# Patient Record
Sex: MALE | Race: BLACK OR AFRICAN AMERICAN | NOT HISPANIC OR LATINO | Employment: OTHER | URBAN - METROPOLITAN AREA
[De-identification: names, ages, dates, MRNs, and addresses within clinical notes are randomized per-mention and may not be internally consistent; named-entity substitution may affect disease eponyms.]

---

## 2017-01-09 RX ORDER — CARVEDILOL 25 MG/1
TABLET ORAL
Qty: 60 TABLET | Refills: 3 | Status: SHIPPED | OUTPATIENT
Start: 2017-01-09 | End: 2017-05-18 | Stop reason: SDUPTHER

## 2017-02-24 ENCOUNTER — LAB VISIT (OUTPATIENT)
Dept: LAB | Facility: HOSPITAL | Age: 72
End: 2017-02-24
Attending: INTERNAL MEDICINE
Payer: MEDICARE

## 2017-02-24 ENCOUNTER — TELEPHONE (OUTPATIENT)
Dept: INTERNAL MEDICINE | Facility: CLINIC | Age: 72
End: 2017-02-24

## 2017-02-24 DIAGNOSIS — R06.6 HICCUPS: ICD-10-CM

## 2017-02-24 DIAGNOSIS — I10 HTN (HYPERTENSION), BENIGN: Primary | ICD-10-CM

## 2017-02-24 DIAGNOSIS — I10 HTN (HYPERTENSION), BENIGN: ICD-10-CM

## 2017-02-24 LAB
ANION GAP SERPL CALC-SCNC: 10 MMOL/L
BUN SERPL-MCNC: 34 MG/DL
CALCIUM SERPL-MCNC: 9.5 MG/DL
CHLORIDE SERPL-SCNC: 98 MMOL/L
CO2 SERPL-SCNC: 28 MMOL/L
CREAT SERPL-MCNC: 2.2 MG/DL
EST. GFR  (AFRICAN AMERICAN): 33.4 ML/MIN/1.73 M^2
EST. GFR  (NON AFRICAN AMERICAN): 28.9 ML/MIN/1.73 M^2
GLUCOSE SERPL-MCNC: 502 MG/DL
POTASSIUM SERPL-SCNC: 4.3 MMOL/L
SODIUM SERPL-SCNC: 136 MMOL/L

## 2017-02-24 PROCEDURE — 36415 COLL VENOUS BLD VENIPUNCTURE: CPT | Mod: PO

## 2017-02-24 PROCEDURE — 83036 HEMOGLOBIN GLYCOSYLATED A1C: CPT

## 2017-02-24 PROCEDURE — 80048 BASIC METABOLIC PNL TOTAL CA: CPT

## 2017-02-24 RX ORDER — PANTOPRAZOLE SODIUM 40 MG/1
40 TABLET, DELAYED RELEASE ORAL DAILY
Qty: 30 TABLET | Refills: 3 | Status: SHIPPED | OUTPATIENT
Start: 2017-02-24 | End: 2017-06-30 | Stop reason: SDUPTHER

## 2017-02-25 LAB
ESTIMATED AVG GLUCOSE: 306 MG/DL
HBA1C MFR BLD HPLC: 12.3 %

## 2017-02-25 NOTE — PROGRESS NOTES
IM on call--  Lab call bs 500.  Spoke with pt. BS's running 300's generally.  Feels fine.  He is on 42 units levemir. Advised increase to 48 units daily.  Call PCP 1 week with BS readings

## 2017-03-02 ENCOUNTER — TELEPHONE (OUTPATIENT)
Dept: INTERNAL MEDICINE | Facility: CLINIC | Age: 72
End: 2017-03-02

## 2017-03-02 NOTE — TELEPHONE ENCOUNTER
Please schedule an appointment for patient to be seen.  He will need to have an evaluation.  Patient mentioned symptoms during his wife's appointment, and labs were ordered.  His blood sugar was very high, and insulin dose subsequently adjusted.

## 2017-03-02 NOTE — TELEPHONE ENCOUNTER
Family has been notified the patient needs to schedule an appointment, Also discussed  The levels and correct dieting. Will call to schedule

## 2017-03-03 ENCOUNTER — TELEPHONE (OUTPATIENT)
Dept: INTERNAL MEDICINE | Facility: CLINIC | Age: 72
End: 2017-03-03

## 2017-03-03 NOTE — TELEPHONE ENCOUNTER
----- Message from Mehnaz Mantilla sent at 3/2/2017  2:26 PM CST -----  Contact: Wife/[phone]  Sooner appointment than the  can schedule.  When is the first available appointment: 4/26  What is the nature of the appointment: follow up  What visit type: EP  Patient preference of timeframe to be scheduled:  After March 13  Comments: Pt's wife states that she spoke to Cee  About an apt in March

## 2017-03-03 NOTE — TELEPHONE ENCOUNTER
----- Message from Cha Armenta sent at 3/1/2017 11:32 AM CST -----  Contact: wife/juan/[phone]  Pt wife called in regards to a missed call from the office.        Please advise

## 2017-03-08 RX ORDER — INSULIN LISPRO 100 [IU]/ML
INJECTION, SOLUTION INTRAVENOUS; SUBCUTANEOUS
Qty: 1 BOX | Refills: 6 | Status: SHIPPED | OUTPATIENT
Start: 2017-03-08 | End: 2017-08-04 | Stop reason: SDUPTHER

## 2017-03-14 ENCOUNTER — OFFICE VISIT (OUTPATIENT)
Dept: INTERNAL MEDICINE | Facility: CLINIC | Age: 72
End: 2017-03-14
Payer: MEDICARE

## 2017-03-14 ENCOUNTER — LAB VISIT (OUTPATIENT)
Dept: LAB | Facility: HOSPITAL | Age: 72
End: 2017-03-14
Attending: INTERNAL MEDICINE
Payer: MEDICARE

## 2017-03-14 VITALS
WEIGHT: 249.31 LBS | HEIGHT: 75 IN | RESPIRATION RATE: 18 BRPM | TEMPERATURE: 98 F | BODY MASS INDEX: 31 KG/M2 | HEART RATE: 74 BPM | DIASTOLIC BLOOD PRESSURE: 78 MMHG | SYSTOLIC BLOOD PRESSURE: 146 MMHG

## 2017-03-14 DIAGNOSIS — N18.30 CKD (CHRONIC KIDNEY DISEASE) STAGE 3, GFR 30-59 ML/MIN: ICD-10-CM

## 2017-03-14 LAB
ANION GAP SERPL CALC-SCNC: 10 MMOL/L
BUN SERPL-MCNC: 27 MG/DL
CALCIUM SERPL-MCNC: 9.5 MG/DL
CHLORIDE SERPL-SCNC: 104 MMOL/L
CO2 SERPL-SCNC: 26 MMOL/L
CREAT SERPL-MCNC: 1.9 MG/DL
EST. GFR  (AFRICAN AMERICAN): 39.8 ML/MIN/1.73 M^2
EST. GFR  (NON AFRICAN AMERICAN): 34.5 ML/MIN/1.73 M^2
GLUCOSE SERPL-MCNC: 198 MG/DL
POTASSIUM SERPL-SCNC: 4.1 MMOL/L
SODIUM SERPL-SCNC: 140 MMOL/L

## 2017-03-14 PROCEDURE — 80048 BASIC METABOLIC PNL TOTAL CA: CPT

## 2017-03-14 PROCEDURE — 99999 PR PBB SHADOW E&M-EST. PATIENT-LVL III: CPT | Mod: PBBFAC,,, | Performed by: INTERNAL MEDICINE

## 2017-03-14 PROCEDURE — 99213 OFFICE O/P EST LOW 20 MIN: CPT | Mod: S$PBB,,, | Performed by: INTERNAL MEDICINE

## 2017-03-14 PROCEDURE — 36415 COLL VENOUS BLD VENIPUNCTURE: CPT | Mod: PO

## 2017-03-14 NOTE — MR AVS SNAPSHOT
[place]   [facility]  [place] [id number]  Phone: [phone]  Fax: [phone]                  Domingo Castro   3/14/2017 3:20 PM   Office Visit    Description:  Male : 1945   Provider:  Griselda Nugent MD   Department:  [facility] [place]           Reason for Visit     Follow-up           Diagnoses this Visit        Comments    Type 2 diabetes, uncontrolled, with neuropathy    -  Primary     CKD (chronic kidney disease) stage 3, GFR 30-59 ml/min                To Do List           Future Appointments        Provider Department Dept Phone    2017 8:15 AM HORACIO BROTHERS [facility] Ophthalmology [phone]    2017 9:30 AM Jane Moreno MD [facility] Ophthalmology [phone]      Goals (5 Years of Data)     None      Follow-Up and Disposition     Return in about 3 months (around 2017).      [facility]     [facility] On Call Nurse [facility] -  Assistance  Registered nurses in the [facility] Call Center provide clinical advisement, health education, appointment booking, and other advisory services.  Call for this free service at [phone].             Medications           Message regarding Medications     Verify the changes and/or additions to your medication regime listed below are the same as discussed with your clinician today.  If any of these changes or additions are incorrect, please notify your healthcare provider.             Verify that the below list of medications is an accurate representation of the medications you are currently taking.  If none reported, the list may be blank. If incorrect, please contact your healthcare provider. Carry this list with you in case of emergency.           Current Medications     amlodipine (NORVASC) 5 MG tablet Take 1 tablet (5 mg total) by mouth once daily.    aspirin (ECOTRIN) 81 MG EC tablet Take 1 tablet (81 mg total) by mouth once daily.    carvedilol (COREG) 25 MG tablet  "TAKE ONE TABLET BY MOUTH TWICE DAILY WITH MEALS    CRESTOR 20 mg tablet TAKE ONE TABLET BY MOUTH ONCE DAILY    cyclobenzaprine (FLEXERIL) 10 MG tablet TAKE ONE TABLET BY MOUTH THREE TIMES DAILY AS NEEDED FOR MUSCLE SPASM    gabapentin (NEURONTIN) 100 MG capsule Take 1 tab PO qam, 1 tab PO qpm and 3 tabs PO qHS    HUMALOG KWIKPEN 100 unit/mL InPn pen INJECT 10 UNITS SUBCUTANEOUSLY THREE TIMES DAILY WITH MEALS    hydrALAZINE (APRESOLINE) 50 MG tablet TAKE ONE TABLET BY MOUTH EVERY 12 HOURS    hydrochlorothiazide (HYDRODIURIL) 25 MG tablet Take 1 tablet (25 mg total) by mouth every morning.    hydrocodone-acetaminophen 5-325mg (NORCO) 5-325 mg per tablet Take 1 tablet by mouth every 4 to 6 hours as needed for Pain.    hydrocodone-acetaminophen 5-325mg (NORCO) 5-325 mg per tablet Take 1 tablet by mouth every 6 (six) hours as needed for Pain.    insulin detemir (LEVEMIR FLEXPEN) 100 unit/mL (3 mL) SubQ InPn pen Inject 48 Units into the skin once daily.    losartan (COZAAR) 100 MG tablet Take 1 tablet (100 mg total) by mouth once daily.    MG GLY-CALCIUM-POTASSIUM-E-NNEKA ORAL Take 25 mg by mouth once daily.    pantoprazole (PROTONIX) 40 MG tablet Take 1 tablet (40 mg total) by mouth once daily.           Clinical Reference Information           Your Vitals Were     BP Pulse Temp Resp Height Weight    146/78 (BP Location: Left arm, Patient Position: Sitting, BP Method: Manual) 74 98.2 °F (36.8 °C) (Oral) 18 6' 3" (1.905 m) 113.1 kg (249 lb 5.4 oz)    BMI                31.17 kg/m2          Blood Pressure          Most Recent Value    BP  (!)  146/78      Allergies as of 3/14/2017     Atorvastatin      Immunizations Administered on Date of Encounter - 3/14/2017     None      Orders Placed During Today's Visit     Future Labs/Procedures Expected by Expires    Basic metabolic panel  3/14/2017 3/14/2018      Language Assistance Services     ATTENTION: Language assistance services are available, free of charge. Please call " [phone].      ATENCIÓN: Si habla español, tiene a roth disposición servicios gratuitos de asistencia lingüística. Llame al [phone].     CHÚ Ý: N?u b?n nói Ti?ng Vi?t, có các d?ch v? h? tr? ngôn ng? mi?n phí dành cho b?n. G?i s? [phone].         [facility] complies with applicable Federal civil rights laws and does not discriminate on the basis of race, color, national origin, age, disability, or sex.

## 2017-03-14 NOTE — PROGRESS NOTES
CC: followup of hypertension and diabetes   HPI:  The patient is a 72 y.o. year old male who presents to the office for followup of hypertension and diabetes.  The patient denies any chest pain, shortness of breath, headache, nausea or vomiting, but complains of blurred vision and fatigue.  He reports his blood sugars have been labile.  He states his blood sugar was 60 today.  The patient states hiccups have resolved.    PAST MEDICAL HISTORY:  Past Medical History:   Diagnosis Date    Cataract     Cystoid macular edema of both eyes     Diabetes mellitus     Diabetes mellitus type II     Hyperlipemia     Hypertension     Retinal hole        SURGICAL HISTORY:  Past Surgical History:   Procedure Laterality Date    CATARACT EXTRACTION W/  INTRAOCULAR LENS IMPLANT Left 12/15/14    Dr de la cruz    CATARACT EXTRACTION W/  INTRAOCULAR LENS IMPLANT Right 14    dorothy    CIRCUMCISION, NON-      focal laser right eye      KNEE SURGERY      left    Left medial collateral ligament repair      TONSILLECTOMY         MEDS:  Medcard reviewed and updated    ALLERGIES: Allergy Card reviewed and updated    SOCIAL HISTORY:   The patient is a nonsmoker.    PE:   APPEARANCE: Well nourished, well developed, in no acute distress.    CHEST: Lungs clear to auscultation with unlabored respirations.  CARDIOVASCULAR: Normal S1, S2. No murmurs. No carotid bruits. No pedal edema.  ABDOMEN: Bowel sounds normal. Not distended. Soft. No tenderness or masses.   PSYCHIATRIC: The patient is oriented to person, place, and time and has a pleasant affect.        ASSESSMENT/PLAN:  Domingo was seen today for follow-up.    Diagnoses and all orders for this visit:    Type 2 diabetes, uncontrolled, with neuropathy  -     Basic metabolic panel; Future  -     Encourage adherence to diet and medications    CKD (chronic kidney disease) stage 3, GFR 30-59 ml/min  -     Basic metabolic panel; Future  -     Recent creatinine was elevated,  retest

## 2017-03-16 ENCOUNTER — TELEPHONE (OUTPATIENT)
Dept: INTERNAL MEDICINE | Facility: CLINIC | Age: 72
End: 2017-03-16

## 2017-03-16 DIAGNOSIS — N18.30 CKD (CHRONIC KIDNEY DISEASE) STAGE 3, GFR 30-59 ML/MIN: ICD-10-CM

## 2017-03-16 DIAGNOSIS — I10 HTN (HYPERTENSION), BENIGN: ICD-10-CM

## 2017-03-16 NOTE — TELEPHONE ENCOUNTER
Please inform patient that glucose remains elevated at 198, but overall improved.  Also, kidney function has improved somewhat.  Please schedule follow-up in 3 months with fasting labs.

## 2017-03-17 NOTE — TELEPHONE ENCOUNTER
Spoke to pt and he was given the result of lab and appt was booked . Patient said he would get it off his my chart portal

## 2017-05-02 ENCOUNTER — INITIAL CONSULT (OUTPATIENT)
Dept: OPHTHALMOLOGY | Facility: CLINIC | Age: 72
End: 2017-05-02
Payer: MEDICARE

## 2017-05-02 ENCOUNTER — CLINICAL SUPPORT (OUTPATIENT)
Dept: OPHTHALMOLOGY | Facility: CLINIC | Age: 72
End: 2017-05-02
Payer: MEDICARE

## 2017-05-02 DIAGNOSIS — H02.403 PTOSIS, BILATERAL: ICD-10-CM

## 2017-05-02 DIAGNOSIS — H02.423 MYOGENIC PTOSIS OF BILATERAL EYELIDS: Primary | ICD-10-CM

## 2017-05-02 DIAGNOSIS — E11.3413 DIABETIC MACULAR EDEMA OF BOTH EYES WITH SEVERE NONPROLIFERATIVE RETINOPATHY ASSOCIATED WITH TYPE 2 DIABETES MELLITUS: ICD-10-CM

## 2017-05-02 DIAGNOSIS — H35.033 HYPERTENSIVE RETINOPATHY OF BOTH EYES: ICD-10-CM

## 2017-05-02 DIAGNOSIS — E11.311 DIABETIC MACULAR EDEMA, RIGHT EYE: ICD-10-CM

## 2017-05-02 DIAGNOSIS — H33.302 RETINAL HOLE OR TEAR, LEFT: ICD-10-CM

## 2017-05-02 PROCEDURE — 99212 OFFICE O/P EST SF 10 MIN: CPT | Mod: PBBFAC | Performed by: OPHTHALMOLOGY

## 2017-05-02 PROCEDURE — 99999 PR PBB SHADOW E&M-EST. PATIENT-LVL II: CPT | Mod: PBBFAC,,, | Performed by: OPHTHALMOLOGY

## 2017-05-02 PROCEDURE — 92014 COMPRE OPH EXAM EST PT 1/>: CPT | Mod: S$PBB,,, | Performed by: OPHTHALMOLOGY

## 2017-05-02 PROCEDURE — 99999 PR PBB SHADOW E&M-EST. PATIENT-LVL I: CPT | Mod: PBBFAC,,,

## 2017-05-02 PROCEDURE — 99211 OFF/OP EST MAY X REQ PHY/QHP: CPT | Mod: PBBFAC,27

## 2017-05-02 NOTE — PROGRESS NOTES
HPI     Ptosis    Additional comments: ref. by Dr Harrison for ptosis eval           Comments   DLS 12/13/16- Patient feels as if lids are sagging to much. He has to open   his eyes really wide to see things. Difficult driving at night.    RGI=177 this am fasting  A1c=12.4 (10/28/16)    1. Severe NPDR OU     2. DME OU   S/p Focal OU (OS x 2 06/13)   S/p focal OS (6/1/15)   S/p Ozurdex x 1 (8/9/16)    3. PCIOL OU     4. HTN Ret OU     5. Ret Hole x 2   S/p barrier laser      Eyelid surgery by Dr Bautista               Last edited by Lyndsay Barkley on 5/2/2017  9:55 AM. (History)            Assessment /Plan     For exam results, see Encounter Report.    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  Now with recurrent ptosis.  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 Ice pack test negative.      Plan for bilateral external levator resection with conservative blepharoplasty. MRD 1 equivalent on exam today.    Informed consent obtained after extensive risks/benefits/alternatives were discussed with the patient including but not limited to pain, bleeding, infection, ocular injury, loss of the eye, asymmetry, need for revision in future, scarring.  Alternatives such as waiting were discussed.  All questions were answered.      Hold ASA, NSAIDS, and fish oil 5 to 7 days prior to procedure.    Return for surgery

## 2017-05-19 RX ORDER — CARVEDILOL 25 MG/1
TABLET ORAL
Qty: 60 TABLET | Refills: 3 | Status: SHIPPED | OUTPATIENT
Start: 2017-05-19 | End: 2017-08-28 | Stop reason: SDUPTHER

## 2017-06-02 DIAGNOSIS — H02.403 PTOSIS, BILATERAL: Primary | ICD-10-CM

## 2017-06-12 ENCOUNTER — PATIENT MESSAGE (OUTPATIENT)
Dept: INTERNAL MEDICINE | Facility: CLINIC | Age: 72
End: 2017-06-12

## 2017-06-13 ENCOUNTER — LAB VISIT (OUTPATIENT)
Dept: LAB | Facility: HOSPITAL | Age: 72
End: 2017-06-13
Attending: INTERNAL MEDICINE
Payer: MEDICARE

## 2017-06-13 DIAGNOSIS — N18.30 CKD (CHRONIC KIDNEY DISEASE) STAGE 3, GFR 30-59 ML/MIN: ICD-10-CM

## 2017-06-13 DIAGNOSIS — I10 HTN (HYPERTENSION), BENIGN: ICD-10-CM

## 2017-06-13 LAB
ALBUMIN SERPL BCP-MCNC: 3.5 G/DL
ALP SERPL-CCNC: 88 U/L
ALT SERPL W/O P-5'-P-CCNC: 15 U/L
ANION GAP SERPL CALC-SCNC: 10 MMOL/L
AST SERPL-CCNC: 15 U/L
BILIRUB SERPL-MCNC: 0.4 MG/DL
BUN SERPL-MCNC: 29 MG/DL
CALCIUM SERPL-MCNC: 9.7 MG/DL
CHLORIDE SERPL-SCNC: 104 MMOL/L
CHOLEST/HDLC SERPL: 3.4 {RATIO}
CO2 SERPL-SCNC: 25 MMOL/L
CREAT SERPL-MCNC: 2 MG/DL
EST. GFR  (AFRICAN AMERICAN): 37.4 ML/MIN/1.73 M^2
EST. GFR  (NON AFRICAN AMERICAN): 32.4 ML/MIN/1.73 M^2
ESTIMATED AVG GLUCOSE: 246 MG/DL
GLUCOSE SERPL-MCNC: 300 MG/DL
HBA1C MFR BLD HPLC: 10.2 %
HDL/CHOLESTEROL RATIO: 29.4 %
HDLC SERPL-MCNC: 143 MG/DL
HDLC SERPL-MCNC: 42 MG/DL
LDLC SERPL CALC-MCNC: 81.6 MG/DL
NONHDLC SERPL-MCNC: 101 MG/DL
POTASSIUM SERPL-SCNC: 3.7 MMOL/L
PROT SERPL-MCNC: 7.6 G/DL
SODIUM SERPL-SCNC: 139 MMOL/L
TRIGL SERPL-MCNC: 97 MG/DL
TSH SERPL DL<=0.005 MIU/L-ACNC: 1.59 UIU/ML

## 2017-06-13 PROCEDURE — 80061 LIPID PANEL: CPT

## 2017-06-13 PROCEDURE — 36415 COLL VENOUS BLD VENIPUNCTURE: CPT | Mod: PO

## 2017-06-13 PROCEDURE — 80053 COMPREHEN METABOLIC PANEL: CPT

## 2017-06-13 PROCEDURE — 83036 HEMOGLOBIN GLYCOSYLATED A1C: CPT

## 2017-06-13 PROCEDURE — 84443 ASSAY THYROID STIM HORMONE: CPT

## 2017-06-13 NOTE — TELEPHONE ENCOUNTER
i spoke to patient. He needed rx for bd needle to use with flexpen.  loaded for you to approve.    He is going to call [employer] and see what kind of glucometer is covered so can get lancets and strips ordered.    Thanks zhanna

## 2017-06-15 ENCOUNTER — TELEPHONE (OUTPATIENT)
Dept: INTERNAL MEDICINE | Facility: CLINIC | Age: 72
End: 2017-06-15

## 2017-06-15 NOTE — TELEPHONE ENCOUNTER
Attempted to call the pharmacy and speak with them in regards to the Rx request change. There is no one in the office until 9a/ will RTC again

## 2017-06-15 NOTE — TELEPHONE ENCOUNTER
----- Message from Madelaine Neville sent at 6/15/2017  8:18 AM CDT -----  Contact: Karlee Vale [phone]  Karlee Vale would like to speak with the nurse regarding changing the pts Pen Arabi to  [employer] Brand Pen Needles because they are cheaper ,Please call

## 2017-06-30 RX ORDER — LOSARTAN POTASSIUM 100 MG/1
TABLET ORAL
Qty: 90 TABLET | Refills: 3 | Status: SHIPPED | OUTPATIENT
Start: 2017-06-30 | End: 2017-08-28 | Stop reason: SDUPTHER

## 2017-06-30 RX ORDER — HYDROCHLOROTHIAZIDE 25 MG/1
TABLET ORAL
Qty: 90 TABLET | Refills: 3 | Status: SHIPPED | OUTPATIENT
Start: 2017-06-30 | End: 2017-08-28 | Stop reason: SDUPTHER

## 2017-06-30 RX ORDER — PANTOPRAZOLE SODIUM 40 MG/1
TABLET, DELAYED RELEASE ORAL
Qty: 30 TABLET | Refills: 3 | Status: SHIPPED | OUTPATIENT
Start: 2017-06-30 | End: 2017-08-28 | Stop reason: SDUPTHER

## 2017-07-05 RX ORDER — AMLODIPINE BESYLATE 5 MG/1
TABLET ORAL
Qty: 90 TABLET | Refills: 0 | Status: SHIPPED | OUTPATIENT
Start: 2017-07-05 | End: 2017-08-28 | Stop reason: SDUPTHER

## 2017-07-21 RX ORDER — AMLODIPINE BESYLATE 5 MG/1
TABLET ORAL
Qty: 90 TABLET | Refills: 0 | Status: ON HOLD | OUTPATIENT
Start: 2017-07-21 | End: 2018-07-23 | Stop reason: HOSPADM

## 2017-07-31 ENCOUNTER — TELEPHONE (OUTPATIENT)
Dept: OPHTHALMOLOGY | Facility: CLINIC | Age: 72
End: 2017-07-31

## 2017-07-31 NOTE — TELEPHONE ENCOUNTER
----- Message from Osiel Dobbins sent at 7/31/2017 11:29 AM CDT -----  Contact: Domingo Castro       ----- Message -----  From: Nae Hart  Sent: 7/27/2017   2:18 PM  To: Osiel Dobbins    Pt would like to speak with you about the scheduled the eye surgery pt can be reached at [phone] or Home#  [phone] please thanks.

## 2017-08-01 ENCOUNTER — TELEPHONE (OUTPATIENT)
Dept: INTERNAL MEDICINE | Facility: CLINIC | Age: 72
End: 2017-08-01

## 2017-08-01 NOTE — TELEPHONE ENCOUNTER
----- Message from Marilyn Betts sent at 7/31/2017 11:34 AM CDT -----  Contact: self   Patient state he is calling to give information from his insurance company, ask for a call back too much information to provide.

## 2017-08-01 NOTE — TELEPHONE ENCOUNTER
Called to advise the patient he can bring the insurance information to the office,. There was no answer lmom

## 2017-08-02 ENCOUNTER — PATIENT MESSAGE (OUTPATIENT)
Dept: INTERNAL MEDICINE | Facility: CLINIC | Age: 72
End: 2017-08-02

## 2017-08-03 ENCOUNTER — TELEPHONE (OUTPATIENT)
Dept: OPHTHALMOLOGY | Facility: CLINIC | Age: 72
End: 2017-08-03

## 2017-08-03 DIAGNOSIS — H02.423 MYOGENIC PTOSIS OF BILATERAL EYELIDS: Primary | ICD-10-CM

## 2017-08-04 RX ORDER — INSULIN LISPRO 100 [IU]/ML
INJECTION, SOLUTION INTRAVENOUS; SUBCUTANEOUS
Qty: 3 BOX | Refills: 6 | Status: SHIPPED | OUTPATIENT
Start: 2017-08-04 | End: 2017-08-25 | Stop reason: SDUPTHER

## 2017-08-25 RX ORDER — INSULIN LISPRO 100 [IU]/ML
INJECTION, SOLUTION INTRAVENOUS; SUBCUTANEOUS
Qty: 3 BOX | Refills: 6 | Status: SHIPPED | OUTPATIENT
Start: 2017-08-25 | End: 2017-08-28 | Stop reason: SDUPTHER

## 2017-08-28 ENCOUNTER — TELEPHONE (OUTPATIENT)
Dept: OPHTHALMOLOGY | Facility: CLINIC | Age: 72
End: 2017-08-28

## 2017-08-28 DIAGNOSIS — R09.1 PLEURISY: ICD-10-CM

## 2017-08-28 DIAGNOSIS — M54.12 CERVICAL RADICULOPATHY: ICD-10-CM

## 2017-08-28 DIAGNOSIS — M62.838 MUSCLE SPASMS OF NECK: ICD-10-CM

## 2017-08-28 RX ORDER — AMLODIPINE BESYLATE 5 MG/1
5 TABLET ORAL DAILY
Qty: 90 TABLET | Refills: 3 | Status: ON HOLD | OUTPATIENT
Start: 2017-08-28 | End: 2017-08-30 | Stop reason: HOSPADM

## 2017-08-28 RX ORDER — HYDROCODONE BITARTRATE AND ACETAMINOPHEN 5; 325 MG/1; MG/1
1 TABLET ORAL
Qty: 30 TABLET | Refills: 0 | OUTPATIENT
Start: 2017-08-28

## 2017-08-28 RX ORDER — ROSUVASTATIN CALCIUM 20 MG/1
20 TABLET, COATED ORAL DAILY
Qty: 90 TABLET | Refills: 3 | Status: SHIPPED | OUTPATIENT
Start: 2017-08-28 | End: 2018-11-02 | Stop reason: SDUPTHER

## 2017-08-28 RX ORDER — AMLODIPINE BESYLATE 5 MG/1
5 TABLET ORAL DAILY
Qty: 90 TABLET | Refills: 0 | OUTPATIENT
Start: 2017-08-28

## 2017-08-28 RX ORDER — HYDROCODONE BITARTRATE AND ACETAMINOPHEN 5; 325 MG/1; MG/1
1 TABLET ORAL EVERY 6 HOURS PRN
Qty: 12 TABLET | Refills: 0 | OUTPATIENT
Start: 2017-08-28

## 2017-08-28 RX ORDER — LOSARTAN POTASSIUM 100 MG/1
100 TABLET ORAL DAILY
Qty: 90 TABLET | Refills: 3 | Status: ON HOLD | OUTPATIENT
Start: 2017-08-28 | End: 2018-07-23 | Stop reason: HOSPADM

## 2017-08-28 RX ORDER — GABAPENTIN 100 MG/1
CAPSULE ORAL
Qty: 450 CAPSULE | Refills: 3 | Status: ON HOLD | OUTPATIENT
Start: 2017-08-28 | End: 2017-08-30 | Stop reason: HOSPADM

## 2017-08-28 RX ORDER — HYDRALAZINE HYDROCHLORIDE 50 MG/1
50 TABLET, FILM COATED ORAL EVERY 12 HOURS
Qty: 180 TABLET | Refills: 3 | Status: ON HOLD | OUTPATIENT
Start: 2017-08-28 | End: 2018-07-23 | Stop reason: HOSPADM

## 2017-08-28 RX ORDER — ASPIRIN 81 MG/1
81 TABLET ORAL DAILY
Qty: 90 TABLET | Refills: 0 | OUTPATIENT
Start: 2017-08-28 | End: 2017-11-26

## 2017-08-28 RX ORDER — CYCLOBENZAPRINE HCL 10 MG
10 TABLET ORAL 3 TIMES DAILY PRN
Qty: 180 TABLET | Refills: 0 | Status: SHIPPED | OUTPATIENT
Start: 2017-08-28 | End: 2017-11-26

## 2017-08-28 RX ORDER — PANTOPRAZOLE SODIUM 40 MG/1
40 TABLET, DELAYED RELEASE ORAL DAILY
Qty: 90 TABLET | Refills: 3 | Status: SHIPPED | OUTPATIENT
Start: 2017-08-28 | End: 2017-11-26

## 2017-08-28 RX ORDER — CARVEDILOL 25 MG/1
25 TABLET ORAL 2 TIMES DAILY WITH MEALS
Qty: 180 TABLET | Refills: 0 | Status: SHIPPED | OUTPATIENT
Start: 2017-08-28 | End: 2017-11-26 | Stop reason: SDUPTHER

## 2017-08-28 RX ORDER — HYDROCHLOROTHIAZIDE 25 MG/1
25 TABLET ORAL EVERY MORNING
Qty: 90 TABLET | Refills: 3 | Status: SHIPPED | OUTPATIENT
Start: 2017-08-28 | End: 2018-06-14

## 2017-08-28 RX ORDER — INSULIN LISPRO 100 [IU]/ML
INJECTION, SOLUTION INTRAVENOUS; SUBCUTANEOUS
Qty: 9 BOX | Refills: 6 | Status: SHIPPED | OUTPATIENT
Start: 2017-08-28 | End: 2018-05-18 | Stop reason: SDUPTHER

## 2017-08-30 ENCOUNTER — SURGERY (OUTPATIENT)
Age: 72
End: 2017-08-30

## 2017-08-30 ENCOUNTER — ANESTHESIA (OUTPATIENT)
Dept: SURGERY | Facility: HOSPITAL | Age: 72
End: 2017-08-30
Payer: MEDICARE

## 2017-08-30 ENCOUNTER — ANESTHESIA EVENT (OUTPATIENT)
Dept: SURGERY | Facility: HOSPITAL | Age: 72
End: 2017-08-30
Payer: MEDICARE

## 2017-08-30 ENCOUNTER — HOSPITAL ENCOUNTER (OUTPATIENT)
Facility: HOSPITAL | Age: 72
Discharge: HOME | End: 2017-08-30
Attending: OPHTHALMOLOGY | Admitting: OPHTHALMOLOGY
Payer: MEDICARE

## 2017-08-30 VITALS
HEART RATE: 60 BPM | RESPIRATION RATE: 18 BRPM | BODY MASS INDEX: 29.96 KG/M2 | TEMPERATURE: 99 F | WEIGHT: 246 LBS | OXYGEN SATURATION: 100 % | DIASTOLIC BLOOD PRESSURE: 79 MMHG | HEIGHT: 76 IN | SYSTOLIC BLOOD PRESSURE: 174 MMHG

## 2017-08-30 DIAGNOSIS — H02.429: ICD-10-CM

## 2017-08-30 DIAGNOSIS — H02.423 MYOGENIC PTOSIS OF BILATERAL EYELIDS: Primary | ICD-10-CM

## 2017-08-30 LAB
POCT GLUCOSE: 119 MG/DL (ref 70–110)
POCT GLUCOSE: 80 MG/DL (ref 70–110)

## 2017-08-30 PROCEDURE — 25000003 PHARM REV CODE 250: Performed by: NURSE ANESTHETIST, CERTIFIED REGISTERED

## 2017-08-30 PROCEDURE — 88304 TISSUE EXAM BY PATHOLOGIST: CPT | Mod: 26,,,

## 2017-08-30 PROCEDURE — 71000015 HC POSTOP RECOV 1ST HR: Performed by: OPHTHALMOLOGY

## 2017-08-30 PROCEDURE — 36000706: Performed by: OPHTHALMOLOGY

## 2017-08-30 PROCEDURE — 37000008 HC ANESTHESIA 1ST 15 MINUTES: Performed by: OPHTHALMOLOGY

## 2017-08-30 PROCEDURE — 82962 GLUCOSE BLOOD TEST: CPT | Performed by: OPHTHALMOLOGY

## 2017-08-30 PROCEDURE — 36000707: Performed by: OPHTHALMOLOGY

## 2017-08-30 PROCEDURE — 63600175 PHARM REV CODE 636 W HCPCS: Performed by: NURSE ANESTHETIST, CERTIFIED REGISTERED

## 2017-08-30 PROCEDURE — 25000003 PHARM REV CODE 250: Performed by: OPHTHALMOLOGY

## 2017-08-30 PROCEDURE — 88304 TISSUE EXAM BY PATHOLOGIST: CPT

## 2017-08-30 PROCEDURE — 27201423 OPTIME MED/SURG SUP & DEVICES STERILE SUPPLY: Performed by: OPHTHALMOLOGY

## 2017-08-30 PROCEDURE — 67904 REPAIR EYELID DEFECT: CPT | Mod: 50,,, | Performed by: OPHTHALMOLOGY

## 2017-08-30 PROCEDURE — D9220A PRA ANESTHESIA: Mod: ANES,,, | Performed by: ANESTHESIOLOGY

## 2017-08-30 PROCEDURE — D9220A PRA ANESTHESIA: Mod: CRNA,,, | Performed by: NURSE ANESTHETIST, CERTIFIED REGISTERED

## 2017-08-30 PROCEDURE — 37000009 HC ANESTHESIA EA ADD 15 MINS: Performed by: OPHTHALMOLOGY

## 2017-08-30 RX ORDER — PROPOFOL 10 MG/ML
VIAL (ML) INTRAVENOUS
Status: DISCONTINUED | OUTPATIENT
Start: 2017-08-30 | End: 2017-08-30

## 2017-08-30 RX ORDER — OXYCODONE AND ACETAMINOPHEN 5; 325 MG/1; MG/1
1 TABLET ORAL EVERY 6 HOURS PRN
Qty: 16 TABLET | Refills: 0 | Status: SHIPPED | OUTPATIENT
Start: 2017-08-30 | End: 2018-07-16 | Stop reason: SDUPTHER

## 2017-08-30 RX ORDER — FENTANYL CITRATE 50 UG/ML
25 INJECTION, SOLUTION INTRAMUSCULAR; INTRAVENOUS EVERY 5 MIN PRN
Status: DISCONTINUED | OUTPATIENT
Start: 2017-08-30 | End: 2017-08-30 | Stop reason: HOSPADM

## 2017-08-30 RX ORDER — LIDOCAINE HYDROCHLORIDE AND EPINEPHRINE 20; 10 MG/ML; UG/ML
INJECTION, SOLUTION INFILTRATION; PERINEURAL
Status: DISCONTINUED
Start: 2017-08-30 | End: 2017-08-30 | Stop reason: HOSPADM

## 2017-08-30 RX ORDER — SODIUM CHLORIDE 9 MG/ML
INJECTION, SOLUTION INTRAVENOUS CONTINUOUS PRN
Status: DISCONTINUED | OUTPATIENT
Start: 2017-08-30 | End: 2017-08-30

## 2017-08-30 RX ORDER — DIPHENHYDRAMINE HYDROCHLORIDE 50 MG/ML
25 INJECTION INTRAMUSCULAR; INTRAVENOUS EVERY 6 HOURS PRN
Status: DISCONTINUED | OUTPATIENT
Start: 2017-08-30 | End: 2017-08-30 | Stop reason: HOSPADM

## 2017-08-30 RX ORDER — TETRACAINE HYDROCHLORIDE 5 MG/ML
1 SOLUTION OPHTHALMIC ONCE
Status: DISCONTINUED | OUTPATIENT
Start: 2017-08-30 | End: 2017-08-30 | Stop reason: HOSPADM

## 2017-08-30 RX ORDER — HYDROMORPHONE HYDROCHLORIDE 1 MG/ML
0.2 INJECTION, SOLUTION INTRAMUSCULAR; INTRAVENOUS; SUBCUTANEOUS EVERY 5 MIN PRN
Status: DISCONTINUED | OUTPATIENT
Start: 2017-08-30 | End: 2017-08-30 | Stop reason: HOSPADM

## 2017-08-30 RX ORDER — LIDOCAINE HYDROCHLORIDE 10 MG/ML
1 INJECTION, SOLUTION EPIDURAL; INFILTRATION; INTRACAUDAL; PERINEURAL ONCE
Status: DISCONTINUED | OUTPATIENT
Start: 2017-08-30 | End: 2017-08-30 | Stop reason: HOSPADM

## 2017-08-30 RX ORDER — MIDAZOLAM HYDROCHLORIDE 1 MG/ML
INJECTION, SOLUTION INTRAMUSCULAR; INTRAVENOUS
Status: DISCONTINUED | OUTPATIENT
Start: 2017-08-30 | End: 2017-08-30

## 2017-08-30 RX ORDER — ERYTHROMYCIN 5 MG/G
OINTMENT OPHTHALMIC NIGHTLY
Qty: 1 TUBE | Refills: 1 | Status: SHIPPED | OUTPATIENT
Start: 2017-08-30 | End: 2017-10-03

## 2017-08-30 RX ORDER — TETRACAINE HYDROCHLORIDE 5 MG/ML
SOLUTION OPHTHALMIC
Status: DISCONTINUED | OUTPATIENT
Start: 2017-08-30 | End: 2017-08-30 | Stop reason: HOSPADM

## 2017-08-30 RX ORDER — OXYCODONE AND ACETAMINOPHEN 5; 325 MG/1; MG/1
1 TABLET ORAL ONCE
Status: COMPLETED | OUTPATIENT
Start: 2017-08-30 | End: 2017-08-30

## 2017-08-30 RX ORDER — LIDOCAINE HYDROCHLORIDE AND EPINEPHRINE 20; 10 MG/ML; UG/ML
INJECTION, SOLUTION INFILTRATION; PERINEURAL
Status: DISCONTINUED | OUTPATIENT
Start: 2017-08-30 | End: 2017-08-30 | Stop reason: HOSPADM

## 2017-08-30 RX ADMIN — PROPOFOL 20 MG: 10 INJECTION, EMULSION INTRAVENOUS at 05:08

## 2017-08-30 RX ADMIN — LIDOCAINE HYDROCHLORIDE AND EPINEPHRINE 2.5 ML: 20; 10 INJECTION, SOLUTION INFILTRATION; PERINEURAL at 05:08

## 2017-08-30 RX ADMIN — PROPOFOL 10 MG: 10 INJECTION, EMULSION INTRAVENOUS at 05:08

## 2017-08-30 RX ADMIN — OXYCODONE HYDROCHLORIDE AND ACETAMINOPHEN 1 TABLET: 5; 325 TABLET ORAL at 08:08

## 2017-08-30 RX ADMIN — MIDAZOLAM HYDROCHLORIDE 1 MG: 1 INJECTION, SOLUTION INTRAMUSCULAR; INTRAVENOUS at 06:08

## 2017-08-30 RX ADMIN — PROPOFOL 50 MG: 10 INJECTION, EMULSION INTRAVENOUS at 05:08

## 2017-08-30 RX ADMIN — SODIUM CHLORIDE: 0.9 INJECTION, SOLUTION INTRAVENOUS at 04:08

## 2017-08-30 RX ADMIN — TETRACAINE HYDROCHLORIDE 1 DROP: 5 SOLUTION OPHTHALMIC at 05:08

## 2017-08-30 NOTE — ANESTHESIA PREPROCEDURE EVALUATION
08/30/2017  Domingo Castro is a 72 y.o., male.    Anesthesia Evaluation    I have reviewed the Patient Summary Reports.    I have reviewed the Nursing Notes.   I have reviewed the Medications.     Review of Systems  Anesthesia Hx:  No problems with previous Anesthesia    Hematology/Oncology:  Hematology Normal   Oncology Normal     Cardiovascular:   Hypertension    Renal/:   Chronic Renal Disease, CRI    Hepatic/GI:  Hepatic/GI Normal    Neurological:   Neuromuscular Disease,    Endocrine:   Diabetes, type 2        Physical Exam  General:  Well nourished    Airway/Jaw/Neck:  Airway Findings: Mouth Opening: Normal Tongue: Normal  General Airway Assessment: Adult  Mallampati: II  Improves to II with phonation.  TM Distance: Normal, at least 6 cm      Dental:  Dental Findings: In tact   Chest/Lungs:  Chest/Lungs Findings: Clear to auscultation     Heart/Vascular:  Heart Findings: Rate: Normal  Rhythm: Regular Rhythm  Sounds: Normal        Mental Status:  Mental Status Findings:  Cooperative, Alert and Oriented         Anesthesia Plan  Type of Anesthesia, risks & benefits discussed:  Anesthesia Type:  MAC  Patient's Preference: Sedation  Intra-op Monitoring Plan: standard ASA monitors  Intra-op Monitoring Plan Comments:   Post Op Pain Control Plan: per primary service following discharge from PACU  Post Op Pain Control Plan Comments:   Induction:   IV  Beta Blocker:  Patient is on a Beta-Blocker and has received one dose within the past 24 hours (No further documentation required).       Informed Consent: Patient understands risks and agrees with Anesthesia plan.  Questions answered. Anesthesia consent signed with patient.  ASA Score: 3     Day of Surgery Review of History & Physical:    H&P update referred to the surgeon.     Anesthesia Plan Notes: Sedation plan discussed.          Ready For Surgery From  Anesthesia Perspective.

## 2017-08-30 NOTE — H&P
Ophthalmology H&P    CC: lids sagging too much    HPI: Domingo Castro is a 72 y.o. male, currently feeling well, able to lie flat without having shortness of breath, has no current complaints of pain, headache, dizziness, fever, or chills, and feels well enough to undergo eye surgery. Pt has history of blepharoplasty and ptosis repair now with recurrent ptosis.     PMHx: has a past medical history of Cataract; Cystoid macular edema of both eyes; Diabetes mellitus; Diabetes mellitus type II; Hyperlipemia; Hypertension; and Retinal hole.     PSurgHx:  has a past surgical history that includes Tonsillectomy; Knee surgery; Left medial collateral ligament repair; focal laser right eye; Cataract extraction w/  intraocular lens implant (Left, 12/15/14); Cataract extraction w/  intraocular lens implant (Right, 14); and Circumcision, non-.     Medications:  Prior to Admission medications    Medication Sig Start Date End Date Taking? Authorizing Provider   amlodipine (NORVASC) 5 MG tablet TAKE ONE TABLET BY MOUTH ONCE DAILY 17  Yes Griselda Nugent MD   aspirin (ECOTRIN) 81 MG EC tablet Take 1 tablet (81 mg total) by mouth once daily. 14 Yes Griselda Nugent MD   carvedilol (COREG) 25 MG tablet Take 1 tablet (25 mg total) by mouth 2 (two) times daily with meals. 17 Yes Griselda Nugent MD   gabapentin (NEURONTIN) 100 MG capsule Take 1 tab PO qam, 1 tab PO qpm and 3 tabs PO qHS 17  Yes Griselda Nugent MD   hydrALAZINE (APRESOLINE) 50 MG tablet Take 1 tablet (50 mg total) by mouth every 12 (twelve) hours. 17  Yes Griselda Nugent MD   hydrochlorothiazide (HYDRODIURIL) 25 MG tablet Take 1 tablet (25 mg total) by mouth every morning. 17  Yes Griselda Nugent MD   insulin detemir (LEVEMIR FLEXPEN) 100 unit/mL (3 mL) SubQ InPn pen Inject 48 Units into the skin once daily. 17 Yes Griselda Nugent MD   insulin lispro (HUMALOG KWIKPEN) 100 unit/mL  "InPn pen INJECT 10 UNITS SUBCUTANEOUSLY THREE TIMES DAILY WITH MEALS 8/28/17  Yes Griselda Nugent MD   losartan (COZAAR) 100 MG tablet Take 1 tablet (100 mg total) by mouth once daily. 8/28/17  Yes Griselda Nugent MD   MG GLY-CALCIUM-POTASSIUM-E-NNEKA ORAL Take 25 mg by mouth once daily. 3/14/16  Yes Historical Provider, MD   pantoprazole (PROTONIX) 40 MG tablet Take 1 tablet (40 mg total) by mouth once daily. 8/28/17 11/26/17 Yes Griselda Nugent MD   rosuvastatin (CRESTOR) 20 MG tablet Take 1 tablet (20 mg total) by mouth once daily. 8/28/17 11/26/17 Yes Griselda Nugent MD   amlodipine (NORVASC) 5 MG tablet Take 1 tablet (5 mg total) by mouth once daily. 8/28/17   Griselda Nugent MD   blood sugar diagnostic Strp 1 strip by Misc.(Non-Drug; Combo Route) route once daily. 8/28/17 11/26/17  Griselda Nugent MD   cyclobenzaprine (FLEXERIL) 10 MG tablet Take 1 tablet (10 mg total) by mouth 3 (three) times daily as needed. 8/28/17 11/26/17  Griselda Nugent MD   hydrocodone-acetaminophen 5-325mg (NORCO) 5-325 mg per tablet Take 1 tablet by mouth every 4 to 6 hours as needed for Pain. 9/29/14   Griselda Nugent MD   hydrocodone-acetaminophen 5-325mg (NORCO) 5-325 mg per tablet Take 1 tablet by mouth every 6 (six) hours as needed for Pain. 10/15/16   Karly Sam MD   pen needle, diabetic, safety (BD AUTOSHIELD PEN NEEDLE) 29 gauge x 5/16" Ndle For use once daily with levemir flexpen 8/28/17   Griselda Nugent MD       Allergies:is allergic to atorvastatin.      Social: reports that he has never smoked. He has never used smokeless tobacco. He reports that he does not drink alcohol or use drugs.     Family Hx:family history includes Cancer in his brother, mother, and sister; Cataracts in his paternal grandmother; Diabetes in his father; Glaucoma in his paternal grandmother; Heart disease in his father and mother.     ROS: Negative x 10 except for eye complaints pre-operatively; negative for fever, " chills, weight loss, nausea, vomiting, diarrhea, shortness of breath, nasal discharge, cough, abdominal pain, dyspnea, difficulty moving arms and legs, confusion, dysuria, palpitations, or chest pain     PE:  Patient appears well developed and well nourished and is in no acute distress, is normocephalic and atraumatic. Pt is resting comfortably and breathing at a normal rate. Pulses are intact and heart is beating at a normal rate. Abdomen is not distended. Pt is able to ambulate and move arms and legs appropriately. Pt is awake, alert, and oriented x3. See ophthalmology clinic note for full ocular details of examination findings.     Assessment/Plan:   Mr. Castro is a 72-year-old male with history of blepharoplasty and ptosis repair that presents with recurrent ptosis Plan for bilateral external levator resection with conservative blepharoplasty in OR today.

## 2017-08-30 NOTE — OP NOTE
PROCEDURE NOTE:  8/30/17  Attending: Jane Moreno M.D.  Resident: RUDY Childers M.D.  Procedure: Blepharoplasty OU; External Levator Resection OU  Pre-op Dx: myogenic ptosis OU  Post-op Dx: same  Local: 2% lidocaine with epinephrine  Specimens: None  Blood Loss: less than 5 mL     The patient presents with a history of ptosis repair and blepharoplasty now with recurrent ptosis impairing his superior visual fields. Plan for bilateral external levator resection with conservative blepharoplasty.Informed consent obtained after extensive risks/benefits/alternatives were discussed with the patient including but not limited to pain, bleeding, infection, ocular injury, loss of the eye, asymmetry, need for revision in future, scarring.  Alternatives such as waiting were discussed.  All questions were answered.      The patient was brought into the room and marked and injected with local anesthesia as above. The patient was then prepped and draped in the usual sterile manner for ophthalmic plastic surgery. Corneal shield was placed into both eyes. Attention was placed to the left eye. A 4-0 Silk traction suture was placed into the tarsus at the lid margin. A #15 blade was then used to incise the skin of the blepharoplasty incision, and dissection with #15 blade was used to elevate the skin flap of the incision. Skin hooks were then used to pull tension over the incision, and sharp westcotts were used to make a horizontal cut over the tarsus through the orbicularis down to the external surface of tarsus, also exposing the underlying mullers muscle and orbital septum. The sharp westcotts were then used to make a similar horizontal incision through the orbital septum, taking care not to involve the levator. Hemostasis was achieved with monopolar cautery. Once the septum was opened, the scrolled up levator aponeurosis was identified. The levator was dissected off of the underlying mullers muscle with high-temp cautery and was  able to be brought down freely over the tarsus. A prior prolene suture was encountered within Carter's muscle which was cut out with Kiran scissors. A 5-0 Vicryl double armed suture was then placed centrally at the superior margin of tarsal border, and then was placed through the levator aponeurosis and temporarily tied down. The position of the lid was then checked by raising the head of the bed and asking the patient to look straight ahead. Once the lid was in satisfactory height and contour, the 5-0 Vicryl was permanently tied down. 2-3 mm of the redundant edge of levator aponeurosis was then excised. A 6-0 Prolene running suture was used to close the lid incision, taking care to incorporate the edge of levator with every other throw. The entirety of the procedure was repeated on the right side. The corneal shields were removed. Tobradex ointment was applied to the wound and the eyes. The patient tolerated the procedure well. There were no complications.

## 2017-08-31 NOTE — ANESTHESIA RELEASE NOTE
Anesthesia Release from PACU note     Patient: Domingo Castro  Procedure(s) Performed: Procedure(s) (LRB):  REPAIR-PTOSIS/BILATERAL EXTERNAL LEVATORS (Bilateral)  BLEPHAROPLASTY (Bilateral)  Anesthesia type: MAC  Post pain: Adequate analgesia  Post assessment: no apparent anesthetic complications, tolerated procedure well and no evidence of recall  Last Vitals:   Vitals:    08/30/17 1915   BP: (!) 156/67   Pulse: (!) 54   Resp: 18   Temp: 37 °C (98.6 °F)   SpO2: 100%     Post vital signs: stable  Level of consciousness: awake, alert  and oriented  Nausea/Vomiting: no nausea/no vomiting  Complications: none  Airway Patency: patent  Respiratory: unassisted  Cardiovascular: stable and blood pressure at baseline  Hydration: euvolemic

## 2017-08-31 NOTE — TRANSFER OF CARE
"Anesthesia Transfer of Care Note    Patient: Domingo Castro    Procedure(s) Performed: Procedure(s) (LRB):  REPAIR-PTOSIS/BILATERAL EXTERNAL LEVATORS (Bilateral)  BLEPHAROPLASTY (Bilateral)    Patient location: [facility]    Anesthesia Type: MAC    Transport from OR: Transported from OR on 2-3 L/min O2 by NC with adequate spontaneous ventilation    Post pain: adequate analgesia    Post assessment: no apparent anesthetic complications    Post vital signs: stable    Level of consciousness: awake and alert    Nausea/Vomiting: no nausea/vomiting    Complications: none    Transfer of care protocol was followed      Last vitals:   Visit Vitals  BP (!) 160/68 (BP Location: Right arm, Patient Position: Lying)   Pulse (!) 56   Temp 36.9 °C (98.4 °F) (Oral)   Resp 20   Ht 6' 3.5" (1.918 m)   Wt 111.6 kg (246 lb)   SpO2 100%   BMI 30.34 kg/m²     "

## 2017-08-31 NOTE — PLAN OF CARE
Patient and family state they are ready to be discharged. Instructions and narcotic prescription given to patient and family. Both verbalize understanding. Patient tolerating po liquids with no difficulty. Patient states pain is at a tolerable level for them. Anesthesia consent and surgical consent in chart upon patient's discharge from [facility].

## 2017-08-31 NOTE — DISCHARGE SUMMARY
Discharge Summary  Ophthalmology Service    Admit Date: 8/30/2017     Discharge Date: 8/30/2017     Attending Physician: Jane Moreno MD     Discharged Condition: Good    Reason for Admission: Myogenic ptosis of bilateral eyelids [H02.423]  Myogenic ptosis [H02.429]     Treatments/Procedures: Procedure(s) (LRB):  REPAIR-PTOSIS/BILATERAL EXTERNAL LEVATORS (Bilateral)  BLEPHAROPLASTY (Bilateral) (see dictated report for details).    Hospital Course: Stable    Consults: None    Significant Diagnostic Studies: None    Disposition: Home    Patient Instructions:   - Resume same diet as prior to surgery  - Limit activity, no heavy lifting  - Call MD for severe uncontrolled pain  - Follow-up with ophthalmology as instructed    Patient Instructions:   Current Discharge Medication List      START taking these medications    Details   erythromycin (ROMYCIN) ophthalmic ointment Place into both eyes every evening. Place into both eyes and onto incision sites nightly  Qty: 1 Tube, Refills: 1      oxycodone-acetaminophen (ROXICET) 5-325 mg per tablet Take 1 tablet by mouth every 6 (six) hours as needed for Pain.  Qty: 16 tablet, Refills: 0         CONTINUE these medications which have NOT CHANGED    Details   amlodipine (NORVASC) 5 MG tablet TAKE ONE TABLET BY MOUTH ONCE DAILY  Qty: 90 tablet, Refills: 0      aspirin (ECOTRIN) 81 MG EC tablet Take 1 tablet (81 mg total) by mouth once daily.  Refills: 0      carvedilol (COREG) 25 MG tablet Take 1 tablet (25 mg total) by mouth 2 (two) times daily with meals.  Qty: 180 tablet, Refills: 0      hydrALAZINE (APRESOLINE) 50 MG tablet Take 1 tablet (50 mg total) by mouth every 12 (twelve) hours.  Qty: 180 tablet, Refills: 3      hydrochlorothiazide (HYDRODIURIL) 25 MG tablet Take 1 tablet (25 mg total) by mouth every morning.  Qty: 90 tablet, Refills: 3      insulin detemir (LEVEMIR FLEXPEN) 100 unit/mL (3 mL) SubQ InPn pen Inject 48 Units into the skin once daily.  Qty: 43.2 mL,  "Refills: 3      insulin lispro (HUMALOG KWIKPEN) 100 unit/mL InPn pen INJECT 10 UNITS SUBCUTANEOUSLY THREE TIMES DAILY WITH MEALS  Qty: 9 Box, Refills: 6      losartan (COZAAR) 100 MG tablet Take 1 tablet (100 mg total) by mouth once daily.  Qty: 90 tablet, Refills: 3      MG GLY-CALCIUM-POTASSIUM-E-NNEKA ORAL Take 25 mg by mouth once daily.      pantoprazole (PROTONIX) 40 MG tablet Take 1 tablet (40 mg total) by mouth once daily.  Qty: 90 tablet, Refills: 3      rosuvastatin (CRESTOR) 20 MG tablet Take 1 tablet (20 mg total) by mouth once daily.  Qty: 90 tablet, Refills: 3      blood sugar diagnostic Strp 1 strip by Misc.(Non-Drug; Combo Route) route once daily.  Qty: 200 strip, Refills: 0      cyclobenzaprine (FLEXERIL) 10 MG tablet Take 1 tablet (10 mg total) by mouth 3 (three) times daily as needed.  Qty: 180 tablet, Refills: 0    Associated Diagnoses: Cervical radiculopathy; Muscle spasms of neck      hydrocodone-acetaminophen 5-325mg (NORCO) 5-325 mg per tablet Take 1 tablet by mouth every 4 to 6 hours as needed for Pain.  Qty: 30 tablet, Refills: 0    Comments: Medication is sedating.      pen needle, diabetic, safety (BD AUTOSHIELD PEN NEEDLE) 29 gauge x 5/16" Ndle For use once daily with levemir flexpen  Qty: 90 each, Refills: 11         STOP taking these medications       gabapentin (NEURONTIN) 100 MG capsule Comments:   Reason for Stopping:                 Discharge Procedure Orders  Diet general     Diet general           "

## 2017-08-31 NOTE — ANESTHESIA POSTPROCEDURE EVALUATION
"Anesthesia Post Evaluation    Patient: Domingo Castro    Procedure(s) Performed: Procedure(s) (LRB):  REPAIR-PTOSIS/BILATERAL EXTERNAL LEVATORS (Bilateral)  BLEPHAROPLASTY (Bilateral)    Final Anesthesia Type: MAC  Patient location during evaluation: PACU  Patient participation: Yes- Able to Participate  Level of consciousness: awake and alert  Post-procedure vital signs: reviewed and stable  Pain management: adequate  Airway patency: patent  PONV status at discharge: No PONV  Anesthetic complications: no      Cardiovascular status: blood pressure returned to baseline  Respiratory status: unassisted  Hydration status: euvolemic  Follow-up not needed.        Visit Vitals  BP (!) 156/67   Pulse (!) 54   Temp 37 °C (98.6 °F) (Skin)   Resp 18   Ht 6' 3.5" (1.918 m)   Wt 111.6 kg (246 lb)   SpO2 100%   BMI 30.34 kg/m²       Pain/Ashli Score: Pain Assessment Performed: Yes (8/30/2017  7:45 PM)  Presence of Pain: denies (8/30/2017  7:45 PM)      "

## 2017-08-31 NOTE — DISCHARGE INSTRUCTIONS
Recovery After Procedural Sedation (Adult)  You have been given medicine by vein to make you sleep during your surgery. This may have included both a pain medicine and sleeping medicine. Most of the effects have worn off. But you may still have some drowsiness for the next 6 to 8 hours.  Home care  Follow these guidelines when you get home:  · For the next 8 hours, you should be watched by a responsible adult. This person should make sure your condition is not getting worse.  · Don't drink any alcohol for the next 24 hours.  · Don't drive, operate dangerous machinery, or make important business or personal decisions during the next 24 hours.  Note: Your healthcare provider may tell you not to take any medicine by mouth for pain or sleep in the next 4 hours. These medicines may react with the medicines you were given in the hospital. This could cause a much stronger response than usual.  Follow-up care  Follow up with your healthcare provider if you are not alert and back to your usual level of activity within 12 hours.  When to seek medical advice  Call your healthcare provider right away if any of these occur:  · Drowsiness gets worse  · Weakness or dizziness gets worse  · Repeated vomiting  · You can't be awakened   Date Last Reviewed: 10/18/2016  © 3041-4854 [employer]. [address] [place] [address], [place] [address]. All rights reserved. This information is not intended as a substitute for professional medical care. Always follow your healthcare professional's instructions.

## 2017-09-05 ENCOUNTER — TELEPHONE (OUTPATIENT)
Dept: OPHTHALMOLOGY | Facility: CLINIC | Age: 72
End: 2017-09-05

## 2017-09-05 NOTE — TELEPHONE ENCOUNTER
----- Message from Olivia Garcia sent at 9/1/2017  3:31 PM CDT -----  Contact: Spouse   Wife calling to get appointment scheduled for her husbands post op.  He had surgery on 08/30/17 and was told to schedule in a 5 days.  She can be reached at [phone] or [phone]

## 2017-09-07 ENCOUNTER — OFFICE VISIT (OUTPATIENT)
Dept: OPHTHALMOLOGY | Facility: CLINIC | Age: 72
End: 2017-09-07
Payer: MEDICARE

## 2017-09-07 DIAGNOSIS — H33.302 RETINAL HOLE OR TEAR, LEFT: ICD-10-CM

## 2017-09-07 DIAGNOSIS — E11.311 DIABETIC MACULAR EDEMA, RIGHT EYE: ICD-10-CM

## 2017-09-07 DIAGNOSIS — H35.033 HYPERTENSIVE RETINOPATHY OF BOTH EYES: ICD-10-CM

## 2017-09-07 DIAGNOSIS — E11.3413 DIABETIC MACULAR EDEMA OF BOTH EYES WITH SEVERE NONPROLIFERATIVE RETINOPATHY ASSOCIATED WITH TYPE 2 DIABETES MELLITUS: ICD-10-CM

## 2017-09-07 DIAGNOSIS — Z98.890 POST-OPERATIVE STATE: Primary | ICD-10-CM

## 2017-09-07 DIAGNOSIS — H02.423 MYOGENIC PTOSIS OF BILATERAL EYELIDS: ICD-10-CM

## 2017-09-07 PROCEDURE — 99212 OFFICE O/P EST SF 10 MIN: CPT | Mod: PBBFAC | Performed by: OPHTHALMOLOGY

## 2017-09-07 PROCEDURE — 99024 POSTOP FOLLOW-UP VISIT: CPT | Mod: ,,, | Performed by: OPHTHALMOLOGY

## 2017-09-07 PROCEDURE — 99999 PR PBB SHADOW E&M-EST. PATIENT-LVL II: CPT | Mod: PBBFAC,,, | Performed by: OPHTHALMOLOGY

## 2017-09-19 NOTE — PROGRESS NOTES
HPI     S/p Myogenic ptosis of bilateral eyelids    1 week post op suture removed.          1. Severe NPDR OU     2. DME OU   S/p Focal OU (OS x 2 06/13)   S/p focal OS (6/1/15)   S/p Ozurdex x 1 (8/9/16)     3. PCIOL OU     4. HTN Ret OU     5. Ret Hole x 2   S/p barrier laser       Eyelid surgery by Dr Bautista    Last edited by Rian Krueger MA on 9/7/2017  3:29 PM. (History)            Assessment /Plan     For exam results, see Encounter Report.    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 Post-operative instructions reviewed. Sutures removed. All questions answered.  Return in 3 weeks prn sooner any worsening of vision/symptoms or any concerns.     Severe NPDR followed by Dr. Harrison.

## 2017-10-03 ENCOUNTER — OFFICE VISIT (OUTPATIENT)
Dept: OPHTHALMOLOGY | Facility: CLINIC | Age: 72
End: 2017-10-03
Payer: MEDICARE

## 2017-10-03 DIAGNOSIS — H02.423 MYOGENIC PTOSIS OF BILATERAL EYELIDS: ICD-10-CM

## 2017-10-03 DIAGNOSIS — H33.302 RETINAL HOLE OR TEAR, LEFT: ICD-10-CM

## 2017-10-03 DIAGNOSIS — E11.3413 DIABETIC MACULAR EDEMA OF BOTH EYES WITH SEVERE NONPROLIFERATIVE RETINOPATHY ASSOCIATED WITH TYPE 2 DIABETES MELLITUS: ICD-10-CM

## 2017-10-03 DIAGNOSIS — Z98.890 POST-OPERATIVE STATE: Primary | ICD-10-CM

## 2017-10-03 DIAGNOSIS — H35.033 HYPERTENSIVE RETINOPATHY OF BOTH EYES: ICD-10-CM

## 2017-10-03 PROCEDURE — 99024 POSTOP FOLLOW-UP VISIT: CPT | Mod: ,,, | Performed by: OPHTHALMOLOGY

## 2017-10-03 PROCEDURE — 99213 OFFICE O/P EST LOW 20 MIN: CPT | Mod: PBBFAC | Performed by: OPHTHALMOLOGY

## 2017-10-03 PROCEDURE — 99999 PR PBB SHADOW E&M-EST. PATIENT-LVL III: CPT | Mod: PBBFAC,,, | Performed by: OPHTHALMOLOGY

## 2017-10-03 PROCEDURE — 92285 EXTERNAL OCULAR PHOTOGRAPHY: CPT | Mod: PBBFAC | Performed by: OPHTHALMOLOGY

## 2017-10-03 NOTE — PROGRESS NOTES
HPI     S/p Myogenic ptosis of bilateral eyelids 8/30/17    3 week post op  No drops or ointments          1. Severe NPDR OU     2. DME OU   S/p Focal OU (OS x 2 06/13)   S/p focal OS (6/1/15)   S/p Ozurdex x 1 (8/9/16)     3. PCIOL OU     4. HTN Ret OU     5. Ret Hole x 2   S/p barrier laser       Eyelid surgery by Dr Bautista    Last edited by Lyndsay Barkley on 10/3/2017  8:38 AM. (History)            Assessment /Plan     For exam results, see Encounter Report.    Post-operative state  -     External/Slit Lamp Photography    Myogenic ptosis of bilateral eyelids    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 2 diabetes mellitus    Hypertensive retinopathy of both eyes    Retinal hole or tear, left      NPDR followed by Dr. Harrison.    Patient doing well! Post-operative instructions reviewed. All questions answered. Return in 4 weeks prn sooner any worsening of vision/symptoms or any concerns.

## 2017-11-07 ENCOUNTER — OFFICE VISIT (OUTPATIENT)
Dept: OPHTHALMOLOGY | Facility: CLINIC | Age: 72
End: 2017-11-07
Payer: MEDICARE

## 2017-11-07 DIAGNOSIS — Z98.890 POST-OPERATIVE STATE: ICD-10-CM

## 2017-11-07 DIAGNOSIS — H02.409 PTOSIS OF EYELID, UNSPECIFIED LATERALITY: Primary | ICD-10-CM

## 2017-11-07 DIAGNOSIS — H02.423 MYOGENIC PTOSIS OF BILATERAL EYELIDS: ICD-10-CM

## 2017-11-07 DIAGNOSIS — H33.302 RETINAL HOLE OR TEAR, LEFT: ICD-10-CM

## 2017-11-07 DIAGNOSIS — H35.033 HYPERTENSIVE RETINOPATHY OF BOTH EYES: ICD-10-CM

## 2017-11-07 DIAGNOSIS — E11.3413 DIABETIC MACULAR EDEMA OF BOTH EYES WITH SEVERE NONPROLIFERATIVE RETINOPATHY ASSOCIATED WITH TYPE 2 DIABETES MELLITUS: ICD-10-CM

## 2017-11-07 PROCEDURE — 99213 OFFICE O/P EST LOW 20 MIN: CPT | Mod: PBBFAC | Performed by: OPHTHALMOLOGY

## 2017-11-07 PROCEDURE — 99999 PR PBB SHADOW E&M-EST. PATIENT-LVL III: CPT | Mod: PBBFAC,,, | Performed by: OPHTHALMOLOGY

## 2017-11-07 PROCEDURE — 92285 EXTERNAL OCULAR PHOTOGRAPHY: CPT | Mod: PBBFAC | Performed by: OPHTHALMOLOGY

## 2017-11-07 PROCEDURE — 99024 POSTOP FOLLOW-UP VISIT: CPT | Mod: ,,, | Performed by: OPHTHALMOLOGY

## 2017-11-07 NOTE — PROGRESS NOTES
HPI     S/p Myogenic ptosis of bilateral eyelids 8/30/17    Eyedrops PRN OU (pt not sure of the name)    1. Severe NPDR OU   2. DME OU   S/p Focal OU (OS x 2 06/13)   S/p focal OS (6/1/15)   S/p Ozurdex x 1 (8/9/16)   3. PCIOL OU   4. HTN Ret OU   5. Ret Hole x 2   S/p barrier laser       Prior eyelid surgery by Dr Bautista    Pt here for post op check.  Pt states he is doing well.  Pt states x last   week he had a little irritation OU with a floater.  Pt denies pain or   floaters today OU.    Last edited by Zahraa Suazo MA on 11/7/2017  8:34 AM.   (History)            Assessment /Plan     For exam results, see Encounter Report.    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 Post-operative instructions reviewed. All questions answered. Return prn    Severe NPDR ou followed by Dr. Harrison.

## 2017-11-27 RX ORDER — CARVEDILOL 25 MG/1
TABLET ORAL
Qty: 180 TABLET | Refills: 3 | Status: ON HOLD | OUTPATIENT
Start: 2017-11-27 | End: 2018-07-23 | Stop reason: HOSPADM

## 2018-05-07 ENCOUNTER — TELEPHONE (OUTPATIENT)
Dept: INTERNAL MEDICINE | Facility: CLINIC | Age: 73
End: 2018-05-07

## 2018-05-07 NOTE — TELEPHONE ENCOUNTER
Patient spouse  called and he states he has been having swelling in his leg to his knee  And he can barely walk. This has been there for at least a week.. Called to advise patient per PCP he needs to go to the ED.     Per patient he states his leg is not as swollen and he has new insurance and he has to pay a large co payment and he does not want to pay that fee. Per Patient he states rather come in the office and see Dr Nugent as his leg is not as swollen as before and he didn't take his medication on that day or he may have eaten something he was not supposed to eat. Appt has been scheduled. Termed the call

## 2018-05-08 RX ORDER — TOBRAMYCIN/DEXAMETHASONE 0.3 %-0.1%
OINTMENT (GRAM) OPHTHALMIC (EYE)
Refills: 0 | OUTPATIENT
Start: 2018-05-08

## 2018-05-10 ENCOUNTER — OFFICE VISIT (OUTPATIENT)
Dept: INTERNAL MEDICINE | Facility: CLINIC | Age: 73
End: 2018-05-10
Payer: MEDICARE

## 2018-05-10 ENCOUNTER — LAB VISIT (OUTPATIENT)
Dept: LAB | Facility: HOSPITAL | Age: 73
End: 2018-05-10
Attending: INTERNAL MEDICINE
Payer: MEDICARE

## 2018-05-10 VITALS
BODY MASS INDEX: 31.52 KG/M2 | HEART RATE: 70 BPM | DIASTOLIC BLOOD PRESSURE: 70 MMHG | RESPIRATION RATE: 16 BRPM | TEMPERATURE: 98 F | WEIGHT: 253.5 LBS | HEIGHT: 75 IN | SYSTOLIC BLOOD PRESSURE: 126 MMHG

## 2018-05-10 DIAGNOSIS — I10 HTN (HYPERTENSION), BENIGN: Primary | ICD-10-CM

## 2018-05-10 DIAGNOSIS — B35.1 ONYCHOMYCOSIS: ICD-10-CM

## 2018-05-10 DIAGNOSIS — L03.90 CELLULITIS, UNSPECIFIED CELLULITIS SITE: ICD-10-CM

## 2018-05-10 LAB
ANION GAP SERPL CALC-SCNC: 7 MMOL/L
BUN SERPL-MCNC: 36 MG/DL
CALCIUM SERPL-MCNC: 9.6 MG/DL
CHLORIDE SERPL-SCNC: 107 MMOL/L
CO2 SERPL-SCNC: 27 MMOL/L
CREAT SERPL-MCNC: 2.4 MG/DL
EST. GFR  (AFRICAN AMERICAN): 29.8 ML/MIN/1.73 M^2
EST. GFR  (NON AFRICAN AMERICAN): 25.8 ML/MIN/1.73 M^2
ESTIMATED AVG GLUCOSE: 223 MG/DL
GLUCOSE SERPL-MCNC: 121 MG/DL
HBA1C MFR BLD HPLC: 9.4 %
POTASSIUM SERPL-SCNC: 4 MMOL/L
SODIUM SERPL-SCNC: 141 MMOL/L

## 2018-05-10 PROCEDURE — 99214 OFFICE O/P EST MOD 30 MIN: CPT | Mod: PBBFAC,PO | Performed by: INTERNAL MEDICINE

## 2018-05-10 PROCEDURE — 99214 OFFICE O/P EST MOD 30 MIN: CPT | Mod: S$PBB,,, | Performed by: INTERNAL MEDICINE

## 2018-05-10 PROCEDURE — 80048 BASIC METABOLIC PNL TOTAL CA: CPT

## 2018-05-10 PROCEDURE — 36415 COLL VENOUS BLD VENIPUNCTURE: CPT | Mod: PO

## 2018-05-10 PROCEDURE — 83036 HEMOGLOBIN GLYCOSYLATED A1C: CPT

## 2018-05-10 PROCEDURE — 99999 PR PBB SHADOW E&M-EST. PATIENT-LVL IV: CPT | Mod: PBBFAC,,, | Performed by: INTERNAL MEDICINE

## 2018-05-10 RX ORDER — ROSUVASTATIN CALCIUM 20 MG/1
20 TABLET, COATED ORAL DAILY
COMMUNITY
Start: 2018-04-12 | End: 2020-02-18

## 2018-05-10 RX ORDER — GABAPENTIN 100 MG/1
100 CAPSULE ORAL
COMMUNITY
Start: 2018-02-16 | End: 2018-11-28 | Stop reason: SDUPTHER

## 2018-05-10 RX ORDER — DOXYCYCLINE HYCLATE 100 MG
100 TABLET ORAL 2 TIMES DAILY
Qty: 20 TABLET | Refills: 0 | Status: ON HOLD | OUTPATIENT
Start: 2018-05-10 | End: 2018-07-20 | Stop reason: CLARIF

## 2018-05-10 RX ORDER — INSULIN DETEMIR 100 [IU]/ML
INJECTION, SOLUTION SUBCUTANEOUS
COMMUNITY
Start: 2018-02-16 | End: 2018-05-18 | Stop reason: SDUPTHER

## 2018-05-10 RX ORDER — PANTOPRAZOLE SODIUM 40 MG/1
40 TABLET, DELAYED RELEASE ORAL DAILY
Status: ON HOLD | COMMUNITY
Start: 2018-03-02 | End: 2018-07-23 | Stop reason: HOSPADM

## 2018-05-10 NOTE — PROGRESS NOTES
CC: followup of hypertension and diabetes  HPI:  The patient is a 73 y.o. year old male who presents to the office for followup of hypertension and diabetes.  The patient denies any chest pain, headache, nausea or vomiting, but complains of fatigue and intermittent blurred vision.  He reports shortness of breath with activity.  He also reports intermittent lower extremity swelling.    PAST MEDICAL HISTORY:  Past Medical History:   Diagnosis Date    Cataract     Cystoid macular edema of both eyes     Diabetes mellitus     Diabetes mellitus type II     Hyperlipemia     Hypertension     Retinal hole        SURGICAL HISTORY:  Past Surgical History:   Procedure Laterality Date    CATARACT EXTRACTION W/  INTRAOCULAR LENS IMPLANT Left 12/15/14    Dr de la cruz    CATARACT EXTRACTION W/  INTRAOCULAR LENS IMPLANT Right 14    dorothy    CIRCUMCISION, NON-      focal laser right eye      KNEE SURGERY      left    Left medial collateral ligament repair      TONSILLECTOMY         MEDS:  Medcard reviewed and updated    ALLERGIES: Allergy Card reviewed and updated    SOCIAL HISTORY:   The patient is a nonsmoker.    PE:   APPEARANCE: Well nourished, well developed, in no acute distress.    CHEST: Lungs clear to auscultation with unlabored respirations.  CARDIOVASCULAR: Normal S1, S2. No murmurs. No carotid bruits. Positive pedal edema.  ABDOMEN: Bowel sounds normal. Not distended. Soft. No tenderness or masses.   SKIN: Positive erythema of anterior aspect of left leg.  PSYCHIATRIC: The patient is oriented to person, place, and time and has a pleasant affect.    Protective Sensation (w/ 10 gram monofilament):  Right: Decreased  Left: Decreased    Visual Inspection:  Onychomycosis -  Bilateral    Pedal Pulses:   Right: Present  Left: Present    Posterior tibialis:   Right:Present  Left: Present            ASSESSMENT/PLAN:  Domingo was seen today for leg swelling.    Diagnoses and all orders for this visit:    HTN  (hypertension), benign    Type 2 diabetes, uncontrolled, with neuropathy  -     Ambulatory Referral to Podiatry  -     Basic metabolic panel; Future  -     Hemoglobin A1c; Future    Onychomycosis  -     Ambulatory Referral to Podiatry    Cellulitis, unspecified cellulitis site  -      Prescribed doxycycline    Other orders  -     doxycycline (VIBRA-TABS) 100 MG tablet; Take 1 tablet (100 mg total) by mouth 2 (two) times daily.

## 2018-05-11 ENCOUNTER — TELEPHONE (OUTPATIENT)
Dept: INTERNAL MEDICINE | Facility: CLINIC | Age: 73
End: 2018-05-11

## 2018-05-11 DIAGNOSIS — N18.4 CKD (CHRONIC KIDNEY DISEASE) STAGE 4, GFR 15-29 ML/MIN: ICD-10-CM

## 2018-05-11 NOTE — TELEPHONE ENCOUNTER
Informed patient that diabetes remains poorly controlled with a hemoglobin A1c of 9.4.  Also, kidney function has worsened with a creatinine of 2.4.  Recommend referral to endocrinology and nephrology.

## 2018-05-18 ENCOUNTER — OFFICE VISIT (OUTPATIENT)
Dept: ENDOCRINOLOGY | Facility: CLINIC | Age: 73
End: 2018-05-18
Payer: MEDICARE

## 2018-05-18 VITALS
HEART RATE: 62 BPM | HEIGHT: 75 IN | WEIGHT: 254.19 LBS | BODY MASS INDEX: 31.61 KG/M2 | RESPIRATION RATE: 18 BRPM | SYSTOLIC BLOOD PRESSURE: 146 MMHG | DIASTOLIC BLOOD PRESSURE: 78 MMHG

## 2018-05-18 DIAGNOSIS — E78.5 HYPERLIPIDEMIA, UNSPECIFIED HYPERLIPIDEMIA TYPE: ICD-10-CM

## 2018-05-18 DIAGNOSIS — I10 HTN (HYPERTENSION), BENIGN: ICD-10-CM

## 2018-05-18 DIAGNOSIS — E11.3413 DIABETIC MACULAR EDEMA OF BOTH EYES WITH SEVERE NONPROLIFERATIVE RETINOPATHY ASSOCIATED WITH TYPE 2 DIABETES MELLITUS: ICD-10-CM

## 2018-05-18 DIAGNOSIS — N18.30 CKD (CHRONIC KIDNEY DISEASE) STAGE 3, GFR 30-59 ML/MIN: ICD-10-CM

## 2018-05-18 PROCEDURE — 99214 OFFICE O/P EST MOD 30 MIN: CPT | Mod: PBBFAC | Performed by: INTERNAL MEDICINE

## 2018-05-18 PROCEDURE — 99214 OFFICE O/P EST MOD 30 MIN: CPT | Mod: S$PBB,,, | Performed by: INTERNAL MEDICINE

## 2018-05-18 PROCEDURE — 99999 PR PBB SHADOW E&M-EST. PATIENT-LVL IV: CPT | Mod: PBBFAC,,, | Performed by: INTERNAL MEDICINE

## 2018-05-18 RX ORDER — INSULIN LISPRO 100 [IU]/ML
INJECTION, SOLUTION INTRAVENOUS; SUBCUTANEOUS
Qty: 9 BOX | Refills: 6 | Status: SHIPPED | OUTPATIENT
Start: 2018-05-18 | End: 2018-09-28

## 2018-05-18 RX ORDER — INSULIN DETEMIR 100 [IU]/ML
36 INJECTION, SOLUTION SUBCUTANEOUS NIGHTLY
Qty: 36 ML | Refills: 3 | Status: SHIPPED | OUTPATIENT
Start: 2018-05-18 | End: 2018-09-28

## 2018-05-18 NOTE — LETTER
May 18, 2018      Griselda Nugent MD  [address]  [place] [address]           [facility]  [address]  [facility] [address]  Phone: [phone]  Fax: [phone]          Patient: Domingo Castro   MR Number: [id number]   YOB: 1945   Date of Visit: 5/18/2018       Dear Dr. Griselda Nugent:    Thank you for referring Domingo Castro to me for evaluation. Attached you will find relevant portions of my assessment and plan of care.    If you have questions, please do not hesitate to call me. I look forward to following Domingo Castro along with you.    Sincerely,    Jarod King MD    Enclosure  CC:  No Recipients    If you would like to receive this communication electronically, please contact [email or web address] or [phone] to request more information on [email or web address] Link access.    For providers and/or their staff who would like to refer a patient to [facility], please contact us through our one-stop-shop provider referral line, [facility], at [phone].    If you feel you have received this communication in error or would no longer like to receive these types of communications, please e-mail [email or web address]

## 2018-05-18 NOTE — PROGRESS NOTES
Subjective:      Patient ID: Domingo Latif is a 73 y.o. male.    Chief Complaint:  Diabetes      History of Present Illness  Mr. Latif presents for evaluation and management of type 2 diabetes. Previously seen by Dr. Cotto.    Has active history of type 2 diabetes, HTN, HLP, CKD 3, diabetic neuropathy, and diabetic retinopathy.     Type 2 diabetes first diagnosed in early 50's. Was started on insulin in mid 60's.    Diabetes Complications:  Has diabetic neuropathy. No hx of foot ulcers. Has appt upcoming with podiatry.   Has retinopathy - follows with ophthamology regularly   Has nephropathy - has appt with nephrology    Current Diabetes Regimen:  Levemir 30 units qhs (but will take less because insurance not giving him enough insulin to make it a month)  Humalog 5-15 with meals    Reports full compliance with diabetes regimen, however he won't take insulin with meal if glucose is in the low normal range, and he will take less than the prescribed meal time dose if his glucose is near 100. He has also been taking less than the prescribed dose of Levemir as he has not been getting enough Levemir to last a month.    Current Self Reported Glucoses:  AM: 200's if takes full dose of 30 units  Lunch: 200's  Dinner: 200's    Gets hypoglycemia, rare nocturnal hypoglycemia, will get hypoglycemia if takes fasting acting insulin with a meal and then works outside or dose physical activity.     Eats 3 regular meals daily.    On statin for HLP.    Review of Systems   Constitutional: Negative for chills and fever.   Gastrointestinal: Negative for nausea and vomiting.   No CP  No SOB    Objective:   Physical Exam   Nursing note and vitals reviewed.  injection sites are ok. No lipo hypertropthy or atrophy  Feet no cuts or  scratches  Shoes appropriate  Decreased sensation to vibration and monofilament in bilateral feet    Lab Review:   Results for DOMINGO LATIF (MRN [id number]) as of 5/18/2018 07:46   Ref. Range 10/28/2016 06:01  2/24/2017 16:37 3/14/2017 16:33 6/13/2017 07:11 5/10/2018 16:18   Hemoglobin A1C Latest Ref Range: 4.0 - 5.6 % 12.4 (H) 12.3 (H)  10.2 (H) 9.4 (H)   Estimated Avg Glucose Latest Ref Range: 68 - 131 mg/dL 309 (H) 306 (H)  246 (H) 223 (H)   TSH Latest Ref Range: 0.400 - 4.000 uIU/mL    1.589        Assessment:     1. Type 2 diabetes, uncontrolled, with neuropathy    2. CKD (chronic kidney disease) stage 3, GFR 30-59 ml/min    3. Hyperlipidemia, unspecified hyperlipidemia type    4. HTN (hypertension), benign    5. 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 2 diabetes mellitus      Plan:     1.) Patient with type 2 diabetes that is not at goal  --A1c goal <7.5%  --Reviewed appropriate timing and administration of meal time insulin  --Will have him take 7 units of Novolog if glucose <110 and have him take 12 units with meals if glucose >110 plus correction scale  --Advised him that Novolog should be taken at the time of the meal and not after the meal  --Will continue Levemir 30 units qhs but ensure he gets enough insulin to last a full month  --Diabetes education ASAP    2.) Avoid insulin stacking and hypoglycemia    3.) On statin    4.) On 5 bp agents  --Managed by PCP    5.) Sees ophthalmology regularly\    Follow up in 3 months with A1c prior to appt    Jarod King M.D. Staff Endocrinology

## 2018-05-18 NOTE — PATIENT INSTRUCTIONS
If glucose greater than 110 take 12 units plus floating scale, if glucose less than 110 take 7 units plus floating scale

## 2018-05-22 ENCOUNTER — CLINICAL SUPPORT (OUTPATIENT)
Dept: DIABETES | Facility: CLINIC | Age: 73
End: 2018-05-22
Payer: MEDICARE

## 2018-05-22 PROCEDURE — G0108 DIAB MANAGE TRN  PER INDIV: HCPCS | Mod: PBBFAC | Performed by: DIETITIAN, REGISTERED

## 2018-05-30 ENCOUNTER — OFFICE VISIT (OUTPATIENT)
Dept: PODIATRY | Facility: CLINIC | Age: 73
End: 2018-05-30
Payer: MEDICARE

## 2018-05-30 VITALS
HEART RATE: 63 BPM | BODY MASS INDEX: 31.58 KG/M2 | SYSTOLIC BLOOD PRESSURE: 191 MMHG | DIASTOLIC BLOOD PRESSURE: 86 MMHG | HEIGHT: 75 IN | WEIGHT: 254 LBS

## 2018-05-30 DIAGNOSIS — B35.1 ONYCHOMYCOSIS DUE TO DERMATOPHYTE: ICD-10-CM

## 2018-05-30 PROCEDURE — 99213 OFFICE O/P EST LOW 20 MIN: CPT | Mod: PBBFAC,PO,25 | Performed by: PODIATRIST

## 2018-05-30 PROCEDURE — 99203 OFFICE O/P NEW LOW 30 MIN: CPT | Mod: S$PBB,25,, | Performed by: PODIATRIST

## 2018-05-30 PROCEDURE — 11721 DEBRIDE NAIL 6 OR MORE: CPT | Mod: Q9,S$PBB,, | Performed by: PODIATRIST

## 2018-05-30 PROCEDURE — 99999 PR PBB SHADOW E&M-EST. PATIENT-LVL III: CPT | Mod: PBBFAC,,, | Performed by: PODIATRIST

## 2018-05-30 PROCEDURE — 11721 DEBRIDE NAIL 6 OR MORE: CPT | Mod: Q9,PBBFAC,PO | Performed by: PODIATRIST

## 2018-05-30 NOTE — PROGRESS NOTES
Subjective:      Patient ID: Domingo Castro is a 73 y.o. male.    Chief Complaint: Diabetes Mellitus (Dr. Nugent 5/10/18) and Diabetic Foot Exam    Domingo is a 73 y.o. male who presents to the clinic for evaluation and treatment of high risk feet. Domingo has a past medical history of Cataract; Cystoid macular edema of both eyes; Diabetes mellitus; Diabetes mellitus type II; Hyperlipemia; Hypertension; and Retinal hole. The patient's chief complaint is long, thick toenails. This patient has documented high risk feet requiring routine maintenance secondary to peripheral neuropathy.    PCP: Griselda Nugent MD    Date Last Seen by PCP: Jarod King 5/18/2018    Current shoe gear:  Affected Foot: Tennis shoes     Unaffected Foot: Tennis shoes    Hemoglobin A1C   Date Value Ref Range Status   05/10/2018 9.4 (H) 4.0 - 5.6 % Final     Comment:     According to ADA guidelines, hemoglobin A1c <7.0% represents  optimal control in non-pregnant diabetic patients. Different  metrics may apply to specific patient populations.   Standards of Medical Care in Diabetes-2016.  For the purpose of screening for the presence of diabetes:  <5.7%     Consistent with the absence of diabetes  5.7-6.4%  Consistent with increasing risk for diabetes   (prediabetes)  >or=6.5%  Consistent with diabetes  Currently, no consensus exists for use of hemoglobin A1c  for diagnosis of diabetes for children.  This Hemoglobin A1c assay has significant interference with fetal   hemoglobin   (HbF). The results are invalid for patients with abnormal amounts of   HbF,   including those with known Hereditary Persistence   of Fetal Hemoglobin. Heterozygous hemoglobin variants (HbAS, HbAC,   HbAD, HbAE, HbA2) do not significantly interfere with this assay;   however, presence of multiple variants in a sample may impact the %   interference.     06/13/2017 10.2 (H) 4.0 - 5.6 % Final     Comment:     According to ADA guidelines, hemoglobin A1c <7.0%  represents  optimal control in non-pregnant diabetic patients. Different  metrics may apply to specific patient populations.   Standards of Medical Care in Diabetes-2016.  For the purpose of screening for the presence of diabetes:  <5.7%     Consistent with the absence of diabetes  5.7-6.4%  Consistent with increasing risk for diabetes   (prediabetes)  >or=6.5%  Consistent with diabetes  Currently, no consensus exists for use of hemoglobin A1c  for diagnosis of diabetes for children.  This Hemoglobin A1c assay has significant interference with fetal   hemoglobin   (HbF). The results are invalid for patients with abnormal amounts of   HbF,   including those with known Hereditary Persistence   of Fetal Hemoglobin. Heterozygous hemoglobin variants (HbAS, HbAC,   HbAD, HbAE, HbA2) do not significantly interfere with this assay;   however, presence of multiple variants in a sample may impact the %   interference.     02/24/2017 12.3 (H) 4.5 - 6.2 % Final     Comment:     According to ADA guidelines, hemoglobin A1C <7.0% represents  optimal control in non-pregnant diabetic patients.  Different  metrics may apply to specific populations.   Standards of Medical Care in Diabetes - 2016.  For the purpose of screening for the presence of diabetes:  <5.7%     Consistent with the absence of diabetes  5.7-6.4%  Consistent with increasing risk for diabetes   (prediabetes)  >or=6.5%  Consistent with diabetes  Currently no consensus exists for use of hemoglobin A1C  for diagnosis of diabetes for children.         Review of Systems   Constitution: Negative for chills, fever and malaise/fatigue.   HENT: Negative for hearing loss.    Cardiovascular: Positive for leg swelling. Negative for claudication.   Respiratory: Negative for shortness of breath.    Skin: Positive for color change, dry skin and nail changes. Negative for flushing and rash.   Musculoskeletal: Negative for joint pain and myalgias.   Neurological: Positive for  numbness and sensory change. Negative for loss of balance.   Psychiatric/Behavioral: Negative for altered mental status.           Objective:      Physical Exam   Constitutional: He appears well-developed and well-nourished. He is cooperative.   Cardiovascular:   Pulses:       Dorsalis pedis pulses are 0 on the right side, and 0 on the left side.        Posterior tibial pulses are 0 on the right side, and 0 on the left side.   Mild LE edema noted b/L   Musculoskeletal: He exhibits edema and tenderness.        Right ankle: He exhibits decreased range of motion and abnormal pulse. Achilles tendon exhibits normal Thibodeaux's test results.        Left ankle: He exhibits decreased range of motion and abnormal pulse. Achilles tendon exhibits normal Thibodeaux's test results.        Right foot: There is decreased range of motion.        Left foot: There is decreased range of motion.   Adequate joint ROM noted to all lower extremity muscle groups with no pain or crepitation noted. Muscle strength is 5/5 in all groups bilaterally.  Hammertoes noted, digits 2-5 b/L with adductovarus rotation of b/L fifth digit.       Feet:   Right Foot:   Protective Sensation: 5 sites tested. 1 site sensed.  Left Foot:   Protective Sensation: 5 sites tested. 1 site sensed.  Neurological: He is alert.   Diminished protective sensation noted b/L     Skin: Skin is dry. Capillary refill takes more than 3 seconds.   Skin is thin, terse, shiny, and cool, b/L.  Nails x10 are elongated by  3-5mm's, thickened by 3-4 mm's, dystrophic, and are dark in  coloration . Xerosis Bilaterally. No open lesions noted.   Webspaces 1-4 b/L clean, dry, and intact.     Psychiatric: His behavior is normal.   Vitals reviewed.            Assessment:       Encounter Diagnoses   Name Primary?    Type 2 diabetes, uncontrolled, with neuropathy Yes    Onychomycosis due to dermatophyte          Plan:       Domingo was seen today for diabetes mellitus and diabetic foot  exam.    Diagnoses and all orders for this visit:    Type 2 diabetes, uncontrolled, with neuropathy    Onychomycosis due to dermatophyte      I counseled the patient on his conditions, their implications and medical management.        - Shoe inspection. Diabetic Foot Education. Patient reminded of the importance of good nutrition and blood sugar control to help prevent podiatric complications of diabetes. Patient instructed on proper foot hygeine. We discussed wearing proper shoe gear, daily foot inspections, never walking without protective shoe gear, never putting sharp instruments to feet, routine podiatric nail visits every 2-3 months.      - With patient's permission, nails were aggressively reduced and debrided x 10 to their soft tissue attachment mechanically and with electric , removing all offending nail and debris. Patient relates relief following the procedure. He will continue to monitor the areas daily, inspect his feet, wear protective shoe gear when ambulatory, moisturizer to maintain skin integrity and follow in this office in approximately 2-3 months, sooner p.r.n.

## 2018-05-30 NOTE — PROGRESS NOTES
Diabetes Education  Author: Rema Chun RD  Date: 2018    Diabetes Education Visit  Diabetes Education Record Assessment/Progress: Initial    Diabetes Type  Diabetes Type : Type II    Diabetes History  Diabetes Diagnosis: >10 years    Nutrition  Meal Planning: 3 meals per day (usually 3 meals per day; no white rice, no added sugars)    Monitoring   Self Monitoring :  (SMBG 3-5 times daily; FB-250s; averaging high 200s)  Blood Glucose Logs: No  Do you use a personal glucose monitor?: No  In the last month, how often have you had a low blood sugar reaction?: never    Current Diabetes Treatment   Current Treatment: Insulin (levemir (pens) 30 units daily, humalog (pens) 12 units AC + correction)    Social History  Preferred Learning Method: Face to Face  Primary Support: Self  Occupation:  ()  Smoking Status: Never a Smoker    DDS-2 Score  ( > 3 = SIGNIFICANT DISTRESS): 1    Barriers to Change  Barriers to Change: None  Learning Challenges : None    Readiness to Learn   Readiness to Learn : Acceptance    Cultural Influences  Cultural Influences: No      Diabetes Education Assessment/Progress  Diabetes Disease Process (diabetes disease process and treatment options): Discussion, Instructed, Individual Session, Written Materials Provided, Comprehends Key Points  Discussed qualifying parameters of diabetes dx. Discussed disease process. Reviewed possible causes of increasing A1c.    Nutrition (Incorporating nutritional management into one's lifestyle): Discussion, Instructed, Individual Session, Written Materials Provided, Comprehends Key Points  Emphasized importance of eliminating all SSB. Discussed SF drink options. Discussed carb vs non-carb foods and reviewed appropriate amounts of carbs to have at meals vs snacks. Reviewed need to have carb containing foods only at mealtimes with humalog. Recommended 45-60 gm carb at meals and 0-5 gm carb at snacks. Discussed meal plans and snack ideas amenable to  pt.     Physical Activity (incorporating physical activity into one's lifestyle): Discussion    Medications (states correct name, dose, onset, peak, duration, side effects & timing of meds): Instructed, Discussion, Individual Session, Written Materials Provided, Comprehends Key Points (Discussed timing and MOA of long vs rapid acting insulin. Reviewed need for rotation of injection sites, appropriate insulin storage, and insulin pen use. Emphasized need to take Levemir at same time daily and need to take Humalog >4 hours apart. Emphasized need to take humalog injections BEFORE meals.)    Monitoring (monitoring blood glucose/other parameters & using results): Discussion, Instructed, Individual Session, Written Materials Provided, Comprehends Key Points (Reviewed goal BG's and when to use sliding scale. Emphasized need to test BG before every meal/novolog injection.)    Acute Complications (preventing, detecting, and treating acute complications): Discussion, Instructed, Individual Session, Written Materials Provided, Comprehends Key Points (Reviewed hypo vs hyper symptoms and discussed appropriate treatments for each.)    Chronic Complications (preventing, detecting, and treating chronic complications): Discussion, Instructed, Individual Session, Written Materials Provided, Comprehends Key Points (Reviewed annual diabetes care schedule and patient priorities.)    Cognitive (knowledge of self-management skills, functional health literacy): Discussion (Arrives with misconceptions of diabetes self care - would benefit from f/u)    Diabetes Distress and Support Systems: Discussion (No distress noted.)    Behavioral (readiness for change, lifestyle practices, self-care behaviors): Discussion (Appears somewhat motivated for diet modifications.)        Goals  Patient has selected/evaluated goals during today's session: Yes, selected    Medications: Set (Take humalog BEFORE meals)  Start Date: 05/22/18         Diabetes Care  Plan/Intervention   Education Plan/Intervention: Individual Follow-Up DSMT    Diabetes Meal Plan  Restrictions: Restricted Carbohydrate  Carbohydrate Per Meal: 45-60g  Carbohydrate Per Snack :  (0-5 gm)    Education Units of Time   Time Spent: 60 min    Health Maintenance was reviewed today with patient. Discussed with patient importance of routine eye exams, foot exams/foot care, blood work (i.e.: A1c, microalbumin, and lipid), dental visits, yearly flu vaccine, and pneumonia vaccine as indicated by PCP. Patient verbalized understanding.     Health Maintenance Topics with due status: Not Due       Topic Last Completion Date    Colonoscopy 08/24/2012    TETANUS VACCINE 08/26/2013    Influenza Vaccine 10/16/2014    Lipid Panel 06/13/2017    Foot Exam 05/10/2018    Hemoglobin A1c 05/10/2018    Low Dose Statin 05/30/2018     Health Maintenance Due   Topic Date Due    Zoster Vaccine  02/24/2005    Pneumococcal (65+) (1 of 2 - PCV13) 02/24/2010    Eye Exam  05/02/2018

## 2018-05-30 NOTE — LETTER
May 30, 2018      Griselda Nugent MD  [address]  [place] [address]           [facility] - Podiatry  [address] [address]  Phone: [phone]          Patient: Domingo Castro   MR Number: [id number]   YOB: 1945   Date of Visit: 5/30/2018       Dear Dr. Griselda Nugent:    Thank you for referring Domingo Castro to me for evaluation. Attached you will find relevant portions of my assessment and plan of care.    If you have questions, please do not hesitate to call me. I look forward to following Domingo Castro along with you.    Sincerely,    Dale Neville, EDUIN    Enclosure  CC:  No Recipients    If you would like to receive this communication electronically, please contact [email or web address] or [phone] to request more information on [employer] Link access.    For providers and/or their staff who would like to refer a patient to [facility], please contact us through our one-stop-shop provider referral line, Sheila Lock, at [phone].    If you feel you have received this communication in error or would no longer like to receive these types of communications, please e-mail [email or web address]

## 2018-06-14 ENCOUNTER — LAB VISIT (OUTPATIENT)
Dept: LAB | Facility: HOSPITAL | Age: 73
End: 2018-06-14
Attending: INTERNAL MEDICINE
Payer: MEDICARE

## 2018-06-14 ENCOUNTER — OFFICE VISIT (OUTPATIENT)
Dept: NEPHROLOGY | Facility: CLINIC | Age: 73
End: 2018-06-14
Payer: MEDICARE

## 2018-06-14 VITALS
WEIGHT: 253 LBS | BODY MASS INDEX: 31.46 KG/M2 | HEART RATE: 64 BPM | DIASTOLIC BLOOD PRESSURE: 80 MMHG | HEIGHT: 75 IN | SYSTOLIC BLOOD PRESSURE: 140 MMHG | OXYGEN SATURATION: 99 %

## 2018-06-14 DIAGNOSIS — I10 HYPERTENSION, UNSPECIFIED TYPE: ICD-10-CM

## 2018-06-14 DIAGNOSIS — N18.4 CKD (CHRONIC KIDNEY DISEASE) STAGE 4, GFR 15-29 ML/MIN: Primary | ICD-10-CM

## 2018-06-14 DIAGNOSIS — R06.09 DYSPNEA ON EXERTION: ICD-10-CM

## 2018-06-14 DIAGNOSIS — D63.1 ANEMIA OF CHRONIC RENAL FAILURE, STAGE 4 (SEVERE): ICD-10-CM

## 2018-06-14 DIAGNOSIS — N39.45 CONTINUOUS LEAKAGE OF URINE: ICD-10-CM

## 2018-06-14 DIAGNOSIS — N18.4 CKD (CHRONIC KIDNEY DISEASE) STAGE 4, GFR 15-29 ML/MIN: ICD-10-CM

## 2018-06-14 DIAGNOSIS — R60.0 PERIPHERAL EDEMA: ICD-10-CM

## 2018-06-14 DIAGNOSIS — E78.49 OTHER HYPERLIPIDEMIA: ICD-10-CM

## 2018-06-14 DIAGNOSIS — M89.8X9 METABOLIC BONE DISEASE: ICD-10-CM

## 2018-06-14 DIAGNOSIS — N18.4 ANEMIA OF CHRONIC RENAL FAILURE, STAGE 4 (SEVERE): ICD-10-CM

## 2018-06-14 PROBLEM — R32 INCONTINENCE: Status: ACTIVE | Noted: 2018-06-14

## 2018-06-14 LAB
25(OH)D3+25(OH)D2 SERPL-MCNC: 39 NG/ML
ALBUMIN SERPL BCP-MCNC: 3.7 G/DL
ANION GAP SERPL CALC-SCNC: 10 MMOL/L
BASOPHILS # BLD AUTO: 0.06 K/UL
BASOPHILS NFR BLD: 0.8 %
BUN SERPL-MCNC: 29 MG/DL
CALCIUM SERPL-MCNC: 9.9 MG/DL
CHLORIDE SERPL-SCNC: 105 MMOL/L
CO2 SERPL-SCNC: 27 MMOL/L
CREAT SERPL-MCNC: 2.1 MG/DL
DIFFERENTIAL METHOD: ABNORMAL
EOSINOPHIL # BLD AUTO: 0.2 K/UL
EOSINOPHIL NFR BLD: 2.3 %
ERYTHROCYTE [DISTWIDTH] IN BLOOD BY AUTOMATED COUNT: 13.9 %
EST. GFR  (AFRICAN AMERICAN): 35 ML/MIN/1.73 M^2
EST. GFR  (NON AFRICAN AMERICAN): 30.3 ML/MIN/1.73 M^2
FERRITIN SERPL-MCNC: 265 NG/ML
GLUCOSE SERPL-MCNC: 203 MG/DL
HCT VFR BLD AUTO: 36.8 %
HGB BLD-MCNC: 12 G/DL
IMM GRANULOCYTES # BLD AUTO: 0.02 K/UL
IMM GRANULOCYTES NFR BLD AUTO: 0.3 %
IRON SERPL-MCNC: 52 UG/DL
LYMPHOCYTES # BLD AUTO: 1.8 K/UL
LYMPHOCYTES NFR BLD: 24.2 %
MAGNESIUM SERPL-MCNC: 2.2 MG/DL
MCH RBC QN AUTO: 28.9 PG
MCHC RBC AUTO-ENTMCNC: 32.6 G/DL
MCV RBC AUTO: 89 FL
MONOCYTES # BLD AUTO: 0.6 K/UL
MONOCYTES NFR BLD: 8.3 %
NEUTROPHILS # BLD AUTO: 4.8 K/UL
NEUTROPHILS NFR BLD: 64.1 %
NRBC BLD-RTO: 0 /100 WBC
PHOSPHATE SERPL-MCNC: 3.3 MG/DL
PLATELET # BLD AUTO: 110 K/UL
PMV BLD AUTO: 12.9 FL
POTASSIUM SERPL-SCNC: 4 MMOL/L
PTH-INTACT SERPL-MCNC: 128 PG/ML
RBC # BLD AUTO: 4.15 M/UL
SATURATED IRON: 20 %
SODIUM SERPL-SCNC: 142 MMOL/L
TOTAL IRON BINDING CAPACITY: 259 UG/DL
TRANSFERRIN SERPL-MCNC: 175 MG/DL
URATE SERPL-MCNC: 6.5 MG/DL
WBC # BLD AUTO: 7.43 K/UL

## 2018-06-14 PROCEDURE — 85025 COMPLETE CBC W/AUTO DIFF WBC: CPT

## 2018-06-14 PROCEDURE — 99214 OFFICE O/P EST MOD 30 MIN: CPT | Mod: PBBFAC | Performed by: INTERNAL MEDICINE

## 2018-06-14 PROCEDURE — 83970 ASSAY OF PARATHORMONE: CPT

## 2018-06-14 PROCEDURE — 36415 COLL VENOUS BLD VENIPUNCTURE: CPT

## 2018-06-14 PROCEDURE — 83540 ASSAY OF IRON: CPT

## 2018-06-14 PROCEDURE — 99204 OFFICE O/P NEW MOD 45 MIN: CPT | Mod: S$PBB,,, | Performed by: INTERNAL MEDICINE

## 2018-06-14 PROCEDURE — 80069 RENAL FUNCTION PANEL: CPT

## 2018-06-14 PROCEDURE — 82306 VITAMIN D 25 HYDROXY: CPT

## 2018-06-14 PROCEDURE — 82728 ASSAY OF FERRITIN: CPT

## 2018-06-14 PROCEDURE — 83520 IMMUNOASSAY QUANT NOS NONAB: CPT

## 2018-06-14 PROCEDURE — 83735 ASSAY OF MAGNESIUM: CPT

## 2018-06-14 PROCEDURE — 99999 PR PBB SHADOW E&M-EST. PATIENT-LVL IV: CPT | Mod: PBBFAC,,, | Performed by: INTERNAL MEDICINE

## 2018-06-14 PROCEDURE — 84550 ASSAY OF BLOOD/URIC ACID: CPT

## 2018-06-14 PROCEDURE — 86334 IMMUNOFIX E-PHORESIS SERUM: CPT

## 2018-06-14 PROCEDURE — 86334 IMMUNOFIX E-PHORESIS SERUM: CPT | Mod: 26,,, | Performed by: PATHOLOGY

## 2018-06-14 RX ORDER — FUROSEMIDE 20 MG/1
20 TABLET ORAL DAILY
Qty: 90 TABLET | Refills: 3 | Status: ON HOLD | OUTPATIENT
Start: 2018-06-14 | End: 2018-07-23

## 2018-06-14 NOTE — PATIENT INSTRUCTIONS
1. Labs: today, rfp in 2 weeks and labs and urien in 3 mo 24 hr urien for protein   renal us   echocardiogram    2.  Medications: stop HCTZ ,begin lasix 20 mg daily    3. Referrals:  will place urology referral    4. Follow up with PCP regarding:speak to your PCP about cardiology referral, workupfor leg weakness, gastroenterology evaluation colonscopy and egd    5. BP:  Take BP and pulse  twice daily for one week, record              Bring results  to next visit.              Goal :   <130/80, > 110/60  Weigh daily call if wt increases or decreases more than 5 lbs    6. Diet:  National Kidney Foundation:  www.kidney.org       Sodium: < 2000 milligrams daily including all food and drink      (one teaspoon of table salt has 2300 milligrams of sodium)         Phosphorus: <1000mg daily       It is very important that you reduce your salt intake to below 2 grams daily  Low/moderate  oxalate diet: avoid tea, chocolate, spinach, rhubarb, nuts as peanuts, cashews, almonds, walnuts..vit C   Low red meat  As beef, pork, lamb, venison, once a month  Avoid cold cuts  Shellfish, once a month  Eat fillets of fish, white lean meat chicken and turkey.  Avoid animal fat and butter and fried food completely.  Use extra virgin olive oil first cold press and cannola oil.  Plenty of fresh fruits and vegetables   drink >64 oz of water daily 4- 16 oz bottles of water daily  Avoid sodas and sugar drinks       Vaccines: please check with your PCP and be sure to have an annual flu vaccine and keep up to date with your pneumococcal vaccine for pneumonia      Please avoid or minimize all NSAIDS (ibuprofen, motrin, aleve, indocin, naprosyn, BC powder, mobic, relafen, alleve, and any others) to minimize the risk to your kidneys    Please avoid Proton pump inhibitors unless specifically necessary and speak with your PCP about alternatives such as H2 blockers    Avoid laxatives and enemas with magnesium and phosphate     Return to clinic: 3  months

## 2018-06-14 NOTE — LETTER
June 14, 2018      Griselda Nugent MD  [address]  [place] [address]           [facility] - Nephrology  [address]  [place] [address]  Phone: [phone]  Fax: [phone]          Patient: Domingo Castro   MR Number: [id number]   YOB: 1945   Date of Visit: 6/14/2018       Dear Dr. Griselda Nugent:    Thank you for referring Domingo Castro to me for evaluation. Attached you will find relevant portions of my assessment and plan of care.    If you have questions, please do not hesitate to call me. I look forward to following Domingo Castro along with you.    Sincerely,    Nely Watson MD    Enclosure  CC:  No Recipients    If you would like to receive this communication electronically, please contact [email or web address] or [phone] to request more information on [employer] Link access.    For providers and/or their staff who would like to refer a patient to [facility], please contact us through our one-stop-shop provider referral line, [facility], at [phone].    If you feel you have received this communication in error or would no longer like to receive these types of communications, please e-mail [email or web address]

## 2018-06-14 NOTE — PROGRESS NOTES
Subjective:       Patient ID: Domingo Castro is a 73 y.o. Black or  male who presents for new evaluation of renal function  Patient was seen once by Dr. See in 2103 for CKD.    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 Serum  crt 2013 1.7-1.8 and now 2.3, no recent urine studies, no imaging studies available, electrolytes wnl, no acidosis  He is on RAAS inhibition   He ws recently place don doxycycline for a skin infection.  He does have intermittent peripheral edema, the only diuretic that he is on currently is HCTZ, He did not take carvedilol today, he has to pickup at pharmacy.  He has had recent worsenign SOB and exertional dyspnea and increasing LE edema      Review of Systems   Constitutional: Negative for activity change, appetite change, chills, diaphoresis, fatigue, fever and unexpected weight change.   HENT: Negative for congestion, ear discharge, ear pain, facial swelling, hearing loss, nosebleeds, sinus pressure, sore throat and trouble swallowing.    Eyes: Negative for photophobia, pain, discharge, redness, itching and visual disturbance.   Respiratory: Positive for shortness of breath. Negative for apnea, cough, chest tightness and wheezing.         Recent sob going up steps   Cardiovascular: Positive for leg swelling. Negative for chest pain and palpitations.        Intermittent   Gastrointestinal: Negative for abdominal distention, abdominal pain, constipation, diarrhea and vomiting.   Endocrine: Negative for cold intolerance, heat intolerance, polydipsia and polyuria.   Genitourinary: Negative for decreased urine volume, difficulty urinating, dysuria, flank pain, frequency, hematuria, scrotal swelling, testicular pain and urgency.        Incontinence and urgency   Musculoskeletal:  Negative for arthralgias, back pain, gait problem, joint swelling, myalgias, neck pain and neck stiffness.   Skin: Negative for color change, pallor, rash and wound.   Allergic/Immunologic: Negative for environmental allergies and food allergies.   Neurological: Negative for dizziness, tremors, seizures, syncope, facial asymmetry, speech difficulty, weakness, light-headedness, numbness and headaches.        L leg heaviness and weakness   Hematological: Negative for adenopathy. Does not bruise/bleed easily.   Psychiatric/Behavioral: Negative for agitation, behavioral problems, dysphoric mood and sleep disturbance. The patient is not nervous/anxious.        Objective:       Physical Exam   Constitutional: He is oriented to person, place, and time. He appears well-developed and well-nourished. No distress.   Periorbital edema  NAD    HENT:   Head: Normocephalic and atraumatic.   Mouth/Throat: Oropharynx is clear and moist. No oropharyngeal exudate.   Eyes: Conjunctivae and EOM are normal. Pupils are equal, round, and reactive to light. Right eye exhibits no discharge. Left eye exhibits no discharge. No scleral icterus.   Neck: Normal range of motion. Neck supple. No JVD present. No tracheal deviation present. No thyromegaly present.   Cardiovascular: Normal rate, regular rhythm and normal heart sounds.  Exam reveals no gallop and no friction rub.    No murmur heard.  + 4 LE edema.    Pulmonary/Chest: Effort normal and breath sounds normal. No stridor. No respiratory distress. He has no wheezes. He has no rales. He exhibits no tenderness.   No crackles or wheeze   Abdominal: Soft. Bowel sounds are normal. He exhibits no distension and no mass. There is no tenderness. There is no rebound and no guarding. No hernia.   Musculoskeletal: Normal range of motion. He exhibits no edema or tenderness.   Lymphadenopathy:     He has no cervical adenopathy.   Neurological: He is alert and oriented to person, place, and time. No  cranial nerve deficit. He exhibits normal muscle tone. Coordination normal.   Skin: Skin is warm and dry. No rash noted. He is not diaphoretic. No erythema. No pallor.   No open wounds,discharge or erythema   Psychiatric: He has a normal mood and affect. His behavior is normal. Judgment and thought content normal.   Nursing note and vitals reviewed.      Assessment:       1. CKD (chronic kidney disease) stage 4, GFR 15-29 ml/min    2. Other hyperlipidemia    3. Hypertension, unspecified type    4. Peripheral edema    5. Dyspnea on exertion    6. Continuous leakage of urine        Plan:       74 yo AAM with longstanding poorly controlled HTM, T2IDDM, HPL, with macroalbuminuria in the past and nonproliferative Dm retinopathy, now with progression of CKD 3 to CKD 4   related to Dm nephropathy and nephroscelrosis, arteriolarsclerosis and must consider that there may be an acute component from infection, and ischemic nephropathy secondary to PERICO. Will evaluate for obstruction as well.    HTN: on lisinopril, hydralazine, HCTZ, amlodipine and coreg it is possible that PERICO is contributing to poorly controlled HTN, will request Renal US and eval Perico.  Unless there is an acute change in gfr/ Bp the likelihood of improving renal function or bp with intervention would be very low however.  consider 2 d echo to evalute for end organ damage    Proteinuria: check UA,  UPRCT and free light chains spep/ipep    Dyspnea on exertion and worsening  Peripheral edema  :  Echo and would consider cardiology evaluation, add lasix 20 mg daily and follow rfp, mg     Leg weakness and cramping;  consider evaluation for PVD  Metabolic bone disease: check pth and vit d     electrolyte abnormality: none    metabolic acidosis: none    Anemia  Check cbc and irons suggest egd and colonoscopy as he has never had these it seems    Plan: labs and imaging as above   renal US   rtc 3 mos or as testing indicates clincially

## 2018-06-14 NOTE — Clinical Note
Labs sand urine today  rfp 2 weeks  other labs and urien in 3 months   24 hr urien protein  urology referral,  echo  Renal ultrasound

## 2018-06-15 LAB
INTERPRETATION SERPL IFE-IMP: NORMAL
KAPPA LC SER QL IA: 7.2 MG/DL
KAPPA LC/LAMBDA SER IA: 1.9
LAMBDA LC SER QL IA: 3.79 MG/DL
PATHOLOGIST INTERPRETATION IFE: NORMAL

## 2018-06-29 DIAGNOSIS — E11.9 TYPE 2 DIABETES MELLITUS WITHOUT COMPLICATION: ICD-10-CM

## 2018-07-16 ENCOUNTER — HOSPITAL ENCOUNTER (EMERGENCY)
Facility: HOSPITAL | Age: 73
Discharge: HOME | End: 2018-07-16
Attending: EMERGENCY MEDICINE
Payer: MEDICARE

## 2018-07-16 ENCOUNTER — OFFICE VISIT (OUTPATIENT)
Dept: URGENT CARE | Facility: CLINIC | Age: 73
End: 2018-07-16
Payer: MEDICARE

## 2018-07-16 VITALS
SYSTOLIC BLOOD PRESSURE: 170 MMHG | OXYGEN SATURATION: 92 % | DIASTOLIC BLOOD PRESSURE: 72 MMHG | HEART RATE: 79 BPM | BODY MASS INDEX: 31.71 KG/M2 | TEMPERATURE: 99 F | WEIGHT: 255 LBS | RESPIRATION RATE: 16 BRPM | HEIGHT: 75 IN

## 2018-07-16 VITALS
TEMPERATURE: 100 F | OXYGEN SATURATION: 96 % | DIASTOLIC BLOOD PRESSURE: 85 MMHG | HEART RATE: 76 BPM | SYSTOLIC BLOOD PRESSURE: 193 MMHG | RESPIRATION RATE: 18 BRPM | HEIGHT: 75 IN | WEIGHT: 250 LBS | BODY MASS INDEX: 31.08 KG/M2

## 2018-07-16 DIAGNOSIS — N45.1 EPIDIDYMITIS, RIGHT: Primary | ICD-10-CM

## 2018-07-16 DIAGNOSIS — N50.811 RIGHT TESTICULAR PAIN: Primary | ICD-10-CM

## 2018-07-16 DIAGNOSIS — N50.89 TESTICULAR MASS: ICD-10-CM

## 2018-07-16 LAB
ALBUMIN SERPL BCP-MCNC: 3.4 G/DL
ALP SERPL-CCNC: 75 U/L
ALT SERPL W/O P-5'-P-CCNC: 22 U/L
ANION GAP SERPL CALC-SCNC: 10 MMOL/L
AST SERPL-CCNC: 19 U/L
BACTERIA #/AREA URNS HPF: ABNORMAL /HPF
BASOPHILS # BLD AUTO: 0.03 K/UL
BASOPHILS NFR BLD: 0.3 %
BILIRUB SERPL-MCNC: 0.7 MG/DL
BILIRUB UR QL STRIP: NEGATIVE
BUN SERPL-MCNC: 30 MG/DL
CALCIUM SERPL-MCNC: 9.6 MG/DL
CHLORIDE SERPL-SCNC: 109 MMOL/L
CLARITY UR: CLEAR
CO2 SERPL-SCNC: 21 MMOL/L
COLOR UR: YELLOW
CREAT SERPL-MCNC: 2.1 MG/DL
DIFFERENTIAL METHOD: ABNORMAL
EOSINOPHIL # BLD AUTO: 0.1 K/UL
EOSINOPHIL NFR BLD: 0.8 %
ERYTHROCYTE [DISTWIDTH] IN BLOOD BY AUTOMATED COUNT: 14.1 %
EST. GFR  (AFRICAN AMERICAN): 35 ML/MIN/1.73 M^2
EST. GFR  (NON AFRICAN AMERICAN): 30 ML/MIN/1.73 M^2
GLUCOSE SERPL-MCNC: 147 MG/DL
GLUCOSE UR QL STRIP: NEGATIVE
HCT VFR BLD AUTO: 31.6 %
HGB BLD-MCNC: 10.4 G/DL
HGB UR QL STRIP: ABNORMAL
HYALINE CASTS #/AREA URNS LPF: 2 /LPF
KETONES UR QL STRIP: NEGATIVE
LEUKOCYTE ESTERASE UR QL STRIP: NEGATIVE
LIPASE SERPL-CCNC: 10 U/L
LYMPHOCYTES # BLD AUTO: 1.2 K/UL
LYMPHOCYTES NFR BLD: 11.7 %
MCH RBC QN AUTO: 28.6 PG
MCHC RBC AUTO-ENTMCNC: 32.9 G/DL
MCV RBC AUTO: 87 FL
MICROSCOPIC COMMENT: ABNORMAL
MONOCYTES # BLD AUTO: 0.8 K/UL
MONOCYTES NFR BLD: 8 %
NEUTROPHILS # BLD AUTO: 7.8 K/UL
NEUTROPHILS NFR BLD: 78.8 %
NITRITE UR QL STRIP: NEGATIVE
PH UR STRIP: 6 [PH] (ref 5–8)
PLATELET # BLD AUTO: 122 K/UL
PMV BLD AUTO: 11.3 FL
POTASSIUM SERPL-SCNC: 3.8 MMOL/L
PROT SERPL-MCNC: 7.6 G/DL
PROT UR QL STRIP: ABNORMAL
RBC # BLD AUTO: 3.64 M/UL
RBC #/AREA URNS HPF: 3 /HPF (ref 0–4)
SODIUM SERPL-SCNC: 140 MMOL/L
SP GR UR STRIP: 1.02 (ref 1–1.03)
SQUAMOUS #/AREA URNS HPF: 2 /HPF
URN SPEC COLLECT METH UR: ABNORMAL
UROBILINOGEN UR STRIP-ACNC: NEGATIVE EU/DL
WBC # BLD AUTO: 9.85 K/UL
WBC #/AREA URNS HPF: 5 /HPF (ref 0–5)

## 2018-07-16 PROCEDURE — 85025 COMPLETE CBC W/AUTO DIFF WBC: CPT

## 2018-07-16 PROCEDURE — 81000 URINALYSIS NONAUTO W/SCOPE: CPT

## 2018-07-16 PROCEDURE — 80053 COMPREHEN METABOLIC PANEL: CPT

## 2018-07-16 PROCEDURE — 96375 TX/PRO/DX INJ NEW DRUG ADDON: CPT

## 2018-07-16 PROCEDURE — 96365 THER/PROPH/DIAG IV INF INIT: CPT

## 2018-07-16 PROCEDURE — 96366 THER/PROPH/DIAG IV INF ADDON: CPT

## 2018-07-16 PROCEDURE — 87086 URINE CULTURE/COLONY COUNT: CPT

## 2018-07-16 PROCEDURE — 83690 ASSAY OF LIPASE: CPT

## 2018-07-16 PROCEDURE — 63600175 PHARM REV CODE 636 W HCPCS: Performed by: EMERGENCY MEDICINE

## 2018-07-16 PROCEDURE — 99214 OFFICE O/P EST MOD 30 MIN: CPT | Mod: S$GLB,,, | Performed by: FAMILY MEDICINE

## 2018-07-16 PROCEDURE — 99284 EMERGENCY DEPT VISIT MOD MDM: CPT | Mod: 25

## 2018-07-16 RX ORDER — CIPROFLOXACIN 2 MG/ML
400 INJECTION, SOLUTION INTRAVENOUS
Status: COMPLETED | OUTPATIENT
Start: 2018-07-16 | End: 2018-07-16

## 2018-07-16 RX ORDER — ONDANSETRON 2 MG/ML
8 INJECTION INTRAMUSCULAR; INTRAVENOUS
Status: COMPLETED | OUTPATIENT
Start: 2018-07-16 | End: 2018-07-16

## 2018-07-16 RX ORDER — OXYCODONE AND ACETAMINOPHEN 5; 325 MG/1; MG/1
0.5 TABLET ORAL EVERY 4 HOURS PRN
Qty: 12 TABLET | Refills: 0 | Status: SHIPPED | OUTPATIENT
Start: 2018-07-16 | End: 2018-09-28

## 2018-07-16 RX ORDER — MORPHINE SULFATE 2 MG/ML
2 INJECTION, SOLUTION INTRAMUSCULAR; INTRAVENOUS
Status: COMPLETED | OUTPATIENT
Start: 2018-07-16 | End: 2018-07-16

## 2018-07-16 RX ORDER — CIPROFLOXACIN 500 MG/1
500 TABLET ORAL 2 TIMES DAILY
Qty: 28 TABLET | Refills: 0 | Status: SHIPPED | OUTPATIENT
Start: 2018-07-16 | End: 2018-07-27

## 2018-07-16 RX ADMIN — ONDANSETRON 8 MG: 2 INJECTION INTRAMUSCULAR; INTRAVENOUS at 08:07

## 2018-07-16 RX ADMIN — MORPHINE SULFATE 2 MG: 2 INJECTION, SOLUTION INTRAMUSCULAR; INTRAVENOUS at 08:07

## 2018-07-16 RX ADMIN — CIPROFLOXACIN 400 MG: 2 INJECTION, SOLUTION INTRAVENOUS at 09:07

## 2018-07-16 NOTE — PROGRESS NOTES
"Subjective:       Patient ID: Domingo Castro is a 73 y.o. male.    Vitals:  height is 6' 3" (1.905 m) and weight is 113.4 kg (250 lb). His temperature is 99.8 °F (37.7 °C). His blood pressure is 193/85 (abnormal) and his pulse is 76. His respiration is 18 and oxygen saturation is 96%.     Chief Complaint: Testicle Pain    Pt is having Rt testicle pain and swelling. No known injury done to area. He relates to the pain as a result of wearing a different type of undergarment and having it put pressure in the area  He reports being treated for UTI recently.       Testicle Pain   The patient's primary symptoms include testicular pain. This is a new problem. Episode onset: 3 Days. The problem occurs constantly. The problem has been gradually worsening. The pain is medium. Pertinent negatives include no chills, dysuria, fever, nausea, rash, urgency or vomiting. The testicular pain affects the right testicle. There is swelling in the right testicle. The color of the testicles is normal. The symptoms are aggravated by bowel movements. Treatments tried: warm compress. The treatment provided mild relief.     Review of Systems   Constitution: Negative for chills and fever.   Eyes: Negative for discharge.   Skin: Negative for flushing and rash.   Musculoskeletal: Negative for back pain.   Gastrointestinal: Negative for nausea and vomiting.   Genitourinary: Positive for testicular pain. Negative for dysuria, genital sores, hematuria and urgency.   All other systems reviewed and are negative.      Objective:      Physical Exam   Constitutional: He is oriented to person, place, and time. He appears well-developed and well-nourished. No distress.   HENT:   Head: Normocephalic and atraumatic.   Right Ear: External ear normal.   Left Ear: External ear normal.   Nose: Nose normal. No nasal deformity. No epistaxis.   Mouth/Throat: Oropharynx is clear and moist and mucous membranes are normal.   Eyes: Conjunctivae and lids are normal. "   Neck: Trachea normal, normal range of motion and phonation normal. Neck supple.   Cardiovascular: Normal rate, regular rhythm, normal heart sounds and intact distal pulses.    Pulmonary/Chest: Effort normal and breath sounds normal.   Abdominal: Soft. Normal appearance and bowel sounds are normal. He exhibits no distension. There is no tenderness. There is no CVA tenderness.   Genitourinary: Right testis shows mass, swelling and tenderness. No discharge found.         Musculoskeletal: Normal range of motion.   Neurological: He is alert and oriented to person, place, and time. He has normal reflexes.   Skin: Skin is warm, dry and intact. He is not diaphoretic.   Psychiatric: He has a normal mood and affect. His speech is normal and behavior is normal. Judgment and thought content normal. Cognition and memory are normal.   Nursing note and vitals reviewed.      Assessment:       1. Right testicular pain    2. Testicular mass        Plan:         Right testicular pain  -     Refer to Emergency Dept.    Testicular mass      Follow Up Comments   Make sure that you follow up with your primary care doctor in the next 2-5 days if needed .  Return to the Urgent Care if signs or symptoms change and certainly if you have worsening symptoms go to the nearest emergency department for further evaluation.

## 2018-07-17 NOTE — ED PROVIDER NOTES
Encounter Date: 2018       History     Chief Complaint   Patient presents with    Testicle Pain     tender to palpation; swollen right testicle     Patient is a 73-year-old male past medicine of diabetes presents emergency room reporting right testicle pain x3 days.  Patient states prior to onset of pain he experience 1 day of dysuria, took antibiotics for 1 day which relieved the dysuria.  Patient denies nausea vomiting, flank pain, rectal pain weakness, chills associated with right testicle pain.  Patient also denies any trauma.          Review of patient's allergies indicates:   Allergen Reactions    Atorvastatin Other (See Comments)     Past Medical History:   Diagnosis Date    Cataract     Cystoid macular edema of both eyes     Diabetes mellitus     Diabetes mellitus type II     Hyperlipemia     Hypertension     Retinal hole      Past Surgical History:   Procedure Laterality Date    CATARACT EXTRACTION W/  INTRAOCULAR LENS IMPLANT Left 12/15/14    Dr de la cruz    CATARACT EXTRACTION W/  INTRAOCULAR LENS IMPLANT Right 14    dorothy    CIRCUMCISION, NON-      focal laser right eye      KNEE SURGERY      left    Left medial collateral ligament repair      TONSILLECTOMY       Family History   Problem Relation Age of Onset    Heart disease Mother     Cancer Mother     Cancer Brother     Heart disease Father     Diabetes Father     Cancer Sister     Cataracts Paternal Grandmother     Glaucoma Paternal Grandmother     Blindness Neg Hx     Amblyopia Neg Hx     Hypertension Neg Hx     Macular degeneration Neg Hx     Retinal detachment Neg Hx     Strabismus Neg Hx      Social History   Substance Use Topics    Smoking status: Never Smoker    Smokeless tobacco: Never Used    Alcohol use No     Review of Systems   Constitutional: Negative.    HENT: Negative.    Eyes: Negative.    Respiratory: Negative.    Cardiovascular: Negative.    Gastrointestinal: Negative.    Endocrine:  Negative.    Genitourinary: Positive for dysuria and testicular pain.   Musculoskeletal: Negative.    Skin: Negative.    Allergic/Immunologic: Negative.    Neurological: Negative.    Hematological: Negative.    Psychiatric/Behavioral: Negative.    All other systems reviewed and are negative.      Physical Exam     Initial Vitals [07/16/18 1848]   BP Pulse Resp Temp SpO2   (!) 196/80 82 16 99.1 °F (37.3 °C) 96 %      MAP       --         Physical Exam    Vitals reviewed.  Constitutional: He appears well-developed and well-nourished.   Well-appearing moist mucous membranes   HENT:   Head: Normocephalic and atraumatic.   Right Ear: External ear normal.   Left Ear: External ear normal.   Nose: Nose normal.   Mouth/Throat: Oropharynx is clear and moist.   Eyes: Conjunctivae and EOM are normal. Pupils are equal, round, and reactive to light.   Neck: Normal range of motion. Neck supple.   Genitourinary: Penis normal.   Genitourinary Comments: Scrotal:  Positive tenderness to palpation of right epididymis, negative edema negative erythema of scrotal sac, negative crepitance   Musculoskeletal: Normal range of motion.   Neurological: He is alert. He has normal reflexes.   Skin: Skin is warm and dry. Capillary refill takes less than 2 seconds. No erythema.   Psychiatric: He has a normal mood and affect. His behavior is normal. Judgment and thought content normal.         ED Course   Procedures  Labs Reviewed   CBC W/ AUTO DIFFERENTIAL - Abnormal; Notable for the following:        Result Value    RBC 3.64 (*)     Hemoglobin 10.4 (*)     Hematocrit 31.6 (*)     Platelets 122 (*)     Gran # (ANC) 7.8 (*)     Gran% 78.8 (*)     Lymph% 11.7 (*)     All other components within normal limits   COMPREHENSIVE METABOLIC PANEL - Abnormal; Notable for the following:     CO2 21 (*)     Glucose 147 (*)     BUN, Bld 30 (*)     Creatinine 2.1 (*)     Albumin 3.4 (*)     eGFR if  35 (*)     eGFR if non  30 (*)      All other components within normal limits   CULTURE, URINE   LIPASE   URINALYSIS          Imaging Results          US Scrotum And Testicles (Final result)  Result time 07/16/18 20:34:50    Final result by Thaddeus Mari MD (07/16/18 20:34:50)                 Impression:      Enlarged right epididymis with hyperemia to the right epididymis.  The findings are most suggestive of right-sided epididymitis.  No sonographic evidence of orchitis.    Small bilateral hydroceles with mild complex appearance of the left-sided hydrocele.    Additional details as above.      Electronically signed by: Thaddeus Mari MD  Date:    07/16/2018  Time:    20:34             Narrative:    EXAMINATION:  US SCROTUM AND TESTICLES    CLINICAL HISTORY:  Right-sided testicular pain.    TECHNIQUE:  Sonography of the scrotum and testes.    COMPARISON:  None.    FINDINGS:  Right Testicle:    Size: 3.5 x 2.8 x 1.2 cm    Appearance: There are multiple anechoic regions within the right testicle of the largest measuring 0.4 x 0.3 x 0.4 cm.  No evidence of increased vascularity is identified these lesions.    Flow: There is asymmetric increased flow to the right epididymal head.  The right epididymal head is enlarged.    Epididymis: There is enlargement of the right epididymal head with increased vascularity right epididymal head.  There is a right epididymal head cyst measuring 0.5 x 0.6 x 0.4 cm.  An additional right epididymal head cyst present measuring 0.5 x 0.5 x 0.7 cm.    Hydrocele: There is a small hydrocele.    Varicocele: None    Left Testicle:    Size: 4.4 x 2.4 x 3.0 cm    Appearance: There are multiple hypoechoic regions within the left testicle with the largest measuring 0.4 x 0.3 x 0.3 cm.    Flow: Normal arterial and venous flow    Epididymis: Normal    Hydrocele: There is a small left-sided hydrocele.  There is debris within the hydrocele.    Varicocele: None                              X-Rays:   Independently Interpreted Readings:    Other Readings:  EXAMINATION:  US SCROTUM AND TESTICLES    CLINICAL HISTORY:  Right-sided testicular pain.    TECHNIQUE:  Sonography of the scrotum and testes.    COMPARISON:  None.    FINDINGS:  Right Testicle:    Size: 3.5 x 2.8 x 1.2 cm    Appearance: There are multiple anechoic regions within the right testicle of the largest measuring 0.4 x 0.3 x 0.4 cm.  No evidence of increased vascularity is identified these lesions.    Flow: There is asymmetric increased flow to the right epididymal head.  The right epididymal head is enlarged.    Epididymis: There is enlargement of the right epididymal head with increased vascularity right epididymal head.  There is a right epididymal head cyst measuring 0.5 x 0.6 x 0.4 cm.  An additional right epididymal head cyst present measuring 0.5 x 0.5 x 0.7 cm.    Hydrocele: There is a small hydrocele.    Varicocele: None    Left Testicle:    Size: 4.4 x 2.4 x 3.0 cm    Appearance: There are multiple hypoechoic regions within the left testicle with the largest measuring 0.4 x 0.3 x 0.3 cm.    Flow: Normal arterial and venous flow    Epididymis: Normal    Hydrocele: There is a small left-sided hydrocele.  There is debris within the hydrocele.    Varicocele: None  Impression       Enlarged right epididymis with hyperemia to the right epididymis.  The findings are most suggestive of right-sided epididymitis.  No sonographic evidence of orchitis.    Small bilateral hydroceles with mild complex appearance of the left-sided hydrocele.    Additional details as above.        Medical Decision Making:   Initial Assessment:   Patient is a 73-year-old male past medicine of diabetes presents emergency room reporting right testicle pain x3 days.  Patient states prior to onset of pain he experience 1 day of dysuria, took antibiotics for 1 day which relieved the dysuria.  Patient denies nausea vomiting, flank pain, rectal pain weakness, chills associated with right testicle pain.  Patient also  denies any trauma.    Differential Diagnosis:   Epididymitis  Torsion    ED Management:  Patient given IV pain medicine on arrival, with resolution of pain. Patient also given Cipro 400 mg IV.  2154 patient is well appearing after completion of Cipro 40 mg IV, with normal CBC.  Will discharge patient home with Cipro 500 mg b.i.d. for 14 days; Pt states he has follow-up set up with his PCP tomorrow morning for further evaluation.  Patient encouraged to return to emergency room with any acute changes or if he cannot follow up with his PCP as planned.                      Clinical Impression:   The encounter diagnosis was Epididymitis, right.                             Lionel Brown MD  07/16/18 215

## 2018-07-17 NOTE — ED NOTES
"Pt A&Ox4, calm & cooperative, resting comfortably in bed, warm blanket applied, pt instructed to change into gown. Pt states testicular pain started 3 days ago, worse on Rt side. Denies any redness or swelling but states it feels "hard".  Pt states he was at [facility]gent Care [facility] and sent here for further testing not available at .  Pt states he occasionally has dizziness when he stands up that has been occurring for at least 6 months. Also c/o worsening pain with his bulging disc in his back that has been causing pain going down his right leg. Pt states he has an appointment with his PCP tomorrow to discuss this.  Wife at bedside. Denies any needs at this time. Pt informed of plan to wait for provider orders.  "

## 2018-07-17 NOTE — ED NOTES
US completed, pt resting comfortably on bed, aware of need for urine sample but unable to produce sample at this time - has cup & urinal at bedside. Wife remains at bedside. Pt denies any needs at this time.

## 2018-07-18 LAB — BACTERIA UR CULT: NO GROWTH

## 2018-07-19 ENCOUNTER — TELEPHONE (OUTPATIENT)
Dept: INTERNAL MEDICINE | Facility: CLINIC | Age: 73
End: 2018-07-19

## 2018-07-19 ENCOUNTER — HOSPITAL ENCOUNTER (INPATIENT)
Facility: OTHER | Age: 73
LOS: 4 days | Discharge: HOME | DRG: 291 | End: 2018-07-23
Attending: EMERGENCY MEDICINE | Admitting: HOSPITALIST
Payer: MEDICARE

## 2018-07-19 DIAGNOSIS — I50.9 ACUTE ON CHRONIC CONGESTIVE HEART FAILURE, UNSPECIFIED HEART FAILURE TYPE: ICD-10-CM

## 2018-07-19 DIAGNOSIS — R79.89 ELEVATED TROPONIN: ICD-10-CM

## 2018-07-19 DIAGNOSIS — I50.9 CHF (CONGESTIVE HEART FAILURE): ICD-10-CM

## 2018-07-19 DIAGNOSIS — R53.83 FATIGUE: ICD-10-CM

## 2018-07-19 DIAGNOSIS — N18.9 ACUTE KIDNEY INJURY SUPERIMPOSED ON CHRONIC KIDNEY DISEASE: Primary | ICD-10-CM

## 2018-07-19 DIAGNOSIS — I50.9 ACUTE ON CHRONIC CONGESTIVE HEART FAILURE: ICD-10-CM

## 2018-07-19 DIAGNOSIS — N17.9 ACUTE KIDNEY INJURY SUPERIMPOSED ON CHRONIC KIDNEY DISEASE: Primary | ICD-10-CM

## 2018-07-19 DIAGNOSIS — N17.9 AKI (ACUTE KIDNEY INJURY): ICD-10-CM

## 2018-07-19 DIAGNOSIS — I50.33 ACUTE ON CHRONIC DIASTOLIC HEART FAILURE: ICD-10-CM

## 2018-07-19 DIAGNOSIS — R73.9 HYPERGLYCEMIA: ICD-10-CM

## 2018-07-19 DIAGNOSIS — N18.9 CHRONIC KIDNEY DISEASE, UNSPECIFIED CKD STAGE: ICD-10-CM

## 2018-07-19 LAB
ALBUMIN SERPL BCP-MCNC: 3.1 G/DL
ALLENS TEST: ABNORMAL
ALP SERPL-CCNC: 71 U/L
ALT SERPL W/O P-5'-P-CCNC: 21 U/L
ANION GAP SERPL CALC-SCNC: 11 MMOL/L
AST SERPL-CCNC: 19 U/L
B-OH-BUTYR BLD STRIP-SCNC: 0.1 MMOL/L
BACTERIA #/AREA URNS HPF: ABNORMAL /HPF
BASOPHILS # BLD AUTO: 0.02 K/UL
BASOPHILS NFR BLD: 0.1 %
BILIRUB SERPL-MCNC: 0.6 MG/DL
BILIRUB UR QL STRIP: NEGATIVE
BNP SERPL-MCNC: 880 PG/ML
BUN SERPL-MCNC: 52 MG/DL
CALCIUM SERPL-MCNC: 9.7 MG/DL
CHLORIDE SERPL-SCNC: 103 MMOL/L
CLARITY UR: CLEAR
CO2 SERPL-SCNC: 20 MMOL/L
COLOR UR: YELLOW
CREAT SERPL-MCNC: 2.8 MG/DL
D DIMER PPP IA.FEU-MCNC: 1.45 MG/L FEU
DELSYS: ABNORMAL
DIFFERENTIAL METHOD: ABNORMAL
EOSINOPHIL # BLD AUTO: 0 K/UL
EOSINOPHIL NFR BLD: 0.2 %
ERYTHROCYTE [DISTWIDTH] IN BLOOD BY AUTOMATED COUNT: 14.1 %
EST. GFR  (AFRICAN AMERICAN): 25 ML/MIN/1.73 M^2
EST. GFR  (NON AFRICAN AMERICAN): 21 ML/MIN/1.73 M^2
GLUCOSE SERPL-MCNC: 468 MG/DL
GLUCOSE SERPL-MCNC: >500 MG/DL (ref 70–110)
GLUCOSE UR QL STRIP: ABNORMAL
HCO3 UR-SCNC: 26.4 MMOL/L (ref 24–28)
HCT VFR BLD AUTO: 31.8 %
HGB BLD-MCNC: 10.4 G/DL
HGB UR QL STRIP: ABNORMAL
HYALINE CASTS #/AREA URNS LPF: 2 /LPF
KETONES UR QL STRIP: NEGATIVE
LEUKOCYTE ESTERASE UR QL STRIP: NEGATIVE
LYMPHOCYTES # BLD AUTO: 1.2 K/UL
LYMPHOCYTES NFR BLD: 8.6 %
MAGNESIUM SERPL-MCNC: 2.4 MG/DL
MCH RBC QN AUTO: 28.8 PG
MCHC RBC AUTO-ENTMCNC: 32.7 G/DL
MCV RBC AUTO: 88 FL
MICROSCOPIC COMMENT: ABNORMAL
MODE: ABNORMAL
MONOCYTES # BLD AUTO: 1.3 K/UL
MONOCYTES NFR BLD: 9.1 %
NEUTROPHILS # BLD AUTO: 11.3 K/UL
NEUTROPHILS NFR BLD: 81.6 %
NITRITE UR QL STRIP: NEGATIVE
PCO2 BLDA: 41.3 MMHG (ref 35–45)
PH SMN: 7.41 [PH] (ref 7.35–7.45)
PH UR STRIP: 5 [PH] (ref 5–8)
PHOSPHATE SERPL-MCNC: 2.7 MG/DL
PLATELET # BLD AUTO: 167 K/UL
PMV BLD AUTO: 11.1 FL
PO2 BLDA: 40 MMHG (ref 40–60)
POC BE: 2 MMOL/L
POC SATURATED O2: 75 % (ref 95–100)
POCT GLUCOSE: 435 MG/DL (ref 70–110)
POCT GLUCOSE: 473 MG/DL (ref 70–110)
POTASSIUM SERPL-SCNC: 4.9 MMOL/L
PROT SERPL-MCNC: 8 G/DL
PROT UR QL STRIP: ABNORMAL
RBC # BLD AUTO: 3.61 M/UL
RBC #/AREA URNS HPF: 0 /HPF (ref 0–4)
SAMPLE: ABNORMAL
SITE: ABNORMAL
SODIUM SERPL-SCNC: 134 MMOL/L
SP GR UR STRIP: >=1.03 (ref 1–1.03)
TROPONIN I SERPL DL<=0.01 NG/ML-MCNC: 0.07 NG/ML
URN SPEC COLLECT METH UR: ABNORMAL
UROBILINOGEN UR STRIP-ACNC: NEGATIVE EU/DL
WBC # BLD AUTO: 13.89 K/UL
WBC #/AREA URNS HPF: 0 /HPF (ref 0–5)
YEAST URNS QL MICRO: ABNORMAL

## 2018-07-19 PROCEDURE — 27000190 HC CPAP FULL FACE MASK W/VALVE

## 2018-07-19 PROCEDURE — 27000221 HC OXYGEN, UP TO 24 HOURS

## 2018-07-19 PROCEDURE — 85025 COMPLETE CBC W/AUTO DIFF WBC: CPT

## 2018-07-19 PROCEDURE — 94660 CPAP INITIATION&MGMT: CPT

## 2018-07-19 PROCEDURE — 25000003 PHARM REV CODE 250: Performed by: NURSE PRACTITIONER

## 2018-07-19 PROCEDURE — 96374 THER/PROPH/DIAG INJ IV PUSH: CPT

## 2018-07-19 PROCEDURE — 25000003 PHARM REV CODE 250: Performed by: EMERGENCY MEDICINE

## 2018-07-19 PROCEDURE — 63600175 PHARM REV CODE 636 W HCPCS: Performed by: EMERGENCY MEDICINE

## 2018-07-19 PROCEDURE — 84484 ASSAY OF TROPONIN QUANT: CPT

## 2018-07-19 PROCEDURE — 11000001 HC ACUTE MED/SURG PRIVATE ROOM

## 2018-07-19 PROCEDURE — 83735 ASSAY OF MAGNESIUM: CPT

## 2018-07-19 PROCEDURE — 82962 GLUCOSE BLOOD TEST: CPT

## 2018-07-19 PROCEDURE — 99284 EMERGENCY DEPT VISIT MOD MDM: CPT | Mod: 25

## 2018-07-19 PROCEDURE — 85379 FIBRIN DEGRADATION QUANT: CPT

## 2018-07-19 PROCEDURE — 82803 BLOOD GASES ANY COMBINATION: CPT

## 2018-07-19 PROCEDURE — 63600175 PHARM REV CODE 636 W HCPCS: Performed by: NURSE PRACTITIONER

## 2018-07-19 PROCEDURE — 80053 COMPREHEN METABOLIC PANEL: CPT

## 2018-07-19 PROCEDURE — 81000 URINALYSIS NONAUTO W/SCOPE: CPT

## 2018-07-19 PROCEDURE — 83880 ASSAY OF NATRIURETIC PEPTIDE: CPT

## 2018-07-19 PROCEDURE — 82010 KETONE BODYS QUAN: CPT

## 2018-07-19 PROCEDURE — 99900035 HC TECH TIME PER 15 MIN (STAT)

## 2018-07-19 PROCEDURE — 93010 ELECTROCARDIOGRAM REPORT: CPT | Mod: ,,, | Performed by: INTERNAL MEDICINE

## 2018-07-19 PROCEDURE — 84100 ASSAY OF PHOSPHORUS: CPT

## 2018-07-19 RX ORDER — IBUPROFEN 200 MG
16 TABLET ORAL
Status: DISCONTINUED | OUTPATIENT
Start: 2018-07-19 | End: 2018-07-23 | Stop reason: HOSPADM

## 2018-07-19 RX ORDER — ONDANSETRON 8 MG/1
8 TABLET, ORALLY DISINTEGRATING ORAL EVERY 8 HOURS PRN
Status: DISCONTINUED | OUTPATIENT
Start: 2018-07-19 | End: 2018-07-23 | Stop reason: HOSPADM

## 2018-07-19 RX ORDER — ROSUVASTATIN CALCIUM 10 MG/1
20 TABLET, COATED ORAL DAILY
Status: DISCONTINUED | OUTPATIENT
Start: 2018-07-20 | End: 2018-07-23 | Stop reason: HOSPADM

## 2018-07-19 RX ORDER — IPRATROPIUM BROMIDE AND ALBUTEROL SULFATE 2.5; .5 MG/3ML; MG/3ML
3 SOLUTION RESPIRATORY (INHALATION) EVERY 4 HOURS PRN
Status: DISCONTINUED | OUTPATIENT
Start: 2018-07-19 | End: 2018-07-23 | Stop reason: HOSPADM

## 2018-07-19 RX ORDER — CARVEDILOL 12.5 MG/1
25 TABLET ORAL 2 TIMES DAILY WITH MEALS
Status: DISCONTINUED | OUTPATIENT
Start: 2018-07-20 | End: 2018-07-23 | Stop reason: HOSPADM

## 2018-07-19 RX ORDER — ASPIRIN 81 MG/1
81 TABLET ORAL DAILY
Status: DISCONTINUED | OUTPATIENT
Start: 2018-07-20 | End: 2018-07-23 | Stop reason: HOSPADM

## 2018-07-19 RX ORDER — HYDRALAZINE HYDROCHLORIDE 25 MG/1
50 TABLET, FILM COATED ORAL EVERY 12 HOURS
Status: DISCONTINUED | OUTPATIENT
Start: 2018-07-19 | End: 2018-07-21

## 2018-07-19 RX ORDER — GLUCAGON 1 MG
1 KIT INJECTION
Status: DISCONTINUED | OUTPATIENT
Start: 2018-07-19 | End: 2018-07-23 | Stop reason: HOSPADM

## 2018-07-19 RX ORDER — LOSARTAN POTASSIUM 50 MG/1
100 TABLET ORAL DAILY
Status: DISCONTINUED | OUTPATIENT
Start: 2018-07-20 | End: 2018-07-20

## 2018-07-19 RX ORDER — PANTOPRAZOLE SODIUM 40 MG/1
40 TABLET, DELAYED RELEASE ORAL DAILY
Status: DISCONTINUED | OUTPATIENT
Start: 2018-07-20 | End: 2018-07-23 | Stop reason: HOSPADM

## 2018-07-19 RX ORDER — ASPIRIN 325 MG
325 TABLET ORAL
Status: COMPLETED | OUTPATIENT
Start: 2018-07-19 | End: 2018-07-19

## 2018-07-19 RX ORDER — SODIUM CHLORIDE 0.9 % (FLUSH) 0.9 %
5 SYRINGE (ML) INJECTION
Status: DISCONTINUED | OUTPATIENT
Start: 2018-07-19 | End: 2018-07-20

## 2018-07-19 RX ORDER — IBUPROFEN 200 MG
24 TABLET ORAL
Status: DISCONTINUED | OUTPATIENT
Start: 2018-07-19 | End: 2018-07-23 | Stop reason: HOSPADM

## 2018-07-19 RX ORDER — INSULIN ASPART 100 [IU]/ML
1-10 INJECTION, SOLUTION INTRAVENOUS; SUBCUTANEOUS
Status: DISCONTINUED | OUTPATIENT
Start: 2018-07-19 | End: 2018-07-23 | Stop reason: HOSPADM

## 2018-07-19 RX ORDER — ACETAMINOPHEN 325 MG/1
650 TABLET ORAL EVERY 4 HOURS PRN
Status: DISCONTINUED | OUTPATIENT
Start: 2018-07-19 | End: 2018-07-23 | Stop reason: HOSPADM

## 2018-07-19 RX ORDER — AMLODIPINE BESYLATE 5 MG/1
5 TABLET ORAL DAILY
Status: DISCONTINUED | OUTPATIENT
Start: 2018-07-20 | End: 2018-07-21

## 2018-07-19 RX ORDER — GABAPENTIN 100 MG/1
100 CAPSULE ORAL 2 TIMES DAILY
Status: DISCONTINUED | OUTPATIENT
Start: 2018-07-19 | End: 2018-07-23 | Stop reason: HOSPADM

## 2018-07-19 RX ORDER — FUROSEMIDE 10 MG/ML
40 INJECTION INTRAMUSCULAR; INTRAVENOUS
Status: COMPLETED | OUTPATIENT
Start: 2018-07-19 | End: 2018-07-19

## 2018-07-19 RX ADMIN — INSULIN ASPART 5 UNITS: 100 INJECTION, SOLUTION INTRAVENOUS; SUBCUTANEOUS at 11:07

## 2018-07-19 RX ADMIN — NITROGLYCERIN 1 INCH: 20 OINTMENT TOPICAL at 08:07

## 2018-07-19 RX ADMIN — ASPIRIN 325 MG ORAL TABLET 325 MG: 325 PILL ORAL at 08:07

## 2018-07-19 RX ADMIN — FUROSEMIDE 40 MG: 10 INJECTION, SOLUTION INTRAVENOUS at 08:07

## 2018-07-19 RX ADMIN — HYDRALAZINE HYDROCHLORIDE 50 MG: 25 TABLET, FILM COATED ORAL at 10:07

## 2018-07-19 RX ADMIN — INSULIN DETEMIR 36 UNITS: 100 INJECTION, SOLUTION SUBCUTANEOUS at 11:07

## 2018-07-19 RX ADMIN — GABAPENTIN 100 MG: 100 CAPSULE ORAL at 10:07

## 2018-07-19 NOTE — TELEPHONE ENCOUNTER
Per Dr Nugent Patient wife was instructed to take him the Er.   Spoke to patient wife , they will go to [facility] er because they are in a hotel down town

## 2018-07-19 NOTE — ED PROVIDER NOTES
"Encounter Date: 2018    SCRIBE #1 NOTE: I, Kyle Caraballo, am scribing for, and in the presence of, Dr. Paige .       History     Chief Complaint   Patient presents with    Fatigue     + generalized legthary, pt states his sugars have been running 500's, decreased appitite.     Time seen by provider: 6:26 PM    This is a 73 y.o. male, with history of HTN, HLD, and DM, who presents with complaint of hyperglycemia that began today. Patient reports his blood glucose was 560 earlier today, he took 17 units of Humalog just prior to coming to the ED, and "it dropped to 500". He has been experiencing nausea, malaise, and decreased appetite x 4 days. He called his PCP today and was advised to present to the ED. He also admits chills, one episode of vomiting, SOB with physical exertion, and bilateral leg swelling (getting better). Patient complies with lasix and Levemir regularly. Patient states he had a "UTI for one day", and it resolved after taking "two pills". He denies fevers, headaches, dizziness, congestion, rhinorrhea, sore throat, cough, chest pain, palpitations, abdominal pain, diarrhea, or constipation. NKDA. He denies use of alcohol, tobacco, or illicit drugs.       The history is provided by the patient (daughter at bedside).     Review of patient's allergies indicates:   Allergen Reactions    Atorvastatin Other (See Comments)     Past Medical History:   Diagnosis Date    Cataract     Cystoid macular edema of both eyes     Diabetes mellitus     Diabetes mellitus type II     Hyperlipemia     Hypertension     Retinal hole     Stage 4 chronic kidney disease      Past Surgical History:   Procedure Laterality Date    CATARACT EXTRACTION W/  INTRAOCULAR LENS IMPLANT Left 12/15/14    Dr de la cruz    CATARACT EXTRACTION W/  INTRAOCULAR LENS IMPLANT Right 14    dorothy    CIRCUMCISION, NON-      focal laser right eye      KNEE SURGERY      left    Left medial collateral ligament repair      " TONSILLECTOMY       Family History   Problem Relation Age of Onset    Heart disease Mother     Cancer Mother     Cancer Brother     Heart disease Father     Diabetes Father     Cancer Sister     Cataracts Paternal Grandmother     Glaucoma Paternal Grandmother     Blindness Neg Hx     Amblyopia Neg Hx     Hypertension Neg Hx     Macular degeneration Neg Hx     Retinal detachment Neg Hx     Strabismus Neg Hx      Social History   Substance Use Topics    Smoking status: Never Smoker    Smokeless tobacco: Never Used    Alcohol use No     Review of Systems   Constitutional: Positive for appetite change and chills. Negative for fever.        Positive for hyperglycemia. Positive for malaise.    HENT: Negative for congestion, rhinorrhea and sore throat.    Respiratory: Positive for shortness of breath. Negative for cough.    Cardiovascular: Positive for leg swelling (bilateral). Negative for chest pain and palpitations.   Gastrointestinal: Positive for nausea and vomiting. Negative for abdominal pain, constipation and diarrhea.   Genitourinary: Negative for decreased urine volume, dysuria and frequency.   Musculoskeletal: Negative for back pain.   Skin: Negative for rash.   Neurological: Negative for dizziness and headaches.   Psychiatric/Behavioral: Negative for confusion.       Physical Exam     Initial Vitals [07/19/18 1819]   BP Pulse Resp Temp SpO2   (!) 128/57 73 16 97.9 °F (36.6 °C) (!) 80 %      MAP       --         Vitals:    07/22/18 2338 07/23/18 0336 07/23/18 0430 07/23/18 0745   BP: (!) 116/58 135/66  (!) 150/71   Pulse: 67 65  75   Resp: 18 18 18   Temp: 98.9 °F (37.2 °C) 98.8 °F (37.1 °C)  99.1 °F (37.3 °C)   TempSrc: Oral Oral  Oral   SpO2: (!) 94% (!) 94%  97%   Weight:   110 kg (242 lb 8.1 oz)    Height:        07/23/18 0753   BP:    Pulse: 60   Resp:    Temp:    TempSrc:    SpO2: 98%   Weight:    Height:        Physical Exam    Nursing note and vitals reviewed.  Constitutional: He  appears well-developed and well-nourished. No distress.   HENT:   Head: Atraumatic.   Mouth/Throat: Oropharynx is clear and moist.   Eyes: Conjunctivae and EOM are normal. Pupils are equal, round, and reactive to light.   Neck: Normal range of motion. Neck supple.   Cardiovascular: Normal rate, regular rhythm and normal heart sounds.   Pulmonary/Chest: No respiratory distress. He has no wheezes. He has no rhonchi. He has no rales.   Breath sounds diminished at the bases.    Abdominal: Soft. Bowel sounds are normal. He exhibits no distension. There is no tenderness.   Musculoskeletal: Normal range of motion. He exhibits edema.   Trace pretibial edema bilaterally.    Neurological: He is alert and oriented to person, place, and time. He has normal strength.   Skin: Skin is warm and dry. There is pallor.   Psychiatric: He has a normal mood and affect. His behavior is normal. Judgment and thought content normal.         ED Course   Procedures  Labs Reviewed   CBC W/ AUTO DIFFERENTIAL - Abnormal; Notable for the following:        Result Value    WBC 13.89 (*)     RBC 3.61 (*)     Hemoglobin 10.4 (*)     Hematocrit 31.8 (*)     Gran # (ANC) 11.3 (*)     Mono # 1.3 (*)     Gran% 81.6 (*)     Lymph% 8.6 (*)     All other components within normal limits   COMPREHENSIVE METABOLIC PANEL - Abnormal; Notable for the following:     Sodium 134 (*)     CO2 20 (*)     Glucose 468 (*)     BUN, Bld 52 (*)     Creatinine 2.8 (*)     Albumin 3.1 (*)     eGFR if  25 (*)     eGFR if non  21 (*)     All other components within normal limits    Narrative:     Glu critical result(s) called and verbal readback obtained from   Melinda Davis [id number], 07/19/2018 19:58   TROPONIN I - Abnormal; Notable for the following:     Troponin I 0.074 (*)     All other components within normal limits   B-TYPE NATRIURETIC PEPTIDE - Abnormal; Notable for the following:      (*)     All other components within  normal limits   D DIMER, QUANTITATIVE - Abnormal; Notable for the following:     D-Dimer 1.45 (*)     All other components within normal limits   POCT GLUCOSE - Abnormal; Notable for the following:     POCT Glucose 435 (*)     All other components within normal limits   ISTAT PROCEDURE - Abnormal; Notable for the following:     POC SATURATED O2 75 (*)     All other components within normal limits   BETA - HYDROXYBUTYRATE, SERUM   MAGNESIUM   PHOSPHORUS   POCT GLUCOSE MONITORING CONTINUOUS     EKG Readings: (Independently Interpreted)   Initial Reading: No STEMI.   NSR at a rate of 70. Lateral T wave changes with mild depressions. Changed from previous EKG.       Imaging Results          X-Ray Chest AP Portable (Final result)  Result time 07/19/18 19:32:31    Final result by José Blanchard MD (07/19/18 19:32:31)                 Impression:      Cardiomegaly with bilateral edema.      Electronically signed by: José Blanchard MD  Date:    07/19/2018  Time:    19:32             Narrative:    EXAMINATION:  XR CHEST AP PORTABLE    CLINICAL HISTORY:  hyperglycemia;    TECHNIQUE:  Single frontal view of the chest was performed.    COMPARISON:  December 16, 2016.    FINDINGS:  Cardiomegaly with bilateral edema.    No pneumothorax.  No effusion on the right.  Left CP angle is not included.                              X-Rays:   Independently Interpreted Readings:   Chest X-Ray: Overdeveloped. Diffuse interstitial prominence without lobar infiltrate, effusion, or pneumothorax.      Medical Decision Making:   Clinical Tests:   Lab Tests: Ordered and Reviewed  Radiological Study: Ordered and Reviewed  Medical Tests: Ordered and Reviewed  ED Management:  Emergent evaluation of a 73-year-old male with complaint of fatigue and malaise, hyperglycemia.  Triage vital signs revealed hypoxia, although patient denies shortness of breath, he did require supplemental oxygen.  On exam he had decreased lung sounds at the bases with a  trace of pedal edema, had x-ray and labs confirm CHF.  Other labs show acute on chronic kidney injury with prerenal pattern, concerning for the delicate situation of fluid balance given that he is overall fluid overloaded.  There was a trace elevation in his troponin, although I doubt acute MI will trend with serial cardiac enzymes.  He was treated in the ED with Lasix, topical nitro, and BiPAP to treat his CHF.  There is no DKA present.  He is admitted in stable condition for further care and monitoring.            Scribe Attestation:   Scribe #1: I performed the above scribed service and the documentation accurately describes the services I performed. I attest to the accuracy of the note.    Attending Attestation:           Physician Attestation for Scribe:  Physician Attestation Statement for Scribe #1: I, Dr. Paige, reviewed documentation, as scribed by Kyle Caraballo  in my presence, and it is both accurate and complete.                    Clinical Impression:     1. Acute kidney injury superimposed on chronic kidney disease    2. Fatigue    3. Hyperglycemia    4. Acute on chronic congestive heart failure, unspecified heart failure type    5. MAGDA (acute kidney injury)    6. Chronic kidney disease, unspecified CKD stage    7. Elevated troponin    8. CHF (congestive heart failure)    9. Acute on chronic congestive heart failure    10. Acute on chronic diastolic heart failure                                   Rebecca Paige MD  07/23/18 [id number]

## 2018-07-19 NOTE — TELEPHONE ENCOUNTER
Spoke to patient wife and patient is have nausea vomiting.  Patient is not getting out of the of and moaning in pain but when she ask he say that nothing hurting him.  Patient has not eating any things solid since Monday evening, they took his blood sugar on yesterday and it was 300.   I asked if he taking his med and she said he did not do his night injection and nothing today .  I ask her to check  His blood sugar and call back with his reading .  I booked him and appointment for tomorrow at 11 40

## 2018-07-19 NOTE — TELEPHONE ENCOUNTER
----- Message from Kimberly Dowling sent at 7/19/2018  1:55 PM CDT -----  Contact: pt wife Patty [phone]  Pt wife would like a call back regarding pt taking antibiotics for a uti. Please advise.

## 2018-07-19 NOTE — TELEPHONE ENCOUNTER
----- Message from Rachelle Marrufo sent at 7/19/2018  4:00 PM CDT -----  Contact: Patty/wife/ [phone]  FYI:Patient sugar reading is 560 and patient took 17cc of his insulin and patient is eating.

## 2018-07-20 LAB
ALBUMIN SERPL BCP-MCNC: 2.9 G/DL
ALP SERPL-CCNC: 65 U/L
ALT SERPL W/O P-5'-P-CCNC: 18 U/L
ANION GAP SERPL CALC-SCNC: 9 MMOL/L
ANION GAP SERPL CALC-SCNC: 9 MMOL/L
AST SERPL-CCNC: 16 U/L
BASOPHILS # BLD AUTO: 0.02 K/UL
BASOPHILS NFR BLD: 0.2 %
BILIRUB SERPL-MCNC: 0.5 MG/DL
BUN SERPL-MCNC: 54 MG/DL
BUN SERPL-MCNC: 54 MG/DL
CALCIUM SERPL-MCNC: 9.1 MG/DL
CALCIUM SERPL-MCNC: 9.5 MG/DL
CHLORIDE SERPL-SCNC: 103 MMOL/L
CHLORIDE SERPL-SCNC: 103 MMOL/L
CHOLEST SERPL-MCNC: 126 MG/DL
CHOLEST/HDLC SERPL: 4.1 {RATIO}
CO2 SERPL-SCNC: 22 MMOL/L
CO2 SERPL-SCNC: 23 MMOL/L
CREAT SERPL-MCNC: 2.8 MG/DL
CREAT SERPL-MCNC: 2.8 MG/DL
DIASTOLIC DYSFUNCTION: YES
DIFFERENTIAL METHOD: ABNORMAL
EOSINOPHIL # BLD AUTO: 0.1 K/UL
EOSINOPHIL NFR BLD: 0.9 %
ERYTHROCYTE [DISTWIDTH] IN BLOOD BY AUTOMATED COUNT: 14.3 %
EST. GFR  (AFRICAN AMERICAN): 25 ML/MIN/1.73 M^2
EST. GFR  (AFRICAN AMERICAN): 25 ML/MIN/1.73 M^2
EST. GFR  (NON AFRICAN AMERICAN): 21 ML/MIN/1.73 M^2
EST. GFR  (NON AFRICAN AMERICAN): 21 ML/MIN/1.73 M^2
ESTIMATED PA SYSTOLIC PRESSURE: 14.29
GLUCOSE SERPL-MCNC: 427 MG/DL
GLUCOSE SERPL-MCNC: 495 MG/DL
HCT VFR BLD AUTO: 31.7 %
HDLC SERPL-MCNC: 31 MG/DL
HDLC SERPL: 24.6 %
HGB BLD-MCNC: 10.2 G/DL
LDLC SERPL CALC-MCNC: 76.8 MG/DL
LYMPHOCYTES # BLD AUTO: 1.4 K/UL
LYMPHOCYTES NFR BLD: 11.8 %
MAGNESIUM SERPL-MCNC: 2.2 MG/DL
MCH RBC QN AUTO: 28.4 PG
MCHC RBC AUTO-ENTMCNC: 32.2 G/DL
MCV RBC AUTO: 88 FL
MITRAL VALVE REGURGITATION: ABNORMAL
MONOCYTES # BLD AUTO: 1.1 K/UL
MONOCYTES NFR BLD: 9.1 %
NEUTROPHILS # BLD AUTO: 9 K/UL
NEUTROPHILS NFR BLD: 77.6 %
NONHDLC SERPL-MCNC: 95 MG/DL
PHOSPHATE SERPL-MCNC: 2.6 MG/DL
PLATELET # BLD AUTO: 179 K/UL
PMV BLD AUTO: 11.8 FL
POCT GLUCOSE: 236 MG/DL (ref 70–110)
POCT GLUCOSE: 244 MG/DL (ref 70–110)
POCT GLUCOSE: 357 MG/DL (ref 70–110)
POCT GLUCOSE: 409 MG/DL (ref 70–110)
POCT GLUCOSE: 456 MG/DL (ref 70–110)
POCT GLUCOSE: >500 MG/DL (ref 70–110)
POTASSIUM SERPL-SCNC: 4.4 MMOL/L
POTASSIUM SERPL-SCNC: 4.5 MMOL/L
PROT SERPL-MCNC: 7.5 G/DL
RBC # BLD AUTO: 3.59 M/UL
RETIRED EF AND QEF - SEE NOTES: 55 (ref 55–65)
SODIUM SERPL-SCNC: 134 MMOL/L
SODIUM SERPL-SCNC: 135 MMOL/L
TRICUSPID VALVE REGURGITATION: ABNORMAL
TRIGL SERPL-MCNC: 91 MG/DL
TROPONIN I SERPL DL<=0.01 NG/ML-MCNC: 0.05 NG/ML
WBC # BLD AUTO: 11.53 K/UL

## 2018-07-20 PROCEDURE — 83735 ASSAY OF MAGNESIUM: CPT

## 2018-07-20 PROCEDURE — 27000221 HC OXYGEN, UP TO 24 HOURS

## 2018-07-20 PROCEDURE — 84484 ASSAY OF TROPONIN QUANT: CPT

## 2018-07-20 PROCEDURE — 36415 COLL VENOUS BLD VENIPUNCTURE: CPT

## 2018-07-20 PROCEDURE — 80053 COMPREHEN METABOLIC PANEL: CPT

## 2018-07-20 PROCEDURE — 25000003 PHARM REV CODE 250: Performed by: NURSE PRACTITIONER

## 2018-07-20 PROCEDURE — 85025 COMPLETE CBC W/AUTO DIFF WBC: CPT

## 2018-07-20 PROCEDURE — 93306 TTE W/DOPPLER COMPLETE: CPT

## 2018-07-20 PROCEDURE — 63600175 PHARM REV CODE 636 W HCPCS: Performed by: NURSE PRACTITIONER

## 2018-07-20 PROCEDURE — 80061 LIPID PANEL: CPT

## 2018-07-20 PROCEDURE — 11000001 HC ACUTE MED/SURG PRIVATE ROOM

## 2018-07-20 PROCEDURE — 63600175 PHARM REV CODE 636 W HCPCS: Performed by: HOSPITALIST

## 2018-07-20 PROCEDURE — 25000003 PHARM REV CODE 250: Performed by: HOSPITALIST

## 2018-07-20 PROCEDURE — 99223 1ST HOSP IP/OBS HIGH 75: CPT | Mod: ,,, | Performed by: HOSPITALIST

## 2018-07-20 PROCEDURE — 80048 BASIC METABOLIC PNL TOTAL CA: CPT

## 2018-07-20 PROCEDURE — 94761 N-INVAS EAR/PLS OXIMETRY MLT: CPT

## 2018-07-20 PROCEDURE — 84100 ASSAY OF PHOSPHORUS: CPT

## 2018-07-20 PROCEDURE — 99900035 HC TECH TIME PER 15 MIN (STAT)

## 2018-07-20 RX ORDER — CIPROFLOXACIN 500 MG/1
500 TABLET ORAL EVERY 12 HOURS
Status: DISCONTINUED | OUTPATIENT
Start: 2018-07-20 | End: 2018-07-23 | Stop reason: HOSPADM

## 2018-07-20 RX ORDER — IBUPROFEN 100 MG/5ML
3000 SUSPENSION, ORAL (FINAL DOSE FORM) ORAL 2 TIMES DAILY
Status: ON HOLD | COMMUNITY
End: 2018-07-23 | Stop reason: HOSPADM

## 2018-07-20 RX ORDER — FUROSEMIDE 10 MG/ML
80 INJECTION INTRAMUSCULAR; INTRAVENOUS 2 TIMES DAILY
Status: DISCONTINUED | OUTPATIENT
Start: 2018-07-20 | End: 2018-07-23 | Stop reason: HOSPADM

## 2018-07-20 RX ORDER — VIT C/E/ZN/COPPR/LUTEIN/ZEAXAN 250MG-90MG
1000 CAPSULE ORAL DAILY
Status: ON HOLD | COMMUNITY
End: 2018-07-23 | Stop reason: HOSPADM

## 2018-07-20 RX ORDER — SODIUM CHLORIDE 9 MG/ML
INJECTION, SOLUTION INTRAVENOUS CONTINUOUS
Status: DISCONTINUED | OUTPATIENT
Start: 2018-07-20 | End: 2018-07-20

## 2018-07-20 RX ADMIN — INSULIN ASPART 10 UNITS: 100 INJECTION, SOLUTION INTRAVENOUS; SUBCUTANEOUS at 10:07

## 2018-07-20 RX ADMIN — INSULIN DETEMIR 36 UNITS: 100 INJECTION, SOLUTION SUBCUTANEOUS at 08:07

## 2018-07-20 RX ADMIN — HYDRALAZINE HYDROCHLORIDE 50 MG: 25 TABLET, FILM COATED ORAL at 10:07

## 2018-07-20 RX ADMIN — SODIUM CHLORIDE: 0.9 INJECTION, SOLUTION INTRAVENOUS at 04:07

## 2018-07-20 RX ADMIN — PANTOPRAZOLE SODIUM 40 MG: 40 TABLET, DELAYED RELEASE ORAL at 10:07

## 2018-07-20 RX ADMIN — GABAPENTIN 100 MG: 100 CAPSULE ORAL at 10:07

## 2018-07-20 RX ADMIN — INSULIN ASPART 10 UNITS: 100 INJECTION, SOLUTION INTRAVENOUS; SUBCUTANEOUS at 12:07

## 2018-07-20 RX ADMIN — ASPIRIN 81 MG: 81 TABLET, COATED ORAL at 10:07

## 2018-07-20 RX ADMIN — INSULIN ASPART 4 UNITS: 100 INJECTION, SOLUTION INTRAVENOUS; SUBCUTANEOUS at 06:07

## 2018-07-20 RX ADMIN — AMLODIPINE BESYLATE 5 MG: 5 TABLET ORAL at 10:07

## 2018-07-20 RX ADMIN — CIPROFLOXACIN HYDROCHLORIDE 500 MG: 500 TABLET, FILM COATED ORAL at 08:07

## 2018-07-20 RX ADMIN — CARVEDILOL 25 MG: 12.5 TABLET, FILM COATED ORAL at 10:07

## 2018-07-20 RX ADMIN — GABAPENTIN 100 MG: 100 CAPSULE ORAL at 08:07

## 2018-07-20 RX ADMIN — FUROSEMIDE 80 MG: 10 INJECTION, SOLUTION INTRAVENOUS at 06:07

## 2018-07-20 RX ADMIN — HYDRALAZINE HYDROCHLORIDE 50 MG: 25 TABLET, FILM COATED ORAL at 08:07

## 2018-07-20 RX ADMIN — FUROSEMIDE 80 MG: 10 INJECTION, SOLUTION INTRAVENOUS at 10:07

## 2018-07-20 RX ADMIN — INSULIN ASPART 2 UNITS: 100 INJECTION, SOLUTION INTRAVENOUS; SUBCUTANEOUS at 09:07

## 2018-07-20 RX ADMIN — CARVEDILOL 25 MG: 12.5 TABLET, FILM COATED ORAL at 06:07

## 2018-07-20 RX ADMIN — ROSUVASTATIN CALCIUM 20 MG: 10 TABLET, FILM COATED ORAL at 10:07

## 2018-07-20 NOTE — PHARMACY MED REC
"Admission Medication Reconciliation - Pharmacy Consult Note    The home medication history was taken by Swati Massey.  Based on information gathered and subsequent review by the clinical pharmacist, the items below may need attention.     You may go to "Admission" then "Reconcile Home Medications" tabs to review and/or act upon these items.     Potentially problematic discrepancies with current MAR  o Patient IS taking the following which was not ordered upon admit  o Losartan 100 mg PO daily  - NOTED: AoCKD  o Patient is taking a drug DIFFERENTLY than how ordered upon admit  o Carvedilol 25 mg PO daily instead of BID  - May lack understanding of therapy  o Gabapentin 100 mg TID with meals and 3 capsules (300 mg) at bedtime  - Current inpatient order = gabapentin 100 mg PO BID   Dose reduction may be appropriate based on AoCKD    Potential issues to be addressed PRIOR TO DISCHARGE  o Patient lacks understanding of therapy    Prescriptions Prior to Admission   Medication Sig Dispense Refill Last Dose    amlodipine (NORVASC) 5 MG tablet TAKE ONE TABLET BY MOUTH ONCE DAILY 90 tablet 0 7/19/2018    ascorbic acid, vitamin C, (VITAMIN C) 1000 MG tablet Take 3,000 mg by mouth 2 (two) times daily.       aspirin (ECOTRIN) 81 MG EC tablet Take 1 tablet (81 mg total) by mouth once daily.  0 7/19/2018    carvedilol (COREG) 25 MG tablet TAKE ONE TABLET BY MOUTH TWICE DAILY WITH MEALS (Patient taking differently: TAKE ONE TABLET BY MOUTH ONCE DAILY WITH MEALS) 180 tablet 3 7/19/2018    cholecalciferol, vitamin D3, (VITAMIN D3) 1,000 unit capsule Take 1,000 Units by mouth once daily.       ciprofloxacin HCl (CIPRO) 500 MG tablet Take 1 tablet (500 mg total) by mouth 2 (two) times daily. for 10 days 28 tablet 0 7/19/2018    furosemide (LASIX) 20 MG tablet Take 1 tablet (20 mg total) by mouth once daily. Do not take the pm dose after 3 pm 90 tablet 3 7/19/2018    gabapentin (NEURONTIN) 100 MG capsule Take 100 mg by " "mouth 3 (three) times daily with meals. . Then 3 capsules by mouth at bedtime   7/19/2018    hydrALAZINE (APRESOLINE) 50 MG tablet Take 1 tablet (50 mg total) by mouth every 12 (twelve) hours. 180 tablet 3 7/19/2018    insulin lispro (HUMALOG KWIKPEN INSULIN) 100 unit/mL InPn pen INJECT 12 UNITS SUBCUTANEOUSLY THREE TIMES DAILY WITH MEALS WITH CORRECTION SCALE, MAX TDD 50 UNITS 9 Box 6 7/19/2018    LEVEMIR FLEXTOUCH U-100 INSULN 100 unit/mL (3 mL) InPn pen Inject 36 Units into the skin every evening. 36 mL 3 7/18/2018    losartan (COZAAR) 100 MG tablet Take 1 tablet (100 mg total) by mouth once daily. 90 tablet 3 7/19/2018    oxyCODONE-acetaminophen (PERCOCET) 5-325 mg per tablet Take 0.5 tablets by mouth every 4 (four) hours as needed for Pain. (Patient taking differently: Take 0.5 tablets by mouth every 12 (twelve) hours as needed for Pain. ) 12 tablet 0 7/19/2018    pantoprazole (PROTONIX) 40 MG tablet Take 40 mg by mouth once daily.    7/19/2018    rosuvastatin (CRESTOR) 20 MG tablet Take 20 mg by mouth once daily.    7/19/2018    pen needle, diabetic, safety (BD AUTOSHIELD PEN NEEDLE) 29 gauge x 5/16" Ndle For use once daily with levemir flexpen 90 each 11 Taking       Please address this information as you see fit.  Feel free to contact us if you have any questions or require assistance.    Stefano Mccarthy, Pharm.D., BCPS  [phone]                .  .          "

## 2018-07-20 NOTE — PLAN OF CARE
CM met with pt and family for initial discharge planning assessment.    Pt's PCP is correct in [facility] and pharmacy of choice is [facility] in [place].    Pt is independent, uses no DME and has no mental health issues.    Pt states he will likely have no CM needs for discharge.     07/20/18 1211   Discharge Assessment   Assessment Type Discharge Planning Assessment   Confirmed/corrected address and phone number on facesheet? Yes   Assessment information obtained from? Patient;Medical Record;Caregiver   Expected Length of Stay (days) 3   Communicated expected length of stay with patient/caregiver yes   Prior to hospitilization cognitive status: Alert/Oriented   Prior to hospitalization functional status: Independent   Current cognitive status: Alert/Oriented   Current Functional Status: Independent   Lives With spouse   Able to Return to Prior Arrangements yes   Is patient able to care for self after discharge? Yes   Patient's perception of discharge disposition home or selfcare   Readmission Within The Last 30 Days no previous admission in last 30 days   Patient currently being followed by outpatient case management? No   Patient currently receives any other outside agency services? No   Equipment Currently Used at Home none   Do you have any problems affording any of your prescribed medications? No   Is the patient taking medications as prescribed? yes   Does the patient have transportation home? Yes   Transportation Available car;family or friend will provide   Does the patient receive services at the [facility]? No   Discharge Plan A Home   Discharge Plan B Home

## 2018-07-20 NOTE — ASSESSMENT & PLAN NOTE
Creatinine 2.8, 2.1 three days ago. Believe patient is intravascularly depleted due to elevated BG    [facility] further lasix dosing  [facility]

## 2018-07-20 NOTE — PLAN OF CARE
Problem: Patient Care Overview  Goal: Plan of Care Review  Outcome: Ongoing (interventions implemented as appropriate)  Patient received on simple mask from ED. Placed on Bipap, settings as documented. Sats 94%. No Prn Tx needed.  Pt weaned to 2L nc.@ 2200. Bipap on standby in room. Pt. in no distress, will continue to monitor.

## 2018-07-20 NOTE — ED NOTES
Pt presents with c/o hyperglycemia, N/V x2 days and sob with exertion. Pt denies any diarrhea, dizziness or CP. Pt reports his CBG was 560 pta , he took 17 units of Humalog and reports CBG went down to 530.  Pt also reporting that he was seen by his PCP on MOnday for swollen scrotum and was sent home on abx for a UTI. Pt does not appear to be in acute distress. RR are even and unlabored. Pts skin appears pale. Pt is placed o cont cardiac, bp and pulse ox monitoring,. Bed is locked and in lowest position with side rails up x2. Call light is within reach. Pts wife is at the bedside.

## 2018-07-20 NOTE — H&P
"[facility] Hospital Medicine  History & Physical    Patient Name: Domingo Castro  MRN: [id number]  Admission Date: 2018  Attending Physician: Farhan Chow MD   Primary Care Provider: Griselda Nugent MD         Patient information was obtained from patient, past medical records and ER records.     Subjective:     Principal Problem:Acute on chronic congestive heart failure    Chief Complaint:   Chief Complaint   Patient presents with    Fatigue     + generalized legthary, pt states his sugars have been running 500's, decreased appitite.        HPI: This is a 73 y.o. male, with history of HTN, HLD, and DM, who presents with complaint of hyperglycemia that began today. Patient reports his blood glucose was 560 earlier today, he took 17 units of Humalog, and it dropped to 500. He has been experiencing nausea, malaise, and decreased appetite x 4 days. He called his PCP today and was advised to present to the ED. He also admits chills, one episode of vomiting, SOB with physical exertion, and bilateral leg swelling (getting better). Patient complies with lasix and Levemir regularly. Patient states he had a "UTI for one day", and it resolved after taking "two pills". He denies fevers, headaches, dizziness, congestion, rhinorrhea, sore throat, cough, chest pain, palpitations, abdominal pain, diarrhea, or constipation. NKDA. He denies use of alcohol, tobacco, or illicit drugs.     Past Medical History:   Diagnosis Date    Cataract     Cystoid macular edema of both eyes     Diabetes mellitus     Diabetes mellitus type II     Hyperlipemia     Hypertension     Retinal hole     Stage 4 chronic kidney disease        Past Surgical History:   Procedure Laterality Date    CATARACT EXTRACTION W/  INTRAOCULAR LENS IMPLANT Left 12/15/14    Dr de la cruz    CATARACT EXTRACTION W/  INTRAOCULAR LENS IMPLANT Right 14    dorothy    CIRCUMCISION, NON-      focal laser right eye      KNEE SURGERY   " "   left    Left medial collateral ligament repair      TONSILLECTOMY         Review of patient's allergies indicates:   Allergen Reactions    Atorvastatin Other (See Comments)       No current facility-administered medications on file prior to encounter.      Current Outpatient Prescriptions on File Prior to Encounter   Medication Sig    amlodipine (NORVASC) 5 MG tablet TAKE ONE TABLET BY MOUTH ONCE DAILY    aspirin (ECOTRIN) 81 MG EC tablet Take 1 tablet (81 mg total) by mouth once daily.    carvedilol (COREG) 25 MG tablet TAKE ONE TABLET BY MOUTH TWICE DAILY WITH MEALS    ciprofloxacin HCl (CIPRO) 500 MG tablet Take 1 tablet (500 mg total) by mouth 2 (two) times daily. for 10 days    furosemide (LASIX) 20 MG tablet Take 1 tablet (20 mg total) by mouth once daily. Do not take the pm dose after 3 pm    gabapentin (NEURONTIN) 100 MG capsule     hydrALAZINE (APRESOLINE) 50 MG tablet Take 1 tablet (50 mg total) by mouth every 12 (twelve) hours.    insulin lispro (HUMALOG KWIKPEN INSULIN) 100 unit/mL InPn pen INJECT 12 UNITS SUBCUTANEOUSLY THREE TIMES DAILY WITH MEALS WITH CORRECTION SCALE, MAX TDD 50 UNITS    LEVEMIR FLEXTOUCH U-100 INSULN 100 unit/mL (3 mL) InPn pen Inject 36 Units into the skin every evening.    losartan (COZAAR) 100 MG tablet Take 1 tablet (100 mg total) by mouth once daily.    oxyCODONE-acetaminophen (PERCOCET) 5-325 mg per tablet Take 0.5 tablets by mouth every 4 (four) hours as needed for Pain.    pantoprazole (PROTONIX) 40 MG tablet     rosuvastatin (CRESTOR) 20 MG tablet     doxycycline (VIBRA-TABS) 100 MG tablet Take 1 tablet (100 mg total) by mouth 2 (two) times daily.    MG GLY-CALCIUM-POTASSIUM-E-NNEKA ORAL Take 25 mg by mouth once daily.    pen needle, diabetic, safety (BD AUTOSHIELD PEN NEEDLE) 29 gauge x 5/16" Ndle For use once daily with levemir flexpen     Family History     Problem Relation (Age of Onset)    Cancer Mother, Brother, Sister    Cataracts Paternal " Grandmother    Diabetes Father    Glaucoma Paternal Grandmother    Heart disease Mother, Father        Social History Main Topics    Smoking status: Never Smoker    Smokeless tobacco: Never Used    Alcohol use No    Drug use: No    Sexual activity: Yes     Partners: Female     Review of Systems   Constitutional: Positive for activity change, appetite change and fatigue. Negative for fever.   HENT: Negative for congestion, ear pain and postnasal drip.    Eyes: Negative for discharge.   Respiratory: Positive for cough and shortness of breath. Negative for apnea and wheezing.    Cardiovascular: Positive for leg swelling. Negative for chest pain.   Gastrointestinal: Positive for nausea and vomiting. Negative for abdominal distention and abdominal pain.   Endocrine: Negative for polydipsia, polyphagia and polyuria.   Genitourinary: Negative for difficulty urinating, flank pain, frequency, hematuria and urgency.   Musculoskeletal: Positive for arthralgias and myalgias. Negative for joint swelling.   Skin: Negative for pallor and rash.   Allergic/Immunologic: Negative for environmental allergies and food allergies.   Neurological: Negative for dizziness, speech difficulty, weakness, light-headedness and headaches.   Hematological: Does not bruise/bleed easily.   Psychiatric/Behavioral: Negative for agitation.     Objective:     Vital Signs (Most Recent):  Temp: 97.8 °F (36.6 °C) (07/19/18 2237)  Pulse: 66 (07/19/18 2237)  Resp: 20 (07/19/18 2237)  BP: (!) 171/80 (will give night meds and cont to monitor ) (07/19/18 2237)  SpO2: 95 % (07/19/18 2237) Vital Signs (24h Range):  Temp:  [97.8 °F (36.6 °C)-97.9 °F (36.6 °C)] 97.8 °F (36.6 °C)  Pulse:  [66-73] 66  Resp:  [16-25] 20  SpO2:  [72 %-96 %] 95 %  BP: (128-179)/(57-80) 171/80     Weight: 114.3 kg (252 lb)  Body mass index is 31.5 kg/m².    Physical Exam   Constitutional: He is oriented to person, place, and time. He appears well-developed and well-nourished.   HENT:    Head: Normocephalic.   Eyes: Conjunctivae are normal.   Neck: Normal range of motion. Neck supple.   Cardiovascular: Normal rate, regular rhythm, normal heart sounds and intact distal pulses.    Pulses:       Radial pulses are 2+ on the right side, and 2+ on the left side.        Dorsalis pedis pulses are 2+ on the right side, and 2+ on the left side.   Bilateral +1 edema to lower extremities   Pulmonary/Chest: Effort normal. Tachypnea noted. He has rales in the right lower field and the left lower field.   Abdominal: Soft. Bowel sounds are normal. He exhibits no distension. There is no tenderness.   Musculoskeletal: Normal range of motion.   Neurological: He is alert and oriented to person, place, and time. He has normal strength. GCS eye subscore is 4. GCS verbal subscore is 5. GCS motor subscore is 6.   Skin: Skin is warm and dry.   Psychiatric: He has a normal mood and affect. His speech is normal and behavior is normal.           Significant Labs:   CBC:   Recent Labs  Lab 07/19/18 1922   WBC 13.89*   HGB 10.4*   HCT 31.8*        CMP:   Recent Labs  Lab 07/19/18 1922   *   K 4.9      CO2 20*   *   BUN 52*   CREATININE 2.8*   CALCIUM 9.7   PROT 8.0   ALBUMIN 3.1*   BILITOT 0.6   ALKPHOS 71   AST 19   ALT 21   ANIONGAP 11   EGFRNONAA 21*     Cardiac Markers:   Recent Labs  Lab 07/19/18 1922   *     Troponin:   Recent Labs  Lab 07/19/18 1922   TROPONINI 0.074*       Significant Imaging: I have reviewed all pertinent imaging results/findings within the past 24 hours.    Assessment/Plan:     * Acute on chronic congestive heart failure    BNP- 880    Lasix given in ER  Echo pending  Trend Troponin            Hyperlipidemia    Lipid panel pending    Continue Crestor        Hypertension    Hypertensive currently    Continue amlodipine, coreg, hydralazine, losartan          Acute kidney injury superimposed on CKD    Creatinine 2.8, 2.1 three days ago. Believe patient is  intravascularly depleted due to elevated BG    Will hold further lasix dosing  CMP QAM                Type 2 DM with CKD stage 4 and hypertension    BG- 468    Continue Levemir 36 units daily  Moderate dose SSI  A1c pending            VTE Risk Mitigation         Ordered     Place sequential compression device  Until discontinued      07/19/18 2219     IP VTE HIGH RISK PATIENT  Once      07/19/18 2219     Reason for No Pharmacological VTE Prophylaxis  Once      07/19/18 2219             Oniel Dudley NP  Department of Hospital Medicine   [facility]

## 2018-07-20 NOTE — PLAN OF CARE
Problem: Patient Care Overview  Goal: Plan of Care Review  Outcome: Ongoing (interventions implemented as appropriate)  Patient on 5L NC humidified saturations 93% with clear and diminished BS,prn not needed.

## 2018-07-20 NOTE — HPI
"Per nOiel Dudley, "This is a 73 y.o. male, with history of HTN, HLD, and DM, who presents with complaint of hyperglycemia that began today. Patient reports his blood glucose was 560 earlier today, he took 17 units of Humalog, and it dropped to 500. He has been experiencing nausea, malaise, and decreased appetite x 4 days. He called his PCP today and was advised to present to the ED. He also admits chills, one episode of vomiting, SOB with physical exertion, and bilateral leg swelling (getting better). Patient complies with lasix and Levemir regularly. Patient states he had a "UTI for one day", and it resolved after taking "two pills". He denies fevers, headaches, dizziness, congestion, rhinorrhea, sore throat, cough, chest pain, palpitations, abdominal pain, diarrhea, or constipation. NKDA. He denies use of alcohol, tobacco, or illicit drugs."  "

## 2018-07-20 NOTE — PLAN OF CARE
Problem: Patient Care Overview  Goal: Plan of Care Review  Outcome: Ongoing (interventions implemented as appropriate)  Patient resting in bed at this time, call light within reach, instructed to call as needed, bed locked and in lowest position, night light on, non skid footwear on, room free of clutter, patient free of injuries    2L NC, pt tolerating  NS at 50, tolerating (gentle hydration to help with high blood sugar and BUN and Cr levels)  No pain reported at this time  accuchecks in 400s, covered   Tele reading NSR    No further concerns at this time, will cont to monitor

## 2018-07-20 NOTE — SUBJECTIVE & OBJECTIVE
Past Medical History:   Diagnosis Date    Cataract     Cystoid macular edema of both eyes     Diabetes mellitus     Diabetes mellitus type II     Hyperlipemia     Hypertension     Retinal hole     Stage 4 chronic kidney disease        Past Surgical History:   Procedure Laterality Date    CATARACT EXTRACTION W/  INTRAOCULAR LENS IMPLANT Left 12/15/14    Dr de la cruz    CATARACT EXTRACTION W/  INTRAOCULAR LENS IMPLANT Right 14    dorothy    CIRCUMCISION, NON-      focal laser right eye      KNEE SURGERY      left    Left medial collateral ligament repair      TONSILLECTOMY         Review of patient's allergies indicates:   Allergen Reactions    Atorvastatin Other (See Comments)       No current facility-administered medications on file prior to encounter.      Current Outpatient Prescriptions on File Prior to Encounter   Medication Sig    amlodipine (NORVASC) 5 MG tablet TAKE ONE TABLET BY MOUTH ONCE DAILY    aspirin (ECOTRIN) 81 MG EC tablet Take 1 tablet (81 mg total) by mouth once daily.    carvedilol (COREG) 25 MG tablet TAKE ONE TABLET BY MOUTH TWICE DAILY WITH MEALS    ciprofloxacin HCl (CIPRO) 500 MG tablet Take 1 tablet (500 mg total) by mouth 2 (two) times daily. for 10 days    furosemide (LASIX) 20 MG tablet Take 1 tablet (20 mg total) by mouth once daily. Do not take the pm dose after 3 pm    gabapentin (NEURONTIN) 100 MG capsule     hydrALAZINE (APRESOLINE) 50 MG tablet Take 1 tablet (50 mg total) by mouth every 12 (twelve) hours.    insulin lispro (HUMALOG KWIKPEN INSULIN) 100 unit/mL InPn pen INJECT 12 UNITS SUBCUTANEOUSLY THREE TIMES DAILY WITH MEALS WITH CORRECTION SCALE, MAX TDD 50 UNITS    LEVEMIR FLEXTOUCH U-100 INSULN 100 unit/mL (3 mL) InPn pen Inject 36 Units into the skin every evening.    losartan (COZAAR) 100 MG tablet Take 1 tablet (100 mg total) by mouth once daily.    oxyCODONE-acetaminophen (PERCOCET) 5-325 mg per tablet Take 0.5 tablets by mouth every 4  "(four) hours as needed for Pain.    pantoprazole (PROTONIX) 40 MG tablet     rosuvastatin (CRESTOR) 20 MG tablet     doxycycline (VIBRA-TABS) 100 MG tablet Take 1 tablet (100 mg total) by mouth 2 (two) times daily.    MG GLY-CALCIUM-POTASSIUM-E-NNEKA ORAL Take 25 mg by mouth once daily.    pen needle, diabetic, safety (BD AUTOSHIELD PEN NEEDLE) 29 gauge x 5/16" Ndle For use once daily with levemir flexpen     Family History     Problem Relation (Age of Onset)    Cancer Mother, Brother, Sister    Cataracts Paternal Grandmother    Diabetes Father    Glaucoma Paternal Grandmother    Heart disease Mother, Father        Social History Main Topics    Smoking status: Never Smoker    Smokeless tobacco: Never Used    Alcohol use No    Drug use: No    Sexual activity: Yes     Partners: Female     Review of Systems   Constitutional: Positive for activity change, appetite change and fatigue. Negative for fever.   HENT: Negative for congestion, ear pain and postnasal drip.    Eyes: Negative for discharge.   Respiratory: Positive for cough and shortness of breath. Negative for apnea and wheezing.    Cardiovascular: Positive for leg swelling. Negative for chest pain.   Gastrointestinal: Positive for nausea and vomiting. Negative for abdominal distention and abdominal pain.   Endocrine: Negative for polydipsia, polyphagia and polyuria.   Genitourinary: Negative for difficulty urinating, flank pain, frequency, hematuria and urgency.   Musculoskeletal: Positive for arthralgias and myalgias. Negative for joint swelling.   Skin: Negative for pallor and rash.   Allergic/Immunologic: Negative for environmental allergies and food allergies.   Neurological: Negative for dizziness, speech difficulty, weakness, light-headedness and headaches.   Hematological: Does not bruise/bleed easily.   Psychiatric/Behavioral: Negative for agitation.     Objective:     Vital Signs (Most Recent):  Temp: 97.8 °F (36.6 °C) (07/19/18 2237)  Pulse: 66 " (07/19/18 2237)  Resp: 20 (07/19/18 2237)  BP: (!) 171/80 (will give night meds and cont to monitor ) (07/19/18 2237)  SpO2: 95 % (07/19/18 2237) Vital Signs (24h Range):  Temp:  [97.8 °F (36.6 °C)-97.9 °F (36.6 °C)] 97.8 °F (36.6 °C)  Pulse:  [66-73] 66  Resp:  [16-25] 20  SpO2:  [72 %-96 %] 95 %  BP: (128-179)/(57-80) 171/80     Weight: 114.3 kg (252 lb)  Body mass index is 31.5 kg/m².    Physical Exam   Constitutional: He is oriented to person, place, and time. He appears well-developed and well-nourished.   HENT:   Head: Normocephalic.   Eyes: Conjunctivae are normal.   Neck: Normal range of motion. Neck supple.   Cardiovascular: Normal rate, regular rhythm, normal heart sounds and intact distal pulses.    Pulses:       Radial pulses are 2+ on the right side, and 2+ on the left side.        Dorsalis pedis pulses are 2+ on the right side, and 2+ on the left side.   Bilateral +1 edema to lower extremities   Pulmonary/Chest: Effort normal. Tachypnea noted. He has rales in the right lower field and the left lower field.   Abdominal: Soft. Bowel sounds are normal. He exhibits no distension. There is no tenderness.   Musculoskeletal: Normal range of motion.   Neurological: He is alert and oriented to person, place, and time. He has normal strength. GCS eye subscore is 4. GCS verbal subscore is 5. GCS motor subscore is 6.   Skin: Skin is warm and dry.   Psychiatric: He has a normal mood and affect. His speech is normal and behavior is normal.           Significant Labs:   CBC:   Recent Labs  Lab 07/19/18 1922   WBC 13.89*   HGB 10.4*   HCT 31.8*        CMP:   Recent Labs  Lab 07/19/18 1922   *   K 4.9      CO2 20*   *   BUN 52*   CREATININE 2.8*   CALCIUM 9.7   PROT 8.0   ALBUMIN 3.1*   BILITOT 0.6   ALKPHOS 71   AST 19   ALT 21   ANIONGAP 11   EGFRNONAA 21*     Cardiac Markers:   Recent Labs  Lab 07/19/18 1922   *     Troponin:   Recent Labs  Lab 07/19/18 1922   TROPONINI 0.074*        Significant Imaging: I have reviewed all pertinent imaging results/findings within the past 24 hours.

## 2018-07-21 PROBLEM — I50.43 ACUTE ON CHRONIC COMBINED SYSTOLIC AND DIASTOLIC HEART FAILURE: Status: ACTIVE | Noted: 2018-07-19

## 2018-07-21 PROBLEM — I50.33 ACUTE ON CHRONIC DIASTOLIC HEART FAILURE: Status: ACTIVE | Noted: 2018-07-19

## 2018-07-21 LAB
ANION GAP SERPL CALC-SCNC: 11 MMOL/L
BASOPHILS # BLD AUTO: 0.03 K/UL
BASOPHILS NFR BLD: 0.3 %
BUN SERPL-MCNC: 53 MG/DL
CALCIUM SERPL-MCNC: 9.2 MG/DL
CHLORIDE SERPL-SCNC: 104 MMOL/L
CO2 SERPL-SCNC: 24 MMOL/L
CREAT SERPL-MCNC: 2.6 MG/DL
DIFFERENTIAL METHOD: ABNORMAL
EOSINOPHIL # BLD AUTO: 0.3 K/UL
EOSINOPHIL NFR BLD: 2.7 %
ERYTHROCYTE [DISTWIDTH] IN BLOOD BY AUTOMATED COUNT: 14 %
EST. GFR  (AFRICAN AMERICAN): 27 ML/MIN/1.73 M^2
EST. GFR  (NON AFRICAN AMERICAN): 23 ML/MIN/1.73 M^2
GLUCOSE SERPL-MCNC: 204 MG/DL
HCT VFR BLD AUTO: 29.7 %
HGB BLD-MCNC: 9.6 G/DL
LYMPHOCYTES # BLD AUTO: 1.1 K/UL
LYMPHOCYTES NFR BLD: 11.9 %
MAGNESIUM SERPL-MCNC: 1.9 MG/DL
MCH RBC QN AUTO: 28.2 PG
MCHC RBC AUTO-ENTMCNC: 32.3 G/DL
MCV RBC AUTO: 87 FL
MONOCYTES # BLD AUTO: 0.9 K/UL
MONOCYTES NFR BLD: 9.4 %
NEUTROPHILS # BLD AUTO: 6.9 K/UL
NEUTROPHILS NFR BLD: 75.4 %
PHOSPHATE SERPL-MCNC: 2.6 MG/DL
PLATELET # BLD AUTO: 171 K/UL
PMV BLD AUTO: 10.6 FL
POCT GLUCOSE: 179 MG/DL (ref 70–110)
POCT GLUCOSE: 210 MG/DL (ref 70–110)
POCT GLUCOSE: 297 MG/DL (ref 70–110)
POCT GLUCOSE: 323 MG/DL (ref 70–110)
POTASSIUM SERPL-SCNC: 3.4 MMOL/L
RBC # BLD AUTO: 3.4 M/UL
SODIUM SERPL-SCNC: 139 MMOL/L
WBC # BLD AUTO: 9.13 K/UL

## 2018-07-21 PROCEDURE — 11000001 HC ACUTE MED/SURG PRIVATE ROOM

## 2018-07-21 PROCEDURE — 85025 COMPLETE CBC W/AUTO DIFF WBC: CPT

## 2018-07-21 PROCEDURE — 27000221 HC OXYGEN, UP TO 24 HOURS

## 2018-07-21 PROCEDURE — 99900035 HC TECH TIME PER 15 MIN (STAT)

## 2018-07-21 PROCEDURE — 83735 ASSAY OF MAGNESIUM: CPT

## 2018-07-21 PROCEDURE — 36415 COLL VENOUS BLD VENIPUNCTURE: CPT

## 2018-07-21 PROCEDURE — 25000003 PHARM REV CODE 250: Performed by: HOSPITALIST

## 2018-07-21 PROCEDURE — 80048 BASIC METABOLIC PNL TOTAL CA: CPT

## 2018-07-21 PROCEDURE — 25000003 PHARM REV CODE 250: Performed by: NURSE PRACTITIONER

## 2018-07-21 PROCEDURE — 84100 ASSAY OF PHOSPHORUS: CPT

## 2018-07-21 PROCEDURE — 99233 SBSQ HOSP IP/OBS HIGH 50: CPT | Mod: ,,, | Performed by: HOSPITALIST

## 2018-07-21 PROCEDURE — 94761 N-INVAS EAR/PLS OXIMETRY MLT: CPT

## 2018-07-21 PROCEDURE — 63600175 PHARM REV CODE 636 W HCPCS: Performed by: HOSPITALIST

## 2018-07-21 RX ORDER — INSULIN ASPART 100 [IU]/ML
5 INJECTION, SOLUTION INTRAVENOUS; SUBCUTANEOUS
Status: DISCONTINUED | OUTPATIENT
Start: 2018-07-21 | End: 2018-07-22

## 2018-07-21 RX ORDER — AMLODIPINE BESYLATE 5 MG/1
10 TABLET ORAL DAILY
Status: DISCONTINUED | OUTPATIENT
Start: 2018-07-22 | End: 2018-07-23 | Stop reason: HOSPADM

## 2018-07-21 RX ORDER — HYDRALAZINE HYDROCHLORIDE 25 MG/1
50 TABLET, FILM COATED ORAL EVERY 8 HOURS
Status: DISCONTINUED | OUTPATIENT
Start: 2018-07-21 | End: 2018-07-23 | Stop reason: HOSPADM

## 2018-07-21 RX ORDER — POTASSIUM CHLORIDE 20 MEQ/1
40 TABLET, EXTENDED RELEASE ORAL ONCE
Status: COMPLETED | OUTPATIENT
Start: 2018-07-21 | End: 2018-07-21

## 2018-07-21 RX ORDER — HEPARIN SODIUM 5000 [USP'U]/ML
5000 INJECTION, SOLUTION INTRAVENOUS; SUBCUTANEOUS EVERY 8 HOURS
Status: DISCONTINUED | OUTPATIENT
Start: 2018-07-21 | End: 2018-07-23 | Stop reason: HOSPADM

## 2018-07-21 RX ADMIN — INSULIN ASPART 4 UNITS: 100 INJECTION, SOLUTION INTRAVENOUS; SUBCUTANEOUS at 01:07

## 2018-07-21 RX ADMIN — HYDRALAZINE HYDROCHLORIDE 50 MG: 25 TABLET, FILM COATED ORAL at 09:07

## 2018-07-21 RX ADMIN — INSULIN ASPART 5 UNITS: 100 INJECTION, SOLUTION INTRAVENOUS; SUBCUTANEOUS at 05:07

## 2018-07-21 RX ADMIN — CARVEDILOL 25 MG: 12.5 TABLET, FILM COATED ORAL at 05:07

## 2018-07-21 RX ADMIN — ASPIRIN 81 MG: 81 TABLET, COATED ORAL at 09:07

## 2018-07-21 RX ADMIN — CIPROFLOXACIN HYDROCHLORIDE 500 MG: 500 TABLET, FILM COATED ORAL at 09:07

## 2018-07-21 RX ADMIN — GABAPENTIN 100 MG: 100 CAPSULE ORAL at 08:07

## 2018-07-21 RX ADMIN — HEPARIN SODIUM 5000 UNITS: 5000 INJECTION, SOLUTION INTRAVENOUS; SUBCUTANEOUS at 09:07

## 2018-07-21 RX ADMIN — CARVEDILOL 25 MG: 12.5 TABLET, FILM COATED ORAL at 08:07

## 2018-07-21 RX ADMIN — ROSUVASTATIN CALCIUM 20 MG: 10 TABLET, FILM COATED ORAL at 09:07

## 2018-07-21 RX ADMIN — HEPARIN SODIUM 5000 UNITS: 5000 INJECTION, SOLUTION INTRAVENOUS; SUBCUTANEOUS at 02:07

## 2018-07-21 RX ADMIN — AMLODIPINE BESYLATE 5 MG: 5 TABLET ORAL at 09:07

## 2018-07-21 RX ADMIN — FUROSEMIDE 80 MG: 10 INJECTION, SOLUTION INTRAVENOUS at 05:07

## 2018-07-21 RX ADMIN — CIPROFLOXACIN HYDROCHLORIDE 500 MG: 500 TABLET, FILM COATED ORAL at 08:07

## 2018-07-21 RX ADMIN — GABAPENTIN 100 MG: 100 CAPSULE ORAL at 09:07

## 2018-07-21 RX ADMIN — FUROSEMIDE 80 MG: 10 INJECTION, SOLUTION INTRAVENOUS at 09:07

## 2018-07-21 RX ADMIN — POTASSIUM CHLORIDE 40 MEQ: 1500 TABLET, EXTENDED RELEASE ORAL at 11:07

## 2018-07-21 RX ADMIN — INSULIN ASPART 4 UNITS: 100 INJECTION, SOLUTION INTRAVENOUS; SUBCUTANEOUS at 09:07

## 2018-07-21 RX ADMIN — PANTOPRAZOLE SODIUM 40 MG: 40 TABLET, DELAYED RELEASE ORAL at 09:07

## 2018-07-21 RX ADMIN — HYDRALAZINE HYDROCHLORIDE 50 MG: 25 TABLET, FILM COATED ORAL at 02:07

## 2018-07-21 RX ADMIN — INSULIN ASPART 2 UNITS: 100 INJECTION, SOLUTION INTRAVENOUS; SUBCUTANEOUS at 09:07

## 2018-07-21 NOTE — PLAN OF CARE
Problem: Diabetes, Type 2 (Adult)  Goal: Signs and Symptoms of Listed Potential Problems Will be Absent, Minimized or Managed (Diabetes, Type 2)  Signs and symptoms of listed potential problems will be absent, minimized or managed by discharge/transition of care (reference Diabetes, Type 2 (Adult) CPG).   Outcome: Ongoing (interventions implemented as appropriate)  BG remain elevated. MD changed insulin orders, new orders acknowledged and carried out. Spouse remains at the bedside. No acute distress noted. Will continue to monitor.

## 2018-07-21 NOTE — NURSING
1500:  Pt request shower and states he doesn't need his oxygen to do it.  Had pt sit on edge of bed without O2 for 15 minutes.      1515:  SpO2 89% at rest.  Humidified O2 @ 5L via NC placed back on pt.  Pt assisted to shower by MAURI Luciano.

## 2018-07-21 NOTE — ASSESSMENT & PLAN NOTE
Patient with chronic kidney disease stage IV and now with acute kidney injury likely secondary to heart failure.  Serum creatinine improving with diuresis.  Will continue.

## 2018-07-21 NOTE — PROGRESS NOTES
"[facility] Hospital Medicine  Progress Note    Patient Name: Domingo Castro  MRN: [id number]  Patient Class: IP- Inpatient   Admission Date: 7/19/2018  Length of Stay: 2 days  Attending Physician: Farhan Chow MD  Primary Care Provider: Griselda Nugent MD        Subjective:     Principal Problem:Acute on chronic diastolic heart failure    HPI:  This is a 73 y.o. male, with history of HTN, HLD, and DM, who presents with complaint of hyperglycemia that began today. Patient reports his blood glucose was 560 earlier today, he took 17 units of Humalog, and it dropped to 500. He has been experiencing nausea, malaise, and decreased appetite x 4 days. He called his PCP today and was advised to present to the ED. He also admits chills, one episode of vomiting, SOB with physical exertion, and bilateral leg swelling (getting better). Patient complies with lasix and Levemir regularly. Patient states he had a "UTI for one day", and it resolved after taking "two pills". He denies fevers, headaches, dizziness, congestion, rhinorrhea, sore throat, cough, chest pain, palpitations, abdominal pain, diarrhea, or constipation. NKDA. He denies use of alcohol, tobacco, or illicit drugs.     Hospital Course:  Patient is a 73 year-old man with hypertension, diabetes mellitus type II, chronic kidney disease stage IV, and dyslipidemia who presented with hypervolemic in acute on chronic diastolic heart failure and acute kidney injury.  Patient improving with intravenous furosemide and titrate to achieve negative fluid balance.  Both volume status and kidney function improving with diuresis.  Patient also receiving antibiotics to treatment epididymitis.    Interval History: Patient reports breathing better this morning.    Review of Systems   Constitutional: Negative for chills and fever.   Respiratory: Positive for shortness of breath.    Cardiovascular: Negative for chest pain.   Gastrointestinal: Negative for " abdominal pain, constipation, diarrhea, nausea and vomiting.   Genitourinary: Positive for scrotal swelling and testicular pain.     Objective:     Vital Signs (Most Recent):  Temp: 97.5 °F (36.4 °C) (07/21/18 1212)  Pulse: 63 (07/21/18 1212)  Resp: 20 (07/21/18 0820)  BP: (!) 162/79 (07/21/18 1212)  SpO2: (!) 93 % (07/21/18 1212) Vital Signs (24h Range):  Temp:  [97.5 °F (36.4 °C)-98.9 °F (37.2 °C)] 97.5 °F (36.4 °C)  Pulse:  [58-68] 63  Resp:  [18-20] 20  SpO2:  [92 %-97 %] 93 %  BP: (154-186)/(77-86) 162/79     Weight: 110.1 kg (242 lb 11.6 oz)  Body mass index is 30.34 kg/m².    Intake/Output Summary (Last 24 hours) at 07/21/18 1252  Last data filed at 07/21/18 0400   Gross per 24 hour   Intake              880 ml   Output             1750 ml   Net             -870 ml      Physical Exam   Constitutional: He is oriented to person, place, and time. He appears well-developed.   Cardiovascular: Normal rate, regular rhythm, normal heart sounds and intact distal pulses.  Exam reveals no gallop and no friction rub.    No murmur heard.  Pulmonary/Chest: No respiratory distress. He has wheezes. He has rales.   Abdominal: Soft. Bowel sounds are normal. He exhibits no distension. There is no tenderness.   Genitourinary:   Genitourinary Comments: Testicles erythematous and tender to touch, improving.   Musculoskeletal: Normal range of motion. He exhibits no edema or tenderness.   Neurological: He is alert and oriented to person, place, and time.   Skin: Skin is warm and dry. No rash noted. No erythema. No pallor.       Significant Labs: All pertinent labs within the past 24 hours have been reviewed.    Significant Imaging: I have reviewed all pertinent imaging results/findings within the past 24 hours.    Assessment/Plan:      * Acute on chronic diastolic heart failure    Improving.  Continue with medical therapy with diuresis.          Acute kidney injury superimposed on chronic kidney disease    Patient with chronic  kidney disease stage IV and now with acute kidney injury likely secondary to heart failure.  Serum creatinine improving with diuresis.  Will continue.            Essential hypertension    I have increased amlodipine and change hydralazine to thrice daily.  Hold losartan due to acute kidney injury.        Type 2 diabetes mellitus with stage 4 chronic kidney disease, with long-term current use of insulin    Poorly controlled.  Start scheduled preprandial insulin in addition to long-acting insulin.          Dyslipidemia    Continue with statin therapy.          VTE Risk Mitigation         Ordered     heparin (porcine) injection 5,000 Units  Every 8 hours      07/21/18 1305     Place sequential compression device  Until discontinued      07/19/18 2219     IP VTE HIGH RISK PATIENT  Once      07/19/18 2219     Reason for No Pharmacological VTE Prophylaxis  Once      07/19/18 2219              Farhan Chow MD  Department of Hospital Medicine   [facility]

## 2018-07-21 NOTE — SUBJECTIVE & OBJECTIVE
Interval History: Patient reports breathing better this morning.    Review of Systems   Constitutional: Negative for chills and fever.   Respiratory: Positive for shortness of breath.    Cardiovascular: Negative for chest pain.   Gastrointestinal: Negative for abdominal pain, constipation, diarrhea, nausea and vomiting.   Genitourinary: Positive for scrotal swelling and testicular pain.     Objective:     Vital Signs (Most Recent):  Temp: 97.5 °F (36.4 °C) (07/21/18 1212)  Pulse: 63 (07/21/18 1212)  Resp: 20 (07/21/18 0820)  BP: (!) 162/79 (07/21/18 1212)  SpO2: (!) 93 % (07/21/18 1212) Vital Signs (24h Range):  Temp:  [97.5 °F (36.4 °C)-98.9 °F (37.2 °C)] 97.5 °F (36.4 °C)  Pulse:  [58-68] 63  Resp:  [18-20] 20  SpO2:  [92 %-97 %] 93 %  BP: (154-186)/(77-86) 162/79     Weight: 110.1 kg (242 lb 11.6 oz)  Body mass index is 30.34 kg/m².    Intake/Output Summary (Last 24 hours) at 07/21/18 1252  Last data filed at 07/21/18 0400   Gross per 24 hour   Intake              880 ml   Output             1750 ml   Net             -870 ml      Physical Exam   Constitutional: He is oriented to person, place, and time. He appears well-developed.   Cardiovascular: Normal rate, regular rhythm, normal heart sounds and intact distal pulses.  Exam reveals no gallop and no friction rub.    No murmur heard.  Pulmonary/Chest: No respiratory distress. He has wheezes. He has rales.   Abdominal: Soft. Bowel sounds are normal. He exhibits no distension. There is no tenderness.   Genitourinary:   Genitourinary Comments: Testicles erythematous and tender to touch, improving.   Musculoskeletal: Normal range of motion. He exhibits no edema or tenderness.   Neurological: He is alert and oriented to person, place, and time.   Skin: Skin is warm and dry. No rash noted. No erythema. No pallor.       Significant Labs: All pertinent labs within the past 24 hours have been reviewed.    Significant Imaging: I have reviewed all pertinent imaging  results/findings within the past 24 hours.

## 2018-07-21 NOTE — NURSING
Report received. No acute distress observed. Needs met. Wife at bedside. Will continue to monitor.

## 2018-07-21 NOTE — PLAN OF CARE
Problem: Patient Care Overview  Goal: Plan of Care Review  Outcome: Ongoing (interventions implemented as appropriate)  Patient remains on 5L NC humidified saturations 92-93% with fine crackles in LL,prn aerosol teratment not needed.Will continue to monitor pt status..

## 2018-07-21 NOTE — PLAN OF CARE
Problem: Patient Care Overview  Goal: Plan of Care Review  Outcome: Ongoing (interventions implemented as appropriate)  Plan of care reviewed with patient.  Blood pressure elevated this shift, discussed with moonlighter, no new orders received.  Blood glucose  Monitored, prn insulin given.  Purposeful rounding completed, call bell within reach, no needs at this time.

## 2018-07-21 NOTE — ASSESSMENT & PLAN NOTE
I have increased amlodipine and change hydralazine to thrice daily.  Hold losartan due to acute kidney injury.

## 2018-07-21 NOTE — HOSPITAL COURSE
Patient is a 73 year-old man with hypertension, diabetes mellitus type II, chronic kidney disease stage IV, and dyslipidemia who presented with hypervolemic in acute on chronic diastolic heart failure and acute kidney injury.  Patient treated with intravenous furosemide which was titrated to achieve negative fluid balance.  Both volume status and kidney function improving with diuresis.  Patient also continued to receive antibiotics which was started as an outpatient to treatment epididymitis which continued to improve with treatment.  Patient's blood pressure medications were adjusted to achieve better blood pressure control however his losartan was held due to acute kidney injury.  Patient discharged home in stable condition and advised to follow-up with his primary care provider and his nephrologist Dr. Nely Watson to continue to closely monitor his kidney function and to arrange for fistula as I suspect he will need dialysis in the near future.

## 2018-07-22 LAB
ANION GAP SERPL CALC-SCNC: 14 MMOL/L
BASOPHILS # BLD AUTO: 0.04 K/UL
BASOPHILS NFR BLD: 0.4 %
BUN SERPL-MCNC: 49 MG/DL
CALCIUM SERPL-MCNC: 9.8 MG/DL
CHLORIDE SERPL-SCNC: 100 MMOL/L
CO2 SERPL-SCNC: 24 MMOL/L
CREAT SERPL-MCNC: 2.5 MG/DL
DIFFERENTIAL METHOD: ABNORMAL
EOSINOPHIL # BLD AUTO: 0.2 K/UL
EOSINOPHIL NFR BLD: 2.2 %
ERYTHROCYTE [DISTWIDTH] IN BLOOD BY AUTOMATED COUNT: 14.1 %
EST. GFR  (AFRICAN AMERICAN): 28 ML/MIN/1.73 M^2
EST. GFR  (NON AFRICAN AMERICAN): 25 ML/MIN/1.73 M^2
GLUCOSE SERPL-MCNC: 225 MG/DL
HCT VFR BLD AUTO: 33.8 %
HGB BLD-MCNC: 10.9 G/DL
LYMPHOCYTES # BLD AUTO: 1.4 K/UL
LYMPHOCYTES NFR BLD: 14.4 %
MAGNESIUM SERPL-MCNC: 2 MG/DL
MCH RBC QN AUTO: 28.2 PG
MCHC RBC AUTO-ENTMCNC: 32.2 G/DL
MCV RBC AUTO: 88 FL
MONOCYTES # BLD AUTO: 0.7 K/UL
MONOCYTES NFR BLD: 6.8 %
NEUTROPHILS # BLD AUTO: 7.5 K/UL
NEUTROPHILS NFR BLD: 75.9 %
PHOSPHATE SERPL-MCNC: 3.4 MG/DL
PLATELET # BLD AUTO: 211 K/UL
PMV BLD AUTO: 11 FL
POCT GLUCOSE: 121 MG/DL (ref 70–110)
POCT GLUCOSE: 201 MG/DL (ref 70–110)
POCT GLUCOSE: 205 MG/DL (ref 70–110)
POCT GLUCOSE: 257 MG/DL (ref 70–110)
POCT GLUCOSE: 310 MG/DL (ref 70–110)
POTASSIUM SERPL-SCNC: 3.6 MMOL/L
RBC # BLD AUTO: 3.86 M/UL
SODIUM SERPL-SCNC: 138 MMOL/L
WBC # BLD AUTO: 9.94 K/UL

## 2018-07-22 PROCEDURE — 99233 SBSQ HOSP IP/OBS HIGH 50: CPT | Mod: ,,, | Performed by: HOSPITALIST

## 2018-07-22 PROCEDURE — 85025 COMPLETE CBC W/AUTO DIFF WBC: CPT

## 2018-07-22 PROCEDURE — 83735 ASSAY OF MAGNESIUM: CPT

## 2018-07-22 PROCEDURE — 99900035 HC TECH TIME PER 15 MIN (STAT)

## 2018-07-22 PROCEDURE — 11000001 HC ACUTE MED/SURG PRIVATE ROOM

## 2018-07-22 PROCEDURE — 36415 COLL VENOUS BLD VENIPUNCTURE: CPT

## 2018-07-22 PROCEDURE — 84100 ASSAY OF PHOSPHORUS: CPT

## 2018-07-22 PROCEDURE — 80048 BASIC METABOLIC PNL TOTAL CA: CPT

## 2018-07-22 PROCEDURE — 25000003 PHARM REV CODE 250: Performed by: NURSE PRACTITIONER

## 2018-07-22 PROCEDURE — 25000003 PHARM REV CODE 250: Performed by: HOSPITALIST

## 2018-07-22 PROCEDURE — 63600175 PHARM REV CODE 636 W HCPCS: Performed by: HOSPITALIST

## 2018-07-22 PROCEDURE — 94761 N-INVAS EAR/PLS OXIMETRY MLT: CPT

## 2018-07-22 RX ORDER — INSULIN ASPART 100 [IU]/ML
8 INJECTION, SOLUTION INTRAVENOUS; SUBCUTANEOUS
Status: DISCONTINUED | OUTPATIENT
Start: 2018-07-22 | End: 2018-07-23 | Stop reason: HOSPADM

## 2018-07-22 RX ORDER — ISOSORBIDE MONONITRATE 30 MG/1
60 TABLET, EXTENDED RELEASE ORAL DAILY
Status: DISCONTINUED | OUTPATIENT
Start: 2018-07-22 | End: 2018-07-23 | Stop reason: HOSPADM

## 2018-07-22 RX ORDER — POTASSIUM CHLORIDE 20 MEQ/1
40 TABLET, EXTENDED RELEASE ORAL ONCE
Status: COMPLETED | OUTPATIENT
Start: 2018-07-22 | End: 2018-07-22

## 2018-07-22 RX ORDER — BENZONATATE 100 MG/1
200 CAPSULE ORAL 3 TIMES DAILY PRN
Status: DISCONTINUED | OUTPATIENT
Start: 2018-07-22 | End: 2018-07-23 | Stop reason: HOSPADM

## 2018-07-22 RX ADMIN — HEPARIN SODIUM 5000 UNITS: 5000 INJECTION, SOLUTION INTRAVENOUS; SUBCUTANEOUS at 05:07

## 2018-07-22 RX ADMIN — INSULIN ASPART 8 UNITS: 100 INJECTION, SOLUTION INTRAVENOUS; SUBCUTANEOUS at 05:07

## 2018-07-22 RX ADMIN — HYDRALAZINE HYDROCHLORIDE 50 MG: 25 TABLET, FILM COATED ORAL at 09:07

## 2018-07-22 RX ADMIN — INSULIN ASPART 8 UNITS: 100 INJECTION, SOLUTION INTRAVENOUS; SUBCUTANEOUS at 12:07

## 2018-07-22 RX ADMIN — CARVEDILOL 25 MG: 12.5 TABLET, FILM COATED ORAL at 04:07

## 2018-07-22 RX ADMIN — BENZONATATE 200 MG: 100 CAPSULE ORAL at 08:07

## 2018-07-22 RX ADMIN — CIPROFLOXACIN HYDROCHLORIDE 500 MG: 500 TABLET, FILM COATED ORAL at 08:07

## 2018-07-22 RX ADMIN — FUROSEMIDE 80 MG: 10 INJECTION, SOLUTION INTRAVENOUS at 08:07

## 2018-07-22 RX ADMIN — GABAPENTIN 100 MG: 100 CAPSULE ORAL at 08:07

## 2018-07-22 RX ADMIN — INSULIN ASPART 4 UNITS: 100 INJECTION, SOLUTION INTRAVENOUS; SUBCUTANEOUS at 08:07

## 2018-07-22 RX ADMIN — ASPIRIN 81 MG: 81 TABLET, COATED ORAL at 08:07

## 2018-07-22 RX ADMIN — PANTOPRAZOLE SODIUM 40 MG: 40 TABLET, DELAYED RELEASE ORAL at 08:07

## 2018-07-22 RX ADMIN — INSULIN ASPART 6 UNITS: 100 INJECTION, SOLUTION INTRAVENOUS; SUBCUTANEOUS at 12:07

## 2018-07-22 RX ADMIN — HEPARIN SODIUM 5000 UNITS: 5000 INJECTION, SOLUTION INTRAVENOUS; SUBCUTANEOUS at 09:07

## 2018-07-22 RX ADMIN — HYDRALAZINE HYDROCHLORIDE 50 MG: 25 TABLET, FILM COATED ORAL at 05:07

## 2018-07-22 RX ADMIN — ROSUVASTATIN CALCIUM 20 MG: 10 TABLET, FILM COATED ORAL at 08:07

## 2018-07-22 RX ADMIN — POTASSIUM CHLORIDE 40 MEQ: 1500 TABLET, EXTENDED RELEASE ORAL at 12:07

## 2018-07-22 RX ADMIN — CARVEDILOL 25 MG: 12.5 TABLET, FILM COATED ORAL at 08:07

## 2018-07-22 RX ADMIN — AMLODIPINE BESYLATE 10 MG: 5 TABLET ORAL at 08:07

## 2018-07-22 RX ADMIN — HYDRALAZINE HYDROCHLORIDE 50 MG: 25 TABLET, FILM COATED ORAL at 01:07

## 2018-07-22 RX ADMIN — INSULIN ASPART 5 UNITS: 100 INJECTION, SOLUTION INTRAVENOUS; SUBCUTANEOUS at 08:07

## 2018-07-22 RX ADMIN — ISOSORBIDE MONONITRATE 60 MG: 30 TABLET, EXTENDED RELEASE ORAL at 12:07

## 2018-07-22 RX ADMIN — HEPARIN SODIUM 5000 UNITS: 5000 INJECTION, SOLUTION INTRAVENOUS; SUBCUTANEOUS at 01:07

## 2018-07-22 NOTE — PLAN OF CARE
Problem: Patient Care Overview  Goal: Plan of Care Review  Outcome: Ongoing (interventions implemented as appropriate)  Plan of care reviewed with patient.  BP remains elevated.  Patient denies pain.  Purposeful rounding completed, call bell within reach, no needs at this time.

## 2018-07-22 NOTE — PLAN OF CARE
Problem: Patient Care Overview  Goal: Plan of Care Review  Outcome: Ongoing (interventions implemented as appropriate)  Good saturation on room air, and no requests for PRN rx.

## 2018-07-22 NOTE — SUBJECTIVE & OBJECTIVE
Interval History: Patient reports breathing continues to improve.    Review of Systems   Constitutional: Negative for chills and fever.   Respiratory: Positive for shortness of breath.    Cardiovascular: Negative for chest pain.   Gastrointestinal: Negative for abdominal pain, constipation, diarrhea, nausea and vomiting.   Genitourinary: Positive for scrotal swelling and testicular pain.     Objective:     Vital Signs (Most Recent):  Temp: 97.6 °F (36.4 °C) (07/22/18 1201)  Pulse: 63 (07/22/18 1201)  Resp: 18 (07/22/18 0738)  BP: 131/63 (07/22/18 1201)  SpO2: (!) 93 % (07/22/18 1201) Vital Signs (24h Range):  Temp:  [97.6 °F (36.4 °C)-99.3 °F (37.4 °C)] 97.6 °F (36.4 °C)  Pulse:  [60-70] 63  Resp:  [18-20] 18  SpO2:  [93 %-100 %] 93 %  BP: (131-187)/(63-87) 131/63     Weight: 109.1 kg (240 lb 8.4 oz)  Body mass index is 30.06 kg/m².    Intake/Output Summary (Last 24 hours) at 07/22/18 1340  Last data filed at 07/22/18 0500   Gross per 24 hour   Intake              240 ml   Output             1350 ml   Net            -1110 ml      Physical Exam   Constitutional: He is oriented to person, place, and time. He appears well-developed.   Cardiovascular: Normal rate, regular rhythm, normal heart sounds and intact distal pulses.  Exam reveals no gallop and no friction rub.    No murmur heard.  Pulmonary/Chest: No respiratory distress. He has wheezes. He has rales.   Abdominal: Soft. Bowel sounds are normal. He exhibits no distension. There is no tenderness.   Genitourinary:   Genitourinary Comments: Testicles erythematous and tender to touch, improving.   Musculoskeletal: Normal range of motion. He exhibits no edema or tenderness.   Neurological: He is alert and oriented to person, place, and time.   Skin: Skin is warm and dry. No rash noted. No erythema. No pallor.       Significant Labs: All pertinent labs within the past 24 hours have been reviewed.    Significant Imaging: I have reviewed all pertinent imaging  results/findings within the past 24 hours.

## 2018-07-22 NOTE — ASSESSMENT & PLAN NOTE
Patient with chronic kidney disease stage IV and now with acute kidney injury likely secondary to heart failure.  Serum creatinine improving with diuresis.  Will continue.  I explained to the patient and the wife of controlling comorbidities (hypertension and diabetes) to delay the need for dialysis.  I also explained that he will need very close monitoring and I would arrange for a fistula to be placed as he will likely need access in the near future.  I explained that the risk of complications and death are much lower if he obtains access prior to needing urgent dialysis.

## 2018-07-22 NOTE — PROGRESS NOTES
"[facility] Hospital Medicine  Progress Note    Patient Name: Domingo Castro  MRN: [id number]  Patient Class: IP- Inpatient   Admission Date: 7/19/2018  Length of Stay: 3 days  Attending Physician: Farhan Chow MD  Primary Care Provider: Griselda Nugent MD        Subjective:     Principal Problem:Acute on chronic diastolic heart failure    HPI:  This is a 73 y.o. male, with history of HTN, HLD, and DM, who presents with complaint of hyperglycemia that began today. Patient reports his blood glucose was 560 earlier today, he took 17 units of Humalog, and it dropped to 500. He has been experiencing nausea, malaise, and decreased appetite x 4 days. He called his PCP today and was advised to present to the ED. He also admits chills, one episode of vomiting, SOB with physical exertion, and bilateral leg swelling (getting better). Patient complies with lasix and Levemir regularly. Patient states he had a "UTI for one day", and it resolved after taking "two pills". He denies fevers, headaches, dizziness, congestion, rhinorrhea, sore throat, cough, chest pain, palpitations, abdominal pain, diarrhea, or constipation. NKDA. He denies use of alcohol, tobacco, or illicit drugs.     Hospital Course:  Patient is a 73 year-old man with hypertension, diabetes mellitus type II, chronic kidney disease stage IV, and dyslipidemia who presented with hypervolemic in acute on chronic diastolic heart failure and acute kidney injury.  Patient improving with intravenous furosemide and titrate to achieve negative fluid balance.  Both volume status and kidney function improving with diuresis.  Patient also receiving antibiotics to treatment epididymitis.    Interval History: Patient reports breathing continues to improve.    Review of Systems   Constitutional: Negative for chills and fever.   Respiratory: Positive for shortness of breath.    Cardiovascular: Negative for chest pain.   Gastrointestinal: Negative for " abdominal pain, constipation, diarrhea, nausea and vomiting.   Genitourinary: Positive for scrotal swelling and testicular pain.     Objective:     Vital Signs (Most Recent):  Temp: 97.6 °F (36.4 °C) (07/22/18 1201)  Pulse: 63 (07/22/18 1201)  Resp: 18 (07/22/18 0738)  BP: 131/63 (07/22/18 1201)  SpO2: (!) 93 % (07/22/18 1201) Vital Signs (24h Range):  Temp:  [97.6 °F (36.4 °C)-99.3 °F (37.4 °C)] 97.6 °F (36.4 °C)  Pulse:  [60-70] 63  Resp:  [18-20] 18  SpO2:  [93 %-100 %] 93 %  BP: (131-187)/(63-87) 131/63     Weight: 109.1 kg (240 lb 8.4 oz)  Body mass index is 30.06 kg/m².    Intake/Output Summary (Last 24 hours) at 07/22/18 1340  Last data filed at 07/22/18 0500   Gross per 24 hour   Intake              240 ml   Output             1350 ml   Net            -1110 ml      Physical Exam   Constitutional: He is oriented to person, place, and time. He appears well-developed.   Cardiovascular: Normal rate, regular rhythm, normal heart sounds and intact distal pulses.  Exam reveals no gallop and no friction rub.    No murmur heard.  Pulmonary/Chest: No respiratory distress. He has wheezes. He has rales.   Abdominal: Soft. Bowel sounds are normal. He exhibits no distension. There is no tenderness.   Genitourinary:   Genitourinary Comments: Testicles erythematous and tender to touch, improving.   Musculoskeletal: Normal range of motion. He exhibits no edema or tenderness.   Neurological: He is alert and oriented to person, place, and time.   Skin: Skin is warm and dry. No rash noted. No erythema. No pallor.       Significant Labs: All pertinent labs within the past 24 hours have been reviewed.    Significant Imaging: I have reviewed all pertinent imaging results/findings within the past 24 hours.    Assessment/Plan:      * Acute on chronic diastolic heart failure    Improving.  Continue with medical therapy with diuresis with intravenous furosemide.           Acute kidney injury superimposed on chronic kidney disease     Patient with chronic kidney disease stage IV and now with acute kidney injury likely secondary to heart failure.  Serum creatinine improving with diuresis.  Will continue.  I explained to the patient and the wife of controlling comorbidities (hypertension and diabetes) to delay the need for dialysis.  I also explained that he will need very close monitoring and I would arrange for a fistula to be placed as he will likely need access in the near future.  I explained that the risk of complications and death are much lower if he obtains access prior to needing urgent dialysis.        Essential hypertension    Holding losartan due to acute kidney injury.  Increased amlodipine and now will also add long-acting nitrates (isosorbide mononitrate 24 hour tablet 60 mg daily) for better blood pressure control.        Type 2 diabetes mellitus with stage 4 chronic kidney disease, with long-term current use of insulin    Poorly controlled.  Increase both long-acting and preprandial insulin today for better control.        Dyslipidemia    Continue with statin therapy.          VTE Risk Mitigation         Ordered     heparin (porcine) injection 5,000 Units  Every 8 hours      07/21/18 1305     Place sequential compression device  Until discontinued      07/19/18 2219     IP VTE HIGH RISK PATIENT  Once      07/19/18 2219     Reason for No Pharmacological VTE Prophylaxis  Once      07/19/18 2219              Farhan Chow MD  Department of Hospital Medicine   [facility]

## 2018-07-22 NOTE — PROGRESS NOTES
Asked by patients nurse to give scheduled dose of 80 mg IV lasix.  /63.  Spoke to dany Ambrosio.  Hebert ordered to hold this dose of lasix due to low BP.  Will continue to monitor.

## 2018-07-22 NOTE — PROGRESS NOTES
Pt received on RA with adequate saturation. No PRN treatments were needed. Will continue to monitor.

## 2018-07-22 NOTE — ASSESSMENT & PLAN NOTE
Holding losartan due to acute kidney injury.  Increased amlodipine and now will also add long-acting nitrates (isosorbide mononitrate 24 hour tablet 60 mg daily) for better blood pressure control.

## 2018-07-23 VITALS
WEIGHT: 242.5 LBS | BODY MASS INDEX: 30.15 KG/M2 | HEART RATE: 60 BPM | DIASTOLIC BLOOD PRESSURE: 71 MMHG | TEMPERATURE: 99 F | OXYGEN SATURATION: 98 % | HEIGHT: 75 IN | RESPIRATION RATE: 18 BRPM | SYSTOLIC BLOOD PRESSURE: 150 MMHG

## 2018-07-23 LAB
ANION GAP SERPL CALC-SCNC: 8 MMOL/L
BASOPHILS # BLD AUTO: 0.04 K/UL
BASOPHILS NFR BLD: 0.4 %
BUN SERPL-MCNC: 49 MG/DL
CALCIUM SERPL-MCNC: 9.2 MG/DL
CHLORIDE SERPL-SCNC: 103 MMOL/L
CO2 SERPL-SCNC: 27 MMOL/L
CREAT SERPL-MCNC: 2.6 MG/DL
DIFFERENTIAL METHOD: ABNORMAL
EOSINOPHIL # BLD AUTO: 0.2 K/UL
EOSINOPHIL NFR BLD: 2 %
ERYTHROCYTE [DISTWIDTH] IN BLOOD BY AUTOMATED COUNT: 14.4 %
EST. GFR  (AFRICAN AMERICAN): 27 ML/MIN/1.73 M^2
EST. GFR  (NON AFRICAN AMERICAN): 23 ML/MIN/1.73 M^2
GLUCOSE SERPL-MCNC: 160 MG/DL
HCT VFR BLD AUTO: 29.6 %
HGB BLD-MCNC: 9.5 G/DL
LYMPHOCYTES # BLD AUTO: 1.7 K/UL
LYMPHOCYTES NFR BLD: 17.2 %
MAGNESIUM SERPL-MCNC: 1.9 MG/DL
MCH RBC QN AUTO: 28.1 PG
MCHC RBC AUTO-ENTMCNC: 32.1 G/DL
MCV RBC AUTO: 88 FL
MONOCYTES # BLD AUTO: 0.6 K/UL
MONOCYTES NFR BLD: 6.1 %
NEUTROPHILS # BLD AUTO: 7.2 K/UL
NEUTROPHILS NFR BLD: 73.8 %
PHOSPHATE SERPL-MCNC: 3.3 MG/DL
PLATELET # BLD AUTO: 191 K/UL
PMV BLD AUTO: 10.6 FL
POCT GLUCOSE: 149 MG/DL (ref 70–110)
POTASSIUM SERPL-SCNC: 3.4 MMOL/L
RBC # BLD AUTO: 3.38 M/UL
SODIUM SERPL-SCNC: 138 MMOL/L
WBC # BLD AUTO: 9.77 K/UL

## 2018-07-23 PROCEDURE — 99900035 HC TECH TIME PER 15 MIN (STAT)

## 2018-07-23 PROCEDURE — 25000003 PHARM REV CODE 250: Performed by: NURSE PRACTITIONER

## 2018-07-23 PROCEDURE — 80048 BASIC METABOLIC PNL TOTAL CA: CPT

## 2018-07-23 PROCEDURE — 94761 N-INVAS EAR/PLS OXIMETRY MLT: CPT

## 2018-07-23 PROCEDURE — 25000003 PHARM REV CODE 250: Performed by: HOSPITALIST

## 2018-07-23 PROCEDURE — 63600175 PHARM REV CODE 636 W HCPCS: Performed by: HOSPITALIST

## 2018-07-23 PROCEDURE — 85025 COMPLETE CBC W/AUTO DIFF WBC: CPT

## 2018-07-23 PROCEDURE — 27000221 HC OXYGEN, UP TO 24 HOURS

## 2018-07-23 PROCEDURE — 84100 ASSAY OF PHOSPHORUS: CPT

## 2018-07-23 PROCEDURE — 36415 COLL VENOUS BLD VENIPUNCTURE: CPT

## 2018-07-23 PROCEDURE — 99239 HOSP IP/OBS DSCHRG MGMT >30: CPT | Mod: ,,, | Performed by: HOSPITALIST

## 2018-07-23 PROCEDURE — 83735 ASSAY OF MAGNESIUM: CPT

## 2018-07-23 RX ORDER — HYDRALAZINE HYDROCHLORIDE 50 MG/1
50 TABLET, FILM COATED ORAL EVERY 8 HOURS
Qty: 90 TABLET | Refills: 0 | Status: SHIPPED | OUTPATIENT
Start: 2018-07-23 | End: 2018-09-28 | Stop reason: SDUPTHER

## 2018-07-23 RX ORDER — AMLODIPINE BESYLATE 10 MG/1
10 TABLET ORAL DAILY
Qty: 30 TABLET | Refills: 0 | Status: SHIPPED | OUTPATIENT
Start: 2018-07-23 | End: 2018-11-08 | Stop reason: SDUPTHER

## 2018-07-23 RX ORDER — POTASSIUM CHLORIDE 20 MEQ/1
40 TABLET, EXTENDED RELEASE ORAL ONCE
Status: COMPLETED | OUTPATIENT
Start: 2018-07-23 | End: 2018-07-23

## 2018-07-23 RX ORDER — ISOSORBIDE MONONITRATE 60 MG/1
60 TABLET, EXTENDED RELEASE ORAL DAILY
Qty: 30 TABLET | Refills: 0 | Status: SHIPPED | OUTPATIENT
Start: 2018-07-23 | End: 2018-09-28

## 2018-07-23 RX ORDER — ACETAMINOPHEN 325 MG/1
650 TABLET ORAL EVERY 4 HOURS PRN
Refills: 0 | Status: ON HOLD | COMMUNITY
Start: 2018-07-23 | End: 2023-01-01 | Stop reason: HOSPADM

## 2018-07-23 RX ORDER — FUROSEMIDE 80 MG/1
80 TABLET ORAL 2 TIMES DAILY
Qty: 60 TABLET | Refills: 0 | Status: SHIPPED | OUTPATIENT
Start: 2018-07-23 | End: 2018-07-27

## 2018-07-23 RX ORDER — CARVEDILOL 25 MG/1
25 TABLET ORAL 2 TIMES DAILY WITH MEALS
Qty: 60 TABLET | Refills: 0 | Status: SHIPPED | OUTPATIENT
Start: 2018-07-23 | End: 2020-01-13

## 2018-07-23 RX ORDER — ASPIRIN 81 MG/1
81 TABLET ORAL DAILY
Refills: 0 | COMMUNITY
Start: 2018-07-23 | End: 2020-02-18

## 2018-07-23 RX ADMIN — HYDRALAZINE HYDROCHLORIDE 50 MG: 25 TABLET, FILM COATED ORAL at 05:07

## 2018-07-23 RX ADMIN — INSULIN ASPART 8 UNITS: 100 INJECTION, SOLUTION INTRAVENOUS; SUBCUTANEOUS at 09:07

## 2018-07-23 RX ADMIN — AMLODIPINE BESYLATE 10 MG: 5 TABLET ORAL at 09:07

## 2018-07-23 RX ADMIN — PANTOPRAZOLE SODIUM 40 MG: 40 TABLET, DELAYED RELEASE ORAL at 09:07

## 2018-07-23 RX ADMIN — ASPIRIN 81 MG: 81 TABLET, COATED ORAL at 09:07

## 2018-07-23 RX ADMIN — CIPROFLOXACIN HYDROCHLORIDE 500 MG: 500 TABLET, FILM COATED ORAL at 09:07

## 2018-07-23 RX ADMIN — CARVEDILOL 25 MG: 12.5 TABLET, FILM COATED ORAL at 09:07

## 2018-07-23 RX ADMIN — FUROSEMIDE 80 MG: 10 INJECTION, SOLUTION INTRAVENOUS at 10:07

## 2018-07-23 RX ADMIN — ROSUVASTATIN CALCIUM 20 MG: 10 TABLET, FILM COATED ORAL at 09:07

## 2018-07-23 RX ADMIN — POTASSIUM CHLORIDE 40 MEQ: 1500 TABLET, EXTENDED RELEASE ORAL at 09:07

## 2018-07-23 RX ADMIN — INSULIN ASPART 2 UNITS: 100 INJECTION, SOLUTION INTRAVENOUS; SUBCUTANEOUS at 09:07

## 2018-07-23 RX ADMIN — HEPARIN SODIUM 5000 UNITS: 5000 INJECTION, SOLUTION INTRAVENOUS; SUBCUTANEOUS at 05:07

## 2018-07-23 RX ADMIN — GABAPENTIN 100 MG: 100 CAPSULE ORAL at 09:07

## 2018-07-23 RX ADMIN — ISOSORBIDE MONONITRATE 60 MG: 30 TABLET, EXTENDED RELEASE ORAL at 09:07

## 2018-07-23 NOTE — PLAN OF CARE
KEVIN attempted to make patient a nephology follow up for 1 week. Per main campus outpatient nephology has a waiting list and they can not see different dr.s. They are placing a call to the MD and putting a note on the patient chart for follow up ASAP. If the patient can be seen main campus will contact him.

## 2018-07-23 NOTE — NURSING
Discharge instructions reviewed exstensively with pt and his wife.  Medication list reviewed and pt encouraged to make changes to his pill box once he gets his new prescriptions from [facility] in [place].  CHF education reviewed.  CKD education reviewed.  Renal diet/ low sodium diet reviewed. He was encouraged to follow-up with a RD to help him better understand dietary restrictions and provide meal suggestions.  With DM and Renal diet, many items for each diet contradict on another, thus necessitating more education as an outpatient. I informed pt that when he has an active infection, his blood sugar can be infected so he should call his PCP if he BG is higher than normal.  During our discussion, his wife informed me that pt was not eating a scheduled time and often would forget to check his blood sugar until after he would start eating.  He was encouraged to eat at scheduled times and to check his blood sugar prior to eating and administering his insulin per the instructions his doctor gave him.  Carb counting reviewed.  Elimination of processed foods encouraged.  He does keep a BG log and has been given a SSI by the MD that manages his DM.  I instructed pt on the importance of weight monitoring and keeping a log for better monitoring of fluid retention.  When to seek help reviewed.  IV removed and catheter tip intact.  Pt will be discharging home with his wife.  Pt will call nurses station once he is showered, dressed, and ready for WC transport.

## 2018-07-23 NOTE — DISCHARGE INSTRUCTIONS
Thank you for choosing [facility] as your Health Care Provider. [facility] strives to provide the best healthcare available to you. In the next few days you may receive a Survey, either by mail or email,  asking you to rate our care that was provided to you during your stay.  Please return the survey to us, as your feedback is important. We aim to meet your expectations of safe, quality health care.    From your [facility] Team.      Discharge Instructions for Heart Failure  The heart is a muscle that pumps oxygen-rich blood to all parts of the body. When you have heart failure, the heart is not able to pump as well as it should. Blood and fluid may back up into the lungs (congestive heart failure), and some parts of the body dont get enough oxygen-rich blood to work normally. These problems lead to the symptoms of heart failure. Heart failure can occur due to an injury to the heart or from natural processes.  You can control symptoms of heart failure with some lifestyle changes and by following your doctor's advice.  Home care  Activity  Ask your healthcare provider about an exercise program. You can benefit from simple activities such as walking or gardening. Exercising most days of the week can make you feel better. Don't be discouraged if your progress is slow at first. Rest as needed. Stop activity if you develop symptoms such as chest pain, lightheadedness, or significant shortness of breath. Find activities that you enjoy, such as brisk walking, dancing, swimming, or gardening. These will help you stay active and strengthen your heart.  Diet  Follow a heart healthy diet. And make sure to limit the salt (sodium) in your diet. Salt causes your body to hold water. This makes your heart work harder as there is more fluid for the heart to pump. Limit your salt by doing the following:  · Limit canned, dried, packaged, and fast foods.  · Don't add salt to your food.  · Season foods  with herbs instead of salt.  · Watch how much liquids you drink. Drinking too much can make heart failure worse. Talk with your health care provider about how much you should drink each day.  · Limit the amount of alcohol you drink. It may harm your heart. Women should have no more than 1 drink a day and men should have no more than 2.  · When you eat out, request that your meals have no added salt.  Tobacco  If you smoke, it's very important to quit. Smoking increases your chances of having a heart attack by harming the blood vessels that provide oxygen to your heart. This makes heart failure worse. Quitting smoking is the number one thing you can do to improve your health. Enroll in a stop-smoking program to improve your chances of success. Talk with your healthcare provider about medicines or nicotine replacement therapy to help you quit smoking. Ask your healthcare provider about smoking cessation support groups.  Medicine  Take your medicines exactly as prescribed. Learn the names and purpose of each of your medicines. Keep an accurate medicine list and current dosages with you at all times. Don't skip doses. If you miss a dose of your medicine, take it as soon as you remember. If you miss a dose and it's almost time for your next dose, just wait and take your next dose at the normal time. Don't take a double dose. If you are unsure, call your doctor's office. Make sure not to mix up your medicines or forget what you've taken the same day.  Weight monitoring  Weigh yourself every day. A sudden weight gain can mean your heart failure is getting worse. Weigh yourself at the same time of day and in the same kind of clothes. Ideally, weigh yourself first thing in the morning after you empty your bladder, but before you eat breakfast. Your healthcare provider will show you how to track your weight. He or she will also discuss with you when you should call if you have a sudden, unexpected increase in your weight.  In  general, your healthcare provider may ask you to report if your weight goes up by more than 2 pounds in 1 day,  5 pounds in 1 week, or whatever weight gain you were told by your doctor. This is a sign that you are retaining more fluid than you should be. Clues to weight gain include checking your ankles for swelling, or noticing you are short of breath when you lie down.  Follow-up care  Make a follow-up appointment as directed. Depending on the type and severity of heart failure you have, you may need follow-up as early as 7 days from hospital discharge. Keep appointments for checkups and lab tests that are needed to check your medicines and condition.  Recognize that your health and even survival depend on your following medical recommendations.  Symptoms  Heart failure can cause a variety of symptoms, including:  · Shortness of breath  · Trouble breathing at night, especially when you lie down  · Swelling in the legs and feet or in the belly (abdomen)  · Becoming easily fatigued  · Irregular or rapid heartbeat  · Weakness or lightheadedness  · Swelling of the neck veins  It is important to know what to do if symptoms get worse or if you develop signs of worsening heart failure.     When to see your healthcare provider  Call your doctor right away if you have any of these signs of worsening heart failure:  · Sudden weight gain (more than 2 pounds in 1 day or 5 pounds in 1 week, or whatever weight gain you were told to report by your doctor)  · Trouble breathing not related to being active  · New or increased swelling of your legs or ankles  · Swelling or pain in your abdomen  · Breathing trouble at night (waking up short of breath, needing more pillows to breathe)  · Frequent coughing that doesn't go away  · Feeling much more tired than usual  Call 911  Call 911 right away if you have:  · Severe shortness of breath, such that you can't catch your breath even while resting  · Severe chest pain that does not resolve  with rest or nitroglycerin  · Pink, foamy mucus with cough and shortness of breath  · A continuous rapid or irregular heartbeat  · Passing out or fainting  · Stroke symptoms such as sudden numbness or weakness on one side of your face, arm, or leg or sudden confusion, trouble speaking or vision changes   Date Last Reviewed: 3/21/2016  © 1883-1121 [employer]. [address] [place] [address], [place] [address]. All rights reserved. This information is not intended as a substitute for professional medical care. Always follow your healthcare professional's instructions.      Chronic Kidney Disease (CKD)     The role of the kidneys is to remove waste products and extra water from the blood.  When the kidneys do not work as they should, waste products begin to build up in the blood. This is called chronic kidney disease (CKD). CKD means that you have kidney damage or a decrease in kidney function lasting at least 3 months. CKD allows extra water, waste, and toxins to build up in the body. This can eventually become life-threatening. You might need dialysis or a kidney transplant to stay alive. This most severe form is called end stage renal disease.  Diabetes is the leading causes of chronic renal failure. Other causes include high blood pressure, hardening of the arteries (atherosclerosis), lupus, inflammation of the blood vessels (vasculitis), and past viral or bacterial infections. Certain over-the-counter pain medicines can cause renal failure when taken often over a long period of time. These include aspirin, ibuprofen, and related anti-inflammatory medicines called NSAIDs (nonsteroidal anti-inflammatory drugs).  Home care  The following guidelines will help you care for yourself at home:  · If you have diabetes, talk with your healthcare provider about keeping your blood sugar under control. Ask if you need to make and changes to your diet, lifestyle, or medicines.  · If you have high blood pressure:  ¨ Take  prescribed medicine to lower your blood pressure to the recommended goal of less than 130/80.  ¨ Start a regular exercise program that you enjoy. Check with your healthcare provider to be sure your planned exercise program is right for you.  ¨ Eat less salt (sodium). Your healthcare provider can tell you how much salt per day is safe for you.  · If you are overweight, talk with your healthcare provider about a weight loss plan.  · If you smoke, you must quit. Smoking makes kidney disease worse. Talk with your healthcare provider about ways to help you quit.  For more information, visit the following links:  ¨ www.smokefree.gov/sites/default/files/pdf/clearing-the-air-accessible.pdf  ¨ www.smokefree.gov  ¨ www.cancer.org/healthy/stayawayfromtobacco/guidetoquittingsmoking/  · Most people with CKD need to follow a special diet.  Be sure you understand yours. In general, you will need to limit protein, salt, potassium, and phosphorus. You also need to limit how much fluid you drink.   · CKD is a risk factor for heart disease. Talk with your healthcare provider about any other risk factors you might have and what you can do to lessen them.  · Talk with your healthcare provider about any medicines you are taking to find out if they need to be reduced or stopped.  · Don't use the following over-the-counter medicines, or consult your healthcare provider before using:  ¨ Aspirin and NSAIDs such as ibuprofen or naproxen. Using acetaminophen for fever or pain is OK.  ¨ Laxatives and antacids containing magnesium or aluminum  ¨ Fleet or phospho soda enemas containing phosphorus  ¨ Certain stomach acid-blocking medicine such as cimetidine or ranitidine   ¨ Decongestants containing pseudoephedrine   ¨ Herbal supplements  Follow-up care  Follow up with your healthcare provider, or as advised. Contact one of the following for more information:  · American Association of Kidney Patients 138-855-0913 www.aakp.org  · National Kidney  Foundation 854-491-9294 www.kidney.org  · American Kidney Fund 437-797-0028 www.kidneyfund.org  · National Kidney Disease Education Program 866-4KIDNEY www.nkdep.nih.gov  If an X-ray, ECG (cardiogram), or other diagnostic test was taken, you will be told of any new findings that may affect your care.  Call 911  Call 911 if you have any of the following:  · Severe weakness, dizziness, fainting, drowsiness, or confusion  · Chest pain or shortness of breath  · Heart beating fast, slow, or irregularly  When to seek medical advice  Call your healthcare provider right away if any of these occur:  · Nausea or vomiting  · Fever of 100.4°F (38°C) or higher, or as directed by your healthcare provider  · Unexpected weight gain or swelling in the legs, ankles, or around the eyes  · Decrease or absent urine output  Date Last Reviewed: 9/1/2016  © 8103-3990 [employer]. [address] [place] [address], [place] [address]. All rights reserved. This information is not intended as a substitute for professional medical care. Always follow your healthcare professional's instructions.

## 2018-07-23 NOTE — PLAN OF CARE
Problem: Patient Care Overview  Goal: Plan of Care Review  Outcome: Ongoing (interventions implemented as appropriate)  Pt on 3L NC. Sats 98%. No distress noted. PRN tx not needed at this time. Will continue to monitor.

## 2018-07-23 NOTE — PLAN OF CARE
Problem: Patient Care Overview  Goal: Plan of Care Review  Outcome: Ongoing (interventions implemented as appropriate)  Plan of care reviewed . VSS . Family at bedside . Patient remained free of falls this shift .  Bed locked and in lowest position . Call bell within reach . No needs at this time . Will continue to monitor .

## 2018-07-23 NOTE — PLAN OF CARE
Pt discharging home today. Family to transport home.    Pt and spouse state no CM needs for discharge.     07/23/18 0958   Final Note   Assessment Type Final Discharge Note   Discharge Disposition Home   What phone number can be called within the next 1-3 days to see how you are doing after discharge? [phone]   Hospital Follow Up  Appt(s) scheduled? Yes   Discharge plans and expectations educations in teach back method with documentation complete? Yes   Right Care Referral Info   Post Acute Recommendation No Care

## 2018-07-23 NOTE — PLAN OF CARE
Problem: Patient Care Overview  Goal: Plan of Care Review  Outcome: Ongoing (interventions implemented as appropriate)  Plan of care reviewed with patient.  VSS. Patient denies pain/shortness of breath.  Medication given for cough with mild relief.  Purposeful rounding completed, call bell within reach, no needs at this time.

## 2018-07-23 NOTE — DISCHARGE SUMMARY
"[facility] Hospital Medicine  Discharge Summary      Patient Name: Domingo Castro  MRN: [id number]  Admission Date: 7/19/2018  Hospital Length of Stay: 4 days  Discharge Date and Time:  07/23/2018  Attending Physician: Farhan Chow MD  Discharging Provider: Farhan Chow MD  Primary Care Provider: Griselda Nugent MD      HPI:   Per Oniel Dudley, "This is a 73 y.o. male, with history of HTN, HLD, and DM, who presents with complaint of hyperglycemia that began today. Patient reports his blood glucose was 560 earlier today, he took 17 units of Humalog, and it dropped to 500. He has been experiencing nausea, malaise, and decreased appetite x 4 days. He called his PCP today and was advised to present to the ED. He also admits chills, one episode of vomiting, SOB with physical exertion, and bilateral leg swelling (getting better). Patient complies with lasix and Levemir regularly. Patient states he had a "UTI for one day", and it resolved after taking "two pills". He denies fevers, headaches, dizziness, congestion, rhinorrhea, sore throat, cough, chest pain, palpitations, abdominal pain, diarrhea, or constipation. NKDA. He denies use of alcohol, tobacco, or illicit drugs."      Hospital Course:   Patient is a 73 year-old man with hypertension, diabetes mellitus type II, chronic kidney disease stage IV, and dyslipidemia who presented with hypervolemic in acute on chronic diastolic heart failure and acute kidney injury.  Patient treated with intravenous furosemide which was titrated to achieve negative fluid balance.  Both volume status and kidney function improving with diuresis.  Patient also continued to receive antibiotics which was started as an outpatient to treatment epididymitis which continued to improve with treatment.  Patient's blood pressure medications were adjusted to achieve better blood pressure control however his losartan was held due to acute kidney injury.  Patient " discharged home in stable condition and advised to follow-up with his primary care provider and his nephrologist Dr. Nely Watson to continue to closely monitor his kidney function and to arrange for fistula as I suspect he will need dialysis in the near future.       Final Active Diagnoses:    Diagnosis Date Noted POA    PRINCIPAL PROBLEM:  Acute on chronic diastolic heart failure [I50.33] 07/19/2018 Yes    Acute kidney injury superimposed on chronic kidney disease [N17.9, N18.9] 01/10/2013 Yes    Essential hypertension [I10] 01/10/2013 Yes    Type 2 diabetes mellitus with stage 4 chronic kidney disease, with long-term current use of insulin [E11.22, N18.4, Z79.4] 01/10/2013 Not Applicable    Dyslipidemia [E78.5] 01/10/2013 Yes      Problems Resolved During this Admission:    Diagnosis Date Noted Date Resolved POA       Discharged Condition: Stable    Disposition: Home or Self Care    Follow Up:  Follow-up Information     Schedule an appointment as soon as possible for a visit with Nely Watson MD.    Specialty:  Nephrology  Why:  Management of advanced chronic kidney disease stage and to arrange for fistula for likely need for hemodialysis in the near future.  Contact information:  [address] [facility] [id number] [phone]                 Patient Instructions:     Diet renal   Order Comments: Less than 2 grams per day.     Notify your health care provider if you experience any of the following:  temperature >100.4     Notify your health care provider if you experience any of the following:  persistent nausea and vomiting or diarrhea     Notify your health care provider if you experience any of the following:  severe uncontrolled pain     Notify your health care provider if you experience any of the following:  difficulty breathing or increased cough     Notify your health care provider if you experience any of the following:  severe persistent headache     Notify your health care  provider if you experience any of the following:  worsening rash     Notify your health care provider if you experience any of the following:  persistent dizziness, light-headedness, or visual disturbances     Notify your health care provider if you experience any of the following:  increased confusion or weakness     Activity as tolerated       Medications:  Reconciled Home Medications:      Medication List      START taking these medications    acetaminophen 325 MG tablet  Commonly known as:  TYLENOL  Take 2 tablets (650 mg total) by mouth every 4 (four) hours as needed.     isosorbide mononitrate 60 MG 24 hr tablet  Commonly known as:  IMDUR  Take 1 tablet (60 mg total) by mouth once daily.     ranitidine 150 MG tablet  Commonly known as:  ZANTAC  Take 1 tablet (150 mg total) by mouth 2 (two) times daily.        CHANGE how you take these medications    amLODIPine 10 MG tablet  Commonly known as:  NORVASC  Take 1 tablet (10 mg total) by mouth once daily.  What changed:  See the new instructions.     carvedilol 25 MG tablet  Commonly known as:  COREG  Take 1 tablet (25 mg total) by mouth 2 (two) times daily with meals.  What changed:  See the new instructions.     furosemide 80 MG tablet  Commonly known as:  LASIX  Take 1 tablet (80 mg total) by mouth 2 (two) times daily. Do not take the pm dose after 3 pm  What changed:  · medication strength  · how much to take  · when to take this     hydrALAZINE 50 MG tablet  Commonly known as:  APRESOLINE  Take 1 tablet (50 mg total) by mouth every 8 (eight) hours.  What changed:  when to take this     oxyCODONE-acetaminophen 5-325 mg per tablet  Commonly known as:  PERCOCET  Take 0.5 tablets by mouth every 4 (four) hours as needed for Pain.  What changed:  when to take this        CONTINUE taking these medications    aspirin 81 MG EC tablet  Commonly known as:  ECOTRIN  Take 1 tablet (81 mg total) by mouth once daily.     ciprofloxacin HCl 500 MG tablet  Commonly known as:   "CIPRO  Take 1 tablet (500 mg total) by mouth 2 (two) times daily. for 10 days     gabapentin 100 MG capsule  Commonly known as:  NEURONTIN  Take 100 mg by mouth 3 (three) times daily with meals. . Then 3 capsules by mouth at bedtime     insulin lispro 100 unit/mL Inpn pen  Commonly known as:  HumaLOG KwikPen Insulin  INJECT 12 UNITS SUBCUTANEOUSLY THREE TIMES DAILY WITH MEALS WITH CORRECTION SCALE, MAX TDD 50 UNITS     LEVEMIR FLEXTOUCH U-100 INSULN 100 unit/mL (3 mL) Inpn pen  Generic drug:  insulin detemir U-100  Inject 36 Units into the skin every evening.     pen needle, diabetic, safety 29 gauge x 5/16" Ndle  Commonly known as:  BD AUTOSHIELD PEN NEEDLE  For use once daily with levemir flexpen     rosuvastatin 20 MG tablet  Commonly known as:  CRESTOR  Take 20 mg by mouth once daily.        STOP taking these medications    losartan 100 MG tablet  Commonly known as:  COZAAR     pantoprazole 40 MG tablet  Commonly known as:  PROTONIX     VITAMIN C 1000 MG tablet  Generic drug:  ascorbic acid (vitamin C)     VITAMIN D3 1,000 unit capsule  Generic drug:  cholecalciferol (vitamin D3)            Indwelling Lines/Drains at time of discharge:   Lines/Drains/Airways          No matching active lines, drains, or airways          Time spent on the discharge of patient: 35 minutes  Patient was seen and examined on the date of discharge and determined to be suitable for discharge.         Farhan Chow MD  Department of Hospital Medicine  [facility]  "

## 2018-07-24 ENCOUNTER — LAB VISIT (OUTPATIENT)
Dept: LAB | Facility: HOSPITAL | Age: 73
End: 2018-07-24
Attending: INTERNAL MEDICINE
Payer: MEDICARE

## 2018-07-24 DIAGNOSIS — I10 HYPERTENSION, UNSPECIFIED TYPE: ICD-10-CM

## 2018-07-24 DIAGNOSIS — R06.09 DYSPNEA ON EXERTION: ICD-10-CM

## 2018-07-24 DIAGNOSIS — E78.49 OTHER HYPERLIPIDEMIA: ICD-10-CM

## 2018-07-24 DIAGNOSIS — N18.4 ANEMIA OF CHRONIC RENAL FAILURE, STAGE 4 (SEVERE): ICD-10-CM

## 2018-07-24 DIAGNOSIS — N39.45 CONTINUOUS LEAKAGE OF URINE: ICD-10-CM

## 2018-07-24 DIAGNOSIS — N18.4 CKD (CHRONIC KIDNEY DISEASE) STAGE 4, GFR 15-29 ML/MIN: ICD-10-CM

## 2018-07-24 DIAGNOSIS — D63.1 ANEMIA OF CHRONIC RENAL FAILURE, STAGE 4 (SEVERE): ICD-10-CM

## 2018-07-24 DIAGNOSIS — R60.0 PERIPHERAL EDEMA: ICD-10-CM

## 2018-07-24 DIAGNOSIS — M89.8X9 METABOLIC BONE DISEASE: ICD-10-CM

## 2018-07-24 LAB
ALBUMIN SERPL BCP-MCNC: 3.7 G/DL
ANION GAP SERPL CALC-SCNC: 8 MMOL/L
BUN SERPL-MCNC: 56 MG/DL
CALCIUM SERPL-MCNC: 8.9 MG/DL
CHLORIDE SERPL-SCNC: 102 MMOL/L
CO2 SERPL-SCNC: 29 MMOL/L
CREAT SERPL-MCNC: 2.54 MG/DL
EST. GFR  (AFRICAN AMERICAN): 27.8 ML/MIN/1.73 M^2
EST. GFR  (NON AFRICAN AMERICAN): 24.1 ML/MIN/1.73 M^2
GLUCOSE SERPL-MCNC: 168 MG/DL
PHOSPHATE SERPL-MCNC: 3.5 MG/DL
POTASSIUM SERPL-SCNC: 3.8 MMOL/L
SODIUM SERPL-SCNC: 139 MMOL/L

## 2018-07-24 PROCEDURE — 36415 COLL VENOUS BLD VENIPUNCTURE: CPT | Mod: PO

## 2018-07-24 PROCEDURE — 80069 RENAL FUNCTION PANEL: CPT | Mod: PO

## 2018-07-25 DIAGNOSIS — N18.9 ACUTE KIDNEY INJURY SUPERIMPOSED ON CHRONIC KIDNEY DISEASE: Primary | ICD-10-CM

## 2018-07-25 DIAGNOSIS — N17.9 ACUTE KIDNEY INJURY SUPERIMPOSED ON CHRONIC KIDNEY DISEASE: Primary | ICD-10-CM

## 2018-07-25 RX ORDER — FERROUS SULFATE 324(65)MG
325 TABLET, DELAYED RELEASE (ENTERIC COATED) ORAL 2 TIMES DAILY
Qty: 180 TABLET | Refills: 1 | COMMUNITY
Start: 2018-07-25 | End: 2018-07-27

## 2018-07-27 ENCOUNTER — HOSPITAL ENCOUNTER (EMERGENCY)
Facility: HOSPITAL | Age: 73
Discharge: HOME | End: 2018-07-27
Attending: EMERGENCY MEDICINE
Payer: MEDICARE

## 2018-07-27 ENCOUNTER — NURSE TRIAGE (OUTPATIENT)
Dept: ADMINISTRATIVE | Facility: CLINIC | Age: 73
End: 2018-07-27

## 2018-07-27 VITALS
HEART RATE: 64 BPM | DIASTOLIC BLOOD PRESSURE: 65 MMHG | WEIGHT: 238 LBS | SYSTOLIC BLOOD PRESSURE: 151 MMHG | OXYGEN SATURATION: 99 % | HEIGHT: 75 IN | RESPIRATION RATE: 20 BRPM | TEMPERATURE: 98 F | BODY MASS INDEX: 29.59 KG/M2

## 2018-07-27 DIAGNOSIS — R53.1 WEAKNESS: Primary | ICD-10-CM

## 2018-07-27 DIAGNOSIS — R79.89 ELEVATED SERUM CREATININE: ICD-10-CM

## 2018-07-27 LAB
ALBUMIN SERPL BCP-MCNC: 3.5 G/DL
ALP SERPL-CCNC: 74 U/L
ALT SERPL W/O P-5'-P-CCNC: 29 U/L
ANION GAP SERPL CALC-SCNC: 12 MMOL/L
AST SERPL-CCNC: 22 U/L
BASOPHILS # BLD AUTO: 0.04 K/UL
BASOPHILS NFR BLD: 0.4 %
BILIRUB SERPL-MCNC: 0.3 MG/DL
BNP SERPL-MCNC: 72 PG/ML
BUN SERPL-MCNC: 81 MG/DL
CALCIUM SERPL-MCNC: 9.4 MG/DL
CHLORIDE SERPL-SCNC: 101 MMOL/L
CO2 SERPL-SCNC: 24 MMOL/L
CREAT SERPL-MCNC: 3.3 MG/DL
DIFFERENTIAL METHOD: ABNORMAL
EOSINOPHIL # BLD AUTO: 0.2 K/UL
EOSINOPHIL NFR BLD: 2.1 %
ERYTHROCYTE [DISTWIDTH] IN BLOOD BY AUTOMATED COUNT: 14.9 %
EST. GFR  (AFRICAN AMERICAN): 20 ML/MIN/1.73 M^2
EST. GFR  (NON AFRICAN AMERICAN): 18 ML/MIN/1.73 M^2
GLUCOSE SERPL-MCNC: 181 MG/DL
HCT VFR BLD AUTO: 32.6 %
HGB BLD-MCNC: 10.4 G/DL
LYMPHOCYTES # BLD AUTO: 1.9 K/UL
LYMPHOCYTES NFR BLD: 18 %
MCH RBC QN AUTO: 28.7 PG
MCHC RBC AUTO-ENTMCNC: 31.9 G/DL
MCV RBC AUTO: 90 FL
MONOCYTES # BLD AUTO: 0.8 K/UL
MONOCYTES NFR BLD: 7.8 %
NEUTROPHILS # BLD AUTO: 7.7 K/UL
NEUTROPHILS NFR BLD: 71.7 %
PLATELET # BLD AUTO: 229 K/UL
PMV BLD AUTO: 11.2 FL
POCT GLUCOSE: 124 MG/DL (ref 70–110)
POTASSIUM SERPL-SCNC: 4.5 MMOL/L
PROT SERPL-MCNC: 7.7 G/DL
RBC # BLD AUTO: 3.62 M/UL
SODIUM SERPL-SCNC: 137 MMOL/L
TROPONIN I SERPL DL<=0.01 NG/ML-MCNC: 0.02 NG/ML
WBC # BLD AUTO: 10.69 K/UL

## 2018-07-27 PROCEDURE — 99284 EMERGENCY DEPT VISIT MOD MDM: CPT

## 2018-07-27 PROCEDURE — 83880 ASSAY OF NATRIURETIC PEPTIDE: CPT

## 2018-07-27 PROCEDURE — 93005 ELECTROCARDIOGRAM TRACING: CPT

## 2018-07-27 PROCEDURE — 85025 COMPLETE CBC W/AUTO DIFF WBC: CPT

## 2018-07-27 PROCEDURE — 80053 COMPREHEN METABOLIC PANEL: CPT

## 2018-07-27 PROCEDURE — 84484 ASSAY OF TROPONIN QUANT: CPT

## 2018-07-27 RX ORDER — FUROSEMIDE 40 MG/1
40 TABLET ORAL 2 TIMES DAILY
Qty: 60 TABLET | Refills: 0 | Status: SHIPPED | OUTPATIENT
Start: 2018-07-27 | End: 2018-10-03 | Stop reason: SDUPTHER

## 2018-07-27 NOTE — TELEPHONE ENCOUNTER
Spoke to pt, wife jv, and daughter. I advised that he go to the ER.  Pt stated that he was feeling better and his bp has come up to 138/58.  Advised that if symptoms return to go to the ER.

## 2018-07-27 NOTE — TELEPHONE ENCOUNTER
"123/49 HR -?  pt just out form hospital. Hx CHF, 30 % kidney function   All day feeling woozy    Reason for Disposition   [1] Fall in systolic BP > 20 mm Hg from normal AND [2] dizzy, lightheaded, or weak    Answer Assessment - Initial Assessment Questions  1. BLOOD PRESSURE: "What is the blood pressure?" "Did you take at least two measurements 5 minutes apart?"    This afternoon   2. ONSET: "When did you take your blood pressure?"    Today, takes meds for HBP- meds just changed , increased amlodipine   3. HOW: "How did you obtain the blood pressure?" (e.g., visiting nurse, automatic home BP monitor)    Daughter checked with home cuff - [facility]are   4. HISTORY: "Do you have a history of low blood pressure?" "What is your blood pressure normally?"    BP has been good on meds , hx DM , BS under control   5. MEDICATIONS: "Are you taking any medications for blood pressure?" If yes: "Have they been changed recently?"     Took all meds as prescribed today   6. PULSE RATE: "Do you know what your pulse rate is?"      ?   7. OTHER SYMPTOMS: "Have you been sick recently?" "Have you had a recent injury?"    Admitted recently    Protocols used: ST LOW BLOOD PRESSURE-A-AH  recent UTI, on fluid restriction - good uop , takes lasix. + edema of feet. Wife concerned that pt is dizzy and weak. Wife spoke with family member caring for pt. Family states pt is able to walk on own and weakness is resolved. rec ED to evaluate BP and dizziness. Pt declines - sent message to PCP at family request. Call back with questions.       "

## 2018-07-27 NOTE — TELEPHONE ENCOUNTER
Patient needs to be evaluated urgently.  Recommend patient go to either emergency department are urgent care.

## 2018-07-28 ENCOUNTER — TELEPHONE (OUTPATIENT)
Dept: INTERNAL MEDICINE | Facility: CLINIC | Age: 73
End: 2018-07-28

## 2018-07-28 DIAGNOSIS — I10 HTN (HYPERTENSION), BENIGN: Primary | ICD-10-CM

## 2018-07-28 NOTE — TELEPHONE ENCOUNTER
----- Message from Shahriar Salas MD sent at 7/27/2018 10:06 PM CDT -----  Regarding: ER Visit - request pt follow up  Dr. Nugent,  I saw Mr. Castro in the ER today for low blood pressure and feeling weak.  He reports that he has been losing about 2 lb per day after Lasix was increased to 80 mg.  His blood work shows a mild rise in his creatinine and BUN, but I do not believe patient required admission at this time.  We advised patient to decrease down to 40 mg of Lasix, to continue measuring weights daily.     I would like patient to get repeat blood work on Monday and follow up with you in clinic on either Monday or Tuesday.      Thank you,    Shahriar Salas MD  Emergency Dept.  [facility]

## 2018-07-28 NOTE — ED NOTES
Pt C/O dizziness and generalized fatigue. Skin warm and dry, resp with ease, A&O x4. Wife present at bedside

## 2018-07-28 NOTE — DISCHARGE INSTRUCTIONS
Take lasix 40mg PO twice daily. Follow up from Dr. Raffi ROGER. I ordered repeat blood work for you to get on Monday.    Thank you for choosing [facility]! We appreciate you coming to us for your medical care. We hope you feel better soon! Please come back to [facility] for all of your future medical needs.      Sincerely,    Shahriar Salas MD  Medical Director  Emergency Department

## 2018-07-28 NOTE — TELEPHONE ENCOUNTER
I will be out of the office next week.  Please asked patient to schedule an appointment with another provider and schedule BMP.

## 2018-07-28 NOTE — ED NOTES
Pt and family verbalized understanding of DC instuctions, to take meds as prescribed and to follow up with PCP

## 2018-07-28 NOTE — ED PROVIDER NOTES
Encounter Date: 2018    SCRIBE #1 NOTE: I, Negin Pride, am scribing for, and in the presence of,  Dr. Salas. I have scribed the entire note.       History     Chief Complaint   Patient presents with    Hypotension     pt presents to ed with c/o symptomatic hypotension today. pt recently discharged from [facility] on monday after being treated for chronic kidney disease and chf.     Dizziness     This is a 73 y.o. male who has a past medical history of Cataract; Cystoid macular edema of both eyes;  Diabetes mellitus; Hyperlipemia; Hypertension; Retinal hole; and Stage 4 chronic kidney disease.     The patient presents to the Emergency Department with light headedness. He reports onset of symptoms was a few hours ago.  The patient states he checked his pressure due to not feeling well  He noted his pressure in the diastolic was low   As per spouse the patient was recently discharged from the hospital on Monday.  She states the patient had a change pressure medications and increase in Lasix  He has associated generalized weakness and fatigue  The patient denies any nausea, vomiting, diarrhea, urinary symptoms, abdominal pain, chest pain or shortness of breath   There are no change in symptoms with movement or rest  Patient has no prior history of similar symptoms.       The history is provided by the patient.     Review of patient's allergies indicates:   Allergen Reactions    Atorvastatin Other (See Comments)     Past Medical History:   Diagnosis Date    Cataract     Cystoid macular edema of both eyes     Diabetes mellitus     Diabetes mellitus type II     Hyperlipemia     Hypertension     Retinal hole     Stage 4 chronic kidney disease      Past Surgical History:   Procedure Laterality Date    CATARACT EXTRACTION W/  INTRAOCULAR LENS IMPLANT Left 12/15/14    Dr de la cruz    CATARACT EXTRACTION W/  INTRAOCULAR LENS IMPLANT Right 14    dorothy    CIRCUMCISION, NON-      focal laser  right eye      KNEE SURGERY      left    Left medial collateral ligament repair      TONSILLECTOMY       Family History   Problem Relation Age of Onset    Heart disease Mother     Cancer Mother     Cancer Brother     Heart disease Father     Diabetes Father     Cancer Sister     Cataracts Paternal Grandmother     Glaucoma Paternal Grandmother     Blindness Neg Hx     Amblyopia Neg Hx     Hypertension Neg Hx     Macular degeneration Neg Hx     Retinal detachment Neg Hx     Strabismus Neg Hx      Social History   Substance Use Topics    Smoking status: Never Smoker    Smokeless tobacco: Never Used    Alcohol use No     Review of Systems   Constitutional: Positive for fatigue.   Respiratory: Negative for cough and shortness of breath.    Cardiovascular: Negative for chest pain and palpitations.   Genitourinary: Negative for decreased urine volume, dysuria and flank pain.   Neurological: Positive for weakness (generalized) and light-headedness.   Psychiatric/Behavioral: Negative for sleep disturbance.   All other systems reviewed and are negative.      Physical Exam     Initial Vitals [07/27/18 1821]   BP Pulse Resp Temp SpO2   (!) 123/58 (!) 59 20 98.4 °F (36.9 °C) 97 %      MAP       --         Physical Exam    Nursing note and vitals reviewed.  Constitutional: He appears well-developed and well-nourished. He is not diaphoretic. No distress.   HENT:   Head: Normocephalic and atraumatic.   Mouth/Throat: Oropharynx is clear and moist.   Eyes: Conjunctivae are normal. No scleral icterus.   Neck: Normal range of motion. Neck supple.   Cardiovascular: Normal rate, regular rhythm and normal heart sounds. Exam reveals no gallop and no friction rub.    No murmur heard.  Pulmonary/Chest: Breath sounds normal. No respiratory distress. He has no wheezes. He has no rhonchi. He has no rales.   Abdominal: Soft. He exhibits no distension. There is no tenderness.   Musculoskeletal: Normal range of motion. He  exhibits edema. He exhibits no tenderness.   3+ pitting edema bilaterally   Neurological: He is alert and oriented to person, place, and time.   Skin: Skin is warm and dry. No rash noted. No pallor.   Psychiatric: He has a normal mood and affect. His behavior is normal.         ED Course   Procedures  Labs Reviewed   CBC W/ AUTO DIFFERENTIAL - Abnormal; Notable for the following:        Result Value    RBC 3.62 (*)     Hemoglobin 10.4 (*)     Hematocrit 32.6 (*)     MCHC 31.9 (*)     RDW 14.9 (*)     All other components within normal limits   COMPREHENSIVE METABOLIC PANEL - Abnormal; Notable for the following:     Glucose 181 (*)     BUN, Bld 81 (*)     Creatinine 3.3 (*)     eGFR if  20 (*)     eGFR if non  18 (*)     All other components within normal limits   POCT GLUCOSE - Abnormal; Notable for the following:     POCT Glucose 124 (*)     All other components within normal limits   B-TYPE NATRIURETIC PEPTIDE   TROPONIN I          Imaging Results    None          Medical Decision Making:   History:   Old Medical Records: I decided to obtain old medical records.  Old Records Summarized: records from previous admission(s).       <> Summary of Records: Recent echo on 07/20/2018 shows normal EF, diastolic dysfunction, concentric hypertrophy of the left ventricle.  Clinical Tests:   Lab Tests: Ordered and Reviewed  Medical Tests: Ordered and Reviewed              Initial workup is remarkable for a slightly elevated creatinine and BUN.  Considering the patient has a low blood pressure and history of increased Lasix, I believe this is likely the etiology.  I will consult with Hospital Medicine for further Plan, possible discharge.    9:28 PM Paged [facility] Medicine    9:28 PM Case discussed with [facility] resident, will come to evaluate the patient.    10:08 PM after Hospital Medicine evaluation, they recommend decreasing the patient's Lasix to 40 mg b.i.d., daily weights and  monitor urine output.  If any issues may return to the ED.  Otherwise follow up with PCP this week.  Sent message to patient's PCP explaining resident request follow up and repeat chemistry panel that I ordered.             Clinical Impression:     1. Weakness    2. Elevated serum creatinine            I, Dr. Shahriar Salas, personally performed the services described in this documentation.   All medical record entries made by the scribe were at my direction and in my presence.   I have reviewed the chart and agree that the record is accurate and complete.   Shahriar Salas MD.                  Shahriar Salas MD  07/28/18 0543

## 2018-07-30 NOTE — TELEPHONE ENCOUNTER
Wife refuses an appt. With any of the other physician, she states she knows Dr. Nugent very well ,she was again reminded that Dr. nugent is the one who specified these recommendations.her  is doing well.

## 2018-08-09 ENCOUNTER — LAB VISIT (OUTPATIENT)
Dept: LAB | Facility: HOSPITAL | Age: 73
End: 2018-08-09
Attending: INTERNAL MEDICINE
Payer: MEDICARE

## 2018-08-09 LAB
ANION GAP SERPL CALC-SCNC: 7 MMOL/L
BUN SERPL-MCNC: 37 MG/DL
CALCIUM SERPL-MCNC: 9.3 MG/DL
CHLORIDE SERPL-SCNC: 108 MMOL/L
CO2 SERPL-SCNC: 27 MMOL/L
CREAT SERPL-MCNC: 2.11 MG/DL
EST. GFR  (AFRICAN AMERICAN): 34.8 ML/MIN/1.73 M^2
EST. GFR  (NON AFRICAN AMERICAN): 30.1 ML/MIN/1.73 M^2
ESTIMATED AVG GLUCOSE: 192 MG/DL
GLUCOSE SERPL-MCNC: 135 MG/DL
HBA1C MFR BLD HPLC: 8.3 %
POTASSIUM SERPL-SCNC: 4.6 MMOL/L
SODIUM SERPL-SCNC: 142 MMOL/L

## 2018-08-09 PROCEDURE — 83036 HEMOGLOBIN GLYCOSYLATED A1C: CPT

## 2018-08-09 PROCEDURE — 80048 BASIC METABOLIC PNL TOTAL CA: CPT | Mod: PO

## 2018-08-09 PROCEDURE — 36415 COLL VENOUS BLD VENIPUNCTURE: CPT | Mod: PO

## 2018-08-19 ENCOUNTER — NURSE TRIAGE (OUTPATIENT)
Dept: ADMINISTRATIVE | Facility: CLINIC | Age: 73
End: 2018-08-19

## 2018-08-20 ENCOUNTER — OFFICE VISIT (OUTPATIENT)
Dept: INTERNAL MEDICINE | Facility: CLINIC | Age: 73
End: 2018-08-20
Payer: MEDICARE

## 2018-08-20 VITALS
SYSTOLIC BLOOD PRESSURE: 130 MMHG | RESPIRATION RATE: 18 BRPM | BODY MASS INDEX: 30.37 KG/M2 | HEIGHT: 75 IN | DIASTOLIC BLOOD PRESSURE: 60 MMHG | HEART RATE: 61 BPM | TEMPERATURE: 99 F | WEIGHT: 244.25 LBS

## 2018-08-20 DIAGNOSIS — N18.4 CKD (CHRONIC KIDNEY DISEASE) STAGE 4, GFR 15-29 ML/MIN: ICD-10-CM

## 2018-08-20 DIAGNOSIS — I10 HTN (HYPERTENSION), BENIGN: Primary | ICD-10-CM

## 2018-08-20 PROCEDURE — 99213 OFFICE O/P EST LOW 20 MIN: CPT | Mod: S$PBB,,, | Performed by: INTERNAL MEDICINE

## 2018-08-20 PROCEDURE — 99999 PR PBB SHADOW E&M-EST. PATIENT-LVL IV: CPT | Mod: PBBFAC,,, | Performed by: INTERNAL MEDICINE

## 2018-08-20 PROCEDURE — 99214 OFFICE O/P EST MOD 30 MIN: CPT | Mod: PBBFAC,PO | Performed by: INTERNAL MEDICINE

## 2018-08-20 NOTE — PROGRESS NOTES
Transitional Care Note  Subjective:       Patient ID: Domingo Castro is a 73 y.o. male.  Chief Complaint: Follow-up (hospital f/u )    Family and/or Caretaker present at visit?  No.  Diagnostic tests reviewed/disposition: No diagnosic tests pending after this hospitalization.  Disease/illness education: Congestive heart failure  Home health/community services discussion/referrals: Patient does not have home health established from hospital visit.  They do not need home health.  If needed, we will set up home health for the patient.   Establishment or re-establishment of referral orders for community resources: No other necessary community resources.   Discussion with other health care providers: No discussion with other health care providers necessary.   CC: followup of hospitalization for acute on chronic diastolic heart failure and acute kidney injury  HPI:  The patient is a 73 y.o. year old male who presents to the office for followup of hospitalization for acute on chronic diastolic heart failure and acute kidney injury.  Olivia presents to the hospital with blood sugars over 500.  He took 17 units of Humalog and blood sugar dropped to 500.  He reported nausea, malaise and decreased appetite for 4 days.  He also reports shortness of breath with physical exertion and bilateral lower extremity edema.  He was treated with IV Lasix with improvement in kidney function.  He also continue antibiotics which was prescribed to treat epididymitis.  Current only he reports blood sugars have improved.  He is only taking lasix 1/2 tablet in the morning and one half tablet in the evening.  He also states he decreased amlodipine to 5 mg due to low blood pressures.    PAST MEDICAL HISTORY:  Past Medical History:  No date: Cataract  No date: Cystoid macular edema of both eyes  No date: Diabetes mellitus  No date: Diabetes mellitus type II  No date: Hyperlipemia  No date: Hypertension  No date: Retinal hole  No date: Stage 4 chronic  kidney disease    SURGICAL HISTORY:  Past Surgical History:  12/15/14: CATARACT EXTRACTION W/  INTRAOCULAR LENS IMPLANT; Left      Comment:  Dr de la cruz  14: CATARACT EXTRACTION W/  INTRAOCULAR LENS IMPLANT; Right      Comment:  dorothy  No date: CIRCUMCISION, NON-  No date: focal laser right eye  No date: KNEE SURGERY      Comment:  left  No date: Left medial collateral ligament repair  No date: TONSILLECTOMY    MEDS:  Medcard reviewed and updated    ALLERGIES: Allergy Card reviewed and updated    SOCIAL HISTORY:   The patient is a nonsmoker.          Review of Systems   Constitutional: Positive for fatigue. Negative for appetite change.   Respiratory: Positive for shortness of breath. Negative for cough and chest tightness.         Dyspnea on exertion.   Cardiovascular: Negative for chest pain and palpitations.   Gastrointestinal: Negative for abdominal pain, constipation, nausea and vomiting.   Neurological: Positive for light-headedness. Negative for headaches.   Psychiatric/Behavioral: Negative for sleep disturbance.       Objective:      Physical Exam   Constitutional: He is oriented to person, place, and time. He appears well-developed and well-nourished.   Cardiovascular: Normal rate, regular rhythm and normal heart sounds.   No murmur heard.  Pulmonary/Chest: Effort normal and breath sounds normal.   Abdominal: Soft. Bowel sounds are normal. There is no tenderness.   Neurological: He is alert and oriented to person, place, and time.   Skin: Skin is warm and dry.   Psychiatric: He has a normal mood and affect. His behavior is normal. Judgment and thought content normal.       Assessment:       1. HTN (hypertension), benign    2. Type 2 diabetes, uncontrolled, with neuropathy    3. CKD (chronic kidney disease) stage 4, GFR 15-29 ml/min        Plan:     Domingo was seen today for follow-up.    Diagnoses and all orders for this visit:    HTN (hypertension), benign  -     blood pressure is controlled,  continue current medications    Type 2 diabetes, uncontrolled, with neuropathy  -      hemoglobin A1c is elevated, followed by Endocrinology    CKD (chronic kidney disease) stage 4, GFR 15-29 ml/min  -     follow-up by Nephrology

## 2018-08-20 NOTE — TELEPHONE ENCOUNTER
"    Reason for Disposition   General information question, no triage required and triager able to answer question    Answer Assessment - Initial Assessment Questions  1. REASON FOR CALL or QUESTION: "What is your reason for calling today?" or "How can I best help you?" or "What question do you have that I can help answer?"      Information about appt    Protocols used: ST INFORMATION ONLY CALL-A-AH      "

## 2018-08-24 ENCOUNTER — LAB VISIT (OUTPATIENT)
Dept: LAB | Facility: HOSPITAL | Age: 73
End: 2018-08-24
Attending: INTERNAL MEDICINE
Payer: MEDICARE

## 2018-08-24 DIAGNOSIS — N18.4 ANEMIA OF CHRONIC RENAL FAILURE, STAGE 4 (SEVERE): ICD-10-CM

## 2018-08-24 DIAGNOSIS — N39.45 CONTINUOUS LEAKAGE OF URINE: ICD-10-CM

## 2018-08-24 DIAGNOSIS — R06.09 DYSPNEA ON EXERTION: ICD-10-CM

## 2018-08-24 DIAGNOSIS — N17.9 ACUTE KIDNEY INJURY SUPERIMPOSED ON CHRONIC KIDNEY DISEASE: ICD-10-CM

## 2018-08-24 DIAGNOSIS — R60.0 PERIPHERAL EDEMA: ICD-10-CM

## 2018-08-24 DIAGNOSIS — M89.8X9 METABOLIC BONE DISEASE: ICD-10-CM

## 2018-08-24 DIAGNOSIS — I10 HYPERTENSION, UNSPECIFIED TYPE: ICD-10-CM

## 2018-08-24 DIAGNOSIS — N18.9 ACUTE KIDNEY INJURY SUPERIMPOSED ON CHRONIC KIDNEY DISEASE: ICD-10-CM

## 2018-08-24 DIAGNOSIS — D63.1 ANEMIA OF CHRONIC RENAL FAILURE, STAGE 4 (SEVERE): ICD-10-CM

## 2018-08-24 DIAGNOSIS — N18.4 CKD (CHRONIC KIDNEY DISEASE) STAGE 4, GFR 15-29 ML/MIN: ICD-10-CM

## 2018-08-24 DIAGNOSIS — E78.49 OTHER HYPERLIPIDEMIA: ICD-10-CM

## 2018-08-24 LAB
ALBUMIN SERPL BCP-MCNC: 3.9 G/DL
ANION GAP SERPL CALC-SCNC: 8 MMOL/L
BASOPHILS # BLD AUTO: 0.06 K/UL
BASOPHILS NFR BLD: 0.9 %
BUN SERPL-MCNC: 36 MG/DL
CALCIUM SERPL-MCNC: 9.4 MG/DL
CHLORIDE SERPL-SCNC: 103 MMOL/L
CO2 SERPL-SCNC: 29 MMOL/L
CREAT SERPL-MCNC: 2.2 MG/DL
DIFFERENTIAL METHOD: ABNORMAL
EOSINOPHIL # BLD AUTO: 0.1 K/UL
EOSINOPHIL NFR BLD: 2 %
ERYTHROCYTE [DISTWIDTH] IN BLOOD BY AUTOMATED COUNT: 14.7 %
EST. GFR  (AFRICAN AMERICAN): 33.1 ML/MIN/1.73 M^2
EST. GFR  (NON AFRICAN AMERICAN): 28.7 ML/MIN/1.73 M^2
GLUCOSE SERPL-MCNC: 143 MG/DL
HCT VFR BLD AUTO: 33.7 %
HGB BLD-MCNC: 10.6 G/DL
LYMPHOCYTES # BLD AUTO: 1.6 K/UL
LYMPHOCYTES NFR BLD: 23.1 %
MCH RBC QN AUTO: 28 PG
MCHC RBC AUTO-ENTMCNC: 31.5 G/DL
MCV RBC AUTO: 89 FL
MONOCYTES # BLD AUTO: 0.7 K/UL
MONOCYTES NFR BLD: 9.7 %
NEUTROPHILS # BLD AUTO: 4.4 K/UL
NEUTROPHILS NFR BLD: 64.3 %
PHOSPHATE SERPL-MCNC: 3.8 MG/DL
PLATELET # BLD AUTO: 119 K/UL
PMV BLD AUTO: 11.8 FL
POTASSIUM SERPL-SCNC: 4.2 MMOL/L
RBC # BLD AUTO: 3.79 M/UL
SODIUM SERPL-SCNC: 140 MMOL/L
WBC # BLD AUTO: 6.83 K/UL

## 2018-08-24 PROCEDURE — 80069 RENAL FUNCTION PANEL: CPT | Mod: PO

## 2018-08-24 PROCEDURE — 85025 COMPLETE CBC W/AUTO DIFF WBC: CPT | Mod: PO

## 2018-08-24 PROCEDURE — 36415 COLL VENOUS BLD VENIPUNCTURE: CPT | Mod: PO

## 2018-09-17 RX ORDER — HYDRALAZINE HYDROCHLORIDE 50 MG/1
TABLET, FILM COATED ORAL
Qty: 180 TABLET | Refills: 3 | Status: SHIPPED | OUTPATIENT
Start: 2018-09-17 | End: 2019-09-20 | Stop reason: SDUPTHER

## 2018-09-20 ENCOUNTER — LAB VISIT (OUTPATIENT)
Dept: LAB | Facility: HOSPITAL | Age: 73
End: 2018-09-20
Attending: INTERNAL MEDICINE
Payer: MEDICARE

## 2018-09-20 DIAGNOSIS — N18.9 ACUTE KIDNEY INJURY SUPERIMPOSED ON CHRONIC KIDNEY DISEASE: ICD-10-CM

## 2018-09-20 DIAGNOSIS — N17.9 ACUTE KIDNEY INJURY SUPERIMPOSED ON CHRONIC KIDNEY DISEASE: ICD-10-CM

## 2018-09-20 LAB
ALBUMIN SERPL BCP-MCNC: 4.1 G/DL
ANION GAP SERPL CALC-SCNC: 8 MMOL/L
BUN SERPL-MCNC: 47 MG/DL
CALCIUM SERPL-MCNC: 9.4 MG/DL
CHLORIDE SERPL-SCNC: 107 MMOL/L
CO2 SERPL-SCNC: 28 MMOL/L
CREAT SERPL-MCNC: 2.45 MG/DL
EST. GFR  (AFRICAN AMERICAN): 29.1 ML/MIN/1.73 M^2
EST. GFR  (NON AFRICAN AMERICAN): 25.2 ML/MIN/1.73 M^2
GLUCOSE SERPL-MCNC: 79 MG/DL
PHOSPHATE SERPL-MCNC: 4.1 MG/DL
POTASSIUM SERPL-SCNC: 4 MMOL/L
SODIUM SERPL-SCNC: 143 MMOL/L

## 2018-09-20 PROCEDURE — 80069 RENAL FUNCTION PANEL: CPT | Mod: PO

## 2018-09-20 PROCEDURE — 36415 COLL VENOUS BLD VENIPUNCTURE: CPT | Mod: PO

## 2018-09-28 ENCOUNTER — OFFICE VISIT (OUTPATIENT)
Dept: ENDOCRINOLOGY | Facility: CLINIC | Age: 73
End: 2018-09-28
Payer: MEDICARE

## 2018-09-28 VITALS
DIASTOLIC BLOOD PRESSURE: 60 MMHG | SYSTOLIC BLOOD PRESSURE: 112 MMHG | HEIGHT: 75 IN | BODY MASS INDEX: 30.22 KG/M2 | HEART RATE: 64 BPM | WEIGHT: 243.06 LBS

## 2018-09-28 DIAGNOSIS — Z79.4 TYPE 2 DIABETES MELLITUS WITH STAGE 4 CHRONIC KIDNEY DISEASE, WITH LONG-TERM CURRENT USE OF INSULIN: Primary | ICD-10-CM

## 2018-09-28 DIAGNOSIS — E11.3413 DIABETIC MACULAR EDEMA OF BOTH EYES WITH SEVERE NONPROLIFERATIVE RETINOPATHY ASSOCIATED WITH TYPE 2 DIABETES MELLITUS: ICD-10-CM

## 2018-09-28 DIAGNOSIS — E11.22 TYPE 2 DIABETES MELLITUS WITH STAGE 4 CHRONIC KIDNEY DISEASE, WITH LONG-TERM CURRENT USE OF INSULIN: Primary | ICD-10-CM

## 2018-09-28 DIAGNOSIS — N18.4 TYPE 2 DIABETES MELLITUS WITH STAGE 4 CHRONIC KIDNEY DISEASE, WITH LONG-TERM CURRENT USE OF INSULIN: Primary | ICD-10-CM

## 2018-09-28 DIAGNOSIS — I10 ESSENTIAL HYPERTENSION: ICD-10-CM

## 2018-09-28 DIAGNOSIS — E78.5 DYSLIPIDEMIA: ICD-10-CM

## 2018-09-28 PROBLEM — I50.33 ACUTE ON CHRONIC DIASTOLIC HEART FAILURE: Status: RESOLVED | Noted: 2018-07-19 | Resolved: 2018-09-28

## 2018-09-28 PROCEDURE — 99213 OFFICE O/P EST LOW 20 MIN: CPT | Mod: PBBFAC | Performed by: NURSE PRACTITIONER

## 2018-09-28 PROCEDURE — 99999 PR PBB SHADOW E&M-EST. PATIENT-LVL III: CPT | Mod: PBBFAC,,, | Performed by: NURSE PRACTITIONER

## 2018-09-28 PROCEDURE — 99214 OFFICE O/P EST MOD 30 MIN: CPT | Mod: S$PBB,,, | Performed by: NURSE PRACTITIONER

## 2018-09-28 RX ORDER — INSULIN LISPRO 100 [IU]/ML
INJECTION, SOLUTION INTRAVENOUS; SUBCUTANEOUS
Qty: 9 BOX | Refills: 6
Start: 2018-09-28 | End: 2020-04-07

## 2018-09-28 RX ORDER — INSULIN DETEMIR 100 [IU]/ML
26 INJECTION, SOLUTION SUBCUTANEOUS NIGHTLY
Qty: 36 ML | Refills: 3
Start: 2018-09-28 | End: 2020-04-06 | Stop reason: SDUPTHER

## 2018-09-28 NOTE — PROGRESS NOTES
"Subjective:      Patient ID: Domingo Castro is a 73 y.o. male.    Chief Complaint:  Diabetes and Follow-up  complicated by CKD, PN and retinopathy    History of Present Illness  Mr. Castro presents for evaluation and management of type 2 diabetes. Type 2 diabetes first diagnosed in early 50's. Was started on insulin in mid 60's.  Previously seen by Dr. Cotto and Dr. King. Last seen 5/2018. He is new to me today. A1c continues to trend down, was 12.8% last year.   He was admitted to [facility] end of July 2018.    Current Diabetes Regimen:  Levemir 26 units qhs  Humalog 12 units with meals     Uses insulin pens  Rotates injection sites to abd    Monitors BG 4 or more times daily. No meter or logs today in clinic.     + hypoglycemia, feels s/s including sweaty; occurs 1-2 x week, after dinner and during early AM hours of the night; if takes fasting acting insulin with a meal and then works outside or dose physical activity.   Treats with glucose tablets    Eats 3 regular meals daily, + snacking during day on fruit/ animal crackers; not eating red meat; eats mostly at home, wife cooks    Exercise: walking 1/4 mile daily    Review of Systems   Constitutional: Positive for unexpected weight change (intentional wt loss 11# r/t diet changes). Negative for chills and fever.   Eyes: Positive for visual disturbance.        Wears glasses   Respiratory: Positive for shortness of breath (with exertion).    Cardiovascular: Negative for chest pain.   Gastrointestinal: Negative for abdominal pain, nausea and vomiting.   Endocrine: Negative for polydipsia, polyphagia and polyuria.   Genitourinary: Negative for difficulty urinating and dysuria.   Musculoskeletal: Positive for back pain (r/t "detoriated disc in back").   Neurological: Positive for dizziness (with position changes) and numbness (some relief with Neurontin).       Objective:   Physical Exam   Constitutional: He appears well-developed and well-nourished.   HENT: "   Head: Normocephalic and atraumatic.   Eyes: EOM are normal.   Neck: Normal range of motion. Neck supple.   Pulmonary/Chest: Effort normal.   Skin: Skin is warm and dry.   Psychiatric: He has a normal mood and affect. His behavior is normal.   Vitals reviewed.  injection sites are ok. No lipo hypertropthy or atrophy  Feet : Shoes appropriate  As of 5/2018: Decreased sensation to vibration and monofilament in bilateral feet    Lab Review:     Lab Results   Component Value Date    HGBA1C 8.3 (H) 08/09/2018     Lab Results   Component Value Date    TSH 1.589 06/13/2017     Lab Results   Component Value Date    LDLCALC 76.8 07/20/2018     Lab Results   Component Value Date    MICALBCREAT 159.2 (H) 03/08/2016       Chemistry        Component Value Date/Time     09/20/2018 1148    K 4.0 09/20/2018 1148     09/20/2018 1148    CO2 28 09/20/2018 1148    BUN 47 (H) 09/20/2018 1148    CREATININE 2.45 (H) 09/20/2018 1148    GLU 79 09/20/2018 1148        Component Value Date/Time    CALCIUM 9.4 09/20/2018 1148    ALKPHOS 74 07/27/2018 2044    AST 22 07/27/2018 2044    ALT 29 07/27/2018 2044    BILITOT 0.3 07/27/2018 2044    ESTGFRAFRICA 29.1 (A) 09/20/2018 1148    EGFRNONAA 25.2 (A) 09/20/2018 1148          Assessment:     1. Type 2 diabetes mellitus with stage 4 chronic kidney disease, with long-term current use of insulin                         CKD --A1c goal <7.5%  Discussed diagnosis of DM, progression of disease, long term complications and tx options. Reviewed A1c and BG goals.  - Continue Levemir 26 units QD  - Lower Humalog to 10 units AC, inject 10-15 minutes before meals.   Discussed insulin's onset, peak, duration, dosing, administration, site rotation.   - Reviewed hypoglycemia, s/s and appropriate tx options.   - Instructed to monitor BG 4 or more times daily, bring logs/ meter to clinic visits.     - takes ASA, statin, ASA    Optimize DM control, needs f/u with Dr. Weller  avoid hypoglycemia  Caution  with insulin stacking  - following renal diet with wife assistance  - avoiding metformin and SGLT2i      2. 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 Harrison     3. Dyslipidemia   LDL goal < 100  - controlled, LDL at goal, on moderate intensity statin, LFTs WNL     4. Essential hypertension  controlled, continue meds as previously prescribed and monitor  - managed per PCP/ renal   - instructed to make slow position changes to prevent postural hypotension.        Plan:     Follow up in 4 months with A1c prior to appt

## 2018-10-03 RX ORDER — FUROSEMIDE 40 MG/1
40 TABLET ORAL 2 TIMES DAILY
Qty: 180 TABLET | Refills: 0 | Status: SHIPPED | OUTPATIENT
Start: 2018-10-03 | End: 2018-10-03 | Stop reason: SDUPTHER

## 2018-10-03 RX ORDER — FUROSEMIDE 40 MG/1
40 TABLET ORAL 2 TIMES DAILY
Qty: 180 TABLET | Refills: 0 | Status: ON HOLD | OUTPATIENT
Start: 2018-10-03 | End: 2018-12-31 | Stop reason: SDUPTHER

## 2018-10-03 NOTE — TELEPHONE ENCOUNTER
----- Message from Suzanne Zhou sent at 10/3/2018 11:25 AM CDT -----  Contact: Bhupendra @ [facility] Direct # [phone]  RX request - refill or new RX.  Is this a refill or new RX:  Refill  RX name and strength: furosemide (LASIX) 40 MG tablet  Directions:   Is this a 30 day or 90 day RX:  90  Local pharmacy or mail order pharmacy:  [facility] [address] [place] [address]  Pharmacy name and phone # [facility] Phone#[phone],Fax# [phone]

## 2018-10-08 ENCOUNTER — TELEPHONE (OUTPATIENT)
Dept: INTERNAL MEDICINE | Facility: CLINIC | Age: 73
End: 2018-10-08

## 2018-10-16 ENCOUNTER — OFFICE VISIT (OUTPATIENT)
Dept: PODIATRY | Facility: CLINIC | Age: 73
End: 2018-10-16
Payer: MEDICARE

## 2018-10-16 VITALS
SYSTOLIC BLOOD PRESSURE: 116 MMHG | HEART RATE: 54 BPM | DIASTOLIC BLOOD PRESSURE: 59 MMHG | WEIGHT: 243 LBS | BODY MASS INDEX: 30.21 KG/M2 | HEIGHT: 75 IN

## 2018-10-16 DIAGNOSIS — E11.51 TYPE 2 DIABETES MELLITUS WITH PERIPHERAL VASCULAR DISEASE: ICD-10-CM

## 2018-10-16 DIAGNOSIS — B35.1 ONYCHOMYCOSIS: ICD-10-CM

## 2018-10-16 PROCEDURE — 99499 UNLISTED E&M SERVICE: CPT | Mod: S$PBB,,, | Performed by: PODIATRIST

## 2018-10-16 PROCEDURE — 99999 PR PBB SHADOW E&M-EST. PATIENT-LVL III: CPT | Mod: PBBFAC,,, | Performed by: PODIATRIST

## 2018-10-16 PROCEDURE — 11721 DEBRIDE NAIL 6 OR MORE: CPT | Mod: PBBFAC,PN | Performed by: PODIATRIST

## 2018-10-16 PROCEDURE — 99213 OFFICE O/P EST LOW 20 MIN: CPT | Mod: PBBFAC,PN,25 | Performed by: PODIATRIST

## 2018-10-16 NOTE — PROCEDURES
"Routine Foot Care  Date/Time: 10/16/2018 11:51 AM  Performed by: Dimitry Gallegos DPM  Authorized by: Dimitry Gallegos DPM     Time out: Immediately prior to procedure a "time out" was called to verify the correct patient, procedure, equipment, support staff and site/side marked as required.    Consent Done?:  Yes (Verbal)  Hyperkeratotic Skin Lesions?: No      Nail Care Type:  Debride  Location(s): All  (Left 1st Toe, Left 3rd Toe, Left 2nd Toe, Left 4th Toe, Left 5th Toe, Right 1st Toe, Right 2nd Toe, Right 3rd Toe, Right 4th Toe and Right 5th Toe)  Patient tolerance:  Patient tolerated the procedure well with no immediate complications      "

## 2018-10-17 NOTE — PROGRESS NOTES
Subjective:      Patient ID: Domingo Castro is a 73 y.o. male.    Chief Complaint: Diabetes Mellitus (Dr. Griselda Nugent 8//20/18); Diabetic Foot Exam; and Routine Foot Care    Domingo is a 73 y.o. male who presents to the clinic for evaluation and treatment of high risk feet. Domingo has a past medical history of Cataract, Cystoid macular edema of both eyes, Diabetes mellitus, Diabetes mellitus type II, Hyperlipemia, Hypertension, Retinal hole, and Stage 4 chronic kidney disease. The patient's chief complaint is long, thick toenails. This patient has documented high risk feet requiring routine maintenance secondary to peripheral neuropathy.    PCP: Griselda Nugent MD    Date Last Seen by PCP: 8/20/18    Current shoe gear:  Affected Foot: Tennis shoes     Unaffected Foot: Tennis shoes    Hemoglobin A1C   Date Value Ref Range Status   08/09/2018 8.3 (H) 4.0 - 5.6 % Final     Comment:     ADA Screening Guidelines:  5.7-6.4%  Consistent with prediabetes  >or=6.5%  Consistent with diabetes  High levels of fetal hemoglobin interfere with the HbA1C  assay. Heterozygous hemoglobin variants (HbS, HgC, etc)do  not significantly interfere with this assay.   However, presence of multiple variants may affect accuracy.     05/10/2018 9.4 (H) 4.0 - 5.6 % Final     Comment:     According to ADA guidelines, hemoglobin A1c <7.0% represents  optimal control in non-pregnant diabetic patients. Different  metrics may apply to specific patient populations.   Standards of Medical Care in Diabetes-2016.  For the purpose of screening for the presence of diabetes:  <5.7%     Consistent with the absence of diabetes  5.7-6.4%  Consistent with increasing risk for diabetes   (prediabetes)  >or=6.5%  Consistent with diabetes  Currently, no consensus exists for use of hemoglobin A1c  for diagnosis of diabetes for children.  This Hemoglobin A1c assay has significant interference with fetal   hemoglobin   (HbF). The results are invalid for patients  "with abnormal amounts of   HbF,   including those with known Hereditary Persistence   of Fetal Hemoglobin. Heterozygous hemoglobin variants (HbAS, HbAC,   HbAD, HbAE, HbA2) do not significantly interfere with this assay;   however, presence of multiple variants in a sample may impact the %   interference.     06/13/2017 10.2 (H) 4.0 - 5.6 % Final     Comment:     According to ADA guidelines, hemoglobin A1c <7.0% represents  optimal control in non-pregnant diabetic patients. Different  metrics may apply to specific patient populations.   Standards of Medical Care in Diabetes-2016.  For the purpose of screening for the presence of diabetes:  <5.7%     Consistent with the absence of diabetes  5.7-6.4%  Consistent with increasing risk for diabetes   (prediabetes)  >or=6.5%  Consistent with diabetes  Currently, no consensus exists for use of hemoglobin A1c  for diagnosis of diabetes for children.  This Hemoglobin A1c assay has significant interference with fetal   hemoglobin   (HbF). The results are invalid for patients with abnormal amounts of   HbF,   including those with known Hereditary Persistence   of Fetal Hemoglobin. Heterozygous hemoglobin variants (HbAS, HbAC,   HbAD, HbAE, HbA2) do not significantly interfere with this assay;   however, presence of multiple variants in a sample may impact the %   interference.       Vitals:    10/16/18 1058   BP: (!) 116/59   Pulse: (!) 54   Weight: 110.2 kg (243 lb)   Height: 6' 3" (1.905 m)   PainSc: 0-No pain      Past Medical History:   Diagnosis Date    Cataract     Cystoid macular edema of both eyes     Diabetes mellitus     Diabetes mellitus type II     Hyperlipemia     Hypertension     Retinal hole     Stage 4 chronic kidney disease        Past Surgical History:   Procedure Laterality Date    BLEPHAROPLASTY Bilateral 8/30/2017    Per[facility] at [facility] OR [address] [place] [address]    CATARACT EXTRACTION W/  INTRAOCULAR LENS IMPLANT Left 12/15/14    Dr de la cruz    " CATARACT EXTRACTION W/  INTRAOCULAR LENS IMPLANT Right 14    dorothy    CIRCUMCISION, NON-      focal laser right eye      INSERTION-INTRAOCULAR LENS (IOL) Right 2014    [facility]    INSERTION-INTRAOCULAR LENS (IOL) Left 12/15/2014    [facility] at [facility]    KNEE SURGERY      left    Left medial collateral ligament repair      PHACOEMULSIFICATION-ASPIRATION-CATARACT Right 2014    [facility] at [facility]    PHACOEMULSIFICATION-ASPIRATION-CATARACT Left 12/15/2014    [facility] at [facility]    REPAIR-PTOSIS/BILATERAL EXTERNAL LEVATORS Bilateral 2017    [facility] OR [facility]    TONSILLECTOMY         Family History   Problem Relation Age of Onset    Heart disease Mother     Cancer Mother     Cancer Brother     Heart disease Father     Diabetes Father     Cancer Sister     Cataracts Paternal Grandmother     Glaucoma Paternal Grandmother     Blindness Neg Hx     Amblyopia Neg Hx     Hypertension Neg Hx     Macular degeneration Neg Hx     Retinal detachment Neg Hx     Strabismus Neg Hx        Social History     Socioeconomic History    Marital status:      Spouse name: None    Number of children: None    Years of education: None    Highest education level: None   Social Needs    Financial resource strain: None    Food insecurity - worry: None    Food insecurity - inability: None    Transportation needs - medical: None    Transportation needs - non-medical: None   Occupational History    None   Tobacco Use    Smoking status: Never Smoker    Smokeless tobacco: Never Used   Substance and Sexual Activity    Alcohol use: No     Alcohol/week: 0.0 oz    Drug use: No    Sexual activity: Yes     Partners: Female   Other Topics Concern    None   Social History Narrative    None       Current Outpatient Medications   Medication Sig Dispense  "Refill    acetaminophen (TYLENOL) 325 MG tablet Take 2 tablets (650 mg total) by mouth every 4 (four) hours as needed.  0    amLODIPine (NORVASC) 10 MG tablet Take 1 tablet (10 mg total) by mouth once daily. 30 tablet 0    aspirin (ECOTRIN) 81 MG EC tablet Take 1 tablet (81 mg total) by mouth once daily.  0    carvedilol (COREG) 25 MG tablet Take 1 tablet (25 mg total) by mouth 2 (two) times daily with meals. 60 tablet 0    furosemide (LASIX) 40 MG tablet Take 1 tablet (40 mg total) by mouth 2 (two) times daily. Do not take the pm dose after 3 pm 180 tablet 0    gabapentin (NEURONTIN) 100 MG capsule Take 100 mg by mouth 3 (three) times daily with meals. . Then 3 capsules by mouth at bedtime      hydrALAZINE (APRESOLINE) 50 MG tablet TAKE ONE TABLET BY MOUTH EVERY 12 HOURS 180 tablet 3    insulin lispro (HUMALOG KWIKPEN INSULIN) 100 unit/mL InPn pen INJECT 10 UNITS SUBCUTANEOUSLY THREE TIMES DAILY BEFORE MEALS WITH CORRECTION SCALE, MAX TDD 50 UNITS 9 Box 6    LEVEMIR FLEXTOUCH U-100 INSULN 100 unit/mL (3 mL) InPn pen Inject 26 Units into the skin every evening. 36 mL 3    pen needle, diabetic, safety (BD AUTOSHIELD PEN NEEDLE) 29 gauge x 5/16" Ndle For use once daily with levemir flexpen 90 each 11    rosuvastatin (CRESTOR) 20 MG tablet Take 20 mg by mouth once daily.        No current facility-administered medications for this visit.        Review of patient's allergies indicates:   Allergen Reactions    Atorvastatin Other (See Comments)       Review of Systems   Constitution: Negative for chills, fever, weakness and malaise/fatigue.   HENT: Negative for congestion.    Cardiovascular: Negative for chest pain, claudication and leg swelling.   Respiratory: Negative for cough and shortness of breath.    Skin: Positive for dry skin and nail changes.   Musculoskeletal: Positive for back pain. Negative for joint pain, muscle cramps and muscle weakness.   Gastrointestinal: Negative for nausea and vomiting. "   Neurological: Positive for numbness and paresthesias.   Psychiatric/Behavioral: Negative for altered mental status.           Objective:      Physical Exam   Constitutional: He is oriented to person, place, and time. No distress.   Cardiovascular:   Pulses:       Dorsalis pedis pulses are 1+ on the right side, and 1+ on the left side.        Posterior tibial pulses are 1+ on the right side, and 1+ on the left side.   CFT< 3 secs all toes bilateral foot, skin temp warm to cool proximal to distal bilateral foot, no hair growth bilateral lower extremity, no lower extremity edema bilateral.       Musculoskeletal:        Right foot: There is decreased range of motion and deformity.        Left foot: There is decreased range of motion and deformity.   No pain with ROM or MMT bilateral lower extremity.    Elongated second toe bilateral. Non-reducible hammertoe second toe left foot. Flexion contractures toes 2-5 bilateral foot.    + equinus that reduces with knee bent bilateral.    No pain with ROM or MMT bilateral lower extremity.     Feet:   Right Foot:   Protective Sensation: 10 sites tested. 3 sites sensed.   Skin Integrity: Positive for dry skin. Negative for ulcer, blister, skin breakdown, erythema, warmth or callus.   Left Foot:   Protective Sensation: 10 sites tested. 4 sites sensed.   Skin Integrity: Positive for skin breakdown and dry skin. Negative for ulcer, blister, erythema, warmth or callus.   Neurological: He is alert and oriented to person, place, and time. He has normal strength. A sensory deficit is present.   Vibratory sensation absent bilateral foot.   Skin: Skin is dry and intact. Capillary refill takes 2 to 3 seconds. Lesion and rash noted. No ecchymosis noted. He is not diaphoretic. No cyanosis or erythema. Nails show no clubbing.   Distal tip of left second toe with pre-ulcerative lesion noting mild dark red discoloration.    Nails 1-5 bilateral are elongated 3-4 mm, thickened 1-2 mm, hard with  minor loosening and debris.    No open lesions or macerations bilateral lower extremity.               Assessment:       Encounter Diagnoses   Name Primary?    Uncontrolled type 2 diabetes mellitus with peripheral neuropathy Yes    Type 2 diabetes mellitus with peripheral vascular disease     Onychomycosis          Plan:       Domingo was seen today for diabetes mellitus, diabetic foot exam and routine foot care.    Diagnoses and all orders for this visit:    Uncontrolled type 2 diabetes mellitus with peripheral neuropathy  -     Routine Foot Care    Type 2 diabetes mellitus with peripheral vascular disease  -     Routine Foot Care    Onychomycosis  -     Routine Foot Care      I counseled the patient on his conditions, their implications and medical management.    Shoe inspection. Diabetic Foot Education. Patient reminded of the importance of good nutrition and blood sugar control to help prevent podiatric complications of diabetes. Patient instructed on proper foot hygeine. We discussed wearing proper shoe gear, daily foot inspections, never walking without protective shoe gear, never putting sharp instruments to feet, routine podiatric nail visits every 2-3 months.      Routine foot care per attached note. Patient relates relief following the procedure. He will continue to monitor the areas daily, inspect his feet, wear protective shoe gear when ambulatory, moisturizer to maintain skin integrity and follow in this office in approximately 2-3 months, sooner p.r.n.    Refused diabetic shoes today. Discussed importance of good shoe gear to protect his foot. Clinically pre-ulcerative lesion distal left second toe.    RTC 3 months or prn.

## 2018-10-22 RX ORDER — GABAPENTIN 100 MG/1
CAPSULE ORAL
Qty: 450 CAPSULE | Refills: 3 | Status: SHIPPED | OUTPATIENT
Start: 2018-10-22 | End: 2019-11-04 | Stop reason: SDUPTHER

## 2018-11-01 ENCOUNTER — OFFICE VISIT (OUTPATIENT)
Dept: NEPHROLOGY | Facility: CLINIC | Age: 73
End: 2018-11-01
Payer: MEDICARE

## 2018-11-01 ENCOUNTER — LAB VISIT (OUTPATIENT)
Dept: LAB | Facility: HOSPITAL | Age: 73
End: 2018-11-01
Attending: INTERNAL MEDICINE
Payer: MEDICARE

## 2018-11-01 VITALS
HEIGHT: 75 IN | SYSTOLIC BLOOD PRESSURE: 130 MMHG | OXYGEN SATURATION: 98 % | WEIGHT: 247 LBS | DIASTOLIC BLOOD PRESSURE: 70 MMHG | BODY MASS INDEX: 30.71 KG/M2 | HEART RATE: 56 BPM

## 2018-11-01 DIAGNOSIS — Z79.4 TYPE 2 DIABETES MELLITUS WITH STAGE 4 CHRONIC KIDNEY DISEASE, WITH LONG-TERM CURRENT USE OF INSULIN: Primary | ICD-10-CM

## 2018-11-01 DIAGNOSIS — N18.4 ANEMIA OF CHRONIC RENAL FAILURE, STAGE 4 (SEVERE): ICD-10-CM

## 2018-11-01 DIAGNOSIS — E78.5 DYSLIPIDEMIA: ICD-10-CM

## 2018-11-01 DIAGNOSIS — E11.22 TYPE 2 DIABETES MELLITUS WITH STAGE 4 CHRONIC KIDNEY DISEASE, WITH LONG-TERM CURRENT USE OF INSULIN: ICD-10-CM

## 2018-11-01 DIAGNOSIS — E11.22 TYPE 2 DIABETES MELLITUS WITH STAGE 4 CHRONIC KIDNEY DISEASE, WITH LONG-TERM CURRENT USE OF INSULIN: Primary | ICD-10-CM

## 2018-11-01 DIAGNOSIS — R60.0 PERIPHERAL EDEMA: ICD-10-CM

## 2018-11-01 DIAGNOSIS — N18.4 TYPE 2 DIABETES MELLITUS WITH STAGE 4 CHRONIC KIDNEY DISEASE, WITH LONG-TERM CURRENT USE OF INSULIN: ICD-10-CM

## 2018-11-01 DIAGNOSIS — N18.4 TYPE 2 DIABETES MELLITUS WITH STAGE 4 CHRONIC KIDNEY DISEASE, WITH LONG-TERM CURRENT USE OF INSULIN: Primary | ICD-10-CM

## 2018-11-01 DIAGNOSIS — M89.8X9 METABOLIC BONE DISEASE: ICD-10-CM

## 2018-11-01 DIAGNOSIS — I10 ESSENTIAL HYPERTENSION: ICD-10-CM

## 2018-11-01 DIAGNOSIS — R06.09 DYSPNEA ON EXERTION: ICD-10-CM

## 2018-11-01 DIAGNOSIS — Z79.4 TYPE 2 DIABETES MELLITUS WITH STAGE 4 CHRONIC KIDNEY DISEASE, WITH LONG-TERM CURRENT USE OF INSULIN: ICD-10-CM

## 2018-11-01 DIAGNOSIS — D63.1 ANEMIA OF CHRONIC RENAL FAILURE, STAGE 4 (SEVERE): ICD-10-CM

## 2018-11-01 LAB
ALBUMIN SERPL BCP-MCNC: 3.5 G/DL
ANION GAP SERPL CALC-SCNC: 6 MMOL/L
BUN SERPL-MCNC: 38 MG/DL
CALCIUM SERPL-MCNC: 9.9 MG/DL
CHLORIDE SERPL-SCNC: 107 MMOL/L
CO2 SERPL-SCNC: 29 MMOL/L
CREAT SERPL-MCNC: 2.3 MG/DL
EST. GFR  (AFRICAN AMERICAN): 31.4 ML/MIN/1.73 M^2
EST. GFR  (NON AFRICAN AMERICAN): 27.2 ML/MIN/1.73 M^2
GLUCOSE SERPL-MCNC: 59 MG/DL
PHOSPHATE SERPL-MCNC: 3.2 MG/DL
POTASSIUM SERPL-SCNC: 4.4 MMOL/L
SODIUM SERPL-SCNC: 142 MMOL/L

## 2018-11-01 PROCEDURE — 80069 RENAL FUNCTION PANEL: CPT

## 2018-11-01 PROCEDURE — 99999 PR PBB SHADOW E&M-EST. PATIENT-LVL III: CPT | Mod: PBBFAC,,, | Performed by: INTERNAL MEDICINE

## 2018-11-01 PROCEDURE — 99213 OFFICE O/P EST LOW 20 MIN: CPT | Mod: PBBFAC | Performed by: INTERNAL MEDICINE

## 2018-11-01 PROCEDURE — 99213 OFFICE O/P EST LOW 20 MIN: CPT | Mod: S$PBB,,, | Performed by: INTERNAL MEDICINE

## 2018-11-01 PROCEDURE — 36415 COLL VENOUS BLD VENIPUNCTURE: CPT

## 2018-11-01 NOTE — PROGRESS NOTES
Subjective:       Patient ID: Domingo Castro is a 73 y.o. Black or  male who presents for new evaluation of renal function  Patient was seen once by Dr. See in 2103 for CKD.  Interval Hx:   11/1/2018   in house 7/2108 for:  who presented with hypervolemic in acute on chronic diastolic heart failure and acute kidney injury.  Patient treated with intravenous furosemide which was titrated to achieve negative fluid balance.  Both volume status and kidney function improving with diuresis.  Patient also continued to receive antibiotics which was started as an outpatient to treatment epididymitis which continued to improve with treatment.  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 Serum  crt 2013 1.7-1.8 and now 2.3, no recent urine studies, no imaging studies available, electrolytes wnl, no acidosis  He is on RAAS inhibition   He ws recently place don doxycycline for a skin infection.  He does have intermittent peripheral edema, the only diuretic that he is on currently is HCTZ, He did not take carvedilol today, he has to pickup at pharmacy.  He has had recent worsenign SOB and exertional dyspnea and increasing LE edema      Review of Systems   Constitutional: Negative for activity change, appetite change, chills, diaphoresis, fatigue, fever and unexpected weight change.   HENT: Negative for congestion, ear discharge, ear pain, facial swelling, hearing loss, nosebleeds, sinus pressure, sore throat and trouble swallowing.   "  Eyes: Negative for photophobia, pain, discharge, redness, itching and visual disturbance.   Respiratory: Positive for shortness of breath. Negative for apnea, cough, chest tightness and wheezing.         Recent sob going up steps   Cardiovascular: Positive for leg swelling. Negative for chest pain and palpitations.        Intermittent   Gastrointestinal: Negative for abdominal distention, abdominal pain, constipation, diarrhea and vomiting.   Endocrine: Negative for cold intolerance, heat intolerance, polydipsia and polyuria.   Genitourinary: Negative for decreased urine volume, difficulty urinating, dysuria, flank pain, frequency, hematuria, scrotal swelling, testicular pain and urgency.        Incontinence and urgency   Musculoskeletal: Negative for arthralgias, back pain, gait problem, joint swelling, myalgias, neck pain and neck stiffness.   Skin: Negative for color change, pallor, rash and wound.   Allergic/Immunologic: Negative for environmental allergies and food allergies.   Neurological: Negative for dizziness, tremors, seizures, syncope, facial asymmetry, speech difficulty, weakness, light-headedness, numbness and headaches.        L leg heaviness and weakness   Hematological: Negative for adenopathy. Does not bruise/bleed easily.   Psychiatric/Behavioral: Negative for agitation, behavioral problems, dysphoric mood and sleep disturbance. The patient is not nervous/anxious.        Objective:     Blood pressure 130/70, pulse (!) 56, height 6' 3" (1.905 m), weight 112 kg (247 lb), SpO2 98 %.      Physical Exam   Constitutional: He is oriented to person, place, and time. He appears well-developed and well-nourished. No distress.   Periorbital edema  NAD    HENT:   Head: Normocephalic and atraumatic.   Mouth/Throat: Oropharynx is clear and moist. No oropharyngeal exudate.   Eyes: Conjunctivae and EOM are normal. Pupils are equal, round, and reactive to light. Right eye exhibits no discharge. Left eye exhibits " no discharge. No scleral icterus.   Neck: Normal range of motion. Neck supple. No JVD present. No tracheal deviation present. No thyromegaly present.   Cardiovascular: Normal rate, regular rhythm and normal heart sounds. Exam reveals no gallop and no friction rub.   No murmur heard.  + 4 LE edema. bilateral   Pulmonary/Chest: Effort normal and breath sounds normal. No stridor. No respiratory distress. He has no wheezes. He has no rales. He exhibits no tenderness.   No crackles or wheeze   Abdominal: Soft. Bowel sounds are normal. He exhibits no distension and no mass. There is no tenderness. There is no rebound and no guarding. No hernia.   Musculoskeletal: Normal range of motion. He exhibits no edema or tenderness.   Lymphadenopathy:     He has no cervical adenopathy.   Neurological: He is alert and oriented to person, place, and time. No cranial nerve deficit. He exhibits normal muscle tone. Coordination normal.   Skin: Skin is warm and dry. No rash noted. He is not diaphoretic. No erythema. No pallor.   No open wounds,discharge or erythema   Psychiatric: He has a normal mood and affect. His behavior is normal. Judgment and thought content normal.   Nursing note and vitals reviewed.      Assessment:       1. Type 2 diabetes mellitus with stage 4 chronic kidney disease, with long-term current use of insulin    2. Anemia of chronic renal failure, stage 4 (severe)    3. Dyslipidemia    4. Essential hypertension    5. Peripheral edema    6. Dyspnea on exertion    7. Metabolic bone disease        Plan:       74 yo AAM with longstanding poorly controlled HTM, T2IDDM, HPL, with macroalbuminuria in the past and nonproliferative DM retinopathy, now with progression of CKD 3 to CKD 4   related to DM nephropathy and nephrosclerosis, arteriolarsclerosis and must consider that there may be an acute component from infection, and ischemic nephropathy secondary to RAFAEL. Will evaluate for obstruction as well.    HTN:  With  worsening peripheral edema, and wt gain increase lasix 40 bid   off lisinopril, on  hydralazine, HCTZ, amlodipine and coreg it is possible that RAFAEL is contributing to poorly controlled HTN, will request Renal US and eval RAFAEL.  Unless there is an acute change in gfr/ Bp the likelihood of improving renal function or bp with intervention would be very low however.  consider 2 d echo to evalute for end organ damage    Proteinuria: check UA,  UPRCT and free light chains spep/ipep    Dyspnea on exertion and worsening  Peripheral edema  :  Echo and would consider cardiology evaluation, add lasix 20 mg daily and follow rfp, mg     Leg weakness and cramping;  consider evaluation for PVD  Metabolic bone disease: check pth and vit d     electrolyte abnormality: none    metabolic acidosis: none    Anemia  Check cbc and irons suggest egd and colonoscopy as he has never had these it seems    Plan: labs and imaging as above   renal US   rtc 3 mos   rfp in 1 mo

## 2018-11-02 RX ORDER — ROSUVASTATIN CALCIUM 20 MG/1
TABLET, COATED ORAL
Qty: 90 TABLET | Refills: 3 | Status: SHIPPED | OUTPATIENT
Start: 2018-11-02 | End: 2018-11-28 | Stop reason: SDUPTHER

## 2018-11-08 RX ORDER — AMLODIPINE BESYLATE 10 MG/1
10 TABLET ORAL DAILY
Qty: 90 TABLET | Refills: 3 | Status: SHIPPED | OUTPATIENT
Start: 2018-11-08 | End: 2019-12-09 | Stop reason: SDUPTHER

## 2018-11-28 ENCOUNTER — OFFICE VISIT (OUTPATIENT)
Dept: INTERNAL MEDICINE | Facility: CLINIC | Age: 73
End: 2018-11-28
Payer: MEDICARE

## 2018-11-28 ENCOUNTER — LAB VISIT (OUTPATIENT)
Dept: LAB | Facility: HOSPITAL | Age: 73
End: 2018-11-28
Attending: INTERNAL MEDICINE
Payer: MEDICARE

## 2018-11-28 VITALS
OXYGEN SATURATION: 97 % | DIASTOLIC BLOOD PRESSURE: 68 MMHG | BODY MASS INDEX: 29.93 KG/M2 | RESPIRATION RATE: 16 BRPM | WEIGHT: 239.44 LBS | HEART RATE: 57 BPM | TEMPERATURE: 98 F | SYSTOLIC BLOOD PRESSURE: 124 MMHG

## 2018-11-28 DIAGNOSIS — N18.4 CKD (CHRONIC KIDNEY DISEASE) STAGE 4, GFR 15-29 ML/MIN: ICD-10-CM

## 2018-11-28 DIAGNOSIS — R06.09 DOE (DYSPNEA ON EXERTION): ICD-10-CM

## 2018-11-28 DIAGNOSIS — I10 HTN (HYPERTENSION), BENIGN: Primary | ICD-10-CM

## 2018-11-28 DIAGNOSIS — I10 HTN (HYPERTENSION), BENIGN: ICD-10-CM

## 2018-11-28 LAB
ANION GAP SERPL CALC-SCNC: 9 MMOL/L
BUN SERPL-MCNC: 55 MG/DL
CALCIUM SERPL-MCNC: 9.8 MG/DL
CHLORIDE SERPL-SCNC: 106 MMOL/L
CO2 SERPL-SCNC: 26 MMOL/L
CREAT SERPL-MCNC: 2.7 MG/DL
EST. GFR  (AFRICAN AMERICAN): 25.9 ML/MIN/1.73 M^2
EST. GFR  (NON AFRICAN AMERICAN): 22.4 ML/MIN/1.73 M^2
ESTIMATED AVG GLUCOSE: 163 MG/DL
GLUCOSE SERPL-MCNC: 151 MG/DL
HBA1C MFR BLD HPLC: 7.3 %
POTASSIUM SERPL-SCNC: 4.1 MMOL/L
SODIUM SERPL-SCNC: 141 MMOL/L

## 2018-11-28 PROCEDURE — 99214 OFFICE O/P EST MOD 30 MIN: CPT | Mod: S$PBB,,, | Performed by: INTERNAL MEDICINE

## 2018-11-28 PROCEDURE — 99999 PR PBB SHADOW E&M-EST. PATIENT-LVL III: CPT | Mod: PBBFAC,,, | Performed by: INTERNAL MEDICINE

## 2018-11-28 PROCEDURE — 36415 COLL VENOUS BLD VENIPUNCTURE: CPT | Mod: PO

## 2018-11-28 PROCEDURE — 80048 BASIC METABOLIC PNL TOTAL CA: CPT

## 2018-11-28 PROCEDURE — 99213 OFFICE O/P EST LOW 20 MIN: CPT | Mod: PBBFAC,PO | Performed by: INTERNAL MEDICINE

## 2018-11-28 PROCEDURE — 83036 HEMOGLOBIN GLYCOSYLATED A1C: CPT

## 2018-11-28 NOTE — PROGRESS NOTES
CC: followup of hypertension and diabetes  HPI:  The patient is a 73 y.o. year old male who presents to the office for followup of hypertension and diabetes.  The patient denies any chest pain, headache, excessive fatigue, nausea or vomiting, but complains of shortness of breath with activity and floaters.  He states his blood sugars have been good.   The patient reports periodic lower extremity swelling.    PAST MEDICAL HISTORY:  Past Medical History:   Diagnosis Date    Cataract     Cystoid macular edema of both eyes     Diabetes mellitus     Diabetes mellitus type II     Hyperlipemia     Hypertension     Retinal hole     Stage 4 chronic kidney disease        SURGICAL HISTORY:  Past Surgical History:   Procedure Laterality Date    BLEPHAROPLASTY Bilateral 2017    [facility]    CATARACT EXTRACTION W/  INTRAOCULAR LENS IMPLANT Left 12/15/14    Dr martin    CATARACT EXTRACTION W/  INTRAOCULAR LENS IMPLANT Right 14    dorothy    CIRCUMCISION, NON-      focal laser right eye      INSERTION-INTRAOCULAR LENS (IOL) Right 2014    [facility] OR    INSERTION-INTRAOCULAR LENS (IOL) Left 12/15/2014    [facility] at [facility] OR    KNEE SURGERY      left    Left medial collateral ligament repair      PHACOEMULSIFICATION-ASPIRATION-CATARACT Right 2014    [facility] at [facility]    PHACOEMULSIFICATION-ASPIRATION-CATARACT Left 12/15/2014    [facility] at [facility]    REPAIR-PTOSIS/BILATERAL EXTERNAL LEVATORS Bilateral 2017    [facility] OR [facility]    TONSILLECTOMY         MEDS:  Medcard reviewed and updated    ALLERGIES: Allergy Card reviewed and updated    SOCIAL HISTORY:   The patient is a nonsmoker.    PE:   APPEARANCE: Well nourished, well developed, in no acute distress. CHEST: Lungs clear to auscultation with unlabored  respirations.  CARDIOVASCULAR: Normal S1, S2. No murmurs. No carotid bruits. No pedal edema.  ABDOMEN: Bowel sounds normal. Not distended. Soft. No tenderness or masses.   SKIN: Onychomycosis.  PSYCHIATRIC: The patient is oriented to person, place, and time and has a pleasant affect.        ASSESSMENT/PLAN:  Domingo was seen today for follow-up.    Diagnoses and all orders for this visit:    HTN (hypertension), benign  -     Basic metabolic panel; Future  -     blood pressure is controlled    Type 2 diabetes, uncontrolled, with neuropathy  -     Basic metabolic panel; Future  -     Hemoglobin A1c; Future    CKD (chronic kidney disease) stage 4, GFR 15-29 ml/min  -     Basic metabolic panel; Future    ABAD (dyspnea on exertion)  -     Ambulatory Referral to Cardiology  -     Transthoracic echo (TTE) complete (Cupid Only); Future

## 2018-11-30 ENCOUNTER — HOSPITAL ENCOUNTER (OUTPATIENT)
Dept: CARDIOLOGY | Facility: HOSPITAL | Age: 73
Discharge: HOME | End: 2018-11-30
Attending: INTERNAL MEDICINE
Payer: MEDICARE

## 2018-11-30 DIAGNOSIS — R06.09 DOE (DYSPNEA ON EXERTION): ICD-10-CM

## 2018-11-30 LAB
AORTIC VALVE CUSP SEPERATION: 2 CM
AV INDEX (PROSTH): 0.71
AV PEAK GRADIENT: 6.76 MMHG
AV VALVE AREA: 3.22 CM2
CV ECHO LV RWT: 0.68 CM
DOP CALC AO PEAK VEL: 1.3 M/S
DOP CALC AO VTI: 29.5 CM
DOP CALC LVOT AREA: 4.52 CM2
DOP CALC LVOT DIAMETER: 2.4 CM
DOP CALC LVOT STROKE VOLUME: 94.95 CM3
DOP CALC MV VTI: 32 CM
DOP CALCLVOT PEAK VEL VTI: 21 CM
E WAVE DECELERATION TIME: 195 MSEC
E/A RATIO: 1.34
E/E' RATIO: 15.85
ECHO LV POSTERIOR WALL: 1.5 CM (ref 0.6–1.1)
FRACTIONAL SHORTENING: 32 % (ref 28–44)
INTERVENTRICULAR SEPTUM: 1.5 CM (ref 0.6–1.1)
IVRT: 73 MSEC
LA MAJOR: 5.1 CM
LA MINOR: 5.4 CM
LA WIDTH: 4 CM
LEFT ATRIUM SIZE: 4.1 CM
LEFT ATRIUM VOLUME: 73.13 CM3
LEFT INTERNAL DIMENSION IN SYSTOLE: 3 CM (ref 2.1–4)
LEFT VENTRICULAR INTERNAL DIMENSION IN DIASTOLE: 4.4 CM (ref 3.5–6)
LEFT VENTRICULAR MASS: 266.87 G
LV LATERAL E/E' RATIO: 14.71
LV SEPTAL E/E' RATIO: 17.17
MV A" WAVE DURATION": 152 MSEC
MV PEAK A VEL: 0.77 M/S
MV PEAK E VEL: 1.03 M/S
MV STENOSIS PRESSURE HALF TIME: 57 MS
MV VALVE AREA BY CONTINUITY EQUATION: 2.97 CM2
MV VALVE AREA P 1/2 METHOD: 3.86 CM2
PULM VEIN A" WAVE DURATION": 152 MSEC
RA MAJOR: 4.1 CM
RA PRESSURE: 3 MMHG
RA WIDTH: 3.6 CM
RIGHT VENTRICULAR END-DIASTOLIC DIMENSION: 2.1 CM
TDI LATERAL: 0.07
TDI SEPTAL: 0.06
TDI: 0.07

## 2018-11-30 PROCEDURE — 93306 TTE W/DOPPLER COMPLETE: CPT | Mod: 26,,, | Performed by: INTERNAL MEDICINE

## 2018-11-30 PROCEDURE — 93306 TTE W/DOPPLER COMPLETE: CPT | Mod: PO

## 2018-12-03 ENCOUNTER — OFFICE VISIT (OUTPATIENT)
Dept: CARDIOLOGY | Facility: CLINIC | Age: 73
End: 2018-12-03
Payer: MEDICARE

## 2018-12-03 VITALS
SYSTOLIC BLOOD PRESSURE: 164 MMHG | OXYGEN SATURATION: 98 % | DIASTOLIC BLOOD PRESSURE: 71 MMHG | HEART RATE: 61 BPM | BODY MASS INDEX: 29.24 KG/M2 | HEIGHT: 75 IN | WEIGHT: 235.19 LBS

## 2018-12-03 DIAGNOSIS — I50.32 CHRONIC DIASTOLIC CONGESTIVE HEART FAILURE: ICD-10-CM

## 2018-12-03 DIAGNOSIS — I20.89 ANGINAL EQUIVALENT: ICD-10-CM

## 2018-12-03 DIAGNOSIS — E78.5 DYSLIPIDEMIA: ICD-10-CM

## 2018-12-03 DIAGNOSIS — R06.09 DOE (DYSPNEA ON EXERTION): Primary | ICD-10-CM

## 2018-12-03 DIAGNOSIS — I10 ESSENTIAL HYPERTENSION: ICD-10-CM

## 2018-12-03 DIAGNOSIS — R06.09 DYSPNEA ON EXERTION: ICD-10-CM

## 2018-12-03 PROCEDURE — 99999 PR PBB SHADOW E&M-EST. PATIENT-LVL III: CPT | Mod: PBBFAC,,, | Performed by: INTERNAL MEDICINE

## 2018-12-03 PROCEDURE — 99213 OFFICE O/P EST LOW 20 MIN: CPT | Mod: PBBFAC,PO | Performed by: INTERNAL MEDICINE

## 2018-12-03 PROCEDURE — 99204 OFFICE O/P NEW MOD 45 MIN: CPT | Mod: S$PBB,,, | Performed by: INTERNAL MEDICINE

## 2018-12-03 NOTE — PROGRESS NOTES
Subjective:   Patient ID:  Domingo Castro is a 73 y.o. male who presents for evaluation of Consult and Dizziness (on exertion)      HPI: 72 y/o AA male with PMH of DM2, HTN and HLD present to establish care secondary to ABAD that started in July after hospitalization for diastolic CHF. He had associated Acute on chronic renal injury and was treated with IV diuresis and did well. HE denies chest pain. Echo last week showed normal ef. No history of MI or CVA. No fever/chills. BP is elevated. He said he has not taken BP medications today even thou he claim to be compliant. No orthopnea or PND. He is on lasix and has good UOP.   As per patient he use to be very active now he is unable to do what he wants to do for as frequently as he wants and for as long as he wants to.     Past Medical History:   Diagnosis Date    Cataract     Cystoid macular edema of both eyes     Diabetes mellitus     Diabetes mellitus type II     Hyperlipemia     Hypertension     Retinal hole     Stage 4 chronic kidney disease        Past Surgical History:   Procedure Laterality Date    BLEPHAROPLASTY Bilateral 2017    [facility] 1ST F[facility]    CATARACT EXTRACTION W/  INTRAOCULAR LENS IMPLANT Left 12/15/14    Dr martin    CATARACT EXTRACTION W/  INTRAOCULAR LENS IMPLANT Right 14    dorothy    CIRCUMCISION, NON-      focal laser right eye      INSERTION-INTRAOCULAR LENS (IOL) Right 2014    Performed by [facility] Martin [facility] at [facility] OR    INSERTION-INTRAOCULAR LENS (IOL) Left 12/15/2014    Performed by [facility] Martin [facility] at [facility] OR    KNEE SURGERY      left    Left medial collateral ligament repair      PHACOEMULSIFICATION-ASPIRATION-CATARACT Right 2014    Performed by [facility] Martin [facility] at [facility] OR    PHACOEMULSIFICATION-ASPIRATION-CATARACT Left 12/15/2014    Performed by Jaron Martin MD at [facility] OR    REPAIR-PTOSIS/BILATERAL EXTERNAL LEVATORS Bilateral 2017    Performed by Jane  LIZETH Moreno MD at [facility] OR [address] [place] [address]    TONSILLECTOMY         Social History     Tobacco Use    Smoking status: Never Smoker    Smokeless tobacco: Never Used   Substance Use Topics    Alcohol use: No     Alcohol/week: 0.0 oz    Drug use: No       Family History   Problem Relation Age of Onset    Heart disease Mother     Cancer Mother     Cancer Brother     Heart disease Father     Diabetes Father     Cancer Sister     Cataracts Paternal Grandmother     Glaucoma Paternal Grandmother     Blindness Neg Hx     Amblyopia Neg Hx     Hypertension Neg Hx     Macular degeneration Neg Hx     Retinal detachment Neg Hx     Strabismus Neg Hx           Medication List           Accurate as of 12/3/18  1:43 PM. If you have any questions, ask your nurse or doctor.               CONTINUE taking these medications    acetaminophen 325 MG tablet  Commonly known as:  TYLENOL  Take 2 tablets (650 mg total) by mouth every 4 (four) hours as needed.     amLODIPine 10 MG tablet  Commonly known as:  NORVASC  Take 1 tablet (10 mg total) by mouth once daily.     aspirin 81 MG EC tablet  Commonly known as:  ECOTRIN  Take 1 tablet (81 mg total) by mouth once daily.     carvedilol 25 MG tablet  Commonly known as:  COREG  Take 1 tablet (25 mg total) by mouth 2 (two) times daily with meals.     furosemide 40 MG tablet  Commonly known as:  LASIX  Take 1 tablet (40 mg total) by mouth 2 (two) times daily. Do not take the pm dose after 3 pm     gabapentin 100 MG capsule  Commonly known as:  NEURONTIN  TAKE ONE CAPSULE BY MOUTH IN THE MORNING THEN ONE CAPSULE  IN THE EVENING AND THEN THREE CAPSULES AT BEDTIME     hydrALAZINE 50 MG tablet  Commonly known as:  APRESOLINE  TAKE ONE TABLET BY MOUTH EVERY 12 HOURS     insulin lispro 100 unit/mL Inpn pen  Commonly known as:  HumaLOG KwikPen Insulin  INJECT 10 UNITS SUBCUTANEOUSLY THREE TIMES DAILY BEFORE MEALS WITH CORRECTION SCALE, MAX TDD 50 UNITS     LEVEMIR FLEXTOUCH U-100 INSULN 100 unit/mL  "(3 mL) Inpn pen  Generic drug:  insulin detemir U-100  Inject 26 Units into the skin every evening.     pen needle, diabetic, safety 29 gauge x 5/16" Ndle  Commonly known as:  BD AUTOSHIELD PEN NEEDLE  For use once daily with levemir flexpen     rosuvastatin 20 MG tablet  Commonly known as:  CRESTOR             Review of patient's allergies indicates:   Allergen Reactions    Atorvastatin Other (See Comments)       Review of Systems   Constitution: Negative.   HENT: Negative.    Eyes: Negative.    Cardiovascular: Positive for dyspnea on exertion.   Respiratory: Negative.    Endocrine: Negative.    Hematologic/Lymphatic: Negative.    Skin: Negative.    Musculoskeletal: Negative.    Gastrointestinal: Negative.    Neurological: Negative.    Psychiatric/Behavioral: Negative.    Allergic/Immunologic: Negative.      Objective:   Physical Exam   Constitutional: He is oriented to person, place, and time. He appears well-developed and well-nourished. No distress.   Examination of the digits showed no clubbing or cyanosis   HENT:   Head: Normocephalic and atraumatic.   Eyes: Conjunctivae are normal. Pupils are equal, round, and reactive to light. Right eye exhibits no discharge.   Neck: Normal range of motion. Neck supple. No JVD present. No thyromegaly present.   No carotid bruits   Cardiovascular: Normal rate, regular rhythm, S1 normal, S2 normal, normal heart sounds, intact distal pulses and normal pulses. PMI is not displaced. Exam reveals no gallop, no friction rub and no opening snap.   No murmur heard.  Pulmonary/Chest: Effort normal and breath sounds normal. No respiratory distress. He has no wheezes. He has no rales. He exhibits no tenderness.   Abdominal: Soft. Bowel sounds are normal. He exhibits no distension and no mass. There is no tenderness. There is no guarding.   No hepatosplenomegaly   Musculoskeletal: Normal range of motion. He exhibits edema. He exhibits no tenderness.   Lymphadenopathy:     He has no " cervical adenopathy.   Neurological: He is alert and oriented to person, place, and time.   Skin: Skin is warm. No rash noted. He is not diaphoretic. No erythema.   Psychiatric: He has a normal mood and affect.   Nursing note and vitals reviewed.      Lab Results   Component Value Date    WBC 6.83 08/24/2018    HGB 10.6 (L) 08/24/2018    HCT 33.7 (L) 08/24/2018    MCV 89 08/24/2018     (L) 08/24/2018         Chemistry        Component Value Date/Time     11/28/2018 1033    K 4.1 11/28/2018 1033     11/28/2018 1033    CO2 26 11/28/2018 1033    BUN 55 (H) 11/28/2018 1033    CREATININE 2.7 (H) 11/28/2018 1033     (H) 11/28/2018 1033        Component Value Date/Time    CALCIUM 9.8 11/28/2018 1033    ALKPHOS 74 07/27/2018 2044    AST 22 07/27/2018 2044    ALT 29 07/27/2018 2044    BILITOT 0.3 07/27/2018 2044    ESTGFRAFRICA 25.9 (A) 11/28/2018 1033    EGFRNONAA 22.4 (A) 11/28/2018 1033            Lab Results   Component Value Date    CHOL 126 07/20/2018    CHOL 143 06/13/2017    CHOL 154 08/18/2016     Lab Results   Component Value Date    HDL 31 (L) 07/20/2018    HDL 42 06/13/2017    HDL 39 (L) 08/18/2016     Lab Results   Component Value Date    LDLCALC 76.8 07/20/2018    LDLCALC 81.6 06/13/2017    LDLCALC 92.6 08/18/2016     Lab Results   Component Value Date    TRIG 91 07/20/2018    TRIG 97 06/13/2017    TRIG 112 08/18/2016     Lab Results   Component Value Date    CHOLHDL 24.6 07/20/2018    CHOLHDL 29.4 06/13/2017    CHOLHDL 25.3 08/18/2016       Lab Results   Component Value Date    TSH 1.589 06/13/2017       Lab Results   Component Value Date    HGBA1C 7.3 (H) 11/28/2018       ECGs reviewed-NSR with non specific St changes  LABS reviewed  Imaging including Echoes reviewed- ef 55% with DD    Assessment:     1. ABAD (dyspnea on exertion)    2. Essential hypertension    3. Dyslipidemia    4. Chronic diastolic congestive heart failure    5. Anginal equivalent        Plan:     Nuclear stress.  Patient unable to walk 1 block. ABAD could be angina equivalent in setting of Diastolic CHF.   If Nuclear stress negative, ABAD likely debility  Activity as tolerate  Weight loss  F/u in 3 months

## 2018-12-03 NOTE — LETTER
December 3, 2018      Griselda Nugent MD  [address]  [place] [address]           [facility] - Cardiology  [address], [address]  [place] [address]  Phone: [phone]  Fax: [phone]          Patient: Domingo Castro   MR Number: [id number]   YOB: 1945   Date of Visit: 12/3/2018       Dear Dr. Griselda Nugent:    Thank you for referring Domingo Castro to me for evaluation. Attached you will find relevant portions of my assessment and plan of care.    If you have questions, please do not hesitate to call me. I look forward to following Domingo Castro along with you.    Sincerely,    Vandana Tovar MD    Enclosure  CC:  No Recipients    If you would like to receive this communication electronically, please contact [email or web address] or [phone] to request more information on [employer] access.    For providers and/or their staff who would like to refer a patient to [facility], please contact us through our one-stop-shop provider referral line, [facility] , at [phone].    If you feel you have received this communication in error or would no longer like to receive these types of communications, please e-mail [email or web address]

## 2018-12-18 ENCOUNTER — OFFICE VISIT (OUTPATIENT)
Dept: OPHTHALMOLOGY | Facility: CLINIC | Age: 73
End: 2018-12-18
Payer: MEDICARE

## 2018-12-18 VITALS — SYSTOLIC BLOOD PRESSURE: 155 MMHG | HEART RATE: 61 BPM | DIASTOLIC BLOOD PRESSURE: 72 MMHG

## 2018-12-18 DIAGNOSIS — H33.302 RETINAL HOLE OR TEAR, LEFT: ICD-10-CM

## 2018-12-18 DIAGNOSIS — H35.033 HYPERTENSIVE RETINOPATHY OF BOTH EYES: ICD-10-CM

## 2018-12-18 PROCEDURE — 92134 CPTRZ OPH DX IMG PST SGM RTA: CPT | Mod: PBBFAC | Performed by: OPHTHALMOLOGY

## 2018-12-18 PROCEDURE — 67028 INJECTION EYE DRUG: CPT | Mod: S$PBB,RT,, | Performed by: OPHTHALMOLOGY

## 2018-12-18 PROCEDURE — 99213 OFFICE O/P EST LOW 20 MIN: CPT | Mod: PBBFAC,25 | Performed by: OPHTHALMOLOGY

## 2018-12-18 PROCEDURE — 67028 INJECTION EYE DRUG: CPT | Mod: PBBFAC,RT | Performed by: OPHTHALMOLOGY

## 2018-12-18 PROCEDURE — 99999 PR PBB SHADOW E&M-EST. PATIENT-LVL III: CPT | Mod: PBBFAC,,, | Performed by: OPHTHALMOLOGY

## 2018-12-18 PROCEDURE — C9257 BEVACIZUMAB INJECTION: HCPCS | Mod: PBBFAC,JG | Performed by: OPHTHALMOLOGY

## 2018-12-18 PROCEDURE — 92014 COMPRE OPH EXAM EST PT 1/>: CPT | Mod: 25,S$PBB,, | Performed by: OPHTHALMOLOGY

## 2018-12-18 RX ADMIN — BEVACIZUMAB 1.25 MG: 100 INJECTION, SOLUTION INTRAVENOUS at 05:12

## 2018-12-18 NOTE — PROGRESS NOTES
HPI     Overdue f/u   DLS-12/13/2016    Pt sts declining va in OD. Denies pain   (-)Flashes (+)Floaters  (+)Photophobia  (+)Glare    Systane PRN       OCT - [place] OD stable, nasal CME improved  OS- CME slightly worse compared to prior       A/P    1. PDR OU  Improved A1c recently, but still uncontrolled on insulin  But  VH OD    Avastin OD today    Get approved for ozurdex again    2. DME OU  - S/p Focal OU (OS x 2 06/13)  - S/p Ozurdex OD - fluid improved, but pt didn't notice much of a chance  - Some residual DME OD> OS    Try observation     3. PCIOL OU    4. HTN Ret OU    5. Ret Hole x 2  S/p barrier laser     6. Presbyopia  - Significantly increased since cataract surgery   - Pt unhappy with glasses- may need new MRx     7. 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  The patient voiced understanding and wished to proceed with the procedure    Injection Procedure Note:  Diagnosis: PDR/VH OD    Patient Identified and Time Out complete  Topical Proparacaine and Betadine.  Inject Avastin OD at 6:00 @ 3.5-4mm posterior to limbus  Post Operative Dx: Same  Complications: None  Follow up as above.

## 2018-12-18 NOTE — PATIENT INSTRUCTIONS

## 2018-12-23 ENCOUNTER — HOSPITAL ENCOUNTER (INPATIENT)
Facility: HOSPITAL | Age: 73
LOS: 10 days | Discharge: HOME HEALTH | DRG: 853 | End: 2019-01-02
Attending: EMERGENCY MEDICINE | Admitting: HOSPITALIST
Payer: MEDICARE

## 2018-12-23 DIAGNOSIS — L97.522 DIABETIC ULCER OF TOE OF LEFT FOOT ASSOCIATED WITH TYPE 2 DIABETES MELLITUS, WITH FAT LAYER EXPOSED: ICD-10-CM

## 2018-12-23 DIAGNOSIS — M79.671 FOOT PAIN, RIGHT: ICD-10-CM

## 2018-12-23 DIAGNOSIS — E11.621 DIABETIC ULCER OF TOE OF LEFT FOOT ASSOCIATED WITH TYPE 2 DIABETES MELLITUS, WITH NECROSIS OF BONE: ICD-10-CM

## 2018-12-23 DIAGNOSIS — E11.42 DIABETIC POLYNEUROPATHY ASSOCIATED WITH TYPE 2 DIABETES MELLITUS: Chronic | ICD-10-CM

## 2018-12-23 DIAGNOSIS — M79.672 LEFT FOOT PAIN: ICD-10-CM

## 2018-12-23 DIAGNOSIS — E11.621 DIABETIC ULCER OF TOE OF LEFT FOOT ASSOCIATED WITH TYPE 2 DIABETES MELLITUS, WITH FAT LAYER EXPOSED: ICD-10-CM

## 2018-12-23 DIAGNOSIS — R53.83 FATIGUE: ICD-10-CM

## 2018-12-23 DIAGNOSIS — R78.81 STREPTOCOCCAL BACTEREMIA: ICD-10-CM

## 2018-12-23 DIAGNOSIS — B95.5 STREPTOCOCCAL BACTEREMIA: ICD-10-CM

## 2018-12-23 DIAGNOSIS — M86.172 ACUTE OSTEOMYELITIS OF TOE, LEFT: ICD-10-CM

## 2018-12-23 DIAGNOSIS — J18.9 PNEUMONIA OF LEFT LOWER LOBE DUE TO INFECTIOUS ORGANISM: Primary | ICD-10-CM

## 2018-12-23 DIAGNOSIS — E11.22 TYPE 2 DIABETES MELLITUS WITH STAGE 4 CHRONIC KIDNEY DISEASE, WITH LONG-TERM CURRENT USE OF INSULIN: Chronic | ICD-10-CM

## 2018-12-23 DIAGNOSIS — L97.524 DIABETIC ULCER OF TOE OF LEFT FOOT ASSOCIATED WITH TYPE 2 DIABETES MELLITUS, WITH NECROSIS OF BONE: ICD-10-CM

## 2018-12-23 DIAGNOSIS — J15.9 BACTERIAL PNEUMONIA: ICD-10-CM

## 2018-12-23 DIAGNOSIS — N18.4 TYPE 2 DIABETES MELLITUS WITH STAGE 4 CHRONIC KIDNEY DISEASE, WITH LONG-TERM CURRENT USE OF INSULIN: Chronic | ICD-10-CM

## 2018-12-23 DIAGNOSIS — Z79.4 TYPE 2 DIABETES MELLITUS WITH STAGE 4 CHRONIC KIDNEY DISEASE, WITH LONG-TERM CURRENT USE OF INSULIN: Chronic | ICD-10-CM

## 2018-12-23 DIAGNOSIS — A40.0 SEPTICEMIA DUE TO GROUP A STREPTOCOCCUS: ICD-10-CM

## 2018-12-23 PROBLEM — R06.89 ACUTE RESPIRATORY INSUFFICIENCY: Status: ACTIVE | Noted: 2018-12-23

## 2018-12-23 PROBLEM — E11.40 DIABETIC NEUROPATHY ASSOCIATED WITH TYPE 2 DIABETES MELLITUS: Status: ACTIVE | Noted: 2018-12-23

## 2018-12-23 LAB
ALBUMIN SERPL BCP-MCNC: 3.7 G/DL
ALP SERPL-CCNC: 87 U/L
ALT SERPL W/O P-5'-P-CCNC: 14 U/L
ANION GAP SERPL CALC-SCNC: 13 MMOL/L
AST SERPL-CCNC: 15 U/L
BACTERIA #/AREA URNS HPF: ABNORMAL /HPF
BASOPHILS # BLD AUTO: 0.02 K/UL
BASOPHILS NFR BLD: 0.2 %
BILIRUB SERPL-MCNC: 0.4 MG/DL
BILIRUB UR QL STRIP: NEGATIVE
BNP SERPL-MCNC: 94 PG/ML
BUN SERPL-MCNC: 50 MG/DL
CALCIUM SERPL-MCNC: 9.8 MG/DL
CHLORIDE SERPL-SCNC: 105 MMOL/L
CLARITY UR: CLEAR
CO2 SERPL-SCNC: 20 MMOL/L
COLOR UR: YELLOW
CREAT SERPL-MCNC: 2.7 MG/DL
DIFFERENTIAL METHOD: ABNORMAL
EOSINOPHIL # BLD AUTO: 0.1 K/UL
EOSINOPHIL NFR BLD: 1.2 %
ERYTHROCYTE [DISTWIDTH] IN BLOOD BY AUTOMATED COUNT: 13.9 %
EST. GFR  (AFRICAN AMERICAN): 26 ML/MIN/1.73 M^2
EST. GFR  (NON AFRICAN AMERICAN): 22 ML/MIN/1.73 M^2
FLUAV AG SPEC QL IA: NEGATIVE
FLUBV AG SPEC QL IA: NEGATIVE
GLUCOSE SERPL-MCNC: 225 MG/DL
GLUCOSE UR QL STRIP: ABNORMAL
HCT VFR BLD AUTO: 32.4 %
HGB BLD-MCNC: 10.6 G/DL
HGB UR QL STRIP: ABNORMAL
HYALINE CASTS #/AREA URNS LPF: 0 /LPF
KETONES UR QL STRIP: NEGATIVE
LACTATE SERPL-SCNC: 0.8 MMOL/L
LACTATE SERPL-SCNC: 1.2 MMOL/L
LEUKOCYTE ESTERASE UR QL STRIP: NEGATIVE
LYMPHOCYTES # BLD AUTO: 0.8 K/UL
LYMPHOCYTES NFR BLD: 7 %
MCH RBC QN AUTO: 28.2 PG
MCHC RBC AUTO-ENTMCNC: 32.7 G/DL
MCV RBC AUTO: 86 FL
MICROSCOPIC COMMENT: ABNORMAL
MONOCYTES # BLD AUTO: 0.1 K/UL
MONOCYTES NFR BLD: 1.2 %
NEUTROPHILS # BLD AUTO: 10.9 K/UL
NEUTROPHILS NFR BLD: 90.3 %
NITRITE UR QL STRIP: NEGATIVE
PH UR STRIP: 6 [PH] (ref 5–8)
PLATELET # BLD AUTO: 122 K/UL
PMV BLD AUTO: 11.2 FL
POCT GLUCOSE: 234 MG/DL (ref 70–110)
POTASSIUM SERPL-SCNC: 3.9 MMOL/L
PROT SERPL-MCNC: 8.2 G/DL
PROT UR QL STRIP: ABNORMAL
RBC # BLD AUTO: 3.76 M/UL
RBC #/AREA URNS HPF: 5 /HPF (ref 0–4)
SODIUM SERPL-SCNC: 138 MMOL/L
SP GR UR STRIP: 1.02 (ref 1–1.03)
SPECIMEN SOURCE: NORMAL
TROPONIN I SERPL DL<=0.01 NG/ML-MCNC: 0.01 NG/ML
URN SPEC COLLECT METH UR: ABNORMAL
UROBILINOGEN UR STRIP-ACNC: NEGATIVE EU/DL
WBC # BLD AUTO: 12.04 K/UL
WBC #/AREA URNS HPF: 2 /HPF (ref 0–5)

## 2018-12-23 PROCEDURE — 83880 ASSAY OF NATRIURETIC PEPTIDE: CPT

## 2018-12-23 PROCEDURE — 81000 URINALYSIS NONAUTO W/SCOPE: CPT

## 2018-12-23 PROCEDURE — 25000003 PHARM REV CODE 250: Performed by: HOSPITALIST

## 2018-12-23 PROCEDURE — 25000003 PHARM REV CODE 250: Performed by: PHYSICIAN ASSISTANT

## 2018-12-23 PROCEDURE — 85025 COMPLETE CBC W/AUTO DIFF WBC: CPT

## 2018-12-23 PROCEDURE — 83605 ASSAY OF LACTIC ACID: CPT

## 2018-12-23 PROCEDURE — 83605 ASSAY OF LACTIC ACID: CPT | Mod: 91

## 2018-12-23 PROCEDURE — 96375 TX/PRO/DX INJ NEW DRUG ADDON: CPT | Mod: 59

## 2018-12-23 PROCEDURE — 87186 SC STD MICRODIL/AGAR DIL: CPT

## 2018-12-23 PROCEDURE — 84484 ASSAY OF TROPONIN QUANT: CPT

## 2018-12-23 PROCEDURE — 99291 CRITICAL CARE FIRST HOUR: CPT | Mod: 25

## 2018-12-23 PROCEDURE — 36415 COLL VENOUS BLD VENIPUNCTURE: CPT

## 2018-12-23 PROCEDURE — 87147 CULTURE TYPE IMMUNOLOGIC: CPT

## 2018-12-23 PROCEDURE — 11000001 HC ACUTE MED/SURG PRIVATE ROOM

## 2018-12-23 PROCEDURE — 80053 COMPREHEN METABOLIC PANEL: CPT

## 2018-12-23 PROCEDURE — 63600175 PHARM REV CODE 636 W HCPCS: Performed by: HOSPITALIST

## 2018-12-23 PROCEDURE — 25000003 PHARM REV CODE 250: Performed by: EMERGENCY MEDICINE

## 2018-12-23 PROCEDURE — 27000221 HC OXYGEN, UP TO 24 HOURS

## 2018-12-23 PROCEDURE — 63600175 PHARM REV CODE 636 W HCPCS: Performed by: PHYSICIAN ASSISTANT

## 2018-12-23 PROCEDURE — 87400 INFLUENZA A/B EACH AG IA: CPT | Mod: 59

## 2018-12-23 PROCEDURE — 94640 AIRWAY INHALATION TREATMENT: CPT

## 2018-12-23 PROCEDURE — 63600175 PHARM REV CODE 636 W HCPCS: Performed by: EMERGENCY MEDICINE

## 2018-12-23 PROCEDURE — 87449 NOS EACH ORGANISM AG IA: CPT

## 2018-12-23 PROCEDURE — 25000242 PHARM REV CODE 250 ALT 637 W/ HCPCS: Performed by: EMERGENCY MEDICINE

## 2018-12-23 PROCEDURE — 96365 THER/PROPH/DIAG IV INF INIT: CPT

## 2018-12-23 PROCEDURE — 87040 BLOOD CULTURE FOR BACTERIA: CPT

## 2018-12-23 RX ORDER — IBUPROFEN 200 MG
24 TABLET ORAL
Status: DISCONTINUED | OUTPATIENT
Start: 2018-12-23 | End: 2019-01-02 | Stop reason: HOSPADM

## 2018-12-23 RX ORDER — HEPARIN SODIUM 5000 [USP'U]/ML
5000 INJECTION, SOLUTION INTRAVENOUS; SUBCUTANEOUS EVERY 8 HOURS
Status: DISCONTINUED | OUTPATIENT
Start: 2018-12-23 | End: 2019-01-02 | Stop reason: HOSPADM

## 2018-12-23 RX ORDER — GLUCAGON 1 MG
1 KIT INJECTION
Status: DISCONTINUED | OUTPATIENT
Start: 2018-12-23 | End: 2019-01-02 | Stop reason: HOSPADM

## 2018-12-23 RX ORDER — IPRATROPIUM BROMIDE AND ALBUTEROL SULFATE 2.5; .5 MG/3ML; MG/3ML
3 SOLUTION RESPIRATORY (INHALATION) EVERY 4 HOURS
Status: DISCONTINUED | OUTPATIENT
Start: 2018-12-24 | End: 2019-01-02 | Stop reason: HOSPADM

## 2018-12-23 RX ORDER — GABAPENTIN 100 MG/1
100 CAPSULE ORAL 2 TIMES DAILY WITH MEALS
Status: DISCONTINUED | OUTPATIENT
Start: 2018-12-24 | End: 2019-01-02 | Stop reason: HOSPADM

## 2018-12-23 RX ORDER — GABAPENTIN 300 MG/1
300 CAPSULE ORAL NIGHTLY
Status: DISCONTINUED | OUTPATIENT
Start: 2018-12-23 | End: 2019-01-02 | Stop reason: HOSPADM

## 2018-12-23 RX ORDER — AMLODIPINE BESYLATE 5 MG/1
10 TABLET ORAL DAILY
Status: DISCONTINUED | OUTPATIENT
Start: 2018-12-24 | End: 2019-01-02 | Stop reason: HOSPADM

## 2018-12-23 RX ORDER — ASPIRIN 81 MG/1
81 TABLET ORAL DAILY
Status: DISCONTINUED | OUTPATIENT
Start: 2018-12-24 | End: 2019-01-02 | Stop reason: HOSPADM

## 2018-12-23 RX ORDER — SODIUM CHLORIDE 0.9 % (FLUSH) 0.9 %
5 SYRINGE (ML) INJECTION
Status: DISCONTINUED | OUTPATIENT
Start: 2018-12-23 | End: 2019-01-02 | Stop reason: HOSPADM

## 2018-12-23 RX ORDER — CARVEDILOL 25 MG/1
25 TABLET ORAL 2 TIMES DAILY WITH MEALS
Status: DISCONTINUED | OUTPATIENT
Start: 2018-12-24 | End: 2019-01-02 | Stop reason: HOSPADM

## 2018-12-23 RX ORDER — INSULIN ASPART 100 [IU]/ML
0-5 INJECTION, SOLUTION INTRAVENOUS; SUBCUTANEOUS
Status: DISCONTINUED | OUTPATIENT
Start: 2018-12-23 | End: 2019-01-02 | Stop reason: HOSPADM

## 2018-12-23 RX ORDER — ONDANSETRON 2 MG/ML
4 INJECTION INTRAMUSCULAR; INTRAVENOUS EVERY 8 HOURS PRN
Status: DISCONTINUED | OUTPATIENT
Start: 2018-12-23 | End: 2019-01-02 | Stop reason: HOSPADM

## 2018-12-23 RX ORDER — ACETAMINOPHEN 325 MG/1
650 TABLET ORAL EVERY 4 HOURS PRN
Status: DISCONTINUED | OUTPATIENT
Start: 2018-12-23 | End: 2019-01-02 | Stop reason: HOSPADM

## 2018-12-23 RX ORDER — HYDROCODONE BITARTRATE AND ACETAMINOPHEN 5; 325 MG/1; MG/1
1 TABLET ORAL EVERY 6 HOURS PRN
Status: DISCONTINUED | OUTPATIENT
Start: 2018-12-23 | End: 2019-01-02 | Stop reason: HOSPADM

## 2018-12-23 RX ORDER — ACETAMINOPHEN 650 MG/1
650 SUPPOSITORY RECTAL
Status: COMPLETED | OUTPATIENT
Start: 2018-12-23 | End: 2018-12-23

## 2018-12-23 RX ORDER — SODIUM CHLORIDE 9 MG/ML
INJECTION, SOLUTION INTRAVENOUS CONTINUOUS
Status: DISCONTINUED | OUTPATIENT
Start: 2018-12-23 | End: 2018-12-24

## 2018-12-23 RX ORDER — PROCHLORPERAZINE EDISYLATE 5 MG/ML
5 INJECTION INTRAMUSCULAR; INTRAVENOUS EVERY 6 HOURS PRN
Status: DISCONTINUED | OUTPATIENT
Start: 2018-12-23 | End: 2019-01-02 | Stop reason: HOSPADM

## 2018-12-23 RX ORDER — ONDANSETRON 2 MG/ML
4 INJECTION INTRAMUSCULAR; INTRAVENOUS
Status: COMPLETED | OUTPATIENT
Start: 2018-12-23 | End: 2018-12-23

## 2018-12-23 RX ORDER — ROSUVASTATIN CALCIUM 10 MG/1
20 TABLET, COATED ORAL DAILY
Status: DISCONTINUED | OUTPATIENT
Start: 2018-12-24 | End: 2019-01-02 | Stop reason: HOSPADM

## 2018-12-23 RX ORDER — ALBUTEROL SULFATE 2.5 MG/.5ML
5 SOLUTION RESPIRATORY (INHALATION) ONCE
Status: COMPLETED | OUTPATIENT
Start: 2018-12-23 | End: 2018-12-23

## 2018-12-23 RX ORDER — IBUPROFEN 200 MG
16 TABLET ORAL
Status: DISCONTINUED | OUTPATIENT
Start: 2018-12-23 | End: 2019-01-02 | Stop reason: HOSPADM

## 2018-12-23 RX ORDER — ACETAMINOPHEN 500 MG
1000 TABLET ORAL
Status: DISCONTINUED | OUTPATIENT
Start: 2018-12-23 | End: 2018-12-23

## 2018-12-23 RX ADMIN — SODIUM CHLORIDE 1000 ML: 0.9 INJECTION, SOLUTION INTRAVENOUS at 05:12

## 2018-12-23 RX ADMIN — HEPARIN SODIUM 5000 UNITS: 5000 INJECTION, SOLUTION INTRAVENOUS; SUBCUTANEOUS at 10:12

## 2018-12-23 RX ADMIN — GABAPENTIN 300 MG: 300 CAPSULE ORAL at 10:12

## 2018-12-23 RX ADMIN — SODIUM CHLORIDE: 0.9 INJECTION, SOLUTION INTRAVENOUS at 10:12

## 2018-12-23 RX ADMIN — ACETAMINOPHEN 650 MG: 650 SUPPOSITORY RECTAL at 05:12

## 2018-12-23 RX ADMIN — ONDANSETRON 4 MG: 2 INJECTION INTRAMUSCULAR; INTRAVENOUS at 06:12

## 2018-12-23 RX ADMIN — VANCOMYCIN HYDROCHLORIDE 3000 MG: 1 INJECTION, POWDER, LYOPHILIZED, FOR SOLUTION INTRAVENOUS at 06:12

## 2018-12-23 RX ADMIN — ALBUTEROL SULFATE 2.5 MG: 2.5 SOLUTION RESPIRATORY (INHALATION) at 07:12

## 2018-12-23 RX ADMIN — AZITHROMYCIN MONOHYDRATE 500 MG: 500 INJECTION, POWDER, LYOPHILIZED, FOR SOLUTION INTRAVENOUS at 10:12

## 2018-12-23 RX ADMIN — PIPERACILLIN AND TAZOBACTAM 4.5 G: 4; .5 INJECTION, POWDER, LYOPHILIZED, FOR SOLUTION INTRAVENOUS; PARENTERAL at 06:12

## 2018-12-24 PROBLEM — E11.621 DIABETIC ULCER OF TOE OF LEFT FOOT WITH FAT LAYER EXPOSED: Status: ACTIVE | Noted: 2018-12-24

## 2018-12-24 PROBLEM — E11.621 DIABETIC ULCER OF TOE OF LEFT FOOT: Status: ACTIVE | Noted: 2018-12-24

## 2018-12-24 PROBLEM — E87.6 HYPOKALEMIA: Status: ACTIVE | Noted: 2018-12-24

## 2018-12-24 PROBLEM — R78.81 GRAM-POSITIVE COCCI BACTEREMIA: Status: ACTIVE | Noted: 2018-12-24

## 2018-12-24 PROBLEM — D69.6 THROMBOCYTOPENIA, UNSPECIFIED: Status: ACTIVE | Noted: 2018-12-24

## 2018-12-24 PROBLEM — L97.529 DIABETIC ULCER OF TOE OF LEFT FOOT: Status: ACTIVE | Noted: 2018-12-24

## 2018-12-24 PROBLEM — L97.522 DIABETIC ULCER OF TOE OF LEFT FOOT WITH FAT LAYER EXPOSED: Status: ACTIVE | Noted: 2018-12-24

## 2018-12-24 LAB
ANION GAP SERPL CALC-SCNC: 10 MMOL/L
BASOPHILS # BLD AUTO: 0.02 K/UL
BASOPHILS NFR BLD: 0.1 %
BUN SERPL-MCNC: 48 MG/DL
CALCIUM SERPL-MCNC: 9 MG/DL
CHLORIDE SERPL-SCNC: 106 MMOL/L
CO2 SERPL-SCNC: 21 MMOL/L
CREAT SERPL-MCNC: 2.8 MG/DL
DIFFERENTIAL METHOD: ABNORMAL
EOSINOPHIL # BLD AUTO: 0 K/UL
EOSINOPHIL NFR BLD: 0 %
ERYTHROCYTE [DISTWIDTH] IN BLOOD BY AUTOMATED COUNT: 14 %
EST. GFR  (AFRICAN AMERICAN): 25 ML/MIN/1.73 M^2
EST. GFR  (NON AFRICAN AMERICAN): 21 ML/MIN/1.73 M^2
GLUCOSE SERPL-MCNC: 241 MG/DL
HCT VFR BLD AUTO: 28.6 %
HGB BLD-MCNC: 9.3 G/DL
LYMPHOCYTES # BLD AUTO: 1.1 K/UL
LYMPHOCYTES NFR BLD: 5.3 %
MAGNESIUM SERPL-MCNC: 1.8 MG/DL
MCH RBC QN AUTO: 28.3 PG
MCHC RBC AUTO-ENTMCNC: 32.5 G/DL
MCV RBC AUTO: 87 FL
MONOCYTES # BLD AUTO: 1.4 K/UL
MONOCYTES NFR BLD: 6.4 %
NEUTROPHILS # BLD AUTO: 18.6 K/UL
NEUTROPHILS NFR BLD: 87.4 %
PHOSPHATE SERPL-MCNC: 2.8 MG/DL
PLATELET # BLD AUTO: 112 K/UL
PMV BLD AUTO: 12 FL
POCT GLUCOSE: 179 MG/DL (ref 70–110)
POCT GLUCOSE: 225 MG/DL (ref 70–110)
POCT GLUCOSE: 227 MG/DL (ref 70–110)
POCT GLUCOSE: 309 MG/DL (ref 70–110)
POTASSIUM SERPL-SCNC: 3.4 MMOL/L
RBC # BLD AUTO: 3.29 M/UL
SODIUM SERPL-SCNC: 137 MMOL/L
VANCOMYCIN SERPL-MCNC: 18.3 UG/ML
WBC # BLD AUTO: 21.25 K/UL

## 2018-12-24 PROCEDURE — 84100 ASSAY OF PHOSPHORUS: CPT

## 2018-12-24 PROCEDURE — 63600175 PHARM REV CODE 636 W HCPCS: Performed by: HOSPITALIST

## 2018-12-24 PROCEDURE — 25000003 PHARM REV CODE 250: Performed by: HOSPITALIST

## 2018-12-24 PROCEDURE — 36415 COLL VENOUS BLD VENIPUNCTURE: CPT

## 2018-12-24 PROCEDURE — 80048 BASIC METABOLIC PNL TOTAL CA: CPT

## 2018-12-24 PROCEDURE — 94761 N-INVAS EAR/PLS OXIMETRY MLT: CPT

## 2018-12-24 PROCEDURE — 87147 CULTURE TYPE IMMUNOLOGIC: CPT

## 2018-12-24 PROCEDURE — 80202 ASSAY OF VANCOMYCIN: CPT

## 2018-12-24 PROCEDURE — 87186 SC STD MICRODIL/AGAR DIL: CPT

## 2018-12-24 PROCEDURE — S5571 INSULIN DISPOS PEN 3 ML: HCPCS | Performed by: HOSPITALIST

## 2018-12-24 PROCEDURE — 99222 1ST HOSP IP/OBS MODERATE 55: CPT | Mod: ,,, | Performed by: PODIATRIST

## 2018-12-24 PROCEDURE — 27000221 HC OXYGEN, UP TO 24 HOURS

## 2018-12-24 PROCEDURE — 94640 AIRWAY INHALATION TREATMENT: CPT

## 2018-12-24 PROCEDURE — 87077 CULTURE AEROBIC IDENTIFY: CPT

## 2018-12-24 PROCEDURE — 11000001 HC ACUTE MED/SURG PRIVATE ROOM

## 2018-12-24 PROCEDURE — 25000242 PHARM REV CODE 250 ALT 637 W/ HCPCS: Performed by: HOSPITALIST

## 2018-12-24 PROCEDURE — 83735 ASSAY OF MAGNESIUM: CPT

## 2018-12-24 PROCEDURE — 25000003 PHARM REV CODE 250: Performed by: PODIATRIST

## 2018-12-24 PROCEDURE — 87070 CULTURE OTHR SPECIMN AEROBIC: CPT

## 2018-12-24 PROCEDURE — 85025 COMPLETE CBC W/AUTO DIFF WBC: CPT

## 2018-12-24 PROCEDURE — 99222 PR INITIAL HOSPITAL CARE,LEVL II: ICD-10-PCS | Mod: ,,, | Performed by: PODIATRIST

## 2018-12-24 RX ORDER — FUROSEMIDE 10 MG/ML
40 INJECTION INTRAMUSCULAR; INTRAVENOUS ONCE
Status: COMPLETED | OUTPATIENT
Start: 2018-12-24 | End: 2018-12-24

## 2018-12-24 RX ORDER — INSULIN ASPART 100 [IU]/ML
5 INJECTION, SOLUTION INTRAVENOUS; SUBCUTANEOUS
Status: DISCONTINUED | OUTPATIENT
Start: 2018-12-24 | End: 2018-12-30

## 2018-12-24 RX ORDER — FUROSEMIDE 10 MG/ML
40 INJECTION INTRAMUSCULAR; INTRAVENOUS DAILY
Status: DISCONTINUED | OUTPATIENT
Start: 2018-12-25 | End: 2018-12-25

## 2018-12-24 RX ORDER — POTASSIUM CHLORIDE 20 MEQ/1
20 TABLET, EXTENDED RELEASE ORAL ONCE
Status: COMPLETED | OUTPATIENT
Start: 2018-12-24 | End: 2018-12-24

## 2018-12-24 RX ORDER — ACETAMINOPHEN 650 MG/1
650 SUPPOSITORY RECTAL ONCE
Status: COMPLETED | OUTPATIENT
Start: 2018-12-24 | End: 2018-12-24

## 2018-12-24 RX ADMIN — HEPARIN SODIUM 5000 UNITS: 5000 INJECTION, SOLUTION INTRAVENOUS; SUBCUTANEOUS at 02:12

## 2018-12-24 RX ADMIN — INSULIN ASPART 5 UNITS: 100 INJECTION, SOLUTION INTRAVENOUS; SUBCUTANEOUS at 04:12

## 2018-12-24 RX ADMIN — HEPARIN SODIUM 5000 UNITS: 5000 INJECTION, SOLUTION INTRAVENOUS; SUBCUTANEOUS at 09:12

## 2018-12-24 RX ADMIN — CARVEDILOL 25 MG: 25 TABLET, FILM COATED ORAL at 04:12

## 2018-12-24 RX ADMIN — CARVEDILOL 25 MG: 25 TABLET, FILM COATED ORAL at 09:12

## 2018-12-24 RX ADMIN — Medication 10 G: at 03:12

## 2018-12-24 RX ADMIN — ROSUVASTATIN CALCIUM 20 MG: 10 TABLET, FILM COATED ORAL at 09:12

## 2018-12-24 RX ADMIN — ACETAMINOPHEN 650 MG: 650 SUPPOSITORY RECTAL at 10:12

## 2018-12-24 RX ADMIN — INSULIN DETEMIR 25 UNITS: 100 INJECTION, SOLUTION SUBCUTANEOUS at 09:12

## 2018-12-24 RX ADMIN — IPRATROPIUM BROMIDE AND ALBUTEROL SULFATE 3 ML: .5; 3 SOLUTION RESPIRATORY (INHALATION) at 12:12

## 2018-12-24 RX ADMIN — ONDANSETRON 4 MG: 2 INJECTION INTRAMUSCULAR; INTRAVENOUS at 09:12

## 2018-12-24 RX ADMIN — AMLODIPINE BESYLATE 10 MG: 5 TABLET ORAL at 09:12

## 2018-12-24 RX ADMIN — IPRATROPIUM BROMIDE AND ALBUTEROL SULFATE 3 ML: .5; 3 SOLUTION RESPIRATORY (INHALATION) at 08:12

## 2018-12-24 RX ADMIN — IPRATROPIUM BROMIDE AND ALBUTEROL SULFATE 3 ML: .5; 3 SOLUTION RESPIRATORY (INHALATION) at 04:12

## 2018-12-24 RX ADMIN — INSULIN ASPART 2 UNITS: 100 INJECTION, SOLUTION INTRAVENOUS; SUBCUTANEOUS at 08:12

## 2018-12-24 RX ADMIN — CEFTRIAXONE 1 G: 1 INJECTION, SOLUTION INTRAVENOUS at 09:12

## 2018-12-24 RX ADMIN — VANCOMYCIN HYDROCHLORIDE 1250 MG: 10 INJECTION, POWDER, LYOPHILIZED, FOR SOLUTION INTRAVENOUS at 09:12

## 2018-12-24 RX ADMIN — INSULIN ASPART 4 UNITS: 100 INJECTION, SOLUTION INTRAVENOUS; SUBCUTANEOUS at 12:12

## 2018-12-24 RX ADMIN — HEPARIN SODIUM 5000 UNITS: 5000 INJECTION, SOLUTION INTRAVENOUS; SUBCUTANEOUS at 05:12

## 2018-12-24 RX ADMIN — ACETAMINOPHEN 650 MG: 325 TABLET ORAL at 12:12

## 2018-12-24 RX ADMIN — FUROSEMIDE 40 MG: 10 INJECTION, SOLUTION INTRAMUSCULAR; INTRAVENOUS at 01:12

## 2018-12-24 RX ADMIN — GABAPENTIN 100 MG: 100 CAPSULE ORAL at 09:12

## 2018-12-24 RX ADMIN — IPRATROPIUM BROMIDE AND ALBUTEROL SULFATE 3 ML: .5; 3 SOLUTION RESPIRATORY (INHALATION) at 03:12

## 2018-12-24 RX ADMIN — POTASSIUM CHLORIDE 20 MEQ: 20 TABLET, EXTENDED RELEASE ORAL at 11:12

## 2018-12-24 RX ADMIN — IPRATROPIUM BROMIDE AND ALBUTEROL SULFATE 3 ML: .5; 3 SOLUTION RESPIRATORY (INHALATION) at 11:12

## 2018-12-24 RX ADMIN — GABAPENTIN 100 MG: 100 CAPSULE ORAL at 04:12

## 2018-12-24 RX ADMIN — ASPIRIN 81 MG: 81 TABLET, COATED ORAL at 09:12

## 2018-12-24 RX ADMIN — Medication 500 MG: at 09:12

## 2018-12-24 RX ADMIN — ACETAMINOPHEN 650 MG: 325 TABLET ORAL at 05:12

## 2018-12-24 NOTE — PLAN OF CARE
TN met with pt and pt's family, pt lives with wife, pt independent with ADLs, pt still drives, no HH or DME noted, pt admitted with pneumonia.    Discharge brochure and blue discharge folder given to pt. TN updated contact information on whiteboard.       12/24/18 1227   Discharge Assessment   Assessment Type Discharge Planning Assessment   Confirmed/corrected address and phone number on facesheet? Yes   Assessment information obtained from? Patient;Caregiver;Medical Record   Communicated expected length of stay with patient/caregiver yes   Prior to hospitilization cognitive status: Alert/Oriented   Prior to hospitalization functional status: Independent   Current cognitive status: Alert/Oriented   Current Functional Status: Independent   Lives With spouse   Able to Return to Prior Arrangements yes   Is patient able to care for self after discharge? Yes   Patient's perception of discharge disposition admitted as an inpatient   Readmission Within the Last 30 Days no previous admission in last 30 days   Patient currently being followed by outpatient case management? No   Patient currently receives any other outside agency services? No   Equipment Currently Used at Home none   Do you have any problems affording any of your prescribed medications? No   Is the patient taking medications as prescribed? yes   Does the patient have transportation home? Yes   Transportation Anticipated family or friend will provide   Does the patient receive services at the [facility] Clinic? No   Discharge Plan A Home;Home with family   Patient/Family in Agreement with Plan yes   Concepcion Sawant RN-BC  Transitional Navigator  [phone]

## 2018-12-24 NOTE — HPI
Mr. Castro is a 74 yo BM with HTN, DM2, CKD4, and diastolic heart failure being treated for suspected pneumonia. Wound was discovered on left great toe per Hospital Medicine and purulent drainage expressed. Patient relates he had a friend taking care of the wound for him present for several weeks.

## 2018-12-24 NOTE — PLAN OF CARE
Problem: Adult Inpatient Plan of Care  Goal: Plan of Care Review  Outcome: Ongoing (interventions implemented as appropriate)  Patient on oxygen with documented flow.  Will attempt to wean per O2 order protocol.neb given.Will continue to monitor.

## 2018-12-24 NOTE — ASSESSMENT & PLAN NOTE
Metabolic bone disease  Creatinine is at his baseline currently. Avoid nephrotoxins. Monitor renal function.

## 2018-12-24 NOTE — ASSESSMENT & PLAN NOTE
Cleanse left foot with wound cleanser and padded dry. Wound c&s taken. Melgisorb Ag applied with aquacel foam dressing. No clinical signs of infection, doubt source of infection. Local wound care orders written for nursing. Requires Darco shoe to help offload toe. Recommend follow up within 2-3 weeks of discharge. Recommend HH for dressing changes.

## 2018-12-24 NOTE — PROGRESS NOTES
"Vancomycin Dosing and Monitoring Pharmacy Protocol    Domingo Castro is a 73 y.o. male    Height: 6' 3" (1.905 m)   Wt Readings from Last 3 Encounters:   12/24/18 112.1 kg (247 lb 3.2 oz)   12/03/18 106.7 kg (235 lb 3.2 oz)   11/28/18 108.6 kg (239 lb 6.7 oz)       Temp Readings from Last 3 Encounters:   12/24/18 (!) 103.4 °F (39.7 °C) (Oral)   11/28/18 98 °F (36.7 °C) (Oral)   08/20/18 98.5 °F (36.9 °C) (Oral)      Lab Results   Component Value Date/Time    WBC 21.25 (H) 12/24/2018 02:48 AM    WBC 12.04 12/23/2018 05:39 PM    WBC 6.83 08/24/2018 01:23 PM      Lab Results   Component Value Date/Time    CREATININE 2.8 (H) 12/24/2018 02:48 AM    CREATININE 2.7 (H) 12/23/2018 05:39 PM    CREATININE 2.7 (H) 11/28/2018 10:33 AM      Lab Results   Component Value Date/Time    LACTATE 0.8 12/23/2018 09:45 PM    LACTATE 1.2 12/23/2018 05:39 PM       Serum creatinine: 2.8 mg/dL (H) 12/24/18 0248  Estimated creatinine clearance: 31.7 mL/min (A)    Antibiotics (From admission, onward)      Start     Stop Route Frequency Ordered    12/25/18 0900  cefTRIAXone (ROCEPHIN) 2 g in dextrose 5 % 50 mL IVPB  (Ceftriaxone IV/ Azithromycin IV Panel)      -- IV Every 24 hours (non-standard times) 12/24/18 1024    12/24/18 2130  azithromycin 500 mg in dextrose 5 % 250 mL IVPB (ready to mix system)  (Ceftriaxone IV/ Azithromycin IV Panel)      -- IV Every 24 hours (non-standard times) 12/24/18 1024          Antifungals (From admission, onward)      None            Microbiology Results (last 7 days)       Procedure Component Value Units Date/Time    Aerobic culture [[id number]] Collected:  12/24/18 1250    Order Status:  Sent Specimen:  Wound from Toe, Left Foot Updated:  12/24/18 1323    Blood culture x two cultures. Draw prior to antibiotics. [[id number]] Collected:  12/23/18 8333    Order Status:  Completed Specimen:  Blood from Peripheral, Forearm, Right Updated:  12/24/18 1006     Blood Culture, Routine Gram stain aer bottle: Gram " positive cocci     Blood Culture, Routine Gram stain jamal bottle: Gram positive cocci     Blood Culture, Routine Results called to and read back by: Radha Mejia RN  2018  10:05    Narrative:       Aerobic and anaerobic    Blood culture x two cultures. Draw prior to antibiotics. [[id number]] Collected:  18 1735    Order Status:  Completed Specimen:  Blood from Peripheral, Hand, Right Updated:  18 1005     Blood Culture, Routine Gram stain aer bottle: Gram positive cocci     Blood Culture, Routine Gram stain jamal bottle: Gram positive cocci     Blood Culture, Routine Results called to and read back by: Radha Mejia RN  2018  10:05    Narrative:       Aerobic and anaerobic            Indication/Target trough:   Bacteremia (Target trough: 15-20mcg/ml)    Hemodialysis:   N/A    Dosing Weight:   Wt Readings from Last 1 Encounters:   18 112.1 kg (247 lb 3.2 oz)       Last Vancomycin dose: 3000 mg   Date/Time given:  1840          Vancomycin level:  No results for input(s): VANCOMYCIN-TROUGH in the last 72 hours.  No results for input(s): VANCOMYCIN, RANDOM in the last 72 hours.    Per Protocol Initial/Adjustments Dosin. Initial/Adjustment Dose: HOLD next Vancomycin dose due to elevated level and/or worsening renal function. D/t unstable renal function, will dose vancomycin by level.  2. Vancomycin Random Level will be drawn on  1700date/time     Pharmacy will continue to follow.    Please contact if you have any further questions. Thank you.    Dinesh Mccarthy, PharmD  [phone]

## 2018-12-24 NOTE — ED NOTES
Pt voided in brief. Attempted to straight cath without urine return. NEHA Silva notified. Condom cath placed on pt.

## 2018-12-24 NOTE — PROGRESS NOTES
"Vancomycin Dosing and Monitoring Pharmacy Protocol    Domingo Castro is a 73 y.o. male    Height: 6' 3" (1.905 m)   Wt Readings from Last 3 Encounters:   12/24/18 112.1 kg (247 lb 3.2 oz)   12/03/18 106.7 kg (235 lb 3.2 oz)   11/28/18 108.6 kg (239 lb 6.7 oz)       Temp Readings from Last 3 Encounters:   12/24/18 (!) 101.9 °F (38.8 °C) (Oral)   11/28/18 98 °F (36.7 °C) (Oral)   08/20/18 98.5 °F (36.9 °C) (Oral)      Lab Results   Component Value Date/Time    WBC 21.25 (H) 12/24/2018 02:48 AM    WBC 12.04 12/23/2018 05:39 PM    WBC 6.83 08/24/2018 01:23 PM      Lab Results   Component Value Date/Time    CREATININE 2.8 (H) 12/24/2018 02:48 AM    CREATININE 2.7 (H) 12/23/2018 05:39 PM    CREATININE 2.7 (H) 11/28/2018 10:33 AM      Lab Results   Component Value Date/Time    LACTATE 0.8 12/23/2018 09:45 PM    LACTATE 1.2 12/23/2018 05:39 PM       Serum creatinine: 2.8 mg/dL (H) 12/24/18 0248  Estimated creatinine clearance: 31.7 mL/min (A)    Antibiotics (From admission, onward)      Start     Stop Route Frequency Ordered    12/25/18 0900  cefTRIAXone (ROCEPHIN) 2 g in dextrose 5 % 50 mL IVPB  (Ceftriaxone IV/ Azithromycin IV Panel)      -- IV Every 24 hours (non-standard times) 12/24/18 1024    12/24/18 2130  azithromycin 500 mg in dextrose 5 % 250 mL IVPB (ready to mix system)  (Ceftriaxone IV/ Azithromycin IV Panel)      -- IV Every 24 hours (non-standard times) 12/24/18 1024    12/24/18 1800  vancomycin (VANCOCIN) 1,250 mg in dextrose 5 % 250 mL IVPB  (Vancomycin IVPB with levels panel)      -- IV Every 24 hours (non-standard times) 12/24/18 1322          Antifungals (From admission, onward)      None            Microbiology Results (last 7 days)       Procedure Component Value Units Date/Time    Aerobic culture [[id number]]     Order Status:  No result Specimen:  Wound from Toe, Left Foot     Blood culture x two cultures. Draw prior to antibiotics. [[id number]] Collected:  12/23/18 7227    Order Status:  Completed " Specimen:  Blood from Peripheral, Forearm, Right Updated:  18 1006     Blood Culture, Routine Gram stain aer bottle: Gram positive cocci     Blood Culture, Routine Gram stain jamal bottle: Gram positive cocci     Blood Culture, Routine Results called to and read back by: Radha Mejia RN  2018  10:05    Narrative:       Aerobic and anaerobic    Blood culture x two cultures. Draw prior to antibiotics. [[id number]] Collected:  18 6005    Order Status:  Completed Specimen:  Blood from Peripheral, Hand, Right Updated:  18 1005     Blood Culture, Routine Gram stain aer bottle: Gram positive cocci     Blood Culture, Routine Gram stain jamal bottle: Gram positive cocci     Blood Culture, Routine Results called to and read back by: Radha Mejia RN  2018  10:05    Narrative:       Aerobic and anaerobic            Indication/Target trough:   Bacteremia (Target trough: 15-20mcg/ml)    Hemodialysis:   N/A    Dosing Weight:   Wt Readings from Last 1 Encounters:   18 112.1 kg (247 lb 3.2 oz)       Last Vancomycin dose: 3000 mg   Date/Time given: 18 1840           Vancomycin level:  No results for input(s): VANCOMYCIN-TROUGH in the last 72 hours.  No results for input(s): VANCOMYCIN, RANDOM in the last 72 hours.    Per Protocol Initial/Adjustments Dosin. Initial/Adjustment Dose: Loading dose = 3000mg x1, followed by Maintenance dose of 1250 mg q24hr to be given at 18 1800  date/time  2. Vancomycin Trough Level will be drawn on 18 1700 date/time     Pharmacy will continue to follow.    Please contact if you have any further questions. Thank you.    Aamir Garcia, PharmD  [phone]

## 2018-12-24 NOTE — ASSESSMENT & PLAN NOTE
BP is okay here. Will continue home carvedilol 25 mg BID, amlodipine 10 mg daily. Hold home hydralazine 50 mg BID. Give furosemide IV. Monitor BP.

## 2018-12-24 NOTE — SUBJECTIVE & OBJECTIVE
Interval History: Says that he is feeling better today, but had fever essentially since admission. Still with no significant cough. BCx are positive with GPC in chains. Eating well.     Review of Systems   Constitutional: Positive for fever. Negative for chills.   Respiratory: Positive for shortness of breath. Negative for cough.    Cardiovascular: Negative for chest pain and palpitations.   Gastrointestinal: Negative for nausea and vomiting.     Objective:     Vital Signs (Most Recent):  Temp: (!) 100.8 °F (38.2 °C) (12/24/18 1657)  Pulse: 77 (12/24/18 1657)  Resp: 20 (12/24/18 1657)  BP: 128/61 (12/24/18 1657)  SpO2: (!) 92 % (12/24/18 1533) Vital Signs (24h Range):  Temp:  [100 °F (37.8 °C)-103.4 °F (39.7 °C)] 100.8 °F (38.2 °C)  Pulse:  [68-96] 77  Resp:  [16-27] 20  SpO2:  [91 %-98 %] 92 %  BP: (128-177)/() 128/61     Weight: 112.1 kg (247 lb 3.2 oz)  Body mass index is 30.9 kg/m².    Intake/Output Summary (Last 24 hours) at 12/24/2018 1710  Last data filed at 12/24/2018 1429  Gross per 24 hour   Intake 350 ml   Output 650 ml   Net -300 ml      Physical Exam   Constitutional: He is oriented to person, place, and time. He appears well-developed and well-nourished. No distress.   Cardiovascular: Normal rate and regular rhythm.   No murmur heard.  Pulmonary/Chest: Effort normal and breath sounds normal. No respiratory distress.   Abdominal: Soft. Bowel sounds are normal. He exhibits no distension. There is no tenderness.   Musculoskeletal: He exhibits no edema or tenderness.   Neurological: He is alert and oriented to person, place, and time.   Skin: Skin is warm and dry.   Psychiatric: He has a normal mood and affect. His behavior is normal.   Nursing note and vitals reviewed.      Significant Labs:   Blood Culture:   Recent Labs   Lab 12/23/18  1735 12/23/18  1738   LABBLOO Gram stain aer bottle: Gram positive cocci  Gram stain jamal bottle: Gram positive cocci  Results called to and read back by: Radha  Jackie CHURCH  12/24/2018  10:05 Gram stain aer bottle: Gram positive cocci  Gram stain jamal bottle: Gram positive cocci  Results called to and read back by: Radha Mejia RN  12/24/2018  10:05     CBC:   Recent Labs   Lab 12/23/18  1739 12/24/18  0248   WBC 12.04 21.25*   HGB 10.6* 9.3*   HCT 32.4* 28.6*   * 112*     CMP:   Recent Labs   Lab 12/23/18  1739 12/24/18  0248    137   K 3.9 3.4*    106   CO2 20* 21*   * 241*   BUN 50* 48*   CREATININE 2.7* 2.8*   CALCIUM 9.8 9.0   PROT 8.2  --    ALBUMIN 3.7  --    BILITOT 0.4  --    ALKPHOS 87  --    AST 15  --    ALT 14  --    ANIONGAP 13 10   EGFRNONAA 22* 21*     Magnesium:   Recent Labs   Lab 12/24/18  0248   MG 1.8     POCT Glucose:   Recent Labs   Lab 12/24/18  0451 12/24/18  1224 12/24/18  1656   POCTGLUCOSE 225* 309* 227*       Significant Imaging: I have reviewed all pertinent imaging results/findings within the past 24 hours.

## 2018-12-24 NOTE — ED NOTES
Pt fails dysphagia screen. Able to tolerate a couple sips of water, but unable to swallow 1 tylenol pill. Spit up just after drinking water. Family informed that pt is NPO

## 2018-12-24 NOTE — ASSESSMENT & PLAN NOTE
Lab Results   Component Value Date    HGBA1C 7.3 (H) 11/28/2018   BS are reasonably controlled at home. He takes levemir 26 units qHS and lispro 10-12 units TIDWM at home. Will continue the levemir at 20 units qHS and give moderate dose SSI prn. Monitor accuchecks.

## 2018-12-24 NOTE — ASSESSMENT & PLAN NOTE
Acute respiratory insuffiencey  Will give ceftriaxone and azitrhomyin in place of the vancomycin and zosyn as he has a community acquired pneumonia with lower risk of MDR. Follow blood cultures. Not coughing much, so no sputum culture. A/A nebs. Supplemental oxygen as needed.

## 2018-12-24 NOTE — PLAN OF CARE
Problem: Adult Inpatient Plan of Care  Goal: Plan of Care Review  Outcome: Ongoing (interventions implemented as appropriate)   12/24/18 0450   Plan of Care Review   Plan of Care Reviewed With patient;daughter   POC reviewed with pt, verbalized understanding.  Appears to be tired.  Daughter at the bedside.   No complaint of pain throughout shift.  Temperature monitoring closely, tylenol given as needed.  This morning temp was 101.4.   On 2L oxygen via NC, o2 sat >96%.   IV antibiotic given.  NS infusing at 125 mL/hr for 8 hrs.  Condom catheter in place, no redness and discomfort noted.  Encouraged to turn frequently.  Fall precaution initiated, contract signed.  Safety maintained, free of falls throughout shift.  Instructed to call for any assistance.  WIll continue to monitor.

## 2018-12-24 NOTE — H&P
[facility] Hospital Medicine  History & Physical    Patient Name: Domingo Castro  MRN: [id number]  Admission Date: 12/23/2018  Attending Physician: Iam Ibarra MD  Primary Care Provider: Griselda Nugent MD         Patient information was obtained from patient, spouse/SO, past medical records and ER records.     Subjective:     Principal Problem:Bacterial pneumonia    Chief Complaint:   Chief Complaint   Patient presents with    Fatigue     c/o fatigue, SOB, chills, fever, and swelling to bilateral legs today. Also has been having drainage to left 1st toe wound for the past few days        HPI: Mr. Castro is a 72 yo BM with HTN, DM2, CKD4, and diastolic heart failure that presented to [facility] ED for evaluation of shaking and feeling cold this afternoon. 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 After getting home he went to sleep, as usual after [employer], and woke up feeling very cold. He reported chills and shaking as well. Reported a little cough on the way to the ED and some cough a week ago, but none earlier in the day. Also reports some nausea but no vomiting with decreased oral intake over the last day. He also noted that he was light headed earlier today. No sick contacts or recent travel. He was found to have a temperature of 103.2 F on arrival to the ED with pulse ox down to 90%. He was given vancomycin and zosyn in the ED.     Past Medical History:   Diagnosis Date    Arthritis     Cataract     CHF (congestive heart failure)     Coronary artery disease     Cystoid macular edema of both eyes     Diabetes mellitus     Diabetes mellitus type II     Hyperlipemia     Hypertension     Retinal hole     Stage 4 chronic kidney disease        Past Surgical History:   Procedure Laterality Date    BLEPHAROPLASTY Bilateral 8/30/2017    Performed by [facility] at [facility] OR [address] [place] [address]    CATARACT EXTRACTION W/  INTRAOCULAR LENS  IMPLANT Left 12/15/14    Dr martin    CATARACT EXTRACTION W/  INTRAOCULAR LENS IMPLANT Right 14    dorothy    CIRCUMCISION, NON-      focal laser right eye      INSERTION-INTRAOCULAR LENS (IOL) Right 2014    [facility]    INSERTION-INTRAOCULAR LENS (IOL) Left 12/15/2014    [facility] at [facility] OR    KNEE SURGERY      left    Left medial collateral ligament repair      PHACOEMULSIFICATION-ASPIRATION-CATARACT Right 2014    Performed by [facility] at [facility] OR    PHACOEMULSIFICATION-ASPIRATION-CATARACT Left 12/15/2014    Performed by [facility] at [facility] OR    REPAIR-PTOSIS/BILATERAL EXTERNAL LEVATORS Bilateral 2017    [facility] OR [address] [place] [address]    TONSILLECTOMY         Review of patient's allergies indicates:   Allergen Reactions    Atorvastatin Other (See Comments)       No current facility-administered medications on file prior to encounter.      Current Outpatient Medications on File Prior to Encounter   Medication Sig    acetaminophen (TYLENOL) 325 MG tablet Take 2 tablets (650 mg total) by mouth every 4 (four) hours as needed.    amLODIPine (NORVASC) 10 MG tablet Take 1 tablet (10 mg total) by mouth once daily.    aspirin (ECOTRIN) 81 MG EC tablet Take 1 tablet (81 mg total) by mouth once daily.    carvedilol (COREG) 25 MG tablet Take 1 tablet (25 mg total) by mouth 2 (two) times daily with meals.    furosemide (LASIX) 40 MG tablet Take 1 tablet (40 mg total) by mouth 2 (two) times daily. Do not take the pm dose after 3 pm    gabapentin (NEURONTIN) 100 MG capsule TAKE ONE CAPSULE BY MOUTH IN THE MORNING THEN ONE CAPSULE  IN THE EVENING AND THEN THREE CAPSULES AT BEDTIME    hydrALAZINE (APRESOLINE) 50 MG tablet TAKE ONE TABLET BY MOUTH EVERY 12 HOURS    insulin lispro (HUMALOG KWIKPEN INSULIN) 100 unit/mL InPn pen INJECT 10 UNITS SUBCUTANEOUSLY THREE TIMES DAILY BEFORE MEALS WITH  "CORRECTION SCALE, MAX TDD 50 UNITS    LEVEMIR FLEXTOUCH U-100 INSULN 100 unit/mL (3 mL) InPn pen Inject 26 Units into the skin every evening.    pen needle, diabetic, safety (BD AUTOSHIELD PEN NEEDLE) 29 gauge x 5/16" Ndle For use once daily with levemir flexpen    rosuvastatin (CRESTOR) 20 MG tablet Take 20 mg by mouth once daily.      Family History     Problem Relation (Age of Onset)    Cancer Mother, Brother, Sister    Cataracts Paternal Grandmother    Diabetes Father    Glaucoma Paternal Grandmother    Heart disease Mother, Father        Tobacco Use    Smoking status: Never Smoker    Smokeless tobacco: Never Used   Substance and Sexual Activity    Alcohol use: No     Alcohol/week: 0.0 oz    Drug use: No    Sexual activity: Yes     Partners: Female     Review of Systems   Constitutional: Positive for activity change, appetite change, chills, fatigue and fever.   HENT: Negative for congestion, hearing loss, nosebleeds, sore throat and tinnitus.    Eyes: Negative for discharge, itching and visual disturbance.   Respiratory: Positive for cough and shortness of breath. Negative for chest tightness.    Cardiovascular: Positive for leg swelling. Negative for chest pain and palpitations.   Gastrointestinal: Positive for constipation and nausea. Negative for abdominal pain, blood in stool, diarrhea and vomiting.   Endocrine: Negative for cold intolerance and polydipsia.   Genitourinary: Negative for difficulty urinating, dysuria and hematuria.   Musculoskeletal: Negative for arthralgias, myalgias and neck stiffness.   Skin: Negative for rash and wound.   Allergic/Immunologic: Negative for environmental allergies and immunocompromised state.   Neurological: Negative for dizziness, tremors and headaches.   Hematological: Does not bruise/bleed easily.   Psychiatric/Behavioral: Negative for agitation and confusion. The patient is not nervous/anxious.      Objective:     Vital Signs (Most Recent):  Temp: (!) 102.1 °F " (38.9 °C) (12/23/18 1941)  Pulse: 86 (12/23/18 1941)  Resp: 19 (12/23/18 1941)  BP: (!) 160/72 (12/23/18 1941)  SpO2: 96 % (12/23/18 1941) Vital Signs (24h Range):  Temp:  [102.1 °F (38.9 °C)-103.2 °F (39.6 °C)] 102.1 °F (38.9 °C)  Pulse:  [86-96] 86  Resp:  [17-27] 19  SpO2:  [92 %-96 %] 96 %  BP: (156-177)/() 160/72     Weight: 112.5 kg (248 lb)  Body mass index is 31 kg/m².    Physical Exam   Constitutional: He is oriented to person, place, and time. He appears well-developed and well-nourished. He is sleeping and cooperative. He is easily aroused. No distress. Nasal cannula in place.   HENT:   Head: Normocephalic and atraumatic.   Right Ear: External ear normal.   Left Ear: External ear normal.   Nose: No mucosal edema or sinus tenderness.   Mouth/Throat: Uvula is midline, oropharynx is clear and moist and mucous membranes are normal. No oropharyngeal exudate.   Eyes: Conjunctivae and EOM are normal. Pupils are equal, round, and reactive to light. No scleral icterus.   Neck: Phonation normal. Neck supple. No JVD present. No muscular tenderness present. Carotid bruit is not present. No tracheal deviation present.   Cardiovascular: Normal rate, regular rhythm, S1 normal and S2 normal.   No murmur heard.  Pulses:       Radial pulses are 2+ on the right side, and 2+ on the left side.   Pulmonary/Chest: Effort normal and breath sounds normal. No respiratory distress. He has no wheezes. He has no rales. He exhibits no tenderness and no crepitus.   Abdominal: Soft. Bowel sounds are normal. He exhibits no distension. There is no tenderness. There is no rebound and no guarding.   Musculoskeletal: He exhibits edema. He exhibits no tenderness.   Lymphadenopathy:        Right cervical: No superficial cervical adenopathy present.       Left cervical: No superficial cervical adenopathy present.        Right: No supraclavicular adenopathy present.        Left: No supraclavicular adenopathy present.   Neurological: He is  oriented to person, place, and time and easily aroused. He displays no tremor. He displays no seizure activity.   Skin: Skin is warm and dry. He is not diaphoretic. No cyanosis or erythema. No pallor. Nails show no clubbing.   Psychiatric: He has a normal mood and affect. His behavior is normal. Thought content normal.   Nursing note and vitals reviewed.        CRANIAL NERVES     CN III, IV, VI   Pupils are equal, round, and reactive to light.  Extraocular motions are normal.        Significant Labs:   A1C:   Recent Labs   Lab 08/09/18  1105 11/28/18  1033   HGBA1C 8.3* 7.3*     CBC:   Recent Labs   Lab 12/23/18  1739   WBC 12.04   HGB 10.6*   HCT 32.4*   *     CMP:   Recent Labs   Lab 12/23/18  1739      K 3.9      CO2 20*   *   BUN 50*   CREATININE 2.7*   CALCIUM 9.8   PROT 8.2   ALBUMIN 3.7   BILITOT 0.4   ALKPHOS 87   AST 15   ALT 14   ANIONGAP 13   EGFRNONAA 22*     Cardiac Markers:   Recent Labs   Lab 12/23/18  1739   BNP 94     Lactic Acid:   Recent Labs   Lab 12/23/18  1739   LACTATE 1.2     Troponin:   Recent Labs   Lab 12/23/18  1739   TROPONINI 0.010     Influenza swab: Negative    Significant Imaging: CXR: I have reviewed all pertinent results/findings within the past 24 hours and my personal findings are:  Very subtle LLL infiltrate, no other significant consolidation. No evidence of pulmonary edema.   EKG: I have reviewed all pertinent results/findings within the past 24 hours and my personal findings are: NSR at 93 bpm, incomplete RBBB, LAFB, no ST-T wave changes.     Assessment/Plan:     * Bacterial pneumonia    Acute respiratory insuffiencey  Will give ceftriaxone and azitrhomyin in place of the vancomycin and zosyn as he has a community acquired pneumonia with lower risk of MDR. Follow blood cultures. Not coughing much, so no sputum culture. A/A nebs. Supplemental oxygen as needed.      Diabetic neuropathy associated with type 2 diabetes mellitus    Continue gabapentin.         CKD (chronic kidney disease) stage 4, GFR 15-29 ml/min    Metabolic bone disease  Creatinine is at his baseline currently. Avoid nephrotoxins. Monitor renal function.        Chronic diastolic congestive heart failure    Not sure that he is having an acute exacerbation of his CHF at this time with normal BNP and no pulmonary edema. He does have significant BLE edema and reports taking an extra furosemide today because his weight increased. Will give furosemide 60 mg IV daily here and monitor UOP. Daily weights. Saw Dr. Tovar on 12/3 and he ordered stress test to evaluate for evidence of ischemia as a cause of his ABAD. If he remains in the hospital a few days, will consider ordering the test here to evaluate that. Last TTE (11/30/28) with LVEF 60% and indeterminate LV diastolic function. Had diastolic dysfunction present on TTE in 7/18.        Anemia of chronic renal failure, stage 4 (severe)    Stable at baseline. Monitor.        Dyslipidemia    Continue home rosuvastatin.      Essential hypertension    BP is okay here. Will continue home carvedilol 25 mg BID, amlodipine 10 mg daily. Hold home hydralazine 50 mg BID. Give furosemide IV. Monitor BP.      Type 2 diabetes mellitus with stage 4 chronic kidney disease, with long-term current use of insulin    Lab Results   Component Value Date    HGBA1C 7.3 (H) 11/28/2018   BS are reasonably controlled at home. He takes levemir 26 units qHS and lispro 10-12 units TIDWM at home. Will continue the levemir at 20 units qHS and give moderate dose SSI prn. Monitor accuchecks.          VTE Risk Mitigation (From admission, onward)    None             Iam Ibarra MD  Department of Hospital Medicine   [facility]

## 2018-12-24 NOTE — CONSULTS
[facility]  Podiatry  Consult Note    Patient Name: Domingo Castro  MRN: [id number]  Admission Date: 12/23/2018  Hospital Length of Stay: 1 days  Attending Physician: Iam Ibarra MD  Primary Care Provider: Griselda Nugent MD     Inpatient consult to Podiatry  Consult performed by: Dimitry Gallegos DPM  Consult ordered by: Iam Ibarra MD        Subjective:     History of Present Illness:  Mr. Castro is a 74 yo BM with HTN, DM2, CKD4, and diastolic heart failure being treated for suspected pneumonia. Wound was discovered on left great toe per Hospital Medicine and purulent drainage expressed. Patient relates he had a friend taking care of the wound for him present for several weeks.         Scheduled Meds:   albuterol-ipratropium  3 mL Nebulization Q4H    amLODIPine  10 mg Oral Daily    aspirin  81 mg Oral Daily    [START ON 12/25/2018] cefTRIAXone (ROCEPHIN) IVPB  2 g Intravenous Q24H    And    azithromycin  500 mg Intravenous Q24H    carvedilol  25 mg Oral BID WM    gabapentin  100 mg Oral BID WM    gabapentin  300 mg Oral QHS    heparin (porcine)  5,000 Units Subcutaneous Q8H    insulin detemir U-100  20 Units Subcutaneous QHS    rosuvastatin  20 mg Oral Daily     Continuous Infusions:  PRN Meds:acetaminophen, dextrose 50%, dextrose 50%, glucagon (human recombinant), glucose, glucose, HYDROcodone-acetaminophen, insulin aspart U-100, ondansetron, prochlorperazine, sodium chloride 0.9%    Review of patient's allergies indicates:   Allergen Reactions    Atorvastatin Other (See Comments)        Past Medical History:   Diagnosis Date    Arthritis     Cataract     CHF (congestive heart failure)     Coronary artery disease     Cystoid macular edema of both eyes     Diabetes mellitus     Diabetes mellitus type II     Hyperlipemia     Hypertension     Retinal hole     Stage 4 chronic kidney disease      Past Surgical History:   Procedure Laterality Date     BLEPHAROPLASTY Bilateral 2017    Performed by [facility] OR [facility]    CATARACT EXTRACTION W/  INTRAOCULAR LENS IMPLANT Left 12/15/14    Dr martin    CATARACT EXTRACTION W/  INTRAOCULAR LENS IMPLANT Right 14    dorothy    CIRCUMCISION, NON-      focal laser right eye      INSERTION-INTRAOCULAR LENS (IOL) Right 2014    [facility] OR    INSERTION-INTRAOCULAR LENS (IOL) Left 12/15/2014    [facility] at [facility] OR    KNEE SURGERY      left    Left medial collateral ligament repair      PHACOEMULSIFICATION-ASPIRATION-CATARACT Right 2014    [facility] at [facility] OR    PHACOEMULSIFICATION-ASPIRATION-CATARACT Left 12/15/2014    [facility] at [facility] OR    REPAIR-PTOSIS/BILATERAL EXTERNAL LEVATORS Bilateral 2017    Performed by [facility] at [facility] OR [facility]    TONSILLECTOMY         Family History     Problem Relation (Age of Onset)    Cancer Mother, Brother, Sister    Cataracts Paternal Grandmother    Diabetes Father    Glaucoma Paternal Grandmother    Heart disease Mother, Father        Tobacco Use    Smoking status: Never Smoker    Smokeless tobacco: Never Used   Substance and Sexual Activity    Alcohol use: No     Alcohol/week: 0.0 oz    Drug use: No    Sexual activity: Yes     Partners: Female     Review of Systems   Constitutional: Positive for chills. Negative for fever.   HENT: Negative for congestion and hearing loss.    Respiratory: Negative for cough and shortness of breath.    Gastrointestinal: Negative for nausea and vomiting.   Skin: Positive for wound.   Neurological: Negative for dizziness.   Psychiatric/Behavioral: Negative for agitation.     Objective:     Vital Signs (Most Recent):  Temp: (!) 101.9 °F (38.8 °C) (18 1234)  Pulse: 73 (18 1234)  Resp: 20 (18 1223)  BP: (!) 141/67 (18 1223)  SpO2: (!) 94 % (18 1109) Vital Signs (24h  Range):  Temp:  [100 °F (37.8 °C)-103.2 °F (39.6 °C)] 101.9 °F (38.8 °C)  Pulse:  [68-96] 73  Resp:  [16-27] 20  SpO2:  [92 %-98 %] 94 %  BP: (133-177)/() 141/67     Weight: 112.1 kg (247 lb 3.2 oz)  Body mass index is 30.9 kg/m².    Foot Exam    General  General Appearance: appears stated age and healthy   Orientation: alert and oriented to person, place, and time   Affect: appropriate       Right Foot/Ankle     Inspection and Palpation  Skin Exam: no drainage, skin not intact and no cellulitis     Neurovascular  Dorsalis pedis: 1+  Posterior tibial: 1+      Left Foot/Ankle      Inspection and Palpation  Skin Exam: skin intact; no cellulitis     Neurovascular  Dorsalis pedis: 1+  Posterior tibial: 1+            Laboratory:  A1C:   Recent Labs   Lab 08/09/18  1105 11/28/18  1033   HGBA1C 8.3* 7.3*     Blood Cultures:   Recent Labs   Lab 12/23/18  1735 12/23/18  1738   LABBLOO Gram stain aer bottle: Gram positive cocci  Gram stain jamal bottle: Gram positive cocci  Results called to and read back by: Radha Mejia RN  12/24/2018  10:05 Gram stain aer bottle: Gram positive cocci  Gram stain jamal bottle: Gram positive cocci  Results called to and read back by: Radha Mejia RN  12/24/2018  10:05     CBC:   Recent Labs   Lab 12/24/18  0248   WBC 21.25*   RBC 3.29*   HGB 9.3*   HCT 28.6*   *   MCV 87   MCH 28.3   MCHC 32.5     CMP:   Recent Labs   Lab 12/23/18  1739 12/24/18  0248   * 241*   CALCIUM 9.8 9.0   ALBUMIN 3.7  --    PROT 8.2  --     137   K 3.9 3.4*   CO2 20* 21*    106   BUN 50* 48*   CREATININE 2.7* 2.8*   ALKPHOS 87  --    ALT 14  --    AST 15  --    BILITOT 0.4  --      Wound Cultures: No results for input(s): LABAERO in the last 4320 hours.    Diagnostic Results:  X-Ray: I have reviewed all pertinent results/findings within the past 24 hours.  Left foot xray did not show any focal bony destructive changes, gas or foreign bodies    Clinical Findings:  There was 0.5x0.8x0.2cm  wound plantar left hallux with mixed granular and fibrous base, scant serous drainage, no erythema, does not probe, track or undermine.             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 Wound c&s taken. Melgisorb Ag applied with aquacel foam dressing. No clinical signs of infection, doubt source of infection. Local wound care orders written for nursing. Requires Darco shoe to help offload toe. Recommend follow up within 2-3 weeks of discharge. Recommend  for dressing changes.     Diabetic neuropathy associated with type 2 diabetes mellitus    Managed per Hospital Medicine.     Pneumonia of left lower lobe due to infectious organism    Managed per Hospital Medicine.     Chronic diastolic congestive heart failure    Managed per Hospital Medicine.     Type 2 diabetes mellitus with stage 4 chronic kidney disease, with long-term current use of insulin    Managed per Hospital medicine.         Thank you for your consult. I will sign off. Please contact us if you have any additional questions.    Dimitry Gallegos DPM  Podiatry  [facility]

## 2018-12-24 NOTE — SUBJECTIVE & OBJECTIVE
Scheduled Meds:   albuterol-ipratropium  3 mL Nebulization Q4H    amLODIPine  10 mg Oral Daily    aspirin  81 mg Oral Daily    [START ON 2018] cefTRIAXone (ROCEPHIN) IVPB  2 g Intravenous Q24H    And    azithromycin  500 mg Intravenous Q24H    carvedilol  25 mg Oral BID WM    gabapentin  100 mg Oral BID WM    gabapentin  300 mg Oral QHS    heparin (porcine)  5,000 Units Subcutaneous Q8H    insulin detemir U-100  20 Units Subcutaneous QHS    rosuvastatin  20 mg Oral Daily     Continuous Infusions:  PRN Meds:acetaminophen, dextrose 50%, dextrose 50%, glucagon (human recombinant), glucose, glucose, HYDROcodone-acetaminophen, insulin aspart U-100, ondansetron, prochlorperazine, sodium chloride 0.9%    Review of patient's allergies indicates:   Allergen Reactions    Atorvastatin Other (See Comments)        Past Medical History:   Diagnosis Date    Arthritis     Cataract     CHF (congestive heart failure)     Coronary artery disease     Cystoid macular edema of both eyes     Diabetes mellitus     Diabetes mellitus type II     Hyperlipemia     Hypertension     Retinal hole     Stage 4 chronic kidney disease      Past Surgical History:   Procedure Laterality Date    BLEPHAROPLASTY Bilateral 2017    Performed by [facility] at [facility] OR 1ST FLR    CATARACT EXTRACTION W/  INTRAOCULAR LENS IMPLANT Left 12/15/14    Dr martin    CATARACT EXTRACTION W/  INTRAOCULAR LENS IMPLANT Right 14    dorothy    CIRCUMCISION, NON-      focal laser right eye      INSERTION-INTRAOCULAR LENS (IOL) Right 2014    Per[facility] at [facility] OR    INSERTION-INTRAOCULAR LENS (IOL) Left 12/15/2014    Performed by [facility] at [facility] OR    KNEE SURGERY      left    Left medial collateral ligament repair      PHACOEMULSIFICATION-ASPIRATION-CATARACT Right 2014    Performed by [facility] at [facility] OR    PHACOEMULSIFICATION-ASPIRATION-CATARACT Left 12/15/2014     Performed by [facility] at [facility] OR    REPAIR-PTOSIS/BILATERAL EXTERNAL LEVATORS Bilateral 8/30/2017    Per[facility] at [facility] OR 1ST FLR    TONSILLECTOMY         Family History     Problem Relation (Age of Onset)    Cancer Mother, Brother, Sister    Cataracts Paternal Grandmother    Diabetes Father    Glaucoma Paternal Grandmother    Heart disease Mother, Father        Tobacco Use    Smoking status: Never Smoker    Smokeless tobacco: Never Used   Substance and Sexual Activity    Alcohol use: No     Alcohol/week: 0.0 oz    Drug use: No    Sexual activity: Yes     Partners: Female     Review of Systems   Constitutional: Positive for chills. Negative for fever.   HENT: Negative for congestion and hearing loss.    Respiratory: Negative for cough and shortness of breath.    Gastrointestinal: Negative for nausea and vomiting.   Skin: Positive for wound.   Neurological: Negative for dizziness.   Psychiatric/Behavioral: Negative for agitation.     Objective:     Vital Signs (Most Recent):  Temp: (!) 101.9 °F (38.8 °C) (12/24/18 1234)  Pulse: 73 (12/24/18 1234)  Resp: 20 (12/24/18 1223)  BP: (!) 141/67 (12/24/18 1223)  SpO2: (!) 94 % (12/24/18 1109) Vital Signs (24h Range):  Temp:  [100 °F (37.8 °C)-103.2 °F (39.6 °C)] 101.9 °F (38.8 °C)  Pulse:  [68-96] 73  Resp:  [16-27] 20  SpO2:  [92 %-98 %] 94 %  BP: (133-177)/() 141/67     Weight: 112.1 kg (247 lb 3.2 oz)  Body mass index is 30.9 kg/m².    Foot Exam    General  General Appearance: appears stated age and healthy   Orientation: alert and oriented to person, place, and time   Affect: appropriate       Right Foot/Ankle     Inspection and Palpation  Skin Exam: no drainage, skin not intact and no cellulitis     Neurovascular  Dorsalis pedis: 1+  Posterior tibial: 1+      Left Foot/Ankle      Inspection and Palpation  Skin Exam: skin intact; no cellulitis     Neurovascular  Dorsalis pedis: 1+  Posterior tibial:  1+            Laboratory:  A1C:   Recent Labs   Lab 08/09/18  1105 11/28/18  1033   HGBA1C 8.3* 7.3*     Blood Cultures:   Recent Labs   Lab 12/23/18  1735 12/23/18  1738   LABBLOO Gram stain aer bottle: Gram positive cocci  Gram stain jamal bottle: Gram positive cocci  Results called to and read back by: Radha Mejia RN  12/24/2018  10:05 Gram stain aer bottle: Gram positive cocci  Gram stain jamal bottle: Gram positive cocci  Results called to and read back by: Radha Mejia RN  12/24/2018  10:05     CBC:   Recent Labs   Lab 12/24/18  0248   WBC 21.25*   RBC 3.29*   HGB 9.3*   HCT 28.6*   *   MCV 87   MCH 28.3   MCHC 32.5     CMP:   Recent Labs   Lab 12/23/18  1739 12/24/18  0248   * 241*   CALCIUM 9.8 9.0   ALBUMIN 3.7  --    PROT 8.2  --     137   K 3.9 3.4*   CO2 20* 21*    106   BUN 50* 48*   CREATININE 2.7* 2.8*   ALKPHOS 87  --    ALT 14  --    AST 15  --    BILITOT 0.4  --      Wound Cultures: No results for input(s): LABAERO in the last 4320 hours.    Diagnostic Results:  X-Ray: I have reviewed all pertinent results/findings within the past 24 hours.  Left foot xray did not show any focal bony destructive changes, gas or foreign bodies    Clinical Findings:  There was 0.5x0.8x0.2cm wound plantar left hallux with mixed granular and fibrous base, scant serous drainage, no erythema, does not probe, track or undermine.

## 2018-12-24 NOTE — PROGRESS NOTES
[facility] Hospital Medicine  Progress Note    Patient Name: Domingo Castro  MRN: [id number]  Patient Class: IP- Inpatient   Admission Date: 12/23/2018  Length of Stay: 1 days  Attending Physician: Iam Ibarra MD  Primary Care Provider: Griselda Nugent MD        Subjective:     Principal Problem:Bacterial pneumonia    HPI:  Mr. Castro is a 72 yo BM with HTN, DM2, CKD4, and diastolic heart failure that presented to [facility] ED for evaluation of shaking and feeling cold this afternoon. 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 After getting home he went to sleep, as usual after [employer], and woke up feeling very cold. He reported chills and shaking as well. Reported a little cough on the way to the ED and some cough a week ago, but none earlier in the day. Also reports some nausea but no vomiting with decreased oral intake over the last day. He also noted that he was light headed earlier today. No sick contacts or recent travel. He was found to have a temperature of 103.2 F on arrival to the ED with pulse ox down to 90%. He was given vancomycin and zosyn in the ED.     Hospital Course:  No notes on file    Interval History: Says that he is feeling better today, but had fever essentially since admission. Still with no significant cough. BCx are positive with GPC in chains. Eating well.     Review of Systems   Constitutional: Positive for fever. Negative for chills.   Respiratory: Positive for shortness of breath. Negative for cough.    Cardiovascular: Negative for chest pain and palpitations.   Gastrointestinal: Negative for nausea and vomiting.     Objective:     Vital Signs (Most Recent):  Temp: (!) 100.8 °F (38.2 °C) (12/24/18 1657)  Pulse: 77 (12/24/18 1657)  Resp: 20 (12/24/18 1657)  BP: 128/61 (12/24/18 1657)  SpO2: (!) 92 % (12/24/18 1533) Vital Signs (24h Range):  Temp:  [100 °F (37.8 °C)-103.4 °F (39.7 °C)] 100.8 °F (38.2 °C)  Pulse:   [68-96] 77  Resp:  [16-27] 20  SpO2:  [91 %-98 %] 92 %  BP: (128-177)/() 128/61     Weight: 112.1 kg (247 lb 3.2 oz)  Body mass index is 30.9 kg/m².    Intake/Output Summary (Last 24 hours) at 12/24/2018 1710  Last data filed at 12/24/2018 1429  Gross per 24 hour   Intake 350 ml   Output 650 ml   Net -300 ml      Physical Exam   Constitutional: He is oriented to person, place, and time. He appears well-developed and well-nourished. No distress.   Cardiovascular: Normal rate and regular rhythm.   No murmur heard.  Pulmonary/Chest: Effort normal and breath sounds normal. No respiratory distress.   Abdominal: Soft. Bowel sounds are normal. He exhibits no distension. There is no tenderness.   Musculoskeletal: He exhibits no edema or tenderness.   Neurological: He is alert and oriented to person, place, and time.   Skin: Skin is warm and dry.   Psychiatric: He has a normal mood and affect. His behavior is normal.   Nursing note and vitals reviewed.      Significant Labs:   Blood Culture:   Recent Labs   Lab 12/23/18  1735 12/23/18  1738   LABBLOO Gram stain aer bottle: Gram positive cocci  Gram stain jamal bottle: Gram positive cocci  Results called to and read back by: Radha Mejia RN  12/24/2018  10:05 Gram stain aer bottle: Gram positive cocci  Gram stain jamal bottle: Gram positive cocci  Results called to and read back by: Radha Mejia RN  12/24/2018  10:05     CBC:   Recent Labs   Lab 12/23/18  1739 12/24/18  0248   WBC 12.04 21.25*   HGB 10.6* 9.3*   HCT 32.4* 28.6*   * 112*     CMP:   Recent Labs   Lab 12/23/18  1739 12/24/18  0248    137   K 3.9 3.4*    106   CO2 20* 21*   * 241*   BUN 50* 48*   CREATININE 2.7* 2.8*   CALCIUM 9.8 9.0   PROT 8.2  --    ALBUMIN 3.7  --    BILITOT 0.4  --    ALKPHOS 87  --    AST 15  --    ALT 14  --    ANIONGAP 13 10   EGFRNONAA 22* 21*     Magnesium:   Recent Labs   Lab 12/24/18  0248   MG 1.8     POCT Glucose:   Recent Labs   Lab 12/24/18  3822  12/24/18  1224 12/24/18  1656   POCTGLUCOSE 225* 309* 227*       Significant Imaging: I have reviewed all pertinent imaging results/findings within the past 24 hours.    Assessment/Plan:      * Bacterial pneumonia    Acute respiratory insuffiencey  GPC bacteremia  Continue ceftriaxone and azitrhomyin for now. Will restart vancomycin given the continued fever and blood culture results. Repeat blood culture in AM.  A/A nebs. Supplemental oxygen as needed.      Thrombocytopenia, unspecified    Stable.        Hypokalemia    Give KCl.        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        Diabetic neuropathy associated with type 2 diabetes mellitus    Continue gabapentin.        CKD (chronic kidney disease) stage 4, GFR 15-29 ml/min    Metabolic bone disease  Creatinine is at his baseline currently. Avoid nephrotoxins. Monitor renal function.        Chronic diastolic congestive heart failure    Not sure that he is having an acute exacerbation of his CHF at this time with normal BNP and no pulmonary edema. He does have significant BLE edema and reports taking an extra furosemide today because his weight increased. Will give furosemide 60 mg IV daily here and monitor UOP. Daily weights. Saw Dr. Tovar on 12/3 and he ordered stress test to evaluate for evidence of ischemia as a cause of his ABAD. If he remains in the hospital a few days, will consider ordering the test here to evaluate that. Last TTE (11/30/28) with LVEF 60% and indeterminate LV diastolic function. Had diastolic dysfunction present on TTE in 7/18.        Anemia of chronic renal failure, stage 4 (severe)    Stable at baseline. Monitor.        Dyslipidemia    Continue home rosuvastatin.      Essential hypertension    SBP ranged 133 to 177. Will continue home carvedilol 25 mg BID, amlodipine 10 mg daily. Hold home hydralazine 50 mg BID. Give furosemide 40 mg IV daily. Monitor BP.      Type 2 diabetes mellitus  with stage 4 chronic kidney disease, with long-term current use of insulin    Lab Results   Component Value Date    HGBA1C 7.3 (H) 11/28/2018   BS are reasonably controlled at home. He takes levemir 26 units qHS and lispro 10-12 units TIDWM at home. Increase levemir to 25 units qHS. Start aspart 5 units TIDWM. Continue moderate dose SSI prn. Monitor accuchecks. BS ranged 225 to 234.          VTE Risk Mitigation (From admission, onward)        Ordered     heparin (porcine) injection 5,000 Units  Every 8 hours      12/23/18 2124     IP VTE HIGH RISK PATIENT  Once      12/23/18 2124              Iam Ibarra MD  Department of Hospital Medicine   [facility]

## 2018-12-24 NOTE — ASSESSMENT & PLAN NOTE
SBP ranged 133 to 177. Will continue home carvedilol 25 mg BID, amlodipine 10 mg daily. Hold home hydralazine 50 mg BID. Give furosemide 40 mg IV daily. Monitor BP.

## 2018-12-24 NOTE — ASSESSMENT & PLAN NOTE
Lab Results   Component Value Date    HGBA1C 7.3 (H) 11/28/2018   BS are reasonably controlled at home. He takes levemir 26 units qHS and lispro 10-12 units TIDWM at home. Increase levemir to 25 units qHS. Start aspart 5 units TIDWM. Continue moderate dose SSI prn. Monitor accuchecks. BS ranged 225 to 234.

## 2018-12-24 NOTE — ASSESSMENT & PLAN NOTE
Not sure that he is having an acute exacerbation of his CHF at this time with normal BNP and no pulmonary edema. He does have significant BLE edema and reports taking an extra furosemide today because his weight increased. Will give furosemide 60 mg IV daily here and monitor UOP. Daily weights. Saw Dr. Tovar on 12/3 and he ordered stress test to evaluate for evidence of ischemia as a cause of his ABAD. If he remains in the hospital a few days, will consider ordering the test here to evaluate that. Last TTE (11/30/28) with LVEF 60% and indeterminate LV diastolic function. Had diastolic dysfunction present on TTE in 7/18.

## 2018-12-24 NOTE — ED NOTES
Pt arrives from home with his family c/o fatigue, SOB, chills, fever, and swelling to bilateral legs today. Wife states he was feeling fine this morning, but began feeling bad at about 1500 today. States pt weighs himself daily, and pt reports he has been having some weight gain in the past few days, so he took 2 lasix pills this afternoon. 4+ pitting edema noted to BLE. Breath sounds coarse with expiratory wheezes noted throughout chest. Skin is hot, dry. Pt is febrile, with 103.2 oral temp at this time. Pt is extremely weak and lethargic. Is able to stand with assistance after being awakened multiple times. When awakened pt is oriented x4. Wife states pt has also been having drainage from a wound on the bottom of his left 1st toe for the past few days. A family member has been dressing the wound daily. Pt also has a small skin tear to his right shin. Pt denies pain when awakened.

## 2018-12-24 NOTE — PROGRESS NOTES
"Vancomycin Dosing and Monitoring Pharmacy Protocol    Domingo Castro is a 73 y.o. male    Height: 6' 3" (1.905 m)   Wt Readings from Last 3 Encounters:   12/23/18 112.5 kg (248 lb)   12/03/18 106.7 kg (235 lb 3.2 oz)   11/28/18 108.6 kg (239 lb 6.7 oz)       Temp Readings from Last 3 Encounters:   12/23/18 (!) 103.1 °F (39.5 °C) (Oral)   11/28/18 98 °F (36.7 °C) (Oral)   08/20/18 98.5 °F (36.9 °C) (Oral)      Lab Results   Component Value Date/Time    WBC 12.04 12/23/2018 05:39 PM    WBC 6.83 08/24/2018 01:23 PM    WBC 10.69 07/27/2018 08:44 PM      Lab Results   Component Value Date/Time    CREATININE 2.7 (H) 12/23/2018 05:39 PM    CREATININE 2.7 (H) 11/28/2018 10:33 AM    CREATININE 2.3 (H) 11/01/2018 03:24 PM      Lab Results   Component Value Date/Time    LACTATE 1.2 12/23/2018 05:39 PM       Serum creatinine: 2.7 mg/dL (H) 12/23/18 1739  Estimated creatinine clearance: 33 mL/min (A)    Antibiotics (From admission, onward)      Start     Stop Route Frequency Ordered    12/24/18 1900  vancomycin (VANCOCIN) 1,250 mg in dextrose 5 % 250 mL IVPB      -- IV Every 24 hours (non-standard times) 12/23/18 1848    12/23/18 1900  vancomycin (VANCOCIN) 3,000 mg in dextrose 5 % 500 mL IVPB      12/24 0659 IV ED 1 Time 12/23/18 1804          Antifungals (From admission, onward)      None            Microbiology Results (last 7 days)       Procedure Component Value Units Date/Time    Blood culture x two cultures. Draw prior to antibiotics. [[id number]] Collected:  12/23/18 1735    Order Status:  Sent Specimen:  Blood from Peripheral, Hand, Right Updated:  12/23/18 1739    Blood culture x two cultures. Draw prior to antibiotics. [[id number]] Collected:  12/23/18 1738    Order Status:  Sent Specimen:  Blood from Peripheral, Forearm, Left Updated:  12/23/18 3059            Indication/Target trough:   Sepsis (Target trough: 15-20mcg/ml)    Hemodialysis:   N/A    Dosing Weight:   Wt Readings from Last 1 Encounters:   12/23/18 112.5 " kg (248 lb)       Last Vancomycin dose: N/A   Date/Time given: N/A         Vancomycin level:  No results for input(s): VANCOMYCIN-TROUGH in the last 72 hours.  No results for input(s): VANCOMYCIN, RANDOM in the last 72 hours.    Per Protocol Initial/Adjustments Dosin. Initial/Adjustment Dose: Loading dose = 3000 mg x1, followed by Maintenance dose of 1250 mg q24hr to be given at 18 at 1900  date/time  2. Vancomycin Trough Level will be drawn on 18 at 1800 date/time     Pharmacy will continue to follow.    Please contact if you have any further questions. Thank you.    Blas Oro, PharmD  [phone]

## 2018-12-24 NOTE — SUBJECTIVE & OBJECTIVE
Past Medical History:   Diagnosis Date    Arthritis     Cataract     CHF (congestive heart failure)     Coronary artery disease     Cystoid macular edema of both eyes     Diabetes mellitus     Diabetes mellitus type II     Hyperlipemia     Hypertension     Retinal hole     Stage 4 chronic kidney disease        Past Surgical History:   Procedure Laterality Date    BLEPHAROPLASTY Bilateral 2017    Performed by [facility]no [facility] at [facility] OR 1ST FLR    CATARACT EXTRACTION W/  INTRAOCULAR LENS IMPLANT Left 12/15/14    Dr martin    CATARACT EXTRACTION W/  INTRAOCULAR LENS IMPLANT Right 14    dorothy    CIRCUMCISION, NON-      focal laser right eye      INSERTION-INTRAOCULAR LENS (IOL) Right 2014    Performed by [facility] at [facility]    INSERTION-INTRAOCULAR LENS (IOL) Left 12/15/2014    Performed by [facility] at [facility]    KNEE SURGERY      left    Left medial collateral ligament repair      PHACOEMULSIFICATION-ASPIRATION-CATARACT Right 2014    Performed by [facility] at [facility]    PHACOEMULSIFICATION-ASPIRATION-CATARACT Left 12/15/2014    Performed by [facility] at [facility]    REPAIR-PTOSIS/BILATERAL EXTERNAL LEVATORS Bilateral 2017    Performed by Jane [facility]no [facility] at [facility] OR 1ST FLR    TONSILLECTOMY         Review of patient's allergies indicates:   Allergen Reactions    Atorvastatin Other (See Comments)       No current facility-administered medications on file prior to encounter.      Current Outpatient Medications on File Prior to Encounter   Medication Sig    acetaminophen (TYLENOL) 325 MG tablet Take 2 tablets (650 mg total) by mouth every 4 (four) hours as needed.    amLODIPine (NORVASC) 10 MG tablet Take 1 tablet (10 mg total) by mouth once daily.    aspirin (ECOTRIN) 81 MG EC tablet Take 1 tablet (81 mg total) by mouth once daily.    carvedilol (COREG) 25 MG tablet Take 1 tablet (25 mg total) by mouth 2 (two) times daily with  "meals.    furosemide (LASIX) 40 MG tablet Take 1 tablet (40 mg total) by mouth 2 (two) times daily. Do not take the pm dose after 3 pm    gabapentin (NEURONTIN) 100 MG capsule TAKE ONE CAPSULE BY MOUTH IN THE MORNING THEN ONE CAPSULE  IN THE EVENING AND THEN THREE CAPSULES AT BEDTIME    hydrALAZINE (APRESOLINE) 50 MG tablet TAKE ONE TABLET BY MOUTH EVERY 12 HOURS    insulin lispro (HUMALOG KWIKPEN INSULIN) 100 unit/mL InPn pen INJECT 10 UNITS SUBCUTANEOUSLY THREE TIMES DAILY BEFORE MEALS WITH CORRECTION SCALE, MAX TDD 50 UNITS    LEVEMIR FLEXTOUCH U-100 INSULN 100 unit/mL (3 mL) InPn pen Inject 26 Units into the skin every evening.    pen needle, diabetic, safety (Tapatap AUTOSHIELD PEN NEEDLE) 29 gauge x 5/16" Ndle For use once daily with levemir flexpen    rosuvastatin (CRESTOR) 20 MG tablet Take 20 mg by mouth once daily.      Family History     Problem Relation (Age of Onset)    Cancer Mother, Brother, Sister    Cataracts Paternal Grandmother    Diabetes Father    Glaucoma Paternal Grandmother    Heart disease Mother, Father        Tobacco Use    Smoking status: Never Smoker    Smokeless tobacco: Never Used   Substance and Sexual Activity    Alcohol use: No     Alcohol/week: 0.0 oz    Drug use: No    Sexual activity: Yes     Partners: Female     Review of Systems   Constitutional: Positive for activity change, appetite change, chills, fatigue and fever.   HENT: Negative for congestion, hearing loss, nosebleeds, sore throat and tinnitus.    Eyes: Negative for discharge, itching and visual disturbance.   Respiratory: Positive for cough and shortness of breath. Negative for chest tightness.    Cardiovascular: Positive for leg swelling. Negative for chest pain and palpitations.   Gastrointestinal: Positive for constipation and nausea. Negative for abdominal pain, blood in stool, diarrhea and vomiting.   Endocrine: Negative for cold intolerance and polydipsia.   Genitourinary: Negative for difficulty " urinating, dysuria and hematuria.   Musculoskeletal: Negative for arthralgias, myalgias and neck stiffness.   Skin: Negative for rash and wound.   Allergic/Immunologic: Negative for environmental allergies and immunocompromised state.   Neurological: Negative for dizziness, tremors and headaches.   Hematological: Does not bruise/bleed easily.   Psychiatric/Behavioral: Negative for agitation and confusion. The patient is not nervous/anxious.      Objective:     Vital Signs (Most Recent):  Temp: (!) 102.1 °F (38.9 °C) (12/23/18 1941)  Pulse: 86 (12/23/18 1941)  Resp: 19 (12/23/18 1941)  BP: (!) 160/72 (12/23/18 1941)  SpO2: 96 % (12/23/18 1941) Vital Signs (24h Range):  Temp:  [102.1 °F (38.9 °C)-103.2 °F (39.6 °C)] 102.1 °F (38.9 °C)  Pulse:  [86-96] 86  Resp:  [17-27] 19  SpO2:  [92 %-96 %] 96 %  BP: (156-177)/() 160/72     Weight: 112.5 kg (248 lb)  Body mass index is 31 kg/m².    Physical Exam   Constitutional: He is oriented to person, place, and time. He appears well-developed and well-nourished. He is sleeping and cooperative. He is easily aroused. No distress. Nasal cannula in place.   HENT:   Head: Normocephalic and atraumatic.   Right Ear: External ear normal.   Left Ear: External ear normal.   Nose: No mucosal edema or sinus tenderness.   Mouth/Throat: Uvula is midline, oropharynx is clear and moist and mucous membranes are normal. No oropharyngeal exudate.   Eyes: Conjunctivae and EOM are normal. Pupils are equal, round, and reactive to light. No scleral icterus.   Neck: Phonation normal. Neck supple. No JVD present. No muscular tenderness present. Carotid bruit is not present. No tracheal deviation present.   Cardiovascular: Normal rate, regular rhythm, S1 normal and S2 normal.   No murmur heard.  Pulses:       Radial pulses are 2+ on the right side, and 2+ on the left side.   Pulmonary/Chest: Effort normal and breath sounds normal. No respiratory distress. He has no wheezes. He has no rales. He  exhibits no tenderness and no crepitus.   Abdominal: Soft. Bowel sounds are normal. He exhibits no distension. There is no tenderness. There is no rebound and no guarding.   Musculoskeletal: He exhibits edema. He exhibits no tenderness.   Lymphadenopathy:        Right cervical: No superficial cervical adenopathy present.       Left cervical: No superficial cervical adenopathy present.        Right: No supraclavicular adenopathy present.        Left: No supraclavicular adenopathy present.   Neurological: He is oriented to person, place, and time and easily aroused. He displays no tremor. He displays no seizure activity.   Skin: Skin is warm and dry. He is not diaphoretic. No cyanosis or erythema. No pallor. Nails show no clubbing.   Psychiatric: He has a normal mood and affect. His behavior is normal. Thought content normal.   Nursing note and vitals reviewed.        CRANIAL NERVES     CN III, IV, VI   Pupils are equal, round, and reactive to light.  Extraocular motions are normal.        Significant Labs:   A1C:   Recent Labs   Lab 08/09/18  1105 11/28/18  1033   HGBA1C 8.3* 7.3*     CBC:   Recent Labs   Lab 12/23/18  1739   WBC 12.04   HGB 10.6*   HCT 32.4*   *     CMP:   Recent Labs   Lab 12/23/18  1739      K 3.9      CO2 20*   *   BUN 50*   CREATININE 2.7*   CALCIUM 9.8   PROT 8.2   ALBUMIN 3.7   BILITOT 0.4   ALKPHOS 87   AST 15   ALT 14   ANIONGAP 13   EGFRNONAA 22*     Cardiac Markers:   Recent Labs   Lab 12/23/18  1739   BNP 94     Lactic Acid:   Recent Labs   Lab 12/23/18  1739   LACTATE 1.2     Troponin:   Recent Labs   Lab 12/23/18  1739   TROPONINI 0.010     Influenza swab: Negative    Significant Imaging: CXR: I have reviewed all pertinent results/findings within the past 24 hours and my personal findings are:  Very subtle LLL infiltrate, no other significant consolidation. No evidence of pulmonary edema.   EKG: I have reviewed all pertinent results/findings within the past 24  hours and my personal findings are: NSR at 93 bpm, incomplete RBBB, LAFB, no ST-T wave changes.

## 2018-12-24 NOTE — ED NOTES
"Report received from RANJIT Oconnell. Assumed care of pt at this time. Pt aaox3. rr even and unlabored on 2LNC. Pt reports "I feel much better than when I came in." pt with 4+ pitting edema noted to bilateral LE. Pt with a pressure ulcer noted to great toe on left foot. Pt denies cp, sob, headache, abdominal pain or n/v at this time. Will continue to monitor.  "

## 2018-12-24 NOTE — NURSING
Informed Dr. Ibarra that pt came from ER with external catheter without order.  Pt is weak and often missing control, asked okay to leave in place.  MD stated okay to put the order.

## 2018-12-24 NOTE — ASSESSMENT & PLAN NOTE
Acute respiratory insuffiencey  GPC bacteremia  Continue ceftriaxone and azitrhomyin for now. Will restart vancomycin given the continued fever and blood culture results. Repeat blood culture in AM.  A/A nebs. Supplemental oxygen as needed.

## 2018-12-24 NOTE — ED PROVIDER NOTES
Encounter Date: 2018       History     Chief Complaint   Patient presents with    Fatigue     c/o fatigue, SOB, chills, fever, and swelling to bilateral legs today. Also has been having drainage to right 1st toe wound for the past few days     72 yo male with PMH of DM, HLD, HTN, Stage 4 CKD presents to the ED with complaints of sudden onset of fatigue, shortness of breath, chills, fever, and weakness x 1-2 hours prior to arrival. He has also had pedal edema x several days and took 40mg lasix around 3pm today. Also admits to mild intermittent cough x 1-2 days. Fever is subjective, he has not taken his temperature. He states he has a diabetic ulcer on his left great toe and has seen his podiatrist and wound care for this, denies changes in drainage. Denies sputum production, decreased urine production, dysuria, chest pain, nausea, vomiting, abdominal pain.      The history is provided by the patient and the spouse. No  was used.     Review of patient's allergies indicates:   Allergen Reactions    Atorvastatin Other (See Comments)     Past Medical History:   Diagnosis Date    Cataract     Cystoid macular edema of both eyes     Diabetes mellitus     Diabetes mellitus type II     Hyperlipemia     Hypertension     Retinal hole     Stage 4 chronic kidney disease      Past Surgical History:   Procedure Laterality Date    BLEPHAROPLASTY Bilateral 2017    [facility]formed by [facility] at [facility] 1ST FLR    CATARACT EXTRACTION W/  INTRAOCULAR LENS IMPLANT Left 12/15/14    Dr martin    CATARACT EXTRACTION W/  INTRAOCULAR LENS IMPLANT Right 14    dorothy    CIRCUMCISION, NON-      focal laser right eye      INSERTION-INTRAOCULAR LENS (IOL) Right 2014    Performed [facility] at [facility] [place]    INSERTION-INTRAOCULAR LENS (IOL) Left 12/15/2014    Performed by [facility] at [facility] [place]    KNEE SURGERY      left    Left medial collateral ligament repair       PHACOEMULSIFICATION-ASPIRATION-CATARACT Right 12/29/2014    Performed by [facility] at [facility]    PHACOEMULSIFICATION-ASPIRATION-CATARACT Left 12/15/2014    Performed by [facility] at [facility] [place]    REPAIR-PTOSIS/BILATERAL EXTERNAL LEVATORS Bilateral 8/30/2017    [facility]formed [facility] at [facility] 1ST FLR    TONSILLECTOMY       Family History   Problem Relation Age of Onset    Heart disease Mother     Cancer Mother     Cancer Brother     Heart disease Father     Diabetes Father     Cancer Sister     Cataracts Paternal Grandmother     Glaucoma Paternal Grandmother     Blindness Neg Hx     Amblyopia Neg Hx     Hypertension Neg Hx     Macular degeneration Neg Hx     Retinal detachment Neg Hx     Strabismus Neg Hx      Social History     Tobacco Use    Smoking status: Never Smoker    Smokeless tobacco: Never Used   Substance Use Topics    Alcohol use: No     Alcohol/week: 0.0 oz    Drug use: No     Review of Systems   Constitutional: Positive for chills and fever.   HENT: Negative for congestion.    Respiratory: Positive for cough and shortness of breath.    Cardiovascular: Positive for leg swelling. Negative for chest pain.   Gastrointestinal: Negative for abdominal pain, diarrhea, nausea and vomiting.   Genitourinary: Negative for decreased urine volume, dysuria and hematuria.   Skin: Positive for wound.   Neurological: Negative for headaches.   All other systems reviewed and are negative.      Physical Exam     Initial Vitals [12/23/18 1724]   BP Pulse Resp Temp SpO2   (!) 177/83 94 (!) 24 (!) 103.2 °F (39.6 °C) (!) 92 %      MAP       --         Physical Exam    Nursing note and vitals reviewed.  Constitutional: He appears well-developed and well-nourished. He is cooperative. He appears ill. No distress.   HENT:   Head: Normocephalic and atraumatic.   Nose: Nose normal.   Eyes: Conjunctivae are normal.   Neck: Normal range of motion.   Cardiovascular: Normal rate, regular  rhythm, normal heart sounds and intact distal pulses.   4+ pitting edema to the level of the mid shin.    Pulmonary/Chest: Effort normal. Tachypnea noted. He has wheezes (throughout bilaterally but great on left). He has rales (throughout bilaterally but greater on left).   O2 sats at 93% ORA   Abdominal: Soft. Bowel sounds are normal. There is no tenderness.   Musculoskeletal: Normal range of motion.        Feet:    Stage 2 diabetic ulcer to plantar surface of left great toe approximately .75 x.75cm in diameter, minimal erythema surround, draining serous fluid   Neurological: He is alert and oriented to person, place, and time.   Skin: Skin is warm and dry.   Psychiatric: He has a normal mood and affect. His speech is normal and behavior is normal. Judgment and thought content normal. Cognition and memory are normal.         ED Course   Procedures  Labs Reviewed   CBC W/ AUTO DIFFERENTIAL - Abnormal; Notable for the following components:       Result Value    RBC 3.76 (*)     Hemoglobin 10.6 (*)     Hematocrit 32.4 (*)     Platelets 122 (*)     Gran # (ANC) 10.9 (*)     Lymph # 0.8 (*)     Mono # 0.1 (*)     Gran% 90.3 (*)     Lymph% 7.0 (*)     Mono% 1.2 (*)     All other components within normal limits   COMPREHENSIVE METABOLIC PANEL - Abnormal; Notable for the following components:    CO2 20 (*)     Glucose 225 (*)     BUN, Bld 50 (*)     Creatinine 2.7 (*)     eGFR if  26 (*)     eGFR if non  22 (*)     All other components within normal limits   CULTURE, BLOOD   CULTURE, BLOOD   LACTIC ACID, PLASMA   B-TYPE NATRIURETIC PEPTIDE   TROPONIN I   LACTIC ACID, PLASMA   URINALYSIS, REFLEX TO URINE CULTURE          Imaging Results          X-Ray Chest AP Portable (Final result)  Result time 12/23/18 18:07:31    Final result by Lilliana Rasheed MD (12/23/18 18:07:31)                 Impression:      Subtle focal airspace opacity in the left lower lung zone could reflect aspiration or  developing pneumonia in the right clinical setting.  No focal consolidation seen.      Electronically signed by: Lilliana Rasheed MD  Date:    12/23/2018  Time:    18:07             Narrative:    EXAMINATION:  XR CHEST AP PORTABLE    CLINICAL HISTORY:  Sepsis;    TECHNIQUE:  Single frontal view of the chest was performed.    COMPARISON:  07/19/2018.    FINDINGS:  Heart is mildly enlarged but stable in size.  Lungs are symmetrically expanded.  Mild asymmetric airspace opacity is seen in the left lung base.  No evidence of consolidation.  No evidence of pneumothorax or significant effusion.  No acute osseous abnormality identified.                                 Medical Decision Making:   Initial Assessment:   74 yo male with dyspnea, fatigue, weakness, chills x today, bilateral pedal edema x 3-4 days. Diabetic ulcer to left great toe x several days, has seen wound care.   Tachypnea with Wheezing and rales noted bilaterally but worse on left. Febrile at 103.1F. 4+ pedal edema to the level of the mid shin. O2 sats at 92% ORA, 94% on 3L nasal canula.  Differential Diagnosis:   Sepsis, PE, pneumonia, UTI, wound infection, CHF exacerbation   Clinical Tests:   Lab Tests: Ordered and Reviewed  Radiological Study: Ordered and Reviewed  Medical Tests: Ordered and Reviewed  ED Management:  Labs, imaging, EKG, O2, fluids, antibiotics ordered.   WBC normal but granulocyte elevated at 90%. Chest x-ray shows Subtle focal airspace opacity in the left lower lung zone could reflect aspiration or developing pneumonia in the right clinical setting.  6:37 PM  [facility] hospitalist paged. Temperature has not improved with tylenol suppository.   6:56 PM  Discussed patient with Dr Ibarra, who will admit for further treatment. Flu swap ordered.   Other:   I have discussed this case with another health care provider.                   ED Course as of Dec 23 1837   Sun Dec 23, 2018   1825 EKG interpretation:  NSR, rate 93, no ST changes or  obvious ischemia, normal intervals.  Stable from prior EKG 07/2018. EKG 12-lead [SS]      ED Course User Index  [SS] Maximiliano Hillman MD     Clinical Impression:   The primary encounter diagnosis was Pneumonia of left lower lobe due to infectious organism. Diagnoses of Fatigue, Foot pain, right, and Left foot pain were also pertinent to this visit.                             Shira Calderon PA-C  12/23/18 2021       Shira Calderon PA-C  12/23/18 2023

## 2018-12-25 PROBLEM — D72.829 LEUKOCYTOSIS: Status: ACTIVE | Noted: 2018-12-25

## 2018-12-25 PROBLEM — E87.1 HYPONATREMIA: Status: ACTIVE | Noted: 2018-12-25

## 2018-12-25 PROBLEM — E87.6 HYPOKALEMIA: Status: RESOLVED | Noted: 2018-12-24 | Resolved: 2018-12-25

## 2018-12-25 LAB
ANION GAP SERPL CALC-SCNC: 9 MMOL/L
BASOPHILS # BLD AUTO: 0.02 K/UL
BASOPHILS NFR BLD: 0.1 %
BUN SERPL-MCNC: 52 MG/DL
CALCIUM SERPL-MCNC: 9 MG/DL
CHLORIDE SERPL-SCNC: 103 MMOL/L
CO2 SERPL-SCNC: 22 MMOL/L
CREAT SERPL-MCNC: 3.1 MG/DL
DIFFERENTIAL METHOD: ABNORMAL
EOSINOPHIL # BLD AUTO: 0 K/UL
EOSINOPHIL NFR BLD: 0.1 %
ERYTHROCYTE [DISTWIDTH] IN BLOOD BY AUTOMATED COUNT: 13.9 %
EST. GFR  (AFRICAN AMERICAN): 22 ML/MIN/1.73 M^2
EST. GFR  (NON AFRICAN AMERICAN): 19 ML/MIN/1.73 M^2
GLUCOSE SERPL-MCNC: 229 MG/DL
HCT VFR BLD AUTO: 27.4 %
HGB BLD-MCNC: 9 G/DL
LYMPHOCYTES # BLD AUTO: 1.6 K/UL
LYMPHOCYTES NFR BLD: 9.1 %
MAGNESIUM SERPL-MCNC: 1.8 MG/DL
MCH RBC QN AUTO: 28.8 PG
MCHC RBC AUTO-ENTMCNC: 32.8 G/DL
MCV RBC AUTO: 88 FL
MONOCYTES # BLD AUTO: 0.7 K/UL
MONOCYTES NFR BLD: 3.9 %
NEUTROPHILS # BLD AUTO: 14.9 K/UL
NEUTROPHILS NFR BLD: 86.1 %
PHOSPHATE SERPL-MCNC: 3 MG/DL
PLATELET # BLD AUTO: 105 K/UL
PMV BLD AUTO: 11.9 FL
POCT GLUCOSE: 179 MG/DL (ref 70–110)
POCT GLUCOSE: 184 MG/DL (ref 70–110)
POCT GLUCOSE: 185 MG/DL (ref 70–110)
POCT GLUCOSE: 216 MG/DL (ref 70–110)
POTASSIUM SERPL-SCNC: 3.5 MMOL/L
RBC # BLD AUTO: 3.13 M/UL
SODIUM SERPL-SCNC: 134 MMOL/L
WBC # BLD AUTO: 17.34 K/UL

## 2018-12-25 PROCEDURE — 80048 BASIC METABOLIC PNL TOTAL CA: CPT

## 2018-12-25 PROCEDURE — 11000001 HC ACUTE MED/SURG PRIVATE ROOM

## 2018-12-25 PROCEDURE — 84100 ASSAY OF PHOSPHORUS: CPT

## 2018-12-25 PROCEDURE — 85025 COMPLETE CBC W/AUTO DIFF WBC: CPT

## 2018-12-25 PROCEDURE — 27000221 HC OXYGEN, UP TO 24 HOURS

## 2018-12-25 PROCEDURE — 87040 BLOOD CULTURE FOR BACTERIA: CPT

## 2018-12-25 PROCEDURE — 36415 COLL VENOUS BLD VENIPUNCTURE: CPT

## 2018-12-25 PROCEDURE — 25000003 PHARM REV CODE 250: Performed by: HOSPITALIST

## 2018-12-25 PROCEDURE — 25000242 PHARM REV CODE 250 ALT 637 W/ HCPCS: Performed by: HOSPITALIST

## 2018-12-25 PROCEDURE — 94761 N-INVAS EAR/PLS OXIMETRY MLT: CPT

## 2018-12-25 PROCEDURE — S0077 INJECTION, CLINDAMYCIN PHOSP: HCPCS | Performed by: HOSPITALIST

## 2018-12-25 PROCEDURE — 83735 ASSAY OF MAGNESIUM: CPT

## 2018-12-25 PROCEDURE — 63600175 PHARM REV CODE 636 W HCPCS: Performed by: HOSPITALIST

## 2018-12-25 PROCEDURE — 94640 AIRWAY INHALATION TREATMENT: CPT

## 2018-12-25 RX ORDER — CLINDAMYCIN PHOSPHATE 150 MG/ML
900 INJECTION, SOLUTION INTRAVENOUS
Status: DISCONTINUED | OUTPATIENT
Start: 2018-12-25 | End: 2018-12-25

## 2018-12-25 RX ORDER — CLINDAMYCIN PHOSPHATE 900 MG/50ML
900 INJECTION, SOLUTION INTRAVENOUS
Status: DISCONTINUED | OUTPATIENT
Start: 2018-12-25 | End: 2018-12-26

## 2018-12-25 RX ORDER — POTASSIUM CHLORIDE 20 MEQ/1
20 TABLET, EXTENDED RELEASE ORAL ONCE
Status: COMPLETED | OUTPATIENT
Start: 2018-12-25 | End: 2018-12-25

## 2018-12-25 RX ADMIN — INSULIN ASPART 2 UNITS: 100 INJECTION, SOLUTION INTRAVENOUS; SUBCUTANEOUS at 08:12

## 2018-12-25 RX ADMIN — ACETAMINOPHEN 650 MG: 325 TABLET ORAL at 06:12

## 2018-12-25 RX ADMIN — IPRATROPIUM BROMIDE AND ALBUTEROL SULFATE 3 ML: .5; 3 SOLUTION RESPIRATORY (INHALATION) at 07:12

## 2018-12-25 RX ADMIN — GABAPENTIN 100 MG: 100 CAPSULE ORAL at 08:12

## 2018-12-25 RX ADMIN — INSULIN ASPART 5 UNITS: 100 INJECTION, SOLUTION INTRAVENOUS; SUBCUTANEOUS at 11:12

## 2018-12-25 RX ADMIN — ROSUVASTATIN CALCIUM 20 MG: 10 TABLET, FILM COATED ORAL at 08:12

## 2018-12-25 RX ADMIN — IPRATROPIUM BROMIDE AND ALBUTEROL SULFATE 3 ML: .5; 3 SOLUTION RESPIRATORY (INHALATION) at 11:12

## 2018-12-25 RX ADMIN — PROCHLORPERAZINE EDISYLATE 5 MG: 5 INJECTION INTRAMUSCULAR; INTRAVENOUS at 05:12

## 2018-12-25 RX ADMIN — HEPARIN SODIUM 5000 UNITS: 5000 INJECTION, SOLUTION INTRAVENOUS; SUBCUTANEOUS at 11:12

## 2018-12-25 RX ADMIN — HEPARIN SODIUM 5000 UNITS: 5000 INJECTION, SOLUTION INTRAVENOUS; SUBCUTANEOUS at 02:12

## 2018-12-25 RX ADMIN — INSULIN DETEMIR 25 UNITS: 100 INJECTION, SOLUTION SUBCUTANEOUS at 09:12

## 2018-12-25 RX ADMIN — FUROSEMIDE 40 MG: 10 INJECTION, SOLUTION INTRAMUSCULAR; INTRAVENOUS at 08:12

## 2018-12-25 RX ADMIN — INSULIN ASPART 5 UNITS: 100 INJECTION, SOLUTION INTRAVENOUS; SUBCUTANEOUS at 08:12

## 2018-12-25 RX ADMIN — GABAPENTIN 100 MG: 100 CAPSULE ORAL at 05:12

## 2018-12-25 RX ADMIN — CARVEDILOL 25 MG: 25 TABLET, FILM COATED ORAL at 08:12

## 2018-12-25 RX ADMIN — CLINDAMYCIN IN 5 PERCENT DEXTROSE 900 MG: 18 INJECTION, SOLUTION INTRAVENOUS at 11:12

## 2018-12-25 RX ADMIN — HEPARIN SODIUM 5000 UNITS: 5000 INJECTION, SOLUTION INTRAVENOUS; SUBCUTANEOUS at 06:12

## 2018-12-25 RX ADMIN — CEFTRIAXONE 2 G: 2 INJECTION, SOLUTION INTRAVENOUS at 09:12

## 2018-12-25 RX ADMIN — ONDANSETRON 4 MG: 2 INJECTION INTRAMUSCULAR; INTRAVENOUS at 05:12

## 2018-12-25 RX ADMIN — IPRATROPIUM BROMIDE AND ALBUTEROL SULFATE 3 ML: .5; 3 SOLUTION RESPIRATORY (INHALATION) at 04:12

## 2018-12-25 RX ADMIN — CARVEDILOL 25 MG: 25 TABLET, FILM COATED ORAL at 05:12

## 2018-12-25 RX ADMIN — ACETAMINOPHEN 650 MG: 325 TABLET ORAL at 08:12

## 2018-12-25 RX ADMIN — ASPIRIN 81 MG: 81 TABLET, COATED ORAL at 08:12

## 2018-12-25 RX ADMIN — GABAPENTIN 300 MG: 300 CAPSULE ORAL at 09:12

## 2018-12-25 RX ADMIN — CLINDAMYCIN IN 5 PERCENT DEXTROSE 900 MG: 18 INJECTION, SOLUTION INTRAVENOUS at 02:12

## 2018-12-25 RX ADMIN — CEFTRIAXONE 2 G: 2 INJECTION, SOLUTION INTRAVENOUS at 08:12

## 2018-12-25 RX ADMIN — Medication 500 MG: at 09:12

## 2018-12-25 RX ADMIN — AMLODIPINE BESYLATE 10 MG: 5 TABLET ORAL at 08:12

## 2018-12-25 RX ADMIN — POTASSIUM CHLORIDE 20 MEQ: 20 TABLET, EXTENDED RELEASE ORAL at 06:12

## 2018-12-25 NOTE — PLAN OF CARE
Problem: Adult Inpatient Plan of Care  Goal: Plan of Care Review  Outcome: Ongoing (interventions implemented as appropriate)  Plan of care reviewed with patient. Verbal reported of understanding. No falls or injuries throughout the shift. Temperature closely monitor. Temperature elevated to 103.2 one time. Dr. Ibarra aware. Tylenol given per ordered.  IV antibiotic given as ordered. Left great toe wound care done with gel put on as ordered. Dressing in place. Condom catheter changed and the new one in place with no redness, swelling, or skin breakdown noted. Turned Q2H. Encouraged to turn frequently and educated to call her help. Neuro Check Q4H. Call light within reach. No acute distress noted. Bed on lowest position with alarm set. Head of bed elevated. Side rails x 2 are up. Patient will be monitored over night.

## 2018-12-25 NOTE — NURSING
Transferred patient to room 424 with wheelchair. Patient tolerated well. No complaints of SOB, dizziness or nausea. O2 2LPM NC on. New condom catheter in place. No acute distress noted. Will continue to monitor.

## 2018-12-25 NOTE — PROGRESS NOTES
"Vancomycin Dosing and Monitoring Pharmacy Protocol    Domingo Castro is a 73 y.o. male    Height: 6' 3" (1.905 m)   Wt Readings from Last 3 Encounters:   12/24/18 112.1 kg (247 lb 3.2 oz)   12/03/18 106.7 kg (235 lb 3.2 oz)   11/28/18 108.6 kg (239 lb 6.7 oz)       Temp Readings from Last 3 Encounters:   12/24/18 (!) 100.8 °F (38.2 °C)   11/28/18 98 °F (36.7 °C) (Oral)   08/20/18 98.5 °F (36.9 °C) (Oral)      Lab Results   Component Value Date/Time    WBC 21.25 (H) 12/24/2018 02:48 AM    WBC 12.04 12/23/2018 05:39 PM    WBC 6.83 08/24/2018 01:23 PM      Lab Results   Component Value Date/Time    CREATININE 2.8 (H) 12/24/2018 02:48 AM    CREATININE 2.7 (H) 12/23/2018 05:39 PM    CREATININE 2.7 (H) 11/28/2018 10:33 AM      Lab Results   Component Value Date/Time    LACTATE 0.8 12/23/2018 09:45 PM    LACTATE 1.2 12/23/2018 05:39 PM       Serum creatinine: 2.8 mg/dL (H) 12/24/18 0248  Estimated creatinine clearance: 31.7 mL/min (A)    Antibiotics (From admission, onward)      Start     Stop Route Frequency Ordered    12/25/18 0900  cefTRIAXone (ROCEPHIN) 2 g in dextrose 5 % 50 mL IVPB  (Ceftriaxone IV/ Azithromycin IV Panel)      -- IV Every 24 hours (non-standard times) 12/24/18 1024    12/24/18 2130  azithromycin 500 mg in dextrose 5 % 250 mL IVPB (ready to mix system)  (Ceftriaxone IV/ Azithromycin IV Panel)      -- IV Every 24 hours (non-standard times) 12/24/18 1024    12/24/18 2045  vancomycin (VANCOCIN) 1,250 mg in dextrose 5 % 250 mL IVPB      -- IV Once 12/24/18 1939          Antifungals (From admission, onward)      None            Microbiology Results (last 7 days)       Procedure Component Value Units Date/Time    Aerobic culture [[id number]] Collected:  12/24/18 1250    Order Status:  Sent Specimen:  Wound from Toe, Left Foot Updated:  12/24/18 1650    Blood culture x two cultures. Draw prior to antibiotics. [[id number]] Collected:  12/23/18 8458    Order Status:  Completed Specimen:  Blood from Peripheral, " Forearm, Right Updated:  18 1006     Blood Culture, Routine Gram stain aer bottle: Gram positive cocci     Blood Culture, Routine Gram stain jamal bottle: Gram positive cocci     Blood Culture, Routine Results called to and read back by: Radha Mejia RN  2018  10:05    Narrative:       Aerobic and anaerobic    Blood culture x two cultures. Draw prior to antibiotics. [[id number]] Collected:  18 1735    Order Status:  Completed Specimen:  Blood from Peripheral, Hand, Right Updated:  18 1005     Blood Culture, Routine Gram stain aer bottle: Gram positive cocci     Blood Culture, Routine Gram stain jamal bottle: Gram positive cocci     Blood Culture, Routine Results called to and read back by: Radha Mejia RN  2018  10:05    Narrative:       Aerobic and anaerobic            Indication/Target trough:   Bacteremia (Target trough: 15-20mcg/ml)    Hemodialysis:   N/A    Dosing Weight:   Wt Readings from Last 1 Encounters:   18 112.1 kg (247 lb 3.2 oz)       Last Vancomycin dose: 3000 mg   Date/Time given: 18 @ 1840          Vancomycin level:  No results for input(s): VANCOMYCIN-TROUGH in the last 72 hours.  Recent Labs   Lab Result Units 18  1614   Vancomycin, Random ug/mL 18.3       Per Protocol Initial/Adjustments Dosin. Initial/Adjustment Dose: Pulse dosing of Vanco 1250mg (contingent on levels)   2. Vancomycin Random Level will be drawn on 18@1900 date/time     Pharmacy will continue to follow.    Please contact if you have any further questions. Thank you.    Cathleen Rodrigues, PharmD  [phone]

## 2018-12-25 NOTE — SUBJECTIVE & OBJECTIVE
Interval History: Continues to have fever and was up to 104 F overnight. Having a little more cough. Still with chills and gets tired with the fever.     Review of Systems   Constitutional: Positive for chills and fever.   Respiratory: Positive for cough. Negative for shortness of breath.    Cardiovascular: Negative for chest pain and palpitations.   Gastrointestinal: Negative for nausea and vomiting.     Objective:     Vital Signs (Most Recent):  Temp: (!) 101.3 °F (38.5 °C) (12/25/18 1613)  Pulse: 77 (12/25/18 1618)  Resp: 16 (12/25/18 1618)  BP: 133/64 (12/25/18 1613)  SpO2: (!) 91 % (12/25/18 1626) Vital Signs (24h Range):  Temp:  [99.1 °F (37.3 °C)-104 °F (40 °C)] 101.3 °F (38.5 °C)  Pulse:  [68-87] 77  Resp:  [16-20] 16  SpO2:  [88 %-95 %] 91 %  BP: (131-179)/(64-92) 133/64     Weight: 110 kg (242 lb 8.1 oz)  Body mass index is 30.31 kg/m².    Intake/Output Summary (Last 24 hours) at 12/25/2018 1741  Last data filed at 12/25/2018 1435  Gross per 24 hour   Intake 560 ml   Output 1924 ml   Net -1364 ml      Physical Exam   Constitutional: He is oriented to person, place, and time. He appears well-developed and well-nourished. No distress.   Cardiovascular: Normal rate and regular rhythm.   No murmur heard.  Pulmonary/Chest: Effort normal and breath sounds normal. No respiratory distress.   Abdominal: Soft. Bowel sounds are normal. He exhibits no distension. There is no tenderness.   Musculoskeletal: He exhibits no edema or tenderness.   Neurological: He is alert and oriented to person, place, and time.   Skin: Skin is warm and dry.   Psychiatric: He has a normal mood and affect. His behavior is normal.   Nursing note and vitals reviewed.      Significant Labs:   Blood Culture:   Recent Labs   Lab 12/25/18  0351   LABBLOO No Growth to date     CBC:   Recent Labs   Lab 12/24/18  0248 12/25/18  0351   WBC 21.25* 17.34*   HGB 9.3* 9.0*   HCT 28.6* 27.4*   * 105*     CMP:   Recent Labs   Lab 12/24/18  024  12/25/18  0351    134*   K 3.4* 3.5    103   CO2 21* 22*   * 229*   BUN 48* 52*   CREATININE 2.8* 3.1*   CALCIUM 9.0 9.0   ANIONGAP 10 9   EGFRNONAA 21* 19*     Magnesium:   Recent Labs   Lab 12/24/18  0248 12/25/18  0351   MG 1.8 1.8     POCT Glucose:   Recent Labs   Lab 12/25/18  0533 12/25/18  1035 12/25/18  1608   POCTGLUCOSE 216* 184* 179*       Significant Imaging: I have reviewed all pertinent imaging results/findings within the past 24 hours.

## 2018-12-25 NOTE — ASSESSMENT & PLAN NOTE
Metabolic bone disease  Creatinine is up to 3.1 today. Hold IV furosemide.  Avoid nephrotoxins. Monitor renal function.

## 2018-12-25 NOTE — PLAN OF CARE
Problem: Adult Inpatient Plan of Care  Goal: Plan of Care Review  Outcome: Ongoing (interventions implemented as appropriate)   12/25/18 1817   Plan of Care Review   Plan of Care Reviewed With patient;spouse;family   Patient awake, alert and oriented. VSS. Patient said he is starting to feel better. I removed condom cath from patient was leaking. Patient is incontinent, will continue to monitor. No complaints of pain. Tylenol given for fever. IV antibiotics for infection. Receiving neb treatments, tolerating well. Bed alarm remains on, call light in reach, family at the bedside.

## 2018-12-25 NOTE — ASSESSMENT & PLAN NOTE
Lab Results   Component Value Date    HGBA1C 7.3 (H) 11/28/2018   BS are reasonably controlled at home. He takes levemir 26 units qHS and lispro 10-12 units TIDWM at home. Continue levemir 25 units qHS. Continue aspart 5 units TIDWM. Continue moderate dose SSI prn. Monitor accuchecks. BS ranged 256 to 340.

## 2018-12-25 NOTE — NURSING
Notified Dr. Ibarra that pt's temp at 2100 was 104, afterward pt had an episode of vomiting around 2120, and pt received Zofran.  Pt stated nauseated at current time.  Pt had last tylenol at 1244.  Also, informed that pt had total 1950mg of tylenol in 24hrs, asked if MD wants to give any different medication for his temp due to pt's kidney function.  MD stated he rather stick with tylenol for the pt's temp.  MD ordered to put suppository tylenol 650mg one time dose for now.

## 2018-12-25 NOTE — ASSESSMENT & PLAN NOTE
Due to Strep pyogenes  Acute respiratory insuffiencey  Strep pyogenes bacteremia  Continue ceftriaxone but increase dose to 2 gram BID. Add clindamycin 900 mg IV TID. Continue azitrhomyin 500 mg IV daily for now. Stop vancomycin today. Repeated blood cultures this AM.  A/A nebs. Supplemental oxygen as needed.

## 2018-12-25 NOTE — PROGRESS NOTES
[facility] Hospital Medicine  Progress Note    Patient Name: Domingo Castro  MRN: [id number]  Patient Class: IP- Inpatient   Admission Date: 12/23/2018  Length of Stay: 2 days  Attending Physician: Iam Ibarra MD  Primary Care Provider: Griselda Nugent MD        Subjective:     Principal Problem:Bacterial pneumonia    HPI:  Mr. Castro is a 72 yo BM with HTN, DM2, CKD4, and diastolic heart failure that presented to [facility] ED for evaluation of shaking and feeling cold this afternoon. 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 After getting home he went to sleep, as usual after [employer], and woke up feeling very cold. He reported chills and shaking as well. Reported a little cough on the way to the ED and some cough a week ago, but none earlier in the day. Also reports some nausea but no vomiting with decreased oral intake over the last day. He also noted that he was light headed earlier today. No sick contacts or recent travel. He was found to have a temperature of 103.2 F on arrival to the ED with pulse ox down to 90%. He was given vancomycin and zosyn in the ED.     Hospital Course:  No notes on file    Interval History: Continues to have fever and was up to 104 F overnight. Having a little more cough. Still with chills and gets tired with the fever.     Review of Systems   Constitutional: Positive for chills and fever.   Respiratory: Positive for cough. Negative for shortness of breath.    Cardiovascular: Negative for chest pain and palpitations.   Gastrointestinal: Negative for nausea and vomiting.     Objective:     Vital Signs (Most Recent):  Temp: (!) 101.3 °F (38.5 °C) (12/25/18 1613)  Pulse: 77 (12/25/18 1618)  Resp: 16 (12/25/18 1618)  BP: 133/64 (12/25/18 1613)  SpO2: (!) 91 % (12/25/18 1626) Vital Signs (24h Range):  Temp:  [99.1 °F (37.3 °C)-104 °F (40 °C)] 101.3 °F (38.5 °C)  Pulse:  [68-87] 77  Resp:  [16-20] 16  SpO2:  [88 %-95  %] 91 %  BP: (131-179)/(64-92) 133/64     Weight: 110 kg (242 lb 8.1 oz)  Body mass index is 30.31 kg/m².    Intake/Output Summary (Last 24 hours) at 12/25/2018 1741  Last data filed at 12/25/2018 1435  Gross per 24 hour   Intake 560 ml   Output 1924 ml   Net -1364 ml      Physical Exam   Constitutional: He is oriented to person, place, and time. He appears well-developed and well-nourished. No distress.   Cardiovascular: Normal rate and regular rhythm.   No murmur heard.  Pulmonary/Chest: Effort normal and breath sounds normal. No respiratory distress.   Abdominal: Soft. Bowel sounds are normal. He exhibits no distension. There is no tenderness.   Musculoskeletal: He exhibits no edema or tenderness.   Neurological: He is alert and oriented to person, place, and time.   Skin: Skin is warm and dry.   Psychiatric: He has a normal mood and affect. His behavior is normal.   Nursing note and vitals reviewed.      Significant Labs:   Blood Culture:   Recent Labs   Lab 12/25/18  0351   LABBLOO No Growth to date     CBC:   Recent Labs   Lab 12/24/18  0248 12/25/18  0351   WBC 21.25* 17.34*   HGB 9.3* 9.0*   HCT 28.6* 27.4*   * 105*     CMP:   Recent Labs   Lab 12/24/18  0248 12/25/18  0351    134*   K 3.4* 3.5    103   CO2 21* 22*   * 229*   BUN 48* 52*   CREATININE 2.8* 3.1*   CALCIUM 9.0 9.0   ANIONGAP 10 9   EGFRNONAA 21* 19*     Magnesium:   Recent Labs   Lab 12/24/18  0248 12/25/18  0351   MG 1.8 1.8     POCT Glucose:   Recent Labs   Lab 12/25/18  0533 12/25/18  1035 12/25/18  1608   POCTGLUCOSE 216* 184* 179*       Significant Imaging: I have reviewed all pertinent imaging results/findings within the past 24 hours.    Assessment/Plan:      * Bacterial pneumonia    Due to Strep pyogenes  Acute respiratory insuffiencey  Strep pyogenes bacteremia  Continue ceftriaxone but increase dose to 2 gram BID. Add clindamycin 900 mg IV TID. Continue azitrhomyin 500 mg IV daily for now. Stop vancomycin  today. Repeated blood cultures this AM.  A/A nebs. Supplemental oxygen as needed.      Hyponatremia    Monitor     Thrombocytopenia, unspecified    Stable.        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        Diabetic neuropathy associated with type 2 diabetes mellitus    Continue gabapentin.        CKD (chronic kidney disease) stage 4, GFR 15-29 ml/min    Metabolic bone disease  Creatinine is up to 3.1 today. Hold IV furosemide.  Avoid nephrotoxins. Monitor renal function.        Chronic diastolic congestive heart failure    Hold lasix with continued fever and creatinine up to 3.1 today. Daily weights. Saw Dr. Tovar on 12/3 and he ordered stress test to evaluate for evidence of ischemia as a cause of his ABAD. If he remains in the hospital a few days, will consider ordering the test here to evaluate that. Last TTE (11/30/28) with LVEF 60% and indeterminate LV diastolic function. Had diastolic dysfunction present on TTE in 7/18.        Anemia of chronic renal failure, stage 4 (severe)    Stable at baseline. Monitor.        Dyslipidemia    Continue home rosuvastatin.      Essential hypertension    SBP ranged 128 to 151. Will continue home carvedilol 25 mg BID, amlodipine 10 mg daily. Hold home hydralazine 50 mg BID.  Monitor BP.      Type 2 diabetes mellitus with stage 4 chronic kidney disease, with long-term current use of insulin    Lab Results   Component Value Date    HGBA1C 7.3 (H) 11/28/2018   BS are reasonably controlled at home. He takes levemir 26 units qHS and lispro 10-12 units TIDWM at home. Continue levemir 25 units qHS. Continue aspart 5 units TIDWM. Continue moderate dose SSI prn. Monitor accuchecks. BS ranged 256 to 340.        VTE Risk Mitigation (From admission, onward)        Ordered     heparin (porcine) injection 5,000 Units  Every 8 hours      12/23/18 2124     IP VTE HIGH RISK PATIENT  Once      12/23/18 2124              Iam Ibarra,  MD  Department of [facility] Medicine   [facility]

## 2018-12-25 NOTE — ASSESSMENT & PLAN NOTE
SBP ranged 128 to 151. Will continue home carvedilol 25 mg BID, amlodipine 10 mg daily. Hold home hydralazine 50 mg BID.  Monitor BP.

## 2018-12-25 NOTE — PLAN OF CARE
Problem: Adult Inpatient Plan of Care  Goal: Plan of Care Review  Patient on oxygen with documented flow.  Will attempt to wean per O2 order protocol.

## 2018-12-25 NOTE — PLAN OF CARE
Problem: Adult Inpatient Plan of Care  Goal: Plan of Care Review  Outcome: Ongoing (interventions implemented as appropriate)   12/25/18 3871   Plan of Care Review   Plan of Care Reviewed With patient;spouse   POC reviewed with pt, verbalized understanding.  Appears to be tired.  Spouse at the bedside.   No complaint of pain throughout shift.  Pt had one vomiting episode, Zofran given.  Temperature monitoring closely, suppository tylenol given.  Lightclothing and wet towel used.  This morning temp down to 99.1.  On 2L oxygen via NC, o2 sat >92%.   IV antibiotics given.  Blood glucose monitored, insulin given as ordered.  Condom catheter in place, no redness and discomfort noted.  Encouraged to turn frequently.  Fall precaution initiated, contract signed.  Safety maintained, free of falls throughout shift.  Instructed to call for any assistance.  WIll continue to monitor.

## 2018-12-25 NOTE — PLAN OF CARE
Problem: Adult Inpatient Plan of Care  Goal: Plan of Care Review  Outcome: Ongoing (interventions implemented as appropriate)  Patient on oxygen with documented flow.  Will attempt to wean per O2 order protocol.

## 2018-12-25 NOTE — ASSESSMENT & PLAN NOTE
Hold lasix with continued fever and creatinine up to 3.1 today. Daily weights. Saw Dr. Tovar on 12/3 and he ordered stress test to evaluate for evidence of ischemia as a cause of his ABAD. If he remains in the hospital a few days, will consider ordering the test here to evaluate that. Last TTE (11/30/28) with LVEF 60% and indeterminate LV diastolic function. Had diastolic dysfunction present on TTE in 7/18.

## 2018-12-26 PROBLEM — N18.4 CKD (CHRONIC KIDNEY DISEASE) STAGE 4, GFR 15-29 ML/MIN: Chronic | Status: ACTIVE | Noted: 2018-12-23

## 2018-12-26 PROBLEM — A40.0 SEPTICEMIA DUE TO GROUP A STREPTOCOCCUS: Status: ACTIVE | Noted: 2018-12-24

## 2018-12-26 PROBLEM — E11.40 DIABETIC NEUROPATHY ASSOCIATED WITH TYPE 2 DIABETES MELLITUS: Chronic | Status: ACTIVE | Noted: 2018-12-23

## 2018-12-26 PROBLEM — D63.1 ANEMIA OF CHRONIC RENAL FAILURE, STAGE 4 (SEVERE): Chronic | Status: ACTIVE | Noted: 2018-06-14

## 2018-12-26 PROBLEM — I50.32 CHRONIC DIASTOLIC CONGESTIVE HEART FAILURE: Chronic | Status: ACTIVE | Noted: 2018-12-03

## 2018-12-26 PROBLEM — N18.4 ANEMIA OF CHRONIC RENAL FAILURE, STAGE 4 (SEVERE): Chronic | Status: ACTIVE | Noted: 2018-06-14

## 2018-12-26 PROBLEM — J96.01 ACUTE RESPIRATORY FAILURE WITH HYPOXIA: Status: ACTIVE | Noted: 2018-12-23

## 2018-12-26 LAB
ALLENS TEST: ABNORMAL
ANION GAP SERPL CALC-SCNC: 13 MMOL/L
BACTERIA BLD CULT: NORMAL
BASOPHILS # BLD AUTO: 0.02 K/UL
BASOPHILS NFR BLD: 0.1 %
BUN SERPL-MCNC: 57 MG/DL
CALCIUM SERPL-MCNC: 9.6 MG/DL
CHLORIDE SERPL-SCNC: 99 MMOL/L
CO2 SERPL-SCNC: 22 MMOL/L
CREAT SERPL-MCNC: 3.3 MG/DL
DELSYS: ABNORMAL
DIFFERENTIAL METHOD: ABNORMAL
EOSINOPHIL # BLD AUTO: 0.1 K/UL
EOSINOPHIL NFR BLD: 0.3 %
ERYTHROCYTE [DISTWIDTH] IN BLOOD BY AUTOMATED COUNT: 13.9 %
EST. GFR  (AFRICAN AMERICAN): 20 ML/MIN/1.73 M^2
EST. GFR  (NON AFRICAN AMERICAN): 18 ML/MIN/1.73 M^2
FLOW: 15
GLUCOSE SERPL-MCNC: 198 MG/DL
HCO3 UR-SCNC: 21 MMOL/L (ref 24–28)
HCT VFR BLD AUTO: 28.8 %
HGB BLD-MCNC: 9.3 G/DL
L PNEUMO AG UR QL IA: NOT DETECTED
LYMPHOCYTES # BLD AUTO: 1.7 K/UL
LYMPHOCYTES NFR BLD: 11.2 %
MAGNESIUM SERPL-MCNC: 1.9 MG/DL
MCH RBC QN AUTO: 28.1 PG
MCHC RBC AUTO-ENTMCNC: 32.3 G/DL
MCV RBC AUTO: 87 FL
MODE: ABNORMAL
MONOCYTES # BLD AUTO: 1.1 K/UL
MONOCYTES NFR BLD: 7.2 %
NEUTROPHILS # BLD AUTO: 12.1 K/UL
NEUTROPHILS NFR BLD: 81 %
PCO2 BLDA: 34 MMHG (ref 35–45)
PH SMN: 7.4 [PH] (ref 7.35–7.45)
PHOSPHATE SERPL-MCNC: 3.5 MG/DL
PLATELET # BLD AUTO: 112 K/UL
PMV BLD AUTO: 12.5 FL
PO2 BLDA: 54 MMHG (ref 80–100)
POC BE: -4 MMOL/L
POC SATURATED O2: 88 % (ref 95–100)
POC TCO2: 22 MMOL/L (ref 23–27)
POCT GLUCOSE: 154 MG/DL (ref 70–110)
POCT GLUCOSE: 182 MG/DL (ref 70–110)
POCT GLUCOSE: 213 MG/DL (ref 70–110)
POCT GLUCOSE: 223 MG/DL (ref 70–110)
POTASSIUM SERPL-SCNC: 4.1 MMOL/L
RBC # BLD AUTO: 3.31 M/UL
SAMPLE: ABNORMAL
SITE: ABNORMAL
SODIUM SERPL-SCNC: 134 MMOL/L
VANCOMYCIN SERPL-MCNC: 15.9 UG/ML
WBC # BLD AUTO: 14.97 K/UL

## 2018-12-26 PROCEDURE — S0077 INJECTION, CLINDAMYCIN PHOSP: HCPCS | Performed by: HOSPITALIST

## 2018-12-26 PROCEDURE — 25000003 PHARM REV CODE 250: Performed by: PODIATRIST

## 2018-12-26 PROCEDURE — 84100 ASSAY OF PHOSPHORUS: CPT

## 2018-12-26 PROCEDURE — 99231 PR SUBSEQUENT HOSPITAL CARE,LEVL I: ICD-10-PCS | Mod: ,,, | Performed by: PODIATRIST

## 2018-12-26 PROCEDURE — 80048 BASIC METABOLIC PNL TOTAL CA: CPT

## 2018-12-26 PROCEDURE — 94640 AIRWAY INHALATION TREATMENT: CPT

## 2018-12-26 PROCEDURE — 63600175 PHARM REV CODE 636 W HCPCS: Performed by: INTERNAL MEDICINE

## 2018-12-26 PROCEDURE — 83735 ASSAY OF MAGNESIUM: CPT

## 2018-12-26 PROCEDURE — 36600 WITHDRAWAL OF ARTERIAL BLOOD: CPT

## 2018-12-26 PROCEDURE — 85025 COMPLETE CBC W/AUTO DIFF WBC: CPT

## 2018-12-26 PROCEDURE — 99231 SBSQ HOSP IP/OBS SF/LOW 25: CPT | Mod: ,,, | Performed by: PODIATRIST

## 2018-12-26 PROCEDURE — 25000003 PHARM REV CODE 250: Performed by: HOSPITALIST

## 2018-12-26 PROCEDURE — 25000003 PHARM REV CODE 250: Performed by: INTERNAL MEDICINE

## 2018-12-26 PROCEDURE — 25000242 PHARM REV CODE 250 ALT 637 W/ HCPCS: Performed by: HOSPITALIST

## 2018-12-26 PROCEDURE — 94761 N-INVAS EAR/PLS OXIMETRY MLT: CPT

## 2018-12-26 PROCEDURE — 80202 ASSAY OF VANCOMYCIN: CPT

## 2018-12-26 PROCEDURE — 11000001 HC ACUTE MED/SURG PRIVATE ROOM

## 2018-12-26 PROCEDURE — 82803 BLOOD GASES ANY COMBINATION: CPT

## 2018-12-26 PROCEDURE — 87040 BLOOD CULTURE FOR BACTERIA: CPT | Mod: 59

## 2018-12-26 PROCEDURE — 27000190 HC CPAP FULL FACE MASK W/VALVE

## 2018-12-26 PROCEDURE — 27000221 HC OXYGEN, UP TO 24 HOURS

## 2018-12-26 PROCEDURE — 36415 COLL VENOUS BLD VENIPUNCTURE: CPT

## 2018-12-26 PROCEDURE — 63600175 PHARM REV CODE 636 W HCPCS: Performed by: HOSPITALIST

## 2018-12-26 PROCEDURE — 99900035 HC TECH TIME PER 15 MIN (STAT)

## 2018-12-26 PROCEDURE — 94660 CPAP INITIATION&MGMT: CPT

## 2018-12-26 RX ORDER — VANCOMYCIN HCL IN 5 % DEXTROSE 1G/250ML
1000 PLASTIC BAG, INJECTION (ML) INTRAVENOUS ONCE
Status: COMPLETED | OUTPATIENT
Start: 2018-12-26 | End: 2018-12-26

## 2018-12-26 RX ORDER — LIDOCAINE HYDROCHLORIDE 10 MG/ML
10 INJECTION, SOLUTION EPIDURAL; INFILTRATION; INTRACAUDAL; PERINEURAL ONCE
Status: DISCONTINUED | OUTPATIENT
Start: 2018-12-26 | End: 2019-01-02 | Stop reason: HOSPADM

## 2018-12-26 RX ORDER — LIDOCAINE HYDROCHLORIDE 20 MG/ML
JELLY TOPICAL
Status: DISCONTINUED | OUTPATIENT
Start: 2018-12-26 | End: 2018-12-28

## 2018-12-26 RX ORDER — SODIUM CHLORIDE 9 MG/ML
INJECTION, SOLUTION INTRAVENOUS CONTINUOUS
Status: ACTIVE | OUTPATIENT
Start: 2018-12-26 | End: 2018-12-26

## 2018-12-26 RX ADMIN — ACETAMINOPHEN 650 MG: 325 TABLET ORAL at 03:12

## 2018-12-26 RX ADMIN — IPRATROPIUM BROMIDE AND ALBUTEROL SULFATE 3 ML: .5; 3 SOLUTION RESPIRATORY (INHALATION) at 02:12

## 2018-12-26 RX ADMIN — AMLODIPINE BESYLATE 10 MG: 5 TABLET ORAL at 09:12

## 2018-12-26 RX ADMIN — Medication 500 MG: at 09:12

## 2018-12-26 RX ADMIN — SODIUM CHLORIDE: 0.9 INJECTION, SOLUTION INTRAVENOUS at 10:12

## 2018-12-26 RX ADMIN — IPRATROPIUM BROMIDE AND ALBUTEROL SULFATE 3 ML: .5; 3 SOLUTION RESPIRATORY (INHALATION) at 11:12

## 2018-12-26 RX ADMIN — ONDANSETRON 4 MG: 2 INJECTION INTRAMUSCULAR; INTRAVENOUS at 03:12

## 2018-12-26 RX ADMIN — ONDANSETRON 4 MG: 2 INJECTION INTRAMUSCULAR; INTRAVENOUS at 09:12

## 2018-12-26 RX ADMIN — Medication: at 09:12

## 2018-12-26 RX ADMIN — GABAPENTIN 100 MG: 100 CAPSULE ORAL at 05:12

## 2018-12-26 RX ADMIN — HEPARIN SODIUM 5000 UNITS: 5000 INJECTION, SOLUTION INTRAVENOUS; SUBCUTANEOUS at 09:12

## 2018-12-26 RX ADMIN — ACETAMINOPHEN 650 MG: 325 TABLET ORAL at 08:12

## 2018-12-26 RX ADMIN — CARVEDILOL 25 MG: 25 TABLET, FILM COATED ORAL at 07:12

## 2018-12-26 RX ADMIN — VANCOMYCIN HYDROCHLORIDE 1000 MG: 1 INJECTION, POWDER, LYOPHILIZED, FOR SOLUTION INTRAVENOUS at 02:12

## 2018-12-26 RX ADMIN — CLINDAMYCIN IN 5 PERCENT DEXTROSE 900 MG: 18 INJECTION, SOLUTION INTRAVENOUS at 07:12

## 2018-12-26 RX ADMIN — CEFTRIAXONE 2 G: 2 INJECTION, SOLUTION INTRAVENOUS at 07:12

## 2018-12-26 RX ADMIN — CARVEDILOL 25 MG: 25 TABLET, FILM COATED ORAL at 05:12

## 2018-12-26 RX ADMIN — GABAPENTIN 100 MG: 100 CAPSULE ORAL at 07:12

## 2018-12-26 RX ADMIN — ROSUVASTATIN CALCIUM 20 MG: 10 TABLET, FILM COATED ORAL at 09:12

## 2018-12-26 RX ADMIN — INSULIN DETEMIR 25 UNITS: 100 INJECTION, SOLUTION SUBCUTANEOUS at 08:12

## 2018-12-26 RX ADMIN — INSULIN ASPART 5 UNITS: 100 INJECTION, SOLUTION INTRAVENOUS; SUBCUTANEOUS at 04:12

## 2018-12-26 RX ADMIN — IPRATROPIUM BROMIDE AND ALBUTEROL SULFATE 3 ML: .5; 3 SOLUTION RESPIRATORY (INHALATION) at 01:12

## 2018-12-26 RX ADMIN — ASPIRIN 81 MG: 81 TABLET, COATED ORAL at 09:12

## 2018-12-26 RX ADMIN — INSULIN ASPART 1 UNITS: 100 INJECTION, SOLUTION INTRAVENOUS; SUBCUTANEOUS at 08:12

## 2018-12-26 RX ADMIN — GABAPENTIN 300 MG: 300 CAPSULE ORAL at 08:12

## 2018-12-26 RX ADMIN — IPRATROPIUM BROMIDE AND ALBUTEROL SULFATE 3 ML: .5; 3 SOLUTION RESPIRATORY (INHALATION) at 08:12

## 2018-12-26 RX ADMIN — INSULIN ASPART 5 UNITS: 100 INJECTION, SOLUTION INTRAVENOUS; SUBCUTANEOUS at 07:12

## 2018-12-26 RX ADMIN — CEFTRIAXONE 2 G: 2 INJECTION, SOLUTION INTRAVENOUS at 08:12

## 2018-12-26 RX ADMIN — IPRATROPIUM BROMIDE AND ALBUTEROL SULFATE 3 ML: .5; 3 SOLUTION RESPIRATORY (INHALATION) at 07:12

## 2018-12-26 RX ADMIN — IPRATROPIUM BROMIDE AND ALBUTEROL SULFATE 3 ML: .5; 3 SOLUTION RESPIRATORY (INHALATION) at 03:12

## 2018-12-26 RX ADMIN — HEPARIN SODIUM 5000 UNITS: 5000 INJECTION, SOLUTION INTRAVENOUS; SUBCUTANEOUS at 07:12

## 2018-12-26 RX ADMIN — HEPARIN SODIUM 5000 UNITS: 5000 INJECTION, SOLUTION INTRAVENOUS; SUBCUTANEOUS at 02:12

## 2018-12-26 NOTE — PROGRESS NOTES
[facility]  Podiatry  Progress Note    Patient Name: Domingo Castro  MRN: [id number]  Admission Date: 12/23/2018  Hospital Length of Stay: 3 days  Attending Physician: Iam Ibarra MD  Primary Care Provider: Griselda Nugent MD     Interval Hx: Pt seen at bedside. No pedal complaints at this time.     Scheduled Meds:   albuterol-ipratropium  3 mL Nebulization Q4H    amLODIPine  10 mg Oral Daily    aspirin  81 mg Oral Daily    cefTRIAXone (ROCEPHIN) IVPB  2 g Intravenous Q12H    And    azithromycin  500 mg Intravenous Q24H    cadexomer iodine   Topical (Top) Every Mon, Wed, Fri    carvedilol  25 mg Oral BID WM    gabapentin  100 mg Oral BID WM    gabapentin  300 mg Oral QHS    heparin (porcine)  5,000 Units Subcutaneous Q8H    insulin aspart U-100  5 Units Subcutaneous TIDWM    insulin detemir U-100  25 Units Subcutaneous QHS    lidocaine (PF) 10 mg/ml (1%)  10 mL Intradermal Once    rosuvastatin  20 mg Oral Daily     Continuous Infusions:  PRN Meds:acetaminophen, dextrose 50%, dextrose 50%, glucagon (human recombinant), glucose, glucose, HYDROcodone-acetaminophen, insulin aspart U-100, lidocaine HCL 2%, ondansetron, prochlorperazine, sodium chloride 0.9%    Review of patient's allergies indicates:   Allergen Reactions    Atorvastatin Other (See Comments)        Past Medical History:   Diagnosis Date    Arthritis     Cataract     CHF (congestive heart failure)     Coronary artery disease     Cystoid macular edema of both eyes     Diabetes mellitus     Diabetes mellitus type II     Hyperlipemia     Hypertension     Retinal hole     Stage 4 chronic kidney disease      Past Surgical History:   Procedure Laterality Date    BLEPHAROPLASTY Bilateral 8/30/2017    [facility]    CATARACT EXTRACTION W/  INTRAOCULAR LENS IMPLANT Left 12/15/14    Dr de la cruz    CATARACT EXTRACTION W/  INTRAOCULAR LENS IMPLANT Right 12/29/14    dorothy     CIRCUMCISION, NON-      focal laser right eye      INSERTION-INTRAOCULAR LENS (IOL) Right 2014    [facility]    INSERTION-INTRAOCULAR LENS (IOL) Left 12/15/2014    [facility] at [facility]    KNEE SURGERY      left    Left medial collateral ligament repair      PHACOEMULSIFICATION-ASPIRATION-CATARACT Right 2014    [facility]    PHACOEMULSIFICATION-ASPIRATION-CATARACT Left 12/15/2014    [facility] at [facility]    REPAIR-PTOSIS/BILATERAL EXTERNAL LEVATORS Bilateral 2017    [facility]    TONSILLECTOMY         Family History     Problem Relation (Age of Onset)    Cancer Mother, Brother, Sister    Cataracts Paternal Grandmother    Diabetes Father    Glaucoma Paternal Grandmother    Heart disease Mother, Father        Tobacco Use    Smoking status: Never Smoker    Smokeless tobacco: Never Used   Substance and Sexual Activity    Alcohol use: No     Alcohol/week: 0.0 oz    Drug use: No    Sexual activity: Yes     Partners: Female     Review of Systems   Constitutional: Positive for chills. Negative for fever.   HENT: Negative for congestion and hearing loss.    Respiratory: Negative for cough and shortness of breath.    Gastrointestinal: Negative for nausea and vomiting.   Skin: Positive for wound.   Neurological: Negative for dizziness.   Psychiatric/Behavioral: Negative for agitation.     Objective:     Vital Signs (Most Recent):  Temp: 97.9 °F (36.6 °C) (18 1540)  Pulse: 61 (18 1540)  Resp: 20 (18 1540)  BP: 135/63 (18 1540)  SpO2: 96 % (18 1440) Vital Signs (24h Range):  Temp:  [97.9 °F (36.6 °C)-102.3 °F (39.1 °C)] 97.9 °F (36.6 °C)  Pulse:  [61-83] 61  Resp:  [18-22] 20  SpO2:  [78 %-100 %] 96 %  BP: (133-168)/(60-75) 135/63     Weight: 108.6 kg (239 lb 6.7 oz)  Body mass index is 29.93 kg/m².    Foot  Exam    General  General Appearance: appears stated age and healthy   Orientation: alert and oriented to person, place, and time   Affect: appropriate       Left Foot/Ankle      Inspection and Palpation  Ecchymosis: first toe  Swelling: none   Skin Exam: ulcer; no cellulitis     Neurovascular  Dorsalis pedis: 1+  Posterior tibial: 1+            Laboratory:  A1C:   Recent Labs   Lab 08/09/18  1105 11/28/18  1033   HGBA1C 8.3* 7.3*     Blood Cultures:   Recent Labs   Lab 12/25/18  0351 12/26/18  0458 12/26/18  0502   LABBLOO No Growth to date  No Growth to date No Growth to date No Growth to date     CBC:   Recent Labs   Lab 12/26/18  0322   WBC 14.97*   RBC 3.31*   HGB 9.3*   HCT 28.8*   *   MCV 87   MCH 28.1   MCHC 32.3     CMP:   Recent Labs   Lab 12/23/18  1739  12/26/18  0322   *   < > 198*   CALCIUM 9.8   < > 9.6   ALBUMIN 3.7  --   --    PROT 8.2  --   --       < > 134*   K 3.9   < > 4.1   CO2 20*   < > 22*      < > 99   BUN 50*   < > 57*   CREATININE 2.7*   < > 3.3*   ALKPHOS 87  --   --    ALT 14  --   --    AST 15  --   --    BILITOT 0.4  --   --     < > = values in this interval not displayed.     Wound Cultures:   Recent Labs   Lab 12/24/18  1250   LABAERO STREPTOCOCCUS PYOGENES (GROUP A)  Moderate  Beta-hemolytic streptococci are routinely susceptible to   penicillins,cephalosporins and carbapenems.  Susceptibility testing not routinely performed    STAPHYLOCOCCUS AUREUS  Moderate  Susceptibility pending         Diagnostic Results:  X-Ray: I have reviewed all pertinent results/findings within the past 24 hours.  Left foot xray did not show any focal bony destructive changes, gas or foreign bodies    Clinical Findings:  There was 0.5x0.8x0.2cm wound plantar left hallux with mixed granular and fibrous base, scant serous drainage, mild periwound erythema, does not probe, track or undermine.             Assessment/Plan:     * Bacterial pneumonia    Per primary     Diabetic ulcer of  toe of left foot with fat layer exposed    -Superficial blister noted to left hallux, scant purulent drainage noted, no acute SOI noted, stable  -Plan for bedside debridement tomorrow AM  -ID on board, appreciate recs  -Wound dressed with betadine, aquacel border  -Local wound care orders written for nursing.   -Requires Darco shoe to help offload toe.   -Recommend follow up within 2-3 weeks of discharge. Recommend HH for dressing changes.     Diabetic neuropathy associated with type 2 diabetes mellitus    Managed per Hospital Medicine.     Type 2 diabetes mellitus with stage 4 chronic kidney disease, with long-term current use of insulin    Managed per Hospital medicine.         Vale Sarmiento MD  Podiatry  [facility]

## 2018-12-26 NOTE — ASSESSMENT & PLAN NOTE
74 y/o male with DM2, HTN, HLD, CKD4, CHF presented to [facility] and was admitted 12/23 with fever to 103, LLL infiltrate on CXR  - CAP - and left great toe ulcer with drainage. BC on admit positive for group A strep (GAS). Left great toe culture positive for GAS and Staph aureus. Patient has been persistently febrile this hospital stay with fever curve trending slightly down today. Originally given vanco and zosyn in the ED then ceftriaxone and azithro, then vanco added when GPC in blood then vanco discontinued once BC identified as GAS and then ceftriaxone dose increased, azithro continued and clinda added when fever persisted. Two probable sources for the GAS septicemia - LLL pneumonia and left great toe wound infection.     Rec:  1. Continue ceftriaxone 2 g every 12 h and azithro IV daily for now  2. Stop clindamycin - do not need 2 macrolides and want the azithro for other atypical CAP pathogens  3. Check vanco level today and redose pending sensitivities for the staph aureus in the left foot  4. Get Podiatry to reassess left great toe and get MRI since there is pus expressible on medial aspect of left great toe with fluctuance - likely will need I + D in OR and the great toe has some lucency on xray - possible osteo.   5. Patient with pain to palpation of right ankle - watch - may need workup if it continues

## 2018-12-26 NOTE — PLAN OF CARE
Problem: Adult Inpatient Plan of Care  Goal: Plan of Care Review  Outcome: Ongoing (interventions implemented as appropriate)  The proper method of use, as well as anticipated side effects, of this aerosol treatment are discussed and demonstrated to the patient. Patient on oxygen with documented flow.  Will attempt to wean per O2 order protocol. Will continue to monitor.

## 2018-12-26 NOTE — ASSESSMENT & PLAN NOTE
Has pus expressible on left great toe when pressing on the medial aspect of the toe. Culture positive for Staph aureus and Group A strep. Patient had Tdap in 2013.     Rec  reconsult Podiatry for reassessment of left great toe ulcer and purulence - may need I + D  Check MRI of left foot to rule out osteo  Add vanco pending staph aureus sensitivities

## 2018-12-26 NOTE — CONSULTS
[facility]  Infectious Disease  Consult Note    Patient Name: Domingo Castro  MRN: [id number]  Admission Date: 12/23/2018  Hospital Length of Stay: 3 days  Attending Physician: Iam Ibarra MD  Primary Care Provider: Griselda Nugent MD     Isolation Status: No active isolations    Patient information was obtained from patient, spouse/SO, past medical records, chart and ER records.      Inpatient consult to Infectious Diseases  Consult performed by: Maria Guadalupe Bruner MD  Consult ordered by: Iam Ibarra MD  Reason for consult: group a strep bacteremia and left great toe ulcer and persistent fever        Assessment/Plan:     Septicemia due to group A Streptococcus    74 y/o male with DM2, HTN, HLD, CKD4, CHF [place] [facility] [place]23 with fever to 103, LLL infiltrate on CXR  - CAP - and left great toe ulcer with drainage. BC on admit positive for group A strep (GAS). Left great toe culture positive for GAS and Staph aureus. Patient has been persistently febrile this hospital stay with fever curve trending slightly down today. Originally given vanco and zosyn in the ED then ceftriaxone and azithro, then vanco added when GPC in blood then vanco discontinued once BC identified as GAS and then ceftriaxone dose increased, azithro continued and clinda added when fever persisted. Two probable sources for the GAS septicemia - LLL pneumonia and left great toe wound infection.     Rec:  1. Continue ceftriaxone 2 g every 12 h and azithro IV daily for now  2. Stop clindamycin - do not need 2 macrolides and want the azithro for other atypical CAP pathogens  3. Check vanco level today and redose pending sensitivities for the staph aureus in the left foot  4. Get Podiatry to reassess left great toe and get MRI since there is pus expressible on medial aspect of left great toe with fluctuance - likely will need I + D in OR and the great toe has some lucency on xray - possible osteo.   5.  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 Culture positive for Staph aureus and Group A strep. Patient had Tdap in 2013.     Rec  reconsult Podiatry for reassessment of left great toe ulcer and purulence - may need I + D  Check MRI of left foot to rule out osteo  Add vanco pending staph aureus sensitivities           Thank you for your consult. I will follow-up with patient. Please contact us if you have any additional questions.[phone]    Maria Guadalupe Bruner MD  Infectious Disease  [facility]    Subjective:     Principal Problem: Bacterial pneumonia    HPI: 74 y/o male with DM, HLD, HTN, stage 4 CKD, Admitted to [facility] 12/23 with sudden onset of fatigue, SOB, chills, fever and weakness X 1 - 2 h prior to arrival. Patient also complained of bilateral leg swelling, and drainage from left great toe wound for a few days PTA. Complained of cough for a few days PTA. Patient was febrile to 103.2 F in the ED and CXR positive for LLL pneumonia. Patient was treated empirically with vancomycin and zosyn in the ED. Then antibiotics were changed to Ceftriaxone 1 gram every 24 h and azithromycin for CAP on admit 12/23. Patient remained febrile to 104 on 12/24 and vancomycin was added when BC on admit positive for GPC. Patient remained febrile on 12/25 to 102.5 and ceftriaxone was increased to 2 grams every 12 h, azithro continued, clindamycin added 900 mg IVPB every 8 h and vancomycin was discontinued when BC resulted as Group A strep from admit. Podiatry evaluated patient on 12/24 and took culture of left great toe ulcer on 12/24 which is positive for Group A strep and staph aureus. Left foot X ray 12/23 positive for irregularity and lucency of great toe distal phalanx. ID consult called for help with antibiotics  18.     Patient feels OK from respiratory standpoint. Less SOB. No dysuria. Some nausea no vomiting. No diarrhea. Does not feel any pain in left foot due to DM neuropathy. Wife at bedside. Has pain in right ankle to palpation.     Past Medical History:   Diagnosis Date    Arthritis     Cataract     CHF (congestive heart failure)     Coronary artery disease     Cystoid macular edema of both eyes     Diabetes mellitus     Diabetes mellitus type II     Hyperlipemia     Hypertension     Retinal hole     Stage 4 chronic kidney disease        Past Surgical History:   Procedure Laterality Date    BLEPHAROPLASTY Bilateral 2017    Performed by Jane [facility]no MD at [facility] OR [address] [place] [address]    CATARACT EXTRACTION W/  INTRAOCULAR LENS IMPLANT Left 12/15/14    Dr martin    CATARACT EXTRACTION W/  INTRAOCULAR LENS IMPLANT Right 14    dorothy    CIRCUMCISION, NON-      focal laser right eye      INSERTION-INTRAOCULAR LENS (IOL) Right 2014    Performed by [facility] OR    INSERTION-INTRAOCULAR LENS (IOL) Left 12/15/2014    [facility] at [facility]    KNEE SURGERY      left    Left medial collateral ligament repair      PHACOEMULSIFICATION-ASPIRATION-CATARACT Right 2014    [facility] at [facility]    PHACOEMULSIFICATION-ASPIRATION-CATARACT Left 12/15/2014    [facility] at [facility]    REPAIR-PTOSIS/BILATERAL EXTERNAL LEVATORS Bilateral 2017    Performed by [facility] MD at [facility] OR [address] [place] [address]    TONSILLECTOMY         Review of patient's allergies indicates:   Allergen Reactions    Atorvastatin Other (See Comments)       Medications:  Medications Prior to Admission   Medication Sig    acetaminophen (TYLENOL) 325 MG tablet Take 2 tablets (650 mg total) by mouth every 4 (four) hours as needed.    amLODIPine (NORVASC) 10 MG tablet Take 1 tablet (10 mg total) by mouth once daily.    aspirin (ECOTRIN) 81 MG EC tablet  "Take 1 tablet (81 mg total) by mouth once daily.    carvedilol (COREG) 25 MG tablet Take 1 tablet (25 mg total) by mouth 2 (two) times daily with meals.    furosemide (LASIX) 40 MG tablet Take 1 tablet (40 mg total) by mouth 2 (two) times daily. Do not take the pm dose after 3 pm    gabapentin (NEURONTIN) 100 MG capsule TAKE ONE CAPSULE BY MOUTH IN THE MORNING THEN ONE CAPSULE  IN THE EVENING AND THEN THREE CAPSULES AT BEDTIME    hydrALAZINE (APRESOLINE) 50 MG tablet TAKE ONE TABLET BY MOUTH EVERY 12 HOURS    insulin lispro (HUMALOG KWIKPEN INSULIN) 100 unit/mL InPn pen INJECT 10 UNITS SUBCUTANEOUSLY THREE TIMES DAILY BEFORE MEALS WITH CORRECTION SCALE, MAX TDD 50 UNITS    LEVEMIR FLEXTOUCH U-100 INSULN 100 unit/mL (3 mL) InPn pen Inject 26 Units into the skin every evening.    pen needle, diabetic, safety (N3TWORK AUTOSHIELD PEN NEEDLE) 29 gauge x 5/16" Ndle For use once daily with levemir flexpen    rosuvastatin (CRESTOR) 20 MG tablet Take 20 mg by mouth once daily.      Antibiotics (From admission, onward)    Start     Stop Route Frequency Ordered    12/25/18 2130  azithromycin 500 mg in dextrose 5 % 250 mL IVPB (ready to mix system)  (Ceftriaxone IV/ Azithromycin IV Panel)      -- IV Every 24 hours (non-standard times) 12/25/18 1400    12/25/18 2014  cefTRIAXone (ROCEPHIN) 2 g in dextrose 5 % 50 mL IVPB  (Ceftriaxone IV/ Azithromycin IV Panel)      -- IV Every 12 hours (non-standard times) 12/25/18 1400      was on clindamycin 900 mg IVPB every 8 h   Antifungals (From admission, onward)    None        Antivirals (From admission, onward)    None           Immunization History   Administered Date(s) Administered    Influenza 10/11/2007, 11/12/2008, 09/21/2009, 09/27/2010    Influenza - High Dose 11/18/2013, 10/16/2014    Tdap 08/26/2013       Family History     Problem Relation (Age of Onset)    Cancer Mother, Brother, Sister    Cataracts Paternal Grandmother    Diabetes Father    Glaucoma Paternal " Grandmother    Heart disease Mother, Father        Social History     Socioeconomic History    Marital status:      Spouse name: None    Number of children: None    Years of education: None    Highest education level: None   Social Needs    Financial resource strain: None    Food insecurity - worry: None    Food insecurity - inability: None    Transportation needs - medical: None    Transportation needs - non-medical: None   Occupational History    None   Tobacco Use    Smoking status: Never Smoker    Smokeless tobacco: Never Used   Substance and Sexual Activity    Alcohol use: No     Alcohol/week: 0.0 oz    Drug use: No    Sexual activity: Yes     Partners: Female   Other Topics Concern    None   Social History Narrative    None     Review of Systems   Respiratory: Positive for shortness of breath.         Less SOB than earlier   Gastrointestinal: Positive for nausea. Negative for diarrhea and vomiting.   Genitourinary: Negative for dysuria.   Musculoskeletal:        Right ankle pain with palpation     Objective:     Vital Signs (Most Recent):  Temp: 98.8 °F (37.1 °C) (12/26/18 1135)  Pulse: 62 (12/26/18 1135)  Resp: 20 (12/26/18 1135)  BP: 133/60 (12/26/18 1135)  SpO2: 100 % (12/26/18 1101) Vital Signs (24h Range):  Temp:  [98.8 °F (37.1 °C)-102.5 °F (39.2 °C)] 98.8 °F (37.1 °C)  Pulse:  [62-83] 62  Resp:  [16-22] 20  SpO2:  [78 %-100 %] 100 %  BP: (133-168)/(60-75) 133/60     Weight: 108.6 kg (239 lb 6.7 oz)  Body mass index is 29.93 kg/m².    Estimated Creatinine Clearance: 26.5 mL/min (A) (based on SCr of 3.3 mg/dL (H)).    Physical Exam   Cardiovascular: Normal heart sounds.   No murmur heard.  Pulmonary/Chest: Breath sounds normal. No respiratory distress.   Abdominal: Bowel sounds are normal. He exhibits no distension. There is no tenderness.   obese   Musculoskeletal: He exhibits no edema.   Pain to palpation of right ankle but no pain with movement of right foot; has left great toe  with plantar ulcer that is dry but has discoloration medially of left great toe with pus expressible that exits through the plantar ulcer of toe with palpation of medial aspect of left great toe and some fluctuance palpable medial aspect of left great toe. No other joint tenderness bilateral UE and LE and no joint effusions appreciated.        Significant Labs:   Blood Culture:   Recent Labs   Lab 12/23/18  1735 12/23/18  1738 12/25/18  0351   LABBLOO Gram stain aer bottle: Gram positive cocci  Gram stain jamal bottle: Gram positive cocci  Results called to and read back by: Radha Mejia RN  12/24/2018  10:05  STREPTOCOCCUS PYOGENES (GROUP A)  Beta-hemolytic streptococci are routinely susceptible to   penicillins,cephalosporins and carbapenems.  For susceptibility see order #[id number]   Gram stain aer bottle: Gram positive cocci  Gram stain jamal bottle: Gram positive cocci  Results called to and read back by: Radha Mejia RN  12/24/2018  10:05  STREPTOCOCCUS PYOGENES (GROUP A)  Beta-hemolytic streptococci are routinely susceptible to   penicillins,cephalosporins and carbapenems.   No Growth to date  No Growth to date     CBC:   Recent Labs   Lab 12/25/18  0351 12/26/18  0322   WBC 17.34* 14.97*   HGB 9.0* 9.3*   HCT 27.4* 28.8*   * 112*     CMP:   Recent Labs   Lab 12/25/18  0351 12/26/18  0322   * 134*   K 3.5 4.1    99   CO2 22* 22*   * 198*   BUN 52* 57*   CREATININE 3.1* 3.3*   CALCIUM 9.0 9.6   ANIONGAP 9 13   EGFRNONAA 19* 18*     Respiratory Culture: No results for input(s): GSRESP, RESPIRATORYC in the last 4320 hours.  Urine Culture:   Recent Labs   Lab 07/16/18 2053   LABURIN No growth     Urine Studies:   Recent Labs   Lab 07/16/18 2053 12/23/18  2300   COLORU Yellow   < > Yellow   APPEARANCEUA Clear   < > Clear   PHUR 6.0   < > 6.0   SPECGRAV 1.025   < > 1.025   PROTEINUA 2+*   < > 2+*   GLUCUA Negative   < > Trace*   KETONESU Negative   < > Negative   BILIRUBINUA Negative    < > Negative   OCCULTUA Trace*   < > 1+*   NITRITE Negative   < > Negative   UROBILINOGEN Negative   < > Negative   LEUKOCYTESUR Negative   < > Negative   RBCUA 3   < > 5*   WBCUA 5   < > 2   BACTERIA Few*   < > Few*   SQUAMEPITHEL 2  --   --    HYALINECASTS 2*   < > 0    < > = values in this interval not displayed.     Wound Culture:   Recent Labs   Lab 18  1250   LABAERO STREPTOCOCCUS PYOGENES (GROUP A)  Moderate  Beta-hemolytic streptococci are routinely susceptible to   penicillins,cephalosporins and carbapenems.  Susceptibility testing not routinely performed    STAPHYLOCOCCUS AUREUS  Moderate  Susceptibility pending         Significant Imagin/26 cxr - Since CT 2018, no significant change or new abnormality   CT chest - Minimal pleural reaction both lung bases with bibasilar dependent consolidative change, likely atelectasis LEFT greater than RIGHT.   xray left foot - Nonspecific irregularity and lucency is seen at the distal aspect of the great toe distal phalanx.  Uncertain if this may represent chronic finding versus potential acute or chronic infectious process.  Clinical correlation is needed.  No evidence of acute displaced fracture or dislocation.  Chronic change possibly related to remote injury is seen at the base of the 5th metatarsal.  Os peroneum is also noted.  Lisfranc articulation is congruent.  There is mild plantar calcaneal spurring. Mild degenerative spurring is noted in the midfoot. Mild soft tissue swelling is seen over the dorsum of the foot.  No radiopaque retained foreign body seen.   cxr - Impression    Subtle focal airspace opacity in the left lower lung zone could reflect aspiration or developing pneumonia in the right clinical setting.  No focal consolidation seen.

## 2018-12-26 NOTE — SUBJECTIVE & OBJECTIVE
Past Medical History:   Diagnosis Date    Arthritis     Cataract     CHF (congestive heart failure)     Coronary artery disease     Cystoid macular edema of both eyes     Diabetes mellitus     Diabetes mellitus type II     Hyperlipemia     Hypertension     Retinal hole     Stage 4 chronic kidney disease        Past Surgical History:   Procedure Laterality Date    BLEPHAROPLASTY Bilateral 2017    [facility]formed [facility]    CATARACT EXTRACTION W/  INTRAOCULAR LENS IMPLANT Left 12/15/14    Dr martin    CATARACT EXTRACTION W/  INTRAOCULAR LENS IMPLANT Right 14    dorothy    CIRCUMCISION, NON-      focal laser right eye      INSERTION-INTRAOCULAR LENS (IOL) Right 2014    Performed [facility] OR    INSERTION-INTRAOCULAR LENS (IOL) Left 12/15/2014    [facility] by [facility] at [facility]    KNEE SURGERY      left    Left medial collateral ligament repair      PHACOEMULSIFICATION-ASPIRATION-CATARACT Right 2014    Performed [facility] at [facility]    PHACOEMULSIFICATION-ASPIRATION-CATARACT Left 12/15/2014    [facility] at [facility]    REPAIR-PTOSIS/BILATERAL EXTERNAL LEVATORS Bilateral 2017    Per[facility] at [facility] OR [facility]    TONSILLECTOMY         Review of patient's allergies indicates:   Allergen Reactions    Atorvastatin Other (See Comments)       Medications:  Medications Prior to Admission   Medication Sig    acetaminophen (TYLENOL) 325 MG tablet Take 2 tablets (650 mg total) by mouth every 4 (four) hours as needed.    amLODIPine (NORVASC) 10 MG tablet Take 1 tablet (10 mg total) by mouth once daily.    aspirin (ECOTRIN) 81 MG EC tablet Take 1 tablet (81 mg total) by mouth once daily.    carvedilol (COREG) 25 MG tablet Take 1 tablet (25 mg total) by mouth 2 (two) times daily with meals.    furosemide (LASIX) 40 MG tablet Take 1 tablet (40 mg total) by mouth 2 (two)  "times daily. Do not take the pm dose after 3 pm    gabapentin (NEURONTIN) 100 MG capsule TAKE ONE CAPSULE BY MOUTH IN THE MORNING THEN ONE CAPSULE  IN THE EVENING AND THEN THREE CAPSULES AT BEDTIME    hydrALAZINE (APRESOLINE) 50 MG tablet TAKE ONE TABLET BY MOUTH EVERY 12 HOURS    insulin lispro (HUMALOG KWIKPEN INSULIN) 100 unit/mL InPn pen INJECT 10 UNITS SUBCUTANEOUSLY THREE TIMES DAILY BEFORE MEALS WITH CORRECTION SCALE, MAX TDD 50 UNITS    LEVEMIR FLEXTOUCH U-100 INSULN 100 unit/mL (3 mL) InPn pen Inject 26 Units into the skin every evening.    pen needle, diabetic, safety ([employer]ight Plus AUTOSHIELD PEN NEEDLE) 29 gauge x 5/16" Ndle For use once daily with levemir flexpen    rosuvastatin (CRESTOR) 20 MG tablet Take 20 mg by mouth once daily.      Antibiotics (From admission, onward)    Start     Stop Route Frequency Ordered    12/25/18 2130  azithromycin 500 mg in dextrose 5 % 250 mL IVPB (ready to mix system)  (Ceftriaxone IV/ Azithromycin IV Panel)      -- IV Every 24 hours (non-standard times) 12/25/18 1400    12/25/18 2014  cefTRIAXone (ROCEPHIN) 2 g in dextrose 5 % 50 mL IVPB  (Ceftriaxone IV/ Azithromycin IV Panel)      -- IV Every 12 hours (non-standard times) 12/25/18 1400      was on clindamycin 900 mg IVPB every 8 h   Antifungals (From admission, onward)    None        Antivirals (From admission, onward)    None           Immunization History   Administered Date(s) Administered    Influenza 10/11/2007, 11/12/2008, 09/21/2009, 09/27/2010    Influenza - High Dose 11/18/2013, 10/16/2014    Tdap 08/26/2013       Family History     Problem Relation (Age of Onset)    Cancer Mother, Brother, Sister    Cataracts Paternal Grandmother    Diabetes Father    Glaucoma Paternal Grandmother    Heart disease Mother, Father        Social History     Socioeconomic History    Marital status:      Spouse name: None    Number of children: None    Years of education: None    Highest education level: None   Social " Needs    Financial resource strain: None    Food insecurity - worry: None    Food insecurity - inability: None    Transportation needs - medical: None    Transportation needs - non-medical: None   Occupational History    None   Tobacco Use    Smoking status: Never Smoker    Smokeless tobacco: Never Used   Substance and Sexual Activity    Alcohol use: No     Alcohol/week: 0.0 oz    Drug use: No    Sexual activity: Yes     Partners: Female   Other Topics Concern    None   Social History Narrative    None     Review of Systems   Respiratory: Positive for shortness of breath.         Less SOB than earlier   Gastrointestinal: Positive for nausea. Negative for diarrhea and vomiting.   Genitourinary: Negative for dysuria.   Musculoskeletal:        Right ankle pain with palpation     Objective:     Vital Signs (Most Recent):  Temp: 98.8 °F (37.1 °C) (12/26/18 1135)  Pulse: 62 (12/26/18 1135)  Resp: 20 (12/26/18 1135)  BP: 133/60 (12/26/18 1135)  SpO2: 100 % (12/26/18 1101) Vital Signs (24h Range):  Temp:  [98.8 °F (37.1 °C)-102.5 °F (39.2 °C)] 98.8 °F (37.1 °C)  Pulse:  [62-83] 62  Resp:  [16-22] 20  SpO2:  [78 %-100 %] 100 %  BP: (133-168)/(60-75) 133/60     Weight: 108.6 kg (239 lb 6.7 oz)  Body mass index is 29.93 kg/m².    Estimated Creatinine Clearance: 26.5 mL/min (A) (based on SCr of 3.3 mg/dL (H)).    Physical Exam   Cardiovascular: Normal heart sounds.   No murmur heard.  Pulmonary/Chest: Breath sounds normal. No respiratory distress.   Abdominal: Bowel sounds are normal. He exhibits no distension. There is no tenderness.   obese   Musculoskeletal: He exhibits no edema.   Pain to palpation of right ankle but no pain with movement of right foot; has left great toe with plantar ulcer that is dry but has discoloration medially of left great toe with pus expressible that exits through the plantar ulcer of toe with palpation of medial aspect of left great toe and some fluctuance palpable medial aspect of  left great toe. No other joint tenderness bilateral UE and LE and no joint effusions appreciated.        Significant Labs:   Blood Culture:   Recent Labs   Lab 12/23/18  1735 12/23/18  1738 12/25/18  0351   LABBLOO Gram stain aer bottle: Gram positive cocci  Gram stain jamal bottle: Gram positive cocci  Results called to and read back by: Radha Mejia RN  12/24/2018  10:05  STREPTOCOCCUS PYOGENES (GROUP A)  Beta-hemolytic streptococci are routinely susceptible to   penicillins,cephalosporins and carbapenems.  For susceptibility see order #[id number]   Gram stain aer bottle: Gram positive cocci  Gram stain jamal bottle: Gram positive cocci  Results called to and read back by: Radha Mejia RN  12/24/2018  10:05  STREPTOCOCCUS PYOGENES (GROUP A)  Beta-hemolytic streptococci are routinely susceptible to   penicillins,cephalosporins and carbapenems.   No Growth to date  No Growth to date     CBC:   Recent Labs   Lab 12/25/18  0351 12/26/18  0322   WBC 17.34* 14.97*   HGB 9.0* 9.3*   HCT 27.4* 28.8*   * 112*     CMP:   Recent Labs   Lab 12/25/18  0351 12/26/18  0322   * 134*   K 3.5 4.1    99   CO2 22* 22*   * 198*   BUN 52* 57*   CREATININE 3.1* 3.3*   CALCIUM 9.0 9.6   ANIONGAP 9 13   EGFRNONAA 19* 18*     Respiratory Culture: No results for input(s): GSRESP, RESPIRATORYC in the last 4320 hours.  Urine Culture:   Recent Labs   Lab 07/16/18 2053   LABURIN No growth     Urine Studies:   Recent Labs   Lab 07/16/18 2053 12/23/18  2300   COLORU Yellow   < > Yellow   APPEARANCEUA Clear   < > Clear   PHUR 6.0   < > 6.0   SPECGRAV 1.025   < > 1.025   PROTEINUA 2+*   < > 2+*   GLUCUA Negative   < > Trace*   KETONESU Negative   < > Negative   BILIRUBINUA Negative   < > Negative   OCCULTUA Trace*   < > 1+*   NITRITE Negative   < > Negative   UROBILINOGEN Negative   < > Negative   LEUKOCYTESUR Negative   < > Negative   RBCUA 3   < > 5*   WBCUA 5   < > 2   BACTERIA Few*   < > Few*   SQUAMEPITHEL 2  --    --    HYALINECASTS 2*   < > 0    < > = values in this interval not displayed.     Wound Culture:   Recent Labs   Lab 18  1250   LABAERO STREPTOCOCCUS PYOGENES (GROUP A)  Moderate  Beta-hemolytic streptococci are routinely susceptible to   penicillins,cephalosporins and carbapenems.  Susceptibility testing not routinely performed    STAPHYLOCOCCUS AUREUS  Moderate  Susceptibility pending         Significant Imagin/26 cxr - Since CT 2018, no significant change or new abnormality   CT chest - Minimal pleural reaction both lung bases with bibasilar dependent consolidative change, likely atelectasis LEFT greater than RIGHT.   xray left foot - Nonspecific irregularity and lucency is seen at the distal aspect of the great toe distal phalanx.  Uncertain if this may represent chronic finding versus potential acute or chronic infectious process.  Clinical correlation is needed.  No evidence of acute displaced fracture or dislocation.  Chronic change possibly related to remote injury is seen at the base of the 5th metatarsal.  Os peroneum is also noted.  Lisfranc articulation is congruent.  There is mild plantar calcaneal spurring. Mild degenerative spurring is noted in the midfoot. Mild soft tissue swelling is seen over the dorsum of the foot.  No radiopaque retained foreign body seen.   cxr - Impression    Subtle focal airspace opacity in the left lower lung zone could reflect aspiration or developing pneumonia in the right clinical setting.  No focal consolidation seen.

## 2018-12-26 NOTE — PROGRESS NOTES
Found patient with O2 SATS of 78% on 4 lpm NC. Pt had and increased WOB and was feeling SOB.  Flow increased to 6 lpm and SATS went up to 84%. Pt placed on aerosol ma for breathing tx. SATS were stable at 88%. RN notified and MD called. Pt placed on 15 lpm HFNC. SATS remained 88%. ABG ordered and obtained, results reported to MD. MD ordered Bipap QHS for pt. Bipap initiated at 12/8/60% with O2 SATS of 94-95%. Pt reports feeling better and no longer SOB. Pt in no apparent distress. Will continue to monitor.

## 2018-12-26 NOTE — HPI
74 y/o male with DM, HLD, HTN, stage 4 CKD, [place] [facility] [place] sudden onset of fatigue, SOB, chills, fever and weakness X 1 - 2 h prior to arrival. Patient also complained of bilateral leg swelling, and drainage from left great toe wound for a few days PTA. Complained of cough for a few days PTA. Patient was febrile to 103.2 F in the ED and CXR positive for LLL pneumonia. Patient was treated empirically with vancomycin and zosyn in the ED. Then antibiotics were changed to Ceftriaxone 1 gram every 24 h and azithromycin for CAP on admit 12/23. Patient remained febrile to 104 on 12/24 and vancomycin was added when BC on admit positive for GPC. Patient remained febrile on 12/25 to 102.5 and ceftriaxone was increased to 2 grams every 12 h, azithro continued, clindamycin added 900 mg IVPB every 8 h and vancomycin was discontinued when BC resulted as Group A strep from admit. Podiatry evaluated patient on 12/24 and took culture of left great toe ulcer on 12/24 which is positive for Group A strep and staph aureus. Left foot X ray 12/23 positive for irregularity and lucency of great toe distal phalanx. ID consult called for help with antibiotics 12/26/18.     12/26 - Patient feels OK from respiratory standpoint. Less SOB. No dysuria. Some nausea no vomiting. No diarrhea. Does not feel any pain in left foot due to DM neuropathy. Wife at bedside. Has pain in right ankle to palpation.   12/27 - still febrile over night to 102.5; MRI left foot - possible osteomyelitis left great toe

## 2018-12-26 NOTE — SUBJECTIVE & OBJECTIVE
Scheduled Meds:   albuterol-ipratropium  3 mL Nebulization Q4H    amLODIPine  10 mg Oral Daily    aspirin  81 mg Oral Daily    cefTRIAXone (ROCEPHIN) IVPB  2 g Intravenous Q12H    And    azithromycin  500 mg Intravenous Q24H    cadexomer iodine   Topical (Top) Every Mon, Wed, Fri    carvedilol  25 mg Oral BID WM    gabapentin  100 mg Oral BID WM    gabapentin  300 mg Oral QHS    heparin (porcine)  5,000 Units Subcutaneous Q8H    insulin aspart U-100  5 Units Subcutaneous TIDWM    insulin detemir U-100  25 Units Subcutaneous QHS    lidocaine (PF) 10 mg/ml (1%)  10 mL Intradermal Once    rosuvastatin  20 mg Oral Daily     Continuous Infusions:  PRN Meds:acetaminophen, dextrose 50%, dextrose 50%, glucagon (human recombinant), glucose, glucose, HYDROcodone-acetaminophen, insulin aspart U-100, lidocaine HCL 2%, ondansetron, prochlorperazine, sodium chloride 0.9%    Review of patient's allergies indicates:   Allergen Reactions    Atorvastatin Other (See Comments)        Past Medical History:   Diagnosis Date    Arthritis     Cataract     CHF (congestive heart failure)     Coronary artery disease     Cystoid macular edema of both eyes     Diabetes mellitus     Diabetes mellitus type II     Hyperlipemia     Hypertension     Retinal hole     Stage 4 chronic kidney disease      Past Surgical History:   Procedure Laterality Date    BLEPHAROPLASTY Bilateral 2017    Performed by [facility] at [facility]    CATARACT EXTRACTION W/  INTRAOCULAR LENS IMPLANT Left 12/15/14    Dr martin    CATARACT EXTRACTION W/  INTRAOCULAR LENS IMPLANT Right 14    dorothy    CIRCUMCISION, NON-      focal laser right eye      INSERTION-INTRAOCULAR LENS (IOL) Right 2014    Performed by [facility] at [facility] OR    INSERTION-INTRAOCULAR LENS (IOL) Left 12/15/2014    Performed by [facility] at [facility] OR    KNEE SURGERY      left    Left medial collateral ligament repair       PHACOEMULSIFICATION-ASPIRATION-CATARACT Right 12/29/2014    Performed by [facility] at [facility] OR    PHACOEMULSIFICATION-ASPIRATION-CATARACT Left 12/15/2014    Performed by [facility] at [facility] OR    REPAIR-PTOSIS/BILATERAL EXTERNAL LEVATORS Bilateral 8/30/2017    [facility] at [facility]    TONSILLECTOMY         Family History     Problem Relation (Age of Onset)    Cancer Mother, Brother, Sister    Cataracts Paternal Grandmother    Diabetes Father    Glaucoma Paternal Grandmother    Heart disease Mother, Father        Tobacco Use    Smoking status: Never Smoker    Smokeless tobacco: Never Used   Substance and Sexual Activity    Alcohol use: No     Alcohol/week: 0.0 oz    Drug use: No    Sexual activity: Yes     Partners: Female     Review of Systems   Constitutional: Positive for chills. Negative for fever.   HENT: Negative for congestion and hearing loss.    Respiratory: Negative for cough and shortness of breath.    Gastrointestinal: Negative for nausea and vomiting.   Skin: Positive for wound.   Neurological: Negative for dizziness.   Psychiatric/Behavioral: Negative for agitation.     Objective:     Vital Signs (Most Recent):  Temp: 97.9 °F (36.6 °C) (12/26/18 1540)  Pulse: 61 (12/26/18 1540)  Resp: 20 (12/26/18 1540)  BP: 135/63 (12/26/18 1540)  SpO2: 96 % (12/26/18 1440) Vital Signs (24h Range):  Temp:  [97.9 °F (36.6 °C)-102.3 °F (39.1 °C)] 97.9 °F (36.6 °C)  Pulse:  [61-83] 61  Resp:  [18-22] 20  SpO2:  [78 %-100 %] 96 %  BP: (133-168)/(60-75) 135/63     Weight: 108.6 kg (239 lb 6.7 oz)  Body mass index is 29.93 kg/m².    Foot Exam    General  General Appearance: appears stated age and healthy   Orientation: alert and oriented to person, place, and time   Affect: appropriate       Left Foot/Ankle      Inspection and Palpation  Ecchymosis: first toe  Swelling: none   Skin Exam: ulcer; no cellulitis     Neurovascular  Dorsalis pedis: 1+  Posterior tibial:  1+            Laboratory:  A1C:   Recent Labs   Lab 08/09/18  1105 11/28/18  1033   HGBA1C 8.3* 7.3*     Blood Cultures:   Recent Labs   Lab 12/25/18  0351 12/26/18  0458 12/26/18  0502   LABBLOO No Growth to date  No Growth to date No Growth to date No Growth to date     CBC:   Recent Labs   Lab 12/26/18  0322   WBC 14.97*   RBC 3.31*   HGB 9.3*   HCT 28.8*   *   MCV 87   MCH 28.1   MCHC 32.3     CMP:   Recent Labs   Lab 12/23/18  1739  12/26/18  0322   *   < > 198*   CALCIUM 9.8   < > 9.6   ALBUMIN 3.7  --   --    PROT 8.2  --   --       < > 134*   K 3.9   < > 4.1   CO2 20*   < > 22*      < > 99   BUN 50*   < > 57*   CREATININE 2.7*   < > 3.3*   ALKPHOS 87  --   --    ALT 14  --   --    AST 15  --   --    BILITOT 0.4  --   --     < > = values in this interval not displayed.     Wound Cultures:   Recent Labs   Lab 12/24/18  1250   LABAERO STREPTOCOCCUS PYOGENES (GROUP A)  Moderate  Beta-hemolytic streptococci are routinely susceptible to   penicillins,cephalosporins and carbapenems.  Susceptibility testing not routinely performed    STAPHYLOCOCCUS AUREUS  Moderate  Susceptibility pending         Diagnostic Results:  X-Ray: I have reviewed all pertinent results/findings within the past 24 hours.  Left foot xray did not show any focal bony destructive changes, gas or foreign bodies    Clinical Findings:  There was 0.5x0.8x0.2cm wound plantar left hallux with mixed granular and fibrous base, scant serous drainage, mild periwound erythema, does not probe, track or undermine.

## 2018-12-26 NOTE — PLAN OF CARE
I have reviewed the plan of care with the pt. The pt de-sated overnight with SpO2 as low as 78%. Pt is now on Bipap over night. Antibiotic therapy continued. The pt was febrile throughout the night with a temp as high as 102.3. Ice packs were applied and tylenol was administered. NP will repeat blood cultures. No true red alarms noted on tele. Safety measures are in place and family remains at the bedside. The pt verbalizes full understanding of their plan of care.

## 2018-12-26 NOTE — ASSESSMENT & PLAN NOTE
-Superficial blister noted to left hallux, scant purulent drainage noted, no acute SOI noted, stable  -Plan for bedside debridement tomorrow AM  -Wound dressed with betadine, aquacel border  -Local wound care orders written for nursing.   -Requires Darco shoe to help offload toe.   -Recommend follow up within 2-3 weeks of discharge. Recommend HH for dressing changes.

## 2018-12-27 PROBLEM — D72.829 LEUKOCYTOSIS: Status: RESOLVED | Noted: 2018-12-25 | Resolved: 2018-12-27

## 2018-12-27 PROBLEM — J18.9 PNEUMONIA OF LEFT LOWER LOBE DUE TO INFECTIOUS ORGANISM: Status: RESOLVED | Noted: 2018-12-23 | Resolved: 2018-12-27

## 2018-12-27 LAB
ANION GAP SERPL CALC-SCNC: 10 MMOL/L
BACTERIA SPEC AEROBE CULT: NORMAL
BACTERIA SPEC AEROBE CULT: NORMAL
BASOPHILS # BLD AUTO: 0.03 K/UL
BASOPHILS NFR BLD: 0.3 %
BNP SERPL-MCNC: 424 PG/ML
BUN SERPL-MCNC: 65 MG/DL
CALCIUM SERPL-MCNC: 9.2 MG/DL
CHLORIDE SERPL-SCNC: 101 MMOL/L
CO2 SERPL-SCNC: 22 MMOL/L
CREAT SERPL-MCNC: 3.7 MG/DL
DIFFERENTIAL METHOD: ABNORMAL
EOSINOPHIL # BLD AUTO: 0.1 K/UL
EOSINOPHIL NFR BLD: 0.6 %
ERYTHROCYTE [DISTWIDTH] IN BLOOD BY AUTOMATED COUNT: 14 %
EST. GFR  (AFRICAN AMERICAN): 18 ML/MIN/1.73 M^2
EST. GFR  (NON AFRICAN AMERICAN): 15 ML/MIN/1.73 M^2
GLUCOSE SERPL-MCNC: 197 MG/DL
HCT VFR BLD AUTO: 25.1 %
HGB BLD-MCNC: 8.2 G/DL
LYMPHOCYTES # BLD AUTO: 1.3 K/UL
LYMPHOCYTES NFR BLD: 11.3 %
MAGNESIUM SERPL-MCNC: 1.9 MG/DL
MCH RBC QN AUTO: 28.6 PG
MCHC RBC AUTO-ENTMCNC: 32.7 G/DL
MCV RBC AUTO: 88 FL
MONOCYTES # BLD AUTO: 1 K/UL
MONOCYTES NFR BLD: 8.9 %
NEUTROPHILS # BLD AUTO: 9.2 K/UL
NEUTROPHILS NFR BLD: 78.9 %
PHOSPHATE SERPL-MCNC: 2.9 MG/DL
PLATELET # BLD AUTO: 104 K/UL
PMV BLD AUTO: 12.4 FL
POCT GLUCOSE: 168 MG/DL (ref 70–110)
POCT GLUCOSE: 176 MG/DL (ref 70–110)
POCT GLUCOSE: 176 MG/DL (ref 70–110)
POCT GLUCOSE: 177 MG/DL (ref 70–110)
POCT GLUCOSE: 188 MG/DL (ref 70–110)
POTASSIUM SERPL-SCNC: 3.7 MMOL/L
RBC # BLD AUTO: 2.87 M/UL
SODIUM SERPL-SCNC: 133 MMOL/L
VANCOMYCIN SERPL-MCNC: 17.3 UG/ML
WBC # BLD AUTO: 11.61 K/UL

## 2018-12-27 PROCEDURE — 94640 AIRWAY INHALATION TREATMENT: CPT

## 2018-12-27 PROCEDURE — 84100 ASSAY OF PHOSPHORUS: CPT

## 2018-12-27 PROCEDURE — 63600175 PHARM REV CODE 636 W HCPCS: Performed by: HOSPITALIST

## 2018-12-27 PROCEDURE — 36415 COLL VENOUS BLD VENIPUNCTURE: CPT

## 2018-12-27 PROCEDURE — 11000001 HC ACUTE MED/SURG PRIVATE ROOM

## 2018-12-27 PROCEDURE — 94761 N-INVAS EAR/PLS OXIMETRY MLT: CPT

## 2018-12-27 PROCEDURE — 25000003 PHARM REV CODE 250: Performed by: HOSPITALIST

## 2018-12-27 PROCEDURE — 83735 ASSAY OF MAGNESIUM: CPT

## 2018-12-27 PROCEDURE — 25000242 PHARM REV CODE 250 ALT 637 W/ HCPCS: Performed by: HOSPITALIST

## 2018-12-27 PROCEDURE — G8979 MOBILITY GOAL STATUS: HCPCS | Mod: CI

## 2018-12-27 PROCEDURE — 97530 THERAPEUTIC ACTIVITIES: CPT

## 2018-12-27 PROCEDURE — G8978 MOBILITY CURRENT STATUS: HCPCS | Mod: CL

## 2018-12-27 PROCEDURE — 97165 OT EVAL LOW COMPLEX 30 MIN: CPT

## 2018-12-27 PROCEDURE — 83880 ASSAY OF NATRIURETIC PEPTIDE: CPT

## 2018-12-27 PROCEDURE — 85025 COMPLETE CBC W/AUTO DIFF WBC: CPT

## 2018-12-27 PROCEDURE — 97161 PT EVAL LOW COMPLEX 20 MIN: CPT

## 2018-12-27 PROCEDURE — 80048 BASIC METABOLIC PNL TOTAL CA: CPT

## 2018-12-27 PROCEDURE — 80202 ASSAY OF VANCOMYCIN: CPT

## 2018-12-27 PROCEDURE — 63600175 PHARM REV CODE 636 W HCPCS: Performed by: INTERNAL MEDICINE

## 2018-12-27 PROCEDURE — 27100171 HC OXYGEN HIGH FLOW UP TO 24 HOURS

## 2018-12-27 RX ORDER — FUROSEMIDE 10 MG/ML
40 INJECTION INTRAMUSCULAR; INTRAVENOUS 2 TIMES DAILY
Status: DISCONTINUED | OUTPATIENT
Start: 2018-12-27 | End: 2018-12-29

## 2018-12-27 RX ORDER — VANCOMYCIN HCL IN 5 % DEXTROSE 1G/250ML
1000 PLASTIC BAG, INJECTION (ML) INTRAVENOUS ONCE
Status: COMPLETED | OUTPATIENT
Start: 2018-12-27 | End: 2018-12-27

## 2018-12-27 RX ORDER — FUROSEMIDE 40 MG/1
40 TABLET ORAL 2 TIMES DAILY
Status: DISCONTINUED | OUTPATIENT
Start: 2018-12-27 | End: 2018-12-27

## 2018-12-27 RX ADMIN — HEPARIN SODIUM 5000 UNITS: 5000 INJECTION, SOLUTION INTRAVENOUS; SUBCUTANEOUS at 09:12

## 2018-12-27 RX ADMIN — GABAPENTIN 100 MG: 100 CAPSULE ORAL at 08:12

## 2018-12-27 RX ADMIN — INSULIN DETEMIR 25 UNITS: 100 INJECTION, SOLUTION SUBCUTANEOUS at 09:12

## 2018-12-27 RX ADMIN — ASPIRIN 81 MG: 81 TABLET, COATED ORAL at 08:12

## 2018-12-27 RX ADMIN — CARVEDILOL 25 MG: 25 TABLET, FILM COATED ORAL at 05:12

## 2018-12-27 RX ADMIN — AMLODIPINE BESYLATE 10 MG: 5 TABLET ORAL at 08:12

## 2018-12-27 RX ADMIN — IPRATROPIUM BROMIDE AND ALBUTEROL SULFATE 3 ML: .5; 3 SOLUTION RESPIRATORY (INHALATION) at 08:12

## 2018-12-27 RX ADMIN — INSULIN ASPART 5 UNITS: 100 INJECTION, SOLUTION INTRAVENOUS; SUBCUTANEOUS at 08:12

## 2018-12-27 RX ADMIN — IPRATROPIUM BROMIDE AND ALBUTEROL SULFATE 3 ML: .5; 3 SOLUTION RESPIRATORY (INHALATION) at 07:12

## 2018-12-27 RX ADMIN — HEPARIN SODIUM 5000 UNITS: 5000 INJECTION, SOLUTION INTRAVENOUS; SUBCUTANEOUS at 02:12

## 2018-12-27 RX ADMIN — ROSUVASTATIN CALCIUM 20 MG: 10 TABLET, FILM COATED ORAL at 08:12

## 2018-12-27 RX ADMIN — ACETAMINOPHEN 650 MG: 325 TABLET ORAL at 02:12

## 2018-12-27 RX ADMIN — IPRATROPIUM BROMIDE AND ALBUTEROL SULFATE 3 ML: .5; 3 SOLUTION RESPIRATORY (INHALATION) at 11:12

## 2018-12-27 RX ADMIN — FUROSEMIDE 40 MG: 40 TABLET ORAL at 12:12

## 2018-12-27 RX ADMIN — FUROSEMIDE 40 MG: 10 INJECTION, SOLUTION INTRAMUSCULAR; INTRAVENOUS at 05:12

## 2018-12-27 RX ADMIN — INSULIN ASPART 5 UNITS: 100 INJECTION, SOLUTION INTRAVENOUS; SUBCUTANEOUS at 12:12

## 2018-12-27 RX ADMIN — GABAPENTIN 100 MG: 100 CAPSULE ORAL at 05:12

## 2018-12-27 RX ADMIN — IPRATROPIUM BROMIDE AND ALBUTEROL SULFATE 3 ML: .5; 3 SOLUTION RESPIRATORY (INHALATION) at 12:12

## 2018-12-27 RX ADMIN — HEPARIN SODIUM 5000 UNITS: 5000 INJECTION, SOLUTION INTRAVENOUS; SUBCUTANEOUS at 05:12

## 2018-12-27 RX ADMIN — VANCOMYCIN HYDROCHLORIDE 1000 MG: 1 INJECTION, POWDER, FOR SOLUTION INTRAVENOUS at 05:12

## 2018-12-27 RX ADMIN — CEFTRIAXONE 2 G: 2 INJECTION, SOLUTION INTRAVENOUS at 08:12

## 2018-12-27 RX ADMIN — ACETAMINOPHEN 650 MG: 325 TABLET ORAL at 12:12

## 2018-12-27 RX ADMIN — CARVEDILOL 25 MG: 25 TABLET, FILM COATED ORAL at 08:12

## 2018-12-27 RX ADMIN — IPRATROPIUM BROMIDE AND ALBUTEROL SULFATE 3 ML: .5; 3 SOLUTION RESPIRATORY (INHALATION) at 04:12

## 2018-12-27 RX ADMIN — Medication 500 MG: at 09:12

## 2018-12-27 RX ADMIN — INSULIN ASPART 5 UNITS: 100 INJECTION, SOLUTION INTRAVENOUS; SUBCUTANEOUS at 05:12

## 2018-12-27 RX ADMIN — FUROSEMIDE 40 MG: 10 INJECTION, SOLUTION INTRAMUSCULAR; INTRAVENOUS at 02:12

## 2018-12-27 RX ADMIN — GABAPENTIN 300 MG: 300 CAPSULE ORAL at 08:12

## 2018-12-27 NOTE — ASSESSMENT & PLAN NOTE
Continue home carvedilol 25 mg BID, amlodipine 10 mg daily. Hold home hydralazine 50 mg BID.  Monitor BP.

## 2018-12-27 NOTE — ASSESSMENT & PLAN NOTE
72 y/o male with DM2, HTN, HLD, CKD4, CHF [place] [facility] [place] with fever to 103, LLL infiltrate on CXR  - CAP - and left great toe ulcer with drainage. BC on admit positive for group A strep (GAS). Left great toe culture positive for GAS and Staph aureus. Patient has been persistently febrile this hospital stay with fever curve trending slightly down today. Originally given vanco and zosyn in the ED then ceftriaxone and azithro, then vanco added when GPC in blood then vanco discontinued once BC identified as GAS and then ceftriaxone dose increased, azithro continued and clinda added when fever persisted. Two probable sources for the GAS septicemia - LLL pneumonia and left great toe wound infection.     -12/26 clindamycin stopped    Rec:  1. Continue ceftriaxone 2 g every 12 h and azithro IV daily for now for CAP  2. Continue vancomycin and monitor levels pending sensitivities for the staph aureus in the left foot  3. MRI of left foot with probable osteo of great toe - will likely need 6 weeks of antibiotics - await staph aureus sensitives - continue vanco for now  5. Patient with pain to palpation of right ankle - watch - may need workup if it continues

## 2018-12-27 NOTE — ASSESSMENT & PLAN NOTE
Lab Results   Component Value Date    HGBA1C 7.3 (H) 11/28/2018   BS are reasonably controlled at home. He takes levemir 26 units qHS and lispro 10-12 units TIDWM at home. Continue levemir 25 units qHS. Continue aspart 5 units TIDWM. Continue moderate dose SSI prn. Monitor accuchecks. BS ranged 179 to 185.

## 2018-12-27 NOTE — SUBJECTIVE & OBJECTIVE
Interval History: Had increased SOB and hypoxia overnight. Started on BiPAP for a short time and transitioned to high flow NC. Looking better today. Still with fevers but not as high and is having more normal temps now.     Review of Systems   Constitutional: Positive for fever. Negative for chills.   Respiratory: Positive for shortness of breath. Negative for cough.    Cardiovascular: Negative for chest pain and palpitations.   Gastrointestinal: Negative for nausea and vomiting.     Objective:     Vital Signs (Most Recent):  Temp: 97.9 °F (36.6 °C) (12/26/18 1540)  Pulse: 61 (12/26/18 1540)  Resp: 20 (12/26/18 1540)  BP: 135/63 (12/26/18 1540)  SpO2: 96 % (12/26/18 1440) Vital Signs (24h Range):  Temp:  [97.9 °F (36.6 °C)-102.3 °F (39.1 °C)] 97.9 °F (36.6 °C)  Pulse:  [61-83] 61  Resp:  [18-22] 20  SpO2:  [78 %-100 %] 96 %  BP: (133-168)/(60-75) 135/63     Weight: 108.6 kg (239 lb 6.7 oz)  Body mass index is 29.93 kg/m².    Intake/Output Summary (Last 24 hours) at 12/26/2018 1836  Last data filed at 12/26/2018 1600  Gross per 24 hour   Intake 305 ml   Output 350 ml   Net -45 ml      Physical Exam   Constitutional: He is oriented to person, place, and time. He appears well-developed and well-nourished. No distress.   Cardiovascular: Normal rate and regular rhythm.   No murmur heard.  Pulmonary/Chest: Effort normal and breath sounds normal. No respiratory distress.   Abdominal: Soft. Bowel sounds are normal. He exhibits no distension. There is no tenderness.   Musculoskeletal: He exhibits no edema or tenderness.   Neurological: He is alert and oriented to person, place, and time.   Skin: Skin is warm and dry.   Psychiatric: He has a normal mood and affect. His behavior is normal.   Nursing note and vitals reviewed.      Significant Labs:   CBC:   Recent Labs   Lab 12/25/18  0351 12/26/18  0322   WBC 17.34* 14.97*   HGB 9.0* 9.3*   HCT 27.4* 28.8*   * 112*     CMP:   Recent Labs   Lab 12/25/18  5816  12/26/18  0322   * 134*   K 3.5 4.1    99   CO2 22* 22*   * 198*   BUN 52* 57*   CREATININE 3.1* 3.3*   CALCIUM 9.0 9.6   ANIONGAP 9 13   EGFRNONAA 19* 18*     Magnesium:   Recent Labs   Lab 12/25/18  0351 12/26/18  0322   MG 1.8 1.9     POCT Glucose:   Recent Labs   Lab 12/26/18  0657 12/26/18  1134 12/26/18  1537   POCTGLUCOSE 182* 154* 223*       Significant Imaging: I have reviewed all pertinent imaging results/findings within the past 24 hours.

## 2018-12-27 NOTE — HOSPITAL COURSE
He was transitioned to ceftriaxone, azithromycin, and vancomycin. Blood cultures grew Streptococcus pyogenes. Despite antibiotics and negative fluid balance, his hypoxia worsened. Chest CT showed bilateral pleural effusions and atelectasis. Podiatry evaluated his wound on the left great toe and did not feel that it was source of bacteremia. Wound culture grew Streptococcus pyogenes. Infectious Disease was consulted and recommended MRI of the left foot, which showed early osteomyelitis. On 12/27/18, it was found that nursing staff had not been allowing him to get out of bed. BNP had risen to 424. His home furosemide had been held since admission, so he was put on IV furosemide. Physical and Occupational Therapy were consulted to get him mobilized and recommended skilled nursing facility placement. On 12/28/18, he revealed that he had not had a bowel movement since prior to admission, so he was given polyethylene glycol and lactulose with excellent results. Dr. Gallegos performed left 1st toe excisional debridement. Hypoxia progressively improved with diuretics and mobilization. Infectious Disease recommended continuing ceftriaxone for 6 weeks (end date 2/4/19). He was weaned to room air by 12/31/18 with oxygen saturation of 87%. Due to chronic kidney disease, Nephrology did not approve a PICC so General Surgery was consulted for port placement. His home furosemide was increased from 40 mg to 80 mg twice daily. He was prescribed home health for wound care, antibiotic administration, physical therapy, and occupational therapy. Port was placed on day of discharge.

## 2018-12-27 NOTE — PROGRESS NOTES
[facility]  Infectious Disease  Progress Note    Patient Name: Domingo Castro  MRN: [id number]  Admission Date: 12/23/2018  Length of Stay: 4 days  Attending Physician: Kendall Sotomayor MD  Primary Care Provider: Griselda Nugent MD    Isolation Status: No active isolations  Assessment/Plan:      * Septicemia due to group A Streptococcus    74 y/o male with DM2, HTN, HLD, CKD4, CHF [place] [facility] [place]23 with fever to 103, LLL infiltrate on CXR  - CAP - and left great toe ulcer with drainage. BC on admit positive for group A strep (GAS). Left great toe culture positive for GAS and Staph aureus. Patient has been persistently febrile this hospital stay with fever curve trending slightly down today. Originally given vanco and zosyn in the ED then ceftriaxone and azithro, then vanco added when GPC in blood then vanco discontinued once BC identified as GAS and then ceftriaxone dose increased, azithro continued and clinda added when fever persisted. Two probable sources for the GAS septicemia - LLL pneumonia and left great toe wound infection.     -12/26 clindamycin stopped    Rec:  1. Continue ceftriaxone 2 g every 12 h and azithro IV daily for now for CAP  2. Continue vancomycin and monitor levels pending sensitivities for the staph aureus in the left foot  3. MRI of left foot with probable osteo of great toe - will likely need 6 weeks of antibiotics - await staph aureus sensitives - continue vanco for now  5. 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 Patient had Tdap in 2013.   MRI - possible osteo    Rec  Continue vanco pending staph aureus sensitivities           Anticipated Disposition: unclear at this point - likely needs 6 weeks IV antibiotics for left great toe osteo    Thank you for your consult. DR. Singh  Neva will  U ID Service 12/28 - call [phone] if needed    Maria Guadalupe Bruner MD  Infectious Disease  [facility]    Subjective:     Principal Problem:Septicemia due to group A Streptococcus    HPI: 72 y/o male with DM, HLD, HTN, stage 4 CKD, Admitted to [facility] [place] 12[place]23 with sudden onset of fatigue, SOB, chills, fever and weakness X 1 - 2 h prior to arrival. Patient also complained of bilateral leg swelling, and drainage from left great toe wound for a few days PTA. Complained of cough for a few days PTA. Patient was febrile to 103.2 F in the ED and CXR positive for LLL pneumonia. Patient was treated empirically with vancomycin and zosyn in the ED. Then antibiotics were changed to Ceftriaxone 1 gram every 24 h and azithromycin for CAP on admit 12/23. Patient remained febrile to 104 on 12/24 and vancomycin was added when BC on admit positive for GPC. Patient remained febrile on 12/25 to 102.5 and ceftriaxone was increased to 2 grams every 12 h, azithro continued, clindamycin added 900 mg IVPB every 8 h and vancomycin was discontinued when BC resulted as Group A strep from admit. Podiatry evaluated patient on 12/24 and took culture of left great toe ulcer on 12/24 which is positive for Group A strep and staph aureus. Left foot X ray 12/23 positive for irregularity and lucency of great toe distal phalanx. ID consult called for help with antibiotics 12/26/18.     12/26 - Patient feels OK from respiratory standpoint. Less SOB. No dysuria. Some nausea no vomiting. No diarrhea. Does not feel any pain in left foot due to DM neuropathy. Wife at bedside. Has pain in right ankle to palpation.   12/27 - still febrile over night to 102.5; MRI left foot - possible osteomyelitis left great toe  Interval History: Podiatry looked at left great toe yesterday and there was a blister medial aspect with purulence underneath - they unroofed the blister and there is a clean bed underneath. Patient spiked temp last  night to 102.5. Still on oxygen but not feeling SOB. Has some right ankle pain but no joint swelling on exam.     Review of Systems   Constitutional: Positive for chills and fatigue.   Respiratory: Negative for shortness of breath.    Gastrointestinal: Positive for nausea. Negative for diarrhea and vomiting.     Antibiotics (From admission, onward)    Start     Stop Route Frequency Ordered    12/27/18 1600  vancomycin in dextrose 5 % 1 gram/250 mL IVPB 1,000 mg      -- IV Once 12/27/18 1521    12/25/18 2130  azithromycin 500 mg in dextrose 5 % 250 mL IVPB (ready to mix system)  (Ceftriaxone IV/ Azithromycin IV Panel)      12/28 2129 IV Every 24 hours (non-standard times) 12/25/18 1400    12/25/18 2014  cefTRIAXone (ROCEPHIN) 2 g in dextrose 5 % 50 mL IVPB  (Ceftriaxone IV/ Azithromycin IV Panel)      -- IV Every 12 hours (non-standard times) 12/25/18 1400        Objective:     Vital Signs (Most Recent):  Temp: 97.8 °F (36.6 °C) (12/27/18 1132)  Pulse: 69 (12/27/18 1140)  Resp: 18 (12/27/18 1140)  BP: 136/73 (12/27/18 1132)  SpO2: (!) 92 % (12/27/18 1140) Vital Signs (24h Range):  Temp:  [97.4 °F (36.3 °C)-102.5 °F (39.2 °C)] 97.8 °F (36.6 °C)  Pulse:  [66-89] 69  Resp:  [18-25] 18  SpO2:  [80 %-95 %] 92 %  BP: (136-151)/(65-73) 136/73     Weight: 115.2 kg (253 lb 15.5 oz)  Body mass index is 31.74 kg/m².    Estimated Creatinine Clearance: 24.3 mL/min (A) (based on SCr of 3.7 mg/dL (H)).    Physical Exam   Cardiovascular: Normal heart sounds.   Pulmonary/Chest: Breath sounds normal.   Abdominal: Bowel sounds are normal. He exhibits no distension. There is no tenderness.   Musculoskeletal: He exhibits no edema.   Right ankle pain but no swelling; left great toe with no drainage - ulcer bed clean and medial blister unroofed and clean base       Significant Labs:   Blood Culture:   Recent Labs   Lab 12/23/18  1735 12/23/18  1738 12/25/18  0351 12/26/18  0458 12/26/18  0502   LABBLOO Gram stain aer bottle: Gram positive  cocci  Gram stain jamal bottle: Gram positive cocci  Results called to and read back by: Radha Mejia RN  12/24/2018  10:05  STREPTOCOCCUS PYOGENES (GROUP A)  Beta-hemolytic streptococci are routinely susceptible to   penicillins,cephalosporins and carbapenems.  For susceptibility see order #[id number]   Gram stain aer bottle: Gram positive cocci  Gram stain jamal bottle: Gram positive cocci  Results called to and read back by: Radha Mejia RN  12/24/2018  10:05  STREPTOCOCCUS PYOGENES (GROUP A)  Beta-hemolytic streptococci are routinely susceptible to   penicillins,cephalosporins and carbapenems.   No Growth to date  No Growth to date  No Growth to date No Growth to date  No Growth to date No Growth to date  No Growth to date     CBC:   Recent Labs   Lab 12/26/18 0322 12/27/18  0439   WBC 14.97* 11.61   HGB 9.3* 8.2*   HCT 28.8* 25.1*   * 104*     CMP:   Recent Labs   Lab 12/26/18 0322 12/27/18  0439   * 133*   K 4.1 3.7   CL 99 101   CO2 22* 22*   * 197*   BUN 57* 65*   CREATININE 3.3* 3.7*   CALCIUM 9.6 9.2   ANIONGAP 13 10   EGFRNONAA 18* 15*     Urine Culture:   Recent Labs   Lab 07/16/18 2053   LABURIN No growth     Urine Studies:   Recent Labs   Lab 07/16/18 2053 12/23/18  2300   COLORU Yellow   < > Yellow   APPEARANCEUA Clear   < > Clear   PHUR 6.0   < > 6.0   SPECGRAV 1.025   < > 1.025   PROTEINUA 2+*   < > 2+*   GLUCUA Negative   < > Trace*   KETONESU Negative   < > Negative   BILIRUBINUA Negative   < > Negative   OCCULTUA Trace*   < > 1+*   NITRITE Negative   < > Negative   UROBILINOGEN Negative   < > Negative   LEUKOCYTESUR Negative   < > Negative   RBCUA 3   < > 5*   WBCUA 5   < > 2   BACTERIA Few*   < > Few*   SQUAMEPITHEL 2  --   --    HYALINECASTS 2*   < > 0    < > = values in this interval not displayed.     Wound Culture:   Recent Labs   Lab 12/24/18  1250   LABAERO STREPTOCOCCUS PYOGENES (GROUP A)  Moderate  Beta-hemolytic streptococci are routinely susceptible to    penicillins,cephalosporins and carbapenems.  Susceptibility testing not routinely performed    STAPHYLOCOCCUS AUREUS  Moderate         Significant Imagin/26 MRI left foot -   Impression       Reactive marrow edema versus early osteomyelitis in the distal phalanx of the great toe, noting exam significantly limited due to patient motion artifact.  Short-term imaging follow-up could be performed to ensure stability/resolution, if indicated.

## 2018-12-27 NOTE — ASSESSMENT & PLAN NOTE
Metabolic bone disease  Creatinine is up to 3.3 today. Hold IV furosemide.  Avoid nephrotoxins. Monitor renal function. Will give a liter of saline today to recover insensible losses from the fever and decreased oral intake yesterday.

## 2018-12-27 NOTE — PT/OT/SLP EVAL
"Occupational Therapy   Evaluation    Name: Domingo Castro  MRN: [id number]  Admitting Diagnosis:  Septicemia due to group A Streptococcus      Recommendations:     Discharge Recommendations: nursing facility, skilled  Discharge Equipment Recommendations:  other (see comments)(TBD)  Barriers to discharge:  None    History:     Occupational Profile:  Living Environment: Pt lives w/wife, dtr, ENRIQUE in H, no DENA, tub/shower combo  Previous level of function: indep, drives  Roles and Routines:  of Worship  "He is a night owl"  Equipment Used at Home:  none  Assistance upon Discharge: tamia    Past Medical History:   Diagnosis Date    Arthritis     Cataract     CHF (congestive heart failure)     Coronary artery disease     Cystoid macular edema of both eyes     Diabetes mellitus type II     Hyperlipemia     Hypertension     Retinal hole     Stage 4 chronic kidney disease        Past Surgical History:   Procedure Laterality Date    BLEPHAROPLASTY Bilateral 2017    [facility]    CATARACT EXTRACTION W/  INTRAOCULAR LENS IMPLANT Left 12/15/14    Dr martin    CATARACT EXTRACTION W/  INTRAOCULAR LENS IMPLANT Right 14    dorothy    CIRCUMCISION, NON-      focal laser right eye      INSERTION-INTRAOCULAR LENS (IOL) Right 2014    Performed by [facility] at [facility]    INSERTION-INTRAOCULAR LENS (IOL) Left 12/15/2014    Performed by [facility] at [facility] OR    KNEE SURGERY      left    Left medial collateral ligament repair      PHACOEMULSIFICATION-ASPIRATION-CATARACT Right 2014    Performed by [facility] at [facility]    PHACOEMULSIFICATION-ASPIRATION-CATARACT Left 12/15/2014    Performed by [facility]rtin [facility] at [facility]    REPAIR-PTOSIS/BILATERAL EXTERNAL LEVATORS Bilateral 2017    [facility] at [facility]    TONSILLECTOMY         Subjective     Chief Complaint: weakness, "hole in my " "toe"  Patient/Family Comments/goals: return to PLOF    Pain/Comfort:  · Pain Rating 1: 0/10  · Pain Rating Post-Intervention 1: 0/10    Patients cultural, spiritual, Alevism conflicts given the current situation:      Objective:     Communicated with: nurse prior to session.  Patient found with: all lines intact, call button in reach and family present and peripheral IV, telemetry upon OT entry to room.    General Precautions: Standard, fall, diabetic   Orthopedic Precautions:    Braces:       Occupational Performance:    Bed Mobility:    · Patient completed Rolling/Turning to Right with minimum assistance  · Patient completed Scooting/Bridging with contact guard assistance and minimum assistance  · Patient completed Supine to Sit with minimum assistance and moderate assistance  · Patient completed Sit to Supine with minimum assistance and moderate assistance    Functional Mobility/Transfers:  · Patient completed Sit <> Stand Transfer with minimum assistance  with  rolling walker   · Functional Mobility: unable    Activities of Daily Living:  ·     Cognitive/Visual Perceptual:  AO4    Physical Exam:  BUE AROM, strength WFL  Decreased sensation Jalil hands and feet  Fair+ sti balance, fair stand balance and endurance    AMPAC 6 Click ADL:  AMPAC Total Score: 14    Treatment & Education:  Pt/fam educated on role of OT/POC, post acute therapy recs, general safety precautions.  Pt moved sit to stand min A on 2 trials, L lateral lean, posterior hips and leaning on bed when in upright stance  Education:    Patient left HOB elevated with all lines intact, call button in reach, bed alarm on and nurse & family present    Assessment:     Domingo Castro is a 73 y.o. male with a medical diagnosis of Septicemia due to group A Streptococcus.  He presents with the following performance deficits affecting function: weakness, impaired functional mobilty, impaired endurance, gait instability, impaired self care skills, impaired " "sensation, impaired balance, decreased lower extremity function, decreased upper extremity function, impaired skin.      Rehab Prognosis: Good; patient would benefit from acute skilled OT services to address these deficits and reach maximum level of function.         Clinical Decision Makin.  OT Low:  "Pt evaluation falls under low complexity for evaluation coding due to performance deficits noted in 1-3 areas as stated above and 0 co-morbities affecting current functional status. Data obtained from problem focused assessments. No modifications or assistance was required for completion of evaluation. Only brief occupational profile and history review completed."     Plan:     Patient to be seen 5 x/week to address the above listed problems via self-care/home management, therapeutic activities, therapeutic exercises  · Plan of Care Expires: 19  · Plan of Care Reviewed with: patient, family, spouse    This Plan of care has been discussed with the patient who was involved in its development and understands and is in agreement with the identified goals and treatment plan    GOALS:   Multidisciplinary Problems     Occupational Therapy Goals        Problem: Occupational Therapy Goal    Goal Priority Disciplines Outcome Interventions   Occupational Therapy Goal     OT, PT/OT Ongoing (interventions implemented as appropriate)    Description:  Goals to be met by:      Patient will increase functional independence with ADLs by performing:    UE Dressing with Set-up Assistance.  LE Dressing with Stand-by Assistance.  Grooming while standing with Stand-by Assistance.  Toileting from toilet with Contact Guard Assistance for hygiene and clothing management.   Toilet transfer to toilet with Contact Guard Assistance.  Upper extremity exercise program x10 reps per handout, with supervision.                      Time Tracking:     OT Date of Treatment: 18  OT Start Time: 1410  OT Stop Time: 1437  OT Total Time " (min): 27 min    Billable Minutes:Evaluation 15  Therapeutic Activity 8    Prudencio Morgan OT  12/27/2018

## 2018-12-27 NOTE — ASSESSMENT & PLAN NOTE
Hold lasix with continued fever and creatinine up to 3.3 today. Daily weights. Saw Dr. Tovar on 12/3 and he ordered stress test to evaluate for evidence of ischemia as a cause of his ABAD. If he remains in the hospital a few days, will consider ordering the test here to evaluate that. Last TTE (11/30/28) with LVEF 60% and indeterminate LV diastolic function. Had diastolic dysfunction present on TTE in 7/18.

## 2018-12-27 NOTE — ASSESSMENT & PLAN NOTE
Lab Results   Component Value Date    HGBA1C 7.3 (H) 11/28/2018   BS are reasonably controlled at home. He takes insulin detemir 26 units qHS and lispro 10-12 units TIDWM at home. Continue insulin detemir 25 units qHS. Continue aspart 5 units TIDWM. Continue moderate dose SSI prn. Monitor accuchecks.

## 2018-12-27 NOTE — PLAN OF CARE
Problem: Adult Inpatient Plan of Care  Goal: Plan of Care Review  Outcome: Ongoing (interventions implemented as appropriate)  The proper method of use, as well as anticipated side effects, of this aerosol treatment are discussed and demonstrated to the patient. o2 saturation 93-96% on high flow nasal cannula 12 lpm. Will continue to monitor.

## 2018-12-27 NOTE — PLAN OF CARE
Problem: Occupational Therapy Goal  Goal: Occupational Therapy Goal  Goals to be met by: 1/27     Patient will increase functional independence with ADLs by performing:    UE Dressing with Set-up Assistance.  LE Dressing with Stand-by Assistance.  Grooming while standing with Stand-by Assistance.  Toileting from toilet with Contact Guard Assistance for hygiene and clothing management.   Toilet transfer to toilet with Contact Guard Assistance.  Upper extremity exercise program x10 reps per handout, with supervision.    Outcome: Ongoing (interventions implemented as appropriate)  OT eval performed, report to follow    Rec SNF at this time, pending progress, pt may be able to go home w/HH therapy    Ongoing assessment of DME

## 2018-12-27 NOTE — PROGRESS NOTES
[facility] Hospital Medicine  Progress Note    Patient Name: Domingo Castro  MRN: [id number]  Patient Class: IP- Inpatient   Admission Date: 12/23/2018  Length of Stay: 3 days  Attending Physician: Iam Ibarra MD  Primary Care Provider: Griselda Nugent MD        Subjective:     Principal Problem:Bacterial pneumonia    HPI:  Mr. Castro is a 72 yo BM with HTN, DM2, CKD4, and diastolic heart failure that presented to [facility] ED for evaluation of shaking and feeling cold this afternoon. He did his normal routine this AM and had a sermon at Taoism, but did not sleep much last PM. After getting home he went to sleep, as usual after Taoism, and woke up feeling very cold. He reported chills and shaking as well. Reported a little cough on the way to the ED and some cough a week ago, but none earlier in the day. Also reports some nausea but no vomiting with decreased oral intake over the last day. He also noted that he was light headed earlier today. No sick contacts or recent travel. He was found to have a temperature of 103.2 F on arrival to the ED with pulse ox down to 90%. He was given vancomycin and zosyn in the ED.     Hospital Course:  Given vancomycin and zosyn in the ED. Transitioned to ceftriaxone and azithromycin. Continued to have fever, so vancomycin was restarted. Blood cultures returned positive for Strep pyogenes. Vancomycin then stopped and clindamycin was added to the ceftriaxone and azithromycin. Podiatry evaluated wound on left great toe and did not feel that it was source of bacteremia. ID consulted and recommended MRI of the left foot to rule out osteomyelitis.     Interval History: Had increased SOB and hypoxia overnight. Started on BiPAP for a short time and transitioned to high flow NC. Looking better today. Still with fevers but not as high and is having more normal temps now.     Review of Systems   Constitutional: Positive for fever. Negative for chills.   Respiratory:  Positive for shortness of breath. Negative for cough.    Cardiovascular: Negative for chest pain and palpitations.   Gastrointestinal: Negative for nausea and vomiting.     Objective:     Vital Signs (Most Recent):  Temp: 97.9 °F (36.6 °C) (12/26/18 1540)  Pulse: 61 (12/26/18 1540)  Resp: 20 (12/26/18 1540)  BP: 135/63 (12/26/18 1540)  SpO2: 96 % (12/26/18 1440) Vital Signs (24h Range):  Temp:  [97.9 °F (36.6 °C)-102.3 °F (39.1 °C)] 97.9 °F (36.6 °C)  Pulse:  [61-83] 61  Resp:  [18-22] 20  SpO2:  [78 %-100 %] 96 %  BP: (133-168)/(60-75) 135/63     Weight: 108.6 kg (239 lb 6.7 oz)  Body mass index is 29.93 kg/m².    Intake/Output Summary (Last 24 hours) at 12/26/2018 1836  Last data filed at 12/26/2018 1600  Gross per 24 hour   Intake 305 ml   Output 350 ml   Net -45 ml      Physical Exam   Constitutional: He is oriented to person, place, and time. He appears well-developed and well-nourished. No distress.   Cardiovascular: Normal rate and regular rhythm.   No murmur heard.  Pulmonary/Chest: Effort normal and breath sounds normal. No respiratory distress.   Abdominal: Soft. Bowel sounds are normal. He exhibits no distension. There is no tenderness.   Musculoskeletal: He exhibits no edema or tenderness.   Neurological: He is alert and oriented to person, place, and time.   Skin: Skin is warm and dry.   Psychiatric: He has a normal mood and affect. His behavior is normal.   Nursing note and vitals reviewed.      Significant Labs:   CBC:   Recent Labs   Lab 12/25/18  0351 12/26/18  0322   WBC 17.34* 14.97*   HGB 9.0* 9.3*   HCT 27.4* 28.8*   * 112*     CMP:   Recent Labs   Lab 12/25/18  0351 12/26/18  0322   * 134*   K 3.5 4.1    99   CO2 22* 22*   * 198*   BUN 52* 57*   CREATININE 3.1* 3.3*   CALCIUM 9.0 9.6   ANIONGAP 9 13   EGFRNONAA 19* 18*     Magnesium:   Recent Labs   Lab 12/25/18  0351 12/26/18  0322   MG 1.8 1.9     POCT Glucose:   Recent Labs   Lab 12/26/18  0657 12/26/18  1134  12/26/18  1537   POCTGLUCOSE 182* 154* 223*       Significant Imaging: I have reviewed all pertinent imaging results/findings within the past 24 hours.    Assessment/Plan:      * Bacterial pneumonia    Due to Strep pyogenes  Acute respiratory insuffiencey -> acute respiratory failure with hypoxia  Strep pyogenes bacteremia  Continue ceftriaxone 2 gram IV BID and azitrhomyin 500 mg IV daily per ID. Stopped clindamycin. Repeat blood cultures are NGTD. A/A nebs. Wean supplemental oxygen as able now that on HFNC. CXR this AM was relatively unchanged with only a small left effusion and LLL infiltrate.      Hyponatremia    Monitor     Thrombocytopenia, unspecified    Stable.        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 Will get MRI of the foot.        Diabetic neuropathy associated with type 2 diabetes mellitus    Continue gabapentin.        CKD (chronic kidney disease) stage 4, GFR 15-29 ml/min    Metabolic bone disease  Creatinine is up to 3.3 today. Hold IV furosemide.  Avoid nephrotoxins. Monitor renal function. Will give a liter of saline today to recover insensible losses from the fever and decreased oral intake yesterday.        Chronic diastolic congestive heart failure    Hold lasix with continued fever and creatinine up to 3.3 today. Daily weights. Saw Dr. Tovar on 12/3 and he ordered stress test to evaluate for evidence of ischemia as a cause of his ABAD. If he remains in the hospital a few days, will consider ordering the test here to evaluate that. Last TTE (11/30/28) with LVEF 60% and indeterminate LV diastolic function. Had diastolic dysfunction present on TTE in 7/18.        Anemia of chronic renal failure, stage 4 (severe)    Stable at baseline. Monitor.        Dyslipidemia    Continue home rosuvastatin.      Essential hypertension    SBP ranged 133 to 179. Will continue home carvedilol 25 mg BID, amlodipine 10 mg daily. Hold home hydralazine 50 mg  BID.  Monitor BP.      Type 2 diabetes mellitus with stage 4 chronic kidney disease, with long-term current use of insulin    Lab Results   Component Value Date    HGBA1C 7.3 (H) 11/28/2018   BS are reasonably controlled at home. He takes levemir 26 units qHS and lispro 10-12 units TIDWM at home. Continue levemir 25 units qHS. Continue aspart 5 units TIDWM. Continue moderate dose SSI prn. Monitor accuchecks. BS ranged 179 to 185.        VTE Risk Mitigation (From admission, onward)        Ordered     heparin (porcine) injection 5,000 Units  Every 8 hours      12/23/18 2124     IP VTE HIGH RISK PATIENT  Once      12/23/18 2124              Iam Ibarra MD  Department of Hospital Medicine   [facility]

## 2018-12-27 NOTE — ASSESSMENT & PLAN NOTE
Acute on chronic diastolic congestive heart failure  Acute respiratory failure with hypoxia  Due to holding home furosemide. Takes furosemide 40 mg BID. Give furosemide 40 mg IV BID.

## 2018-12-27 NOTE — ASSESSMENT & PLAN NOTE
Due to Strep pyogenes  Acute respiratory insuffiencey -> acute respiratory failure with hypoxia  Strep pyogenes bacteremia  Continue ceftriaxone 2 gram IV BID and azitrhomyin 500 mg IV daily per ID. Stopped clindamycin. Repeat blood cultures are NGTD. A/A nebs. Wean supplemental oxygen as able now that on HFNC. CXR this AM was relatively unchanged with only a small left effusion and LLL infiltrate.

## 2018-12-27 NOTE — PT/OT/SLP EVAL
Physical Therapy Evaluation    Patient Name:  Domingo Castro   MRN:  [id number]    Recommendations:     Discharge Recommendations:  nursing facility, skilled   Discharge Equipment Recommendations: walker, rolling, shower chair, commode, tub bench   Barriers to discharge: None    Assessment:     Domingo Castro is a 73 y.o. male admitted with a medical diagnosis of Septicemia due to group A Streptococcus.  He presents with the following impairments/functional limitations:  weakness, gait instability, decreased lower extremity function, impaired balance, impaired endurance, impaired skin, edema, impaired functional mobilty, impaired self care skills, impaired sensation, impaired cardiopulmonary response to activity, decreased upper extremity function . Patient able to come from sit <> stand with min assist however unsteady standing not able to take steps.    Rehab Prognosis: Good; patient would benefit from acute skilled PT services to address these deficits and reach maximum level of function.    Recent Surgery: * No surgery found *      Plan:     During this hospitalization, patient to be seen 6 x/week to address the identified rehab impairments via gait training, therapeutic activities, therapeutic exercises and progress toward the following goals:    · Plan of Care Expires:  01/27/19    Subjective     Chief Complaint: weakness  Patient/Family Comments/goals:  Go home  Pain/Comfort:  · Pain Rating 1: 0/10  · Pain Rating Post-Intervention 1: 0/10    Patients cultural, spiritual, Voodoo conflicts given the current situation:      Living Environment:  Lives with spouse and other family H no concerns  Prior to admission, patients level of function was independent.  Equipment used at home: none.  DME owned (not currently used): none.  Upon discharge, patient will have assistance from family.    Objective:     Communicated with primary nurse prior to session.  Patient found all lines intact, call button in reach and  spouse present oxygen, telemetry, peripheral IV(High flow)  upon PT entry to room.    General Precautions: Standard, fall   Orthopedic Precautions:N/A   Braces: (may need off loading shoe for great toe L)     Exams:  · RLE ROM: WFL  · RLE Strength: 3/5  · LLE ROM: WFL  · LLE Strength: 3/5    Functional Mobility:  · Bed Mobility:     · Supine to Sit: minimum assistance  · Sit to Supine: minimum assistance and moderate assistance  · Transfers:     · Sit to Stand:  minimum assistance with rolling walker  · Balance: poor/ poor + with RW      Therapeutic Activities and Exercises:   na    AM-PAC 6 CLICK MOBILITY  Total Score:11     Patient left HOB elevated with all lines intact, call button in reach and bed alarm on.    GOALS:   Multidisciplinary Problems     Physical Therapy Goals        Problem: Physical Therapy Goal    Goal Priority Disciplines Outcome Goal Variances Interventions   Physical Therapy Goal     PT, PT/OT Ongoing (interventions implemented as appropriate)     Description:  Goals to be met by: 2019     Patient will increase functional independence with mobility by performin. Supine to sit with Modified [place]  2. Sit to stand transfer with Modified [place]  3. Bed to chair transfer with Stand-by Assistance using Rolling Walker  4. Gait  x 50 feet with Stand-by Assistance using Rolling Walker.                       History:     Past Medical History:   Diagnosis Date    Arthritis     Cataract     CHF (congestive heart failure)     Coronary artery disease     Cystoid macular edema of both eyes     Diabetes mellitus type II     Hyperlipemia     Hypertension     Retinal hole     Stage 4 chronic kidney disease        Past Surgical History:   Procedure Laterality Date    BLEPHAROPLASTY Bilateral 2017    Performed by [facility] at [facility]    CATARACT EXTRACTION W/  INTRAOCULAR LENS IMPLANT Left 12/15/14    Dr de la cruz    CATARACT EXTRACTION W/  INTRAOCULAR LENS  IMPLANT Right 14    dorothy    CIRCUMCISION, NON-      focal laser right eye      INSERTION-INTRAOCULAR LENS (IOL) Right 2014    [facility]    INSERTION-INTRAOCULAR LENS (IOL) Left 12/15/2014    [facility] at [facility]    KNEE SURGERY      left    Left medial collateral ligament repair      PHACOEMULSIFICATION-ASPIRATION-CATARACT Right 2014    [facility] at [facility]    PHACOEMULSIFICATION-ASPIRATION-CATARACT Left 12/15/2014    [facility] at [facility]    REPAIR-PTOSIS/BILATERAL EXTERNAL LEVATORS Bilateral 2017    [facility]    TONSILLECTOMY         Clinical Decision Making:     History  Co-morbidities and personal factors that may impact the plan of care Examination  Body Structures and Functions, activity limitations and participation restrictions that may impact the plan of care Clinical Presentation   Decision Making/ Complexity Score   Co-morbidities:   [] Time since onset of injury / illness / exacerbation  [] Status of current condition  []Patient's cognitive status and safety concerns    [x] Multiple Medical Problems (see med hx)  Personal Factors:   [] Patient's age  [] Prior Level of function   [] Patient's home situation (environment and family support)  [] Patient's level of motivation  [] Expected progression of patient      HISTORY:(criteria)    [] 69413 - no personal factors/history    [x] 23922 - has 1-2 personal factor/comorbidity     [] 16518 - has >3 personal factor/comorbidity     Body Regions:  [] Objective examination findings  [] Head     []  Neck  [] Trunk   [] Upper Extremity  [] Lower Extremity    Body Systems:  [] For communication ability, affect, cognition, language, and learning style: the assessment of the ability to make needs known, consciousness, orientation (person, place, and time), expected emotional /behavioral responses,  and learning preferences (eg, learning barriers, education  needs)  [x] For the neuromuscular system: a general assessment of gross coordinated movement (eg, balance, gait, locomotion, transfers, and transitions) and motor function  (motor control and motor learning)  [x] For the musculoskeletal system: the assessment of gross symmetry, gross range of motion, gross strength, height, and weight  [x] For the integumentary system: the assessment of pliability(texture), presence of scar formation, skin color, and skin integrity  [] For cardiovascular/pulmonary system: the assessment of heart rate, respiratory rate, blood pressure, and edema     Activity limitations:    [] Patient's cognitive status and saf ety concerns          [] Status of current condition      [] Weight bearing restriction  [] Cardiopulmunary Restriction    Participation Restrictions:   [] Goals and goal agreement with the patient     [] Rehab potential (prognosis) and probable outcome      Examination of Body System: (criteria)    [x] 99733 - addressing 1-2 elements    [] 02878 - addressing a total of 3 or more elements     [] 31645 -  Addressing a total of 4 or more elements         Clinical Presentation: (criteria)  Stable - 21219     On examination of body system using standardized tests and measures patient presents with 1-2 elements from any of the following: body structures and functions, activity limitations, and/or participation restrictions.  Leading to a clinical presentation that is considered stable and/or uncomplicated                              Clinical Decision Making  (Eval Complexity):  Low- 57729     Time Tracking:     PT Received On: 12/27/18  PT Start Time: 1410     PT Stop Time: 1440  PT Total Time (min): 30 min     Billable Minutes: Evaluation 20      Endy Thomas, PT  12/27/2018

## 2018-12-27 NOTE — PLAN OF CARE
Problem: Adult Inpatient Plan of Care  Goal: Patient-Specific Goal (Individualization)  Pt is laying in bed supine, wife at bedside,Patient is agitated and continuously trying to get out of bed. Redirected patient to get back into bed and continue to rest. Patient had a temp of 101.1 prn tylenol given as per orders, ice packs put under patients arms to help try to cool him down. Temp reassessed at  0008 and temp was 102.5, second dose of prn tylenol given. Patient on 10 L viz nasal cannula. Patient continuously pulling off nasal cannula, and when he pulls it off, his oxygen % goes down to the 80's. Bed in lowest position, call light in reach, side rails up x 3, side table next to bed. No distress noted . Will cont to monitor

## 2018-12-27 NOTE — SUBJECTIVE & OBJECTIVE
Interval History: See above. Normally sleeps during the day and is awake at night at home.    Review of Systems   Constitutional: Positive for fever. Negative for chills.   Respiratory: Positive for shortness of breath. Negative for cough.    Cardiovascular: Negative for chest pain and palpitations.   Gastrointestinal: Negative for nausea and vomiting.     Objective:     Vital Signs (Most Recent):  Temp: 97.8 °F (36.6 °C) (12/27/18 1132)  Pulse: 69 (12/27/18 1140)  Resp: 18 (12/27/18 1140)  BP: 136/73 (12/27/18 1132)  SpO2: (!) 92 % (12/27/18 1140) Vital Signs (24h Range):  Temp:  [97.4 °F (36.3 °C)-102.5 °F (39.2 °C)] 97.8 °F (36.6 °C)  Pulse:  [61-89] 69  Resp:  [18-25] 18  SpO2:  [80 %-96 %] 92 %  BP: (135-151)/(63-73) 136/73     Weight: 115.2 kg (253 lb 15.5 oz)  Body mass index is 31.74 kg/m².    Intake/Output Summary (Last 24 hours) at 12/27/2018 1307  Last data filed at 12/27/2018 1101  Gross per 24 hour   Intake 305 ml   Output 1067 ml   Net -762 ml      Physical Exam   Constitutional: He is oriented to person, place, and time. He appears well-developed and well-nourished. No distress.   Cardiovascular: Normal rate and regular rhythm.   No murmur heard.  Pulmonary/Chest: Effort normal and breath sounds normal. No respiratory distress.   Abdominal: Soft. Bowel sounds are normal. He exhibits no distension. There is no tenderness.   Musculoskeletal: He exhibits no edema or tenderness.   Neurological: He is alert and oriented to person, place, and time.   Skin: Skin is warm and dry.   Psychiatric: He has a normal mood and affect. His behavior is normal.   Nursing note and vitals reviewed.      Significant Labs:   CBC:   Recent Labs   Lab 12/26/18  0322 12/27/18  0439   WBC 14.97* 11.61   HGB 9.3* 8.2*   HCT 28.8* 25.1*   * 104*     CMP:   Recent Labs   Lab 12/26/18  0322 12/27/18  0439   * 133*   K 4.1 3.7   CL 99 101   CO2 22* 22*   * 197*   BUN 57* 65*   CREATININE 3.3* 3.7*   CALCIUM 9.6  9.2   ANIONGAP 13 10   EGFRNONAA 18* 15*     Magnesium:   Recent Labs   Lab 12/26/18  0322 12/27/18  0439   MG 1.9 1.9     POCT Glucose:   Recent Labs   Lab 12/27/18  0706 12/27/18  0812 12/27/18  1134   POCTGLUCOSE 176* 177* 176*       Significant Imaging: I have reviewed all pertinent imaging results/findings within the past 24 hours.   CT Chest Without Contrast 12/25/18: FINDINGS:  Base of Neck: No significant abnormality.  Thoracic soft tissues: Normal.  Aorta: Left-sided aortic arch.  No aneurysm and no significant atherosclerosis  Heart: Normal size. No effusion.  Pulmonary vasculature: Pulmonary arteries distribute normally.  There are four pulmonary veins.  Nani/Mediastinum: No pathologic rosalba enlargement.  Airways: Patent.  Lungs/Pleura: Small pleural effusions with bibasilar consolidative changes, dependent aspect, LEFT greater than RIGHT.  Mild bronchiectasis LEFT lower lobe.  Fissural thickening LEFT major fissure..  Esophagus: Normal.  Upper Abdomen: No abnormality of the partially imaged upper abdomen.  Bones: No acute fracture. No suspicious lytic or sclerotic lesions.  Impression: Minimal pleural reaction both lung bases with bibasilar dependent consolidative change, likely atelectasis LEFT greater than RIGHT.  MRI Foot (Forefoot) Left Without Contrast 12/27/18:  FINDINGS:  Exam limited by patient motion artifact.  Increased T2/stir signal in the distal phalanx of the great toe.  No definitive decreased T1 signal or discrete cortical erosion.  No fracture or dislocation.  No marrow replacement process.  Soft tissue edema signal noted.  No gross evidence of fluid collections.  Impression: Reactive marrow edema versus early osteomyelitis in the distal phalanx of the great toe, noting exam significantly limited due to patient motion artifact.  Short-term imaging follow-up could be performed to ensure stability/resolution, if indicated.

## 2018-12-27 NOTE — ASSESSMENT & PLAN NOTE
Culture positive for Staph aureus and Group A strep. Patient had Tdap in 2013.   MRI - possible osteo    Rec  Continue vanco pending staph aureus sensitivities

## 2018-12-27 NOTE — PLAN OF CARE
Problem: Adult Inpatient Plan of Care  Goal: Plan of Care Review  Plan of care reviewed with patient. Voices understanding. Side rails up x3, bed low, bed alarm on, call bell within reach. Will continue to monitor.

## 2018-12-27 NOTE — SUBJECTIVE & OBJECTIVE
Interval History: Podiatry looked at left great toe yesterday and there was a blister medial aspect with purulence underneath - they unroofed the blister and there is a clean bed underneath. Patient spiked temp last night to 102.5. Still on oxygen but not feeling SOB. Has some right ankle pain but no joint swelling on exam.     Review of Systems   Constitutional: Positive for chills and fatigue.   Respiratory: Negative for shortness of breath.    Gastrointestinal: Positive for nausea. Negative for diarrhea and vomiting.     Antibiotics (From admission, onward)    Start     Stop Route Frequency Ordered    12/27/18 1600  vancomycin in dextrose 5 % 1 gram/250 mL IVPB 1,000 mg      -- IV Once 12/27/18 1521    12/25/18 2130  azithromycin 500 mg in dextrose 5 % 250 mL IVPB (ready to mix system)  (Ceftriaxone IV/ Azithromycin IV Panel)      12/28 2129 IV Every 24 hours (non-standard times) 12/25/18 1400    12/25/18 2014  cefTRIAXone (ROCEPHIN) 2 g in dextrose 5 % 50 mL IVPB  (Ceftriaxone IV/ Azithromycin IV Panel)      -- IV Every 12 hours (non-standard times) 12/25/18 1400        Objective:     Vital Signs (Most Recent):  Temp: 97.8 °F (36.6 °C) (12/27/18 1132)  Pulse: 69 (12/27/18 1140)  Resp: 18 (12/27/18 1140)  BP: 136/73 (12/27/18 1132)  SpO2: (!) 92 % (12/27/18 1140) Vital Signs (24h Range):  Temp:  [97.4 °F (36.3 °C)-102.5 °F (39.2 °C)] 97.8 °F (36.6 °C)  Pulse:  [66-89] 69  Resp:  [18-25] 18  SpO2:  [80 %-95 %] 92 %  BP: (136-151)/(65-73) 136/73     Weight: 115.2 kg (253 lb 15.5 oz)  Body mass index is 31.74 kg/m².    Estimated Creatinine Clearance: 24.3 mL/min (A) (based on SCr of 3.7 mg/dL (H)).    Physical Exam   Cardiovascular: Normal heart sounds.   Pulmonary/Chest: Breath sounds normal.   Abdominal: Bowel sounds are normal. He exhibits no distension. There is no tenderness.   Musculoskeletal: He exhibits no edema.   Right ankle pain but no swelling; left great toe with no drainage - ulcer bed clean and medial  blister unroofed and clean base       Significant Labs:   Blood Culture:   Recent Labs   Lab 12/23/18  1735 12/23/18  1738 12/25/18  0351 12/26/18  0458 12/26/18  0502   LABBLOO Gram stain aer bottle: Gram positive cocci  Gram stain jamal bottle: Gram positive cocci  Results called to and read back by: Radha Mejia RN  12/24/2018  10:05  STREPTOCOCCUS PYOGENES (GROUP A)  Beta-hemolytic streptococci are routinely susceptible to   penicillins,cephalosporins and carbapenems.  For susceptibility see order #[id number]   Gram stain aer bottle: Gram positive cocci  Gram stain jamal bottle: Gram positive cocci  Results called to and read back by: Radha Mejia RN  12/24/2018  10:05  STREPTOCOCCUS PYOGENES (GROUP A)  Beta-hemolytic streptococci are routinely susceptible to   penicillins,cephalosporins and carbapenems.   No Growth to date  No Growth to date  No Growth to date No Growth to date  No Growth to date No Growth to date  No Growth to date     CBC:   Recent Labs   Lab 12/26/18 0322 12/27/18  0439   WBC 14.97* 11.61   HGB 9.3* 8.2*   HCT 28.8* 25.1*   * 104*     CMP:   Recent Labs   Lab 12/26/18 0322 12/27/18  0439   * 133*   K 4.1 3.7   CL 99 101   CO2 22* 22*   * 197*   BUN 57* 65*   CREATININE 3.3* 3.7*   CALCIUM 9.6 9.2   ANIONGAP 13 10   EGFRNONAA 18* 15*     Urine Culture:   Recent Labs   Lab 07/16/18 2053   LABURIN No growth     Urine Studies:   Recent Labs   Lab 07/16/18 2053 12/23/18  2300   COLORU Yellow   < > Yellow   APPEARANCEUA Clear   < > Clear   PHUR 6.0   < > 6.0   SPECGRAV 1.025   < > 1.025   PROTEINUA 2+*   < > 2+*   GLUCUA Negative   < > Trace*   KETONESU Negative   < > Negative   BILIRUBINUA Negative   < > Negative   OCCULTUA Trace*   < > 1+*   NITRITE Negative   < > Negative   UROBILINOGEN Negative   < > Negative   LEUKOCYTESUR Negative   < > Negative   RBCUA 3   < > 5*   WBCUA 5   < > 2   BACTERIA Few*   < > Few*   SQUAMEPITHEL 2  --   --    HYALINECASTS 2*   < > 0     < > = values in this interval not displayed.     Wound Culture:   Recent Labs   Lab 18  1250   LABAERO STREPTOCOCCUS PYOGENES (GROUP A)  Moderate  Beta-hemolytic streptococci are routinely susceptible to   penicillins,cephalosporins and carbapenems.  Susceptibility testing not routinely performed    STAPHYLOCOCCUS AUREUS  Moderate         Significant Imagin/26 MRI left foot -   Impression       Reactive marrow edema versus early osteomyelitis in the distal phalanx of the great toe, noting exam significantly limited due to patient motion artifact.  Short-term imaging follow-up could be performed to ensure stability/resolution, if indicated.

## 2018-12-27 NOTE — ASSESSMENT & PLAN NOTE
SBP ranged 133 to 179. Will continue home carvedilol 25 mg BID, amlodipine 10 mg daily. Hold home hydralazine 50 mg BID.  Monitor BP.

## 2018-12-27 NOTE — PLAN OF CARE
TN rounded on pt and pt's family.  Pt remains in the hospital, pt on IV abx for strep bacteremia and pneumonia, pt on O2 at this time, pt not stable for discharge at this time.       12/27/18 1440   Discharge Reassessment   Assessment Type Discharge Planning Reassessment   Provided patient/caregiver education on the expected discharge date and the discharge plan Yes   Concepcion Sawant RN-BC  Transitional Navigator  [phone]

## 2018-12-27 NOTE — PROGRESS NOTES
[facility] Hospital Medicine  Progress Note    Patient Name: Domingo Castro  MRN: [id number]  Patient Class: IP- Inpatient   Admission Date: 12/23/2018  Length of Stay: 4 days  Attending Physician: Kendall Sotomayor MD  Primary Care Provider: Griselda Nugent MD        Subjective:     Principal Problem:Septicemia due to group A Streptococcus    HPI:  Domingo Castro is a 73 y.o. black man with hypertension, diabetes mellitus type 2 with neuropathy and peripheral vascular disease (treated with insulin), coronary artery disease, hyperlipidemia, chronic diastolic congestive heart failure, chronic kidney disease stage 4, anemia. He lives in [place], [place]. He is . He is a . His primary care physician is Dr. Griselda Nugent. His nephrologist is Dr. Nely Watson. His podiatrist is Dr. Dimitry Gallegos. His cardiologist is Dr. Vandana Tovar.   He presented to [facility] Emergency Department on 12/23/18 with shaking and chills that began in the afternoon. 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 He went to sleep after [employer] as he usually did and woke up feeling cold. He had some cough a week prior and a little cough on the way to the ED. He also had some nausea with decreased oral intake over the past day, and was lightheaded earlier in the day. He was found to have a fever of 103.2° Fahrenheit and oxygen saturation of 90% in the ED. Chest X-ray showed a subtle focal airspace opacity in the left lower lung zone. He was given piperacillin-tazobactam and vancomycin in the ED.     Hospital Course:  He was transitioned to ceftriaxone and azithromycin. He continued to have fever, so vancomycin was restarted. Blood cultures grew Streptococcus pyogenes. Vancomycin was then stopped and clindamycin was added to the ceftriaxone and azithromycin. Despite antibiotics and negative fluid balance, his hypoxia worsened.  Chest CT showed bilateral pleural effusions and atelectasis. Podiatry evaluated his wound on the left great toe and did not feel that it was source of bacteremia. Wound culture grew Streptococcus pyogenes. Infectious Disease was consulted and recommended MRI of the left foot to rule out osteomyelitis. It showed reactive marrow edema versus early osteomyelitis. On 12/27/18, it was found that nursing staff had not been allowing him to get out of bed. BNP had risen to 424. His home furosemide had been held since admission.     Interval History: See above. Normally sleeps during the day and is awake at night at home.    Review of Systems   Constitutional: Positive for fever. Negative for chills.   Respiratory: Positive for shortness of breath. Negative for cough.    Cardiovascular: Negative for chest pain and palpitations.   Gastrointestinal: Negative for nausea and vomiting.     Objective:     Vital Signs (Most Recent):  Temp: 97.8 °F (36.6 °C) (12/27/18 1132)  Pulse: 69 (12/27/18 1140)  Resp: 18 (12/27/18 1140)  BP: 136/73 (12/27/18 1132)  SpO2: (!) 92 % (12/27/18 1140) Vital Signs (24h Range):  Temp:  [97.4 °F (36.3 °C)-102.5 °F (39.2 °C)] 97.8 °F (36.6 °C)  Pulse:  [61-89] 69  Resp:  [18-25] 18  SpO2:  [80 %-96 %] 92 %  BP: (135-151)/(63-73) 136/73     Weight: 115.2 kg (253 lb 15.5 oz)  Body mass index is 31.74 kg/m².    Intake/Output Summary (Last 24 hours) at 12/27/2018 1307  Last data filed at 12/27/2018 1101  Gross per 24 hour   Intake 305 ml   Output 1067 ml   Net -762 ml      Physical Exam   Constitutional: He is oriented to person, place, and time. He appears well-developed and well-nourished. No distress.   Cardiovascular: Normal rate and regular rhythm.   No murmur heard.  Pulmonary/Chest: Effort normal and breath sounds normal. No respiratory distress.   Abdominal: Soft. Bowel sounds are normal. He exhibits no distension. There is no tenderness.   Musculoskeletal: He exhibits no edema or tenderness.    Neurological: He is alert and oriented to person, place, and time.   Skin: Skin is warm and dry.   Psychiatric: He has a normal mood and affect. His behavior is normal.   Nursing note and vitals reviewed.      Significant Labs:   CBC:   Recent Labs   Lab 12/26/18 0322 12/27/18  0439   WBC 14.97* 11.61   HGB 9.3* 8.2*   HCT 28.8* 25.1*   * 104*     CMP:   Recent Labs   Lab 12/26/18 0322 12/27/18  0439   * 133*   K 4.1 3.7   CL 99 101   CO2 22* 22*   * 197*   BUN 57* 65*   CREATININE 3.3* 3.7*   CALCIUM 9.6 9.2   ANIONGAP 13 10   EGFRNONAA 18* 15*     Magnesium:   Recent Labs   Lab 12/26/18 0322 12/27/18  0439   MG 1.9 1.9     POCT Glucose:   Recent Labs   Lab 12/27/18  0706 12/27/18  0812 12/27/18  1134   POCTGLUCOSE 176* 177* 176*       Significant Imaging: I have reviewed all pertinent imaging results/findings within the past 24 hours.   CT Chest Without Contrast 12/25/18: FINDINGS:  Base of Neck: No significant abnormality.  Thoracic soft tissues: Normal.  Aorta: Left-sided aortic arch.  No aneurysm and no significant atherosclerosis  Heart: Normal size. No effusion.  Pulmonary vasculature: Pulmonary arteries distribute normally.  There are four pulmonary veins.  Nani/Mediastinum: No pathologic rosalba enlargement.  Airways: Patent.  Lungs/Pleura: Small pleural effusions with bibasilar consolidative changes, dependent aspect, LEFT greater than RIGHT.  Mild bronchiectasis LEFT lower lobe.  Fissural thickening LEFT major fissure..  Esophagus: Normal.  Upper Abdomen: No abnormality of the partially imaged upper abdomen.  Bones: No acute fracture. No suspicious lytic or sclerotic lesions.  Impression: Minimal pleural reaction both lung bases with bibasilar dependent consolidative change, likely atelectasis LEFT greater than RIGHT.  MRI Foot (Forefoot) Left Without Contrast 12/27/18:  FINDINGS:  Exam limited by patient motion artifact.  Increased T2/stir signal in the distal phalanx of the great  toe.  No definitive decreased T1 signal or discrete cortical erosion.  No fracture or dislocation.  No marrow replacement process.  Soft tissue edema signal noted.  No gross evidence of fluid collections.  Impression: Reactive marrow edema versus early osteomyelitis in the distal phalanx of the great toe, noting exam significantly limited due to patient motion artifact.  Short-term imaging follow-up could be performed to ensure stability/resolution, if indicated.    Assessment/Plan:      * Septicemia due to group A Streptococcus    Left toe osteomyelitis  Appreciate Infectious Disease. Getting antibiotics.     Hyponatremia    Due to hypervolemia. Treating.     Thrombocytopenia, unspecified    Stable.        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        Diabetic neuropathy associated with type 2 diabetes mellitus    Continue gabapentin.        CKD (chronic kidney disease) stage 4, GFR 15-29 ml/min    Metabolic bone disease  Avoid nephrotoxins. Monitor renal function.      Chronic diastolic congestive heart failure    Acute on chronic diastolic congestive heart failure  Acute respiratory failure with hypoxia  Due to holding home furosemide. Takes furosemide 40 mg BID. Give furosemide 40 mg IV BID.     Anemia of chronic renal failure, stage 4 (severe)    Stable at baseline. Monitor.        Dyslipidemia    Continue home rosuvastatin.      Essential hypertension    Continue home carvedilol 25 mg BID, amlodipine 10 mg daily. Hold home hydralazine 50 mg BID.  Monitor BP.      Type 2 diabetes mellitus with stage 4 chronic kidney disease, with long-term current use of insulin    Lab Results   Component Value Date    HGBA1C 7.3 (H) 11/28/2018   BS are reasonably controlled at home. He takes insulin detemir 26 units qHS and lispro 10-12 units TIDWM at home. Continue insulin detemir 25 units qHS. Continue aspart 5 units TIDWM. Continue moderate dose SSI prn. Monitor  accuchecks.        VTE Risk Mitigation (From admission, onward)        Ordered     heparin (porcine) injection 5,000 Units  Every 8 hours      12/23/18 2124     IP VTE HIGH RISK PATIENT  Once      12/23/18 2124              Kendall Sotomayor MD  Department of Hospital Medicine   [facility]

## 2018-12-27 NOTE — PLAN OF CARE
Problem: Physical Therapy Goal  Goal: Physical Therapy Goal  Goals to be met by: 2019     Patient will increase functional independence with mobility by performin. Supine to sit with Modified [place]  2. Sit to stand transfer with Modified [place]  3. Bed to chair transfer with Stand-by Assistance using Rolling Walker  4. Gait  x 50 feet with Stand-by Assistance using Rolling Walker.     Outcome: Ongoing (interventions implemented as appropriate)  Recommend SNF pending progress Home with HH  May need RW

## 2018-12-27 NOTE — PROGRESS NOTES
.Pharmacy New Medication Education    Patient accepted medication education.    Pharmacy educated patient on name and purpose of medications and possible side effects, using the teach-back method.     Current Inpatient Medication Orders   acetaminophen tablet 650 mg   albuterol-ipratropium 2.5 mg-0.5 mg/3 mL nebulizer solution 3 mL   amLODIPine tablet 10 mg   aspirin EC tablet 81 mg   cefTRIAXone (ROCEPHIN) 1 g in dextrose 5 % 50 mL IVPB   cefTRIAXone (ROCEPHIN) 2 g in dextrose 5 % 50 mL IVPB   cefTRIAXone (ROCEPHIN) 2 g in dextrose 5 % 50 mL IVPB   azithromycin 500 mg in dextrose 5 % 250 mL IVPB (ready to mix system)   azithromycin 500 mg in dextrose 5 % 250 mL IVPB (ready to mix system)   azithromycin 500 mg in dextrose 5 % 250 mL IVPB (ready to mix system)   cadexomer iodine 0.9 % gel   carvedilol tablet 25 mg   dextrose 50% injection 12.5 g   dextrose 50% injection 25 g   gabapentin capsule 100 mg   gabapentin capsule 300 mg   glucagon (human recombinant) injection 1 mg   glucose chewable tablet 16 g   glucose chewable tablet 24 g   heparin (porcine) injection 5,000 Units   HYDROcodone-acetaminophen 5-325 mg per tablet 1 tablet   insulin aspart U-100 pen 0-5 Units   insulin aspart U-100 pen 5 Units   insulin detemir U-100 pen 25 Units   lidocaine (PF) 10 mg/ml (1%) injection 100 mg   lidocaine HCL 2% jelly   ondansetron injection 4 mg   prochlorperazine injection Soln 5 mg   rosuvastatin tablet 20 mg   sodium chloride 0.9% flush 5 mL       Learners of pharmacy medication education included:  Patient    Patient +/- learner response:  Verbalized Understanding, Teachback

## 2018-12-28 PROBLEM — K59.00 ACUTE CONSTIPATION: Status: ACTIVE | Noted: 2018-12-28

## 2018-12-28 LAB
ANION GAP SERPL CALC-SCNC: 13 MMOL/L
BASOPHILS # BLD AUTO: 0.03 K/UL
BASOPHILS NFR BLD: 0.3 %
BUN SERPL-MCNC: 67 MG/DL
CALCIUM SERPL-MCNC: 9.3 MG/DL
CHLORIDE SERPL-SCNC: 100 MMOL/L
CO2 SERPL-SCNC: 21 MMOL/L
CREAT SERPL-MCNC: 3.8 MG/DL
DIFFERENTIAL METHOD: ABNORMAL
EOSINOPHIL # BLD AUTO: 0.1 K/UL
EOSINOPHIL NFR BLD: 0.8 %
ERYTHROCYTE [DISTWIDTH] IN BLOOD BY AUTOMATED COUNT: 13.8 %
EST. GFR  (AFRICAN AMERICAN): 17 ML/MIN/1.73 M^2
EST. GFR  (NON AFRICAN AMERICAN): 15 ML/MIN/1.73 M^2
GLUCOSE SERPL-MCNC: 181 MG/DL
HCT VFR BLD AUTO: 25.8 %
HGB BLD-MCNC: 8.5 G/DL
LYMPHOCYTES # BLD AUTO: 1.5 K/UL
LYMPHOCYTES NFR BLD: 12.5 %
MAGNESIUM SERPL-MCNC: 1.9 MG/DL
MCH RBC QN AUTO: 28.4 PG
MCHC RBC AUTO-ENTMCNC: 32.9 G/DL
MCV RBC AUTO: 86 FL
MONOCYTES # BLD AUTO: 0.5 K/UL
MONOCYTES NFR BLD: 4.5 %
NEUTROPHILS # BLD AUTO: 9.7 K/UL
NEUTROPHILS NFR BLD: 81.5 %
PHOSPHATE SERPL-MCNC: 3.1 MG/DL
PLATELET # BLD AUTO: 125 K/UL
PMV BLD AUTO: 11.9 FL
POCT GLUCOSE: 172 MG/DL (ref 70–110)
POCT GLUCOSE: 196 MG/DL (ref 70–110)
POCT GLUCOSE: 210 MG/DL (ref 70–110)
POCT GLUCOSE: 270 MG/DL (ref 70–110)
POTASSIUM SERPL-SCNC: 4 MMOL/L
RBC # BLD AUTO: 2.99 M/UL
SODIUM SERPL-SCNC: 134 MMOL/L
VANCOMYCIN SERPL-MCNC: 18.7 UG/ML
WBC # BLD AUTO: 11.88 K/UL

## 2018-12-28 PROCEDURE — 83735 ASSAY OF MAGNESIUM: CPT

## 2018-12-28 PROCEDURE — 63600175 PHARM REV CODE 636 W HCPCS: Performed by: HOSPITALIST

## 2018-12-28 PROCEDURE — 36415 COLL VENOUS BLD VENIPUNCTURE: CPT

## 2018-12-28 PROCEDURE — 25000003 PHARM REV CODE 250: Performed by: HOSPITALIST

## 2018-12-28 PROCEDURE — 97535 SELF CARE MNGMENT TRAINING: CPT

## 2018-12-28 PROCEDURE — 80202 ASSAY OF VANCOMYCIN: CPT

## 2018-12-28 PROCEDURE — 11042 DBRDMT SUBQ TIS 1ST 20SQCM/<: CPT | Mod: ,,, | Performed by: PODIATRIST

## 2018-12-28 PROCEDURE — 97116 GAIT TRAINING THERAPY: CPT

## 2018-12-28 PROCEDURE — 63600175 PHARM REV CODE 636 W HCPCS: Performed by: INTERNAL MEDICINE

## 2018-12-28 PROCEDURE — 25000003 PHARM REV CODE 250: Performed by: PODIATRIST

## 2018-12-28 PROCEDURE — 94640 AIRWAY INHALATION TREATMENT: CPT

## 2018-12-28 PROCEDURE — 85025 COMPLETE CBC W/AUTO DIFF WBC: CPT

## 2018-12-28 PROCEDURE — 80048 BASIC METABOLIC PNL TOTAL CA: CPT

## 2018-12-28 PROCEDURE — 27100171 HC OXYGEN HIGH FLOW UP TO 24 HOURS

## 2018-12-28 PROCEDURE — 94761 N-INVAS EAR/PLS OXIMETRY MLT: CPT

## 2018-12-28 PROCEDURE — 99900035 HC TECH TIME PER 15 MIN (STAT)

## 2018-12-28 PROCEDURE — 84100 ASSAY OF PHOSPHORUS: CPT

## 2018-12-28 PROCEDURE — 11042 WOUND DEBRIDEMENT: ICD-10-PCS | Mod: ,,, | Performed by: PODIATRIST

## 2018-12-28 PROCEDURE — 25000242 PHARM REV CODE 250 ALT 637 W/ HCPCS: Performed by: HOSPITALIST

## 2018-12-28 PROCEDURE — 11000001 HC ACUTE MED/SURG PRIVATE ROOM

## 2018-12-28 RX ORDER — LIDOCAINE AND PRILOCAINE 25; 25 MG/G; MG/G
CREAM TOPICAL
Status: DISCONTINUED | OUTPATIENT
Start: 2018-12-28 | End: 2019-01-02 | Stop reason: HOSPADM

## 2018-12-28 RX ORDER — LACTULOSE 10 G/15ML
15 SOLUTION ORAL ONCE
Status: COMPLETED | OUTPATIENT
Start: 2018-12-28 | End: 2018-12-28

## 2018-12-28 RX ORDER — POLYETHYLENE GLYCOL 3350 17 G/17G
17 POWDER, FOR SOLUTION ORAL 2 TIMES DAILY
Status: DISCONTINUED | OUTPATIENT
Start: 2018-12-28 | End: 2018-12-29

## 2018-12-28 RX ORDER — VANCOMYCIN HCL IN 5 % DEXTROSE 1G/250ML
1000 PLASTIC BAG, INJECTION (ML) INTRAVENOUS ONCE
Status: COMPLETED | OUTPATIENT
Start: 2018-12-28 | End: 2018-12-28

## 2018-12-28 RX ADMIN — CARVEDILOL 25 MG: 25 TABLET, FILM COATED ORAL at 08:12

## 2018-12-28 RX ADMIN — FUROSEMIDE 40 MG: 10 INJECTION, SOLUTION INTRAMUSCULAR; INTRAVENOUS at 04:12

## 2018-12-28 RX ADMIN — Medication: at 08:12

## 2018-12-28 RX ADMIN — INSULIN ASPART 5 UNITS: 100 INJECTION, SOLUTION INTRAVENOUS; SUBCUTANEOUS at 04:12

## 2018-12-28 RX ADMIN — GABAPENTIN 300 MG: 300 CAPSULE ORAL at 09:12

## 2018-12-28 RX ADMIN — POLYETHYLENE GLYCOL 3350 17 G: 17 POWDER, FOR SOLUTION ORAL at 09:12

## 2018-12-28 RX ADMIN — HEPARIN SODIUM 5000 UNITS: 5000 INJECTION, SOLUTION INTRAVENOUS; SUBCUTANEOUS at 09:12

## 2018-12-28 RX ADMIN — FUROSEMIDE 40 MG: 10 INJECTION, SOLUTION INTRAMUSCULAR; INTRAVENOUS at 08:12

## 2018-12-28 RX ADMIN — ROSUVASTATIN CALCIUM 20 MG: 10 TABLET, FILM COATED ORAL at 08:12

## 2018-12-28 RX ADMIN — IPRATROPIUM BROMIDE AND ALBUTEROL SULFATE 3 ML: .5; 3 SOLUTION RESPIRATORY (INHALATION) at 04:12

## 2018-12-28 RX ADMIN — IPRATROPIUM BROMIDE AND ALBUTEROL SULFATE 3 ML: .5; 3 SOLUTION RESPIRATORY (INHALATION) at 11:12

## 2018-12-28 RX ADMIN — CARVEDILOL 25 MG: 25 TABLET, FILM COATED ORAL at 04:12

## 2018-12-28 RX ADMIN — GABAPENTIN 100 MG: 100 CAPSULE ORAL at 04:12

## 2018-12-28 RX ADMIN — HEPARIN SODIUM 5000 UNITS: 5000 INJECTION, SOLUTION INTRAVENOUS; SUBCUTANEOUS at 06:12

## 2018-12-28 RX ADMIN — CEFTRIAXONE 2 G: 2 INJECTION, SOLUTION INTRAVENOUS at 08:12

## 2018-12-28 RX ADMIN — LACTULOSE 15 G: 20 SOLUTION ORAL at 11:12

## 2018-12-28 RX ADMIN — IPRATROPIUM BROMIDE AND ALBUTEROL SULFATE 3 ML: .5; 3 SOLUTION RESPIRATORY (INHALATION) at 12:12

## 2018-12-28 RX ADMIN — ACETAMINOPHEN 650 MG: 325 TABLET ORAL at 04:12

## 2018-12-28 RX ADMIN — POLYETHYLENE GLYCOL 3350 17 G: 17 POWDER, FOR SOLUTION ORAL at 11:12

## 2018-12-28 RX ADMIN — ACETAMINOPHEN 650 MG: 325 TABLET ORAL at 11:12

## 2018-12-28 RX ADMIN — INSULIN ASPART 2 UNITS: 100 INJECTION, SOLUTION INTRAVENOUS; SUBCUTANEOUS at 04:12

## 2018-12-28 RX ADMIN — INSULIN DETEMIR 25 UNITS: 100 INJECTION, SOLUTION SUBCUTANEOUS at 10:12

## 2018-12-28 RX ADMIN — AMLODIPINE BESYLATE 10 MG: 5 TABLET ORAL at 08:12

## 2018-12-28 RX ADMIN — INSULIN ASPART 5 UNITS: 100 INJECTION, SOLUTION INTRAVENOUS; SUBCUTANEOUS at 11:12

## 2018-12-28 RX ADMIN — HEPARIN SODIUM 5000 UNITS: 5000 INJECTION, SOLUTION INTRAVENOUS; SUBCUTANEOUS at 02:12

## 2018-12-28 RX ADMIN — IPRATROPIUM BROMIDE AND ALBUTEROL SULFATE 3 ML: .5; 3 SOLUTION RESPIRATORY (INHALATION) at 07:12

## 2018-12-28 RX ADMIN — GABAPENTIN 100 MG: 100 CAPSULE ORAL at 08:12

## 2018-12-28 RX ADMIN — CEFTRIAXONE 2 G: 2 INJECTION, SOLUTION INTRAVENOUS at 09:12

## 2018-12-28 RX ADMIN — VANCOMYCIN HYDROCHLORIDE 1000 MG: 1 INJECTION, POWDER, FOR SOLUTION INTRAVENOUS at 04:12

## 2018-12-28 RX ADMIN — ASPIRIN 81 MG: 81 TABLET, COATED ORAL at 08:12

## 2018-12-28 RX ADMIN — INSULIN ASPART 1 UNITS: 100 INJECTION, SOLUTION INTRAVENOUS; SUBCUTANEOUS at 10:12

## 2018-12-28 RX ADMIN — INSULIN ASPART 5 UNITS: 100 INJECTION, SOLUTION INTRAVENOUS; SUBCUTANEOUS at 08:12

## 2018-12-28 RX ADMIN — IPRATROPIUM BROMIDE AND ALBUTEROL SULFATE 3 ML: .5; 3 SOLUTION RESPIRATORY (INHALATION) at 08:12

## 2018-12-28 NOTE — PT/OT/SLP PROGRESS
Occupational Therapy   Treatment    Name: Domingo Castro  MRN: [id number]  Admitting Diagnosis:  Septicemia due to group A Streptococcus       Recommendations:     Discharge Recommendations: nursing facility, skilled  Discharge Equipment Recommendations:  walker, rolling, shower chair, commode, tub bench  Barriers to discharge:  None    Subjective     Pain/Comfort:  · Pain Rating 1: 0/10  · Pain Rating Post-Intervention 1: 0/10    Objective:     Communicated with: Savannah peng prior to session.     General Precautions: Standard, fall   Orthopedic Precautions:N/A   Braces: (As per PT recs)     Bed Mobility:    · Patient completed Scooting/Bridging with stand by assistance  · Patient completed Supine to Sit with contact guard assistance, minimum assistance and increased time/effort     Functional Mobility/Transfers:  · Patient completed Sit <> Stand Transfer with minimum assistance and of 2 persons  with  rolling walker   · Patient completed Bed <> Chair Transfer using Step Transfer technique with minimum assistance and moderate assistance with rolling walker  · Patient completed Toilet Transfer Step Transfer technique with minimum assistance, moderate assistance and of 2 persons with  rolling walker and grab bars    Activities of Daily Living:  · Lower Body Dressing: maximal assistance to [place] depends  · Toileting: minimum assistance for balance safety    [facility] 6 Click ADL: 16    Treatment & Education:  Patient with bed mob as noted above. Stood and ambulated into bathroom using RW with min (A) of 2, but upon entering bathroom patient turned to look R and had 1 LOB requiring mod (A) recover. ADLs as above. Patient then ambulated to bedside chair - unsteady and with decreased foot clearance on RLE. Patient's BLEs elevated for comfort. Reported some SOB after activity, but recovered with rest and PLB.    Patient left up in chair with all lines intact, call button in reach, nurse notified and family members  present  Education:    Assessment:   Patient with improved activity tolerance and functional mobility. Still requiring (A) during ambulation. Will benefit from SNF upon D/C to maximize strength and endurance for functional ADL tasks.     Domingo Castro is a 73 y.o. male with a medical diagnosis of Septicemia due to group A Streptococcus.  He presents with the following performance deficits affecting function are weakness, impaired endurance, impaired self care skills, impaired functional mobilty, gait instability, impaired balance, decreased safety awareness, decreased upper extremity function, decreased lower extremity function, impaired coordination, impaired cardiopulmonary response to activity.     Rehab Prognosis:  Good; patient would benefit from acute skilled OT services to address these deficits and reach maximum level of function.       Plan:     Patient to be seen 5 x/week to address the above listed problems via self-care/home management, therapeutic activities, therapeutic exercises  · Plan of Care Expires: 01/27/19  · Plan of Care Reviewed with: patient, spouse, family    This Plan of care has been discussed with the patient who was involved in its development and understands and is in agreement with the identified goals and treatment plan    GOALS:   Multidisciplinary Problems     Occupational Therapy Goals        Problem: Occupational Therapy Goal    Goal Priority Disciplines Outcome Interventions   Occupational Therapy Goal     OT, PT/OT Ongoing (interventions implemented as appropriate)    Description:  Goals to be met by: 1/27     Patient will increase functional independence with ADLs by performing:    UE Dressing with Set-up Assistance.  LE Dressing with Stand-by Assistance.  Grooming while standing with Stand-by Assistance.  Toileting from toilet with Contact Guard Assistance for hygiene and clothing management.   Toilet transfer to toilet with Contact Guard Assistance.  Upper extremity exercise  program x10 reps per handout, with supervision.                      Time Tracking:     OT Date of Treatment: 12/28/18  OT Start Time: 1045  OT Stop Time: 1110  OT Total Time (min): 25 mins partial co-tx with PT    Billable Minutes:Self Care/Home Management 15    PERI Ordonez  12/28/2018

## 2018-12-28 NOTE — PLAN OF CARE
PT/OT recommending SNF on d/c, TN and SW went to go speak to pt regarding going to SNF on discharge, multiple family members present at bedside, pt prefers to speak with his wife first before deciding on SNF, pt's wife not present at this time. Pt provided with SNF list.       12/28/18 6952   Post-Acute Status   Post-Acute Authorization Placement   Post-Acute Placement Status Patient List Provided

## 2018-12-28 NOTE — PLAN OF CARE
Problem: Physical Therapy Goal  Goal: Physical Therapy Goal  Goals to be met by: 2019     Patient will increase functional independence with mobility by performin. Supine to sit with Modified [place]  2. Sit to stand transfer with Modified [place]  3. Bed to chair transfer with Stand-by Assistance using Rolling Walker  4. Gait  x 50 feet with Stand-by Assistance using Rolling Walker.      Outcome: Ongoing (interventions implemented as appropriate)  Improved functional mobility  Recommend SNF pending progress Home with Home Health

## 2018-12-28 NOTE — SUBJECTIVE & OBJECTIVE
Interval History: See above. Normally sleeps during the day and is awake at night at home.    Review of Systems   Respiratory: Positive for cough and shortness of breath.    Cardiovascular: Negative for chest pain and palpitations.   Gastrointestinal: Positive for constipation. Negative for nausea and vomiting.     Objective:     Vital Signs (Most Recent):  Temp: 98.1 °F (36.7 °C) (12/28/18 1123)  Pulse: 62 (12/28/18 1123)  Resp: 18 (12/28/18 1123)  BP: 131/61 (12/28/18 1123)  SpO2: 97 % (12/28/18 0738) Vital Signs (24h Range):  Temp:  [97.8 °F (36.6 °C)-102.5 °F (39.2 °C)] 98.1 °F (36.7 °C)  Pulse:  [62-82] 62  Resp:  [16-21] 18  SpO2:  [88 %-97 %] 97 %  BP: (122-158)/(61-78) 131/61     Weight: 112.5 kg (248 lb 0.3 oz)  Body mass index is 31 kg/m².    Intake/Output Summary (Last 24 hours) at 12/28/2018 1131  Last data filed at 12/28/2018 1027  Gross per 24 hour   Intake 475 ml   Output 1620 ml   Net -1145 ml      Physical Exam   Constitutional: He is oriented to person, place, and time. He appears well-developed and well-nourished. No distress.   Cardiovascular: Normal rate and regular rhythm.   No murmur heard.  Pulmonary/Chest: Effort normal and breath sounds normal. No respiratory distress.   Abdominal: Soft. Bowel sounds are normal. He exhibits no distension. There is no tenderness.   Musculoskeletal: He exhibits no edema or tenderness.   Neurological: He is alert and oriented to person, place, and time.   Skin: Skin is warm and dry.   Psychiatric: He has a normal mood and affect. His behavior is normal.   Nursing note and vitals reviewed.      Significant Labs:   CBC:   Recent Labs   Lab 12/27/18 0439 12/28/18  0356   WBC 11.61 11.88   HGB 8.2* 8.5*   HCT 25.1* 25.8*   * 125*     CMP:   Recent Labs   Lab 12/27/18 0439 12/28/18  0356   * 134*   K 3.7 4.0    100   CO2 22* 21*   * 181*   BUN 65* 67*   CREATININE 3.7* 3.8*   CALCIUM 9.2 9.3   ANIONGAP 10 13   EGFRNONAA 15* 15*      Magnesium:   Recent Labs   Lab 12/27/18  0439 12/28/18  0356   MG 1.9 1.9     POCT Glucose:   Recent Labs   Lab 12/27/18  2105 12/28/18  0509 12/28/18  1110   POCTGLUCOSE 168* 196* 172*       Significant Imaging: I have reviewed all pertinent imaging results/findings within the past 24 hours.

## 2018-12-28 NOTE — PT/OT/SLP PROGRESS
Physical Therapy Treatment    Patient Name:  Domingo Castro   MRN:  [id number]    Recommendations:     Discharge Recommendations:  nursing facility, skilled   Discharge Equipment Recommendations: shower chair, commode, tub bench, walker, rolling   Barriers to discharge: None    Assessment:     Domingo Castro is a 73 y.o. male admitted with a medical diagnosis of Septicemia due to group A Streptococcus.  He presents with the following impairments/functional limitations:  weakness, impaired self care skills, impaired sensation, gait instability, impaired functional mobilty, impaired endurance, impaired balance, decreased lower extremity function, impaired skin, impaired cardiopulmonary response to activity . Patient able to ambulate with RW and mod assist. .    Rehab Prognosis: Good; patient would benefit from acute skilled PT services to address these deficits and reach maximum level of function.    Recent Surgery: * No surgery found *      Plan:     During this hospitalization, patient to be seen 6 x/week to address the identified rehab impairments via gait training, therapeutic exercises, therapeutic activities and progress toward the following goals:    · Plan of Care Expires:  01/27/19    Subjective     Chief Complaint: weakness  Patient/Family Comments/goals:  Go home  Pain/Comfort:  · Pain Rating 1: 0/10  · Pain Rating Post-Intervention 1: 0/10      Objective:     Communicated with primary nurse prior to session.  Patient found all lines intact, call button in reach and spouse present oxygen  upon PT entry to room.     General Precautions: Standard, fall   Orthopedic Precautions:N/A   Braces: N/A     Functional Mobility:  · Bed Mobility:     · Supine to Sit: minimum assistance  · Transfers:     · Sit to Stand:  minimum assistance with rolling walker  · Gait: min to mod assist with RW in room to and from BR to chair at bedsice  · Balance: poor + with RW      AM-PAC 6 CLICK MOBILITY  Turning over in bed (including  adjusting bedclothes, sheets and blankets)?: 4  Sitting down on and standing up from a chair with arms (e.g., wheelchair, bedside commode, etc.): 3  Moving from lying on back to sitting on the side of the bed?: 3  Moving to and from a bed to a chair (including a wheelchair)?: 3  Need to walk in hospital room?: 3  Climbing 3-5 steps with a railing?: 2  Basic Mobility Total Score: 18       Therapeutic Activities and Exercises:   Reviewed ankle pumps, heel slides and marching    Patient left up in chair with all lines intact, call button in reach and family present..    GOALS:   Multidisciplinary Problems     Physical Therapy Goals        Problem: Physical Therapy Goal    Goal Priority Disciplines Outcome Goal Variances Interventions   Physical Therapy Goal     PT, PT/OT Ongoing (interventions implemented as appropriate)     Description:  Goals to be met by: 2019     Patient will increase functional independence with mobility by performin. Supine to sit with Modified [place]  2. Sit to stand transfer with Modified [place]  3. Bed to chair transfer with Stand-by Assistance using Rolling Walker  4. Gait  x 50 feet with Stand-by Assistance using Rolling Walker.                       Time Tracking:     PT Received On: 18  PT Start Time: 1050     PT Stop Time: 1114  PT Total Time (min): 24 min     Billable Minutes: Gait Training 15    Treatment Type: Treatment  PT/PTA: PT           Endy Thomas, PT  2018

## 2018-12-28 NOTE — PLAN OF CARE
made phone contact with long term access services and completed a level of care eligibility tool (LOCET) for nursing facility admission.  3:53-  faxed Level 1 Pre- Admission Screening and Resident Review form to the state.

## 2018-12-28 NOTE — PROGRESS NOTES
[facility] Hospital Medicine  Progress Note    Patient Name: Domingo Castro  MRN: [id number]  Patient Class: IP- Inpatient   Admission Date: 12/23/2018  Length of Stay: 5 days  Attending Physician: Kendall Sotomayor MD  Primary Care Provider: Griselda Nugent MD        Subjective:     Principal Problem:Septicemia due to group A Streptococcus    HPI:  Domingo Castro is a 73 y.o. black man with hypertension, diabetes mellitus type 2 with neuropathy and peripheral vascular disease (treated with insulin), coronary artery disease, hyperlipidemia, chronic diastolic congestive heart failure, chronic kidney disease stage 4, anemia. He lives in [place], [place]. He is . He is a . His primary care physician is Dr. Griselda Nugent. His nephrologist is Dr. Nely Watson. His podiatrist is Dr. Dimitry Gallegos. His cardiologist is Dr. Vandana Tovar.   He presented to [facility] Emergency Department on 12/23/18 with shaking and chills that began in the afternoon. 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 He went to sleep after [employer] as he usually did and woke up feeling cold. He had some cough a week prior and a little cough on the way to the ED. He also had some nausea with decreased oral intake over the past day, and was lightheaded earlier in the day. He was found to have a fever of 103.2° Fahrenheit and oxygen saturation of 90% in the ED. Chest X-ray showed a subtle focal airspace opacity in the left lower lung zone. He was given piperacillin-tazobactam and vancomycin in the ED.     Hospital Course:  He was transitioned to ceftriaxone, azithromycin, and vancomycin. Blood cultures grew Streptococcus pyogenes. Despite antibiotics and negative fluid balance, his hypoxia worsened. Chest CT showed bilateral pleural effusions and atelectasis. Podiatry evaluated his wound on the left great toe and did not feel that it  was source of bacteremia. Wound culture grew Streptococcus pyogenes. Infectious Disease was consulted and recommended MRI of the left foot to rule out osteomyelitis. It showed reactive marrow edema versus early osteomyelitis. On 12/27/18, it was found that nursing staff had not been allowing him to get out of bed. BNP had risen to 424. His home furosemide had been held since admission, so he was put on IV furosemide. Physical and Occupational Therapy were consulted to get him mobilized. On 12/28/18, he revealed that he had not had a bowel movement since prior to admission.     Interval History: See above. Normally sleeps during the day and is awake at night at home.    Review of Systems   Respiratory: Positive for cough and shortness of breath.    Cardiovascular: Negative for chest pain and palpitations.   Gastrointestinal: Positive for constipation. Negative for nausea and vomiting.     Objective:     Vital Signs (Most Recent):  Temp: 98.1 °F (36.7 °C) (12/28/18 1123)  Pulse: 62 (12/28/18 1123)  Resp: 18 (12/28/18 1123)  BP: 131/61 (12/28/18 1123)  SpO2: 97 % (12/28/18 0738) Vital Signs (24h Range):  Temp:  [97.8 °F (36.6 °C)-102.5 °F (39.2 °C)] 98.1 °F (36.7 °C)  Pulse:  [62-82] 62  Resp:  [16-21] 18  SpO2:  [88 %-97 %] 97 %  BP: (122-158)/(61-78) 131/61     Weight: 112.5 kg (248 lb 0.3 oz)  Body mass index is 31 kg/m².    Intake/Output Summary (Last 24 hours) at 12/28/2018 1131  Last data filed at 12/28/2018 1027  Gross per 24 hour   Intake 475 ml   Output 1620 ml   Net -1145 ml      Physical Exam   Constitutional: He is oriented to person, place, and time. He appears well-developed and well-nourished. No distress.   Cardiovascular: Normal rate and regular rhythm.   No murmur heard.  Pulmonary/Chest: Effort normal and breath sounds normal. No respiratory distress.   Abdominal: Soft. Bowel sounds are normal. He exhibits no distension. There is no tenderness.   Musculoskeletal: He exhibits no edema or tenderness.    Neurological: He is alert and oriented to person, place, and time.   Skin: Skin is warm and dry.   Psychiatric: He has a normal mood and affect. His behavior is normal.   Nursing note and vitals reviewed.      Significant Labs:   CBC:   Recent Labs   Lab 12/27/18 0439 12/28/18  0356   WBC 11.61 11.88   HGB 8.2* 8.5*   HCT 25.1* 25.8*   * 125*     CMP:   Recent Labs   Lab 12/27/18 0439 12/28/18  0356   * 134*   K 3.7 4.0    100   CO2 22* 21*   * 181*   BUN 65* 67*   CREATININE 3.7* 3.8*   CALCIUM 9.2 9.3   ANIONGAP 10 13   EGFRNONAA 15* 15*     Magnesium:   Recent Labs   Lab 12/27/18 0439 12/28/18  0356   MG 1.9 1.9     POCT Glucose:   Recent Labs   Lab 12/27/18  2105 12/28/18  0509 12/28/18  1110   POCTGLUCOSE 168* 196* 172*       Significant Imaging: I have reviewed all pertinent imaging results/findings within the past 24 hours.     Assessment/Plan:      * Septicemia due to group A Streptococcus    Left toe osteomyelitis  Appreciate Infectious Disease. Getting IV antibiotics for 6 weeks.     Acute constipation    Polyethylene glycol BID. Lactulose x 1.     Hyponatremia    Due to hypervolemia. Treating.     Thrombocytopenia, unspecified    Stable.        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        Diabetic neuropathy associated with type 2 diabetes mellitus    Continue gabapentin.        CKD (chronic kidney disease) stage 4, GFR 15-29 ml/min    Metabolic bone disease  Avoid nephrotoxins. Monitor renal function.      Chronic diastolic congestive heart failure    Acute on chronic diastolic congestive heart failure  Acute respiratory failure with hypoxia  Takes furosemide 40 mg BID. Giving furosemide 40 mg IV BID.     Anemia of chronic renal failure, stage 4 (severe)    Stable at baseline. Monitor.        Dyslipidemia    Continue home rosuvastatin.      Essential hypertension    Continue home carvedilol 25 mg BID, amlodipine 10 mg  daily. Hold home hydralazine 50 mg BID.  Monitor BP.      Type 2 diabetes mellitus with stage 4 chronic kidney disease, with long-term current use of insulin    Lab Results   Component Value Date    HGBA1C 7.3 (H) 11/28/2018   BS are reasonably controlled at home. He takes insulin detemir 26 units qHS and lispro 10-12 units TIDWM at home. Continue insulin detemir 25 units qHS. Continue aspart 5 units TIDWM. Continue moderate dose SSI prn. Monitor accuchecks.        VTE Risk Mitigation (From admission, onward)        Ordered     heparin (porcine) injection 5,000 Units  Every 8 hours      12/23/18 2124     IP VTE HIGH RISK PATIENT  Once      12/23/18 2124              Kendall Sotomayor MD  Department of Hospital Medicine   [facility]

## 2018-12-28 NOTE — PROCEDURES
"Wound Debridement  Date/Time: 12/28/2018 1:05 PM  Performed by: Dimitry Gallegos DPM  Authorized by: Dimitry Gallegos DPM     Time out: Immediately prior to procedure a "time out" was called to verify the correct patient, procedure, equipment, support staff and site/side marked as required.    Consent Done?:  Yes (Written)    Preparation: Patient was prepped and draped in usual sterile fashion    Local anesthesia used?: Yes    Local anesthetic:  Lidocaine/prilocaine emulsion    Wound Details:    Location:  Left foot    Location:  Left 1st Toe    Type of Debridement:  Excisional       Length (cm):  1.5       Area (sq cm):  2.1       Width (cm):  1.4       Percent Debrided (%):  60       Depth (cm):  0.2       Total Area Debrided (sq cm):  1.26    Depth of debridement:  Subcutaneous tissue    Tissue debrided:  Subcutaneous and Dermis    Devitalized tissue debrided:  Necrotic/Eschar, Sough and Fibrin    Instruments:  Curette    Bleeding:  Minimal  Hemostasis Achieved: Yes    Method Used:  Pressure  Patient tolerance:  Patient tolerated the procedure well with no immediate complications     Applied iodosorb gel, aquacel extra, gauze and coban.      "

## 2018-12-28 NOTE — PLAN OF CARE
Problem: Occupational Therapy Goal  Goal: Occupational Therapy Goal  Goals to be met by: 1/27     Patient will increase functional independence with ADLs by performing:    UE Dressing with Set-up Assistance.  LE Dressing with Stand-by Assistance.  Grooming while standing with Stand-by Assistance.  Toileting from toilet with Contact Guard Assistance for hygiene and clothing management.   Toilet transfer to toilet with Contact Guard Assistance.  Upper extremity exercise program x10 reps per handout, with supervision.     Outcome: Ongoing (interventions implemented as appropriate)  Patient with improved activity tolerance and functional mobility. Still requiring (A) during ambulation. Will benefit from SNF upon D/C to maximize strength and endurance for functional ADL tasks.

## 2018-12-28 NOTE — ASSESSMENT & PLAN NOTE
Acute on chronic diastolic congestive heart failure  Acute respiratory failure with hypoxia  Takes furosemide 40 mg BID. Giving furosemide 40 mg IV BID.

## 2018-12-28 NOTE — PHYSICIAN QUERY
"PT Name: Domingo Castro  MR #: [id number]    Physician Query Form - Hematology Clarification     CDS/: Beverly Delvalle RN         Contact information:[email or web address]  This form is a permanent document in the medical record.     Query Date: December 28, 2018    By submitting this query, we are merely seeking further clarification of documentation. Please utilize your independent clinical judgment when addressing the question(s) below.    The Medical record contains the following:     Indicators   Supporting Clinical Findings   Location in Medical Record   X "Thrombocytopenia" documented  Thrombocytopenia, unspecified Hospital medicine 12/28   X Platelets Platelets 122  Platelets 105 Labs 12/23  Labs 105    Acute bleeding, Petechiae, Bruising     X Patient on anticoagulant medication Aspirin EC tab 81mg po daily  Heparin 5000 unit sq every 8 hours   Hospital medicine 12/28    Transfusion(s)     X Treatments:  CBC auto differential daily MD orders 12/24   X Other:  Domingo Castro is a 73 y.o. black man with hypertension, diabetes mellitus type 2 with neuropathy and peripheral vascular disease (treated with insulin), coronary artery disease, hyperlipidemia, chronic diastolic congestive heart failure, chronic kidney disease stage 4, anemia.     [facility] medicine 12/28       Provider, please specify diagnosis or diagnoses associated with above clinical findings.    [   ] Primary thrombocytopenia   [   ] Secondary thrombocytopenia related to (please specify)   [ x  ] Unspecified thrombocytopenia   [   ] Drug induced thrombocytopenia (please specify)   [   ] Other hematological diagnosis (please specify)   [  ] Clinically Undetermined         Please document in your progress notes daily for the duration of treatment, until resolved, and include in your discharge summary.                                                                                                                                               "

## 2018-12-29 PROBLEM — K59.00 ACUTE CONSTIPATION: Status: RESOLVED | Noted: 2018-12-28 | Resolved: 2018-12-29

## 2018-12-29 LAB
ANION GAP SERPL CALC-SCNC: 12 MMOL/L
BASOPHILS # BLD AUTO: 0.03 K/UL
BASOPHILS NFR BLD: 0.3 %
BUN SERPL-MCNC: 71 MG/DL
CALCIUM SERPL-MCNC: 9.2 MG/DL
CHLORIDE SERPL-SCNC: 101 MMOL/L
CO2 SERPL-SCNC: 20 MMOL/L
CREAT SERPL-MCNC: 3.7 MG/DL
DIFFERENTIAL METHOD: ABNORMAL
EOSINOPHIL # BLD AUTO: 0.1 K/UL
EOSINOPHIL NFR BLD: 1.2 %
ERYTHROCYTE [DISTWIDTH] IN BLOOD BY AUTOMATED COUNT: 13.7 %
EST. GFR  (AFRICAN AMERICAN): 18 ML/MIN/1.73 M^2
EST. GFR  (NON AFRICAN AMERICAN): 15 ML/MIN/1.73 M^2
GLUCOSE SERPL-MCNC: 200 MG/DL
HCT VFR BLD AUTO: 24.4 %
HGB BLD-MCNC: 8.1 G/DL
LYMPHOCYTES # BLD AUTO: 1.4 K/UL
LYMPHOCYTES NFR BLD: 13.6 %
MAGNESIUM SERPL-MCNC: 2 MG/DL
MCH RBC QN AUTO: 28.2 PG
MCHC RBC AUTO-ENTMCNC: 33.2 G/DL
MCV RBC AUTO: 85 FL
MONOCYTES # BLD AUTO: 0.7 K/UL
MONOCYTES NFR BLD: 6.8 %
NEUTROPHILS # BLD AUTO: 7.8 K/UL
NEUTROPHILS NFR BLD: 77.8 %
PHOSPHATE SERPL-MCNC: 3.5 MG/DL
PLATELET # BLD AUTO: 139 K/UL
PMV BLD AUTO: 11.1 FL
POCT GLUCOSE: 205 MG/DL (ref 70–110)
POCT GLUCOSE: 217 MG/DL (ref 70–110)
POCT GLUCOSE: 258 MG/DL (ref 70–110)
POCT GLUCOSE: 308 MG/DL (ref 70–110)
POTASSIUM SERPL-SCNC: 3.7 MMOL/L
RBC # BLD AUTO: 2.87 M/UL
SODIUM SERPL-SCNC: 133 MMOL/L
VANCOMYCIN SERPL-MCNC: 21.2 UG/ML
VANCOMYCIN TROUGH SERPL-MCNC: 20.4 UG/ML
WBC # BLD AUTO: 10.08 K/UL

## 2018-12-29 PROCEDURE — 85025 COMPLETE CBC W/AUTO DIFF WBC: CPT

## 2018-12-29 PROCEDURE — 63600175 PHARM REV CODE 636 W HCPCS: Performed by: HOSPITALIST

## 2018-12-29 PROCEDURE — 25000003 PHARM REV CODE 250: Performed by: HOSPITALIST

## 2018-12-29 PROCEDURE — 27000221 HC OXYGEN, UP TO 24 HOURS

## 2018-12-29 PROCEDURE — 80202 ASSAY OF VANCOMYCIN: CPT

## 2018-12-29 PROCEDURE — 80202 ASSAY OF VANCOMYCIN: CPT | Mod: 91

## 2018-12-29 PROCEDURE — 25000242 PHARM REV CODE 250 ALT 637 W/ HCPCS: Performed by: HOSPITALIST

## 2018-12-29 PROCEDURE — 99900035 HC TECH TIME PER 15 MIN (STAT)

## 2018-12-29 PROCEDURE — 11000001 HC ACUTE MED/SURG PRIVATE ROOM

## 2018-12-29 PROCEDURE — 94640 AIRWAY INHALATION TREATMENT: CPT

## 2018-12-29 PROCEDURE — 94761 N-INVAS EAR/PLS OXIMETRY MLT: CPT

## 2018-12-29 PROCEDURE — 94799 UNLISTED PULMONARY SVC/PX: CPT

## 2018-12-29 PROCEDURE — 83735 ASSAY OF MAGNESIUM: CPT

## 2018-12-29 PROCEDURE — 63600175 PHARM REV CODE 636 W HCPCS: Performed by: INTERNAL MEDICINE

## 2018-12-29 PROCEDURE — 94660 CPAP INITIATION&MGMT: CPT

## 2018-12-29 PROCEDURE — 84100 ASSAY OF PHOSPHORUS: CPT

## 2018-12-29 PROCEDURE — 36415 COLL VENOUS BLD VENIPUNCTURE: CPT

## 2018-12-29 PROCEDURE — 80048 BASIC METABOLIC PNL TOTAL CA: CPT

## 2018-12-29 RX ORDER — POTASSIUM CHLORIDE 20 MEQ/1
40 TABLET, EXTENDED RELEASE ORAL ONCE
Status: COMPLETED | OUTPATIENT
Start: 2018-12-29 | End: 2018-12-29

## 2018-12-29 RX ORDER — POLYETHYLENE GLYCOL 3350 17 G/17G
17 POWDER, FOR SOLUTION ORAL DAILY
Status: DISCONTINUED | OUTPATIENT
Start: 2018-12-30 | End: 2019-01-02 | Stop reason: HOSPADM

## 2018-12-29 RX ORDER — FUROSEMIDE 10 MG/ML
80 INJECTION INTRAMUSCULAR; INTRAVENOUS 2 TIMES DAILY
Status: DISCONTINUED | OUTPATIENT
Start: 2018-12-29 | End: 2018-12-30

## 2018-12-29 RX ADMIN — AMLODIPINE BESYLATE 10 MG: 5 TABLET ORAL at 09:12

## 2018-12-29 RX ADMIN — FUROSEMIDE 80 MG: 10 INJECTION, SOLUTION INTRAMUSCULAR; INTRAVENOUS at 05:12

## 2018-12-29 RX ADMIN — INSULIN DETEMIR 25 UNITS: 100 INJECTION, SOLUTION SUBCUTANEOUS at 08:12

## 2018-12-29 RX ADMIN — POTASSIUM CHLORIDE 40 MEQ: 20 TABLET, EXTENDED RELEASE ORAL at 09:12

## 2018-12-29 RX ADMIN — IPRATROPIUM BROMIDE AND ALBUTEROL SULFATE 3 ML: .5; 3 SOLUTION RESPIRATORY (INHALATION) at 08:12

## 2018-12-29 RX ADMIN — INSULIN ASPART 5 UNITS: 100 INJECTION, SOLUTION INTRAVENOUS; SUBCUTANEOUS at 05:12

## 2018-12-29 RX ADMIN — ACETAMINOPHEN 650 MG: 325 TABLET ORAL at 05:12

## 2018-12-29 RX ADMIN — HEPARIN SODIUM 5000 UNITS: 5000 INJECTION, SOLUTION INTRAVENOUS; SUBCUTANEOUS at 09:12

## 2018-12-29 RX ADMIN — GABAPENTIN 100 MG: 100 CAPSULE ORAL at 05:12

## 2018-12-29 RX ADMIN — FUROSEMIDE 80 MG: 10 INJECTION, SOLUTION INTRAMUSCULAR; INTRAVENOUS at 08:12

## 2018-12-29 RX ADMIN — ROSUVASTATIN CALCIUM 20 MG: 10 TABLET, FILM COATED ORAL at 09:12

## 2018-12-29 RX ADMIN — IPRATROPIUM BROMIDE AND ALBUTEROL SULFATE 3 ML: .5; 3 SOLUTION RESPIRATORY (INHALATION) at 04:12

## 2018-12-29 RX ADMIN — POLYETHYLENE GLYCOL 3350 17 G: 17 POWDER, FOR SOLUTION ORAL at 09:12

## 2018-12-29 RX ADMIN — HEPARIN SODIUM 5000 UNITS: 5000 INJECTION, SOLUTION INTRAVENOUS; SUBCUTANEOUS at 05:12

## 2018-12-29 RX ADMIN — ASPIRIN 81 MG: 81 TABLET, COATED ORAL at 09:12

## 2018-12-29 RX ADMIN — INSULIN ASPART 2 UNITS: 100 INJECTION, SOLUTION INTRAVENOUS; SUBCUTANEOUS at 07:12

## 2018-12-29 RX ADMIN — INSULIN ASPART 5 UNITS: 100 INJECTION, SOLUTION INTRAVENOUS; SUBCUTANEOUS at 11:12

## 2018-12-29 RX ADMIN — IPRATROPIUM BROMIDE AND ALBUTEROL SULFATE 3 ML: .5; 3 SOLUTION RESPIRATORY (INHALATION) at 03:12

## 2018-12-29 RX ADMIN — CEFTRIAXONE 2 G: 2 INJECTION, SOLUTION INTRAVENOUS at 07:12

## 2018-12-29 RX ADMIN — IPRATROPIUM BROMIDE AND ALBUTEROL SULFATE 3 ML: .5; 3 SOLUTION RESPIRATORY (INHALATION) at 12:12

## 2018-12-29 RX ADMIN — INSULIN ASPART 3 UNITS: 100 INJECTION, SOLUTION INTRAVENOUS; SUBCUTANEOUS at 05:12

## 2018-12-29 RX ADMIN — CEFTRIAXONE 2 G: 2 INJECTION, SOLUTION INTRAVENOUS at 09:12

## 2018-12-29 RX ADMIN — INSULIN ASPART 5 UNITS: 100 INJECTION, SOLUTION INTRAVENOUS; SUBCUTANEOUS at 07:12

## 2018-12-29 RX ADMIN — GABAPENTIN 100 MG: 100 CAPSULE ORAL at 07:12

## 2018-12-29 RX ADMIN — GABAPENTIN 300 MG: 300 CAPSULE ORAL at 08:12

## 2018-12-29 RX ADMIN — INSULIN ASPART 2 UNITS: 100 INJECTION, SOLUTION INTRAVENOUS; SUBCUTANEOUS at 11:12

## 2018-12-29 RX ADMIN — HEPARIN SODIUM 5000 UNITS: 5000 INJECTION, SOLUTION INTRAVENOUS; SUBCUTANEOUS at 01:12

## 2018-12-29 RX ADMIN — INSULIN ASPART 2 UNITS: 100 INJECTION, SOLUTION INTRAVENOUS; SUBCUTANEOUS at 09:12

## 2018-12-29 RX ADMIN — CARVEDILOL 25 MG: 25 TABLET, FILM COATED ORAL at 05:12

## 2018-12-29 RX ADMIN — CARVEDILOL 25 MG: 25 TABLET, FILM COATED ORAL at 07:12

## 2018-12-29 NOTE — ASSESSMENT & PLAN NOTE
Acute on chronic diastolic congestive heart failure  Acute respiratory failure with hypoxia  Takes furosemide 40 mg BID. Giving furosemide 40 mg IV BID. Increase to 80 mg IV BID.

## 2018-12-29 NOTE — PROGRESS NOTES
Patient placed on 7 lpm high flow nasal can with SpO2 95%. Patient and wife coached and educated on the benefits of deep breathing exercises. The patient demonstrates this method and verbalizes understanding.

## 2018-12-29 NOTE — NURSING
3rd call to dr sr p/t temp 101.1. Awaiting return call, on [facility] nurse informed of call placed. Pt continues to deny any complaints.

## 2018-12-29 NOTE — PLAN OF CARE
Problem: Adult Inpatient Plan of Care  Goal: Plan of Care Review  Outcome: Ongoing (interventions implemented as appropriate)  Patient on oxygen with documented flow.  Will attempt to wean per O2 order protocol. The proper method of use, as well as anticipated side effects, of this aerosol treatment are discussed and demonstrated to the patient.  Will continue to monitor.

## 2018-12-29 NOTE — NURSING
Pt denies any c/o SOB or distress Ordered nightly BIPAP placed per RT. spo2 93% prior. Will closely monitor.

## 2018-12-29 NOTE — PROGRESS NOTES
[facility] Hospital Medicine  Progress Note    Patient Name: Domingo Castro  MRN: [id number]  Patient Class: IP- Inpatient   Admission Date: 12/23/2018  Length of Stay: 6 days  Attending Physician: Kendall Sotomayor MD  Primary Care Provider: Griselda Nugent MD        Subjective:     Principal Problem:Septicemia due to group A Streptococcus    HPI:  Domingo Castro is a 73 y.o. black man with hypertension, diabetes mellitus type 2 with neuropathy and peripheral vascular disease (treated with insulin), coronary artery disease, hyperlipidemia, chronic diastolic congestive heart failure, chronic kidney disease stage 4, anemia. He lives in [place], [place]. He is . He is a . His primary care physician is Dr. Griselda Nugent. His nephrologist is Dr. Nely Watson. His podiatrist is Dr. Dimitry Gallegos. His cardiologist is Dr. Vandana Tovar.   He presented to [facility] Emergency Department on 12/23/18 with shaking and chills that began in the afternoon. 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 He went to sleep after [employer] as he usually did and woke up feeling cold. He had some cough a week prior and a little cough on the way to the ED. He also had some nausea with decreased oral intake over the past day, and was lightheaded earlier in the day. He was found to have a fever of 103.2° Fahrenheit and oxygen saturation of 90% in the ED. Chest X-ray showed a subtle focal airspace opacity in the left lower lung zone. He was given piperacillin-tazobactam and vancomycin in the ED.     Hospital Course:  He was transitioned to ceftriaxone, azithromycin, and vancomycin. Blood cultures grew Streptococcus pyogenes. Despite antibiotics and negative fluid balance, his hypoxia worsened. Chest CT showed bilateral pleural effusions and atelectasis. Podiatry evaluated his wound on the left great toe and did not feel that it  was source of bacteremia. Wound culture grew Streptococcus pyogenes. Infectious Disease was consulted and recommended MRI of the left foot to rule out osteomyelitis. It showed reactive marrow edema versus early osteomyelitis. On 12/27/18, it was found that nursing staff had not been allowing him to get out of bed. BNP had risen to 424. His home furosemide had been held since admission, so he was put on IV furosemide. Physical and Occupational Therapy were consulted to get him mobilized and recommended skilled nursing facility placement. On 12/28/18, he revealed that he had not had a bowel movement since prior to admission, so he was given polyethylene glycol and lactulose with excellent resulst. Dr. Gallegos performed left 1st toe excisional debridement. Hypoxia progressively improved with diuretics and mobilization.     Interval History: Doing better with therapy. Doing well with incentive spirometry. Had 5 bowel movements after lactulose.    Review of Systems   Cardiovascular: Negative for chest pain and palpitations.   Gastrointestinal: Negative for constipation, nausea and vomiting.     Objective:     Vital Signs (Most Recent):  Temp: 98.6 °F (37 °C) (12/29/18 1125)  Pulse: 65 (12/29/18 1213)  Resp: 18 (12/29/18 1213)  BP: 132/63 (12/29/18 1125)  SpO2: 95 % (12/29/18 1213) Vital Signs (24h Range):  Temp:  [97.9 °F (36.6 °C)-101.1 °F (38.4 °C)] 98.6 °F (37 °C)  Pulse:  [61-74] 65  Resp:  [16-20] 18  SpO2:  [92 %-96 %] 95 %  BP: (132-154)/(63-69) 132/63     Weight: 112.5 kg (248 lb 0.3 oz)  Body mass index is 31 kg/m².    Intake/Output Summary (Last 24 hours) at 12/29/2018 1246  Last data filed at 12/29/2018 1000  Gross per 24 hour   Intake 720 ml   Output 850 ml   Net -130 ml      Physical Exam   Constitutional: He is oriented to person, place, and time. He appears well-developed and well-nourished. No distress.   Pulmonary/Chest: Effort normal and breath sounds normal. No respiratory distress.   Abdominal: Soft.  Bowel sounds are normal. He exhibits no distension. There is no tenderness.   Neurological: He is alert and oriented to person, place, and time.   Psychiatric: He has a normal mood and affect. His behavior is normal.   Nursing note and vitals reviewed.      Significant Labs:   CBC:   Recent Labs   Lab 12/28/18  0356 12/29/18  0525   WBC 11.88 10.08   HGB 8.5* 8.1*   HCT 25.8* 24.4*   * 139*     CMP:   Recent Labs   Lab 12/28/18  0356 12/29/18  0525   * 133*   K 4.0 3.7    101   CO2 21* 20*   * 200*   BUN 67* 71*   CREATININE 3.8* 3.7*   CALCIUM 9.3 9.2   ANIONGAP 13 12   EGFRNONAA 15* 15*     Magnesium:   Recent Labs   Lab 12/28/18  0356 12/29/18  0525   MG 1.9 2.0     POCT Glucose:   Recent Labs   Lab 12/28/18  2158 12/29/18  0541 12/29/18  1127   POCTGLUCOSE 270* 205* 217*       Significant Imaging: I have reviewed all pertinent imaging results/findings within the past 24 hours.     Assessment/Plan:      * Septicemia due to group A Streptococcus    Left toe osteomyelitis  Appreciate Infectious Disease. Getting IV antibiotics for 6 weeks.     Hyponatremia    Due to hypervolemia. Treating.     Thrombocytopenia, unspecified    Stable.      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 Getting wound care.     Diabetic neuropathy associated with type 2 diabetes mellitus    Continue home gabapentin.      CKD (chronic kidney disease) stage 4, GFR 15-29 ml/min    Metabolic bone disease  Avoid nephrotoxins. Monitor renal function.      Chronic diastolic congestive heart failure    Acute on chronic diastolic congestive heart failure  Acute respiratory failure with hypoxia  Takes furosemide 40 mg BID. Giving furosemide 40 mg IV BID. Increase to 80 mg IV BID.     Anemia of chronic renal failure, stage 4 (severe)    Stable at baseline.     Dyslipidemia    Continue home rosuvastatin.      Essential hypertension    Continue home carvedilol 25 mg BID,  amlodipine 10 mg daily. Hold home hydralazine 50 mg BID.  Monitor BP.      Type 2 diabetes mellitus with stage 4 chronic kidney disease, with long-term current use of insulin    Lab Results   Component Value Date    HGBA1C 7.3 (H) 11/28/2018   BS are reasonably controlled at home. He takes insulin detemir 26 units qHS and lispro 10-12 units TIDWM at home. Continue insulin detemir 25 units qHS. Continue aspart 5 units TIDWM. Continue moderate dose SSI prn. Monitor accuchecks.        VTE Risk Mitigation (From admission, onward)        Ordered     heparin (porcine) injection 5,000 Units  Every 8 hours      12/23/18 2124     IP VTE HIGH RISK PATIENT  Once      12/23/18 2124              Kendall Sotomayor MD  Department of Hospital Medicine   [facility]

## 2018-12-29 NOTE — SUBJECTIVE & OBJECTIVE
Interval History: Doing better with therapy. Doing well with incentive spirometry. Had 5 bowel movements after lactulose.    Review of Systems   Cardiovascular: Negative for chest pain and palpitations.   Gastrointestinal: Negative for constipation, nausea and vomiting.     Objective:     Vital Signs (Most Recent):  Temp: 98.6 °F (37 °C) (12/29/18 1125)  Pulse: 65 (12/29/18 1213)  Resp: 18 (12/29/18 1213)  BP: 132/63 (12/29/18 1125)  SpO2: 95 % (12/29/18 1213) Vital Signs (24h Range):  Temp:  [97.9 °F (36.6 °C)-101.1 °F (38.4 °C)] 98.6 °F (37 °C)  Pulse:  [61-74] 65  Resp:  [16-20] 18  SpO2:  [92 %-96 %] 95 %  BP: (132-154)/(63-69) 132/63     Weight: 112.5 kg (248 lb 0.3 oz)  Body mass index is 31 kg/m².    Intake/Output Summary (Last 24 hours) at 12/29/2018 1246  Last data filed at 12/29/2018 1000  Gross per 24 hour   Intake 720 ml   Output 850 ml   Net -130 ml      Physical Exam   Constitutional: He is oriented to person, place, and time. He appears well-developed and well-nourished. No distress.   Pulmonary/Chest: Effort normal and breath sounds normal. No respiratory distress.   Abdominal: Soft. Bowel sounds are normal. He exhibits no distension. There is no tenderness.   Neurological: He is alert and oriented to person, place, and time.   Psychiatric: He has a normal mood and affect. His behavior is normal.   Nursing note and vitals reviewed.      Significant Labs:   CBC:   Recent Labs   Lab 12/28/18  0356 12/29/18  0525   WBC 11.88 10.08   HGB 8.5* 8.1*   HCT 25.8* 24.4*   * 139*     CMP:   Recent Labs   Lab 12/28/18  0356 12/29/18  0525   * 133*   K 4.0 3.7    101   CO2 21* 20*   * 200*   BUN 67* 71*   CREATININE 3.8* 3.7*   CALCIUM 9.3 9.2   ANIONGAP 13 12   EGFRNONAA 15* 15*     Magnesium:   Recent Labs   Lab 12/28/18  0356 12/29/18  0525   MG 1.9 2.0     POCT Glucose:   Recent Labs   Lab 12/28/18  2158 12/29/18  0541 12/29/18  1127   POCTGLUCOSE 270* 205* 217*       Significant  Imaging: I have reviewed all pertinent imaging results/findings within the past 24 hours.

## 2018-12-29 NOTE — PLAN OF CARE
Patient lying in bed, A/O. Verbal, able to make needs known. No S/S of pain or discomfort noted at this time. NADN. Patient's wife at bedside throughout shift. IV ABx admin per MAR. Plan of care and medications reviewed with patient and spouse- both verbalize understanding. Bed in lowest position, side rails up x 2, call light within reach. Will continue to monitor patient.

## 2018-12-29 NOTE — NURSING
RANJIT Nuñez previous nurse reported foot wound has been dressed for the today and stated MD does not want it removed until Monday.

## 2018-12-29 NOTE — PLAN OF CARE
Problem: Adult Inpatient Plan of Care  Goal: Plan of Care Review  Outcome: Ongoing (interventions implemented as appropriate)  Patient on oxygen with documented flow.  Will attempt to wean per O2 order protocol. The proper method of use, as well as anticipated side effects, of this aerosol treatment are discussed and demonstrated to the patient. Family at the bedside. Will continue to monitor.

## 2018-12-29 NOTE — NURSING
2nd called place to notifiy of 101.1 temp. Awaiting return call. Pt denies any complaints. Wide awake.

## 2018-12-29 NOTE — PLAN OF CARE
Problem: Adult Inpatient Plan of Care  Goal: Plan of Care Review  Outcome: Ongoing (interventions implemented as appropriate)  Continue to monitor O2 sats. Pt denies c/o SOB or distress by the end of shift. BIPAP wore as ordered at HS.

## 2018-12-30 LAB
ANION GAP SERPL CALC-SCNC: 10 MMOL/L
BACTERIA BLD CULT: NORMAL
BUN SERPL-MCNC: 71 MG/DL
CALCIUM SERPL-MCNC: 9.2 MG/DL
CHLORIDE SERPL-SCNC: 102 MMOL/L
CO2 SERPL-SCNC: 23 MMOL/L
CREAT SERPL-MCNC: 3.5 MG/DL
EST. GFR  (AFRICAN AMERICAN): 19 ML/MIN/1.73 M^2
EST. GFR  (NON AFRICAN AMERICAN): 16 ML/MIN/1.73 M^2
GLUCOSE SERPL-MCNC: 268 MG/DL
MAGNESIUM SERPL-MCNC: 2.1 MG/DL
PHOSPHATE SERPL-MCNC: 3.2 MG/DL
POCT GLUCOSE: 182 MG/DL (ref 70–110)
POCT GLUCOSE: 194 MG/DL (ref 70–110)
POCT GLUCOSE: 216 MG/DL (ref 70–110)
POCT GLUCOSE: 241 MG/DL (ref 70–110)
POTASSIUM SERPL-SCNC: 3.8 MMOL/L
SODIUM SERPL-SCNC: 135 MMOL/L
VANCOMYCIN SERPL-MCNC: 16.7 UG/ML

## 2018-12-30 PROCEDURE — 63600175 PHARM REV CODE 636 W HCPCS: Performed by: INTERNAL MEDICINE

## 2018-12-30 PROCEDURE — 25000242 PHARM REV CODE 250 ALT 637 W/ HCPCS: Performed by: HOSPITALIST

## 2018-12-30 PROCEDURE — 63600175 PHARM REV CODE 636 W HCPCS: Performed by: HOSPITALIST

## 2018-12-30 PROCEDURE — 11000001 HC ACUTE MED/SURG PRIVATE ROOM

## 2018-12-30 PROCEDURE — 80048 BASIC METABOLIC PNL TOTAL CA: CPT

## 2018-12-30 PROCEDURE — 83735 ASSAY OF MAGNESIUM: CPT

## 2018-12-30 PROCEDURE — 94640 AIRWAY INHALATION TREATMENT: CPT

## 2018-12-30 PROCEDURE — 80202 ASSAY OF VANCOMYCIN: CPT

## 2018-12-30 PROCEDURE — 97530 THERAPEUTIC ACTIVITIES: CPT

## 2018-12-30 PROCEDURE — 25000003 PHARM REV CODE 250: Performed by: HOSPITALIST

## 2018-12-30 PROCEDURE — 97116 GAIT TRAINING THERAPY: CPT

## 2018-12-30 PROCEDURE — 27000221 HC OXYGEN, UP TO 24 HOURS

## 2018-12-30 PROCEDURE — 84100 ASSAY OF PHOSPHORUS: CPT

## 2018-12-30 PROCEDURE — 94761 N-INVAS EAR/PLS OXIMETRY MLT: CPT

## 2018-12-30 PROCEDURE — 36415 COLL VENOUS BLD VENIPUNCTURE: CPT

## 2018-12-30 RX ORDER — VANCOMYCIN HCL IN 5 % DEXTROSE 1G/250ML
1000 PLASTIC BAG, INJECTION (ML) INTRAVENOUS ONCE
Status: COMPLETED | OUTPATIENT
Start: 2018-12-30 | End: 2018-12-30

## 2018-12-30 RX ORDER — FUROSEMIDE 10 MG/ML
80 INJECTION INTRAMUSCULAR; INTRAVENOUS
Status: DISCONTINUED | OUTPATIENT
Start: 2018-12-30 | End: 2018-12-31

## 2018-12-30 RX ORDER — INSULIN ASPART 100 [IU]/ML
10 INJECTION, SOLUTION INTRAVENOUS; SUBCUTANEOUS
Status: DISCONTINUED | OUTPATIENT
Start: 2018-12-30 | End: 2019-01-02 | Stop reason: HOSPADM

## 2018-12-30 RX ADMIN — IPRATROPIUM BROMIDE AND ALBUTEROL SULFATE 3 ML: .5; 3 SOLUTION RESPIRATORY (INHALATION) at 12:12

## 2018-12-30 RX ADMIN — FUROSEMIDE 80 MG: 10 INJECTION, SOLUTION INTRAMUSCULAR; INTRAVENOUS at 05:12

## 2018-12-30 RX ADMIN — FUROSEMIDE 80 MG: 10 INJECTION, SOLUTION INTRAMUSCULAR; INTRAVENOUS at 08:12

## 2018-12-30 RX ADMIN — FUROSEMIDE 80 MG: 10 INJECTION, SOLUTION INTRAMUSCULAR; INTRAVENOUS at 11:12

## 2018-12-30 RX ADMIN — IPRATROPIUM BROMIDE AND ALBUTEROL SULFATE 3 ML: .5; 3 SOLUTION RESPIRATORY (INHALATION) at 08:12

## 2018-12-30 RX ADMIN — CEFTRIAXONE 2 G: 2 INJECTION, SOLUTION INTRAVENOUS at 09:12

## 2018-12-30 RX ADMIN — GABAPENTIN 100 MG: 100 CAPSULE ORAL at 08:12

## 2018-12-30 RX ADMIN — GABAPENTIN 100 MG: 100 CAPSULE ORAL at 05:12

## 2018-12-30 RX ADMIN — CARVEDILOL 25 MG: 25 TABLET, FILM COATED ORAL at 08:12

## 2018-12-30 RX ADMIN — HEPARIN SODIUM 5000 UNITS: 5000 INJECTION, SOLUTION INTRAVENOUS; SUBCUTANEOUS at 09:12

## 2018-12-30 RX ADMIN — GABAPENTIN 300 MG: 300 CAPSULE ORAL at 09:12

## 2018-12-30 RX ADMIN — INSULIN ASPART 10 UNITS: 100 INJECTION, SOLUTION INTRAVENOUS; SUBCUTANEOUS at 11:12

## 2018-12-30 RX ADMIN — IPRATROPIUM BROMIDE AND ALBUTEROL SULFATE 3 ML: .5; 3 SOLUTION RESPIRATORY (INHALATION) at 11:12

## 2018-12-30 RX ADMIN — VANCOMYCIN HYDROCHLORIDE 1000 MG: 1 INJECTION, POWDER, FOR SOLUTION INTRAVENOUS at 08:12

## 2018-12-30 RX ADMIN — ROSUVASTATIN CALCIUM 20 MG: 10 TABLET, FILM COATED ORAL at 08:12

## 2018-12-30 RX ADMIN — INSULIN ASPART 5 UNITS: 100 INJECTION, SOLUTION INTRAVENOUS; SUBCUTANEOUS at 08:12

## 2018-12-30 RX ADMIN — INSULIN ASPART 2 UNITS: 100 INJECTION, SOLUTION INTRAVENOUS; SUBCUTANEOUS at 08:12

## 2018-12-30 RX ADMIN — HEPARIN SODIUM 5000 UNITS: 5000 INJECTION, SOLUTION INTRAVENOUS; SUBCUTANEOUS at 02:12

## 2018-12-30 RX ADMIN — IPRATROPIUM BROMIDE AND ALBUTEROL SULFATE 3 ML: .5; 3 SOLUTION RESPIRATORY (INHALATION) at 01:12

## 2018-12-30 RX ADMIN — IPRATROPIUM BROMIDE AND ALBUTEROL SULFATE 3 ML: .5; 3 SOLUTION RESPIRATORY (INHALATION) at 09:12

## 2018-12-30 RX ADMIN — AMLODIPINE BESYLATE 10 MG: 5 TABLET ORAL at 08:12

## 2018-12-30 RX ADMIN — CEFTRIAXONE 2 G: 2 INJECTION, SOLUTION INTRAVENOUS at 08:12

## 2018-12-30 RX ADMIN — ASPIRIN 81 MG: 81 TABLET, COATED ORAL at 08:12

## 2018-12-30 RX ADMIN — INSULIN ASPART 10 UNITS: 100 INJECTION, SOLUTION INTRAVENOUS; SUBCUTANEOUS at 05:12

## 2018-12-30 RX ADMIN — INSULIN ASPART 2 UNITS: 100 INJECTION, SOLUTION INTRAVENOUS; SUBCUTANEOUS at 11:12

## 2018-12-30 RX ADMIN — IPRATROPIUM BROMIDE AND ALBUTEROL SULFATE 3 ML: .5; 3 SOLUTION RESPIRATORY (INHALATION) at 04:12

## 2018-12-30 RX ADMIN — HEPARIN SODIUM 5000 UNITS: 5000 INJECTION, SOLUTION INTRAVENOUS; SUBCUTANEOUS at 05:12

## 2018-12-30 RX ADMIN — CARVEDILOL 25 MG: 25 TABLET, FILM COATED ORAL at 05:12

## 2018-12-30 NOTE — ASSESSMENT & PLAN NOTE
Acute osteomyelitis of toe, left  Appreciate Infectious Disease. Getting IV ceftriaxone for 6 weeks.

## 2018-12-30 NOTE — PROGRESS NOTES
[facility] Medicine  Progress Note    Patient Name: Domingo Castro  MRN: [id number]  Patient Class: IP- Inpatient   Admission Date: 12/23/2018  Length of Stay: 7 days  Attending Physician: Kendall Sotomayor MD  Primary Care Provider: Griselda Nugent MD        Subjective:     Principal Problem:Septicemia due to group A Streptococcus    HPI:  Domingo Castro is a 73 y.o. black man with hypertension, diabetes mellitus type 2 with neuropathy and peripheral vascular disease (treated with insulin), coronary artery disease, hyperlipidemia, chronic diastolic congestive heart failure, chronic kidney disease stage 4, anemia. He lives in [place], [place]. He is . He is a . His primary care physician is Dr. Griselda Nugent. His nephrologist is Dr. Nely Watson. His podiatrist is Dr. Dimitry Gallegos. His cardiologist is Dr. Vandana Tovar.   He presented to [facility] on 12/23/18 with shaking and chills that began in the afternoon. 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 He went to sleep after [employer] as he usually did and woke up feeling cold. He had some cough a week prior and a little cough on the way to the ED. He also had some nausea with decreased oral intake over the past day, and was lightheaded earlier in the day. He was found to have a fever of 103.2° Fahrenheit and oxygen saturation of 90% in the ED. Chest X-ray showed a subtle focal airspace opacity in the left lower lung zone. He was given piperacillin-tazobactam and vancomycin in the ED.     Hospital Course:  He was transitioned to ceftriaxone, azithromycin, and vancomycin. Blood cultures grew Streptococcus pyogenes. Despite antibiotics and negative fluid balance, his hypoxia worsened. Chest CT showed bilateral pleural effusions and atelectasis. Podiatry evaluated his wound on the left great toe and did not feel that it  was source of bacteremia. Wound culture grew Streptococcus pyogenes. Infectious Disease was consulted and recommended MRI of the left foot, which showed early osteomyelitis. On 12/27/18, it was found that nursing staff had not been allowing him to get out of bed. BNP had risen to 424. His home furosemide had been held since admission, so he was put on IV furosemide. Physical and Occupational Therapy were consulted to get him mobilized and recommended skilled nursing facility placement. On 12/28/18, he revealed that he had not had a bowel movement since prior to admission, so he was given polyethylene glycol and lactulose with excellent results. Dr. Gallegos performed left 1st toe excisional debridement. Hypoxia progressively improved with diuretics and mobilization. Infectious Disease recommended continuing ceftriaxone for 6 weeks (end date 2/4/19).    Interval History: He and his wife spoke to Infectious Disease and heard the plan already. They want to go home with home health when hypoxia resolves.    Review of Systems   Cardiovascular: Negative for chest pain and palpitations.   Gastrointestinal: Negative for constipation, nausea and vomiting.     Objective:     Vital Signs (Most Recent):  Temp: (!) 100.5 °F (38.1 °C) (12/30/18 0552)  Pulse: 67 (12/30/18 0900)  Resp: 18 (12/30/18 0900)  BP: (!) 167/73 (12/30/18 0858)  SpO2: (!) 94 % (12/30/18 0900) Vital Signs (24h Range):  Temp:  [98.4 °F (36.9 °C)-100.8 °F (38.2 °C)] 100.5 °F (38.1 °C)  Pulse:  [61-74] 67  Resp:  [18-19] 18  SpO2:  [91 %-95 %] 94 %  BP: (112-167)/(59-73) 167/73     Weight: 112.5 kg (248 lb 0.3 oz)  Body mass index is 31 kg/m².    Intake/Output Summary (Last 24 hours) at 12/30/2018 1014  Last data filed at 12/30/2018 1005  Gross per 24 hour   Intake 275 ml   Output 1300 ml   Net -1025 ml      Physical Exam   Constitutional: He is oriented to person, place, and time. He appears well-developed and well-nourished. No distress.   Pulmonary/Chest:  Effort normal and breath sounds normal. No respiratory distress.   Abdominal: Soft. Bowel sounds are normal. He exhibits no distension. There is no tenderness.   Neurological: He is alert and oriented to person, place, and time.   Psychiatric: He has a normal mood and affect. His behavior is normal.   Nursing note and vitals reviewed.      Significant Labs:   CBC:   Recent Labs   Lab 12/29/18  0525   WBC 10.08   HGB 8.1*   HCT 24.4*   *     CMP:   Recent Labs   Lab 12/29/18  0525 12/30/18  0530   * 135*   K 3.7 3.8    102   CO2 20* 23   * 268*   BUN 71* 71*   CREATININE 3.7* 3.5*   CALCIUM 9.2 9.2   ANIONGAP 12 10   EGFRNONAA 15* 16*     Magnesium:   Recent Labs   Lab 12/29/18  0525 12/30/18  0530   MG 2.0 2.1     POCT Glucose:   Recent Labs   Lab 12/29/18  1713 12/29/18  2131 12/30/18  0653   POCTGLUCOSE 258* 308* 241*       Significant Imaging: I have reviewed all pertinent imaging results/findings within the past 24 hours.     Assessment/Plan:      * Septicemia due to group A Streptococcus    Acute osteomyelitis of toe, left  Appreciate Infectious Disease. Getting IV ceftriaxone for 6 weeks.     Hyponatremia    Due to hypervolemia. Treating.     Thrombocytopenia, unspecified    Stable.      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 Getting wound care.     Diabetic neuropathy associated with type 2 diabetes mellitus    Continue home gabapentin.      CKD (chronic kidney disease) stage 4, GFR 15-29 ml/min    Metabolic bone disease  Avoid nephrotoxins. Monitor renal function.      Chronic diastolic congestive heart failure    Acute on chronic diastolic congestive heart failure  Acute respiratory failure with hypoxia  Takes furosemide 40 mg BID. Giving furosemide 80 mg IV TID.     Anemia of chronic renal failure, stage 4 (severe)    Stable at baseline.     Dyslipidemia    Continue home rosuvastatin.      Essential hypertension    Continue  home carvedilol 25 mg BID, amlodipine 10 mg daily. Hold home hydralazine 50 mg BID.  Monitor BP.      Type 2 diabetes mellitus with stage 4 chronic kidney disease, with long-term current use of insulin    Lab Results   Component Value Date    HGBA1C 7.3 (H) 11/28/2018   He takes insulin detemir 26 units qHS and lispro 10-12 units TIDWM at home. Continue this regimen inpatient. Continue moderate dose SSI prn. Monitor accuchecks.        VTE Risk Mitigation (From admission, onward)        Ordered     heparin (porcine) injection 5,000 Units  Every 8 hours      12/23/18 2124     IP VTE HIGH RISK PATIENT  Once      12/23/18 2124              Kendall Sotomayor MD  Department of Hospital Medicine   [facility]

## 2018-12-30 NOTE — PLAN OF CARE
Problem: Adult Inpatient Plan of Care  Goal: Plan of Care Review  Outcome: Ongoing (interventions implemented as appropriate)  Patient remained in bed with bed alarm on, yellow safety socks, fall risk band, ID band and bed alarm. Bed in lowest position with wheels locked and call light within reach. Plan of care and interventions reviewed with patient. Patient and wife verbalized understanding. IV remained intact. Vital signs remained stable. Gave IV ABX. Patient positioned self in bed easily

## 2018-12-30 NOTE — SUBJECTIVE & OBJECTIVE
Interval History: He and his wife spoke to Infectious Disease and heard the plan already. They want to go home with home health when hypoxia resolves.    Review of Systems   Cardiovascular: Negative for chest pain and palpitations.   Gastrointestinal: Negative for constipation, nausea and vomiting.     Objective:     Vital Signs (Most Recent):  Temp: (!) 100.5 °F (38.1 °C) (12/30/18 0552)  Pulse: 67 (12/30/18 0900)  Resp: 18 (12/30/18 0900)  BP: (!) 167/73 (12/30/18 0858)  SpO2: (!) 94 % (12/30/18 0900) Vital Signs (24h Range):  Temp:  [98.4 °F (36.9 °C)-100.8 °F (38.2 °C)] 100.5 °F (38.1 °C)  Pulse:  [61-74] 67  Resp:  [18-19] 18  SpO2:  [91 %-95 %] 94 %  BP: (112-167)/(59-73) 167/73     Weight: 112.5 kg (248 lb 0.3 oz)  Body mass index is 31 kg/m².    Intake/Output Summary (Last 24 hours) at 12/30/2018 1014  Last data filed at 12/30/2018 1005  Gross per 24 hour   Intake 275 ml   Output 1300 ml   Net -1025 ml      Physical Exam   Constitutional: He is oriented to person, place, and time. He appears well-developed and well-nourished. No distress.   Pulmonary/Chest: Effort normal and breath sounds normal. No respiratory distress.   Abdominal: Soft. Bowel sounds are normal. He exhibits no distension. There is no tenderness.   Neurological: He is alert and oriented to person, place, and time.   Psychiatric: He has a normal mood and affect. His behavior is normal.   Nursing note and vitals reviewed.      Significant Labs:   CBC:   Recent Labs   Lab 12/29/18 0525   WBC 10.08   HGB 8.1*   HCT 24.4*   *     CMP:   Recent Labs   Lab 12/29/18 0525 12/30/18  0530   * 135*   K 3.7 3.8    102   CO2 20* 23   * 268*   BUN 71* 71*   CREATININE 3.7* 3.5*   CALCIUM 9.2 9.2   ANIONGAP 12 10   EGFRNONAA 15* 16*     Magnesium:   Recent Labs   Lab 12/29/18  0525 12/30/18  0530   MG 2.0 2.1     POCT Glucose:   Recent Labs   Lab 12/29/18  1713 12/29/18  2131 12/30/18  0653   POCTGLUCOSE 258* 308* 241*        Significant Imaging: I have reviewed all pertinent imaging results/findings within the past 24 hours.

## 2018-12-30 NOTE — PT/OT/SLP PROGRESS
Physical Therapy Treatment    Patient Name:  Domingo Castro   MRN:  [id number]    Recommendations:     Discharge Recommendations:  nursing facility, skilled(pt )   Discharge Equipment Recommendations: walker, rolling   Barriers to discharge: decreased mobility,endurance and strength    Assessment:     Domingo Castro is a 73 y.o. male admitted with a medical diagnosis of Septicemia due to group A Streptococcus.  He presents with the following impairments/functional limitations:  weakness, impaired endurance, impaired functional mobilty, gait instability, impaired balance, decreased lower extremity function, impaired coordination, impaired cardiopulmonary response to activity,pt with good participation and requires min A with mobility and decreased endurance,pt will benefit from continuing PT services upon discharge.    Rehab Prognosis: Good; patient would benefit from acute skilled PT services to address these deficits and reach maximum level of function.    Recent Surgery: * No surgery found *      Plan:     During this hospitalization, patient to be seen 6 x/week to address the identified rehab impairments via gait training, therapeutic activities, therapeutic exercises and progress toward the following goals:    · Plan of Care Expires:  01/27/19    Subjective     Chief Complaint: n/a  Patient/Family Comments/goals: pt's family states pt may go home with them upon discharge.  Pain/Comfort:  · Pain Rating 1: 0/10      Objective:     Communicated with nsg prior to session.  Patient found all lines intact, call button in reach, nsg notified and family present oxygen  upon PT entry to room.     General Precautions: Standard, fall   Orthopedic Precautions:N/A   Braces: N/A     Functional Mobility:  · Transfers:     · Sit to Stand:  minimum assistance with rolling walker  · Gait: amb ~15' X 2 with RW and O2 donned with SBA/CGA  · Balance: fair standing balance with RW      AM-PAC 6 CLICK MOBILITY  Turning over in bed  (including adjusting bedclothes, sheets and blankets)?: 4  Sitting down on and standing up from a chair with arms (e.g., wheelchair, bedside commode, etc.): 3  Moving from lying on back to sitting on the side of the bed?: 3  Moving to and from a bed to a chair (including a wheelchair)?: 3  Need to walk in hospital room?: 3  Climbing 3-5 steps with a railing?: 2  Basic Mobility Total Score: 18       Therapeutic Activities and Exercises: rest periods PRN during rx,pt did ex's with spouse earlier.       Patient left up in chair with all lines intact, call button in reach, nsg notified and family present..    GOALS: see general POC  Multidisciplinary Problems     Physical Therapy Goals        Problem: Physical Therapy Goal    Goal Priority Disciplines Outcome Goal Variances Interventions   Physical Therapy Goal     PT, PT/OT Ongoing (interventions implemented as appropriate)     Description:  Goals to be met by: 2019     Patient will increase functional independence with mobility by performin. Supine to sit with Modified [place]  2. Sit to stand transfer with Modified [place]  3. Bed to chair transfer with Stand-by Assistance using Rolling Walker  4. Gait  x 50 feet with Stand-by Assistance using Rolling Walker.                       Time Tracking:     PT Received On: 18  PT Start Time: 1356     PT Stop Time: 1419  PT Total Time (min): 23 min     Billable Minutes: Gait Training 15 and Therapeutic Activity 8    Treatment Type: Treatment  PT/PTA: PTA     PTA Visit Number: 1     Enrique Mcnamara, PTA  2018

## 2018-12-30 NOTE — ASSESSMENT & PLAN NOTE
Acute on chronic diastolic congestive heart failure  Acute respiratory failure with hypoxia  Takes furosemide 40 mg BID. Giving furosemide 80 mg IV TID.

## 2018-12-30 NOTE — ASSESSMENT & PLAN NOTE
Lab Results   Component Value Date    HGBA1C 7.3 (H) 11/28/2018   He takes insulin detemir 26 units qHS and lispro 10-12 units TIDWM at home. Continue this regimen inpatient. Continue moderate dose SSI prn. Monitor accuchecks.

## 2018-12-30 NOTE — PROGRESS NOTES
Infectious Disease Follow up Note      Assessment/Plan:    Mr. Castro is a 73 year-old man who was admitted with fever of 104 and found to have Group A Strep bacteremia, Left great toe ulcer and osteomyelitis with positive Group A strep wound culture and LLL pneumonia. Staph aureus was also isolated from his would that was subsequently identified as MSSA (not MRSA). His WBC responded well to treatment with Ceftriaxone, Azithromycin and vancomycin from 40,000 to 10,000 over the last week, however his temperature as responded slowly, but on continuous decline. Temp of 104 on admission and T(MAX) of 100.8 over last 24 hours. Currently on Ceftriaxone alone.    1. Overall very much improved. Not ill appearing at all with continued temperature. Steady downward trend. WBC: 40,000 on admit and now 10,000.    2. Recommend ECHO and repeat CXR to rule out endocarditis or mild progression of pneumonia. Clinically he has not worsened and CXR lags behind. Last CXR was 5 days ago and had progressed some from his admission CXR due to CHF.    3. Will require 6 weeks of Ceftriaxone. This will also complete his course of pneumonia. Once temperature has resolved, recommend changing the dosage of ceftriaxone to 2 grams once a day and discharged on the same for completion of course with home health.    4. Group A Strep bacteremia. Resolved. Cultures now negative for with 3 sets on December 23rd, 24th and 25th.    Samantha Hooks MD  [facility]    _____________________________________________________________________________________________________________      Active Hospital Problems    Diagnosis  POA    *Septicemia due to group A Streptococcus [A40.0]  Yes    Hyponatremia [E87.1]  No    Diabetic ulcer of toe of left foot with fat layer exposed [E11.621, L97.522]  Yes    Diabetic ulcer of toe of left foot [E11.621, L97.529]  Yes    Thrombocytopenia, unspecified [D69.6]  Yes    Acute respiratory failure with  hypoxia [J96.01]  Yes    CKD (chronic kidney disease) stage 4, GFR 15-29 ml/min [N18.4]  Yes     Chronic    Diabetic neuropathy associated with type 2 diabetes mellitus [E11.40]  Yes     Chronic    Chronic diastolic congestive heart failure [I50.32]  Yes     Chronic    Metabolic bone disease [E88.9, M90.80]  Yes    Anemia of chronic renal failure, stage 4 (severe) [N18.4, D63.1]  Yes     Chronic    Type 2 diabetes mellitus with stage 4 chronic kidney disease, with long-term current use of insulin [E11.22, N18.4, Z79.4]  Not Applicable     Chronic    Dyslipidemia [E78.5]  Yes     Chronic    Essential hypertension [I10]  Yes     Chronic      Resolved Hospital Problems    Diagnosis Date Resolved POA    Acute constipation [K59.00] 12/29/2018 Yes    Leukocytosis [D72.829] 12/27/2018 Yes    Hypokalemia [E87.6] 12/25/2018 No    Pneumonia of left lower lobe due to infectious organism [J18.1] 12/27/2018 Yes           Subjective and Interval History:  Doing very well. No acute events last night and breathing without difficulty. Inquiring about going home. Continued low grade fever with gradual improvement was discussed.      Review of Symptoms:  Constitutional: Denies fevers, chills, or weakness.  Cardiovascular: Denies chest pain and orthopnea  Respiratory: Denies shortness of breath, cough, hemoptysis, or wheezing.  GI: Denies nausea/vomitting, abd pain  : Denies dysuria, incontinence, or hematuria.  Musculoskeletal: Denies foot pain       Objective:    Medications:  Antibiotics:   Antibiotics (From admission, onward)    Start     Stop Route Frequency Ordered    12/25/18 2014  cefTRIAXone (ROCEPHIN) 2 g in dextrose 5 % 50 mL IVPB  (Ceftriaxone IV/ Azithromycin IV Panel)      -- IV Every 12 hours (non-standard times) 12/25/18 1400          Physical Exam:  VS (24h):   Vitals:    12/30/18 0552   BP: (!) 142/67   Pulse: 72   Resp: 18   Temp: (!) 100.5 °F (38.1 °C)     Temp:  [98.4 °F (36.9 °C)-100.8 °F (38.2 °C)]        General: Afebrile, alert, comfortable, no acute distress.   HEENT: BRENT. EOMI, no scleral icterus.   Pulmonary: Few scattered crackles at base, Non labored, No wheezing or rhonchi.  Cardiac: normal S1 & S2 w/o rubs/murmurs/gallops. No JVD.   Abdominal: Non-tender, non-distended.Bowel sounds present x 4. No appreciable hepatosplenomegaly. No guarding or rebound tenderness  Extremities: Moves all extremities x 4. No peripheral edema. 2+ pulses. Left foot dressed. Did not remove today.  Skin: No jaundice, rashes, or visible lesions.   Neurological:  Alert and oriented x 4.        Labs:  CBC:   Lab Results   Component Value Date    WBC 10.08 12/29/2018    WBC 11.88 12/28/2018    WBC 11.61 12/27/2018    WBC 14.97 (H) 12/26/2018    WBC 17.34 (H) 12/25/2018    HCT 24.4 (L) 12/29/2018     (L) 12/29/2018        BMP:   Recent Labs   Lab 12/30/18  0530   *   *   K 3.8      CO2 23   BUN 71*   CREATININE 3.5*   CALCIUM 9.2   MG 2.1       LFT:   Lab Results   Component Value Date    ALT 14 12/23/2018    AST 15 12/23/2018    ALKPHOS 87 12/23/2018    BILITOT 0.4 12/23/2018         Microbiology x 7d:   Microbiology Results (last 7 days)     Procedure Component Value Units Date/Time    Blood culture [[id number]] Collected:  12/26/18 0502    Order Status:  Completed Specimen:  Blood Updated:  12/30/18 0812     Blood Culture, Routine No Growth to date     Blood Culture, Routine No Growth to date     Blood Culture, Routine No Growth to date     Blood Culture, Routine No Growth to date     Blood Culture, Routine No Growth to date    Blood culture [[id number]] Collected:  12/26/18 0458    Order Status:  Completed Specimen:  Blood Updated:  12/30/18 0812     Blood Culture, Routine No Growth to date     Blood Culture, Routine No Growth to date     Blood Culture, Routine No Growth to date     Blood Culture, Routine No Growth to date     Blood Culture, Routine No Growth to date    Blood culture [[id number]]  Collected:  12/25/18 0351    Order Status:  Completed Specimen:  Blood Updated:  12/29/18 1012     Blood Culture, Routine No Growth to date     Blood Culture, Routine No Growth to date     Blood Culture, Routine No Growth to date     Blood Culture, Routine No Growth to date     Blood Culture, Routine No Growth to date    Aerobic culture [[id number]]  (Susceptibility) Collected:  12/24/18 1250    Order Status:  Completed Specimen:  Wound from Toe, Left Foot Updated:  12/27/18 0950     Aerobic Bacterial Culture --     STREPTOCOCCUS PYOGENES (GROUP A)  Moderate  Beta-hemolytic streptococci are routinely susceptible to   penicillins,cephalosporins and carbapenems.  Susceptibility testing not routinely performed       Aerobic Bacterial Culture --     STAPHYLOCOCCUS AUREUS  Moderate      Blood culture x two cultures. Draw prior to antibiotics. [[id number]] Collected:  12/23/18 1735    Order Status:  Completed Specimen:  Blood from Peripheral, Hand, Right Updated:  12/26/18 0959     Blood Culture, Routine Gram stain aer bottle: Gram positive cocci     Blood Culture, Routine Gram stain jamal bottle: Gram positive cocci     Blood Culture, Routine Results called to and read back by: Radha Mejia RN  12/24/2018  10:05     Blood Culture, Routine --     STREPTOCOCCUS PYOGENES (GROUP A)  Beta-hemolytic streptococci are routinely susceptible to   penicillins,cephalosporins and carbapenems.  For susceptibility see order #[id number]      Narrative:       Aerobic and anaerobic    Blood culture x two cultures. Draw prior to antibiotics. [[id number]]  (Susceptibility) Collected:  12/23/18 1738    Order Status:  Completed Specimen:  Blood from Peripheral, Forearm, Right Updated:  12/26/18 0959     Blood Culture, Routine Gram stain aer bottle: Gram positive cocci     Blood Culture, Routine Gram stain jamal bottle: Gram positive cocci     Blood Culture, Routine Results called to and read back by: Radha Mejia RN  12/24/2018  10:05     Blood  Culture, Routine --     STREPTOCOCCUS PYOGENES (GROUP A)  Beta-hemolytic streptococci are routinely susceptible to   penicillins,cephalosporins and carbapenems.      Narrative:       Aerobic and anaerobic            Imaging:  CXRs reviewed as above.

## 2018-12-30 NOTE — PLAN OF CARE
Patient currently sitting up in chair, A/O. No S/S of pain or discomfort noted at this time. NADN.  Verbal, able to make needs known. Family at bedside. No tele. Plan of care and medications reviewed with patient and patient's spouse- both verbalize understanding. Bed in lowest position, side rails up x 2, call light within reach. Will continue to monitor patient.

## 2018-12-30 NOTE — PLAN OF CARE
Problem: Physical Therapy Goal  Goal: Physical Therapy Goal  Goals to be met by: 2019     Patient will increase functional independence with mobility by performin. Supine to sit with Modified [place]  2. Sit to stand transfer with Modified [place]  3. Bed to chair transfer with Stand-by Assistance using Rolling Walker  4. Gait  x 50 feet with Stand-by Assistance using Rolling Walker.      Outcome: Ongoing (interventions implemented as appropriate)  Goals ongoing

## 2018-12-31 ENCOUNTER — TELEPHONE (OUTPATIENT)
Dept: INTERNAL MEDICINE | Facility: CLINIC | Age: 73
End: 2018-12-31

## 2018-12-31 PROBLEM — B95.5 STREPTOCOCCAL BACTEREMIA: Status: ACTIVE | Noted: 2018-12-23

## 2018-12-31 PROBLEM — R78.81 STREPTOCOCCAL BACTEREMIA: Status: ACTIVE | Noted: 2018-12-23

## 2018-12-31 PROBLEM — J96.01 ACUTE RESPIRATORY FAILURE WITH HYPOXIA: Status: RESOLVED | Noted: 2018-12-23 | Resolved: 2018-12-31

## 2018-12-31 PROBLEM — D69.6 THROMBOCYTOPENIA, UNSPECIFIED: Status: RESOLVED | Noted: 2018-12-24 | Resolved: 2018-12-31

## 2018-12-31 PROBLEM — E87.1 HYPONATREMIA: Status: RESOLVED | Noted: 2018-12-25 | Resolved: 2018-12-31

## 2018-12-31 PROBLEM — A40.0 SEPTICEMIA DUE TO GROUP A STREPTOCOCCUS: Status: RESOLVED | Noted: 2018-12-24 | Resolved: 2018-12-31

## 2018-12-31 LAB
ANION GAP SERPL CALC-SCNC: 10 MMOL/L
AORTIC ROOT ANNULUS: 3.71 CM
AV INDEX (PROSTH): 0.81
AV MEAN GRADIENT: 4.83 MMHG
AV PEAK GRADIENT: 8.07 MMHG
AV VALVE AREA: 4.1 CM2
BACTERIA BLD CULT: NORMAL
BACTERIA BLD CULT: NORMAL
BSA FOR ECHO PROCEDURE: 2.46 M2
BUN SERPL-MCNC: 64 MG/DL
CALCIUM SERPL-MCNC: 9.3 MG/DL
CHLORIDE SERPL-SCNC: 101 MMOL/L
CO2 SERPL-SCNC: 25 MMOL/L
CREAT SERPL-MCNC: 3.3 MG/DL
CV ECHO LV RWT: 0.39 CM
DOP CALC AO PEAK VEL: 1.42 M/S
DOP CALC AO VTI: 32.9 CM
DOP CALC LVOT AREA: 5.06 CM2
DOP CALC LVOT DIAMETER: 2.54 CM
DOP CALC LVOT STROKE VOLUME: 134.97 CM3
DOP CALCLVOT PEAK VEL VTI: 26.65 CM
E WAVE DECELERATION TIME: 223.79 MSEC
E/A RATIO: 1
ECHO LV POSTERIOR WALL: 1 CM (ref 0.6–1.1)
EST. GFR  (AFRICAN AMERICAN): 20 ML/MIN/1.73 M^2
EST. GFR  (NON AFRICAN AMERICAN): 18 ML/MIN/1.73 M^2
FRACTIONAL SHORTENING: 59 % (ref 28–44)
GLUCOSE SERPL-MCNC: 204 MG/DL
INTERVENTRICULAR SEPTUM: 1 CM (ref 0.6–1.1)
LA MAJOR: 5.41 CM
LA MINOR: 5 CM
LA WIDTH: 4.51 CM
LEFT ATRIUM SIZE: 3.8 CM
LEFT ATRIUM VOLUME INDEX: 31.6 ML/M2
LEFT ATRIUM VOLUME: 75.71 CM3
LEFT INTERNAL DIMENSION IN SYSTOLE: 2.1 CM (ref 2.1–4)
LEFT VENTRICLE DIASTOLIC VOLUME INDEX: 53.19 ML/M2
LEFT VENTRICLE DIASTOLIC VOLUME: 127.27 ML
LEFT VENTRICLE MASS INDEX: 80.1 G/M2
LEFT VENTRICLE SYSTOLIC VOLUME INDEX: 18.5 ML/M2
LEFT VENTRICLE SYSTOLIC VOLUME: 44.32 ML
LEFT VENTRICULAR INTERNAL DIMENSION IN DIASTOLE: 5.16 CM (ref 3.5–6)
LEFT VENTRICULAR MASS: 191.69 G
MAGNESIUM SERPL-MCNC: 1.9 MG/DL
MV PEAK A VEL: 0.94 M/S
MV PEAK E VEL: 0.94 M/S
PHOSPHATE SERPL-MCNC: 3.3 MG/DL
POCT GLUCOSE: 123 MG/DL (ref 70–110)
POCT GLUCOSE: 141 MG/DL (ref 70–110)
POCT GLUCOSE: 157 MG/DL (ref 70–110)
POCT GLUCOSE: 210 MG/DL (ref 70–110)
POTASSIUM SERPL-SCNC: 3.4 MMOL/L
PULM VEIN S/D RATIO: 1.74
PV PEAK D VEL: 0.5 M/S
PV PEAK S VEL: 0.87 M/S
RA MAJOR: 4.97 CM
RA PRESSURE: 3 MMHG
RA WIDTH: 3.2 CM
RIGHT VENTRICULAR END-DIASTOLIC DIMENSION: 2.72 CM
SODIUM SERPL-SCNC: 136 MMOL/L

## 2018-12-31 PROCEDURE — 63600175 PHARM REV CODE 636 W HCPCS: Performed by: INTERNAL MEDICINE

## 2018-12-31 PROCEDURE — 25000003 PHARM REV CODE 250: Performed by: HOSPITALIST

## 2018-12-31 PROCEDURE — 94640 AIRWAY INHALATION TREATMENT: CPT

## 2018-12-31 PROCEDURE — 63600175 PHARM REV CODE 636 W HCPCS: Performed by: HOSPITALIST

## 2018-12-31 PROCEDURE — 97530 THERAPEUTIC ACTIVITIES: CPT

## 2018-12-31 PROCEDURE — 97116 GAIT TRAINING THERAPY: CPT

## 2018-12-31 PROCEDURE — 94761 N-INVAS EAR/PLS OXIMETRY MLT: CPT

## 2018-12-31 PROCEDURE — 80048 BASIC METABOLIC PNL TOTAL CA: CPT

## 2018-12-31 PROCEDURE — 27000221 HC OXYGEN, UP TO 24 HOURS

## 2018-12-31 PROCEDURE — 83735 ASSAY OF MAGNESIUM: CPT

## 2018-12-31 PROCEDURE — 84100 ASSAY OF PHOSPHORUS: CPT

## 2018-12-31 PROCEDURE — 36415 COLL VENOUS BLD VENIPUNCTURE: CPT

## 2018-12-31 PROCEDURE — 25000242 PHARM REV CODE 250 ALT 637 W/ HCPCS: Performed by: HOSPITALIST

## 2018-12-31 PROCEDURE — 11000001 HC ACUTE MED/SURG PRIVATE ROOM

## 2018-12-31 PROCEDURE — 97110 THERAPEUTIC EXERCISES: CPT

## 2018-12-31 RX ORDER — FUROSEMIDE 40 MG/1
80 TABLET ORAL 2 TIMES DAILY
Qty: 360 TABLET | Refills: 0 | Status: SHIPPED | OUTPATIENT
Start: 2018-12-31 | End: 2019-05-29 | Stop reason: SDUPTHER

## 2018-12-31 RX ORDER — FUROSEMIDE 40 MG/1
80 TABLET ORAL 2 TIMES DAILY
Status: DISCONTINUED | OUTPATIENT
Start: 2018-12-31 | End: 2019-01-02 | Stop reason: HOSPADM

## 2018-12-31 RX ORDER — POTASSIUM CHLORIDE 20 MEQ/1
40 TABLET, EXTENDED RELEASE ORAL ONCE
Status: COMPLETED | OUTPATIENT
Start: 2018-12-31 | End: 2018-12-31

## 2018-12-31 RX ADMIN — GABAPENTIN 300 MG: 300 CAPSULE ORAL at 10:12

## 2018-12-31 RX ADMIN — IPRATROPIUM BROMIDE AND ALBUTEROL SULFATE 3 ML: .5; 3 SOLUTION RESPIRATORY (INHALATION) at 02:12

## 2018-12-31 RX ADMIN — GABAPENTIN 100 MG: 100 CAPSULE ORAL at 08:12

## 2018-12-31 RX ADMIN — FUROSEMIDE 80 MG: 10 INJECTION, SOLUTION INTRAMUSCULAR; INTRAVENOUS at 08:12

## 2018-12-31 RX ADMIN — INSULIN ASPART 10 UNITS: 100 INJECTION, SOLUTION INTRAVENOUS; SUBCUTANEOUS at 12:12

## 2018-12-31 RX ADMIN — INSULIN ASPART 2 UNITS: 100 INJECTION, SOLUTION INTRAVENOUS; SUBCUTANEOUS at 08:12

## 2018-12-31 RX ADMIN — GABAPENTIN 100 MG: 100 CAPSULE ORAL at 05:12

## 2018-12-31 RX ADMIN — IPRATROPIUM BROMIDE AND ALBUTEROL SULFATE 3 ML: .5; 3 SOLUTION RESPIRATORY (INHALATION) at 11:12

## 2018-12-31 RX ADMIN — ROSUVASTATIN CALCIUM 20 MG: 10 TABLET, FILM COATED ORAL at 08:12

## 2018-12-31 RX ADMIN — IPRATROPIUM BROMIDE AND ALBUTEROL SULFATE 3 ML: .5; 3 SOLUTION RESPIRATORY (INHALATION) at 07:12

## 2018-12-31 RX ADMIN — INSULIN ASPART 10 UNITS: 100 INJECTION, SOLUTION INTRAVENOUS; SUBCUTANEOUS at 08:12

## 2018-12-31 RX ADMIN — CARVEDILOL 25 MG: 25 TABLET, FILM COATED ORAL at 05:12

## 2018-12-31 RX ADMIN — AMLODIPINE BESYLATE 10 MG: 5 TABLET ORAL at 08:12

## 2018-12-31 RX ADMIN — FUROSEMIDE 80 MG: 40 TABLET ORAL at 05:12

## 2018-12-31 RX ADMIN — HEPARIN SODIUM 5000 UNITS: 5000 INJECTION, SOLUTION INTRAVENOUS; SUBCUTANEOUS at 02:12

## 2018-12-31 RX ADMIN — POTASSIUM CHLORIDE 40 MEQ: 20 TABLET, EXTENDED RELEASE ORAL at 08:12

## 2018-12-31 RX ADMIN — HEPARIN SODIUM 5000 UNITS: 5000 INJECTION, SOLUTION INTRAVENOUS; SUBCUTANEOUS at 05:12

## 2018-12-31 RX ADMIN — ASPIRIN 81 MG: 81 TABLET, COATED ORAL at 08:12

## 2018-12-31 RX ADMIN — CEFTRIAXONE 2 G: 2 INJECTION, SOLUTION INTRAVENOUS at 08:12

## 2018-12-31 RX ADMIN — CARVEDILOL 25 MG: 25 TABLET, FILM COATED ORAL at 08:12

## 2018-12-31 RX ADMIN — IPRATROPIUM BROMIDE AND ALBUTEROL SULFATE 3 ML: .5; 3 SOLUTION RESPIRATORY (INHALATION) at 03:12

## 2018-12-31 RX ADMIN — INSULIN ASPART 10 UNITS: 100 INJECTION, SOLUTION INTRAVENOUS; SUBCUTANEOUS at 05:12

## 2018-12-31 RX ADMIN — HEPARIN SODIUM 5000 UNITS: 5000 INJECTION, SOLUTION INTRAVENOUS; SUBCUTANEOUS at 10:12

## 2018-12-31 NOTE — PT/OT/SLP PROGRESS
Occupational Therapy   Treatment    Name: Domingo Castro  MRN: [id number]  Admitting Diagnosis:  Septicemia due to group A Streptococcus       Recommendations:     Discharge Recommendations: (SNF vs HH OT/PT)  Discharge Equipment Recommendations:  walker, rolling  Barriers to discharge:  None    Subjective     Pain/Comfort:  · Pain Rating 1: 0/10  · Pain Rating Post-Intervention 1: 0/10    Objective:     Communicated with: nurseSavannah prior to session.     General Precautions: Standard, fall   Orthopedic Precautions:N/A   Braces: N/A     Bed Mobility:    · Sit > sup completed when HAILE out of room; family assisted    Functional Mobility/Transfers:  · N/A    Activities of Daily Living:  · N/A    [facility] 6 Click ADL: 20    Treatment & Education:  Patient seated EOB on arrival. Patient discussing renal vs diabetic diet (received a combo of both on tray). Patient given med resistance theraband and HEP. Patient also given energy conservation handout. All handouts covered and questions answered.     Patient left HOB elevated with all lines intact, call button in reach, bed alarm on, nurse notified and family present  Education:    Assessment:   Patient continues to make progress with activity tolerance and mobility. Will benefit from continued skilled OT to address functional deficits.     Domingo Castro is a 73 y.o. male with a medical diagnosis of Septicemia due to group A Streptococcus.  He presents with the following performance deficits affecting function are weakness, impaired endurance, impaired self care skills, impaired functional mobilty, gait instability, decreased safety awareness.     Rehab Prognosis:  Good; patient would benefit from acute skilled OT services to address these deficits and reach maximum level of function.       Plan:     Patient to be seen 5 x/week to address the above listed problems via self-care/home management, therapeutic activities, therapeutic exercises  · Plan of Care Expires:  01/27/19  · Plan of Care Reviewed with: patient, spouse, son    This Plan of care has been discussed with the patient who was involved in its development and understands and is in agreement with the identified goals and treatment plan    GOALS:   Multidisciplinary Problems     Occupational Therapy Goals        Problem: Occupational Therapy Goal    Goal Priority Disciplines Outcome Interventions   Occupational Therapy Goal     OT, PT/OT Ongoing (interventions implemented as appropriate)    Description:  Goals to be met by: 1/27     Patient will increase functional independence with ADLs by performing:    UE Dressing with Set-up Assistance.  LE Dressing with Stand-by Assistance.  Grooming while standing with Stand-by Assistance.  Toileting from toilet with Contact Guard Assistance for hygiene and clothing management.   Toilet transfer to toilet with Contact Guard Assistance.  Upper extremity exercise program x10 reps per handout, with supervision.                      Time Tracking:     OT Date of Treatment: 12/31/18  OT Start Time: 1500  OT Stop Time: 1530  OT Total Time (min): 30 min    Billable Minutes:Therapeutic Activity 15  Therapeutic Exercise 15    PERI Ordonez  12/31/2018

## 2018-12-31 NOTE — ASSESSMENT & PLAN NOTE
Acute on chronic diastolic congestive heart failure  Acute respiratory failure with hypoxia  Takes furosemide 40 mg BID. Giving furosemide 80 mg IV TID. Increase home furosemide dose to 80 mg BID.

## 2018-12-31 NOTE — PLAN OF CARE
Problem: Physical Therapy Goal  Goal: Physical Therapy Goal  Goals to be met by: 2019     Patient will increase functional independence with mobility by performin. Supine to sit with Modified [place]  2. Sit to stand transfer with Modified [place]  3. Bed to chair transfer with Stand-by Assistance using Rolling Walker  4. Gait  x 50 feet with Stand-by Assistance using Rolling Walker.      Outcome: Ongoing (interventions implemented as appropriate)  SNF pending progress Home Health

## 2018-12-31 NOTE — PLAN OF CARE
Problem: Adult Inpatient Plan of Care  Goal: Plan of Care Review  Outcome: Ongoing (interventions implemented as appropriate)  Pt remain free from fall shift. Wife at bedside. Monitoring pulse ox PRN pt on 2L high flow NC. ABT therapy in progress. Call light within reach. Will continue to monitor

## 2018-12-31 NOTE — PROGRESS NOTES
[facility] Hospital Medicine  Progress Note    Patient Name: Domingo Castro  MRN: [id number]  Patient Class: IP- Inpatient   Admission Date: 12/23/2018  Length of Stay: 8 days  Attending Physician: Kendall Sotomayor MD  Primary Care Provider: Griselda Nugent MD        Subjective:     Principal Problem:Septicemia due to group A Streptococcus    HPI:  Domingo Castro is a 73 y.o. black man with hypertension, diabetes mellitus type 2 with neuropathy and peripheral vascular disease (treated with insulin), coronary artery disease, hyperlipidemia, chronic diastolic congestive heart failure, chronic kidney disease stage 4, anemia. He lives in [place], [place]. He is . He is a . His primary care physician is Dr. Griselda Nugent. His nephrologist is Dr. Nely Watson. His podiatrist is Dr. Dimitry Gallegos. His cardiologist is Dr. Vandana Tovar.   He presented to [facility] Emergency Department on 12/23/18 with shaking and chills that began in the afternoon. 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 He went to sleep after [employer] as he usually did and woke up feeling cold. He had some cough a week prior and a little cough on the way to the ED. He also had some nausea with decreased oral intake over the past day, and was lightheaded earlier in the day. He was found to have a fever of 103.2° Fahrenheit and oxygen saturation of 90% in the ED. Chest X-ray showed a subtle focal airspace opacity in the left lower lung zone. He was given piperacillin-tazobactam and vancomycin in the ED.     Hospital Course:  He was transitioned to ceftriaxone, azithromycin, and vancomycin. Blood cultures grew Streptococcus pyogenes. Despite antibiotics and negative fluid balance, his hypoxia worsened. Chest CT showed bilateral pleural effusions and atelectasis. Podiatry evaluated his wound on the left great toe and did not feel that it  was source of bacteremia. Wound culture grew Streptococcus pyogenes. Infectious Disease was consulted and recommended MRI of the left foot, which showed early osteomyelitis. On 12/27/18, it was found that nursing staff had not been allowing him to get out of bed. BNP had risen to 424. His home furosemide had been held since admission, so he was put on IV furosemide. Physical and Occupational Therapy were consulted to get him mobilized and recommended skilled nursing facility placement. On 12/28/18, he revealed that he had not had a bowel movement since prior to admission, so he was given polyethylene glycol and lactulose with excellent results. Dr. Gallegos performed left 1st toe excisional debridement. Hypoxia progressively improved with diuretics and mobilization. Infectious Disease recommended continuing ceftriaxone for 6 weeks (end date 2/4/19). He was weaned to room air by 12/31/18.     Interval History: Observed him get up from a wheelchair and sit on the bed by himself.    Review of Systems   Cardiovascular: Negative for chest pain and palpitations.   Gastrointestinal: Negative for constipation, nausea and vomiting.     Objective:     Vital Signs (Most Recent):  Temp: 98 °F (36.7 °C) (12/31/18 0837)  Pulse: 66 (12/31/18 0837)  Resp: 18 (12/31/18 0837)  BP: (!) 156/72 (12/31/18 0837)  SpO2: (!) 91 % (12/31/18 1132) Vital Signs (24h Range):  Temp:  [97.1 °F (36.2 °C)-98.4 °F (36.9 °C)] 98 °F (36.7 °C)  Pulse:  [58-69] 66  Resp:  [16-20] 18  SpO2:  [88 %-97 %] 91 %  BP: (127-160)/(60-75) 156/72     Weight: 111.2 kg (245 lb 2.4 oz)  Body mass index is 30.64 kg/m².    Intake/Output Summary (Last 24 hours) at 12/31/2018 1140  Last data filed at 12/31/2018 1100  Gross per 24 hour   Intake 855 ml   Output 2250 ml   Net -1395 ml      Physical Exam   Constitutional: He is oriented to person, place, and time. He appears well-developed and well-nourished. No distress.   Pulmonary/Chest: Effort normal and breath sounds  normal. No respiratory distress.   Abdominal: Soft. Bowel sounds are normal. He exhibits no distension. There is no tenderness.   Neurological: He is alert and oriented to person, place, and time.   Psychiatric: He has a normal mood and affect. His behavior is normal.   Nursing note and vitals reviewed.      Significant Labs:   CBC:   No results for input(s): WBC, HGB, HCT, PLT in the last 48 hours.  CMP:   Recent Labs   Lab 12/30/18  0530 12/31/18  0607   * 136   K 3.8 3.4*    101   CO2 23 25   * 204*   BUN 71* 64*   CREATININE 3.5* 3.3*   CALCIUM 9.2 9.3   ANIONGAP 10 10   EGFRNONAA 16* 18*     Magnesium:   Recent Labs   Lab 12/30/18  0530 12/31/18  0607   MG 2.1 1.9     POCT Glucose:   Recent Labs   Lab 12/30/18  1617 12/30/18  2109 12/31/18  0614   POCTGLUCOSE 194* 182* 210*       Significant Imaging: I have reviewed all pertinent imaging results/findings within the past 24 hours.   X-Ray Chest PA And Lateral 12/31/18: 12/26/2018  FINDINGS: Mildly enlarged cardiac silhouette, unchanged.  Left lung base opacities, improved.  No pneumothorax.    Assessment/Plan:      Streptococcus pyogenes bacteremia    Acute osteomyelitis of toe, left  Continue IV ceftriaxone until 2/4/19. Consult PICC team. Continue wound care at home.     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 Getting wound care.     Diabetic neuropathy associated with type 2 diabetes mellitus    Continue home gabapentin.      CKD (chronic kidney disease) stage 4, GFR 15-29 ml/min    Metabolic bone disease  Avoid nephrotoxins. Monitor renal function.      Chronic diastolic congestive heart failure    Acute on chronic diastolic congestive heart failure  Acute respiratory failure with hypoxia  Takes furosemide 40 mg BID. Giving furosemide 80 mg IV TID. Increase home furosemide dose to 80 mg BID.     Anemia of chronic renal failure, stage 4 (severe)    Stable at baseline.     Dyslipidemia     Continue home rosuvastatin.      Essential hypertension    Continue home carvedilol 25 mg BID, amlodipine 10 mg daily. Hold home hydralazine 50 mg BID.  Monitor BP.      Type 2 diabetes mellitus with stage 4 chronic kidney disease, with long-term current use of insulin    Lab Results   Component Value Date    HGBA1C 7.3 (H) 11/28/2018   He takes insulin detemir 26 units qHS and lispro 10-12 units TIDWM at home. Continue this regimen inpatient. Continue moderate dose SSI prn. Monitor accuchecks.        VTE Risk Mitigation (From admission, onward)        Ordered     heparin (porcine) injection 5,000 Units  Every 8 hours      12/23/18 2124     IP VTE HIGH RISK PATIENT  Once      12/23/18 2124              Kendall Sotomayor MD  Department of Hospital Medicine   [facility]

## 2018-12-31 NOTE — SUBJECTIVE & OBJECTIVE
Interval History: Observed him get up from a wheelchair and sit on the bed by himself.    Review of Systems   Cardiovascular: Negative for chest pain and palpitations.   Gastrointestinal: Negative for constipation, nausea and vomiting.     Objective:     Vital Signs (Most Recent):  Temp: 98 °F (36.7 °C) (12/31/18 0837)  Pulse: 66 (12/31/18 0837)  Resp: 18 (12/31/18 0837)  BP: (!) 156/72 (12/31/18 0837)  SpO2: (!) 91 % (12/31/18 1132) Vital Signs (24h Range):  Temp:  [97.1 °F (36.2 °C)-98.4 °F (36.9 °C)] 98 °F (36.7 °C)  Pulse:  [58-69] 66  Resp:  [16-20] 18  SpO2:  [88 %-97 %] 91 %  BP: (127-160)/(60-75) 156/72     Weight: 111.2 kg (245 lb 2.4 oz)  Body mass index is 30.64 kg/m².    Intake/Output Summary (Last 24 hours) at 12/31/2018 1140  Last data filed at 12/31/2018 1100  Gross per 24 hour   Intake 855 ml   Output 2250 ml   Net -1395 ml      Physical Exam   Constitutional: He is oriented to person, place, and time. He appears well-developed and well-nourished. No distress.   Pulmonary/Chest: Effort normal and breath sounds normal. No respiratory distress.   Abdominal: Soft. Bowel sounds are normal. He exhibits no distension. There is no tenderness.   Neurological: He is alert and oriented to person, place, and time.   Psychiatric: He has a normal mood and affect. His behavior is normal.   Nursing note and vitals reviewed.      Significant Labs:   CBC:   No results for input(s): WBC, HGB, HCT, PLT in the last 48 hours.  CMP:   Recent Labs   Lab 12/30/18  0530 12/31/18  0607   * 136   K 3.8 3.4*    101   CO2 23 25   * 204*   BUN 71* 64*   CREATININE 3.5* 3.3*   CALCIUM 9.2 9.3   ANIONGAP 10 10   EGFRNONAA 16* 18*     Magnesium:   Recent Labs   Lab 12/30/18  0530 12/31/18  0607   MG 2.1 1.9     POCT Glucose:   Recent Labs   Lab 12/30/18  1617 12/30/18  2109 12/31/18  0614   POCTGLUCOSE 194* 182* 210*       Significant Imaging: I have reviewed all pertinent imaging results/findings within the past  24 hours.   X-Ray Chest PA And Lateral 12/31/18: 12/26/2018  FINDINGS: Mildly enlarged cardiac silhouette, unchanged.  Left lung base opacities, improved.  No pneumothorax.

## 2018-12-31 NOTE — PT/OT/SLP PROGRESS
Occupational Therapy  Visit Attempt    Patient Name:  Domingo Castro   MRN:  [id number]    Patient not seen today secondary to NAVEEN for echocardiogram. Will follow-up later, as time permits.    PERI Ordonez  12/31/2018

## 2018-12-31 NOTE — TELEPHONE ENCOUNTER
Spouse called and she advised they where still in the ED and he was admitted at [facility]. She advised they where still there and he has an open wound on is toe and he has been getting help with walking she advised he will be d/c in a couple of days and or he is getting Surgery and a PIC line she states they will send him home with a Rx for an ABX to assist with clearing the infection termed the call

## 2018-12-31 NOTE — PLAN OF CARE
[facility] HOME HEALTH ORDERS  FACE TO FACE ENCOUNTER    Patient Name: Domingo Castro  YOB: 1945    PCP: Griselda Nugent MD   PCP Address: [address] [place] [address] / [place] [id number]  PCP Phone Number: [phone]  PCP Fax: [phone]    Encounter Date: 12/31/2018    Admit to Home Health    Diagnoses:  Active Hospital Problems    Diagnosis  POA    Acute osteomyelitis of toe, left [M86.172]  Yes    Diabetic ulcer of toe of left foot with fat layer exposed [E11.621, L97.522]  Yes    Diabetic ulcer of toe of left foot [E11.621, L97.529]  Yes    CKD (chronic kidney disease) stage 4, GFR 15-29 ml/min [N18.4]  Yes     Chronic    Diabetic neuropathy associated with type 2 diabetes mellitus [E11.40]  Yes     Chronic    Streptococcus pyogenes bacteremia [R78.81, B95.5]  Yes     Due to left toe osteomyelitis      Chronic diastolic congestive heart failure [I50.32]  Yes     Chronic    Metabolic bone disease [E88.9, M90.80]  Yes    Anemia of chronic renal failure, stage 4 (severe) [N18.4, D63.1]  Yes     Chronic    Type 2 diabetes mellitus with stage 4 chronic kidney disease, with long-term current use of insulin [E11.22, N18.4, Z79.4]  Not Applicable     Chronic    Dyslipidemia [E78.5]  Yes     Chronic    Essential hypertension [I10]  Yes     Chronic      Resolved Hospital Problems    Diagnosis Date Resolved POA    *Septicemia due to group A Streptococcus [A40.0] 12/31/2018 Yes    Acute constipation [K59.00] 12/29/2018 Yes    Hyponatremia [E87.1] 12/31/2018 No    Leukocytosis [D72.829] 12/27/2018 Yes    Hypokalemia [E87.6] 12/25/2018 No    Thrombocytopenia, unspecified [D69.6] 12/31/2018 Yes    Pneumonia of left lower lobe due to infectious organism [J18.1] 12/27/2018 Yes    Acute respiratory failure with hypoxia [J96.01] 12/31/2018 Yes       Future Appointments   Date Time Provider Department Center   1/15/2019  1:00 PM LIZETH Harrison MD [facility] OPHTHAL  [facility]   1/22/2019  9:15 AM Dimitry Gallegos DPM [facility] PODIAT [facility]   3/8/2019  3:20 PM Vandana Tovar MD [facility] CARDIO [place]   3/12/2019  9:40 AM Griselda Nugent MD [facility]       I have seen and examined this patient face to face today. My clinical findings that support the need for the home health skilled services and home bound status are the following:  Requiring assistive device to leave home due to unsteady gait caused by  Infection.  Medical restrictions requiring assistance of another human to leave home due to  IV infusion Needs.    Allergies:  Review of patient's allergies indicates:   Allergen Reactions    Atorvastatin Other (See Comments)       Diet: diabetic diet: 2000 calorie and 2 gram sodium diet    Activities: activity as tolerated    Nursing:   SN to complete comprehensive assessment including routine vital signs. Instruct on disease process and s/s of complications to report to MD. Review/verify medication list sent home with the patient at time of discharge  and instruct patient/caregiver as needed. Frequency may be adjusted depending on start of care date.    Notify MD if SBP > 160 or < 90; DBP > 90 or < 50; HR > 120 or < 50; Temp > 101      CONSULTS:    Physical Therapy to evaluate and treat. Evaluate for home safety and equipment needs; Establish/upgrade home exercise program. Perform / instruct on therapeutic exercises, gait training, transfer training, and Range of Motion.  Occupational Therapy to evaluate and treat. Evaluate home environment for safety and equipment needs. Perform/Instruct on transfers, ADL training, ROM, and therapeutic exercises.    MISCELLANEOUS CARE:  Home Infusion Therapy:   SN to perform Infusion Therapy/Central Line Care.  Review Central Line Care & Central Line Flush with patient.    Administer (drug and dose): Ceftriaxone 2 grams until 2/4/19  Last dose given: 1/2/19                         Home dose due: 1/3/19    Scrub the Hub: Prior to  accessing the line, always perform a 30 second alcohol scrub  Port to be flushed at least daily with 10 mL Normal Saline and 3 mL Heparin flush (10 units/mL)  Skilled Nurse (SN) may draw blood from IV access  Blood Draw Procedure:   - Aspirate at least 5 mL of blood   - Discard   - Obtain specimen   - Change injection cap   - Flush with 20 mL Normal Saline followed by a                 3-5 mL Heparin flush (10 units/mL)  Central :   - Sterile dressing changes are done weekly and as needed.   - Injection caps are changed weekly and after EVERY lab draw.   - If sterile gauze is under dressing to control oozing,                 dressing change must be performed every 24 hours until gauze is not needed.    CBC, CMP, sed rate, and CRP weekly until 2/4/19. Report results to Antonio Hernandez MD (Fax: [phone]) until follow up with [facility].     WOUND CARE ORDERS   Left 1st toe ulcer: Nursing to cleanse left great toe with wound cleanser. Apply iodosorb to wound and cover with aquacel foam dressing. Every Monday Wednesday Friday.      Medications: Review discharge medications with patient and family and provide education.      Current Discharge Medication List      START taking these medications    Details   cadexomer iodine (IODOSORB) 0.9 % gel Apply topically every Mon, Wed, Fri.  Qty: 40 g, Refills: 0      cefTRIAXone 2 g in dextrose 5 % 50 mL (ROCEPHIN) 2 g/50 mL PgBk IVPB Inject 50 mLs (2 g total) into the vein once daily. End date 2/4/19.         CONTINUE these medications which have CHANGED    Details   furosemide (LASIX) 40 MG tablet Take 2 tablets (80 mg total) by mouth 2 (two) times daily. Do not take the pm dose after 3 pm  Qty: 360 tablet, Refills: 0         CONTINUE these medications which have NOT CHANGED    Details   acetaminophen (TYLENOL) 325 MG tablet Take 2 tablets (650 mg total) by mouth every 4 (four) hours as needed.  Refills: 0      amLODIPine (NORVASC) 10 MG tablet Take 1  "tablet (10 mg total) by mouth once daily.  Qty: 90 tablet, Refills: 3      aspirin (ECOTRIN) 81 MG EC tablet Take 1 tablet (81 mg total) by mouth once daily.  Refills: 0      carvedilol (COREG) 25 MG tablet Take 1 tablet (25 mg total) by mouth 2 (two) times daily with meals.  Qty: 60 tablet, Refills: 0      gabapentin (NEURONTIN) 100 MG capsule TAKE ONE CAPSULE BY MOUTH IN THE MORNING THEN ONE CAPSULE  IN THE EVENING AND THEN THREE CAPSULES AT BEDTIME  Qty: 450 capsule, Refills: 3      hydrALAZINE (APRESOLINE) 50 MG tablet TAKE ONE TABLET BY MOUTH EVERY 12 HOURS  Qty: 180 tablet, Refills: 3      insulin lispro (HUMALOG KWIKPEN INSULIN) 100 unit/mL InPn pen INJECT 10 UNITS SUBCUTANEOUSLY THREE TIMES DAILY BEFORE MEALS WITH CORRECTION SCALE, MAX TDD 50 UNITS  Qty: 9 Box, Refills: 6    Associated Diagnoses: Type 2 diabetes mellitus with stage 4 chronic kidney disease, with long-term current use of insulin      LEVEMIR FLEXTOUCH U-100 INSULN 100 unit/mL (3 mL) InPn pen Inject 26 Units into the skin every evening.  Qty: 36 mL, Refills: 3    Associated Diagnoses: Type 2 diabetes mellitus with stage 4 chronic kidney disease, with long-term current use of insulin      pen needle, diabetic, safety (BD AUTOSHIELD PEN NEEDLE) 29 gauge x 5/16" Ndle For use once daily with levemir flexpen  Qty: 90 each, Refills: 11      rosuvastatin (CRESTOR) 20 MG tablet Take 20 mg by mouth once daily.              I certify that this patient is confined to his home and needs intermittent skilled nursing care, physical therapy and occupational therapy.      Iam Ibarra MD  [facility]  01/02/2019  "

## 2018-12-31 NOTE — PT/OT/SLP PROGRESS
Physical Therapy Treatment    Patient Name:  Domingo Castro   MRN:  [id number]    Recommendations:     Discharge Recommendations:  nursing facility, skilled, other (see comments)(Home with Home Health pending progress)   Discharge Equipment Recommendations: walker, rolling   Barriers to discharge: None    Assessment:     Domingo Castro is a 73 y.o. male admitted with a medical diagnosis of Septicemia due to group A Streptococcus.  He presents with the following impairments/functional limitations:  weakness, impaired endurance, impaired balance, decreased lower extremity function, gait instability, impaired functional mobilty, impaired self care skills, decreased safety awareness . Patient able to ambulate with RW and SBA to CG ~ 75 ft X 2 with seated rest between trips.    Rehab Prognosis: Good; patient would benefit from acute skilled PT services to address these deficits and reach maximum level of function.    Recent Surgery: * No surgery found *      Plan:     During this hospitalization, patient to be seen 6 x/week to address the identified rehab impairments via gait training, therapeutic activities, therapeutic exercises and progress toward the following goals:    · Plan of Care Expires:  01/27/19    Subjective     Chief Complaint: weakness  Patient/Family Comments/goals: go home  Pain/Comfort:  · Pain Rating 1: 0/10  · Pain Rating Post-Intervention 1: 0/10      Objective:     Communicated with [facility] nurse prior to session.  Patient found all lines intact, call button in reach and bed alarm on telemetry, peripheral IV  upon PT entry to room.     General Precautions: Standard, fall   Orthopedic Precautions:N/A   Braces: N/A     Functional Mobility:  · Bed Mobility:     · Supine to Sit: contact guard assistance  · Sit to Supine: contact guard assistance  · Transfers:     · Sit to Stand:  stand by assistance and contact guard assistance with rolling walker  · Gait: SBA to CG with RW 75 ft x 2   · Balance: fair with  RW      AM-PAC 6 CLICK MOBILITY  Turning over in bed (including adjusting bedclothes, sheets and blankets)?: 4  Sitting down on and standing up from a chair with arms (e.g., wheelchair, bedside commode, etc.): 4  Moving from lying on back to sitting on the side of the bed?: 4  Moving to and from a bed to a chair (including a wheelchair)?: 3  Need to walk in hospital room?: 3  Climbing 3-5 steps with a railing?: 3  Basic Mobility Total Score: 21       Therapeutic Activities and Exercises:   performed seated AROM ex including marching ankle pumps,LAQ and sit <> stand     Patient left left sidelying with all lines intact, call button in reach, bed alarm on and family present..    GOALS:   Multidisciplinary Problems     Physical Therapy Goals        Problem: Physical Therapy Goal    Goal Priority Disciplines Outcome Goal Variances Interventions   Physical Therapy Goal     PT, PT/OT Ongoing (interventions implemented as appropriate)     Description:  Goals to be met by: 2019     Patient will increase functional independence with mobility by performin. Supine to sit with Modified [place]  2. Sit to stand transfer with Modified [place]  3. Bed to chair transfer with Stand-by Assistance using Rolling Walker  4. Gait  x 50 feet with Stand-by Assistance using Rolling Walker.                       Time Tracking:     PT Received On: 18  PT Start Time: 1125     PT Stop Time: 1155  PT Total Time (min): 30 min     Billable Minutes: Gait Training 15 and Therapeutic Exercise 15    Treatment Type: Treatment  PT/PTA: PT     PTA Visit Number: 1     Endy Thomas, PT  2018

## 2018-12-31 NOTE — PLAN OF CARE
Problem: Occupational Therapy Goal  Goal: Occupational Therapy Goal  Goals to be met by: 1/27     Patient will increase functional independence with ADLs by performing:    UE Dressing with Set-up Assistance.  LE Dressing with Stand-by Assistance.  Grooming while standing with Stand-by Assistance.  Toileting from toilet with Contact Guard Assistance for hygiene and clothing management.   Toilet transfer to toilet with Contact Guard Assistance.  Upper extremity exercise program x10 reps per handout, with supervision.     Outcome: Ongoing (interventions implemented as appropriate)  Patient continues to make progress with activity tolerance and mobility. Will benefit from continued skilled OT to address functional deficits.

## 2018-12-31 NOTE — ASSESSMENT & PLAN NOTE
Acute osteomyelitis of toe, left  Continue IV ceftriaxone until 2/4/19. Consult PICC team. Continue wound care at home.

## 2018-12-31 NOTE — PLAN OF CARE
12/31/18 1600   Medicare Message   Important Message from Medicare regarding Discharge Appeal Rights Given to patient/caregiver;Explained to patient/caregiver;Signed/date by patient/caregiver   Date IMM was signed 12/31/18   Time IMM was signed 1600

## 2018-12-31 NOTE — PROGRESS NOTES
"Vancomycin Dosing and Monitoring Pharmacy Protocol    Domingo Castro is a 73 y.o. male    Height: 6' 3" (1.905 m)   Wt Readings from Last 3 Encounters:   12/28/18 112.5 kg (248 lb 0.3 oz)   12/03/18 106.7 kg (235 lb 3.2 oz)   11/28/18 108.6 kg (239 lb 6.7 oz)       Temp Readings from Last 3 Encounters:   12/30/18 98 °F (36.7 °C)   11/28/18 98 °F (36.7 °C) (Oral)   08/20/18 98.5 °F (36.9 °C) (Oral)      Lab Results   Component Value Date/Time    WBC 10.08 12/29/2018 05:25 AM    WBC 11.88 12/28/2018 03:56 AM    WBC 11.61 12/27/2018 04:39 AM      Lab Results   Component Value Date/Time    CREATININE 3.5 (H) 12/30/2018 05:30 AM    CREATININE 3.7 (H) 12/29/2018 05:25 AM    CREATININE 3.8 (H) 12/28/2018 03:56 AM      Lab Results   Component Value Date/Time    LACTATE 0.8 12/23/2018 09:45 PM    LACTATE 1.2 12/23/2018 05:39 PM       Serum creatinine: 3.5 mg/dL (H) 12/30/18 0530  Estimated creatinine clearance: 25.4 mL/min (A)    Antibiotics (From admission, onward)      Start     Stop Route Frequency Ordered    12/30/18 2000  vancomycin in dextrose 5 % 1 gram/250 mL IVPB 1,000 mg      -- IV Once 12/30/18 1900    12/25/18 2014  cefTRIAXone (ROCEPHIN) 2 g in dextrose 5 % 50 mL IVPB  (Ceftriaxone IV/ Azithromycin IV Panel)      -- IV Every 12 hours (non-standard times) 12/25/18 1400          Antifungals (From admission, onward)      None            Microbiology Results (last 7 days)       Procedure Component Value Units Date/Time    Blood culture [[id number]] Collected:  12/25/18 0351    Order Status:  Completed Specimen:  Blood Updated:  12/30/18 1012     Blood Culture, Routine No growth after 5 days.    Blood culture [[id number]] Collected:  12/26/18 0502    Order Status:  Completed Specimen:  Blood Updated:  12/30/18 0812     Blood Culture, Routine No Growth to date     Blood Culture, Routine No Growth to date     Blood Culture, Routine No Growth to date     Blood Culture, Routine No Growth to date     Blood Culture, Routine " No Growth to date    Blood culture [[id number]] Collected:  12/26/18 0458    Order Status:  Completed Specimen:  Blood Updated:  12/30/18 0812     Blood Culture, Routine No Growth to date     Blood Culture, Routine No Growth to date     Blood Culture, Routine No Growth to date     Blood Culture, Routine No Growth to date     Blood Culture, Routine No Growth to date    Aerobic culture [[id number]]  (Susceptibility) Collected:  12/24/18 1250    Order Status:  Completed Specimen:  Wound from Toe, Left Foot Updated:  12/27/18 0950     Aerobic Bacterial Culture --     STREPTOCOCCUS PYOGENES (GROUP A)  Moderate  Beta-hemolytic streptococci are routinely susceptible to   penicillins,cephalosporins and carbapenems.  Susceptibility testing not routinely performed       Aerobic Bacterial Culture --     STAPHYLOCOCCUS AUREUS  Moderate      Blood culture x two cultures. Draw prior to antibiotics. [[id number]] Collected:  12/23/18 1735    Order Status:  Completed Specimen:  Blood from Peripheral, Hand, Right Updated:  12/26/18 0959     Blood Culture, Routine Gram stain aer bottle: Gram positive cocci     Blood Culture, Routine Gram stain jamal bottle: Gram positive cocci     Blood Culture, Routine Results called to and read back by: Radha Mejia RN  12/24/2018  10:05     Blood Culture, Routine --     STREPTOCOCCUS PYOGENES (GROUP A)  Beta-hemolytic streptococci are routinely susceptible to   penicillins,cephalosporins and carbapenems.  For susceptibility see order #[id number]      Narrative:       Aerobic and anaerobic    Blood culture x two cultures. Draw prior to antibiotics. [[id number]]  (Susceptibility) Collected:  12/23/18 1738    Order Status:  Completed Specimen:  Blood from Peripheral, Forearm, Right Updated:  12/26/18 0959     Blood Culture, Routine Gram stain aer bottle: Gram positive cocci     Blood Culture, Routine Gram stain jamal bottle: Gram positive cocci     Blood Culture, Routine Results called to and read back by:  Radha Mejia RN  2018  10:05     Blood Culture, Routine --     STREPTOCOCCUS PYOGENES (GROUP A)  Beta-hemolytic streptococci are routinely susceptible to   penicillins,cephalosporins and carbapenems.      Narrative:       Aerobic and anaerobic            Indication/Target trough:   Bacteremia (Target trough: 15-20mcg/ml)    Hemodialysis:   N/A    Dosing Weight:   Wt Readings from Last 1 Encounters:   18 112.5 kg (248 lb 0.3 oz)       Last Vancomycin dose: 1000 mg   Date/Time given: 18 at 1600          Vancomycin level:  Recent Labs   Lab Result Units 18  2103   Vancomycin-Trough ug/mL 20.4     Recent Labs   Lab Result Units 18  2103 18  1557   Vancomycin, Random ug/mL  --  16.7   Vancomycin-Trough ug/mL 20.4  --        Per Protocol Initial/Adjustments Dosin. Initial/Adjustment Dose: 1000 mg X 1 per Pulse dosing protocol    2. Vancomycin Random Level will be drawn on 18 at 1600 date/time     Pharmacy will continue to follow.    Please contact if you have any further questions. Thank you.    Aishwarya Fabian, PharmD  [phone]

## 2018-12-31 NOTE — PLAN OF CARE
Pt accepted to [facility], pt accepted to [facility], awaiting PICC placement.       12/31/18 1235   Post-Acute Status   Post-Acute Authorization Home Health/Hospice;Medications   Home Health/Hospice Status Authorization Obtained   Medication Status Pending Access Placement   Concepcion Sawant RN-BC  Transitional Navigator  [phone]

## 2018-12-31 NOTE — PLAN OF CARE
[facility] Infectious Diseases     Mr. Castro's temperature is finally completely resolved Repeat CXR is improved. Doing well from our standpoint. Recommend completion of 6-week course of Ceftriaxone 2 gm every 24 hours. This will simultaneously cover his pneumonia as well.     Will sign off. Please call for any questions.    Samantha Hooks MD  [facility] Infectious Diseases Staff

## 2018-12-31 NOTE — PLAN OF CARE
Referral sent to pt's first preference of [facility] , pt denied, pt's wife stated now  to sent to multiple  companies, IV abx orders sent to [facility].       12/31/18 1134   Post-Acute Status   Post-Acute Authorization Home Health/Hospice;Medications   Post-Acute Placement Status Referrals Sent   Medication Status Rx fund Referral

## 2019-01-01 ENCOUNTER — ANESTHESIA EVENT (OUTPATIENT)
Dept: SURGERY | Facility: HOSPITAL | Age: 74
DRG: 853 | End: 2019-01-01
Payer: MEDICARE

## 2019-01-01 LAB
ANION GAP SERPL CALC-SCNC: 9 MMOL/L
BUN SERPL-MCNC: 61 MG/DL
CALCIUM SERPL-MCNC: 9.2 MG/DL
CHLORIDE SERPL-SCNC: 102 MMOL/L
CO2 SERPL-SCNC: 25 MMOL/L
CREAT SERPL-MCNC: 3 MG/DL
EST. GFR  (AFRICAN AMERICAN): 23 ML/MIN/1.73 M^2
EST. GFR  (NON AFRICAN AMERICAN): 20 ML/MIN/1.73 M^2
GLUCOSE SERPL-MCNC: 117 MG/DL
MAGNESIUM SERPL-MCNC: 1.9 MG/DL
PHOSPHATE SERPL-MCNC: 3.4 MG/DL
POCT GLUCOSE: 128 MG/DL (ref 70–110)
POCT GLUCOSE: 146 MG/DL (ref 70–110)
POCT GLUCOSE: 162 MG/DL (ref 70–110)
POCT GLUCOSE: 187 MG/DL (ref 70–110)
POTASSIUM SERPL-SCNC: 3.5 MMOL/L
SODIUM SERPL-SCNC: 136 MMOL/L

## 2019-01-01 PROCEDURE — 94640 AIRWAY INHALATION TREATMENT: CPT

## 2019-01-01 PROCEDURE — 83735 ASSAY OF MAGNESIUM: CPT

## 2019-01-01 PROCEDURE — 80048 BASIC METABOLIC PNL TOTAL CA: CPT

## 2019-01-01 PROCEDURE — 94761 N-INVAS EAR/PLS OXIMETRY MLT: CPT

## 2019-01-01 PROCEDURE — 84100 ASSAY OF PHOSPHORUS: CPT

## 2019-01-01 PROCEDURE — 63600175 PHARM REV CODE 636 W HCPCS: Performed by: HOSPITALIST

## 2019-01-01 PROCEDURE — 25000242 PHARM REV CODE 250 ALT 637 W/ HCPCS: Performed by: HOSPITALIST

## 2019-01-01 PROCEDURE — 99222 1ST HOSP IP/OBS MODERATE 55: CPT | Mod: ,,, | Performed by: SURGERY

## 2019-01-01 PROCEDURE — 27000221 HC OXYGEN, UP TO 24 HOURS

## 2019-01-01 PROCEDURE — 25000003 PHARM REV CODE 250: Performed by: HOSPITALIST

## 2019-01-01 PROCEDURE — 36415 COLL VENOUS BLD VENIPUNCTURE: CPT

## 2019-01-01 PROCEDURE — 11000001 HC ACUTE MED/SURG PRIVATE ROOM

## 2019-01-01 PROCEDURE — 99222 PR INITIAL HOSPITAL CARE,LEVL II: ICD-10-PCS | Mod: ,,, | Performed by: SURGERY

## 2019-01-01 RX ADMIN — FUROSEMIDE 80 MG: 40 TABLET ORAL at 05:01

## 2019-01-01 RX ADMIN — HEPARIN SODIUM 5000 UNITS: 5000 INJECTION, SOLUTION INTRAVENOUS; SUBCUTANEOUS at 03:01

## 2019-01-01 RX ADMIN — IPRATROPIUM BROMIDE AND ALBUTEROL SULFATE 3 ML: .5; 3 SOLUTION RESPIRATORY (INHALATION) at 08:01

## 2019-01-01 RX ADMIN — AMLODIPINE BESYLATE 10 MG: 5 TABLET ORAL at 08:01

## 2019-01-01 RX ADMIN — ROSUVASTATIN CALCIUM 20 MG: 10 TABLET, FILM COATED ORAL at 08:01

## 2019-01-01 RX ADMIN — HEPARIN SODIUM 5000 UNITS: 5000 INJECTION, SOLUTION INTRAVENOUS; SUBCUTANEOUS at 05:01

## 2019-01-01 RX ADMIN — INSULIN ASPART 10 UNITS: 100 INJECTION, SOLUTION INTRAVENOUS; SUBCUTANEOUS at 08:01

## 2019-01-01 RX ADMIN — IPRATROPIUM BROMIDE AND ALBUTEROL SULFATE 3 ML: .5; 3 SOLUTION RESPIRATORY (INHALATION) at 11:01

## 2019-01-01 RX ADMIN — INSULIN ASPART 10 UNITS: 100 INJECTION, SOLUTION INTRAVENOUS; SUBCUTANEOUS at 05:01

## 2019-01-01 RX ADMIN — GABAPENTIN 300 MG: 300 CAPSULE ORAL at 09:01

## 2019-01-01 RX ADMIN — IPRATROPIUM BROMIDE AND ALBUTEROL SULFATE 3 ML: .5; 3 SOLUTION RESPIRATORY (INHALATION) at 03:01

## 2019-01-01 RX ADMIN — CARVEDILOL 25 MG: 25 TABLET, FILM COATED ORAL at 05:01

## 2019-01-01 RX ADMIN — HEPARIN SODIUM 5000 UNITS: 5000 INJECTION, SOLUTION INTRAVENOUS; SUBCUTANEOUS at 09:01

## 2019-01-01 RX ADMIN — IPRATROPIUM BROMIDE AND ALBUTEROL SULFATE 3 ML: .5; 3 SOLUTION RESPIRATORY (INHALATION) at 07:01

## 2019-01-01 RX ADMIN — CARVEDILOL 25 MG: 25 TABLET, FILM COATED ORAL at 08:01

## 2019-01-01 RX ADMIN — GABAPENTIN 100 MG: 100 CAPSULE ORAL at 05:01

## 2019-01-01 RX ADMIN — CEFTRIAXONE 2 G: 2 INJECTION, POWDER, FOR SOLUTION INTRAMUSCULAR; INTRAVENOUS at 08:01

## 2019-01-01 RX ADMIN — INSULIN ASPART 10 UNITS: 100 INJECTION, SOLUTION INTRAVENOUS; SUBCUTANEOUS at 12:01

## 2019-01-01 RX ADMIN — FUROSEMIDE 80 MG: 40 TABLET ORAL at 08:01

## 2019-01-01 RX ADMIN — ASPIRIN 81 MG: 81 TABLET, COATED ORAL at 08:01

## 2019-01-01 RX ADMIN — GABAPENTIN 100 MG: 100 CAPSULE ORAL at 08:01

## 2019-01-01 NOTE — PROGRESS NOTES
[facility] Hospital Medicine  Progress Note    Patient Name: Domingo Castro  MRN: [id number]  Patient Class: IP- Inpatient   Admission Date: 12/23/2018  Length of Stay: 9 days  Attending Physician: Kendall Sotomayor MD  Primary Care Provider: Griselda Nugent MD        Subjective:     Principal Problem:Septicemia due to group A Streptococcus    HPI:  Domingo Castro is a 73 y.o. black man with hypertension, diabetes mellitus type 2 with neuropathy and peripheral vascular disease (treated with insulin), coronary artery disease, hyperlipidemia, chronic diastolic congestive heart failure, chronic kidney disease stage 4, anemia. He lives in [place], [place]. He is . He is a . His primary care physician is Dr. Griselda Nugent. His nephrologist is Dr. Nely Watson. His podiatrist is Dr. Dimitry Gallegos. His cardiologist is Dr. Vandana Tovar.   He presented to [facility] Emergency Department on 12/23/18 with shaking and chills that began in the afternoon. 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 He went to sleep after [employer] as he usually did and woke up feeling cold. He had some cough a week prior and a little cough on the way to the ED. He also had some nausea with decreased oral intake over the past day, and was lightheaded earlier in the day. He was found to have a fever of 103.2° Fahrenheit and oxygen saturation of 90% in the ED. Chest X-ray showed a subtle focal airspace opacity in the left lower lung zone. He was given piperacillin-tazobactam and vancomycin in the ED.     Hospital Course:  He was transitioned to ceftriaxone, azithromycin, and vancomycin. Blood cultures grew Streptococcus pyogenes. Despite antibiotics and negative fluid balance, his hypoxia worsened. Chest CT showed bilateral pleural effusions and atelectasis. Podiatry evaluated his wound on the left great toe and did not feel that it  was source of bacteremia. Wound culture grew Streptococcus pyogenes. Infectious Disease was consulted and recommended MRI of the left foot, which showed early osteomyelitis. On 12/27/18, it was found that nursing staff had not been allowing him to get out of bed. BNP had risen to 424. His home furosemide had been held since admission, so he was put on IV furosemide. Physical and Occupational Therapy were consulted to get him mobilized and recommended skilled nursing facility placement. On 12/28/18, he revealed that he had not had a bowel movement since prior to admission, so he was given polyethylene glycol and lactulose with excellent results. Dr. Gallegos performed left 1st toe excisional debridement. Hypoxia progressively improved with diuretics and mobilization. Infectious Disease recommended continuing ceftriaxone for 6 weeks (end date 2/4/19). He was weaned to room air by 12/31/18 with oxygen saturation of 87%. Due to chronic kidney disease, Nephrology did not approve a PICC so General Surgery was consulted for port placement. His home furosemide was increased from 40 mg to 80 mg twice daily. He was prescribed home health for wound care, antibiotic administration, physical therapy, and occupational therapy.    Interval History: Will get port placed tomorrow then go home. Had SpO2 88% while sleeping last night so nasal cannula was placed on him again.    Review of Systems   Cardiovascular: Negative for chest pain and palpitations.   Gastrointestinal: Negative for constipation, nausea and vomiting.     Objective:     Vital Signs (Most Recent):  Temp: 97.9 °F (36.6 °C) (01/01/19 1140)  Pulse: (!) 57 (01/01/19 1140)  Resp: 16 (01/01/19 1140)  BP: (!) 144/89 (01/01/19 1140)  SpO2: 96 % (01/01/19 1138) Vital Signs (24h Range):  Temp:  [97.7 °F (36.5 °C)-98.6 °F (37 °C)] 97.9 °F (36.6 °C)  Pulse:  [56-72] 57  Resp:  [15-18] 16  SpO2:  [87 %-96 %] 96 %  BP: (126-169)/(61-89) 144/89     Weight: 110.3 kg (243 lb 2.7  oz)  Body mass index is 30.39 kg/m².    Intake/Output Summary (Last 24 hours) at 1/1/2019 1417  Last data filed at 1/1/2019 1200  Gross per 24 hour   Intake 305 ml   Output 1100 ml   Net -795 ml      Physical Exam   Constitutional: He is oriented to person, place, and time. He appears well-developed and well-nourished. No distress.   Pulmonary/Chest: Effort normal. No respiratory distress.   Neurological: He is alert and oriented to person, place, and time.   Psychiatric: He has a normal mood and affect. His behavior is normal.   Nursing note and vitals reviewed.      Significant Labs:   CBC:   No results for input(s): WBC, HGB, HCT, PLT in the last 48 hours.  CMP:   Recent Labs   Lab 12/31/18  0607 01/01/19  0420    136   K 3.4* 3.5    102   CO2 25 25   * 117*   BUN 64* 61*   CREATININE 3.3* 3.0*   CALCIUM 9.3 9.2   ANIONGAP 10 9   EGFRNONAA 18* 20*     Magnesium:   Recent Labs   Lab 12/31/18  0607 01/01/19  0420   MG 1.9 1.9     POCT Glucose:   Recent Labs   Lab 12/31/18  2024 01/01/19  0545 01/01/19  1137   POCTGLUCOSE 141* 128* 187*       Significant Imaging: I have reviewed all pertinent imaging results/findings within the past 24 hours.     Assessment/Plan:      Streptococcus pyogenes bacteremia    Acute osteomyelitis of toe, left  Continue IV ceftriaxone until 2/4/19. Port placement tomorrow then discharge home. Continue wound care at home.     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 Getting wound care.     Diabetic neuropathy associated with type 2 diabetes mellitus    Continue home gabapentin.      CKD (chronic kidney disease) stage 4, GFR 15-29 ml/min    Metabolic bone disease  Avoid nephrotoxins. Monitor renal function.      Chronic diastolic congestive heart failure    Home furosemide increased from 40 mg to 80 mg BID. Diuresed a lot this admission. Will not need home oxygen.     Anemia of chronic renal failure, stage 4 (severe)     Stable at baseline.     Dyslipidemia    Continue home rosuvastatin.      Essential hypertension    Continue home carvedilol 25 mg BID, amlodipine 10 mg daily. Hold home hydralazine 50 mg BID.  Monitor BP.      Type 2 diabetes mellitus with stage 4 chronic kidney disease, with long-term current use of insulin    Lab Results   Component Value Date    HGBA1C 7.3 (H) 11/28/2018   He takes insulin detemir 26 units qHS and lispro 10-12 units TIDWM at home. Continue this regimen inpatient. Continue moderate dose SSI prn. Monitor accuchecks.        VTE Risk Mitigation (From admission, onward)        Ordered     heparin (porcine) injection 5,000 Units  Every 8 hours      12/23/18 2124     IP VTE HIGH RISK PATIENT  Once      12/23/18 2124        Disposition: Home with home health tomorrow. Home health already set up.      Kendall Sotomayor MD  Department of Hospital Medicine   [facility]

## 2019-01-01 NOTE — CONSULTS
[facility]  INPATIENT H&P    REASON FOR CONSULT/ADMISSION:  Port-A-Cath placement    HPI: Domingo Castro is a 73 y.o. male with hypertension, diabetes type 2 with neuropathy and peripheral vascular disease, coronary artery disease, hyperlipidemia, chronic diastolic heart failure, chronic kidney disease stage 4 and anemia who presented to the emergency department on 12/23/2018 with shaking and chills.  He is found to be febrile and chest x-ray shows suspected left lower lung pneumonia.  He was started on IV antibiotics and admitted medicine.  He is also found to have a left great toe diabetic foot ulcer which MRI showed early osteomyelitis.  ID was consulted recommended 6 weeks of ceftriaxone to cover pneumonia and osteomyelitis.  Given his chronic kidney disease PICC line was not recommended surgery is consulted for possible port for long-term antibiotics.    Patient seen examined this morning with family members at bedside.  Patient was sleepy and minimally interactive but did answer questions appropriately.  He denies significant pain at this time.  He denied previous surgical interventions or injury of the neck earlier upper thorax.    I have reviewed the patient's chart including prior progress notes, procedures and testing.     ROS:   Review of Systems   Constitutional: Positive for activity change. Negative for chills and fever (No current fevers or chills).   HENT: Negative for congestion, nosebleeds and trouble swallowing.    Eyes: Negative for photophobia, discharge and visual disturbance.   Respiratory: Negative for apnea, chest tightness and shortness of breath.    Cardiovascular: Positive for leg swelling. Negative for chest pain and palpitations.   Gastrointestinal: Negative for abdominal distention, abdominal pain, nausea and vomiting.   Genitourinary: Negative for difficulty urinating, dysuria and hematuria.   Musculoskeletal: Negative for neck pain and neck stiffness.   Skin:  Negative for color change, pallor, rash and wound.   Neurological: Positive for weakness. Negative for seizures and syncope.   Psychiatric/Behavioral: Negative for agitation, behavioral problems and confusion.       PROBLEM LIST:  Patient Active Problem List   Diagnosis    Type 2 diabetes mellitus with stage 4 chronic kidney disease, with long-term current use of insulin    Essential hypertension    Dyslipidemia    Hypogonadism male    ED (erectile dysfunction)    Severe nonproliferative diabetic retinopathy of both eyes    Diabetic macular edema, right eye    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 2 diabetes mellitus    Hypertensive retinopathy of both eyes    Retinal hole or tear, left    Cervical radiculopathy    Weakness of left arm    Impaired mobility and ADLs    Hemoptysis    Normocytic anemia    Pulmonary nodule    Myogenic ptosis    Peripheral edema    Dyspnea on exertion    Incontinence    Metabolic bone disease    Anemia of chronic renal failure, stage 4 (severe)    Right testicular pain    Testicular mass    Chronic diastolic congestive heart failure    Anginal equivalent    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, with long-term current use of insulin    CKD (chronic kidney disease) stage 4, GFR 15-29 ml/min    Diabetic neuropathy associated with type 2 diabetes mellitus    Diabetic ulcer of toe of left foot with fat layer exposed    Diabetic ulcer of toe of left foot    Acute osteomyelitis of toe, left    Streptococcus pyogenes bacteremia         HISTORY  Past Medical History:   Diagnosis Date    Arthritis     Cataract     CHF (congestive heart failure)     Coronary artery disease     Cystoid macular edema of both eyes     Diabetes mellitus type II     Hyperlipemia     Hypertension     Retinal hole     Stage 4 chronic kidney disease      Streptococcus pyogenes bacteremia 2018    Due to left toe osteomyelitis       Past Surgical History:   Procedure Laterality Date    BLEPHAROPLASTY Bilateral 2017    [facility]ST [facility]    CATARACT EXTRACTION W/  INTRAOCULAR LENS IMPLANT Left 12/15/14    Dr martin    CATARACT EXTRACTION W/  INTRAOCULAR LENS IMPLANT Right 14    dorothy    CIRCUMCISION, NON-      focal laser right eye      INSERTION-INTRAOCULAR LENS (IOL) Right 2014    [facility]    INSERTION-INTRAOCULAR LENS (IOL) Left 12/15/2014    [facility] at [facility]    KNEE SURGERY      left    Left medial collateral ligament repair      PHACOEMULSIFICATION-ASPIRATION-CATARACT Right 2014    [facility]    PHACOEMULSIFICATION-ASPIRATION-CATARACT Left 12/15/2014    [facility]    REPAIR-PTOSIS/BILATERAL EXTERNAL LEVATORS Bilateral 2017    [facility] at [facility] OR 1ST FLR    TONSILLECTOMY         Social History     Tobacco Use    Smoking status: Never Smoker    Smokeless tobacco: Never Used   Substance Use Topics    Alcohol use: No     Alcohol/week: 0.0 oz    Drug use: No       Family History   Problem Relation Age of Onset    Heart disease Mother     Cancer Mother     Cancer Brother     Heart disease Father     Diabetes Father     Cancer Sister     Cataracts Paternal Grandmother     Glaucoma Paternal Grandmother     Blindness Neg Hx     Amblyopia Neg Hx     Hypertension Neg Hx     Macular degeneration Neg Hx     Retinal detachment Neg Hx     Strabismus Neg Hx          MEDS:  No current facility-administered medications on file prior to encounter.      Current Outpatient Medications on File Prior to Encounter   Medication Sig Dispense Refill    acetaminophen (TYLENOL) 325 MG tablet Take 2 tablets (650 mg total) by mouth every 4 (four) hours  "as needed.  0    amLODIPine (NORVASC) 10 MG tablet Take 1 tablet (10 mg total) by mouth once daily. 90 tablet 3    aspirin (ECOTRIN) 81 MG EC tablet Take 1 tablet (81 mg total) by mouth once daily.  0    carvedilol (COREG) 25 MG tablet Take 1 tablet (25 mg total) by mouth 2 (two) times daily with meals. 60 tablet 0    gabapentin (NEURONTIN) 100 MG capsule TAKE ONE CAPSULE BY MOUTH IN THE MORNING THEN ONE CAPSULE  IN THE EVENING AND THEN THREE CAPSULES AT BEDTIME 450 capsule 3    hydrALAZINE (APRESOLINE) 50 MG tablet TAKE ONE TABLET BY MOUTH EVERY 12 HOURS 180 tablet 3    insulin lispro (HUMALOG KWIKPEN INSULIN) 100 unit/mL InPn pen INJECT 10 UNITS SUBCUTANEOUSLY THREE TIMES DAILY BEFORE MEALS WITH CORRECTION SCALE, MAX TDD 50 UNITS 9 Box 6    LEVEMIR FLEXTOUCH U-100 INSULN 100 unit/mL (3 mL) InPn pen Inject 26 Units into the skin every evening. 36 mL 3    pen needle, diabetic, safety (BD AUTOSHIELD PEN NEEDLE) 29 gauge x 5/16" Ndle For use once daily with levemir flexpen 90 each 11    rosuvastatin (CRESTOR) 20 MG tablet Take 20 mg by mouth once daily.          ALLERGIES:  Review of patient's allergies indicates:   Allergen Reactions    Atorvastatin Other (See Comments)         VITALS:  Temp:  [97.7 °F (36.5 °C)-98.6 °F (37 °C)] 98 °F (36.7 °C)  Pulse:  [59-72] 60  Resp:  [15-18] 18  SpO2:  [87 %-95 %] 95 %  BP: (126-169)/(61-72) 156/68    I/O last 3 completed shifts:  In: 1005 [P.O.:805; IV Piggyback:200]  Out: 2250 [Urine:2250]      PHYSICAL EXAM:  Physical Exam   Constitutional: He is oriented to person, place, and time. He appears well-developed and well-nourished. No distress.   HENT:   Head: Normocephalic and atraumatic.   Nose: Nose normal.   Eyes: Conjunctivae and EOM are normal. No scleral icterus.   Neck: Normal range of motion. Neck supple. No tracheal deviation present.   Cardiovascular: Normal rate and regular rhythm.   Decreased distal pulses   Pulmonary/Chest: Effort normal and breath sounds " normal. No respiratory distress.   Abdominal: Soft. He exhibits no distension. There is no tenderness.   Musculoskeletal: Normal range of motion. He exhibits deformity (Left great toe wound with bandage in place).   Neurological: He is alert and oriented to person, place, and time. He exhibits normal muscle tone.   Skin: Skin is warm and dry. He is not diaphoretic. No erythema.   Psychiatric: He has a normal mood and affect. His behavior is normal. Judgment and thought content normal.   Vitals reviewed.        LABS:  Lab Results   Component Value Date    WBC 10.08 12/29/2018    RBC 2.87 (L) 12/29/2018    HGB 8.1 (L) 12/29/2018    HCT 24.4 (L) 12/29/2018     (L) 12/29/2018     Lab Results   Component Value Date     (H) 01/01/2019     01/01/2019    K 3.5 01/01/2019     01/01/2019    CO2 25 01/01/2019    BUN 61 (H) 01/01/2019    CREATININE 3.0 (H) 01/01/2019    CALCIUM 9.2 01/01/2019     Lab Results   Component Value Date    ALT 14 12/23/2018    AST 15 12/23/2018    ALKPHOS 87 12/23/2018    BILITOT 0.4 12/23/2018     Lab Results   Component Value Date    MG 1.9 01/01/2019    PHOS 3.4 01/01/2019         ASSESSMENT & PLAN:  73 y.o. male with group a Streptococcus septicemia with osteomyelitis of the great toe and left lower lobe pneumonia  - infectious disease recommend 6 weeks of ceftriaxone for coverage of both osteomyelitis and pneumonia  - PICC line not recommended given his advanced chroni kidney disease and likely need for future dialysis access of the upper extremities  - will plan to place Port-A-Cath and internal jugular vein tomorrow  - NPO midnight

## 2019-01-01 NOTE — ASSESSMENT & PLAN NOTE
Home furosemide increased from 40 mg to 80 mg BID. Diuresed a lot this admission. Will not need home oxygen.

## 2019-01-01 NOTE — ANESTHESIA PREPROCEDURE EVALUATION
01/01/2019  Domingo Castro is a 73 y.o., male.    Patient Active Problem List   Diagnosis    Type 2 diabetes mellitus with stage 4 chronic kidney disease, with long-term current use of insulin    Essential hypertension    Dyslipidemia    Hypogonadism male    ED (erectile dysfunction)    Severe nonproliferative diabetic retinopathy of both eyes    Diabetic macular edema, right eye    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 2 diabetes mellitus    Hypertensive retinopathy of both eyes    Retinal hole or tear, left    Cervical radiculopathy    Weakness of left arm    Impaired mobility and ADLs    Hemoptysis    Normocytic anemia    Pulmonary nodule    Myogenic ptosis    Peripheral edema    Dyspnea on exertion    Incontinence    Metabolic bone disease    Anemia of chronic renal failure, stage 4 (severe)    Right testicular pain    Testicular mass    Chronic diastolic congestive heart failure    Anginal equivalent    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, with long-term current use of insulin    CKD (chronic kidney disease) stage 4, GFR 15-29 ml/min    Diabetic neuropathy associated with type 2 diabetes mellitus    Diabetic ulcer of toe of left foot with fat layer exposed    Diabetic ulcer of toe of left foot    Acute osteomyelitis of toe, left    Streptococcus pyogenes bacteremia     Past Medical History:   Diagnosis Date    Arthritis     Cataract     CHF (congestive heart failure)     Coronary artery disease     Cystoid macular edema of both eyes     Diabetes mellitus type II     Hyperlipemia     Hypertension     Retinal hole     Stage 4 chronic kidney disease     Streptococcus pyogenes bacteremia 12/23/2018    Due to left toe osteomyelitis        Past Surgical History:   Procedure Laterality Date    BLEPHAROPLASTY Bilateral 2017    Performed [facility]ST [facility]LR    CATARACT EXTRACTION W/  INTRAOCULAR LENS IMPLANT Left 12/15/14    Dr martin    CATARACT EXTRACTION W/  INTRAOCULAR LENS IMPLANT Right 14    dorothy    CIRCUMCISION, NON-      focal laser right eye      INSERTION-INTRAOCULAR LENS (IOL) Right 2014    [facility]    INSERTION-INTRAOCULAR LENS (IOL) Left 12/15/2014    [facility] at [facility] OR    KNEE SURGERY      left    Left medial collateral ligament repair      PHACOEMULSIFICATION-ASPIRATION-CATARACT Right 2014    [facility] at [facility]    PHACOEMULSIFICATION-ASPIRATION-CATARACT Left 12/15/2014    [facility] by [facility] at [facility] OR    REPAIR-PTOSIS/BILATERAL EXTERNAL LEVATORS Bilateral 2017    [facility] at [facility] 1ST FLR    TONSILLECTOMY       Pre-op Assessment    I have reviewed the Patient Summary Reports.     I have reviewed the Nursing Notes.   I have reviewed the Medications.     Review of Systems  Anesthesia Hx:  No problems with previous Anesthesia    Social:  Non-Smoker    Hematology/Oncology:  Hematology Normal   Oncology Normal     Cardiovascular:   Exercise tolerance: poor Hypertension CAD   Angina, with exertion CHF Orthopnea (3+ pillows at night) ABAD ECG has been reviewed.  Functional Capacity low / < 4 METS  Cardiovascular Symptoms: Shortness of Breath Dyspnea On Extertion  Coronary Artery Disease:  Congestive Heart Failure (CHF) (Preserved EF (>55%))  Hypertension    Pulmonary:   Shortness of breath Sleep apnea: Likely, but undiagnosed.    Renal/:   Chronic Renal Disease, CRI    Hepatic/GI:  Hepatic/GI Normal    Neurological:   Neuromuscular Disease,    Endocrine:   Diabetes, poorly controlled, type 2  Diabetes, Type 2 Diabetes , controlled by insulin.       EKG(7/27/18):  Normal sinus rhythm  Left axis deviation , left anterior hemiblock   Abnormal ECG  When compared with ECG of 19-JUL-2018 18:30,  ST changes imp;roved   Confirmed by Vik Mata MD ([id number]) on 7/29/2018 5:04:56 PM    TTE (12/31/18):  · Normal left ventricular systolic function. The estimated ejection fraction is 55%  · Grade I (mild) left ventricular diastolic dysfunction consistent with impaired relaxation.  · Normal central venous pressure (3 mm Hg).  · Septal wall has abnormal motion consistent with left bundle branch block.  · Normal left atrial pressure      Physical Exam  General:  Obesity    Airway/Jaw/Neck:  Airway Findings: Mouth Opening: Normal Tongue: Large  General Airway Assessment: Adult, Possible difficult mask airway  Mallampati: III  Improves to II with phonation.  TM Distance: 4 - 6 cm      Dental:  Dental Findings: In tact    Chest/Lungs:  Chest/Lungs Findings: (scant bibasilar crackles appreciated) Rales, Basilar     Heart/Vascular:  Heart Findings: Rate: Normal  Rhythm: Regular Rhythm  Sounds: S4 Gallop  Vascular Findings:  Edema Locations: BLE  Edema: +1 or +2        Mental Status:  Mental Status Findings:  Cooperative, Alert and Oriented       Lab Results   Component Value Date    WBC 10.08 12/29/2018    HGB 8.1 (L) 12/29/2018    HCT 24.4 (L) 12/29/2018     (L) 12/29/2018    CHOL 126 07/20/2018    TRIG 91 07/20/2018    HDL 31 (L) 07/20/2018    ALT 14 12/23/2018    AST 15 12/23/2018     01/01/2019    K 3.5 01/01/2019     01/01/2019    CREATININE 3.0 (H) 01/01/2019    BUN 61 (H) 01/01/2019    CO2 25 01/01/2019    TSH 1.589 06/13/2017    PSA 4.4 (H) 08/18/2016    INR 1.1 10/27/2016    GLUF 106 04/22/2008    HGBA1C 7.3 (H) 11/28/2018       Anesthesia Plan  Type of Anesthesia, risks & benefits discussed:  Anesthesia Type:  MAC, general, regional  Patient's Preference: Sedation  Intra-op Monitoring Plan: standard ASA monitors  Intra-op Monitoring Plan  Comments:   Post Op Pain Control Plan: per primary service following discharge from PACU  Post Op Pain Control Plan Comments:   Induction:   IV  Beta Blocker:  Patient is on a Beta-Blocker and has received one dose within the past 24 hours (No further documentation required).       Informed Consent: Patient understands risks and agrees with Anesthesia plan.  Questions answered. Anesthesia consent signed with patient.  ASA Score: 3     Day of Surgery Review of History & Physical:    H&P update referred to the surgeon.     Anesthesia Plan Notes: Sedation plan discussed.          Ready For Surgery From Anesthesia Perspective.

## 2019-01-01 NOTE — PLAN OF CARE
Problem: Adult Inpatient Plan of Care  Goal: Plan of Care Review   01/01/19 0341   Plan of Care Review   Plan of Care Reviewed With patient   Pt reported no pain.  Putting out clear yellow urine, wears diaper just incase.  2100 , no coverage. Son at bedside.  Pt & family  very pleasant.     Tele: none.    Bed in lowest position, wheels locked, non skid socks, ID band worn, personal items and call bell with in reach, bed alarm set.

## 2019-01-01 NOTE — ASSESSMENT & PLAN NOTE
Acute osteomyelitis of toe, left  Continue IV ceftriaxone until 2/4/19. Port placement tomorrow then discharge home. Continue wound care at home.

## 2019-01-01 NOTE — SUBJECTIVE & OBJECTIVE
Interval History: Will get port placed tomorrow then go home. Had SpO2 88% while sleeping last night so nasal cannula was placed on him again.    Review of Systems   Cardiovascular: Negative for chest pain and palpitations.   Gastrointestinal: Negative for constipation, nausea and vomiting.     Objective:     Vital Signs (Most Recent):  Temp: 97.9 °F (36.6 °C) (01/01/19 1140)  Pulse: (!) 57 (01/01/19 1140)  Resp: 16 (01/01/19 1140)  BP: (!) 144/89 (01/01/19 1140)  SpO2: 96 % (01/01/19 1138) Vital Signs (24h Range):  Temp:  [97.7 °F (36.5 °C)-98.6 °F (37 °C)] 97.9 °F (36.6 °C)  Pulse:  [56-72] 57  Resp:  [15-18] 16  SpO2:  [87 %-96 %] 96 %  BP: (126-169)/(61-89) 144/89     Weight: 110.3 kg (243 lb 2.7 oz)  Body mass index is 30.39 kg/m².    Intake/Output Summary (Last 24 hours) at 1/1/2019 1417  Last data filed at 1/1/2019 1200  Gross per 24 hour   Intake 305 ml   Output 1100 ml   Net -795 ml      Physical Exam   Constitutional: He is oriented to person, place, and time. He appears well-developed and well-nourished. No distress.   Pulmonary/Chest: Effort normal. No respiratory distress.   Neurological: He is alert and oriented to person, place, and time.   Psychiatric: He has a normal mood and affect. His behavior is normal.   Nursing note and vitals reviewed.      Significant Labs:   CBC:   No results for input(s): WBC, HGB, HCT, PLT in the last 48 hours.  CMP:   Recent Labs   Lab 12/31/18  0607 01/01/19  0420    136   K 3.4* 3.5    102   CO2 25 25   * 117*   BUN 64* 61*   CREATININE 3.3* 3.0*   CALCIUM 9.3 9.2   ANIONGAP 10 9   EGFRNONAA 18* 20*     Magnesium:   Recent Labs   Lab 12/31/18  0607 01/01/19  0420   MG 1.9 1.9     POCT Glucose:   Recent Labs   Lab 12/31/18 2024 01/01/19  0545 01/01/19  1137   POCTGLUCOSE 141* 128* 187*       Significant Imaging: I have reviewed all pertinent imaging results/findings within the past 24 hours.

## 2019-01-02 ENCOUNTER — TELEPHONE (OUTPATIENT)
Dept: PODIATRY | Facility: CLINIC | Age: 74
End: 2019-01-02

## 2019-01-02 ENCOUNTER — ANESTHESIA (OUTPATIENT)
Dept: SURGERY | Facility: HOSPITAL | Age: 74
DRG: 853 | End: 2019-01-02
Payer: MEDICARE

## 2019-01-02 VITALS
TEMPERATURE: 98 F | SYSTOLIC BLOOD PRESSURE: 160 MMHG | HEART RATE: 65 BPM | BODY MASS INDEX: 30.24 KG/M2 | HEIGHT: 75 IN | RESPIRATION RATE: 21 BRPM | OXYGEN SATURATION: 94 % | WEIGHT: 243.19 LBS | DIASTOLIC BLOOD PRESSURE: 71 MMHG

## 2019-01-02 VITALS — HEART RATE: 63 BPM | OXYGEN SATURATION: 97 % | DIASTOLIC BLOOD PRESSURE: 67 MMHG | SYSTOLIC BLOOD PRESSURE: 140 MMHG

## 2019-01-02 PROBLEM — J18.9 PNEUMONIA OF LEFT LOWER LOBE DUE TO INFECTIOUS ORGANISM: Status: ACTIVE | Noted: 2019-01-02

## 2019-01-02 LAB
ANION GAP SERPL CALC-SCNC: 10 MMOL/L
BASOPHILS # BLD AUTO: 0.07 K/UL
BASOPHILS NFR BLD: 0.9 %
BUN SERPL-MCNC: 53 MG/DL
CALCIUM SERPL-MCNC: 9.3 MG/DL
CHLORIDE SERPL-SCNC: 104 MMOL/L
CO2 SERPL-SCNC: 25 MMOL/L
CREAT SERPL-MCNC: 2.7 MG/DL
DIFFERENTIAL METHOD: ABNORMAL
EOSINOPHIL # BLD AUTO: 0.2 K/UL
EOSINOPHIL NFR BLD: 2.2 %
ERYTHROCYTE [DISTWIDTH] IN BLOOD BY AUTOMATED COUNT: 13.5 %
EST. GFR  (AFRICAN AMERICAN): 26 ML/MIN/1.73 M^2
EST. GFR  (NON AFRICAN AMERICAN): 22 ML/MIN/1.73 M^2
GLUCOSE SERPL-MCNC: 100 MG/DL
HCT VFR BLD AUTO: 28.2 %
HGB BLD-MCNC: 9.1 G/DL
LYMPHOCYTES # BLD AUTO: 1.4 K/UL
LYMPHOCYTES NFR BLD: 16.9 %
MAGNESIUM SERPL-MCNC: 2.1 MG/DL
MCH RBC QN AUTO: 27.9 PG
MCHC RBC AUTO-ENTMCNC: 32.3 G/DL
MCV RBC AUTO: 87 FL
MONOCYTES # BLD AUTO: 0.8 K/UL
MONOCYTES NFR BLD: 9.3 %
NEUTROPHILS # BLD AUTO: 5.7 K/UL
NEUTROPHILS NFR BLD: 70.1 %
PHOSPHATE SERPL-MCNC: 3.5 MG/DL
PLATELET # BLD AUTO: 275 K/UL
PMV BLD AUTO: 9.6 FL
POCT GLUCOSE: 111 MG/DL (ref 70–110)
POCT GLUCOSE: 117 MG/DL (ref 70–110)
POTASSIUM SERPL-SCNC: 3.6 MMOL/L
RBC # BLD AUTO: 3.26 M/UL
SODIUM SERPL-SCNC: 139 MMOL/L
WBC # BLD AUTO: 8.16 K/UL

## 2019-01-02 PROCEDURE — 71000033 HC RECOVERY, INTIAL HOUR: Performed by: SURGERY

## 2019-01-02 PROCEDURE — 36561 PR INSERT TUNNELED CV CATH WITH PORT: ICD-10-PCS | Mod: RT,,, | Performed by: SURGERY

## 2019-01-02 PROCEDURE — 36000707: Performed by: SURGERY

## 2019-01-02 PROCEDURE — 25000003 PHARM REV CODE 250: Performed by: HOSPITALIST

## 2019-01-02 PROCEDURE — 80048 BASIC METABOLIC PNL TOTAL CA: CPT

## 2019-01-02 PROCEDURE — 63600175 PHARM REV CODE 636 W HCPCS: Performed by: HOSPITALIST

## 2019-01-02 PROCEDURE — 37000009 HC ANESTHESIA EA ADD 15 MINS: Performed by: SURGERY

## 2019-01-02 PROCEDURE — 25000003 PHARM REV CODE 250: Performed by: NURSE ANESTHETIST, CERTIFIED REGISTERED

## 2019-01-02 PROCEDURE — 27000221 HC OXYGEN, UP TO 24 HOURS

## 2019-01-02 PROCEDURE — 94761 N-INVAS EAR/PLS OXIMETRY MLT: CPT

## 2019-01-02 PROCEDURE — 37000008 HC ANESTHESIA 1ST 15 MINUTES: Performed by: SURGERY

## 2019-01-02 PROCEDURE — 36561 INSERT TUNNELED CV CATH: CPT | Mod: RT,,, | Performed by: SURGERY

## 2019-01-02 PROCEDURE — C1788 PORT, INDWELLING, IMP: HCPCS | Performed by: SURGERY

## 2019-01-02 PROCEDURE — 97116 GAIT TRAINING THERAPY: CPT

## 2019-01-02 PROCEDURE — 77001 CHG FLUOROGUIDE CNTRL VEN ACCESS,PLACE,REPLACE,REMOVE: ICD-10-PCS | Mod: 26,,, | Performed by: SURGERY

## 2019-01-02 PROCEDURE — 84100 ASSAY OF PHOSPHORUS: CPT

## 2019-01-02 PROCEDURE — 77001 FLUOROGUIDE FOR VEIN DEVICE: CPT | Mod: 26,,, | Performed by: SURGERY

## 2019-01-02 PROCEDURE — 25000003 PHARM REV CODE 250: Performed by: SURGERY

## 2019-01-02 PROCEDURE — 94640 AIRWAY INHALATION TREATMENT: CPT

## 2019-01-02 PROCEDURE — 63600175 PHARM REV CODE 636 W HCPCS: Performed by: NURSE ANESTHETIST, CERTIFIED REGISTERED

## 2019-01-02 PROCEDURE — 36000706: Performed by: SURGERY

## 2019-01-02 PROCEDURE — 85025 COMPLETE CBC W/AUTO DIFF WBC: CPT

## 2019-01-02 PROCEDURE — 63600175 PHARM REV CODE 636 W HCPCS: Performed by: SURGERY

## 2019-01-02 PROCEDURE — 25000003 PHARM REV CODE 250: Performed by: PODIATRIST

## 2019-01-02 PROCEDURE — 36415 COLL VENOUS BLD VENIPUNCTURE: CPT

## 2019-01-02 PROCEDURE — 25000242 PHARM REV CODE 250 ALT 637 W/ HCPCS: Performed by: HOSPITALIST

## 2019-01-02 PROCEDURE — 83735 ASSAY OF MAGNESIUM: CPT

## 2019-01-02 DEVICE — PORT POWER CLEAR VIEW: Type: IMPLANTABLE DEVICE | Site: OTHER (ADD COMMENT) | Status: FUNCTIONAL

## 2019-01-02 RX ORDER — MIDAZOLAM HYDROCHLORIDE 1 MG/ML
INJECTION INTRAMUSCULAR; INTRAVENOUS
Status: DISCONTINUED | OUTPATIENT
Start: 2019-01-02 | End: 2019-01-02

## 2019-01-02 RX ORDER — SODIUM CHLORIDE 9 MG/ML
INJECTION, SOLUTION INTRAVENOUS CONTINUOUS PRN
Status: DISCONTINUED | OUTPATIENT
Start: 2019-01-02 | End: 2019-01-02

## 2019-01-02 RX ORDER — BUPIVACAINE HYDROCHLORIDE 2.5 MG/ML
INJECTION, SOLUTION EPIDURAL; INFILTRATION; INTRACAUDAL
Status: DISCONTINUED | OUTPATIENT
Start: 2019-01-02 | End: 2019-01-02 | Stop reason: HOSPADM

## 2019-01-02 RX ORDER — LIDOCAINE HCL/PF 100 MG/5ML
SYRINGE (ML) INTRAVENOUS
Status: DISCONTINUED | OUTPATIENT
Start: 2019-01-02 | End: 2019-01-02

## 2019-01-02 RX ORDER — LIDOCAINE HYDROCHLORIDE AND EPINEPHRINE 10; 10 MG/ML; UG/ML
INJECTION, SOLUTION INFILTRATION; PERINEURAL
Status: DISCONTINUED | OUTPATIENT
Start: 2019-01-02 | End: 2019-01-02 | Stop reason: HOSPADM

## 2019-01-02 RX ORDER — HEPARIN SODIUM 5000 [USP'U]/ML
INJECTION, SOLUTION INTRAVENOUS; SUBCUTANEOUS
Status: DISCONTINUED | OUTPATIENT
Start: 2019-01-02 | End: 2019-01-02 | Stop reason: HOSPADM

## 2019-01-02 RX ORDER — PROPOFOL 10 MG/ML
VIAL (ML) INTRAVENOUS CONTINUOUS PRN
Status: DISCONTINUED | OUTPATIENT
Start: 2019-01-02 | End: 2019-01-02

## 2019-01-02 RX ADMIN — IPRATROPIUM BROMIDE AND ALBUTEROL SULFATE 3 ML: .5; 3 SOLUTION RESPIRATORY (INHALATION) at 07:01

## 2019-01-02 RX ADMIN — MIDAZOLAM HYDROCHLORIDE 0.5 MG: 1 INJECTION, SOLUTION INTRAMUSCULAR; INTRAVENOUS at 08:01

## 2019-01-02 RX ADMIN — SODIUM CHLORIDE: 0.9 INJECTION, SOLUTION INTRAVENOUS at 08:01

## 2019-01-02 RX ADMIN — AMLODIPINE BESYLATE 10 MG: 5 TABLET ORAL at 11:01

## 2019-01-02 RX ADMIN — PROPOFOL 50 MCG/KG/MIN: 10 INJECTION, EMULSION INTRAVENOUS at 08:01

## 2019-01-02 RX ADMIN — Medication: at 01:01

## 2019-01-02 RX ADMIN — ASPIRIN 81 MG: 81 TABLET, COATED ORAL at 11:01

## 2019-01-02 RX ADMIN — FUROSEMIDE 80 MG: 40 TABLET ORAL at 11:01

## 2019-01-02 RX ADMIN — ROSUVASTATIN CALCIUM 20 MG: 10 TABLET, FILM COATED ORAL at 11:01

## 2019-01-02 RX ADMIN — IPRATROPIUM BROMIDE AND ALBUTEROL SULFATE 3 ML: .5; 3 SOLUTION RESPIRATORY (INHALATION) at 04:01

## 2019-01-02 RX ADMIN — GABAPENTIN 100 MG: 100 CAPSULE ORAL at 11:01

## 2019-01-02 RX ADMIN — CEFTRIAXONE 2 G: 2 INJECTION, POWDER, FOR SOLUTION INTRAMUSCULAR; INTRAVENOUS at 08:01

## 2019-01-02 RX ADMIN — IPRATROPIUM BROMIDE AND ALBUTEROL SULFATE 3 ML: .5; 3 SOLUTION RESPIRATORY (INHALATION) at 12:01

## 2019-01-02 RX ADMIN — INSULIN ASPART 10 UNITS: 100 INJECTION, SOLUTION INTRAVENOUS; SUBCUTANEOUS at 01:01

## 2019-01-02 RX ADMIN — LIDOCAINE HYDROCHLORIDE 75 MG: 20 INJECTION, SOLUTION INTRAVENOUS at 08:01

## 2019-01-02 NOTE — PLAN OF CARE
Problem: Adult Inpatient Plan of Care  Goal: Plan of Care Review  Outcome: Ongoing (interventions implemented as appropriate)  Plan of care reviewed with patient. Verbal reported of understanding. AAOx4. Patient not on heart monitoring. Blood glucose closely monitor. Insuline given per ordered. No falls or injuries noted. Frequently repositioning encouraged. Due medications given per ordered. Educated to call for help. Call light within reach. No acute distress throughout the shift noted. O2 1LPM NC. O2 sat 94%.  Bed on lowest position with alarm set. Head of bed elevated.Side rails x 2 are up. Patient will be monitored over night.

## 2019-01-02 NOTE — OP NOTE
DATE OF PROCEDURE: 01/02/2019    PREOPERATIVE DIAGNOSIS:  Streptococcus bacteremia with left lower lobe pneumonia and left great toe osteomyelitis    POSTOPERATIVE DIAGNOSIS:  Same     PROCEDURE:  Right internal jugular Port-A-Cath Placement    SURGEON: Denys Aleman M.D.    ASSISTANT: None    ANESTHESIA:  Local mac    PREP: Chlorhexidine    ESTIMATED BLOOD LOSS:  5 cc    INDICATION:  73-year-old man developed Streptococcus bacteremia was found have left lower pneumonia and osteomyelitis left great toe.  Need 6 weeks of IV antibiotics to treat these infectious diseases.  Due to his chronic kidney disease and anticipated need for future dialysis access, a PICC line was contraindicated.  Surgery is consulted for port placement for long-term IV antibiotic the access.    FINDINGS:  1.  IJ accessed using ultrasound with return of dark red nonpulsatile blood.  2.  Wire easily passed and confirmed in the venous system on fluoro  3.  Catheter tip positioned at the atrial caval junction under fluoro  4.  Port aspirated and flushed with ease.    PROCEDURE IN DETAIL:  The patient was identified in the preoperative unit and taken back to the operating room and laid supine on the operating room table. IV antibiotics were administered prior to the start of anesthesia. MAC anesthesia was administered without complication. The patient was then prepped and draped in the standard sterile fashion. The ultrasound was used to identify the right internal jugular vein. The patient was placed into the Trendelenburg position and an 18g needle was used to access the vein under ultrasound guidance. The wire was advanced without issue under fluoroscopic guidance. We turned our attention to creation of the port pocket in the right upper chest. Local anesthetic was injected into the right upper chest in the location of the port pocket. A 4cm incision was made sharpley and the subcutaneous tissue was dissected until an appropriate sized  pocket was created to accommodate the port. Once we were satisfied with the pocket, the catheter was flushed and tunneled from the neck to the chest incision after injecting local anesthetic along the path. Under fluoroscopic guidance the dilator and sheath were placed over the wire using Seldinger technique. The dilator and wire were removed and the catheter was introduced and fed into the sheath as the sheath was peeled away. Fluoro was used to confirm the position of the catheter in the atriocaval junction and confirm that the catheter was not kinked. The catheter was then cut to size and attached to the port which was then placed in the chest wall pocket. The port was then access and easily aspirated blood. It was then flushed with injectable saline. Hemostasis was assured and the dermis of the port pocket was re-approximated with 3-0 vicryl suture in an running fashion. The skin was re-approximated using 4-0 Vicryl suture in a running fashion. Dermabond was then applied. The patient was awakened from anesthesia without complication and returned to the postoperative recovery unit in stable condition. At the end of the case, sponge, instrument, and needle counts were correct and hemostasis was achieved. I was present and scrubbed throughout the entirety of the case. A post-operative CXR will be obtained to confirm appropriate catheter location and to rule out pneumothorax.     COMPLICATIONS: None    CONDITION:  Stable    DISPO: To PACU then home after CXR    Denys Aleman Jr

## 2019-01-02 NOTE — PLAN OF CARE
Discharge order noted. Pt set up with [facility], [place] spoke with  Radha regarding previous acceptance on 12/31 and that pt is discharging today from hospital, RW delivered to bedside by TN, pt set up [facility] home infusion, per pt's wife, medication to be delivered to pt's house.    Discharge rounds on patient. Discussed followup appointments, blue discharge folder, discharge nurse will go over home medications and reasons for medications and final discharge instructions. All patient/caregiver questions answered. Patient verbalized understanding.         01/02/19 1533   Final Note   Assessment Type Final Discharge Note   Anticipated Discharge Disposition Home-Health   What phone number can be called within the next 1-3 days to see how you are doing after discharge? [phone]   Hospital Follow Up  Appt(s) scheduled? Yes   Discharge plans and expectations educations in teach back method with documentation complete? Yes   Right Care Referral Info   Post Acute Recommendation Home-care   Concepcion Sawant RN-BC  Transitional Navigator  [phone]

## 2019-01-02 NOTE — PT/OT/SLP PROGRESS
Occupational Therapy  Visit Attempt    Patient Name:  Domingo Castro   MRN:  [id number]    Patient not seen today secondary to NAVEEN in OR for cath placement. Will follow-up later.    2nd attempt, patient with PCT and TN discussing home IV meds. Will attempt again as time permits.    PERI Ordonez  1/2/2019

## 2019-01-02 NOTE — TRANSFER OF CARE
"Anesthesia Transfer of Care Note    Patient: Domingo Castro    Procedure(s) Performed: Procedure(s) (LRB):  [id number] (Right)    Patient location: PACU    Anesthesia Type: MAC    Transport from OR: Transported from OR on room air with adequate spontaneous ventilation    Post pain: adequate analgesia    Post assessment: no apparent anesthetic complications    Post vital signs: stable    Level of consciousness: awake    Nausea/Vomiting: no nausea/vomiting    Complications: none    Transfer of care protocol was followed      Last vitals:   Visit Vitals  BP (!) 168/76 (Patient Position: Lying)   Pulse 63   Temp 36.6 °C (97.8 °F) (Oral)   Resp 18   Ht 6' 3" (1.905 m)   Wt 110.3 kg (243 lb 2.7 oz)   SpO2 95%   BMI 30.39 kg/m²     "

## 2019-01-02 NOTE — PLAN OF CARE
Problem: Adult Inpatient Plan of Care  Goal: Plan of Care Review  Outcome: Ongoing (interventions implemented as appropriate)  Pt on oxygen in no apparent distress.  Breathing tx. Given with ok pt. Effort.  Will cont. To monitor.

## 2019-01-02 NOTE — PLAN OF CARE
01/02/19 1018   Medicare Message   Important Message from Medicare regarding Discharge Appeal Rights Given to patient/caregiver;Explained to patient/caregiver;Signed/date by patient/caregiver   Date IMM was signed 01/02/19   Time IMM was signed 1018

## 2019-01-02 NOTE — PLAN OF CARE
Problem: Fall Injury Risk  Goal: Absence of Fall and Fall-Related Injury  Outcome: Ongoing (interventions implemented as appropriate)  Bed in low position and locked. Alarms on and audible. Call light within reach. Education given on preventing falls and using call light. Undersatnding verbalized. Wife at bedside throughout night shift

## 2019-01-02 NOTE — PT/OT/SLP PROGRESS
Physical Therapy Treatment    Patient Name:  Domingo Castro   MRN:  [id number]    Recommendations:     Discharge Recommendations:  ( PT)   Discharge Equipment Recommendations: walker, rolling   Barriers to discharge: decreased endurance and strength    Assessment:     Domingo Castro is a 73 y.o. male admitted with a medical diagnosis of Streptococcal bacteremia.  He presents with the following impairments/functional limitations:  weakness, impaired endurance, impaired functional mobilty, gait instability, impaired balance, impaired coordination, impaired skin,pt with improving mobility and strength,pt will benefit from HH services upon discharge.    Rehab Prognosis: Good; patient would benefit from acute skilled PT services to address these deficits and reach maximum level of function.    Recent Surgery: Procedure(s) (LRB):  [id number] (Right) Day of Surgery    Plan:     During this hospitalization, patient to be seen 6 x/week to address the identified rehab impairments via gait training, therapeutic activities and progress toward the following goals:    · Plan of Care Expires:  01/27/19    Subjective     Chief Complaint: n/a  Patient/Family Comments/goals: pt ready to go home  Pain/Comfort:  · Pain Rating 1: 0/10      Objective:     Communicated with nsg prior to session.  Patient found all lines intact, call button in reach, nsg notified and spouse present telemetry  upon PT entry to room.     General Precautions: Standard, fall   Orthopedic Precautions:N/A   Braces: N/A     Functional Mobility:  · Bed Mobility:     · Supine to Sit: modified independence  · Transfers:     · Sit to Stand:  modified independence with rolling walker  · Gait: amb ~200' X 1 with RW and S  · Balance: good standing balance with RW      AM-PAC 6 CLICK MOBILITY  Turning over in bed (including adjusting bedclothes, sheets and blankets)?: 4  Sitting down on and standing up from a chair with arms (e.g., wheelchair, bedside commode,  etc.): 4  Moving from lying on back to sitting on the side of the bed?: 4  Moving to and from a bed to a chair (including a wheelchair)?: 3  Need to walk in hospital room?: 3  Climbing 3-5 steps with a railing?: 3  Basic Mobility Total Score: 21       Therapeutic Activities and Exercises:       Patient left EOB with all lines intact, call button in reach, nsg notified and spouse present..    GOALS: see general POC  Multidisciplinary Problems     Physical Therapy Goals        Problem: Physical Therapy Goal    Goal Priority Disciplines Outcome Goal Variances Interventions   Physical Therapy Goal     PT, PT/OT Ongoing (interventions implemented as appropriate)     Description:  Goals to be met by: 2019     Patient will increase functional independence with mobility by performin. Supine to sit with Modified [place] MET 19  2. Sit to stand transfer with Modified [place] MET 19  3. Bed to chair transfer with Stand-by Assistance using Rolling Walker  4. Gait  x 50 feet with Stand-by Assistance using Rolling Walker. MET 19                       Time Tracking:     PT Received On: 19  PT Start Time: 1408     PT Stop Time: 1424  PT Total Time (min): 16 min     Billable Minutes: Gait Training 16    Treatment Type: Treatment  PT/PTA: PTA     PTA Visit Number: 1     Enrique Mcnamara PTA  2019

## 2019-01-02 NOTE — DISCHARGE INSTRUCTIONS
Adult, Pneumonia (English) View Edit Remove   Diabetes, Diet (English) View Edit Remove   Vascular Access Port Implantation (English) View Edit Remove   Ceftriaxone injection (English) View Edit Remove

## 2019-01-02 NOTE — ANESTHESIA POSTPROCEDURE EVALUATION
"Anesthesia Post Evaluation    Patient: Domingo Castro    Procedure(s) Performed: Procedure(s) (LRB):  [id number] (Right)    Final Anesthesia Type: MAC  Patient location during evaluation: PACU  Patient participation: Yes- Able to Participate  Level of consciousness: awake and alert  Post-procedure vital signs: reviewed and stable  Pain management: adequate  Airway patency: patent  PONV status at discharge: No PONV  Anesthetic complications: no      Cardiovascular status: blood pressure returned to baseline  Respiratory status: unassisted  Hydration status: euvolemic          Visit Vitals  BP (!) 163/84   Pulse 62   Temp 36.8 °C (98.2 °F) (Oral)   Resp 19   Ht 6' 3" (1.905 m)   Wt 110.3 kg (243 lb 2.7 oz)   SpO2 96%   BMI 30.39 kg/m²       Pain/Ashli Score: No Data Recorded      "

## 2019-01-02 NOTE — PLAN OF CARE
TN delivered RW to pt's bedside per Miguelina's approval from [facility] to pull from DME closet, educational handout given to pt.       01/02/19 1532   Post-Acute Status   Post-Acute Authorization E   [facility] Status Set-up Complete

## 2019-01-02 NOTE — PLAN OF CARE
Problem: Physical Therapy Goal  Goal: Physical Therapy Goal  Goals to be met by: 2019     Patient will increase functional independence with mobility by performin. Supine to sit with Modified [place] MET 19  2. Sit to stand transfer with Modified [place] MET 19  3. Bed to chair transfer with Stand-by Assistance using Rolling Walker  4. Gait  x 50 feet with Stand-by Assistance using Rolling Walker. MET 19     Outcome: Ongoing (interventions implemented as appropriate)  Goals 1,2 and 4 met

## 2019-01-03 ENCOUNTER — PATIENT OUTREACH (OUTPATIENT)
Dept: ADMINISTRATIVE | Facility: CLINIC | Age: 74
End: 2019-01-03

## 2019-01-03 DIAGNOSIS — M86.9 OSTEOMYELITIS OF LEFT FOOT, UNSPECIFIED TYPE: Primary | ICD-10-CM

## 2019-01-03 NOTE — PT/OT/SLP DISCHARGE
Physical Therapy Discharge Summary    Name: Domingo Castro  MRN: [id number]   Principal Problem: Streptococcal bacteremia     Patient Discharged from acute Physical Therapy on 2018.  Please refer to prior PT noted date on 2018 for functional status.     Assessment:     Patient appropriate for care in another setting.    Objective:     GOALS:   Multidisciplinary Problems     Physical Therapy Goals     Not on file          Multidisciplinary Problems (Resolved)        Problem: Physical Therapy Goal    Goal Priority Disciplines Outcome Goal Variances Interventions   Physical Therapy Goal   (Resolved)     PT, PT/OT Outcome(s) achieved     Description:  Goals to be met by: 2019     Patient will increase functional independence with mobility by performin. Supine to sit with Modified [place] MET 19  2. Sit to stand transfer with Modified [place] MET 19  3. Bed to chair transfer with Stand-by Assistance using Rolling Walker  4. Gait  x 50 feet with Stand-by Assistance using Rolling Walker. MET 19                       Reasons for Discontinuation of Therapy Services  Transfer to alternate level of care.      Plan:     Patient Discharged to: Home with Home Health Service.    Endy Thomas, PT  1/3/2019

## 2019-01-03 NOTE — PATIENT INSTRUCTIONS
Discharge Instructions: Caring for Your Central Line  You are going home with a central line. Its also called a central venous access device (CVAD) or central venous catheter (CVC). A small, soft tube (catheter) has been put in a vein that leads to your heart. This provides medicine during your treatment. It is taken out when you no longer need it. At home, you need to take care of your central line to keep it working. A central line has a high infection risk. So you must take extra care washing your hands and preventing the spread of germs. This sheet will help you remember what to do at home.  Understanding your role  · A nurse or other healthcare provider will teach you and your caregivers how to care for the central line. Before leaving the hospital, make sure you understand what to do at home, how long you may need the central line, and when to have a follow-up visit.  · You will likely be told to flush the central line with saline or heparin solution. You may also be told to change the catheters injection caps and change the bandage (dressing). Or, a nurse may do this for you during a follow-up visit. Only do these things if youre told to do so. Follow the instructions you were given.  Protecting the central line  If the central line gets damaged, it wont work right and could raise your chance of infection. Call your healthcare team right away if any damage occurs. To protect the central line at home:  · Prevent infection. Use good hand hygiene by following the guidelines on this sheet. Dont touch the catheter or dressing unless you need to. And always clean your hands before and after you come in contact with any part of the central line. Your caregivers, family members, and any visitors should use good hand hygiene, too.  · Keep the central line dry. The catheter and dressing must stay dry. Dont take baths, go swimming, use a hot tub, or do other activities that could get the central line wet. Take a  sponge bath to avoid getting the central line wet, unless your healthcare provider tells you otherwise. Ask your provider about the best way to keep the line dry when bathing or showering. If the dressing does get wet, change it only if you have been shown how. Otherwise, call your healthcare team right away for help.  · Avoid damage. Dont use any sharp or pointy objects around the catheter. This includes scissors, pins, knives, razors, or anything else that could cut it or put a hole in it (puncture it). Also, dont let anything pull or rub on the catheter, such as clothing.  · Watch for signs of problems. Pay attention to how much of the catheter sticks out from your skin. If this changes at all, let your healthcare provider know. Also watch for cracks, leaks, or other damage. If the dressing becomes dirty, loose, or wet, change it (if you have been instructed to). Or call your healthcare team right away.  · Avoid lowering your chest below your waist. This includes bending at the waist to do things like tying your shoes. When your chest is below your waist, especially for a long time, the catheters internal tip could slip out of place in the vein.  · Tell your healthcare team if you vomit or have severe coughing. This can also make the catheter slip out of place.  Risk of blood clot  If a blood clot forms it can block blood flow through the vein where the catheter is placed. Signs of a blood clot include pain or swelling in your neck, face, chest or arm. If you have any of these symptoms, call your healthcare provider right away. You may need an ultrasound exam to find the blood clot. You may also be treated with a blood thinner.    Prevent infection with good hand hygiene  A central line can let germs into your body. This can lead to serious and sometimes deadly infections. To prevent infection, its very important that you, your caregivers, and others around you use good hand hygiene. This means washing your  hands well with soap and water, and cleaning them with alcohol-based hand gel as directed. Never touch the central line or dressing without first using one of these methods.  To wash your hands with soap and water:  1. Wet your hands with warm water. (Avoid hot water. It can cause skin irritation when you wash your hands often.)  2. Apply enough soap to cover the entire surface of your hands, including your fingers.  3. Rub your hands together briskly for at least 15 seconds. Make sure to rub the front and back of each hand up to the wrist, your fingers and fingernails, between the fingers, and each thumb.  4. Rinse your hands with warm water.  5. Dry your hands completely with a new, unused paper towel. Dont use a cloth towel or other reusable towel. These can harbor germs.  6. Use the paper towel to turn off the faucet, then throw it away. If youre in a bathroom, also use a paper towel to open the door instead of touching the handle.  When you dont have access to soap and water: Use alcohol-based hand gel to clean your hands. The gel should have at least 60% alcohol. Follow the instructions on the package. Your healthcare team can answer any questions you have about when to use hand gel, or when its better to wash with soap and water.   When to seek medical care  Call your healthcare provider right away if you have any of the following:  · Pain or burning in your shoulder, chest, back, arm, or leg  · Fever of 100.4°F (38.0°C) or higher  · Chills  · Signs of infection at the catheter site (pain, redness, drainage, burning, or stinging)  · Coughing, wheezing, or shortness of breath  · A racing or irregular heartbeat  · Muscle stiffness or trouble moving  · Gurgling noises coming from the catheter  · The catheter falls out, breaks, cracks, leaks, or has other damage   Date Last Reviewed: 7/1/2016  © 1317-2237 [employer]. [address] [place] [address], [place] [address]. All rights reserved. This  information is not intended as a substitute for professional medical care. Always follow your healthcare professional's instructions.

## 2019-01-07 NOTE — DISCHARGE SUMMARY
[facility] Hospital Medicine  Discharge Summary      Patient Name: Domingo Castro  MRN: [id number]  Admission Date: 12/23/2018  Hospital Length of Stay: 10 days  Discharge Date and Time: 1/2/2019  5:42 PM  Attending Physician: Iam Ibarra MD   Discharging Provider: Iam Ibarra MD  Primary Care Provider: Griselda Nugent MD      HPI:   Domingo Castro is a 73 y.o. black man with hypertension, diabetes mellitus type 2 with neuropathy and peripheral vascular disease (treated with insulin), coronary artery disease, hyperlipidemia, chronic diastolic congestive heart failure, chronic kidney disease stage 4, anemia. He lives in [place], [place]. He is . He is a . His primary care physician is Dr. Griselda Nugent. His nephrologist is Dr. Nely Watson. His podiatrist is Dr. Dimitry Gallegos. His cardiologist is Dr. Vandana Tovar.   He presented to [facility] Emergency Department on 12/23/18 with shaking and chills that began in the afternoon. 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 He went to sleep after [employer] as he usually did and woke up feeling cold. He had some cough a week prior and a little cough on the way to the ED. He also had some nausea with decreased oral intake over the past day, and was lightheaded earlier in the day. He was found to have a fever of 103.2° Fahrenheit and oxygen saturation of 90% in the ED. Chest X-ray showed a subtle focal airspace opacity in the left lower lung zone. He was given piperacillin-tazobactam and vancomycin in the ED.     Procedure(s) (LRB):  [id number] (Right)      Hospital Course:   He was transitioned to ceftriaxone, azithromycin, and vancomycin. Blood cultures grew Streptococcus pyogenes. Despite antibiotics and negative fluid balance, his hypoxia worsened. Chest CT showed bilateral pleural effusions and atelectasis. Podiatry evaluated  his wound on the left great toe and did not feel that it was source of bacteremia. Wound culture grew Streptococcus pyogenes. Infectious Disease was consulted and recommended MRI of the left foot, which showed early osteomyelitis. On 12/27/18, it was found that nursing staff had not been allowing him to get out of bed. BNP had risen to 424. His home furosemide had been held since admission, so he was put on IV furosemide. Physical and Occupational Therapy were consulted to get him mobilized and recommended skilled nursing facility placement. On 12/28/18, he revealed that he had not had a bowel movement since prior to admission, so he was given polyethylene glycol and lactulose with excellent results. Dr. Gallegos performed left 1st toe excisional debridement. Hypoxia progressively improved with diuretics and mobilization. Infectious Disease recommended continuing ceftriaxone for 6 weeks (end date 2/4/19). He was weaned to room air by 12/31/18 with oxygen saturation of 87%. Due to chronic kidney disease, Nephrology did not approve a PICC so General Surgery was consulted for port placement. His home furosemide was increased from 40 mg to 80 mg twice daily. He was prescribed home health for wound care, antibiotic administration, physical therapy, and occupational therapy. Port was placed on day of discharge.      Consults:   Consults (From admission, onward)        Status Ordering Provider     Inpatient consult to General Surgery  Once     Provider:  Denys Aleman Jr., MD    Completed ALIS BARAKAT     Inpatient consult to Infectious Diseases  Once     Provider:  Maria Guadalupe Bruner MD    Completed LORETA PRETTY     Inpatient consult to [facility] ([facility] [place] [place])  Once     Provider:  (Not yet assigned)    Completed ALIS BARAKAT     Inpatient consult to Podiatry  Once     Provider:  Dimitry Gallegos DPM    Completed LORETA PRETTY          No new Assessment & Plan notes  have been filed under this hospital service since the last note was generated.  Service: Hospital Medicine    Final Active Diagnoses:    Diagnosis Date Noted POA    PRINCIPAL PROBLEM:  Streptococcus pyogenes bacteremia [R78.81, B95.5] 12/23/2018 Yes    Pneumonia of left lower lobe due to infectious organism [J18.1] 01/02/2019 Yes    Acute osteomyelitis of toe, left [M86.172]  Yes    Diabetic ulcer of toe of left foot with fat layer exposed [E11.621, L97.522] 12/24/2018 Yes    Diabetic ulcer of toe of left foot [E11.621, L97.529] 12/24/2018 Yes    CKD (chronic kidney disease) stage 4, GFR 15-29 ml/min [N18.4] 12/23/2018 Yes     Chronic    Diabetic neuropathy associated with type 2 diabetes mellitus [E11.40] 12/23/2018 Yes     Chronic    Chronic diastolic congestive heart failure [I50.32] 12/03/2018 Yes     Chronic    Metabolic bone disease [E88.9, M90.80] 06/14/2018 Yes    Anemia of chronic renal failure, stage 4 (severe) [N18.4, D63.1] 06/14/2018 Yes     Chronic    Type 2 diabetes mellitus with stage 4 chronic kidney disease, with long-term current use of insulin [E11.22, N18.4, Z79.4] 01/10/2013 Not Applicable     Chronic    Dyslipidemia [E78.5] 01/10/2013 Yes     Chronic    Essential hypertension [I10] 01/10/2013 Yes     Chronic      Problems Resolved During this Admission:    Diagnosis Date Noted Date Resolved POA    Acute constipation [K59.00] 12/28/2018 12/29/2018 Yes    Hyponatremia [E87.1] 12/25/2018 12/31/2018 No    Leukocytosis [D72.829] 12/25/2018 12/27/2018 Yes    Hypokalemia [E87.6] 12/24/2018 12/25/2018 No    Thrombocytopenia, unspecified [D69.6] 12/24/2018 12/31/2018 Yes    Septicemia due to group A Streptococcus [A40.0] 12/24/2018 12/31/2018 Yes    Pneumonia of left lower lobe due to infectious organism [J18.1] 12/23/2018 12/27/2018 Yes    Acute respiratory failure with hypoxia [J96.01] 12/23/2018 12/31/2018 Yes       Discharged Condition: good    Disposition: Home-Health Care  "[facility]    Follow Up:  Follow-up Information     Griselda Nugent MD. Go on 1/14/2019.    Specialty:  Internal Medicine  Why:  time: 10:40 Hospital follow up  Contact information:  [address]  [place] [address]  [phone]             [facility] - Infectious Disease. Go on 1/16/2019.    Specialty:  Infectious Diseases  Why:  time: 2:30pm   Contact information:  [address], [address]  [facility] [address] [phone]           [facility].    Specialties:  DME Provider, Home Health Services  Contact information:  [address]  [address]  [place] [id number] [phone]             [facility]    Specialty:  DME Provider  Contact information:  [address]  [address]  [place] [address]  [phone]             [facility].    Specialties:  Pharmacist, DME Provider, IV Infusion  Contact information:  [address] [place] [address]  [phone]                 Patient Instructions:      WALKER FOR HOME USE     Order Specific Question Answer Comments   Type of Walker: Adult (5'4"-6'6")    With wheels? Yes    Height: 6' 3" (1.905 m)    Weight: 111.2 kg (245 lb 2.4 oz)    Length of need (1-99 months): 99    Does patient have medical equipment at home? none    Please check all that apply: Patient's condition impairs ambulation.      Ambulatory referral to Home Health   Referral Priority: Routine Referral Type: Home Health   Referral Reason: Specialty Services Required   Requested Specialty: Home Health Services   Number of Visits Requested: 1     Ambulatory Referral to Infectious Disease   Referral Priority: Routine Referral Type: Consultation   Referral Reason: Specialty Services Required   Requested Specialty: Infectious Diseases   Number of Visits Requested: 1     Diet diabetic   Order Comments: Low sodium 2 grams daily     Notify your health care provider if you experience any of the following:  redness, tenderness, or signs of " infection (pain, swelling, redness, odor or green/yellow discharge around incision site)     Notify your health care provider if you experience any of the following:  difficulty breathing or increased cough     Change dressing (specify)   Order Comments: Cleanse left great toe with wound cleanser. Apply iodosorb to wound and cover with aquacel foam dressing. Every Monday, Wednesday, and Friday.     Activity as tolerated       Significant Diagnostic Studies: Labs:   Lab Results   Component Value Date    WBC 8.16 01/02/2019    HGB 9.1 (L) 01/02/2019    HCT 28.2 (L) 01/02/2019    MCV 87 01/02/2019     01/02/2019     BMP  Lab Results   Component Value Date     01/02/2019    K 3.6 01/02/2019     01/02/2019    CO2 25 01/02/2019    BUN 53 (H) 01/02/2019    CREATININE 2.7 (H) 01/02/2019    CALCIUM 9.3 01/02/2019    ANIONGAP 10 01/02/2019    ESTGFRAFRICA 26 (A) 01/02/2019    EGFRNONAA 22 (A) 01/02/2019       Microbiology:   Blood Culture   Lab Results   Component Value Date    LABBLOO No growth after 5 days. 12/26/2018     Radiology:   Imaging Results          X-Ray Foot Complete Left (Final result)  Result time 12/23/18 19:25:42    Final result by Lilliana Rasheed MD (12/23/18 19:25:42)                 Impression:      See above.      Electronically signed by: Lilliana Rasheed MD  Date:    12/23/2018  Time:    19:25             Narrative:    EXAMINATION:  XR FOOT COMPLETE 3 VIEW LEFT    CLINICAL HISTORY:  .  Pain in left foot    TECHNIQUE:  AP, lateral and oblique views of the left foot were performed.    COMPARISON:  None.    FINDINGS:  Nonspecific irregularity and lucency is seen at the distal aspect of the great toe distal phalanx.  Uncertain if this may represent chronic finding versus potential acute or chronic infectious process.  Clinical correlation is needed.    No evidence of acute displaced fracture or dislocation.  Chronic change possibly related to remote injury is seen at the base of the 5th  metatarsal.  Os peroneum is also noted.  Lisfranc articulation is congruent.  There is mild plantar calcaneal spurring. Mild degenerative spurring is noted in the midfoot. Mild soft tissue swelling is seen over the dorsum of the foot.  No radiopaque retained foreign body seen.                               X-Ray Chest AP Portable (Final result)  Result time 12/23/18 18:07:31    Final result by Lilliana Rasheed MD (12/23/18 18:07:31)                 Impression:      Subtle focal airspace opacity in the left lower lung zone could reflect aspiration or developing pneumonia in the right clinical setting.  No focal consolidation seen.      Electronically signed by: Lilliana Rasheed MD  Date:    12/23/2018  Time:    18:07             Narrative:    EXAMINATION:  XR CHEST AP PORTABLE    CLINICAL HISTORY:  Sepsis;    TECHNIQUE:  Single frontal view of the chest was performed.    COMPARISON:  07/19/2018.    FINDINGS:  Heart is mildly enlarged but stable in size.  Lungs are symmetrically expanded.  Mild asymmetric airspace opacity is seen in the left lung base.  No evidence of consolidation.  No evidence of pneumothorax or significant effusion.  No acute osseous abnormality identified.                                  Pending Diagnostic Studies:     None         Medications:  Reconciled Home Medications:      Medication List      START taking these medications    cadexomer iodine 0.9 % gel  Commonly known as:  IODOSORB  Apply topically every Mon, Wed, Fri.     cefTRIAXone 2 g in dextrose 5 % 50 mL 2 g/50 mL Pgbk IVPB  Commonly known as:  ROCEPHIN  Inject 50 mLs (2 g total) into the vein once daily. End date 2/4/19.        CHANGE how you take these medications    furosemide 40 MG tablet  Commonly known as:  LASIX  Take 2 tablets (80 mg total) by mouth 2 (two) times daily. Do not take the pm dose after 3 pm  What changed:  how much to take        CONTINUE taking these medications    acetaminophen 325 MG tablet  Commonly known as:  " TYLENOL  Take 2 tablets (650 mg total) by mouth every 4 (four) hours as needed.     amLODIPine 10 MG tablet  Commonly known as:  NORVASC  Take 1 tablet (10 mg total) by mouth once daily.     aspirin 81 MG EC tablet  Commonly known as:  ECOTRIN  Take 1 tablet (81 mg total) by mouth once daily.     carvedilol 25 MG tablet  Commonly known as:  COREG  Take 1 tablet (25 mg total) by mouth 2 (two) times daily with meals.     gabapentin 100 MG capsule  Commonly known as:  NEURONTIN  TAKE ONE CAPSULE BY MOUTH IN THE MORNING THEN ONE CAPSULE  IN THE EVENING AND THEN THREE CAPSULES AT BEDTIME     hydrALAZINE 50 MG tablet  Commonly known as:  APRESOLINE  TAKE ONE TABLET BY MOUTH EVERY 12 HOURS     insulin lispro 100 unit/mL pen  Commonly known as:  HumaLOG KwikPen Insulin  INJECT 10 UNITS SUBCUTANEOUSLY THREE TIMES DAILY BEFORE MEALS WITH CORRECTION SCALE, MAX TDD 50 UNITS     LEVEMIR FLEXTOUCH U-100 INSULN 100 unit/mL (3 mL) Inpn pen  Generic drug:  insulin detemir U-100  Inject 26 Units into the skin every evening.     pen needle, diabetic, safety 29 gauge x 5/16" Ndle  Commonly known as:  BD AUTOSHIELD PEN NEEDLE  For use once daily with levemir flexpen     rosuvastatin 20 MG tablet  Commonly known as:  CRESTOR  Take 20 mg by mouth once daily.            Indwelling Lines/Drains at time of discharge:   Lines/Drains/Airways     Airway                 Airway - Non-Surgical 01/02/19 0833 Mask 4 days                Time spent on the discharge of patient: 40 minutes  Patient was seen and examined on the date of discharge and determined to be suitable for discharge.         Iam Ibarra MD  Department of Hospital Medicine  [facility]  "

## 2019-01-14 ENCOUNTER — TELEPHONE (OUTPATIENT)
Dept: OPHTHALMOLOGY | Facility: CLINIC | Age: 74
End: 2019-01-14

## 2019-01-14 ENCOUNTER — CLINICAL SUPPORT (OUTPATIENT)
Dept: CARDIOLOGY | Facility: CLINIC | Age: 74
End: 2019-01-14
Attending: INTERNAL MEDICINE
Payer: MEDICARE

## 2019-01-14 ENCOUNTER — OFFICE VISIT (OUTPATIENT)
Dept: INTERNAL MEDICINE | Facility: CLINIC | Age: 74
End: 2019-01-14
Payer: MEDICARE

## 2019-01-14 VITALS
RESPIRATION RATE: 20 BRPM | BODY MASS INDEX: 29.2 KG/M2 | TEMPERATURE: 99 F | DIASTOLIC BLOOD PRESSURE: 60 MMHG | HEIGHT: 75 IN | SYSTOLIC BLOOD PRESSURE: 100 MMHG | WEIGHT: 234.81 LBS

## 2019-01-14 DIAGNOSIS — I10 HTN (HYPERTENSION), BENIGN: Primary | ICD-10-CM

## 2019-01-14 DIAGNOSIS — N18.4 CKD (CHRONIC KIDNEY DISEASE) STAGE 4, GFR 15-29 ML/MIN: ICD-10-CM

## 2019-01-14 DIAGNOSIS — R60.9 EDEMA, UNSPECIFIED TYPE: ICD-10-CM

## 2019-01-14 DIAGNOSIS — M25.571 RIGHT ANKLE PAIN, UNSPECIFIED CHRONICITY: ICD-10-CM

## 2019-01-14 DIAGNOSIS — M86.9 OSTEOMYELITIS OF TOE OF LEFT FOOT: ICD-10-CM

## 2019-01-14 DIAGNOSIS — J18.9 PNEUMONIA OF LEFT LOWER LOBE DUE TO INFECTIOUS ORGANISM: ICD-10-CM

## 2019-01-14 LAB
LEFT ABI: 0.94
LEFT ARM BP: 136 MMHG
LEFT DORSALIS PEDIS: 117 MMHG
LEFT POSTERIOR TIBIAL: 128 MMHG
RIGHT ABI: 1.01
RIGHT ARM BP: 131 MMHG
RIGHT DORSALIS PEDIS: 122 MMHG
RIGHT POSTERIOR TIBIAL: 138 MMHG

## 2019-01-14 PROCEDURE — 99214 OFFICE O/P EST MOD 30 MIN: CPT | Mod: S$PBB,,, | Performed by: INTERNAL MEDICINE

## 2019-01-14 PROCEDURE — 99213 OFFICE O/P EST LOW 20 MIN: CPT | Mod: PBBFAC,25,PO | Performed by: INTERNAL MEDICINE

## 2019-01-14 PROCEDURE — 93922 UPR/L XTREMITY ART 2 LEVELS: CPT | Mod: PBBFAC,PO | Performed by: INTERNAL MEDICINE

## 2019-01-14 PROCEDURE — 99214 PR OFFICE/OUTPT VISIT, EST, LEVL IV, 30-39 MIN: ICD-10-PCS | Mod: S$PBB,,, | Performed by: INTERNAL MEDICINE

## 2019-01-14 PROCEDURE — 93971 EXTREMITY STUDY: CPT | Mod: PBBFAC,PO,RT | Performed by: INTERNAL MEDICINE

## 2019-01-14 PROCEDURE — 93971 CV US DOPPLER VENOUS LEG RIGHT (CUPID ONLY): ICD-10-PCS | Mod: 26,S$PBB,RT, | Performed by: INTERNAL MEDICINE

## 2019-01-14 PROCEDURE — 99999 PR PBB SHADOW E&M-EST. PATIENT-LVL III: ICD-10-PCS | Mod: PBBFAC,,, | Performed by: INTERNAL MEDICINE

## 2019-01-14 PROCEDURE — 99999 PR PBB SHADOW E&M-EST. PATIENT-LVL III: CPT | Mod: PBBFAC,,, | Performed by: INTERNAL MEDICINE

## 2019-01-14 PROCEDURE — 93922 CV US ANKLE BRACHIAL INDICES RESTING (CUPID ONLY): ICD-10-PCS | Mod: 26,S$PBB,, | Performed by: INTERNAL MEDICINE

## 2019-01-14 NOTE — TELEPHONE ENCOUNTER
----- Message from Lori Crespo sent at 1/14/2019  1:34 PM CST -----  Contact: Mrs. Matthew Castro needs to know if her husbands has to have his procedure tomorrow or can it be rescheduled. She can be reached at [phone]

## 2019-01-14 NOTE — PROGRESS NOTES
Transitional Care Note  Subjective:       Patient ID: Domingo Castro is a 73 y.o. male.  Chief Complaint: Hospital Follow Up and Blood Sugar Problem (level 113 this am)    Family and/or Caretaker present at visit?  Yes.  Diagnostic tests reviewed/disposition: I have reviewed all completed as well as pending diagnostic tests at the time of discharge.  Disease/illness education: Pneumonia, bacteremia and early osteomyelitis  Home health/community services discussion/referrals: Patient has home health established at [place].   Establishment or re-establishment of referral orders for community resources: No other necessary community resources.   Discussion with other health care providers: No discussion with other health care providers necessary.   CC: followup of hospitalization for pneumonia, bacteremia and acute osteomyelitis  HPI:  The patient is a 73 y.o. year old male who presents to the office for followup of hospitalization for pneumonia, bacteremia and acute osteomyelitis.  The patient presented to the hospital with shaking and chills that began in the afternoon of presentation.  He also reported some nausea with decreased oral intake.  He was found to have a fever of 103.2° F and oxygen saturation 90% in the emergency department.  Chest x-ray showed a subtle focal airspace opacity in the left lower lung zone.  He was initially given Piperacillin- tazobactam and vancomycin in the emergency department.  He was transitioned to ceftriaxone, azithromycin and vancomycin.  Blood cultures were positive for Streptococcus pyogenes.  His hypoxia worsened despite antibiotics.  Chest CT showed bilateral pleural effusions and atelectasis.  Podiatry evaluated a wound on his left great toe.  Wound culture grew Streptococcus pyogenes.  Infectious Disease was consulted and recommended MRI which showed early osteomyelitis.  He underwent excisional debridement of the toe.  Hypoxia progressively improved with diuretics  immobilization.  Infectious Disease recommended continuing ceftriaxone for 6 weeks with an end date of 2019.  He was weaned to room air and oxygen saturation of 87%.  Furosemide dose was increased from 40 to 80 mg twice daily.  He was prescribed home health for wound care, antibiotic administration, physical therapy and occupational therapy.  Port was placed on the date of discharge.      PAST MEDICAL HISTORY:  Past Medical History:  No date: Arthritis  No date: Cataract  No date: CHF (congestive heart failure)  No date: Coronary artery disease  No date: Cystoid macular edema of both eyes  No date: Diabetes mellitus type II  No date: Hyperlipemia  No date: Hypertension  No date: Retinal hole  No date: Stage 4 chronic kidney disease  2018: Streptococcus pyogenes bacteremia      Comment:  Due to left toe osteomyelitis    SURGICAL HISTORY:  Past Surgical History:  2017: BLEPHAROPLASTY; Bilateral      Comment:  Performed by Jane Moreno MD at [facility] OR 1ST FLR  12/15/14: CATARACT EXTRACTION W/  INTRAOCULAR LENS IMPLANT; Left      Comment:  Dr martin  14: CATARACT EXTRACTION W/  INTRAOCULAR LENS IMPLANT; Right      Comment:  dorothy  No date: CIRCUMCISION, NON-  No date: focal laser right eye  2014: INSERTION-INTRAOCULAR LENS (IOL); Right      Comment:  Performed by Jaron Martin MD at [facility] [place] OR  12/15/2014: INSERTION-INTRAOCULAR LENS (IOL); Left      Comment:  Performed by Jaron Martin MD at [facility] [place] OR  2019: [id number]-Q-CATH; Right      Comment:  Performed by Denys Aleman Jr., MD at [facility] OR  No date: KNEE SURGERY      Comment:  left  No date: Left medial collateral ligament repair  2014: PHACOEMULSIFICATION-ASPIRATION-CATARACT; Right      Comment:  Performed by Jaron Martin MD at [facility] [place] OR  12/15/2014: PHACOEMULSIFICATION-ASPIRATION-CATARACT; Left      Comment:  Performed by Jaron Martin MD at [facility], [place] OR  2017: REPAIR-PTOSIS/BILATERAL EXTERNAL LEVATORS;  Bilateral      Comment:  [facility]  No date: TONSILLECTOMY    MEDS:  Medcard reviewed and updated    ALLERGIES: Allergy Card reviewed and updated    SOCIAL HISTORY:   The patient is a nonsmoker.          Review of Systems   Constitutional: Positive for appetite change and fatigue. Negative for fever.   Respiratory: Positive for shortness of breath. Negative for cough, chest tightness and wheezing.    Cardiovascular: Negative for chest pain and leg swelling.   Gastrointestinal: Negative for abdominal pain, constipation and diarrhea.   Genitourinary: Negative for frequency.   Musculoskeletal: Positive for arthralgias.        Right ankle pain.   Skin: Positive for wound.        Left foot   Neurological: Positive for dizziness and numbness. Negative for light-headedness and headaches.   Psychiatric/Behavioral: Negative for sleep disturbance.       Objective:      Physical Exam   Constitutional: He is oriented to person, place, and time. He appears well-developed and well-nourished.   Cardiovascular: Normal rate, regular rhythm and normal heart sounds.   Pulmonary/Chest: Effort normal and breath sounds normal.   Abdominal: Soft. Bowel sounds are normal. He exhibits no distension.   Neurological: He is alert and oriented to person, place, and time.   Skin: Skin is warm and dry.   Psychiatric: He has a normal mood and affect. His behavior is normal. Judgment and thought content normal.       Assessment:       1. HTN (hypertension), benign    2. Type 2 diabetes, uncontrolled, with neuropathy    3. CKD (chronic kidney disease) stage 4, GFR 15-29 ml/min    4. Pneumonia of left lower lobe due to infectious organism    5. Osteomyelitis of toe of left foot    6. Edema, unspecified type    7. Right ankle pain, unspecified chronicity        Plan:         Domingo was seen today for hospital follow up and blood sugar problem.    Diagnoses and all orders for this visit:    HTN (hypertension),  benign  -     blood pressure is controlled    Type 2 diabetes, uncontrolled, with neuropathy  -     follow-up by Endocrinology    CKD (chronic kidney disease) stage 4, GFR 15-29 ml/min  -     follow-up by Nephrology    Pneumonia of left lower lobe due to infectious organism  -     X-Ray Chest PA And Lateral; Future    Osteomyelitis of toe of left foot  -     continue antibiotics    Edema, unspecified type  -     Resting ROCIO (Cupid Only); Future  -     Ultrasound doppler venous leg right (Cupid Only); Future    Right ankle pain, unspecified chronicity  -     Resting ROCIO (Cupid Only); Future

## 2019-01-14 NOTE — TELEPHONE ENCOUNTER
----- Message from Monique Scott sent at 1/14/2019  1:47 PM CST -----  Contact: Wife Patty  Type:  Patient Returning Call    Who Called:  Wife Patty  Who Left Message for Patient: Miguelina Patel  Does the patient know what this is regarding?: rescheduling a procedure due to the patient has been in the hospital and has home health care daily, so not sure how long until he will be able to do the procedure but please call back to advise  Best Call Back Number:  [phone]  Additional Information:  na

## 2019-01-16 ENCOUNTER — OFFICE VISIT (OUTPATIENT)
Dept: INFECTIOUS DISEASES | Facility: CLINIC | Age: 74
End: 2019-01-16
Payer: MEDICARE

## 2019-01-16 VITALS
SYSTOLIC BLOOD PRESSURE: 123 MMHG | BODY MASS INDEX: 28.94 KG/M2 | OXYGEN SATURATION: 98 % | HEIGHT: 76 IN | WEIGHT: 237.63 LBS | DIASTOLIC BLOOD PRESSURE: 82 MMHG | TEMPERATURE: 98 F | HEART RATE: 58 BPM

## 2019-01-16 DIAGNOSIS — R60.0 BILATERAL LEG EDEMA: ICD-10-CM

## 2019-01-16 DIAGNOSIS — R42 POSTURAL DIZZINESS WITH PRESYNCOPE: ICD-10-CM

## 2019-01-16 DIAGNOSIS — B95.5 STREPTOCOCCAL BACTEREMIA: Primary | ICD-10-CM

## 2019-01-16 DIAGNOSIS — I50.32 CHRONIC DIASTOLIC CONGESTIVE HEART FAILURE: Chronic | ICD-10-CM

## 2019-01-16 DIAGNOSIS — R78.81 STREPTOCOCCAL BACTEREMIA: Primary | ICD-10-CM

## 2019-01-16 DIAGNOSIS — R55 POSTURAL DIZZINESS WITH PRESYNCOPE: ICD-10-CM

## 2019-01-16 DIAGNOSIS — E11.42 DIABETIC POLYNEUROPATHY ASSOCIATED WITH TYPE 2 DIABETES MELLITUS: Chronic | ICD-10-CM

## 2019-01-16 DIAGNOSIS — J18.9 PNEUMONIA OF LEFT LOWER LOBE DUE TO INFECTIOUS ORGANISM: ICD-10-CM

## 2019-01-16 DIAGNOSIS — N18.4 CKD (CHRONIC KIDNEY DISEASE) STAGE 4, GFR 15-29 ML/MIN: Chronic | ICD-10-CM

## 2019-01-16 DIAGNOSIS — M86.172 ACUTE OSTEOMYELITIS OF TOE, LEFT: ICD-10-CM

## 2019-01-16 PROCEDURE — 99999 PR PBB SHADOW E&M-EST. PATIENT-LVL IV: ICD-10-PCS | Mod: PBBFAC,,,

## 2019-01-16 PROCEDURE — 99999 PR PBB SHADOW E&M-EST. PATIENT-LVL IV: CPT | Mod: PBBFAC,,,

## 2019-01-16 PROCEDURE — 99214 PR OFFICE/OUTPT VISIT, EST, LEVL IV, 30-39 MIN: ICD-10-PCS | Mod: S$PBB,,, | Performed by: INTERNAL MEDICINE

## 2019-01-16 PROCEDURE — 99214 OFFICE O/P EST MOD 30 MIN: CPT | Mod: S$PBB,,, | Performed by: INTERNAL MEDICINE

## 2019-01-16 PROCEDURE — 99214 OFFICE O/P EST MOD 30 MIN: CPT | Mod: PBBFAC,PO

## 2019-01-16 NOTE — PROGRESS NOTES
"Subjective:      Patient ID: Domingo Castro is a 73 y.o. male.    Chief Complaint:Hospital Follow Up      History of Present Illness  On arrival, patient states he felt dizzy and slumped into chair. Vitals checked - normal. Blood glucose normal. Patient stated that he did not eat breakfast and took insulin this AM. Tolerating IV antibiotics well - discharged on 1/2 with antibiotics for pneumonia and acute osteomyelitis of left hallux.     Note from [facility] ID on 12/31 stated: "Mr. Castro's temperature is finally completely resolved Repeat CXR is improved. Doing well from our standpoint. Recommend completion of 6-week course of Ceftriaxone 2 gm every 24 hours. This will simultaneously cover his pneumonia as well.      Will sign off. Please call for any questions."    Port-a-cath arranged on 1/2. Patient discharged on 1/2. No complaints - shows me picture of wound from home health.      Review of Systems   Constitution: Positive for diaphoresis.   Neurological: Positive for dizziness.     Objective:   Physical Exam   Constitutional: He is oriented to person, place, and time. He appears well-developed and well-nourished.   HENT:   Head: Normocephalic and atraumatic.   Eyes: Conjunctivae and EOM are normal. Pupils are equal, round, and reactive to light.   Neck: Normal range of motion. Neck supple.   Cardiovascular: Normal rate, regular rhythm and normal heart sounds.   Pulmonary/Chest: Effort normal and breath sounds normal. No respiratory distress.       Abdominal: Soft. Bowel sounds are normal.   Musculoskeletal: Normal range of motion. He exhibits edema (woody edema bilaterally).        Feet:    Neurological: He is alert and oriented to person, place, and time.   Skin: Skin is warm and dry. No erythema.   Nursing note and vitals reviewed.    Picture of toe on patient's phone from today.    Assessment:       1. Streptococcus pyogenes bacteremia    2. Postural dizziness with presyncope    3. Acute osteomyelitis of toe, " left    4. Pneumonia of left lower lobe due to infectious organism    5. Diabetic polyneuropathy associated with type 2 diabetes mellitus    6. CKD (chronic kidney disease) stage 4, GFR 15-29 ml/min    7. Chronic diastolic congestive heart failure    8. Bilateral leg edema          Plan:       Patient improved while being observed. Declined to go to ER and stated that he felt better.   Finished 3 of 6 weeks of IV antibiotics. No outpatient labs established. Will check ESR and CRP as outpatient. Instructed patient to elevate feet, continue usual dose of lasix and discuss with cardiologist about possible weight gain and leg edema.   Continue wound care of left hallux - will refer to wound care for follow up with podiatry.    EOC of ceftriaxone is 2/5/19. Return in 3 weeks for ESR, CRP and exam.

## 2019-01-17 ENCOUNTER — TELEPHONE (OUTPATIENT)
Dept: INTERNAL MEDICINE | Facility: CLINIC | Age: 74
End: 2019-01-17

## 2019-01-17 ENCOUNTER — TELEPHONE (OUTPATIENT)
Dept: SURGERY | Facility: CLINIC | Age: 74
End: 2019-01-17

## 2019-01-17 ENCOUNTER — HOSPITAL ENCOUNTER (EMERGENCY)
Facility: HOSPITAL | Age: 74
Discharge: HOME | End: 2019-01-17
Attending: EMERGENCY MEDICINE
Payer: MEDICARE

## 2019-01-17 VITALS
DIASTOLIC BLOOD PRESSURE: 68 MMHG | OXYGEN SATURATION: 99 % | BODY MASS INDEX: 29.22 KG/M2 | SYSTOLIC BLOOD PRESSURE: 152 MMHG | HEIGHT: 75 IN | HEART RATE: 65 BPM | TEMPERATURE: 98 F | RESPIRATION RATE: 16 BRPM | WEIGHT: 235 LBS

## 2019-01-17 DIAGNOSIS — D64.9 CHRONIC ANEMIA: Primary | ICD-10-CM

## 2019-01-17 LAB
ABO + RH BLD: NORMAL
ALBUMIN SERPL BCP-MCNC: 2.9 G/DL
ALP SERPL-CCNC: 75 U/L
ALT SERPL W/O P-5'-P-CCNC: 16 U/L
ANION GAP SERPL CALC-SCNC: 10 MMOL/L
AST SERPL-CCNC: 16 U/L
BASOPHILS # BLD AUTO: 0.1 K/UL
BASOPHILS NFR BLD: 1.3 %
BILIRUB SERPL-MCNC: 0.2 MG/DL
BLD GP AB SCN CELLS X3 SERPL QL: NORMAL
BUN SERPL-MCNC: 47 MG/DL
CALCIUM SERPL-MCNC: 9.1 MG/DL
CHLORIDE SERPL-SCNC: 107 MMOL/L
CO2 SERPL-SCNC: 23 MMOL/L
CREAT SERPL-MCNC: 2.7 MG/DL
DIFFERENTIAL METHOD: ABNORMAL
EOSINOPHIL # BLD AUTO: 0.6 K/UL
EOSINOPHIL NFR BLD: 7.8 %
ERYTHROCYTE [DISTWIDTH] IN BLOOD BY AUTOMATED COUNT: 14.2 %
EST. GFR  (AFRICAN AMERICAN): 26 ML/MIN/1.73 M^2
EST. GFR  (NON AFRICAN AMERICAN): 22 ML/MIN/1.73 M^2
GLUCOSE SERPL-MCNC: 220 MG/DL
HCT VFR BLD AUTO: 27.1 %
HGB BLD-MCNC: 8.5 G/DL
INR PPP: 1.1
LYMPHOCYTES # BLD AUTO: 1.6 K/UL
LYMPHOCYTES NFR BLD: 21.2 %
MCH RBC QN AUTO: 28 PG
MCHC RBC AUTO-ENTMCNC: 31.4 G/DL
MCV RBC AUTO: 89 FL
MONOCYTES # BLD AUTO: 0.3 K/UL
MONOCYTES NFR BLD: 4.5 %
NEUTROPHILS # BLD AUTO: 4.9 K/UL
NEUTROPHILS NFR BLD: 65.1 %
PLATELET # BLD AUTO: 158 K/UL
PMV BLD AUTO: 11.1 FL
POTASSIUM SERPL-SCNC: 4.2 MMOL/L
PROT SERPL-MCNC: 8.3 G/DL
PROTHROMBIN TIME: 11.4 SEC
RBC # BLD AUTO: 3.04 M/UL
SODIUM SERPL-SCNC: 140 MMOL/L
WBC # BLD AUTO: 7.54 K/UL

## 2019-01-17 PROCEDURE — 99283 EMERGENCY DEPT VISIT LOW MDM: CPT | Mod: 25

## 2019-01-17 PROCEDURE — 85610 PROTHROMBIN TIME: CPT

## 2019-01-17 PROCEDURE — 80053 COMPREHEN METABOLIC PANEL: CPT

## 2019-01-17 PROCEDURE — 85025 COMPLETE CBC W/AUTO DIFF WBC: CPT

## 2019-01-17 PROCEDURE — 86850 RBC ANTIBODY SCREEN: CPT

## 2019-01-17 NOTE — TELEPHONE ENCOUNTER
Dr Hillman called from [facility]  Hemoglobin repeat 8.5 and 27 and baseline was 8.1-9.1 within that range., He has no bleeding and they advised the labs may have been false readings. Advised he will send orders to [facility] for repeat labs. Termed the call

## 2019-01-17 NOTE — ED NOTES
Pt seen in the ED after he was called by his PCP and was told that his H&H were low. Pt has no other complaints at this moment. Pt has a port on the R side of the upper chest that has been accessed prior to ED visit by his home health nurse last Friday. The skin above the port looks indurated and has a darker color compared to the rest of the skin, between the transparent dressing and the skin there is a white/clear layer of unknown source. Biopatch on place, port was last accessed 01/16 by the wife.  Pt denies any fever, chills, nausea, or vomiting. Port was not accessed, will notify provider and continue to monitor.

## 2019-01-17 NOTE — ED PROVIDER NOTES
Encounter Date: 1/17/2019    SCRIBE #1 NOTE: I, Negin Pride, am scribing for, and in the presence of,  Dr. Hillman. I have scribed the entire note.       History     Chief Complaint   Patient presents with    Abnormal Lab     was sent to PCP with low H/H     Domingo JEAN CARLOS Castro is a 73 y.o. male who  has a past medical history of Arthritis, Cataract, CHF (congestive heart failure), Coronary artery disease, Cystoid macular edema of both eyes, Diabetes mellitus type II, Hyperlipemia, Hypertension, Retinal hole, Stage 4 chronic kidney disease, and Streptococcus pyogenes bacteremia (12/23/2018).    The patient presents to the ED due to abnormal lab. He reports he had blood work drawn 2 days ago.The patient was called and told his blood count was low, and to present to ED for transfusion. He is uncertain of his values. The patient has not noted any blood in his stool or urine. He denies having any light headedness, syncope, chest pain, palpitations, nausea or vomiting. He does note feeling dizzy and short of breath yesterday after going up stairs, but states no symptoms at rest, and actually reports improvement after climbing stairs earlier today.      The history is provided by the patient.     Review of patient's allergies indicates:   Allergen Reactions    Atorvastatin Other (See Comments)     Past Medical History:   Diagnosis Date    Arthritis     Cataract     CHF (congestive heart failure)     Coronary artery disease     Cystoid macular edema of both eyes     Diabetes mellitus type II     Hyperlipemia     Hypertension     Retinal hole     Stage 4 chronic kidney disease     Streptococcus pyogenes bacteremia 12/23/2018    Due to left toe osteomyelitis     Past Surgical History:   Procedure Laterality Date    BLEPHAROPLASTY Bilateral 8/30/2017    Performed [facility]    CATARACT EXTRACTION W/  INTRAOCULAR LENS IMPLANT Left 12/15/14    Dr de la cruz    CATARACT EXTRACTION W/   INTRAOCULAR LENS IMPLANT Right 14    dorothy    CIRCUMCISION, NON-      focal laser right eye      INSERTION-INTRAOCULAR LENS (IOL) Right 2014    [facility] at [facility] OR    INSERTION-INTRAOCULAR LENS (IOL) Left 12/15/2014    [facility] at [facility] OR    XJWYXRNWQ-IWCK-E-CATH Right 2019    Per[facility]ys [facility] Jr., [facility] at [facility] OR    KNEE SURGERY      left    Left medial collateral ligament repair      PHACOEMULSIFICATION-ASPIRATION-CATARACT Right 2014    [facility] at [facility] OR    PHACOEMULSIFICATION-ASPIRATION-CATARACT Left 12/15/2014    [facility] at [facility] OR    REPAIR-PTOSIS/BILATERAL EXTERNAL LEVATORS Bilateral 2017    [facility]    TONSILLECTOMY       Family History   Problem Relation Age of Onset    Heart disease Mother     Cancer Mother     Cancer Brother     Heart disease Father     Diabetes Father     Cancer Sister     Cataracts Paternal Grandmother     Glaucoma Paternal Grandmother     Blindness Neg Hx     Amblyopia Neg Hx     Hypertension Neg Hx     Macular degeneration Neg Hx     Retinal detachment Neg Hx     Strabismus Neg Hx      Social History     Tobacco Use    Smoking status: Never Smoker    Smokeless tobacco: Never Used   Substance Use Topics    Alcohol use: No     Alcohol/week: 0.0 oz    Drug use: No     Review of Systems   Constitutional: Negative for activity change, diaphoresis and fever.   HENT: Negative for drooling, rhinorrhea, sore throat and trouble swallowing.    Eyes: Negative for pain and visual disturbance.   Respiratory: Negative for cough, shortness of breath and stridor.    Cardiovascular: Negative for chest pain and leg swelling.   Gastrointestinal: Negative for abdominal distention, abdominal pain, constipation and vomiting.   Genitourinary: Negative for discharge, dysuria and hematuria.    Musculoskeletal: Negative for gait problem.   Skin: Negative for rash.   Neurological: Negative for seizures, facial asymmetry and headaches.   Psychiatric/Behavioral: Negative for hallucinations and suicidal ideas.       Physical Exam     Initial Vitals [01/17/19 1147]   BP Pulse Resp Temp SpO2   (!) 115/56 65 15 98.4 °F (36.9 °C) 97 %      MAP       --         Physical Exam    Nursing note and vitals reviewed.  Constitutional: He appears well-developed and well-nourished. He is not diaphoretic. No distress.   HENT:   Head: Normocephalic and atraumatic.   Mouth/Throat: Oropharynx is clear and moist.   Eyes: EOM are normal. Pupils are equal, round, and reactive to light.   Neck: No tracheal deviation present.   Cardiovascular: Normal rate, regular rhythm, normal heart sounds and intact distal pulses.   Pulmonary/Chest: Breath sounds normal. No stridor. No respiratory distress.   Right chest port. No erythema or tenderness   Abdominal: Soft. He exhibits no distension and no mass. There is no tenderness.   Musculoskeletal: Normal range of motion. He exhibits no edema.   Neurological: He is alert and oriented to person, place, and time. No cranial nerve deficit or sensory deficit.   Skin: Skin is warm and dry. Capillary refill takes less than 2 seconds. No rash noted.   Psychiatric: He has a normal mood and affect. His behavior is normal. Thought content normal.         ED Course   Procedures  Labs Reviewed   CBC W/ AUTO DIFFERENTIAL - Abnormal; Notable for the following components:       Result Value    RBC 3.04 (*)     Hemoglobin 8.5 (*)     Hematocrit 27.1 (*)     MCHC 31.4 (*)     Eos # 0.6 (*)     All other components within normal limits   COMPREHENSIVE METABOLIC PANEL - Abnormal; Notable for the following components:    Glucose 220 (*)     BUN, Bld 47 (*)     Creatinine 2.7 (*)     Albumin 2.9 (*)     eGFR if  26 (*)     eGFR if non  22 (*)     All other components within normal  limits   PROTIME-INR   TYPE & SCREEN          Imaging Results    None          Medical Decision Making:   Initial Assessment:   This is 73 y.o male with recent hospitalization for pneumonia, with right chest port placed during admission for IV antibiotics, presents with low H&H checked by home health nurse today. Hb reportedly 6.9. Patient is well appearing and pleasant. Vitals stable. Will obtain repeat labs and continue to monitor.   Differential Diagnosis:   Differential Diagnosis includes, but is not limited to:  Blood loss anemia, GI bleed, infection/sepsis, medication/chemotherapy side effect, vitamin deficiency, lead poisoning, hemolysis, DIC, bone marrow suppression, proliferative neoplastic disorder, liver disease, hypothyroidism, chronic disease, lab error.  Clinical Tests:   Lab Tests: Ordered and Reviewed  ED Management:  Repeat H/H 8.5/27.1, which appears stable from patient's baseline during recent admission.  Patient reports blood was drawn from port, raising concern of possible dilution during lab draw.  Case discussed with Dr. Nugent's nurse assistant, Cee, who recommended placing written order for home health to repeat labs in a few days, who will then call clinic with results.    Patient and family updated on findings and comfortable with plan.  Patient stable for D/C.  Upon re-evaluation, the patient's status has improved.  After complete ED evaluation, clinical impression is most consistent with chronic anemia.  PCP follow-up within 1 week was recommended.    After taking into careful account the patient's history, physical exam findings, as well as empirical and objective data obtained throughout ED workup, I feel no emergent medical condition has been identified. No further evaluation or admission was felt to be required, and the patient is stable for discharge from the ED. The patient and any additional family present were updated with test results, overall clinical impression, and  recommended further plan of care, including discharge instructions as provided and outpatient follow-up for continued evaluation and management as needed. All questions were answered. The patient expressed understanding and agreed with current plan for discharge and follow-up plan of care. Strict ED return precautions were provided, including return/worsening of current symptoms, new symptoms, or any other concerns.                     ED Course as of Jan 18 1355   Thu Jan 17, 2019   1147 Sort note: Domingo Catsro nontoxic/afebrile 73 y.o.  presented to the ED with c/o hemoglobin hematocrit of 6.9 and 21.5.  Instructed to come directly to ED for further evaluation.       Patient seen and medically screened by Physician assistant in Sort process due to ED crowding.  Appropriate tests and/or medications ordered.  Care transferred to an alternate provider when patient was placed in an Exam Room from the [facility] for physical exam, additional orders, and disposition. [facility]    [AM]      ED Course User Index  [AM] Magalys Black PA-C     Clinical Impression:     1. Chronic anemia               I, Dr. Maximiliano Hillman, personally performed the services described in this documentation. All medical record entries made by the scribe were at my direction and in my presence.  I have reviewed the chart and agree that the record reflects my personal performance and is accurate and complete.     Maximiliano Hillman MD.               Maximiliano Hillman MD  01/18/19 3358

## 2019-01-17 NOTE — PROGRESS NOTES
[facility]  POST-OP PROGRESS NOTE    Patient's wife called to say he was in the emergency room after his PCP found critical anemia.  He was scheduled for postop follow-up for port placement in clinic today.     HPI: Domingo Castro is a 73 y.o. male status post right IJ port catheter placement for long-term antibiotic therapy for left lower lobe pneumonia and osteomyelitis of the left great toe.  Patient has chronic kidney disease with possible need for hemodialysis in the future therefore PICC line was contraindicated.  He underwent the this procedure on 01/02/2019 and was scheduled follow-up today in clinic however was in the emergency room for possible anemia.  Patient was seen emergency room and has no acute complaints.  His Port-A-Cath is being accessed currently for IV medication administration.  He denies fevers or chills.  He has not had any drainage her significant redness from the wound.  The port is been functioning appropriately.      VITALS:  Vitals:    01/17/19 1147   BP: (!) 115/56   Pulse: 65   Resp: 15   Temp: 98.4 °F (36.9 °C)       PHYSICAL EXAM:  GEN:  No acute distress, alert or x3  HEENT:  Right neck stab incision healing well, right chest port catheter incision healing well without evidence of breakdown, there is no surrounding erythema, induration or fluctuance.  Port is currently access and has a sterile dressing in place.  CV:  Regular rate rhythm  RESP:  Nonlabored breathing      ASSESSMENT & PLAN:  73 y.o. male s/p right IJ port catheter placement for long-term IV antibiotic use currently in the emergency room for anemia  - Port-A-Cath is healing well and is functioning properly  - continues as needed  - patient may follow up in surgery Clinic once antibiotic therapy is complete any no longer has need for carlos catheter  - for continue workup in the emergency room for anemia

## 2019-01-17 NOTE — ED NOTES
"Dr. Hillman was made aware of the pts BP prior to discharge. Dr. Hillman stated "He is good to go, I am ok wit that".   "

## 2019-01-17 NOTE — TELEPHONE ENCOUNTER
Received a phone call from home health nurse at [facility], who reported a critical lab value of hemoglobin hematocrit of 6.9 and 21.5.  Advised that patient go to the emergency department.

## 2019-01-17 NOTE — DISCHARGE INSTRUCTIONS
Your H/H today was 8.5/27.1; your values today are the same as your baseline blood counts.  Please contact your Home Health group to repeat labs in 2 days to re-check H/H.  Dr. Nugent's clinic will be contacted with the results.  Return to the ED for worsening symptoms, any evidence of bleeding, or any other concerns.

## 2019-01-17 NOTE — TELEPHONE ENCOUNTER
----- Message from Nica Jack sent at 1/17/2019 12:04 PM CST -----  Contact: Mrs. Castro (wife)/ [phone]  Patient's wife called to let you know patient is in the ER and the doctor at the hospital wants to ask a question since he might get blood transfusion.    Please call.         01/17/2019            1307  Contacted patient's wife regarding the above message. Mrs. Castro stated that the patient is currently in the ER and they will not make his appointment. Informed her that Dr. Aleman was on his way to the ER to see them, and we would cancel the office visit. Mrs. Castro verbalized understanding.

## 2019-01-18 ENCOUNTER — TELEPHONE (OUTPATIENT)
Dept: INTERNAL MEDICINE | Facility: CLINIC | Age: 74
End: 2019-01-18

## 2019-01-18 NOTE — TELEPHONE ENCOUNTER
Please inform patient that he would need to have repeat iron studies it to determine if his anemia is iron deficiency.  Iron studies performed last June were not consistent with iron deficiency.  Recommend patient follow up with his nephrologist.

## 2019-01-18 NOTE — TELEPHONE ENCOUNTER
Ruth called from [facility] to advise she has the orders to draw repeat labs on Saturday and she advised she will draw on Monday and family is aware. Termed the call

## 2019-01-18 NOTE — TELEPHONE ENCOUNTER
Spouse call and she advised he was anemic and he is cold and he has been sleeping all day/ She states he went to bed at 11 pm and he slept until 6 am She states she needs to use a diet that he can take an OTC iron supplement as he is cold and sleeps an un-normal amount.   \Blood pressure has been normal and his glucose levels are within range. She wants a diet and or food choices or an OTC medication for the anemia

## 2019-01-22 ENCOUNTER — OFFICE VISIT (OUTPATIENT)
Dept: PODIATRY | Facility: CLINIC | Age: 74
End: 2019-01-22
Payer: MEDICARE

## 2019-01-22 VITALS
HEIGHT: 75 IN | SYSTOLIC BLOOD PRESSURE: 126 MMHG | BODY MASS INDEX: 29.22 KG/M2 | HEART RATE: 59 BPM | WEIGHT: 235 LBS | DIASTOLIC BLOOD PRESSURE: 63 MMHG

## 2019-01-22 DIAGNOSIS — Z87.2 HISTORY OF FOOT ULCER: ICD-10-CM

## 2019-01-22 DIAGNOSIS — E11.51 TYPE 2 DIABETES MELLITUS WITH PERIPHERAL VASCULAR DISEASE: ICD-10-CM

## 2019-01-22 DIAGNOSIS — B35.1 ONYCHOMYCOSIS: ICD-10-CM

## 2019-01-22 PROCEDURE — 99999 PR PBB SHADOW E&M-EST. PATIENT-LVL III: ICD-10-PCS | Mod: PBBFAC,,, | Performed by: PODIATRIST

## 2019-01-22 PROCEDURE — 99999 PR PBB SHADOW E&M-EST. PATIENT-LVL III: CPT | Mod: PBBFAC,,, | Performed by: PODIATRIST

## 2019-01-22 PROCEDURE — 11721 ROUTINE FOOT CARE: ICD-10-PCS | Mod: S$PBB,Q9,, | Performed by: PODIATRIST

## 2019-01-22 PROCEDURE — 99213 OFFICE O/P EST LOW 20 MIN: CPT | Mod: PBBFAC,PN,25 | Performed by: PODIATRIST

## 2019-01-22 PROCEDURE — 99213 PR OFFICE/OUTPT VISIT, EST, LEVL III, 20-29 MIN: ICD-10-PCS | Mod: S$PBB,25,, | Performed by: PODIATRIST

## 2019-01-22 PROCEDURE — 99213 OFFICE O/P EST LOW 20 MIN: CPT | Mod: S$PBB,25,, | Performed by: PODIATRIST

## 2019-01-22 PROCEDURE — 11721 DEBRIDE NAIL 6 OR MORE: CPT | Mod: PBBFAC,PN | Performed by: PODIATRIST

## 2019-01-22 NOTE — PROGRESS NOTES
Subjective:      Patient ID: Domingo Castro is a 73 y.o. male.    Chief Complaint: Diabetes Mellitus (Dr. Griselda Nugent 1/14/19); Diabetic Foot Exam; and Routine Foot Care    Domingo is a 73 y.o. male who presents to the clinic for evaluation and treatment of high risk feet. Domingo has a past medical history of Arthritis, Cataract, CHF (congestive heart failure), Coronary artery disease, Cystoid macular edema of both eyes, Diabetes mellitus type II, Hyperlipemia, Hypertension, Retinal hole, Stage 4 chronic kidney disease, and Streptococcus pyogenes bacteremia (12/23/2018). The patient's chief complaint is long, thick toenails. This patient has documented high risk feet requiring routine maintenance secondary to peripheral neuropathy. Seen while admitted at [facility] on 12/27/19 for left hallux ulceration.    PCP: Griselda Nugent MD    Date Last Seen by PCP: 1/14/19    Current shoe gear:  Affected Foot: Rx diabetic extra depth shoes and custom accommodative insoles     Unaffected Foot: Rx diabetic extra depth shoes and custom accommodative insoles    Hemoglobin A1C   Date Value Ref Range Status   11/28/2018 7.3 (H) 4.0 - 5.6 % Final     Comment:     ADA Screening Guidelines:  5.7-6.4%  Consistent with prediabetes  >or=6.5%  Consistent with diabetes  High levels of fetal hemoglobin interfere with the HbA1C  assay. Heterozygous hemoglobin variants (HbS, HgC, etc)do  not significantly interfere with this assay.   However, presence of multiple variants may affect accuracy.     08/09/2018 8.3 (H) 4.0 - 5.6 % Final     Comment:     ADA Screening Guidelines:  5.7-6.4%  Consistent with prediabetes  >or=6.5%  Consistent with diabetes  High levels of fetal hemoglobin interfere with the HbA1C  assay. Heterozygous hemoglobin variants (HbS, HgC, etc)do  not significantly interfere with this assay.   However, presence of multiple variants may affect accuracy.     05/10/2018 9.4 (H) 4.0 - 5.6 % Final     Comment:     According  "to ADA guidelines, hemoglobin A1c <7.0% represents  optimal control in non-pregnant diabetic patients. Different  metrics may apply to specific patient populations.   Standards of Medical Care in Diabetes-2016.  For the purpose of screening for the presence of diabetes:  <5.7%     Consistent with the absence of diabetes  5.7-6.4%  Consistent with increasing risk for diabetes   (prediabetes)  >or=6.5%  Consistent with diabetes  Currently, no consensus exists for use of hemoglobin A1c  for diagnosis of diabetes for children.  This Hemoglobin A1c assay has significant interference with fetal   hemoglobin   (HbF). The results are invalid for patients with abnormal amounts of   HbF,   including those with known Hereditary Persistence   of Fetal Hemoglobin. Heterozygous hemoglobin variants (HbAS, HbAC,   HbAD, HbAE, HbA2) do not significantly interfere with this assay;   however, presence of multiple variants in a sample may impact the %   interference.       Vitals:    19 0931   BP: 126/63   Pulse: (!) 59   Weight: 106.6 kg (235 lb)   Height: 6' 3" (1.905 m)   PainSc: 0-No pain      Past Medical History:   Diagnosis Date    Arthritis     Cataract     CHF (congestive heart failure)     Coronary artery disease     Cystoid macular edema of both eyes     Diabetes mellitus type II     Hyperlipemia     Hypertension     Retinal hole     Stage 4 chronic kidney disease     Streptococcus pyogenes bacteremia 2018    Due to left toe osteomyelitis       Past Surgical History:   Procedure Laterality Date    BLEPHAROPLASTY Bilateral 2017    Performed [facility]    CATARACT EXTRACTION W/  INTRAOCULAR LENS IMPLANT Left 12/15/14    Dr martin    CATARACT EXTRACTION W/  INTRAOCULAR LENS IMPLANT Right 14    dorothy    CIRCUMCISION, NON-      focal laser right eye      INSERTION-INTRAOCULAR LENS (IOL) Right 2014    [facility]    " INSERTION-INTRAOCULAR LENS (IOL) Left 12/15/2014    [facility]    OYWELZIAE-NEGJ-B-CATH Right 1/2/2019    [facility]    KNEE SURGERY      left    Left medial collateral ligament repair      PHACOEMULSIFICATION-ASPIRATION-CATARACT Right 12/29/2014    [facility] at [facility] OR    PHACOEMULSIFICATION-ASPIRATION-CATARACT Left 12/15/2014    Performed [facility] OR    REPAIR-PTOSIS/BILATERAL EXTERNAL LEVATORS Bilateral 8/30/2017    [facility]    TONSILLECTOMY         Family History   Problem Relation Age of Onset    Heart disease Mother     Cancer Mother     Cancer Brother     Heart disease Father     Diabetes Father     Cancer Sister     Cataracts Paternal Grandmother     Glaucoma Paternal Grandmother     Blindness Neg Hx     Amblyopia Neg Hx     Hypertension Neg Hx     Macular degeneration Neg Hx     Retinal detachment Neg Hx     Strabismus Neg Hx        Social History     Socioeconomic History    Marital status:      Spouse name: Not on file    Number of children: Not on file    Years of education: Not on file    Highest education level: Not on file   Social Needs    Financial resource strain: Not on file    Food insecurity - worry: Not on file    Food insecurity - inability: Not on file    Transportation needs - medical: Not on file    Transportation needs - non-medical: Not on file   Occupational History    Not on file   Tobacco Use    Smoking status: Never Smoker    Smokeless tobacco: Never Used   Substance and Sexual Activity    Alcohol use: No     Alcohol/week: 0.0 oz    Drug use: No    Sexual activity: Yes     Partners: Female   Other Topics Concern    Not on file   Social History Narrative    Not on file       Current Outpatient Medications   Medication Sig Dispense Refill    acetaminophen (TYLENOL) 325 MG tablet Take 2  "tablets (650 mg total) by mouth every 4 (four) hours as needed.  0    amLODIPine (NORVASC) 10 MG tablet Take 1 tablet (10 mg total) by mouth once daily. 90 tablet 3    aspirin (ECOTRIN) 81 MG EC tablet Take 1 tablet (81 mg total) by mouth once daily.  0    cadexomer iodine (IODOSORB) 0.9 % gel Apply topically every Mon, Wed, Fri. 40 g 0    carvedilol (COREG) 25 MG tablet Take 1 tablet (25 mg total) by mouth 2 (two) times daily with meals. 60 tablet 0    cefTRIAXone 2 g in dextrose 5 % 50 mL (ROCEPHIN) 2 g/50 mL PgBk IVPB Inject 50 mLs (2 g total) into the vein once daily. End date 2/4/19.      furosemide (LASIX) 40 MG tablet Take 2 tablets (80 mg total) by mouth 2 (two) times daily. Do not take the pm dose after 3 pm 360 tablet 0    gabapentin (NEURONTIN) 100 MG capsule TAKE ONE CAPSULE BY MOUTH IN THE MORNING THEN ONE CAPSULE  IN THE EVENING AND THEN THREE CAPSULES AT BEDTIME 450 capsule 3    hydrALAZINE (APRESOLINE) 50 MG tablet TAKE ONE TABLET BY MOUTH EVERY 12 HOURS 180 tablet 3    insulin lispro (HUMALOG KWIKPEN INSULIN) 100 unit/mL InPn pen INJECT 10 UNITS SUBCUTANEOUSLY THREE TIMES DAILY BEFORE MEALS WITH CORRECTION SCALE, MAX TDD 50 UNITS 9 Box 6    LEVEMIR FLEXTOUCH U-100 INSULN 100 unit/mL (3 mL) InPn pen Inject 26 Units into the skin every evening. 36 mL 3    pen needle, diabetic, safety (BD AUTOSHIELD PEN NEEDLE) 29 gauge x 5/16" Ndle For use once daily with levemir flexpen 90 each 11    rosuvastatin (CRESTOR) 20 MG tablet Take 20 mg by mouth once daily.        No current facility-administered medications for this visit.        Review of patient's allergies indicates:   Allergen Reactions    Atorvastatin Other (See Comments)       Review of Systems   Constitution: Negative for chills, fever, weakness and malaise/fatigue.   HENT: Negative for congestion.    Cardiovascular: Negative for chest pain, claudication and leg swelling.   Respiratory: Negative for cough and shortness of breath.  "   Skin: Positive for dry skin and nail changes.   Musculoskeletal: Positive for back pain. Negative for joint pain, muscle cramps and muscle weakness.   Gastrointestinal: Negative for nausea and vomiting.   Neurological: Positive for numbness and paresthesias.   Psychiatric/Behavioral: Negative for altered mental status.           Objective:      Physical Exam   Constitutional: He is oriented to person, place, and time. No distress.   Cardiovascular:   Pulses:       Dorsalis pedis pulses are 1+ on the right side, and 1+ on the left side.        Posterior tibial pulses are 1+ on the right side, and 1+ on the left side.   CFT< 3 secs all toes bilateral foot, skin temp warm to cool proximal to distal bilateral foot, no hair growth bilateral lower extremity, no lower extremity edema bilateral.       Musculoskeletal:        Right foot: There is decreased range of motion and deformity.        Left foot: There is decreased range of motion and deformity.   No pain with ROM or MMT bilateral lower extremity.    Elongated second toe bilateral. Non-reducible hammertoe second toe left foot. Flexion contractures toes 2-5 bilateral foot.    + equinus that reduces with knee bent bilateral.    No pain with ROM or MMT bilateral lower extremity.     Feet:   Right Foot:   Protective Sensation: 10 sites tested. 3 sites sensed.   Skin Integrity: Positive for dry skin. Negative for ulcer, blister, skin breakdown, erythema, warmth or callus.   Left Foot:   Protective Sensation: 10 sites tested. 4 sites sensed.   Skin Integrity: Positive for skin breakdown and dry skin. Negative for ulcer, blister, erythema, warmth or callus.   Neurological: He is alert and oriented to person, place, and time. He has normal strength. A sensory deficit is present.   Vibratory sensation absent bilateral foot.   Skin: Skin is dry and intact. Capillary refill takes 2 to 3 seconds. Lesion noted. No ecchymosis and no rash noted. He is not diaphoretic. No cyanosis  or erythema. No pallor. Nails show no clubbing.   Distal tip of left second toe with pre-ulcerative lesion noting mild dark red discoloration. Plantar left hallux with hyperkeratotic skin noting hemosiderin deposition.    Nails 1-5 bilateral are elongated 3-4 mm, thickened 1-2 mm, hard with minor loosening and debris.    No open lesions or macerations bilateral lower extremity.               Assessment:       Encounter Diagnoses   Name Primary?    Uncontrolled type 2 diabetes mellitus with peripheral neuropathy Yes    Type 2 diabetes mellitus with peripheral vascular disease     Onychomycosis     History of foot ulcer          Plan:       Domingo was seen today for diabetes mellitus, diabetic foot exam and routine foot care.    Diagnoses and all orders for this visit:    Uncontrolled type 2 diabetes mellitus with peripheral neuropathy  -     Routine Foot Care  -     DIABETIC SHOES FOR HOME USE    Type 2 diabetes mellitus with peripheral vascular disease  -     Routine Foot Care  -     DIABETIC SHOES FOR HOME USE    Onychomycosis  -     Routine Foot Care  -     DIABETIC SHOES FOR HOME USE    History of foot ulcer  -     DIABETIC SHOES FOR HOME USE      I counseled the patient on his conditions, their implications and medical management.    Shoe inspection. Diabetic Foot Education. Patient reminded of the importance of good nutrition and blood sugar control to help prevent podiatric complications of diabetes. Patient instructed on proper foot hygeine. We discussed wearing proper shoe gear, daily foot inspections, never walking without protective shoe gear, never putting sharp instruments to feet, routine podiatric nail visits every 2-3 months.     Routine foot care per attached note. Patient relates relief following the procedure. He will continue to monitor the areas daily, inspect his feet, wear protective shoe gear when ambulatory, moisturizer to maintain skin integrity and follow in this office in approximately  2-3 months, sooner p.r.n.    Refused diabetic shoes today. Discussed importance of good shoe gear to protect his foot. Clinically pre-ulcerative lesion distal left second toe. Patient is at intermediate to high risk for diabetic foot complication and requires extra depth shoes with accommodative insoles.    RTC 3 months or prn.

## 2019-01-22 NOTE — PROCEDURES
"Routine Foot Care  Date/Time: 1/22/2019 9:59 AM  Performed by: Dimitry Gallegos DPM  Authorized by: Dimitry Gallegos DPM     Time out: Immediately prior to procedure a "time out" was called to verify the correct patient, procedure, equipment, support staff and site/side marked as required.    Consent Done?:  Yes (Verbal)  Hyperkeratotic Skin Lesions?: No      Nail Care Type:  Debride  Location(s): All  (Left 1st Toe, Left 3rd Toe, Left 2nd Toe, Left 4th Toe, Left 5th Toe, Right 1st Toe, Right 2nd Toe, Right 3rd Toe, Right 4th Toe and Right 5th Toe)  Patient tolerance:  Patient tolerated the procedure well with no immediate complications      "

## 2019-01-24 ENCOUNTER — TELEPHONE (OUTPATIENT)
Dept: INTERNAL MEDICINE | Facility: CLINIC | Age: 74
End: 2019-01-24

## 2019-01-25 ENCOUNTER — TELEPHONE (OUTPATIENT)
Dept: INTERNAL MEDICINE | Facility: CLINIC | Age: 74
End: 2019-01-25

## 2019-01-29 ENCOUNTER — TELEPHONE (OUTPATIENT)
Dept: ADMINISTRATIVE | Facility: CLINIC | Age: 74
End: 2019-01-29

## 2019-02-05 ENCOUNTER — TELEPHONE (OUTPATIENT)
Dept: OPHTHALMOLOGY | Facility: CLINIC | Age: 74
End: 2019-02-05

## 2019-02-06 ENCOUNTER — TELEPHONE (OUTPATIENT)
Dept: NEPHROLOGY | Facility: CLINIC | Age: 74
End: 2019-02-06

## 2019-02-06 ENCOUNTER — OFFICE VISIT (OUTPATIENT)
Dept: INFECTIOUS DISEASES | Facility: CLINIC | Age: 74
End: 2019-02-06
Payer: MEDICARE

## 2019-02-06 VITALS
SYSTOLIC BLOOD PRESSURE: 143 MMHG | TEMPERATURE: 99 F | DIASTOLIC BLOOD PRESSURE: 65 MMHG | WEIGHT: 237.44 LBS | BODY MASS INDEX: 29.52 KG/M2 | OXYGEN SATURATION: 98 % | HEIGHT: 75 IN | HEART RATE: 65 BPM

## 2019-02-06 DIAGNOSIS — E11.42 DIABETIC POLYNEUROPATHY ASSOCIATED WITH TYPE 2 DIABETES MELLITUS: Primary | Chronic | ICD-10-CM

## 2019-02-06 DIAGNOSIS — L97.524 DIABETIC ULCER OF TOE OF LEFT FOOT ASSOCIATED WITH TYPE 2 DIABETES MELLITUS, WITH NECROSIS OF BONE: ICD-10-CM

## 2019-02-06 DIAGNOSIS — N18.4 TYPE 2 DIABETES MELLITUS WITH STAGE 4 CHRONIC KIDNEY DISEASE, WITH LONG-TERM CURRENT USE OF INSULIN: Chronic | ICD-10-CM

## 2019-02-06 DIAGNOSIS — E11.621 DIABETIC ULCER OF TOE OF LEFT FOOT ASSOCIATED WITH TYPE 2 DIABETES MELLITUS, WITH FAT LAYER EXPOSED: ICD-10-CM

## 2019-02-06 DIAGNOSIS — R78.81 STREPTOCOCCAL BACTEREMIA: ICD-10-CM

## 2019-02-06 DIAGNOSIS — B95.5 STREPTOCOCCAL BACTEREMIA: ICD-10-CM

## 2019-02-06 DIAGNOSIS — E11.621 DIABETIC ULCER OF TOE OF LEFT FOOT ASSOCIATED WITH TYPE 2 DIABETES MELLITUS, WITH NECROSIS OF BONE: ICD-10-CM

## 2019-02-06 DIAGNOSIS — M86.172 ACUTE OSTEOMYELITIS OF TOE, LEFT: ICD-10-CM

## 2019-02-06 DIAGNOSIS — N18.4 CKD (CHRONIC KIDNEY DISEASE) STAGE 4, GFR 15-29 ML/MIN: Chronic | ICD-10-CM

## 2019-02-06 DIAGNOSIS — L97.522 DIABETIC ULCER OF TOE OF LEFT FOOT ASSOCIATED WITH TYPE 2 DIABETES MELLITUS, WITH FAT LAYER EXPOSED: ICD-10-CM

## 2019-02-06 DIAGNOSIS — Z79.4 TYPE 2 DIABETES MELLITUS WITH STAGE 4 CHRONIC KIDNEY DISEASE, WITH LONG-TERM CURRENT USE OF INSULIN: Chronic | ICD-10-CM

## 2019-02-06 DIAGNOSIS — E11.22 TYPE 2 DIABETES MELLITUS WITH STAGE 4 CHRONIC KIDNEY DISEASE, WITH LONG-TERM CURRENT USE OF INSULIN: Chronic | ICD-10-CM

## 2019-02-06 PROBLEM — L97.529 DIABETIC ULCER OF TOE OF LEFT FOOT: Status: RESOLVED | Noted: 2018-12-24 | Resolved: 2019-02-06

## 2019-02-06 PROCEDURE — 99213 PR OFFICE/OUTPT VISIT, EST, LEVL III, 20-29 MIN: ICD-10-PCS | Mod: S$PBB,,, | Performed by: INTERNAL MEDICINE

## 2019-02-06 PROCEDURE — 99999 PR PBB SHADOW E&M-EST. PATIENT-LVL IV: CPT | Mod: PBBFAC,,,

## 2019-02-06 PROCEDURE — 99213 OFFICE O/P EST LOW 20 MIN: CPT | Mod: S$PBB,,, | Performed by: INTERNAL MEDICINE

## 2019-02-06 PROCEDURE — 99999 PR PBB SHADOW E&M-EST. PATIENT-LVL IV: ICD-10-PCS | Mod: PBBFAC,,,

## 2019-02-06 PROCEDURE — 99214 OFFICE O/P EST MOD 30 MIN: CPT | Mod: PBBFAC,PO

## 2019-02-06 NOTE — PROGRESS NOTES
Subjective:      Patient ID: Domingo Castro is a 73 y.o. male.    Chief Complaint:No chief complaint on file.      History of Present Illness    No complaints. Wound healed. Wants port out. Wants to have subcutaneous port out, not just de-accessed.    Review of Systems   All other systems reviewed and are negative.    Objective:   Physical Exam   Constitutional: He is oriented to person, place, and time. He appears well-developed and well-nourished.   HENT:   Head: Normocephalic and atraumatic.   Eyes: Conjunctivae and EOM are normal. Pupils are equal, round, and reactive to light.   Neck: Normal range of motion. Neck supple.   Cardiovascular: Normal rate, regular rhythm and normal heart sounds.   Pulmonary/Chest: Effort normal and breath sounds normal. No respiratory distress.       Abdominal: Soft. Bowel sounds are normal.   Musculoskeletal: Normal range of motion. He exhibits edema (woody edema bilaterally).        Feet:    Neurological: He is alert and oriented to person, place, and time.   Skin: Skin is warm and dry. No erythema.   Nursing note and vitals reviewed.         Assessment:       1. Diabetic polyneuropathy associated with type 2 diabetes mellitus    2. CKD (chronic kidney disease) stage 4, GFR 15-29 ml/min    3. Acute osteomyelitis of toe, left    4. Streptococcus pyogenes bacteremia    5. Type 2 diabetes mellitus with stage 4 chronic kidney disease, with long-term current use of insulin    6. Diabetic ulcer of toe of left foot associated with type 2 diabetes mellitus, with fat layer exposed    7. Diabetic ulcer of toe of left foot associated with type 2 diabetes mellitus, with necrosis of bone          Plan:       Stop IV antibiotics today. Continue edema control. Continue wound prevention. He will discuss with nephrologist regarding access removal. Return to clinic PRN.

## 2019-02-06 NOTE — TELEPHONE ENCOUNTER
----- Message from Nely Watson MD sent at 2/6/2019  4:51 PM CST -----  Contact: Lorna / Wife [phone]  Nephrology had nothing to do with the port  It needs delia directed to doctors who ordered it  ----- Message -----  From: Dorothea Priest LPN  Sent: 2/6/2019  12:09 PM  To: Nely Watson MD        ----- Message -----  From: Nikki Vides  Sent: 2/6/2019  12:02 PM  To: Walter BOTELLO Staff    PT last ID visit is today. Lorna wants to know if PT can now have port removed. Lorna can be reached at [phone].

## 2019-02-14 ENCOUNTER — TELEPHONE (OUTPATIENT)
Dept: INTERNAL MEDICINE | Facility: CLINIC | Age: 74
End: 2019-02-14

## 2019-02-14 ENCOUNTER — HOSPITAL ENCOUNTER (OUTPATIENT)
Dept: RADIOLOGY | Facility: HOSPITAL | Age: 74
Discharge: HOME | End: 2019-02-14
Attending: INTERNAL MEDICINE
Payer: MEDICARE

## 2019-02-14 DIAGNOSIS — J18.9 PNEUMONIA OF LEFT LOWER LOBE DUE TO INFECTIOUS ORGANISM: ICD-10-CM

## 2019-02-14 PROCEDURE — 71046 X-RAY EXAM CHEST 2 VIEWS: CPT | Mod: TC,PO

## 2019-02-14 PROCEDURE — 71046 XR CHEST PA AND LATERAL: ICD-10-PCS | Mod: 26,,, | Performed by: RADIOLOGY

## 2019-02-14 PROCEDURE — 71046 X-RAY EXAM CHEST 2 VIEWS: CPT | Mod: 26,,, | Performed by: RADIOLOGY

## 2019-02-22 ENCOUNTER — OFFICE VISIT (OUTPATIENT)
Dept: INTERNAL MEDICINE | Facility: CLINIC | Age: 74
End: 2019-02-22
Payer: MEDICARE

## 2019-02-22 ENCOUNTER — LAB VISIT (OUTPATIENT)
Dept: LAB | Facility: HOSPITAL | Age: 74
End: 2019-02-22
Attending: INTERNAL MEDICINE
Payer: MEDICARE

## 2019-02-22 VITALS
TEMPERATURE: 98 F | DIASTOLIC BLOOD PRESSURE: 60 MMHG | WEIGHT: 236.56 LBS | SYSTOLIC BLOOD PRESSURE: 128 MMHG | BODY MASS INDEX: 29.41 KG/M2 | HEIGHT: 75 IN | RESPIRATION RATE: 20 BRPM

## 2019-02-22 DIAGNOSIS — Z23 NEED FOR VACCINATION WITH 13-POLYVALENT PNEUMOCOCCAL CONJUGATE VACCINE: ICD-10-CM

## 2019-02-22 DIAGNOSIS — I10 HTN (HYPERTENSION), BENIGN: ICD-10-CM

## 2019-02-22 DIAGNOSIS — N18.4 CKD (CHRONIC KIDNEY DISEASE) STAGE 4, GFR 15-29 ML/MIN: ICD-10-CM

## 2019-02-22 DIAGNOSIS — I10 HTN (HYPERTENSION), BENIGN: Primary | ICD-10-CM

## 2019-02-22 LAB
ANION GAP SERPL CALC-SCNC: 9 MMOL/L
BUN SERPL-MCNC: 44 MG/DL
CALCIUM SERPL-MCNC: 9.7 MG/DL
CHLORIDE SERPL-SCNC: 103 MMOL/L
CO2 SERPL-SCNC: 27 MMOL/L
CREAT SERPL-MCNC: 2.6 MG/DL
EST. GFR  (AFRICAN AMERICAN): 27.1 ML/MIN/1.73 M^2
EST. GFR  (NON AFRICAN AMERICAN): 23.4 ML/MIN/1.73 M^2
ESTIMATED AVG GLUCOSE: 189 MG/DL
GLUCOSE SERPL-MCNC: 254 MG/DL
HBA1C MFR BLD HPLC: 8.2 %
POTASSIUM SERPL-SCNC: 3.8 MMOL/L
SODIUM SERPL-SCNC: 139 MMOL/L

## 2019-02-22 PROCEDURE — 99999 PR PBB SHADOW E&M-EST. PATIENT-LVL III: ICD-10-PCS | Mod: PBBFAC,,, | Performed by: INTERNAL MEDICINE

## 2019-02-22 PROCEDURE — G0009 ADMIN PNEUMOCOCCAL VACCINE: HCPCS | Mod: PBBFAC,PO

## 2019-02-22 PROCEDURE — 99213 OFFICE O/P EST LOW 20 MIN: CPT | Mod: S$PBB,,, | Performed by: INTERNAL MEDICINE

## 2019-02-22 PROCEDURE — 36415 COLL VENOUS BLD VENIPUNCTURE: CPT | Mod: PO

## 2019-02-22 PROCEDURE — 99213 OFFICE O/P EST LOW 20 MIN: CPT | Mod: PBBFAC,PO,25 | Performed by: INTERNAL MEDICINE

## 2019-02-22 PROCEDURE — 99999 PR PBB SHADOW E&M-EST. PATIENT-LVL III: CPT | Mod: PBBFAC,,, | Performed by: INTERNAL MEDICINE

## 2019-02-22 PROCEDURE — 80048 BASIC METABOLIC PNL TOTAL CA: CPT

## 2019-02-22 PROCEDURE — 99213 PR OFFICE/OUTPT VISIT, EST, LEVL III, 20-29 MIN: ICD-10-PCS | Mod: S$PBB,,, | Performed by: INTERNAL MEDICINE

## 2019-02-22 PROCEDURE — 83036 HEMOGLOBIN GLYCOSYLATED A1C: CPT

## 2019-02-22 NOTE — PROGRESS NOTES
CC: followup of hypertension  HPI:  The patient is a 73 y.o. year old male who presents to the office for followup of hypertension.  The patient denies any chest pain, shortness of breath, headache, blurred vision, excessive fatigue, nausea or vomiting.  He reports blood sugars have good.    PAST MEDICAL HISTORY:  Past Medical History:   Diagnosis Date    Arthritis     Cataract     CHF (congestive heart failure)     Coronary artery disease     Cystoid macular edema of both eyes     Diabetes mellitus type II     Hyperlipemia     Hypertension     Retinal hole     Stage 4 chronic kidney disease     Streptococcus pyogenes bacteremia 2018    Due to left toe osteomyelitis       SURGICAL HISTORY:  Past Surgical History:   Procedure Laterality Date    BLEPHAROPLASTY Bilateral 2017    [facility]formed [facility]ST [facility]    CATARACT EXTRACTION W/  INTRAOCULAR LENS IMPLANT Left 12/15/14    Dr martin    CATARACT EXTRACTION W/  INTRAOCULAR LENS IMPLANT Right 14    dorothy    CIRCUMCISION, NON-      focal laser right eye      INSERTION-INTRAOCULAR LENS (IOL) Right 2014    [facility]    INSERTION-INTRAOCULAR LENS (IOL) Left 12/15/2014    [facility]formed by [facility] at [facility] OR    [id number]-[id number]-O-CATH Right 2019    [facility]    KNEE SURGERY      left    Left medial collateral ligament repair      PHACOEMULSIFICATION-ASPIRATION-CATARACT Right 2014    [facility] at [facility] OR    PHACOEMULSIFICATION-ASPIRATION-CATARACT Left 12/15/2014    Performed by [facility] at [facility]    REPAIR-PTOSIS/BILATERAL EXTERNAL LEVATORS Bilateral 2017    Performed [facility] at [facility]ST [facility]    TONSILLECTOMY         MEDS:  Medcard reviewed and updated    ALLERGIES: Allergy Card reviewed and updated    SOCIAL HISTORY:   The patient is a nonsmoker.    PE:   APPEARANCE:  Well nourished, well developed, in no acute distress.    CHEST: Lungs clear to auscultation with unlabored respirations.  CARDIOVASCULAR: Normal S1, S2. No murmurs. No carotid bruits. No pedal edema.  ABDOMEN: Bowel sounds normal. Not distended. Soft. No tenderness or masses.   PSYCHIATRIC: The patient is oriented to person, place, and time and has a pleasant affect.        ASSESSMENT/PLAN:  Domingo was seen today for results.    Diagnoses and all orders for this visit:    HTN (hypertension), benign  -     Basic metabolic panel; Future  -     blood pressure is controlled    Type 2 diabetes, uncontrolled, with neuropathy  -     Basic metabolic panel; Future  -     Hemoglobin A1c; Future    CKD (chronic kidney disease) stage 4, GFR 15-29 ml/min  -     Basic metabolic panel; Future    Need for vaccination with 13-polyvalent pneumococcal conjugate vaccine  -     (In Office Administered) Pneumococcal Conjugate Vaccine (13 Valent) (IM)

## 2019-02-25 ENCOUNTER — PROCEDURE VISIT (OUTPATIENT)
Dept: OPHTHALMOLOGY | Facility: CLINIC | Age: 74
End: 2019-02-25
Payer: MEDICARE

## 2019-02-25 VITALS — HEART RATE: 60 BPM | SYSTOLIC BLOOD PRESSURE: 128 MMHG | DIASTOLIC BLOOD PRESSURE: 58 MMHG

## 2019-02-25 DIAGNOSIS — H35.033 HYPERTENSIVE RETINOPATHY OF BOTH EYES: ICD-10-CM

## 2019-02-25 DIAGNOSIS — H33.302 RETINAL HOLE OR TEAR, LEFT: ICD-10-CM

## 2019-02-25 PROCEDURE — 67028 INJECTION EYE DRUG: CPT | Mod: S$PBB,RT,, | Performed by: OPHTHALMOLOGY

## 2019-02-25 PROCEDURE — 92134 CPTRZ OPH DX IMG PST SGM RTA: CPT | Mod: PBBFAC | Performed by: OPHTHALMOLOGY

## 2019-02-25 PROCEDURE — 92134 POSTERIOR SEGMENT OCT RETINA (OCULAR COHERENCE TOMOGRAPHY)-BOTH EYES: ICD-10-PCS | Mod: 26,S$PBB,, | Performed by: OPHTHALMOLOGY

## 2019-02-25 PROCEDURE — 92235 FLUORESCEIN ANGIOGRAPHY - OU - BOTH EYES: ICD-10-PCS | Mod: 26,S$PBB,, | Performed by: OPHTHALMOLOGY

## 2019-02-25 PROCEDURE — 92014 PR EYE EXAM, EST PATIENT,COMPREHESV: ICD-10-PCS | Mod: 25,S$PBB,, | Performed by: OPHTHALMOLOGY

## 2019-02-25 PROCEDURE — 67028 INJECTION EYE DRUG: CPT | Mod: PBBFAC,RT | Performed by: OPHTHALMOLOGY

## 2019-02-25 PROCEDURE — 92014 COMPRE OPH EXAM EST PT 1/>: CPT | Mod: 25,S$PBB,, | Performed by: OPHTHALMOLOGY

## 2019-02-25 PROCEDURE — 67028 PR INJECT INTRAVITREAL PHARMCOLOGIC: ICD-10-PCS | Mod: S$PBB,RT,, | Performed by: OPHTHALMOLOGY

## 2019-02-25 PROCEDURE — 92235 FLUORESCEIN ANGRPH MLTIFRAME: CPT | Mod: 50,PBBFAC | Performed by: OPHTHALMOLOGY

## 2019-02-25 RX ADMIN — DEXAMETHASONE 0.7 MG: 0.7 IMPLANT INTRAVITREAL at 04:02

## 2019-02-25 NOTE — PATIENT INSTRUCTIONS
Fluorescein Angiography     A fluorescein angiogram of the retina   Fluorescein angiography is an eye test. It is done to look at the back of your eye, including:  · The blood vessels in your eye  · The layer of tissue at the back of your eye (the retina)  · The center of your retina (the macula)  · The optic nerve  This test can diagnose diseases found in these areas. It can also diagnose other conditions that affect these areas. To do this test, a dye called fluorescein is shot (injected) into your arm. The dye goes into your bloodstream and up into the blood vessels in your eyes. A special camera is then used to take images (angiograms) of your eyes.  Getting ready for your test  Tell your healthcare provider if you:  · Are pregnant or think you may be pregnant  · Are breastfeeding  · Have a history of severe allergic reactions, including to X-ray dye or other medicines  · Have kidney problems  Tell your provider about any medicines you are taking. You may need to stop taking all or some of these before the test. This includes:  · All prescription medicines  · Over-the-counter medicines such as aspirin or ibuprofen  · Street drugs  · Herbs, vitamins, and other supplements  You should arrange for an adult family member or friend to drive you home after your test. Your vision will be blurry for up to 12 hours.  Follow any other instructions from your healthcare provider.  During your test  · You are given eye drops to enlarge (dilate) your pupils.  · You then sit in front of a special camera. You place your chin on the chin rest and look into the camera.  · Images are taken of your eyes, one eye at a time.  · Fluorescein dye is then injected into your arm. The lights in the room are turned off. You may have mild nausea. You may have a warm feeling in your arm or upper body. Tell your provider if your skin feels itchy or if you are having trouble breathing. If so, you could be having an allergic reaction to the  dye.  · More pictures of your eyes are taken over 15 to 30 minutes. The camera shines a bright light into your eyes. Try to keep your head still and your eyes open.  · When enough images have been taken, the test is over.  After your test  Your vision will be blurry for up to 4 to 12 hours. This is because of your dilated pupils. Your eye will be more sensitive to light for up to 12 hours. You may want to wear sunglasses during this time. Do not drive if your vision is very blurry. You may also find it uncomfortable to read. Your skin may look yellow for a few hours. This is from the dye. Your urine will be bright yellow or orange for 24 to 48 hours after the test.     Risks and possible complications  All procedures have some risks. Possible risks of fluorescein angiography include:  · Upset stomach (nausea) and vomiting  · Leaking dye around the injection site that causes pain and swelling  · Metallic taste in your mouth  · Infection at injection site  · Allergic reaction to the dye  · Dry mouth or too much saliva  · Faster heart rate  · Sweating  · Lower back pain   Date Last Reviewed: 5/30/2015  © 7986-7331 [employer]. [address] [place] [address]. All rights reserved. This information is not intended as a substitute for professional medical care. Always follow your healthcare professional's instructions.      .POST INTRAVITREAL INJECTION PATIENT INSTRUCTIONS   Below are some guidelines for what to expect after your treatment and additional care instructions.   * you may experience mild discomfort in your eye after the treatment. Your eye usually feels better within 24 to 48 hours after the procedure.   * you have been given drops that numb the surface of your eye.   DO NOT rub or touch your eye, DO NOT wear contacts until numbness goes away.   * you may experience small spots that appear in your field of vision, these are usually caused by an air bubble or from the medication. It  taked a few hours or days for these to go away.   * use of sunglasses will help reduce light sensitivity and glare.   * DO NOT swim   for at least 3 hours after the injection   * If you have a gritty, burning, irritating or stinging feeling in the injected eye. This may be a result of the antiseptic used. Use artifical tears or eye lubricant ( from over- the- counter from your local pharmacy ). If you have some at home already please check the expiration date, so not to use anything contaminated in your eye. A cool pack over your eye brow above the injected eye may also relieve discomfort.   Call us right away or go to the Emergency Department if you have a dramatic decrease in vision or extreme pain in the eye.   [facility] [phone]

## 2019-02-25 NOTE — PROGRESS NOTES
HPI     Eye Problem      Additional comments: 1 month check              Comments     DLS 12/18/18- Vision OD improved x last visit. He feels the injection helped.        OCT - [place] OD stable, nasal CME improved  OS- CME slightly worse compared to prior     [place] FA - Peripheral NP and NV  Late macular leakage OU      A/P    1. PDR OU  Improved A1c recently, but still uncontrolled on insulin  But  VH OD  S/p resumed Avastin OD x 1    PLan PRP OU    2. DME OU  - S/p Focal OU (OS x 2 06/13)  - S/p Ozurdex OD - fluid improved, but pt didn't notice much of a chance  - Some residual DME OD> OS    Ozurdex OD today      3. PCIOL OU    4. HTN Ret OU    5. Ret Hole x 2  S/p barrier laser     6. Presbyopia  - Significantly increased since cataract surgery   - Pt unhappy with glasses- may need new MRx     7. 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  The patient voiced understanding and wished to proceed with the procedure    Injection Procedure Note:  Diagnosis: PDR/DME OD    Patient Identified and Time Out complete  Topical Proparacaine and Betadine. subconj lido  Inject Ozurdex OD at 6:00 @ 3.5-4mm posterior to limbus  Post Operative Dx: Same  Complications: None  Follow up as above.

## 2019-02-26 RX ORDER — CARVEDILOL 25 MG/1
TABLET ORAL
Qty: 180 TABLET | Refills: 3 | Status: SHIPPED | OUTPATIENT
Start: 2019-02-26 | End: 2019-05-29

## 2019-03-08 ENCOUNTER — OFFICE VISIT (OUTPATIENT)
Dept: CARDIOLOGY | Facility: CLINIC | Age: 74
End: 2019-03-08
Payer: MEDICARE

## 2019-03-08 ENCOUNTER — TELEPHONE (OUTPATIENT)
Dept: INTERNAL MEDICINE | Facility: CLINIC | Age: 74
End: 2019-03-08

## 2019-03-08 VITALS
WEIGHT: 238 LBS | DIASTOLIC BLOOD PRESSURE: 60 MMHG | HEART RATE: 72 BPM | SYSTOLIC BLOOD PRESSURE: 120 MMHG | HEIGHT: 72 IN | BODY MASS INDEX: 32.23 KG/M2

## 2019-03-08 DIAGNOSIS — N18.4 CKD (CHRONIC KIDNEY DISEASE) STAGE 4, GFR 15-29 ML/MIN: Chronic | ICD-10-CM

## 2019-03-08 DIAGNOSIS — E78.5 DYSLIPIDEMIA: Chronic | ICD-10-CM

## 2019-03-08 DIAGNOSIS — I10 ESSENTIAL HYPERTENSION: Primary | Chronic | ICD-10-CM

## 2019-03-08 DIAGNOSIS — I50.32 CHRONIC DIASTOLIC CONGESTIVE HEART FAILURE: Chronic | ICD-10-CM

## 2019-03-08 PROCEDURE — 99213 OFFICE O/P EST LOW 20 MIN: CPT | Mod: PBBFAC,PO | Performed by: INTERNAL MEDICINE

## 2019-03-08 PROCEDURE — 99214 PR OFFICE/OUTPT VISIT, EST, LEVL IV, 30-39 MIN: ICD-10-PCS | Mod: S$PBB,,, | Performed by: INTERNAL MEDICINE

## 2019-03-08 PROCEDURE — 99214 OFFICE O/P EST MOD 30 MIN: CPT | Mod: S$PBB,,, | Performed by: INTERNAL MEDICINE

## 2019-03-08 PROCEDURE — 99999 PR PBB SHADOW E&M-EST. PATIENT-LVL III: ICD-10-PCS | Mod: PBBFAC,,, | Performed by: INTERNAL MEDICINE

## 2019-03-08 PROCEDURE — 99999 PR PBB SHADOW E&M-EST. PATIENT-LVL III: CPT | Mod: PBBFAC,,, | Performed by: INTERNAL MEDICINE

## 2019-03-08 NOTE — PROGRESS NOTES
Subjective:   Patient ID:  Domingo Castro is a 74 y.o. male who presents for evaluation of No chief complaint on file.      HPI: 74 y/o AA male with PMH of DM2, HTN and HLD present to establish care. He is felling well so did not have stress test done. He denies chest pain or dyspnea. No orthopnea or PND. BP and HR is controlled. He is active. He is compliant with medications.     Past Medical History:   Diagnosis Date    Arthritis     Cataract     CHF (congestive heart failure)     Coronary artery disease     Cystoid macular edema of both eyes     Diabetes mellitus type II     Hyperlipemia     Hypertension     Retinal hole     Stage 4 chronic kidney disease     Streptococcus pyogenes bacteremia 12/23/2018    Due to left toe osteomyelitis           Patient's Medications   New Prescriptions    No medications on file   Previous Medications    ACETAMINOPHEN (TYLENOL) 325 MG TABLET    Take 2 tablets (650 mg total) by mouth every 4 (four) hours as needed.    AMLODIPINE (NORVASC) 10 MG TABLET    Take 1 tablet (10 mg total) by mouth once daily.    ASPIRIN (ECOTRIN) 81 MG EC TABLET    Take 1 tablet (81 mg total) by mouth once daily.    CADEXOMER IODINE (IODOSORB) 0.9 % GEL    Apply topically every Mon, Wed, Fri.    CARVEDILOL (COREG) 25 MG TABLET    Take 1 tablet (25 mg total) by mouth 2 (two) times daily with meals.    CARVEDILOL (COREG) 25 MG TABLET    TAKE ONE TABLET BY MOUTH TWICE DAILY WITH MEALS    FUROSEMIDE (LASIX) 40 MG TABLET    Take 2 tablets (80 mg total) by mouth 2 (two) times daily. Do not take the pm dose after 3 pm    GABAPENTIN (NEURONTIN) 100 MG CAPSULE    TAKE ONE CAPSULE BY MOUTH IN THE MORNING THEN ONE CAPSULE  IN THE EVENING AND THEN THREE CAPSULES AT BEDTIME    HYDRALAZINE (APRESOLINE) 50 MG TABLET    TAKE ONE TABLET BY MOUTH EVERY 12 HOURS    INSULIN LISPRO (HUMALOG KWIKPEN INSULIN) 100 UNIT/ML INPN PEN    INJECT 10 UNITS SUBCUTANEOUSLY THREE TIMES DAILY BEFORE MEALS WITH CORRECTION SCALE,  "MAX TDD 50 UNITS    LEVEMIR FLEXTOUCH U-100 INSULN 100 UNIT/ML (3 ML) INPN PEN    Inject 26 Units into the skin every evening.    PEN NEEDLE, DIABETIC, SAFETY (BD AUTOSHIELD PEN NEEDLE) 29 GAUGE X 5/16" NDLE    For use once daily with levemir flexpen    ROSUVASTATIN (CRESTOR) 20 MG TABLET    Take 20 mg by mouth once daily.    Modified Medications    No medications on file   Discontinued Medications    No medications on file        Review of patient's allergies indicates:   Allergen Reactions    Atorvastatin Other (See Comments)       Review of Systems   Constitution: Negative.   HENT: Negative.    Eyes: Negative.    Cardiovascular: Positive for dyspnea on exertion.   Respiratory: Negative.    Endocrine: Negative.    Hematologic/Lymphatic: Negative.    Skin: Negative.    Musculoskeletal: Negative.    Gastrointestinal: Negative.    Neurological: Negative.    Psychiatric/Behavioral: Negative.    Allergic/Immunologic: Negative.      Objective:   Physical Exam   Constitutional: He is oriented to person, place, and time. He appears well-developed and well-nourished. No distress.   Examination of the digits showed no clubbing or cyanosis   HENT:   Head: Normocephalic and atraumatic.   Eyes: Conjunctivae are normal. Pupils are equal, round, and reactive to light. Right eye exhibits no discharge.   Neck: Normal range of motion. Neck supple. No JVD present. No thyromegaly present.   No carotid bruits   Cardiovascular: Normal rate, regular rhythm, S1 normal, S2 normal, normal heart sounds, intact distal pulses and normal pulses. PMI is not displaced. Exam reveals no gallop, no friction rub and no opening snap.   No murmur heard.  Pulmonary/Chest: Effort normal and breath sounds normal. No respiratory distress. He has no wheezes. He has no rales. He exhibits no tenderness.   Abdominal: Soft. Bowel sounds are normal. He exhibits no distension and no mass. There is no tenderness. There is no guarding.   No hepatosplenomegaly "   Musculoskeletal: Normal range of motion. He exhibits edema. He exhibits no tenderness.   Lymphadenopathy:     He has no cervical adenopathy.   Neurological: He is alert and oriented to person, place, and time.   Skin: Skin is warm. No rash noted. He is not diaphoretic. No erythema.   Psychiatric: He has a normal mood and affect.   Nursing note and vitals reviewed.      Lab Results   Component Value Date    WBC 7.54 01/17/2019    HGB 8.5 (L) 01/17/2019    HCT 27.1 (L) 01/17/2019    MCV 89 01/17/2019     01/17/2019         Chemistry        Component Value Date/Time     02/22/2019 0941    K 3.8 02/22/2019 0941     02/22/2019 0941    CO2 27 02/22/2019 0941    BUN 44 (H) 02/22/2019 0941    CREATININE 2.6 (H) 02/22/2019 0941     (H) 02/22/2019 0941        Component Value Date/Time    CALCIUM 9.7 02/22/2019 0941    ALKPHOS 75 01/17/2019 1224    AST 16 01/17/2019 1224    ALT 16 01/17/2019 1224    BILITOT 0.2 01/17/2019 1224    ESTGFRAFRICA 27.1 (A) 02/22/2019 0941    EGFRNONAA 23.4 (A) 02/22/2019 0941            Lab Results   Component Value Date    CHOL 126 07/20/2018    CHOL 143 06/13/2017    CHOL 154 08/18/2016     Lab Results   Component Value Date    HDL 31 (L) 07/20/2018    HDL 42 06/13/2017    HDL 39 (L) 08/18/2016     Lab Results   Component Value Date    LDLCALC 76.8 07/20/2018    LDLCALC 81.6 06/13/2017    LDLCALC 92.6 08/18/2016     Lab Results   Component Value Date    TRIG 91 07/20/2018    TRIG 97 06/13/2017    TRIG 112 08/18/2016     Lab Results   Component Value Date    CHOLHDL 24.6 07/20/2018    CHOLHDL 29.4 06/13/2017    CHOLHDL 25.3 08/18/2016       Lab Results   Component Value Date    TSH 1.589 06/13/2017       Lab Results   Component Value Date    HGBA1C 8.2 (H) 02/22/2019       ECGs reviewed-NSR with non specific St changes  LABS reviewed  Imaging including Echoes reviewed- ef 55% with DD    Assessment:     1. Essential hypertension    2. Dyslipidemia    3. Chronic diastolic  congestive heart failure    4. CKD (chronic kidney disease) stage 4, GFR 15-29 ml/min        Plan:     Patient doing well  Continue current medications  Activity as tolerate  Weight loss  F/u in 6 months

## 2019-03-19 ENCOUNTER — TELEPHONE (OUTPATIENT)
Dept: INTERNAL MEDICINE | Facility: CLINIC | Age: 74
End: 2019-03-19

## 2019-03-19 NOTE — TELEPHONE ENCOUNTER
----- Message from Jose Saravia sent at 3/18/2019  4:23 PM CDT -----  Contact: Wife - Patty @ [phone]   Podiatry doctor has sent him to get a diabetic shoe, he went to  the shoes and they said that they have been sending requests to Dr. Nugent to complete a form for the orders.

## 2019-03-22 ENCOUNTER — TELEPHONE (OUTPATIENT)
Dept: INTERNAL MEDICINE | Facility: CLINIC | Age: 74
End: 2019-03-22

## 2019-03-26 ENCOUNTER — TELEPHONE (OUTPATIENT)
Dept: INTERNAL MEDICINE | Facility: CLINIC | Age: 74
End: 2019-03-26

## 2019-04-08 ENCOUNTER — OFFICE VISIT (OUTPATIENT)
Dept: PODIATRY | Facility: CLINIC | Age: 74
End: 2019-04-08
Payer: MEDICARE

## 2019-04-08 VITALS — WEIGHT: 238 LBS | HEIGHT: 72 IN | BODY MASS INDEX: 32.23 KG/M2

## 2019-04-08 DIAGNOSIS — L97.521 DIABETIC ULCER OF TOE OF LEFT FOOT ASSOCIATED WITH TYPE 2 DIABETES MELLITUS, LIMITED TO BREAKDOWN OF SKIN: Primary | ICD-10-CM

## 2019-04-08 DIAGNOSIS — B35.1 ONYCHOMYCOSIS: ICD-10-CM

## 2019-04-08 DIAGNOSIS — E11.51 TYPE 2 DIABETES MELLITUS WITH PERIPHERAL VASCULAR DISEASE: ICD-10-CM

## 2019-04-08 DIAGNOSIS — E11.621 DIABETIC ULCER OF TOE OF LEFT FOOT ASSOCIATED WITH TYPE 2 DIABETES MELLITUS, LIMITED TO BREAKDOWN OF SKIN: Primary | ICD-10-CM

## 2019-04-08 PROCEDURE — 11721 ROUTINE FOOT CARE: ICD-10-PCS | Mod: 59,Q9,S$PBB, | Performed by: PODIATRIST

## 2019-04-08 PROCEDURE — 99499 UNLISTED E&M SERVICE: CPT | Mod: S$PBB,,, | Performed by: PODIATRIST

## 2019-04-08 PROCEDURE — 99999 PR PBB SHADOW E&M-EST. PATIENT-LVL II: CPT | Mod: PBBFAC,,, | Performed by: PODIATRIST

## 2019-04-08 PROCEDURE — 99499 NO LOS: ICD-10-PCS | Mod: S$PBB,,, | Performed by: PODIATRIST

## 2019-04-08 PROCEDURE — 97597 WOUND DEBRIDEMENT: ICD-10-PCS | Mod: S$PBB,,, | Performed by: PODIATRIST

## 2019-04-08 PROCEDURE — 97597 DBRDMT OPN WND 1ST 20 CM/<: CPT | Mod: PBBFAC,PN | Performed by: PODIATRIST

## 2019-04-08 PROCEDURE — 99999 PR PBB SHADOW E&M-EST. PATIENT-LVL II: ICD-10-PCS | Mod: PBBFAC,,, | Performed by: PODIATRIST

## 2019-04-08 PROCEDURE — 99212 OFFICE O/P EST SF 10 MIN: CPT | Mod: PBBFAC,PN,25 | Performed by: PODIATRIST

## 2019-04-08 PROCEDURE — 11721 DEBRIDE NAIL 6 OR MORE: CPT | Mod: PBBFAC,PN,59 | Performed by: PODIATRIST

## 2019-04-08 NOTE — PROCEDURES
"Routine Foot Care  Date/Time: 4/8/2019 11:50 AM  Performed by: Dimitry Gallegos DPM  Authorized by: Dimitry Gallegos DPM     Time out: Immediately prior to procedure a "time out" was called to verify the correct patient, procedure, equipment, support staff and site/side marked as required.    Consent Done?:  Yes (Verbal)  Hyperkeratotic Skin Lesions?: No      Nail Care Type:  Debride  Location(s): All  (Left 1st Toe, Left 3rd Toe, Left 2nd Toe, Left 4th Toe, Left 5th Toe, Right 1st Toe, Right 2nd Toe, Right 3rd Toe, Right 4th Toe and Right 5th Toe)  Patient tolerance:  Patient tolerated the procedure well with no immediate complications      "

## 2019-04-08 NOTE — PROCEDURES
"Wound Debridement  Date/Time: 4/8/2019 11:49 AM  Performed by: Dimitry Gallegos DPM  Authorized by: Dimitry Gallegos DPM     Time out: Immediately prior to procedure a "time out" was called to verify the correct patient, procedure, equipment, support staff and site/side marked as required.    Consent Done?:  Yes (Verbal)    Preparation: Patient was prepped and draped in usual sterile fashion    Local anesthesia used?: No      Wound Details:    Location:  Left foot    Location:  Left 1st Toe    Type of Debridement:  Excisional       Length (cm):  0.5       Area (sq cm):  0.2       Width (cm):  0.4       Percent Debrided (%):  100       Depth (cm):  0.1       Total Area Debrided (sq cm):  0.2    Depth of debridement:  Epidermis/Dermis    Tissue debrided:  Epidermis and Dermis    Devitalized tissue debrided:  Callus, Fibrin, Necrotic/Eschar and Slough    Instruments:  Blade    Bleeding:  Minimal  Hemostasis Achieved: Yes    Method Used:  Pressure and Silver Nitrate  Patient tolerance:  Patient tolerated the procedure well with no immediate complications     Betadine, shar foam secured with coban.      "

## 2019-04-09 NOTE — PROGRESS NOTES
Subjective:      Patient ID: Domingo Castro is a 74 y.o. male.    Chief Complaint: Diabetes Mellitus (Dr. Nugent 2/22/19); Diabetic Foot Exam (feet swollen and big toe left foot skin is black ); and Routine Foot Care    Domingo is a 74 y.o. male who presents to the clinic for evaluation and treatment of high risk feet. Domingo has a past medical history of Arthritis, Cataract, CHF (congestive heart failure), Coronary artery disease, Cystoid macular edema of both eyes, Diabetes mellitus type II, Hyperlipemia, Hypertension, Retinal hole, Stage 4 chronic kidney disease, and Streptococcus pyogenes bacteremia (12/23/2018). The patient's chief complaint is long, thick toenails. This patient has documented high risk feet requiring routine maintenance secondary to peripheral neuropathy. Seen while admitted at [facility] on 12/27/19 for left hallux ulceration. Says he is using whirl pool device with dilute betadine at home that has improved his skin appearance.    PCP: Griselda Nugent MD    Date Last Seen by PCP: 2/22/19    Current shoe gear:  Affected Foot: Rx diabetic extra depth shoes and custom accommodative insoles     Unaffected Foot: Rx diabetic extra depth shoes and custom accommodative insoles    Hemoglobin A1C   Date Value Ref Range Status   02/22/2019 8.2 (H) 4.0 - 5.6 % Final     Comment:     ADA Screening Guidelines:  5.7-6.4%  Consistent with prediabetes  >or=6.5%  Consistent with diabetes  High levels of fetal hemoglobin interfere with the HbA1C  assay. Heterozygous hemoglobin variants (HbS, HgC, etc)do  not significantly interfere with this assay.   However, presence of multiple variants may affect accuracy.     11/28/2018 7.3 (H) 4.0 - 5.6 % Final     Comment:     ADA Screening Guidelines:  5.7-6.4%  Consistent with prediabetes  >or=6.5%  Consistent with diabetes  High levels of fetal hemoglobin interfere with the HbA1C  assay. Heterozygous hemoglobin variants (HbS, HgC, etc)do  not significantly interfere  with this assay.   However, presence of multiple variants may affect accuracy.     2018 8.3 (H) 4.0 - 5.6 % Final     Comment:     ADA Screening Guidelines:  5.7-6.4%  Consistent with prediabetes  >or=6.5%  Consistent with diabetes  High levels of fetal hemoglobin interfere with the HbA1C  assay. Heterozygous hemoglobin variants (HbS, HgC, etc)do  not significantly interfere with this assay.   However, presence of multiple variants may affect accuracy.       Vitals:    19 1117   Weight: 108 kg (238 lb)   Height: 6' (1.829 m)   PainSc: 0-No pain      Past Medical History:   Diagnosis Date    Arthritis     Cataract     CHF (congestive heart failure)     Coronary artery disease     Cystoid macular edema of both eyes     Diabetes mellitus type II     Hyperlipemia     Hypertension     Retinal hole     Stage 4 chronic kidney disease     Streptococcus pyogenes bacteremia 2018    Due to left toe osteomyelitis       Past Surgical History:   Procedure Laterality Date    BLEPHAROPLASTY Bilateral 2017    [facility]    CATARACT EXTRACTION W/  INTRAOCULAR LENS IMPLANT Left 12/15/14    Dr martin    CATARACT EXTRACTION W/  INTRAOCULAR LENS IMPLANT Right 14    dorothy    CIRCUMCISION, NON-      focal laser right eye      INSERTION-INTRAOCULAR LENS (IOL) Right 2014    [facility]    INSERTION-INTRAOCULAR LENS (IOL) Left 12/15/2014    [facility] at [facility] OR    MHYRMIEJA-CIFI-Q-CATH Right 2019    [facility]    KNEE SURGERY      left    Left medial collateral ligament repair      PHACOEMULSIFICATION-ASPIRATION-CATARACT Right 2014    [facility] OR    PHACOEMULSIFICATION-ASPIRATION-CATARACT Left 12/15/2014    [facility] OR    REPAIR-PTOSIS/BILATERAL EXTERNAL LEVATORS Bilateral 2017     [facility] OR [address] [place] [address]    TONSILLECTOMY         Family History   Problem Relation Age of Onset    Heart disease Mother     Cancer Mother     Cancer Brother     Heart disease Father     Diabetes Father     Cancer Sister     Cataracts Paternal Grandmother     Glaucoma Paternal Grandmother     Blindness Neg Hx     Amblyopia Neg Hx     Hypertension Neg Hx     Macular degeneration Neg Hx     Retinal detachment Neg Hx     Strabismus Neg Hx        Social History     Socioeconomic History    Marital status:      Spouse name: Not on file    Number of children: Not on file    Years of education: Not on file    Highest education level: Not on file   Occupational History    Not on file   Social Needs    Financial resource strain: Not on file    Food insecurity:     Worry: Not on file     Inability: Not on file    Transportation needs:     Medical: Not on file     Non-medical: Not on file   Tobacco Use    Smoking status: Never Smoker    Smokeless tobacco: Never Used   Substance and Sexual Activity    Alcohol use: No     Alcohol/week: 0.0 oz    Drug use: No    Sexual activity: Yes     Partners: Female   Lifestyle    Physical activity:     Days per week: Not on file     Minutes per session: Not on file    Stress: Not on file   Relationships    Social connections:     Talks on phone: Not on file     Gets together: Not on file     Attends [employer] service: Not on file     Active member of club or organization: Not on file     Attends meetings of clubs or organizations: Not on file     Relationship status: Not on file   Other Topics Concern    Not on file   Social History Narrative    Not on file       Current Outpatient Medications   Medication Sig Dispense Refill    acetaminophen (TYLENOL) 325 MG tablet Take 2 tablets (650 mg total) by mouth every 4 (four) hours as needed.  0    aspirin (ECOTRIN) 81 MG EC tablet Take 1 tablet (81 mg total) by mouth once daily.  " 0    cadexomer iodine (IODOSORB) 0.9 % gel Apply topically every Mon, Wed, Fri. 40 g 0    carvedilol (COREG) 25 MG tablet Take 1 tablet (25 mg total) by mouth 2 (two) times daily with meals. 60 tablet 0    carvedilol (COREG) 25 MG tablet TAKE ONE TABLET BY MOUTH TWICE DAILY WITH MEALS 180 tablet 3    furosemide (LASIX) 40 MG tablet Take 2 tablets (80 mg total) by mouth 2 (two) times daily. Do not take the pm dose after 3 pm 360 tablet 0    gabapentin (NEURONTIN) 100 MG capsule TAKE ONE CAPSULE BY MOUTH IN THE MORNING THEN ONE CAPSULE  IN THE EVENING AND THEN THREE CAPSULES AT BEDTIME 450 capsule 3    hydrALAZINE (APRESOLINE) 50 MG tablet TAKE ONE TABLET BY MOUTH EVERY 12 HOURS 180 tablet 3    insulin lispro (HUMALOG KWIKPEN INSULIN) 100 unit/mL InPn pen INJECT 10 UNITS SUBCUTANEOUSLY THREE TIMES DAILY BEFORE MEALS WITH CORRECTION SCALE, MAX TDD 50 UNITS 9 Box 6    LEVEMIR FLEXTOUCH U-100 INSULN 100 unit/mL (3 mL) InPn pen Inject 26 Units into the skin every evening. 36 mL 3    pen needle, diabetic, safety (BD AUTOSHIELD PEN NEEDLE) 29 gauge x 5/16" Ndle For use once daily with levemir flexpen 90 each 11    rosuvastatin (CRESTOR) 20 MG tablet Take 20 mg by mouth once daily.       amLODIPine (NORVASC) 10 MG tablet Take 1 tablet (10 mg total) by mouth once daily. 90 tablet 3     No current facility-administered medications for this visit.        Review of patient's allergies indicates:   Allergen Reactions    Atorvastatin Other (See Comments)       Review of Systems   Constitution: Negative for chills, fever and malaise/fatigue.   HENT: Negative for congestion.    Cardiovascular: Negative for chest pain, claudication and leg swelling.   Respiratory: Negative for cough and shortness of breath.    Skin: Positive for dry skin and nail changes.   Musculoskeletal: Positive for back pain. Negative for joint pain, muscle cramps and muscle weakness.   Gastrointestinal: Negative for nausea and vomiting. "   Neurological: Positive for numbness and paresthesias. Negative for weakness.   Psychiatric/Behavioral: Negative for altered mental status.           Objective:      Physical Exam   Constitutional: He is oriented to person, place, and time. No distress.   Cardiovascular:   Pulses:       Dorsalis pedis pulses are 1+ on the right side, and 1+ on the left side.        Posterior tibial pulses are 1+ on the right side, and 1+ on the left side.   CFT< 3 secs all toes bilateral foot, skin temp warm to cool proximal to distal bilateral foot, no hair growth bilateral lower extremity, no lower extremity edema bilateral.       Musculoskeletal:        Right foot: There is decreased range of motion and deformity.        Left foot: There is decreased range of motion and deformity.   No pain with ROM or MMT bilateral lower extremity.    Elongated second toe bilateral. Non-reducible hammertoe second toe left foot. Flexion contractures toes 2-5 bilateral foot.    + equinus that reduces with knee bent bilateral.    No pain with ROM or MMT bilateral lower extremity.     Feet:   Right Foot:   Protective Sensation: 10 sites tested. 3 sites sensed.   Skin Integrity: Positive for dry skin. Negative for ulcer, blister, skin breakdown, erythema, warmth or callus.   Left Foot:   Protective Sensation: 10 sites tested. 4 sites sensed.   Skin Integrity: Positive for skin breakdown and dry skin. Negative for ulcer, blister, erythema, warmth or callus.   Neurological: He is alert and oriented to person, place, and time. He has normal strength. A sensory deficit is present.   Vibratory sensation absent bilateral foot.   Skin: Skin is dry and intact. Capillary refill takes 2 to 3 seconds. Lesion noted. No ecchymosis and no rash noted. He is not diaphoretic. No cyanosis or erythema. No pallor. Nails show no clubbing.   Loosened, dark, hyperkeratotic skin distal medial left hallux. Post debridement note ulceration to level of dermis with mixed  granular and fibrous base containing overlying biofilm and slough.    Nails 1-5 bilateral are elongated 3-4 mm, thickened 1-2 mm, hard with minor loosening and debris.    No open lesions or macerations bilateral lower extremity.               Assessment:       Encounter Diagnoses   Name Primary?    Diabetic ulcer of toe of left foot associated with type 2 diabetes mellitus, limited to breakdown of skin Yes    Onychomycosis     Type 2 diabetes mellitus with peripheral vascular disease     Uncontrolled type 2 diabetes mellitus with peripheral neuropathy          Plan:       Domingo was seen today for diabetes mellitus, diabetic foot exam and routine foot care.    Diagnoses and all orders for this visit:    Diabetic ulcer of toe of left foot associated with type 2 diabetes mellitus, limited to breakdown of skin  -     Wound Debridement    Onychomycosis  -     Routine Foot Care    Type 2 diabetes mellitus with peripheral vascular disease  -     Routine Foot Care    Uncontrolled type 2 diabetes mellitus with peripheral neuropathy  -     Routine Foot Care      I counseled the patient on his conditions, their implications and medical management.    Shoe inspection. Diabetic Foot Education. Patient reminded of the importance of good nutrition and blood sugar control to help prevent podiatric complications of diabetes. Patient instructed on proper foot hygeine. We discussed wearing proper shoe gear, daily foot inspections, never walking without protective shoe gear, never putting sharp instruments to feet, routine podiatric nail visits every 2-3 months.     Routine foot care per attached note. Patient relates relief following the procedure. He will continue to monitor the areas daily, inspect his feet, wear protective shoe gear when ambulatory, moisturizer to maintain skin integrity and follow in this office in approximately 2-3 months, sooner p.r.n.    Wound debridement and dressing per attached note.Home care  instructions reviewed in detail.    RTC 3 months or prn.

## 2019-05-10 ENCOUNTER — OFFICE VISIT (OUTPATIENT)
Dept: PODIATRY | Facility: CLINIC | Age: 74
End: 2019-05-10
Payer: MEDICARE

## 2019-05-10 VITALS — HEIGHT: 72 IN | WEIGHT: 238 LBS | BODY MASS INDEX: 32.23 KG/M2

## 2019-05-10 DIAGNOSIS — L97.522 DIABETIC ULCER OF TOE OF LEFT FOOT ASSOCIATED WITH TYPE 2 DIABETES MELLITUS, WITH FAT LAYER EXPOSED: Primary | ICD-10-CM

## 2019-05-10 DIAGNOSIS — E11.621 DIABETIC ULCER OF TOE OF LEFT FOOT ASSOCIATED WITH TYPE 2 DIABETES MELLITUS, WITH FAT LAYER EXPOSED: Primary | ICD-10-CM

## 2019-05-10 DIAGNOSIS — E11.51 TYPE 2 DIABETES MELLITUS WITH PERIPHERAL VASCULAR DISEASE: ICD-10-CM

## 2019-05-10 PROCEDURE — 99214 OFFICE O/P EST MOD 30 MIN: CPT | Mod: PBBFAC,PN,25 | Performed by: PODIATRIST

## 2019-05-10 PROCEDURE — 99213 OFFICE O/P EST LOW 20 MIN: CPT | Mod: 25,S$PBB,, | Performed by: PODIATRIST

## 2019-05-10 PROCEDURE — 99999 PR PBB SHADOW E&M-EST. PATIENT-LVL IV: CPT | Mod: PBBFAC,,, | Performed by: PODIATRIST

## 2019-05-10 PROCEDURE — 99213 PR OFFICE/OUTPT VISIT, EST, LEVL III, 20-29 MIN: ICD-10-PCS | Mod: 25,S$PBB,, | Performed by: PODIATRIST

## 2019-05-10 PROCEDURE — 11042 DBRDMT SUBQ TIS 1ST 20SQCM/<: CPT | Mod: PBBFAC,PN | Performed by: PODIATRIST

## 2019-05-10 PROCEDURE — 99999 PR PBB SHADOW E&M-EST. PATIENT-LVL IV: ICD-10-PCS | Mod: PBBFAC,,, | Performed by: PODIATRIST

## 2019-05-10 PROCEDURE — 11042 WOUND DEBRIDEMENT: ICD-10-PCS | Mod: S$PBB,,, | Performed by: PODIATRIST

## 2019-05-10 NOTE — PROCEDURES
"Wound Debridement  Date/Time: 5/10/2019 11:42 AM  Performed by: Dimitry Gallegos DPM  Authorized by: Dimitry Gallegos DPM     Time out: Immediately prior to procedure a "time out" was called to verify the correct patient, procedure, equipment, support staff and site/side marked as required.    Consent Done?:  Yes (Verbal)  Local anesthesia used?: No      Wound Details:    Location:  Left foot    Location:  Left 1st Toe    Type of Debridement:  Excisional       Length (cm):  1.9       Area (sq cm):  3.61       Width (cm):  1.9       Percent Debrided (%):  100       Depth (cm):  0.2       Total Area Debrided (sq cm):  3.61    Depth of debridement:  Subcutaneous tissue    Tissue debrided:  Subcutaneous    Devitalized tissue debrided:  Callus, Biofilm, Fibrin and Slough    Instruments:  Curette    Bleeding:  Minimal  Hemostasis Achieved: Yes    Method Used:  Pressure  Patient tolerance:  Patient tolerated the procedure well with no immediate complications     Saline moistened hydrothera blue, shar foam, cast padding secured with coban.         "

## 2019-05-10 NOTE — PROGRESS NOTES
Subjective:      Patient ID: Domingo Castro is a 74 y.o. male.    Chief Complaint: Follow-up (left foot toe ulcer)    Domingo is a 74 y.o. male who presents to the clinic for evaluation and treatment of high risk feet. Domingo has a past medical history of Arthritis, Cataract, CHF (congestive heart failure), Coronary artery disease, Cystoid macular edema of both eyes, Diabetes mellitus type II, Hyperlipemia, Hypertension, Retinal hole, Stage 4 chronic kidney disease, and Streptococcus pyogenes bacteremia (12/23/2018). The patient's chief complaint is long, thick toenails. This patient has documented high risk feet requiring routine maintenance secondary to peripheral neuropathy. Seen while admitted at [facility] on 12/27/19 for left hallux ulceration. Says he is using whirl pool device with dilute betadine at home that has improved his skin appearance.    5/10/19: Pt seen today, states his wound is worsening. States he has been soaking his foot AMA. Denies n/v/f/c.     PCP: Griselda Nugent MD    Date Last Seen by PCP: 2/22/19    Current shoe gear:  Affected Foot: Rx diabetic extra depth shoes and custom accommodative insoles     Unaffected Foot: Rx diabetic extra depth shoes and custom accommodative insoles    Hemoglobin A1C   Date Value Ref Range Status   02/22/2019 8.2 (H) 4.0 - 5.6 % Final     Comment:     ADA Screening Guidelines:  5.7-6.4%  Consistent with prediabetes  >or=6.5%  Consistent with diabetes  High levels of fetal hemoglobin interfere with the HbA1C  assay. Heterozygous hemoglobin variants (HbS, HgC, etc)do  not significantly interfere with this assay.   However, presence of multiple variants may affect accuracy.     11/28/2018 7.3 (H) 4.0 - 5.6 % Final     Comment:     ADA Screening Guidelines:  5.7-6.4%  Consistent with prediabetes  >or=6.5%  Consistent with diabetes  High levels of fetal hemoglobin interfere with the HbA1C  assay. Heterozygous hemoglobin variants (HbS, HgC, etc)do  not significantly  interfere with this assay.   However, presence of multiple variants may affect accuracy.     2018 8.3 (H) 4.0 - 5.6 % Final     Comment:     ADA Screening Guidelines:  5.7-6.4%  Consistent with prediabetes  >or=6.5%  Consistent with diabetes  High levels of fetal hemoglobin interfere with the HbA1C  assay. Heterozygous hemoglobin variants (HbS, HgC, etc)do  not significantly interfere with this assay.   However, presence of multiple variants may affect accuracy.       Vitals:    05/10/19 1132   Weight: 108 kg (238 lb)   Height: 6' (1.829 m)   PainSc: 0-No pain      Past Medical History:   Diagnosis Date    Arthritis     Cataract     CHF (congestive heart failure)     Coronary artery disease     Cystoid macular edema of both eyes     Diabetes mellitus type II     Hyperlipemia     Hypertension     Retinal hole     Stage 4 chronic kidney disease     Streptococcus pyogenes bacteremia 2018    Due to left toe osteomyelitis       Past Surgical History:   Procedure Laterality Date    BLEPHAROPLASTY Bilateral 2017    Performed [facility] at [facility] 1ST FLR    CATARACT EXTRACTION W/  INTRAOCULAR LENS IMPLANT Left 12/15/14    Dr martin    CATARACT EXTRACTION W/  INTRAOCULAR LENS IMPLANT Right 14    dorothy    CIRCUMCISION, NON-      focal laser right eye      INSERTION-INTRAOCULAR LENS (IOL) Right 2014    [facility]    INSERTION-INTRAOCULAR LENS (IOL) Left 12/15/2014    [facility] at [facility]    IERQZNPII-GNJQ-A-CATH Right 2019    Per[facility] at [facility] OR    KNEE SURGERY      left    Left medial collateral ligament repair      PHACOEMULSIFICATION-ASPIRATION-CATARACT Right 2014    [facility]    PHACOEMULSIFICATION-ASPIRATION-CATARACT Left 12/15/2014    [facility]    REPAIR-PTOSIS/BILATERAL EXTERNAL LEVATORS Bilateral  8/30/2017    Per[facility] OR [address] [place] [address]    TONSILLECTOMY         Family History   Problem Relation Age of Onset    Heart disease Mother     Cancer Mother     Cancer Brother     Heart disease Father     Diabetes Father     Cancer Sister     Cataracts Paternal Grandmother     Glaucoma Paternal Grandmother     Blindness Neg Hx     Amblyopia Neg Hx     Hypertension Neg Hx     Macular degeneration Neg Hx     Retinal detachment Neg Hx     Strabismus Neg Hx        Social History     Socioeconomic History    Marital status:      Spouse name: Not on file    Number of children: Not on file    Years of education: Not on file    Highest education level: Not on file   Occupational History    Not on file   Social Needs    Financial resource strain: Not on file    Food insecurity:     Worry: Not on file     Inability: Not on file    Transportation needs:     Medical: Not on file     Non-medical: Not on file   Tobacco Use    Smoking status: Never Smoker    Smokeless tobacco: Never Used   Substance and Sexual Activity    Alcohol use: No     Alcohol/week: 0.0 oz    Drug use: No    Sexual activity: Yes     Partners: Female   Lifestyle    Physical activity:     Days per week: Not on file     Minutes per session: Not on file    Stress: Not on file   Relationships    Social connections:     Talks on phone: Not on file     Gets together: Not on file     Attends Christianity service: Not on file     Active member of club or organization: Not on file     Attends meetings of clubs or organizations: Not on file     Relationship status: Not on file   Other Topics Concern    Not on file   Social History Narrative    Not on file       Current Outpatient Medications   Medication Sig Dispense Refill    acetaminophen (TYLENOL) 325 MG tablet Take 2 tablets (650 mg total) by mouth every 4 (four) hours as needed.  0    aspirin (ECOTRIN) 81 MG EC tablet Take 1 tablet (81 mg total) by mouth  "once daily.  0    cadexomer iodine (IODOSORB) 0.9 % gel Apply topically every Mon, Wed, Fri. 40 g 0    carvedilol (COREG) 25 MG tablet Take 1 tablet (25 mg total) by mouth 2 (two) times daily with meals. 60 tablet 0    carvedilol (COREG) 25 MG tablet TAKE ONE TABLET BY MOUTH TWICE DAILY WITH MEALS 180 tablet 3    furosemide (LASIX) 40 MG tablet Take 2 tablets (80 mg total) by mouth 2 (two) times daily. Do not take the pm dose after 3 pm 360 tablet 0    gabapentin (NEURONTIN) 100 MG capsule TAKE ONE CAPSULE BY MOUTH IN THE MORNING THEN ONE CAPSULE  IN THE EVENING AND THEN THREE CAPSULES AT BEDTIME 450 capsule 3    hydrALAZINE (APRESOLINE) 50 MG tablet TAKE ONE TABLET BY MOUTH EVERY 12 HOURS 180 tablet 3    insulin lispro (HUMALOG KWIKPEN INSULIN) 100 unit/mL InPn pen INJECT 10 UNITS SUBCUTANEOUSLY THREE TIMES DAILY BEFORE MEALS WITH CORRECTION SCALE, MAX TDD 50 UNITS 9 Box 6    LEVEMIR FLEXTOUCH U-100 INSULN 100 unit/mL (3 mL) InPn pen Inject 26 Units into the skin every evening. 36 mL 3    pen needle, diabetic, safety (BD AUTOSHIELD PEN NEEDLE) 29 gauge x 5/16" Ndle For use once daily with levemir flexpen 90 each 11    rosuvastatin (CRESTOR) 20 MG tablet Take 20 mg by mouth once daily.       amLODIPine (NORVASC) 10 MG tablet Take 1 tablet (10 mg total) by mouth once daily. 90 tablet 3     No current facility-administered medications for this visit.        Review of patient's allergies indicates:   Allergen Reactions    Atorvastatin Other (See Comments)       Review of Systems   Constitution: Negative for chills, fever and malaise/fatigue.   HENT: Negative for congestion.    Cardiovascular: Negative for chest pain, claudication and leg swelling.   Respiratory: Negative for cough and shortness of breath.    Skin: Positive for dry skin and nail changes.   Musculoskeletal: Positive for back pain. Negative for joint pain, muscle cramps and muscle weakness.   Gastrointestinal: Negative for nausea and vomiting. "   Neurological: Positive for numbness and paresthesias. Negative for weakness.   Psychiatric/Behavioral: Negative for altered mental status.           Objective:      Physical Exam   Constitutional: He is oriented to person, place, and time. No distress.   Cardiovascular:   Pulses:       Dorsalis pedis pulses are 1+ on the right side, and 1+ on the left side.        Posterior tibial pulses are 1+ on the right side, and 1+ on the left side.   CFT< 3 secs all toes bilateral foot, skin temp warm to cool proximal to distal bilateral foot, no hair growth bilateral lower extremity, no lower extremity edema bilateral.       Musculoskeletal:        Right foot: There is decreased range of motion and deformity.        Left foot: There is decreased range of motion and deformity.   No pain with ROM or MMT bilateral lower extremity.    Elongated second toe bilateral. Non-reducible hammertoe second toe left foot. Flexion contractures toes 2-5 bilateral foot.    + equinus that reduces with knee bent bilateral.    No pain with ROM or MMT bilateral lower extremity.     Feet:   Right Foot:   Protective Sensation: 10 sites tested. 3 sites sensed.   Skin Integrity: Positive for dry skin. Negative for ulcer, blister, skin breakdown, erythema, warmth or callus.   Left Foot:   Protective Sensation: 10 sites tested. 4 sites sensed.   Skin Integrity: Positive for skin breakdown and dry skin. Negative for ulcer, blister, erythema, warmth or callus.   Neurological: He is alert and oriented to person, place, and time. He has normal strength. A sensory deficit is present.   Vibratory sensation absent bilateral foot.   Skin: Skin is dry and intact. Capillary refill takes 2 to 3 seconds. Lesion noted. No ecchymosis and no rash noted. He is not diaphoretic. No cyanosis or erythema. No pallor. Nails show no clubbing.   Loosened, dark, hyperkeratotic skin distal medial left hallux. Post debridement note ulceration to level of subcutaneous layer with  mixed granular and fibrous base containing overlying biofilm and slough. Ulcer measures 1.9x1.9x0.2cm. No acute SOI noted, HPK borders, appears stable.     Nails 1-5 bilateral are of normal length, thickened 1-2 mm, hard with minor loosening and debris.    No open lesions or macerations bilateral lower extremity.                   Assessment:       Encounter Diagnoses   Name Primary?    Diabetic ulcer of toe of left foot associated with type 2 diabetes mellitus, with fat layer exposed Yes    Type 2 diabetes mellitus with peripheral vascular disease     Uncontrolled type 2 diabetes mellitus with peripheral neuropathy          Plan:       Domingo was seen today for follow-up.    Diagnoses and all orders for this visit:    Diabetic ulcer of toe of left foot associated with type 2 diabetes mellitus, with fat layer exposed  -     Wound Debridement  -     Ambulatory referral to Home Health    Type 2 diabetes mellitus with peripheral vascular disease  -     Wound Debridement  -     Ambulatory referral to Home Health    Uncontrolled type 2 diabetes mellitus with peripheral neuropathy  -     Wound Debridement  -     Ambulatory referral to Home Health    Other orders  -     Cancel: Routine Foot Care      I counseled the patient on his conditions, their implications and medical management.    Shoe inspection. Diabetic Foot Education. Patient reminded of the importance of good nutrition and blood sugar control to help prevent podiatric complications of diabetes. Patient instructed on proper foot hygeine. We discussed wearing proper shoe gear, daily foot inspections, never walking without protective shoe gear, never putting sharp instruments to feet, routine podiatric nail visits every 2-3 months.     Wound debridement and dressing per attached note.Home care instructions reviewed in detail. Keep dressing C/D/I, do not soak foot.     Cultures taken    RTC 2 weeks    Vale Sarmiento DPM, PGY3    I have personally taken the  history and examined this patient and agree with the resident's note as stated as above.   Dimitry Gallegos DPM, FACFAS

## 2019-05-14 ENCOUNTER — TELEPHONE (OUTPATIENT)
Dept: PODIATRY | Facility: CLINIC | Age: 74
End: 2019-05-14

## 2019-05-14 NOTE — TELEPHONE ENCOUNTER
Spoke with pt and advised him that the speciment that Dr. Gallegos took of his left toe ulcer was never picked up from our location and if he notice any swelling redness or drainage he is to come in the office and have Dr. gallegos to look at it. Pt under stood this and advised the home health nurse stated that the ulcer look good

## 2019-05-20 ENCOUNTER — TELEPHONE (OUTPATIENT)
Dept: ADMINISTRATIVE | Facility: CLINIC | Age: 74
End: 2019-05-20

## 2019-05-20 NOTE — TELEPHONE ENCOUNTER
Home Health SOC 05/13/2019 - 07/11/2019 [facility] - Dr. Dimitry Gallegos. Patient received SN services.

## 2019-05-29 ENCOUNTER — OFFICE VISIT (OUTPATIENT)
Dept: INTERNAL MEDICINE | Facility: CLINIC | Age: 74
End: 2019-05-29
Payer: MEDICARE

## 2019-05-29 VITALS
HEIGHT: 72 IN | SYSTOLIC BLOOD PRESSURE: 144 MMHG | DIASTOLIC BLOOD PRESSURE: 62 MMHG | HEART RATE: 59 BPM | TEMPERATURE: 98 F | BODY MASS INDEX: 33.26 KG/M2 | WEIGHT: 245.56 LBS

## 2019-05-29 DIAGNOSIS — D64.9 ANEMIA, UNSPECIFIED TYPE: ICD-10-CM

## 2019-05-29 DIAGNOSIS — I10 HTN (HYPERTENSION), BENIGN: ICD-10-CM

## 2019-05-29 DIAGNOSIS — N18.4 CKD (CHRONIC KIDNEY DISEASE) STAGE 4, GFR 15-29 ML/MIN: ICD-10-CM

## 2019-05-29 PROCEDURE — 99214 PR OFFICE/OUTPT VISIT, EST, LEVL IV, 30-39 MIN: ICD-10-PCS | Mod: S$PBB,,, | Performed by: INTERNAL MEDICINE

## 2019-05-29 PROCEDURE — 99214 OFFICE O/P EST MOD 30 MIN: CPT | Mod: S$PBB,,, | Performed by: INTERNAL MEDICINE

## 2019-05-29 PROCEDURE — 99999 PR PBB SHADOW E&M-EST. PATIENT-LVL IV: CPT | Mod: PBBFAC,,, | Performed by: INTERNAL MEDICINE

## 2019-05-29 PROCEDURE — 99999 PR PBB SHADOW E&M-EST. PATIENT-LVL IV: ICD-10-PCS | Mod: PBBFAC,,, | Performed by: INTERNAL MEDICINE

## 2019-05-29 PROCEDURE — 99214 OFFICE O/P EST MOD 30 MIN: CPT | Mod: PBBFAC,PO | Performed by: INTERNAL MEDICINE

## 2019-05-29 RX ORDER — FUROSEMIDE 40 MG/1
80 TABLET ORAL 2 TIMES DAILY
Qty: 360 TABLET | Refills: 0 | Status: SHIPPED | OUTPATIENT
Start: 2019-05-29 | End: 2019-09-10 | Stop reason: SDUPTHER

## 2019-05-29 NOTE — PROGRESS NOTES
CC: followup of hypertension and diabetes  HPI:  The patient is a 74 y.o. year old male who presents to the office for followup of hypertension and diabetes.  The patient denies any chest pain, headache, blurred vision, nausea or vomiting, but complains of shortness of breath with activity and fatigue.  He states his blood sugars have been labile, 80s to 300s.  The patient also reports feeling lightheaded intermittently upon standing.    PAST MEDICAL HISTORY:  Past Medical History:   Diagnosis Date    Arthritis     Cataract     CHF (congestive heart failure)     Coronary artery disease     Cystoid macular edema of both eyes     Diabetes mellitus type II     Hyperlipemia     Hypertension     Retinal hole     Stage 4 chronic kidney disease     Streptococcus pyogenes bacteremia 2018    Due to left toe osteomyelitis       SURGICAL HISTORY:  Past Surgical History:   Procedure Laterality Date    BLEPHAROPLASTY Bilateral 2017    [facility] 1ST FLR    CATARACT EXTRACTION W/  INTRAOCULAR LENS IMPLANT Left 12/15/14    Dr martin    CATARACT EXTRACTION W/  INTRAOCULAR LENS IMPLANT Right 14    dorothy    CIRCUMCISION, NON-      focal laser right eye      INSERTION-INTRAOCULAR LENS (IOL) Right 2014    [facility] at [facility]    INSERTION-INTRAOCULAR LENS (IOL) Left 12/15/2014    [facility] by [facility] at [facility] OR    [id number] Right 2019    [facility] OR    KNEE SURGERY      left    Left medial collateral ligament repair      PHACOEMULSIFICATION-ASPIRATION-CATARACT Right 2014    [facility] at [facility]    PHACOEMULSIFICATION-ASPIRATION-CATARACT Left 12/15/2014    [facility] at [facility] OR    REPAIR-PTOSIS/BILATERAL EXTERNAL LEVATORS Bilateral 2017    [facility] at [facility] 1ST FLR    TONSILLECTOMY          MEDS:  Medcard reviewed and updated    ALLERGIES: Allergy Card reviewed and updated    SOCIAL HISTORY:   The patient is a nonsmoker.    PE:   APPEARANCE: Well nourished, well developed, in no acute distress.    CHEST: Lungs clear to auscultation with unlabored respirations.  CARDIOVASCULAR: Normal S1, S2. No murmurs. No carotid bruits. No pedal edema.  ABDOMEN: Bowel sounds normal. Not distended. Soft. No tenderness or masses.   PSYCHIATRIC: The patient is oriented to person, place, and time and has a pleasant affect.        ASSESSMENT/PLAN:  Domingo was seen today for follow-up.    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

## 2019-05-29 NOTE — TELEPHONE ENCOUNTER
Pharmacy:  [facility] (store # [id number]) [address] [place] [phone]  Fax: [phone]  Requested refill of fuosemide 80 mg tab qty: 60 tab  Sig: take 1 tab po bid, do not take evening dose after 3 pm    Please advise

## 2019-06-04 ENCOUNTER — TELEPHONE (OUTPATIENT)
Dept: PODIATRY | Facility: CLINIC | Age: 74
End: 2019-06-04

## 2019-06-04 NOTE — TELEPHONE ENCOUNTER
----- Message from Roberto Viera MA sent at 6/4/2019 11:42 AM CDT -----  Can you add pt to Dr. Gallegos's eugene for 6/10/19 at 11am? thanks

## 2019-06-10 ENCOUNTER — TELEPHONE (OUTPATIENT)
Dept: PODIATRY | Facility: CLINIC | Age: 74
End: 2019-06-10

## 2019-06-10 ENCOUNTER — OFFICE VISIT (OUTPATIENT)
Dept: PODIATRY | Facility: CLINIC | Age: 74
End: 2019-06-10
Payer: MEDICARE

## 2019-06-10 VITALS
BODY MASS INDEX: 33.18 KG/M2 | SYSTOLIC BLOOD PRESSURE: 137 MMHG | DIASTOLIC BLOOD PRESSURE: 62 MMHG | HEIGHT: 72 IN | WEIGHT: 245 LBS | HEART RATE: 51 BPM

## 2019-06-10 DIAGNOSIS — L97.522 DIABETIC ULCER OF TOE OF LEFT FOOT ASSOCIATED WITH TYPE 2 DIABETES MELLITUS, WITH FAT LAYER EXPOSED: Primary | ICD-10-CM

## 2019-06-10 DIAGNOSIS — E11.51 TYPE 2 DIABETES MELLITUS WITH PERIPHERAL VASCULAR DISEASE: ICD-10-CM

## 2019-06-10 DIAGNOSIS — E11.621 DIABETIC ULCER OF TOE OF LEFT FOOT ASSOCIATED WITH TYPE 2 DIABETES MELLITUS, WITH FAT LAYER EXPOSED: Primary | ICD-10-CM

## 2019-06-10 PROCEDURE — 99213 PR OFFICE/OUTPT VISIT, EST, LEVL III, 20-29 MIN: ICD-10-PCS | Mod: 25,S$PBB,, | Performed by: PODIATRIST

## 2019-06-10 PROCEDURE — 11042 WOUND DEBRIDEMENT: ICD-10-PCS | Mod: S$PBB,,, | Performed by: PODIATRIST

## 2019-06-10 PROCEDURE — 99213 OFFICE O/P EST LOW 20 MIN: CPT | Mod: 25,S$PBB,, | Performed by: PODIATRIST

## 2019-06-10 PROCEDURE — 99213 OFFICE O/P EST LOW 20 MIN: CPT | Mod: PBBFAC,PN | Performed by: PODIATRIST

## 2019-06-10 PROCEDURE — 99999 PR PBB SHADOW E&M-EST. PATIENT-LVL III: ICD-10-PCS | Mod: PBBFAC,,, | Performed by: PODIATRIST

## 2019-06-10 PROCEDURE — 99999 PR PBB SHADOW E&M-EST. PATIENT-LVL III: CPT | Mod: PBBFAC,,, | Performed by: PODIATRIST

## 2019-06-10 PROCEDURE — 11042 DBRDMT SUBQ TIS 1ST 20SQCM/<: CPT | Mod: PBBFAC,PN | Performed by: PODIATRIST

## 2019-06-10 NOTE — PROCEDURES
"Wound Debridement  Date/Time: 6/10/2019 11:36 AM  Performed by: Dimitry Gallegos DPM  Authorized by: Dimitry Gallegos DPM     Time out: Immediately prior to procedure a "time out" was called to verify the correct patient, procedure, equipment, support staff and site/side marked as required.    Consent Done?:  Yes (Verbal)    Preparation: Patient was prepped and draped in usual sterile fashion    Local anesthesia used?: No      Wound Details:    Location:  Left foot    Location:  Left 1st Toe    Type of Debridement:  Excisional       Length (cm):  0.9       Area (sq cm):  0.45       Width (cm):  0.5       Percent Debrided (%):  100       Depth (cm):  0.1       Total Area Debrided (sq cm):  0.45    Depth of debridement:  Subcutaneous tissue    Tissue debrided:  Epidermis and Subcutaneous    Devitalized tissue debrided:  Biofilm, Callus, Fibrin and Slough    Instruments:  Blade    Bleeding:  Minimal  Hemostasis Achieved: Yes    Method Used:  Silver Nitrate  Patient tolerance:  Patient tolerated the procedure well with no immediate complications     Applied saline moistened oy, shar foam, cast padding x 2 secured with coban.        "

## 2019-06-11 NOTE — PROGRESS NOTES
Subjective:      Patient ID: Domingo Castro is a 74 y.o. male.    Chief Complaint: Follow-up (left foot big toe )    Domingo is a 74 y.o. male who presents to the clinic for evaluation and treatment of high risk feet. Domingo has a past medical history of Arthritis, Cataract, CHF (congestive heart failure), Coronary artery disease, Cystoid macular edema of both eyes, Diabetes mellitus type II, Hyperlipemia, Hypertension, Retinal hole, Stage 4 chronic kidney disease, and Streptococcus pyogenes bacteremia (12/23/2018). The patient's chief complaint is long, thick toenails. This patient has documented high risk feet requiring routine maintenance secondary to peripheral neuropathy. Seen while admitted at [facility] on 12/27/19 for left hallux ulceration. Says he is using whirl pool device with dilute betadine at home that has improved his skin appearance.    5/10/19: Pt seen today, states his wound is worsening. States he has been soaking his foot AMA. Denies n/v/f/c.     6/10/19: Wound check. Followed by Susana AZUL. No new complaints.    PCP: Griselda Nugent MD    Date Last Seen by PCP: 2/22/19    Current shoe gear:  Affected Foot: Rx diabetic extra depth shoes and custom accommodative insoles     Unaffected Foot: Rx diabetic extra depth shoes and custom accommodative insoles    Hemoglobin A1C   Date Value Ref Range Status   02/22/2019 8.2 (H) 4.0 - 5.6 % Final     Comment:     ADA Screening Guidelines:  5.7-6.4%  Consistent with prediabetes  >or=6.5%  Consistent with diabetes  High levels of fetal hemoglobin interfere with the HbA1C  assay. Heterozygous hemoglobin variants (HbS, HgC, etc)do  not significantly interfere with this assay.   However, presence of multiple variants may affect accuracy.     11/28/2018 7.3 (H) 4.0 - 5.6 % Final     Comment:     ADA Screening Guidelines:  5.7-6.4%  Consistent with prediabetes  >or=6.5%  Consistent with diabetes  High levels of fetal hemoglobin interfere with the HbA1C  assay.  Heterozygous hemoglobin variants (HbS, HgC, etc)do  not significantly interfere with this assay.   However, presence of multiple variants may affect accuracy.     2018 8.3 (H) 4.0 - 5.6 % Final     Comment:     ADA Screening Guidelines:  5.7-6.4%  Consistent with prediabetes  >or=6.5%  Consistent with diabetes  High levels of fetal hemoglobin interfere with the HbA1C  assay. Heterozygous hemoglobin variants (HbS, HgC, etc)do  not significantly interfere with this assay.   However, presence of multiple variants may affect accuracy.       Vitals:    06/10/19 1111   BP: 137/62   Pulse: (!) 51   Weight: 111.1 kg (245 lb)   Height: 6' (1.829 m)   PainSc: 0-No pain      Past Medical History:   Diagnosis Date    Arthritis     Cataract     CHF (congestive heart failure)     Coronary artery disease     Cystoid macular edema of both eyes     Diabetes mellitus type II     Hyperlipemia     Hypertension     Retinal hole     Stage 4 chronic kidney disease     Streptococcus pyogenes bacteremia 2018    Due to left toe osteomyelitis       Past Surgical History:   Procedure Laterality Date    BLEPHAROPLASTY Bilateral 2017    [facility] F[facility]    CATARACT EXTRACTION W/  INTRAOCULAR LENS IMPLANT Left 12/15/14    Dr martin    CATARACT EXTRACTION W/  INTRAOCULAR LENS IMPLANT Right 14    droothy    CIRCUMCISION, NON-      focal laser right eye      INSERTION-INTRAOCULAR LENS (IOL) Right 2014    [facility] OR    INSERTION-INTRAOCULAR LENS (IOL) Left 12/15/2014    [facility] at [facility] OR    [id number] Right 2019    [facility]    KNEE SURGERY      left    Left medial collateral ligament repair      PHACOEMULSIFICATION-ASPIRATION-CATARACT Right 2014    [facility] at [facility] OR    PHACOEMULSIFICATION-ASPIRATION-CATARACT Left 12/15/2014     [facility]    REPAIR-PTOSIS/BILATERAL EXTERNAL LEVATORS Bilateral 8/30/2017    [facility] 1ST FLR    TONSILLECTOMY         Family History   Problem Relation Age of Onset    Heart disease Mother     Cancer Mother     Cancer Brother     Heart disease Father     Diabetes Father     Cancer Sister     Cataracts Paternal Grandmother     Glaucoma Paternal Grandmother     Blindness Neg Hx     Amblyopia Neg Hx     Hypertension Neg Hx     Macular degeneration Neg Hx     Retinal detachment Neg Hx     Strabismus Neg Hx        Social History     Socioeconomic History    Marital status:      Spouse name: Not on file    Number of children: Not on file    Years of education: Not on file    Highest education level: Not on file   Occupational History    Not on file   Social Needs    Financial resource strain: Not on file    Food insecurity:     Worry: Not on file     Inability: Not on file    Transportation needs:     Medical: Not on file     Non-medical: Not on file   Tobacco Use    Smoking status: Never Smoker    Smokeless tobacco: Never Used   Substance and Sexual Activity    Alcohol use: No     Alcohol/week: 0.0 oz    Drug use: No    Sexual activity: Yes     Partners: Female   Lifestyle    Physical activity:     Days per week: Not on file     Minutes per session: Not on file    Stress: Not on file   Relationships    Social connections:     Talks on phone: Not on file     Gets together: Not on file     Attends [employer] service: Not on file     Active member of club or organization: Not on file     Attends meetings of clubs or organizations: Not on file     Relationship status: Not on file   Other Topics Concern    Not on file   Social History Narrative    Not on file       Current Outpatient Medications   Medication Sig Dispense Refill    acetaminophen (TYLENOL) 325 MG tablet Take 2 tablets (650 mg total) by mouth every 4  "(four) hours as needed.  0    aspirin (ECOTRIN) 81 MG EC tablet Take 1 tablet (81 mg total) by mouth once daily.  0    cadexomer iodine (IODOSORB) 0.9 % gel Apply topically every Mon, Wed, Fri. 40 g 0    carvedilol (COREG) 25 MG tablet Take 1 tablet (25 mg total) by mouth 2 (two) times daily with meals. 60 tablet 0    furosemide (LASIX) 40 MG tablet Take 2 tablets (80 mg total) by mouth 2 (two) times daily. Do not take the pm dose after 3 pm 360 tablet 0    gabapentin (NEURONTIN) 100 MG capsule TAKE ONE CAPSULE BY MOUTH IN THE MORNING THEN ONE CAPSULE  IN THE EVENING AND THEN THREE CAPSULES AT BEDTIME 450 capsule 3    hydrALAZINE (APRESOLINE) 50 MG tablet TAKE ONE TABLET BY MOUTH EVERY 12 HOURS 180 tablet 3    insulin lispro (HUMALOG KWIKPEN INSULIN) 100 unit/mL InPn pen INJECT 10 UNITS SUBCUTANEOUSLY THREE TIMES DAILY BEFORE MEALS WITH CORRECTION SCALE, MAX TDD 50 UNITS 9 Box 6    LEVEMIR FLEXTOUCH U-100 INSULN 100 unit/mL (3 mL) InPn pen Inject 26 Units into the skin every evening. 36 mL 3    pen needle, diabetic, safety (BD AUTOSHIELD PEN NEEDLE) 29 gauge x 5/16" Ndle For use once daily with levemir flexpen 90 each 11    rosuvastatin (CRESTOR) 20 MG tablet Take 20 mg by mouth once daily.       amLODIPine (NORVASC) 10 MG tablet Take 1 tablet (10 mg total) by mouth once daily. 90 tablet 3     No current facility-administered medications for this visit.        Review of patient's allergies indicates:   Allergen Reactions    Atorvastatin Other (See Comments)       Review of Systems   Constitution: Negative for chills, fever and malaise/fatigue.   HENT: Negative for congestion.    Cardiovascular: Negative for chest pain, claudication and leg swelling.   Respiratory: Negative for cough and shortness of breath.    Skin: Positive for dry skin and nail changes.   Musculoskeletal: Positive for back pain. Negative for joint pain, muscle cramps and muscle weakness.   Gastrointestinal: Negative for nausea and " vomiting.   Neurological: Positive for numbness and paresthesias. Negative for weakness.   Psychiatric/Behavioral: Negative for altered mental status.           Objective:      Physical Exam   Constitutional: He is oriented to person, place, and time. No distress.   Cardiovascular:   Pulses:       Dorsalis pedis pulses are 1+ on the right side, and 1+ on the left side.        Posterior tibial pulses are 1+ on the right side, and 1+ on the left side.   CFT< 3 secs all toes bilateral foot, skin temp warm to cool proximal to distal bilateral foot, no hair growth bilateral lower extremity, no lower extremity edema bilateral.       Musculoskeletal:        Right foot: There is decreased range of motion and deformity.        Left foot: There is decreased range of motion and deformity.   No pain with ROM or MMT bilateral lower extremity.    Elongated second toe bilateral. Non-reducible hammertoe second toe left foot. Flexion contractures toes 2-5 bilateral foot.    + equinus that reduces with knee bent bilateral.    No pain with ROM or MMT bilateral lower extremity.     Feet:   Right Foot:   Protective Sensation: 10 sites tested. 3 sites sensed.   Skin Integrity: Positive for dry skin. Negative for ulcer, blister, skin breakdown, erythema, warmth or callus.   Left Foot:   Protective Sensation: 10 sites tested. 4 sites sensed.   Skin Integrity: Positive for skin breakdown and dry skin. Negative for ulcer, blister, erythema, warmth or callus.   Neurological: He is alert and oriented to person, place, and time. He has normal strength. A sensory deficit is present.   Vibratory sensation absent bilateral foot.   Skin: Skin is dry and intact. Capillary refill takes 2 to 3 seconds. Lesion noted. No ecchymosis and no rash noted. He is not diaphoretic. No cyanosis or erythema. No pallor. Nails show no clubbing.   Loosened, dark, hyperkeratotic skin distal medial left hallux. Post debridement note ulceration to level of subcutaneous  layer with mixed granular and fibrous base containing overlying biofilm and slough. Ulcer measures 1.9x1.9x0.2cm. No acute SOI noted, HPK borders, appears stable.     Nails 1-5 bilateral are of normal length, thickened 1-2 mm, hard with minor loosening and debris.    No open lesions or macerations bilateral lower extremity.                   Assessment:       Encounter Diagnoses   Name Primary?    Diabetic ulcer of toe of left foot associated with type 2 diabetes mellitus, with fat layer exposed Yes    Type 2 diabetes mellitus with peripheral vascular disease     Uncontrolled type 2 diabetes mellitus with peripheral neuropathy          Plan:       Domingo was seen today for follow-up.    Diagnoses and all orders for this visit:    Diabetic ulcer of toe of left foot associated with type 2 diabetes mellitus, with fat layer exposed  -     Wound Debridement  -     SUBSEQUENT HOME HEALTH ORDERS    Type 2 diabetes mellitus with peripheral vascular disease  -     Wound Debridement  -     SUBSEQUENT HOME HEALTH ORDERS    Uncontrolled type 2 diabetes mellitus with peripheral neuropathy  -     Wound Debridement  -     SUBSEQUENT HOME HEALTH ORDERS      I counseled the patient on his conditions, their implications and medical management.    Shoe inspection. Diabetic Foot Education. Patient reminded of the importance of good nutrition and blood sugar control to help prevent podiatric complications of diabetes. Patient instructed on proper foot hygeine. We discussed wearing proper shoe gear, daily foot inspections, never walking without protective shoe gear, never putting sharp instruments to feet, routine podiatric nail visits every 2-3 months.     Wound debridement and dressing per attached note.Home care instructions reviewed in detail. Keep dressing C/D/I, do not soak foot.     RTC 3-4 weeks or prn.    RTC 2 weeks    Vale Sarmiento DPM, PGY3    I have personally taken the history and examined this patient and agree with  the resident's note as stated as above.   Dimitry Gallegos DPM, FACFAS

## 2019-06-12 RX ORDER — INSULIN LISPRO 100 [IU]/ML
INJECTION, SOLUTION INTRAVENOUS; SUBCUTANEOUS
Qty: 1 BOX | Refills: 3 | Status: ON HOLD | OUTPATIENT
Start: 2019-06-12 | End: 2019-08-02 | Stop reason: HOSPADM

## 2019-06-12 RX ORDER — INSULIN DETEMIR 100 [IU]/ML
INJECTION, SOLUTION SUBCUTANEOUS
Qty: 1 BOX | Refills: 3 | Status: SHIPPED | OUTPATIENT
Start: 2019-06-12 | End: 2019-07-11 | Stop reason: SDUPTHER

## 2019-06-26 ENCOUNTER — TELEPHONE (OUTPATIENT)
Dept: PODIATRY | Facility: CLINIC | Age: 74
End: 2019-06-26

## 2019-06-26 DIAGNOSIS — E11.51 TYPE 2 DIABETES MELLITUS WITH PERIPHERAL VASCULAR DISEASE: ICD-10-CM

## 2019-06-26 DIAGNOSIS — L97.522 DIABETIC ULCER OF TOE OF LEFT FOOT ASSOCIATED WITH TYPE 2 DIABETES MELLITUS, WITH FAT LAYER EXPOSED: ICD-10-CM

## 2019-06-26 DIAGNOSIS — E11.42 TYPE 2 DIABETES MELLITUS WITH PERIPHERAL NEUROPATHY: Primary | ICD-10-CM

## 2019-06-26 DIAGNOSIS — E11.621 DIABETIC ULCER OF TOE OF LEFT FOOT ASSOCIATED WITH TYPE 2 DIABETES MELLITUS, WITH FAT LAYER EXPOSED: ICD-10-CM

## 2019-06-26 NOTE — TELEPHONE ENCOUNTER
----- Message from Marie Marrufo sent at 6/25/2019  5:49 PM CDT -----  Contact: Domingo @ [facility] [phone]  Domingo called stating patient went out of [facility] to [place] so had to be discharged, now that patient is back and needs home health resumed. Please send OK to resume prev orders. Please call. Needs to be sent as a NEW REFERRAL.

## 2019-07-09 ENCOUNTER — EXTERNAL HOME HEALTH (OUTPATIENT)
Dept: HOME HEALTH SERVICES | Facility: HOSPITAL | Age: 74
End: 2019-07-09

## 2019-07-11 ENCOUNTER — OFFICE VISIT (OUTPATIENT)
Dept: PODIATRY | Facility: CLINIC | Age: 74
End: 2019-07-11
Payer: MEDICARE

## 2019-07-11 VITALS
WEIGHT: 245 LBS | DIASTOLIC BLOOD PRESSURE: 58 MMHG | SYSTOLIC BLOOD PRESSURE: 127 MMHG | HEART RATE: 57 BPM | BODY MASS INDEX: 33.18 KG/M2 | HEIGHT: 72 IN

## 2019-07-11 DIAGNOSIS — L97.522 DIABETIC ULCER OF TOE OF LEFT FOOT ASSOCIATED WITH TYPE 2 DIABETES MELLITUS, WITH FAT LAYER EXPOSED: Primary | ICD-10-CM

## 2019-07-11 DIAGNOSIS — E11.42 TYPE 2 DIABETES MELLITUS WITH PERIPHERAL NEUROPATHY: ICD-10-CM

## 2019-07-11 DIAGNOSIS — B35.1 ONYCHOMYCOSIS: ICD-10-CM

## 2019-07-11 DIAGNOSIS — E11.621 DIABETIC ULCER OF TOE OF LEFT FOOT ASSOCIATED WITH TYPE 2 DIABETES MELLITUS, WITH FAT LAYER EXPOSED: Primary | ICD-10-CM

## 2019-07-11 PROCEDURE — 99999 PR PBB SHADOW E&M-EST. PATIENT-LVL IV: ICD-10-PCS | Mod: PBBFAC,,, | Performed by: PODIATRIST

## 2019-07-11 PROCEDURE — 99999 PR PBB SHADOW E&M-EST. PATIENT-LVL IV: CPT | Mod: PBBFAC,,, | Performed by: PODIATRIST

## 2019-07-11 PROCEDURE — 11042 WOUND DEBRIDEMENT: ICD-10-PCS | Mod: S$PBB,,, | Performed by: PODIATRIST

## 2019-07-11 PROCEDURE — 11042 DBRDMT SUBQ TIS 1ST 20SQCM/<: CPT | Mod: PBBFAC,PN | Performed by: PODIATRIST

## 2019-07-11 PROCEDURE — 11721 ROUTINE FOOT CARE: ICD-10-PCS | Mod: 59,Q9,S$PBB, | Performed by: PODIATRIST

## 2019-07-11 PROCEDURE — 99213 PR OFFICE/OUTPT VISIT, EST, LEVL III, 20-29 MIN: ICD-10-PCS | Mod: 25,S$PBB,, | Performed by: PODIATRIST

## 2019-07-11 PROCEDURE — 99213 OFFICE O/P EST LOW 20 MIN: CPT | Mod: 25,S$PBB,, | Performed by: PODIATRIST

## 2019-07-11 PROCEDURE — 11721 DEBRIDE NAIL 6 OR MORE: CPT | Mod: PBBFAC,PN | Performed by: PODIATRIST

## 2019-07-11 PROCEDURE — 99214 OFFICE O/P EST MOD 30 MIN: CPT | Mod: PBBFAC,PN | Performed by: PODIATRIST

## 2019-07-11 NOTE — PROCEDURES
"Wound Debridement  Date/Time: 7/11/2019 3:12 PM  Performed by: Dimitry Gallegos DPM  Authorized by: Dimitry Gallegos DPM     Time out: Immediately prior to procedure a "time out" was called to verify the correct patient, procedure, equipment, support staff and site/side marked as required.    Consent Done?:  Yes (Verbal)    Preparation: Patient was prepped and draped in usual sterile fashion    Local anesthesia used?: No      Wound Details:    Location:  Left foot    Location:  Left 1st Toe    Type of Debridement:  Excisional       Length (cm):  0.2       Area (sq cm):  0.04       Width (cm):  0.2       Percent Debrided (%):  100       Depth (cm):  0.3       Total Area Debrided (sq cm):  0.04    Depth of debridement:  Subcutaneous tissue    Tissue debrided:  Subcutaneous    Devitalized tissue debrided:  Biofilm, Callus, Fibrin and Slough    Instruments:  Blade    Bleeding:  Minimal  Hemostasis Achieved: Yes    Method Used:  Pressure and Silver Nitrate  Patient tolerance:  Patient tolerated the procedure well with no immediate complications     Saline moistened yo, shar foam lightly secured with coban.      "

## 2019-07-12 NOTE — PROCEDURES
"Routine Foot Care  Date/Time: 7/11/2019 3:00 PM  Performed by: Dimitry Gallegos DPM  Authorized by: Dimitry Gallegos DPM     Time out: Immediately prior to procedure a "time out" was called to verify the correct patient, procedure, equipment, support staff and site/side marked as required.    Consent Done?:  Yes (Verbal)    Nail Care Type:  Debride  Location(s): All  (Left 1st Toe, Left 3rd Toe, Left 2nd Toe, Left 4th Toe, Left 5th Toe, Right 1st Toe, Right 2nd Toe, Right 3rd Toe, Right 4th Toe and Right 5th Toe)  Patient tolerance:  Patient tolerated the procedure well with no immediate complications      "

## 2019-07-12 NOTE — PROGRESS NOTES
Subjective:      Patient ID: Domingo Castro is a 74 y.o. male.    Chief Complaint: Follow-up    Domingo is a 74 y.o. male who presents to the clinic for evaluation and treatment of high risk feet. Domingo has a past medical history of Arthritis, Cataract, CHF (congestive heart failure), Coronary artery disease, Cystoid macular edema of both eyes, Diabetes mellitus type II, Hyperlipemia, Hypertension, Retinal hole, Stage 4 chronic kidney disease, and Streptococcus pyogenes bacteremia (12/23/2018). The patient's chief complaint is long, thick toenails. This patient has documented high risk feet requiring routine maintenance secondary to peripheral neuropathy. Seen while admitted at [facility] on 12/27/19 for left hallux ulceration. Says he is using whirl pool device with dilute betadine at home that has improved his skin appearance.    5/10/19: Pt seen today, states his wound is worsening. States he has been soaking his foot AMA. Denies n/v/f/c.     6/10/19: Wound check. Followed by Susana AZUL. No new complaints.    07/12/2019:  Follow-up for wound check to left great toe.  Says that his home health nurse told that the wounds pretty much closed.  No new complaints.  He has his new diabetic shoes with.    PCP: Griselda Nugent MD    Date Last Seen by PCP: 2/22/19    Current shoe gear:  Affected Foot: Rx diabetic extra depth shoes and custom accommodative insoles     Unaffected Foot: Rx diabetic extra depth shoes and custom accommodative insoles    Hemoglobin A1C   Date Value Ref Range Status   02/22/2019 8.2 (H) 4.0 - 5.6 % Final     Comment:     ADA Screening Guidelines:  5.7-6.4%  Consistent with prediabetes  >or=6.5%  Consistent with diabetes  High levels of fetal hemoglobin interfere with the HbA1C  assay. Heterozygous hemoglobin variants (HbS, HgC, etc)do  not significantly interfere with this assay.   However, presence of multiple variants may affect accuracy.     11/28/2018 7.3 (H) 4.0 - 5.6 % Final     Comment:      ADA Screening Guidelines:  5.7-6.4%  Consistent with prediabetes  >or=6.5%  Consistent with diabetes  High levels of fetal hemoglobin interfere with the HbA1C  assay. Heterozygous hemoglobin variants (HbS, HgC, etc)do  not significantly interfere with this assay.   However, presence of multiple variants may affect accuracy.     2018 8.3 (H) 4.0 - 5.6 % Final     Comment:     ADA Screening Guidelines:  5.7-6.4%  Consistent with prediabetes  >or=6.5%  Consistent with diabetes  High levels of fetal hemoglobin interfere with the HbA1C  assay. Heterozygous hemoglobin variants (HbS, HgC, etc)do  not significantly interfere with this assay.   However, presence of multiple variants may affect accuracy.       Vitals:    19 1442   BP: (!) 127/58   Pulse: (!) 57   Weight: 111.1 kg (245 lb)   Height: 6' (1.829 m)   PainSc: 0-No pain      Past Medical History:   Diagnosis Date    Arthritis     Cataract     CHF (congestive heart failure)     Coronary artery disease     Cystoid macular edema of both eyes     Diabetes mellitus type II     Hyperlipemia     Hypertension     Retinal hole     Stage 4 chronic kidney disease     Streptococcus pyogenes bacteremia 2018    Due to left toe osteomyelitis       Past Surgical History:   Procedure Laterality Date    BLEPHAROPLASTY Bilateral 2017    [facility] F[facility]    CATARACT EXTRACTION W/  INTRAOCULAR LENS IMPLANT Left 12/15/14    Dr martin    CATARACT EXTRACTION W/  INTRAOCULAR LENS IMPLANT Right 14    dorothy    CIRCUMCISION, NON-      focal laser right eye      INSERTION-INTRAOCULAR LENS (IOL) Right 2014    [facility] OR    INSERTION-INTRAOCULAR LENS (IOL) Left 12/15/2014    [facility] at [facility] OR    [id number]-[id number]-CATH Right 2019    Per[facility] Jr[facility] at [facility] OR    KNEE SURGERY      left    Left medial collateral ligament  repair      PHACOEMULSIFICATION-ASPIRATION-CATARACT Right 12/29/2014    [facility]    PHACOEMULSIFICATION-ASPIRATION-CATARACT Left 12/15/2014    [facility] at [facility] OR    REPAIR-PTOSIS/BILATERAL EXTERNAL LEVATORS Bilateral 8/30/2017    [facility] 1ST FLR    TONSILLECTOMY         Family History   Problem Relation Age of Onset    Heart disease Mother     Cancer Mother     Cancer Brother     Heart disease Father     Diabetes Father     Cancer Sister     Cataracts Paternal Grandmother     Glaucoma Paternal Grandmother     Blindness Neg Hx     Amblyopia Neg Hx     Hypertension Neg Hx     Macular degeneration Neg Hx     Retinal detachment Neg Hx     Strabismus Neg Hx        Social History     Socioeconomic History    Marital status:      Spouse name: Not on file    Number of children: Not on file    Years of education: Not on file    Highest education level: Not on file   Occupational History    Not on file   Social Needs    Financial resource strain: Not on file    Food insecurity:     Worry: Not on file     Inability: Not on file    Transportation needs:     Medical: Not on file     Non-medical: Not on file   Tobacco Use    Smoking status: Never Smoker    Smokeless tobacco: Never Used   Substance and Sexual Activity    Alcohol use: No     Alcohol/week: 0.0 oz    Drug use: No    Sexual activity: Yes     Partners: Female   Lifestyle    Physical activity:     Days per week: Not on file     Minutes per session: Not on file    Stress: Not on file   Relationships    Social connections:     Talks on phone: Not on file     Gets together: Not on file     Attends Anabaptism service: Not on file     Active member of club or organization: Not on file     Attends meetings of clubs or organizations: Not on file     Relationship status: Not on file   Other Topics Concern    Not on file   Social History Narrative  "   Not on file       Current Outpatient Medications   Medication Sig Dispense Refill    acetaminophen (TYLENOL) 325 MG tablet Take 2 tablets (650 mg total) by mouth every 4 (four) hours as needed.  0    aspirin (ECOTRIN) 81 MG EC tablet Take 1 tablet (81 mg total) by mouth once daily.  0    cadexomer iodine (IODOSORB) 0.9 % gel Apply topically every Mon, Wed, Fri. 40 g 0    carvedilol (COREG) 25 MG tablet Take 1 tablet (25 mg total) by mouth 2 (two) times daily with meals. 60 tablet 0    furosemide (LASIX) 40 MG tablet Take 2 tablets (80 mg total) by mouth 2 (two) times daily. Do not take the pm dose after 3 pm 360 tablet 0    gabapentin (NEURONTIN) 100 MG capsule TAKE ONE CAPSULE BY MOUTH IN THE MORNING THEN ONE CAPSULE  IN THE EVENING AND THEN THREE CAPSULES AT BEDTIME 450 capsule 3    hydrALAZINE (APRESOLINE) 50 MG tablet TAKE ONE TABLET BY MOUTH EVERY 12 HOURS 180 tablet 3    insulin lispro (HUMALOG KWIKPEN INSULIN) 100 unit/mL InPn pen INJECT 10 UNITS SUBCUTANEOUSLY THREE TIMES DAILY BEFORE MEALS WITH CORRECTION SCALE, MAX TDD 50 UNITS 9 Box 6    insulin lispro (HUMALOG KWIKPEN INSULIN) 100 unit/mL pen INJECT 12 UNITS SUBCUTANEOUSLY THREE TIMES DAILY WITH MEALS WITH  CORRECTION  SCALE.  MAX  50  UNITS  DAILY 1 Box 3    LEVEMIR FLEXTOUCH U-100 INSULN 100 unit/mL (3 mL) InPn pen Inject 26 Units into the skin every evening. 36 mL 3    pen needle, diabetic, safety (BD AUTOSHIELD PEN NEEDLE) 29 gauge x 5/16" Ndle For use once daily with levemir flexpen 90 each 11    rosuvastatin (CRESTOR) 20 MG tablet Take 20 mg by mouth once daily.       amLODIPine (NORVASC) 10 MG tablet Take 1 tablet (10 mg total) by mouth once daily. 90 tablet 3     No current facility-administered medications for this visit.        Review of patient's allergies indicates:   Allergen Reactions    Atorvastatin Other (See Comments)       Review of Systems   Constitution: Negative for chills, fever and malaise/fatigue.   HENT: " Negative for congestion.    Cardiovascular: Negative for chest pain, claudication and leg swelling.   Respiratory: Negative for cough and shortness of breath.    Skin: Positive for dry skin and nail changes.   Musculoskeletal: Positive for back pain. Negative for joint pain, muscle cramps and muscle weakness.   Gastrointestinal: Negative for nausea and vomiting.   Neurological: Positive for numbness and paresthesias. Negative for weakness.   Psychiatric/Behavioral: Negative for altered mental status.           Objective:      Physical Exam   Constitutional: He is oriented to person, place, and time. No distress.   Cardiovascular:   Pulses:       Dorsalis pedis pulses are 1+ on the right side, and 1+ on the left side.        Posterior tibial pulses are 1+ on the right side, and 1+ on the left side.   CFT< 3 secs all toes bilateral foot, skin temp warm to cool proximal to distal bilateral foot, no hair growth bilateral lower extremity, no lower extremity edema bilateral.       Musculoskeletal:        Right foot: There is decreased range of motion and deformity.        Left foot: There is decreased range of motion and deformity.   No pain with ROM or MMT bilateral lower extremity.    Elongated second toe bilateral. Non-reducible hammertoe second toe left foot. Flexion contractures toes 2-5 bilateral foot.    + equinus that reduces with knee bent bilateral.    No pain with ROM or MMT bilateral lower extremity.     Feet:   Right Foot:   Protective Sensation: 10 sites tested. 3 sites sensed.   Skin Integrity: Positive for dry skin. Negative for ulcer, blister, skin breakdown, erythema, warmth or callus.   Left Foot:   Protective Sensation: 10 sites tested. 4 sites sensed.   Skin Integrity: Positive for skin breakdown and dry skin. Negative for ulcer, blister, erythema, warmth or callus.   Neurological: He is alert and oriented to person, place, and time. He has normal strength. A sensory deficit is present.   Vibratory  sensation absent bilateral foot.   Skin: Skin is dry and intact. Capillary refill takes 2 to 3 seconds. Lesion noted. No ecchymosis and no rash noted. He is not diaphoretic. No cyanosis or erythema. No pallor. Nails show no clubbing.   Ulcer Location:  Distal left hallux  Measurements:  Unable measured pre debridement due to overlying callus with subdermal him sooner noted.  Posterior rim measures 0.2 x 0.2 x 0.3 cm extending to the subcuticular layer.  Periwound:  Hyperkeratotic  Drainage:  Scant purulent  Pus: None.  Malodor: None.  Base:  100% granular/epithelial. 0% fibrin.  Signs of infection: None.  Does not probe ordered to bone or track or undermine.      Nails 1-5 bilateral are 3-4 mm, thickened 1-2 mm, hard with minor loosening and debris.  Left hallux nail severely loosened, thickened and dystrophic with moderate underlying debris.    No open lesions or macerations bilateral lower extremity.                   Assessment:       Encounter Diagnoses   Name Primary?    Diabetic ulcer of toe of left foot associated with type 2 diabetes mellitus, with fat layer exposed Yes    Onychomycosis     Type 2 diabetes mellitus with peripheral neuropathy          Plan:       Domingo was seen today for follow-up.    Diagnoses and all orders for this visit:    Diabetic ulcer of toe of left foot associated with type 2 diabetes mellitus, with fat layer exposed  -     Wound Debridement  -     SUBSEQUENT HOME HEALTH ORDERS    Onychomycosis  -     Routine Foot Care    Type 2 diabetes mellitus with peripheral neuropathy  -     Routine Foot Care  -     SUBSEQUENT HOME HEALTH ORDERS    Other orders  -     Cancel: Routine Foot Care      I counseled the patient on his conditions, their implications and medical management.    Shoe inspection. Diabetic Foot Education. Patient reminded of the importance of good nutrition and blood sugar control to help prevent podiatric complications of diabetes. Patient instructed on proper foot  hygeine. We discussed wearing proper shoe gear, daily foot inspections, never walking without protective shoe gear, never putting sharp instruments to feet, routine podiatric nail visits every 2-3 months.     Wound debridement and dressing per attached note.Home care instructions reviewed in detail. Keep dressing C/D/I, do not soak foot.     Routine foot care per attached note.    Return to clinic 3-4 weeks.

## 2019-07-25 ENCOUNTER — HOSPITAL ENCOUNTER (INPATIENT)
Facility: HOSPITAL | Age: 74
LOS: 7 days | Discharge: HOME HEALTH | DRG: 629 | End: 2019-08-02
Attending: EMERGENCY MEDICINE | Admitting: HOSPITALIST
Payer: MEDICARE

## 2019-07-25 ENCOUNTER — TELEPHONE (OUTPATIENT)
Dept: PODIATRY | Facility: CLINIC | Age: 74
End: 2019-07-25

## 2019-07-25 DIAGNOSIS — E11.621 DIABETIC ULCER OF TOE OF LEFT FOOT ASSOCIATED WITH TYPE 2 DIABETES MELLITUS, WITH NECROSIS OF BONE: ICD-10-CM

## 2019-07-25 DIAGNOSIS — L97.524 DIABETIC ULCER OF TOE OF LEFT FOOT ASSOCIATED WITH TYPE 2 DIABETES MELLITUS, WITH NECROSIS OF BONE: ICD-10-CM

## 2019-07-25 DIAGNOSIS — A49.02 MRSA (METHICILLIN RESISTANT STAPHYLOCOCCUS AUREUS) INFECTION: ICD-10-CM

## 2019-07-25 DIAGNOSIS — I73.9 PAD (PERIPHERAL ARTERY DISEASE): ICD-10-CM

## 2019-07-25 DIAGNOSIS — M86.9 TOE OSTEOMYELITIS, LEFT: Primary | ICD-10-CM

## 2019-07-25 PROCEDURE — 86140 C-REACTIVE PROTEIN: CPT

## 2019-07-25 PROCEDURE — 96365 THER/PROPH/DIAG IV INF INIT: CPT

## 2019-07-25 PROCEDURE — 85610 PROTHROMBIN TIME: CPT

## 2019-07-25 PROCEDURE — 96366 THER/PROPH/DIAG IV INF ADDON: CPT

## 2019-07-25 PROCEDURE — 80053 COMPREHEN METABOLIC PANEL: CPT

## 2019-07-25 PROCEDURE — 85025 COMPLETE CBC W/AUTO DIFF WBC: CPT

## 2019-07-25 PROCEDURE — 85730 THROMBOPLASTIN TIME PARTIAL: CPT

## 2019-07-25 PROCEDURE — 99285 EMERGENCY DEPT VISIT HI MDM: CPT | Mod: 25

## 2019-07-25 PROCEDURE — 83605 ASSAY OF LACTIC ACID: CPT

## 2019-07-25 PROCEDURE — 96368 THER/DIAG CONCURRENT INF: CPT

## 2019-07-25 PROCEDURE — 82962 GLUCOSE BLOOD TEST: CPT

## 2019-07-25 PROCEDURE — 87040 BLOOD CULTURE FOR BACTERIA: CPT

## 2019-07-25 PROCEDURE — 85652 RBC SED RATE AUTOMATED: CPT

## 2019-07-25 PROCEDURE — 96372 THER/PROPH/DIAG INJ SC/IM: CPT

## 2019-07-25 RX ORDER — VANCOMYCIN HCL IN 5 % DEXTROSE 1G/250ML
1000 PLASTIC BAG, INJECTION (ML) INTRAVENOUS
Status: DISCONTINUED | OUTPATIENT
Start: 2019-07-25 | End: 2019-07-25

## 2019-07-25 NOTE — Clinical Note
Angiography of the  right femoral artery performed to evaluate for the placement of a closure device. CO2

## 2019-07-25 NOTE — TELEPHONE ENCOUNTER
----- Message from Conchis Emelia sent at 7/25/2019  2:09 PM CDT -----  Contact: Rohit Alo [facility], [phone]  Requests to speak with you, states left great toe wound is infected.

## 2019-07-25 NOTE — Clinical Note
0 ml injected throughout the case. 150 mL total wasted during the case. 150 mL total used in the case.

## 2019-07-25 NOTE — Clinical Note
Angiography of the left lower extremity selectively performed with the sheath   and hand injected with 0 mL of contrast. C02

## 2019-07-25 NOTE — Clinical Note
Prepped: groin and left foot. Prepped with: ChloraPrep. The site was clipped. The patient was draped.

## 2019-07-25 NOTE — Clinical Note
Angiography performed of the proximal right tibio-peroneal trunk. Angiography performed via hand injection with 0 mL of contrast. is inserted in to the CO2.

## 2019-07-25 NOTE — Clinical Note
Angiography performed of the proximal right common femoral artery. Angiography performed via hand injection with 0 mL of contrast. is inserted in to the CO2.

## 2019-07-25 NOTE — Clinical Note
Angiography of the left lower extremity performed with the sheath   and hand injected with 0 mL of contrast. CO2 used

## 2019-07-25 NOTE — Clinical Note
Angiography performed of the proximal left common femoral artery. Angiography performed via hand injection with 0 mL of contrast. is inserted in to the CO2.

## 2019-07-25 NOTE — Clinical Note
Angiography performed of the proximal left tibio-peroneal trunk. Angiography performed via hand injection with 0 mL of contrast. is inserted in to the CO2.

## 2019-07-25 NOTE — TELEPHONE ENCOUNTER
----- Message from Conchis Kapoor sent at 7/25/2019  4:02 PM CDT -----  Contact: spouse, [phone]  Requests to speak with you ASAP, hospitals home health nurse called earlier but hasn't received a call, states patient's toe is infected and she needs instructions from doctor.

## 2019-07-25 NOTE — Clinical Note
Angiography performed of the distal infrarenal . Angiography performed via hand injection with 0 mL of contrast. is inserted in to the CO2.

## 2019-07-26 PROBLEM — E87.6 HYPOKALEMIA: Status: ACTIVE | Noted: 2019-07-26

## 2019-07-26 PROBLEM — L97.524 DIABETIC ULCER OF TOE OF LEFT FOOT ASSOCIATED WITH TYPE 2 DIABETES MELLITUS, WITH NECROSIS OF BONE: Status: ACTIVE | Noted: 2019-07-26

## 2019-07-26 PROBLEM — E11.621 DIABETIC ULCER OF TOE OF LEFT FOOT ASSOCIATED WITH TYPE 2 DIABETES MELLITUS, WITH NECROSIS OF BONE: Status: ACTIVE | Noted: 2019-07-26

## 2019-07-26 PROBLEM — M86.9 TOE OSTEOMYELITIS, LEFT: Status: ACTIVE | Noted: 2019-07-26

## 2019-07-26 PROBLEM — D69.6 THROMBOCYTOPENIA, UNSPECIFIED: Status: ACTIVE | Noted: 2019-07-26

## 2019-07-26 LAB
ALBUMIN SERPL BCP-MCNC: 3.3 G/DL (ref 3.5–5.2)
ALBUMIN SERPL BCP-MCNC: 3.6 G/DL (ref 3.5–5.2)
ALP SERPL-CCNC: 73 U/L (ref 55–135)
ALP SERPL-CCNC: 83 U/L (ref 55–135)
ALT SERPL W/O P-5'-P-CCNC: 10 U/L (ref 10–44)
ALT SERPL W/O P-5'-P-CCNC: 8 U/L (ref 10–44)
ANION GAP SERPL CALC-SCNC: 10 MMOL/L (ref 8–16)
ANION GAP SERPL CALC-SCNC: 12 MMOL/L (ref 8–16)
APTT BLDCRRT: 27.3 SEC (ref 21–32)
AST SERPL-CCNC: 12 U/L (ref 10–40)
AST SERPL-CCNC: 14 U/L (ref 10–40)
BASOPHILS # BLD AUTO: 0.04 K/UL (ref 0–0.2)
BASOPHILS NFR BLD: 0.4 % (ref 0–1.9)
BILIRUB SERPL-MCNC: 0.3 MG/DL (ref 0.1–1)
BILIRUB SERPL-MCNC: 0.3 MG/DL (ref 0.1–1)
BUN SERPL-MCNC: 48 MG/DL (ref 8–23)
BUN SERPL-MCNC: 51 MG/DL (ref 8–23)
CALCIUM SERPL-MCNC: 9.5 MG/DL (ref 8.7–10.5)
CALCIUM SERPL-MCNC: 9.9 MG/DL (ref 8.7–10.5)
CHLORIDE SERPL-SCNC: 105 MMOL/L (ref 95–110)
CHLORIDE SERPL-SCNC: 106 MMOL/L (ref 95–110)
CO2 SERPL-SCNC: 24 MMOL/L (ref 23–29)
CO2 SERPL-SCNC: 26 MMOL/L (ref 23–29)
CREAT SERPL-MCNC: 2.9 MG/DL (ref 0.5–1.4)
CREAT SERPL-MCNC: 3 MG/DL (ref 0.5–1.4)
CRP SERPL-MCNC: 29.9 MG/L (ref 0–8.2)
DIFFERENTIAL METHOD: ABNORMAL
EOSINOPHIL # BLD AUTO: 0.2 K/UL (ref 0–0.5)
EOSINOPHIL NFR BLD: 1.8 % (ref 0–8)
ERYTHROCYTE [DISTWIDTH] IN BLOOD BY AUTOMATED COUNT: 14.5 % (ref 11.5–14.5)
ERYTHROCYTE [SEDIMENTATION RATE] IN BLOOD BY WESTERGREN METHOD: 91 MM/HR (ref 0–10)
EST. GFR  (AFRICAN AMERICAN): 23 ML/MIN/1.73 M^2
EST. GFR  (AFRICAN AMERICAN): 24 ML/MIN/1.73 M^2
EST. GFR  (NON AFRICAN AMERICAN): 20 ML/MIN/1.73 M^2
EST. GFR  (NON AFRICAN AMERICAN): 20 ML/MIN/1.73 M^2
ESTIMATED AVG GLUCOSE: 186 MG/DL (ref 68–131)
GLUCOSE SERPL-MCNC: 126 MG/DL (ref 70–110)
GLUCOSE SERPL-MCNC: 153 MG/DL (ref 70–110)
GLUCOSE SERPL-MCNC: 282 MG/DL (ref 70–110)
HBA1C MFR BLD HPLC: 8.1 % (ref 4–5.6)
HCT VFR BLD AUTO: 31.9 % (ref 40–54)
HGB BLD-MCNC: 10.3 G/DL (ref 14–18)
INR PPP: 1 (ref 0.8–1.2)
LACTATE SERPL-SCNC: 1.1 MMOL/L (ref 0.5–2.2)
LYMPHOCYTES # BLD AUTO: 1.7 K/UL (ref 1–4.8)
LYMPHOCYTES NFR BLD: 15.8 % (ref 18–48)
MCH RBC QN AUTO: 27.8 PG (ref 27–31)
MCHC RBC AUTO-ENTMCNC: 32.3 G/DL (ref 32–36)
MCV RBC AUTO: 86 FL (ref 82–98)
MONOCYTES # BLD AUTO: 0.5 K/UL (ref 0.3–1)
MONOCYTES NFR BLD: 4.9 % (ref 4–15)
NEUTROPHILS # BLD AUTO: 8.2 K/UL (ref 1.8–7.7)
NEUTROPHILS NFR BLD: 77.1 % (ref 38–73)
PLATELET # BLD AUTO: 118 K/UL (ref 150–350)
PMV BLD AUTO: 12.2 FL (ref 9.2–12.9)
POCT GLUCOSE: 154 MG/DL (ref 70–110)
POCT GLUCOSE: 282 MG/DL (ref 70–110)
POCT GLUCOSE: 302 MG/DL (ref 70–110)
POCT GLUCOSE: 352 MG/DL (ref 70–110)
POTASSIUM SERPL-SCNC: 3.4 MMOL/L (ref 3.5–5.1)
POTASSIUM SERPL-SCNC: 3.7 MMOL/L (ref 3.5–5.1)
PROT SERPL-MCNC: 7.5 G/DL (ref 6–8.4)
PROT SERPL-MCNC: 8.1 G/DL (ref 6–8.4)
PROTHROMBIN TIME: 10.8 SEC (ref 9–12.5)
RBC # BLD AUTO: 3.7 M/UL (ref 4.6–6.2)
SODIUM SERPL-SCNC: 141 MMOL/L (ref 136–145)
SODIUM SERPL-SCNC: 142 MMOL/L (ref 136–145)
WBC # BLD AUTO: 10.64 K/UL (ref 3.9–12.7)

## 2019-07-26 PROCEDURE — 25000003 PHARM REV CODE 250: Performed by: NURSE PRACTITIONER

## 2019-07-26 PROCEDURE — 11000001 HC ACUTE MED/SURG PRIVATE ROOM

## 2019-07-26 PROCEDURE — 63600175 PHARM REV CODE 636 W HCPCS: Performed by: NURSE PRACTITIONER

## 2019-07-26 PROCEDURE — 80053 COMPREHEN METABOLIC PANEL: CPT

## 2019-07-26 PROCEDURE — 80202 ASSAY OF VANCOMYCIN: CPT

## 2019-07-26 PROCEDURE — 63600175 PHARM REV CODE 636 W HCPCS: Performed by: EMERGENCY MEDICINE

## 2019-07-26 PROCEDURE — S5571 INSULIN DISPOS PEN 3 ML: HCPCS | Performed by: NURSE PRACTITIONER

## 2019-07-26 PROCEDURE — 87070 CULTURE OTHR SPECIMN AEROBIC: CPT

## 2019-07-26 PROCEDURE — 36415 COLL VENOUS BLD VENIPUNCTURE: CPT

## 2019-07-26 PROCEDURE — 87040 BLOOD CULTURE FOR BACTERIA: CPT

## 2019-07-26 PROCEDURE — 87077 CULTURE AEROBIC IDENTIFY: CPT

## 2019-07-26 PROCEDURE — 83036 HEMOGLOBIN GLYCOSYLATED A1C: CPT

## 2019-07-26 PROCEDURE — 94761 N-INVAS EAR/PLS OXIMETRY MLT: CPT

## 2019-07-26 PROCEDURE — 99222 1ST HOSP IP/OBS MODERATE 55: CPT | Mod: ,,, | Performed by: PODIATRIST

## 2019-07-26 PROCEDURE — 99222 PR INITIAL HOSPITAL CARE,LEVL II: ICD-10-PCS | Mod: ,,, | Performed by: PODIATRIST

## 2019-07-26 PROCEDURE — 87186 SC STD MICRODIL/AGAR DIL: CPT

## 2019-07-26 PROCEDURE — 87075 CULTR BACTERIA EXCEPT BLOOD: CPT

## 2019-07-26 RX ORDER — INSULIN ASPART 100 [IU]/ML
0-5 INJECTION, SOLUTION INTRAVENOUS; SUBCUTANEOUS
Status: DISCONTINUED | OUTPATIENT
Start: 2019-07-26 | End: 2019-07-28

## 2019-07-26 RX ORDER — HYDRALAZINE HYDROCHLORIDE 25 MG/1
25 TABLET, FILM COATED ORAL EVERY 8 HOURS PRN
Status: DISCONTINUED | OUTPATIENT
Start: 2019-07-26 | End: 2019-08-02 | Stop reason: HOSPADM

## 2019-07-26 RX ORDER — IBUPROFEN 200 MG
16 TABLET ORAL
Status: DISCONTINUED | OUTPATIENT
Start: 2019-07-26 | End: 2019-08-02 | Stop reason: HOSPADM

## 2019-07-26 RX ORDER — ASPIRIN 81 MG/1
81 TABLET ORAL DAILY
Status: DISCONTINUED | OUTPATIENT
Start: 2019-07-26 | End: 2019-08-02 | Stop reason: HOSPADM

## 2019-07-26 RX ORDER — FUROSEMIDE 40 MG/1
80 TABLET ORAL 2 TIMES DAILY
Status: DISCONTINUED | OUTPATIENT
Start: 2019-07-26 | End: 2019-08-02 | Stop reason: HOSPADM

## 2019-07-26 RX ORDER — ACETAMINOPHEN 325 MG/1
650 TABLET ORAL EVERY 4 HOURS PRN
Status: DISCONTINUED | OUTPATIENT
Start: 2019-07-26 | End: 2019-08-02 | Stop reason: HOSPADM

## 2019-07-26 RX ORDER — IBUPROFEN 200 MG
24 TABLET ORAL
Status: DISCONTINUED | OUTPATIENT
Start: 2019-07-26 | End: 2019-08-02 | Stop reason: HOSPADM

## 2019-07-26 RX ORDER — CARVEDILOL 25 MG/1
25 TABLET ORAL 2 TIMES DAILY WITH MEALS
Status: DISCONTINUED | OUTPATIENT
Start: 2019-07-26 | End: 2019-08-02 | Stop reason: HOSPADM

## 2019-07-26 RX ORDER — ACETAMINOPHEN 500 MG
1000 TABLET ORAL EVERY 8 HOURS PRN
Status: DISCONTINUED | OUTPATIENT
Start: 2019-07-26 | End: 2019-08-02 | Stop reason: HOSPADM

## 2019-07-26 RX ORDER — ACETAMINOPHEN 325 MG/1
650 TABLET ORAL EVERY 4 HOURS PRN
Status: DISCONTINUED | OUTPATIENT
Start: 2019-07-26 | End: 2019-07-26

## 2019-07-26 RX ORDER — ONDANSETRON 2 MG/ML
4 INJECTION INTRAMUSCULAR; INTRAVENOUS EVERY 8 HOURS PRN
Status: DISCONTINUED | OUTPATIENT
Start: 2019-07-26 | End: 2019-08-02 | Stop reason: HOSPADM

## 2019-07-26 RX ORDER — CLONIDINE HYDROCHLORIDE 0.1 MG/1
0.1 TABLET ORAL EVERY 6 HOURS PRN
Status: DISCONTINUED | OUTPATIENT
Start: 2019-07-26 | End: 2019-08-02 | Stop reason: HOSPADM

## 2019-07-26 RX ORDER — GLUCAGON 1 MG
1 KIT INJECTION
Status: DISCONTINUED | OUTPATIENT
Start: 2019-07-26 | End: 2019-08-02 | Stop reason: HOSPADM

## 2019-07-26 RX ORDER — SODIUM CHLORIDE 9 MG/ML
INJECTION, SOLUTION INTRAVENOUS CONTINUOUS
Status: DISCONTINUED | OUTPATIENT
Start: 2019-07-26 | End: 2019-07-26

## 2019-07-26 RX ORDER — LACTULOSE 10 G/15ML
15 SOLUTION ORAL EVERY 6 HOURS PRN
Status: DISCONTINUED | OUTPATIENT
Start: 2019-07-26 | End: 2019-08-02 | Stop reason: HOSPADM

## 2019-07-26 RX ORDER — POTASSIUM CHLORIDE 20 MEQ/1
40 TABLET, EXTENDED RELEASE ORAL ONCE
Status: COMPLETED | OUTPATIENT
Start: 2019-07-26 | End: 2019-07-26

## 2019-07-26 RX ORDER — GABAPENTIN 100 MG/1
100 CAPSULE ORAL 3 TIMES DAILY
Status: DISCONTINUED | OUTPATIENT
Start: 2019-07-26 | End: 2019-08-02 | Stop reason: HOSPADM

## 2019-07-26 RX ORDER — BISACODYL 5 MG
5 TABLET, DELAYED RELEASE (ENTERIC COATED) ORAL DAILY PRN
Status: DISCONTINUED | OUTPATIENT
Start: 2019-07-26 | End: 2019-08-02 | Stop reason: HOSPADM

## 2019-07-26 RX ORDER — ROSUVASTATIN CALCIUM 10 MG/1
20 TABLET, COATED ORAL NIGHTLY
Status: DISCONTINUED | OUTPATIENT
Start: 2019-07-26 | End: 2019-08-02 | Stop reason: HOSPADM

## 2019-07-26 RX ORDER — HYDRALAZINE HYDROCHLORIDE 25 MG/1
50 TABLET, FILM COATED ORAL EVERY 12 HOURS
Status: DISCONTINUED | OUTPATIENT
Start: 2019-07-26 | End: 2019-07-31

## 2019-07-26 RX ORDER — SODIUM CHLORIDE 0.9 % (FLUSH) 0.9 %
10 SYRINGE (ML) INJECTION
Status: DISCONTINUED | OUTPATIENT
Start: 2019-07-26 | End: 2019-08-02 | Stop reason: HOSPADM

## 2019-07-26 RX ORDER — HYDRALAZINE HYDROCHLORIDE 20 MG/ML
10 INJECTION INTRAMUSCULAR; INTRAVENOUS EVERY 8 HOURS PRN
Status: DISCONTINUED | OUTPATIENT
Start: 2019-07-26 | End: 2019-07-26

## 2019-07-26 RX ORDER — AMLODIPINE BESYLATE 5 MG/1
10 TABLET ORAL DAILY
Status: DISCONTINUED | OUTPATIENT
Start: 2019-07-26 | End: 2019-08-02 | Stop reason: HOSPADM

## 2019-07-26 RX ADMIN — CARVEDILOL 25 MG: 25 TABLET, FILM COATED ORAL at 05:07

## 2019-07-26 RX ADMIN — AMLODIPINE BESYLATE 10 MG: 5 TABLET ORAL at 09:07

## 2019-07-26 RX ADMIN — FUROSEMIDE 80 MG: 40 TABLET ORAL at 08:07

## 2019-07-26 RX ADMIN — ROSUVASTATIN CALCIUM 20 MG: 10 TABLET, FILM COATED ORAL at 08:07

## 2019-07-26 RX ADMIN — VANCOMYCIN HYDROCHLORIDE 2000 MG: 100 INJECTION, POWDER, LYOPHILIZED, FOR SOLUTION INTRAVENOUS at 12:07

## 2019-07-26 RX ADMIN — FUROSEMIDE 80 MG: 40 TABLET ORAL at 09:07

## 2019-07-26 RX ADMIN — GABAPENTIN 100 MG: 100 CAPSULE ORAL at 03:07

## 2019-07-26 RX ADMIN — CARVEDILOL 25 MG: 25 TABLET, FILM COATED ORAL at 07:07

## 2019-07-26 RX ADMIN — ASPIRIN 81 MG: 81 TABLET, COATED ORAL at 09:07

## 2019-07-26 RX ADMIN — INSULIN DETEMIR 26 UNITS: 100 INJECTION, SOLUTION SUBCUTANEOUS at 09:07

## 2019-07-26 RX ADMIN — POTASSIUM CHLORIDE 40 MEQ: 20 TABLET, EXTENDED RELEASE ORAL at 09:07

## 2019-07-26 RX ADMIN — HYDRALAZINE HYDROCHLORIDE 50 MG: 25 TABLET, FILM COATED ORAL at 08:07

## 2019-07-26 RX ADMIN — GABAPENTIN 100 MG: 100 CAPSULE ORAL at 11:07

## 2019-07-26 RX ADMIN — PIPERACILLIN AND TAZOBACTAM 4.5 G: 4; .5 INJECTION, POWDER, LYOPHILIZED, FOR SOLUTION INTRAVENOUS; PARENTERAL at 09:07

## 2019-07-26 RX ADMIN — HYDRALAZINE HYDROCHLORIDE 50 MG: 25 TABLET, FILM COATED ORAL at 09:07

## 2019-07-26 RX ADMIN — INSULIN ASPART 4 UNITS: 100 INJECTION, SOLUTION INTRAVENOUS; SUBCUTANEOUS at 03:07

## 2019-07-26 RX ADMIN — PIPERACILLIN AND TAZOBACTAM 4.5 G: 4; .5 INJECTION, POWDER, LYOPHILIZED, FOR SOLUTION INTRAVENOUS; PARENTERAL at 05:07

## 2019-07-26 RX ADMIN — INSULIN ASPART 3 UNITS: 100 INJECTION, SOLUTION INTRAVENOUS; SUBCUTANEOUS at 11:07

## 2019-07-26 RX ADMIN — GABAPENTIN 100 MG: 100 CAPSULE ORAL at 09:07

## 2019-07-26 NOTE — CONSULTS
[facility]  Podiatry  Consult Note    Patient Name: Domingo Castro  MRN: [id number]  Admission Date: 7/25/2019  Hospital Length of Stay: 0 days  Attending Physician: Kendall Sotomayor MD  Primary Care Provider: Griselda Nugent MD     Inpatient consult to Podiatry  Consult performed by: Merrill Tadeo MD  Consult ordered by: Marie Hestre NP        Subjective:     History of Present Illness:  Domingo Castro is a 74 y.o. male who  has a past medical history of Arthritis, Cataract, Chronic diastolic congestive heart failure, Coronary artery disease, Cystoid macular edema of both eyes, Diabetes mellitus type II, Hyperlipemia, Hypertension, Retinal hole, Stage 4 chronic kidney disease, and Streptococcus pyogenes bacteremia.    Patient Admited for left great toe infection.  Back in December/Jamuary of 20018/20019 patient was admitted and found to have bacteemia.  Found to have osteo left great toe with cultures growing out strep pyogenes, and was put on 6 weeks of ceftriaxone.  States that his toe wound nearly healed, but got worse a week ago, and was told by his home health nurse yesterday to go to the ED because it looked infected.  Currently patient denies F/C/N/V      Scheduled Meds:   amLODIPine  10 mg Oral Daily    aspirin  81 mg Oral Daily    carvedilol  25 mg Oral BID WM    furosemide  80 mg Oral BID    gabapentin  100 mg Oral TID    hydrALAZINE  50 mg Oral Q12H    insulin detemir U-100  26 Units Subcutaneous QHS    piperacillin-tazobactam (ZOSYN) IVPB  4.5 g Intravenous Q8H    rosuvastatin  20 mg Oral QHS     Continuous Infusions:  PRN Meds:acetaminophen, Dextrose 10% Bolus, Dextrose 10% Bolus, glucagon (human recombinant), glucose, glucose, hydrALAZINE, insulin aspart U-100, ondansetron, sodium chloride 0.9%    Review of patient's allergies indicates:   Allergen Reactions    Atorvastatin Other (See Comments)        Past Medical History:   Diagnosis Date    Arthritis      Cataract     Chronic diastolic congestive heart failure     Coronary artery disease     Cystoid macular edema of both eyes     Diabetes mellitus type II     Hyperlipemia     Hypertension     Retinal hole     Stage 4 chronic kidney disease     Streptococcus pyogenes bacteremia 2018    Due to left toe osteomyelitis     Past Surgical History:   Procedure Laterality Date    BLEPHAROPLASTY Bilateral 2017    [facility] 1ST FLR    CATARACT EXTRACTION W/  INTRAOCULAR LENS IMPLANT Left 12/15/14    Dr martin    CATARACT EXTRACTION W/  INTRAOCULAR LENS IMPLANT Right 14    dorothy    CIRCUMCISION, NON-      focal laser right eye      INSERTION-INTRAOCULAR LENS (IOL) Right 2014    [facility]    INSERTION-INTRAOCULAR LENS (IOL) Left 12/15/2014    [facility]formed by [facility] at [facility] OR    [id number]-H-CATH Right 2019    [facility] OR    KNEE SURGERY      left    Left medial collateral ligament repair      PHACOEMULSIFICATION-ASPIRATION-CATARACT Right 2014    [facility] at [facility] OR    PHACOEMULSIFICATION-ASPIRATION-CATARACT Left 12/15/2014    [facility] by [facility] at [facility] OR    REPAIR-PTOSIS/BILATERAL EXTERNAL LEVATORS Bilateral 2017    [facility] at [facility] OR 1ST FLR    TONSILLECTOMY         Family History     Problem Relation (Age of Onset)    Cancer Mother, Brother, Sister    Cataracts Paternal Grandmother    Diabetes Father    Glaucoma Paternal Grandmother    Heart disease Mother, Father        Tobacco Use    Smoking status: Never Smoker    Smokeless tobacco: Never Used   Substance and Sexual Activity    Alcohol use: No     Alcohol/week: 0.0 oz    Drug use: No    Sexual activity: Yes     Partners: Female     Review of Systems   Constitutional: Negative for chills and fever.   Cardiovascular: Positive for leg  swelling.   Gastrointestinal: Negative for nausea and vomiting.   Musculoskeletal: Negative for arthralgias and joint swelling.   Skin: Positive for wound. Negative for rash.   Psychiatric/Behavioral: Negative for agitation and confusion.     Objective:     Vital Signs (Most Recent):  Temp: 97.6 °F (36.4 °C) (07/26/19 1217)  Pulse: 60 (07/26/19 1217)  Resp: 18 (07/26/19 1217)  BP: (!) 179/72 (07/26/19 1217)  SpO2: 98 % (07/26/19 1148) Vital Signs (24h Range):  Temp:  [97.6 °F (36.4 °C)-99.6 °F (37.6 °C)] 97.6 °F (36.4 °C)  Pulse:  [57-72] 60  Resp:  [10-20] 18  SpO2:  [96 %-100 %] 98 %  BP: (143-186)/(66-81) 179/72     Weight: 108.9 kg (240 lb)  Body mass index is 30 kg/m².    Foot Exam    General  Orientation: alert and oriented to person, place, and time   Affect: appropriate       Right Foot/Ankle     Inspection and Palpation  Ecchymosis: none  Tenderness: none   Swelling: none     Neurovascular  Dorsalis pedis: 1+  Posterior tibial: 1+  Saphenous nerve sensation: diminished  Tibial nerve sensation: diminished  Superficial peroneal nerve sensation: diminished  Deep peroneal nerve sensation: diminished  Sural nerve sensation: diminished      Left Foot/Ankle      Inspection and Palpation  Ecchymosis: none  Tenderness: none     Neurovascular  Dorsalis pedis: 1+  Posterior tibial: 1+  Saphenous nerve sensation: diminished  Tibial nerve sensation: diminished  Superficial peroneal nerve sensation: diminished  Deep peroneal nerve sensation: diminished  Sural nerve sensation: diminished            Laboratory:  A1C:   Recent Labs   Lab 02/22/19  0941 07/26/19  0529   HGBA1C 8.2* 8.1*     CBC:   Recent Labs   Lab 07/25/19  2353   WBC 10.64   RBC 3.70*   HGB 10.3*   HCT 31.9*   *   MCV 86   MCH 27.8   MCHC 32.3     CMP:   Recent Labs   Lab 07/26/19  0529   *   CALCIUM 9.5   ALBUMIN 3.3*   PROT 7.5      K 3.4*   CO2 26      BUN 48*   CREATININE 2.9*   ALKPHOS 73   ALT 8*   AST 12   BILITOT 0.3      CRP:   Recent Labs   Lab 07/25/19 2353   CRP 29.9*     ESR:   Recent Labs   Lab 07/25/19 2353   SEDRATE 91*     Wound Cultures: No results for input(s): LABAERO in the last 4320 hours.  Microbiology Results (last 7 days)     Procedure Component Value Units Date/Time    Blood culture #2 **CANNOT BE ORDERED STAT** [[id number]] Collected:  07/26/19 0009    Order Status:  Completed Specimen:  Blood from Peripheral, Antecubital, Right Updated:  07/26/19 1115     Blood Culture, Routine No Growth to date    Blood culture #1 **CANNOT BE ORDERED STAT** [[id number]] Collected:  07/25/19 2357    Order Status:  Completed Specimen:  Blood from Peripheral, Antecubital, Left Updated:  07/26/19 1115     Blood Culture, Routine No Growth to date    Aerobic culture [[id number]] Collected:  07/26/19 1114    Order Status:  Sent Specimen:  Wound from Toe, Left Foot Updated:  07/26/19 1114    Culture, Anaerobe [[id number]] Collected:  07/26/19 1113    Order Status:  Sent Specimen:  Wound from Toe, Left Foot Updated:  07/26/19 1114        Specimen (12h ago, onward)    None          Diagnostic Results:  X-ray: showing osteo left distal hallux phalanx    MRI:  Showing osteo left distal hallux phalanx    Clinical Findings:  Ulcer Location: left great toe  Measurements:  Periwound: dusky, macerated  Drainage: seropurlent.  Base:  Fibrous, liquefactive.   Signs of infection: edema, erythema, purulence.                 Assessment/Plan:     * Toe osteomyelitis, left   Domingo Castro is a 74 y.o. male with left hallux toe infection , suspicious for osteomyelitis  -ESR: 91, CRP: 29, WBC: 10  -Imaging: x-ray and MRI showing osteomyelitis of left hallux  -Discussed treatment options of osteomyelitis amputation vs IV antibiotics.  Podiatry recommends amputaiton due to non-viability appearance of toe, recurrence of osteo.  Patient resistant to idea of amputation, but says he will think about it  -ID consulted.  Appreciate their opinion on likelyhood  of conservative measures along resolving the infection.  -obtained wound cultures   -applied betadine wet to dry gauze dressigns  -Will discuss case again with patient tomorrow.  If patient refuses amputation podiatry can do a bone biopsy to direct antibiotic care.  If amenable will will plan on hallux amputation on Sunday, sooner if patient become septic, but currently stable.    Podiatry will follow    Diabetic ulcer of toe of left foot associated with type 2 diabetes mellitus, with necrosis of bone  As above    Diabetic neuropathy associated with type 2 diabetes mellitus  Per primary      Chronic diastolic congestive heart failure  Per primary          Thank you for your consult. I will follow-up with patient. Please contact us if you have any additional questions.    Merrill Beckham MD  Podiatry  [facility]

## 2019-07-26 NOTE — PROGRESS NOTES
Pharmacokinetic Initial Assessment: IV Vancomycin    Assessment/Plan:    Initiate intravenous vancomycin with loading dose of 2000 mg once.  Desired empiric serum trough concentration is 10 to 20 mcg/ml.      Please contact pharmacy at extension [phone] with any questions regarding this assessment.     Thank you for the consult,   Dax Tejeda     Patient brief summary:  Domingo Castro is a 74 y.o. male initiated on antimicrobial therapy with IV Vancomycin for treatment of suspected bone/joint    Drug Allergies:   Review of patient's allergies indicates:   Allergen Reactions    Atorvastatin Other (See Comments)       Actual Body Weight:   108.9 kg    Renal Function:   Estimated Creatinine Clearance: 28.8 mL/min (A) (based on SCr of 3 mg/dL (H)).,     Dialysis Method (if applicable):  N/A     CBC (last 72 hours):  Recent Labs   Lab Result Units 07/25/19  2353   WBC K/uL 10.64   Hemoglobin g/dL 10.3*   Hematocrit % 31.9*   Platelets K/uL 118*   Gran% % 77.1*   Lymph% % 15.8*   Mono% % 4.9   Eosinophil% % 1.8   Basophil% % 0.4   Differential Method  Automated       Metabolic Panel (last 72 hours):  Recent Labs   Lab Result Units 07/25/19  2353   Sodium mmol/L 142   Potassium mmol/L 3.7   Chloride mmol/L 106   CO2 mmol/L 24   Glucose mg/dL 126*   BUN, Bld mg/dL 51*   Creatinine mg/dL 3.0*   Albumin g/dL 3.6   Total Bilirubin mg/dL 0.3   Alkaline Phosphatase U/L 83   AST U/L 14   ALT U/L 10       Drug levels (last 3 results):  No results for input(s): VANCOMYCINRA, VANCOMYCINPE, VANCOMYCINTR in the last 72 hours.    Microbiologic Results:  Microbiology Results (last 7 days)       Procedure Component Value Units Date/Time    Blood culture #2 **CANNOT BE ORDERED STAT** [[id number]] Collected:  07/26/19 0009    Order Status:  Sent Specimen:  Blood from Peripheral, Antecubital, Right Updated:  07/26/19 0013    Blood culture #1 **CANNOT BE ORDERED STAT** [[id number]] Collected:  07/25/19 2357    Order Status:  Sent Specimen:   Blood from Peripheral, Antecubital, Left Updated:  07/25/19 0574

## 2019-07-26 NOTE — SUBJECTIVE & OBJECTIVE
Past Medical History:   Diagnosis Date    Arthritis     Cataract     Chronic diastolic congestive heart failure     Coronary artery disease     Cystoid macular edema of both eyes     Diabetes mellitus type II     Hyperlipemia     Hypertension     Retinal hole     Stage 4 chronic kidney disease     Streptococcus pyogenes bacteremia 2018    Due to left toe osteomyelitis       Past Surgical History:   Procedure Laterality Date    BLEPHAROPLASTY Bilateral 2017    [facility]    CATARACT EXTRACTION W/  INTRAOCULAR LENS IMPLANT Left 12/15/14    Dr martin    CATARACT EXTRACTION W/  INTRAOCULAR LENS IMPLANT Right 14    dorothy    CIRCUMCISION, NON-      focal laser right eye      INSERTION-INTRAOCULAR LENS (IOL) Right 2014    [facility]    INSERTION-INTRAOCULAR LENS (IOL) Left 12/15/2014    [facility]    WIZTNKUEF-CJBN-C-CATH Right 2019    [facility]    KNEE SURGERY      left    Left medial collateral ligament repair      PHACOEMULSIFICATION-ASPIRATION-CATARACT Right 2014    [facility] at [facility]    PHACOEMULSIFICATION-ASPIRATION-CATARACT Left 12/15/2014    [facility]    REPAIR-PTOSIS/BILATERAL EXTERNAL LEVATORS Bilateral 2017    [facility]    TONSILLECTOMY         Review of patient's allergies indicates:   Allergen Reactions    Atorvastatin Other (See Comments)       No current facility-administered medications on file prior to encounter.      Current Outpatient Medications on File Prior to Encounter   Medication Sig    acetaminophen (TYLENOL) 325 MG tablet Take 2 tablets (650 mg total) by mouth every 4 (four) hours as needed.    amLODIPine (NORVASC) 10 MG tablet Take 1 tablet (10 mg total) by mouth once daily.    aspirin  "(ECOTRIN) 81 MG EC tablet Take 1 tablet (81 mg total) by mouth once daily.    cadexomer iodine (IODOSORB) 0.9 % gel Apply topically every Mon, Wed, Fri.    carvedilol (COREG) 25 MG tablet Take 1 tablet (25 mg total) by mouth 2 (two) times daily with meals.    furosemide (LASIX) 40 MG tablet Take 2 tablets (80 mg total) by mouth 2 (two) times daily. Do not take the pm dose after 3 pm    gabapentin (NEURONTIN) 100 MG capsule TAKE ONE CAPSULE BY MOUTH IN THE MORNING THEN ONE CAPSULE  IN THE EVENING AND THEN THREE CAPSULES AT BEDTIME    hydrALAZINE (APRESOLINE) 50 MG tablet TAKE ONE TABLET BY MOUTH EVERY 12 HOURS    insulin lispro (HUMALOG KWIKPEN INSULIN) 100 unit/mL InPn pen INJECT 10 UNITS SUBCUTANEOUSLY THREE TIMES DAILY BEFORE MEALS WITH CORRECTION SCALE, MAX TDD 50 UNITS    insulin lispro (HUMALOG KWIKPEN INSULIN) 100 unit/mL pen INJECT 12 UNITS SUBCUTANEOUSLY THREE TIMES DAILY WITH MEALS WITH  CORRECTION  SCALE.  MAX  50  UNITS  DAILY    LEVEMIR FLEXTOUCH U-100 INSULN 100 unit/mL (3 mL) InPn pen Inject 26 Units into the skin every evening.    pen needle, diabetic, safety (BD AUTOSHIELD PEN NEEDLE) 29 gauge x 5/16" Ndle For use once daily with levemir flexpen    rosuvastatin (CRESTOR) 20 MG tablet Take 20 mg by mouth once daily.      Family History     Problem Relation (Age of Onset)    Cancer Mother, Brother, Sister    Cataracts Paternal Grandmother    Diabetes Father    Glaucoma Paternal Grandmother    Heart disease Mother, Father        Tobacco Use    Smoking status: Never Smoker    Smokeless tobacco: Never Used   Substance and Sexual Activity    Alcohol use: No     Alcohol/week: 0.0 oz    Drug use: No    Sexual activity: Yes     Partners: Female     Review of Systems   Constitutional: Negative for chills and fever.   HENT: Negative for congestion, postnasal drip and rhinorrhea.    Eyes: Negative for visual disturbance.   Respiratory: Negative for chest tightness and shortness of breath.  "   Cardiovascular: Negative for chest pain.   Gastrointestinal: Negative for abdominal distention and abdominal pain.   Genitourinary: Negative for difficulty urinating.   Musculoskeletal: Negative for arthralgias and myalgias.   Skin: Negative for color change.   Neurological: Negative for weakness, light-headedness and headaches.   Hematological: Does not bruise/bleed easily.   Psychiatric/Behavioral: Negative for agitation.     Objective:     Vital Signs (Most Recent):  Temp: 97.6 °F (36.4 °C) (07/26/19 1217)  Pulse: 60 (07/26/19 1217)  Resp: 18 (07/26/19 1217)  BP: (!) 179/72 (07/26/19 1217)  SpO2: 98 % (07/26/19 1148) Vital Signs (24h Range):  Temp:  [97.6 °F (36.4 °C)-99.6 °F (37.6 °C)] 97.6 °F (36.4 °C)  Pulse:  [57-72] 60  Resp:  [10-20] 18  SpO2:  [96 %-100 %] 98 %  BP: (143-186)/(66-81) 179/72     Weight: 108.9 kg (240 lb)  Body mass index is 30 kg/m².    Physical Exam        Significant Labs:   Blood Culture:   Recent Labs   Lab 07/25/19 2357 07/26/19  0009   LABBLOO No Growth to date No Growth to date     BMP:   Recent Labs   Lab 07/26/19  0529   *      K 3.4*      CO2 26   BUN 48*   CREATININE 2.9*   CALCIUM 9.5     CBC:   Recent Labs   Lab 07/25/19 2353   WBC 10.64   HGB 10.3*   HCT 31.9*   *     CMP:   Recent Labs   Lab 07/25/19 2353 07/26/19  0529    141   K 3.7 3.4*    105   CO2 24 26   * 153*   BUN 51* 48*   CREATININE 3.0* 2.9*   CALCIUM 9.9 9.5   PROT 8.1 7.5   ALBUMIN 3.6 3.3*   BILITOT 0.3 0.3   ALKPHOS 83 73   AST 14 12   ALT 10 8*   ANIONGAP 12 10   EGFRNONAA 20* 20*     Significant Imaging: I have reviewed all pertinent imaging results/findings within the past 24 hours.

## 2019-07-26 NOTE — PROGRESS NOTES
VN cued into room to complete admit assessment. Plan of care reviewed with patient, son, and son in law. Patient verbalized understanding. Pt informed of fall risk and fall precautions, call light within reach, 2x bed rails, pt wearing slip resistant socks. Patient notified to ask staff for assistance and pt verbalized complete understanding. Will continue to monitor as needed. Pt has no further questions at this time.     07/26/19 1245   Type of Frequent Check   Type Patient Rounds  (VN rounding)   Safety/Activity   Patient Rounds bed in low position;call light in patient/parent reach;visualized patient;placement of personal items at bedside   Safety Promotion/Fall Prevention assistive device/personal item within reach;commode/urinal/bedpan at bedside;Fall Risk reviewed with patient/family;family to remain at bedside;medications reviewed;nonskid shoes/socks when out of bed;side rails raised x 2;instructed to call staff for mobility   Safety Precautions emergency equipment at bedside   Positioning   Body Position sitting up in bed   Head of Bed (HOB) HOB at 30 degrees   Positioning/Transfer Devices pillows;in use   Pain/Comfort/Sleep   Preferred Pain Scale number (Numeric Rating Pain Scale)   Comfort/Acceptable Pain Level 0   Pain Rating (0-10): Rest 0   Assessments (Pre/Post)   Level of Consciousness (AVPU) alert

## 2019-07-26 NOTE — PLAN OF CARE
Patient AAOx3  Lives at home with spouse  Uses [facility]  Has had home IV abx in the past with his wife helping himl (has used [facility] IV infusion)    Future Appointments   Date Time Provider Department Center   8/8/2019 11:15 AM Dimitry Gallegos DPM [facility]   8/29/2019 11:00 AM Griselda Nugent MD [facility] [place]   9/23/2019  8:30 AM ANNUAL WELLNESS VISIT-NURSE PRACTITIONER, MET [address] [place] [address] [place]        07/26/19 1502   Discharge Assessment   Assessment Type Discharge Planning Assessment   Confirmed/corrected address and phone number on facesheet? Yes   Assessment information obtained from? Patient   Prior to hospitilization cognitive status: Alert/Oriented   Prior to hospitalization functional status: Independent   Current cognitive status: Alert/Oriented   Current Functional Status: Independent   Lives With spouse   Able to Return to Prior Arrangements yes   Is patient able to care for self after discharge? Yes   Patient's perception of discharge disposition home health;home or selfcare   Readmission Within the Last 30 Days no previous admission in last 30 days   Patient currently being followed by outpatient case management? No   Patient currently receives any other outside agency services? No   Equipment Currently Used at Home none   Do you have any problems affording any of your prescribed medications? No   Is the patient taking medications as prescribed? yes   Does the patient have transportation home? No   Does the patient receive services at the [facility]? No   Discharge Plan A Home;Home with family;Home Health   Discharge Plan B Home;Home with family;Home Health   DME Needed Upon Discharge  none   Patient/Family in Agreement with Plan yes     Daysi Denny RN, CCM, CMSRN  RN Transition Navigator  [phone]

## 2019-07-26 NOTE — SUBJECTIVE & OBJECTIVE
Scheduled Meds:   amLODIPine  10 mg Oral Daily    aspirin  81 mg Oral Daily    carvedilol  25 mg Oral BID WM    furosemide  80 mg Oral BID    gabapentin  100 mg Oral TID    hydrALAZINE  50 mg Oral Q12H    insulin detemir U-100  26 Units Subcutaneous QHS    piperacillin-tazobactam (ZOSYN) IVPB  4.5 g Intravenous Q8H    rosuvastatin  20 mg Oral QHS     Continuous Infusions:  PRN Meds:acetaminophen, Dextrose 10% Bolus, Dextrose 10% Bolus, glucagon (human recombinant), glucose, glucose, hydrALAZINE, insulin aspart U-100, ondansetron, sodium chloride 0.9%    Review of patient's allergies indicates:   Allergen Reactions    Atorvastatin Other (See Comments)        Past Medical History:   Diagnosis Date    Arthritis     Cataract     Chronic diastolic congestive heart failure     Coronary artery disease     Cystoid macular edema of both eyes     Diabetes mellitus type II     Hyperlipemia     Hypertension     Retinal hole     Stage 4 chronic kidney disease     Streptococcus pyogenes bacteremia 2018    Due to left toe osteomyelitis     Past Surgical History:   Procedure Laterality Date    BLEPHAROPLASTY Bilateral 2017    [facility]    CATARACT EXTRACTION W/  INTRAOCULAR LENS IMPLANT Left 12/15/14    Dr martin    CATARACT EXTRACTION W/  INTRAOCULAR LENS IMPLANT Right 14    dorothy    CIRCUMCISION, NON-      focal laser right eye      INSERTION-INTRAOCULAR LENS (IOL) Right 2014    [facility] OR    INSERTION-INTRAOCULAR LENS (IOL) Left 12/15/2014    [facility] at [facility] OR    [id number]-B-CATH Right 2019    Per[facility] by [facility]man Jr., [facility] at [facility] OR    KNEE SURGERY      left    Left medial collateral ligament repair      PHACOEMULSIFICATION-ASPIRATION-CATARACT Right 2014    [facility] at [facility]    PHACOEMULSIFICATION-ASPIRATION-CATARACT Left  12/15/2014    [facility] at [facility] OR    REPAIR-PTOSIS/BILATERAL EXTERNAL LEVATORS Bilateral 8/30/2017    [facility]    TONSILLECTOMY         Family History     Problem Relation (Age of Onset)    Cancer Mother, Brother, Sister    Cataracts Paternal Grandmother    Diabetes Father    Glaucoma Paternal Grandmother    Heart disease Mother, Father        Tobacco Use    Smoking status: Never Smoker    Smokeless tobacco: Never Used   Substance and Sexual Activity    Alcohol use: No     Alcohol/week: 0.0 oz    Drug use: No    Sexual activity: Yes     Partners: Female     Review of Systems   Constitutional: Negative for chills and fever.   Cardiovascular: Positive for leg swelling.   Gastrointestinal: Negative for nausea and vomiting.   Musculoskeletal: Negative for arthralgias and joint swelling.   Skin: Positive for wound. Negative for rash.   Psychiatric/Behavioral: Negative for agitation and confusion.     Objective:     Vital Signs (Most Recent):  Temp: 97.6 °F (36.4 °C) (07/26/19 1217)  Pulse: 60 (07/26/19 1217)  Resp: 18 (07/26/19 1217)  BP: (!) 179/72 (07/26/19 1217)  SpO2: 98 % (07/26/19 1148) Vital Signs (24h Range):  Temp:  [97.6 °F (36.4 °C)-99.6 °F (37.6 °C)] 97.6 °F (36.4 °C)  Pulse:  [57-72] 60  Resp:  [10-20] 18  SpO2:  [96 %-100 %] 98 %  BP: (143-186)/(66-81) 179/72     Weight: 108.9 kg (240 lb)  Body mass index is 30 kg/m².    Foot Exam    General  Orientation: alert and oriented to person, place, and time   Affect: appropriate       Right Foot/Ankle     Inspection and Palpation  Ecchymosis: none  Tenderness: none   Swelling: none     Neurovascular  Dorsalis pedis: 1+  Posterior tibial: 1+  Saphenous nerve sensation: diminished  Tibial nerve sensation: diminished  Superficial peroneal nerve sensation: diminished  Deep peroneal nerve sensation: diminished  Sural nerve sensation: diminished      Left Foot/Ankle      Inspection and  Palpation  Ecchymosis: none  Tenderness: none     Neurovascular  Dorsalis pedis: 1+  Posterior tibial: 1+  Saphenous nerve sensation: diminished  Tibial nerve sensation: diminished  Superficial peroneal nerve sensation: diminished  Deep peroneal nerve sensation: diminished  Sural nerve sensation: diminished            Laboratory:  A1C:   Recent Labs   Lab 02/22/19  0941 07/26/19  0529   HGBA1C 8.2* 8.1*     CBC:   Recent Labs   Lab 07/25/19  2353   WBC 10.64   RBC 3.70*   HGB 10.3*   HCT 31.9*   *   MCV 86   MCH 27.8   MCHC 32.3     CMP:   Recent Labs   Lab 07/26/19  0529   *   CALCIUM 9.5   ALBUMIN 3.3*   PROT 7.5      K 3.4*   CO2 26      BUN 48*   CREATININE 2.9*   ALKPHOS 73   ALT 8*   AST 12   BILITOT 0.3     CRP:   Recent Labs   Lab 07/25/19  2353   CRP 29.9*     ESR:   Recent Labs   Lab 07/25/19  2353   SEDRATE 91*     Wound Cultures: No results for input(s): LABAERO in the last 4320 hours.  Microbiology Results (last 7 days)     Procedure Component Value Units Date/Time    Blood culture #2 **CANNOT BE ORDERED STAT** [[id number]] Collected:  07/26/19 0009    Order Status:  Completed Specimen:  Blood from Peripheral, Antecubital, Right Updated:  07/26/19 1115     Blood Culture, Routine No Growth to date    Blood culture #1 **CANNOT BE ORDERED STAT** [[id number]] Collected:  07/25/19 2357    Order Status:  Completed Specimen:  Blood from Peripheral, Antecubital, Left Updated:  07/26/19 1115     Blood Culture, Routine No Growth to date    Aerobic culture [[id number]] Collected:  07/26/19 1114    Order Status:  Sent Specimen:  Wound from Toe, Left Foot Updated:  07/26/19 1114    Culture, Anaerobe [[id number]] Collected:  07/26/19 1113    Order Status:  Sent Specimen:  Wound from Toe, Left Foot Updated:  07/26/19 1114        Specimen (12h ago, onward)    None          Diagnostic Results:  X-ray: showing osteo left distal hallux phalanx    MRI:  Showing osteo left distal hallux  phalanx    Clinical Findings:  Ulcer Location: left great toe  Measurements:  Periwound: dusky, macerated  Drainage: seropurlent.  Base:  Fibrous, liquefactive.   Signs of infection: edema, erythema, purulence.

## 2019-07-26 NOTE — PLAN OF CARE
Patient arrived to  via w/c with escort service and family. Port a cath accessed in ER. Dressing c/d/i. Patient AAO x4. Dressing to right foot c/d/i. Will cont to monitor pt and intervene prn.

## 2019-07-26 NOTE — HPI
74 y.o. Male with past medical history of DM type 2, Hypertension, Arthritis, CAD, HLD, CKD, left toe osteomyelitis, CHF, Cataract presented to the ED on yesterday complaining of left foot infection.  Patient has been treated in past for osteomyelitis of the same foot in which he completed course of antibiotics and was still receiving wound care and podiatry follow up.  Patient reports that he receives wound care by home health nurse who was concerned that toe appeared infected and notified Podiatrist office who advised patient to go to ED.  Patient is followed by Dr. Gallegos.  Denies fever, chills, SOB, chest pain, sick contacts,  trauma to the toe, change in bladder or bowel habits.  ED findings: H/H 10.3/31.9, Sed rate 91, CRP  29.9, Hgb A1C 8.1, Left forefoot MRI showed osteomyelitis of the distal aspect of the 1st distal phalanx with adjacent marrow edema which may be reactive in nature versus early infectious process.  Patient admitted for medical management.

## 2019-07-26 NOTE — H&P
[facility] Hospital Medicine  History & Physical    Patient Name: Domingo Castro  MRN: [id number]  Admission Date: 7/25/2019  Attending Physician: Kendall Sotomayor MD   Primary Care Provider: Griselda Nugent MD         Patient information was obtained from patient, caregiver / friend, past medical records and ER records.     Subjective:     Principal Problem:Toe osteomyelitis, left    Chief Complaint:   Chief Complaint   Patient presents with    Wound Infection     Patient has wound to L big toe states he was hospitalized in december for it and he believes that it returned. Patient of Dr. Franz states his wound care nurse came today and told him he needed to come to ED. Only took aspirin 81 mg this morning         HPI: 74 y.o. Male with past medical history of DM type 2, Hypertension, Arthritis, CAD, HLD, CKD, left toe osteomyelitis, CHF, Cataract presented to the ED on yesterday complaining of left foot infection.  Patient has been treated in past for osteomyelitis of the same foot in which he completed course of antibiotics and was still receiving wound care and podiatry follow up.  Patient reports that he receives wound care by home health nurse who was concerned that toe appeared infected and notified Podiatrist office who advised patient to go to ED.  Patient is followed by Dr. Gallegos.  Denies fever, chills, SOB, chest pain, sick contacts,  trauma to the toe, change in bladder or bowel habits.  ED findings: H/H 10.3/31.9, Sed rate 91, CRP  29.9, Hgb A1C 8.1, Left forefoot MRI showed osteomyelitis of the distal aspect of the 1st distal phalanx with adjacent marrow edema which may be reactive in nature versus early infectious process.  Patient admitted for medical management.    Past Medical History:   Diagnosis Date    Arthritis     Cataract     Chronic diastolic congestive heart failure     Coronary artery disease     Cystoid macular edema of both eyes     Diabetes mellitus type II      Hyperlipemia     Hypertension     Retinal hole     Stage 4 chronic kidney disease     Streptococcus pyogenes bacteremia 2018    Due to left toe osteomyelitis       Past Surgical History:   Procedure Laterality Date    BLEPHAROPLASTY Bilateral 2017    Performed by [facility] at [facility] OR 1ST FLR    CATARACT EXTRACTION W/  INTRAOCULAR LENS IMPLANT Left 12/15/14    Dr martin    CATARACT EXTRACTION W/  INTRAOCULAR LENS IMPLANT Right 14    dorothy    CIRCUMCISION, NON-      focal laser right eye      INSERTION-INTRAOCULAR LENS (IOL) Right 2014    [facility] OR    INSERTION-INTRAOCULAR LENS (IOL) Left 12/15/2014    [facility]formed by [facility] at [facility] OR    VHHLWHPAB-WOEW-I-CATH Right 2019    [facility] Jr[facility] OR    KNEE SURGERY      left    Left medial collateral ligament repair      PHACOEMULSIFICATION-ASPIRATION-CATARACT Right 2014    Performed by [facility] at [facility] OR    PHACOEMULSIFICATION-ASPIRATION-CATARACT Left 12/15/2014    [facility]formed [facility] at [facility] OR    REPAIR-PTOSIS/BILATERAL EXTERNAL LEVATORS Bilateral 2017    Performed by [facility] at [facility] OR 1ST FLR    TONSILLECTOMY         Review of patient's allergies indicates:   Allergen Reactions    Atorvastatin Other (See Comments)       No current facility-administered medications on file prior to encounter.      Current Outpatient Medications on File Prior to Encounter   Medication Sig    acetaminophen (TYLENOL) 325 MG tablet Take 2 tablets (650 mg total) by mouth every 4 (four) hours as needed.    amLODIPine (NORVASC) 10 MG tablet Take 1 tablet (10 mg total) by mouth once daily.    aspirin (ECOTRIN) 81 MG EC tablet Take 1 tablet (81 mg total) by mouth once daily.    cadexomer iodine (IODOSORB) 0.9 % gel Apply topically every Mon, Wed, Fri.    carvedilol (COREG) 25 MG tablet Take 1 tablet (25 mg total) by  "mouth 2 (two) times daily with meals.    furosemide (LASIX) 40 MG tablet Take 2 tablets (80 mg total) by mouth 2 (two) times daily. Do not take the pm dose after 3 pm    gabapentin (NEURONTIN) 100 MG capsule TAKE ONE CAPSULE BY MOUTH IN THE MORNING THEN ONE CAPSULE  IN THE EVENING AND THEN THREE CAPSULES AT BEDTIME    hydrALAZINE (APRESOLINE) 50 MG tablet TAKE ONE TABLET BY MOUTH EVERY 12 HOURS    insulin lispro (HUMALOG KWIKPEN INSULIN) 100 unit/mL InPn pen INJECT 10 UNITS SUBCUTANEOUSLY THREE TIMES DAILY BEFORE MEALS WITH CORRECTION SCALE, MAX TDD 50 UNITS    insulin lispro (HUMALOG KWIKPEN INSULIN) 100 unit/mL pen INJECT 12 UNITS SUBCUTANEOUSLY THREE TIMES DAILY WITH MEALS WITH  CORRECTION  SCALE.  MAX  50  UNITS  DAILY    LEVEMIR FLEXTOUCH U-100 INSULN 100 unit/mL (3 mL) InPn pen Inject 26 Units into the skin every evening.    pen needle, diabetic, safety ([employer] AUTOSHIELD PEN NEEDLE) 29 gauge x 5/16" Ndle For use once daily with levemir flexpen    rosuvastatin (CRESTOR) 20 MG tablet Take 20 mg by mouth once daily.      Family History     Problem Relation (Age of Onset)    Cancer Mother, Brother, Sister    Cataracts Paternal Grandmother    Diabetes Father    Glaucoma Paternal Grandmother    Heart disease Mother, Father        Tobacco Use    Smoking status: Never Smoker    Smokeless tobacco: Never Used   Substance and Sexual Activity    Alcohol use: No     Alcohol/week: 0.0 oz    Drug use: No    Sexual activity: Yes     Partners: Female     Review of Systems   Constitutional: Negative for chills and fever.   HENT: Negative for congestion, postnasal drip and rhinorrhea.    Eyes: Negative for visual disturbance.   Respiratory: Negative for chest tightness and shortness of breath.    Cardiovascular: Negative for chest pain.   Gastrointestinal: Negative for abdominal distention and abdominal pain.   Genitourinary: Negative for difficulty urinating.   Musculoskeletal: Negative for arthralgias and myalgias. "   Skin: Negative for color change.   Neurological: Negative for weakness, light-headedness and headaches.   Hematological: Does not bruise/bleed easily.   Psychiatric/Behavioral: Negative for agitation.     Objective:     Vital Signs (Most Recent):  Temp: 97.6 °F (36.4 °C) (07/26/19 1217)  Pulse: 60 (07/26/19 1217)  Resp: 18 (07/26/19 1217)  BP: (!) 179/72 (07/26/19 1217)  SpO2: 98 % (07/26/19 1148) Vital Signs (24h Range):  Temp:  [97.6 °F (36.4 °C)-99.6 °F (37.6 °C)] 97.6 °F (36.4 °C)  Pulse:  [57-72] 60  Resp:  [10-20] 18  SpO2:  [96 %-100 %] 98 %  BP: (143-186)/(66-81) 179/72     Weight: 108.9 kg (240 lb)  Body mass index is 30 kg/m².    Physical Exam   Constitutional: He is oriented to person, place, and time. He appears well-developed and well-nourished.   HENT:   Head: Normocephalic and atraumatic.   Eyes: EOM are normal.   Neck: Normal range of motion.   Cardiovascular: Normal rate.   Pulmonary/Chest: Effort normal and breath sounds normal.   Abdominal: Soft. Bowel sounds are normal.   Musculoskeletal: Normal range of motion.   Neurological: He is alert and oriented to person, place, and time.   Skin: Skin is warm and dry.   Psychiatric: He has a normal mood and affect.           Significant Labs:   Blood Culture:   Recent Labs   Lab 07/25/19 2357 07/26/19  0009   LABBLOO No Growth to date No Growth to date     BMP:   Recent Labs   Lab 07/26/19  0529   *      K 3.4*      CO2 26   BUN 48*   CREATININE 2.9*   CALCIUM 9.5     CBC:   Recent Labs   Lab 07/25/19 2353   WBC 10.64   HGB 10.3*   HCT 31.9*   *     CMP:   Recent Labs   Lab 07/25/19 2353 07/26/19  0529    141   K 3.7 3.4*    105   CO2 24 26   * 153*   BUN 51* 48*   CREATININE 3.0* 2.9*   CALCIUM 9.9 9.5   PROT 8.1 7.5   ALBUMIN 3.6 3.3*   BILITOT 0.3 0.3   ALKPHOS 83 73   AST 14 12   ALT 10 8*   ANIONGAP 12 10   EGFRNONAA 20* 20*     Significant Imaging: I have reviewed all pertinent imaging  results/findings within the past 24 hours.    Assessment/Plan:     * Toe osteomyelitis, left  Consult Podiatry  Continue Vanc and Zosyn  Blood Cultures: Pending  Wound Cultures: Pending        Thrombocytopenia, unspecified  Monitor      Hypokalemia  Replaced      Chronic diastolic congestive heart failure  Essential hypertension    Denies chest pain or SOB  Continue Amlodipine, Coreg Furosemide and Hydralazine      Anemia of chronic renal failure, stage 4 (severe)  CKD (chronic kidney disease) stage 4, GFR 15-29 ml/min    Creatinine at baseline   Voiding well  Strict I&O  Avoid Nephrotoxic Agents  Renally dose medications    Dyslipidemia  Continue statin      Essential hypertension          Type 2 diabetes mellitus with stage 4 chronic kidney disease, with long-term current use of insulin  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

## 2019-07-26 NOTE — PROGRESS NOTES
Pharmacokinetic Initial Assessment: IV Vancomycin    Assessment/Plan:    Initiate intravenous vancomycin with loading dose of 2000 mg once with subsequent doses when random vancomycin level <20 mcg/ml.  Desired empiric serum trough concentration is 10 to 20 mcg/mL.  Draw vancomycin random level on 7/27/19 at 0000.  Pharmacy will continue to follow and monitor vancomycin.      Please contact pharmacy at extension [phone] with any questions regarding this assessment.     Thank you for the consult,   Dax Tejeda     Patient brief summary:  Domingo Castro is a 74 y.o. male initiated on antimicrobial therapy with IV Vancomycin for treatment of suspected bone/joint    Drug Allergies:   Review of patient's allergies indicates:   Allergen Reactions    Atorvastatin Other (See Comments)       Actual Body Weight:   108.9 kg    Renal Function:   Estimated Creatinine Clearance: 28.8 mL/min (A) (based on SCr of 3 mg/dL (H)).,     Dialysis Method (if applicable):  N/A     CBC (last 72 hours):  Recent Labs   Lab Result Units 07/25/19  2353   WBC K/uL 10.64   Hemoglobin g/dL 10.3*   Hematocrit % 31.9*   Platelets K/uL 118*   Gran% % 77.1*   Lymph% % 15.8*   Mono% % 4.9   Eosinophil% % 1.8   Basophil% % 0.4   Differential Method  Automated       Metabolic Panel (last 72 hours):  Recent Labs   Lab Result Units 07/25/19  2353   Sodium mmol/L 142   Potassium mmol/L 3.7   Chloride mmol/L 106   CO2 mmol/L 24   Glucose mg/dL 126*   BUN, Bld mg/dL 51*   Creatinine mg/dL 3.0*   Albumin g/dL 3.6   Total Bilirubin mg/dL 0.3   Alkaline Phosphatase U/L 83   AST U/L 14   ALT U/L 10       Drug levels (last 3 results):  No results for input(s): VANCOMYCINRA, VANCOMYCINPE, VANCOMYCINTR in the last 72 hours.    Microbiologic Results:  Microbiology Results (last 7 days)       Procedure Component Value Units Date/Time    Blood culture #2 **CANNOT BE ORDERED STAT** [[id number]] Collected:  07/26/19 0009    Order Status:  Sent Specimen:  Blood from  Peripheral, Antecubital, Right Updated:  07/26/19 0013    Blood culture #1 **CANNOT BE ORDERED STAT** [[id number]] Collected:  07/25/19 7665    Order Status:  Sent Specimen:  Blood from Peripheral, Antecubital, Left Updated:  07/25/19 1747

## 2019-07-26 NOTE — ED NOTES
"Pt presents to ED with c/o wound infection. Pt states "big toe on left foot is infected again". Pt states he had fever at home; low grade temp at this time. Pt denies c/o pain to lt great toe at this time, but does have sensation. Edema noted to lt ankle and foot. Pt with hx diabetes and states he has "neuropathy to lt foot". Pt states he has a home health nurse who provides wound care twice weekly. Aaox3. Resp even and unlabored. Drsg removed; Stage 2 wound noted to lt great toe. No odor noted.   "

## 2019-07-26 NOTE — HPI
Domingo Castro is a 74 y.o. male who  has a past medical history of Arthritis, Cataract, Chronic diastolic congestive heart failure, Coronary artery disease, Cystoid macular edema of both eyes, Diabetes mellitus type II, Hyperlipemia, Hypertension, Retinal hole, Stage 4 chronic kidney disease, and Streptococcus pyogenes bacteremia.    Patient Admited for left great toe infection.  Back in December/Jamuary of 20018/20019 patient was admitted and found to have bacteemia.  Found to have osteo left great toe with cultures growing out strep pyogenes, and was put on 6 weeks of ceftriaxone.  States that his toe wound nearly healed, but got worse a week ago, and was told by his home health nurse yesterday to go to the ED because it looked infected.  Currently patient denies F/C/N/V

## 2019-07-26 NOTE — ED NOTES
Pt remains asleep at this time. Resp even and unlabored. Will cont to monitor. Call bell within pt reach.

## 2019-07-26 NOTE — ED NOTES
Pt resting in bed quietly. Denies c/o lt great toe pain at this time. Pt updated with plan of care. Pt denies any questions or concerns. Verbalized understanding. Call bell within pt reach.

## 2019-07-27 PROBLEM — L97.509 TOE ULCER: Status: ACTIVE | Noted: 2019-07-27

## 2019-07-27 LAB
ALBUMIN SERPL BCP-MCNC: 3 G/DL (ref 3.5–5.2)
ALP SERPL-CCNC: 70 U/L (ref 55–135)
ALT SERPL W/O P-5'-P-CCNC: 7 U/L (ref 10–44)
ANION GAP SERPL CALC-SCNC: 8 MMOL/L (ref 8–16)
AST SERPL-CCNC: 10 U/L (ref 10–40)
BASOPHILS # BLD AUTO: 0.02 K/UL (ref 0–0.2)
BASOPHILS NFR BLD: 0.2 % (ref 0–1.9)
BILIRUB SERPL-MCNC: 0.4 MG/DL (ref 0.1–1)
BUN SERPL-MCNC: 49 MG/DL (ref 8–23)
CALCIUM SERPL-MCNC: 9.3 MG/DL (ref 8.7–10.5)
CHLORIDE SERPL-SCNC: 104 MMOL/L (ref 95–110)
CO2 SERPL-SCNC: 28 MMOL/L (ref 23–29)
CREAT SERPL-MCNC: 2.8 MG/DL (ref 0.5–1.4)
DIFFERENTIAL METHOD: ABNORMAL
EOSINOPHIL # BLD AUTO: 0.2 K/UL (ref 0–0.5)
EOSINOPHIL NFR BLD: 2.5 % (ref 0–8)
ERYTHROCYTE [DISTWIDTH] IN BLOOD BY AUTOMATED COUNT: 14.5 % (ref 11.5–14.5)
EST. GFR  (AFRICAN AMERICAN): 25 ML/MIN/1.73 M^2
EST. GFR  (NON AFRICAN AMERICAN): 21 ML/MIN/1.73 M^2
GLUCOSE SERPL-MCNC: 266 MG/DL (ref 70–110)
GRAM STN SPEC: NORMAL
GRAM STN SPEC: NORMAL
HCT VFR BLD AUTO: 27.3 % (ref 40–54)
HGB BLD-MCNC: 8.8 G/DL (ref 14–18)
LYMPHOCYTES # BLD AUTO: 1.8 K/UL (ref 1–4.8)
LYMPHOCYTES NFR BLD: 21.1 % (ref 18–48)
MAGNESIUM SERPL-MCNC: 2.1 MG/DL (ref 1.6–2.6)
MCH RBC QN AUTO: 27.8 PG (ref 27–31)
MCHC RBC AUTO-ENTMCNC: 32.2 G/DL (ref 32–36)
MCV RBC AUTO: 86 FL (ref 82–98)
MONOCYTES # BLD AUTO: 0.7 K/UL (ref 0.3–1)
MONOCYTES NFR BLD: 7.9 % (ref 4–15)
NEUTROPHILS # BLD AUTO: 5.7 K/UL (ref 1.8–7.7)
NEUTROPHILS NFR BLD: 68.3 % (ref 38–73)
PHOSPHATE SERPL-MCNC: 3.3 MG/DL (ref 2.7–4.5)
PLATELET # BLD AUTO: 104 K/UL (ref 150–350)
PMV BLD AUTO: 11.3 FL (ref 9.2–12.9)
POCT GLUCOSE: 238 MG/DL (ref 70–110)
POCT GLUCOSE: 239 MG/DL (ref 70–110)
POCT GLUCOSE: 310 MG/DL (ref 70–110)
POCT GLUCOSE: 335 MG/DL (ref 70–110)
POTASSIUM SERPL-SCNC: 3.6 MMOL/L (ref 3.5–5.1)
PROT SERPL-MCNC: 7.3 G/DL (ref 6–8.4)
RBC # BLD AUTO: 3.17 M/UL (ref 4.6–6.2)
SODIUM SERPL-SCNC: 140 MMOL/L (ref 136–145)
VANCOMYCIN SERPL-MCNC: 11.8 UG/ML
WBC # BLD AUTO: 8.31 K/UL (ref 3.9–12.7)

## 2019-07-27 PROCEDURE — 99233 PR SUBSEQUENT HOSPITAL CARE,LEVL III: ICD-10-PCS | Mod: 25,,, | Performed by: PODIATRIST

## 2019-07-27 PROCEDURE — 63600175 PHARM REV CODE 636 W HCPCS: Performed by: HOSPITALIST

## 2019-07-27 PROCEDURE — 87077 CULTURE AEROBIC IDENTIFY: CPT

## 2019-07-27 PROCEDURE — 63600175 PHARM REV CODE 636 W HCPCS: Performed by: NURSE PRACTITIONER

## 2019-07-27 PROCEDURE — 88305 TISSUE SPECIMEN TO PATHOLOGY - SURGERY: ICD-10-PCS | Mod: 26,,, | Performed by: PATHOLOGY

## 2019-07-27 PROCEDURE — 87070 CULTURE OTHR SPECIMN AEROBIC: CPT

## 2019-07-27 PROCEDURE — 88311 TISSUE SPECIMEN TO PATHOLOGY - SURGERY: ICD-10-PCS | Mod: 26,,, | Performed by: PATHOLOGY

## 2019-07-27 PROCEDURE — 99233 SBSQ HOSP IP/OBS HIGH 50: CPT | Mod: 25,,, | Performed by: PODIATRIST

## 2019-07-27 PROCEDURE — 87205 SMEAR GRAM STAIN: CPT

## 2019-07-27 PROCEDURE — 25000003 PHARM REV CODE 250: Performed by: NURSE PRACTITIONER

## 2019-07-27 PROCEDURE — S5571 INSULIN DISPOS PEN 3 ML: HCPCS | Performed by: NURSE PRACTITIONER

## 2019-07-27 PROCEDURE — 87076 CULTURE ANAEROBE IDENT EACH: CPT

## 2019-07-27 PROCEDURE — 85025 COMPLETE CBC W/AUTO DIFF WBC: CPT

## 2019-07-27 PROCEDURE — 87186 SC STD MICRODIL/AGAR DIL: CPT

## 2019-07-27 PROCEDURE — 88305 TISSUE EXAM BY PATHOLOGIST: CPT | Performed by: PATHOLOGY

## 2019-07-27 PROCEDURE — 83735 ASSAY OF MAGNESIUM: CPT

## 2019-07-27 PROCEDURE — 84100 ASSAY OF PHOSPHORUS: CPT

## 2019-07-27 PROCEDURE — 11000001 HC ACUTE MED/SURG PRIVATE ROOM

## 2019-07-27 PROCEDURE — 20220 BONE BIOPSY TROCAR/NDL SUPFC: CPT | Mod: ,,, | Performed by: PODIATRIST

## 2019-07-27 PROCEDURE — 20220 PR BONE BIOPSY,TROCAR/NEEDLE SUPERF: ICD-10-PCS | Mod: ,,, | Performed by: PODIATRIST

## 2019-07-27 PROCEDURE — 87075 CULTR BACTERIA EXCEPT BLOOD: CPT

## 2019-07-27 PROCEDURE — 88311 DECALCIFY TISSUE: CPT | Mod: 26,,, | Performed by: PATHOLOGY

## 2019-07-27 PROCEDURE — 80053 COMPREHEN METABOLIC PANEL: CPT

## 2019-07-27 PROCEDURE — 88305 TISSUE EXAM BY PATHOLOGIST: CPT | Mod: 26,,, | Performed by: PATHOLOGY

## 2019-07-27 RX ORDER — INSULIN ASPART 100 [IU]/ML
10 INJECTION, SOLUTION INTRAVENOUS; SUBCUTANEOUS
Status: DISCONTINUED | OUTPATIENT
Start: 2019-07-27 | End: 2019-07-28

## 2019-07-27 RX ADMIN — INSULIN ASPART 10 UNITS: 100 INJECTION, SOLUTION INTRAVENOUS; SUBCUTANEOUS at 12:07

## 2019-07-27 RX ADMIN — INSULIN ASPART 2 UNITS: 100 INJECTION, SOLUTION INTRAVENOUS; SUBCUTANEOUS at 08:07

## 2019-07-27 RX ADMIN — PIPERACILLIN AND TAZOBACTAM 4.5 G: 4; .5 INJECTION, POWDER, LYOPHILIZED, FOR SOLUTION INTRAVENOUS; PARENTERAL at 08:07

## 2019-07-27 RX ADMIN — HYDRALAZINE HYDROCHLORIDE 50 MG: 25 TABLET, FILM COATED ORAL at 08:07

## 2019-07-27 RX ADMIN — FUROSEMIDE 80 MG: 40 TABLET ORAL at 08:07

## 2019-07-27 RX ADMIN — ROSUVASTATIN CALCIUM 20 MG: 10 TABLET, FILM COATED ORAL at 08:07

## 2019-07-27 RX ADMIN — INSULIN ASPART 10 UNITS: 100 INJECTION, SOLUTION INTRAVENOUS; SUBCUTANEOUS at 04:07

## 2019-07-27 RX ADMIN — CARVEDILOL 25 MG: 25 TABLET, FILM COATED ORAL at 08:07

## 2019-07-27 RX ADMIN — GABAPENTIN 100 MG: 100 CAPSULE ORAL at 08:07

## 2019-07-27 RX ADMIN — GABAPENTIN 100 MG: 100 CAPSULE ORAL at 04:07

## 2019-07-27 RX ADMIN — AMLODIPINE BESYLATE 10 MG: 5 TABLET ORAL at 08:07

## 2019-07-27 RX ADMIN — PIPERACILLIN AND TAZOBACTAM 4.5 G: 4; .5 INJECTION, POWDER, LYOPHILIZED, FOR SOLUTION INTRAVENOUS; PARENTERAL at 04:07

## 2019-07-27 RX ADMIN — VANCOMYCIN HYDROCHLORIDE 1500 MG: 1.5 INJECTION, POWDER, LYOPHILIZED, FOR SOLUTION INTRAVENOUS at 03:07

## 2019-07-27 RX ADMIN — ASPIRIN 81 MG: 81 TABLET, COATED ORAL at 08:07

## 2019-07-27 RX ADMIN — INSULIN ASPART 4 UNITS: 100 INJECTION, SOLUTION INTRAVENOUS; SUBCUTANEOUS at 04:07

## 2019-07-27 RX ADMIN — PIPERACILLIN AND TAZOBACTAM 4.5 G: 4; .5 INJECTION, POWDER, LYOPHILIZED, FOR SOLUTION INTRAVENOUS; PARENTERAL at 12:07

## 2019-07-27 RX ADMIN — INSULIN DETEMIR 26 UNITS: 100 INJECTION, SOLUTION SUBCUTANEOUS at 08:07

## 2019-07-27 RX ADMIN — CARVEDILOL 25 MG: 25 TABLET, FILM COATED ORAL at 04:07

## 2019-07-27 NOTE — PLAN OF CARE
07/27/19 0516   Plan of Care Review   Plan of Care Reviewed With patient       POC reviewed with the patient. Pt currently resting quietly in bed. AAO x 4. VSS. Afebrile. No complaints of pain. Blood glucose maintained per orders, insulin given per orders. IV antibiotics given per orders. Bed in lowest position, call bell within reach, instructed to notify staff for assistance, bed alarms audible and set. No other concerns noted at this time. Will continue to monitor.

## 2019-07-27 NOTE — PROGRESS NOTES
[facility] Infectious Disease Progress Note     Primary Team: Dr. Sotomayor  Consultant Attending: Dr. Hendrickson  Date of Admit: 7/25/2019    Summary of history   Domingo Castro is a 74 y.o. male with a relevant history of IDT2DM, Hypertension, HLD, CKD stage IV, left toe osteomyelitis in December of 2018, CHF with unknown EF for which he takes 40mg Lasix daily.     Patient presented to the ED today with a complaint of an ulcer on the plantar surface of his left great toe. Patient states that he first noticed a small ulceration at the site on 7/19/19 and that it became progressively worse throughout the week. The patient has significant diabetic neuropathy and does not have intact sensation in his feet. He follows up with Dr. Gallegos for podiatry. On admission MRI and radiograph of the left foot were obtained which were suggestive of osteomyelitis in the toe.      Patient has been admitted from 12/23/18 to 1/6/19 at this facility due to osteomyelitis of the same toe and a pneumonia. At the time he was found to have Strep pyogenes bacteremia and strep pyogenes in the culture from his infected toe. He completed a course of Rocephin on 2/4/19. He currently appears to be receiving regular wound care and podiatry follow up. Patient reports that he receives wound care by home health nurse who was concerned that his toe appeared infected today and notified Podiatrist office who advised patient to go to ED.      On admission he denied fever, chills, SOB, chest pain, sick contacts,  trauma to the toe, change in bladder or bowel habits.  ED findings: H/H 10.3/31.9, Sed rate 91, CRP  29.9, Hgb A1C 8.1     He lives in [place], [place]. He is . He is a . His primary care physician is Dr. Griselda Nugent. His nephrologist is Dr. Nely Watson. His podiatrist is Dr. Dimitry Gallegos. His cardiologist is Dr. Vandana Tovar.      Interval events     Bone biopsy performed today  Bilateral arterial US of LE performed today showing  significant stenosis    Subjective     Patient has no complaints today and tolerated his bone biopsy of the infected toe well. He is tolerating current antibiotic regimen well.    Current Medications:     Infusions:       Scheduled:   amLODIPine  10 mg Oral Daily    aspirin  81 mg Oral Daily    carvedilol  25 mg Oral BID WM    furosemide  80 mg Oral BID    gabapentin  100 mg Oral TID    hydrALAZINE  50 mg Oral Q12H    insulin aspart U-100  10 Units Subcutaneous TIDWM    insulin detemir U-100  26 Units Subcutaneous QHS    piperacillin-tazobactam (ZOSYN) IVPB  4.5 g Intravenous Q8H    rosuvastatin  20 mg Oral QHS        PRN:  acetaminophen, acetaminophen, bisacodyl, cloNIDine, Dextrose 10% Bolus, Dextrose 10% Bolus, glucagon (human recombinant), glucose, glucose, hydrALAZINE, insulin aspart U-100, lactulose, ondansetron, sodium chloride 0.9%    Antibiotics and Day Number of Therapy:  vanc and zosyn day #2    Objective   Last 24 Hour Vital Signs:  BP  Min: 154/70  Max: 176/72  Temp  Av °F (36.7 °C)  Min: 96.5 °F (35.8 °C)  Max: 99.4 °F (37.4 °C)  Pulse  Av.3  Min: 54  Max: 64  Resp  Av  Min: 16  Max: 18  SpO2  Av.5 %  Min: 92 %  Max: 97 %  Weight  Av.3 kg (240 lb 15.4 oz)  Min: 109.3 kg (240 lb 15.4 oz)  Max: 109.3 kg (240 lb 15.4 oz)    Lines, drains, airway:  Port A Cath Single Lumen 19 2348 right subclavian    Physical Examination:  Constitutional: wt loss, fever, chills, night sweats, fatigue, malaise  HEENT: dysphonia, epistaxis, tinnitus, vertigo, otalgia, otorrhea, visual changes, lacrimation, changes in hearing, rhinorrhea, sore throat  Urogenital: frequency, nocturia, dysuria, hematuria, retention, incontinence, discharge  Neurological: headaches, syncope, weakness, numbness, paresthesia in LE b/l, dysequilibrium, falls, amnesia, dysarthria, facial assymetry  Gastrointestinal: anorexia, nausea, vomiting, melena, hematochezia, abdominal pain, hematemesis, diarrhea,  constipation, dyspepsia, dysphagia, odynophagia, dysgeusia  Respiratory: dyspnea, cough, sputum production, wheeze, hemoptysis, cyanosis, apnea  Integumentary: hypo/hyperpigmentation, rash, lesions, pruritus, alopecia, nail changes  Cardiovascular: chest pain, orthopnea, cyanosis, edema, claudication, syncope, palpitations  Hematological: bleeding, bruising, lymphadenopathy  Psych: insomnia, mood changes, hallucinations, anxiety  Reproductive: erectile dysfunction in men, abnormal uterine bleeding in women, changes in libido  Immune/Allergy: urticaria, localized pain/erythema/warmth/swelling  Musculoskeletal: arthralgia, myalgia, joint swelling, stiffness, wasting, deformity, weakness, back pain, ulcer on left 1st toe  Endocrine: gynecomastia, galactorrhea, weight gain, heat/cold intolerance, polyphagia, polydipsia, polyuria    Lab data:  CBC:   Lab Results   Component Value Date    WBC 8.31 07/27/2019    HGB 8.8 (L) 07/27/2019     (L) 07/27/2019    MCV 86 07/27/2019    RDW 14.5 07/27/2019       Estimated Creatinine Clearance: 30.9 mL/min (A) (based on SCr of 2.8 mg/dL (H)).    Microbiology Data  Microbiology Results (last 7 days)     Procedure Component Value Units Date/Time    Aerobic culture [[id number]] Collected:  07/26/19 1114    Order Status:  Completed Specimen:  Wound from Toe, Left Foot Updated:  07/27/19 1259     Aerobic Bacterial Culture Insufficient incubation, culture in progress    Gram stain [[id number]] Collected:  07/27/19 0942    Order Status:  Sent Specimen:  Bone from Toe, Left Foot Updated:  07/27/19 0942    Culture, Anaerobe [[id number]] Collected:  07/27/19 0942    Order Status:  Sent Specimen:  Bone from Toe, Left Foot Updated:  07/27/19 0942    Aerobic culture [[id number]] Collected:  07/27/19 0942    Order Status:  Sent Specimen:  Bone from Toe, Left Foot Updated:  07/27/19 0942    Blood culture #2 **CANNOT BE ORDERED STAT** [[id number]] Collected:  07/26/19 0009    Order Status:   Completed Specimen:  Blood from Peripheral, Antecubital, Right Updated:  07/27/19 0612     Blood Culture, Routine No Growth to date      No Growth to date    Blood culture #1 **CANNOT BE ORDERED STAT** [[id number]] Collected:  07/25/19 2877    Order Status:  Completed Specimen:  Blood from Peripheral, Antecubital, Left Updated:  07/27/19 0612     Blood Culture, Routine No Growth to date      No Growth to date    Culture, Anaerobe [[id number]] Collected:  07/26/19 1113    Order Status:  Sent Specimen:  Wound from Toe, Left Foot Updated:  07/26/19 1635          Results of procedures/ Findings     US lower extremities b/l on 7/27/19    Impression       Atherosclerosis with focal elevated segmental velocities present in the proximal/mid left SFA, proximal left anterior tibial artery, and proximal right anterior tibial artery suggesting greater than 50% focal stenosis.    Abnormal arterial waveforms present in the bilateral thighs, worse on the left.  Cannot exclude more proximal aortoiliac stenosis.         Assessment     Domingo Castro is a 74 y.o.male with past medical history as above. He has no complaints today and is tolerating his Abx regimen. There is question whether the left toe viable enough for proper recovery with conservative measures. Bilateral arterial US was obtained showing significant stenosis. Bone biopsy obtained. ROCIO will be done. Blood and wound cultures are not showing any growth thus far. Continue empiric coverage.        Recommendations     Osteomyelitis of left 1st toe  - Continue with Zosyn and Vanc, d/c Zosyn if cultures of wound grow a gram positive organism  - We'll follow cultures to narrow down the coverage  - Patient will require 6 weeks of ABx coverage      Thank you for this consult. We will follow.    Tashi Mccormick  [employer] ID Fellow

## 2019-07-27 NOTE — PROGRESS NOTES
Pharmacokinetic Assessment Follow Up: IV Vancomycin    Vancomycin serum concentration assessment(s):    The random level was drawned correctly and can be used to guide therapy at this time. The measurement is below the desired definitive target range of 15 to 20 mcg/mL.    Vancomycin Regimen Plan:    Redose with Vancomycin 1500 mg X 1 dose, then check vancomycin random level at 0200 on 7/28/19. Subsequent doses will be given when random vancomcyin level <20 mcg/ml.    Pharmacy will continue to follow and monitor vancomycin.    Please contact pharmacy at [email or web address] [phone] for questions regarding this assessment.    Thank you for the consult,   Dax Tejeda     Patient brief summary:  Domingo Castro is a 74 y.o. male initiated on antimicrobial therapy with IV Vancomycin for treatment of suspected bone/joint    The patient received a loading dose, followed by the current treatment regimen: random levels with plan to redose when level is less than 20 mcg/mL    Drug Allergies:   Review of patient's allergies indicates:   Allergen Reactions    Atorvastatin Other (See Comments)       Actual Body Weight:   108.9 kg    Renal Function:   Estimated Creatinine Clearance: 29.8 mL/min (A) (based on SCr of 2.9 mg/dL (H)).,     Dialysis Method (if applicable):  N/A     CBC (last 72 hours):  Recent Labs   Lab Result Units 07/25/19 2353 07/26/19  0529   WBC K/uL 10.64  --    Hemoglobin g/dL 10.3*  --    Hemoglobin A1C %  --  8.1*   Hematocrit % 31.9*  --    Platelets K/uL 118*  --    Gran% % 77.1*  --    Lymph% % 15.8*  --    Mono% % 4.9  --    Eosinophil% % 1.8  --    Basophil% % 0.4  --    Differential Method  Automated  --        Metabolic Panel (last 72 hours):  Recent Labs   Lab Result Units 07/25/19 2353 07/26/19  0529   Sodium mmol/L 142 141   Potassium mmol/L 3.7 3.4*   Chloride mmol/L 106 105   CO2 mmol/L 24 26   Glucose mg/dL 126* 153*   BUN, Bld mg/dL 51* 48*   Creatinine mg/dL 3.0* 2.9*   Albumin g/dL 3.6 3.3*   Total  Bilirubin mg/dL 0.3 0.3   Alkaline Phosphatase U/L 83 73   AST U/L 14 12   ALT U/L 10 8*       Vancomycin Administrations:  vancomycin given in the last 96 hours                     vancomycin (VANCOCIN) 2,000 mg in dextrose 5 % 500 mL IVPB (mg) 2,000 mg New Bag 07/26/19 0029                      Drug levels (last 3 results):  Recent Labs   Lab Result Units 07/26/19  2349   Vancomycin, Random ug/mL 11.8       Microbiologic Results:  Microbiology Results (last 7 days)       Procedure Component Value Units Date/Time    Culture, Anaerobe [[id number]] Collected:  07/26/19 1113    Order Status:  Sent Specimen:  Wound from Toe, Left Foot Updated:  07/26/19 1635    Aerobic culture [[id number]] Collected:  07/26/19 1114    Order Status:  Sent Specimen:  Wound from Toe, Left Foot Updated:  07/26/19 1635    Blood culture #2 **CANNOT BE ORDERED STAT** [[id number]] Collected:  07/26/19 0009    Order Status:  Completed Specimen:  Blood from Peripheral, Antecubital, Right Updated:  07/26/19 1115     Blood Culture, Routine No Growth to date    Blood culture #1 **CANNOT BE ORDERED STAT** [[id number]] Collected:  07/25/19 6897    Order Status:  Completed Specimen:  Blood from Peripheral, Antecubital, Left Updated:  07/26/19 1115     Blood Culture, Routine No Growth to date

## 2019-07-27 NOTE — PROGRESS NOTES
[facility]  Podiatry  Progress Note    Patient Name: Domingo Castro  MRN: [id number]  Admission Date: 2019  Hospital Length of Stay: 1 days  Attending Physician: Kendall Sotomayor MD  Primary Care Provider: Griselda Nugent MD   Interval Hx:  Patient Seen at bedside for dressing change.  Patient denies F/C/N/V.  Dressings clean, dry, and intact.    Scheduled Meds:   amLODIPine  10 mg Oral Daily    aspirin  81 mg Oral Daily    carvedilol  25 mg Oral BID WM    furosemide  80 mg Oral BID    gabapentin  100 mg Oral TID    hydrALAZINE  50 mg Oral Q12H    insulin aspart U-100  10 Units Subcutaneous TIDWM    insulin detemir U-100  26 Units Subcutaneous QHS    piperacillin-tazobactam (ZOSYN) IVPB  4.5 g Intravenous Q8H    rosuvastatin  20 mg Oral QHS     Continuous Infusions:  PRN Meds:acetaminophen, acetaminophen, bisacodyl, cloNIDine, Dextrose 10% Bolus, Dextrose 10% Bolus, glucagon (human recombinant), glucose, glucose, hydrALAZINE, insulin aspart U-100, lactulose, ondansetron, sodium chloride 0.9%    Review of patient's allergies indicates:   Allergen Reactions    Atorvastatin Other (See Comments)        Past Medical History:   Diagnosis Date    Arthritis     Cataract     Chronic diastolic congestive heart failure     Coronary artery disease     Cystoid macular edema of both eyes     Diabetes mellitus type II     Hyperlipemia     Hypertension     Retinal hole     Stage 4 chronic kidney disease     Streptococcus pyogenes bacteremia 2018    Due to left toe osteomyelitis     Past Surgical History:   Procedure Laterality Date    BLEPHAROPLASTY Bilateral 2017    Performed [facility] at [facility] OR [address] [place] [address]    CATARACT EXTRACTION W/  INTRAOCULAR LENS IMPLANT Left 12/15/14    Dr de la cruz    CATARACT EXTRACTION W/  INTRAOCULAR LENS IMPLANT Right 14    dorothy    CIRCUMCISION, NON-      focal laser right eye      INSERTION-INTRAOCULAR LENS (IOL) Right  12/29/2014    Performed by [facility] at [facility] OR    INSERTION-INTRAOCULAR LENS (IOL) Left 12/15/2014    Performed by [facility] Martin [facility] at [facility] OR    KXEDMCXBJ-TKGF-L-CATH Right 1/2/2019    [facility] OR    KNEE SURGERY      left    Left medial collateral ligament repair      PHACOEMULSIFICATION-ASPIRATION-CATARACT Right 12/29/2014    Performed by [facility] Martin [facility] at [facility] OR    PHACOEMULSIFICATION-ASPIRATION-CATARACT Left 12/15/2014    Performed by [facility] at [facility] OR    REPAIR-PTOSIS/BILATERAL EXTERNAL LEVATORS Bilateral 8/30/2017    Performed by Jane [facility]no MD at [facility] OR [facility]    TONSILLECTOMY         Family History     Problem Relation (Age of Onset)    Cancer Mother, Brother, Sister    Cataracts Paternal Grandmother    Diabetes Father    Glaucoma Paternal Grandmother    Heart disease Mother, Father        Tobacco Use    Smoking status: Never Smoker    Smokeless tobacco: Never Used   Substance and Sexual Activity    Alcohol use: No     Alcohol/week: 0.0 oz    Drug use: No    Sexual activity: Yes     Partners: Female     Review of Systems   Constitutional: Negative for chills and fever.   Cardiovascular: Positive for leg swelling.   Gastrointestinal: Negative for nausea and vomiting.   Musculoskeletal: Negative for arthralgias and joint swelling.   Skin: Positive for wound. Negative for rash.   Psychiatric/Behavioral: Negative for agitation and confusion.     Objective:     Vital Signs (Most Recent):  Temp: 96.5 °F (35.8 °C) (07/27/19 0846)  Pulse: (!) 57 (07/27/19 0846)  Resp: 18 (07/27/19 0846)  BP: (!) 154/70 (07/27/19 0846)  SpO2: 97 % (07/27/19 0548) Vital Signs (24h Range):  Temp:  [96.5 °F (35.8 °C)-99.4 °F (37.4 °C)] 96.5 °F (35.8 °C)  Pulse:  [55-64] 57  Resp:  [16-20] 18  SpO2:  [92 %-98 %] 97 %  BP: (153-179)/(70-75) 154/70     Weight: 109.3 kg (240 lb 15.4 oz)  Body mass index is 30.12 kg/m².    Foot Exam    General  Orientation:  alert and oriented to person, place, and time   Affect: appropriate       Right Foot/Ankle     Inspection and Palpation  Ecchymosis: none  Tenderness: none   Swelling: none     Neurovascular  Dorsalis pedis: 1+  Posterior tibial: 1+  Saphenous nerve sensation: diminished  Tibial nerve sensation: diminished  Superficial peroneal nerve sensation: diminished  Deep peroneal nerve sensation: diminished  Sural nerve sensation: diminished      Left Foot/Ankle      Inspection and Palpation  Ecchymosis: none  Tenderness: none     Neurovascular  Dorsalis pedis: 1+  Posterior tibial: 1+  Saphenous nerve sensation: diminished  Tibial nerve sensation: diminished  Superficial peroneal nerve sensation: diminished  Deep peroneal nerve sensation: diminished  Sural nerve sensation: diminished            Laboratory:  A1C:   Recent Labs   Lab 02/22/19 0941 07/26/19 0529   HGBA1C 8.2* 8.1*     CBC:   Recent Labs   Lab 07/27/19 0544   WBC 8.31   RBC 3.17*   HGB 8.8*   HCT 27.3*   *   MCV 86   MCH 27.8   MCHC 32.2     CMP:   Recent Labs   Lab 07/27/19 0544   *   CALCIUM 9.3   ALBUMIN 3.0*   PROT 7.3      K 3.6   CO2 28      BUN 49*   CREATININE 2.8*   ALKPHOS 70   ALT 7*   AST 10   BILITOT 0.4     CRP:   Recent Labs   Lab 07/25/19  2353   CRP 29.9*     ESR:   Recent Labs   Lab 07/25/19  2353   SEDRATE 91*     Wound Cultures: No results for input(s): LABAERO in the last 4320 hours.  Microbiology Results (last 7 days)     Procedure Component Value Units Date/Time    Gram stain [[id number]] Collected:  07/27/19 0942    Order Status:  Sent Specimen:  Bone from Toe, Left Foot Updated:  07/27/19 0942    Culture, Anaerobe [[id number]] Collected:  07/27/19 0942    Order Status:  Sent Specimen:  Bone from Toe, Left Foot Updated:  07/27/19 0942    Aerobic culture [[id number]] Collected:  07/27/19 0942    Order Status:  Sent Specimen:  Bone from Toe, Left Foot Updated:  07/27/19 0942    Blood culture #2 **CANNOT BE ORDERED  STAT** [[id number]] Collected:  07/26/19 0009    Order Status:  Completed Specimen:  Blood from Peripheral, Antecubital, Right Updated:  07/27/19 0612     Blood Culture, Routine No Growth to date      No Growth to date    Blood culture #1 **CANNOT BE ORDERED STAT** [[id number]] Collected:  07/25/19 2357    Order Status:  Completed Specimen:  Blood from Peripheral, Antecubital, Left Updated:  07/27/19 0612     Blood Culture, Routine No Growth to date      No Growth to date    Culture, Anaerobe [[id number]] Collected:  07/26/19 1113    Order Status:  Sent Specimen:  Wound from Toe, Left Foot Updated:  07/26/19 1635    Aerobic culture [[id number]] Collected:  07/26/19 1114    Order Status:  Sent Specimen:  Wound from Toe, Left Foot Updated:  07/26/19 1635        Specimen (12h ago, onward)    Start     Ordered    07/27/19 0939  Specimen to Pathology - Surgery  Once     Comments:  Bone biopsy of left hallux distal phalanx suspicious for osteomyelitis.  Ok to send for permanent.     Start Status     07/27/19 0939 Collected (07/27/19 0941) Order ID: [id number]       07/27/19 0939          Diagnostic Results:  X-ray: showing osteo left distal hallux phalanx    MRI:  Showing osteo left distal hallux phalanx    ROCIO's: ordered    Clinical Findings:  Ulcer Location: left great toe  Measurements:  Periwound: dusky, blisters, macerated  Drainage: seropurlent.  Base:  Fibrous, liquefactive.   Signs of infection: edema, erythema, purulence.     7/27 7/26              Assessment/Plan:     * Toe osteomyelitis, left   Domingo Castro is a 74 y.o. male with left hallux toe infection , suspicious for osteomyelitis  -ESR: 91, CRP: 29, WBC: 10  -Imaging: x-ray and MRI showing osteomyelitis of left hallux  -Discussed treatment options of osteomyelitis amputation vs IV antibiotics.  Podiatry recommends amputaiton due to tissue loss and non-viability appearance of toe, recurrence of osteo.  Patient is still resistant to idea of  amputation, and would like to do conservative measures.  Obtained bone biopsy.  See note below.    -ID on board.  Appreciate their opinion on likelyhood of conservative measures along resolving the infection.  -obtained wound cultures   -applied betadine wet to dry gauze dressings.  Nursing to do daily dressing changes.  -Ordered ROCIO's and arterial US, if results are concerning recommend consult to Interventional Cardiology.    Podiatry will follow    07/27/2019  Procedure note:   Surgeon:  Dr. Jackson  Assisting Surgeon: Merrill Tadeo DPM, PGY3  Pre op diagnosis: osteomyelitis   Post op diagnosis: same    Anesthesia:   Hemostasis: direct pressure.   EBL: < 1ml      Procedure: bone biopsy    Findings:  After consent was signed and witnessed 4ml of 1% lidocaine was given locally.  Left great toe was prepped in a sterile manner.  A Jamshidi needle was used to biopsy the Left hallux proximal phalanx.  Hard bone was noted.  Patient tolerated procedure well, and hemostasis was controlled with compression.  Foot was dressed with Betadine wet to dry gauze dressings.        Diabetic ulcer of toe of left foot associated with type 2 diabetes mellitus, with necrosis of bone  As above    Diabetic neuropathy associated with type 2 diabetes mellitus  Per primary      Chronic diastolic congestive heart failure  Per primary          Merrill Tadeo MD  Podiatry  [facility]

## 2019-07-27 NOTE — HOSPITAL COURSE
Hospital Medicine put him on piperacillin-tazobactam and vancomycin. Podiatry consulted Infectious Disease. Wound culture grew MRSA. Per ID will plan for 6 weeks IV abx therapy. Went for LE cath procedure on 7/30. Revascularization procedure was completed on 8/1. Pt was discharged 8/2 with six weeks planned IV vancomycin therapy.

## 2019-07-27 NOTE — SUBJECTIVE & OBJECTIVE
Interval History: No complaints or concerns.    Review of Systems   Constitutional: Negative for chills and fever.   HENT: Negative for congestion, postnasal drip and rhinorrhea.    Eyes: Negative for visual disturbance.   Respiratory: Negative for apnea and shortness of breath.    Cardiovascular: Negative for chest pain.   Gastrointestinal: Negative for abdominal distention and abdominal pain.   Genitourinary: Negative for difficulty urinating.   Musculoskeletal: Negative for arthralgias and myalgias.   Skin: Negative for color change.   Neurological: Negative for weakness, light-headedness and headaches.   Hematological: Does not bruise/bleed easily.   Psychiatric/Behavioral: Negative for agitation.     Objective:     Vital Signs (Most Recent):  Temp: 96.5 °F (35.8 °C) (07/27/19 1158)  Pulse: (!) 56 (07/27/19 1158)  Resp: 18 (07/27/19 1158)  BP: (!) 175/77 (07/27/19 1158)  SpO2: 97 % (07/27/19 0548) Vital Signs (24h Range):  Temp:  [96.5 °F (35.8 °C)-99.4 °F (37.4 °C)] 96.5 °F (35.8 °C)  Pulse:  [55-64] 56  Resp:  [16-18] 18  SpO2:  [92 %-97 %] 97 %  BP: (153-176)/(70-77) 175/77     Weight: 109.3 kg (240 lb 15.4 oz)  Body mass index is 30.12 kg/m².    Intake/Output Summary (Last 24 hours) at 7/27/2019 1416  Last data filed at 7/27/2019 0900  Gross per 24 hour   Intake 530 ml   Output 750 ml   Net -220 ml      Physical Exam   Constitutional: He is oriented to person, place, and time. He appears well-developed and well-nourished.   HENT:   Head: Normocephalic and atraumatic.   Eyes: EOM are normal.   Neck: Normal range of motion. Neck supple.   Cardiovascular: Regular rhythm.   Pulmonary/Chest: Effort normal and breath sounds normal.   Abdominal: Soft. Bowel sounds are normal.   Musculoskeletal: Normal range of motion.   Neurological: He is alert and oriented to person, place, and time.   Skin: Skin is warm and dry.   Psychiatric: He has a normal mood and affect.       Significant Labs:   Blood Culture: No results  for input(s): LABBLOO in the last 48 hours.  BMP:   Recent Labs   Lab 07/28/19  0347   *      K 3.7      CO2 26   BUN 46*   CREATININE 2.9*   CALCIUM 9.3   MG 2.1     CBC:   Recent Labs   Lab 07/27/19  0544 07/28/19  0347   WBC 8.31 8.23   HGB 8.8* 8.7*   HCT 27.3* 27.4*   * 104*     CMP:   Recent Labs   Lab 07/27/19 0544 07/28/19  0347    138   K 3.6 3.7    103   CO2 28 26   * 282*   BUN 49* 46*   CREATININE 2.8* 2.9*   CALCIUM 9.3 9.3   PROT 7.3 7.3   ALBUMIN 3.0* 2.9*   BILITOT 0.4 0.3   ALKPHOS 70 71   AST 10 13   ALT 7* 9*   ANIONGAP 8 9   EGFRNONAA 21* 20*     Magnesium:   Recent Labs   Lab 07/27/19 0544 07/28/19  0347   MG 2.1 2.1       Significant Imaging: I have reviewed all pertinent imaging results/findings within the past 24 hours.

## 2019-07-27 NOTE — PROGRESS NOTES
[facility] Hospital Medicine  Progress Note    Patient Name: Domingo Castro  MRN: [id number]  Patient Class: IP- Inpatient   Admission Date: 7/25/2019  Length of Stay: 1 days  Attending Physician: Kendall Sotomayor MD  Primary Care Provider: Griselda Nugent MD        Subjective:     Principal Problem:Toe osteomyelitis, left      HPI:  74 y.o. Male with past medical history of DM type 2, Hypertension, Arthritis, CAD, HLD, CKD, left toe osteomyelitis, CHF, Cataract presented to the ED on yesterday complaining of left foot infection.  Patient has been treated in past for osteomyelitis of the same foot in which he completed course of antibiotics and was still receiving wound care and podiatry follow up.  Patient reports that he receives wound care by home health nurse who was concerned that toe appeared infected and notified Podiatrist office who advised patient to go to ED.  Patient is followed by Dr. Gallegos.  Denies fever, chills, SOB, chest pain, sick contacts,  trauma to the toe, change in bladder or bowel habits.  ED findings: H/H 10.3/31.9, Sed rate 91, CRP  29.9, Hgb A1C 8.1, Left forefoot MRI showed osteomyelitis of the distal aspect of the 1st distal phalanx with adjacent marrow edema which may be reactive in nature versus early infectious process.  Patient admitted for medical management.    Overview/Hospital Course:  7/27/19 Pending cultures, continue IV ABX, podiatry and ID following.    Interval History: No complaints or concerns    Review of Systems   Constitutional: Negative for chills, fatigue and fever.   HENT: Negative for congestion, postnasal drip and rhinorrhea.    Eyes: Negative for visual disturbance.   Respiratory: Negative for shortness of breath.    Cardiovascular: Negative for chest pain.   Gastrointestinal: Negative for abdominal distention and abdominal pain.   Genitourinary: Negative for difficulty urinating.   Musculoskeletal: Negative for arthralgias and myalgias.   Skin:  Negative for color change.   Neurological: Negative for weakness and headaches.   Hematological: Does not bruise/bleed easily.   Psychiatric/Behavioral: Negative for agitation.     Objective:     Vital Signs (Most Recent):  Temp: 96.5 °F (35.8 °C) (07/27/19 1158)  Pulse: (!) 56 (07/27/19 1158)  Resp: 18 (07/27/19 1158)  BP: (!) 175/77 (07/27/19 1158)  SpO2: 97 % (07/27/19 0548) Vital Signs (24h Range):  Temp:  [96.5 °F (35.8 °C)-99.4 °F (37.4 °C)] 96.5 °F (35.8 °C)  Pulse:  [55-64] 56  Resp:  [16-18] 18  SpO2:  [92 %-97 %] 97 %  BP: (153-176)/(70-77) 175/77     Weight: 109.3 kg (240 lb 15.4 oz)  Body mass index is 30.12 kg/m².    Intake/Output Summary (Last 24 hours) at 7/27/2019 1407  Last data filed at 7/27/2019 0900  Gross per 24 hour   Intake 530 ml   Output 750 ml   Net -220 ml      Physical Exam   Constitutional: He is oriented to person, place, and time. He appears well-developed and well-nourished.   HENT:   Head: Normocephalic and atraumatic.   Eyes: EOM are normal.   Neck: Normal range of motion. Neck supple.   Cardiovascular: Regular rhythm.   Pulmonary/Chest: Effort normal and breath sounds normal.   Abdominal: Soft.   Musculoskeletal: Normal range of motion.   Neurological: He is alert and oriented to person, place, and time.   Skin: Skin is warm and dry.   Psychiatric: He has a normal mood and affect.       Significant Labs:   Blood Culture:   Recent Labs   Lab 07/25/19 2357 07/26/19  0009   LABBLOO No Growth to date  No Growth to date No Growth to date  No Growth to date     BMP:   Recent Labs   Lab 07/27/19 0544   *      K 3.6      CO2 28   BUN 49*   CREATININE 2.8*   CALCIUM 9.3   MG 2.1     CBC:   Recent Labs   Lab 07/25/19  2353 07/27/19  0544   WBC 10.64 8.31   HGB 10.3* 8.8*   HCT 31.9* 27.3*   * 104*     CMP:   Recent Labs   Lab 07/25/19  2353 07/26/19  0529 07/27/19  0544    141 140   K 3.7 3.4* 3.6    105 104   CO2 24 26 28   * 153* 266*   BUN  51* 48* 49*   CREATININE 3.0* 2.9* 2.8*   CALCIUM 9.9 9.5 9.3   PROT 8.1 7.5 7.3   ALBUMIN 3.6 3.3* 3.0*   BILITOT 0.3 0.3 0.4   ALKPHOS 83 73 70   AST 14 12 10   ALT 10 8* 7*   ANIONGAP 12 10 8   EGFRNONAA 20* 20* 21*     Magnesium:   Recent Labs   Lab 07/27/19  0544   MG 2.1       Significant Imaging: I have reviewed all pertinent imaging results/findings within the past 24 hours.      Assessment/Plan:      * Toe osteomyelitis, left  Toe ulcer    Consult Podiatry  ID consulted per Podiatry: plan pending culture results  Continue Vanc and Zosyn  Wound care ordered per podiatry  ROCIO's and arterial US: pending  Blood Cultures: Pending  Wound Cultures: Pending        Thrombocytopenia, unspecified  Monitor      Hypokalemia  Replaced  Monitor      CKD (chronic kidney disease) stage 4, GFR 15-29 ml/min  Creatinine at baseline   Voiding well  Strict I&O  Avoid Nephrotoxic Agents  Renally dose medications        Chronic diastolic congestive heart failure  Essential hypertension    Denies chest pain or SOB  Continue Amlodipine, Coreg Furosemide and Hydralazine      Anemia of chronic renal failure, stage 4 (severe)  Stable, no visual signs of bleeding    Dyslipidemia  Continue statin      Essential hypertension          Type 2 diabetes mellitus with stage 4 chronic kidney disease, with long-term current use of insulin  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

## 2019-07-27 NOTE — SUBJECTIVE & OBJECTIVE
Interval Hx:  Patient Seen at bedside for dressing change.  Patient denies F/C/N/V.  Dressings clean, dry, and intact.    Scheduled Meds:   amLODIPine  10 mg Oral Daily    aspirin  81 mg Oral Daily    carvedilol  25 mg Oral BID WM    furosemide  80 mg Oral BID    gabapentin  100 mg Oral TID    hydrALAZINE  50 mg Oral Q12H    insulin aspart U-100  10 Units Subcutaneous TIDWM    insulin detemir U-100  26 Units Subcutaneous QHS    piperacillin-tazobactam (ZOSYN) IVPB  4.5 g Intravenous Q8H    rosuvastatin  20 mg Oral QHS     Continuous Infusions:  PRN Meds:acetaminophen, acetaminophen, bisacodyl, cloNIDine, Dextrose 10% Bolus, Dextrose 10% Bolus, glucagon (human recombinant), glucose, glucose, hydrALAZINE, insulin aspart U-100, lactulose, ondansetron, sodium chloride 0.9%    Review of patient's allergies indicates:   Allergen Reactions    Atorvastatin Other (See Comments)        Past Medical History:   Diagnosis Date    Arthritis     Cataract     Chronic diastolic congestive heart failure     Coronary artery disease     Cystoid macular edema of both eyes     Diabetes mellitus type II     Hyperlipemia     Hypertension     Retinal hole     Stage 4 chronic kidney disease     Streptococcus pyogenes bacteremia 2018    Due to left toe osteomyelitis     Past Surgical History:   Procedure Laterality Date    BLEPHAROPLASTY Bilateral 2017    [facility]    CATARACT EXTRACTION W/  INTRAOCULAR LENS IMPLANT Left 12/15/14    Dr martin    CATARACT EXTRACTION W/  INTRAOCULAR LENS IMPLANT Right 14    dorothy    CIRCUMCISION, NON-      focal laser right eye      INSERTION-INTRAOCULAR LENS (IOL) Right 2014    [facility] at [facility]    INSERTION-INTRAOCULAR LENS (IOL) Left 12/15/2014    [facility] at [facility] OR    PZUHLBIQY-JIJX-V-CATH Right 2019    [facility] OR     KNEE SURGERY      left    Left medial collateral ligament repair      PHACOEMULSIFICATION-ASPIRATION-CATARACT Right 12/29/2014    [facility] at [facility] OR    PHACOEMULSIFICATION-ASPIRATION-CATARACT Left 12/15/2014    Per[facility] at [facility] OR    REPAIR-PTOSIS/BILATERAL EXTERNAL LEVATORS Bilateral 8/30/2017    [facility] OR [facility]ST [facility]    TONSILLECTOMY         Family History     Problem Relation (Age of Onset)    Cancer Mother, Brother, Sister    Cataracts Paternal Grandmother    Diabetes Father    Glaucoma Paternal Grandmother    Heart disease Mother, Father        Tobacco Use    Smoking status: Never Smoker    Smokeless tobacco: Never Used   Substance and Sexual Activity    Alcohol use: No     Alcohol/week: 0.0 oz    Drug use: No    Sexual activity: Yes     Partners: Female     Review of Systems   Constitutional: Negative for chills and fever.   Cardiovascular: Positive for leg swelling.   Gastrointestinal: Negative for nausea and vomiting.   Musculoskeletal: Negative for arthralgias and joint swelling.   Skin: Positive for wound. Negative for rash.   Psychiatric/Behavioral: Negative for agitation and confusion.     Objective:     Vital Signs (Most Recent):  Temp: 96.5 °F (35.8 °C) (07/27/19 0846)  Pulse: (!) 57 (07/27/19 0846)  Resp: 18 (07/27/19 0846)  BP: (!) 154/70 (07/27/19 0846)  SpO2: 97 % (07/27/19 0548) Vital Signs (24h Range):  Temp:  [96.5 °F (35.8 °C)-99.4 °F (37.4 °C)] 96.5 °F (35.8 °C)  Pulse:  [55-64] 57  Resp:  [16-20] 18  SpO2:  [92 %-98 %] 97 %  BP: (153-179)/(70-75) 154/70     Weight: 109.3 kg (240 lb 15.4 oz)  Body mass index is 30.12 kg/m².    Foot Exam    General  Orientation: alert and oriented to person, place, and time   Affect: appropriate       Right Foot/Ankle     Inspection and Palpation  Ecchymosis: none  Tenderness: none   Swelling: none     Neurovascular  Dorsalis pedis: 1+  Posterior tibial: 1+  Saphenous nerve  sensation: diminished  Tibial nerve sensation: diminished  Superficial peroneal nerve sensation: diminished  Deep peroneal nerve sensation: diminished  Sural nerve sensation: diminished      Left Foot/Ankle      Inspection and Palpation  Ecchymosis: none  Tenderness: none     Neurovascular  Dorsalis pedis: 1+  Posterior tibial: 1+  Saphenous nerve sensation: diminished  Tibial nerve sensation: diminished  Superficial peroneal nerve sensation: diminished  Deep peroneal nerve sensation: diminished  Sural nerve sensation: diminished            Laboratory:  A1C:   Recent Labs   Lab 02/22/19 0941 07/26/19 0529   HGBA1C 8.2* 8.1*     CBC:   Recent Labs   Lab 07/27/19 0544   WBC 8.31   RBC 3.17*   HGB 8.8*   HCT 27.3*   *   MCV 86   MCH 27.8   MCHC 32.2     CMP:   Recent Labs   Lab 07/27/19 0544   *   CALCIUM 9.3   ALBUMIN 3.0*   PROT 7.3      K 3.6   CO2 28      BUN 49*   CREATININE 2.8*   ALKPHOS 70   ALT 7*   AST 10   BILITOT 0.4     CRP:   Recent Labs   Lab 07/25/19  2353   CRP 29.9*     ESR:   Recent Labs   Lab 07/25/19  2353   SEDRATE 91*     Wound Cultures: No results for input(s): LABAERO in the last 4320 hours.  Microbiology Results (last 7 days)     Procedure Component Value Units Date/Time    Gram stain [[id number]] Collected:  07/27/19 0942    Order Status:  Sent Specimen:  Bone from Toe, Left Foot Updated:  07/27/19 0942    Culture, Anaerobe [[id number]] Collected:  07/27/19 0942    Order Status:  Sent Specimen:  Bone from Toe, Left Foot Updated:  07/27/19 0942    Aerobic culture [[id number]] Collected:  07/27/19 0942    Order Status:  Sent Specimen:  Bone from Toe, Left Foot Updated:  07/27/19 0942    Blood culture #2 **CANNOT BE ORDERED STAT** [[id number]] Collected:  07/26/19 0009    Order Status:  Completed Specimen:  Blood from Peripheral, Antecubital, Right Updated:  07/27/19 0612     Blood Culture, Routine No Growth to date      No Growth to date    Blood culture #1 **CANNOT BE  ORDERED STAT** [[id number]] Collected:  07/25/19 6915    Order Status:  Completed Specimen:  Blood from Peripheral, Antecubital, Left Updated:  07/27/19 0612     Blood Culture, Routine No Growth to date      No Growth to date    Culture, Anaerobe [[id number]] Collected:  07/26/19 1113    Order Status:  Sent Specimen:  Wound from Toe, Left Foot Updated:  07/26/19 1635    Aerobic culture [[id number]] Collected:  07/26/19 1114    Order Status:  Sent Specimen:  Wound from Toe, Left Foot Updated:  07/26/19 1635        Specimen (12h ago, onward)    Start     Ordered    07/27/19 0939  Specimen to Pathology - Surgery  Once     Comments:  Bone biopsy of left hallux distal phalanx suspicious for osteomyelitis.  Ok to send for permanent.     Start Status     07/27/19 0939 Collected (07/27/19 0941) Order ID: [id number]       07/27/19 0939          Diagnostic Results:  X-ray: showing osteo left distal hallux phalanx    MRI:  Showing osteo left distal hallux phalanx    ROCIO's: ordered    Clinical Findings:  Ulcer Location: left great toe  Measurements:  Periwound: dusky, blisters, macerated  Drainage: seropurlent.  Base:  Fibrous, liquefactive.   Signs of infection: edema, erythema, purulence.     7/27 7/26

## 2019-07-27 NOTE — SUBJECTIVE & OBJECTIVE
Interval History: No complaints or concerns    Review of Systems   Constitutional: Negative for chills, fatigue and fever.   HENT: Negative for congestion, postnasal drip and rhinorrhea.    Eyes: Negative for visual disturbance.   Respiratory: Negative for shortness of breath.    Cardiovascular: Negative for chest pain.   Gastrointestinal: Negative for abdominal distention and abdominal pain.   Genitourinary: Negative for difficulty urinating.   Musculoskeletal: Negative for arthralgias and myalgias.   Skin: Negative for color change.   Neurological: Negative for weakness and headaches.   Hematological: Does not bruise/bleed easily.   Psychiatric/Behavioral: Negative for agitation.     Objective:     Vital Signs (Most Recent):  Temp: 96.5 °F (35.8 °C) (07/27/19 1158)  Pulse: (!) 56 (07/27/19 1158)  Resp: 18 (07/27/19 1158)  BP: (!) 175/77 (07/27/19 1158)  SpO2: 97 % (07/27/19 0548) Vital Signs (24h Range):  Temp:  [96.5 °F (35.8 °C)-99.4 °F (37.4 °C)] 96.5 °F (35.8 °C)  Pulse:  [55-64] 56  Resp:  [16-18] 18  SpO2:  [92 %-97 %] 97 %  BP: (153-176)/(70-77) 175/77     Weight: 109.3 kg (240 lb 15.4 oz)  Body mass index is 30.12 kg/m².    Intake/Output Summary (Last 24 hours) at 7/27/2019 1407  Last data filed at 7/27/2019 0900  Gross per 24 hour   Intake 530 ml   Output 750 ml   Net -220 ml      Physical Exam   Constitutional: He is oriented to person, place, and time. He appears well-developed and well-nourished.   HENT:   Head: Normocephalic and atraumatic.   Eyes: EOM are normal.   Neck: Normal range of motion. Neck supple.   Cardiovascular: Regular rhythm.   Pulmonary/Chest: Effort normal and breath sounds normal.   Abdominal: Soft.   Musculoskeletal: Normal range of motion.   Neurological: He is alert and oriented to person, place, and time.   Skin: Skin is warm and dry.   Psychiatric: He has a normal mood and affect.       Significant Labs:   Blood Culture:   Recent Labs   Lab 07/25/19  2357 07/26/19  0009    LABBLOO No Growth to date  No Growth to date No Growth to date  No Growth to date     BMP:   Recent Labs   Lab 07/27/19  0544   *      K 3.6      CO2 28   BUN 49*   CREATININE 2.8*   CALCIUM 9.3   MG 2.1     CBC:   Recent Labs   Lab 07/25/19 2353 07/27/19  0544   WBC 10.64 8.31   HGB 10.3* 8.8*   HCT 31.9* 27.3*   * 104*     CMP:   Recent Labs   Lab 07/25/19 2353 07/26/19  0529 07/27/19  0544    141 140   K 3.7 3.4* 3.6    105 104   CO2 24 26 28   * 153* 266*   BUN 51* 48* 49*   CREATININE 3.0* 2.9* 2.8*   CALCIUM 9.9 9.5 9.3   PROT 8.1 7.5 7.3   ALBUMIN 3.6 3.3* 3.0*   BILITOT 0.3 0.3 0.4   ALKPHOS 83 73 70   AST 14 12 10   ALT 10 8* 7*   ANIONGAP 12 10 8   EGFRNONAA 20* 20* 21*     Magnesium:   Recent Labs   Lab 07/27/19  0544   MG 2.1       Significant Imaging: I have reviewed all pertinent imaging results/findings within the past 24 hours.

## 2019-07-27 NOTE — CONSULTS
[facility] Infectious Disease Consult     Primary Team: Dr. Sotomayor  Consultant Attending: Dr. Hendrickson  Date of Admit: 7/25/2019    Reason for Consult     Left 1st toe infection    History of Present Illness   Domingo Castro is a 74 y.o. male with a relevant history of IDT2DM, Hypertension, HLD, CKD stage IV, left toe osteomyelitis in December of 2018, CHF with unknown EF for which he takes 40mg Lasix daily.    Patient presented to the ED today with a complaint of an ulcer on the plantar surface of his left great toe. Patient states that he first noticed a small ulceration at the site on 7/19/19 and that it became progressively worse throughout the week. The patient has significant diabetic neuropathy and does not have intact sensation in his feet. He follows up with Dr. Gallegos for podiatry. On admission MRI and radiograph of the left foot were obtained which were suggestive of osteomyelitis in the toe.     Patient has been admitted from 12/23/18 to 1/6/19 at this facility due to osteomyelitis of the same toe and a pneumonia. At the time he was found to have Strep pyogenes bacteremia and strep pyogenes in the culture from his infected toe. He completed a course of Rocephin on 2/4/19. He currently appears to be receiving regular wound care and podiatry follow up. Patient reports that he receives wound care by home health nurse who was concerned that his toe appeared infected today and notified Podiatrist office who advised patient to go to ED.     Today he denies fever, chills, SOB, chest pain, sick contacts,  trauma to the toe, change in bladder or bowel habits.  ED findings: H/H 10.3/31.9, Sed rate 91, CRP  29.9, Hgb A1C 8.1    He lives in [place], [place]. He is . He is a . His primary care physician is Dr. Griselda Nugent. His nephrologist is Dr. Nely Watson. His podiatrist is Dr. Dimitry Gallegos. His cardiologist is Dr. Vandana Tovar.    Review of Systems (positives in bold):  Constitutional: wt loss,  fever, chills, night sweats, fatigue, malaise  HEENT: dysphonia, epistaxis, tinnitus, vertigo, otalgia, otorrhea, visual changes, lacrimation, changes in hearing, rhinorrhea, sore throat  Urogenital: frequency, nocturia, dysuria, hematuria, retention, incontinence, discharge  Neurological: headaches, syncope, weakness, numbness, paresthesia in LE b/l, dysequilibrium, falls, amnesia, dysarthria, facial assymetry  Gastrointestinal: anorexia, nausea, vomiting, melena, hematochezia, abdominal pain, hematemesis, diarrhea, constipation, dyspepsia, dysphagia, odynophagia, dysgeusia  Respiratory: dyspnea, cough, sputum production, wheeze, hemoptysis, cyanosis, apnea  Integumentary: hypo/hyperpigmentation, rash, lesions, pruritus, alopecia, nail changes  Cardiovascular: chest pain, orthopnea, cyanosis, edema, claudication, syncope, palpitations  Hematological: bleeding, bruising, lymphadenopathy  Psych: insomnia, mood changes, hallucinations, anxiety  Reproductive: erectile dysfunction in men, abnormal uterine bleeding in women, changes in libido  Immune/Allergy: urticaria, localized pain/erythema/warmth/swelling  Musculoskeletal: arthralgia, myalgia, joint swelling, stiffness, wasting, deformity, weakness, back pain, ulcer on left 1st toe  Endocrine: gynecomastia, galactorrhea, weight gain, heat/cold intolerance, polyphagia, polydipsia, polyuria    Allergies:  Review of patient's allergies indicates:   Allergen Reactions    Atorvastatin Other (See Comments)       Medications:   In-Hospital Scheduled Medications:   amLODIPine  10 mg Oral Daily    aspirin  81 mg Oral Daily    carvedilol  25 mg Oral BID WM    furosemide  80 mg Oral BID    gabapentin  100 mg Oral TID    hydrALAZINE  50 mg Oral Q12H    insulin detemir U-100  26 Units Subcutaneous QHS    piperacillin-tazobactam (ZOSYN) IVPB  4.5 g Intravenous Q8H    rosuvastatin  20 mg Oral QHS      In-Hospital PRN Medications:  acetaminophen, acetaminophen, bisacodyl,  cloNIDine, Dextrose 10% Bolus, Dextrose 10% Bolus, glucagon (human recombinant), glucose, glucose, hydrALAZINE, insulin aspart U-100, lactulose, ondansetron, sodium chloride 0.9%   In-Hospital IV Infusion Medications:    Relevant Home Medications:    Antibiotics and Day Number of Therapy:  Zosyn 4.5g Q8H  Past Medical History:  Past Medical History:   Diagnosis Date    Arthritis     Cataract     Chronic diastolic congestive heart failure     Coronary artery disease     Cystoid macular edema of both eyes     Diabetes mellitus type II     Hyperlipemia     Hypertension     Retinal hole     Stage 4 chronic kidney disease     Streptococcus pyogenes bacteremia 2018    Due to left toe osteomyelitis     Past Surgical History/ObGyn Hx if gender appropriate:  Past Surgical History:   Procedure Laterality Date    BLEPHAROPLASTY Bilateral 2017    Per[facility] at [facility]    CATARACT EXTRACTION W/  INTRAOCULAR LENS IMPLANT Left 12/15/14    Dr martin    CATARACT EXTRACTION W/  INTRAOCULAR LENS IMPLANT Right 14    dorothy    CIRCUMCISION, NON-      focal laser right eye      INSERTION-INTRAOCULAR LENS (IOL) Right 2014    [facility] OR    INSERTION-INTRAOCULAR LENS (IOL) Left 12/15/2014    [facility] at [facility] OR    [id number]-[id number]-X-CATH Right 2019    [facility] OR    KNEE SURGERY      left    Left medial collateral ligament repair      PHACOEMULSIFICATION-ASPIRATION-CATARACT Right 2014    [facility] at [facility] OR    PHACOEMULSIFICATION-ASPIRATION-CATARACT Left 12/15/2014    [facility] at [facility] OR    REPAIR-PTOSIS/BILATERAL EXTERNAL LEVATORS Bilateral 2017    [facility] at [facility] OR 1ST FLR    TONSILLECTOMY       Family History:  Family History   Problem Relation Age of Onset    Heart disease Mother      "Cancer Mother     Cancer Brother     Heart disease Father     Diabetes Father     Cancer Sister     Cataracts Paternal Grandmother     Glaucoma Paternal Grandmother     Blindness Neg Hx     Amblyopia Neg Hx     Hypertension Neg Hx     Macular degeneration Neg Hx     Retinal detachment Neg Hx     Strabismus Neg Hx      Social History:  Social History     Tobacco Use    Smoking status: Never Smoker    Smokeless tobacco: Never Used   Substance Use Topics    Alcohol use: No     Alcohol/week: 0.0 oz    Drug use: No       Objective   Last 24 Hour Vital Signs:  BP  Min: 143/66  Max: 186/81  Temp  Av.9 °F (37.2 °C)  Min: 97.6 °F (36.4 °C)  Max: 99.6 °F (37.6 °C)  Pulse  Av.7  Min: 57  Max: 72  Resp  Av.3  Min: 10  Max: 20  SpO2  Av.7 %  Min: 92 %  Max: 100 %  Height  Av' 3" (190.5 cm)  Min: 6' 3" (190.5 cm)  Max: 6' 3" (190.5 cm)  Weight  Av.9 kg (240 lb)  Min: 108.9 kg (240 lb)  Max: 108.9 kg (240 lb)    Lines, Drains, Airway:  none    Physical Examination:  Examination  General: well appearing, comfortable, bandage noted over the left foot  HEENT: normal oral mucosa, normal dentition, conjunctiva normal, pupils normal, extraocular motion normal  Neck: no thyromegaly, no JVD   Cardiac: no murmurs, pulse regular    Pulmonary/Chest: chest clear, no respiratory distress   GI/Rectal: no organomegaly, no masses, non tender, normal bowel sounds, rectal exam deferred   : deferred   Msk: normal motor screening exam  Vascular: normal peripheral perfusion   Lymph nodes: none palpated  Skin/ Extremities: no rash, no pedal edema, no ulceration    Neurology/ Mental status: alert oriented         Laboratory:  CBC:   Lab Results   Component Value Date    WBC 10.64 2019    HGB 10.3 (L) 2019     (L) 2019    MCV 86 2019    RDW 14.5 2019       Estimated Creatinine Clearance: 29.8 mL/min (A) (based on SCr of 2.9 mg/dL (H)).      Microbiology " Data:  Microbiology Results (last 7 days)     Procedure Component Value Units Date/Time    Culture, Anaerobe [[id number]] Collected:  07/26/19 1113    Order Status:  Sent Specimen:  Wound from Toe, Left Foot Updated:  07/26/19 1635    Aerobic culture [[id number]] Collected:  07/26/19 1114    Order Status:  Sent Specimen:  Wound from Toe, Left Foot Updated:  07/26/19 1635    Blood culture #2 **CANNOT BE ORDERED STAT** [[id number]] Collected:  07/26/19 0009    Order Status:  Completed Specimen:  Blood from Peripheral, Antecubital, Right Updated:  07/26/19 1115     Blood Culture, Routine No Growth to date    Blood culture #1 **CANNOT BE ORDERED STAT** [[id number]] Collected:  07/25/19 2357    Order Status:  Completed Specimen:  Blood from Peripheral, Antecubital, Left Updated:  07/26/19 1115     Blood Culture, Routine No Growth to date          Radiology:    MRI of left foot on 7/26/19    Impression       Osteomyelitis of the distal aspect of the 1st distal phalanx with adjacent marrow edema which may be reactive in nature versus early infectious process.    Soft tissue swelling, likely related to cellulitis.  No drainable fluid collections.     X ray of left toe on 7/26/19    FINDINGS:  Two more views of the left great toe demonstrate minimal bone loss at the central eric of the distal phalanx of the great toe.  This is concerning for osteomyelitis.  No fracture or dislocation is detected.  The remainder of the digits are osteopenic.    Assessment     Domingo Castro is a 74 y.o. male with a diabetic left 1st toe wound that appears to have progressed to osteomyelitis. He is receiving appropriate empiric coverage and cultures of blood as well as aerobic and anaerobic cultures of the toe ulceration have been obtained.      Recommendations     Osteomyelitis of left 1st toe  - Continue with Zosyn, recommend adding Vancomycin  - We'll follow cultures to narrow down the coverage  - Patient will require 6 weeks of ABx  coverage      Thank you for this consult. We will follow.      Tashi Mccormick  Fellow, [facility]

## 2019-07-28 PROBLEM — E87.6 HYPOKALEMIA: Status: RESOLVED | Noted: 2019-07-26 | Resolved: 2019-07-28

## 2019-07-28 LAB
ALBUMIN SERPL BCP-MCNC: 2.9 G/DL (ref 3.5–5.2)
ALP SERPL-CCNC: 71 U/L (ref 55–135)
ALT SERPL W/O P-5'-P-CCNC: 9 U/L (ref 10–44)
ANION GAP SERPL CALC-SCNC: 9 MMOL/L (ref 8–16)
AST SERPL-CCNC: 13 U/L (ref 10–40)
BASOPHILS # BLD AUTO: 0.04 K/UL (ref 0–0.2)
BASOPHILS NFR BLD: 0.5 % (ref 0–1.9)
BILIRUB SERPL-MCNC: 0.3 MG/DL (ref 0.1–1)
BUN SERPL-MCNC: 46 MG/DL (ref 8–23)
CALCIUM SERPL-MCNC: 9.3 MG/DL (ref 8.7–10.5)
CHLORIDE SERPL-SCNC: 103 MMOL/L (ref 95–110)
CO2 SERPL-SCNC: 26 MMOL/L (ref 23–29)
CREAT SERPL-MCNC: 2.9 MG/DL (ref 0.5–1.4)
DIFFERENTIAL METHOD: ABNORMAL
EOSINOPHIL # BLD AUTO: 0.2 K/UL (ref 0–0.5)
EOSINOPHIL NFR BLD: 2.9 % (ref 0–8)
ERYTHROCYTE [DISTWIDTH] IN BLOOD BY AUTOMATED COUNT: 14.2 % (ref 11.5–14.5)
EST. GFR  (AFRICAN AMERICAN): 24 ML/MIN/1.73 M^2
EST. GFR  (NON AFRICAN AMERICAN): 20 ML/MIN/1.73 M^2
GLUCOSE SERPL-MCNC: 282 MG/DL (ref 70–110)
HCT VFR BLD AUTO: 27.4 % (ref 40–54)
HGB BLD-MCNC: 8.7 G/DL (ref 14–18)
LYMPHOCYTES # BLD AUTO: 1.5 K/UL (ref 1–4.8)
LYMPHOCYTES NFR BLD: 17.7 % (ref 18–48)
MAGNESIUM SERPL-MCNC: 2.1 MG/DL (ref 1.6–2.6)
MCH RBC QN AUTO: 26.9 PG (ref 27–31)
MCHC RBC AUTO-ENTMCNC: 31.8 G/DL (ref 32–36)
MCV RBC AUTO: 85 FL (ref 82–98)
MONOCYTES # BLD AUTO: 0.7 K/UL (ref 0.3–1)
MONOCYTES NFR BLD: 8.1 % (ref 4–15)
NEUTROPHILS # BLD AUTO: 5.8 K/UL (ref 1.8–7.7)
NEUTROPHILS NFR BLD: 70.8 % (ref 38–73)
PHOSPHATE SERPL-MCNC: 3.4 MG/DL (ref 2.7–4.5)
PLATELET # BLD AUTO: 104 K/UL (ref 150–350)
PMV BLD AUTO: 11.4 FL (ref 9.2–12.9)
POCT GLUCOSE: 124 MG/DL (ref 70–110)
POCT GLUCOSE: 214 MG/DL (ref 70–110)
POCT GLUCOSE: 245 MG/DL (ref 70–110)
POCT GLUCOSE: 89 MG/DL (ref 70–110)
POTASSIUM SERPL-SCNC: 3.7 MMOL/L (ref 3.5–5.1)
PROT SERPL-MCNC: 7.3 G/DL (ref 6–8.4)
RBC # BLD AUTO: 3.23 M/UL (ref 4.6–6.2)
SODIUM SERPL-SCNC: 138 MMOL/L (ref 136–145)
VANCOMYCIN SERPL-MCNC: 17.4 UG/ML
WBC # BLD AUTO: 8.23 K/UL (ref 3.9–12.7)

## 2019-07-28 PROCEDURE — 63600175 PHARM REV CODE 636 W HCPCS: Performed by: HOSPITALIST

## 2019-07-28 PROCEDURE — 11000001 HC ACUTE MED/SURG PRIVATE ROOM

## 2019-07-28 PROCEDURE — 25000003 PHARM REV CODE 250: Performed by: HOSPITALIST

## 2019-07-28 PROCEDURE — 80202 ASSAY OF VANCOMYCIN: CPT

## 2019-07-28 PROCEDURE — 83735 ASSAY OF MAGNESIUM: CPT

## 2019-07-28 PROCEDURE — 99233 PR SUBSEQUENT HOSPITAL CARE,LEVL III: ICD-10-PCS | Mod: ,,, | Performed by: PODIATRIST

## 2019-07-28 PROCEDURE — 25000003 PHARM REV CODE 250: Performed by: NURSE PRACTITIONER

## 2019-07-28 PROCEDURE — 99233 SBSQ HOSP IP/OBS HIGH 50: CPT | Mod: ,,, | Performed by: PODIATRIST

## 2019-07-28 PROCEDURE — 80053 COMPREHEN METABOLIC PANEL: CPT

## 2019-07-28 PROCEDURE — 84100 ASSAY OF PHOSPHORUS: CPT

## 2019-07-28 PROCEDURE — 63600175 PHARM REV CODE 636 W HCPCS: Performed by: NURSE PRACTITIONER

## 2019-07-28 PROCEDURE — 85025 COMPLETE CBC W/AUTO DIFF WBC: CPT

## 2019-07-28 RX ORDER — VANCOMYCIN HCL IN 5 % DEXTROSE 1G/250ML
1000 PLASTIC BAG, INJECTION (ML) INTRAVENOUS
Status: DISCONTINUED | OUTPATIENT
Start: 2019-07-28 | End: 2019-07-30

## 2019-07-28 RX ORDER — INSULIN ASPART 100 [IU]/ML
15 INJECTION, SOLUTION INTRAVENOUS; SUBCUTANEOUS
Status: DISCONTINUED | OUTPATIENT
Start: 2019-07-28 | End: 2019-08-02 | Stop reason: HOSPADM

## 2019-07-28 RX ORDER — INSULIN ASPART 100 [IU]/ML
1-10 INJECTION, SOLUTION INTRAVENOUS; SUBCUTANEOUS
Status: DISCONTINUED | OUTPATIENT
Start: 2019-07-28 | End: 2019-08-02 | Stop reason: HOSPADM

## 2019-07-28 RX ADMIN — PIPERACILLIN AND TAZOBACTAM 4.5 G: 4; .5 INJECTION, POWDER, LYOPHILIZED, FOR SOLUTION INTRAVENOUS; PARENTERAL at 10:07

## 2019-07-28 RX ADMIN — ASPIRIN 81 MG: 81 TABLET, COATED ORAL at 08:07

## 2019-07-28 RX ADMIN — GABAPENTIN 100 MG: 100 CAPSULE ORAL at 04:07

## 2019-07-28 RX ADMIN — CLONIDINE HYDROCHLORIDE 0.1 MG: 0.1 TABLET ORAL at 12:07

## 2019-07-28 RX ADMIN — VANCOMYCIN HYDROCHLORIDE 1000 MG: 1 INJECTION, POWDER, LYOPHILIZED, FOR SOLUTION INTRAVENOUS at 08:07

## 2019-07-28 RX ADMIN — FUROSEMIDE 80 MG: 40 TABLET ORAL at 08:07

## 2019-07-28 RX ADMIN — GABAPENTIN 100 MG: 100 CAPSULE ORAL at 08:07

## 2019-07-28 RX ADMIN — CARVEDILOL 25 MG: 25 TABLET, FILM COATED ORAL at 04:07

## 2019-07-28 RX ADMIN — AMLODIPINE BESYLATE 10 MG: 5 TABLET ORAL at 08:07

## 2019-07-28 RX ADMIN — FUROSEMIDE 80 MG: 40 TABLET ORAL at 10:07

## 2019-07-28 RX ADMIN — ROSUVASTATIN CALCIUM 20 MG: 10 TABLET, FILM COATED ORAL at 10:07

## 2019-07-28 RX ADMIN — HYDRALAZINE HYDROCHLORIDE 50 MG: 25 TABLET, FILM COATED ORAL at 10:07

## 2019-07-28 RX ADMIN — CARVEDILOL 25 MG: 25 TABLET, FILM COATED ORAL at 08:07

## 2019-07-28 RX ADMIN — INSULIN ASPART 2 UNITS: 100 INJECTION, SOLUTION INTRAVENOUS; SUBCUTANEOUS at 11:07

## 2019-07-28 RX ADMIN — HYDRALAZINE HYDROCHLORIDE 25 MG: 25 TABLET, FILM COATED ORAL at 08:07

## 2019-07-28 RX ADMIN — GABAPENTIN 100 MG: 100 CAPSULE ORAL at 10:07

## 2019-07-28 RX ADMIN — INSULIN ASPART 2 UNITS: 100 INJECTION, SOLUTION INTRAVENOUS; SUBCUTANEOUS at 08:07

## 2019-07-28 RX ADMIN — INSULIN ASPART 10 UNITS: 100 INJECTION, SOLUTION INTRAVENOUS; SUBCUTANEOUS at 11:07

## 2019-07-28 RX ADMIN — INSULIN ASPART 15 UNITS: 100 INJECTION, SOLUTION INTRAVENOUS; SUBCUTANEOUS at 04:07

## 2019-07-28 RX ADMIN — INSULIN ASPART 10 UNITS: 100 INJECTION, SOLUTION INTRAVENOUS; SUBCUTANEOUS at 07:07

## 2019-07-28 RX ADMIN — PIPERACILLIN AND TAZOBACTAM 4.5 G: 4; .5 INJECTION, POWDER, LYOPHILIZED, FOR SOLUTION INTRAVENOUS; PARENTERAL at 02:07

## 2019-07-28 RX ADMIN — PIPERACILLIN AND TAZOBACTAM 4.5 G: 4; .5 INJECTION, POWDER, LYOPHILIZED, FOR SOLUTION INTRAVENOUS; PARENTERAL at 07:07

## 2019-07-28 RX ADMIN — HYDRALAZINE HYDROCHLORIDE 50 MG: 25 TABLET, FILM COATED ORAL at 08:07

## 2019-07-28 NOTE — SUBJECTIVE & OBJECTIVE
Interval Hx:  Patient Seen at bedside for dressing change.  Patient denies F/C/N/V.  Dressings clean, dry, and intact.    Scheduled Meds:   amLODIPine  10 mg Oral Daily    aspirin  81 mg Oral Daily    carvedilol  25 mg Oral BID WM    furosemide  80 mg Oral BID    gabapentin  100 mg Oral TID    hydrALAZINE  50 mg Oral Q12H    insulin aspart U-100  15 Units Subcutaneous TIDWM    insulin detemir U-100  26 Units Subcutaneous QHS    piperacillin-tazobactam (ZOSYN) IVPB  4.5 g Intravenous Q8H    rosuvastatin  20 mg Oral QHS    vancomycin (VANCOCIN) IVPB  1,000 mg Intravenous Q24H     Continuous Infusions:  PRN Meds:acetaminophen, acetaminophen, bisacodyl, cloNIDine, Dextrose 10% Bolus, Dextrose 10% Bolus, glucagon (human recombinant), glucose, glucose, hydrALAZINE, insulin aspart U-100, lactulose, ondansetron, sodium chloride 0.9%    Review of patient's allergies indicates:   Allergen Reactions    Atorvastatin Other (See Comments)        Past Medical History:   Diagnosis Date    Arthritis     Cataract     Chronic diastolic congestive heart failure     Coronary artery disease     Cystoid macular edema of both eyes     Diabetes mellitus type II     Hyperlipemia     Hypertension     Retinal hole     Stage 4 chronic kidney disease     Streptococcus pyogenes bacteremia 2018    Due to left toe osteomyelitis     Past Surgical History:   Procedure Laterality Date    BLEPHAROPLASTY Bilateral 2017    Performed [facility]ST FLR    CATARACT EXTRACTION W/  INTRAOCULAR LENS IMPLANT Left 12/15/14    Dr martin    CATARACT EXTRACTION W/  INTRAOCULAR LENS IMPLANT Right 14    dorothy    CIRCUMCISION, NON-      focal laser right eye      INSERTION-INTRAOCULAR LENS (IOL) Right 2014    [facility]    INSERTION-INTRAOCULAR LENS (IOL) Left 12/15/2014    [facility] at [facility] OR    HKCZEKFTY-CGCX-M-CATH Right 2019     [facility]ys [facility] Jr[facility] at [facility] OR    KNEE SURGERY      left    Left medial collateral ligament repair      PHACOEMULSIFICATION-ASPIRATION-CATARACT Right 12/29/2014    [facility]    PHACOEMULSIFICATION-ASPIRATION-CATARACT Left 12/15/2014    [facility] OR    REPAIR-PTOSIS/BILATERAL EXTERNAL LEVATORS Bilateral 8/30/2017    [facility] 1ST FLR    TONSILLECTOMY         Family History     Problem Relation (Age of Onset)    Cancer Mother, Brother, Sister    Cataracts Paternal Grandmother    Diabetes Father    Glaucoma Paternal Grandmother    Heart disease Mother, Father        Tobacco Use    Smoking status: Never Smoker    Smokeless tobacco: Never Used   Substance and Sexual Activity    Alcohol use: No     Alcohol/week: 0.0 oz    Drug use: No    Sexual activity: Yes     Partners: Female     Review of Systems   Constitutional: Negative for chills and fever.   Cardiovascular: Positive for leg swelling.   Gastrointestinal: Negative for nausea and vomiting.   Musculoskeletal: Negative for arthralgias and joint swelling.   Skin: Positive for wound. Negative for rash.   Psychiatric/Behavioral: Negative for agitation and confusion.     Objective:     Vital Signs (Most Recent):  Temp: 96.7 °F (35.9 °C) (07/28/19 1649)  Pulse: (!) 54 (07/28/19 1649)  Resp: 19 (07/28/19 1649)  BP: (!) 154/70 (07/28/19 1649)  SpO2: 97 % (07/27/19 0548) Vital Signs (24h Range):  Temp:  [96.7 °F (35.9 °C)-99.9 °F (37.7 °C)] 96.7 °F (35.9 °C)  Pulse:  [51-65] 54  Resp:  [17-20] 19  BP: (141-182)/(67-74) 154/70     Weight: 109.3 kg (240 lb 15.4 oz)  Body mass index is 30.12 kg/m².    Foot Exam    General  Orientation: alert and oriented to person, place, and time   Affect: appropriate       Right Foot/Ankle     Inspection and Palpation  Ecchymosis: none  Tenderness: none   Swelling: none     Neurovascular  Dorsalis pedis: 1+  Posterior  tibial: 1+  Saphenous nerve sensation: diminished  Tibial nerve sensation: diminished  Superficial peroneal nerve sensation: diminished  Deep peroneal nerve sensation: diminished  Sural nerve sensation: diminished      Left Foot/Ankle      Inspection and Palpation  Ecchymosis: none  Tenderness: none     Neurovascular  Dorsalis pedis: 1+  Posterior tibial: 1+  Saphenous nerve sensation: diminished  Tibial nerve sensation: diminished  Superficial peroneal nerve sensation: diminished  Deep peroneal nerve sensation: diminished  Sural nerve sensation: diminished            Laboratory:  A1C:   Recent Labs   Lab 02/22/19  0941 07/26/19  0529   HGBA1C 8.2* 8.1*     CBC:   Recent Labs   Lab 07/28/19 0347   WBC 8.23   RBC 3.23*   HGB 8.7*   HCT 27.4*   *   MCV 85   MCH 26.9*   MCHC 31.8*     CMP:   Recent Labs   Lab 07/28/19 0347   *   CALCIUM 9.3   ALBUMIN 2.9*   PROT 7.3      K 3.7   CO2 26      BUN 46*   CREATININE 2.9*   ALKPHOS 71   ALT 9*   AST 13   BILITOT 0.3     CRP:   Recent Labs   Lab 07/25/19  2353   CRP 29.9*     ESR:   Recent Labs   Lab 07/25/19  2353   SEDRATE 91*     Wound Cultures:   Recent Labs   Lab 07/26/19  1114 07/27/19  0942   LABAERO STAPHYLOCOCCUS AUREUS  Moderate  Susceptibility pending  * Insufficient incubation, culture in progress     Microbiology Results (last 7 days)     Procedure Component Value Units Date/Time    Aerobic culture [[id number]]  (Abnormal) Collected:  07/26/19 1114    Order Status:  Completed Specimen:  Wound from Toe, Left Foot Updated:  07/28/19 1230     Aerobic Bacterial Culture STAPHYLOCOCCUS AUREUS  Moderate  Susceptibility pending      Aerobic culture [[id number]] Collected:  07/27/19 0942    Order Status:  Completed Specimen:  Bone from Toe, Left Foot Updated:  07/28/19 0736     Aerobic Bacterial Culture Insufficient incubation, culture in progress    Blood culture #2 **CANNOT BE ORDERED STAT** [[id number]] Collected:  07/26/19 0009    Order  Status:  Completed Specimen:  Blood from Peripheral, Antecubital, Right Updated:  07/28/19 0612     Blood Culture, Routine No Growth to date      No Growth to date      No Growth to date    Blood culture #1 **CANNOT BE ORDERED STAT** [[id number]] Collected:  07/25/19 1878    Order Status:  Completed Specimen:  Blood from Peripheral, Antecubital, Left Updated:  07/28/19 0612     Blood Culture, Routine No Growth to date      No Growth to date      No Growth to date    Gram stain [[id number]] Collected:  07/27/19 0942    Order Status:  Completed Specimen:  Bone from Toe, Left Foot Updated:  07/27/19 1806     Gram Stain Result No WBC's      No organisms seen    Culture, Anaerobe [[id number]] Collected:  07/27/19 0942    Order Status:  Sent Specimen:  Bone from Toe, Left Foot Updated:  07/27/19 1639    Culture, Anaerobe [[id number]] Collected:  07/26/19 1113    Order Status:  Sent Specimen:  Wound from Toe, Left Foot Updated:  07/26/19 1635        Specimen (12h ago, onward)    None          Diagnostic Results:  X-ray: showing osteo left distal hallux phalanx    MRI:  Showing osteo left distal hallux phalanx    Arterial US:  Atherosclerosis with focal elevated segmental velocities present in the proximal/mid left SFA, proximal left anterior tibial artery, and proximal right anterior tibial artery suggesting greater than 50% focal stenosis.    Abnormal arterial waveforms present in the bilateral thighs, worse on the left.  Cannot exclude more proximal aortoiliac stenosis.    ROCIO's: ordered    Clinical Findings:  Ulcer Location: left great toe  Measurements:  Periwound: macerated  Drainage: seropurlent.  Base:  granular  Signs of infection: edema, erythema, .   7/28 7/27 7/26

## 2019-07-28 NOTE — PROGRESS NOTES
[facility] Infectious Disease Progress Note     Primary Team: Hospitalist  Consultant Attending: Madhavi  Date of Admit: 7/25/2019    Summary of history     Domingo Castro is a 74 y.o. male with a relevant history of IDT2DM, Hypertension, HLD, CKD stage IV, left toe osteomyelitis in December of 2018, CHF with unknown EF for which he takes 40mg Lasix daily.     Patient presented to the ED on 7/25 with a complaint of an ulcer on the plantar surface of his left great toe. Patient stated that he first noticed a small ulceration at the site on 7/19/19 and that it became progressively worse throughout the week. The patient has significant diabetic neuropathy and does not have intact sensation in his feet. He follows up with Dr. Gallegos for podiatry. On admission MRI and radiograph of the left foot were obtained, which were suggestive of osteomyelitis in the toe.      Patient has been admitted from 12/23/18 to 1/6/19 at this facility due to osteomyelitis of the same toe and a pneumonia. At the time he was found to have Strep pyogenes bacteremia and strep pyogenes in the culture from his infected toe. He completed a course of Rocephin on 2/4/19. He currently appears to be receiving regular wound care and podiatry follow up. Patient reports that he receives wound care by home health nurse who was concerned that his toe appeared infected today and notified Podiatrist office who advised patient to go to ED.      On admission he denied fever, chills, SOB, chest pain, sick contacts,  trauma to the toe, change in bladder or bowel habits.  ED findings: H/H 10.3/31.9, Sed rate 91, CRP  29.9, Hgb A1C 8.1     Interval events     Bone biopsy (7/27)  Bilateral US of LE showed significant stenosis (7/27)    Subjective     Patient reports no complaints overnight. Denies any fever, chills, nausea, vomiting, or pain/discomfort.    Current Medications:     Infusions:       Scheduled:   amLODIPine  10 mg Oral Daily    aspirin  81 mg Oral Daily     carvedilol  25 mg Oral BID WM    furosemide  80 mg Oral BID    gabapentin  100 mg Oral TID    hydrALAZINE  50 mg Oral Q12H    insulin aspart U-100  10 Units Subcutaneous TIDWM    insulin detemir U-100  26 Units Subcutaneous QHS    piperacillin-tazobactam (ZOSYN) IVPB  4.5 g Intravenous Q8H    rosuvastatin  20 mg Oral QHS    vancomycin (VANCOCIN) IVPB  1,000 mg Intravenous Q24H        PRN:  acetaminophen, acetaminophen, bisacodyl, cloNIDine, Dextrose 10% Bolus, Dextrose 10% Bolus, glucagon (human recombinant), glucose, glucose, hydrALAZINE, insulin aspart U-100, lactulose, ondansetron, sodium chloride 0.9%    Antibiotics and Day Number of Therapy:  Vancomycin:  - current  Zosyn:  - current    Objective   Last 24 Hour Vital Signs:  BP  Min: 141/68  Max: 182/72  Temp  Av °F (36.7 °C)  Min: 96.5 °F (35.8 °C)  Max: 99.9 °F (37.7 °C)  Pulse  Av.3  Min: 54  Max: 65  Resp  Av  Min: 18  Max: 20    Lines, drains, airway:  Port A Cath ()    Physical Examination:  Examination  General: Patient lying comfortably in NAD with O2 nasal cannula in place  HEENT: normocephalic, atraumatic  Neck: No thyromegaly or masses   Cardiac: RRR, no murmurs appreciated, no extra heart sounds  Pulmonary/Chest: CTAB, symmetric chest rise, no wheezing or crackles  GI: Soft, non tender, non distended, normoactive bowel sounds  Extremities: no edema, clubbing, or cyanosis  Skin: dry, warm, intact. No bruising or rashes.   - left foot wrapped with clean, dry, intact bandage; no erythema or edema noted above bandage site.  Neuro: Alert and oriented    Lab data:  CBC:   Lab Results   Component Value Date    WBC 8.23 2019    HGB 8.7 (L) 2019     (L) 2019    MCV 85 2019    RDW 14.2 2019       Estimated Creatinine Clearance: 29.8 mL/min (A) (based on SCr of 2.9 mg/dL (H)).    Microbiology Data  Blood cultures = negative  Wound culture = pending    Radiology  MRI Foot  (7/26)  Osteomyelitis of the distal aspect of the 1st distal phalanx with adjacent marrow edema which may be reactive in nature versus early infectious process.    Soft tissue swelling, likely related to cellulitis.  No drainable fluid collections.    US LE (7/27)  Atherosclerosis with focal elevated segmental velocities present in the proximal/mid left SFA, proximal left anterior tibial artery, and proximal right anterior tibial artery suggesting greater than 50% focal stenosis.    Abnormal arterial waveforms present in the bilateral thighs, worse on the left.  Cannot exclude more proximal aortoiliac stenosis.    Assessment     Domingo Castro is a 74 y.o.male with past medical history of IDT2DM, Hypertension, HLD, CKD stage IV, left toe osteomyelitis in December of 2018, CHF with unknown EF. Patient presented to the ED on 7/25 with a complaint of an ulcer on the plantar surface of his left great toe. MRI resulted in osteomyelitis of 1st distal phalanx. Awaiting wound culture results to narrow antibiotic treatment.    Recommendations     Osteomyelitis of Left 1st Toe  - Continue Zosyn 4.5 g IV q8h  - Continue Vancomycin 1g IV q24h  - Anticipate 6 weeks of antibiotic coverage  - Will follow cultures and podiatry recommendations for source control     Thank you for the consult. Please call with any questions.    Evelia Palmer MD  [facility] Internal Medicine Resident, [facility]

## 2019-07-28 NOTE — PROGRESS NOTES
Pharmacokinetic Assessment Follow Up: IV Vancomycin    Vancomycin serum concentration assessment(s):    The random level was drawned correctly and can be used to guide therapy at this time. The measurement is within the desired definitive target range of 15 to 20 mcg/mL.    Vancomycin Regimen Plan:    Change regimen to Vancomycin 1000 mg IV every 24hour with next serum trough concentration measured at 30 minutes prior to 3rd dose on 7/30/19.     Pharmacy will continue to follow and monitor vancomycin.    Please contact pharmacy at extension [phone] for questions regarding this assessment.    Thank you for the consult,   Dax Tejeda     Patient brief summary:  Domingo Castro is a 74 y.o. male initiated on antimicrobial therapy with IV Vancomycin for treatment of suspected bone/joint    The patient received a loading dose, followed by the current treatment regimen: vancomycin 1000 mg IV every 24 hours    Drug Allergies:   Review of patient's allergies indicates:   Allergen Reactions    Atorvastatin Other (See Comments)       Actual Body Weight:   108.9 kg    Renal Function:   Estimated Creatinine Clearance: 29.8 mL/min (A) (based on SCr of 2.9 mg/dL (H)).,     Dialysis Method (if applicable):  N/A     CBC (last 72 hours):  Recent Labs   Lab Result Units 07/25/19  2353 07/26/19  0529 07/27/19  0544 07/28/19  0347   WBC K/uL 10.64  --  8.31 8.23   Hemoglobin g/dL 10.3*  --  8.8* 8.7*   Hemoglobin A1C %  --  8.1*  --   --    Hematocrit % 31.9*  --  27.3* 27.4*   Platelets K/uL 118*  --  104* 104*   Gran% % 77.1*  --  68.3 70.8   Lymph% % 15.8*  --  21.1 17.7*   Mono% % 4.9  --  7.9 8.1   Eosinophil% % 1.8  --  2.5 2.9   Basophil% % 0.4  --  0.2 0.5   Differential Method  Automated  --  Automated Automated       Metabolic Panel (last 72 hours):  Recent Labs   Lab Result Units 07/25/19  2353 07/26/19  0529 07/27/19  0544 07/28/19  0347   Sodium mmol/L 142 141 140 138   Potassium mmol/L 3.7 3.4* 3.6 3.7   Chloride mmol/L  106 105 104 103   CO2 mmol/L 24 26 28 26   Glucose mg/dL 126* 153* 266* 282*   BUN, Bld mg/dL 51* 48* 49* 46*   Creatinine mg/dL 3.0* 2.9* 2.8* 2.9*   Albumin g/dL 3.6 3.3* 3.0* 2.9*   Total Bilirubin mg/dL 0.3 0.3 0.4 0.3   Alkaline Phosphatase U/L 83 73 70 71   AST U/L 14 12 10 13   ALT U/L 10 8* 7* 9*   Magnesium mg/dL  --   --  2.1 2.1   Phosphorus mg/dL  --   --  3.3 3.4       Vancomycin Administrations:  vancomycin given in the last 96 hours                     vancomycin 1.5 g in dextrose 5 % 250 mL IVPB (ready to mix) (mg) 1,500 mg New Bag 07/27/19 0359                      Drug levels (last 3 results):  Recent Labs   Lab Result Units 07/26/19  2349 07/28/19  0347   Vancomycin, Random ug/mL 11.8 17.4       Microbiologic Results:  Microbiology Results (last 7 days)       Procedure Component Value Units Date/Time    Gram stain [[id number]] Collected:  07/27/19 0942    Order Status:  Completed Specimen:  Bone from Toe, Left Foot Updated:  07/27/19 1806     Gram Stain Result No WBC's      No organisms seen    Culture, Anaerobe [[id number]] Collected:  07/27/19 0942    Order Status:  Sent Specimen:  Bone from Toe, Left Foot Updated:  07/27/19 1639    Aerobic culture [[id number]] Collected:  07/27/19 0942    Order Status:  Sent Specimen:  Bone from Toe, Left Foot Updated:  07/27/19 1639    Aerobic culture [[id number]] Collected:  07/26/19 1114    Order Status:  Completed Specimen:  Wound from Toe, Left Foot Updated:  07/27/19 1259     Aerobic Bacterial Culture Insufficient incubation, culture in progress    Blood culture #2 **CANNOT BE ORDERED STAT** [[id number]] Collected:  07/26/19 0009    Order Status:  Completed Specimen:  Blood from Peripheral, Antecubital, Right Updated:  07/27/19 0612     Blood Culture, Routine No Growth to date      No Growth to date    Blood culture #1 **CANNOT BE ORDERED STAT** [[id number]] Collected:  07/25/19 2357    Order Status:  Completed Specimen:  Blood from Peripheral, Antecubital,  Left Updated:  07/27/19 0612     Blood Culture, Routine No Growth to date      No Growth to date    Culture, Anaerobe [[id number]] Collected:  07/26/19 1113    Order Status:  Sent Specimen:  Wound from Toe, Left Foot Updated:  07/26/19 0497

## 2019-07-28 NOTE — PLAN OF CARE
POC reviewed, pt verbalize understanding. Pt denies pain/discomfort. IV antibiotics administered as charted. Fall precaution maintained: bed on lowest position, call light within reach, bed alarm set, side rail up x 2, non skid sock on. Will continue to monitor

## 2019-07-28 NOTE — PROGRESS NOTES
[facility]  Podiatry  Progress Note    Patient Name: Domingo Castro  MRN: [id number]  Admission Date: 2019  Hospital Length of Stay: 2 days  Attending Physician: Kendall Sotomayor MD  Primary Care Provider: Griselda Nugent MD   Interval Hx:  Patient Seen at bedside for dressing change.  Patient denies F/C/N/V.  Dressings clean, dry, and intact.    Scheduled Meds:   amLODIPine  10 mg Oral Daily    aspirin  81 mg Oral Daily    carvedilol  25 mg Oral BID WM    furosemide  80 mg Oral BID    gabapentin  100 mg Oral TID    hydrALAZINE  50 mg Oral Q12H    insulin aspart U-100  15 Units Subcutaneous TIDWM    insulin detemir U-100  26 Units Subcutaneous QHS    piperacillin-tazobactam (ZOSYN) IVPB  4.5 g Intravenous Q8H    rosuvastatin  20 mg Oral QHS    vancomycin (VANCOCIN) IVPB  1,000 mg Intravenous Q24H     Continuous Infusions:  PRN Meds:acetaminophen, acetaminophen, bisacodyl, cloNIDine, Dextrose 10% Bolus, Dextrose 10% Bolus, glucagon (human recombinant), glucose, glucose, hydrALAZINE, insulin aspart U-100, lactulose, ondansetron, sodium chloride 0.9%    Review of patient's allergies indicates:   Allergen Reactions    Atorvastatin Other (See Comments)        Past Medical History:   Diagnosis Date    Arthritis     Cataract     Chronic diastolic congestive heart failure     Coronary artery disease     Cystoid macular edema of both eyes     Diabetes mellitus type II     Hyperlipemia     Hypertension     Retinal hole     Stage 4 chronic kidney disease     Streptococcus pyogenes bacteremia 2018    Due to left toe osteomyelitis     Past Surgical History:   Procedure Laterality Date    BLEPHAROPLASTY Bilateral 2017    [facility]    CATARACT EXTRACTION W/  INTRAOCULAR LENS IMPLANT Left 12/15/14    Dr de la cruz    CATARACT EXTRACTION W/  INTRAOCULAR LENS IMPLANT Right 14    dorothy    CIRCUMCISION, NON-      focal laser right  eye      INSERTION-INTRAOCULAR LENS (IOL) Right 12/29/2014    [facility] OR    INSERTION-INTRAOCULAR LENS (IOL) Left 12/15/2014    [facility] at [facility] OR    FLLEELVKH-HCZZ-U-CATH Right 1/2/2019    [facility]    KNEE SURGERY      left    Left medial collateral ligament repair      PHACOEMULSIFICATION-ASPIRATION-CATARACT Right 12/29/2014    [facility]formed [facility] at [facility] OR    PHACOEMULSIFICATION-ASPIRATION-CATARACT Left 12/15/2014    [facility] at [facility] OR    REPAIR-PTOSIS/BILATERAL EXTERNAL LEVATORS Bilateral 8/30/2017    [facility]    TONSILLECTOMY         Family History     Problem Relation (Age of Onset)    Cancer Mother, Brother, Sister    Cataracts Paternal Grandmother    Diabetes Father    Glaucoma Paternal Grandmother    Heart disease Mother, Father        Tobacco Use    Smoking status: Never Smoker    Smokeless tobacco: Never Used   Substance and Sexual Activity    Alcohol use: No     Alcohol/week: 0.0 oz    Drug use: No    Sexual activity: Yes     Partners: Female     Review of Systems   Constitutional: Negative for chills and fever.   Cardiovascular: Positive for leg swelling.   Gastrointestinal: Negative for nausea and vomiting.   Musculoskeletal: Negative for arthralgias and joint swelling.   Skin: Positive for wound. Negative for rash.   Psychiatric/Behavioral: Negative for agitation and confusion.     Objective:     Vital Signs (Most Recent):  Temp: 96.7 °F (35.9 °C) (07/28/19 1649)  Pulse: (!) 54 (07/28/19 1649)  Resp: 19 (07/28/19 1649)  BP: (!) 154/70 (07/28/19 1649)  SpO2: 97 % (07/27/19 0548) Vital Signs (24h Range):  Temp:  [96.7 °F (35.9 °C)-99.9 °F (37.7 °C)] 96.7 °F (35.9 °C)  Pulse:  [51-65] 54  Resp:  [17-20] 19  BP: (141-182)/(67-74) 154/70     Weight: 109.3 kg (240 lb 15.4 oz)  Body mass index is 30.12 kg/m².    Foot  Exam    General  Orientation: alert and oriented to person, place, and time   Affect: appropriate       Right Foot/Ankle     Inspection and Palpation  Ecchymosis: none  Tenderness: none   Swelling: none     Neurovascular  Dorsalis pedis: 1+  Posterior tibial: 1+  Saphenous nerve sensation: diminished  Tibial nerve sensation: diminished  Superficial peroneal nerve sensation: diminished  Deep peroneal nerve sensation: diminished  Sural nerve sensation: diminished      Left Foot/Ankle      Inspection and Palpation  Ecchymosis: none  Tenderness: none     Neurovascular  Dorsalis pedis: 1+  Posterior tibial: 1+  Saphenous nerve sensation: diminished  Tibial nerve sensation: diminished  Superficial peroneal nerve sensation: diminished  Deep peroneal nerve sensation: diminished  Sural nerve sensation: diminished            Laboratory:  A1C:   Recent Labs   Lab 02/22/19  0941 07/26/19  0529   HGBA1C 8.2* 8.1*     CBC:   Recent Labs   Lab 07/28/19  0347   WBC 8.23   RBC 3.23*   HGB 8.7*   HCT 27.4*   *   MCV 85   MCH 26.9*   MCHC 31.8*     CMP:   Recent Labs   Lab 07/28/19 0347   *   CALCIUM 9.3   ALBUMIN 2.9*   PROT 7.3      K 3.7   CO2 26      BUN 46*   CREATININE 2.9*   ALKPHOS 71   ALT 9*   AST 13   BILITOT 0.3     CRP:   Recent Labs   Lab 07/25/19  2353   CRP 29.9*     ESR:   Recent Labs   Lab 07/25/19  2353   SEDRATE 91*     Wound Cultures:   Recent Labs   Lab 07/26/19  1114 07/27/19  0942   LABAERO STAPHYLOCOCCUS AUREUS  Moderate  Susceptibility pending  * Insufficient incubation, culture in progress     Microbiology Results (last 7 days)     Procedure Component Value Units Date/Time    Aerobic culture [[id number]]  (Abnormal) Collected:  07/26/19 1114    Order Status:  Completed Specimen:  Wound from Toe, Left Foot Updated:  07/28/19 1230     Aerobic Bacterial Culture STAPHYLOCOCCUS AUREUS  Moderate  Susceptibility pending      Aerobic culture [[id number]] Collected:  07/27/19 0942    Order  Status:  Completed Specimen:  Bone from Toe, Left Foot Updated:  07/28/19 0736     Aerobic Bacterial Culture Insufficient incubation, culture in progress    Blood culture #2 **CANNOT BE ORDERED STAT** [[id number]] Collected:  07/26/19 0009    Order Status:  Completed Specimen:  Blood from Peripheral, Antecubital, Right Updated:  07/28/19 0612     Blood Culture, Routine No Growth to date      No Growth to date      No Growth to date    Blood culture #1 **CANNOT BE ORDERED STAT** [[id number]] Collected:  07/25/19 2357    Order Status:  Completed Specimen:  Blood from Peripheral, Antecubital, Left Updated:  07/28/19 0612     Blood Culture, Routine No Growth to date      No Growth to date      No Growth to date    Gram stain [[id number]] Collected:  07/27/19 0942    Order Status:  Completed Specimen:  Bone from Toe, Left Foot Updated:  07/27/19 1806     Gram Stain Result No WBC's      No organisms seen    Culture, Anaerobe [[id number]] Collected:  07/27/19 0942    Order Status:  Sent Specimen:  Bone from Toe, Left Foot Updated:  07/27/19 1639    Culture, Anaerobe [[id number]] Collected:  07/26/19 1113    Order Status:  Sent Specimen:  Wound from Toe, Left Foot Updated:  07/26/19 1635        Specimen (12h ago, onward)    None          Diagnostic Results:  X-ray: showing osteo left distal hallux phalanx    MRI:  Showing osteo left distal hallux phalanx    Arterial US:  Atherosclerosis with focal elevated segmental velocities present in the proximal/mid left SFA, proximal left anterior tibial artery, and proximal right anterior tibial artery suggesting greater than 50% focal stenosis.    Abnormal arterial waveforms present in the bilateral thighs, worse on the left.  Cannot exclude more proximal aortoiliac stenosis.    ROCIO's: ordered    Clinical Findings:  Ulcer Location: left great toe  Measurements:  Periwound: macerated  Drainage: seropurlent.  Base:  granular  Signs of infection: edema, erythema, .    7/28 7/27 7/26              Assessment/Plan:     * Toe osteomyelitis, left   Domingo Castro is a 74 y.o. male with left hallux toe infection , suspicious for osteomyelitis  -Imaging: x-ray and MRI showing osteomyelitis of left hallux  -bone cultures pending  -ID on board.  abx per ID recs  -applied betadine wet to dry gauze dressings.  Nursing to do daily dressing changes.  -ROCIO's abnormal, consulted Interventional Cardiology  -Will continue with conservative care at this time.  Will follow patient peripherally.      Podiatry will follow      Diabetic ulcer of toe of left foot associated with type 2 diabetes mellitus, with necrosis of bone  As above    Diabetic neuropathy associated with type 2 diabetes mellitus  Per primary      Chronic diastolic congestive heart failure  Per primary          Merrill Beckham MD  Podiatry  [facility]

## 2019-07-29 PROBLEM — I73.9 PAD (PERIPHERAL ARTERY DISEASE): Status: ACTIVE | Noted: 2019-07-29

## 2019-07-29 LAB
ALBUMIN SERPL BCP-MCNC: 2.9 G/DL (ref 3.5–5.2)
ALP SERPL-CCNC: 61 U/L (ref 55–135)
ALT SERPL W/O P-5'-P-CCNC: 13 U/L (ref 10–44)
ANION GAP SERPL CALC-SCNC: 12 MMOL/L (ref 8–16)
AST SERPL-CCNC: 19 U/L (ref 10–40)
BACTERIA SPEC AEROBE CULT: ABNORMAL
BASOPHILS # BLD AUTO: 0.08 K/UL (ref 0–0.2)
BASOPHILS NFR BLD: 0.9 % (ref 0–1.9)
BILIRUB SERPL-MCNC: 0.4 MG/DL (ref 0.1–1)
BUN SERPL-MCNC: 41 MG/DL (ref 8–23)
CALCIUM SERPL-MCNC: 9.4 MG/DL (ref 8.7–10.5)
CHLORIDE SERPL-SCNC: 101 MMOL/L (ref 95–110)
CO2 SERPL-SCNC: 25 MMOL/L (ref 23–29)
CREAT SERPL-MCNC: 3 MG/DL (ref 0.5–1.4)
DIFFERENTIAL METHOD: ABNORMAL
EOSINOPHIL # BLD AUTO: 0.3 K/UL (ref 0–0.5)
EOSINOPHIL NFR BLD: 3.8 % (ref 0–8)
ERYTHROCYTE [DISTWIDTH] IN BLOOD BY AUTOMATED COUNT: 14.5 % (ref 11.5–14.5)
EST. GFR  (AFRICAN AMERICAN): 23 ML/MIN/1.73 M^2
EST. GFR  (NON AFRICAN AMERICAN): 20 ML/MIN/1.73 M^2
GLUCOSE SERPL-MCNC: 189 MG/DL (ref 70–110)
HCT VFR BLD AUTO: 30.2 % (ref 40–54)
HGB BLD-MCNC: 9.4 G/DL (ref 14–18)
LYMPHOCYTES # BLD AUTO: 1.8 K/UL (ref 1–4.8)
LYMPHOCYTES NFR BLD: 21.4 % (ref 18–48)
MAGNESIUM SERPL-MCNC: 1.9 MG/DL (ref 1.6–2.6)
MCH RBC QN AUTO: 27 PG (ref 27–31)
MCHC RBC AUTO-ENTMCNC: 31.1 G/DL (ref 32–36)
MCV RBC AUTO: 87 FL (ref 82–98)
MONOCYTES # BLD AUTO: 0.7 K/UL (ref 0.3–1)
MONOCYTES NFR BLD: 7.6 % (ref 4–15)
NEUTROPHILS # BLD AUTO: 5.7 K/UL (ref 1.8–7.7)
NEUTROPHILS NFR BLD: 66.3 % (ref 38–73)
PHOSPHATE SERPL-MCNC: 3.9 MG/DL (ref 2.7–4.5)
PLATELET # BLD AUTO: 114 K/UL (ref 150–350)
PMV BLD AUTO: 11.8 FL (ref 9.2–12.9)
POCT GLUCOSE: 134 MG/DL (ref 70–110)
POCT GLUCOSE: 199 MG/DL (ref 70–110)
POCT GLUCOSE: 252 MG/DL (ref 70–110)
POCT GLUCOSE: 301 MG/DL (ref 70–110)
POTASSIUM SERPL-SCNC: 3.7 MMOL/L (ref 3.5–5.1)
PROT SERPL-MCNC: 7.5 G/DL (ref 6–8.4)
RBC # BLD AUTO: 3.48 M/UL (ref 4.6–6.2)
SODIUM SERPL-SCNC: 138 MMOL/L (ref 136–145)
WBC # BLD AUTO: 8.59 K/UL (ref 3.9–12.7)

## 2019-07-29 PROCEDURE — 99223 PR INITIAL HOSPITAL CARE,LEVL III: ICD-10-PCS | Mod: ,,, | Performed by: INTERNAL MEDICINE

## 2019-07-29 PROCEDURE — 63600175 PHARM REV CODE 636 W HCPCS: Performed by: NURSE PRACTITIONER

## 2019-07-29 PROCEDURE — 83735 ASSAY OF MAGNESIUM: CPT

## 2019-07-29 PROCEDURE — 25000003 PHARM REV CODE 250: Performed by: NURSE PRACTITIONER

## 2019-07-29 PROCEDURE — 85025 COMPLETE CBC W/AUTO DIFF WBC: CPT

## 2019-07-29 PROCEDURE — 99223 1ST HOSP IP/OBS HIGH 75: CPT | Mod: ,,, | Performed by: INTERNAL MEDICINE

## 2019-07-29 PROCEDURE — 11000001 HC ACUTE MED/SURG PRIVATE ROOM

## 2019-07-29 PROCEDURE — 36415 COLL VENOUS BLD VENIPUNCTURE: CPT

## 2019-07-29 PROCEDURE — 80053 COMPREHEN METABOLIC PANEL: CPT

## 2019-07-29 PROCEDURE — 99233 SBSQ HOSP IP/OBS HIGH 50: CPT | Mod: ,,, | Performed by: PODIATRIST

## 2019-07-29 PROCEDURE — 63600175 PHARM REV CODE 636 W HCPCS: Performed by: HOSPITALIST

## 2019-07-29 PROCEDURE — 99233 PR SUBSEQUENT HOSPITAL CARE,LEVL III: ICD-10-PCS | Mod: ,,, | Performed by: PODIATRIST

## 2019-07-29 PROCEDURE — 84100 ASSAY OF PHOSPHORUS: CPT

## 2019-07-29 RX ORDER — METRONIDAZOLE 500 MG/1
500 TABLET ORAL EVERY 8 HOURS
Status: DISCONTINUED | OUTPATIENT
Start: 2019-07-29 | End: 2019-08-01

## 2019-07-29 RX ADMIN — PIPERACILLIN AND TAZOBACTAM 4.5 G: 4; .5 INJECTION, POWDER, LYOPHILIZED, FOR SOLUTION INTRAVENOUS; PARENTERAL at 03:07

## 2019-07-29 RX ADMIN — HYDRALAZINE HYDROCHLORIDE 50 MG: 25 TABLET, FILM COATED ORAL at 10:07

## 2019-07-29 RX ADMIN — METRONIDAZOLE 500 MG: 500 TABLET ORAL at 10:07

## 2019-07-29 RX ADMIN — INSULIN DETEMIR 26 UNITS: 100 INJECTION, SOLUTION SUBCUTANEOUS at 10:07

## 2019-07-29 RX ADMIN — INSULIN ASPART 3 UNITS: 100 INJECTION, SOLUTION INTRAVENOUS; SUBCUTANEOUS at 10:07

## 2019-07-29 RX ADMIN — FUROSEMIDE 80 MG: 40 TABLET ORAL at 10:07

## 2019-07-29 RX ADMIN — INSULIN ASPART 8 UNITS: 100 INJECTION, SOLUTION INTRAVENOUS; SUBCUTANEOUS at 05:07

## 2019-07-29 RX ADMIN — HYDRALAZINE HYDROCHLORIDE 50 MG: 25 TABLET, FILM COATED ORAL at 08:07

## 2019-07-29 RX ADMIN — FUROSEMIDE 80 MG: 40 TABLET ORAL at 08:07

## 2019-07-29 RX ADMIN — AMLODIPINE BESYLATE 10 MG: 5 TABLET ORAL at 11:07

## 2019-07-29 RX ADMIN — ROSUVASTATIN CALCIUM 20 MG: 10 TABLET, FILM COATED ORAL at 10:07

## 2019-07-29 RX ADMIN — ASPIRIN 81 MG: 81 TABLET, COATED ORAL at 08:07

## 2019-07-29 RX ADMIN — GABAPENTIN 100 MG: 100 CAPSULE ORAL at 02:07

## 2019-07-29 RX ADMIN — GABAPENTIN 100 MG: 100 CAPSULE ORAL at 10:07

## 2019-07-29 RX ADMIN — VANCOMYCIN HYDROCHLORIDE 1000 MG: 1 INJECTION, POWDER, LYOPHILIZED, FOR SOLUTION INTRAVENOUS at 08:07

## 2019-07-29 RX ADMIN — GABAPENTIN 100 MG: 100 CAPSULE ORAL at 08:07

## 2019-07-29 RX ADMIN — INSULIN ASPART 2 UNITS: 100 INJECTION, SOLUTION INTRAVENOUS; SUBCUTANEOUS at 11:07

## 2019-07-29 NOTE — HPI
75yo male with history of HTN, HLP, DMII, chronic diastolic heart failure, AOCD, CKD stage IV and thrombocytopenia who presented to the ER with complaints of left great toe wound. He reports being admitted at the end of last year in December 2018 with wound to his left great toe. The wound improved to pinpoint and he has been receiving wound care per  nurse. He reports noting the wound to be worse about 3 days ago while receiving wound care per home health. He reports no pain due to his neuropathy. He noted fever with temp up to 102 with chills therefore he presented to the ER for evaluation

## 2019-07-29 NOTE — PLAN OF CARE
Please place on contact isolation for MRSA of left toe.  Contact Infection Control @ [phone] for questions.

## 2019-07-29 NOTE — HOSPITAL COURSE
7/25/2019-7/28/2019 Presented to the ER with complaints of left great toe wound. WBCs 10K ESR 91 CRP 29.96. MRI with osteomyelitis to left toe/foot. Blood cultures with NGTD. Wound culture with MRSA. On IV Zosyn and IV Vanc. BLE arterial ultrasound with elevated velocities to bilateral ATs. On ASA therapy. Podiatry on board  7/29/2019  BUN 3.0 this AM. H&H 9.4&30.2 this AM. Remains on IV abx with wound care per Podiatry and wound care RN. Cardiology consulted for evaluation of PAD   7/30/2019 NPO for abdominal aortogram with run off   7/31/2019 Underwent abd AO with run off via right radial access with following results:    Patent aorta  Bilateral renal artery stenosis  Patent NOEMÍ + EIA   Subtotal R IIA  Patent L CFA, 80% mid L SFA stenosis, 75% L POP with 99% proximal L AT  Patent R CFA, 90% prox R SFA stenosis, 90% ostial PT + PER with subtotal proximal AT  Patent DP and plantar arch bilaterally  Plans for staged  L SFA + POP + AT revascularization    Creatinine 2.9 this AM. H&H 10.2&31.5. HR nad BP stable. Plan for possible LE revascularization in AM   8/1/2019 NPO for LLE revascularization today   8/2/2019 Underwent LLE revascularization yesterday via RCFA and left AT access with following results:                                            Crossed L AT lesions with retrograde wire manipulation              Atherectomy with 2.0 Classic CSI              PTA of AT with 4.0 x 150 mm Lutonix DCB              PTA of PT with 4.0 x 120 mm balloon after occlusion from distal embolization              PTA of SFA with 7.0 x 60 mm Lutonix DCB              R CFA closed with perclose              L AT retrograde access close with manual pressure    Tolerated procedure well. Access sites stable with no active bleeding. Final abx and wound care recs obtained with plans for discharge today

## 2019-07-29 NOTE — PLAN OF CARE
Rounded on patient  Wife at bedside.    Per ID notes from 7/28 patient will need 6 weeks home IV abx. Wife and patient have done home IV in the past.    Sent referral to [facility] and [facility]    Awaiting final cultures for final IV abx recs    Patient has implanted chest wall port.    Future Appointments   Date Time Provider Department Center   8/8/2019 11:15 AM Dimitry Gallegos DPM [facility]   8/29/2019 11:00 AM Griselda Nugent MD [facility] [place]   9/23/2019  8:30 AM ANNUAL WELLNESS VISIT-NURSE PRACTITIONER, [facility] [place]        07/29/19 1058   Discharge Reassessment   Assessment Type Discharge Planning Reassessment   Provided patient/caregiver education on the expected discharge date and the discharge plan Yes   Discharge Plan A Home;Home with family;Home Health   Post-Acute Status   Post-Acute Authorization Home Health/Hospice   Home Health/Hospice Status Referrals Sent   Discharge Delays None known at this time     Daysi Denny RN, CCM, CMSRN  RN Transition Navigator  [phone]

## 2019-07-29 NOTE — PROGRESS NOTES
[facility] Hospital Medicine  Progress Note    Patient Name: Domingo Castro  MRN: [id number]  Patient Class: IP- Inpatient   Admission Date: 7/25/2019  Length of Stay: 3 days  Attending Physician: Kendall Sotomayor MD  Primary Care Provider: Griselda Nugent MD        Subjective:     Principal Problem:Toe osteomyelitis, left      HPI:  74 y.o. Male with past medical history of DM type 2, Hypertension, Arthritis, CAD, HLD, CKD, left toe osteomyelitis, CHF, Cataract presented to the ED on yesterday complaining of left foot infection.  Patient has been treated in past for osteomyelitis of the same foot in which he completed course of antibiotics and was still receiving wound care and podiatry follow up.  Patient reports that he receives wound care by home health nurse who was concerned that toe appeared infected and notified Podiatrist office who advised patient to go to ED.  Patient is followed by Dr. Gallegos.  Denies fever, chills, SOB, chest pain, sick contacts,  trauma to the toe, change in bladder or bowel habits.  ED findings: H/H 10.3/31.9, Sed rate 91, CRP  29.9, Hgb A1C 8.1, Left forefoot MRI showed osteomyelitis of the distal aspect of the 1st distal phalanx with adjacent marrow edema which may be reactive in nature versus early infectious process.  Patient admitted for medical management.    Overview/Hospital Course:  7/28/19 Hospital Medicine put him on piperacillin-tazobactam and vancomycin. Podiatry consulted Infectious Disease. Wound culture grew Staphylococcus aureus.  7/29/19 Wound culture growing staph bacteria, pending sensitivities.      Interval History: No complaints or concerns.    Review of Systems   Constitutional: Negative for chills and fever.   HENT: Negative for congestion, postnasal drip and rhinorrhea.    Eyes: Negative for visual disturbance.   Respiratory: Negative for chest tightness and shortness of breath.    Cardiovascular: Negative for chest pain.   Gastrointestinal:  Negative for abdominal distention and abdominal pain.   Genitourinary: Negative for difficulty urinating.   Musculoskeletal: Negative for arthralgias and myalgias.   Neurological: Negative for weakness, light-headedness and headaches.   Hematological: Does not bruise/bleed easily.   Psychiatric/Behavioral: Negative for agitation.     Objective:     Vital Signs (Most Recent):  Temp: 97.4 °F (36.3 °C) (07/29/19 0818)  Pulse: (!) 58 (07/29/19 0818)  Resp: 19 (07/29/19 0818)  BP: (!) 165/75 (07/29/19 0818)  SpO2: 97 % (07/27/19 0548) Vital Signs (24h Range):  Temp:  [96.7 °F (35.9 °C)-97.9 °F (36.6 °C)] 97.4 °F (36.3 °C)  Pulse:  [51-98] 58  Resp:  [14-19] 19  BP: (147-165)/(67-75) 165/75     Weight: 109.3 kg (240 lb 15.4 oz)  Body mass index is 30.12 kg/m².    Intake/Output Summary (Last 24 hours) at 7/29/2019 1103  Last data filed at 7/29/2019 0826  Gross per 24 hour   Intake 1185 ml   Output 2350 ml   Net -1165 ml      Physical Exam   Constitutional: He is oriented to person, place, and time. He appears well-developed and well-nourished.   HENT:   Head: Normocephalic and atraumatic.   Eyes: EOM are normal.   Neck: Normal range of motion. Neck supple.   Cardiovascular: Regular rhythm.   Pulmonary/Chest: Effort normal and breath sounds normal.   Abdominal: Soft. Bowel sounds are normal.   Musculoskeletal: Normal range of motion.   Neurological: He is alert and oriented to person, place, and time.   Skin: Skin is warm and dry.   Psychiatric: He has a normal mood and affect.       Significant Labs:   Blood Culture: No results for input(s): LABBLOO in the last 48 hours.  BMP:   Recent Labs   Lab 07/29/19  0853   *      K 3.7      CO2 25   BUN 41*   CREATININE 3.0*   CALCIUM 9.4   MG 1.9     CBC:   Recent Labs   Lab 07/28/19  0347 07/29/19  0853   WBC 8.23 8.59   HGB 8.7* 9.4*   HCT 27.4* 30.2*   * 114*     CMP:   Recent Labs   Lab 07/28/19 0347 07/29/19  0853    138   K 3.7 3.7     101   CO2 26 25   * 189*   BUN 46* 41*   CREATININE 2.9* 3.0*   CALCIUM 9.3 9.4   PROT 7.3 7.5   ALBUMIN 2.9* 2.9*   BILITOT 0.3 0.4   ALKPHOS 71 61   AST 13 19   ALT 9* 13   ANIONGAP 9 12   EGFRNONAA 20* 20*     Magnesium:   Recent Labs   Lab 07/28/19  0347 07/29/19  0853   MG 2.1 1.9       Significant Imaging: I have reviewed all pertinent imaging results/findings within the past 24 hours.      Assessment/Plan:      * Toe osteomyelitis, left  Toe ulcer    Consult Podiatry:  Per Podiatry notes, awaiting cultures, continue IV ABX and wound care       ID consulted per Podiatry: Per ID notes, follow cultures, pending sensitivities, will need 6 weeks of ABX, still has his port that was placed in January for his last osteomyelitis treatment.   Continue Vanc   Wound care ordered per podiatry  Blood Cultures: NGTD       Wound Cultures: growing staph, pending sensitivities  Cardiology Consult:  Per cardiology note, arterial doppler reviewed and elevated mid LSFA and JEANNETTE velocities with likely hemodynamically significant stenosis. May consider peripheral angiogram as early as tomorrow with CO2      Thrombocytopenia, unspecified  Monitor      CKD (chronic kidney disease) stage 4, GFR 15-29 ml/min  Creatinine at baseline   Voiding well  Strict I&O  Avoid Nephrotoxic Agents  Renally dose medications        Chronic diastolic congestive heart failure  Essential hypertension    Denies chest pain or SOB  Continue Amlodipine, Coreg Furosemide and Hydralazine      Anemia of chronic renal failure, stage 4 (severe)  Stable, no visual signs of bleeding  Monitor    Dyslipidemia  Continue statin          Type 2 diabetes mellitus with stage 4 chronic kidney disease, with long-term current use of insulin  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

## 2019-07-29 NOTE — CONSULTS
[facility]  Cardiology  Consult Note    Patient Name: Domingo Castro  MRN: [id number]  Admission Date: 7/25/2019  Hospital Length of Stay: 3 days  Code Status: Full Code   Attending Provider: Kendall Sotomayor MD   Consulting Provider: MALISSA Koroma ANP  Primary Care Physician: Griselda Nugent MD  Principal Problem:Toe osteomyelitis, left    Patient information was obtained from patient, past medical records and ER records.     Inpatient consult to Cardiology  Consult performed by: MALISSA Reaves, ERIBERTO  Consult ordered by: Merrill Tadeo MD  Reason for consult: PAD        Subjective:     Chief Complaint:  Left great toe wound      HPI:   75yo male with history of HTN, HLP, DMII, chronic diastolic heart failure, AOCD, CKD stage IV and thrombocytopenia who presented to the ER with complaints of left great toe wound. He reports being admitted at the end of last year in December 2018 with wound to his left great toe. The wound improved to pinpoint and he has been receiving wound care per  nurse. He reports noting the wound to be worse about 3 days ago while receiving wound care per home health. He reports no pain due to his neuropathy. He noted fever with temp up to 102 with chills therefore he presented to the ER for evaluation     Hospital Course:    7/25/2019-7/28/2019 Presented to the ER with complaints of left great toe wound. WBCs 10K ESR 91 CRP 29.96. MRI with osteomyelitis to left toe/foot. Blood cultures with NGTD. Wound culture with MRSA. On IV Zosyn and IV Vanc. BLE arterial ultrasound with elevated velocities to bilateral ATs. On ASA therapy. Podiatry on board  7/29/2019  BUN 3.0 this AM. H&H 9.4&30.2 this AM. Remains on IV abx with wound care per Podiatry and wound care RN. Cardiology consulted for evaluation of PAD     Past Medical History:   Diagnosis Date    Arthritis     Cataract     Chronic diastolic congestive heart failure     Coronary artery disease      Cystoid macular edema of both eyes     Diabetes mellitus type II     Hyperlipemia     Hypertension     Retinal hole     Stage 4 chronic kidney disease     Streptococcus pyogenes bacteremia 2018    Due to left toe osteomyelitis       Past Surgical History:   Procedure Laterality Date    BLEPHAROPLASTY Bilateral 2017    Performed by [facility]ST [facility]LR    CATARACT EXTRACTION W/  INTRAOCULAR LENS IMPLANT Left 12/15/14    Dr martin    CATARACT EXTRACTION W/  INTRAOCULAR LENS IMPLANT Right 14    dorothy    CIRCUMCISION, NON-      focal laser right eye      INSERTION-INTRAOCULAR LENS (IOL) Right 2014    [facility]    INSERTION-INTRAOCULAR LENS (IOL) Left 12/15/2014    [facility] at [facility]    JYLXGLWON-PWTQ-T-CATH Right 2019    [facility]    KNEE SURGERY      left    Left medial collateral ligament repair      PHACOEMULSIFICATION-ASPIRATION-CATARACT Right 2014    [facility]formed [facility] at [facility] OR    PHACOEMULSIFICATION-ASPIRATION-CATARACT Left 12/15/2014    [facility]formed [facility] at [facility] OR    REPAIR-PTOSIS/BILATERAL EXTERNAL LEVATORS Bilateral 2017    Performed [facility] OR 1ST FLR    TONSILLECTOMY         Review of patient's allergies indicates:   Allergen Reactions    Atorvastatin Other (See Comments)       No current facility-administered medications on file prior to encounter.      Current Outpatient Medications on File Prior to Encounter   Medication Sig    acetaminophen (TYLENOL) 325 MG tablet Take 2 tablets (650 mg total) by mouth every 4 (four) hours as needed.    amLODIPine (NORVASC) 10 MG tablet Take 1 tablet (10 mg total) by mouth once daily.    aspirin (ECOTRIN) 81 MG EC tablet Take 1 tablet (81 mg total) by mouth once daily.    cadexomer iodine (IODOSORB) 0.9 % gel Apply topically every Mon, Wed, Fri.  "   carvedilol (COREG) 25 MG tablet Take 1 tablet (25 mg total) by mouth 2 (two) times daily with meals.    furosemide (LASIX) 40 MG tablet Take 2 tablets (80 mg total) by mouth 2 (two) times daily. Do not take the pm dose after 3 pm    gabapentin (NEURONTIN) 100 MG capsule TAKE ONE CAPSULE BY MOUTH IN THE MORNING THEN ONE CAPSULE  IN THE EVENING AND THEN THREE CAPSULES AT BEDTIME    hydrALAZINE (APRESOLINE) 50 MG tablet TAKE ONE TABLET BY MOUTH EVERY 12 HOURS    insulin lispro (HUMALOG KWIKPEN INSULIN) 100 unit/mL InPn pen INJECT 10 UNITS SUBCUTANEOUSLY THREE TIMES DAILY BEFORE MEALS WITH CORRECTION SCALE, MAX TDD 50 UNITS    insulin lispro (HUMALOG KWIKPEN INSULIN) 100 unit/mL pen INJECT 12 UNITS SUBCUTANEOUSLY THREE TIMES DAILY WITH MEALS WITH  CORRECTION  SCALE.  MAX  50  UNITS  DAILY    LEVEMIR FLEXTOUCH U-100 INSULN 100 unit/mL (3 mL) InPn pen Inject 26 Units into the skin every evening.    pen needle, diabetic, safety (BD AUTOSHIELD PEN NEEDLE) 29 gauge x 5/16" Ndle For use once daily with levemir flexpen    rosuvastatin (CRESTOR) 20 MG tablet Take 20 mg by mouth once daily.      Family History     Problem Relation (Age of Onset)    Cancer Mother, Brother, Sister    Cataracts Paternal Grandmother    Diabetes Father    Glaucoma Paternal Grandmother    Heart disease Mother, Father        Tobacco Use    Smoking status: Never Smoker    Smokeless tobacco: Never Used   Substance and Sexual Activity    Alcohol use: No     Alcohol/week: 0.0 oz    Drug use: No    Sexual activity: Yes     Partners: Female     Review of Systems   Constitution: Negative for chills, decreased appetite, diaphoresis and fever.   Cardiovascular: Negative for chest pain, claudication, cyanosis, dyspnea on exertion, irregular heartbeat, leg swelling, near-syncope, orthopnea, palpitations, paroxysmal nocturnal dyspnea and syncope.   Respiratory: Negative for cough, hemoptysis, shortness of breath and wheezing.    Gastrointestinal: " Negative for bloating, abdominal pain, constipation, diarrhea, melena, nausea and vomiting.   Neurological: Negative for dizziness and weakness.     Objective:     Vital Signs (Most Recent):  Temp: 98 °F (36.7 °C) (07/29/19 1132)  Pulse: 62 (07/29/19 1201)  Resp: 19 (07/29/19 1132)  BP: (!) 164/78 (07/29/19 1132)  SpO2: 97 % (07/27/19 0548) Vital Signs (24h Range):  Temp:  [96.7 °F (35.9 °C)-98 °F (36.7 °C)] 98 °F (36.7 °C)  Pulse:  [52-98] 62  Resp:  [14-19] 19  BP: (147-165)/(67-78) 164/78     Weight: 109.3 kg (240 lb 15.4 oz)  Body mass index is 30.12 kg/m².    SpO2: 97 %  O2 Device (Oxygen Therapy): room air      Intake/Output Summary (Last 24 hours) at 7/29/2019 1330  Last data filed at 7/29/2019 1129  Gross per 24 hour   Intake 1185 ml   Output 2550 ml   Net -1365 ml       Lines/Drains/Airways     Central Venous Catheter Line                 Port A Cath Single Lumen 07/25/19 2348 right subclavian 3 days                Physical Exam   Constitutional: He is oriented to person, place, and time. He appears well-developed and well-nourished. No distress.   Cardiovascular: Normal rate and regular rhythm. Exam reveals no gallop.   No murmur heard.  Pulmonary/Chest: Effort normal and breath sounds normal. No respiratory distress. He has no wheezes.   Abdominal: Soft. Bowel sounds are normal. He exhibits no distension. There is no tenderness.   Neurological: He is alert and oriented to person, place, and time.   Skin: Skin is warm and dry.       Significant Labs:     Recent Labs   Lab 07/29/19  0853   WBC 8.59   RBC 3.48*   HGB 9.4*   HCT 30.2*   *   MCV 87   MCH 27.0   MCHC 31.1*     Recent Labs   Lab 07/29/19  0853      K 3.7      CO2 25   BUN 41*   CREATININE 3.0*   MG 1.9       Significant Imaging:     TTE 12/31/2018    · Normal left ventricular systolic function. The estimated ejection fraction is 55%  · Grade I (mild) left ventricular diastolic dysfunction consistent with impaired  relaxation.  · Normal central venous pressure (3 mm Hg).  · Septal wall has abnormal motion consistent with left bundle branch block.  · Normal left atrial pressure.    Assessment and Plan:     * Toe osteomyelitis, left  -ESR 91 CRP 29.9  -MRI with evidence of osteomyelitis; wound cultures with MRSA present; ID on board and currently on IV Vanc and Zosyn  -continue with abx recs per ID and wound care per Podiatry recs     PAD (peripheral artery disease)  -left great toe wound since 12/2018 with almost complete healing per patient; recurrent worsening of wound 3 days ago with fever and chills  -ESR 91 CRP 29.9 with normal WBCs but elevated temp of 102 at home per patient  -BLE arterial ultrasound with elevated velocities to bilateral AT consistent with PAD; will need LE angiogram for further evaluation with possible revascularization of left AT to improve wound healing  -will place NPO for possible abdominal AO with run off tomorrow with CO2; recommend ASA and statin therapy; would not be opposed to initiation of Plavix if not contraindicated     Anemia of chronic renal failure, stage 4 (severe)  -H&H this AM 9.4&30.2; down slightly from 10.3 & 31.9  -stable at baseline of 8-9/25-30 since 12/2018; 10-13/37-41 from [id number]  -continue to monitor H&H closely; if further trend down may need to hold off on LE angiogram         VTE Risk Mitigation (From admission, onward)        Ordered     IP VTE HIGH RISK PATIENT  Once      07/28/19 1203     Place sequential compression device  Until discontinued      07/26/19 0221          Thank you for your consult. I will follow-up with patient. Please contact us if you have any additional questions.    MALISSA Koroma, ANP  Cardiology   [facility]

## 2019-07-29 NOTE — H&P (VIEW-ONLY)
[facility]  Cardiology  Consult Note    Patient Name: Domingo Castro  MRN: [id number]  Admission Date: 7/25/2019  Hospital Length of Stay: 3 days  Code Status: Full Code   Attending Provider: Kendall Sotomayor MD   Consulting Provider: MALISSA Koroma ANP  Primary Care Physician: Griselda Nugent MD  Principal Problem:Toe osteomyelitis, left    Patient information was obtained from patient, past medical records and ER records.     Inpatient consult to Cardiology  Consult performed by: MALISSA Reaves, ERIBERTO  Consult ordered by: Merrill Tadeo MD  Reason for consult: PAD        Subjective:     Chief Complaint:  Left great toe wound      HPI:   73yo male with history of HTN, HLP, DMII, chronic diastolic heart failure, AOCD, CKD stage IV and thrombocytopenia who presented to the ER with complaints of left great toe wound. He reports being admitted at the end of last year in December 2018 with wound to his left great toe. The wound improved to pinpoint and he has been receiving wound care per  nurse. He reports noting the wound to be worse about 3 days ago while receiving wound care per home health. He reports no pain due to his neuropathy. He noted fever with temp up to 102 with chills therefore he presented to the ER for evaluation     Hospital Course:    7/25/2019-7/28/2019 Presented to the ER with complaints of left great toe wound. WBCs 10K ESR 91 CRP 29.96. MRI with osteomyelitis to left toe/foot. Blood cultures with NGTD. Wound culture with MRSA. On IV Zosyn and IV Vanc. BLE arterial ultrasound with elevated velocities to bilateral ATs. On ASA therapy. Podiatry on board  7/29/2019  BUN 3.0 this AM. H&H 9.4&30.2 this AM. Remains on IV abx with wound care per Podiatry and wound care RN. Cardiology consulted for evaluation of PAD     Past Medical History:   Diagnosis Date    Arthritis     Cataract     Chronic diastolic congestive heart failure     Coronary artery disease      Cystoid macular edema of both eyes     Diabetes mellitus type II     Hyperlipemia     Hypertension     Retinal hole     Stage 4 chronic kidney disease     Streptococcus pyogenes bacteremia 2018    Due to left toe osteomyelitis       Past Surgical History:   Procedure Laterality Date    BLEPHAROPLASTY Bilateral 2017    [facility]formed [facility]ST [facility]LR    CATARACT EXTRACTION W/  INTRAOCULAR LENS IMPLANT Left 12/15/14    Dr martin    CATARACT EXTRACTION W/  INTRAOCULAR LENS IMPLANT Right 14    dorothy    CIRCUMCISION, NON-      focal laser right eye      INSERTION-INTRAOCULAR LENS (IOL) Right 2014    [facility]formed by [facility]    INSERTION-INTRAOCULAR LENS (IOL) Left 12/15/2014    [facility]formed by [facility] at [facility]    [id number]-T-CATH Right 2019    [facility] at [facility] OR    KNEE SURGERY      left    Left medial collateral ligament repair      PHACOEMULSIFICATION-ASPIRATION-CATARACT Right 2014    [facility] at [facility]    PHACOEMULSIFICATION-ASPIRATION-CATARACT Left 12/15/2014    [facility]formed [facility] at [facility]    REPAIR-PTOSIS/BILATERAL EXTERNAL LEVATORS Bilateral 2017    Performed by [facility] at [facility] OR 1ST FLR    TONSILLECTOMY         Review of patient's allergies indicates:   Allergen Reactions    Atorvastatin Other (See Comments)       No current facility-administered medications on file prior to encounter.      Current Outpatient Medications on File Prior to Encounter   Medication Sig    acetaminophen (TYLENOL) 325 MG tablet Take 2 tablets (650 mg total) by mouth every 4 (four) hours as needed.    amLODIPine (NORVASC) 10 MG tablet Take 1 tablet (10 mg total) by mouth once daily.    aspirin (ECOTRIN) 81 MG EC tablet Take 1 tablet (81 mg total) by mouth once daily.    cadexomer iodine (IODOSORB) 0.9 % gel Apply topically every Mon, Wed, Fri.  "   carvedilol (COREG) 25 MG tablet Take 1 tablet (25 mg total) by mouth 2 (two) times daily with meals.    furosemide (LASIX) 40 MG tablet Take 2 tablets (80 mg total) by mouth 2 (two) times daily. Do not take the pm dose after 3 pm    gabapentin (NEURONTIN) 100 MG capsule TAKE ONE CAPSULE BY MOUTH IN THE MORNING THEN ONE CAPSULE  IN THE EVENING AND THEN THREE CAPSULES AT BEDTIME    hydrALAZINE (APRESOLINE) 50 MG tablet TAKE ONE TABLET BY MOUTH EVERY 12 HOURS    insulin lispro (HUMALOG KWIKPEN INSULIN) 100 unit/mL InPn pen INJECT 10 UNITS SUBCUTANEOUSLY THREE TIMES DAILY BEFORE MEALS WITH CORRECTION SCALE, MAX TDD 50 UNITS    insulin lispro (HUMALOG KWIKPEN INSULIN) 100 unit/mL pen INJECT 12 UNITS SUBCUTANEOUSLY THREE TIMES DAILY WITH MEALS WITH  CORRECTION  SCALE.  MAX  50  UNITS  DAILY    LEVEMIR FLEXTOUCH U-100 INSULN 100 unit/mL (3 mL) InPn pen Inject 26 Units into the skin every evening.    pen needle, diabetic, safety (BD AUTOSHIELD PEN NEEDLE) 29 gauge x 5/16" Ndle For use once daily with levemir flexpen    rosuvastatin (CRESTOR) 20 MG tablet Take 20 mg by mouth once daily.      Family History     Problem Relation (Age of Onset)    Cancer Mother, Brother, Sister    Cataracts Paternal Grandmother    Diabetes Father    Glaucoma Paternal Grandmother    Heart disease Mother, Father        Tobacco Use    Smoking status: Never Smoker    Smokeless tobacco: Never Used   Substance and Sexual Activity    Alcohol use: No     Alcohol/week: 0.0 oz    Drug use: No    Sexual activity: Yes     Partners: Female     Review of Systems   Constitution: Negative for chills, decreased appetite, diaphoresis and fever.   Cardiovascular: Negative for chest pain, claudication, cyanosis, dyspnea on exertion, irregular heartbeat, leg swelling, near-syncope, orthopnea, palpitations, paroxysmal nocturnal dyspnea and syncope.   Respiratory: Negative for cough, hemoptysis, shortness of breath and wheezing.    Gastrointestinal: " Negative for bloating, abdominal pain, constipation, diarrhea, melena, nausea and vomiting.   Neurological: Negative for dizziness and weakness.     Objective:     Vital Signs (Most Recent):  Temp: 98 °F (36.7 °C) (07/29/19 1132)  Pulse: 62 (07/29/19 1201)  Resp: 19 (07/29/19 1132)  BP: (!) 164/78 (07/29/19 1132)  SpO2: 97 % (07/27/19 0548) Vital Signs (24h Range):  Temp:  [96.7 °F (35.9 °C)-98 °F (36.7 °C)] 98 °F (36.7 °C)  Pulse:  [52-98] 62  Resp:  [14-19] 19  BP: (147-165)/(67-78) 164/78     Weight: 109.3 kg (240 lb 15.4 oz)  Body mass index is 30.12 kg/m².    SpO2: 97 %  O2 Device (Oxygen Therapy): room air      Intake/Output Summary (Last 24 hours) at 7/29/2019 1330  Last data filed at 7/29/2019 1129  Gross per 24 hour   Intake 1185 ml   Output 2550 ml   Net -1365 ml       Lines/Drains/Airways     Central Venous Catheter Line                 Port A Cath Single Lumen 07/25/19 2348 right subclavian 3 days                Physical Exam   Constitutional: He is oriented to person, place, and time. He appears well-developed and well-nourished. No distress.   Cardiovascular: Normal rate and regular rhythm. Exam reveals no gallop.   No murmur heard.  Pulmonary/Chest: Effort normal and breath sounds normal. No respiratory distress. He has no wheezes.   Abdominal: Soft. Bowel sounds are normal. He exhibits no distension. There is no tenderness.   Neurological: He is alert and oriented to person, place, and time.   Skin: Skin is warm and dry.       Significant Labs:     Recent Labs   Lab 07/29/19  0853   WBC 8.59   RBC 3.48*   HGB 9.4*   HCT 30.2*   *   MCV 87   MCH 27.0   MCHC 31.1*     Recent Labs   Lab 07/29/19  0853      K 3.7      CO2 25   BUN 41*   CREATININE 3.0*   MG 1.9       Significant Imaging:     TTE 12/31/2018    · Normal left ventricular systolic function. The estimated ejection fraction is 55%  · Grade I (mild) left ventricular diastolic dysfunction consistent with impaired  relaxation.  · Normal central venous pressure (3 mm Hg).  · Septal wall has abnormal motion consistent with left bundle branch block.  · Normal left atrial pressure.    Assessment and Plan:     * Toe osteomyelitis, left  -ESR 91 CRP 29.9  -MRI with evidence of osteomyelitis; wound cultures with MRSA present; ID on board and currently on IV Vanc and Zosyn  -continue with abx recs per ID and wound care per Podiatry recs     PAD (peripheral artery disease)  -left great toe wound since 12/2018 with almost complete healing per patient; recurrent worsening of wound 3 days ago with fever and chills  -ESR 91 CRP 29.9 with normal WBCs but elevated temp of 102 at home per patient  -BLE arterial ultrasound with elevated velocities to bilateral AT consistent with PAD; will need LE angiogram for further evaluation with possible revascularization of left AT to improve wound healing  -will place NPO for possible abdominal AO with run off tomorrow with CO2; recommend ASA and statin therapy; would not be opposed to initiation of Plavix if not contraindicated     Anemia of chronic renal failure, stage 4 (severe)  -H&H this AM 9.4&30.2; down slightly from 10.3 & 31.9  -stable at baseline of 8-9/25-30 since 12/2018; 10-13/37-41 from [id number]  -continue to monitor H&H closely; if further trend down may need to hold off on LE angiogram         VTE Risk Mitigation (From admission, onward)        Ordered     IP VTE HIGH RISK PATIENT  Once      07/28/19 1203     Place sequential compression device  Until discontinued      07/26/19 0221          Thank you for your consult. I will follow-up with patient. Please contact us if you have any additional questions.    MALISSA Koroma, ANP  Cardiology   [facility]

## 2019-07-29 NOTE — PROGRESS NOTES
[facility] Infectious Disease Progress Note     Primary Team: Hospitalist  Consultant Attending: Madhavi  Date of Admit: 7/25/2019    Summary of history     Domingo Castro is a 74 y.o. male with a relevant history of IDT2DM, Hypertension, HLD, CKD stage IV, left toe osteomyelitis in December of 2018, CHF with unknown EF for which he takes 40mg Lasix daily.     Patient presented to the ED on 7/25 with a complaint of an ulcer on the plantar surface of his left great toe. Patient stated that he first noticed a small ulceration at the site on 7/19/19 and that it became progressively worse throughout the week. The patient has significant diabetic neuropathy and does not have intact sensation in his feet. He follows up with Dr. Gallegos for podiatry. On admission MRI and radiograph of the left foot were obtained, which were suggestive of osteomyelitis in the toe.      Patient has been admitted from 12/23/18 to 1/6/19 at this facility due to osteomyelitis of the same toe and a pneumonia. At the time he was found to have Strep pyogenes bacteremia and strep pyogenes in the culture from his infected toe. He completed a course of Rocephin on 2/4/19. He currently appears to be receiving regular wound care and podiatry follow up. Patient reports that he receives wound care by home health nurse who was concerned that his toe appeared infected today and notified Podiatrist office who advised patient to go to ED.      On admission he denied fever, chills, SOB, chest pain, sick contacts,  trauma to the toe, change in bladder or bowel habits.  ED findings: H/H 10.3/31.9, Sed rate 91, CRP  29.9, Hgb A1C 8.1     Interval events     Bone biopsy (7/27)  Bilateral US of LE showed significant stenosis (7/27)    Subjective     Patient reports no complaints overnight. Denies any fever, chills, nausea, vomiting, or pain/discomfort.    Current Medications:       Scheduled:   amLODIPine  10 mg Oral Daily    aspirin  81 mg Oral Daily    carvedilol  25  mg Oral BID WM    furosemide  80 mg Oral BID    gabapentin  100 mg Oral TID    hydrALAZINE  50 mg Oral Q12H    insulin aspart U-100  15 Units Subcutaneous TIDWM    insulin detemir U-100  26 Units Subcutaneous QHS    piperacillin-tazobactam (ZOSYN) IVPB  4.5 g Intravenous Q8H    rosuvastatin  20 mg Oral QHS    vancomycin (VANCOCIN) IVPB  1,000 mg Intravenous Q24H        PRN:  acetaminophen, acetaminophen, bisacodyl, cloNIDine, Dextrose 10% Bolus, Dextrose 10% Bolus, glucagon (human recombinant), glucose, glucose, hydrALAZINE, insulin aspart U-100, lactulose, ondansetron, sodium chloride 0.9%    Antibiotics and Day Number of Therapy:  Vancomycin:  - current  Zosyn:  - current    Objective   Last 24 Hour Vital Signs:  BP  Min: 147/67  Max: 169/74  Temp  Av.7 °F (36.5 °C)  Min: 96.7 °F (35.9 °C)  Max: 98.8 °F (37.1 °C)  Pulse  Av.4  Min: 51  Max: 98  Resp  Av.7  Min: 14  Max: 19    Lines, drains, airway:  Port A Cath ()    Physical Examination:  Examination  General: Patient sitting up eating in NAD  HEENT: normocephalic, atraumatic  Neck: No thyromegaly or masses   Cardiac: RRR, no murmurs appreciated, no extra heart sounds  Pulmonary/Chest: CTAB, symmetric chest rise, no wheezing or crackles  GI: Soft, non tender, non distended, normoactive bowel sounds  Extremities: no edema, clubbing, or cyanosis  Skin: dry, warm, intact. No bruising or rashes.   - left foot wrapped with clean, dry, intact bandage; no erythema or edema noted above bandage site.  Neuro: Alert and oriented    Lab data:  CBC:   Lab Results   Component Value Date    WBC 8.23 2019    HGB 8.7 (L) 2019     (L) 2019    MCV 85 2019    RDW 14.2 2019     Estimated Creatinine Clearance: 29.8 mL/min (A) (based on SCr of 2.9 mg/dL (H)).    Microbiology Data  Blood cultures = negative  Wound culture = MRSA  Bone Culture = Staph Aureus    Radiology  MRI Foot ()  Osteomyelitis of the distal  aspect of the 1st distal phalanx with adjacent marrow edema which may be reactive in nature versus early infectious process.    Soft tissue swelling, likely related to cellulitis.  No drainable fluid collections.    US LE (7/27)  Atherosclerosis with focal elevated segmental velocities present in the proximal/mid left SFA, proximal left anterior tibial artery, and proximal right anterior tibial artery suggesting greater than 50% focal stenosis.    Abnormal arterial waveforms present in the bilateral thighs, worse on the left.  Cannot exclude more proximal aortoiliac stenosis.    Assessment     Domingo Castro is a 74 y.o.male with past medical history of IDT2DM, Hypertension, HLD, CKD stage IV, left toe osteomyelitis in December of 2018, CHF with unknown EF. Patient presented to the ED on 7/25 with a complaint of an ulcer on the plantar surface of his left great toe. MRI resulted in osteomyelitis of 1st distal phalanx. Awaiting wound culture results to narrow antibiotic treatment.    Recommendations     Osteomyelitis of Left 1st Distal Phalanx  - Discontinue Zosyn 4.5 g IV q8h at this time.  - Continue Vancomycin 1g IV q24h for MRSA coverage  - Anticipate 6 weeks of antibiotic coverage   - Start date = 7/25  End date = 9/5  - Start Metronidazole 500mg TID PO for anaerobic coverage until anaerobic cultures have resulted  - Will need follow up with [facility] in 3 weeks  - Patient will need weekly antibiotic level, CMP, CBC, ESR, CRP. Please have the labs faxed to Dr. Hernandez at [phone] until the patients clinic appointment.    Thank you for the consult. Please call with any questions.    Evelia Palmer MD  [facility] Internal Medicine Resident, [facility]

## 2019-07-29 NOTE — PROGRESS NOTES
.Pharmacy New Medication Education    Patient and/or Caregiver ACCEPTED medication education.    Pharmacy has provided education on the name, indication, and possible side effects of the medication(s) prescribed, using teach-back method.     Learners of pharmacy medication education includes:  patient and spouse    Medication Indication Side Effects   amlodipine Control blood pressure Headache and fluid retention   Zosyn infection headache, rash and diarrhea     In addition, the following medications have also been discussed.    Current Facility-Administered Medications   Medication Frequency    acetaminophen tablet 1,000 mg Q8H PRN    acetaminophen tablet 650 mg Q4H PRN    amLODIPine tablet 10 mg Daily    aspirin EC tablet 81 mg Daily    bisacodyl EC tablet 5 mg Daily PRN    carvedilol tablet 25 mg BID WM    cloNIDine tablet 0.1 mg Q6H PRN    dextrose 10% (D10W) Bolus PRN    dextrose 10% (D10W) Bolus PRN    furosemide tablet 80 mg BID    gabapentin capsule 100 mg TID    glucagon (human recombinant) injection 1 mg PRN    glucose chewable tablet 16 g PRN    glucose chewable tablet 24 g PRN    hydrALAZINE tablet 25 mg Q8H PRN    hydrALAZINE tablet 50 mg Q12H    insulin aspart U-100 pen 1-10 Units QID (AC + HS) PRN    insulin aspart U-100 pen 15 Units TIDWM    insulin detemir U-100 pen 26 Units QHS    lactulose 20 gram/30 mL solution Soln 15 g Q6H PRN    ondansetron injection 4 mg Q8H PRN    piperacillin-tazobactam 4.5 g in dextrose 5 % 100 mL IVPB (ready to mix system) Q8H    rosuvastatin tablet 20 mg QHS    sodium chloride 0.9% flush 10 mL PRN    vancomycin in dextrose 5 % 1 gram/250 mL IVPB 1,000 mg Q24H

## 2019-07-29 NOTE — SUBJECTIVE & OBJECTIVE
Past Medical History:   Diagnosis Date    Arthritis     Cataract     Chronic diastolic congestive heart failure     Coronary artery disease     Cystoid macular edema of both eyes     Diabetes mellitus type II     Hyperlipemia     Hypertension     Retinal hole     Stage 4 chronic kidney disease     Streptococcus pyogenes bacteremia 2018    Due to left toe osteomyelitis       Past Surgical History:   Procedure Laterality Date    BLEPHAROPLASTY Bilateral 2017    Performed by Jane [facility]no [facility] at [facility]    CATARACT EXTRACTION W/  INTRAOCULAR LENS IMPLANT Left 12/15/14    Dr martin    CATARACT EXTRACTION W/  INTRAOCULAR LENS IMPLANT Right 14    dorothy    CIRCUMCISION, NON-      focal laser right eye      INSERTION-INTRAOCULAR LENS (IOL) Right 2014    Performed by Jaron Martin [facility] at [facility] [place]    INSERTION-INTRAOCULAR LENS (IOL) Left 12/15/2014    Performed by Jaron Martin [facility] at [facility] [place]    [id number]-YVDH-C-CATH Right 2019    Performed by [facility]ys [facility]man Jr., [facility] at [facility]    KNEE SURGERY      left    Left medial collateral ligament repair      PHACOEMULSIFICATION-ASPIRATION-CATARACT Right 2014    Performed by Jaron Martin MD at [facility] [place]    PHACOEMULSIFICATION-ASPIRATION-CATARACT Left 12/15/2014    Performed by Jarno Martin MD at [facility] [place]    REPAIR-PTOSIS/BILATERAL EXTERNAL LEVATORS Bilateral 2017    Performed by Jane [facility]no [facility] at [facility]    TONSILLECTOMY         Review of patient's allergies indicates:   Allergen Reactions    Atorvastatin Other (See Comments)       No current facility-administered medications on file prior to encounter.      Current Outpatient Medications on File Prior to Encounter   Medication Sig    acetaminophen (TYLENOL) 325 MG tablet Take 2 tablets (650 mg total) by mouth every 4 (four) hours as needed.    amLODIPine (NORVASC) 10 MG tablet Take 1 tablet (10 mg total) by mouth once daily.    aspirin  "(ECOTRIN) 81 MG EC tablet Take 1 tablet (81 mg total) by mouth once daily.    cadexomer iodine (IODOSORB) 0.9 % gel Apply topically every Mon, Wed, Fri.    carvedilol (COREG) 25 MG tablet Take 1 tablet (25 mg total) by mouth 2 (two) times daily with meals.    furosemide (LASIX) 40 MG tablet Take 2 tablets (80 mg total) by mouth 2 (two) times daily. Do not take the pm dose after 3 pm    gabapentin (NEURONTIN) 100 MG capsule TAKE ONE CAPSULE BY MOUTH IN THE MORNING THEN ONE CAPSULE  IN THE EVENING AND THEN THREE CAPSULES AT BEDTIME    hydrALAZINE (APRESOLINE) 50 MG tablet TAKE ONE TABLET BY MOUTH EVERY 12 HOURS    insulin lispro (HUMALOG KWIKPEN INSULIN) 100 unit/mL InPn pen INJECT 10 UNITS SUBCUTANEOUSLY THREE TIMES DAILY BEFORE MEALS WITH CORRECTION SCALE, MAX TDD 50 UNITS    insulin lispro (HUMALOG KWIKPEN INSULIN) 100 unit/mL pen INJECT 12 UNITS SUBCUTANEOUSLY THREE TIMES DAILY WITH MEALS WITH  CORRECTION  SCALE.  MAX  50  UNITS  DAILY    LEVEMIR FLEXTOUCH U-100 INSULN 100 unit/mL (3 mL) InPn pen Inject 26 Units into the skin every evening.    pen needle, diabetic, safety (BD AUTOSHIELD PEN NEEDLE) 29 gauge x 5/16" Ndle For use once daily with levemir flexpen    rosuvastatin (CRESTOR) 20 MG tablet Take 20 mg by mouth once daily.      Family History     Problem Relation (Age of Onset)    Cancer Mother, Brother, Sister    Cataracts Paternal Grandmother    Diabetes Father    Glaucoma Paternal Grandmother    Heart disease Mother, Father        Tobacco Use    Smoking status: Never Smoker    Smokeless tobacco: Never Used   Substance and Sexual Activity    Alcohol use: No     Alcohol/week: 0.0 oz    Drug use: No    Sexual activity: Yes     Partners: Female     Review of Systems   Constitution: Negative for chills, decreased appetite, diaphoresis and fever.   Cardiovascular: Negative for chest pain, claudication, cyanosis, dyspnea on exertion, irregular heartbeat, leg swelling, near-syncope, orthopnea, " palpitations, paroxysmal nocturnal dyspnea and syncope.   Respiratory: Negative for cough, hemoptysis, shortness of breath and wheezing.    Gastrointestinal: Negative for bloating, abdominal pain, constipation, diarrhea, melena, nausea and vomiting.   Neurological: Negative for dizziness and weakness.     Objective:     Vital Signs (Most Recent):  Temp: 98 °F (36.7 °C) (07/29/19 1132)  Pulse: 62 (07/29/19 1201)  Resp: 19 (07/29/19 1132)  BP: (!) 164/78 (07/29/19 1132)  SpO2: 97 % (07/27/19 0548) Vital Signs (24h Range):  Temp:  [96.7 °F (35.9 °C)-98 °F (36.7 °C)] 98 °F (36.7 °C)  Pulse:  [52-98] 62  Resp:  [14-19] 19  BP: (147-165)/(67-78) 164/78     Weight: 109.3 kg (240 lb 15.4 oz)  Body mass index is 30.12 kg/m².    SpO2: 97 %  O2 Device (Oxygen Therapy): room air      Intake/Output Summary (Last 24 hours) at 7/29/2019 1330  Last data filed at 7/29/2019 1129  Gross per 24 hour   Intake 1185 ml   Output 2550 ml   Net -1365 ml       Lines/Drains/Airways     Central Venous Catheter Line                 Port A Cath Single Lumen 07/25/19 2348 right subclavian 3 days                Physical Exam   Constitutional: He is oriented to person, place, and time. He appears well-developed and well-nourished. No distress.   Cardiovascular: Normal rate and regular rhythm. Exam reveals no gallop.   No murmur heard.  Pulmonary/Chest: Effort normal and breath sounds normal. No respiratory distress. He has no wheezes.   Abdominal: Soft. Bowel sounds are normal. He exhibits no distension. There is no tenderness.   Neurological: He is alert and oriented to person, place, and time.   Skin: Skin is warm and dry.       Significant Labs:     Recent Labs   Lab 07/29/19  0853   WBC 8.59   RBC 3.48*   HGB 9.4*   HCT 30.2*   *   MCV 87   MCH 27.0   MCHC 31.1*     Recent Labs   Lab 07/29/19  0853      K 3.7      CO2 25   BUN 41*   CREATININE 3.0*   MG 1.9       Significant Imaging:     TTE 12/31/2018    · Normal left  ventricular systolic function. The estimated ejection fraction is 55%  · Grade I (mild) left ventricular diastolic dysfunction consistent with impaired relaxation.  · Normal central venous pressure (3 mm Hg).  · Septal wall has abnormal motion consistent with left bundle branch block.  · Normal left atrial pressure.

## 2019-07-29 NOTE — SUBJECTIVE & OBJECTIVE
Interval History: No complaints or concerns.    Review of Systems   Constitutional: Negative for chills and fever.   HENT: Negative for congestion, postnasal drip and rhinorrhea.    Eyes: Negative for visual disturbance.   Respiratory: Negative for chest tightness and shortness of breath.    Cardiovascular: Negative for chest pain.   Gastrointestinal: Negative for abdominal distention and abdominal pain.   Genitourinary: Negative for difficulty urinating.   Musculoskeletal: Negative for arthralgias and myalgias.   Neurological: Negative for weakness, light-headedness and headaches.   Hematological: Does not bruise/bleed easily.   Psychiatric/Behavioral: Negative for agitation.     Objective:     Vital Signs (Most Recent):  Temp: 97.4 °F (36.3 °C) (07/29/19 0818)  Pulse: (!) 58 (07/29/19 0818)  Resp: 19 (07/29/19 0818)  BP: (!) 165/75 (07/29/19 0818)  SpO2: 97 % (07/27/19 0548) Vital Signs (24h Range):  Temp:  [96.7 °F (35.9 °C)-97.9 °F (36.6 °C)] 97.4 °F (36.3 °C)  Pulse:  [51-98] 58  Resp:  [14-19] 19  BP: (147-165)/(67-75) 165/75     Weight: 109.3 kg (240 lb 15.4 oz)  Body mass index is 30.12 kg/m².    Intake/Output Summary (Last 24 hours) at 7/29/2019 1103  Last data filed at 7/29/2019 0826  Gross per 24 hour   Intake 1185 ml   Output 2350 ml   Net -1165 ml      Physical Exam   Constitutional: He is oriented to person, place, and time. He appears well-developed and well-nourished.   HENT:   Head: Normocephalic and atraumatic.   Eyes: EOM are normal.   Neck: Normal range of motion. Neck supple.   Cardiovascular: Regular rhythm.   Pulmonary/Chest: Effort normal and breath sounds normal.   Abdominal: Soft. Bowel sounds are normal.   Musculoskeletal: Normal range of motion.   Neurological: He is alert and oriented to person, place, and time.   Skin: Skin is warm and dry.   Psychiatric: He has a normal mood and affect.       Significant Labs:   Blood Culture: No results for input(s): LABBLOO in the last 48  hours.  BMP:   Recent Labs   Lab 07/29/19  0853   *      K 3.7      CO2 25   BUN 41*   CREATININE 3.0*   CALCIUM 9.4   MG 1.9     CBC:   Recent Labs   Lab 07/28/19 0347 07/29/19  0853   WBC 8.23 8.59   HGB 8.7* 9.4*   HCT 27.4* 30.2*   * 114*     CMP:   Recent Labs   Lab 07/28/19 0347 07/29/19  0853    138   K 3.7 3.7    101   CO2 26 25   * 189*   BUN 46* 41*   CREATININE 2.9* 3.0*   CALCIUM 9.3 9.4   PROT 7.3 7.5   ALBUMIN 2.9* 2.9*   BILITOT 0.3 0.4   ALKPHOS 71 61   AST 13 19   ALT 9* 13   ANIONGAP 9 12   EGFRNONAA 20* 20*     Magnesium:   Recent Labs   Lab 07/28/19 0347 07/29/19  0853   MG 2.1 1.9       Significant Imaging: I have reviewed all pertinent imaging results/findings within the past 24 hours.

## 2019-07-29 NOTE — PLAN OF CARE
Problem: Adult Inpatient Plan of Care  Goal: Plan of Care Review  Outcome: Ongoing (interventions implemented as appropriate)     07/29/19 0332   Plan of Care Review   Plan of Care Reviewed With patient   Pt reported no pain. Walked to bathroom to use toliet.  2100 BS 89, did not cover with detemir or novalog. Gave apple juice.      0536 unable to draw labs from port.  Asking lab to draw CPC, CMP, MG, Phos.     Tele: SB, reg HR low 50,  No alarms.     Bed in lowest position, wheels locked, non skid socks, ID band worn, personal items and call bell with in reach, bed alarm set.

## 2019-07-30 LAB
ALBUMIN SERPL BCP-MCNC: 2.9 G/DL (ref 3.5–5.2)
ALP SERPL-CCNC: 71 U/L (ref 55–135)
ALT SERPL W/O P-5'-P-CCNC: 15 U/L (ref 10–44)
ANION GAP SERPL CALC-SCNC: 11 MMOL/L (ref 8–16)
AST SERPL-CCNC: 18 U/L (ref 10–40)
BACTERIA SPEC AEROBE CULT: ABNORMAL
BACTERIA SPEC ANAEROBE CULT: NORMAL
BASOPHILS # BLD AUTO: 0.04 K/UL (ref 0–0.2)
BASOPHILS NFR BLD: 0.5 % (ref 0–1.9)
BILIRUB SERPL-MCNC: 0.3 MG/DL (ref 0.1–1)
BUN SERPL-MCNC: 45 MG/DL (ref 8–23)
CALCIUM SERPL-MCNC: 9.4 MG/DL (ref 8.7–10.5)
CHLORIDE SERPL-SCNC: 100 MMOL/L (ref 95–110)
CO2 SERPL-SCNC: 26 MMOL/L (ref 23–29)
CREAT SERPL-MCNC: 2.9 MG/DL (ref 0.5–1.4)
DIFFERENTIAL METHOD: ABNORMAL
EOSINOPHIL # BLD AUTO: 0.3 K/UL (ref 0–0.5)
EOSINOPHIL NFR BLD: 3.9 % (ref 0–8)
ERYTHROCYTE [DISTWIDTH] IN BLOOD BY AUTOMATED COUNT: 14.1 % (ref 11.5–14.5)
EST. GFR  (AFRICAN AMERICAN): 24 ML/MIN/1.73 M^2
EST. GFR  (NON AFRICAN AMERICAN): 20 ML/MIN/1.73 M^2
GLUCOSE SERPL-MCNC: 211 MG/DL (ref 70–110)
HCT VFR BLD AUTO: 29.6 % (ref 40–54)
HGB BLD-MCNC: 9.5 G/DL (ref 14–18)
LYMPHOCYTES # BLD AUTO: 1.9 K/UL (ref 1–4.8)
LYMPHOCYTES NFR BLD: 23.9 % (ref 18–48)
MAGNESIUM SERPL-MCNC: 2.1 MG/DL (ref 1.6–2.6)
MCH RBC QN AUTO: 27.2 PG (ref 27–31)
MCHC RBC AUTO-ENTMCNC: 32.1 G/DL (ref 32–36)
MCV RBC AUTO: 85 FL (ref 82–98)
MONOCYTES # BLD AUTO: 0.5 K/UL (ref 0.3–1)
MONOCYTES NFR BLD: 5.7 % (ref 4–15)
NEUTROPHILS # BLD AUTO: 5.2 K/UL (ref 1.8–7.7)
NEUTROPHILS NFR BLD: 66 % (ref 38–73)
PHOSPHATE SERPL-MCNC: 4 MG/DL (ref 2.7–4.5)
PLATELET # BLD AUTO: 112 K/UL (ref 150–350)
PMV BLD AUTO: 10.4 FL (ref 9.2–12.9)
POCT GLUCOSE: 157 MG/DL (ref 70–110)
POCT GLUCOSE: 212 MG/DL (ref 70–110)
POCT GLUCOSE: 213 MG/DL (ref 70–110)
POCT GLUCOSE: 228 MG/DL (ref 70–110)
POTASSIUM SERPL-SCNC: 3.6 MMOL/L (ref 3.5–5.1)
PROT SERPL-MCNC: 7.7 G/DL (ref 6–8.4)
RBC # BLD AUTO: 3.49 M/UL (ref 4.6–6.2)
SODIUM SERPL-SCNC: 137 MMOL/L (ref 136–145)
VANCOMYCIN TROUGH SERPL-MCNC: 20 UG/ML (ref 10–22)
WBC # BLD AUTO: 7.94 K/UL (ref 3.9–12.7)

## 2019-07-30 PROCEDURE — 25000003 PHARM REV CODE 250: Performed by: NURSE PRACTITIONER

## 2019-07-30 PROCEDURE — 75716 ARTERY X-RAYS ARMS/LEGS: CPT | Mod: 26,59,, | Performed by: INTERNAL MEDICINE

## 2019-07-30 PROCEDURE — 83735 ASSAY OF MAGNESIUM: CPT

## 2019-07-30 PROCEDURE — 75625 CONTRAST EXAM ABDOMINL AORTA: CPT | Mod: 26,,, | Performed by: INTERNAL MEDICINE

## 2019-07-30 PROCEDURE — 97165 OT EVAL LOW COMPLEX 30 MIN: CPT

## 2019-07-30 PROCEDURE — 99152 PR MOD CONSCIOUS SEDATION, SAME PHYS, 5+ YRS, FIRST 15 MIN: ICD-10-PCS | Mod: ,,, | Performed by: INTERNAL MEDICINE

## 2019-07-30 PROCEDURE — 63600175 PHARM REV CODE 636 W HCPCS: Performed by: HOSPITALIST

## 2019-07-30 PROCEDURE — 97161 PT EVAL LOW COMPLEX 20 MIN: CPT

## 2019-07-30 PROCEDURE — C1894 INTRO/SHEATH, NON-LASER: HCPCS | Performed by: INTERNAL MEDICINE

## 2019-07-30 PROCEDURE — 84100 ASSAY OF PHOSPHORUS: CPT

## 2019-07-30 PROCEDURE — 63600175 PHARM REV CODE 636 W HCPCS: Performed by: INTERNAL MEDICINE

## 2019-07-30 PROCEDURE — 75625 PR  ANGIO AORTOGRAM ABD SERIAL: ICD-10-PCS | Mod: 26,,, | Performed by: INTERNAL MEDICINE

## 2019-07-30 PROCEDURE — 99153 MOD SED SAME PHYS/QHP EA: CPT | Performed by: INTERNAL MEDICINE

## 2019-07-30 PROCEDURE — 99152 MOD SED SAME PHYS/QHP 5/>YRS: CPT | Mod: ,,, | Performed by: INTERNAL MEDICINE

## 2019-07-30 PROCEDURE — 75625 CONTRAST EXAM ABDOMINL AORTA: CPT | Performed by: INTERNAL MEDICINE

## 2019-07-30 PROCEDURE — 97116 GAIT TRAINING THERAPY: CPT

## 2019-07-30 PROCEDURE — 36247 PR PLACE CATH SUBSUBSELECT ART,ABD/PEL: ICD-10-PCS | Mod: LT,,, | Performed by: INTERNAL MEDICINE

## 2019-07-30 PROCEDURE — 11000001 HC ACUTE MED/SURG PRIVATE ROOM

## 2019-07-30 PROCEDURE — 80053 COMPREHEN METABOLIC PANEL: CPT

## 2019-07-30 PROCEDURE — 36415 COLL VENOUS BLD VENIPUNCTURE: CPT

## 2019-07-30 PROCEDURE — 25000003 PHARM REV CODE 250: Performed by: INTERNAL MEDICINE

## 2019-07-30 PROCEDURE — C1769 GUIDE WIRE: HCPCS | Performed by: INTERNAL MEDICINE

## 2019-07-30 PROCEDURE — 85025 COMPLETE CBC W/AUTO DIFF WBC: CPT

## 2019-07-30 PROCEDURE — 80202 ASSAY OF VANCOMYCIN: CPT

## 2019-07-30 PROCEDURE — 75716 ARTERY X-RAYS ARMS/LEGS: CPT | Mod: 59 | Performed by: INTERNAL MEDICINE

## 2019-07-30 PROCEDURE — 99152 MOD SED SAME PHYS/QHP 5/>YRS: CPT | Performed by: INTERNAL MEDICINE

## 2019-07-30 PROCEDURE — 36247 INS CATH ABD/L-EXT ART 3RD: CPT | Mod: LT,,, | Performed by: INTERNAL MEDICINE

## 2019-07-30 PROCEDURE — C1887 CATHETER, GUIDING: HCPCS | Performed by: INTERNAL MEDICINE

## 2019-07-30 PROCEDURE — 36247 INS CATH ABD/L-EXT ART 3RD: CPT | Mod: LT | Performed by: INTERNAL MEDICINE

## 2019-07-30 PROCEDURE — 75716 PR  ANGIO EXTERMITY BILAT: ICD-10-PCS | Mod: 26,59,, | Performed by: INTERNAL MEDICINE

## 2019-07-30 RX ORDER — VERAPAMIL HYDROCHLORIDE 2.5 MG/ML
INJECTION, SOLUTION INTRAVENOUS
Status: DISCONTINUED | OUTPATIENT
Start: 2019-07-30 | End: 2019-07-30 | Stop reason: HOSPADM

## 2019-07-30 RX ORDER — HEPARIN SODIUM 1000 [USP'U]/ML
INJECTION, SOLUTION INTRAVENOUS; SUBCUTANEOUS
Status: DISCONTINUED | OUTPATIENT
Start: 2019-07-30 | End: 2019-07-30 | Stop reason: HOSPADM

## 2019-07-30 RX ORDER — MIDAZOLAM HYDROCHLORIDE 1 MG/ML
INJECTION INTRAMUSCULAR; INTRAVENOUS
Status: DISCONTINUED | OUTPATIENT
Start: 2019-07-30 | End: 2019-07-30 | Stop reason: HOSPADM

## 2019-07-30 RX ORDER — HEPARIN SODIUM 200 [USP'U]/100ML
INJECTION, SOLUTION INTRAVENOUS
Status: DISCONTINUED | OUTPATIENT
Start: 2019-07-30 | End: 2019-08-02 | Stop reason: HOSPADM

## 2019-07-30 RX ORDER — FENTANYL CITRATE 50 UG/ML
INJECTION, SOLUTION INTRAMUSCULAR; INTRAVENOUS
Status: DISCONTINUED | OUTPATIENT
Start: 2019-07-30 | End: 2019-07-30 | Stop reason: HOSPADM

## 2019-07-30 RX ORDER — DIPHENHYDRAMINE HYDROCHLORIDE 50 MG/ML
INJECTION INTRAMUSCULAR; INTRAVENOUS
Status: DISCONTINUED | OUTPATIENT
Start: 2019-07-30 | End: 2019-07-30 | Stop reason: HOSPADM

## 2019-07-30 RX ORDER — ACETAMINOPHEN 325 MG/1
650 TABLET ORAL EVERY 4 HOURS PRN
Status: DISCONTINUED | OUTPATIENT
Start: 2019-07-30 | End: 2019-08-02 | Stop reason: HOSPADM

## 2019-07-30 RX ORDER — HYDROCODONE BITARTRATE AND ACETAMINOPHEN 5; 325 MG/1; MG/1
1 TABLET ORAL EVERY 4 HOURS PRN
Status: DISCONTINUED | OUTPATIENT
Start: 2019-07-30 | End: 2019-08-02 | Stop reason: HOSPADM

## 2019-07-30 RX ADMIN — METRONIDAZOLE 500 MG: 500 TABLET ORAL at 06:07

## 2019-07-30 RX ADMIN — GABAPENTIN 100 MG: 100 CAPSULE ORAL at 08:07

## 2019-07-30 RX ADMIN — INSULIN ASPART 4 UNITS: 100 INJECTION, SOLUTION INTRAVENOUS; SUBCUTANEOUS at 06:07

## 2019-07-30 RX ADMIN — ROSUVASTATIN CALCIUM 20 MG: 10 TABLET, FILM COATED ORAL at 10:07

## 2019-07-30 RX ADMIN — HYDRALAZINE HYDROCHLORIDE 50 MG: 25 TABLET, FILM COATED ORAL at 08:07

## 2019-07-30 RX ADMIN — ASPIRIN 81 MG: 81 TABLET, COATED ORAL at 08:07

## 2019-07-30 RX ADMIN — HYDRALAZINE HYDROCHLORIDE 50 MG: 25 TABLET, FILM COATED ORAL at 10:07

## 2019-07-30 RX ADMIN — INSULIN ASPART 4 UNITS: 100 INJECTION, SOLUTION INTRAVENOUS; SUBCUTANEOUS at 08:07

## 2019-07-30 RX ADMIN — INSULIN ASPART 2 UNITS: 100 INJECTION, SOLUTION INTRAVENOUS; SUBCUTANEOUS at 10:07

## 2019-07-30 RX ADMIN — METRONIDAZOLE 500 MG: 500 TABLET ORAL at 10:07

## 2019-07-30 RX ADMIN — FUROSEMIDE 80 MG: 40 TABLET ORAL at 08:07

## 2019-07-30 RX ADMIN — GABAPENTIN 100 MG: 100 CAPSULE ORAL at 10:07

## 2019-07-30 RX ADMIN — AMLODIPINE BESYLATE 10 MG: 5 TABLET ORAL at 08:07

## 2019-07-30 RX ADMIN — VANCOMYCIN HYDROCHLORIDE 1000 MG: 1 INJECTION, POWDER, LYOPHILIZED, FOR SOLUTION INTRAVENOUS at 09:07

## 2019-07-30 RX ADMIN — FUROSEMIDE 80 MG: 40 TABLET ORAL at 10:07

## 2019-07-30 RX ADMIN — INSULIN DETEMIR 26 UNITS: 100 INJECTION, SOLUTION SUBCUTANEOUS at 10:07

## 2019-07-30 NOTE — PROGRESS NOTES
[facility] Infectious Disease Progress Note     Primary Team: Hospitalist  Consultant Attending: Madhavi  Date of Admit: 7/25/2019    Summary of history     Domingo Castro is a 74 y.o. male with a relevant history of IDT2DM, Hypertension, HLD, CKD stage IV, left toe osteomyelitis in December of 2018, CHF with unknown EF for which he takes 40mg Lasix daily.     Patient presented to the ED on 7/25 with a complaint of an ulcer on the plantar surface of his left great toe. Patient stated that he first noticed a small ulceration at the site on 7/19/19 and that it became progressively worse throughout the week. The patient has significant diabetic neuropathy and does not have intact sensation in his feet. He follows up with Dr. Gallegos for podiatry. On admission MRI and radiograph of the left foot were obtained, which were suggestive of osteomyelitis in the toe.      Patient has been admitted from 12/23/18 to 1/6/19 at this facility due to osteomyelitis of the same toe and a pneumonia. At the time he was found to have Strep pyogenes bacteremia and strep pyogenes in the culture from his infected toe. He completed a course of Rocephin on 2/4/19. He currently appears to be receiving regular wound care and podiatry follow up. Patient reports that he receives wound care by home health nurse who was concerned that his toe appeared infected today and notified Podiatrist office who advised patient to go to ED.      On admission he denied fever, chills, SOB, chest pain, sick contacts,  trauma to the toe, change in bladder or bowel habits.  ED findings: H/H 10.3/31.9, Sed rate 91, CRP  29.9, Hgb A1C 8.1     Interval events     Bone biopsy (7/27)  Bilateral US of LE showed significant stenosis (7/27)  Revascularization (7/30)    Subjective     Patient reports no complaints overnight and is ready for procedure today. Denies any fever, chills, nausea, vomiting, itching, rashes or pain/discomfort.    Current Medications:       Scheduled:    amLODIPine  10 mg Oral Daily    aspirin  81 mg Oral Daily    carvedilol  25 mg Oral BID WM    furosemide  80 mg Oral BID    gabapentin  100 mg Oral TID    hydrALAZINE  50 mg Oral Q12H    insulin aspart U-100  15 Units Subcutaneous TIDWM    insulin detemir U-100  26 Units Subcutaneous QHS    metroNIDAZOLE  500 mg Oral Q8H    rosuvastatin  20 mg Oral QHS    vancomycin (VANCOCIN) IVPB  1,000 mg Intravenous Q24H        PRN:  acetaminophen, acetaminophen, bisacodyl, cloNIDine, Dextrose 10% Bolus, Dextrose 10% Bolus, glucagon (human recombinant), glucose, glucose, hydrALAZINE, insulin aspart U-100, lactulose, ondansetron, sodium chloride 0.9%    Antibiotics and Day Number of Therapy:  Vancomycin:  - current  Zosyn:  -   Metronidazole:  - current    Objective   Last 24 Hour Vital Signs:  BP  Min: 156/73  Max: 177/82  Temp  Av.1 °F (36.7 °C)  Min: 97.4 °F (36.3 °C)  Max: 99.1 °F (37.3 °C)  Pulse  Av.4  Min: 57  Max: 63  Resp  Av  Min: 16  Max: 20  SpO2  Av %  Min: 96 %  Max: 96 %    Lines, drains, airway:  Port A Cath ()    Physical Examination:  Examination  General: Patient sitting up eating in NAD  HEENT: normocephalic, atraumatic  Neck: No thyromegaly or masses   Cardiac: RRR, no murmurs appreciated, no extra heart sounds  Pulmonary/Chest: CTAB, symmetric chest rise, no wheezing or crackles  GI: Soft, non tender, non distended, normoactive bowel sounds  Extremities: no edema, clubbing, or cyanosis  Skin: dry, warm, intact. No bruising or rashes.   - left foot wrapped with clean, dry, intact bandage; no erythema or edema noted above bandage site  Neuro: Alert and oriented    Lab data:  CBC:   Lab Results   Component Value Date    WBC 7.94 2019    HGB 9.5 (L) 2019     (L) 2019    MCV 85 2019    RDW 14.1 2019     Estimated Creatinine Clearance: 29.8 mL/min (A) (based on SCr of 2.9 mg/dL (H)).    Microbiology Data  Blood cultures =  negative  Wound culture = MRSA  Bone Culture = MRSA    Radiology  MRI Foot (7/26)  Osteomyelitis of the distal aspect of the 1st distal phalanx with adjacent marrow edema which may be reactive in nature versus early infectious process.    Soft tissue swelling, likely related to cellulitis.  No drainable fluid collections.    US LE (7/27)  Atherosclerosis with focal elevated segmental velocities present in the proximal/mid left SFA, proximal left anterior tibial artery, and proximal right anterior tibial artery suggesting greater than 50% focal stenosis.    Abnormal arterial waveforms present in the bilateral thighs, worse on the left.  Cannot exclude more proximal aortoiliac stenosis.    Assessment     Domingo Castro is a 74 y.o.male with past medical history of IDT2DM, Hypertension, HLD, CKD stage IV, left toe osteomyelitis in December of 2018, CHF with unknown EF. Patient presented to the ED on 7/25 with a complaint of an ulcer on the plantar surface of his left great toe. MRI resulted in osteomyelitis of 1st distal phalanx. Awaiting wound culture results to narrow antibiotic treatment.    Recommendations     Osteomyelitis of Left 1st Distal Phalanx  - Continue Vancomycin 1g IV q24h for MRSA coverage  - Anticipate 6 weeks of antibiotic coverage   - Start date = 7/25  End date = 9/5  - Continue Metronidazole 500mg TID PO for anaerobic coverage until anaerobic cultures have resulted  - [facility]  - Patient will need weekly antibiotic level, CMP, CBC, ESR, CRP. Please have the labs faxed to Dr. Hernandez at [phone] until the patients clinic appointment.    Thank you for the consult. Please call with any questions.    Evelia Palmer MD  [facility] Internal Medicine Resident, [facility]

## 2019-07-30 NOTE — INTERVAL H&P NOTE
The patient has been examined and the H&P has been reviewed:        Anesthesia/Surgery risks, benefits and alternative options discussed and understood by patient/family.          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

## 2019-07-30 NOTE — PROGRESS NOTES
[facility]  Podiatry  Progress Note    Patient Name: Domingo Castro  MRN: [id number]  Admission Date: 7/25/2019  Hospital Length of Stay: 3 days  Attending Physician: Kendall Sotomayor MD  Primary Care Provider: Griselda Nugent MD     Subjective:      Interval History: pt was seen and examined at bed side. Resting comfortably. Plan to have procedure by icard tomorrow.     Follow-up For: Procedure(s) (LRB):  AORTOGRAM-ABDOMINAL (N/A)    Post-Operative Day:      Scheduled Meds:   amLODIPine  10 mg Oral Daily    aspirin  81 mg Oral Daily    carvedilol  25 mg Oral BID WM    furosemide  80 mg Oral BID    gabapentin  100 mg Oral TID    hydrALAZINE  50 mg Oral Q12H    insulin aspart U-100  15 Units Subcutaneous TIDWM    insulin detemir U-100  26 Units Subcutaneous QHS    metroNIDAZOLE  500 mg Oral Q8H    rosuvastatin  20 mg Oral QHS    vancomycin (VANCOCIN) IVPB  1,000 mg Intravenous Q24H     Continuous Infusions:  PRN Meds:acetaminophen, acetaminophen, bisacodyl, cloNIDine, Dextrose 10% Bolus, Dextrose 10% Bolus, glucagon (human recombinant), glucose, glucose, hydrALAZINE, insulin aspart U-100, lactulose, ondansetron, sodium chloride 0.9%    Review of Systems   Constitutional: Negative for activity change, appetite change, chills and fever.   HENT: Negative for sinus pain and sore throat.    Eyes: Negative for pain and discharge.   Respiratory: Negative for apnea, cough, chest tightness and shortness of breath.    Cardiovascular: Negative for chest pain.   Gastrointestinal: Negative for abdominal distention, abdominal pain, nausea and vomiting.   Genitourinary: Negative for difficulty urinating and flank pain.   Skin: Positive for wound.     Objective:     Vital Signs (Most Recent):  Temp: 98.2 °F (36.8 °C) (07/29/19 2209)  Pulse: 60 (07/29/19 2209)  Resp: 16 (07/29/19 2209)  BP: (!) 175/77 (07/29/19 2209)  SpO2: 96 % (07/29/19 2209) Vital Signs (24h Range):  Temp:  [97.4 °F (36.3 °C)-98.2 °F  (36.8 °C)] 98.2 °F (36.8 °C)  Pulse:  [52-98] 60  Resp:  [16-19] 16  SpO2:  [96 %] 96 %  BP: (147-175)/(67-78) 175/77     Weight: 109.3 kg (240 lb 15.4 oz)  Body mass index is 30.12 kg/m².    Foot Exam  L foot  -unchanged from last progress note by Dr. Beckham.    Laboratory:  A1C:   Recent Labs   Lab 02/22/19  0941 07/26/19  0529   HGBA1C 8.2* 8.1*     ABGs: No results for input(s): PH, PCO2, HCO3, POCSATURATED, BE in the last 168 hours.  Blood Cultures: No results for input(s): LABBLOO in the last 48 hours.  BMP:   Recent Labs   Lab 07/29/19  0853   *      K 3.7      CO2 25   BUN 41*   CREATININE 3.0*   CALCIUM 9.4   MG 1.9     Cardiac Markers: No results for input(s): CKMB, TROPONINT, MYOGLOBIN in the last 168 hours.  CBC:   Recent Labs   Lab 07/29/19  0853   WBC 8.59   RBC 3.48*   HGB 9.4*   HCT 30.2*   *   MCV 87   MCH 27.0   MCHC 31.1*     CMP:   Recent Labs   Lab 07/29/19  0853   *   CALCIUM 9.4   ALBUMIN 2.9*   PROT 7.5      K 3.7   CO2 25      BUN 41*   CREATININE 3.0*   ALKPHOS 61   ALT 13   AST 19   BILITOT 0.4     Coagulation:   Recent Labs   Lab 07/25/19  2353   INR 1.0   APTT 27.3     CPS: {:LNK,LABRCNTIP[  CRP:   Recent Labs   Lab 07/25/19  2353   CRP 29.9*     ESR:   Recent Labs   Lab 07/25/19  2353   SEDRATE 91*     LFTs:   Recent Labs   Lab 07/29/19  0853   ALT 13   AST 19   ALKPHOS 61   BILITOT 0.4   PROT 7.5   ALBUMIN 2.9*     Prealbumin: No results for input(s): PREALBUMIN in the last 48 hours.  Wound Cultures:   Recent Labs   Lab 07/26/19  1114 07/27/19  0942   LABAERO METHICILLIN RESISTANT STAPHYLOCOCCUS AUREUS  Moderate  * STAPHYLOCOCCUS AUREUS  Moderate  Susceptibility pending  Skin howie also present  *     Microbiology Results (last 7 days)     Procedure Component Value Units Date/Time    Culture, Anaerobe [[id number]] Collected:  07/27/19 0942    Order Status:  Completed Specimen:  Bone from Toe, Left Foot Updated:  07/29/19 1400     Anaerobic  Culture Culture in progress    Culture, Anaerobe [[id number]] Collected:  07/26/19 1113    Order Status:  Completed Specimen:  Wound from Toe, Left Foot Updated:  07/29/19 1346     Anaerobic Culture Culture in progress    Aerobic culture [[id number]]  (Abnormal)  (Susceptibility) Collected:  07/26/19 1114    Order Status:  Completed Specimen:  Wound from Toe, Left Foot Updated:  07/29/19 1053     Aerobic Bacterial Culture METHICILLIN RESISTANT STAPHYLOCOCCUS AUREUS  Moderate      Aerobic culture [[id number]]  (Abnormal) Collected:  07/27/19 0942    Order Status:  Completed Specimen:  Bone from Toe, Left Foot Updated:  07/29/19 0654     Aerobic Bacterial Culture STAPHYLOCOCCUS AUREUS  Moderate  Susceptibility pending  Skin howie also present      Blood culture #2 **CANNOT BE ORDERED STAT** [[id number]] Collected:  07/26/19 0009    Order Status:  Completed Specimen:  Blood from Peripheral, Antecubital, Right Updated:  07/29/19 0612     Blood Culture, Routine No Growth to date      No Growth to date      No Growth to date      No Growth to date    Blood culture #1 **CANNOT BE ORDERED STAT** [[id number]] Collected:  07/25/19 2357    Order Status:  Completed Specimen:  Blood from Peripheral, Antecubital, Left Updated:  07/29/19 0612     Blood Culture, Routine No Growth to date      No Growth to date      No Growth to date      No Growth to date    Gram stain [[id number]] Collected:  07/27/19 0942    Order Status:  Completed Specimen:  Bone from Toe, Left Foot Updated:  07/27/19 1806     Gram Stain Result No WBC's      No organisms seen        Specimen (12h ago, onward)    None        No results for input(s): COLORU, CLARITYU, SPECGRAV, PHUR, PROTEINUA, GLUCOSEU, BILIRUBINCON, BLOODU, WBCU, RBCU, BACTERIA, MUCUS, NITRITE, LEUKOCYTESUR, UROBILINOGEN, HYALINECASTS in the last 168 hours.  All pertinent labs reviewed within the last 24 hours.    Diagnostic Results:  X-Ray: I have reviewed all pertinent results/findings within the  past 24 hours.  OM    Assessment/Plan:     Active Diagnoses:    Diagnosis Date Noted POA    PRINCIPAL PROBLEM:  Toe osteomyelitis, left [M86.9] 07/26/2019 Yes    PAD (peripheral artery disease) [I73.9] 07/29/2019 Yes    Toe ulcer [L97.509] 07/27/2019 Yes    Thrombocytopenia, unspecified [D69.6] 07/26/2019 Yes    Diabetic ulcer of toe of left foot associated with type 2 diabetes mellitus, with necrosis of bone [E11.621, L97.524] 07/26/2019 Yes    CKD (chronic kidney disease) stage 4, GFR 15-29 ml/min [N18.4] 12/23/2018 Yes     Chronic    Diabetic neuropathy associated with type 2 diabetes mellitus [E11.40] 12/23/2018 Yes     Chronic    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, with long-term current use of insulin [E11.3513, E11.65, Z79.4] 12/18/2018 Yes     Chronic    Chronic diastolic congestive heart failure [I50.32] 12/03/2018 Yes     Chronic    Anemia of chronic renal failure, stage 4 (severe) [N18.4, D63.1] 06/14/2018 Yes     Chronic    Dyslipidemia [E78.5] 01/10/2013 Yes     Chronic    Essential hypertension [I10] 01/10/2013 Yes     Chronic    Type 2 diabetes mellitus with stage 4 chronic kidney disease, with long-term current use of insulin [E11.22, N18.4, Z79.4] 01/10/2013 Not Applicable     Chronic      Problems Resolved During this Admission:    Diagnosis Date Noted Date Resolved POA    Hypokalemia [E87.6] 07/26/2019 07/28/2019 Yes       -revascularization tomorrow with I cards.  -Pt refusing surgical amputation of the left great toe. OM confirmed on radiographic and clinical finding.  -pros and cons of surgical and conservative treatment and long term IV abx discussed in depth.   -Pt would like to try revasculariation with aggressive LWC with long term IV abx.  -LWC: triple abx. Once daily.  -weight bearing to heel in surgical shoe.  -would appreciated ID recommendation for long term abx regimen.    Sofiya Ga,  DPM  Podiatry  [facility]

## 2019-07-30 NOTE — NURSING
Patient to room per stretcher with nurse on cardiac monitor-- sinus to sinus candace noted w/o ectopy; pt aao; no complaints voiced; family member in pts room; pt has no belongings; bedside report to Angie; right radial site dressing cdi, no bleeding, no hematoma, no swelling, 2+ pulse wm to touch and pk w/ brisk cap refill; pt placed on portable tele box and ambulated from stretcher to bed w/o incident; iv access maintained--rt chest port a cath

## 2019-07-30 NOTE — PT/OT/SLP EVAL
"Occupational Therapy   Evaluation and Discharge Note    Name: Domingo Castro  MRN: [id number]  Admitting Diagnosis:  Toe osteomyelitis, left      Recommendations:     Discharge Recommendations: home  Discharge Equipment Recommendations:  shower chair  Barriers to discharge:  None    Assessment:   Pt w/o signif change in fxnl status from baseline & no further OT svcs indicated at this time. D/C acute OT this date.    Domingo Castro is a 74 y.o. male with a medical diagnosis of Toe osteomyelitis, left. At this time, patient is functioning at their prior level of function and does not require further acute OT services.     Plan:     During this hospitalization, patient does not require further acute OT services.  Please re-consult if situation changes.    · Plan of Care Reviewed with: patient, spouse    Subjective     Chief Complaint: L grt toe infection  Patient/Family Comments/goals: return home    Occupational Profile:  Living Environment: w/ spouse/dgtr/ENRIQUE in [facility] w/ 0STe; t/s combo  Previous level of function: (I); PRN A for fasteners 2/2 decreased sensation B hands  Roles and Routines: standing tub showers; IADLs; driving; works as a   Equipment Used at home:  walker, rolling  Assistance upon Discharge: fly    Pain/Comfort:  · Pain Rating 1: ("not really")  · Location - Side 1: Left  · Location 1: first toe  · Pain Addressed 1: Pre-medicate for activity, Reposition, Distraction  · Pain Rating Post-Intervention 1: 0/10    Patients cultural, spiritual, Rastafarian conflicts given the current situation:      Objective:     Communicated with: nsg prior to session.  Patient found supine with oxygen upon OT entry to room.    General Precautions: Standard, fall, contact   Orthopedic Precautions:(L heel WBing w/ surgical shoe)   Braces: N/A     Occupational Performance:    Bed Mobility:    · Patient completed Supine to Sit with supervision  · Patient completed Sit to Supine with supervision    Functional " Mobility/Transfers:  · Patient completed Sit <> Stand Transfer with supervision  with  no assistive device   · Functional Mobility: w/o DME around room for ADLs & for extended dist outside room w/ Sup-SBA     Activities of Daily Living:  · Toileting: supervision in standing using urinal   · LBD: don/doff shoes at EOB w/ Sup    Cognitive/Visual Perceptual:  Grossly WFL    Physical Exam:  BUEs WFL at 4+/5 prox; 5/5 dist    Sit balance: G  Stand balance: G    AMPA 6 Click ADL:  AMPA Total Score: 24    Treatment & Education:  Pt found in supine & agreeable to OT/PT co-eval this AM. Pt lives w/ spouse/dgtr/ENRIQUE in [facility] w/ 0STE. PLOF: (I) w/o DME w/ all fxnl tasks incl standing tub shower, IADLs, driving & working as a . Currently, pt w/o c/o L foot pain (2/2 decreased sensation) & able to perf all eval tasks w/ Sup/SBA while maint L heel WBing w/ surgical shoe. Edu/tx re: larisa dress techs, ortho precautions, general safety & HEP. Pt/spouse verbalized understanding.    Patient left supine with all lines intact, call button in reach, nsg notified and spouse present    GOALS:   Multidisciplinary Problems     Occupational Therapy Goals        Problem: Occupational Therapy Goal    Goal Priority Disciplines Outcome Interventions   Occupational Therapy Goal     OT, PT/OT Ongoing (interventions implemented as appropriate)                    History:     Past Medical History:   Diagnosis Date    Arthritis     Cataract     Chronic diastolic congestive heart failure     Coronary artery disease     Cystoid macular edema of both eyes     Diabetes mellitus type II     Hyperlipemia     Hypertension     Retinal hole     Stage 4 chronic kidney disease     Streptococcus pyogenes bacteremia 12/23/2018    Due to left toe osteomyelitis       Past Surgical History:   Procedure Laterality Date    BLEPHAROPLASTY Bilateral 8/30/2017    [facility]LR    CATARACT EXTRACTION W/  INTRAOCULAR  LENS IMPLANT Left 12/15/14    Dr martin    CATARACT EXTRACTION W/  INTRAOCULAR LENS IMPLANT Right 14    dorothy    CIRCUMCISION, NON-      focal laser right eye      INSERTION-INTRAOCULAR LENS (IOL) Right 2014    [facility] OR    INSERTION-INTRAOCULAR LENS (IOL) Left 12/15/2014    [facility] at [facility] OR    [id number]-E-CATH Right 2019    [facility] at [facility] OR    KNEE SURGERY      left    Left medial collateral ligament repair      PHACOEMULSIFICATION-ASPIRATION-CATARACT Right 2014    [facility] at [facility] OR    PHACOEMULSIFICATION-ASPIRATION-CATARACT Left 12/15/2014    [facility] at [facility] OR    REPAIR-PTOSIS/BILATERAL EXTERNAL LEVATORS Bilateral 2017    [facility] 1ST FLR    TONSILLECTOMY         Time Tracking:     OT Date of Treatment: 19  OT Start Time: 847  OT Stop Time: 916  OT Total Time (min): 29 min    Billable Minutes:Evaluation 10  Total Time 29--co-tx w/ PT       HUA France  2019

## 2019-07-30 NOTE — PLAN OF CARE
Problem: Occupational Therapy Goal  Goal: Occupational Therapy Goal  Outcome: Ongoing (interventions implemented as appropriate)  Pt found in supine & agreeable to OT/PT co-eval this AM. Pt lives w/ spouse/dgtr/ENRIQUE in [facility] w/ 0STE. PLOF: (I) w/o DME w/ all fxnl tasks incl standing tub shower, IADLs, driving & working as a . Currently, pt w/o c/o L foot pain (2/2 decreased sensation) & able to perf all eval tasks w/ Sup/SBA while maint L heel WBing w/ surgical shoe. Edu/tx re: larisa dress techs, ortho precautions, general safety & HEP. Pt/spouse verbalized understanding.    Pt w/o signif change in fxnl status from baseline & no further OT svcs indicated at this time. D/C acute OT this date.

## 2019-07-30 NOTE — PLAN OF CARE
Problem: Adult Inpatient Plan of Care  Goal: Plan of Care Review  Outcome: Ongoing (interventions implemented as appropriate)  Patient safety maintained. Medications administered per tolerated, HR 50's bradycardia. Continue glucose monitoring. Assistance provided as needed. L toe/food dressing changed today, started on contact precautions for wound infection.

## 2019-07-30 NOTE — PROGRESS NOTES
.Pharmacy New Medication Education    Patient and/or Caregiver ACCEPTED medication education.    Pharmacy has provided education on the name, indication, and possible side effects of the medication(s) prescribed, using teach-back method.     Learners of pharmacy medication education includes:  spouse    Medication Indication Side Effects   flagyl osteomyelitis nausea and metallic taste    vancomycin osteomyelitis rash and phlebitis          The following medications have also been discussed, during this admission.     Current Facility-Administered Medications   Medication Frequency    acetaminophen tablet 1,000 mg Q8H PRN    acetaminophen tablet 650 mg Q4H PRN    amLODIPine tablet 10 mg Daily    aspirin EC tablet 81 mg Daily    bisacodyl EC tablet 5 mg Daily PRN    carvedilol tablet 25 mg BID WM    cloNIDine tablet 0.1 mg Q6H PRN    dextrose 10% (D10W) Bolus PRN    dextrose 10% (D10W) Bolus PRN    furosemide tablet 80 mg BID    gabapentin capsule 100 mg TID    glucagon (human recombinant) injection 1 mg PRN    glucose chewable tablet 16 g PRN    glucose chewable tablet 24 g PRN    hydrALAZINE tablet 25 mg Q8H PRN    hydrALAZINE tablet 50 mg Q12H    insulin aspart U-100 pen 1-10 Units QID (AC + HS) PRN    insulin aspart U-100 pen 15 Units TIDWM    insulin detemir U-100 pen 26 Units QHS    lactulose 20 gram/30 mL solution Soln 15 g Q6H PRN    metroNIDAZOLE tablet 500 mg Q8H    ondansetron injection 4 mg Q8H PRN    rosuvastatin tablet 20 mg QHS    sodium chloride 0.9% flush 10 mL PRN    vancomycin in dextrose 5 % 1 gram/250 mL IVPB 1,000 mg Q24H            Thank you  Dinesh Mccarthy, PharmD

## 2019-07-30 NOTE — PT/OT/SLP EVAL
Physical Therapy Evaluation, Treatment, and Discharge Note    Patient Name:  Domingo Castro   MRN:  [id number]    Recommendations:     Discharge Recommendations:  home   Discharge Equipment Recommendations: shower chair   Barriers to discharge: None    Assessment:     Domingo Castro is a 74 y.o. male admitted with a medical diagnosis of Toe osteomyelitis, left. . Pt with LLE heel only WB status with surgical shoe and was able to maintain and ambulate >200 ft with no AD at mod I level. No further IP PT warranted at this time, d/c PT.    Recent Surgery: Procedure(s) (LRB):  AORTOGRAM-ABDOMINAL (N/A) Day of Surgery    Plan:     During this hospitalization, patient does not require further acute PT services.  Please re-consult if situation changes.      Subjective     Chief Complaint: none  Patient/Family Comments/goals: pt reporting no questions/concerns regarding his mobility at this time  Pain/Comfort:  · Pain Rating 1: 0/10  · Pain Rating Post-Intervention 1: 0/10    Patients cultural, spiritual, Synagogue conflicts given the current situation: no    Living Environment:  Pt lives with his wife, daughter, and son-in-law in a [facility] with no DE[employer] and tub/shower combo.  Prior to admission, patients level of function was mod I without AD for mobility and ADLs, drives, and is a .  Equipment used at home: walker, rolling.  DME owned (not currently used): rolling walker.  Upon discharge, patient will have assistance from his family.    Objective:     Communicated with RANJIT Adams prior to session.  Patient found HOB elevated with oxygen, bed alarm, telemetry upon PT entry to room.    General Precautions: Standard, fall, contact   Orthopedic Precautions:(LLW heel WB with surgical shoe)   Braces: Darco shoe LLE    Exams:  · Gross Motor Coordination:  WFL  · Postural Exam:  Patient presented with the following abnormalities:    · -       Rounded shoulders  · Sensation:    · -       Impaired  pt with baseline neuropathy with  decreased sensation to B hands and BLEs from mid-shin down  · Skin Integrity/Edema:      · -       Skin integrity: wound L toe - wrapped but drainage noted  · RLE ROM: WFL  · RLE Strength: WFL  · LLE ROM: WFL except ankle not assessed  · LLE Strength: WFL except ankle not assessed    Functional Mobility:  · Bed Mobility:     · Scooting: modified independence  · Supine to Sit: modified independence  · Sit to Supine: modified independence  · Transfers:     · Sit to Stand:  modified independence with no AD  · Toilet Transfer: modified independence with  no AD  using  Step Transfer  · Gait: >200 ft with no AD at mod I level - pt able to ambulate with LLE heel WB with L Darco shoe and R shoe donned.    AM-PAC 6 CLICK MOBILITY  Total Score:23       Therapeutic Activities and Exercises:  Pt ambulated in halls then ambulated to restroom and stood to urinate with urinal at mod I level.  No further questions/concerns.      Patient left HOB elevated with all lines intact, call button in reach, bed alarm on, RN notified and pt's wife present.    GOALS:   Multidisciplinary Problems     Physical Therapy Goals        Problem: Physical Therapy Goal    Goal Priority Disciplines Outcome Goal Variances Interventions   Physical Therapy Goal     PT, PT/OT Ongoing (interventions implemented as appropriate)                     History:     Past Medical History:   Diagnosis Date    Arthritis     Cataract     Chronic diastolic congestive heart failure     Coronary artery disease     Cystoid macular edema of both eyes     Diabetes mellitus type II     Hyperlipemia     Hypertension     Retinal hole     Stage 4 chronic kidney disease     Streptococcus pyogenes bacteremia 12/23/2018    Due to left toe osteomyelitis       Past Surgical History:   Procedure Laterality Date    BLEPHAROPLASTY Bilateral 8/30/2017    Performed by [facility] at [facility] OR [address] [place] [address]    CATARACT EXTRACTION W/  INTRAOCULAR LENS IMPLANT Left 12/15/14      dorothy    CATARACT EXTRACTION W/  INTRAOCULAR LENS IMPLANT Right 14    dorothy    CIRCUMCISION, NON-      focal laser right eye      INSERTION-INTRAOCULAR LENS (IOL) Right 2014    [facility] OR    INSERTION-INTRAOCULAR LENS (IOL) Left 12/15/2014    [facility] at [facility] OR    [id number] Right 2019    Performed by [facility]ys [facility]man Jr., [facility] at [facility] OR    KNEE SURGERY      left    Left medial collateral ligament repair      PHACOEMULSIFICATION-ASPIRATION-CATARACT Right 2014    [facility] at [facility] OR    PHACOEMULSIFICATION-ASPIRATION-CATARACT Left 12/15/2014    [facility] at [facility] OR    REPAIR-PTOSIS/BILATERAL EXTERNAL LEVATORS Bilateral 2017    [facility] 1ST FLR    TONSILLECTOMY         Time Tracking:     PT Received On: 19  PT Start Time: 0846     PT Stop Time: 0917  PT Total Time (min): 31 min     Billable Minutes: Evaluation 15 and Gait Training 10      Shayy Echols, PT  2019

## 2019-07-30 NOTE — PROGRESS NOTES
[facility] Hospital Medicine  Progress Note    Patient Name: Domingo Castro  MRN: [id number]  Patient Class: IP- Inpatient   Admission Date: 7/25/2019  Length of Stay: 4 days  Attending Physician: Christina Tang*  Primary Care Provider: Griselda Nugent MD        Subjective:     Principal Problem:Toe osteomyelitis, left      HPI:  74 y.o. Male with past medical history of DM type 2, Hypertension, Arthritis, CAD, HLD, CKD, left toe osteomyelitis, CHF, Cataract presented to the ED on yesterday complaining of left foot infection.  Patient has been treated in past for osteomyelitis of the same foot in which he completed course of antibiotics and was still receiving wound care and podiatry follow up.  Patient reports that he receives wound care by home health nurse who was concerned that toe appeared infected and notified Podiatrist office who advised patient to go to ED.  Patient is followed by Dr. Gallegos.  Denies fever, chills, SOB, chest pain, sick contacts,  trauma to the toe, change in bladder or bowel habits.  ED findings: H/H 10.3/31.9, Sed rate 91, CRP  29.9, Hgb A1C 8.1, Left forefoot MRI showed osteomyelitis of the distal aspect of the 1st distal phalanx with adjacent marrow edema which may be reactive in nature versus early infectious process.  Patient admitted for medical management.    Overview/Hospital Course:  Hospital Medicine put him on piperacillin-tazobactam and vancomycin. Podiatry consulted Infectious Disease. Wound culture grew MRSA. Per ID will plan for 6 weeks IV abx therapy. Went for LE cath procedure on 7/30, with plan for staged procedure for stenosis in a few days.     Interval History: Feeling tired this am, but well. No complaints, wants to eat. Ready for cath lab procedure later.    Review of Systems   Constitutional: Positive for fatigue. Negative for fever.   Respiratory: Negative for cough and shortness of breath.    Cardiovascular: Negative for chest pain and  leg swelling.   Gastrointestinal: Negative for abdominal pain and nausea.   Skin: Positive for wound.   Neurological: Negative for weakness.     Objective:     Vital Signs (Most Recent):  Temp: 97.9 °F (36.6 °C) (07/30/19 1330)  Pulse: 61 (07/30/19 1530)  Resp: 20 (07/30/19 1530)  BP: (!) 185/81 (07/30/19 1530)  SpO2: 97 % (07/30/19 1530) Vital Signs (24h Range):  Temp:  [97.5 °F (36.4 °C)-99.1 °F (37.3 °C)] 97.9 °F (36.6 °C)  Pulse:  [53-63] 61  Resp:  [16-21] 20  SpO2:  [94 %-97 %] 97 %  BP: (156-185)/(73-84) 185/81     Weight: 109.3 kg (240 lb 15.4 oz)  Body mass index is 30.12 kg/m².    Intake/Output Summary (Last 24 hours) at 7/30/2019 1543  Last data filed at 7/30/2019 0600  Gross per 24 hour   Intake --   Output 1050 ml   Net -1050 ml      Physical Exam   Constitutional: No distress.   Pulmonary/Chest: Effort normal and breath sounds normal.   Abdominal: Soft. Bowel sounds are normal.   Neurological: He is alert.   Skin: Skin is warm and dry. He is not diaphoretic.   Left foot dressed   Psychiatric: He has a normal mood and affect. His behavior is normal.       Significant Labs:   BMP:   Recent Labs   Lab 07/30/19  0404   *      K 3.6      CO2 26   BUN 45*   CREATININE 2.9*   CALCIUM 9.4   MG 2.1     CBC:   Recent Labs   Lab 07/29/19  0853 07/30/19  0404   WBC 8.59 7.94   HGB 9.4* 9.5*   HCT 30.2* 29.6*   * 112*       Significant Imaging: I have reviewed all pertinent imaging results/findings within the past 24 hours.      Assessment/Plan:      * Toe osteomyelitis, left  Toe ulcer  -Podiatry consult: wound care  -ID consult: six weeks of IV ABx, has usable port  -Continue vanc/zosyn  -BCx: NGTD  -Wound cultures MRSA  -PAD management per cardiology    Anemia of chronic renal failure, stage 4 (severe)  Stable, no visual signs of bleeding  -Monitor    Chronic diastolic congestive heart failure  Essential hypertension  Denies chest pain or SOB  -Continue Amlodipine, Coreg Furosemide and  Hydralazine    CKD (chronic kidney disease) stage 4, GFR 15-29 ml/min  Creatinine at baseline, Voiding well  -Strict I&O  -Avoid Nephrotoxic Agents  -Renally dose medications    Dyslipidemia  Continue statin    Thrombocytopenia, unspecified  Monitor    Type 2 diabetes mellitus with stage 4 chronic kidney disease, with long-term current use of insulin  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, with long-term current use of insulin  Hemoglobin A1c is 8.1  -POC glucose checks ac meals and nightly  -Continue Detemir 26 units nightly  -Increased to Novolog 15 units with meals  -Increased to moderate dose SSI PRN  -Hypoglycemia protocol PRN  -Diabetic Diet    Diabetic ulcer of toe of left foot associated with type 2 diabetes mellitus, with necrosis of bone        Essential hypertension            VTE Risk Mitigation (From admission, onward)        Ordered     heparin infusion 1,000 units/500 ml in 0.9% NaCl (pressure line flush)  Intra-op continuous PRN      07/30/19 1225     IP VTE HIGH RISK PATIENT  Once      07/28/19 1203     Place sequential compression device  Until discontinued      07/26/19 0221                Jazmin Norris PA-C  Department of Hospital Medicine   [facility]

## 2019-07-30 NOTE — NURSING
Spoke with ERICKSON Ryan in relation to patient HR being on the 50's. Received order to hold dose of coreg for today.

## 2019-07-30 NOTE — SUBJECTIVE & OBJECTIVE
Interval History: Feeling tired this am, but well. No complaints, wants to eat. Ready for cath lab procedure later.    Review of Systems   Constitutional: Positive for fatigue. Negative for fever.   Respiratory: Negative for cough and shortness of breath.    Cardiovascular: Negative for chest pain and leg swelling.   Gastrointestinal: Negative for abdominal pain and nausea.   Skin: Positive for wound.   Neurological: Negative for weakness.     Objective:     Vital Signs (Most Recent):  Temp: 97.9 °F (36.6 °C) (07/30/19 1330)  Pulse: 61 (07/30/19 1530)  Resp: 20 (07/30/19 1530)  BP: (!) 185/81 (07/30/19 1530)  SpO2: 97 % (07/30/19 1530) Vital Signs (24h Range):  Temp:  [97.5 °F (36.4 °C)-99.1 °F (37.3 °C)] 97.9 °F (36.6 °C)  Pulse:  [53-63] 61  Resp:  [16-21] 20  SpO2:  [94 %-97 %] 97 %  BP: (156-185)/(73-84) 185/81     Weight: 109.3 kg (240 lb 15.4 oz)  Body mass index is 30.12 kg/m².    Intake/Output Summary (Last 24 hours) at 7/30/2019 1543  Last data filed at 7/30/2019 0600  Gross per 24 hour   Intake --   Output 1050 ml   Net -1050 ml      Physical Exam   Constitutional: No distress.   Pulmonary/Chest: Effort normal and breath sounds normal.   Abdominal: Soft. Bowel sounds are normal.   Neurological: He is alert.   Skin: Skin is warm and dry. He is not diaphoretic.   Left foot dressed   Psychiatric: He has a normal mood and affect. His behavior is normal.       Significant Labs:   BMP:   Recent Labs   Lab 07/30/19  0404   *      K 3.6      CO2 26   BUN 45*   CREATININE 2.9*   CALCIUM 9.4   MG 2.1     CBC:   Recent Labs   Lab 07/29/19  0853 07/30/19  0404   WBC 8.59 7.94   HGB 9.4* 9.5*   HCT 30.2* 29.6*   * 112*       Significant Imaging: I have reviewed all pertinent imaging results/findings within the past 24 hours.

## 2019-07-30 NOTE — NURSING
Patient received in cath lab recovery per stretcher with nurse Gan; bedside report received and assumed care; pt awake alert oriented; pt denies any pain or numbness/tingling; sinus on monitor; post procedure teaching begun and pt verbalized understanding; iv access maintained ---port a cath right chest; pt has a dressing to left foot w/ shoe cover and doppler pulses audible bilateral dp and pt; right radial vasc band intact w/o any bleeding swelling or hematoma and fingers wm brisk cap refill and 2+ pulse; will continue to monitor pt

## 2019-07-30 NOTE — PLAN OF CARE
Awaiting final ID recs for home IV abx. Patient has chest wall port.  [facility] will [facility]- will need order when medically ready    Per ID notes on 7/29  Recommendations      Osteomyelitis of Left 1st Distal Phalanx  - Discontinue Zosyn 4.5 g IV q8h at this time.  - Continue Vancomycin 1g IV q24h for MRSA coverage  - Anticipate 6 weeks of antibiotic coverage              - Start date = 7/25                   End date = 9/5  - Start Metronidazole 500mg TID PO for anaerobic coverage until anaerobic cultures have resulted  - [facility]  - Patient will need weekly antibiotic level, CMP, CBC, ESR, CRP. Please have the labs faxed to Dr. Hernandez at [phone] until the patients clinic appointment.        07/30/19 1137   Discharge Reassessment   Assessment Type Discharge Planning Reassessment   Provided patient/caregiver education on the expected discharge date and the discharge plan No   Discharge Plan A Home;Home with family;Home Health     Daysi Denny, RN, CCM, CMSRN  RN Transition Navigator  [phone]

## 2019-07-30 NOTE — PLAN OF CARE
Remaining air removed from right radial vasc band. Gauze and tegaderm dressing applied c.d.i. No drainage or shadowing noted. No bleeding or hematoma noted around site. +2 jesus radial pulses palpated. Skin normal in color, warm to touch, < 3 sec cap refill. Pt tolerated well.  Will continue to monitor pt.

## 2019-07-30 NOTE — NURSING
Pt sitting up in bed; pt is aao; meal tray served; pt tolerating po; sinus candace on monitor; pt has no complaints; will continue to monitor patient; iv access maintained--- rt chest port a cath

## 2019-07-30 NOTE — NURSING
pts daughter to bedside and updated and called unit for nurse to update pts spouse in pts room-- spoke to Angie

## 2019-07-30 NOTE — PLAN OF CARE
Problem: Physical Therapy Goal  Goal: Physical Therapy Goal  Outcome: Ongoing (interventions implemented as appropriate)  PT evaluation completed, note to follow. Pt with LLE heel only WB status with surgical shoe and was able to maintain and ambulate >200 ft with no AD at mod I level. No further IP PT warranted at this time, d/c PT.

## 2019-07-30 NOTE — BRIEF OP NOTE
S/p aortogram with run off via R radial access       Patent aorta   Bilateral renal artery stenosis       Patent NOEMÍ + EIA    Subtotal R IIA   Patent L CFA, 80% mid L SFA stenosis, 75% L POP with 99% proximal L AT   Patent R CFA, 90% prox R SFA stenosis, 90% ostial PT + PER with subtotal proximal AT   Patent DP and plantar arch bilaterally      plan       Stage L SFA + POP + AT revascularization   Wound care   Aggressive medical therapy

## 2019-07-30 NOTE — PLAN OF CARE
Problem: Adult Inpatient Plan of Care  Goal: Plan of Care Review  Outcome: Ongoing (interventions implemented as appropriate)     07/30/19 0216   Plan of Care Review   Plan of Care Reviewed With patient   Pt reported no pain. Wife juan at bed side b/c pt going to cath lab in the morning. Refused snack b/c he is NPO after 0000.  2100  covered w/ 3 units novalog and 26 u detemir.      Tele: SB, reg HR 50,  No alarms.     Bed in lowest position, wheels locked, non skid socks, ID band worn, personal items and call bell with in reach, bed alarm set.

## 2019-07-31 LAB
ALBUMIN SERPL BCP-MCNC: 3 G/DL (ref 3.5–5.2)
ALP SERPL-CCNC: 68 U/L (ref 55–135)
ALT SERPL W/O P-5'-P-CCNC: 11 U/L (ref 10–44)
ANION GAP SERPL CALC-SCNC: 10 MMOL/L (ref 8–16)
AST SERPL-CCNC: 17 U/L (ref 10–40)
BACTERIA BLD CULT: NORMAL
BACTERIA BLD CULT: NORMAL
BACTERIA SPEC ANAEROBE CULT: NORMAL
BASOPHILS # BLD AUTO: 0.02 K/UL (ref 0–0.2)
BASOPHILS NFR BLD: 0.3 % (ref 0–1.9)
BILIRUB SERPL-MCNC: 0.3 MG/DL (ref 0.1–1)
BUN SERPL-MCNC: 46 MG/DL (ref 8–23)
CALCIUM SERPL-MCNC: 9.6 MG/DL (ref 8.7–10.5)
CHLORIDE SERPL-SCNC: 103 MMOL/L (ref 95–110)
CO2 SERPL-SCNC: 26 MMOL/L (ref 23–29)
CREAT SERPL-MCNC: 2.9 MG/DL (ref 0.5–1.4)
DIFFERENTIAL METHOD: ABNORMAL
EOSINOPHIL # BLD AUTO: 0.3 K/UL (ref 0–0.5)
EOSINOPHIL NFR BLD: 4.2 % (ref 0–8)
ERYTHROCYTE [DISTWIDTH] IN BLOOD BY AUTOMATED COUNT: 14.1 % (ref 11.5–14.5)
EST. GFR  (AFRICAN AMERICAN): 24 ML/MIN/1.73 M^2
EST. GFR  (NON AFRICAN AMERICAN): 20 ML/MIN/1.73 M^2
GLUCOSE SERPL-MCNC: 198 MG/DL (ref 70–110)
HCT VFR BLD AUTO: 31.5 % (ref 40–54)
HGB BLD-MCNC: 10.2 G/DL (ref 14–18)
LYMPHOCYTES # BLD AUTO: 1.4 K/UL (ref 1–4.8)
LYMPHOCYTES NFR BLD: 18.5 % (ref 18–48)
MAGNESIUM SERPL-MCNC: 2.2 MG/DL (ref 1.6–2.6)
MCH RBC QN AUTO: 27.4 PG (ref 27–31)
MCHC RBC AUTO-ENTMCNC: 32.4 G/DL (ref 32–36)
MCV RBC AUTO: 85 FL (ref 82–98)
MONOCYTES # BLD AUTO: 0.6 K/UL (ref 0.3–1)
MONOCYTES NFR BLD: 7.8 % (ref 4–15)
NEUTROPHILS # BLD AUTO: 5.3 K/UL (ref 1.8–7.7)
NEUTROPHILS NFR BLD: 69.2 % (ref 38–73)
PHOSPHATE SERPL-MCNC: 3.6 MG/DL (ref 2.7–4.5)
PLATELET # BLD AUTO: 117 K/UL (ref 150–350)
PMV BLD AUTO: 10.5 FL (ref 9.2–12.9)
POCT GLUCOSE: 155 MG/DL (ref 70–110)
POCT GLUCOSE: 183 MG/DL (ref 70–110)
POCT GLUCOSE: 231 MG/DL (ref 70–110)
POCT GLUCOSE: 234 MG/DL (ref 70–110)
POTASSIUM SERPL-SCNC: 3.9 MMOL/L (ref 3.5–5.1)
PROT SERPL-MCNC: 7.9 G/DL (ref 6–8.4)
RBC # BLD AUTO: 3.72 M/UL (ref 4.6–6.2)
SODIUM SERPL-SCNC: 139 MMOL/L (ref 136–145)
WBC # BLD AUTO: 7.71 K/UL (ref 3.9–12.7)

## 2019-07-31 PROCEDURE — 85025 COMPLETE CBC W/AUTO DIFF WBC: CPT

## 2019-07-31 PROCEDURE — 11000001 HC ACUTE MED/SURG PRIVATE ROOM

## 2019-07-31 PROCEDURE — 36415 COLL VENOUS BLD VENIPUNCTURE: CPT

## 2019-07-31 PROCEDURE — 25000003 PHARM REV CODE 250: Performed by: NURSE PRACTITIONER

## 2019-07-31 PROCEDURE — 83735 ASSAY OF MAGNESIUM: CPT

## 2019-07-31 PROCEDURE — 63600175 PHARM REV CODE 636 W HCPCS: Performed by: FAMILY MEDICINE

## 2019-07-31 PROCEDURE — 84100 ASSAY OF PHOSPHORUS: CPT

## 2019-07-31 PROCEDURE — 80053 COMPREHEN METABOLIC PANEL: CPT

## 2019-07-31 RX ORDER — HYDRALAZINE HYDROCHLORIDE 25 MG/1
100 TABLET, FILM COATED ORAL EVERY 12 HOURS
Status: DISCONTINUED | OUTPATIENT
Start: 2019-07-31 | End: 2019-08-02 | Stop reason: HOSPADM

## 2019-07-31 RX ORDER — CLOPIDOGREL BISULFATE 75 MG/1
75 TABLET ORAL DAILY
Status: DISCONTINUED | OUTPATIENT
Start: 2019-07-31 | End: 2019-08-02 | Stop reason: HOSPADM

## 2019-07-31 RX ADMIN — FUROSEMIDE 80 MG: 40 TABLET ORAL at 09:07

## 2019-07-31 RX ADMIN — HYDRALAZINE HYDROCHLORIDE 100 MG: 25 TABLET, FILM COATED ORAL at 09:07

## 2019-07-31 RX ADMIN — ASPIRIN 81 MG: 81 TABLET, COATED ORAL at 08:07

## 2019-07-31 RX ADMIN — AMLODIPINE BESYLATE 10 MG: 5 TABLET ORAL at 08:07

## 2019-07-31 RX ADMIN — HYDRALAZINE HYDROCHLORIDE 100 MG: 25 TABLET, FILM COATED ORAL at 08:07

## 2019-07-31 RX ADMIN — CLOPIDOGREL BISULFATE 75 MG: 75 TABLET ORAL at 06:07

## 2019-07-31 RX ADMIN — INSULIN ASPART 15 UNITS: 100 INJECTION, SOLUTION INTRAVENOUS; SUBCUTANEOUS at 04:07

## 2019-07-31 RX ADMIN — GABAPENTIN 100 MG: 100 CAPSULE ORAL at 08:07

## 2019-07-31 RX ADMIN — INSULIN ASPART 15 UNITS: 100 INJECTION, SOLUTION INTRAVENOUS; SUBCUTANEOUS at 08:07

## 2019-07-31 RX ADMIN — GABAPENTIN 100 MG: 100 CAPSULE ORAL at 02:07

## 2019-07-31 RX ADMIN — VANCOMYCIN HYDROCHLORIDE 750 MG: 750 INJECTION, POWDER, LYOPHILIZED, FOR SOLUTION INTRAVENOUS at 09:07

## 2019-07-31 RX ADMIN — METRONIDAZOLE 500 MG: 500 TABLET ORAL at 06:07

## 2019-07-31 RX ADMIN — ROSUVASTATIN CALCIUM 20 MG: 10 TABLET, FILM COATED ORAL at 09:07

## 2019-07-31 RX ADMIN — GABAPENTIN 100 MG: 100 CAPSULE ORAL at 09:07

## 2019-07-31 RX ADMIN — METRONIDAZOLE 500 MG: 500 TABLET ORAL at 09:07

## 2019-07-31 RX ADMIN — FUROSEMIDE 80 MG: 40 TABLET ORAL at 08:07

## 2019-07-31 RX ADMIN — INSULIN ASPART 2 UNITS: 100 INJECTION, SOLUTION INTRAVENOUS; SUBCUTANEOUS at 09:07

## 2019-07-31 RX ADMIN — CARVEDILOL 25 MG: 25 TABLET, FILM COATED ORAL at 08:07

## 2019-07-31 RX ADMIN — METRONIDAZOLE 500 MG: 500 TABLET ORAL at 02:07

## 2019-07-31 RX ADMIN — INSULIN DETEMIR 26 UNITS: 100 INJECTION, SOLUTION SUBCUTANEOUS at 09:07

## 2019-07-31 RX ADMIN — CARVEDILOL 25 MG: 25 TABLET, FILM COATED ORAL at 04:07

## 2019-07-31 RX ADMIN — INSULIN ASPART 15 UNITS: 100 INJECTION, SOLUTION INTRAVENOUS; SUBCUTANEOUS at 11:07

## 2019-07-31 NOTE — PROGRESS NOTES
Pharmacokinetic Assessment Follow Up: IV Vancomycin    Vancomycin serum concentration assessment(s):    The trough level was drawned correctly and can be used to guide therapy at this time.    Vancomycin Regimen Plan:    Change regimen to Vancomycin 750 mg IV every 24hour with next serum trough concentration measured at 0930 prior to next dose on 8/1     Pharmacy will continue to follow and monitor vancomycin.    Please contact pharmacy at extension [phone] for questions regarding this assessment.    Thank you for the consult,   Bruce Mccarthy     Patient brief summary:  Domingo Castro is a 74 y.o. male initiated on antimicrobial therapy with IV Vancomycin for treatment of suspected bone/joint    The patient received a loading dose, followed by the current treatment regimen: vancomycin 750 mg IV every 24 hours    Drug Allergies:   Review of patient's allergies indicates:   Allergen Reactions    Atorvastatin Other (See Comments)       Actual Body Weight:   109.3kg    Renal Function:   Estimated Creatinine Clearance: 29.8 mL/min (A) (based on SCr of 2.9 mg/dL (H)).,     Dialysis Method (if applicable):      CBC (last 72 hours):  Recent Labs   Lab Result Units 07/29/19  0853 07/30/19  0404 07/31/19  0725   WBC K/uL 8.59 7.94 7.71   Hemoglobin g/dL 9.4* 9.5* 10.2*   Hematocrit % 30.2* 29.6* 31.5*   Platelets K/uL 114* 112* 117*   Gran% % 66.3 66.0 69.2   Lymph% % 21.4 23.9 18.5   Mono% % 7.6 5.7 7.8   Eosinophil% % 3.8 3.9 4.2   Basophil% % 0.9 0.5 0.3   Differential Method  Automated Automated Automated       Metabolic Panel (last 72 hours):  Recent Labs   Lab Result Units 07/29/19  0853 07/30/19  0404 07/31/19  0725   Sodium mmol/L 138 137 139   Potassium mmol/L 3.7 3.6 3.9   Chloride mmol/L 101 100 103   CO2 mmol/L 25 26 26   Glucose mg/dL 189* 211* 198*   BUN, Bld mg/dL 41* 45* 46*   Creatinine mg/dL 3.0* 2.9* 2.9*   Albumin g/dL 2.9* 2.9* 3.0*   Total Bilirubin mg/dL 0.4 0.3 0.3   Alkaline Phosphatase U/L 61 99 33    AST U/L 19 18 17   ALT U/L 13 15 11   Magnesium mg/dL 1.9 2.1 2.2   Phosphorus mg/dL 3.9 4.0 3.6       Vancomycin Administrations:  vancomycin given in the last 96 hours                     vancomycin in dextrose 5 % 1 gram/250 mL IVPB 1,000 mg (mg) 1,000 mg New Bag 07/30/19 0959     1,000 mg New Bag 07/29/19 0826     1,000 mg New Bag 07/28/19 0809                      Drug levels (last 3 results):  Recent Labs   Lab Result Units 07/30/19  0835   Vancomycin-Trough ug/mL 20.0       Microbiologic Results:  Microbiology Results (last 7 days)       Procedure Component Value Units Date/Time    Culture, Anaerobe [[id number]] Collected:  07/27/19 0942    Order Status:  Completed Specimen:  Bone from Toe, Left Foot Updated:  07/31/19 0712     Anaerobic Culture Culture in progress    Blood culture #2 **CANNOT BE ORDERED STAT** [[id number]] Collected:  07/26/19 0009    Order Status:  Completed Specimen:  Blood from Peripheral, Antecubital, Right Updated:  07/31/19 0612     Blood Culture, Routine No growth after 5 days.    Blood culture #1 **CANNOT BE ORDERED STAT** [[id number]] Collected:  07/25/19 2357    Order Status:  Completed Specimen:  Blood from Peripheral, Antecubital, Left Updated:  07/31/19 0612     Blood Culture, Routine No growth after 5 days.    Culture, Anaerobe [[id number]] Collected:  07/26/19 1113    Order Status:  Completed Specimen:  Wound from Toe, Left Foot Updated:  07/30/19 0951     Anaerobic Culture No anaerobes isolated    Aerobic culture [[id number]]  (Abnormal)  (Susceptibility) Collected:  07/27/19 0942    Order Status:  Completed Specimen:  Bone from Toe, Left Foot Updated:  07/30/19 0836     Aerobic Bacterial Culture METHICILLIN RESISTANT STAPHYLOCOCCUS AUREUS  Moderate  Skin howie also present      Aerobic culture [[id number]]  (Abnormal)  (Susceptibility) Collected:  07/26/19 1114    Order Status:  Completed Specimen:  Wound from Toe, Left Foot Updated:  07/29/19 1053     Aerobic Bacterial Culture  METHICILLIN RESISTANT STAPHYLOCOCCUS AUREUS  Moderate      Gram stain [[id number]] Collected:  07/27/19 0942    Order Status:  Completed Specimen:  Bone from Toe, Left Foot Updated:  07/27/19 6332     Gram Stain Result No WBC's      No organisms seen

## 2019-07-31 NOTE — PROGRESS NOTES
[facility]  Cardiology  Progress Note    Patient Name: Domingo Castro  MRN: [id number]  Admission Date: 7/25/2019  Hospital Length of Stay: 5 days  Code Status: Full Code   Attending Physician: Christina Tang*   Primary Care Physician: Griselda Nugent MD  Expected Discharge Date:   Principal Problem:Toe osteomyelitis, left    Subjective:     Hospital Course:   7/25/2019-7/28/2019 Presented to the ER with complaints of left great toe wound. WBCs 10K ESR 91 CRP 29.96. MRI with osteomyelitis to left toe/foot. Blood cultures with NGTD. Wound culture with MRSA. On IV Zosyn and IV Vanc. BLE arterial ultrasound with elevated velocities to bilateral ATs. On ASA therapy. Podiatry on board  7/29/2019  BUN 3.0 this AM. H&H 9.4&30.2 this AM. Remains on IV abx with wound care per Podiatry and wound care RN. Cardiology consulted for evaluation of PAD   7/30/2019 NPO for abdominal aortogram with run off   7/31/2019 Underwent abd AO with run off via right radial access with following results:    Patent aorta  Bilateral renal artery stenosis  Patent NOEMÍ + EIA   Subtotal R IIA  Patent L CFA, 80% mid L SFA stenosis, 75% L POP with 99% proximal L AT  Patent R CFA, 90% prox R SFA stenosis, 90% ostial PT + PER with subtotal proximal AT  Patent DP and plantar arch bilaterally  Plans for staged  L SFA + POP + AT revascularization    Creatinine 2.9 this AM. H&H 10.2&31.5. HR nad BP stable. Plan for possible LE revascularization in AM                        Review of Systems   Constitution: Negative for chills, decreased appetite, diaphoresis and fever.   Cardiovascular: Negative for chest pain, claudication, cyanosis, dyspnea on exertion, irregular heartbeat, leg swelling, near-syncope, orthopnea, palpitations, paroxysmal nocturnal dyspnea and syncope.   Respiratory: Negative for cough, hemoptysis, shortness of breath and wheezing.    Gastrointestinal: Negative for bloating, abdominal pain, constipation, diarrhea,  melena, nausea and vomiting.   Neurological: Negative for dizziness and weakness.     Objective:     Vital Signs (Most Recent):  Temp: 97.3 °F (36.3 °C) (07/31/19 0806)  Pulse: 66 (07/31/19 0806)  Resp: 20 (07/31/19 0806)  BP: (!) 168/77 (07/31/19 0806)  SpO2: 95 % (07/31/19 0554) Vital Signs (24h Range):  Temp:  [96.1 °F (35.6 °C)-99.2 °F (37.3 °C)] 97.3 °F (36.3 °C)  Pulse:  [53-67] 66  Resp:  [16-21] 20  SpO2:  [92 %-97 %] 95 %  BP: (160-192)/(70-87) 168/77     Weight: 109.3 kg (240 lb 15.4 oz)  Body mass index is 30.12 kg/m².     SpO2: 95 %  O2 Device (Oxygen Therapy): room air      Intake/Output Summary (Last 24 hours) at 7/31/2019 1056  Last data filed at 7/31/2019 0800  Gross per 24 hour   Intake 395 ml   Output 900 ml   Net -505 ml       Lines/Drains/Airways     Central Venous Catheter Line                 Port A Cath Single Lumen 07/25/19 2348 right subclavian 5 days                Physical Exam   Constitutional: He is oriented to person, place, and time. He appears well-developed and well-nourished. No distress.   Cardiovascular: Normal rate and regular rhythm. Exam reveals no gallop.   No murmur heard.  Pulmonary/Chest: Effort normal and breath sounds normal. No respiratory distress. He has no wheezes.   Abdominal: Soft. Bowel sounds are normal. He exhibits no distension. There is no tenderness.   Neurological: He is alert and oriented to person, place, and time.   Skin: Skin is warm and dry.       Significant Labs:     Recent Labs   Lab 07/31/19  0725      K 3.9      CO2 26   BUN 46*   CREATININE 2.9*   MG 2.2     Recent Labs   Lab 07/31/19  0725   WBC 7.71   RBC 3.72*   HGB 10.2*   HCT 31.5*   *   MCV 85   MCH 27.4   MCHC 32.4       Significant Imaging:     TTE 12/312018    · Normal left ventricular systolic function. The estimated ejection fraction is 55%  · Grade I (mild) left ventricular diastolic dysfunction consistent with impaired relaxation.  · Normal central venous pressure (3  mm Hg).  · Septal wall has abnormal motion consistent with left bundle branch block.  · Normal left atrial pressure      Assessment and Plan:     Brief HPI: Seen on AM NP rounds with wife at the bedside. Denies any complaints currently. Reviewed angiogram findings from yesterday with patient and wife. Discussed cardiac POC as detailed below-verbalized understanding and agrees with POC     * Toe osteomyelitis, left  -ESR 91 CRP 29.9  -MRI with evidence of osteomyelitis; wound cultures with MRSA present; ID on board and currently on IV Vanc and Zosyn  -continue with abx recs per ID and wound care per Podiatry recs     PAD (peripheral artery disease)  -left great toe wound since 12/2018 with almost complete healing per patient; recurrent worsening of wound 3 prior to admission with fever and chills  -ESR 91 CRP 29.9 with normal WBCs but elevated temp of 102 at home per patient  -BLE arterial ultrasound with elevated velocities to bilateral AT consistent with PAD  -underwent LE angiogram yesterday with following results:  Patent aorta  Bilateral renal artery stenosis  Patent NOEMÍ + EIA   Subtotal R IIA  Patent L CFA, 80% mid L SFA stenosis, 75% L POP with 99% proximal L AT  Patent R CFA, 90% prox R SFA stenosis, 90% ostial PT + PER with subtotal proximal AT  Patent DP and plantar arch bilaterally    -needs  staged intervention of L SFA + POP + AT revascularization and will attempt to proceed tomorrow  -plan for medical management of RLE PAD for now with close follow up; no plans for revascularization of RLE as of now   -continue ASA and statin therapy; will add Plavix daily to regimen     Anemia of chronic renal failure, stage 4 (severe)  -H&H this AM 10.2&31.5 this AM  -stable at baseline of 8-9/25-30 since 12/2018; 10-13/37-41 from 4300-3934  -continue to monitor H&H closely        VTE Risk Mitigation (From admission, onward)        Ordered     heparin infusion 1,000 units/500 ml in 0.9% NaCl (pressure line flush)   Intra-op continuous PRN      07/30/19 1225     IP VTE HIGH RISK PATIENT  Once      07/28/19 1203     Place sequential compression device  Until discontinued      07/26/19 0221          Kayla Ruelas APRN, ANP  Cardiology  [facility]

## 2019-07-31 NOTE — SUBJECTIVE & OBJECTIVE
Interval History: Doing well this am, tolerated procedure well. Asking about when next procedure will be done, I will d/w cardiology and let him know. Talked about grandchildren, he even has 10 great grandchildren.    Review of Systems   Respiratory: Negative for cough and shortness of breath.    Cardiovascular: Negative for chest pain and leg swelling.   Gastrointestinal: Negative for abdominal pain and nausea.   Genitourinary: Negative for decreased urine volume and difficulty urinating.   Skin: Positive for wound. Negative for color change.     Objective:     Vital Signs (Most Recent):  Temp: 97.3 °F (36.3 °C) (07/31/19 0806)  Pulse: 66 (07/31/19 0806)  Resp: 20 (07/31/19 0806)  BP: (!) 168/77 (07/31/19 0806)  SpO2: 95 % (07/31/19 0554) Vital Signs (24h Range):  Temp:  [96.1 °F (35.6 °C)-99.2 °F (37.3 °C)] 97.3 °F (36.3 °C)  Pulse:  [53-67] 66  Resp:  [16-21] 20  SpO2:  [92 %-97 %] 95 %  BP: (160-192)/(70-87) 168/77     Weight: 109.3 kg (240 lb 15.4 oz)  Body mass index is 30.12 kg/m².    Intake/Output Summary (Last 24 hours) at 7/31/2019 0924  Last data filed at 7/31/2019 0800  Gross per 24 hour   Intake 395 ml   Output 900 ml   Net -505 ml      Physical Exam   Constitutional: He is oriented to person, place, and time. No distress.   Pulmonary/Chest: Effort normal. No respiratory distress.   Musculoskeletal: He exhibits no edema.   Neurological: He is alert and oriented to person, place, and time.   Skin: Skin is warm and dry. He is not diaphoretic.   L foot wrapped, c/d/i   Psychiatric: He has a normal mood and affect. His behavior is normal.       Significant Labs:   BMP:   Recent Labs   Lab 07/31/19  0725   *      K 3.9      CO2 26   BUN 46*   CREATININE 2.9*   CALCIUM 9.6   MG 2.2     CBC:   Recent Labs   Lab 07/30/19  0404 07/31/19  0725   WBC 7.94 7.71   HGB 9.5* 10.2*   HCT 29.6* 31.5*   * 117*       Significant Imaging: I have reviewed all pertinent imaging results/findings  within the past 24 hours.

## 2019-07-31 NOTE — PLAN OF CARE
Problem: Adult Inpatient Plan of Care  Goal: Plan of Care Review  Outcome: Ongoing (interventions implemented as appropriate)  Plan of care reviewed patient verbalized understanding. Blood glucose administered per sliding scale. Medications administered. IV antibiotics infused. Left chest wall port a cath single lumen intact, clean and dry. Dressing changed left foot with betadine and kerlix. Cardiac monitoring HR Sinus Bradycardia. Safety maintained contact precautions maintained. Bed in the lowest position, call bell within reach, bed alarm on.

## 2019-07-31 NOTE — PLAN OF CARE
Problem: Adult Inpatient Plan of Care  Goal: Plan of Care Review  Outcome: Ongoing (interventions implemented as appropriate)     07/31/19 0220   Plan of Care Review   Plan of Care Reviewed With patient   Reported no pain.  Wife Patty at bedside.  Dsg to L foot DCI.  2100 bs 213, covered w/3 u novolog.      Tele: SR,  HR,  No alarms.     Bed in lowest position, wheels locked, non skid socks, ID band worn, personal items and call bell with in reach, bed alarm set.

## 2019-07-31 NOTE — PROGRESS NOTES
[facility] Hospital Medicine  Progress Note    Patient Name: Domingo Castro  MRN: [id number]  Patient Class: IP- Inpatient   Admission Date: 7/25/2019  Length of Stay: 5 days  Attending Physician: Christina Tang*  Primary Care Provider: Griselda Nugent MD        Subjective:     Principal Problem:Toe osteomyelitis, left      HPI:  74 y.o. Male with past medical history of DM type 2, Hypertension, Arthritis, CAD, HLD, CKD, left toe osteomyelitis, CHF, Cataract presented to the ED on yesterday complaining of left foot infection.  Patient has been treated in past for osteomyelitis of the same foot in which he completed course of antibiotics and was still receiving wound care and podiatry follow up.  Patient reports that he receives wound care by home health nurse who was concerned that toe appeared infected and notified Podiatrist office who advised patient to go to ED.  Patient is followed by Dr. Gallegos.  Denies fever, chills, SOB, chest pain, sick contacts,  trauma to the toe, change in bladder or bowel habits.  ED findings: H/H 10.3/31.9, Sed rate 91, CRP  29.9, Hgb A1C 8.1, Left forefoot MRI showed osteomyelitis of the distal aspect of the 1st distal phalanx with adjacent marrow edema which may be reactive in nature versus early infectious process.  Patient admitted for medical management.    Overview/Hospital Course:  Hospital Medicine put him on piperacillin-tazobactam and vancomycin. Podiatry consulted Infectious Disease. Wound culture grew MRSA. Per ID will plan for 6 weeks IV abx therapy. Went for LE cath procedure on 7/30, with plan for staged procedure for stenting on 8/1.     No new subjective & objective note has been filed under this hospital service since the last note was generated.      Assessment/Plan:      * Toe osteomyelitis, left  Toe ulcer  -Podiatry consult: wound care  -ID consult: six weeks of IV ABx, has usable port  -Continue vanc/zosyn  -BCx: NGTD  -Wound cultures  MRSA  -PAD management per cardiology    Anemia of chronic renal failure, stage 4 (severe)  Stable, no visual signs of bleeding  -Monitor    Chronic diastolic congestive heart failure  Essential hypertension  Denies chest pain or SOB  -Continue Amlodipine, Coreg, Furosemide and Hydralazine    CKD (chronic kidney disease) stage 4, GFR 15-29 ml/min  Creatinine at baseline, Voiding well  -Strict I&O  -Avoid Nephrotoxic Agents  -Renally dose medications    Dyslipidemia  -Continue statin    Thrombocytopenia, unspecified  Monitor    Type 2 diabetes mellitus with stage 4 chronic kidney disease, with long-term current use of insulin  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, with long-term current use of insulin  Hemoglobin A1c is 8.1  -POC glucose checks ac meals and nightly  -Continue Detemir 26 units nightly  -Increased to Novolog 15 units with meals  -Increased to moderate dose SSI PRN  -Hypoglycemia protocol PRN  -Diabetic Diet    Diabetic ulcer of toe of left foot associated with type 2 diabetes mellitus, with necrosis of bone        Essential hypertension            VTE Risk Mitigation (From admission, onward)        Ordered     heparin infusion 1,000 units/500 ml in 0.9% NaCl (pressure line flush)  Intra-op continuous PRN      07/30/19 1225     IP VTE HIGH RISK PATIENT  Once      07/28/19 1203     Place sequential compression device  Until discontinued      07/26/19 0221                Jazmin Norris PA-C  Department of Hospital Medicine   [facility]

## 2019-07-31 NOTE — PROGRESS NOTES
[facility] Hospital Medicine  Progress Note    Patient Name: Domingo Castro  MRN: [id number]  Patient Class: IP- Inpatient   Admission Date: 7/25/2019  Length of Stay: 5 days  Attending Physician: Christina Tang*  Primary Care Provider: Griselda Nugent MD        Subjective:     Principal Problem:Toe osteomyelitis, left      HPI:  74 y.o. Male with past medical history of DM type 2, Hypertension, Arthritis, CAD, HLD, CKD, left toe osteomyelitis, CHF, Cataract presented to the ED on yesterday complaining of left foot infection.  Patient has been treated in past for osteomyelitis of the same foot in which he completed course of antibiotics and was still receiving wound care and podiatry follow up.  Patient reports that he receives wound care by home health nurse who was concerned that toe appeared infected and notified Podiatrist office who advised patient to go to ED.  Patient is followed by Dr. Gallegos.  Denies fever, chills, SOB, chest pain, sick contacts,  trauma to the toe, change in bladder or bowel habits.  ED findings: H/H 10.3/31.9, Sed rate 91, CRP  29.9, Hgb A1C 8.1, Left forefoot MRI showed osteomyelitis of the distal aspect of the 1st distal phalanx with adjacent marrow edema which may be reactive in nature versus early infectious process.  Patient admitted for medical management.    Overview/Hospital Course:  Hospital Medicine put him on piperacillin-tazobactam and vancomycin. Podiatry consulted Infectious Disease. Wound culture grew MRSA. Per ID will plan for 6 weeks IV abx therapy. Went for LE cath procedure on 7/30, with plan for staged procedure for stenting on 8/1.     Interval History: Doing well this am, tolerated procedure well. Asking about when next procedure will be done, I will d/w cardiology and let him know. Talked about grandchildren, he even has 10 great grandchildren.    Review of Systems   Respiratory: Negative for cough and shortness of breath.     Cardiovascular: Negative for chest pain and leg swelling.   Gastrointestinal: Negative for abdominal pain and nausea.   Genitourinary: Negative for decreased urine volume and difficulty urinating.   Skin: Positive for wound. Negative for color change.     Objective:     Vital Signs (Most Recent):  Temp: 97.3 °F (36.3 °C) (07/31/19 0806)  Pulse: 66 (07/31/19 0806)  Resp: 20 (07/31/19 0806)  BP: (!) 168/77 (07/31/19 0806)  SpO2: 95 % (07/31/19 0554) Vital Signs (24h Range):  Temp:  [96.1 °F (35.6 °C)-99.2 °F (37.3 °C)] 97.3 °F (36.3 °C)  Pulse:  [53-67] 66  Resp:  [16-21] 20  SpO2:  [92 %-97 %] 95 %  BP: (160-192)/(70-87) 168/77     Weight: 109.3 kg (240 lb 15.4 oz)  Body mass index is 30.12 kg/m².    Intake/Output Summary (Last 24 hours) at 7/31/2019 0924  Last data filed at 7/31/2019 0800  Gross per 24 hour   Intake 395 ml   Output 900 ml   Net -505 ml      Physical Exam   Constitutional: He is oriented to person, place, and time. No distress.   Pulmonary/Chest: Effort normal. No respiratory distress.   Musculoskeletal: He exhibits no edema.   Neurological: He is alert and oriented to person, place, and time.   Skin: Skin is warm and dry. He is not diaphoretic.   L foot wrapped, c/d/i   Psychiatric: He has a normal mood and affect. His behavior is normal.       Significant Labs:   BMP:   Recent Labs   Lab 07/31/19  0725   *      K 3.9      CO2 26   BUN 46*   CREATININE 2.9*   CALCIUM 9.6   MG 2.2     CBC:   Recent Labs   Lab 07/30/19  0404 07/31/19  0725   WBC 7.94 7.71   HGB 9.5* 10.2*   HCT 29.6* 31.5*   * 117*       Significant Imaging: I have reviewed all pertinent imaging results/findings within the past 24 hours.      Assessment/Plan:      * Toe osteomyelitis, left  Toe ulcer  -Podiatry consult: wound care  -ID consult: six weeks of IV ABx, has usable port  -Continue vanc/zosyn  -BCx: NGTD  -Wound cultures MRSA  -PAD management per cardiology    Anemia of chronic renal failure,  stage 4 (severe)  Stable, no visual signs of bleeding  -Monitor    Chronic diastolic congestive heart failure  Essential hypertension  Denies chest pain or SOB  -Continue Amlodipine, Coreg, Furosemide and Hydralazine    CKD (chronic kidney disease) stage 4, GFR 15-29 ml/min  Creatinine at baseline, Voiding well  -Strict I&O  -Avoid Nephrotoxic Agents  -Renally dose medications    Dyslipidemia  -Continue statin    Thrombocytopenia, unspecified  Monitor    Type 2 diabetes mellitus with stage 4 chronic kidney disease, with long-term current use of insulin  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, with long-term current use of insulin  Hemoglobin A1c is 8.1  -POC glucose checks ac meals and nightly  -Continue Detemir 26 units nightly  -Increased to Novolog 15 units with meals  -Increased to moderate dose SSI PRN  -Hypoglycemia protocol PRN  -Diabetic Diet    Diabetic ulcer of toe of left foot associated with type 2 diabetes mellitus, with necrosis of bone        Essential hypertension            VTE Risk Mitigation (From admission, onward)        Ordered     heparin infusion 1,000 units/500 ml in 0.9% NaCl (pressure line flush)  Intra-op continuous PRN      07/30/19 1225     IP VTE HIGH RISK PATIENT  Once      07/28/19 1203     Place sequential compression device  Until discontinued      07/26/19 0221                Jazmin Norris PA-C  Department of Hospital Medicine   [facility]

## 2019-07-31 NOTE — PROGRESS NOTES
[facility] Infectious Disease Progress Note     Primary Team: Hospitalist  Consultant Attending: Madhavi  Date of Admit: 7/25/2019    Summary of history     Domingo Castro is a 74 y.o. male with a relevant history of IDT2DM, Hypertension, HLD, CKD stage IV, left toe osteomyelitis in December of 2018, CHF with unknown EF for which he takes 40mg Lasix daily.     Patient presented to the ED on 7/25 with a complaint of an ulcer on the plantar surface of his left great toe. Patient stated that he first noticed a small ulceration at the site on 7/19/19 and that it became progressively worse throughout the week. The patient has significant diabetic neuropathy and does not have intact sensation in his feet. He follows up with Dr. Gallegos for podiatry. On admission MRI and radiograph of the left foot were obtained, which were suggestive of osteomyelitis in the toe.      Patient has been admitted from 12/23/18 to 1/6/19 at this facility due to osteomyelitis of the same toe and a pneumonia. At the time he was found to have Strep pyogenes bacteremia and strep pyogenes in the culture from his infected toe. He completed a course of Rocephin on 2/4/19. He currently appears to be receiving regular wound care and podiatry follow up. Patient reports that he receives wound care by home health nurse who was concerned that his toe appeared infected today and notified Podiatrist office who advised patient to go to ED.      On admission he denied fever, chills, SOB, chest pain, sick contacts,  trauma to the toe, change in bladder or bowel habits.  ED findings: H/H 10.3/31.9, Sed rate 91, CRP  29.9, Hgb A1C 8.1     Interval events     Bone biopsy (7/27)  Bilateral US of LE showed significant stenosis (7/27)  Aortogram with run off via R radial access  (7/30)    Subjective     Patient reports no complaints overnight. Denies any fever, chills, nausea, vomiting, or pain/discomfort.    Current Medications:   heparin (porcine)          Scheduled:    amLODIPine  10 mg Oral Daily    aspirin  81 mg Oral Daily    carvedilol  25 mg Oral BID WM    furosemide  80 mg Oral BID    gabapentin  100 mg Oral TID    hydrALAZINE  50 mg Oral Q12H    insulin aspart U-100  15 Units Subcutaneous TIDWM    insulin detemir U-100  26 Units Subcutaneous QHS    metroNIDAZOLE  500 mg Oral Q8H    rosuvastatin  20 mg Oral QHS    vancomycin (VANCOCIN) IVPB  750 mg Intravenous Q24H        PRN:  acetaminophen, acetaminophen, acetaminophen, bisacodyl, cloNIDine, Dextrose 10% Bolus, Dextrose 10% Bolus, glucagon (human recombinant), glucose, glucose, heparin (porcine), hydrALAZINE, HYDROcodone-acetaminophen, insulin aspart U-100, lactulose, ondansetron, sodium chloride 0.9%    Antibiotics and Day Number of Therapy:  Vancomycin:  - current  Zosyn:  -   Metronidazole:  - current    Objective   Last 24 Hour Vital Signs:  BP  Min: 160/71  Max: 192/87  Temp  Av °F (36.7 °C)  Min: 96.1 °F (35.6 °C)  Max: 99.2 °F (37.3 °C)  Pulse  Av.5  Min: 53  Max: 67  Resp  Av.9  Min: 16  Max: 21  SpO2  Av.2 %  Min: 92 %  Max: 97 %    Lines, drains, airway:  Port A Cath ()    Physical Examination:  Examination  General: Patient sitting up eating in NAD  HEENT: normocephalic, atraumatic  Neck: No thyromegaly or masses   Cardiac: RRR, no murmurs appreciated, no extra heart sounds  Pulmonary/Chest: CTAB, symmetric chest rise, no wheezing or crackles  GI: Soft, non tender, non distended, normoactive bowel sounds  Extremities: no edema, clubbing, or cyanosis  Skin: dry, warm, intact. No bruising or rashes.   - left foot wrapped with clean, dry, intact bandage; no erythema or edema noted above bandage site  Neuro: Alert and oriented    Lab data:  CBC:   Lab Results   Component Value Date    WBC 7.71 2019    HGB 10.2 (L) 2019     (L) 2019    MCV 85 2019    RDW 14.1 2019     Estimated Creatinine Clearance: 29.8 mL/min (A) (based on SCr of  2.9 mg/dL (H)).    Microbiology Data  Blood cultures = negative  Wound culture = MRSA  Bone Culture = MRSA    Radiology  MRI Foot (7/26)  Osteomyelitis of the distal aspect of the 1st distal phalanx with adjacent marrow edema which may be reactive in nature versus early infectious process.    Soft tissue swelling, likely related to cellulitis.  No drainable fluid collections.    US LE (7/27)  Atherosclerosis with focal elevated segmental velocities present in the proximal/mid left SFA, proximal left anterior tibial artery, and proximal right anterior tibial artery suggesting greater than 50% focal stenosis.    Abnormal arterial waveforms present in the bilateral thighs, worse on the left.  Cannot exclude more proximal aortoiliac stenosis.    Procedures  Aortogram with Run Off via R Radial Access  - Patent aorta  - Bilateral renal artery stenosis  - Patient NOEMÍ + EIA  - Subtotal R Internal Iliac artery  - Patent L CFA, 80% mid L SFA stenosis, 75% L POP with 99% proximal L AT  - Patent R CFA, 90% prox R SFA stenosis   - 90% ostial PT + PER with subtotal proximal AT  - Patent DP and plantar arch bilaterally  - Plan: Stage L SFA + POP + AT revascularization        Assessment     Domingo Castro is a 74 y.o.male with past medical history of IDT2DM, Hypertension, HLD, CKD stage IV, left toe osteomyelitis in December of 2018, CHF with unknown EF. Patient presented to the ED on 7/25 with a complaint of an ulcer on the plantar surface of his left great toe. MRI resulted in osteomyelitis of 1st distal phalanx. Awaiting wound culture results to narrow antibiotic treatment.    Recommendations     Osteomyelitis of Left 1st Distal Phalanx  - Continue Vancomycin 1g IV q24h for MRSA coverage  - Anticipate 6 weeks of antibiotic coverage   - Start date = 7/25  End date = 9/5  - Discontinue Metronidazole 500mg TID PO due to negative anaerobic cultures  - Will need follow up with Yadira REMY in 3 weeks  - Patient will need weekly  antibiotic level, CMP, CBC, ESR, CRP. Please have the labs faxed to Dr. Hernandez at [phone] until the patients clinic appointment.    Thank you for the consult. Please call with any questions.    Evelia Palmer MD  hospitals Internal Medicine Resident, -I

## 2019-07-31 NOTE — SUBJECTIVE & OBJECTIVE
Review of Systems   Constitution: Negative for chills, decreased appetite, diaphoresis and fever.   Cardiovascular: Negative for chest pain, claudication, cyanosis, dyspnea on exertion, irregular heartbeat, leg swelling, near-syncope, orthopnea, palpitations, paroxysmal nocturnal dyspnea and syncope.   Respiratory: Negative for cough, hemoptysis, shortness of breath and wheezing.    Gastrointestinal: Negative for bloating, abdominal pain, constipation, diarrhea, melena, nausea and vomiting.   Neurological: Negative for dizziness and weakness.     Objective:     Vital Signs (Most Recent):  Temp: 97.3 °F (36.3 °C) (07/31/19 0806)  Pulse: 66 (07/31/19 0806)  Resp: 20 (07/31/19 0806)  BP: (!) 168/77 (07/31/19 0806)  SpO2: 95 % (07/31/19 0554) Vital Signs (24h Range):  Temp:  [96.1 °F (35.6 °C)-99.2 °F (37.3 °C)] 97.3 °F (36.3 °C)  Pulse:  [53-67] 66  Resp:  [16-21] 20  SpO2:  [92 %-97 %] 95 %  BP: (160-192)/(70-87) 168/77     Weight: 109.3 kg (240 lb 15.4 oz)  Body mass index is 30.12 kg/m².     SpO2: 95 %  O2 Device (Oxygen Therapy): room air      Intake/Output Summary (Last 24 hours) at 7/31/2019 1056  Last data filed at 7/31/2019 0800  Gross per 24 hour   Intake 395 ml   Output 900 ml   Net -505 ml       Lines/Drains/Airways     Central Venous Catheter Line                 Port A Cath Single Lumen 07/25/19 2348 right subclavian 5 days                Physical Exam   Constitutional: He is oriented to person, place, and time. He appears well-developed and well-nourished. No distress.   Cardiovascular: Normal rate and regular rhythm. Exam reveals no gallop.   No murmur heard.  Pulmonary/Chest: Effort normal and breath sounds normal. No respiratory distress. He has no wheezes.   Abdominal: Soft. Bowel sounds are normal. He exhibits no distension. There is no tenderness.   Neurological: He is alert and oriented to person, place, and time.   Skin: Skin is warm and dry.       Significant Labs:     Recent Labs   Lab  07/31/19  0725      K 3.9      CO2 26   BUN 46*   CREATININE 2.9*   MG 2.2     Recent Labs   Lab 07/31/19  0725   WBC 7.71   RBC 3.72*   HGB 10.2*   HCT 31.5*   *   MCV 85   MCH 27.4   MCHC 32.4       Significant Imaging:     TTE 12/450700    · Normal left ventricular systolic function. The estimated ejection fraction is 55%  · Grade I (mild) left ventricular diastolic dysfunction consistent with impaired relaxation.  · Normal central venous pressure (3 mm Hg).  · Septal wall has abnormal motion consistent with left bundle branch block.  · Normal left atrial pressure

## 2019-07-31 NOTE — NURSING
Patient safety maintained. Medications given as ordered. Assistance provided as needed. Right wrist incision dressing clean, dry, intact. No pain, redness, or any complications to site. Continue monitoring glucose levels. Coreg held today due to HR on 50's; if this continues tomorrow NEHA Wu recommends to evaluate the admistration of this medication with cardiology team.

## 2019-08-01 ENCOUNTER — TELEPHONE (OUTPATIENT)
Dept: CARDIOLOGY | Facility: CLINIC | Age: 74
End: 2019-08-01

## 2019-08-01 LAB
ANION GAP SERPL CALC-SCNC: 7 MMOL/L (ref 8–16)
BUN SERPL-MCNC: 48 MG/DL (ref 8–23)
CALCIUM SERPL-MCNC: 9.4 MG/DL (ref 8.7–10.5)
CHLORIDE SERPL-SCNC: 104 MMOL/L (ref 95–110)
CO2 SERPL-SCNC: 30 MMOL/L (ref 23–29)
CREAT SERPL-MCNC: 2.9 MG/DL (ref 0.5–1.4)
ERYTHROCYTE [DISTWIDTH] IN BLOOD BY AUTOMATED COUNT: 14 % (ref 11.5–14.5)
EST. GFR  (AFRICAN AMERICAN): 24 ML/MIN/1.73 M^2
EST. GFR  (NON AFRICAN AMERICAN): 20 ML/MIN/1.73 M^2
GLUCOSE SERPL-MCNC: 152 MG/DL (ref 70–110)
HCT VFR BLD AUTO: 29.6 % (ref 40–54)
HGB BLD-MCNC: 9.7 G/DL (ref 14–18)
MAGNESIUM SERPL-MCNC: 2.1 MG/DL (ref 1.6–2.6)
MCH RBC QN AUTO: 27.9 PG (ref 27–31)
MCHC RBC AUTO-ENTMCNC: 32.8 G/DL (ref 32–36)
MCV RBC AUTO: 85 FL (ref 82–98)
PHOSPHATE SERPL-MCNC: 3.6 MG/DL (ref 2.7–4.5)
PLATELET # BLD AUTO: 129 K/UL (ref 150–350)
PMV BLD AUTO: 10.7 FL (ref 9.2–12.9)
POC ACTIVATED CLOTTING TIME K: 142 SEC (ref 74–137)
POC ACTIVATED CLOTTING TIME K: 208 SEC (ref 74–137)
POC ACTIVATED CLOTTING TIME K: 224 SEC (ref 74–137)
POC ACTIVATED CLOTTING TIME K: 235 SEC (ref 74–137)
POC ACTIVATED CLOTTING TIME K: 246 SEC (ref 74–137)
POC ACTIVATED CLOTTING TIME K: 268 SEC (ref 74–137)
POCT GLUCOSE: 130 MG/DL (ref 70–110)
POCT GLUCOSE: 165 MG/DL (ref 70–110)
POCT GLUCOSE: 170 MG/DL (ref 70–110)
POCT GLUCOSE: 171 MG/DL (ref 70–110)
POTASSIUM SERPL-SCNC: 3.4 MMOL/L (ref 3.5–5.1)
RBC # BLD AUTO: 3.48 M/UL (ref 4.6–6.2)
SAMPLE: ABNORMAL
SODIUM SERPL-SCNC: 141 MMOL/L (ref 136–145)
VANCOMYCIN TROUGH SERPL-MCNC: 18.4 UG/ML (ref 10–22)
WBC # BLD AUTO: 8.37 K/UL (ref 3.9–12.7)

## 2019-08-01 PROCEDURE — 80048 BASIC METABOLIC PNL TOTAL CA: CPT

## 2019-08-01 PROCEDURE — C1760 CLOSURE DEV, VASC: HCPCS | Performed by: INTERNAL MEDICINE

## 2019-08-01 PROCEDURE — 85027 COMPLETE CBC AUTOMATED: CPT

## 2019-08-01 PROCEDURE — 75710 PR  ANGIO EXTREMITY UNILAT: ICD-10-PCS | Mod: 26,59,, | Performed by: INTERNAL MEDICINE

## 2019-08-01 PROCEDURE — 11000001 HC ACUTE MED/SURG PRIVATE ROOM

## 2019-08-01 PROCEDURE — 99152 MOD SED SAME PHYS/QHP 5/>YRS: CPT | Mod: ,,, | Performed by: INTERNAL MEDICINE

## 2019-08-01 PROCEDURE — C1887 CATHETER, GUIDING: HCPCS | Performed by: INTERNAL MEDICINE

## 2019-08-01 PROCEDURE — C2623 CATH, TRANSLUMIN, DRUG-COAT: HCPCS | Performed by: INTERNAL MEDICINE

## 2019-08-01 PROCEDURE — 63600175 PHARM REV CODE 636 W HCPCS: Performed by: INTERNAL MEDICINE

## 2019-08-01 PROCEDURE — 37229 PR TIB/PER REVASC W/ATHERECTOMY: ICD-10-PCS | Mod: 51,LT,, | Performed by: INTERNAL MEDICINE

## 2019-08-01 PROCEDURE — 80202 ASSAY OF VANCOMYCIN: CPT

## 2019-08-01 PROCEDURE — 37229 HC TIB/PER REVASC W/ATHER: CPT | Mod: 51,LT | Performed by: INTERNAL MEDICINE

## 2019-08-01 PROCEDURE — 99231 SBSQ HOSP IP/OBS SF/LOW 25: CPT | Mod: ,,, | Performed by: PODIATRIST

## 2019-08-01 PROCEDURE — 37225 HC FEM/POPL REVAS W/ATHER: CPT | Mod: LT | Performed by: INTERNAL MEDICINE

## 2019-08-01 PROCEDURE — 25000003 PHARM REV CODE 250: Performed by: INTERNAL MEDICINE

## 2019-08-01 PROCEDURE — 25000003 PHARM REV CODE 250: Performed by: NURSE PRACTITIONER

## 2019-08-01 PROCEDURE — 94761 N-INVAS EAR/PLS OXIMETRY MLT: CPT

## 2019-08-01 PROCEDURE — C1894 INTRO/SHEATH, NON-LASER: HCPCS | Performed by: INTERNAL MEDICINE

## 2019-08-01 PROCEDURE — C1725 CATH, TRANSLUMIN NON-LASER: HCPCS | Performed by: INTERNAL MEDICINE

## 2019-08-01 PROCEDURE — 63600175 PHARM REV CODE 636 W HCPCS: Performed by: FAMILY MEDICINE

## 2019-08-01 PROCEDURE — 84100 ASSAY OF PHOSPHORUS: CPT

## 2019-08-01 PROCEDURE — 75710 ARTERY X-RAYS ARM/LEG: CPT | Mod: 26,59,, | Performed by: INTERNAL MEDICINE

## 2019-08-01 PROCEDURE — 75710 ARTERY X-RAYS ARM/LEG: CPT | Mod: 59 | Performed by: INTERNAL MEDICINE

## 2019-08-01 PROCEDURE — 85347 COAGULATION TIME ACTIVATED: CPT | Performed by: INTERNAL MEDICINE

## 2019-08-01 PROCEDURE — 99231 PR SUBSEQUENT HOSPITAL CARE,LEVL I: ICD-10-PCS | Mod: ,,, | Performed by: PODIATRIST

## 2019-08-01 PROCEDURE — 37229 PR TIB/PER REVASC W/ATHERECTOMY: CPT | Mod: 51,LT,, | Performed by: INTERNAL MEDICINE

## 2019-08-01 PROCEDURE — 99152 PR MOD CONSCIOUS SEDATION, SAME PHYS, 5+ YRS, FIRST 15 MIN: ICD-10-PCS | Mod: ,,, | Performed by: INTERNAL MEDICINE

## 2019-08-01 PROCEDURE — 27201423 OPTIME MED/SURG SUP & DEVICES STERILE SUPPLY: Performed by: INTERNAL MEDICINE

## 2019-08-01 PROCEDURE — C1726 CATH, BAL DIL, NON-VASCULAR: HCPCS | Performed by: INTERNAL MEDICINE

## 2019-08-01 PROCEDURE — 37225 PR FEM/POPL REVAS W/ATHERECTOMY: ICD-10-PCS | Mod: LT,,, | Performed by: INTERNAL MEDICINE

## 2019-08-01 PROCEDURE — C1769 GUIDE WIRE: HCPCS | Performed by: INTERNAL MEDICINE

## 2019-08-01 PROCEDURE — 37225 PR FEM/POPL REVAS W/ATHERECTOMY: CPT | Mod: LT,,, | Performed by: INTERNAL MEDICINE

## 2019-08-01 PROCEDURE — 83735 ASSAY OF MAGNESIUM: CPT

## 2019-08-01 RX ORDER — HYDRALAZINE HYDROCHLORIDE 20 MG/ML
INJECTION INTRAMUSCULAR; INTRAVENOUS
Status: DISCONTINUED | OUTPATIENT
Start: 2019-08-01 | End: 2019-08-01 | Stop reason: HOSPADM

## 2019-08-01 RX ORDER — FENTANYL CITRATE 50 UG/ML
INJECTION, SOLUTION INTRAMUSCULAR; INTRAVENOUS
Status: DISCONTINUED | OUTPATIENT
Start: 2019-08-01 | End: 2019-08-01 | Stop reason: HOSPADM

## 2019-08-01 RX ORDER — MIDAZOLAM HYDROCHLORIDE 1 MG/ML
INJECTION, SOLUTION INTRAMUSCULAR; INTRAVENOUS
Status: DISCONTINUED | OUTPATIENT
Start: 2019-08-01 | End: 2019-08-01 | Stop reason: HOSPADM

## 2019-08-01 RX ORDER — DIPHENHYDRAMINE HYDROCHLORIDE 50 MG/ML
INJECTION INTRAMUSCULAR; INTRAVENOUS
Status: DISCONTINUED | OUTPATIENT
Start: 2019-08-01 | End: 2019-08-01 | Stop reason: HOSPADM

## 2019-08-01 RX ORDER — VERAPAMIL HYDROCHLORIDE 2.5 MG/ML
INJECTION, SOLUTION INTRAVENOUS
Status: DISCONTINUED | OUTPATIENT
Start: 2019-08-01 | End: 2019-08-01 | Stop reason: HOSPADM

## 2019-08-01 RX ORDER — HEPARIN SODIUM 200 [USP'U]/100ML
INJECTION, SOLUTION INTRAVENOUS
Status: DISCONTINUED | OUTPATIENT
Start: 2019-08-01 | End: 2019-08-02 | Stop reason: HOSPADM

## 2019-08-01 RX ORDER — HEPARIN SODIUM 1000 [USP'U]/ML
INJECTION, SOLUTION INTRAVENOUS; SUBCUTANEOUS
Status: DISCONTINUED | OUTPATIENT
Start: 2019-08-01 | End: 2019-08-01 | Stop reason: HOSPADM

## 2019-08-01 RX ORDER — LIDOCAINE HYDROCHLORIDE 10 MG/ML
INJECTION INFILTRATION; PERINEURAL
Status: DISCONTINUED | OUTPATIENT
Start: 2019-08-01 | End: 2019-08-01 | Stop reason: HOSPADM

## 2019-08-01 RX ORDER — SODIUM CHLORIDE 9 MG/ML
INJECTION, SOLUTION INTRAVENOUS
Status: DISCONTINUED | OUTPATIENT
Start: 2019-08-01 | End: 2019-08-02 | Stop reason: HOSPADM

## 2019-08-01 RX ADMIN — CLOPIDOGREL BISULFATE 75 MG: 75 TABLET ORAL at 08:08

## 2019-08-01 RX ADMIN — HYDROCODONE BITARTRATE AND ACETAMINOPHEN 1 TABLET: 5; 325 TABLET ORAL at 03:08

## 2019-08-01 RX ADMIN — VANCOMYCIN HYDROCHLORIDE 750 MG: 750 INJECTION, POWDER, LYOPHILIZED, FOR SOLUTION INTRAVENOUS at 04:08

## 2019-08-01 RX ADMIN — GABAPENTIN 100 MG: 100 CAPSULE ORAL at 03:08

## 2019-08-01 RX ADMIN — GABAPENTIN 100 MG: 100 CAPSULE ORAL at 09:08

## 2019-08-01 RX ADMIN — HYDROCODONE BITARTRATE AND ACETAMINOPHEN 1 TABLET: 5; 325 TABLET ORAL at 12:08

## 2019-08-01 RX ADMIN — ASPIRIN 81 MG: 81 TABLET, COATED ORAL at 08:08

## 2019-08-01 RX ADMIN — CARVEDILOL 25 MG: 25 TABLET, FILM COATED ORAL at 04:08

## 2019-08-01 RX ADMIN — INSULIN DETEMIR 26 UNITS: 100 INJECTION, SOLUTION SUBCUTANEOUS at 09:08

## 2019-08-01 RX ADMIN — CARVEDILOL 25 MG: 25 TABLET, FILM COATED ORAL at 07:08

## 2019-08-01 RX ADMIN — FUROSEMIDE 80 MG: 40 TABLET ORAL at 09:08

## 2019-08-01 RX ADMIN — INSULIN ASPART 1 UNITS: 100 INJECTION, SOLUTION INTRAVENOUS; SUBCUTANEOUS at 09:08

## 2019-08-01 RX ADMIN — HYDRALAZINE HYDROCHLORIDE 100 MG: 25 TABLET, FILM COATED ORAL at 09:08

## 2019-08-01 RX ADMIN — ROSUVASTATIN CALCIUM 20 MG: 10 TABLET, FILM COATED ORAL at 09:08

## 2019-08-01 NOTE — PLAN OF CARE
1155 pt transferred to recovery cath lab, bay 2 via stretcher, side-rails up x2.  Pt connected to cardiac monitor.  Vital signs stable. Pt complaining of pain to left center back, states he has HX of back pain due to deteriorated lumbar discs. Right groin insertion site covered with gauze/tegaderm, intact/dry with no bleeding or hematoma.  Doppler bilateral pedal pulses.  Skin warm to touch, normal in color.  Neuro intact:  Sleepy, but arrousable .  Updated patient's family.  Will continue to monitor patient.

## 2019-08-01 NOTE — BRIEF OP NOTE
S/p left lower extremity revascularization for CLI       R CFA access   L AT retrograde access         Crossed L AT lesions with retrograde wire manipulation         Atherectomy with 2.0 Classic CSI   PTA of AT with 4.0 x 150 mm Lutonix DCB   PTA of PT with 4.0 x 120 mm balloon after occlusion from distal embolization   PTA of SFA with 7.0 x 60 mm Lutonix DCB       R CFA closed with perclose   L AT retrograde access close with manual pressure          Plan:       Aspirin + plavix   Intense statin therapy   Target BP < 130/80 mmHg   Antibiotics per ID   Wound care   Surveillance US + TcPO2    Follow up with Dr. Hyatt in [place] in 2 weeks           Full report to follow

## 2019-08-01 NOTE — NURSING
Arrived from the post-cath AAOx4. Pulses palpable, used doppler for lower extemiites. Right groin dressing intact, clean and dry. No swelling, No hematomas. No numbness no tingling.

## 2019-08-01 NOTE — PLAN OF CARE
Problem: Adult Inpatient Plan of Care  Goal: Patient-Specific Goal (Individualization)  Outcome: Revised  Chart review done.

## 2019-08-01 NOTE — PLAN OF CARE
Patient repositioned with a warm blanket under his left hip.  Patient states that this  helped with his pain and he was able to fall asleep.  Will continue to monitor.

## 2019-08-01 NOTE — SUBJECTIVE & OBJECTIVE
Interval History: Sleeping but arousable post procedure. Drowsy from pain medicine.     Review of Systems   Unable to perform ROS: Mental status change     Objective:     Vital Signs (Most Recent):  Temp: 96.3 °F (35.7 °C) (08/01/19 1610)  Pulse: 64 (08/01/19 1610)  Resp: 16 (08/01/19 1610)  BP: (!) 169/75 (08/01/19 1610)  SpO2: 99 % (08/01/19 1610) Vital Signs (24h Range):  Temp:  [96.3 °F (35.7 °C)-98.7 °F (37.1 °C)] 96.3 °F (35.7 °C)  Pulse:  [52-64] 64  Resp:  [10-36] 16  SpO2:  [94 %-99 %] 99 %  BP: (106-185)/(68-85) 169/75     Weight: 109.3 kg (240 lb 15.4 oz)  Body mass index is 30.12 kg/m².    Intake/Output Summary (Last 24 hours) at 8/1/2019 1805  Last data filed at 8/1/2019 0000  Gross per 24 hour   Intake 125 ml   Output 400 ml   Net -275 ml      Physical Exam   Constitutional: No distress.   Pulmonary/Chest: Effort normal. No respiratory distress.   Skin: Skin is warm and dry. He is not diaphoretic.   Psychiatric: He has a normal mood and affect. His behavior is normal.       Significant Labs:   BMP:   Recent Labs   Lab 08/01/19  0452   *      K 3.4*      CO2 30*   BUN 48*   CREATININE 2.9*   CALCIUM 9.4   MG 2.1     CBC:   Recent Labs   Lab 07/31/19  0725 08/01/19  0452   WBC 7.71 8.37   HGB 10.2* 9.7*   HCT 31.5* 29.6*   * 129*       Significant Imaging: I have reviewed all pertinent imaging results/findings within the past 24 hours.

## 2019-08-01 NOTE — SUBJECTIVE & OBJECTIVE
Interval Hx:  S/p revascularization per Dr. Hyatt.  Patient Seen at bedside for dressing change.  Patient denies F/C/N/V.  Dressings clean, dry, and intact.    Scheduled Meds:   amLODIPine  10 mg Oral Daily    aspirin  81 mg Oral Daily    carvedilol  25 mg Oral BID WM    clopidogrel  75 mg Oral Daily    furosemide  80 mg Oral BID    gabapentin  100 mg Oral TID    hydrALAZINE  100 mg Oral Q12H    insulin aspart U-100  15 Units Subcutaneous TIDWM    insulin detemir U-100  26 Units Subcutaneous QHS    metroNIDAZOLE  500 mg Oral Q8H    rosuvastatin  20 mg Oral QHS    vancomycin (VANCOCIN) IVPB  750 mg Intravenous Q24H     Continuous Infusions:   sodium chloride 0.9% 3 mL/kg/hr (08/01/19 0822)    heparin (porcine)      heparin (porcine)       PRN Meds:sodium chloride 0.9%, acetaminophen, acetaminophen, acetaminophen, bisacodyl, cloNIDine, Dextrose 10% Bolus, Dextrose 10% Bolus, diphenhydrAMINE, fentaNYL, glucagon (human recombinant), glucose, glucose, heparin (porcine), heparin (porcine), heparin (porcine), hydrALAZINE, hydrALAZINE, HYDROcodone-acetaminophen, insulin aspart U-100, lactulose, lidocaine HCL 10 mg/ml (1%), midazolam, nitroglycerin 200mcg/mL, ondansetron, sodium chloride 0.9%, verapamil    Review of patient's allergies indicates:   Allergen Reactions    Atorvastatin Other (See Comments)        Past Medical History:   Diagnosis Date    Arthritis     Cataract     Chronic diastolic congestive heart failure     Coronary artery disease     Cystoid macular edema of both eyes     Diabetes mellitus type II     Hyperlipemia     Hypertension     Retinal hole     Stage 4 chronic kidney disease     Streptococcus pyogenes bacteremia 12/23/2018    Due to left toe osteomyelitis     Past Surgical History:   Procedure Laterality Date    AORTOGRAM-ABDOMINAL N/A 7/30/2019    [facility]formed [facility]ish [facility]att [facility] CATH LAB[facility]EP    BLEPHAROPLASTY Bilateral 8/30/2017    Performed by Jane [facility]no [facility]  at [facility] OR 1ST FLR    CATARACT EXTRACTION W/  INTRAOCULAR LENS IMPLANT Left 12/15/14    Dr martin    CATARACT EXTRACTION W/  INTRAOCULAR LENS IMPLANT Right 14    dorothy    CIRCUMCISION, NON-      focal laser right eye      INSERTION-INTRAOCULAR LENS (IOL) Right 2014    Performed by Jaron Martin [facility] at [facility] OR    INSERTION-INTRAOCULAR LENS (IOL) Left 12/15/2014    Performed by Jaron Martin MD at [facility] OR    FFFUOPRXR-YUZU-E-CATH Right 2019    Performed by [facility]ys Aleman Jr., [facility] at [facility] OR    KNEE SURGERY      left    Left medial collateral ligament repair      PHACOEMULSIFICATION-ASPIRATION-CATARACT Right 2014    Performed by Jaron Martin MD at [facility] OR    PHACOEMULSIFICATION-ASPIRATION-CATARACT Left 12/15/2014    Performed by Jaron Martin MD at [facility] OR    REPAIR-PTOSIS/BILATERAL EXTERNAL LEVATORS Bilateral 2017    Performed by Jane [facility] at [facility] OR 1ST FLR    TONSILLECTOMY         Family History     Problem Relation (Age of Onset)    Cancer Mother, Brother, Sister    Cataracts Paternal Grandmother    Diabetes Father    Glaucoma Paternal Grandmother    Heart disease Mother, Father        Tobacco Use    Smoking status: Never Smoker    Smokeless tobacco: Never Used   Substance and Sexual Activity    Alcohol use: No     Alcohol/week: 0.0 oz    Drug use: No    Sexual activity: Yes     Partners: Female     Review of Systems   Constitutional: Negative for chills and fever.   Cardiovascular: Positive for leg swelling.   Gastrointestinal: Negative for nausea and vomiting.   Musculoskeletal: Negative for arthralgias and joint swelling.   Skin: Positive for wound. Negative for rash.   Psychiatric/Behavioral: Negative for agitation and confusion.     Objective:     Vital Signs (Most Recent):  Temp: 96.5 °F (35.8 °C) (19 0741)  Pulse: (!) 54 (19 1200)  Resp: 15 (19 1200)  BP: 124/69 (19 1200)  SpO2: 98 % (19 1200) Vital Signs (24h  Range):  Temp:  [96.5 °F (35.8 °C)-98.7 °F (37.1 °C)] 96.5 °F (35.8 °C)  Pulse:  [54-60] 54  Resp:  [15-18] 15  SpO2:  [94 %-99 %] 98 %  BP: (124-185)/(69-85) 124/69     Weight: 109.3 kg (240 lb 15.4 oz)  Body mass index is 30.12 kg/m².    Foot Exam    General  Orientation: alert and oriented to person, place, and time   Affect: appropriate       Right Foot/Ankle     Inspection and Palpation  Ecchymosis: none  Tenderness: none   Swelling: none     Neurovascular  Dorsalis pedis: 1+  Posterior tibial: 1+  Saphenous nerve sensation: diminished  Tibial nerve sensation: diminished  Superficial peroneal nerve sensation: diminished  Deep peroneal nerve sensation: diminished  Sural nerve sensation: diminished      Left Foot/Ankle      Inspection and Palpation  Ecchymosis: none  Tenderness: none     Neurovascular  Dorsalis pedis: 1+  Posterior tibial: 1+  Saphenous nerve sensation: diminished  Tibial nerve sensation: diminished  Superficial peroneal nerve sensation: diminished  Deep peroneal nerve sensation: diminished  Sural nerve sensation: diminished            Laboratory:  A1C:   Recent Labs   Lab 02/22/19  0941 07/26/19  0529   HGBA1C 8.2* 8.1*     CBC:   Recent Labs   Lab 08/01/19  0452   WBC 8.37   RBC 3.48*   HGB 9.7*   HCT 29.6*   *   MCV 85   MCH 27.9   MCHC 32.8     CMP:   Recent Labs   Lab 07/31/19  0725 08/01/19  0452   * 152*   CALCIUM 9.6 9.4   ALBUMIN 3.0*  --    PROT 7.9  --     141   K 3.9 3.4*   CO2 26 30*    104   BUN 46* 48*   CREATININE 2.9* 2.9*   ALKPHOS 68  --    ALT 11  --    AST 17  --    BILITOT 0.3  --      CRP:   Recent Labs   Lab 07/25/19  2353   CRP 29.9*     ESR:   Recent Labs   Lab 07/25/19  2353   SEDRATE 91*     Wound Cultures:   Recent Labs   Lab 07/26/19  1114 07/27/19  0942   LABAERO METHICILLIN RESISTANT STAPHYLOCOCCUS AUREUS  Moderate  * METHICILLIN RESISTANT STAPHYLOCOCCUS AUREUS  Moderate  Skin howie also present  *     Microbiology Results (last 7 days)      Procedure Component Value Units Date/Time    Culture, Anaerobe [[id number]] Collected:  07/27/19 0942    Order Status:  Completed Specimen:  Bone from Toe, Left Foot Updated:  07/31/19 1308     Anaerobic Culture No anaerobes isolated    Blood culture #2 **CANNOT BE ORDERED STAT** [[id number]] Collected:  07/26/19 0009    Order Status:  Completed Specimen:  Blood from Peripheral, Antecubital, Right Updated:  07/31/19 0612     Blood Culture, Routine No growth after 5 days.    Blood culture #1 **CANNOT BE ORDERED STAT** [[id number]] Collected:  07/25/19 2357    Order Status:  Completed Specimen:  Blood from Peripheral, Antecubital, Left Updated:  07/31/19 0612     Blood Culture, Routine No growth after 5 days.    Culture, Anaerobe [[id number]] Collected:  07/26/19 1113    Order Status:  Completed Specimen:  Wound from Toe, Left Foot Updated:  07/30/19 0951     Anaerobic Culture No anaerobes isolated    Aerobic culture [[id number]]  (Abnormal)  (Susceptibility) Collected:  07/27/19 0942    Order Status:  Completed Specimen:  Bone from Toe, Left Foot Updated:  07/30/19 0836     Aerobic Bacterial Culture METHICILLIN RESISTANT STAPHYLOCOCCUS AUREUS  Moderate  Skin howie also present      Aerobic culture [[id number]]  (Abnormal)  (Susceptibility) Collected:  07/26/19 1114    Order Status:  Completed Specimen:  Wound from Toe, Left Foot Updated:  07/29/19 1053     Aerobic Bacterial Culture METHICILLIN RESISTANT STAPHYLOCOCCUS AUREUS  Moderate      Gram stain [[id number]] Collected:  07/27/19 0942    Order Status:  Completed Specimen:  Bone from Toe, Left Foot Updated:  07/27/19 1806     Gram Stain Result No WBC's      No organisms seen        Specimen (12h ago, onward)    None          Diagnostic Results:  X-ray: showing osteo left distal hallux phalanx    MRI:  Showing osteo left distal hallux phalanx    Arterial US:  Atherosclerosis with focal elevated segmental velocities present in the proximal/mid left SFA, proximal left  anterior tibial artery, and proximal right anterior tibial artery suggesting greater than 50% focal stenosis.    Abnormal arterial waveforms present in the bilateral thighs, worse on the left.  Cannot exclude more proximal aortoiliac stenosis.    ROCIO's: ordered    Clinical Findings:  Ulcer Location: left great toe  Measurements:  Periwound: macerated  Drainage: sero-sanguinous.  Base:  granular  Signs of infection: edema, erythema resolving, .    8/1 7/28 7/27 7/26

## 2019-08-01 NOTE — PROGRESS NOTES
[facility] Infectious Disease Progress Note     Primary Team: Dr. Dumont  Consultant Attending: Dr. Hendrickson  Date of Admit: 7/25/2019    Summary of history     Domingo Castro is a 74 y.o. male with a relevant history of IDT2DM, Hypertension, HLD, CKD stage IV, left toe osteomyelitis in December of 2018, CHF with unknown EF for which he takes 40mg Lasix daily.     Patient presented to the ED on 7/25 with a complaint of an ulcer on the plantar surface of his left great toe. Patient stated that he first noticed a small ulceration at the site on 7/19/19 and that it became progressively worse throughout the week. The patient has significant diabetic neuropathy and does not have intact sensation in his feet. He follows up with Dr. Gallegos for podiatry. On admission MRI and radiograph of the left foot were obtained, which were suggestive of osteomyelitis in the toe.      Patient has been admitted from 12/23/18 to 1/6/19 at this facility due to osteomyelitis of the same toe and a pneumonia. At the time he was found to have Strep pyogenes bacteremia and strep pyogenes in the culture from his infected toe. He completed a course of Rocephin on 2/4/19. He currently appears to be receiving regular wound care and podiatry follow up. Patient reports that he receives wound care by home health nurse who was concerned that his toe appeared infected today and notified Podiatrist office who advised patient to go to ED.      On admission he denied fever, chills, SOB, chest pain, sick contacts, trauma to the toe, change in bladder or bowel habits.  ED findings: H/H 10.3/31.9, Sed rate 91, CRP  29.9, Hgb A1C 8.1  Interval events     In the interim patient was taken to cath lab and underwent revascularization of the left foot. He is doing well. Tolerating current regimen    Subjective     Patient is doing well post surgery. He has no complaints. No fever or leukocytosis noted.     Current Medications:     Infusions:   sodium chloride 0.9% 3  mL/kg/hr (19 0822)    heparin (porcine)      heparin (porcine)          Scheduled:   amLODIPine  10 mg Oral Daily    aspirin  81 mg Oral Daily    carvedilol  25 mg Oral BID WM    clopidogrel  75 mg Oral Daily    furosemide  80 mg Oral BID    gabapentin  100 mg Oral TID    hydrALAZINE  100 mg Oral Q12H    insulin aspart U-100  15 Units Subcutaneous TIDWM    insulin detemir U-100  26 Units Subcutaneous QHS    rosuvastatin  20 mg Oral QHS    vancomycin (VANCOCIN) IVPB  750 mg Intravenous Q24H        PRN:  sodium chloride 0.9%, acetaminophen, acetaminophen, acetaminophen, bisacodyl, cloNIDine, Dextrose 10% Bolus, Dextrose 10% Bolus, glucagon (human recombinant), glucose, glucose, heparin (porcine), heparin (porcine), hydrALAZINE, HYDROcodone-acetaminophen, insulin aspart U-100, lactulose, ondansetron, sodium chloride 0.9%    Antibiotics and Day Number of Therapy:  Vancomycin:  - current  Zosyn:  -   Metronidazole:  - current    Objective   Last 24 Hour Vital Signs:  BP  Min: 106/85  Max: 185/85  Temp  Av.4 °F (36.3 °C)  Min: 96.4 °F (35.8 °C)  Max: 98.7 °F (37.1 °C)  Pulse  Av.3  Min: 52  Max: 60  Resp  Av.4  Min: 10  Max: 36  SpO2  Av.1 %  Min: 94 %  Max: 99 %    Lines, drains, airway:  none    Physical Examination:  Examination  General: well appearing, comfortable. Ulceration of the left 1st toe with surrounding macerated tissue noted.  HEENT: normal oral mucosa, normal dentition, conjunctiva normal, pupils normal, extraocular motion normal  Neck: no thyromegaly, no JVD   Cardiac: no murmurs, pulse regular    Pulmonary/Chest: chest clear, no respiratory distress   GI/Rectal: no organomegaly, no masses, non tender, normal bowel sounds, rectal exam deferred   : deferred   Msk: normal motor screening exam  Vascular: normal peripheral perfusion   Lymph nodes: none palpated  Skin/ Extremities: no rash, no pedal edema, no ulceration    Neurology/ Mental status: alert  oriented          Lab data:  CBC:   Lab Results   Component Value Date    WBC 8.37 08/01/2019    HGB 9.7 (L) 08/01/2019     (L) 08/01/2019    MCV 85 08/01/2019    RDW 14.0 08/01/2019       Estimated Creatinine Clearance: 29.8 mL/min (A) (based on SCr of 2.9 mg/dL (H)).    Microbiology Data  Microbiology Results (last 7 days)     Procedure Component Value Units Date/Time    Culture, Anaerobe [[id number]] Collected:  07/27/19 0942    Order Status:  Completed Specimen:  Bone from Toe, Left Foot Updated:  07/31/19 1308     Anaerobic Culture No anaerobes isolated    Blood culture #2 **CANNOT BE ORDERED STAT** [[id number]] Collected:  07/26/19 0009    Order Status:  Completed Specimen:  Blood from Peripheral, Antecubital, Right Updated:  07/31/19 0612     Blood Culture, Routine No growth after 5 days.    Blood culture #1 **CANNOT BE ORDERED STAT** [[id number]] Collected:  07/25/19 2357    Order Status:  Completed Specimen:  Blood from Peripheral, Antecubital, Left Updated:  07/31/19 0612     Blood Culture, Routine No growth after 5 days.    Culture, Anaerobe [[id number]] Collected:  07/26/19 1113    Order Status:  Completed Specimen:  Wound from Toe, Left Foot Updated:  07/30/19 0951     Anaerobic Culture No anaerobes isolated    Aerobic culture [[id number]]  (Abnormal)  (Susceptibility) Collected:  07/27/19 0942    Order Status:  Completed Specimen:  Bone from Toe, Left Foot Updated:  07/30/19 0836     Aerobic Bacterial Culture METHICILLIN RESISTANT STAPHYLOCOCCUS AUREUS  Moderate  Skin howie also present      Aerobic culture [[id number]]  (Abnormal)  (Susceptibility) Collected:  07/26/19 1114    Order Status:  Completed Specimen:  Wound from Toe, Left Foot Updated:  07/29/19 1053     Aerobic Bacterial Culture METHICILLIN RESISTANT STAPHYLOCOCCUS AUREUS  Moderate      Gram stain [[id number]] Collected:  07/27/19 0942    Order Status:  Completed Specimen:  Bone from Toe, Left Foot Updated:  07/27/19 1806     Gram Stain  Result No WBC's      No organisms seen          Assessment     Domingo Castro is a 74 y.o.male with past medical history as above. Patient appears to have underwent successful revascularization of his left lower extremity today. Continue with current treatment. He will need close follow up.    Recommendations     Osteomyelitis of Left 1st Distal Phalanx  - Continue Vancomycin 1g IV q24h for MRSA coverage  - Anticipate 6 weeks of antibiotic coverage              - Start date = 7/25                   End date = 9/5  - Will need follow up with [facility] in 3 weeks  - Patient will need weekly antibiotic level, CMP, CBC, ESR, CRP. Please have the labs faxed to Dr. Hernandez at [phone] until the patients clinic appointment.    Thank you for this consult. We will follow.    Tashi BELLU ID Fellow

## 2019-08-01 NOTE — PLAN OF CARE
Problem: Adult Inpatient Plan of Care  Goal: Plan of Care Review  Outcome: Ongoing (interventions implemented as appropriate)  1910H Patient is awake and orientedx4. Care plan explained and verbalized understanding. VIP care model explained. On room air, O2 saturation 94%. On heart monitor running Sinus Bradycardia at 56bpm. Right subclavian carlos catheter with dressing clean, dry and intact; flushed with blood return noted. Left foot with dressing clean, dry and intact. Maintained on contact precaution for MRSA on left foot. Maintained on diabetic diet. Instructed on NPO after midnight. Maintained on fall precaution. Bed in lowest position, bed alarms on, call light within reach and instructed to call for help when needed. Will continue  to monitor.  2129H Due medications given. Blood sugar monitored and covered per insulin sliding scale.  0600H Prepped bilateral groin and radial.

## 2019-08-01 NOTE — PROGRESS NOTES
[facility] Hospital Medicine  Progress Note    Patient Name: Domingo Castro  MRN: [id number]  Patient Class: IP- Inpatient   Admission Date: 7/25/2019  Length of Stay: 6 days  Attending Physician: Christina Tang*  Primary Care Provider: Griselda Nugent MD        Subjective:     Principal Problem:Toe osteomyelitis, left      HPI:  74 y.o. Male with past medical history of DM type 2, Hypertension, Arthritis, CAD, HLD, CKD, left toe osteomyelitis, CHF, Cataract presented to the ED on yesterday complaining of left foot infection.  Patient has been treated in past for osteomyelitis of the same foot in which he completed course of antibiotics and was still receiving wound care and podiatry follow up.  Patient reports that he receives wound care by home health nurse who was concerned that toe appeared infected and notified Podiatrist office who advised patient to go to ED.  Patient is followed by Dr. Gallegos.  Denies fever, chills, SOB, chest pain, sick contacts,  trauma to the toe, change in bladder or bowel habits.  ED findings: H/H 10.3/31.9, Sed rate 91, CRP  29.9, Hgb A1C 8.1, Left forefoot MRI showed osteomyelitis of the distal aspect of the 1st distal phalanx with adjacent marrow edema which may be reactive in nature versus early infectious process.  Patient admitted for medical management.    Overview/Hospital Course:  Hospital Medicine put him on piperacillin-tazobactam and vancomycin. Podiatry consulted Infectious Disease. Wound culture grew MRSA. Per ID will plan for 6 weeks IV abx therapy. Went for LE cath procedure on 7/30. Revascularization procedure was completed on 8/1. Plan for d/c on 8/2.     Interval History: Sleeping but arousable post procedure. Drowsy from pain medicine.     Review of Systems   Unable to perform ROS: Mental status change     Objective:     Vital Signs (Most Recent):  Temp: 96.3 °F (35.7 °C) (08/01/19 1610)  Pulse: 64 (08/01/19 1610)  Resp: 16 (08/01/19  1610)  BP: (!) 169/75 (08/01/19 1610)  SpO2: 99 % (08/01/19 1610) Vital Signs (24h Range):  Temp:  [96.3 °F (35.7 °C)-98.7 °F (37.1 °C)] 96.3 °F (35.7 °C)  Pulse:  [52-64] 64  Resp:  [10-36] 16  SpO2:  [94 %-99 %] 99 %  BP: (106-185)/(68-85) 169/75     Weight: 109.3 kg (240 lb 15.4 oz)  Body mass index is 30.12 kg/m².    Intake/Output Summary (Last 24 hours) at 8/1/2019 1805  Last data filed at 8/1/2019 0000  Gross per 24 hour   Intake 125 ml   Output 400 ml   Net -275 ml      Physical Exam   Constitutional: No distress.   Pulmonary/Chest: Effort normal. No respiratory distress.   Skin: Skin is warm and dry. He is not diaphoretic.   Psychiatric: He has a normal mood and affect. His behavior is normal.       Significant Labs:   BMP:   Recent Labs   Lab 08/01/19  0452   *      K 3.4*      CO2 30*   BUN 48*   CREATININE 2.9*   CALCIUM 9.4   MG 2.1     CBC:   Recent Labs   Lab 07/31/19  0725 08/01/19  0452   WBC 7.71 8.37   HGB 10.2* 9.7*   HCT 31.5* 29.6*   * 129*       Significant Imaging: I have reviewed all pertinent imaging results/findings within the past 24 hours.      Assessment/Plan:      * Toe osteomyelitis, left  Toe ulcer  -Podiatry consult: wound care, will continue conservative management  -ID consult: six weeks of IV ABx, has usable port  -Continue vanc  -BCx: NGTD  -Wound cultures MRSA  -PAD management per cardiology; revascularization done 8/1    Anemia of chronic renal failure, stage 4 (severe)  Stable, no visual signs of bleeding  -Monitor    Chronic diastolic congestive heart failure  Essential hypertension  Denies chest pain or SOB  -Continue Amlodipine, Coreg, Furosemide and Hydralazine    CKD (chronic kidney disease) stage 4, GFR 15-29 ml/min  Creatinine at baseline, Voiding well  -Strict I&O  -Avoid Nephrotoxic Agents  -Renally dose medications    Dyslipidemia  -Continue statin    Thrombocytopenia, unspecified  Monitor    Type 2 diabetes mellitus with stage 4 chronic  kidney disease, with long-term current use of insulin  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, with long-term current use of insulin  Hemoglobin A1c is 8.1  -POC glucose checks ac meals and nightly  -Continue Detemir 26 units nightly  -Increased to Novolog 15 units with meals  -Increased to moderate dose SSI PRN  -Hypoglycemia protocol PRN  -Diabetic Diet    Diabetic ulcer of toe of left foot associated with type 2 diabetes mellitus, with necrosis of bone        Essential hypertension            VTE Risk Mitigation (From admission, onward)        Ordered     heparin infusion 1,000 units/500 ml in 0.9% NaCl (pressure line flush)  Intra-op continuous PRN      08/01/19 0822     heparin infusion 1,000 units/500 ml in 0.9% NaCl (pressure line flush)  Intra-op continuous PRN      07/30/19 1225     IP VTE HIGH RISK PATIENT  Once      07/28/19 1203     Place sequential compression device  Until discontinued      07/26/19 0221                Jazmin Norris PA-C  Department of Hospital Medicine   [facility]

## 2019-08-01 NOTE — PLAN OF CARE
1400   Patient transferred to room 427 on cardiac monitor.  VSS. No c/o pain.   Patient attached to tele monitor upon arrival in room. Right groin tegaderm, and left ankle access sites CDI. No bleeding or hematoma noted, +2 jesus radial pulses palpated and pedal pulses dopplered  with RANJIT Rasmussne at bedside..  All questions answered. Updated pt's wife in pt's room.

## 2019-08-01 NOTE — TELEPHONE ENCOUNTER
----- Message from Daysi Denny RN sent at 8/1/2019 11:52 AM CDT -----  Per Dr. Rodriguez, patient needs 2 week followup with him- [facility] office please. Please place in Epic.    Thanks,  Daysi Denny, RN, CCM, CMSRN  RN Transition Navigator  [phone]

## 2019-08-01 NOTE — PLAN OF CARE
Staged L SFA + POP + AT in am       Prep R CFA + entire left leg   Atherectomy with 2.0 Classic CSI   PTA with DCB      7 mm balloon SFA    6 mm balloon POP    4 mm balloon AT        Consent will be signed in am

## 2019-08-01 NOTE — PROGRESS NOTES
[facility]  Podiatry  Progress Note    Patient Name: Domingo Castro  MRN: [id number]  Admission Date: 7/25/2019  Hospital Length of Stay: 6 days  Attending Physician: Christina Tang*  Primary Care Provider: Griselda Nugent MD   Interval Hx:  S/p revascularization per Dr. Hyatt.  Patient Seen at bedside for dressing change.  Patient denies F/C/N/V.  Dressings clean, dry, and intact.    Scheduled Meds:   amLODIPine  10 mg Oral Daily    aspirin  81 mg Oral Daily    carvedilol  25 mg Oral BID WM    clopidogrel  75 mg Oral Daily    furosemide  80 mg Oral BID    gabapentin  100 mg Oral TID    hydrALAZINE  100 mg Oral Q12H    insulin aspart U-100  15 Units Subcutaneous TIDWM    insulin detemir U-100  26 Units Subcutaneous QHS    metroNIDAZOLE  500 mg Oral Q8H    rosuvastatin  20 mg Oral QHS    vancomycin (VANCOCIN) IVPB  750 mg Intravenous Q24H     Continuous Infusions:   sodium chloride 0.9% 3 mL/kg/hr (08/01/19 0822)    heparin (porcine)      heparin (porcine)       PRN Meds:sodium chloride 0.9%, acetaminophen, acetaminophen, acetaminophen, bisacodyl, cloNIDine, Dextrose 10% Bolus, Dextrose 10% Bolus, diphenhydrAMINE, fentaNYL, glucagon (human recombinant), glucose, glucose, heparin (porcine), heparin (porcine), heparin (porcine), hydrALAZINE, hydrALAZINE, HYDROcodone-acetaminophen, insulin aspart U-100, lactulose, lidocaine HCL 10 mg/ml (1%), midazolam, nitroglycerin 200mcg/mL, ondansetron, sodium chloride 0.9%, verapamil    Review of patient's allergies indicates:   Allergen Reactions    Atorvastatin Other (See Comments)        Past Medical History:   Diagnosis Date    Arthritis     Cataract     Chronic diastolic congestive heart failure     Coronary artery disease     Cystoid macular edema of both eyes     Diabetes mellitus type II     Hyperlipemia     Hypertension     Retinal hole     Stage 4 chronic kidney disease     Streptococcus pyogenes bacteremia  2018    Due to left toe osteomyelitis     Past Surgical History:   Procedure Laterality Date    AORTOGRAM-ABDOMINAL N/A 2019    Performed by [facility]ish [facility]att [facility] CATH LAB[facility]EP    BLEPHAROPLASTY Bilateral 2017    Performed by Jane [facility]no [facility] at [facility] OR 1ST FLR    CATARACT EXTRACTION W/  INTRAOCULAR LENS IMPLANT Left 12/15/14    Dr martin    CATARACT EXTRACTION W/  INTRAOCULAR LENS IMPLANT Right 14    dorothy    CIRCUMCISION, NON-      focal laser right eye      INSERTION-INTRAOCULAR LENS (IOL) Right 2014    Performed by [facility]aron Martin MD at [facility] OR    INSERTION-INTRAOCULAR LENS (IOL) Left 12/15/2014    Performed by Jaron Martin MD at [facility] OR    DKCQWZHEE-ENCY-U-CATH Right 2019    Performed by Denys Aleman Jr., MD at [facility] OR    KNEE SURGERY      left    Left medial collateral ligament repair      PHACOEMULSIFICATION-ASPIRATION-CATARACT Right 2014    Performed by Jaron Martin MD at [facility] OR    PHACOEMULSIFICATION-ASPIRATION-CATARACT Left 12/15/2014    Performed by [facility] at [facility] OR    REPAIR-PTOSIS/BILATERAL EXTERNAL LEVATORS Bilateral 2017    Performed by [facility]nae [facility] at [facility] OR 1ST FLR    TONSILLECTOMY         Family History     Problem Relation (Age of Onset)    Cancer Mother, Brother, Sister    Cataracts Paternal Grandmother    Diabetes Father    Glaucoma Paternal Grandmother    Heart disease Mother, Father        Tobacco Use    Smoking status: Never Smoker    Smokeless tobacco: Never Used   Substance and Sexual Activity    Alcohol use: No     Alcohol/week: 0.0 oz    Drug use: No    Sexual activity: Yes     Partners: Female     Review of Systems   Constitutional: Negative for chills and fever.   Cardiovascular: Positive for leg swelling.   Gastrointestinal: Negative for nausea and vomiting.   Musculoskeletal: Negative for arthralgias and joint swelling.   Skin: Positive for wound. Negative for rash.    Psychiatric/Behavioral: Negative for agitation and confusion.     Objective:     Vital Signs (Most Recent):  Temp: 96.5 °F (35.8 °C) (08/01/19 0741)  Pulse: (!) 54 (08/01/19 1200)  Resp: 15 (08/01/19 1200)  BP: 124/69 (08/01/19 1200)  SpO2: 98 % (08/01/19 1200) Vital Signs (24h Range):  Temp:  [96.5 °F (35.8 °C)-98.7 °F (37.1 °C)] 96.5 °F (35.8 °C)  Pulse:  [54-60] 54  Resp:  [15-18] 15  SpO2:  [94 %-99 %] 98 %  BP: (124-185)/(69-85) 124/69     Weight: 109.3 kg (240 lb 15.4 oz)  Body mass index is 30.12 kg/m².    Foot Exam    General  Orientation: alert and oriented to person, place, and time   Affect: appropriate       Right Foot/Ankle     Inspection and Palpation  Ecchymosis: none  Tenderness: none   Swelling: none     Neurovascular  Dorsalis pedis: 1+  Posterior tibial: 1+  Saphenous nerve sensation: diminished  Tibial nerve sensation: diminished  Superficial peroneal nerve sensation: diminished  Deep peroneal nerve sensation: diminished  Sural nerve sensation: diminished      Left Foot/Ankle      Inspection and Palpation  Ecchymosis: none  Tenderness: none     Neurovascular  Dorsalis pedis: 1+  Posterior tibial: 1+  Saphenous nerve sensation: diminished  Tibial nerve sensation: diminished  Superficial peroneal nerve sensation: diminished  Deep peroneal nerve sensation: diminished  Sural nerve sensation: diminished            Laboratory:  A1C:   Recent Labs   Lab 02/22/19  0941 07/26/19  0529   HGBA1C 8.2* 8.1*     CBC:   Recent Labs   Lab 08/01/19  0452   WBC 8.37   RBC 3.48*   HGB 9.7*   HCT 29.6*   *   MCV 85   MCH 27.9   MCHC 32.8     CMP:   Recent Labs   Lab 07/31/19  0725 08/01/19  0452   * 152*   CALCIUM 9.6 9.4   ALBUMIN 3.0*  --    PROT 7.9  --     141   K 3.9 3.4*   CO2 26 30*    104   BUN 46* 48*   CREATININE 2.9* 2.9*   ALKPHOS 68  --    ALT 11  --    AST 17  --    BILITOT 0.3  --      CRP:   Recent Labs   Lab 07/25/19  2353   CRP 29.9*     ESR:   Recent Labs   Lab  07/25/19  2353   SEDRATE 91*     Wound Cultures:   Recent Labs   Lab 07/26/19  1114 07/27/19  0942   LABAERO METHICILLIN RESISTANT STAPHYLOCOCCUS AUREUS  Moderate  * METHICILLIN RESISTANT STAPHYLOCOCCUS AUREUS  Moderate  Skin howie also present  *     Microbiology Results (last 7 days)     Procedure Component Value Units Date/Time    Culture, Anaerobe [[id number]] Collected:  07/27/19 0942    Order Status:  Completed Specimen:  Bone from Toe, Left Foot Updated:  07/31/19 1308     Anaerobic Culture No anaerobes isolated    Blood culture #2 **CANNOT BE ORDERED STAT** [[id number]] Collected:  07/26/19 0009    Order Status:  Completed Specimen:  Blood from Peripheral, Antecubital, Right Updated:  07/31/19 0612     Blood Culture, Routine No growth after 5 days.    Blood culture #1 **CANNOT BE ORDERED STAT** [[id number]] Collected:  07/25/19 2357    Order Status:  Completed Specimen:  Blood from Peripheral, Antecubital, Left Updated:  07/31/19 0612     Blood Culture, Routine No growth after 5 days.    Culture, Anaerobe [[id number]] Collected:  07/26/19 1113    Order Status:  Completed Specimen:  Wound from Toe, Left Foot Updated:  07/30/19 0951     Anaerobic Culture No anaerobes isolated    Aerobic culture [[id number]]  (Abnormal)  (Susceptibility) Collected:  07/27/19 0942    Order Status:  Completed Specimen:  Bone from Toe, Left Foot Updated:  07/30/19 0836     Aerobic Bacterial Culture METHICILLIN RESISTANT STAPHYLOCOCCUS AUREUS  Moderate  Skin howie also present      Aerobic culture [[id number]]  (Abnormal)  (Susceptibility) Collected:  07/26/19 1114    Order Status:  Completed Specimen:  Wound from Toe, Left Foot Updated:  07/29/19 1053     Aerobic Bacterial Culture METHICILLIN RESISTANT STAPHYLOCOCCUS AUREUS  Moderate      Gram stain [[id number]] Collected:  07/27/19 0942    Order Status:  Completed Specimen:  Bone from Toe, Left Foot Updated:  07/27/19 1806     Gram Stain Result No WBC's      No organisms seen         Specimen (12h ago, onward)    None          Diagnostic Results:  X-ray: showing osteo left distal hallux phalanx    MRI:  Showing osteo left distal hallux phalanx    Arterial US:  Atherosclerosis with focal elevated segmental velocities present in the proximal/mid left SFA, proximal left anterior tibial artery, and proximal right anterior tibial artery suggesting greater than 50% focal stenosis.    Abnormal arterial waveforms present in the bilateral thighs, worse on the left.  Cannot exclude more proximal aortoiliac stenosis.    ROCIO's: ordered    Clinical Findings:  Ulcer Location: left great toe  Measurements:  Periwound: macerated  Drainage: sero-sanguinous.  Base:  granular  Signs of infection: edema, erythema resolving, .    8/1 7/28 7/27 7/26              Assessment/Plan:     * Toe osteomyelitis, left   Domingo Castro is a 74 y.o. male with left hallux toe infection with osteomyelitis  -Imaging: x-ray and MRI showing osteomyelitis of left hallux  -ID on board.  abx per ID recs  -applied betadine wet to dry gauze dressings.  Nursing to do daily dressing changes.  -s/p revasc per Cardiology, appreciate recs  -Will continue with conservative care at this time.  Will follow patient peripherally.      DC Instructions:  Patient is to follow up with podiatry within 10 days of discharge.   Home health/facility to do dressing changes every MWF as follows:  Rinse with saline, pat dry, apply Aquacel Ag extra, 4x4's, Cast padding, and coban.            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

## 2019-08-02 VITALS
BODY MASS INDEX: 29.96 KG/M2 | HEIGHT: 75 IN | SYSTOLIC BLOOD PRESSURE: 167 MMHG | WEIGHT: 240.94 LBS | RESPIRATION RATE: 16 BRPM | OXYGEN SATURATION: 94 % | DIASTOLIC BLOOD PRESSURE: 65 MMHG | HEART RATE: 66 BPM | TEMPERATURE: 98 F

## 2019-08-02 LAB
ANION GAP SERPL CALC-SCNC: 11 MMOL/L (ref 8–16)
BUN SERPL-MCNC: 51 MG/DL (ref 8–23)
CALCIUM SERPL-MCNC: 9.4 MG/DL (ref 8.7–10.5)
CHLORIDE SERPL-SCNC: 103 MMOL/L (ref 95–110)
CO2 SERPL-SCNC: 24 MMOL/L (ref 23–29)
CREAT SERPL-MCNC: 3 MG/DL (ref 0.5–1.4)
ERYTHROCYTE [DISTWIDTH] IN BLOOD BY AUTOMATED COUNT: 14.5 % (ref 11.5–14.5)
EST. GFR  (AFRICAN AMERICAN): 23 ML/MIN/1.73 M^2
EST. GFR  (NON AFRICAN AMERICAN): 20 ML/MIN/1.73 M^2
GLUCOSE SERPL-MCNC: 250 MG/DL (ref 70–110)
HCT VFR BLD AUTO: 28.1 % (ref 40–54)
HGB BLD-MCNC: 8.8 G/DL (ref 14–18)
MAGNESIUM SERPL-MCNC: 2 MG/DL (ref 1.6–2.6)
MCH RBC QN AUTO: 26.7 PG (ref 27–31)
MCHC RBC AUTO-ENTMCNC: 31.3 G/DL (ref 32–36)
MCV RBC AUTO: 85 FL (ref 82–98)
PHOSPHATE SERPL-MCNC: 3.6 MG/DL (ref 2.7–4.5)
PLATELET # BLD AUTO: 121 K/UL (ref 150–350)
PMV BLD AUTO: 10.9 FL (ref 9.2–12.9)
POCT GLUCOSE: 249 MG/DL (ref 70–110)
POCT GLUCOSE: 302 MG/DL (ref 70–110)
POTASSIUM SERPL-SCNC: 3.5 MMOL/L (ref 3.5–5.1)
RBC # BLD AUTO: 3.29 M/UL (ref 4.6–6.2)
SODIUM SERPL-SCNC: 138 MMOL/L (ref 136–145)
WBC # BLD AUTO: 10.27 K/UL (ref 3.9–12.7)

## 2019-08-02 PROCEDURE — 25000003 PHARM REV CODE 250: Performed by: NURSE PRACTITIONER

## 2019-08-02 PROCEDURE — 99232 PR SUBSEQUENT HOSPITAL CARE,LEVL II: ICD-10-PCS | Mod: ,,, | Performed by: NURSE PRACTITIONER

## 2019-08-02 PROCEDURE — 80048 BASIC METABOLIC PNL TOTAL CA: CPT

## 2019-08-02 PROCEDURE — 85027 COMPLETE CBC AUTOMATED: CPT

## 2019-08-02 PROCEDURE — 83735 ASSAY OF MAGNESIUM: CPT

## 2019-08-02 PROCEDURE — 99232 SBSQ HOSP IP/OBS MODERATE 35: CPT | Mod: ,,, | Performed by: NURSE PRACTITIONER

## 2019-08-02 PROCEDURE — 84100 ASSAY OF PHOSPHORUS: CPT

## 2019-08-02 PROCEDURE — 94761 N-INVAS EAR/PLS OXIMETRY MLT: CPT

## 2019-08-02 PROCEDURE — 25000003 PHARM REV CODE 250: Performed by: INTERNAL MEDICINE

## 2019-08-02 PROCEDURE — 63600175 PHARM REV CODE 636 W HCPCS: Performed by: FAMILY MEDICINE

## 2019-08-02 RX ORDER — CLOPIDOGREL BISULFATE 75 MG/1
75 TABLET ORAL DAILY
Qty: 30 TABLET | Refills: 11 | Status: SHIPPED | OUTPATIENT
Start: 2019-08-03 | End: 2019-08-29 | Stop reason: SDUPTHER

## 2019-08-02 RX ADMIN — GABAPENTIN 100 MG: 100 CAPSULE ORAL at 08:08

## 2019-08-02 RX ADMIN — AMLODIPINE BESYLATE 10 MG: 5 TABLET ORAL at 08:08

## 2019-08-02 RX ADMIN — HYDROCODONE BITARTRATE AND ACETAMINOPHEN 1 TABLET: 5; 325 TABLET ORAL at 11:08

## 2019-08-02 RX ADMIN — CARVEDILOL 25 MG: 25 TABLET, FILM COATED ORAL at 08:08

## 2019-08-02 RX ADMIN — GABAPENTIN 100 MG: 100 CAPSULE ORAL at 03:08

## 2019-08-02 RX ADMIN — HYDROCODONE BITARTRATE AND ACETAMINOPHEN 1 TABLET: 5; 325 TABLET ORAL at 03:08

## 2019-08-02 RX ADMIN — INSULIN ASPART 15 UNITS: 100 INJECTION, SOLUTION INTRAVENOUS; SUBCUTANEOUS at 08:08

## 2019-08-02 RX ADMIN — HYDRALAZINE HYDROCHLORIDE 100 MG: 25 TABLET, FILM COATED ORAL at 08:08

## 2019-08-02 RX ADMIN — ASPIRIN 81 MG: 81 TABLET, COATED ORAL at 08:08

## 2019-08-02 RX ADMIN — HYDROCODONE BITARTRATE AND ACETAMINOPHEN 1 TABLET: 5; 325 TABLET ORAL at 08:08

## 2019-08-02 RX ADMIN — FUROSEMIDE 80 MG: 40 TABLET ORAL at 08:08

## 2019-08-02 RX ADMIN — VANCOMYCIN HYDROCHLORIDE 750 MG: 750 INJECTION, POWDER, LYOPHILIZED, FOR SOLUTION INTRAVENOUS at 10:08

## 2019-08-02 RX ADMIN — INSULIN ASPART 15 UNITS: 100 INJECTION, SOLUTION INTRAVENOUS; SUBCUTANEOUS at 11:08

## 2019-08-02 RX ADMIN — CLOPIDOGREL BISULFATE 75 MG: 75 TABLET ORAL at 08:08

## 2019-08-02 NOTE — SUBJECTIVE & OBJECTIVE
Review of Systems   Constitution: Negative for chills, decreased appetite, diaphoresis and fever.   Cardiovascular: Negative for chest pain, claudication, cyanosis, dyspnea on exertion, irregular heartbeat, leg swelling, near-syncope, orthopnea, palpitations, paroxysmal nocturnal dyspnea and syncope.   Respiratory: Negative for cough, hemoptysis, shortness of breath and wheezing.    Gastrointestinal: Negative for bloating, abdominal pain, constipation, diarrhea, melena, nausea and vomiting.   Neurological: Negative for dizziness and weakness.     Objective:     Vital Signs (Most Recent):  Temp: 98.5 °F (36.9 °C) (08/02/19 0826)  Pulse: 60 (08/02/19 0826)  Resp: 16 (08/02/19 0826)  BP: (!) 159/74 (08/02/19 0826)  SpO2: (!) 94 % (08/02/19 0902) Vital Signs (24h Range):  Temp:  [96.3 °F (35.7 °C)-98.8 °F (37.1 °C)] 98.5 °F (36.9 °C)  Pulse:  [52-84] 60  Resp:  [10-36] 16  SpO2:  [94 %-99 %] 94 %  BP: (106-169)/(66-85) 159/74     Weight: 109.3 kg (240 lb 15.4 oz)  Body mass index is 30.12 kg/m².     SpO2: (!) 94 %  O2 Device (Oxygen Therapy): room air      Intake/Output Summary (Last 24 hours) at 8/2/2019 0954  Last data filed at 8/2/2019 0600  Gross per 24 hour   Intake 500 ml   Output 900 ml   Net -400 ml       Lines/Drains/Airways     Central Venous Catheter Line                 Port A Cath Single Lumen 07/25/19 2348 right subclavian 7 days                Physical Exam   Constitutional: He is oriented to person, place, and time. He appears well-developed and well-nourished. No distress.   Cardiovascular: Normal rate and regular rhythm. Exam reveals no gallop.   No murmur heard.  Pulmonary/Chest: Effort normal and breath sounds normal. No respiratory distress. He has no wheezes.   Abdominal: Soft. Bowel sounds are normal. He exhibits no distension. There is no tenderness.   Neurological: He is alert and oriented to person, place, and time.   Skin: Skin is warm and dry.       Significant Labs:     Recent Labs   Lab  08/02/19  0545   WBC 10.27   RBC 3.29*   HGB 8.8*   HCT 28.1*   *   MCV 85   MCH 26.7*   MCHC 31.3*     Recent Labs   Lab 08/02/19  0545   CALCIUM 9.4      K 3.5   CO2 24      BUN 51*   CREATININE 3.0*

## 2019-08-02 NOTE — NURSING
VN note: VN completed AVS and attachments and notified bedside nurseJustin. Waiting for family to arrive to admin discharge education. Will cont to be available and intervene prn.

## 2019-08-02 NOTE — PROGRESS NOTES
.Pharmacy New Medication Education    Patient and/or Caregiver ACCEPTED medication education.    Pharmacy has provided education on the name, indication, and possible side effects of the medication(s) prescribed, using teach-back method.     Learners of pharmacy medication education includes:  patient    Medication Indication Side Effects   vancomycin osteomyelitis rash and diarrhea   clopidogrel Blood thinner to prevent blood clots bruising and bleeding          The following medications have also been discussed, during this admission.     Current Facility-Administered Medications   Medication Frequency    0.9%  NaCl infusion Continuous PRN    acetaminophen tablet 1,000 mg Q8H PRN    acetaminophen tablet 650 mg Q4H PRN    acetaminophen tablet 650 mg Q4H PRN    amLODIPine tablet 10 mg Daily    aspirin EC tablet 81 mg Daily    bisacodyl EC tablet 5 mg Daily PRN    carvedilol tablet 25 mg BID WM    cloNIDine tablet 0.1 mg Q6H PRN    clopidogrel tablet 75 mg Daily    dextrose 10% (D10W) Bolus PRN    dextrose 10% (D10W) Bolus PRN    furosemide tablet 80 mg BID    gabapentin capsule 100 mg TID    glucagon (human recombinant) injection 1 mg PRN    glucose chewable tablet 16 g PRN    glucose chewable tablet 24 g PRN    heparin infusion 1,000 units/500 ml in 0.9% NaCl (pressure line flush) Continuous PRN    heparin infusion 1,000 units/500 ml in 0.9% NaCl (pressure line flush) Continuous PRN    hydrALAZINE tablet 100 mg Q12H    hydrALAZINE tablet 25 mg Q8H PRN    HYDROcodone-acetaminophen 5-325 mg per tablet 1 tablet Q4H PRN    insulin aspart U-100 pen 1-10 Units QID (AC + HS) PRN    insulin aspart U-100 pen 15 Units TIDWM    insulin detemir U-100 pen 26 Units QHS    lactulose 20 gram/30 mL solution Soln 15 g Q6H PRN    ondansetron injection 4 mg Q8H PRN    rosuvastatin tablet 20 mg QHS    sodium chloride 0.9% flush 10 mL PRN    vancomycin 750 mg in dextrose 5 % 250 mL IVPB (ready to mix system) Q24H            Thank  you  Dinesh Mccarthy, PharmD

## 2019-08-02 NOTE — PLAN OF CARE
[facility] HOME HEALTH ORDERS  FACE TO FACE ENCOUNTER    Patient Name: Domingo Castro  YOB: 1945    PCP: Griselda Nugent MD   PCP Address: [address] / [place] [id number]  PCP Phone Number: [phone]  PCP Fax: [phone]    Encounter Date: 08/02/2019    Admit to Home Health    Diagnoses:  Active Hospital Problems    Diagnosis  POA    *Toe osteomyelitis, left [M86.9]  Yes     Priority: 1 - High    Anemia of chronic renal failure, stage 4 (severe) [N18.4, D63.1]  Yes     Priority: 2      Chronic    Chronic diastolic congestive heart failure [I50.32]  Yes     Priority: 3      Chronic    CKD (chronic kidney disease) stage 4, GFR 15-29 ml/min [N18.4]  Yes     Priority: 4      Chronic    Dyslipidemia [E78.5]  Yes     Priority: 6      Chronic    Thrombocytopenia, unspecified [D69.6]  Yes     Priority: 8     Type 2 diabetes mellitus with stage 4 chronic kidney disease, with long-term current use of insulin [E11.22, N18.4, Z79.4]  Not Applicable     Priority: 9      Chronic    PAD (peripheral artery disease) [I73.9]  Yes    Toe ulcer [L97.509]  Yes    Diabetic ulcer of toe of left foot associated with type 2 diabetes mellitus, with necrosis of bone [E11.621, L97.524]  Yes    Diabetic neuropathy associated with type 2 diabetes mellitus [E11.40]  Yes     Chronic    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, with long-term current use of insulin [E11.3513, E11.65, Z79.4]  Yes     Chronic    Essential hypertension [I10]  Yes     Chronic      Resolved Hospital Problems    Diagnosis Date Resolved POA    Hypokalemia [E87.6] 07/28/2019 Yes       Future Appointments   Date Time Provider Department Center   8/8/2019 11:15 AM Dimitry Gallegos DPM [facility] PODIAT [facility]   8/28/2019  4:00 PM INFECTIOUS DISEASE, [facility] INFECT [facility]   8/29/2019  9:20 AM Amish Hyatt MD [facility] CARDIO  [facility]   8/29/2019 11:00 AM Griselda Nugent MD [facility]   9/23/2019  8:30 AM ANNUAL WELLNESS VISIT-NURSE PRACTITIONER, MET 1 [facility] [place]     Follow-up Information     [facility] - Infectious Disease On 8/28/2019.    Specialty:  Infectious Diseases  Why:  4:00pm  Contact information:  [address], [address]  [facility] [address] [phone]                   I have seen and examined this patient face to face today. My clinical findings that support the need for the home health skilled services and home bound status are the following:  Weakness/numbness causing balance and gait disturbance due to Infection and Weakness/Debility making it taxing to leave home.  Requiring assistive device to leave home due to unsteady gait caused by  Weakness/Debility.  Medical restrictions requiring assistance of another human to leave home due to  Decreased range of motions in extremities, IV infusion Needs, Wound care needs and Weight bearing restricted.    Allergies:  Review of patient's allergies indicates:   Allergen Reactions    Atorvastatin Other (See Comments)       Diet: cardiac diet and diabetic diet: 2000 calorie    Activities: activity as tolerated    Nursing:   SN to complete comprehensive assessment including routine vital signs. Instruct on disease process and s/s of complications to report to MD. Review/verify medication list sent home with the patient at time of discharge  and instruct patient/caregiver as needed. Frequency may be adjusted depending on start of care date.    Notify MD if SBP > 160 or < 90; DBP > 90 or < 50; HR > 120 or < 50; Temp > 101; Other:        CONSULTS:    Physical Therapy to evaluate and treat. Evaluate for home safety and equipment needs; Establish/upgrade home exercise program. Perform / instruct on therapeutic exercises, gait training, transfer training, and Range of Motion.  Occupational Therapy to evaluate and treat. Evaluate home environment for  safety and equipment needs. Perform/Instruct on transfers, ADL training, ROM, and therapeutic exercises.   to evaluate for community resources/long-range planning.  Aide to provide assistance with personal care, ADLs, and vital signs.    MISCELLANEOUS CARE:  Wound Care Orders:  Home health/facility to do dressing changes every MWF as follows:  Rinse with saline, pat dry, apply Aquacel Ag extra, 4x4's, Cast padding, and coban.      WOUND CARE ORDERS  n/a    LABS  CBC, CMP, ESR, CRP weekly  Please fax to [phone] c/o Dr. Ascencio    Medications: Review discharge medications with patient and family and provide education.      Current Discharge Medication List      START taking these medications    Details   clopidogrel (PLAVIX) 75 mg tablet Take 1 tablet (75 mg total) by mouth once daily.  Qty: 30 tablet, Refills: 11      vancomycin HCl (VANCOMYCIN 750 mg/250 ML D5W, READY TO MIX SYSTEM,) Inject 250 mLs (750 g total) into the vein once daily.  End date: 9/5/19         CONTINUE these medications which have NOT CHANGED    Details   acetaminophen (TYLENOL) 325 MG tablet Take 2 tablets (650 mg total) by mouth every 4 (four) hours as needed.  Refills: 0      amLODIPine (NORVASC) 10 MG tablet Take 1 tablet (10 mg total) by mouth once daily.  Qty: 90 tablet, Refills: 3      aspirin (ECOTRIN) 81 MG EC tablet Take 1 tablet (81 mg total) by mouth once daily.  Refills: 0      cadexomer iodine (IODOSORB) 0.9 % gel Apply topically every Mon, Wed, Fri.  Qty: 40 g, Refills: 0      carvedilol (COREG) 25 MG tablet Take 1 tablet (25 mg total) by mouth 2 (two) times daily with meals.  Qty: 60 tablet, Refills: 0      furosemide (LASIX) 40 MG tablet Take 2 tablets (80 mg total) by mouth 2 (two) times daily. Do not take the pm dose after 3 pm  Qty: 360 tablet, Refills: 0      gabapentin (NEURONTIN) 100 MG capsule TAKE ONE CAPSULE BY MOUTH IN THE MORNING THEN ONE CAPSULE  IN THE EVENING AND THEN THREE CAPSULES AT  "BEDTIME  Qty: 450 capsule, Refills: 3      hydrALAZINE (APRESOLINE) 50 MG tablet TAKE ONE TABLET BY MOUTH EVERY 12 HOURS  Qty: 180 tablet, Refills: 3      insulin lispro (HUMALOG KWIKPEN INSULIN) 100 unit/mL InPn pen INJECT 10 UNITS SUBCUTANEOUSLY THREE TIMES DAILY BEFORE MEALS WITH CORRECTION SCALE, MAX TDD 50 UNITS  Qty: 9 Box, Refills: 6    Associated Diagnoses: Type 2 diabetes mellitus with stage 4 chronic kidney disease, with long-term current use of insulin      LEVEMIR FLEXTOUCH U-100 INSULN 100 unit/mL (3 mL) InPn pen Inject 26 Units into the skin every evening.  Qty: 36 mL, Refills: 3    Associated Diagnoses: Type 2 diabetes mellitus with stage 4 chronic kidney disease, with long-term current use of insulin      pen needle, diabetic, safety (BD AUTOSHIELD PEN NEEDLE) 29 gauge x 5/16" Ndle For use once daily with levemir flexpen  Qty: 90 each, Refills: 11      rosuvastatin (CRESTOR) 20 MG tablet Take 20 mg by mouth once daily.              I certify that this patient is confined to his home and needs intermittent skilled nursing care.        "

## 2019-08-02 NOTE — PLAN OF CARE
Problem: Adult Inpatient Plan of Care  Goal: Plan of Care Review  Outcome: Ongoing (interventions implemented as appropriate)  POC reviewed, pt verbalize understanding. Pt denies pain/discomfort. Wound care dressing dry and intact. Left tibial dressing and right groin dressing dry and intact, no bleeding, hematoma noted. Pulse by doppler. Blood glucose monitoring. NSR in the 60s on Tele monitor.  Fall precaution maintained: bed on lowest position, call light within reach, bed alarm set, side rail up x 2, non skid sock on. Will continue to monitor

## 2019-08-02 NOTE — PLAN OF CARE
VN cued into pt's room for introduction. VN informed pt that VN would be working along side bedside nurse and PCT throughout shift. Level of present pain assessed. At present no distress noted. Discussed with patient today's plan of care and discharge home with home health and IV Abx therapy. Discussed with patient High fall risk protocol and interventions that have been initiated and cont be in place for safety. Patient verbalized clear understanding and cooperation using teach back method. Bed alarm presently activated and in use. Will cont to be available to patient and intervene prn.

## 2019-08-02 NOTE — PROGRESS NOTES
[facility] Infectious Disease Progress Note     Primary Team: Dr. Dumont  Consultant Attending: Dr. Bruner  Date of Admit: 7/25/2019    Summary of history     Domingo Castro is a 74 y.o. male with a relevant history of IDT2DM, Hypertension, HLD, CKD stage IV, left toe osteomyelitis in December of 2018, CHF with unknown EF for which he takes 40mg Lasix daily.     Patient presented to the ED on 7/25 with a complaint of an ulcer on the plantar surface of his left great toe. Patient stated that he first noticed a small ulceration at the site on 7/19/19 and that it became progressively worse throughout the week. The patient has significant diabetic neuropathy and does not have intact sensation in his feet. He follows up with Dr. Gallegos for podiatry. On admission MRI and radiograph of the left foot were obtained, which were suggestive of osteomyelitis in the toe.      Patient has been admitted from 12/23/18 to 1/6/19 at this facility due to osteomyelitis of the same toe and a pneumonia. At the time he was found to have Strep pyogenes bacteremia and strep pyogenes in the culture from his infected toe. He completed a course of Rocephin on 2/4/19. He currently appears to be receiving regular wound care and podiatry follow up. Patient reports that he receives wound care by home health nurse who was concerned that his toe appeared infected today and notified Podiatrist office who advised patient to go to ED.      On admission he denied fever, chills, SOB, chest pain, sick contacts, trauma to the toe, change in bladder or bowel habits.  ED findings: H/H 10.3/31.9, Sed rate 91, CRP  29.9, Hgb A1C 8.1    Interval events     Patient continues to do well. He is tolerating Vancomycin well and will be discharged today on a 6 week total course. He understand the need for weekly blood work and will follow up with ID at [facility] in 3 weeks    Subjective     Patient is doing well and has no complaints. He denies any rash, fevers,  chills, sob, chest pain, abdominal pain, nausea, vomiting or diarrhea. He is agreeable to continue his antibiotics at home.     Current Medications:     Infusions:   sodium chloride 0.9% 3 mL/kg/hr (19)    heparin (porcine)      heparin (porcine)          Scheduled:   amLODIPine  10 mg Oral Daily    aspirin  81 mg Oral Daily    carvedilol  25 mg Oral BID WM    clopidogrel  75 mg Oral Daily    furosemide  80 mg Oral BID    gabapentin  100 mg Oral TID    hydrALAZINE  100 mg Oral Q12H    insulin aspart U-100  15 Units Subcutaneous TIDWM    insulin detemir U-100  26 Units Subcutaneous QHS    rosuvastatin  20 mg Oral QHS    vancomycin (VANCOCIN) IVPB  750 mg Intravenous Q24H        PRN:  sodium chloride 0.9%, acetaminophen, acetaminophen, acetaminophen, bisacodyl, cloNIDine, Dextrose 10% Bolus, Dextrose 10% Bolus, glucagon (human recombinant), glucose, glucose, heparin (porcine), heparin (porcine), hydrALAZINE, HYDROcodone-acetaminophen, insulin aspart U-100, lactulose, ondansetron, sodium chloride 0.9%    Antibiotics and Day Number of Therapy:  Vancomycin, currently day #8    Objective   Last 24 Hour Vital Signs:  BP  Min: 159/74  Max: 167/65  Temp  Av.2 °F (36.8 °C)  Min: 97.3 °F (36.3 °C)  Max: 98.8 °F (37.1 °C)  Pulse  Av.1  Min: 60  Max: 84  Resp  Av  Min: 16  Max: 20  SpO2  Av.8 %  Min: 94 %  Max: 98 %    Lines, drains, airway:    Right subclavian port noted      Physical Examination:  Examination  General: well appearing, comfortable, infected left first toe noted  HEENT: normal oral mucosa, normal dentition, conjunctiva normal, pupils normal, extraocular motion normal  Neck: no thyromegaly, no JVD   Cardiac: no murmurs, pulse regular    Pulmonary/Chest: chest clear, no respiratory distress   GI/Rectal: no organomegaly, no masses, non tender, normal bowel sounds, rectal exam deferred   : deferred   Msk: normal motor screening exam  Vascular: normal peripheral  perfusion   Lymph nodes: none palpated  Skin/ Extremities: no rash, no pedal edema, no ulceration    Neurology/ Mental status: alert oriented     Lab data:  CBC:   Lab Results   Component Value Date    WBC 10.27 08/02/2019    HGB 8.8 (L) 08/02/2019     (L) 08/02/2019    MCV 85 08/02/2019    RDW 14.5 08/02/2019       Estimated Creatinine Clearance: 28.8 mL/min (A) (based on SCr of 3 mg/dL (H)).    Microbiology Data  Microbiology Results (last 7 days)     Procedure Component Value Units Date/Time    Culture, Anaerobe [[id number]] Collected:  07/27/19 0942    Order Status:  Completed Specimen:  Bone from Toe, Left Foot Updated:  07/31/19 1308     Anaerobic Culture No anaerobes isolated    Blood culture #2 **CANNOT BE ORDERED STAT** [[id number]] Collected:  07/26/19 0009    Order Status:  Completed Specimen:  Blood from Peripheral, Antecubital, Right Updated:  07/31/19 0612     Blood Culture, Routine No growth after 5 days.    Blood culture #1 **CANNOT BE ORDERED STAT** [[id number]] Collected:  07/25/19 2357    Order Status:  Completed Specimen:  Blood from Peripheral, Antecubital, Left Updated:  07/31/19 0612     Blood Culture, Routine No growth after 5 days.    Culture, Anaerobe [[id number]] Collected:  07/26/19 1113    Order Status:  Completed Specimen:  Wound from Toe, Left Foot Updated:  07/30/19 0951     Anaerobic Culture No anaerobes isolated    Aerobic culture [[id number]]  (Abnormal)  (Susceptibility) Collected:  07/27/19 0942    Order Status:  Completed Specimen:  Bone from Toe, Left Foot Updated:  07/30/19 0836     Aerobic Bacterial Culture METHICILLIN RESISTANT STAPHYLOCOCCUS AUREUS  Moderate  Skin howie also present      Aerobic culture [[id number]]  (Abnormal)  (Susceptibility) Collected:  07/26/19 1114    Order Status:  Completed Specimen:  Wound from Toe, Left Foot Updated:  07/29/19 1053     Aerobic Bacterial Culture METHICILLIN RESISTANT STAPHYLOCOCCUS AUREUS  Moderate      Gram stain [[id number]]  Collected:  07/27/19 0942    Order Status:  Completed Specimen:  Bone from Toe, Left Foot Updated:  07/27/19 1806     Gram Stain Result No WBC's      No organisms seen          Assessment     Domingo Castro is a 74 y.o.male with past medical history as above. 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 He underwent revascularization of his left lower extremity which certainly increased the chances of viability of the digit however his infection is significant and will require close follow up with Podiatry and ID.    Recommendations     Osteomyelitis of Left 1st Distal Phalanx  - Continue Vancomycin 1g IV q24h for MRSA coverage  - Anticipate 6 weeks of antibiotic coverage              - Start date = 7/25                   End date = 9/5  - [facility]  - Patient will need weekly antibiotic level, CMP, CBC, ESR, CRP. Please have the labs faxed to Dr. Hernandez at [phone] until the patients clinic appointment.      Thank you for this consult. We will follow.    Tashi Mccormick  [employer] ID Fellow

## 2019-08-02 NOTE — PLAN OF CARE
Problem: Adult Inpatient Plan of Care  Goal: Plan of Care Review  Outcome: Ongoing (interventions implemented as appropriate)  Plan of care reviewed patient and spouse verbalized understanding. Blood glucose monitoring. Medications administered. IV antibiotics infused. Cardiac monitoring Sinus Bradycardia. Right groin dressing intact, clean and dry. No hematomas, No bleeding, no tingling, no numbness. Right tibal dressing intact, clean and dry. Doppler pulses. Wound care dressing changed.

## 2019-08-02 NOTE — PLAN OF CARE
Discharge orders for home IV abx sent to [facility] and [facility]. Rodolfo, RN with [employer] contacting wife and patient to complete teaching.       08/02/19 1105   Post-Acute Status   Post-Acute Authorization Home Health/Hospice   Discharge Delays (!) Patient/Caregiver Education Not Complete     Daysi Denny, RN, CCM, CMSRN  RN Transition Navigator  [phone]

## 2019-08-02 NOTE — NURSING
Pt is awake and alert. Pt continued to be NSR on telemetry with HR in the 60's.No ectopy noted.Pain meds administered PRN for back pain. No distress noted. Port a cath to RCW dressing dry and intact. Tele monitor removed. Discharge instructions given to patient and spouse. Discussed side effects of medications. All questions answered. Verbalized understanding.

## 2019-08-02 NOTE — PROGRESS NOTES
[facility]  Cardiology  Progress Note    Patient Name: Domingo Castro  MRN: [id number]  Admission Date: 7/25/2019  Hospital Length of Stay: 7 days  Code Status: Full Code   Attending Physician: Christina Tang*   Primary Care Physician: Griselda Nugent MD  Expected Discharge Date: 8/2/2019  Principal Problem:Toe osteomyelitis, left    Subjective:     Hospital Course:   7/25/2019-7/28/2019 Presented to the ER with complaints of left great toe wound. WBCs 10K ESR 91 CRP 29.96. MRI with osteomyelitis to left toe/foot. Blood cultures with NGTD. Wound culture with MRSA. On IV Zosyn and IV Vanc. BLE arterial ultrasound with elevated velocities to bilateral ATs. On ASA therapy. Podiatry on board  7/29/2019  BUN 3.0 this AM. H&H 9.4&30.2 this AM. Remains on IV abx with wound care per Podiatry and wound care RN. Cardiology consulted for evaluation of PAD   7/30/2019 NPO for abdominal aortogram with run off   7/31/2019 Underwent abd AO with run off via right radial access with following results:    Patent aorta  Bilateral renal artery stenosis  Patent NOEMÍ + EIA   Subtotal R IIA  Patent L CFA, 80% mid L SFA stenosis, 75% L POP with 99% proximal L AT  Patent R CFA, 90% prox R SFA stenosis, 90% ostial PT + PER with subtotal proximal AT  Patent DP and plantar arch bilaterally  Plans for staged  L SFA + POP + AT revascularization    Creatinine 2.9 this AM. H&H 10.2&31.5. HR nad BP stable. Plan for possible LE revascularization in AM   8/1/2019 NPO for LLE revascularization today   8/2/2019 Underwent LLE revascularization yesterday via RCFA and left AT access with following results:                                            Crossed L AT lesions with retrograde wire manipulation              Atherectomy with 2.0 Classic CSI              PTA of AT with 4.0 x 150 mm Lutonix DCB              PTA of PT with 4.0 x 120 mm balloon after occlusion from distal embolization              PTA of SFA with 7.0 x 60 mm  Lutonix DCB              R CFA closed with perclose              L AT retrograde access close with manual pressure    Tolerated procedure well. Access sites stable with no active bleeding. Final abx and wound care recs obtained with plans for discharge today                              Review of Systems   Constitution: Negative for chills, decreased appetite, diaphoresis and fever.   Cardiovascular: Negative for chest pain, claudication, cyanosis, dyspnea on exertion, irregular heartbeat, leg swelling, near-syncope, orthopnea, palpitations, paroxysmal nocturnal dyspnea and syncope.   Respiratory: Negative for cough, hemoptysis, shortness of breath and wheezing.    Gastrointestinal: Negative for bloating, abdominal pain, constipation, diarrhea, melena, nausea and vomiting.   Neurological: Negative for dizziness and weakness.     Objective:     Vital Signs (Most Recent):  Temp: 98.5 °F (36.9 °C) (08/02/19 0826)  Pulse: 60 (08/02/19 0826)  Resp: 16 (08/02/19 0826)  BP: (!) 159/74 (08/02/19 0826)  SpO2: (!) 94 % (08/02/19 0902) Vital Signs (24h Range):  Temp:  [96.3 °F (35.7 °C)-98.8 °F (37.1 °C)] 98.5 °F (36.9 °C)  Pulse:  [52-84] 60  Resp:  [10-36] 16  SpO2:  [94 %-99 %] 94 %  BP: (106-169)/(66-85) 159/74     Weight: 109.3 kg (240 lb 15.4 oz)  Body mass index is 30.12 kg/m².     SpO2: (!) 94 %  O2 Device (Oxygen Therapy): room air      Intake/Output Summary (Last 24 hours) at 8/2/2019 0954  Last data filed at 8/2/2019 0600  Gross per 24 hour   Intake 500 ml   Output 900 ml   Net -400 ml       Lines/Drains/Airways     Central Venous Catheter Line                 Port A Cath Single Lumen 07/25/19 2348 right subclavian 7 days                Physical Exam   Constitutional: He is oriented to person, place, and time. He appears well-developed and well-nourished. No distress.   Cardiovascular: Normal rate and regular rhythm. Exam reveals no gallop.   No murmur heard.  Pulmonary/Chest: Effort normal and breath sounds normal.  No respiratory distress. He has no wheezes.   Abdominal: Soft. Bowel sounds are normal. He exhibits no distension. There is no tenderness.   Neurological: He is alert and oriented to person, place, and time.   Skin: Skin is warm and dry.       Significant Labs:     Recent Labs   Lab 08/02/19  0545   WBC 10.27   RBC 3.29*   HGB 8.8*   HCT 28.1*   *   MCV 85   MCH 26.7*   MCHC 31.3*     Recent Labs   Lab 08/02/19  0545   CALCIUM 9.4      K 3.5   CO2 24      BUN 51*   CREATININE 3.0*           Assessment and Plan:     Brief HPI: Seen on AM NP rounds while sitting on edge of bed. Denies any complaints and excited to be going home today. Discussed cardiac POC upon discharge with patient-verbalized understanding    * Toe osteomyelitis, left  -ESR 91 CRP 29.9  -MRI with evidence of osteomyelitis; wound cultures with MRSA present; ID on board and currently on IV Vanc and Zosyn  -continue with abx recs per ID and wound care per Podiatry recs     PAD (peripheral artery disease)  -left great toe wound since 12/2018 with almost complete healing per patient; recurrent worsening of wound 3 prior to admission with fever and chills  -ESR 91 CRP 29.9 with normal WBCs but elevated temp of 102 at home per patient  -BLE arterial ultrasound with elevated velocities to bilateral AT consistent with PAD  -underwent diagnostic LE angiogram followed by LLE revascularization yesterday with following results:                  Atherectomy with 2.0 Classic CSI              PTA of AT with 4.0 x 150 mm Lutonix DCB              PTA of PT with 4.0 x 120 mm balloon after occlusion from distal embolization              PTA of SFA with 7.0 x 60 mm Lutonix DCB              R CFA closed with perclose              L AT retrograde access close with manual pressure     -access site stable  -continue ASA, statin and Plavix  -stable for discharge from cardiac standpoint  -follow up with Dr. Hyatt in 2 weeks with surveillance US and TCOMs  as directed by MD             VTE Risk Mitigation (From admission, onward)        Ordered     heparin infusion 1,000 units/500 ml in 0.9% NaCl (pressure line flush)  Intra-op continuous PRN      08/01/19 0822     heparin infusion 1,000 units/500 ml in 0.9% NaCl (pressure line flush)  Intra-op continuous PRN      07/30/19 1225     IP VTE HIGH RISK PATIENT  Once      07/28/19 1203     Place sequential compression device  Until discontinued      07/26/19 0221          Kayla Ruelas APRN, ANP  Cardiology  [facility]

## 2019-08-04 NOTE — DISCHARGE SUMMARY
[facility] Hospital Medicine  Discharge Summary      Patient Name: Domingo Castro  MRN: [id number]  Admission Date: 7/25/2019  Hospital Length of Stay: 7 days  Discharge Date and Time: 8/2/2019  3:45 PM  Attending Physician: No att. providers found   Discharging Provider: Jazmin Norris PA-C  Primary Care Provider: Griselda Nugent MD      HPI:   74 y.o. Male with past medical history of DM type 2, Hypertension, Arthritis, CAD, HLD, CKD, left toe osteomyelitis, CHF, Cataract presented to the ED on yesterday complaining of left foot infection.  Patient has been treated in past for osteomyelitis of the same foot in which he completed course of antibiotics and was still receiving wound care and podiatry follow up.  Patient reports that he receives wound care by home health nurse who was concerned that toe appeared infected and notified Podiatrist office who advised patient to go to ED.  Patient is followed by Dr. Gallegos.  Denies fever, chills, SOB, chest pain, sick contacts,  trauma to the toe, change in bladder or bowel habits.  ED findings: H/H 10.3/31.9, Sed rate 91, CRP  29.9, Hgb A1C 8.1, Left forefoot MRI showed osteomyelitis of the distal aspect of the 1st distal phalanx with adjacent marrow edema which may be reactive in nature versus early infectious process.  Patient admitted for medical management.    Procedure(s) (LRB):  Atherectomy, Peripheral Blood Vessel (Left)  Angiogram Extremity Unilateral (Left)  PTA, Anterior Tibial (Left)  PTA, Posterior Tibial (Left)      Hospital Course:   Hospital Medicine put him on piperacillin-tazobactam and vancomycin. Podiatry consulted Infectious Disease. Wound culture grew MRSA. Per ID will plan for 6 weeks IV abx therapy. Went for LE cath procedure on 7/30. Revascularization procedure was completed on 8/1. Pt was discharged 8/2 with six weeks planned IV vancomycin therapy.      Consults:   Consults (From admission, onward)        Status Ordering  Provider     Inpatient consult to Cardiology  Once     Provider:  (Not yet assigned)    Completed RAZA JONES     Inpatient consult to Infectious Diseases  Once     Provider:  (Not yet assigned)    Completed RAZA JONES     Inpatient consult to Podiatry  Once     Provider:  Dimitry Gallegos, EDUIN    Completed ESTELA UMANA          No new Assessment & Plan notes have been filed under this hospital service since the last note was generated.  Service: Hospital Medicine    Final Active Diagnoses:    Diagnosis Date Noted POA    PRINCIPAL PROBLEM:  Toe osteomyelitis, left [M86.9] 07/26/2019 Yes    Anemia of chronic renal failure, stage 4 (severe) [N18.4, D63.1] 06/14/2018 Yes     Chronic    Chronic diastolic congestive heart failure [I50.32] 12/03/2018 Yes     Chronic    CKD (chronic kidney disease) stage 4, GFR 15-29 ml/min [N18.4] 12/23/2018 Yes     Chronic    Dyslipidemia [E78.5] 01/10/2013 Yes     Chronic    Thrombocytopenia, unspecified [D69.6] 07/26/2019 Yes    Type 2 diabetes mellitus with stage 4 chronic kidney disease, with long-term current use of insulin [E11.22, N18.4, Z79.4] 01/10/2013 Not Applicable     Chronic    MRSA (methicillin resistant Staphylococcus aureus) infection [A49.02]  Yes    PAD (peripheral artery disease) [I73.9] 07/29/2019 Yes    Toe ulcer [L97.509] 07/27/2019 Yes    Diabetic ulcer of toe of left foot associated with type 2 diabetes mellitus, with necrosis of bone [E11.621, L97.524] 07/26/2019 Yes    Diabetic neuropathy associated with type 2 diabetes mellitus [E11.40] 12/23/2018 Yes     Chronic    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    Specialty:  Infectious Diseases  Why:  4:00pm  Contact information:  [address], [address]  [facility] [address] [phone]               Patient Instructions:      COMPREHENSIVE METABOLIC PANEL   Standing Status: Standing Number of Occurrences: 6 Standing Exp. Date: 09/30/20     CBC W/ AUTO DIFFERENTIAL   Standing Status: Standing Number of Occurrences: 6 Standing Exp. Date: 09/30/20     Sedimentation rate   Standing Status: Standing Number of Occurrences: 6 Standing Exp. Date: 09/30/20     C-REACTIVE PROTEIN   Standing Status: Standing Number of Occurrences: 6 Standing Exp. Date: 09/30/20     Ambulatory referral to Cardiology   Referral Priority: Routine Referral Type: Consultation   Referral Reason: Specialty Services Required   Requested Specialty: Cardiology   Number of Visits Requested: 1     Ambulatory Referral to Infectious Disease   Referral Priority: Routine Referral Type: Consultation   Referral Reason: Specialty Services Required   Requested Specialty: Infectious Diseases   Number of Visits Requested: 1     Notify your health care provider if you experience any of the following:  redness, tenderness, or signs of infection (pain, swelling, redness, odor or green/yellow discharge around incision site)     Notify your health care provider if you experience any of the following:  temperature >100.4     Activity as tolerated       Significant Diagnostic Studies: Labs: All labs within the past 24 hours have been reviewed    Pending Diagnostic Studies:     None         Medications:  Reconciled Home Medications:      Medication List      START taking these medications    clopidogrel 75 mg tablet  Commonly known as:  PLAVIX  Take 1 tablet (75 mg total) by mouth once daily.     VANCOMYCIN 750 MG/250 ML D5W  "(READY TO MIX SYSTEM)  Inject 250 mLs (750 mg total) into the vein once daily.        CHANGE how you take these medications    insulin lispro 100 unit/mL pen  Commonly known as:  HumaLOG KwikPen Insulin  INJECT 10 UNITS SUBCUTANEOUSLY THREE TIMES DAILY BEFORE MEALS WITH CORRECTION SCALE, MAX TDD 50 UNITS  What changed:  Another medication with the same name was removed. Continue taking this medication, and follow the directions you see here.        CONTINUE taking these medications    acetaminophen 325 MG tablet  Commonly known as:  TYLENOL  Take 2 tablets (650 mg total) by mouth every 4 (four) hours as needed.     amLODIPine 10 MG tablet  Commonly known as:  NORVASC  Take 1 tablet (10 mg total) by mouth once daily.     aspirin 81 MG EC tablet  Commonly known as:  ECOTRIN  Take 1 tablet (81 mg total) by mouth once daily.     cadexomer iodine 0.9 % gel  Commonly known as:  IODOSORB  Apply topically every Mon, Wed, Fri.     carvedilol 25 MG tablet  Commonly known as:  COREG  Take 1 tablet (25 mg total) by mouth 2 (two) times daily with meals.     furosemide 40 MG tablet  Commonly known as:  LASIX  Take 2 tablets (80 mg total) by mouth 2 (two) times daily. Do not take the pm dose after 3 pm     gabapentin 100 MG capsule  Commonly known as:  NEURONTIN  TAKE ONE CAPSULE BY MOUTH IN THE MORNING THEN ONE CAPSULE  IN THE EVENING AND THEN THREE CAPSULES AT BEDTIME     hydrALAZINE 50 MG tablet  Commonly known as:  APRESOLINE  TAKE ONE TABLET BY MOUTH EVERY 12 HOURS     LEVEMIR FLEXTOUCH U-100 INSULN 100 unit/mL (3 mL) Inpn pen  Generic drug:  insulin detemir U-100  Inject 26 Units into the skin every evening.     pen needle, diabetic, safety 29 gauge x 5/16" Ndle  Commonly known as:  BD AUTOSHIELD PEN NEEDLE  For use once daily with levemir flexpen     rosuvastatin 20 MG tablet  Commonly known as:  CRESTOR  Take 20 mg by mouth once daily.            Indwelling Lines/Drains at time of discharge:   Lines/Drains/Airways     " Central Venous Catheter Line                 Port A Cath Single Lumen 07/25/19 9816 right subclavian 9 days                Time spent on the discharge of patient: 45 minutes  Patient was seen and examined on the date of discharge and determined to be suitable for discharge.         Jazmin Norris PA-C  Department of Hospital Medicine  [facility]

## 2019-08-08 ENCOUNTER — OFFICE VISIT (OUTPATIENT)
Dept: PODIATRY | Facility: CLINIC | Age: 74
End: 2019-08-08
Payer: MEDICARE

## 2019-08-08 VITALS
HEART RATE: 60 BPM | BODY MASS INDEX: 29.84 KG/M2 | SYSTOLIC BLOOD PRESSURE: 137 MMHG | WEIGHT: 240 LBS | DIASTOLIC BLOOD PRESSURE: 66 MMHG | HEIGHT: 75 IN

## 2019-08-08 DIAGNOSIS — E11.51 TYPE 2 DIABETES MELLITUS WITH PERIPHERAL ANGIOPATHY: ICD-10-CM

## 2019-08-08 DIAGNOSIS — M86.172 ACUTE OSTEOMYELITIS OF TOE, LEFT: ICD-10-CM

## 2019-08-08 DIAGNOSIS — E11.621 DIABETIC ULCER OF TOE OF LEFT FOOT ASSOCIATED WITH TYPE 2 DIABETES MELLITUS, WITH BONE INVOLVEMENT WITHOUT EVIDENCE OF NECROSIS: Primary | ICD-10-CM

## 2019-08-08 DIAGNOSIS — L97.526 DIABETIC ULCER OF TOE OF LEFT FOOT ASSOCIATED WITH TYPE 2 DIABETES MELLITUS, WITH BONE INVOLVEMENT WITHOUT EVIDENCE OF NECROSIS: Primary | ICD-10-CM

## 2019-08-08 DIAGNOSIS — E11.42 TYPE 2 DIABETES MELLITUS WITH PERIPHERAL NEUROPATHY: ICD-10-CM

## 2019-08-08 PROCEDURE — 99213 OFFICE O/P EST LOW 20 MIN: CPT | Mod: PBBFAC,PN | Performed by: PODIATRIST

## 2019-08-08 PROCEDURE — 99999 PR PBB SHADOW E&M-EST. PATIENT-LVL III: ICD-10-PCS | Mod: PBBFAC,,, | Performed by: PODIATRIST

## 2019-08-08 PROCEDURE — 99213 PR OFFICE/OUTPT VISIT, EST, LEVL III, 20-29 MIN: ICD-10-PCS | Mod: S$PBB,,, | Performed by: PODIATRIST

## 2019-08-08 PROCEDURE — 99999 PR PBB SHADOW E&M-EST. PATIENT-LVL III: CPT | Mod: PBBFAC,,, | Performed by: PODIATRIST

## 2019-08-08 PROCEDURE — 99213 OFFICE O/P EST LOW 20 MIN: CPT | Mod: S$PBB,,, | Performed by: PODIATRIST

## 2019-08-08 NOTE — PROGRESS NOTES
Subjective:      Patient ID: Domingo Castro is a 74 y.o. male.    Chief Complaint: Follow-up (wound check)    Domingo is a 74 y.o. male who presents to the clinic for evaluation and treatment of high risk feet. Domingo has a past medical history of Arthritis, Cataract, Chronic diastolic congestive heart failure, Coronary artery disease, Cystoid macular edema of both eyes, Diabetes mellitus type II, Hyperlipemia, Hypertension, Retinal hole, Stage 4 chronic kidney disease, and Streptococcus pyogenes bacteremia (12/23/2018). The patient's chief complaint is diabetic foot ulcer, left hallux. This patient has documented high risk feet requiring routine maintenance secondary to peripheral vascular disease.  Discharged from [facility] on 08/04/2019.  Receiving 6 weeks of IV vancomycin per ID recommendations.  Follow-up per [facility] with 3 times weekly Medi Honey dressing changes. Accompanied by his daughter.  No new complaints.    PCP: Griselda Nugent MD    Date Last Seen by PCP:  05/29/2019    Current shoe gear:  Affected Foot: Football and Darco shoe on the affected foot     Unaffected Foot: Rx diabetic extra depth shoes and custom accommodative insoles    History of Trauma: negative  Sign of Infection: none    Hemoglobin A1C   Date Value Ref Range Status   07/26/2019 8.1 (H) 4.0 - 5.6 % Final     Comment:     ADA Screening Guidelines:  5.7-6.4%  Consistent with prediabetes  >or=6.5%  Consistent with diabetes  High levels of fetal hemoglobin interfere with the HbA1C  assay. Heterozygous hemoglobin variants (HbS, HgC, etc)do  not significantly interfere with this assay.   However, presence of multiple variants may affect accuracy.     02/22/2019 8.2 (H) 4.0 - 5.6 % Final     Comment:     ADA Screening Guidelines:  5.7-6.4%  Consistent with prediabetes  >or=6.5%  Consistent with diabetes  High levels of fetal hemoglobin interfere with the HbA1C  assay. Heterozygous hemoglobin variants (HbS, HgC, etc)do  not  significantly interfere with this assay.   However, presence of multiple variants may affect accuracy.     11/28/2018 7.3 (H) 4.0 - 5.6 % Final     Comment:     ADA Screening Guidelines:  5.7-6.4%  Consistent with prediabetes  >or=6.5%  Consistent with diabetes  High levels of fetal hemoglobin interfere with the HbA1C  assay. Heterozygous hemoglobin variants (HbS, HgC, etc)do  not significantly interfere with this assay.   However, presence of multiple variants may affect accuracy.         Review of Systems   Constitution: Negative for chills and fever.   HENT: Negative for congestion and hearing loss.    Cardiovascular: Negative for chest pain and claudication.   Respiratory: Negative for cough and shortness of breath.    Skin: Positive for color change, nail changes and poor wound healing.   Musculoskeletal: Negative for back pain and joint pain.   Gastrointestinal: Negative for nausea and vomiting.   Neurological: Positive for numbness.   Psychiatric/Behavioral: Negative for altered mental status.           Objective:      Physical Exam   Constitutional: He is oriented to person, place, and time. No distress.   Cardiovascular:   Pulses:       Dorsalis pedis pulses are 1+ on the right side, and 1+ on the left side.        Posterior tibial pulses are 1+ on the right side, and 1+ on the left side.   Mild lower extremity edema bilateral. No hair growth bilateral lower extremity.   Musculoskeletal:   One MTP range of motion left is less than 30° dorsiflexion.    No localized pain on palpation left hallux wound site.  No palpable fluctuance or crepitance left foot.   Neurological: He is alert and oriented to person, place, and time. A sensory deficit is present.   Skin: Capillary refill takes 2 to 3 seconds. No ecchymosis and no rash noted. He is not diaphoretic. No cyanosis or erythema. No pallor. Nails show no clubbing.   Left hallux with previous full-thickness erosion of the skin noting new epithelial tissue  throughout the toe with residual wound proximal lateral containing overlying eschar.  No active drainage.  Localized edema with resolving erythema.                 Assessment:       Encounter Diagnoses   Name Primary?    Diabetic ulcer of toe of left foot associated with type 2 diabetes mellitus, with bone involvement without evidence of necrosis Yes    Type 2 diabetes mellitus with peripheral angiopathy     Type 2 diabetes mellitus with peripheral neuropathy     Acute osteomyelitis of toe, left          Plan:       Domingo was seen today for follow-up.    Diagnoses and all orders for this visit:    Diabetic ulcer of toe of left foot associated with type 2 diabetes mellitus, with bone involvement without evidence of necrosis  -     SUBSEQUENT HOME HEALTH ORDERS    Type 2 diabetes mellitus with peripheral angiopathy  -     SUBSEQUENT HOME HEALTH ORDERS    Type 2 diabetes mellitus with peripheral neuropathy  -     SUBSEQUENT HOME HEALTH ORDERS    Acute osteomyelitis of toe, left  -     SUBSEQUENT HOME HEALTH ORDERS      I counseled the patient on his conditions, their implications and medical management.    Shoe inspection. Diabetic Foot Education. Patient reminded of the importance of good nutrition and blood sugar control to help prevent podiatric complications of diabetes. Patient instructed on proper foot hygeine. We discussed wearing proper shoe gear, daily foot inspections, never walking without protective shoe gear, never putting sharp instruments to feet.    Clinically left hallux is healing well with local wound care and IV antibiotics.    Subsequent home health orders updated today.    Rest and elevation.    Offloading Darco shoe.    RTC 4 weeks or p.r.n. as discussed.    Assisted by Merrill Tadeo DPM PGY3

## 2019-08-27 ENCOUNTER — TELEPHONE (OUTPATIENT)
Dept: HOME HEALTH SERVICES | Facility: HOSPITAL | Age: 74
End: 2019-08-27

## 2019-08-28 ENCOUNTER — OFFICE VISIT (OUTPATIENT)
Dept: INFECTIOUS DISEASES | Facility: CLINIC | Age: 74
End: 2019-08-28
Attending: FAMILY MEDICINE
Payer: MEDICARE

## 2019-08-28 VITALS
SYSTOLIC BLOOD PRESSURE: 138 MMHG | DIASTOLIC BLOOD PRESSURE: 62 MMHG | BODY MASS INDEX: 29.84 KG/M2 | WEIGHT: 240 LBS | HEIGHT: 75 IN | HEART RATE: 63 BPM | TEMPERATURE: 99 F

## 2019-08-28 DIAGNOSIS — M86.8X7 OTHER OSTEOMYELITIS OF LEFT FOOT: Primary | ICD-10-CM

## 2019-08-28 PROCEDURE — 99214 OFFICE O/P EST MOD 30 MIN: CPT | Mod: PBBFAC,PO

## 2019-08-28 PROCEDURE — 99999 PR PBB SHADOW E&M-EST. PATIENT-LVL IV: ICD-10-PCS | Mod: PBBFAC,,,

## 2019-08-28 PROCEDURE — 99999 PR PBB SHADOW E&M-EST. PATIENT-LVL IV: CPT | Mod: PBBFAC,,,

## 2019-08-28 PROCEDURE — 99214 OFFICE O/P EST MOD 30 MIN: CPT | Mod: S$PBB,,, | Performed by: INTERNAL MEDICINE

## 2019-08-28 PROCEDURE — G0179 MD RECERTIFICATION HHA PT: HCPCS | Mod: ,,, | Performed by: PODIATRIST

## 2019-08-28 PROCEDURE — G0179 PR HOME HEALTH MD RECERTIFICATION: ICD-10-PCS | Mod: ,,, | Performed by: PODIATRIST

## 2019-08-28 PROCEDURE — 99214 PR OFFICE/OUTPT VISIT, EST, LEVL IV, 30-39 MIN: ICD-10-PCS | Mod: S$PBB,,, | Performed by: INTERNAL MEDICINE

## 2019-08-28 RX ORDER — DOXYCYCLINE 100 MG/1
100 CAPSULE ORAL 2 TIMES DAILY
Qty: 28 CAPSULE | Refills: 0 | Status: SHIPPED | OUTPATIENT
Start: 2019-08-28 | End: 2019-09-07

## 2019-08-28 NOTE — PROGRESS NOTES
Subjective:      Patient ID: Domingo Castro is a 74 y.o. male.    Chief Complaint:No chief complaint on file.      History of Present Illness     Domingo Castro is a 74 y.o. male with a relevant history of IDT2DM, Hypertension, HLD, CKD stage IV, left toe osteomyelitis in December of 2018, CHF with unknown EF for which he takes 40mg Lasix daily.     Patient presented to the ED on 7/25 with a complaint of an ulcer on the plantar surface of his left great toe. Patient stated that he first noticed a small ulceration at the site on 7/19/19 and that it became progressively worse throughout the week. The patient has significant diabetic neuropathy and does not have intact sensation in his feet. He follows up with Dr. Gallegos for podiatry. On admission MRI and radiograph of the left foot were obtained, which were suggestive of osteomyelitis in the toe.      Patient has been admitted from 12/23/18 to 1/6/19 at this facility due to osteomyelitis of the same toe and a pneumonia. At the time he was found to have Strep pyogenes bacteremia and strep pyogenes in the culture from his infected toe. He completed a course of Rocephin on 2/4/19. He currently appears to be receiving regular wound care and podiatry follow up. Patient reports that he receives wound care by home health nurse who was concerned that his toe appeared infected today and notified Podiatrist office who advised patient to go to ED.      On admission he denied fever, chills, SOB, chest pain, sick contacts, trauma to the toe, change in bladder or bowel habits.  ED findings: H/H 10.3/31.9, Sed rate 91, CRP  29.9.    The patient was ultimately diagnosed with MRSA bacteremia osteomyelitis and was discharged on vancomycin x 6 weeks. Labs have been sent to [facility] - vanco trough 17 on the 26th.      Review of Systems   Constitution: Negative for chills and fever.   All other systems reviewed and are negative.    Objective:   Physical Exam   Constitutional: He is oriented  to person, place, and time. He appears well-developed and well-nourished. No distress.   HENT:   Head: Normocephalic and atraumatic.   Right Ear: External ear normal.   Left Ear: External ear normal.   Eyes: Pupils are equal, round, and reactive to light. EOM are normal.   Neurological: He is alert and oriented to person, place, and time.   Skin: He is not diaphoretic.   Psychiatric: He has a normal mood and affect. His behavior is normal. Thought content normal.   Nursing note and vitals reviewed.                   Assessment/Plan:       MRSA osteomyelitis  - seen by [facility] in house and managed since  - s/p revasc  - resolving on vancomycin (Alo ; due to end 9/5/19)  - CKD  - will transition to po doxy x 2 weeks after completing vancomycin on 9/5 (discussed with patient)  - f/u with Podiatry as scheduled (Max: please send picture of toe the next time you see patient. Thx)  - RTC as needed

## 2019-08-29 ENCOUNTER — OFFICE VISIT (OUTPATIENT)
Dept: INTERNAL MEDICINE | Facility: CLINIC | Age: 74
End: 2019-08-29
Payer: MEDICARE

## 2019-08-29 ENCOUNTER — OFFICE VISIT (OUTPATIENT)
Dept: CARDIOLOGY | Facility: CLINIC | Age: 74
End: 2019-08-29
Payer: MEDICARE

## 2019-08-29 VITALS
RESPIRATION RATE: 12 BRPM | HEART RATE: 52 BPM | WEIGHT: 242.75 LBS | HEIGHT: 76 IN | DIASTOLIC BLOOD PRESSURE: 60 MMHG | TEMPERATURE: 97 F | BODY MASS INDEX: 29.56 KG/M2 | SYSTOLIC BLOOD PRESSURE: 136 MMHG

## 2019-08-29 VITALS
BODY MASS INDEX: 32.85 KG/M2 | WEIGHT: 242.5 LBS | HEIGHT: 72 IN | SYSTOLIC BLOOD PRESSURE: 120 MMHG | DIASTOLIC BLOOD PRESSURE: 80 MMHG

## 2019-08-29 DIAGNOSIS — Z79.4 TYPE 2 DIABETES MELLITUS WITH STAGE 4 CHRONIC KIDNEY DISEASE, WITH LONG-TERM CURRENT USE OF INSULIN: Chronic | ICD-10-CM

## 2019-08-29 DIAGNOSIS — N18.4 CKD (CHRONIC KIDNEY DISEASE) STAGE 4, GFR 15-29 ML/MIN: Chronic | ICD-10-CM

## 2019-08-29 DIAGNOSIS — I70.229 CRITICAL LOWER LIMB ISCHEMIA: ICD-10-CM

## 2019-08-29 DIAGNOSIS — E11.22 TYPE 2 DIABETES MELLITUS WITH STAGE 4 CHRONIC KIDNEY DISEASE, WITH LONG-TERM CURRENT USE OF INSULIN: Chronic | ICD-10-CM

## 2019-08-29 DIAGNOSIS — I10 ESSENTIAL HYPERTENSION: Chronic | ICD-10-CM

## 2019-08-29 DIAGNOSIS — E78.5 DYSLIPIDEMIA: Chronic | ICD-10-CM

## 2019-08-29 DIAGNOSIS — N18.4 TYPE 2 DIABETES MELLITUS WITH STAGE 4 CHRONIC KIDNEY DISEASE, WITH LONG-TERM CURRENT USE OF INSULIN: Chronic | ICD-10-CM

## 2019-08-29 DIAGNOSIS — N18.4 CKD (CHRONIC KIDNEY DISEASE) STAGE 4, GFR 15-29 ML/MIN: ICD-10-CM

## 2019-08-29 DIAGNOSIS — A49.02 MRSA (METHICILLIN RESISTANT STAPHYLOCOCCUS AUREUS) INFECTION: ICD-10-CM

## 2019-08-29 DIAGNOSIS — I10 HTN (HYPERTENSION), BENIGN: Primary | ICD-10-CM

## 2019-08-29 DIAGNOSIS — M86.9 TOE OSTEOMYELITIS, LEFT: ICD-10-CM

## 2019-08-29 DIAGNOSIS — M86.9 OSTEOMYELITIS OF TOE OF LEFT FOOT: ICD-10-CM

## 2019-08-29 DIAGNOSIS — D64.9 ANEMIA, UNSPECIFIED TYPE: ICD-10-CM

## 2019-08-29 DIAGNOSIS — I73.9 PAD (PERIPHERAL ARTERY DISEASE): Primary | ICD-10-CM

## 2019-08-29 PROCEDURE — 99213 OFFICE O/P EST LOW 20 MIN: CPT | Mod: PBBFAC,PO | Performed by: INTERNAL MEDICINE

## 2019-08-29 PROCEDURE — 99215 PR OFFICE/OUTPT VISIT, EST, LEVL V, 40-54 MIN: ICD-10-PCS | Mod: S$PBB,,, | Performed by: INTERNAL MEDICINE

## 2019-08-29 PROCEDURE — 99999 PR PBB SHADOW E&M-EST. PATIENT-LVL III: CPT | Mod: PBBFAC,,, | Performed by: INTERNAL MEDICINE

## 2019-08-29 PROCEDURE — 99213 OFFICE O/P EST LOW 20 MIN: CPT | Mod: PBBFAC,27,PO | Performed by: INTERNAL MEDICINE

## 2019-08-29 PROCEDURE — 99999 PR PBB SHADOW E&M-EST. PATIENT-LVL III: ICD-10-PCS | Mod: PBBFAC,,, | Performed by: INTERNAL MEDICINE

## 2019-08-29 PROCEDURE — 99214 PR OFFICE/OUTPT VISIT, EST, LEVL IV, 30-39 MIN: ICD-10-PCS | Mod: S$PBB,,, | Performed by: INTERNAL MEDICINE

## 2019-08-29 PROCEDURE — 99215 OFFICE O/P EST HI 40 MIN: CPT | Mod: S$PBB,,, | Performed by: INTERNAL MEDICINE

## 2019-08-29 PROCEDURE — 99214 OFFICE O/P EST MOD 30 MIN: CPT | Mod: S$PBB,,, | Performed by: INTERNAL MEDICINE

## 2019-08-29 RX ORDER — CLOPIDOGREL BISULFATE 75 MG/1
75 TABLET ORAL DAILY
Qty: 30 TABLET | Refills: 11 | Status: SHIPPED | OUTPATIENT
Start: 2019-08-29 | End: 2020-09-27 | Stop reason: SDUPTHER

## 2019-08-29 NOTE — LETTER
September 3, 2019      Jazmin Norris PA-C  [address] [address]           [facility]  [address]  [facility] [address]  Phone: [phone]          Patient: Domingo Castro   MR Number: [id number]   YOB: 1945   Date of Visit: 8/29/2019       Dear Jazmin Norris:    Thank you for referring Domingo Castro to me for evaluation. Attached you will find relevant portions of my assessment and plan of care.    If you have questions, please do not hesitate to call me. I look forward to following Domingo Castro along with you.    Sincerely,    Amish Hyatt MD    Enclosure  CC:  No Recipients    If you would like to receive this communication electronically, please contact [email or web address] or [phone] to request more information on [facility] access.    For providers and/or their staff who would like to refer a patient to [facility], please contact us through our one-stop-shop provider referral line, [facility], at [phone].    If you feel you have received this communication in error or would no longer like to receive these types of communications, please e-mail [email or web address]

## 2019-08-30 DIAGNOSIS — E11.9 TYPE 2 DIABETES MELLITUS WITHOUT COMPLICATION: ICD-10-CM

## 2019-09-03 PROBLEM — I70.229 CRITICAL LOWER LIMB ISCHEMIA: Status: ACTIVE | Noted: 2019-09-03

## 2019-09-03 NOTE — PROGRESS NOTES
Subjective:   Patient ID:  Domingo Castro is a 74 y.o. male who presents for follow up of Peripheral Arterial Disease; L great toe wound; Osteomyelitis ; Hyperlipidemia; and Hypertension      HPI:       He is here for follow up of L great toe wound complicated with osteomyelitis. He is s/p revascularization of left SFA + AT with atherectomy + PTA using DCB. He is currently being treated with IV abx will be downgraded to oral antibiotics next week. He denies pain. + aspirin + plavix for DAPT. + statin therapy. No arb or acei because of CKD. He has HTN, HLP, DM II, and CKD III.         Diagnostic angiogram 7/2019      S/p transradial aortogram with run off   Adequate inflow with patent aorta, b/l NOEMÍ , EIA, and CFA    80% mid left SFA stenosis, 99% proximal AT, 80% PT, and 80% PER   90% prox right SFA stenosis, 90% ostial PT + PER with subtotal proximal AT   Patent DP and plantar arch bilaterally   Case was done with CO2 in view of CKD          Peripheral intervention 8/2019     Hybrid approach with righ CFA access and retrograde left AT   Crossed left AT lesions with retrograde wire manipulation   Atherectomy of SFA + AT with 2.0 Classic CSI    PTA of AT with 4.0 x 150 mm Lutonix DCB   PTA of PT with 4.0 x 120 mm balloon after occlusion from distal embolization   PTA of SFA with 7.0 x 60 mm Lutonix DCB   Spasm of PER noted at the end of the case   CFA closed with perclose and retrograde access closed with manual pressure             Patient Active Problem List    Diagnosis Date Noted    Critical lower limb ischemia 09/03/2019    MRSA (methicillin resistant Staphylococcus aureus) infection     PAD (peripheral artery disease) 07/29/2019    Toe ulcer 07/27/2019    Toe osteomyelitis, left 07/26/2019    Thrombocytopenia, unspecified 07/26/2019    Diabetic ulcer of toe of left foot associated with type 2 diabetes mellitus, with necrosis of bone 07/26/2019    Postural dizziness with presyncope 01/16/2019     Bilateral leg edema 2019    Acute osteomyelitis of toe, left     CKD (chronic kidney disease) stage 4, GFR 15-29 ml/min 2018    Diabetic neuropathy associated with type 2 diabetes mellitus 2018    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, with long-term current use of insulin 2018    Chronic diastolic congestive heart failure 2018    Anginal equivalent 2018    Right testicular pain 2018    Testicular mass 2018    Peripheral edema 2018    Dyspnea on exertion 2018    Incontinence 2018    Metabolic bone disease 2018    Anemia of chronic renal failure, stage 4 (severe) 2018    Myogenic ptosis 2017    Pulmonary nodule 10/28/2016    Normocytic anemia     Weakness of left arm 2015    Impaired mobility and ADLs 2015    Cervical radiculopathy 2015    Severe nonproliferative diabetic retinopathy of both eyes 2013    Hypertensive retinopathy of both eyes 2013    Retinal hole or tear, left 2013    Hypogonadism male 2013    ED (erectile dysfunction) 2013    Type 2 diabetes mellitus with stage 4 chronic kidney disease, with long-term current use of insulin 01/10/2013    Essential hypertension 01/10/2013    Dyslipidemia 01/10/2013           Right Arm BP - Sittin/80  Left Arm BP - Sittin/80        LABS      LAST HbA1c  Lab Results   Component Value Date    HGBA1C 8.1 (H) 2019       Lipid panel  Lab Results   Component Value Date    CHOL 126 2018    CHOL 143 2017    CHOL 154 2016     Lab Results   Component Value Date    HDL 31 (L) 2018    HDL 42 2017    HDL 39 (L) 2016     Lab Results   Component Value Date    LDLCALC 76.8 2018    LDLCALC 81.6 2017    LDLCALC 92.6 2016     Lab Results   Component Value Date    TRIG 91 2018    TRIG  97 06/13/2017    TRIG 112 08/18/2016     Lab Results   Component Value Date    CHOLHDL 24.6 07/20/2018    CHOLHDL 29.4 06/13/2017    CHOLHDL 25.3 08/18/2016            Review of Systems   Skin: Positive for poor wound healing (L great toe wound ).       Objective:   Physical Exam   Constitutional: He is oriented to person, place, and time. He appears well-developed and well-nourished. He is not intubated.   HENT:   Head: Normocephalic and atraumatic.   Right Ear: External ear normal.   Left Ear: External ear normal.   Mouth/Throat: Oropharynx is clear and moist.   Eyes: Pupils are equal, round, and reactive to light. Conjunctivae and EOM are normal. Right eye exhibits no discharge. Left eye exhibits no discharge. No scleral icterus.   Neck: Normal range of motion. Neck supple. Normal carotid pulses, no hepatojugular reflux and no JVD present. Carotid bruit is not present. No tracheal deviation present. No thyromegaly present.   Cardiovascular: Normal rate, regular rhythm, S1 normal and S2 normal.  No extrasystoles are present. PMI is not displaced. Exam reveals no gallop, no S3, no distant heart sounds, no friction rub and no midsystolic click.   No murmur heard.  Pulses:       Carotid pulses are 2+ on the right side, and 2+ on the left side.       Radial pulses are 2+ on the right side, and 2+ on the left side.        Femoral pulses are 2+ on the right side, and 2+ on the left side.       Popliteal pulses are 2+ on the right side, and 2+ on the left side.        Dorsalis pedis pulses are 0 on the right side, and 2+ on the left side.        Posterior tibial pulses are 0 on the right side, and 2+ on the left side.       Triphasic L DP and PT doppler signals       Biphasic R PT and monophasic R DP doppler signals               Pulmonary/Chest: Effort normal and breath sounds normal. No accessory muscle usage or stridor. No apnea, no tachypnea and no bradypnea. He is not intubated. No respiratory distress. He has no  decreased breath sounds. He has no wheezes. He has no rales. He exhibits no tenderness and no bony tenderness.   Abdominal: He exhibits no distension, no pulsatile liver, no abdominal bruit, no ascites, no pulsatile midline mass and no mass. There is no tenderness. There is no rebound and no guarding.   Musculoskeletal: Normal range of motion. He exhibits no edema or tenderness.   Lymphadenopathy:     He has no cervical adenopathy.   Neurological: He is alert and oriented to person, place, and time. He has normal reflexes. No cranial nerve deficit. Coordination normal.   Skin: Skin is warm. No rash noted. No erythema. No pallor.       L great toe wound       No gangrene      Healing well with granulation tissue       See images                    Psychiatric: He has a normal mood and affect. His behavior is normal. Judgment and thought content normal.         8/30/2019                      Assessment:     1. PAD (peripheral artery disease)    2. Essential hypertension    3. Dyslipidemia    4. CKD (chronic kidney disease) stage 4, GFR 15-29 ml/min    5. Toe osteomyelitis, left    6. MRSA (methicillin resistant Staphylococcus aureus) infection    7. Type 2 diabetes mellitus with stage 4 chronic kidney disease, with long-term current use of insulin    8. Critical lower limb ischemia        Plan:       Wound care  Antibiotics per ID  Proper foot wear  Proper foot care      Aspirin + plaix  Statin therapy  Acei or arb initiation in collaboration with nephrology        Surveillance US in 4 weeks with follow up   Aggressive risk factors modification        Continue with current medical plan and lifestyle changes.  Return sooner for concerns or questions. If symptoms persist go to the ED  I have reviewed all pertinent data on this patient       I have reviewed the patient's medical history in detail and updated the computerized patient record.    Orders Placed This Encounter   Procedures    US Lower Extremity Arteries Left  "    Standing Status:   Future     Standing Expiration Date:   8/29/2020     Order Specific Question:   May the Radiologist modify the order per protocol to meet the clinical needs of the patient?     Answer:   Yes       Follow up as scheduled. Return sooner for concerns or questions            He expressed verbal understanding and agreed with the plan        Patient's Medications   New Prescriptions    No medications on file   Previous Medications    ACETAMINOPHEN (TYLENOL) 325 MG TABLET    Take 2 tablets (650 mg total) by mouth every 4 (four) hours as needed.    AMLODIPINE (NORVASC) 10 MG TABLET    Take 1 tablet (10 mg total) by mouth once daily.    ASPIRIN (ECOTRIN) 81 MG EC TABLET    Take 1 tablet (81 mg total) by mouth once daily.    CADEXOMER IODINE (IODOSORB) 0.9 % GEL    Apply topically every Mon, Wed, Fri.    CARVEDILOL (COREG) 25 MG TABLET    Take 1 tablet (25 mg total) by mouth 2 (two) times daily with meals.    DOXYCYCLINE (VIBRAMYCIN) 100 MG CAP    Take 1 capsule (100 mg total) by mouth 2 (two) times daily. for 10 days    FUROSEMIDE (LASIX) 40 MG TABLET    Take 2 tablets (80 mg total) by mouth 2 (two) times daily. Do not take the pm dose after 3 pm    GABAPENTIN (NEURONTIN) 100 MG CAPSULE    TAKE ONE CAPSULE BY MOUTH IN THE MORNING THEN ONE CAPSULE  IN THE EVENING AND THEN THREE CAPSULES AT BEDTIME    HYDRALAZINE (APRESOLINE) 50 MG TABLET    TAKE ONE TABLET BY MOUTH EVERY 12 HOURS    INSULIN LISPRO (HUMALOG KWIKPEN INSULIN) 100 UNIT/ML INPN PEN    INJECT 10 UNITS SUBCUTANEOUSLY THREE TIMES DAILY BEFORE MEALS WITH CORRECTION SCALE, MAX TDD 50 UNITS    LEVEMIR FLEXTOUCH U-100 INSULN 100 UNIT/ML (3 ML) INPN PEN    Inject 26 Units into the skin every evening.    PEN NEEDLE, DIABETIC, SAFETY (BD AUTOSHIELD PEN NEEDLE) 29 GAUGE X 5/16" NDLE    For use once daily with levemir flexpen    ROSUVASTATIN (CRESTOR) 20 MG TABLET    Take 20 mg by mouth once daily.     VANCOMYCIN HCL (VANCOMYCIN 750 MG/250 ML D5W, READY " TO MIX SYSTEM,)    Inject 250 mLs (750 mg total) into the vein once daily.   Modified Medications    Modified Medication Previous Medication    CLOPIDOGREL (PLAVIX) 75 MG TABLET clopidogrel (PLAVIX) 75 mg tablet       Take 1 tablet (75 mg total) by mouth once daily.    Take 1 tablet (75 mg total) by mouth once daily.   Discontinued Medications    No medications on file

## 2019-09-03 NOTE — PATIENT INSTRUCTIONS
Leg Artery Emergencies: Critical Limb Ischemia (CLI)  Critical limb ischemia (CLI) is a condition that can occur over time when your leg arteries are damaged. It is a severe form of peripheral arterial disease (PAD). PAD is caused when leg arteries are narrowed, reducing blood flow. If blood flow to the toe, foot, or leg is completely blocked, the tissue begins to die (gangrene). If this happens, you need medical care right away to restore blood flow and possibly save the leg. But even with the best medical care, it might not be possible to save a severely affected limb.  When do you need emergency care?    CLI can get worse and cause an urgent problem. For example, if you have a wound, it may not heal. This can lead to gangrene. Go to the emergency room right away if you have any of these symptoms:  · A wound that is foul smelling, draining pus, or discolored  · Severe foot or leg pain that occurs suddenly without injury, especially if the foot or leg is cold or numb  Call your healthcare provider if:  · You have a cut or ulcer that does not heal  · You have foot or leg pain that happens when walking but goes away with rest. This may be a sign of blocked arteries.  How is critical limb ischemia diagnosed?  Certain tests may be done to find out if you have CLI. First your provider will carefully examine you, checking for pulses at several places. Other common tests include:  · Ankle-brachial index (ROCIO). The blood pressure in your ankle is compared with the blood pressure in your arm.  · Duplex ultrasound. Harmless sound waves are used to create images of blood flow in your legs.  · Arteriography. X-ray dye (contrast medium) is injected into the artery using a thin, flexible tube (catheter). This allows blood vessels to be seen easily on X-rays.  How is critical limb ischemia treated?  Possible treatments for CLI include:  · Dissolving or removing a blood clot. To dissolve a clot, a tube (catheter) is put into an  artery in your groin. Clot-busting medicine is put into the tube to dissolve the clot. Or surgery may be done to remove the clot. A cut (incision) is made in the artery at the blocked area. The clot is then removed.  · Angioplasty. A tiny, uninflated balloon is sent to the narrowed area by a catheter. It is then inflated to widen the artery. The balloon is deflated and removed.  · Stenting. After angioplasty, a tiny wire mesh tube (stent) may be put in the artery to help hold it open. The stent is also put in using a catheter.  · Endarterectomy. An incision is made in the artery at the blocked area. The material that blocks the artery is then removed from artery walls.  · Peripheral bypass surgery. A natural or artificial graft is used to bypass the blocked area.  · Amputation. If left untreated, the affected area may have to be removed (amputated).  How can critical limb ischemia emergencies be prevented?  Know the signs and symptoms of a leg artery emergency. If you have diabetes or poor blood circulation, check your feet daily for wounds, sores, blisters, and color changes. If you smoke, stop smoking.  Date Last Reviewed: 6/1/2016  © 6907-1177 [employer]. [address] [place] [address], [place] [address]. All rights reserved. This information is not intended as a substitute for professional medical care. Always follow your healthcare professional's instructions.

## 2019-09-04 ENCOUNTER — TELEPHONE (OUTPATIENT)
Dept: HOME HEALTH SERVICES | Facility: HOSPITAL | Age: 74
End: 2019-09-04

## 2019-09-10 RX ORDER — FUROSEMIDE 40 MG/1
TABLET ORAL
Qty: 360 TABLET | Refills: 0 | Status: SHIPPED | OUTPATIENT
Start: 2019-09-10 | End: 2019-12-09 | Stop reason: SDUPTHER

## 2019-09-16 ENCOUNTER — TELEPHONE (OUTPATIENT)
Dept: PODIATRY | Facility: CLINIC | Age: 74
End: 2019-09-16

## 2019-09-18 ENCOUNTER — TELEPHONE (OUTPATIENT)
Dept: PODIATRY | Facility: CLINIC | Age: 74
End: 2019-09-18

## 2019-09-18 NOTE — TELEPHONE ENCOUNTER
----- Message from Nayeli Campos sent at 9/18/2019  1:53 PM CDT -----  Contact: Domingo Castro  No [phone]    Patient needs to reschedule his missed appointment.

## 2019-09-19 ENCOUNTER — OFFICE VISIT (OUTPATIENT)
Dept: PODIATRY | Facility: CLINIC | Age: 74
End: 2019-09-19
Payer: MEDICARE

## 2019-09-19 VITALS
SYSTOLIC BLOOD PRESSURE: 130 MMHG | HEART RATE: 54 BPM | BODY MASS INDEX: 30.09 KG/M2 | HEIGHT: 75 IN | WEIGHT: 242 LBS | DIASTOLIC BLOOD PRESSURE: 64 MMHG

## 2019-09-19 DIAGNOSIS — E11.621 DIABETIC ULCER OF TOE OF LEFT FOOT ASSOCIATED WITH TYPE 2 DIABETES MELLITUS, WITH BONE INVOLVEMENT WITHOUT EVIDENCE OF NECROSIS: Primary | ICD-10-CM

## 2019-09-19 DIAGNOSIS — E11.42 TYPE 2 DIABETES MELLITUS WITH PERIPHERAL NEUROPATHY: ICD-10-CM

## 2019-09-19 DIAGNOSIS — E11.51 TYPE 2 DIABETES MELLITUS WITH PERIPHERAL ANGIOPATHY: ICD-10-CM

## 2019-09-19 DIAGNOSIS — L97.526 DIABETIC ULCER OF TOE OF LEFT FOOT ASSOCIATED WITH TYPE 2 DIABETES MELLITUS, WITH BONE INVOLVEMENT WITHOUT EVIDENCE OF NECROSIS: Primary | ICD-10-CM

## 2019-09-19 PROBLEM — H02.429 MYOGENIC PTOSIS: Status: RESOLVED | Noted: 2017-08-30 | Resolved: 2019-09-19

## 2019-09-19 PROBLEM — I70.0 AORTIC ARCH ATHEROSCLEROSIS: Status: ACTIVE | Noted: 2019-09-19

## 2019-09-19 PROBLEM — L97.509 TOE ULCER: Status: RESOLVED | Noted: 2019-07-27 | Resolved: 2019-09-19

## 2019-09-19 PROBLEM — I70.229 CRITICAL LOWER LIMB ISCHEMIA: Status: RESOLVED | Noted: 2019-09-03 | Resolved: 2019-09-19

## 2019-09-19 PROBLEM — R55 POSTURAL DIZZINESS WITH PRESYNCOPE: Status: RESOLVED | Noted: 2019-01-16 | Resolved: 2019-09-19

## 2019-09-19 PROBLEM — R06.09 DYSPNEA ON EXERTION: Status: RESOLVED | Noted: 2018-06-14 | Resolved: 2019-09-19

## 2019-09-19 PROBLEM — N50.811 RIGHT TESTICULAR PAIN: Status: RESOLVED | Noted: 2018-07-16 | Resolved: 2019-09-19

## 2019-09-19 PROBLEM — R42 POSTURAL DIZZINESS WITH PRESYNCOPE: Status: RESOLVED | Noted: 2019-01-16 | Resolved: 2019-09-19

## 2019-09-19 PROBLEM — R60.0 PERIPHERAL EDEMA: Status: RESOLVED | Noted: 2018-06-14 | Resolved: 2019-09-19

## 2019-09-19 PROBLEM — I50.32 CHRONIC CONGESTIVE HEART FAILURE WITH LEFT VENTRICULAR DIASTOLIC DYSFUNCTION: Status: ACTIVE | Noted: 2019-09-19

## 2019-09-19 PROBLEM — J47.9 BRONCHIECTASIS WITHOUT COMPLICATION: Status: ACTIVE | Noted: 2019-09-19

## 2019-09-19 PROBLEM — E11.65 UNCONTROLLED TYPE 2 DIABETES MELLITUS WITH HYPERGLYCEMIA: Status: ACTIVE | Noted: 2019-09-19

## 2019-09-19 PROBLEM — I51.89 ECHOCARDIOGRAM SHOWS LEFT VENTRICULAR DIASTOLIC DYSFUNCTION: Status: ACTIVE | Noted: 2019-09-19

## 2019-09-19 PROCEDURE — 99999 PR PBB SHADOW E&M-EST. PATIENT-LVL III: ICD-10-PCS | Mod: PBBFAC,,, | Performed by: PODIATRIST

## 2019-09-19 PROCEDURE — 11042 WOUND DEBRIDEMENT: ICD-10-PCS | Mod: S$PBB,,, | Performed by: PODIATRIST

## 2019-09-19 PROCEDURE — 99213 PR OFFICE/OUTPT VISIT, EST, LEVL III, 20-29 MIN: ICD-10-PCS | Mod: 25,S$PBB,, | Performed by: PODIATRIST

## 2019-09-19 PROCEDURE — 99999 PR PBB SHADOW E&M-EST. PATIENT-LVL III: CPT | Mod: PBBFAC,,, | Performed by: PODIATRIST

## 2019-09-19 PROCEDURE — 99213 OFFICE O/P EST LOW 20 MIN: CPT | Mod: 25,S$PBB,, | Performed by: PODIATRIST

## 2019-09-19 PROCEDURE — 11042 DBRDMT SUBQ TIS 1ST 20SQCM/<: CPT | Mod: PBBFAC,PN | Performed by: PODIATRIST

## 2019-09-19 PROCEDURE — 99213 OFFICE O/P EST LOW 20 MIN: CPT | Mod: PBBFAC,PN | Performed by: PODIATRIST

## 2019-09-19 NOTE — PROCEDURES
"Wound Debridement  Date/Time: 9/19/2019 8:36 AM  Performed by: Dimitry Gallegos DPM  Authorized by: Dimitry Gallegos DPM     Time out: Immediately prior to procedure a "time out" was called to verify the correct patient, procedure, equipment, support staff and site/side marked as required.    Consent Done?:  Yes (Verbal)    Preparation: Patient was prepped and draped in usual sterile fashion    Local anesthesia used?: No      Wound Details:    Location:  Left foot    Location:  Left 1st Toe    Type of Debridement:  Excisional       Length (cm):  1.5       Area (sq cm):  0.9       Width (cm):  0.6       Percent Debrided (%):  100       Depth (cm):  0.4       Total Area Debrided (sq cm):  0.9    Depth of debridement:  Subcutaneous tissue    Tissue debrided:  Subcutaneous    Devitalized tissue debrided:  Callus, Biofilm, Fibrin and Slough    Instruments:  Curette    Bleeding:  Minimal  Hemostasis Achieved: Yes    Method Used:  Pressure  Patient tolerance:  Patient tolerated the procedure well with no immediate complications     Applied saline moistened yo, shar foam, cast padding x 2 secured with coban.        "

## 2019-09-20 RX ORDER — HYDRALAZINE HYDROCHLORIDE 50 MG/1
TABLET, FILM COATED ORAL
Qty: 60 TABLET | Refills: 11 | Status: SHIPPED | OUTPATIENT
Start: 2019-09-20 | End: 2020-01-13

## 2019-09-20 NOTE — PROGRESS NOTES
Subjective:      Patient ID: Domingo Castro is a 74 y.o. male.    Chief Complaint: Follow-up (ulcer of left foot )    Domingo is a 74 y.o. male who presents to the clinic for evaluation and treatment of high risk feet. Domingo has a past medical history of Arthritis, Cataract, Chronic diastolic congestive heart failure, Coronary artery disease, Cystoid macular edema of both eyes, Diabetes mellitus type II, Hyperlipemia, Hypertension, Retinal hole, Stage 4 chronic kidney disease, and Streptococcus pyogenes bacteremia (12/23/2018). The patient's chief complaint is diabetic foot ulcer, left hallux. This patient has documented high risk feet requiring routine maintenance secondary to peripheral vascular disease.  Discharged from [facility] on 08/04/2019.  Receiving 6 weeks of IV vancomycin per ID recommendations.  Follow-up per [facility] with 3 times weekly Medi Honey dressing changes. Accompanied by his daughter.  No new complaints.    09/19/2019:  Follow-up for chronic wound to left hallux treated for osteomyelitis.  Post endovascular intervention per .    PCP: Griselda Nugent MD    Date Last Seen by PCP:  05/29/2019    Current shoe gear:  Affected Foot: Football and Darco shoe on the affected foot     Unaffected Foot: Rx diabetic extra depth shoes and custom accommodative insoles    History of Trauma: negative  Sign of Infection: none    Hemoglobin A1C   Date Value Ref Range Status   07/26/2019 8.1 (H) 4.0 - 5.6 % Final     Comment:     ADA Screening Guidelines:  5.7-6.4%  Consistent with prediabetes  >or=6.5%  Consistent with diabetes  High levels of fetal hemoglobin interfere with the HbA1C  assay. Heterozygous hemoglobin variants (HbS, HgC, etc)do  not significantly interfere with this assay.   However, presence of multiple variants may affect accuracy.     02/22/2019 8.2 (H) 4.0 - 5.6 % Final     Comment:     ADA Screening Guidelines:  5.7-6.4%  Consistent with prediabetes  >or=6.5%   Consistent with diabetes  High levels of fetal hemoglobin interfere with the HbA1C  assay. Heterozygous hemoglobin variants (HbS, HgC, etc)do  not significantly interfere with this assay.   However, presence of multiple variants may affect accuracy.     11/28/2018 7.3 (H) 4.0 - 5.6 % Final     Comment:     ADA Screening Guidelines:  5.7-6.4%  Consistent with prediabetes  >or=6.5%  Consistent with diabetes  High levels of fetal hemoglobin interfere with the HbA1C  assay. Heterozygous hemoglobin variants (HbS, HgC, etc)do  not significantly interfere with this assay.   However, presence of multiple variants may affect accuracy.         Review of Systems   Constitution: Negative for chills and fever.   HENT: Negative for congestion and hearing loss.    Cardiovascular: Negative for chest pain and claudication.   Respiratory: Negative for cough and shortness of breath.    Skin: Positive for color change, nail changes and poor wound healing.   Musculoskeletal: Negative for back pain and joint pain.   Gastrointestinal: Negative for nausea and vomiting.   Neurological: Positive for numbness.   Psychiatric/Behavioral: Negative for altered mental status.           Objective:      Physical Exam   Constitutional: He is oriented to person, place, and time. No distress.   Cardiovascular:   Pulses:       Dorsalis pedis pulses are 1+ on the right side, and 1+ on the left side.        Posterior tibial pulses are 1+ on the right side, and 1+ on the left side.   Mild lower extremity edema bilateral. No hair growth bilateral lower extremity.   Musculoskeletal:   One MTP range of motion left is less than 30° dorsiflexion.    No localized pain on palpation left hallux wound site.  No palpable fluctuance or crepitance left foot.   Neurological: He is alert and oriented to person, place, and time. A sensory deficit is present.   Skin: Capillary refill takes 2 to 3 seconds. No ecchymosis and no rash noted. He is not diaphoretic. No cyanosis  or erythema. No pallor. Nails show no clubbing.   Ulcer Location: lateral left hallux overlying proximal phalanx  Measurements: 1.5x0.6x0.2 cm pre-debridement and 1.5x0.6x0.4cm post debridement  Periwound: Intact  Drainage: scant serous  Pus: None.  Malodor: None.  Base:  0% granular. 100% fibrin. With overlying biofilm and slough  Signs of infection: None.  Does not probe, track or undermine  .             Assessment:       Encounter Diagnoses   Name Primary?    Diabetic ulcer of toe of left foot associated with type 2 diabetes mellitus, with bone involvement without evidence of necrosis Yes    Type 2 diabetes mellitus with peripheral angiopathy     Type 2 diabetes mellitus with peripheral neuropathy          Plan:       Domingo was seen today for follow-up.    Diagnoses and all orders for this visit:    Diabetic ulcer of toe of left foot associated with type 2 diabetes mellitus, with bone involvement without evidence of necrosis  -     Wound Debridement  -     SUBSEQUENT HOME HEALTH ORDERS    Type 2 diabetes mellitus with peripheral angiopathy  -     Wound Debridement  -     SUBSEQUENT HOME HEALTH ORDERS    Type 2 diabetes mellitus with peripheral neuropathy  -     Wound Debridement  -     SUBSEQUENT HOME HEALTH ORDERS      I counseled the patient on his conditions, their implications and medical management.    Shoe inspection. Diabetic Foot Education. Patient reminded of the importance of good nutrition and blood sugar control to help prevent podiatric complications of diabetes. Patient instructed on proper foot hygeine. We discussed wearing proper shoe gear, daily foot inspections, never walking without protective shoe gear, never putting sharp instruments to feet.    Wound debridement and dressing per attached note.    Clinically left hallux is healing well with local wound care and IV antibiotics.    Subsequent home health orders updated today.    Rest and elevation.    Offloading Darco shoe.    RTC 4 weeks  or p.r.n. as discussed.

## 2019-09-23 ENCOUNTER — OFFICE VISIT (OUTPATIENT)
Dept: INTERNAL MEDICINE | Facility: CLINIC | Age: 74
End: 2019-09-23
Payer: MEDICARE

## 2019-09-23 VITALS
OXYGEN SATURATION: 98 % | HEIGHT: 75 IN | SYSTOLIC BLOOD PRESSURE: 120 MMHG | WEIGHT: 243.38 LBS | RESPIRATION RATE: 15 BRPM | HEART RATE: 58 BPM | DIASTOLIC BLOOD PRESSURE: 71 MMHG | BODY MASS INDEX: 30.26 KG/M2

## 2019-09-23 DIAGNOSIS — E11.621 DIABETIC ULCER OF TOE OF LEFT FOOT ASSOCIATED WITH TYPE 2 DIABETES MELLITUS, WITH NECROSIS OF BONE: ICD-10-CM

## 2019-09-23 DIAGNOSIS — E11.65 UNCONTROLLED TYPE 2 DIABETES MELLITUS WITH HYPERGLYCEMIA: ICD-10-CM

## 2019-09-23 DIAGNOSIS — R32 URINARY INCONTINENCE, UNSPECIFIED TYPE: ICD-10-CM

## 2019-09-23 DIAGNOSIS — E11.22 CKD STAGE 4 DUE TO TYPE 2 DIABETES MELLITUS: ICD-10-CM

## 2019-09-23 DIAGNOSIS — I50.32 CHRONIC CONGESTIVE HEART FAILURE WITH LEFT VENTRICULAR DIASTOLIC DYSFUNCTION: ICD-10-CM

## 2019-09-23 DIAGNOSIS — M86.9 TOE OSTEOMYELITIS, LEFT: ICD-10-CM

## 2019-09-23 DIAGNOSIS — E11.59 HYPERTENSION ASSOCIATED WITH DIABETES: ICD-10-CM

## 2019-09-23 DIAGNOSIS — D64.9 NORMOCYTIC ANEMIA: ICD-10-CM

## 2019-09-23 DIAGNOSIS — I15.2 OBESITY, DIABETES, AND HYPERTENSION SYNDROME: ICD-10-CM

## 2019-09-23 DIAGNOSIS — E11.69 HYPERLIPIDEMIA ASSOCIATED WITH TYPE 2 DIABETES MELLITUS: ICD-10-CM

## 2019-09-23 DIAGNOSIS — I15.2 HYPERTENSION ASSOCIATED WITH DIABETES: ICD-10-CM

## 2019-09-23 DIAGNOSIS — H35.033 HYPERTENSIVE RETINOPATHY OF BOTH EYES: ICD-10-CM

## 2019-09-23 DIAGNOSIS — E29.1 HYPOGONADISM MALE: ICD-10-CM

## 2019-09-23 DIAGNOSIS — E11.22 TYPE 2 DIABETES MELLITUS WITH STAGE 4 CHRONIC KIDNEY DISEASE, WITH LONG-TERM CURRENT USE OF INSULIN: Chronic | ICD-10-CM

## 2019-09-23 DIAGNOSIS — A49.02 MRSA (METHICILLIN RESISTANT STAPHYLOCOCCUS AUREUS) INFECTION: ICD-10-CM

## 2019-09-23 DIAGNOSIS — Z79.4 TYPE 2 DIABETES MELLITUS WITH STAGE 4 CHRONIC KIDNEY DISEASE, WITH LONG-TERM CURRENT USE OF INSULIN: Chronic | ICD-10-CM

## 2019-09-23 DIAGNOSIS — M89.8X9 METABOLIC BONE DISEASE: ICD-10-CM

## 2019-09-23 DIAGNOSIS — Z00.00 ENCOUNTER FOR PREVENTIVE HEALTH EXAMINATION: Primary | ICD-10-CM

## 2019-09-23 DIAGNOSIS — N52.9 ERECTILE DYSFUNCTION, UNSPECIFIED ERECTILE DYSFUNCTION TYPE: ICD-10-CM

## 2019-09-23 DIAGNOSIS — D63.1 ANEMIA OF CHRONIC RENAL FAILURE, STAGE 4 (SEVERE): Chronic | ICD-10-CM

## 2019-09-23 DIAGNOSIS — D69.6 THROMBOCYTOPENIA, UNSPECIFIED: ICD-10-CM

## 2019-09-23 DIAGNOSIS — E11.69 OBESITY, DIABETES, AND HYPERTENSION SYNDROME: ICD-10-CM

## 2019-09-23 DIAGNOSIS — J47.9 BRONCHIECTASIS WITHOUT COMPLICATION: ICD-10-CM

## 2019-09-23 DIAGNOSIS — I70.0 AORTIC ARCH ATHEROSCLEROSIS: ICD-10-CM

## 2019-09-23 DIAGNOSIS — E66.09 CLASS 1 OBESITY DUE TO EXCESS CALORIES WITH SERIOUS COMORBIDITY AND BODY MASS INDEX (BMI) OF 30.0 TO 30.9 IN ADULT: ICD-10-CM

## 2019-09-23 DIAGNOSIS — N18.4 CKD STAGE 4 DUE TO TYPE 2 DIABETES MELLITUS: ICD-10-CM

## 2019-09-23 DIAGNOSIS — N18.4 TYPE 2 DIABETES MELLITUS WITH STAGE 4 CHRONIC KIDNEY DISEASE, WITH LONG-TERM CURRENT USE OF INSULIN: Chronic | ICD-10-CM

## 2019-09-23 DIAGNOSIS — Z78.9 IMPAIRED MOBILITY AND ADLS: ICD-10-CM

## 2019-09-23 DIAGNOSIS — E08.3413 SEVERE NONPROLIFERATIVE DIABETIC RETINOPATHY OF BOTH EYES WITH MACULAR EDEMA ASSOCIATED WITH DIABETES MELLITUS DUE TO UNDERLYING CONDITION: ICD-10-CM

## 2019-09-23 DIAGNOSIS — N18.4 ANEMIA OF CHRONIC RENAL FAILURE, STAGE 4 (SEVERE): Chronic | ICD-10-CM

## 2019-09-23 DIAGNOSIS — I50.32 CHRONIC DIASTOLIC CONGESTIVE HEART FAILURE: Chronic | ICD-10-CM

## 2019-09-23 DIAGNOSIS — H33.302 RETINAL HOLE OR TEAR, LEFT: ICD-10-CM

## 2019-09-23 DIAGNOSIS — Z74.09 IMPAIRED MOBILITY AND ADLS: ICD-10-CM

## 2019-09-23 DIAGNOSIS — L97.524 DIABETIC ULCER OF TOE OF LEFT FOOT ASSOCIATED WITH TYPE 2 DIABETES MELLITUS, WITH NECROSIS OF BONE: ICD-10-CM

## 2019-09-23 DIAGNOSIS — R60.0 BILATERAL LEG EDEMA: ICD-10-CM

## 2019-09-23 DIAGNOSIS — M54.12 CERVICAL RADICULOPATHY: ICD-10-CM

## 2019-09-23 DIAGNOSIS — E11.41 DIABETIC MONONEUROPATHY ASSOCIATED WITH TYPE 2 DIABETES MELLITUS: Chronic | ICD-10-CM

## 2019-09-23 DIAGNOSIS — H02.429: ICD-10-CM

## 2019-09-23 DIAGNOSIS — I73.9 PAD (PERIPHERAL ARTERY DISEASE): ICD-10-CM

## 2019-09-23 DIAGNOSIS — E66.9 OBESITY, DIABETES, AND HYPERTENSION SYNDROME: ICD-10-CM

## 2019-09-23 DIAGNOSIS — R91.1 PULMONARY NODULE: ICD-10-CM

## 2019-09-23 DIAGNOSIS — E11.59 OBESITY, DIABETES, AND HYPERTENSION SYNDROME: ICD-10-CM

## 2019-09-23 DIAGNOSIS — I20.89 ANGINAL EQUIVALENT: ICD-10-CM

## 2019-09-23 DIAGNOSIS — E78.5 HYPERLIPIDEMIA ASSOCIATED WITH TYPE 2 DIABETES MELLITUS: ICD-10-CM

## 2019-09-23 PROBLEM — N50.89 TESTICULAR MASS: Status: RESOLVED | Noted: 2018-07-16 | Resolved: 2019-09-23

## 2019-09-23 PROCEDURE — G0439 PR MEDICARE ANNUAL WELLNESS SUBSEQUENT VISIT: ICD-10-PCS | Mod: S$GLB,,, | Performed by: NURSE PRACTITIONER

## 2019-09-23 PROCEDURE — G0439 PPPS, SUBSEQ VISIT: HCPCS | Mod: S$GLB,,, | Performed by: NURSE PRACTITIONER

## 2019-09-23 PROCEDURE — 99215 OFFICE O/P EST HI 40 MIN: CPT | Mod: PBBFAC,PO | Performed by: NURSE PRACTITIONER

## 2019-09-23 PROCEDURE — 99999 PR PBB SHADOW E&M-EST. PATIENT-LVL V: CPT | Mod: PBBFAC,,, | Performed by: NURSE PRACTITIONER

## 2019-09-23 PROCEDURE — 99999 PR PBB SHADOW E&M-EST. PATIENT-LVL V: ICD-10-PCS | Mod: PBBFAC,,, | Performed by: NURSE PRACTITIONER

## 2019-09-23 NOTE — PATIENT INSTRUCTIONS
Counseling and Referral of Other Preventative  (Italic type indicates deductible and co-insurance are waived)    Patient Name: Domingo Castro  Today's Date: 9/23/2019    Health Maintenance       Date Due Completion Date    Shingles Vaccine (1 of 2) cdc handout given   ---    Lipid Panel Due-PCP order noted in EPIC- pt to schedule   7/20/2018    Pneumococcal Vaccine (65+ High/Highest Risk) (2 of 2 - PPSV23) 2/22/2020 2/22/2019    Influenza Vaccine (1) Cough- deferred- appt w PCP on Monday for further evaluation of cough   10/16/2014    Hemoglobin A1c 1/26/2020 7/26/2019    Eye Exam 02/25/2020 2/25/2019    Foot Exam 07/26/2020 7/26/2019    High Dose Statin 09/19/2020 9/19/2019    Colonoscopy 08/24/2022 8/24/2012    TETANUS VACCINE    Ct chest 08/26/2023 8/26/2013 12/25/2019            No orders of the defined types were placed in this encounter.    The following information is provided to all patients.  This information is to help you find resources for any of the problems found today that may be affecting your health:                Living healthy guide: www.[facility].[place].[facility]      Understanding Diabetes: www.diabetes.org      Eating healthy: www.cdc.gov/healthyweight      CDC home safety checklist: www.cdc.gov/steadi/patient.html      Agency on Aging: www.goea.[place].[facility]      Alcoholics anonymous (AA): www.aa.org      Physical Activity: www.sean.nih.gov/zn5kotu      Tobacco use: www.quitwithusla.org     Exercises to Prevent Falls  Certain types of exercises may help make you less likely to fall. Try the ones below. Or do other exercises that your health care provider suggests. Depending on your health, you may need to start slowly. Don't let that stop you. Even small amounts of exercise can help you. Be sure to talk to your health care provider before starting any exercise program.       Improve balance  Many types of exercise can help improve balance. David chi and yoga are good examples. Here's  "another one to try. You can do it anytime and almost anywhere.  · Stand next to a counter or solid support.  · Push yourself up onto your tiptoes.  · Hold for 5 seconds. If you start to lose your balance, hold on to the counter.  · Rest and repeat 5 times. Work up to holding for 20 to 30 seconds, if you can. Increase flexibility  Being more flexible makes it easier for you to move around safely. Try exercises like the seated hamstring stretch.  · Sit in a chair and put one foot on a stool.  · Straighten your leg and reach with both hands down either side of your leg. Reach as far down your leg as you can.  · Hold for about 20 seconds.  · Go back to the starting position. Then repeat 5 times. Switch legs. Build strength  "Resistance" exercises help build strength. You can do them without equipment. Or you can use weights, elastic bands, or special machines. One such exercise is called the biceps curl. You can hold a 1-pound weight or even a can of soup. Do this exercise at least 3 times a week. Strive for every day.  · Sit up straight in a chair.  · Keep your elbow close to your body and your wrist straight.  · Bend your arm, moving your hand up to your shoulder. Then slowly lower your arm.  · Repeat 5 times. Switch to the other arm.   Build your staying power  Aerobic exercises make your heart and lungs stronger so you can keep moving longer. Walking and swimming are two of the best types of exercises you can do. Using a stationary bike is great, too. Find an aerobic exercise that you enjoy. Start slowly and build up. Even 5 minutes is helpful. Aim for a goal of 30 minutes, at least 3 times a week. You don't have to do 30 minutes in 1 session. Break it up and walk a little throughout the day.  More helpful tips  · Start easy. Slowly work up to doing more.  · Talk with your health care provider about the best exercises for you.  · Call senior centers or health clubs about exercise programs.  · If needed, have a " family member watch you walk every so often to check your stability.  · Exercise with a friend. Choose an activity you both enjoy.  · Consider rachelle chi or yoga to strengthen your balance.  · Try exercises that you can do anytime, anywhere. Here are 2 examples. Have someone with you when you first try these:  ¨ Practice walking by placing 1 foot right in front of the other.  ¨ Stand up and sit down 10 times. Repeat this throughout the day.   Date Last Reviewed: 6/13/2015 © 2000-2017 [employer]. [address] [place] [address], [place] [address]. All rights reserved. This information is not intended as a substitute for professional medical care. Always follow your healthcare professional's instructions.

## 2019-09-23 NOTE — Clinical Note
Primary Care Providers:Griselda Nugent MD, MD (General)Your patient was seen today for a HRA visit. No Gap(s) in care (HEDIS gaps) have been identified during this visit that require additional testing and possible follow up.No orders of the defined types were placed in this encounter.These orders were placed using [facility] approved protocol and any results will be forwarded to your office for appropriate follow up. I have included a copy of my visit note; please review the note and feel free to contact me with any questions. Thank you for allowing me to participate in the care of your patients.Nadia Huitron NP

## 2019-09-23 NOTE — PROGRESS NOTES
"Domingo Castro presented for a  Medicare AWV and comprehensive Health Risk Assessment today. The following components were reviewed and updated:    · Medical history-denies testicular mass.  · Family History  · Social history  · Allergies and Current Medications  · Health Risk Assessment  · Health Maintenance  · Care Team     ** See Completed Assessments for Annual Wellness Visit within the encounter summary.**       The following assessments were completed:  · Living Situation  · CAGE  · Depression Screening  · Timed Get Up and Go  · Whisper Test  · Cognitive Function Screening  · Nutrition Screening  · ADL Screening  · PAQ Screening    Vitals:    09/23/19 0833   BP: 120/71   BP Location: Right arm   Patient Position: Sitting   Pulse: (!) 58   Resp: 15   SpO2: 98%   Weight: 110.4 kg (243 lb 6.2 oz)   Height: 6' 3" (1.905 m)     Body mass index is 30.42 kg/m².  Physical Exam   Constitutional: He is oriented to person, place, and time. He appears well-developed and well-nourished.   HENT:   Head: Normocephalic and atraumatic.   Right Ear: External ear normal.   Left Ear: External ear normal.   Cardiovascular: Regular rhythm and normal heart sounds.   No murmur heard.  Asymptomatic    Pulmonary/Chest: Effort normal and breath sounds normal. No respiratory distress. He has no wheezes. He has no rales.   deep cough   Abdominal: Soft. Bowel sounds are normal. He exhibits no distension. There is no tenderness.   obese   Musculoskeletal: He exhibits edema. He exhibits no tenderness or deformity.   Left foot with dressing   Neurological: He is alert and oriented to person, place, and time. No cranial nerve deficit.   Skin: Skin is warm and dry.   Psychiatric: He has a normal mood and affect. His behavior is normal. Judgment and thought content normal.   Nursing note and vitals reviewed.        Diagnoses and health risks identified today and associated recommendations/orders:    1. Severe nonproliferative diabetic retinopathy " of both eyes with macular edema associated with diabetes mellitus due to underlying condition  Stable- followed by opth    2. Hypertensive retinopathy of both eyes  Stable- followed by opth    3. Retinal hole or tear, left  Stable- followed by opth    4. Anemia of chronic renal failure, stage 4 (severe)  Stable- followed by opth, nephrology    5. Chronic diastolic congestive heart failure  Stable- followed by cardiology    6. Diabetic mononeuropathy associated with type 2 diabetes mellitus  Stable- followed by podiatry, PCP    7. Pulmonary nodule  Stable- followed by PCP- CT chest 12/25/2018    8. Aortic arch atherosclerosis  Stable- followed by cardiology      9. Bronchiectasis without complication  Stable- followed by PCP    10. Chronic congestive heart failure with left ventricular diastolic dysfunction  Stable- followed by cardiology    11. MRSA (methicillin resistant Staphylococcus aureus) infection  Stable- followed by PCP    12. PAD (peripheral artery disease)  Stable- followed by cardiology    13. Diabetic ulcer of toe of left foot associated with type 2 diabetes mellitus, with necrosis of bone  Stable- followed by PCP    14. Thrombocytopenia, unspecified  Stable- followed by cardiology,, PCP    15. Toe osteomyelitis, left  Stable- followed by PCP, podiatry,ID    16. Bilateral leg edema  Stable- followed by cardiology    17.Encounter for preventive health examination  Assessments completed. Preventative health recommendations reviewed.       18. Anginal equivalent  Stable- followed by cardiology      19. Type 2 diabetes mellitus with stage 4 chronic kidney disease, with long-term current use of insulin  Stable- followed by PCP      20. Erectile dysfunction, unspecified erectile dysfunction type  Stable- followed by PCP    21. Hypertension associated with diabetes  Stable- followed by cardiology      22. Hypogonadism male  Stable- followed by PCP    23. Cervical radiculopathy  Stable- followed by PCP    24.  Normocytic anemia  Stable- followed by PCP    25. Impaired mobility and ADLs  Stable- followed by PCP    26. Metabolic bone disease  Stable- followed by PCP    27. Urinary incontinence, unspecified type  Stable- followed by PCP    28. Myogenic ptosis of eyelid, unspecified laterality  Stable- followed by opth    29. Uncontrolled type 2 diabetes mellitus with hyperglycemia  Stable- followed by PCP    30. Class 1 obesity due to excess calories with serious comorbidity and body mass index (BMI) of 30.0 to 30.9 in adult  Chronic problem . Followed by PCP.   Centers for Disease Control and Prevention (CDC)  weight recommendations for current BMI & ideal BMI range discussed with patient.  Recommended  Diabetic, Low fat diet, start regular exercise as tolerated -currently liimited by toe osteomyelitis recovery.     31. Obesity, diabetes, and hypertension syndrome  Stable- followed by PCP    32. 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, with long-term current use of insulin  Stable- followed by opth    33. CKD stage 4 due to type 2 diabetes mellitus  Stable- followed by PCP    34. Hyperlipidemia associated with type 2 diabetes mellitus  Stable- followed by cardiology,PCP    35. Urinary complication  Stable- followed by PCP        Provided Domingo with a 5-10 year written screening schedule and personal prevention plan. Recommendations were developed using the USPSTF age appropriate recommendations. Education, counseling, and referrals were provided as needed. After Visit Summary printed and given to patient which includes a list of additional screenings\tests needed. Prevention of falls-handout. Home blood sugar 133 this am.-states he is better controlled since hospitalization. Left foot wrapped w open foot boot. C/O productive cough since hospitalization, denies fever. Prefers to see Dr Nugent- 1st available next Monday- declined sooner appt with another  provider.      Follow up in about 1 year (around 9/23/2020) for HRA.    VANIA Resendez offered to discuss end of life issues, including information on how to make advance directives that the patient could use to name someone who would make medical decisions on their behalf if they became too ill to make themselves.    ___Patient declined  _X_Patient is interested, I provided paper work and offered to discuss.

## 2019-10-11 ENCOUNTER — EXTERNAL HOME HEALTH (OUTPATIENT)
Dept: HOME HEALTH SERVICES | Facility: HOSPITAL | Age: 74
End: 2019-10-11
Payer: MEDICARE

## 2019-10-15 ENCOUNTER — PATIENT OUTREACH (OUTPATIENT)
Dept: ADMINISTRATIVE | Facility: OTHER | Age: 74
End: 2019-10-15

## 2019-10-17 ENCOUNTER — OFFICE VISIT (OUTPATIENT)
Dept: PODIATRY | Facility: CLINIC | Age: 74
End: 2019-10-17
Payer: MEDICARE

## 2019-10-17 ENCOUNTER — HOSPITAL ENCOUNTER (OUTPATIENT)
Dept: RADIOLOGY | Facility: HOSPITAL | Age: 74
Discharge: HOME | End: 2019-10-17
Attending: PODIATRIST
Payer: MEDICARE

## 2019-10-17 VITALS
WEIGHT: 243 LBS | SYSTOLIC BLOOD PRESSURE: 110 MMHG | BODY MASS INDEX: 30.21 KG/M2 | DIASTOLIC BLOOD PRESSURE: 57 MMHG | HEART RATE: 53 BPM | HEIGHT: 75 IN

## 2019-10-17 DIAGNOSIS — L97.526 DIABETIC ULCER OF TOE OF LEFT FOOT ASSOCIATED WITH TYPE 2 DIABETES MELLITUS, WITH BONE INVOLVEMENT WITHOUT EVIDENCE OF NECROSIS: ICD-10-CM

## 2019-10-17 DIAGNOSIS — L97.526 DIABETIC ULCER OF TOE OF LEFT FOOT ASSOCIATED WITH TYPE 2 DIABETES MELLITUS, WITH BONE INVOLVEMENT WITHOUT EVIDENCE OF NECROSIS: Primary | ICD-10-CM

## 2019-10-17 DIAGNOSIS — E11.42 TYPE 2 DIABETES MELLITUS WITH PERIPHERAL NEUROPATHY: ICD-10-CM

## 2019-10-17 DIAGNOSIS — E11.51 TYPE 2 DIABETES MELLITUS WITH PERIPHERAL ANGIOPATHY: ICD-10-CM

## 2019-10-17 DIAGNOSIS — E11.621 DIABETIC ULCER OF TOE OF LEFT FOOT ASSOCIATED WITH TYPE 2 DIABETES MELLITUS, WITH BONE INVOLVEMENT WITHOUT EVIDENCE OF NECROSIS: ICD-10-CM

## 2019-10-17 DIAGNOSIS — E11.621 DIABETIC ULCER OF TOE OF LEFT FOOT ASSOCIATED WITH TYPE 2 DIABETES MELLITUS, WITH BONE INVOLVEMENT WITHOUT EVIDENCE OF NECROSIS: Primary | ICD-10-CM

## 2019-10-17 PROCEDURE — 11042 DBRDMT SUBQ TIS 1ST 20SQCM/<: CPT | Mod: S$PBB,,, | Performed by: PODIATRIST

## 2019-10-17 PROCEDURE — 97597 DBRDMT OPN WND 1ST 20 CM/<: CPT | Mod: PBBFAC,PN | Performed by: PODIATRIST

## 2019-10-17 PROCEDURE — 99213 OFFICE O/P EST LOW 20 MIN: CPT | Mod: PBBFAC,25,PN | Performed by: PODIATRIST

## 2019-10-17 PROCEDURE — 73630 X-RAY EXAM OF FOOT: CPT | Mod: 26,LT,, | Performed by: RADIOLOGY

## 2019-10-17 PROCEDURE — 11042 WOUND DEBRIDEMENT: ICD-10-PCS | Mod: S$PBB,,, | Performed by: PODIATRIST

## 2019-10-17 PROCEDURE — 99999 PR PBB SHADOW E&M-EST. PATIENT-LVL III: ICD-10-PCS | Mod: PBBFAC,,, | Performed by: PODIATRIST

## 2019-10-17 PROCEDURE — 99213 PR OFFICE/OUTPT VISIT, EST, LEVL III, 20-29 MIN: ICD-10-PCS | Mod: 25,S$PBB,, | Performed by: PODIATRIST

## 2019-10-17 PROCEDURE — 99213 OFFICE O/P EST LOW 20 MIN: CPT | Mod: 25,S$PBB,, | Performed by: PODIATRIST

## 2019-10-17 PROCEDURE — 73630 X-RAY EXAM OF FOOT: CPT | Mod: TC,PN,LT

## 2019-10-17 PROCEDURE — 99999 PR PBB SHADOW E&M-EST. PATIENT-LVL III: CPT | Mod: PBBFAC,,, | Performed by: PODIATRIST

## 2019-10-17 PROCEDURE — 97597 WOUND DEBRIDEMENT: ICD-10-PCS | Mod: S$PBB,59,, | Performed by: PODIATRIST

## 2019-10-17 PROCEDURE — 73630 XR FOOT COMPLETE 3 VIEW LEFT: ICD-10-PCS | Mod: 26,LT,, | Performed by: RADIOLOGY

## 2019-10-17 NOTE — PROCEDURES
Wound Debridement  Date/Time: 10/17/2019 9:00 AM  Performed by: Dimitry Gallegos DPM  Authorized by: Dimitry Gallegos DPM     Consent Done?:  Yes (Verbal)    Preparation: Patient was prepped and draped in usual sterile fashion    Local anesthesia used?: No      Wound Details:    Location:  Left foot    Location:  Left 1st Toe (proximal medial )    Type of Debridement:  Excisional       Length (cm):  0.3       Area (sq cm):  0.3       Width (cm):  1       Percent Debrided (%):  100       Depth (cm):  0.2       Total Area Debrided (sq cm):  0.3    Depth of debridement:  Subcutaneous tissue    Tissue debrided:  Subcutaneous    Devitalized tissue debrided:  Biofilm, Fibrin and Slough    Instruments:  Curette    2nd Wound Details:     Location:  Left foot    Location:  Left 1st Toe (distal tip)    Location:  Left 1st Toe (distal tip)    Type of Debridement:  Excisional       Length (cm):  0.8       Area (sq cm):  0.8       Width (cm):  1       Percent Debrided (%):  100       Depth (cm):  0.1       Total Area Debrided (sq cm):  0.8    Depth of debridement:  Epidermis/Dermis    Tissue debrided:  Dermis    Devitalized tissue debrided:  Fibrin, Biofilm and Slough    Instruments:  Curette    Bleeding:  Minimal  Hemostasis Achieved: Yes    Method Used:  Pressure  Patient tolerance:  Patient tolerated the procedure well with no immediate complications     Applied saline moistened yo, shar foam, cast padding x 2 secured with coban.

## 2019-10-18 ENCOUNTER — IMMUNIZATION (OUTPATIENT)
Dept: PHARMACY | Facility: CLINIC | Age: 74
End: 2019-10-18
Payer: COMMERCIAL

## 2019-10-18 ENCOUNTER — TELEPHONE (OUTPATIENT)
Dept: PODIATRY | Facility: CLINIC | Age: 74
End: 2019-10-18

## 2019-10-18 ENCOUNTER — IMMUNIZATION (OUTPATIENT)
Dept: PHARMACY | Facility: CLINIC | Age: 74
End: 2019-10-18

## 2019-10-18 NOTE — TELEPHONE ENCOUNTER
----- Message from Dimitry Gallegos DPM sent at 10/17/2019 10:32 PM CDT -----  Please print and fax home health orders.    Kandice Rogers

## 2019-10-18 NOTE — PROGRESS NOTES
Subjective:      Patient ID: Domingo Castro is a 74 y.o. male.    Chief Complaint: Wound Check (left foot )    Domingo is a 74 y.o. male who presents to the clinic for evaluation and treatment of high risk feet. Domingo has a past medical history of Arthritis, Cataract, Chronic diastolic congestive heart failure, Coronary artery disease, Cystoid macular edema of both eyes, Diabetes mellitus type II, Hyperlipemia, Hypertension, Retinal hole, Stage 4 chronic kidney disease, and Streptococcus pyogenes bacteremia (12/23/2018). The patient's chief complaint is diabetic foot ulcer, left hallux. This patient has documented high risk feet requiring routine maintenance secondary to peripheral vascular disease.  Discharged from [facility] on 08/04/2019.  Receiving 6 weeks of IV vancomycin per ID recommendations.  Follow-up per [facility] with 3 times weekly Medi Honey dressing changes. Accompanied by his daughter.  No new complaints.    09/19/2019:  Follow-up for chronic wound to left hallux treated for osteomyelitis.  Post endovascular intervention per .    10/17/2019:  Presents for wound check.  Followed per Susana .  Believes his wound may be healed.  Says his home health nurse peeled away some dead skin and created a wound at the tip of the left hallux.  No new complaints.    PCP: Griselda Nugent MD    Date Last Seen by PCP:  05/29/2019    Current shoe gear:  Affected Foot: Football and Darco shoe on the affected foot     Unaffected Foot: Rx diabetic extra depth shoes and custom accommodative insoles    History of Trauma: negative  Sign of Infection: none    Hemoglobin A1C   Date Value Ref Range Status   07/26/2019 8.1 (H) 4.0 - 5.6 % Final     Comment:     ADA Screening Guidelines:  5.7-6.4%  Consistent with prediabetes  >or=6.5%  Consistent with diabetes  High levels of fetal hemoglobin interfere with the HbA1C  assay. Heterozygous hemoglobin variants (HbS, HgC, etc)do  not significantly interfere  with this assay.   However, presence of multiple variants may affect accuracy.     02/22/2019 8.2 (H) 4.0 - 5.6 % Final     Comment:     ADA Screening Guidelines:  5.7-6.4%  Consistent with prediabetes  >or=6.5%  Consistent with diabetes  High levels of fetal hemoglobin interfere with the HbA1C  assay. Heterozygous hemoglobin variants (HbS, HgC, etc)do  not significantly interfere with this assay.   However, presence of multiple variants may affect accuracy.     11/28/2018 7.3 (H) 4.0 - 5.6 % Final     Comment:     ADA Screening Guidelines:  5.7-6.4%  Consistent with prediabetes  >or=6.5%  Consistent with diabetes  High levels of fetal hemoglobin interfere with the HbA1C  assay. Heterozygous hemoglobin variants (HbS, HgC, etc)do  not significantly interfere with this assay.   However, presence of multiple variants may affect accuracy.         Review of Systems   Constitution: Negative for chills and fever.   HENT: Negative for congestion and hearing loss.    Cardiovascular: Negative for chest pain and claudication.   Respiratory: Negative for cough and shortness of breath.    Skin: Positive for color change, nail changes and poor wound healing.   Musculoskeletal: Negative for back pain and joint pain.   Gastrointestinal: Negative for nausea and vomiting.   Neurological: Positive for numbness.   Psychiatric/Behavioral: Negative for altered mental status.           Objective:      Physical Exam   Constitutional: He is oriented to person, place, and time. No distress.   Cardiovascular:   Pulses:       Dorsalis pedis pulses are 1+ on the right side, and 1+ on the left side.        Posterior tibial pulses are 1+ on the right side, and 1+ on the left side.   Mild lower extremity edema bilateral. No hair growth bilateral lower extremity.   Musculoskeletal:   One MTP range of motion left is less than 30° dorsiflexion.    No localized pain on palpation left hallux wound site.  No palpable fluctuance or crepitance left foot.    Neurological: He is alert and oriented to person, place, and time. A sensory deficit is present.   Skin: Capillary refill takes 2 to 3 seconds. No ecchymosis and no rash noted. He is not diaphoretic. No cyanosis or erythema. No pallor. Nails show no clubbing.   Ulcer Location:  Proximal lateral left hallux overlying proximal phalanx  Measurements:  0.3 x 1.0 x 0.1 cm pre-debridement and 0.3 x 1.0 x 0.3 cm post debridement  Periwound: Intact  Drainage:  None  Pus: None.  Malodor: None.  Base:  50% granular. 500% fibrin. With overlying biofilm and slough  Signs of infection: None.  Does not probe, track or undermine    Ulcer Location:  Distal left hallux  Measurements:  0.8 x 1.0 x 0.1 cm  Periwound: Intact  Drainage:  Scant serous  Pus: None.  Malodor: None.  Base:  80% granular. 20% fibrin.  Overlying biofilm and slough  Signs of infection: None.  Does not probe, track or undermine.    .             Assessment:       Encounter Diagnoses   Name Primary?    Diabetic ulcer of toe of left foot associated with type 2 diabetes mellitus, with bone involvement without evidence of necrosis Yes    Type 2 diabetes mellitus with peripheral angiopathy     Type 2 diabetes mellitus with peripheral neuropathy          Plan:       Domingo was seen today for wound check.    Diagnoses and all orders for this visit:    Diabetic ulcer of toe of left foot associated with type 2 diabetes mellitus, with bone involvement without evidence of necrosis  -     X-Ray Foot Complete Left; Future  -     Wound Debridement  -     SUBSEQUENT HOME HEALTH ORDERS    Type 2 diabetes mellitus with peripheral angiopathy  -     Wound Debridement  -     SUBSEQUENT HOME HEALTH ORDERS    Type 2 diabetes mellitus with peripheral neuropathy  -     Wound Debridement  -     SUBSEQUENT HOME HEALTH ORDERS      I counseled the patient on his conditions, their implications and medical management.    Shoe inspection. Diabetic Foot Education. Patient reminded of the  importance of good nutrition and blood sugar control to help prevent podiatric complications of diabetes. Patient instructed on proper foot hygeine. We discussed wearing proper shoe gear, daily foot inspections, never walking without protective shoe gear, never putting sharp instruments to feet.    Wound debridement and dressing per attached note.  Clinically wound hopefully will be healed at the next visit.    Clinically left hallux is healing well with local wound care.    Subsequent home health orders updated today.    Rest and elevation.    Offloading Darco shoe.    RTC 4 weeks or p.r.n. as discussed.

## 2019-10-21 ENCOUNTER — HOSPITAL ENCOUNTER (INPATIENT)
Facility: HOSPITAL | Age: 74
LOS: 7 days | Discharge: HOME HEALTH | DRG: 189 | End: 2019-10-29
Attending: FAMILY MEDICINE | Admitting: FAMILY MEDICINE
Payer: MEDICARE

## 2019-10-21 DIAGNOSIS — I50.32 CHRONIC CONGESTIVE HEART FAILURE WITH LEFT VENTRICULAR DIASTOLIC DYSFUNCTION: ICD-10-CM

## 2019-10-21 DIAGNOSIS — I50.32 CHRONIC DIASTOLIC CONGESTIVE HEART FAILURE: Chronic | ICD-10-CM

## 2019-10-21 DIAGNOSIS — R91.1 PULMONARY NODULE: ICD-10-CM

## 2019-10-21 DIAGNOSIS — I50.9 ACUTE ON CHRONIC CONGESTIVE HEART FAILURE, UNSPECIFIED HEART FAILURE TYPE: ICD-10-CM

## 2019-10-21 DIAGNOSIS — I50.43 ACUTE ON CHRONIC COMBINED SYSTOLIC AND DIASTOLIC CONGESTIVE HEART FAILURE: Primary | ICD-10-CM

## 2019-10-21 DIAGNOSIS — R06.02 SOB (SHORTNESS OF BREATH): ICD-10-CM

## 2019-10-21 DIAGNOSIS — R06.03 RESPIRATORY DISTRESS: ICD-10-CM

## 2019-10-21 DIAGNOSIS — J81.0 ACUTE PULMONARY EDEMA: ICD-10-CM

## 2019-10-21 DIAGNOSIS — R06.02 SHORTNESS OF BREATH: ICD-10-CM

## 2019-10-21 DIAGNOSIS — J18.9 PNEUMONIA OF RIGHT MIDDLE LOBE DUE TO INFECTIOUS ORGANISM: ICD-10-CM

## 2019-10-21 DIAGNOSIS — R09.02 HYPOXIA: ICD-10-CM

## 2019-10-21 LAB
ALBUMIN SERPL BCP-MCNC: 4.1 G/DL (ref 3.5–5.2)
ALP SERPL-CCNC: 98 U/L (ref 38–126)
ALT SERPL W/O P-5'-P-CCNC: 16 U/L (ref 10–44)
ANION GAP SERPL CALC-SCNC: 11 MMOL/L (ref 8–16)
AST SERPL-CCNC: 20 U/L (ref 15–46)
BASOPHILS # BLD AUTO: 0.03 K/UL (ref 0–0.2)
BASOPHILS NFR BLD: 0.3 % (ref 0–1.9)
BILIRUB SERPL-MCNC: 0.5 MG/DL (ref 0.1–1)
BUN SERPL-MCNC: 60 MG/DL (ref 2–20)
CALCIUM SERPL-MCNC: 9.6 MG/DL (ref 8.7–10.5)
CHLORIDE SERPL-SCNC: 105 MMOL/L (ref 95–110)
CO2 SERPL-SCNC: 24 MMOL/L (ref 23–29)
CREAT SERPL-MCNC: 3.35 MG/DL (ref 0.5–1.4)
DIFFERENTIAL METHOD: ABNORMAL
EOSINOPHIL # BLD AUTO: 0.1 K/UL (ref 0–0.5)
EOSINOPHIL NFR BLD: 0.6 % (ref 0–8)
ERYTHROCYTE [DISTWIDTH] IN BLOOD BY AUTOMATED COUNT: 15.5 % (ref 11.5–14.5)
EST. GFR  (AFRICAN AMERICAN): 19.8 ML/MIN/1.73 M^2
EST. GFR  (NON AFRICAN AMERICAN): 17.1 ML/MIN/1.73 M^2
GLUCOSE SERPL-MCNC: 434 MG/DL (ref 70–110)
HCT VFR BLD AUTO: 29.9 % (ref 40–54)
HGB BLD-MCNC: 9.4 G/DL (ref 14–18)
INFLUENZA A, MOLECULAR: NEGATIVE
INFLUENZA B, MOLECULAR: NEGATIVE
INR PPP: 1.3 (ref 0.8–1.2)
LYMPHOCYTES # BLD AUTO: 1 K/UL (ref 1–4.8)
LYMPHOCYTES NFR BLD: 9.4 % (ref 18–48)
MCH RBC QN AUTO: 28.1 PG (ref 27–31)
MCHC RBC AUTO-ENTMCNC: 31.4 G/DL (ref 32–36)
MCV RBC AUTO: 90 FL (ref 82–98)
MONOCYTES # BLD AUTO: 0.6 K/UL (ref 0.3–1)
MONOCYTES NFR BLD: 5.1 % (ref 4–15)
NEUTROPHILS # BLD AUTO: 9.1 K/UL (ref 1.8–7.7)
NEUTROPHILS NFR BLD: 84.6 % (ref 38–73)
NT-PROBNP: 2250 PG/ML (ref 5–900)
PLATELET # BLD AUTO: 112 K/UL (ref 150–350)
PMV BLD AUTO: 12.7 FL (ref 9.2–12.9)
POCT GLUCOSE: 368 MG/DL (ref 70–110)
POTASSIUM SERPL-SCNC: 3.6 MMOL/L (ref 3.5–5.1)
PROT SERPL-MCNC: 8 G/DL (ref 6–8.4)
PROTHROMBIN TIME: 13.9 SEC (ref 9–12.5)
RBC # BLD AUTO: 3.34 M/UL (ref 4.6–6.2)
SODIUM SERPL-SCNC: 140 MMOL/L (ref 136–145)
SPECIMEN SOURCE: NORMAL
TROPONIN I SERPL DL<=0.01 NG/ML-MCNC: 0.03 NG/ML (ref 0.01–0.03)
WBC # BLD AUTO: 10.78 K/UL (ref 3.9–12.7)

## 2019-10-21 PROCEDURE — 94760 N-INVAS EAR/PLS OXIMETRY 1: CPT | Mod: ER

## 2019-10-21 PROCEDURE — 85025 COMPLETE CBC W/AUTO DIFF WBC: CPT | Mod: ER

## 2019-10-21 PROCEDURE — 93010 ELECTROCARDIOGRAM REPORT: CPT | Mod: ,,, | Performed by: STUDENT IN AN ORGANIZED HEALTH CARE EDUCATION/TRAINING PROGRAM

## 2019-10-21 PROCEDURE — 87502 INFLUENZA DNA AMP PROBE: CPT | Mod: ER

## 2019-10-21 PROCEDURE — 99291 CRITICAL CARE FIRST HOUR: CPT | Mod: 25,ER

## 2019-10-21 PROCEDURE — 93005 ELECTROCARDIOGRAM TRACING: CPT | Mod: ER

## 2019-10-21 PROCEDURE — 96374 THER/PROPH/DIAG INJ IV PUSH: CPT | Mod: ER

## 2019-10-21 PROCEDURE — 63600175 PHARM REV CODE 636 W HCPCS: Mod: ER | Performed by: FAMILY MEDICINE

## 2019-10-21 PROCEDURE — 87040 BLOOD CULTURE FOR BACTERIA: CPT | Mod: 59,ER

## 2019-10-21 PROCEDURE — 93010 EKG 12-LEAD: ICD-10-PCS | Mod: ,,, | Performed by: STUDENT IN AN ORGANIZED HEALTH CARE EDUCATION/TRAINING PROGRAM

## 2019-10-21 PROCEDURE — 80053 COMPREHEN METABOLIC PANEL: CPT | Mod: ER

## 2019-10-21 PROCEDURE — 84484 ASSAY OF TROPONIN QUANT: CPT | Mod: ER

## 2019-10-21 PROCEDURE — 96375 TX/PRO/DX INJ NEW DRUG ADDON: CPT | Mod: ER

## 2019-10-21 PROCEDURE — 85610 PROTHROMBIN TIME: CPT | Mod: ER

## 2019-10-21 PROCEDURE — 25000003 PHARM REV CODE 250: Mod: ER | Performed by: FAMILY MEDICINE

## 2019-10-21 PROCEDURE — 27000221 HC OXYGEN, UP TO 24 HOURS: Mod: ER

## 2019-10-21 PROCEDURE — 83880 ASSAY OF NATRIURETIC PEPTIDE: CPT | Mod: ER

## 2019-10-21 RX ORDER — CEFEPIME HYDROCHLORIDE 2 G/1
INJECTION, POWDER, FOR SOLUTION INTRAVENOUS
Status: DISPENSED
Start: 2019-10-21 | End: 2019-10-22

## 2019-10-21 RX ORDER — CEFEPIME HYDROCHLORIDE 2 G/50ML
2 INJECTION, SOLUTION INTRAVENOUS
Status: COMPLETED | OUTPATIENT
Start: 2019-10-21 | End: 2019-10-21

## 2019-10-21 RX ORDER — NITROGLYCERIN 0.4 MG/1
0.4 TABLET SUBLINGUAL
Status: COMPLETED | OUTPATIENT
Start: 2019-10-22 | End: 2019-10-22

## 2019-10-21 RX ORDER — FUROSEMIDE 10 MG/ML
60 INJECTION INTRAMUSCULAR; INTRAVENOUS
Status: COMPLETED | OUTPATIENT
Start: 2019-10-21 | End: 2019-10-21

## 2019-10-21 RX ORDER — NITROGLYCERIN 0.4 MG/1
0.4 TABLET SUBLINGUAL
Status: COMPLETED | OUTPATIENT
Start: 2019-10-21 | End: 2019-10-21

## 2019-10-21 RX ORDER — ACETAMINOPHEN 325 MG/1
650 TABLET ORAL
Status: COMPLETED | OUTPATIENT
Start: 2019-10-21 | End: 2019-10-21

## 2019-10-21 RX ADMIN — FUROSEMIDE 60 MG: 10 INJECTION, SOLUTION INTRAMUSCULAR; INTRAVENOUS at 11:10

## 2019-10-21 RX ADMIN — ACETAMINOPHEN 650 MG: 325 TABLET ORAL at 07:10

## 2019-10-21 RX ADMIN — FUROSEMIDE 60 MG: 10 INJECTION, SOLUTION INTRAMUSCULAR; INTRAVENOUS at 07:10

## 2019-10-21 RX ADMIN — INSULIN HUMAN 10 UNITS: 100 INJECTION, SOLUTION PARENTERAL at 11:10

## 2019-10-21 RX ADMIN — CEFEPIME HYDROCHLORIDE 2 G: 2 INJECTION, SOLUTION INTRAVENOUS at 08:10

## 2019-10-21 RX ADMIN — NITROGLYCERIN 0.4 MG: 0.4 TABLET, ORALLY DISINTEGRATING SUBLINGUAL at 08:10

## 2019-10-22 PROBLEM — L97.529 DIABETIC ULCER OF LEFT FOOT: Status: ACTIVE | Noted: 2019-10-22

## 2019-10-22 PROBLEM — R50.9 FEVER: Status: ACTIVE | Noted: 2019-10-22

## 2019-10-22 PROBLEM — J96.00 ACUTE RESPIRATORY FAILURE: Status: ACTIVE | Noted: 2018-12-23

## 2019-10-22 PROBLEM — J18.9 RIGHT UPPER LOBE PNEUMONIA: Status: ACTIVE | Noted: 2019-10-22

## 2019-10-22 PROBLEM — E11.621 DIABETIC ULCER OF LEFT FOOT: Status: ACTIVE | Noted: 2019-10-22

## 2019-10-22 PROBLEM — J81.1 PULMONARY EDEMA: Status: ACTIVE | Noted: 2019-10-22

## 2019-10-22 LAB
ALLENS TEST: ABNORMAL
ANION GAP SERPL CALC-SCNC: 12 MMOL/L (ref 8–16)
BASOPHILS # BLD AUTO: 0.04 K/UL (ref 0–0.2)
BASOPHILS NFR BLD: 0.4 % (ref 0–1.9)
BNP SERPL-MCNC: 941 PG/ML (ref 0–99)
BUN SERPL-MCNC: 62 MG/DL (ref 8–23)
CALCIUM SERPL-MCNC: 9.4 MG/DL (ref 8.7–10.5)
CHLORIDE SERPL-SCNC: 107 MMOL/L (ref 95–110)
CO2 SERPL-SCNC: 24 MMOL/L (ref 23–29)
CREAT SERPL-MCNC: 3.5 MG/DL (ref 0.5–1.4)
DELSYS: ABNORMAL
DIFFERENTIAL METHOD: ABNORMAL
EOSINOPHIL # BLD AUTO: 0 K/UL (ref 0–0.5)
EOSINOPHIL NFR BLD: 0.2 % (ref 0–8)
ERYTHROCYTE [DISTWIDTH] IN BLOOD BY AUTOMATED COUNT: 15.6 % (ref 11.5–14.5)
EST. GFR  (AFRICAN AMERICAN): 19 ML/MIN/1.73 M^2
EST. GFR  (NON AFRICAN AMERICAN): 16 ML/MIN/1.73 M^2
GLUCOSE SERPL-MCNC: 264 MG/DL (ref 70–110)
HCO3 UR-SCNC: 23.6 MMOL/L (ref 24–28)
HCT VFR BLD AUTO: 27.1 % (ref 40–54)
HGB BLD-MCNC: 8.5 G/DL (ref 14–18)
LACTATE SERPL-SCNC: 1.4 MMOL/L (ref 0.5–2.2)
LYMPHOCYTES # BLD AUTO: 1 K/UL (ref 1–4.8)
LYMPHOCYTES NFR BLD: 9.8 % (ref 18–48)
MAGNESIUM SERPL-MCNC: 1.9 MG/DL (ref 1.6–2.6)
MCH RBC QN AUTO: 27.6 PG (ref 27–31)
MCHC RBC AUTO-ENTMCNC: 31.4 G/DL (ref 32–36)
MCV RBC AUTO: 88 FL (ref 82–98)
MONOCYTES # BLD AUTO: 0.5 K/UL (ref 0.3–1)
MONOCYTES NFR BLD: 4.7 % (ref 4–15)
NEUTROPHILS # BLD AUTO: 9 K/UL (ref 1.8–7.7)
NEUTROPHILS NFR BLD: 84.9 % (ref 38–73)
PCO2 BLDA: 36.4 MMHG (ref 35–45)
PH SMN: 7.42 [PH] (ref 7.35–7.45)
PHOSPHATE SERPL-MCNC: 2.6 MG/DL (ref 2.7–4.5)
PLATELET # BLD AUTO: 112 K/UL (ref 150–350)
PMV BLD AUTO: 12.4 FL (ref 9.2–12.9)
PO2 BLDA: 122 MMHG (ref 80–100)
POC BE: -1 MMOL/L
POC SATURATED O2: 99 % (ref 95–100)
POC TCO2: 25 MMOL/L (ref 23–27)
POCT GLUCOSE: 220 MG/DL (ref 70–110)
POCT GLUCOSE: 287 MG/DL (ref 70–110)
POCT GLUCOSE: 303 MG/DL (ref 70–110)
POCT GLUCOSE: 376 MG/DL (ref 70–110)
POTASSIUM SERPL-SCNC: 3.3 MMOL/L (ref 3.5–5.1)
PROCALCITONIN SERPL IA-MCNC: 1.04 NG/ML
RBC # BLD AUTO: 3.08 M/UL (ref 4.6–6.2)
SAMPLE: ABNORMAL
SITE: ABNORMAL
SODIUM SERPL-SCNC: 143 MMOL/L (ref 136–145)
WBC # BLD AUTO: 10.6 K/UL (ref 3.9–12.7)

## 2019-10-22 PROCEDURE — 84100 ASSAY OF PHOSPHORUS: CPT

## 2019-10-22 PROCEDURE — 82803 BLOOD GASES ANY COMBINATION: CPT | Mod: ER

## 2019-10-22 PROCEDURE — 27100092 HC HIGH FLOW DELIVERY CANNULA

## 2019-10-22 PROCEDURE — 83605 ASSAY OF LACTIC ACID: CPT

## 2019-10-22 PROCEDURE — 80048 BASIC METABOLIC PNL TOTAL CA: CPT

## 2019-10-22 PROCEDURE — 36415 COLL VENOUS BLD VENIPUNCTURE: CPT

## 2019-10-22 PROCEDURE — 63600175 PHARM REV CODE 636 W HCPCS: Performed by: FAMILY MEDICINE

## 2019-10-22 PROCEDURE — 99900035 HC TECH TIME PER 15 MIN (STAT): Mod: ER

## 2019-10-22 PROCEDURE — C9399 UNCLASSIFIED DRUGS OR BIOLOG: HCPCS | Performed by: NURSE PRACTITIONER

## 2019-10-22 PROCEDURE — 87449 NOS EACH ORGANISM AG IA: CPT

## 2019-10-22 PROCEDURE — 27100171 HC OXYGEN HIGH FLOW UP TO 24 HOURS

## 2019-10-22 PROCEDURE — 63600175 PHARM REV CODE 636 W HCPCS: Performed by: INTERNAL MEDICINE

## 2019-10-22 PROCEDURE — 25000003 PHARM REV CODE 250: Performed by: FAMILY MEDICINE

## 2019-10-22 PROCEDURE — 25000003 PHARM REV CODE 250: Performed by: NURSE PRACTITIONER

## 2019-10-22 PROCEDURE — 63600175 PHARM REV CODE 636 W HCPCS: Mod: ER | Performed by: FAMILY MEDICINE

## 2019-10-22 PROCEDURE — 25000003 PHARM REV CODE 250: Mod: ER | Performed by: FAMILY MEDICINE

## 2019-10-22 PROCEDURE — 83735 ASSAY OF MAGNESIUM: CPT

## 2019-10-22 PROCEDURE — 63600175 PHARM REV CODE 636 W HCPCS: Performed by: NURSE PRACTITIONER

## 2019-10-22 PROCEDURE — 20000000 HC ICU ROOM

## 2019-10-22 PROCEDURE — 87070 CULTURE OTHR SPECIMN AEROBIC: CPT

## 2019-10-22 PROCEDURE — 94761 N-INVAS EAR/PLS OXIMETRY MLT: CPT

## 2019-10-22 PROCEDURE — 85025 COMPLETE CBC W/AUTO DIFF WBC: CPT

## 2019-10-22 PROCEDURE — 84145 PROCALCITONIN (PCT): CPT

## 2019-10-22 PROCEDURE — 36600 WITHDRAWAL OF ARTERIAL BLOOD: CPT | Mod: ER

## 2019-10-22 PROCEDURE — 83880 ASSAY OF NATRIURETIC PEPTIDE: CPT

## 2019-10-22 PROCEDURE — 99900035 HC TECH TIME PER 15 MIN (STAT)

## 2019-10-22 PROCEDURE — 87205 SMEAR GRAM STAIN: CPT

## 2019-10-22 RX ORDER — ACETAMINOPHEN 325 MG/1
650 TABLET ORAL EVERY 6 HOURS PRN
Status: DISCONTINUED | OUTPATIENT
Start: 2019-10-22 | End: 2019-10-29 | Stop reason: HOSPADM

## 2019-10-22 RX ORDER — HYDRALAZINE HYDROCHLORIDE 25 MG/1
50 TABLET, FILM COATED ORAL EVERY 12 HOURS
Status: DISCONTINUED | OUTPATIENT
Start: 2019-10-22 | End: 2019-10-27

## 2019-10-22 RX ORDER — ASPIRIN 81 MG/1
81 TABLET ORAL DAILY
Status: DISCONTINUED | OUTPATIENT
Start: 2019-10-22 | End: 2019-10-29 | Stop reason: HOSPADM

## 2019-10-22 RX ORDER — INSULIN ASPART 100 [IU]/ML
5 INJECTION, SOLUTION INTRAVENOUS; SUBCUTANEOUS
Status: DISCONTINUED | OUTPATIENT
Start: 2019-10-22 | End: 2019-10-25

## 2019-10-22 RX ORDER — CEFEPIME HYDROCHLORIDE 2 G/50ML
2 INJECTION, SOLUTION INTRAVENOUS
Status: DISCONTINUED | OUTPATIENT
Start: 2019-10-22 | End: 2019-10-24

## 2019-10-22 RX ORDER — AMLODIPINE BESYLATE 5 MG/1
10 TABLET ORAL DAILY
Status: DISCONTINUED | OUTPATIENT
Start: 2019-10-22 | End: 2019-10-29 | Stop reason: HOSPADM

## 2019-10-22 RX ORDER — HEPARIN SODIUM 5000 [USP'U]/ML
5000 INJECTION, SOLUTION INTRAVENOUS; SUBCUTANEOUS EVERY 12 HOURS
Status: DISCONTINUED | OUTPATIENT
Start: 2019-10-22 | End: 2019-10-29 | Stop reason: HOSPADM

## 2019-10-22 RX ORDER — POTASSIUM CHLORIDE 20 MEQ/1
40 TABLET, EXTENDED RELEASE ORAL DAILY
Status: DISCONTINUED | OUTPATIENT
Start: 2019-10-22 | End: 2019-10-29 | Stop reason: HOSPADM

## 2019-10-22 RX ORDER — IBUPROFEN 200 MG
24 TABLET ORAL
Status: DISCONTINUED | OUTPATIENT
Start: 2019-10-22 | End: 2019-10-29 | Stop reason: HOSPADM

## 2019-10-22 RX ORDER — GLUCAGON 1 MG
1 KIT INJECTION
Status: DISCONTINUED | OUTPATIENT
Start: 2019-10-22 | End: 2019-10-29 | Stop reason: HOSPADM

## 2019-10-22 RX ORDER — GABAPENTIN 100 MG/1
100 CAPSULE ORAL 2 TIMES DAILY
Status: DISCONTINUED | OUTPATIENT
Start: 2019-10-22 | End: 2019-10-29 | Stop reason: HOSPADM

## 2019-10-22 RX ORDER — IPRATROPIUM BROMIDE AND ALBUTEROL SULFATE 2.5; .5 MG/3ML; MG/3ML
3 SOLUTION RESPIRATORY (INHALATION) EVERY 4 HOURS PRN
Status: DISCONTINUED | OUTPATIENT
Start: 2019-10-22 | End: 2019-10-29 | Stop reason: HOSPADM

## 2019-10-22 RX ORDER — CARVEDILOL 25 MG/1
25 TABLET ORAL 2 TIMES DAILY WITH MEALS
Status: DISCONTINUED | OUTPATIENT
Start: 2019-10-22 | End: 2019-10-29 | Stop reason: HOSPADM

## 2019-10-22 RX ORDER — IBUPROFEN 200 MG
16 TABLET ORAL
Status: DISCONTINUED | OUTPATIENT
Start: 2019-10-22 | End: 2019-10-29 | Stop reason: HOSPADM

## 2019-10-22 RX ORDER — POTASSIUM CHLORIDE 750 MG/1
40 CAPSULE, EXTENDED RELEASE ORAL DAILY
Status: DISCONTINUED | OUTPATIENT
Start: 2019-10-22 | End: 2019-10-22

## 2019-10-22 RX ORDER — INSULIN ASPART 100 [IU]/ML
0-5 INJECTION, SOLUTION INTRAVENOUS; SUBCUTANEOUS
Status: DISCONTINUED | OUTPATIENT
Start: 2019-10-22 | End: 2019-10-29 | Stop reason: HOSPADM

## 2019-10-22 RX ORDER — IBUPROFEN 400 MG/1
800 TABLET ORAL
Status: COMPLETED | OUTPATIENT
Start: 2019-10-22 | End: 2019-10-22

## 2019-10-22 RX ORDER — ONDANSETRON 2 MG/ML
4 INJECTION INTRAMUSCULAR; INTRAVENOUS EVERY 8 HOURS PRN
Status: DISCONTINUED | OUTPATIENT
Start: 2019-10-22 | End: 2019-10-29 | Stop reason: HOSPADM

## 2019-10-22 RX ORDER — SODIUM CHLORIDE 0.9 % (FLUSH) 0.9 %
10 SYRINGE (ML) INJECTION
Status: DISCONTINUED | OUTPATIENT
Start: 2019-10-22 | End: 2019-10-29 | Stop reason: HOSPADM

## 2019-10-22 RX ORDER — CLOPIDOGREL BISULFATE 75 MG/1
75 TABLET ORAL DAILY
Status: DISCONTINUED | OUTPATIENT
Start: 2019-10-22 | End: 2019-10-29 | Stop reason: HOSPADM

## 2019-10-22 RX ORDER — ROSUVASTATIN CALCIUM 10 MG/1
20 TABLET, COATED ORAL DAILY
Status: DISCONTINUED | OUTPATIENT
Start: 2019-10-22 | End: 2019-10-29 | Stop reason: HOSPADM

## 2019-10-22 RX ADMIN — POTASSIUM CHLORIDE 40 MEQ: 20 TABLET, EXTENDED RELEASE ORAL at 09:10

## 2019-10-22 RX ADMIN — INSULIN ASPART 1 UNITS: 100 INJECTION, SOLUTION INTRAVENOUS; SUBCUTANEOUS at 09:10

## 2019-10-22 RX ADMIN — AMLODIPINE BESYLATE 10 MG: 5 TABLET ORAL at 08:10

## 2019-10-22 RX ADMIN — HEPARIN SODIUM 5000 UNITS: 5000 INJECTION, SOLUTION INTRAVENOUS; SUBCUTANEOUS at 08:10

## 2019-10-22 RX ADMIN — AZITHROMYCIN MONOHYDRATE 500 MG: 500 INJECTION, POWDER, LYOPHILIZED, FOR SOLUTION INTRAVENOUS at 08:10

## 2019-10-22 RX ADMIN — GABAPENTIN 100 MG: 100 CAPSULE ORAL at 08:10

## 2019-10-22 RX ADMIN — DEXTROSE 2 G: 50 INJECTION, SOLUTION INTRAVENOUS at 09:10

## 2019-10-22 RX ADMIN — INSULIN ASPART 3 UNITS: 100 INJECTION, SOLUTION INTRAVENOUS; SUBCUTANEOUS at 07:10

## 2019-10-22 RX ADMIN — IBUPROFEN 800 MG: 400 TABLET, FILM COATED ORAL at 12:10

## 2019-10-22 RX ADMIN — GABAPENTIN 100 MG: 100 CAPSULE ORAL at 09:10

## 2019-10-22 RX ADMIN — HYDRALAZINE HYDROCHLORIDE 50 MG: 25 TABLET, FILM COATED ORAL at 09:10

## 2019-10-22 RX ADMIN — INSULIN ASPART 4 UNITS: 100 INJECTION, SOLUTION INTRAVENOUS; SUBCUTANEOUS at 04:10

## 2019-10-22 RX ADMIN — ROSUVASTATIN CALCIUM 20 MG: 10 TABLET, FILM COATED ORAL at 08:10

## 2019-10-22 RX ADMIN — CARVEDILOL 25 MG: 25 TABLET, FILM COATED ORAL at 08:10

## 2019-10-22 RX ADMIN — VANCOMYCIN HYDROCHLORIDE 2000 MG: 1 INJECTION, POWDER, LYOPHILIZED, FOR SOLUTION INTRAVENOUS at 05:10

## 2019-10-22 RX ADMIN — CARVEDILOL 25 MG: 25 TABLET, FILM COATED ORAL at 04:10

## 2019-10-22 RX ADMIN — INSULIN DETEMIR 26 UNITS: 100 INJECTION, SOLUTION SUBCUTANEOUS at 02:10

## 2019-10-22 RX ADMIN — ASPIRIN 81 MG: 81 TABLET, COATED ORAL at 08:10

## 2019-10-22 RX ADMIN — INSULIN DETEMIR 26 UNITS: 100 INJECTION, SOLUTION SUBCUTANEOUS at 09:10

## 2019-10-22 RX ADMIN — HEPARIN SODIUM 5000 UNITS: 5000 INJECTION, SOLUTION INTRAVENOUS; SUBCUTANEOUS at 09:10

## 2019-10-22 RX ADMIN — ACETAMINOPHEN 650 MG: 325 TABLET ORAL at 07:10

## 2019-10-22 RX ADMIN — ACETAMINOPHEN 650 MG: 325 TABLET ORAL at 02:10

## 2019-10-22 RX ADMIN — INSULIN ASPART 5 UNITS: 100 INJECTION, SOLUTION INTRAVENOUS; SUBCUTANEOUS at 07:10

## 2019-10-22 RX ADMIN — CLOPIDOGREL BISULFATE 75 MG: 75 TABLET ORAL at 08:10

## 2019-10-22 RX ADMIN — HYDRALAZINE HYDROCHLORIDE 50 MG: 25 TABLET, FILM COATED ORAL at 08:10

## 2019-10-22 RX ADMIN — NITROGLYCERIN 0.4 MG: 0.4 TABLET, ORALLY DISINTEGRATING SUBLINGUAL at 12:10

## 2019-10-22 RX ADMIN — INSULIN ASPART 5 UNITS: 100 INJECTION, SOLUTION INTRAVENOUS; SUBCUTANEOUS at 12:10

## 2019-10-22 NOTE — ED NOTES
Patient resting quietly in bed. Respirations even. Labored breathing noted. Patient remains on non-re breather at 100% at this time. No respiratory distress noted.

## 2019-10-22 NOTE — ASSESSMENT & PLAN NOTE
Suspect 2/2 pneumonia. Pt also has right-sided medical infusion port and chronic osteo of left foot     Follow cultures   Consult wound care   Continue  Cefepime, vanc and zithromax

## 2019-10-22 NOTE — SUBJECTIVE & OBJECTIVE
Interval History: awake and alert, reported blood streak sputum,  Also noted decrease appetite with breakfast    Review of Systems   Constitutional: Negative for chills and fever.   Respiratory: Negative for cough, chest tightness, shortness of breath and wheezing.    Cardiovascular: Negative for chest pain and leg swelling.   Gastrointestinal: Negative for abdominal pain, diarrhea, nausea and vomiting.   Genitourinary: Negative for dysuria and hematuria.   Musculoskeletal: Negative for back pain, myalgias and neck pain.   Skin: Negative for rash and wound.   Neurological: Positive for weakness. Negative for dizziness, syncope and headaches.   Psychiatric/Behavioral: Negative for agitation.     Objective:     Vital Signs (Most Recent):  Temp: 98.4 °F (36.9 °C) (10/22/19 1515)  Pulse: 69 (10/22/19 1547)  Resp: (!) 24 (10/22/19 1547)  BP: (!) 136/57 (10/22/19 1400)  SpO2: (!) 91 % (10/22/19 1547) Vital Signs (24h Range):  Temp:  [97.8 °F (36.6 °C)-103.1 °F (39.5 °C)] 98.4 °F (36.9 °C)  Pulse:  [54-90] 69  Resp:  [14-32] 24  SpO2:  [77 %-100 %] 91 %  BP: (131-215)/() 136/57     Weight: 115.1 kg (253 lb 11.2 oz)  Body mass index is 31.71 kg/m².    Intake/Output Summary (Last 24 hours) at 10/22/2019 1639  Last data filed at 10/22/2019 1400  Gross per 24 hour   Intake 750 ml   Output 1175 ml   Net -425 ml      Physical Exam   Constitutional: He is oriented to person, place, and time. He appears well-developed and well-nourished. No distress.   HENT:   Head: Normocephalic and atraumatic.   Neck: Neck supple.   Cardiovascular: Normal rate, regular rhythm, normal heart sounds and intact distal pulses.   Pulmonary/Chest: Effort normal. No respiratory distress. He has no wheezes.   Breath sounds diminished    Abdominal: Soft. Bowel sounds are normal. He exhibits no distension. There is no tenderness.   Musculoskeletal: Normal range of motion. He exhibits edema. He exhibits no tenderness.   Neurological: He is alert and  oriented to person, place, and time.   Skin: Skin is warm and dry. Capillary refill takes less than 2 seconds. No erythema.   Psychiatric: He has a normal mood and affect.       Significant Labs:   ABGs:   Recent Labs   Lab 10/22/19  0019   PH 7.420   PCO2 36.4   HCO3 23.6*   POCSATURATED 99   BE -1     Blood Culture: No results for input(s): LABBLOO in the last 48 hours.  CBC:   Recent Labs   Lab 10/21/19  1916 10/22/19  0407   WBC 10.78 10.60   HGB 9.4* 8.5*   HCT 29.9* 27.1*   * 112*     CMP:   Recent Labs   Lab 10/21/19  1916 10/22/19  0407    143   K 3.6 3.3*    107   CO2 24 24   * 264*   BUN 60* 62*   CREATININE 3.35* 3.5*   CALCIUM 9.6 9.4   PROT 8.0  --    ALBUMIN 4.1  --    BILITOT 0.5  --    ALKPHOS 98  --    AST 20  --    ALT 16  --    ANIONGAP 11 12   EGFRNONAA 17.1* 16*     Coagulation:   Recent Labs   Lab 10/21/19  1956   INR 1.3*     Lactic Acid:   Recent Labs   Lab 10/22/19  1009   LACTATE 1.4     TSH: No results for input(s): TSH in the last 4320 hours.  Urine Culture: No results for input(s): LABURIN in the last 48 hours.  Urine Studies: No results for input(s): COLORU, APPEARANCEUA, PHUR, SPECGRAV, PROTEINUA, GLUCUA, KETONESU, BILIRUBINUA, OCCULTUA, NITRITE, UROBILINOGEN, LEUKOCYTESUR, RBCUA, WBCUA, BACTERIA, SQUAMEPITHEL, HYALINECASTS in the last 48 hours.    Invalid input(s): LIOR    Significant Imaging: I have reviewed all pertinent imaging results/findings within the past 24 hours.

## 2019-10-22 NOTE — ASSESSMENT & PLAN NOTE
Hyperlipidemia   PAD     Continue amlodipine and carvedilol   Continue  ASA, plavix and statin   Monitor telemetry

## 2019-10-22 NOTE — PROGRESS NOTES
Pharmacokinetic Initial Assessment: IV Vancomycin    Assessment/Plan:    Initiate intravenous vancomycin with loading dose of 2000 mg once, then redose when serum vancomycin level <20 mcg/ml.  Desired empiric serum trough concentration is 15 to 20 mcg/mL  Draw vancomycin random level on 10/23/19 at 0400.  Pharmacy will continue to follow and monitor vancomycin.      Please contact pharmacy at extension [phone] with any questions regarding this assessment.     Thank you for the consult,   Dax Tejeda       Patient brief summary:  Domingo Castro is a 74 y.o. male initiated on antimicrobial therapy with IV Vancomycin for treatment of suspected lower respiratory infection    Drug Allergies:   Review of patient's allergies indicates:   Allergen Reactions    Atorvastatin Other (See Comments)       Actual Body Weight:   115.1 kg    Renal Function:   Estimated Creatinine Clearance: 25.3 mL/min (A) (based on SCr of 3.5 mg/dL (H)).,     Dialysis Method (if applicable):  N/A    CBC (last 72 hours):  Recent Labs   Lab Result Units 10/21/19  1916 10/22/19  0407   WBC K/uL 10.78 10.60   Hemoglobin g/dL 9.4* 8.5*   Hematocrit % 29.9* 27.1*   Platelets K/uL 112* 112*   Gran% % 84.6* 84.9*   Lymph% % 9.4* 9.8*   Mono% % 5.1 4.7   Eosinophil% % 0.6 0.2   Basophil% % 0.3 0.4   Differential Method  Automated Automated       Metabolic Panel (last 72 hours):  Recent Labs   Lab Result Units 10/21/19  1916 10/22/19  0407   Sodium mmol/L 140 143   Potassium mmol/L 3.6 3.3*   Chloride mmol/L 105 107   CO2 mmol/L 24 24   Glucose mg/dL 434* 264*   BUN, Bld mg/dL 60* 62*   Creatinine mg/dL 3.35* 3.5*   Albumin g/dL 4.1  --    Total Bilirubin mg/dL 0.5  --    Alkaline Phosphatase U/L 98  --    AST U/L 20  --    ALT U/L 16  --    Magnesium mg/dL  --  1.9   Phosphorus mg/dL  --  2.6*       Drug levels (last 3 results):  No results for input(s): VANCOMYCINRA, VANCOMYCINPE, VANCOMYCINTR in the last 72 hours.    Microbiologic Results:  Microbiology  Results (last 7 days)       Procedure Component Value Units Date/Time    Culture, Respiratory with Gram Stain [[id number]]     Order Status:  No result Specimen:  Respiratory from Sputum, Induced     Blood culture [[id number]] Collected:  10/21/19 2044    Order Status:  Sent Specimen:  Blood from Peripheral, Hand, Left Updated:  10/21/19 2050    Blood culture [[id number]] Collected:  10/21/19 2034    Order Status:  Sent Specimen:  Blood from Peripheral, Antecubital, Right Updated:  10/21/19 2049    Influenza A & B by Molecular [[id number]] Collected:  10/21/19 1932    Order Status:  Completed Specimen:  Nasopharyngeal Swab Updated:  10/21/19 2019     Influenza A, Molecular Negative     Influenza B, Molecular Negative     Flu A & B Source Nasal swab

## 2019-10-22 NOTE — H&P
[facility]  History & Physical    Patient Name: Domingo Castro  MRN: [id number]  Admission Date: 10/21/2019  Attending Physician: Christina Tang*   Primary Care Provider: Griselda Nugent MD         Patient information was obtained from patient, past medical records and ER records.     Subjective:     Principal Problem:Acute respiratory failure with hypoxia    Chief Complaint:   Chief Complaint   Patient presents with    Shortness of Breath     Pt with c/o SOB that started today. Wife states home health nurse told patient to come to ED becasue at home his 02 sat was 73% on RA. Wife states BLE edema x1 week that the home health nurse has been monitoring. Pt reports productive cough of yellowish sputum since Friday.         HPI: Domingo Castro,75 yo male, with HTN, HLD, CAD, chronic diastolic dysfunction, Type 2 Diabetes Mellitus with diabetic neuropathy, CKD stage 4, PAD, chronic diabetic ulcer of left foot and left toe osteomyelitis was advised to report to [facility] by home health nurse for evaluation of oxygen saturation 76%. Patient reports progressively worsening SOB over the past week associated with productive cough and chills. He denies chest pain.   Pt admitted 7/25-8/2/19 for management of Diabetic ulcer of the left foot with osteomyelitis.  Wound culture grew MRSA. He went for LE cath procedure on 7/30. Revascularization procedure was completed on 8/1. Pt was discharged 8/2 with six weeks planned IV vancomycin therapy. Per patient report he most recently on abx for pna and his foot three weeks ago.   Initial labs remarkable for H/H 9/29, BUN/Cr 60/3.3, glucose 434, , glucose 434. CXR dense consolidations to right upper lobe/mid lung field in addition to possible component fluid overload. Influenza negative. Patient hypertensive and tachypneic in ED. He received furosemide 60 mg x 2, cefepime, duonebs and 10 units regular insulin. Patient  with progressively worsening dyspnea despite supplemental O2 prior to transfer to [place]. He was placed on no rebreather with noted improved. ABG unremarkable. Patient admitted to [facility] for further care.     Past Medical History:   Diagnosis Date    Arthritis     Cataract     Chronic diastolic congestive heart failure     Coronary artery disease     Cystoid macular edema of both eyes     Diabetes mellitus type II     Hyperlipemia     Hypertension     Retinal hole     Stage 4 chronic kidney disease     Streptococcus pyogenes bacteremia 2018    Due to left toe osteomyelitis       Past Surgical History:   Procedure Laterality Date    ABDOMINAL AORTOGRAPHY N/A 2019    Procedure: AORTOGRAM-ABDOMINAL;  Surgeon: Amish Hyatt MD;  Location: [facility] CATH LAB/EP;  Service: Cardiology;  Laterality: N/A;  CO2 angiography    ANGIOGRAPHY OF LOWER EXTREMITY Left 2019    Procedure: Angiogram Extremity Unilateral;  Surgeon: Amish Hyatt MD;  Location: [facility] CATH LAB/EP;  Service: Cardiology;  Laterality: Left;    CATARACT EXTRACTION W/  INTRAOCULAR LENS IMPLANT Left 12/15/14    Dr de la cruz    CATARACT EXTRACTION W/  INTRAOCULAR LENS IMPLANT Right 14    dorothy    CIRCUMCISION, NON-      focal laser right eye      INSERTION OF TUNNELED CENTRAL VENOUS CATHETER (CVC) WITH SUBCUTANEOUS PORT Right 2019    Procedure: [id number];  Surgeon: Denys Aleman Jr., MD;  Location: [facility] OR;  Service: General;  Laterality: Right;    KNEE SURGERY      left    Left medial collateral ligament repair      TONSILLECTOMY         Review of patient's allergies indicates:   Allergen Reactions    Atorvastatin Other (See Comments)       No current facility-administered medications on file prior to encounter.      Current Outpatient Medications on File Prior to Encounter   Medication Sig    acetaminophen (TYLENOL) 325 MG tablet Take 2 tablets (650 mg total) by mouth every 4  "(four) hours as needed. (Patient not taking: Reported on 10/17/2019)    amLODIPine (NORVASC) 10 MG tablet Take 1 tablet (10 mg total) by mouth once daily.    aspirin (ECOTRIN) 81 MG EC tablet Take 1 tablet (81 mg total) by mouth once daily.    carvedilol (COREG) 25 MG tablet Take 1 tablet (25 mg total) by mouth 2 (two) times daily with meals.    clopidogrel (PLAVIX) 75 mg tablet Take 1 tablet (75 mg total) by mouth once daily.    furosemide (LASIX) 40 MG tablet TAKE 2 TABLETS BY MOUTH TWICE DAILY. DO NOT TAKE THE EVENING DOSE AFTER 3 PM    gabapentin (NEURONTIN) 100 MG capsule TAKE ONE CAPSULE BY MOUTH IN THE MORNING THEN ONE CAPSULE  IN THE EVENING AND THEN THREE CAPSULES AT BEDTIME    hydrALAZINE (APRESOLINE) 50 MG tablet TAKE 1 TABLET BY MOUTH EVERY 12 HOURS    insulin lispro (HUMALOG KWIKPEN INSULIN) 100 unit/mL InPn pen INJECT 10 UNITS SUBCUTANEOUSLY THREE TIMES DAILY BEFORE MEALS WITH CORRECTION SCALE, MAX TDD 50 UNITS    LEVEMIR FLEXTOUCH U-100 INSULN 100 unit/mL (3 mL) InPn pen Inject 26 Units into the skin every evening.    pen needle, diabetic, safety (BD AUTOSHIELD PEN NEEDLE) 29 gauge x 5/16" Ndle For use once daily with levemir flexpen    rosuvastatin (CRESTOR) 20 MG tablet Take 20 mg by mouth once daily.      Family History     Problem Relation (Age of Onset)    Cancer Mother, Brother, Sister    Cataracts Paternal Grandmother    Diabetes Father    Glaucoma Paternal Grandmother    Heart disease Mother, Father        Tobacco Use    Smoking status: Never Smoker    Smokeless tobacco: Never Used   Substance and Sexual Activity    Alcohol use: No     Alcohol/week: 0.0 standard drinks    Drug use: No    Sexual activity: Yes     Partners: Female     Review of Systems   Constitutional: Positive for chills and fever.   HENT: Positive for congestion.    Eyes: Negative for photophobia.   Respiratory: Positive for cough and shortness of breath. Negative for chest tightness and wheezing.  "   Cardiovascular: Positive for leg swelling. Negative for chest pain and palpitations.   Gastrointestinal: Negative for abdominal pain, diarrhea, nausea and vomiting.   Genitourinary: Negative for dysuria, flank pain and hematuria.   Musculoskeletal: Negative for back pain, myalgias and neck pain.   Skin: Negative for rash and wound.   Neurological: Positive for weakness. Negative for dizziness, syncope and headaches.   Psychiatric/Behavioral: Negative for agitation.     Objective:     Vital Signs (Most Recent):  Temp: 98.7 °F (37.1 °C) (10/22/19 0305)  Pulse: (!) 58 (10/22/19 0605)  Resp: 17 (10/22/19 0605)  BP: (!) 155/68 (10/22/19 0605)  SpO2: (!) 92 % (10/22/19 0605) Vital Signs (24h Range):  Temp:  [98.7 °F (37.1 °C)-103.1 °F (39.5 °C)] 98.7 °F (37.1 °C)  Pulse:  [58-90] 58  Resp:  [17-32] 17  SpO2:  [77 %-100 %] 92 %  BP: (155-215)/() 155/68     Weight: 115.1 kg (253 lb 11.2 oz)  Body mass index is 31.71 kg/m².    Physical Exam   Constitutional: He is oriented to person, place, and time. He appears well-developed and well-nourished. No distress.   HENT:   Head: Normocephalic and atraumatic.   Eyes: Pupils are equal, round, and reactive to light. Conjunctivae are normal.   Neck: Normal range of motion. Neck supple. No JVD present.   Cardiovascular: Normal rate, regular rhythm, normal heart sounds and intact distal pulses.   Pulmonary/Chest: Effort normal. No respiratory distress. He has no wheezes.   Breath sounds diminished    Abdominal: Soft. Bowel sounds are normal. He exhibits no distension. There is no tenderness. There is no guarding.   Musculoskeletal: Normal range of motion. He exhibits edema (2+ lower extremities ). He exhibits no tenderness.   Neurological: He is alert and oriented to person, place, and time.   Skin: Skin is warm and dry. Capillary refill takes less than 2 seconds. No erythema.   Psychiatric: He has a normal mood and affect. His behavior is normal.         CRANIAL NERVES     CN  III, IV, VI   Pupils are equal, round, and reactive to light.       Significant Labs:   BMP:   Recent Labs   Lab 10/22/19  0407   *      K 3.3*      CO2 24   BUN 62*   CREATININE 3.5*   CALCIUM 9.4   MG 1.9     CBC:   Recent Labs   Lab 10/21/19  1916 10/22/19  0407   WBC 10.78 10.60   HGB 9.4* 8.5*   HCT 29.9* 27.1*   * 112*     Significant Imaging: I have reviewed all pertinent imaging results/findings within the past 24 hours.    Assessment/Plan:     * Acute respiratory failure with hypoxia  Pulmonary edema   Acute on chronic congestive heart failure  Right upper lobe pneumonia      Pt received cefepime in the ER with temp peak 103 with dense consolidations right upper lobe/mid lung field in addition to possible component fluid overload. Received 60 mg furosemide x 2 doses     - cont abx, need to clarify recent admissions/abx use as this may be CAP only  - send sputum cx   -follow blood cultures   - add vanco, azithromycin for now per EICU recs   - wean to nasal cannula as tolerated               Fever  Suspect 2/2 pneumonia. Pt also has right-sided medical infusion port and chronic osteo of left foot     Follow cultures   Consult wound care   Continue  Cefepime, vanc and zithromax     Uncontrolled type 2 diabetes mellitus with hyperglycemia  SSI, accucheck AC&HS, Diabetic diet   Continue levemir 26 units qhs     CKD stage 4 due to type 2 diabetes mellitus  Avoid nephrotoxic meds, renal dose meds, strict intact and output     Anemia of chronic renal failure, stage 4 (severe)  H/H stable monitor       Hypertension associated with diabetes  Hyperlipidemia   PAD     Continue amlodipine and carvedilol   Continue  ASA, plavix and statin   Monitor telemetry       VTE Risk Mitigation (From admission, onward)         Ordered     heparin (porcine) injection 5,000 Units  Every 12 hours      10/22/19 0634     IP VTE HIGH RISK PATIENT  Once      10/22/19 0138     Place sequential compression device   Until discontinued      10/22/19 0138              Critical care time spent on the evaluation and treatment of severe organ dysfunction, review of pertinent labs and imaging studies, discussions with consulting providers and discussions with patient/family: 60 minutes.     Marie Hester NP  Department of Hospital Medicine   [facility]

## 2019-10-22 NOTE — PROGRESS NOTES
[facility] Medicine  Progress Note    Patient Name: Domingo Castro  MRN: [id number]  Patient Class: IP- Inpatient   Admission Date: 10/21/2019  Length of Stay: 0 days  Attending Physician: Christina Tang*  Primary Care Provider: Griselda Nugent MD        Subjective:     Principal Problem:Acute respiratory failure with hypoxia        HPI:  Domingo Castro,73 yo male, with HTN, HLD, CAD, chronic diastolic dysfunction, Type 2 Diabetes Mellitus with diabetic neuropathy, CKD stage 4, PAD, chronic diabetic ulcer of left foot and left toe osteomyelitis was advised to report to [facility] by home health nurse for evaluation of oxygen saturation 76%. Patient reports progressively worsening SOB over the past week associated with productive cough and chills. He denies chest pain.   Pt admitted 7/25-8/2/19 for management of Diabetic ulcer of the left foot with osteomyelitis.  Wound culture grew MRSA. He went for LE cath procedure on 7/30. Revascularization procedure was completed on 8/1. Pt was discharged 8/2 with six weeks planned IV vancomycin therapy. Per patient report he most recently on abx for pna and his foot three weeks ago.   Initial labs remarkable for H/H 9/29, BUN/Cr 60/3.3, glucose 434, , glucose 434. CXR dense consolidations to right upper lobe/mid lung field in addition to possible component fluid overload. Influenza negative. Patient hypertensive and tachypneic in ED. He received furosemide 60 mg x 2, cefepime, duonebs and 10 units regular insulin. Patient with progressively worsening dyspnea despite supplemental O2 prior to transfer to [place]. He was placed on no rebreather with noted improved. ABG unremarkable. Patient admitted to [facility] for further care.     Overview/Hospital Course:  No notes on file    Interval History: awake and alert, reported blood streak sputum,  Also noted decrease appetite with breakfast    Review of  Systems   Constitutional: Negative for chills and fever.   Respiratory: Negative for cough, chest tightness, shortness of breath and wheezing.    Cardiovascular: Negative for chest pain and leg swelling.   Gastrointestinal: Negative for abdominal pain, diarrhea, nausea and vomiting.   Genitourinary: Negative for dysuria and hematuria.   Musculoskeletal: Negative for back pain, myalgias and neck pain.   Skin: Negative for rash and wound.   Neurological: Positive for weakness. Negative for dizziness, syncope and headaches.   Psychiatric/Behavioral: Negative for agitation.     Objective:     Vital Signs (Most Recent):  Temp: 98.4 °F (36.9 °C) (10/22/19 1515)  Pulse: 69 (10/22/19 1547)  Resp: (!) 24 (10/22/19 1547)  BP: (!) 136/57 (10/22/19 1400)  SpO2: (!) 91 % (10/22/19 1547) Vital Signs (24h Range):  Temp:  [97.8 °F (36.6 °C)-103.1 °F (39.5 °C)] 98.4 °F (36.9 °C)  Pulse:  [54-90] 69  Resp:  [14-32] 24  SpO2:  [77 %-100 %] 91 %  BP: (131-215)/() 136/57     Weight: 115.1 kg (253 lb 11.2 oz)  Body mass index is 31.71 kg/m².    Intake/Output Summary (Last 24 hours) at 10/22/2019 1639  Last data filed at 10/22/2019 1400  Gross per 24 hour   Intake 750 ml   Output 1175 ml   Net -425 ml      Physical Exam   Constitutional: He is oriented to person, place, and time. He appears well-developed and well-nourished. No distress.   HENT:   Head: Normocephalic and atraumatic.   Neck: Neck supple.   Cardiovascular: Normal rate, regular rhythm, normal heart sounds and intact distal pulses.   Pulmonary/Chest: Effort normal. No respiratory distress. He has no wheezes.   Breath sounds diminished    Abdominal: Soft. Bowel sounds are normal. He exhibits no distension. There is no tenderness.   Musculoskeletal: Normal range of motion. He exhibits edema. He exhibits no tenderness.   Neurological: He is alert and oriented to person, place, and time.   Skin: Skin is warm and dry. Capillary refill takes less than 2 seconds. No erythema.    Psychiatric: He has a normal mood and affect.       Significant Labs:   ABGs:   Recent Labs   Lab 10/22/19  0019   PH 7.420   PCO2 36.4   HCO3 23.6*   POCSATURATED 99   BE -1     Blood Culture: No results for input(s): LABBLOO in the last 48 hours.  CBC:   Recent Labs   Lab 10/21/19  1916 10/22/19  0407   WBC 10.78 10.60   HGB 9.4* 8.5*   HCT 29.9* 27.1*   * 112*     CMP:   Recent Labs   Lab 10/21/19  1916 10/22/19  0407    143   K 3.6 3.3*    107   CO2 24 24   * 264*   BUN 60* 62*   CREATININE 3.35* 3.5*   CALCIUM 9.6 9.4   PROT 8.0  --    ALBUMIN 4.1  --    BILITOT 0.5  --    ALKPHOS 98  --    AST 20  --    ALT 16  --    ANIONGAP 11 12   EGFRNONAA 17.1* 16*     Coagulation:   Recent Labs   Lab 10/21/19  1956   INR 1.3*     Lactic Acid:   Recent Labs   Lab 10/22/19  1009   LACTATE 1.4     TSH: No results for input(s): TSH in the last 4320 hours.  Urine Culture: No results for input(s): LABURIN in the last 48 hours.  Urine Studies: No results for input(s): COLORU, APPEARANCEUA, PHUR, SPECGRAV, PROTEINUA, GLUCUA, KETONESU, BILIRUBINUA, OCCULTUA, NITRITE, UROBILINOGEN, LEUKOCYTESUR, RBCUA, WBCUA, BACTERIA, SQUAMEPITHEL, HYALINECASTS in the last 48 hours.    Invalid input(s): WRIGHTSUR    Significant Imaging: I have reviewed all pertinent imaging results/findings within the past 24 hours.      Assessment/Plan:      * Acute respiratory failure with hypoxia  Pulmonary edema   Acute on chronic congestive heart failure  Right upper lobe pneumonia  Fever  CXR with dense consolidations right upper lobe/mid lung field in addition to possible component fluid overload.   Sputum and blood cx pending  - continue vanco, azithromycin and cefepime  - wean to nasal cannula as tolerated               Diabetic ulcer of left foot  Hx of chronic osteo         Fever  Suspect 2/2 pneumonia. Pt also has right-sided medical infusion port and chronic osteo of left foot     Follow cultures   Consult wound care    Continue  Cefepime, vanc and zithromax     Uncontrolled type 2 diabetes mellitus with hyperglycemia   accucheck AC&HS,  Continue levemir and aspart  Low dose SSI,   Diabetic diet       CKD stage 4 due to type 2 diabetes mellitus  Stable  Avoid nephrotoxic meds  renal dose meds   strict intact and output     Anemia of chronic renal failure, stage 4 (severe)  H/H stable monitor       Hypertension associated with diabetes  Hyperlipidemia   PAD     Continue amlodipine and carvedilol   Continue  ASA, plavix and statin   Monitor telemetry       VTE Risk Mitigation (From admission, onward)         Ordered     heparin (porcine) injection 5,000 Units  Every 12 hours      10/22/19 0633     IP VTE HIGH RISK PATIENT  Once      10/22/19 0138     Place sequential compression device  Until discontinued      10/22/19 0138                Critical care time spent on the evaluation and treatment of severe organ dysfunction, review of pertinent labs and imaging studies, discussions with consulting providers and discussions with patient/family: 45 minutes.      Christina Tang MD  Department of Hospital Medicine   [facility]

## 2019-10-22 NOTE — ASSESSMENT & PLAN NOTE
Pulmonary edema   Acute on chronic congestive heart failure  Right upper lobe pneumonia      Pt received cefepime in the ER with temp peak 103 with dense consolidations right upper lobe/mid lung field in addition to possible component fluid overload. Received 60 mg furosemide x 2 doses     - cont abx, need to clarify recent admissions/abx use as this may be CAP only  - send sputum cx   -follow blood cultures   - add vanco, azithromycin for now per EICU recs   - wean to nasal cannula as tolerated

## 2019-10-22 NOTE — ED NOTES
Patient sitting upright in bed at this time. Denies chest pain or discomfort. No coughing noted at this time. Respiratory remains at bedside. Oxygen Saturation 92% at this time. Will continue to monitor patient.

## 2019-10-22 NOTE — ED PROVIDER NOTES
Encounter Date: 10/21/2019       History     Chief Complaint   Patient presents with    Shortness of Breath     Pt with c/o SOB that started today. Wife states home health nurse told patient to come to ED becasue at home his 02 sat was 73% on RA. Wife states BLE edema x1 week that the home health nurse has been monitoring. Pt reports productive cough of yellowish sputum since Friday.      74-year-old male has been having progressive shortness of breath which has been worsening for last 1 week.  He also had mild to moderate cough.  Patient home health nurse noted that his oxygen saturations were 76 % at room air at home and patient is being sent to ER.  On arrival to ED patient had mild to moderate shortness of breath. Denies chest pain. Low-grade fever.  Patient put on oxygen which improved his saturations.  Patient also has chronic bilateral pedal edema with chronic osteomyelitis of left foot.  He also received antibiotics through his port.    The history is provided by the patient, the spouse and a relative.     Review of patient's allergies indicates:   Allergen Reactions    Atorvastatin Other (See Comments)     Past Medical History:   Diagnosis Date    Arthritis     Cataract     Chronic diastolic congestive heart failure     Coronary artery disease     Cystoid macular edema of both eyes     Diabetes mellitus type II     Hyperlipemia     Hypertension     Retinal hole     Stage 4 chronic kidney disease     Streptococcus pyogenes bacteremia 12/23/2018    Due to left toe osteomyelitis     Past Surgical History:   Procedure Laterality Date    ABDOMINAL AORTOGRAPHY N/A 7/30/2019    Procedure: AORTOGRAM-ABDOMINAL;  Surgeon: Amish Hyatt MD;  Location: [facility] CATH LAB/EP;  Service: Cardiology;  Laterality: N/A;  CO2 angiography    ANGIOGRAPHY OF LOWER EXTREMITY Left 8/1/2019    Procedure: Angiogram Extremity Unilateral;  Surgeon: Amish Hyatt MD;  Location: [facility] CATH LAB/EP;  Service: Cardiology;   Laterality: Left;    CATARACT EXTRACTION W/  INTRAOCULAR LENS IMPLANT Left 12/15/14    Dr de la cruz    CATARACT EXTRACTION W/  INTRAOCULAR LENS IMPLANT Right 14    dorothy    CIRCUMCISION, NON-      focal laser right eye      INSERTION OF TUNNELED CENTRAL VENOUS CATHETER (CVC) WITH SUBCUTANEOUS PORT Right 2019    Procedure: [id number]-TAUP-K-CATH;  Surgeon: Denys Aleman Jr., MD;  Location: [facility] OR;  Service: General;  Laterality: Right;    KNEE SURGERY      left    Left medial collateral ligament repair      TONSILLECTOMY       Family History   Problem Relation Age of Onset    Heart disease Mother     Cancer Mother     Cancer Brother     Heart disease Father     Diabetes Father     Cancer Sister     Cataracts Paternal Grandmother     Glaucoma Paternal Grandmother     Blindness Neg Hx     Amblyopia Neg Hx     Hypertension Neg Hx     Macular degeneration Neg Hx     Retinal detachment Neg Hx     Strabismus Neg Hx      Social History     Tobacco Use    Smoking status: Never Smoker    Smokeless tobacco: Never Used   Substance Use Topics    Alcohol use: No     Alcohol/week: 0.0 standard drinks    Drug use: No     Review of Systems   Constitutional: Negative for activity change, appetite change, chills and fever.   HENT: Negative for congestion, ear discharge, ear pain, rhinorrhea, sinus pressure, sinus pain and sore throat.    Eyes: Negative for pain, discharge, redness and itching.   Respiratory: Positive for cough and shortness of breath. Negative for chest tightness and wheezing.    Cardiovascular: Negative for chest pain and palpitations.   Gastrointestinal: Negative for abdominal distention, abdominal pain, diarrhea, nausea and vomiting.   Genitourinary: Negative for dysuria, flank pain and frequency.   Musculoskeletal: Negative for back pain, gait problem, neck pain and neck stiffness.   Skin: Negative for rash.   Neurological: Positive for tremors and weakness. Negative for  dizziness, seizures, syncope, speech difficulty, light-headedness, numbness and headaches.   Psychiatric/Behavioral: Negative for behavioral problems, confusion and hallucinations. The patient is not nervous/anxious.        Physical Exam     Initial Vitals [10/21/19 1856]   BP Pulse Resp Temp SpO2   (!) 200/91 87 (!) 32 99.9 °F (37.7 °C) (!) 77 %      MAP       --         Physical Exam    Nursing note and vitals reviewed.  Constitutional: Vital signs are normal. He appears well-developed and well-nourished. He is active.  Non-toxic appearance. He appears distressed.   HENT:   Head: Normocephalic and atraumatic.   Nose: Nose normal.   Mouth/Throat: Oropharynx is clear and moist.   Eyes: Conjunctivae, EOM and lids are normal. Pupils are equal, round, and reactive to light.   Neck: Trachea normal, normal range of motion and full passive range of motion without pain. Neck supple. Normal range of motion present. No neck rigidity.   Cardiovascular: Normal rate, regular rhythm, S1 normal, S2 normal, normal heart sounds, intact distal pulses and normal pulses.   Pulmonary/Chest: Accessory muscle usage present. Tachypnea noted. He is in respiratory distress. He has decreased breath sounds in the right middle field. He has rales in the right lower field and the left lower field.   Abdominal: Soft. Normal appearance and bowel sounds are normal. He exhibits no distension. There is no tenderness.   Musculoskeletal: Normal range of motion.   Left lower foot in boot with dressing.  History of poor circulation and diabetic foot.   Lymphadenopathy:     He has no cervical adenopathy.   Neurological: He is alert and oriented to person, place, and time. He has normal reflexes. No cranial nerve deficit or sensory deficit. GCS eye subscore is 4. GCS verbal subscore is 5. GCS motor subscore is 6.   Skin: Skin is warm and intact. Capillary refill takes less than 2 seconds. No abrasion, no bruising and no rash noted.   Psychiatric: He has a  normal mood and affect. His speech is normal and behavior is normal. Judgment and thought content normal. He is not actively hallucinating. Cognition and memory are normal. He is attentive.         ED Course   Critical Care  Date/Time: 10/22/2019 3:20 AM  Performed by: Hemal Remy MD  Authorized by: Christina Tang MD   Direct patient critical care time: 10 minutes  Additional history critical care time: 10 minutes  Ordering / reviewing critical care time: 10 minutes  Documentation critical care time: 10 minutes  Consulting other physicians critical care time: 10 minutes  Consult with family critical care time: 10 minutes  Other critical care time: 10 minutes  Total critical care time (exclusive of procedural time) : 70 minutes  Critical care was necessary to treat or prevent imminent or life-threatening deterioration of the following conditions: cardiac failure and respiratory failure.  Critical care was time spent personally by me on the following activities: evaluation of patient's response to treatment, ordering and review of laboratory studies, pulse oximetry, review of old charts, re-evaluation of patient's condition, ordering and review of radiographic studies, ordering and performing treatments and interventions, examination of patient and development of treatment plan with patient or surrogate.        Labs Reviewed   INFLUENZA A & B BY MOLECULAR   CBC W/ AUTO DIFFERENTIAL   COMPREHENSIVE METABOLIC PANEL   TROPONIN I   PROTIME-INR   NT-PRO NATRIURETIC PEPTIDE     EKG Readings: (Independently Interpreted)   Initial Reading: No STEMI. Rhythm: Normal Sinus Rhythm. Heart Rate: 86. Ectopy: No Ectopy. Conduction: LAFB. ST Segments: Normal ST Segments. T Waves: Normal. Clinical Impression: Normal Sinus Rhythm       Imaging Results          X-Ray Chest AP Portable (Final result)  Result time 10/21/19 19:20:26    Final result by Grey Resendiz Jr., MD (10/21/19 19:20:26)                 Impression:       Findings may relate to pulmonary edema.  Superimposed infection is possible.      Electronically signed by: Grey Resendiz Jr., MD  Date:    10/21/2019  Time:    19:20             Narrative:    EXAMINATION:  XR CHEST AP PORTABLE    CLINICAL HISTORY:  CHF;    COMPARISON:  Prior radiograph from February 14, 2019.    FINDINGS:  Right-sided medical infusion port in place.  Ground-glass opacity within the perihilar regions bilaterally.  Somewhat more confluent within the inferior right upper lobe.  No definite pleural fluid.  No pneumothorax.  The heart is enlarged.No significant bony findings.                                 Medical Decision Making:   Initial Assessment:   Progressive shortness of breath for last 1 week which has increased from his baseline.  Mild to moderate cough.  Mild temperature on arrival to ED.  Differential Diagnosis:   CHF acute on chronic.  Pneumonia, hypoxia, pulmonary edema.  Clinical Tests:   Lab Tests: Ordered and Reviewed  Radiological Study: Ordered and Reviewed  Medical Tests: Ordered and Reviewed  ED Management:  Patient presents with hypoxia and respiratory distress. X-ray with pulmonary congestion and possible pneumonia.  Patient has been given Lasix and started on antibiotics.  Initially he was receiving 40% by Venti mask.  During his stay his temperature increased to 103.  He is treated with Tylenol and Motrin.  High blood pressure improved with nitroglycerin.  Patient oxygen requirements increased and eventually placed him on non-rebreather.    Patient initially accepted by [facility] hospitalist to tele bed which has been upgraded to ICU due to increased requirement of oxygen.  Notified nurse practitioner on call.  Other:   I have discussed this case with another health care provider.       <> Summary of the Discussion: Accepted by nurse practitioner Marie Hester.  To [facility] hospitalist.                      Clinical Impression:       ICD-10-CM ICD-9-CM   1. Acute on chronic  congestive heart failure, unspecified heart failure type I50.9 428.0   2. SOB (shortness of breath) R06.02 786.05   3. Shortness of breath R06.02 786.05   4. Pneumonia of right middle lobe due to infectious organism J18.1 486   5. Respiratory distress R06.03 786.09   6. Hypoxia R09.02 799.02         Disposition:   Disposition: Admitted  Condition: Serious                        Hemal Remy MD  10/22/19 0522

## 2019-10-22 NOTE — ASSESSMENT & PLAN NOTE
Pulmonary edema   Acute on chronic congestive heart failure  Right upper lobe pneumonia  Fever  CXR with dense consolidations right upper lobe/mid lung field in addition to possible component fluid overload.   Sputum and blood cx pending  - continue vanco, azithromycin and cefepime  - wean to nasal cannula as tolerated

## 2019-10-22 NOTE — SUBJECTIVE & OBJECTIVE
Past Medical History:   Diagnosis Date    Arthritis     Cataract     Chronic diastolic congestive heart failure     Coronary artery disease     Cystoid macular edema of both eyes     Diabetes mellitus type II     Hyperlipemia     Hypertension     Retinal hole     Stage 4 chronic kidney disease     Streptococcus pyogenes bacteremia 2018    Due to left toe osteomyelitis       Past Surgical History:   Procedure Laterality Date    ABDOMINAL AORTOGRAPHY N/A 2019    Procedure: AORTOGRAM-ABDOMINAL;  Surgeon: Amish Hyatt MD;  Location: [facility] CATH LAB/EP;  Service: Cardiology;  Laterality: N/A;  CO2 angiography    ANGIOGRAPHY OF LOWER EXTREMITY Left 2019    Procedure: Angiogram Extremity Unilateral;  Surgeon: Amish Hyatt MD;  Location: [facility] CATH LAB/EP;  Service: Cardiology;  Laterality: Left;    CATARACT EXTRACTION W/  INTRAOCULAR LENS IMPLANT Left 12/15/14    Dr de la cruz    CATARACT EXTRACTION W/  INTRAOCULAR LENS IMPLANT Right 14    dorothy    CIRCUMCISION, NON-      focal laser right eye      INSERTION OF TUNNELED CENTRAL VENOUS CATHETER (CVC) WITH SUBCUTANEOUS PORT Right 2019    Procedure: [id number]-[id number];  Surgeon: Denys Aleman Jr., MD;  Location: [facility] OR;  Service: General;  Laterality: Right;    KNEE SURGERY      left    Left medial collateral ligament repair      TONSILLECTOMY         Review of patient's allergies indicates:   Allergen Reactions    Atorvastatin Other (See Comments)       No current facility-administered medications on file prior to encounter.      Current Outpatient Medications on File Prior to Encounter   Medication Sig    acetaminophen (TYLENOL) 325 MG tablet Take 2 tablets (650 mg total) by mouth every 4 (four) hours as needed. (Patient not taking: Reported on 10/17/2019)    amLODIPine (NORVASC) 10 MG tablet Take 1 tablet (10 mg total) by mouth once daily.    aspirin (ECOTRIN) 81 MG EC tablet Take 1 tablet (81 mg total) by  "mouth once daily.    carvedilol (COREG) 25 MG tablet Take 1 tablet (25 mg total) by mouth 2 (two) times daily with meals.    clopidogrel (PLAVIX) 75 mg tablet Take 1 tablet (75 mg total) by mouth once daily.    furosemide (LASIX) 40 MG tablet TAKE 2 TABLETS BY MOUTH TWICE DAILY. DO NOT TAKE THE EVENING DOSE AFTER 3 PM    gabapentin (NEURONTIN) 100 MG capsule TAKE ONE CAPSULE BY MOUTH IN THE MORNING THEN ONE CAPSULE  IN THE EVENING AND THEN THREE CAPSULES AT BEDTIME    hydrALAZINE (APRESOLINE) 50 MG tablet TAKE 1 TABLET BY MOUTH EVERY 12 HOURS    insulin lispro (HUMALOG KWIKPEN INSULIN) 100 unit/mL InPn pen INJECT 10 UNITS SUBCUTANEOUSLY THREE TIMES DAILY BEFORE MEALS WITH CORRECTION SCALE, MAX TDD 50 UNITS    LEVEMIR FLEXTOUCH U-100 INSULN 100 unit/mL (3 mL) InPn pen Inject 26 Units into the skin every evening.    pen needle, diabetic, safety (BD AUTOSHIELD PEN NEEDLE) 29 gauge x 5/16" Ndle For use once daily with levemir flexpen    rosuvastatin (CRESTOR) 20 MG tablet Take 20 mg by mouth once daily.      Family History     Problem Relation (Age of Onset)    Cancer Mother, Brother, Sister    Cataracts Paternal Grandmother    Diabetes Father    Glaucoma Paternal Grandmother    Heart disease Mother, Father        Tobacco Use    Smoking status: Never Smoker    Smokeless tobacco: Never Used   Substance and Sexual Activity    Alcohol use: No     Alcohol/week: 0.0 standard drinks    Drug use: No    Sexual activity: Yes     Partners: Female     Review of Systems   Constitutional: Positive for chills and fever.   HENT: Positive for congestion.    Eyes: Negative for photophobia.   Respiratory: Positive for cough and shortness of breath. Negative for chest tightness and wheezing.    Cardiovascular: Positive for leg swelling. Negative for chest pain and palpitations.   Gastrointestinal: Negative for abdominal pain, diarrhea, nausea and vomiting.   Genitourinary: Negative for dysuria, flank pain and hematuria. "   Musculoskeletal: Negative for back pain, myalgias and neck pain.   Skin: Negative for rash and wound.   Neurological: Positive for weakness. Negative for dizziness, syncope and headaches.   Psychiatric/Behavioral: Negative for agitation.     Objective:     Vital Signs (Most Recent):  Temp: 98.7 °F (37.1 °C) (10/22/19 0305)  Pulse: (!) 58 (10/22/19 0605)  Resp: 17 (10/22/19 0605)  BP: (!) 155/68 (10/22/19 0605)  SpO2: (!) 92 % (10/22/19 0605) Vital Signs (24h Range):  Temp:  [98.7 °F (37.1 °C)-103.1 °F (39.5 °C)] 98.7 °F (37.1 °C)  Pulse:  [58-90] 58  Resp:  [17-32] 17  SpO2:  [77 %-100 %] 92 %  BP: (155-215)/() 155/68     Weight: 115.1 kg (253 lb 11.2 oz)  Body mass index is 31.71 kg/m².    Physical Exam   Constitutional: He is oriented to person, place, and time. He appears well-developed and well-nourished. No distress.   HENT:   Head: Normocephalic and atraumatic.   Eyes: Pupils are equal, round, and reactive to light. Conjunctivae are normal.   Neck: Normal range of motion. Neck supple. No JVD present.   Cardiovascular: Normal rate, regular rhythm, normal heart sounds and intact distal pulses.   Pulmonary/Chest: Effort normal. No respiratory distress. He has no wheezes.   Breath sounds diminished    Abdominal: Soft. Bowel sounds are normal. He exhibits no distension. There is no tenderness. There is no guarding.   Musculoskeletal: Normal range of motion. He exhibits edema (2+ lower extremities ). He exhibits no tenderness.   Neurological: He is alert and oriented to person, place, and time.   Skin: Skin is warm and dry. Capillary refill takes less than 2 seconds. No erythema.   Psychiatric: He has a normal mood and affect. His behavior is normal.         CRANIAL NERVES     CN III, IV, VI   Pupils are equal, round, and reactive to light.       Significant Labs:   BMP:   Recent Labs   Lab 10/22/19  0407   *      K 3.3*      CO2 24   BUN 62*   CREATININE 3.5*   CALCIUM 9.4   MG 1.9      CBC:   Recent Labs   Lab 10/21/19  1916 10/22/19  0407   WBC 10.78 10.60   HGB 9.4* 8.5*   HCT 29.9* 27.1*   * 112*     Significant Imaging: I have reviewed all pertinent imaging results/findings within the past 24 hours.

## 2019-10-22 NOTE — HPI
Domingo Castro,75 yo male, with HTN, HLD, CAD, chronic diastolic dysfunction, Type 2 Diabetes Mellitus with diabetic neuropathy, CKD stage 4, PAD, chronic diabetic ulcer of left foot and left toe osteomyelitis was advised to report to [facility] by home health nurse for evaluation of oxygen saturation 76%. Patient reports progressively worsening SOB over the past week associated with productive cough and chills. He denies chest pain.   Pt admitted 7/25-8/2/19 for management of Diabetic ulcer of the left foot with osteomyelitis.  Wound culture grew MRSA. He went for LE cath procedure on 7/30. Revascularization procedure was completed on 8/1. Pt was discharged 8/2 with six weeks planned IV vancomycin therapy. Per patient report he most recently on abx for pna and his foot three weeks ago.   Initial labs remarkable for H/H 9/29, BUN/Cr 60/3.3, glucose 434, , glucose 434. CXR dense consolidations to right upper lobe/mid lung field in addition to possible component fluid overload. Influenza negative. Patient hypertensive and tachypneic in ED. He received furosemide 60 mg x 2, cefepime, duonebs and 10 units regular insulin. Patient with progressively worsening dyspnea despite supplemental O2 prior to transfer to [place]. He was placed on no rebreather with noted improved. ABG unremarkable. Patient admitted to [facility] for further care.

## 2019-10-22 NOTE — EICU
"EICU    Pt is a 75 y/o m with pmhx sig for stage 4 CKD, CHF (HFpEF) found to have RA SAO2 76% by home health nurse and was advised to go to the hospital.  Pt reported sob w/o chest pain in the ER, sob has been progressive for the past week; discolored sputum with occ shaking chills.  Pt was seen in the [facility] ED and txed to [place].  Pt peak temp in the ER was 103, he received cefepime.  Pt says he most recently was on abx for pna and his foot three weeks ago, says his foot is completely healed, seen by wound care at home yesterday per bedside nurse- pt denies foul smell or drainage from his foot, says it is "completely healed"    /68 94% hr 58 sinus rr 20s  Pt lying in bed on nonrebreather, tachypneic w/o acc muscle use or abd paradox, awakened pt from sleep, lethargic but alert and oriented x 3, knows Ernesto is president  chemoport no signs infection per nurse  Foot dressed by home health nurse, bedside nurse says it looks ok    Wbc10 P 84 L 9 M 5  hgb 9.4 mcv 90  plt 112  Na 140 K 3.6 HCO3 24 ag 11 bun 60 creat 3.35 gluc 434 Ca 9.6  ast 20 alt 16 alk phos 98 tp 8 alb 4.1  INR 1.3 PT 13.9    Influenza A and B negative     Trop 0.027 NT-proBNP 225  7.43/36/122/23/99%    ekg sr 1st deg avb LAFB, t wave inversions I/aVL  tte 12/18   · Normal left ventricular systolic function. The estimated ejection fraction is 55%  · Grade I (mild) left ventricular diastolic dysfunction consistent with impaired relaxation.  · Normal central venous pressure (3 mm Hg).  · Septal wall has abnormal motion consistent with left bundle branch block.  · Normal left atrial pressure.  · No sig valuvlar abnormalities    cxr borderline card silhouette airspace densiites bilat, centrl, more focal/dense infitrates ant segment rul/right mild lung field, no clear pleural effusions. chemoport right IJ    A/P  CAP/Hypoxemic Resp Failure  Unclear source, unclear pt most recent hospital admission but pt reported completing a course of abx for " his foot and pna three weeks ago which likely was during a hospital admission,  received cefepime in the ER with temp peak 103 with dense consolidations right upper lobe/mid lung field in addition to possible component fluid overload.  - cont abx, need to clarify recent admissions/abx use as this may be CAP only  - send sputum cx, urine legionella  - add vanco, azithromycin for now  - change nonrebreather to high flow device- some + pressure to help with wob, more comfortable  - renal dose all medications  - ms intact/ lethargic  - hd stable  - received lasix in the ER, while a sig portion of pt chest imaging could represent fluid, right sound with dense consolidations in setting of temp 103 has to be txed as pna for now; pt does have a tunnelled catheter and hx left foot osteo as possible other sources  - send ua for other possible sources  - creat rising, may be due to sepsis/ATN, cardiorenal effect; will hold on diuresis and may have to consider fluids, mostly depends on the pts respiratory status- hep sq dvt proph  - wound care to evaluate foot    Addendum 0615  plt 112, similar to prior results; hgb down a bit to 8.5, no reports of bleeding, is on plavix  Will watch hgb closely and plt which may fall due to sepsis; hold subq hep if any concern for bleeding, plt counts  - pt looks more comfortable/less tachypneic on high flow nasal cannula, interactive and says he feels much better  - ideally can narrow abx when cx (blood, sputum, legionella) results, abx present a fluid load to pt with CKD and diastolic dysfunction

## 2019-10-22 NOTE — ED NOTES
Patient resting quietly in bed with eyes closed. No complaints of pain or discomfort. Patient remains on venti mask at this time. Oxygen Saturation 89%. Respirations even and unlabored at this time.

## 2019-10-22 NOTE — ED NOTES
Patient respirations even. Labored breathing noted when patient has any activity. Oxygen Saturation % on non-rebreather.

## 2019-10-22 NOTE — NURSING
Dr. Dumont at bedside. Updated on the highlight of the day. Productive cough with blood tinged sputum. Good urine output today. Had to increase Oxygen support. CBG remains elevated. Appitite was good for breakfast, not for lunch. Pt's wife updated and instructed the DR. Dumont will round early in the am.

## 2019-10-22 NOTE — PLAN OF CARE
Pt transferred from [facility]  Pt lethargic but woke up and answered questions.Pt stated he lives with his wife and is current with [facility]; spouse can provide help at home and transportation upon d/c.  Tn gave d/c brochure and card and encouraged to call for any needs/concerns     10/22/19 1032   Discharge Assessment   Assessment Type Discharge Planning Assessment   Confirmed/corrected address and phone number on facesheet? Yes   Assessment information obtained from? Patient   Expected Length of Stay (days) 2   Communicated expected length of stay with patient/caregiver yes   Prior to hospitilization cognitive status: Alert/Oriented   Prior to hospitalization functional status: Independent   Current cognitive status: Alert/Oriented   Current Functional Status: Needs Assistance   Facility Arrived From: transfer from [facility]   Lives With spouse   Able to Return to Prior Arrangements yes   Is patient able to care for self after discharge? Unable to determine at this time (comments)   Who are your caregiver(s) and their phone number(s)? Daniel (spouse) [phone]   Patient's perception of discharge disposition home health   Readmission Within the Last 30 Days no previous admission in last 30 days   Patient currently being followed by outpatient case management? No   Patient currently receives any other outside agency services? No   Do you have any problems affording any of your prescribed medications? No   Is the patient taking medications as prescribed? yes   Does the patient have transportation home? Yes   Transportation Anticipated family or friend will provide   Does the patient receive services at the [facility]? No   Discharge Plan A Home Health   Discharge Plan B Skilled Nursing Facility   DME Needed Upon Discharge    (tbd)   Patient/Family in Agreement with Plan yes

## 2019-10-23 LAB
ANION GAP SERPL CALC-SCNC: 11 MMOL/L (ref 8–16)
BUN SERPL-MCNC: 65 MG/DL (ref 8–23)
CALCIUM SERPL-MCNC: 9.6 MG/DL (ref 8.7–10.5)
CHLORIDE SERPL-SCNC: 107 MMOL/L (ref 95–110)
CO2 SERPL-SCNC: 25 MMOL/L (ref 23–29)
CREAT SERPL-MCNC: 3.6 MG/DL (ref 0.5–1.4)
EST. GFR  (AFRICAN AMERICAN): 18 ML/MIN/1.73 M^2
EST. GFR  (NON AFRICAN AMERICAN): 16 ML/MIN/1.73 M^2
GLUCOSE SERPL-MCNC: 159 MG/DL (ref 70–110)
INR PPP: 1.1 (ref 0.8–1.2)
MAGNESIUM SERPL-MCNC: 2.2 MG/DL (ref 1.6–2.6)
PHOSPHATE SERPL-MCNC: 2.6 MG/DL (ref 2.7–4.5)
POCT GLUCOSE: 147 MG/DL (ref 70–110)
POCT GLUCOSE: 179 MG/DL (ref 70–110)
POCT GLUCOSE: 183 MG/DL (ref 70–110)
POCT GLUCOSE: 209 MG/DL (ref 70–110)
POTASSIUM SERPL-SCNC: 3.7 MMOL/L (ref 3.5–5.1)
PROTHROMBIN TIME: 11.4 SEC (ref 9–12.5)
SODIUM SERPL-SCNC: 143 MMOL/L (ref 136–145)
VANCOMYCIN SERPL-MCNC: 14.9 UG/ML

## 2019-10-23 PROCEDURE — 80202 ASSAY OF VANCOMYCIN: CPT

## 2019-10-23 PROCEDURE — 27100092 HC HIGH FLOW DELIVERY CANNULA

## 2019-10-23 PROCEDURE — 27000221 HC OXYGEN, UP TO 24 HOURS

## 2019-10-23 PROCEDURE — 84100 ASSAY OF PHOSPHORUS: CPT

## 2019-10-23 PROCEDURE — 63600175 PHARM REV CODE 636 W HCPCS: Performed by: FAMILY MEDICINE

## 2019-10-23 PROCEDURE — 20000000 HC ICU ROOM

## 2019-10-23 PROCEDURE — 99900035 HC TECH TIME PER 15 MIN (STAT)

## 2019-10-23 PROCEDURE — 25000003 PHARM REV CODE 250: Performed by: NURSE PRACTITIONER

## 2019-10-23 PROCEDURE — 85610 PROTHROMBIN TIME: CPT

## 2019-10-23 PROCEDURE — 83735 ASSAY OF MAGNESIUM: CPT

## 2019-10-23 PROCEDURE — 80048 BASIC METABOLIC PNL TOTAL CA: CPT

## 2019-10-23 PROCEDURE — 25000003 PHARM REV CODE 250: Performed by: FAMILY MEDICINE

## 2019-10-23 PROCEDURE — 27100171 HC OXYGEN HIGH FLOW UP TO 24 HOURS

## 2019-10-23 PROCEDURE — 36415 COLL VENOUS BLD VENIPUNCTURE: CPT

## 2019-10-23 PROCEDURE — 94761 N-INVAS EAR/PLS OXIMETRY MLT: CPT

## 2019-10-23 PROCEDURE — 63600175 PHARM REV CODE 636 W HCPCS: Performed by: INTERNAL MEDICINE

## 2019-10-23 RX ORDER — VANCOMYCIN HCL IN 5 % DEXTROSE 1G/250ML
1000 PLASTIC BAG, INJECTION (ML) INTRAVENOUS ONCE
Status: COMPLETED | OUTPATIENT
Start: 2019-10-23 | End: 2019-10-23

## 2019-10-23 RX ADMIN — ASPIRIN 81 MG: 81 TABLET, COATED ORAL at 08:10

## 2019-10-23 RX ADMIN — VANCOMYCIN HYDROCHLORIDE 1000 MG: 1 INJECTION, POWDER, LYOPHILIZED, FOR SOLUTION INTRAVENOUS at 09:10

## 2019-10-23 RX ADMIN — AZITHROMYCIN MONOHYDRATE 500 MG: 500 INJECTION, POWDER, LYOPHILIZED, FOR SOLUTION INTRAVENOUS at 05:10

## 2019-10-23 RX ADMIN — POTASSIUM CHLORIDE 40 MEQ: 20 TABLET, EXTENDED RELEASE ORAL at 08:10

## 2019-10-23 RX ADMIN — CLOPIDOGREL BISULFATE 75 MG: 75 TABLET ORAL at 08:10

## 2019-10-23 RX ADMIN — AMLODIPINE BESYLATE 10 MG: 5 TABLET ORAL at 08:10

## 2019-10-23 RX ADMIN — INSULIN DETEMIR 26 UNITS: 100 INJECTION, SOLUTION SUBCUTANEOUS at 09:10

## 2019-10-23 RX ADMIN — ROSUVASTATIN CALCIUM 20 MG: 10 TABLET, FILM COATED ORAL at 08:10

## 2019-10-23 RX ADMIN — INSULIN ASPART 5 UNITS: 100 INJECTION, SOLUTION INTRAVENOUS; SUBCUTANEOUS at 12:10

## 2019-10-23 RX ADMIN — INSULIN ASPART 1 UNITS: 100 INJECTION, SOLUTION INTRAVENOUS; SUBCUTANEOUS at 09:10

## 2019-10-23 RX ADMIN — HEPARIN SODIUM 5000 UNITS: 5000 INJECTION, SOLUTION INTRAVENOUS; SUBCUTANEOUS at 08:10

## 2019-10-23 RX ADMIN — CARVEDILOL 25 MG: 25 TABLET, FILM COATED ORAL at 04:10

## 2019-10-23 RX ADMIN — HYDRALAZINE HYDROCHLORIDE 50 MG: 25 TABLET, FILM COATED ORAL at 08:10

## 2019-10-23 RX ADMIN — INSULIN ASPART 5 UNITS: 100 INJECTION, SOLUTION INTRAVENOUS; SUBCUTANEOUS at 04:10

## 2019-10-23 RX ADMIN — HYDRALAZINE HYDROCHLORIDE 50 MG: 25 TABLET, FILM COATED ORAL at 09:10

## 2019-10-23 RX ADMIN — GABAPENTIN 100 MG: 100 CAPSULE ORAL at 09:10

## 2019-10-23 RX ADMIN — CARVEDILOL 25 MG: 25 TABLET, FILM COATED ORAL at 08:10

## 2019-10-23 RX ADMIN — DEXTROSE 2 G: 50 INJECTION, SOLUTION INTRAVENOUS at 09:10

## 2019-10-23 RX ADMIN — INSULIN ASPART 5 UNITS: 100 INJECTION, SOLUTION INTRAVENOUS; SUBCUTANEOUS at 08:10

## 2019-10-23 RX ADMIN — HEPARIN SODIUM 5000 UNITS: 5000 INJECTION, SOLUTION INTRAVENOUS; SUBCUTANEOUS at 09:10

## 2019-10-23 RX ADMIN — GABAPENTIN 100 MG: 100 CAPSULE ORAL at 08:10

## 2019-10-23 NOTE — PLAN OF CARE
Patient afebrile throughout shift. AAO x 4. NSR with occasional PACs and PVCs. BP 130s-140s/60s. Weaning comfort flow. Currently at 30 L and 60%. Wound care performed as ordered. No sliding scale insulin given. Fluids restricted as ordered.

## 2019-10-23 NOTE — SUBJECTIVE & OBJECTIVE
Interval History: awake and alert, family by bedside, had questions that were answered.   Patient diuresing, continue IV Lasix.      PT/OT, if stable possible step down top the floor     Review of Systems   Constitutional: Negative for chills and fever.   Respiratory: Positive for cough. Negative for shortness of breath.    Cardiovascular: Negative for chest pain and leg swelling.   Gastrointestinal: Negative for abdominal pain and nausea.   Genitourinary: Negative for dysuria.   Musculoskeletal: Negative for neck pain.   Skin: Negative for rash and wound.   Neurological: Negative for syncope and weakness.   Psychiatric/Behavioral: Negative for agitation.     Objective:     Vital Signs (Most Recent):  Temp: 98.4 °F (36.9 °C) (10/23/19 1115)  Pulse: 60 (10/23/19 1300)  Resp: 18 (10/23/19 1300)  BP: (!) 147/70 (10/23/19 1300)  SpO2: 95 % (10/23/19 1300) Vital Signs (24h Range):  Temp:  [98.4 °F (36.9 °C)-101.1 °F (38.4 °C)] 98.4 °F (36.9 °C)  Pulse:  [59-75] 60  Resp:  [17-33] 18  SpO2:  [88 %-97 %] 95 %  BP: (136-178)/(57-96) 147/70     Weight: 115.1 kg (253 lb 11.2 oz)  Body mass index is 31.71 kg/m².    Intake/Output Summary (Last 24 hours) at 10/23/2019 1321  Last data filed at 10/23/2019 1200  Gross per 24 hour   Intake 1740 ml   Output 1395 ml   Net 345 ml      Physical Exam   Constitutional: He is oriented to person, place, and time. He appears well-developed and well-nourished. No distress.   HENT:   Head: Normocephalic and atraumatic.   Neck: Neck supple.   Cardiovascular: Normal rate, regular rhythm, normal heart sounds and intact distal pulses.   Pulmonary/Chest: Effort normal. No respiratory distress. He has no wheezes.   Breath sounds diminished    Abdominal: Soft. Bowel sounds are normal. He exhibits no distension. There is no tenderness.   Musculoskeletal: Normal range of motion. He exhibits no edema or tenderness.   Neurological: He is alert and oriented to person, place, and time.   Skin: Skin is warm  and dry. Capillary refill takes less than 2 seconds. No erythema.   Psychiatric: He has a normal mood and affect.       Significant Labs:   ABGs:   Recent Labs   Lab 10/22/19  0019   PH 7.420   PCO2 36.4   HCO3 23.6*   POCSATURATED 99   BE -1     Blood Culture:   Recent Labs   Lab 10/21/19  2034 10/21/19  2044   LABBLOO No Growth to date No Growth to date     CBC:   Recent Labs   Lab 10/21/19  1916 10/22/19  0407   WBC 10.78 10.60   HGB 9.4* 8.5*   HCT 29.9* 27.1*   * 112*     CMP:   Recent Labs   Lab 10/21/19  1916 10/22/19  0407 10/23/19  0349    143 143   K 3.6 3.3* 3.7    107 107   CO2 24 24 25   * 264* 159*   BUN 60* 62* 65*   CREATININE 3.35* 3.5* 3.6*   CALCIUM 9.6 9.4 9.6   PROT 8.0  --   --    ALBUMIN 4.1  --   --    BILITOT 0.5  --   --    ALKPHOS 98  --   --    AST 20  --   --    ALT 16  --   --    ANIONGAP 11 12 11   EGFRNONAA 17.1* 16* 16*     Coagulation:   Recent Labs   Lab 10/23/19  0349   INR 1.1     Lactic Acid:   Recent Labs   Lab 10/22/19  1009   LACTATE 1.4     TSH: No results for input(s): TSH in the last 4320 hours.  Urine Culture: No results for input(s): LABURIN in the last 48 hours.  Urine Studies: No results for input(s): COLORU, APPEARANCEUA, PHUR, SPECGRAV, PROTEINUA, GLUCUA, KETONESU, BILIRUBINUA, OCCULTUA, NITRITE, UROBILINOGEN, LEUKOCYTESUR, RBCUA, WBCUA, BACTERIA, SQUAMEPITHEL, HYALINECASTS in the last 48 hours.    Invalid input(s): LIOR    Significant Imaging: I have reviewed all pertinent imaging results/findings within the past 24 hours.

## 2019-10-23 NOTE — NURSING
Pt arrived from [facility] ED via [facility] ambulance.  Pt on nonrebreathing.  PT oriented to room call light and urinal within reach.  Pt placed on cardiac monitor.  VSS.  Pt does not appear to be in any distress.  ERICKSON Hester notified that pt arrived.

## 2019-10-23 NOTE — PROGRESS NOTES
Pharmacokinetic Assessment Follow Up: IV Vancomycin    Vancomycin serum concentration assessment(s):    The random level was drawn correctly and can be used to guide therapy at this time. The measurement is within the desired definitive target range of 15 to 20 mcg/mL.    Vancomycin Regimen Plan:    Redose with vancomycin 1g x1 and obtain repeat random level with AM labs on 10/24    Drug levels (last 3 results):  Recent Labs   Lab Result Units 10/23/19  0349   Vancomycin, Random ug/mL 14.9       Pharmacy will continue to follow and monitor vancomycin.    Please contact pharmacy at extension [phone] for questions regarding this assessment.    Thank you for the consult,   Jonna Gonzalez       Patient brief summary:  Domingo Castro is a 74 y.o. male initiated on antimicrobial therapy with IV Vancomycin for treatment of lower respiratory infection    The patient's current regimen is dose by levels    Drug Allergies:   Review of patient's allergies indicates:   Allergen Reactions    Atorvastatin Other (See Comments)       Actual Body Weight:   115kg    Renal Function:   Estimated Creatinine Clearance: 24.6 mL/min (A) (based on SCr of 3.6 mg/dL (H)).,     Dialysis Method (if applicable):  N/A    CBC (last 72 hours):  Recent Labs   Lab Result Units 10/21/19  1916 10/22/19  0407   WBC K/uL 10.78 10.60   Hemoglobin g/dL 9.4* 8.5*   Hematocrit % 29.9* 27.1*   Platelets K/uL 112* 112*   Gran% % 84.6* 84.9*   Lymph% % 9.4* 9.8*   Mono% % 5.1 4.7   Eosinophil% % 0.6 0.2   Basophil% % 0.3 0.4   Differential Method  Automated Automated       Metabolic Panel (last 72 hours):  Recent Labs   Lab Result Units 10/21/19  1916 10/22/19  0407 10/23/19  0349   Sodium mmol/L 140 143 143   Potassium mmol/L 3.6 3.3* 3.7   Chloride mmol/L 105 107 107   CO2 mmol/L 24 24 25   Glucose mg/dL 434* 264* 159*   BUN, Bld mg/dL 60* 62* 65*   Creatinine mg/dL 3.35* 3.5* 3.6*   Albumin g/dL 4.1  --   --    Total Bilirubin mg/dL 0.5  --   --    Alkaline  Phosphatase U/L 98  --   --    AST U/L 20  --   --    ALT U/L 16  --   --    Magnesium mg/dL  --  1.9 2.2   Phosphorus mg/dL  --  2.6* 2.6*       Vancomycin Administrations:  vancomycin given in the last 96 hours                     vancomycin (VANCOCIN) 2,000 mg in dextrose 5 % 500 mL IVPB (mg) 2,000 mg New Bag 10/22/19 0564                      Microbiologic Results:  Microbiology Results (last 7 days)       Procedure Component Value Units Date/Time    Culture, Respiratory with Gram Stain [[id number]] Collected:  10/22/19 0622    Order Status:  Completed Specimen:  Respiratory from Sputum, Induced Updated:  10/23/19 0840     Respiratory Culture Normal respiratory howie     Gram Stain (Respiratory) >10 epithelial cells per low power field     Gram Stain (Respiratory) Moderate WBC's     Gram Stain (Respiratory) Many Gram positive cocci     Gram Stain (Respiratory) Many Gram positive rods     Gram Stain (Respiratory) Many Gram negative rods     Gram Stain (Respiratory) Few Gram negative diplococci    Blood culture [[id number]] Collected:  10/21/19 2044    Order Status:  Completed Specimen:  Blood from Peripheral, Hand, Left Updated:  10/22/19 2115     Blood Culture, Routine No Growth to date    Blood culture [[id number]] Collected:  10/21/19 2034    Order Status:  Completed Specimen:  Blood from Peripheral, Antecubital, Right Updated:  10/22/19 2115     Blood Culture, Routine No Growth to date    Influenza A & B by Molecular [[id number]] Collected:  10/21/19 1932    Order Status:  Completed Specimen:  Nasopharyngeal Swab Updated:  10/21/19 2019     Influenza A, Molecular Negative     Influenza B, Molecular Negative     Flu A & B Source Nasal swab

## 2019-10-23 NOTE — PROGRESS NOTES
[facility] Medicine  Progress Note    Patient Name: Domingo Castro  MRN: [id number]  Patient Class: IP- Inpatient   Admission Date: 10/21/2019  Length of Stay: 1 days  Attending Physician: Christina Tang*  Primary Care Provider: Griselda Nugent MD        Subjective:     Principal Problem:Acute respiratory failure with hypoxia        HPI:  Domingo Castro,75 yo male, with HTN, HLD, CAD, chronic diastolic dysfunction, Type 2 Diabetes Mellitus with diabetic neuropathy, CKD stage 4, PAD, chronic diabetic ulcer of left foot and left toe osteomyelitis was advised to report to [facility] by home health nurse for evaluation of oxygen saturation 76%. Patient reports progressively worsening SOB over the past week associated with productive cough and chills. He denies chest pain.   Pt admitted 7/25-8/2/19 for management of Diabetic ulcer of the left foot with osteomyelitis.  Wound culture grew MRSA. He went for LE cath procedure on 7/30. Revascularization procedure was completed on 8/1. Pt was discharged 8/2 with six weeks planned IV vancomycin therapy. Per patient report he most recently on abx for pna and his foot three weeks ago.   Initial labs remarkable for H/H 9/29, BUN/Cr 60/3.3, glucose 434, , glucose 434. CXR dense consolidations to right upper lobe/mid lung field in addition to possible component fluid overload. Influenza negative. Patient hypertensive and tachypneic in ED. He received furosemide 60 mg x 2, cefepime, duonebs and 10 units regular insulin. Patient with progressively worsening dyspnea despite supplemental O2 prior to transfer to [place]. He was placed on no rebreather with noted improved. ABG unremarkable. Patient admitted to [facility] for further care.     Overview/Hospital Course:  No notes on file    Interval History: awake and alert, family by bedside, had questions that were answered.   Patient diuresing, continue IV Lasix.       PT/OT, if stable possible step down top the floor     Review of Systems   Constitutional: Negative for chills and fever.   Respiratory: Positive for cough. Negative for shortness of breath.    Cardiovascular: Negative for chest pain and leg swelling.   Gastrointestinal: Negative for abdominal pain and nausea.   Genitourinary: Negative for dysuria.   Musculoskeletal: Negative for neck pain.   Skin: Negative for rash and wound.   Neurological: Negative for syncope and weakness.   Psychiatric/Behavioral: Negative for agitation.     Objective:     Vital Signs (Most Recent):  Temp: 98.4 °F (36.9 °C) (10/23/19 1115)  Pulse: 60 (10/23/19 1300)  Resp: 18 (10/23/19 1300)  BP: (!) 147/70 (10/23/19 1300)  SpO2: 95 % (10/23/19 1300) Vital Signs (24h Range):  Temp:  [98.4 °F (36.9 °C)-101.1 °F (38.4 °C)] 98.4 °F (36.9 °C)  Pulse:  [59-75] 60  Resp:  [17-33] 18  SpO2:  [88 %-97 %] 95 %  BP: (136-178)/(57-96) 147/70     Weight: 115.1 kg (253 lb 11.2 oz)  Body mass index is 31.71 kg/m².    Intake/Output Summary (Last 24 hours) at 10/23/2019 1321  Last data filed at 10/23/2019 1200  Gross per 24 hour   Intake 1740 ml   Output 1395 ml   Net 345 ml      Physical Exam   Constitutional: He is oriented to person, place, and time. He appears well-developed and well-nourished. No distress.   HENT:   Head: Normocephalic and atraumatic.   Neck: Neck supple.   Cardiovascular: Normal rate, regular rhythm, normal heart sounds and intact distal pulses.   Pulmonary/Chest: Effort normal. No respiratory distress. He has no wheezes.   Breath sounds diminished    Abdominal: Soft. Bowel sounds are normal. He exhibits no distension. There is no tenderness.   Musculoskeletal: Normal range of motion. He exhibits no edema or tenderness.   Neurological: He is alert and oriented to person, place, and time.   Skin: Skin is warm and dry. Capillary refill takes less than 2 seconds. No erythema.   Psychiatric: He has a normal mood and affect.        Significant Labs:   ABGs:   Recent Labs   Lab 10/22/19  0019   PH 7.420   PCO2 36.4   HCO3 23.6*   POCSATURATED 99   BE -1     Blood Culture:   Recent Labs   Lab 10/21/19  2034 10/21/19  2044   LABBLOO No Growth to date No Growth to date     CBC:   Recent Labs   Lab 10/21/19  1916 10/22/19  0407   WBC 10.78 10.60   HGB 9.4* 8.5*   HCT 29.9* 27.1*   * 112*     CMP:   Recent Labs   Lab 10/21/19  1916 10/22/19  0407 10/23/19  0349    143 143   K 3.6 3.3* 3.7    107 107   CO2 24 24 25   * 264* 159*   BUN 60* 62* 65*   CREATININE 3.35* 3.5* 3.6*   CALCIUM 9.6 9.4 9.6   PROT 8.0  --   --    ALBUMIN 4.1  --   --    BILITOT 0.5  --   --    ALKPHOS 98  --   --    AST 20  --   --    ALT 16  --   --    ANIONGAP 11 12 11   EGFRNONAA 17.1* 16* 16*     Coagulation:   Recent Labs   Lab 10/23/19  0349   INR 1.1     Lactic Acid:   Recent Labs   Lab 10/22/19  1009   LACTATE 1.4     TSH: No results for input(s): TSH in the last 4320 hours.  Urine Culture: No results for input(s): LABURIN in the last 48 hours.  Urine Studies: No results for input(s): COLORU, APPEARANCEUA, PHUR, SPECGRAV, PROTEINUA, GLUCUA, KETONESU, BILIRUBINUA, OCCULTUA, NITRITE, UROBILINOGEN, LEUKOCYTESUR, RBCUA, WBCUA, BACTERIA, SQUAMEPITHEL, HYALINECASTS in the last 48 hours.    Invalid input(s): WRIGHTSUR    Significant Imaging: I have reviewed all pertinent imaging results/findings within the past 24 hours.      Assessment/Plan:      * Acute respiratory failure with hypoxia  Pulmonary edema   Acute on chronic congestive heart failure  Right upper lobe pneumonia  Fever  CXR with dense consolidations right upper lobe/mid lung field in addition to possible component fluid overload.   Sputum and blood cx pending  - continue vanco, azithromycin and cefepime  - wean to nasal cannula as tolerated               Diabetic ulcer of left foot  Hx of chronic osteo         Fever  Suspect 2/2 pneumonia. Pt also has right-sided medical infusion  port and chronic osteo of left foot     Follow cultures   Consult wound care   Continue  Cefepime, vanc and zithromax     Uncontrolled type 2 diabetes mellitus with hyperglycemia   accucheck AC&HS,  Continue levemir and aspart  Low dose SSI,   Diabetic diet       CKD stage 4 due to type 2 diabetes mellitus  Stable  Avoid nephrotoxic meds  renal dose meds   strict intact and output     Anemia of chronic renal failure, stage 4 (severe)  H/H stable monitor       Hypertension associated with diabetes  Hyperlipidemia   PAD     Continue amlodipine and carvedilol   Continue  ASA, plavix and statin   Monitor telemetry       VTE Risk Mitigation (From admission, onward)         Ordered     heparin (porcine) injection 5,000 Units  Every 12 hours      10/22/19 0633     IP VTE HIGH RISK PATIENT  Once      10/22/19 0138     Place sequential compression device  Until discontinued      10/22/19 0138                Critical care time spent on the evaluation and treatment of severe organ dysfunction, review of pertinent labs and imaging studies, discussions with consulting providers and discussions with patient/family: 45 minutes.      Christina Tang MD  Department of Hospital Medicine   [facility]

## 2019-10-23 NOTE — PLAN OF CARE
POC reviewed with pt at 0400. Pt verbalized understanding. Questions and concerns addressed. No acute events overnight.  Pt on comfort flow oxygen titrated to maintain spO2>92. Pt progressing toward goals. Will continue to monitor. See flowsheets for full assessment and VS info

## 2019-10-24 LAB
ANION GAP SERPL CALC-SCNC: 9 MMOL/L (ref 8–16)
BACTERIA SPEC AEROBE CULT: NORMAL
BACTERIA SPEC AEROBE CULT: NORMAL
BASOPHILS # BLD AUTO: 0.04 K/UL (ref 0–0.2)
BASOPHILS NFR BLD: 0.5 % (ref 0–1.9)
BUN SERPL-MCNC: 65 MG/DL (ref 8–23)
CALCIUM SERPL-MCNC: 9.5 MG/DL (ref 8.7–10.5)
CHLORIDE SERPL-SCNC: 108 MMOL/L (ref 95–110)
CO2 SERPL-SCNC: 25 MMOL/L (ref 23–29)
CREAT SERPL-MCNC: 3.6 MG/DL (ref 0.5–1.4)
DIFFERENTIAL METHOD: ABNORMAL
EOSINOPHIL # BLD AUTO: 0.2 K/UL (ref 0–0.5)
EOSINOPHIL NFR BLD: 2.5 % (ref 0–8)
ERYTHROCYTE [DISTWIDTH] IN BLOOD BY AUTOMATED COUNT: 15.2 % (ref 11.5–14.5)
EST. GFR  (AFRICAN AMERICAN): 18 ML/MIN/1.73 M^2
EST. GFR  (NON AFRICAN AMERICAN): 16 ML/MIN/1.73 M^2
GLUCOSE SERPL-MCNC: 133 MG/DL (ref 70–110)
GRAM STN SPEC: NORMAL
HCT VFR BLD AUTO: 25.6 % (ref 40–54)
HGB BLD-MCNC: 8.3 G/DL (ref 14–18)
INR PPP: 1 (ref 0.8–1.2)
LYMPHOCYTES # BLD AUTO: 1.1 K/UL (ref 1–4.8)
LYMPHOCYTES NFR BLD: 13 % (ref 18–48)
MAGNESIUM SERPL-MCNC: 2.2 MG/DL (ref 1.6–2.6)
MCH RBC QN AUTO: 28 PG (ref 27–31)
MCHC RBC AUTO-ENTMCNC: 32.4 G/DL (ref 32–36)
MCV RBC AUTO: 87 FL (ref 82–98)
MONOCYTES # BLD AUTO: 0.5 K/UL (ref 0.3–1)
MONOCYTES NFR BLD: 5.8 % (ref 4–15)
NEUTROPHILS # BLD AUTO: 6.8 K/UL (ref 1.8–7.7)
NEUTROPHILS NFR BLD: 78.2 % (ref 38–73)
PHOSPHATE SERPL-MCNC: 2.6 MG/DL (ref 2.7–4.5)
PLATELET # BLD AUTO: 114 K/UL (ref 150–350)
PMV BLD AUTO: 11.3 FL (ref 9.2–12.9)
POCT GLUCOSE: 133 MG/DL (ref 70–110)
POCT GLUCOSE: 133 MG/DL (ref 70–110)
POCT GLUCOSE: 164 MG/DL (ref 70–110)
POCT GLUCOSE: 174 MG/DL (ref 70–110)
POCT GLUCOSE: 189 MG/DL (ref 70–110)
POTASSIUM SERPL-SCNC: 3.6 MMOL/L (ref 3.5–5.1)
PROTHROMBIN TIME: 10.7 SEC (ref 9–12.5)
RBC # BLD AUTO: 2.96 M/UL (ref 4.6–6.2)
SODIUM SERPL-SCNC: 142 MMOL/L (ref 136–145)
VANCOMYCIN SERPL-MCNC: 17 UG/ML
WBC # BLD AUTO: 8.67 K/UL (ref 3.9–12.7)

## 2019-10-24 PROCEDURE — 27000646 HC AEROBIKA DEVICE

## 2019-10-24 PROCEDURE — 63600175 PHARM REV CODE 636 W HCPCS: Performed by: FAMILY MEDICINE

## 2019-10-24 PROCEDURE — 97162 PT EVAL MOD COMPLEX 30 MIN: CPT

## 2019-10-24 PROCEDURE — 97530 THERAPEUTIC ACTIVITIES: CPT

## 2019-10-24 PROCEDURE — 83735 ASSAY OF MAGNESIUM: CPT

## 2019-10-24 PROCEDURE — 85025 COMPLETE CBC W/AUTO DIFF WBC: CPT

## 2019-10-24 PROCEDURE — 80202 ASSAY OF VANCOMYCIN: CPT

## 2019-10-24 PROCEDURE — 36415 COLL VENOUS BLD VENIPUNCTURE: CPT

## 2019-10-24 PROCEDURE — 94664 DEMO&/EVAL PT USE INHALER: CPT

## 2019-10-24 PROCEDURE — 84100 ASSAY OF PHOSPHORUS: CPT

## 2019-10-24 PROCEDURE — 99900035 HC TECH TIME PER 15 MIN (STAT)

## 2019-10-24 PROCEDURE — 25000003 PHARM REV CODE 250: Performed by: NURSE PRACTITIONER

## 2019-10-24 PROCEDURE — 25000003 PHARM REV CODE 250: Performed by: FAMILY MEDICINE

## 2019-10-24 PROCEDURE — 85610 PROTHROMBIN TIME: CPT

## 2019-10-24 PROCEDURE — 20000000 HC ICU ROOM

## 2019-10-24 PROCEDURE — 27100171 HC OXYGEN HIGH FLOW UP TO 24 HOURS

## 2019-10-24 PROCEDURE — 94761 N-INVAS EAR/PLS OXIMETRY MLT: CPT

## 2019-10-24 PROCEDURE — 63600175 PHARM REV CODE 636 W HCPCS: Performed by: INTERNAL MEDICINE

## 2019-10-24 PROCEDURE — 97165 OT EVAL LOW COMPLEX 30 MIN: CPT

## 2019-10-24 PROCEDURE — 80048 BASIC METABOLIC PNL TOTAL CA: CPT

## 2019-10-24 PROCEDURE — 63600175 PHARM REV CODE 636 W HCPCS: Performed by: STUDENT IN AN ORGANIZED HEALTH CARE EDUCATION/TRAINING PROGRAM

## 2019-10-24 PROCEDURE — 87486 CHLMYD PNEUM DNA AMP PROBE: CPT

## 2019-10-24 RX ORDER — FUROSEMIDE 10 MG/ML
80 INJECTION INTRAMUSCULAR; INTRAVENOUS 2 TIMES DAILY
Status: COMPLETED | OUTPATIENT
Start: 2019-10-24 | End: 2019-10-27

## 2019-10-24 RX ORDER — CODEINE PHOSPHATE AND GUAIFENESIN 10; 100 MG/5ML; MG/5ML
5 SOLUTION ORAL EVERY 4 HOURS PRN
Status: DISCONTINUED | OUTPATIENT
Start: 2019-10-24 | End: 2019-10-24

## 2019-10-24 RX ORDER — VANCOMYCIN HCL IN 5 % DEXTROSE 1G/250ML
1000 PLASTIC BAG, INJECTION (ML) INTRAVENOUS ONCE
Status: COMPLETED | OUTPATIENT
Start: 2019-10-24 | End: 2019-10-24

## 2019-10-24 RX ORDER — CODEINE PHOSPHATE AND GUAIFENESIN 10; 100 MG/5ML; MG/5ML
5 SOLUTION ORAL EVERY 6 HOURS
Status: DISCONTINUED | OUTPATIENT
Start: 2019-10-24 | End: 2019-10-29 | Stop reason: HOSPADM

## 2019-10-24 RX ADMIN — CLOPIDOGREL BISULFATE 75 MG: 75 TABLET ORAL at 08:10

## 2019-10-24 RX ADMIN — AMLODIPINE BESYLATE 10 MG: 5 TABLET ORAL at 08:10

## 2019-10-24 RX ADMIN — GUAIFENESIN AND CODEINE PHOSPHATE 5 ML: 100; 10 SOLUTION ORAL at 05:10

## 2019-10-24 RX ADMIN — ROSUVASTATIN CALCIUM 20 MG: 10 TABLET, FILM COATED ORAL at 08:10

## 2019-10-24 RX ADMIN — CARVEDILOL 25 MG: 25 TABLET, FILM COATED ORAL at 08:10

## 2019-10-24 RX ADMIN — INSULIN ASPART 5 UNITS: 100 INJECTION, SOLUTION INTRAVENOUS; SUBCUTANEOUS at 08:10

## 2019-10-24 RX ADMIN — HYDRALAZINE HYDROCHLORIDE 50 MG: 25 TABLET, FILM COATED ORAL at 08:10

## 2019-10-24 RX ADMIN — HEPARIN SODIUM 5000 UNITS: 5000 INJECTION, SOLUTION INTRAVENOUS; SUBCUTANEOUS at 09:10

## 2019-10-24 RX ADMIN — HEPARIN SODIUM 5000 UNITS: 5000 INJECTION, SOLUTION INTRAVENOUS; SUBCUTANEOUS at 08:10

## 2019-10-24 RX ADMIN — VANCOMYCIN HYDROCHLORIDE 1000 MG: 1 INJECTION, POWDER, LYOPHILIZED, FOR SOLUTION INTRAVENOUS at 08:10

## 2019-10-24 RX ADMIN — POTASSIUM CHLORIDE 40 MEQ: 20 TABLET, EXTENDED RELEASE ORAL at 08:10

## 2019-10-24 RX ADMIN — INSULIN DETEMIR 26 UNITS: 100 INJECTION, SOLUTION SUBCUTANEOUS at 09:10

## 2019-10-24 RX ADMIN — INSULIN ASPART 5 UNITS: 100 INJECTION, SOLUTION INTRAVENOUS; SUBCUTANEOUS at 05:10

## 2019-10-24 RX ADMIN — ASPIRIN 81 MG: 81 TABLET, COATED ORAL at 08:10

## 2019-10-24 RX ADMIN — CARVEDILOL 25 MG: 25 TABLET, FILM COATED ORAL at 05:10

## 2019-10-24 RX ADMIN — GUAIFENESIN AND CODEINE PHOSPHATE 5 ML: 100; 10 SOLUTION ORAL at 11:10

## 2019-10-24 RX ADMIN — CEFTRIAXONE 1 G: 1 INJECTION, SOLUTION INTRAVENOUS at 02:10

## 2019-10-24 RX ADMIN — INSULIN ASPART 5 UNITS: 100 INJECTION, SOLUTION INTRAVENOUS; SUBCUTANEOUS at 12:10

## 2019-10-24 RX ADMIN — HYDRALAZINE HYDROCHLORIDE 50 MG: 25 TABLET, FILM COATED ORAL at 09:10

## 2019-10-24 RX ADMIN — FUROSEMIDE 80 MG: 10 INJECTION, SOLUTION INTRAVENOUS at 05:10

## 2019-10-24 RX ADMIN — GABAPENTIN 100 MG: 100 CAPSULE ORAL at 09:10

## 2019-10-24 RX ADMIN — AZITHROMYCIN MONOHYDRATE 500 MG: 500 INJECTION, POWDER, LYOPHILIZED, FOR SOLUTION INTRAVENOUS at 05:10

## 2019-10-24 RX ADMIN — GABAPENTIN 100 MG: 100 CAPSULE ORAL at 08:10

## 2019-10-24 NOTE — PLAN OF CARE
Pt remains free from falls and injuries. 30L 60% comfort flow and condom cath remain. Blood glucose monitored, supplemental insulin given as ordered. Pt repositioned frequently. Plan of care discussed with pt. VSS, no acute issues overnight.

## 2019-10-24 NOTE — PROGRESS NOTES
[facility] Medicine  Progress Note    Patient Name: Domingo Castro  MRN: [id number]  Patient Class: IP- Inpatient   Admission Date: 10/21/2019  Length of Stay: 2 days  Attending Physician: Christina Tang*  Primary Care Provider: Griselda Nugent MD        Subjective:     Principal Problem:Acute respiratory failure with hypoxia        HPI:  Domingo Castro,73 yo male, with HTN, HLD, CAD, chronic diastolic dysfunction, Type 2 Diabetes Mellitus with diabetic neuropathy, CKD stage 4, PAD, chronic diabetic ulcer of left foot and left toe osteomyelitis was advised to report to [facility] by home health nurse for evaluation of oxygen saturation 76%. Patient reports progressively worsening SOB over the past week associated with productive cough and chills. He denies chest pain.   Pt admitted 7/25-8/2/19 for management of Diabetic ulcer of the left foot with osteomyelitis.  Wound culture grew MRSA. He went for LE cath procedure on 7/30. Revascularization procedure was completed on 8/1. Pt was discharged 8/2 with six weeks planned IV vancomycin therapy. Per patient report he most recently on abx for pna and his foot three weeks ago.   Initial labs remarkable for H/H 9/29, BUN/Cr 60/3.3, glucose 434, , glucose 434. CXR dense consolidations to right upper lobe/mid lung field in addition to possible component fluid overload. Influenza negative. Patient hypertensive and tachypneic in ED. He received furosemide 60 mg x 2, cefepime, duonebs and 10 units regular insulin. Patient with progressively worsening dyspnea despite supplemental O2 prior to transfer to [place]. He was placed on no rebreather with noted improved. ABG unremarkable. Patient admitted to [facility] for further care.     Overview/Hospital Course:  Admitted with Pneumonia, requiring high flow oxygen per NC. Weaning O2 very slowing. Blood cx NGTD  De-escalate abx from Vanc/Cefepime/Azithromycin  to Ceftrixone and Azithro. Cough meds, CPT and PT/OT    Interval History: awake and alert, sitting out in the chair,  family by bedside. Patient feels better, but persistent cough    Patient diuresing, continue IV Lasix.    De-escalate abx to ceftriaxone and Azithromycin  Blood cx NGTD  Viral culture pending    PT/OT, if stable possible step down top the floor tomorrow    Review of Systems   Constitutional: Negative for chills and fever.   Respiratory: Positive for cough. Negative for shortness of breath.    Cardiovascular: Negative for chest pain and leg swelling.   Gastrointestinal: Negative for abdominal pain and nausea.   Genitourinary: Negative for dysuria.   Musculoskeletal: Negative for neck pain.   Skin: Negative for rash and wound.   Neurological: Negative for syncope and weakness.   Psychiatric/Behavioral: Negative for agitation.     Objective:     Vital Signs (Most Recent):  Temp: 98.4 °F (36.9 °C) (10/24/19 1103)  Pulse: 63 (10/24/19 1204)  Resp: (!) 26 (10/24/19 1204)  BP: (!) 142/66 (10/24/19 1204)  SpO2: (!) 93 % (10/24/19 1204) Vital Signs (24h Range):  Temp:  [98.4 °F (36.9 °C)-100.2 °F (37.9 °C)] 98.4 °F (36.9 °C)  Pulse:  [60-74] 63  Resp:  [13-37] 26  SpO2:  [88 %-100 %] 93 %  BP: (131-188)/(60-82) 142/66     Weight: 115.1 kg (253 lb 11.2 oz)  Body mass index is 31.71 kg/m².    Intake/Output Summary (Last 24 hours) at 10/24/2019 1256  Last data filed at 10/24/2019 1000  Gross per 24 hour   Intake 1235 ml   Output 990 ml   Net 245 ml      Physical Exam   Constitutional: He is oriented to person, place, and time. He appears well-developed and well-nourished. No distress.   HENT:   Head: Normocephalic and atraumatic.   Neck: Neck supple.   Cardiovascular: Normal rate, regular rhythm, normal heart sounds and intact distal pulses.   Pulmonary/Chest: Effort normal. No respiratory distress. He has no wheezes.   Breath sounds diminished    Abdominal: Soft. Bowel sounds are normal. He exhibits no  distension. There is no tenderness.   Musculoskeletal: Normal range of motion. He exhibits no edema or tenderness.   Neurological: He is alert and oriented to person, place, and time.   Skin: Skin is warm and dry. Capillary refill takes less than 2 seconds. No erythema.   Psychiatric: He has a normal mood and affect.       Significant Labs:   ABGs:   No results for input(s): PH, PCO2, HCO3, POCSATURATED, BE, TOTALHB, COHB, METHB, O2HB, POCFIO2 in the last 48 hours.  Blood Culture:   No results for input(s): LABBLOO in the last 48 hours.  CBC:   Recent Labs   Lab 10/24/19  0924   WBC 8.67   HGB 8.3*   HCT 25.6*   *     CMP:   Recent Labs   Lab 10/23/19  0349 10/24/19  0438    142   K 3.7 3.6    108   CO2 25 25   * 133*   BUN 65* 65*   CREATININE 3.6* 3.6*   CALCIUM 9.6 9.5   ANIONGAP 11 9   EGFRNONAA 16* 16*     Coagulation:   Recent Labs   Lab 10/24/19  0438   INR 1.0     Lactic Acid:   No results for input(s): LACTATE in the last 48 hours.  TSH: No results for input(s): TSH in the last 4320 hours.  Urine Culture: No results for input(s): LABURIN in the last 48 hours.  Urine Studies: No results for input(s): COLORU, APPEARANCEUA, PHUR, SPECGRAV, PROTEINUA, GLUCUA, KETONESU, BILIRUBINUA, OCCULTUA, NITRITE, UROBILINOGEN, LEUKOCYTESUR, RBCUA, WBCUA, BACTERIA, SQUAMEPITHEL, HYALINECASTS in the last 48 hours.    Invalid input(s): WRIGHTSUR    Significant Imaging: I have reviewed all pertinent imaging results/findings within the past 24 hours.      Assessment/Plan:      * Acute respiratory failure with hypoxia  Pulmonary edema   Acute on chronic congestive heart failure  Right upper lobe pneumonia  Fever  CXR with dense consolidations right upper lobe/mid lung field in addition to possible component fluid overload.   Sputum cx normal howie  blood cx NGTD  Viral cx pending  - continue vanco, azithromycin and cefepime- de-escalate to Azith and rocephin  - wean to nasal cannula as tolerated                Diabetic ulcer of left foot  Hx of chronic osteo         Fever  Suspect 2/2 pneumonia. Pt also has right-sided medical infusion port and chronic osteo of left foot     Follow cultures   Consult wound care   Continue  Cefepime, vanc and zithromax     Uncontrolled type 2 diabetes mellitus with hyperglycemia   accucheck AC&HS,  Continue levemir and aspart  Low dose SSI,   Diabetic diet       CKD stage 4 due to type 2 diabetes mellitus  Stable  Avoid nephrotoxic meds  renal dose meds   strict intact and output     Anemia of chronic renal failure, stage 4 (severe)  H/H stable monitor       Hypertension associated with diabetes  Hyperlipidemia   PAD     Continue amlodipine and carvedilol   Continue  ASA, plavix and statin   Monitor telemetry       VTE Risk Mitigation (From admission, onward)         Ordered     heparin (porcine) injection 5,000 Units  Every 12 hours      10/22/19 0633     IP VTE HIGH RISK PATIENT  Once      10/22/19 0138     Place sequential compression device  Until discontinued      10/22/19 0138                Critical care time spent on the evaluation and treatment of severe organ dysfunction, review of pertinent labs and imaging studies, discussions with consulting providers and discussions with patient/family: 35 minutes.      Christina Tang MD  Department of Hospital Medicine   [facility]

## 2019-10-24 NOTE — PHYSICIAN QUERY
PT Name: Domingo Castro  MR #: [id number]    Physician Query Form - Non-Pressure Ulcer Clarification      CDS/: Raysa Delvalle               Contact information: [email or web address]    This form is a permanent document in the medical record.     Query Date: October 24, 2019    By submitting this query, we are merely seeking further clarification of documentation. Please utilize your independent clinical judgment when addressing the question(s) below.    The Medical Record contains the following:   Indicator   Supporting Clinical Findings Location in Medical Record   x Non-pressure Ulcer Diabetic ulcer of left foot    PN 10/23   x Wound Care Consult Per Dr. Dumont via telephone, wound care orders given for left foot. Wound Care CN 10/22    Radiology Findings     x Acute/Chronic Illness Acute respiratory failure with hypoxia  Acute on chronic congestive heart failure  Right upper lobe pneumonia  Hx of chronic osteo   CKD stage 4  type 2 diabetes mellitus    PN 10/23    Treatment:      Other:        Provider, Please specify the depth of the ulcer associated with above clinical findings.  [   ] Skin breakdown only   [   ] Exposed fat layer   [ x  ] Muscle involvement without evidence of necrosis   [   ] Muscle necrosis   [   ] Bone involvement without evidence of necrosis   [   ] Bone necrosis   [   ] Other depth (please specify):   [   ] Other Integumentary Diagnosis (please specify):   [  ] Clinically Undetermined       Please document in your progress notes daily for the duration of treatment, until resolved, and include in your discharge summary.

## 2019-10-24 NOTE — HOSPITAL COURSE
Admitted with Pneumonia, requiring high flow oxygen per NC. Weaning O2 very slowing. Blood cx NGTD  De-escalate abx from Vanc/Cefepime/Azithromycin to Ceftrixone and Azithro. Cough meds, CPT and PT/OT  10/25 diuresing, continue oxygen weaning down to 15 L on 35 % , tolerating therapy. Step down to floor today   10/28 possible discharge home today or tomorrow with home health.   10/29 pt is feeling good, he is fine to go home, family at bedside as well. Pt is of oxygen, he has [facility] with [facility] for [facility] care

## 2019-10-24 NOTE — PLAN OF CARE
Patient sat up in chair for about 3 hours and NAD noted. Overall condition unchanged, vss see flowsheet. Call light in reach, safety maintained, will continue to monitor.

## 2019-10-24 NOTE — PLAN OF CARE
OT mayra performed, report to follow    No anticipated dc needs      Problem: Occupational Therapy Goal  Goal: Occupational Therapy Goal  Description  Goals to be met by: 11/24     Patient will increase functional independence with ADLs by performing:    UE Dressing with Modified [employer].  LE Dressing with Modified [employer].  Grooming while standing with Modified [employer].  Toileting from toilet with Modified [employer] for hygiene and clothing management.   Toilet transfer to toilet with Modified [employer].  Upper extremity exercise program x10 reps per handout, with independence.     Outcome: Ongoing, Progressing

## 2019-10-24 NOTE — ASSESSMENT & PLAN NOTE
Pulmonary edema   Acute on chronic congestive heart failure  Right upper lobe pneumonia  Fever  CXR with dense consolidations right upper lobe/mid lung field in addition to possible component fluid overload.   Sputum cx normal howie  blood cx NGTD  Viral cx pending  - continue vanco, azithromycin and cefepime- de-escalate to Azith and rocephin  - wean to nasal cannula as tolerated

## 2019-10-24 NOTE — CONSULTS
[facility] Medicine H&P Note     Admitting Team: [facility] Hospitalist  Attending Physician: Christina Tang*   Consulting Physician: Domingo Ferrer  Resident: Ulises    Date of Admit: 10/21/2019    Chief Complaint     Shortness of Breath (Pt with c/o SOB that started today. Wife states home health nurse told patient to come to ED becas at home his 02 sat was 73% on RA. Wife states BLE edema x1 week that the home health nurse has been monitoring. Pt reports productive cough of yellowish sputum since Friday. )      Subjective:      History of Present Illness:  Domingo Castro is a 74 y.o. male who  has a past medical history of Arthritis, Cataract, Chronic diastolic congestive heart failure, Coronary artery disease, Cystoid macular edema of both eyes, Diabetes mellitus type II, Hyperlipemia, Hypertension, Retinal hole, Stage 4 chronic kidney disease, and Streptococcus pyogenes bacteremia (12/23/2018).. The patient presented to [facility] on 10/21/2019 with a primary complaint of Shortness of Breath (Pt with c/o SOB that started today. Wife states home health nurse told patient to come to ED becas at home his 02 sat was 73% on RA. Wife states BLE edema x1 week that the home health nurse has been monitoring. Pt reports productive cough of yellowish sputum since Friday. )    Patient states that he was in his USOH until about a week ago when he noted he started having fever, chills, productive cough with clear sputum and general malaise.  He notes that his grandchildren live with him, and both have been recently ill.  Patient has home health and they noted that he had increasing bilateral lower leg swelling.  Patient states that for the last week or so he has also has taken increased amounts of his lasix due to the swelling.  He sleeps on 2 large fluffy pillows at night and notes that at night if he lays flatter he starts to cough.      On day of presentation, patient was noted by home health be  hypoxic in the 70's.  He was brought in and started on nonrebreather.  Yesterday afternoon, patient had increasing oxygen requirements and was stepped up to the ICU for high flow O2.     Past Medical History:  Past Medical History:   Diagnosis Date    Arthritis     Cataract     Chronic diastolic congestive heart failure     Coronary artery disease     Cystoid macular edema of both eyes     Diabetes mellitus type II     Hyperlipemia     Hypertension     Retinal hole     Stage 4 chronic kidney disease     Streptococcus pyogenes bacteremia 2018    Due to left toe osteomyelitis       Past Surgical History:  Past Surgical History:   Procedure Laterality Date    ABDOMINAL AORTOGRAPHY N/A 2019    Procedure: AORTOGRAM-ABDOMINAL;  Surgeon: Amish Hyatt MD;  Location: [facility] CATH LAB/EP;  Service: Cardiology;  Laterality: N/A;  CO2 angiography    ANGIOGRAPHY OF LOWER EXTREMITY Left 2019    Procedure: Angiogram Extremity Unilateral;  Surgeon: Amish Hyatt MD;  Location: [facility] CATH LAB/EP;  Service: Cardiology;  Laterality: Left;    CATARACT EXTRACTION W/  INTRAOCULAR LENS IMPLANT Left 12/15/14    Dr de la cruz    CATARACT EXTRACTION W/  INTRAOCULAR LENS IMPLANT Right 14    dorothy    CIRCUMCISION, NON-      focal laser right eye      INSERTION OF TUNNELED CENTRAL VENOUS CATHETER (CVC) WITH SUBCUTANEOUS PORT Right 2019    Procedure: [id number];  Surgeon: Denys Aleman Jr., MD;  Location: [facility] OR;  Service: General;  Laterality: Right;    KNEE SURGERY      left    Left medial collateral ligament repair      TONSILLECTOMY         Allergies:  Review of patient's allergies indicates:   Allergen Reactions    Atorvastatin Other (See Comments)       Home Medications:  Prior to Admission medications    Medication Sig Start Date End Date Taking? Authorizing Provider   acetaminophen (TYLENOL) 325 MG tablet Take 2 tablets (650 mg total) by mouth every 4 (four) hours as  "needed.  Patient not taking: Reported on 10/17/2019 7/23/18   Farhan Chow MD   amLODIPine (NORVASC) 10 MG tablet Take 1 tablet (10 mg total) by mouth once daily. 11/8/18 10/17/19  Griselda Nugent MD   aspirin (ECOTRIN) 81 MG EC tablet Take 1 tablet (81 mg total) by mouth once daily. 7/23/18 10/17/19  Farhan Chow MD   carvedilol (COREG) 25 MG tablet Take 1 tablet (25 mg total) by mouth 2 (two) times daily with meals. 7/23/18 10/17/19  Farhan Chow MD   clopidogrel (PLAVIX) 75 mg tablet Take 1 tablet (75 mg total) by mouth once daily. 8/29/19 8/28/20  Griselda Nugent MD   furosemide (LASIX) 40 MG tablet TAKE 2 TABLETS BY MOUTH TWICE DAILY. DO NOT TAKE THE EVENING DOSE AFTER 3 PM 9/10/19   Griselda Nugent MD   gabapentin (NEURONTIN) 100 MG capsule TAKE ONE CAPSULE BY MOUTH IN THE MORNING THEN ONE CAPSULE  IN THE EVENING AND THEN THREE CAPSULES AT BEDTIME 10/22/18   Griselda Nugent MD   hydrALAZINE (APRESOLINE) 50 MG tablet TAKE 1 TABLET BY MOUTH EVERY 12 HOURS 9/20/19   Griselda Nugent MD   insulin lispro (HUMALOG KWIKPEN INSULIN) 100 unit/mL InPn pen INJECT 10 UNITS SUBCUTANEOUSLY THREE TIMES DAILY BEFORE MEALS WITH CORRECTION SCALE, MAX TDD 50 UNITS 9/28/18   MALISSA Gentile ANP-C   LEVEMIR FLEXTOUCH U-100 INSULN 100 unit/mL (3 mL) InPn pen Inject 26 Units into the skin every evening. 9/28/18   MALISSA Gentile ANP-C   pen needle, diabetic, safety (BD AUTOSHIELD PEN NEEDLE) 29 gauge x 5/16" Ndle For use once daily with levemir flexpen 8/28/17   Griselda Nugent MD   rosuvastatin (CRESTOR) 20 MG tablet Take 20 mg by mouth once daily.  4/12/18   Historical Provider, MD       Family History:  Family History   Problem Relation Age of Onset    Heart disease Mother     Cancer Mother     Cancer Brother     Heart disease Father     Diabetes Father     Cancer Sister     Cataracts Paternal Grandmother     Glaucoma Paternal Grandmother     Blindness Neg Hx     Amblyopia Neg " "Hx     Hypertension Neg Hx     Macular degeneration Neg Hx     Retinal detachment Neg Hx     Strabismus Neg Hx        Social History:  Social History     Tobacco Use    Smoking status: Never Smoker    Smokeless tobacco: Never Used   Substance Use Topics    Alcohol use: No     Alcohol/week: 0.0 standard drinks    Drug use: No       Review of Systems:  Pertinent items are noted in HPI. All other systems are reviewed and are negative.    Health Maintaince :     Immunizations:   Flu UTD  Pna UTD    Cancer Screening:  Colonoscopy: UTD     Objective:   Last 24 Hour Vital Signs:  BP  Min: 131/69  Max: 178/79  Temp  Av.1 °F (37.3 °C)  Min: 98.4 °F (36.9 °C)  Max: 100.2 °F (37.9 °C)  Pulse  Av.2  Min: 60  Max: 74  Resp  Av  Min: 13  Max: 37  SpO2  Av.9 %  Min: 88 %  Max: 100 %  Body mass index is 31.71 kg/m².  I/O last 3 completed shifts:  In: 2500 [P.O.:1400; IV Piggyback:1100]  Out: 1610 [Urine:1610]    Physical Examination:  BP (!) 188/77   Pulse 64   Temp 98.4 °F (36.9 °C) (Oral)   Resp (!) 26   Ht 6' 3" (1.905 m)   Wt 115.1 kg (253 lb 11.2 oz)   SpO2 (!) 93%   BMI 31.71 kg/m²     General Appearance:    Alert, cooperative, no distress, appears stated age   Head:    Normocephalic, without obvious abnormality, atraumatic   Eyes:    PERRL, conjunctiva/corneas clear   Nose:   Nares normal, septum midline, mucosa normal, no drainage    or sinus tenderness   Throat:   MMM   Neck:   Supple, symmetrical, trachea midline, no adenopathy   Lungs:     Clear to auscultation bilaterally, respirations unlabored   Chest wall:    With port in L chest wall   Heart:    Regular rate and rhythm, S1 and S2 normal, no murmur, rub   or gallop   Abdomen:     Soft, non-tender, bowel sounds active all four quadrants,     no masses, no organomegaly   Extremities:   Extremities normal, atraumatic, no cyanosis or edema   Pulses:   2+ and symmetric all extremities   Skin:   Skin color, texture, turgor normal, no " rashes or lesions   Neurologic:   Grossly normal strength, decreased sensation in R foot         Laboratory:  Most Recent Data:  CBC:   Lab Results   Component Value Date    WBC 8.67 10/24/2019    HGB 8.3 (L) 10/24/2019    HCT 25.6 (L) 10/24/2019     (L) 10/24/2019    MCV 87 10/24/2019    RDW 15.2 (H) 10/24/2019     BMP:   Lab Results   Component Value Date     10/24/2019    K 3.6 10/24/2019     10/24/2019    CO2 25 10/24/2019    BUN 65 (H) 10/24/2019    CREATININE 3.6 (H) 10/24/2019     (H) 10/24/2019    CALCIUM 9.5 10/24/2019    MG 2.2 10/24/2019    PHOS 2.6 (L) 10/24/2019     LFTs:   Lab Results   Component Value Date    PROT 8.0 10/21/2019    ALBUMIN 4.1 10/21/2019    BILITOT 0.5 10/21/2019    AST 20 10/21/2019    ALKPHOS 98 10/21/2019    ALT 16 10/21/2019     Coags:   Lab Results   Component Value Date    INR 1.0 10/24/2019     FLP:   Lab Results   Component Value Date    CHOL 126 07/20/2018    HDL 31 (L) 07/20/2018    LDLCALC 76.8 07/20/2018    TRIG 91 07/20/2018    CHOLHDL 24.6 07/20/2018     DM:   Lab Results   Component Value Date    HGBA1C 8.1 (H) 07/26/2019    HGBA1C 8.2 (H) 02/22/2019    HGBA1C 7.3 (H) 11/28/2018    GLUF 106 04/22/2008    LDLCALC 76.8 07/20/2018    CREATININE 3.6 (H) 10/24/2019     Thyroid:   Lab Results   Component Value Date    TSH 1.589 06/13/2017     Anemia:   Lab Results   Component Value Date    IRON 52 06/14/2018    TIBC 259 06/14/2018    FERRITIN 265 06/14/2018     Cardiac:   Lab Results   Component Value Date    TROPONINI 0.027 10/21/2019     (H) 10/22/2019     Urinalysis:   Lab Results   Component Value Date    LABURIN No growth 07/16/2018    COLORU Yellow 12/23/2018    SPECGRAV 1.025 12/23/2018    NITRITE Negative 12/23/2018    KETONESU Negative 12/23/2018    UROBILINOGEN Negative 12/23/2018    WBCUA 2 12/23/2018       Trended Lab Data:  Recent Labs   Lab 10/21/19  1916 10/22/19  0407 10/23/19  0349 10/24/19  0438 10/24/19  0924   WBC 10.78  10.60  --   --  8.67   HGB 9.4* 8.5*  --   --  8.3*   HCT 29.9* 27.1*  --   --  25.6*   * 112*  --   --  114*   MCV 90 88  --   --  87   RDW 15.5* 15.6*  --   --  15.2*    143 143 142  --    K 3.6 3.3* 3.7 3.6  --     107 107 108  --    CO2 24 24 25 25  --    BUN 60* 62* 65* 65*  --    CREATININE 3.35* 3.5* 3.6* 3.6*  --    * 264* 159* 133*  --    PROT 8.0  --   --   --   --    ALBUMIN 4.1  --   --   --   --    BILITOT 0.5  --   --   --   --    AST 20  --   --   --   --    ALKPHOS 98  --   --   --   --    ALT 16  --   --   --   --        Trended Cardiac Data:  Recent Labs   Lab 10/21/19  1916 10/22/19  0407   TROPONINI 0.027  --    BNP  --  941*       Microbiology Data:  Blood Cx - NGTD  Respiratory Cx - normal howie  Respiratory PCR - pending    Other Results:  EKG (my interpretation): NSR rate ~80 LAD with LBBB, l atrial enlargement    Radiology:  Imaging Results          X-Ray Chest AP Portable (Final result)  Result time 10/21/19 19:20:26    Final result by Grey Resendiz Jr., MD (10/21/19 19:20:26)                 Impression:      Findings may relate to pulmonary edema.  Superimposed infection is possible.      Electronically signed by: Grey Resendiz Jr., MD  Date:    10/21/2019  Time:    19:20             Narrative:    EXAMINATION:  XR CHEST AP PORTABLE    CLINICAL HISTORY:  CHF;    COMPARISON:  Prior radiograph from February 14, 2019.    FINDINGS:  Right-sided medical infusion port in place.  Ground-glass opacity within the perihilar regions bilaterally.  Somewhat more confluent within the inferior right upper lobe.  No definite pleural fluid.  No pneumothorax.  The heart is enlarged.No significant bony findings.                                 Assessment:     Domingo Castro is a 74 y.o. male with:  Patient Active Problem List    Diagnosis Date Noted    Pulmonary edema 10/22/2019    Right upper lobe pneumonia 10/22/2019    Fever 10/22/2019    Diabetic ulcer of left foot  10/22/2019    Acute on chronic congestive heart failure 10/21/2019    Class 1 obesity due to excess calories with serious comorbidity in adult 09/23/2019    Obesity, diabetes, and hypertension syndrome 09/23/2019    Aortic arch atherosclerosis 09/19/2019    Bronchiectasis without complication 09/19/2019    Chronic congestive heart failure with left ventricular diastolic dysfunction 09/19/2019    Uncontrolled type 2 diabetes mellitus with hyperglycemia 09/19/2019    MRSA (methicillin resistant Staphylococcus aureus) infection     PAD (peripheral artery disease) 07/29/2019    Toe osteomyelitis, left 07/26/2019    Thrombocytopenia, unspecified 07/26/2019    Diabetic ulcer of toe of left foot associated with type 2 diabetes mellitus, with necrosis of bone 07/26/2019    Bilateral leg edema 01/16/2019    Acute respiratory failure with hypoxia 12/23/2018    CKD stage 4 due to type 2 diabetes mellitus 12/23/2018    Diabetic neuropathy associated with type 2 diabetes mellitus 12/23/2018    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, with long-term current use of insulin 12/18/2018    Chronic diastolic congestive heart failure 12/03/2018    Anginal equivalent 12/03/2018    Incontinence 06/14/2018    Metabolic bone disease 06/14/2018    Anemia of chronic renal failure, stage 4 (severe) 06/14/2018    Pulmonary nodule 10/28/2016    Normocytic anemia     Impaired mobility and ADLs 12/23/2015    Cervical radiculopathy 08/12/2015    Severe nonproliferative diabetic retinopathy of both eyes 06/03/2013    Hypertensive retinopathy of both eyes 06/03/2013    Retinal hole or tear, left 06/03/2013    Hypogonadism male 04/12/2013    ED (erectile dysfunction) 04/12/2013    Type 2 diabetes mellitus with stage 4 chronic kidney disease, with long-term current use of insulin 01/10/2013    Hypertension associated with diabetes 01/10/2013     Hyperlipidemia associated with type 2 diabetes mellitus 01/10/2013        Plan:     #Neurological  -no acute issues    #Cardiovascular  Acute on Chronic HFpEF Exacerbation  -patient presenting with acute hypoxia  CXR with bilateral opacities    -EKG with LAD, incomplete LBBB similar to previous   -patient received 2 doses lasix 60 IV on admission  would recommend continued diuresis  -would recommend repeat echo  last documented EF > 60% with indeterminate diastolic dysfunction in 2018  -home coreg and hydralazine continued    CAD  -home ASA, statin, plavix, coreg continued    PVD   -s/p Atherectomy CFA and AT  -s/p Angioplasty FSA, AT, PT  -home ASA, statin, plavix continued    #Pulmonology  Acute on Chronic Hypoxic Respiratory Failure 2/2 CAP and HFpEF exacerbation   -patient presenting with O2 sat in the 70's requiring nonrebreather  ABG on presentation   -stepped up to the ICU for increasing oxygen requirements  currently on high flow at 40 FiO2 and 20L  -will likely improve with continued treatment with abx and diuresis  -will continue to monitor    CAP  -patient presented with 1 week fever, chills, general malaise and new cough  CXR findings as above  procal 1.04  -patient initiated on vanc, zosyn, azithromycin  recommend deescalating to ceftriazone, azithromycin for CAP coverage  -blood cx NGTD  Respiratory Cx normal howie  viral PCR pending  flu negative    #Renal  CKD IV  -patient with CR 3.6  appears at baseline  -no acute issues    #GI  -no acute issues    #ID  CAP  -recommendations as above    Diabetic Foot Ulcer  -patient with previous presentation 7/19, treated with 6 weeks of vancomycin followed by 2 weeks of doxy  -no acute issues, continue wound care   -follows with podiatry outpatient    #Heme  Normocytic Anemia  -H/H 9/30 MCV 90   -recommend iron studies     #Endocrine  DM II with neuropathy   -patient with A1C 7/19 8.1  -home regimen includes 26u lantus qHS and 10u Humalog with  sliding scale continued    PPx: SCD  Diet: Diabetic  Dispo: ICU for high flow    Caitie Montgomery, DO  [employer] [facility] HO-II  [facility]    [facility] Medicine Hospitalist Pager numbers:   [facility] Hospitalist Medicine Team A (Shaquille/Estela): [phone]  [facility] Hospitalist Medicine Team B (Emile/Sona):  [phone]

## 2019-10-24 NOTE — PT/OT/SLP EVAL
Occupational Therapy   Evaluation    Name: Domingo Castro  MRN: [id number]  Admitting Diagnosis:  Acute respiratory failure with hypoxia      Recommendations:     Discharge Recommendations: home  Discharge Equipment Recommendations:  none  Barriers to discharge:  None    Assessment:     Domingo Castro is a 74 y.o. male with a medical diagnosis of Acute respiratory failure with hypoxia.  He presents with performance deficits affecting function: weakness, impaired endurance, impaired sensation, gait instability, impaired functional mobilty, impaired self care skills, impaired balance, decreased lower extremity function, decreased upper extremity function, impaired fine motor, impaired cardiopulmonary response to activity, edema.      Rehab Prognosis: Good; patient would benefit from acute skilled OT services to address these deficits and reach maximum level of function.       Plan:     Patient to be seen 5 x/week to address the above listed problems via self-care/home management, therapeutic activities, therapeutic exercises  · Plan of Care Expires: 11/24/19  · Plan of Care Reviewed with: patient    Subjective     Chief Complaint: SOB  Patient/Family Comments/goals: return to OF    Occupational Profile:  Living Environment: Pt lives at home w/wife, drt and ENRIQUE in [facility], no DENA, T/S combo  Previous level of function: indep  Roles and Routines: drives, works as   Equipment Used at Home:  none, other (see comments)(owms RW, rollator and crutches, doesnt use)  Assistance upon Discharge: family    Pain/Comfort:  · Pain Rating 1: 0/10  · Pain Rating Post-Intervention 1: 0/10    Patients cultural, spiritual, Confucianism conflicts given the current situation: no    Objective:     Communicated with: nurse prior to session.  Patient found up in chair with blood pressure cuff, peripheral IV, oxygen, pulse ox (continuous), Condom Catheter upon OT entry to room.    High-Kristian O2 15L 20%    General Precautions: Standard, fall    Orthopedic Precautions:    Braces:       Occupational Performance:    Bed Mobility:    · NT-see PT    Functional Mobility/Transfers:  · Patient completed Sit <> Stand Transfer with supervision  with  no assistive device   · Functional Mobility: NT    Activities of Daily Living:  · Feeding:  independence    · Lower Body Dressing: stand by assistance socks    Cognitive/Visual Perceptual:  AO4    Physical Exam:  BUE AROM/strength WFL  Good sit balance, fair+ stand balance  Decreased endurance/activity tolerance    AMPAC 6 Click ADL:  AMPAC Total Score: 19    Treatment & Education:  Pt educated on role of OT/POC2  Education:    Patient left up in chair with all lines intact, call button in reach and nurse notified    GOALS:   Multidisciplinary Problems     Occupational Therapy Goals        Problem: Occupational Therapy Goal    Goal Priority Disciplines Outcome Interventions   Occupational Therapy Goal     OT, PT/OT Ongoing, Progressing    Description:  Goals to be met by: 11/24     Patient will increase functional independence with ADLs by performing:    UE Dressing with Modified [place].  LE Dressing with Modified [place].  Grooming while standing with Modified [place].  Toileting from toilet with Modified [place] for hygiene and clothing management.   Toilet transfer to toilet with Modified [place].  Upper extremity exercise program x10 reps per handout, with independence.                      History:     Past Medical History:   Diagnosis Date    Arthritis     Cataract     Chronic diastolic congestive heart failure     Coronary artery disease     Cystoid macular edema of both eyes     Diabetes mellitus type II     Hyperlipemia     Hypertension     Retinal hole     Stage 4 chronic kidney disease     Streptococcus pyogenes bacteremia 12/23/2018    Due to left toe osteomyelitis       Past Surgical History:   Procedure Laterality Date    ABDOMINAL AORTOGRAPHY N/A 7/30/2019     Procedure: AORTOGRAM-ABDOMINAL;  Surgeon: Amish Hyatt MD;  Location: [facility] CATH LAB/EP;  Service: Cardiology;  Laterality: N/A;  CO2 angiography    ANGIOGRAPHY OF LOWER EXTREMITY Left 2019    Procedure: Angiogram Extremity Unilateral;  Surgeon: Amish Hyatt MD;  Location: [facility] CATH LAB/EP;  Service: Cardiology;  Laterality: Left;    CATARACT EXTRACTION W/  INTRAOCULAR LENS IMPLANT Left 12/15/14    Dr de la cruz    CATARACT EXTRACTION W/  INTRAOCULAR LENS IMPLANT Right 14    dorothy    CIRCUMCISION, NON-      focal laser right eye      INSERTION OF TUNNELED CENTRAL VENOUS CATHETER (CVC) WITH SUBCUTANEOUS PORT Right 2019    Procedure: [id number];  Surgeon: Denys Aleman Jr., MD;  Location: [facility] OR;  Service: General;  Laterality: Right;    KNEE SURGERY      left    Left medial collateral ligament repair      TONSILLECTOMY         Time Tracking:     OT Date of Treatment: 10/24/19  OT Start Time: 1328  OT Stop Time: 1339  OT Total Time (min): 11 min    Billable Minutes:Evaluation 11    Prudencio Morgan OT  10/24/2019

## 2019-10-24 NOTE — SUBJECTIVE & OBJECTIVE
Interval History: awake and alert, sitting out in the chair,  family by bedside. Patient feels better, but persistent cough    Patient diuresing, continue IV Lasix.    De-escalate abx to ceftriaxone and Azithromycin  Blood cx NGTD  Viral culture pending    PT/OT, if stable possible step down top the floor tomorrow    Review of Systems   Constitutional: Negative for chills and fever.   Respiratory: Positive for cough. Negative for shortness of breath.    Cardiovascular: Negative for chest pain and leg swelling.   Gastrointestinal: Negative for abdominal pain and nausea.   Genitourinary: Negative for dysuria.   Musculoskeletal: Negative for neck pain.   Skin: Negative for rash and wound.   Neurological: Negative for syncope and weakness.   Psychiatric/Behavioral: Negative for agitation.     Objective:     Vital Signs (Most Recent):  Temp: 98.4 °F (36.9 °C) (10/24/19 1103)  Pulse: 63 (10/24/19 1204)  Resp: (!) 26 (10/24/19 1204)  BP: (!) 142/66 (10/24/19 1204)  SpO2: (!) 93 % (10/24/19 1204) Vital Signs (24h Range):  Temp:  [98.4 °F (36.9 °C)-100.2 °F (37.9 °C)] 98.4 °F (36.9 °C)  Pulse:  [60-74] 63  Resp:  [13-37] 26  SpO2:  [88 %-100 %] 93 %  BP: (131-188)/(60-82) 142/66     Weight: 115.1 kg (253 lb 11.2 oz)  Body mass index is 31.71 kg/m².    Intake/Output Summary (Last 24 hours) at 10/24/2019 1256  Last data filed at 10/24/2019 1000  Gross per 24 hour   Intake 1235 ml   Output 990 ml   Net 245 ml      Physical Exam   Constitutional: He is oriented to person, place, and time. He appears well-developed and well-nourished. No distress.   HENT:   Head: Normocephalic and atraumatic.   Neck: Neck supple.   Cardiovascular: Normal rate, regular rhythm, normal heart sounds and intact distal pulses.   Pulmonary/Chest: Effort normal. No respiratory distress. He has no wheezes.   Breath sounds diminished    Abdominal: Soft. Bowel sounds are normal. He exhibits no distension. There is no tenderness.   Musculoskeletal: Normal  range of motion. He exhibits no edema or tenderness.   Neurological: He is alert and oriented to person, place, and time.   Skin: Skin is warm and dry. Capillary refill takes less than 2 seconds. No erythema.   Psychiatric: He has a normal mood and affect.       Significant Labs:   ABGs:   No results for input(s): PH, PCO2, HCO3, POCSATURATED, BE, TOTALHB, COHB, METHB, O2HB, POCFIO2 in the last 48 hours.  Blood Culture:   No results for input(s): LABBLOO in the last 48 hours.  CBC:   Recent Labs   Lab 10/24/19  0924   WBC 8.67   HGB 8.3*   HCT 25.6*   *     CMP:   Recent Labs   Lab 10/23/19  0349 10/24/19  0438    142   K 3.7 3.6    108   CO2 25 25   * 133*   BUN 65* 65*   CREATININE 3.6* 3.6*   CALCIUM 9.6 9.5   ANIONGAP 11 9   EGFRNONAA 16* 16*     Coagulation:   Recent Labs   Lab 10/24/19  0438   INR 1.0     Lactic Acid:   No results for input(s): LACTATE in the last 48 hours.  TSH: No results for input(s): TSH in the last 4320 hours.  Urine Culture: No results for input(s): LABURIN in the last 48 hours.  Urine Studies: No results for input(s): COLORU, APPEARANCEUA, PHUR, SPECGRAV, PROTEINUA, GLUCUA, KETONESU, BILIRUBINUA, OCCULTUA, NITRITE, UROBILINOGEN, LEUKOCYTESUR, RBCUA, WBCUA, BACTERIA, SQUAMEPITHEL, HYALINECASTS in the last 48 hours.    Invalid input(s): LIOR    Significant Imaging: I have reviewed all pertinent imaging results/findings within the past 24 hours.

## 2019-10-24 NOTE — PLAN OF CARE
Problem: Physical Therapy Goal  Goal: Physical Therapy Goal  Description  Goals to be met by: 2019     Patient will increase functional independence with mobility by performin. Bed to chair transfer with Modified [place] using no AD  2. Gait  x 250 feet with Modified [place] using no AD c/ L surgical shoe and heel WB.   3. Lower extremity exercise program x15 reps per handout, with independence     Outcome: Ongoing, Progressing    PT initial evaluation completed. Plan of care and goals established and discussed with patient.    Discharge Recommendation: Home  DME Recommendation: None

## 2019-10-24 NOTE — PROGRESS NOTES
Pharmacokinetic Assessment Follow Up: IV Vancomycin    Vancomycin serum concentration assessment(s):    The random level was drawn correctly and can be used to guide therapy at this time. The measurement is within the desired definitive target range of 15 to 20 mcg/mL.    Vancomycin Regimen Plan:    Re-dose when the random level is less than 20 mcg/mL, next level to be drawn at 10/25/19 on 0400     Drug levels (last 3 results):  Recent Labs   Lab Result Units 10/23/19  0349 10/24/19  0438   Vancomycin, Random ug/mL 14.9 17.0       Pharmacy will continue to follow and monitor vancomycin.    Please contact pharmacy at extension [phone] for questions regarding this assessment.    Thank you for the consult,   Aamir Garcia       Patient brief summary:  Domingo Castro is a 74 y.o. male initiated on antimicrobial therapy with IV Vancomycin for treatment of lower respiratory infection    The patient's current regimen is 1g once     Drug Allergies:   Review of patient's allergies indicates:   Allergen Reactions    Atorvastatin Other (See Comments)       Actual Body Weight:   115kg    Renal Function:   Estimated Creatinine Clearance: 24.6 mL/min (A) (based on SCr of 3.6 mg/dL (H)).,     Dialysis Method (if applicable):  N/A    CBC (last 72 hours):  Recent Labs   Lab Result Units 10/21/19  1916 10/22/19  0407   WBC K/uL 10.78 10.60   Hemoglobin g/dL 9.4* 8.5*   Hematocrit % 29.9* 27.1*   Platelets K/uL 112* 112*   Gran% % 84.6* 84.9*   Lymph% % 9.4* 9.8*   Mono% % 5.1 4.7   Eosinophil% % 0.6 0.2   Basophil% % 0.3 0.4   Differential Method  Automated Automated       Metabolic Panel (last 72 hours):  Recent Labs   Lab Result Units 10/21/19  1916 10/22/19  0407 10/23/19  0349 10/24/19  0438   Sodium mmol/L 140 143 143 142   Potassium mmol/L 3.6 3.3* 3.7 3.6   Chloride mmol/L 105 107 107 108   CO2 mmol/L 24 24 25 25   Glucose mg/dL 434* 264* 159* 133*   BUN, Bld mg/dL 60* 62* 65* 65*   Creatinine mg/dL 3.35* 3.5* 3.6* 3.6*    Albumin g/dL 4.1  --   --   --    Total Bilirubin mg/dL 0.5  --   --   --    Alkaline Phosphatase U/L 98  --   --   --    AST U/L 20  --   --   --    ALT U/L 16  --   --   --    Magnesium mg/dL  --  1.9 2.2 2.2   Phosphorus mg/dL  --  2.6* 2.6* 2.6*       Vancomycin Administrations:  vancomycin given in the last 96 hours                     vancomycin in dextrose 5 % 1 gram/250 mL IVPB 1,000 mg (mg) 1,000 mg New Bag 10/23/19 0933                      Microbiologic Results:  Microbiology Results (last 7 days)       Procedure Component Value Units Date/Time    Blood culture [[id number]] Collected:  10/21/19 2034    Order Status:  Completed Specimen:  Blood from Peripheral, Antecubital, Right Updated:  10/23/19 1412     Blood Culture, Routine No Growth to date      No Growth to date    Blood culture [[id number]] Collected:  10/21/19 2044    Order Status:  Completed Specimen:  Blood from Peripheral, Hand, Left Updated:  10/23/19 1412     Blood Culture, Routine No Growth to date      No Growth to date    Culture, Respiratory with Gram Stain [[id number]] Collected:  10/22/19 0622    Order Status:  Completed Specimen:  Respiratory from Sputum, Induced Updated:  10/23/19 0840     Respiratory Culture Normal respiratory howie     Gram Stain (Respiratory) >10 epithelial cells per low power field     Gram Stain (Respiratory) Moderate WBC's     Gram Stain (Respiratory) Many Gram positive cocci     Gram Stain (Respiratory) Many Gram positive rods     Gram Stain (Respiratory) Many Gram negative rods     Gram Stain (Respiratory) Few Gram negative diplococci    Influenza A & B by Molecular [[id number]] Collected:  10/21/19 1932    Order Status:  Completed Specimen:  Nasopharyngeal Swab Updated:  10/21/19 2019     Influenza A, Molecular Negative     Influenza B, Molecular Negative     Flu A & B Source Nasal swab

## 2019-10-24 NOTE — PT/OT/SLP EVAL
Physical Therapy Evaluation    Patient Name:  Domingo Castro   MRN:  [id number]    Recommendations:     Discharge Recommendations:  home   Discharge Equipment Recommendations: none   Barriers to discharge: None    Assessment:     Domingo Castro is a 74 y.o. male admitted with a medical diagnosis of Acute respiratory failure with hypoxia.  He presents with the following impairments/functional limitations:  impaired endurance, weakness, decreased coordination, impaired functional mobilty, decreased safety awareness, gait instability, impaired cardiopulmonary response to activity, decreased lower extremity function, impaired coordination, impaired balance, impaired self care skills, edema. Pt instructed in transfer to chair at [facility] c/ no AD. Mobility limited by lines.     Rehab Prognosis: Good; patient would benefit from acute skilled PT services to address these deficits and reach maximum level of function.    Recent Surgery: * No surgery found *      Plan:     During this hospitalization, patient to be seen 5 x/week to address the identified rehab impairments via gait training, therapeutic activities, therapeutic exercises, neuromuscular re-education and progress toward the following goals:    · Plan of Care Expires:  11/24/19    Subjective     Chief Complaint: coughing   Patient/Family Comments/goals: Pt agreed to PT POC  Pain/Comfort:  · Pain Rating 1: 0/10  · Pain Rating Post-Intervention 1: 0/10    Patients cultural, spiritual, Orthodoxy conflicts given the current situation: no    Living Environment:  Pt lives c/ family (wife, daughter and son-in-law) in [facility], [place]  Prior to admission, patients level of function was Mod I c/ SPC PRN; pt is a .  Equipment used at home: none.  DME owned (not currently used): rolling walker and single point cane.  Upon discharge, patient will have assistance from family.    Objective:     Communicated with RNKeila prior to session.  Patient found supine with bed alarm,  blood pressure cuff, oxygen, peripheral IV, pulse ox (continuous), telemetry, denny catheter  upon PT entry to room.    General Precautions: Standard, fall   Orthopedic Precautions:N/A   Braces: N/A     Exams:  · Cognitive Exam:  Patient is oriented to Person, Place, Time and Situation  · Gross Motor Coordination:  mildly impaired c/ functional mobility  · Postural Exam:  Patient presented with the following abnormalities:    · -       No postural abnormalities identified  · -       Habitus  · RLE ROM: WFL  · RLE Strength: 3+/5 functionally  · LLE ROM: WFL  · LLE Strength: 3+/5 functionally    Functional Mobility:  · Bed Mobility:     · Rolling Left:  stand by assistance  · Scooting: contact guard assistance  · Supine to Sit: contact guard assistance  · Transfers:     · Sit to Stand:  contact guard assistance with hand-held assist  · Bed to Chair: contact guard assistance with  hand-held assist  using  Step Transfer; extra time for line management.  · Gait: 3-4 steps c/ HHA at CGA; functional gait limited by lines. Pt demo wide ANDREW, flat foot landing; no LOB or buckling noted  · Balance: dynamic balance- fair; pt tolerated sitting EOB x7 min c/ vitals WNL      Therapeutic Activities and Exercises:  PT marcal completed c/ progressive mobility as needed above.   Pt educated on PT role and POC; mobility/gait c/ vitals WNL.    AM-PAC 6 CLICK MOBILITY  Total Score:19     Patient left up in chair with all lines intact, call button in reach, RN notified and . present.    GOALS:   Multidisciplinary Problems     Physical Therapy Goals        Problem: Physical Therapy Goal    Goal Priority Disciplines Outcome Goal Variances Interventions   Physical Therapy Goal     PT, PT/OT Ongoing, Progressing     Description:  Goals to be met by: 2019     Patient will increase functional independence with mobility by performin. Bed to chair transfer with Modified [place] using no AD  2. Gait  x 250 feet with Modified  [place] using no AD c/ L surgical shoe and heel WB.   3. Lower extremity exercise program x15 reps per handout, with independence                      History:     Past Medical History:   Diagnosis Date    Arthritis     Cataract     Chronic diastolic congestive heart failure     Coronary artery disease     Cystoid macular edema of both eyes     Diabetes mellitus type II     Hyperlipemia     Hypertension     Retinal hole     Stage 4 chronic kidney disease     Streptococcus pyogenes bacteremia 2018    Due to left toe osteomyelitis       Past Surgical History:   Procedure Laterality Date    ABDOMINAL AORTOGRAPHY N/A 2019    Procedure: AORTOGRAM-ABDOMINAL;  Surgeon: Amish Hyatt MD;  Location: [facility] CATH LAB/EP;  Service: Cardiology;  Laterality: N/A;  CO2 angiography    ANGIOGRAPHY OF LOWER EXTREMITY Left 2019    Procedure: Angiogram Extremity Unilateral;  Surgeon: Amish Hyatt MD;  Location: [facility] CATH LAB/EP;  Service: Cardiology;  Laterality: Left;    CATARACT EXTRACTION W/  INTRAOCULAR LENS IMPLANT Left 12/15/14    Dr de la cruz    CATARACT EXTRACTION W/  INTRAOCULAR LENS IMPLANT Right 14    dorothy    CIRCUMCISION, NON-      focal laser right eye      INSERTION OF TUNNELED CENTRAL VENOUS CATHETER (CVC) WITH SUBCUTANEOUS PORT Right 2019    Procedure: [id number];  Surgeon: Denys Aleman Jr., MD;  Location: [facility] OR;  Service: General;  Laterality: Right;    KNEE SURGERY      left    Left medial collateral ligament repair      TONSILLECTOMY         Time Tracking:     PT Received On: 10/24/19  PT Start Time: 1123     PT Stop Time: 1202  PT Total Time (min): 39 min     Billable Minutes: Evaluation 15 and Therapeutic Activity 24      Gianluca Schwartz PT, DPT  10/24/2019

## 2019-10-24 NOTE — PLAN OF CARE
Pt sitting up in chair ; remains on HFNC; Pt updated on plan of care.   PT/OT recs: home...the patient current with Alo [facility]UL (SN for wound care) and pt prefers to resume services upon d/c  [place] to continue to follow for additional needs.     10/24/19 1443   Discharge Reassessment   Assessment Type Discharge Planning Reassessment   Provided patient/caregiver education on the expected discharge date and the discharge plan Yes   Do you have any problems affording any of your prescribed medications? No   Discharge Plan A Home Health   DME Needed Upon Discharge  none   Anticipated Discharge Disposition Home-Health   Can the patient answer the patient profile reliably? Yes, cognitively intact   How does the patient rate their overall health at the present time? Good   Describe the patient's ability to walk at the present time. Walks with the help of equipment   How often would a person be available to care for the patient? Whenever needed   Number of comorbid conditions (as recorded on the chart) Five or more   During the past month, has the patient often been bothered by feeling down, depressed or hopeless? No   During the past month, has the patient often been bothered by little interest or pleasure in doing things? No

## 2019-10-24 NOTE — PHYSICIAN QUERY
PT Name: Domingo Castro  MR #: [id number]     Physician Query Form - Documentation Clarification      CDS/: Raysa Delvalle               Contact information:[email or web address]    This form is a permanent document in the medical record.     Query Date: October 24, 2019    By submitting this query, we are merely seeking further clarification of documentation. Please utilize your independent clinical judgment when addressing the question(s) below.    The Medical record reflects the following:    Supporting Clinical Findings Location in Medical Record     Hypertension associated with diabetes    Continue amlodipine and carvedilol   Continue  ASA, plavix and statin   Monitor telemetry     PN 10/23     Uncontrolled type 2 diabetes mellitus with hyperglycemia   accucheck AC&HS,  Continue levemir and aspart  Low dose SSI,   Diabetic diet       PN 10/23                                                                            Doctor, Please specify if hypertension is or is not a manifestation of diabetes.    Provider Use Only      [ x  ]  HTN is a manifestation of DM    [   ]  HTN is not a manifestation of DM    [   ]  Other:______________________                                                                                                               [  ] Clinically Undetermined

## 2019-10-25 ENCOUNTER — ANESTHESIA EVENT (OUTPATIENT)
Dept: INTENSIVE CARE | Facility: HOSPITAL | Age: 74
DRG: 189 | End: 2019-10-25
Payer: MEDICARE

## 2019-10-25 ENCOUNTER — ANESTHESIA (OUTPATIENT)
Dept: INTENSIVE CARE | Facility: HOSPITAL | Age: 74
DRG: 189 | End: 2019-10-25
Payer: MEDICARE

## 2019-10-25 LAB
ANION GAP SERPL CALC-SCNC: 10 MMOL/L (ref 8–16)
BUN SERPL-MCNC: 64 MG/DL (ref 8–23)
CALCIUM SERPL-MCNC: 9.7 MG/DL (ref 8.7–10.5)
CHLORIDE SERPL-SCNC: 109 MMOL/L (ref 95–110)
CO2 SERPL-SCNC: 24 MMOL/L (ref 23–29)
CREAT SERPL-MCNC: 3.4 MG/DL (ref 0.5–1.4)
EST. GFR  (AFRICAN AMERICAN): 19 ML/MIN/1.73 M^2
EST. GFR  (NON AFRICAN AMERICAN): 17 ML/MIN/1.73 M^2
GLUCOSE SERPL-MCNC: 51 MG/DL (ref 70–110)
INR PPP: 1 (ref 0.8–1.2)
L PNEUMO AG UR QL IA: NOT DETECTED
MAGNESIUM SERPL-MCNC: 2.1 MG/DL (ref 1.6–2.6)
PHOSPHATE SERPL-MCNC: 2.9 MG/DL (ref 2.7–4.5)
POCT GLUCOSE: 105 MG/DL (ref 70–110)
POCT GLUCOSE: 137 MG/DL (ref 70–110)
POCT GLUCOSE: 264 MG/DL (ref 70–110)
POCT GLUCOSE: 295 MG/DL (ref 70–110)
POTASSIUM SERPL-SCNC: 3.7 MMOL/L (ref 3.5–5.1)
PROTHROMBIN TIME: 10.6 SEC (ref 9–12.5)
SODIUM SERPL-SCNC: 143 MMOL/L (ref 136–145)
VANCOMYCIN SERPL-MCNC: 22.7 UG/ML

## 2019-10-25 PROCEDURE — 80048 BASIC METABOLIC PNL TOTAL CA: CPT

## 2019-10-25 PROCEDURE — 36000 PLACE NEEDLE IN VEIN: CPT | Performed by: ANESTHESIOLOGY

## 2019-10-25 PROCEDURE — 27100092 HC HIGH FLOW DELIVERY CANNULA

## 2019-10-25 PROCEDURE — 63600175 PHARM REV CODE 636 W HCPCS: Performed by: STUDENT IN AN ORGANIZED HEALTH CARE EDUCATION/TRAINING PROGRAM

## 2019-10-25 PROCEDURE — 25000003 PHARM REV CODE 250: Performed by: FAMILY MEDICINE

## 2019-10-25 PROCEDURE — 25000003 PHARM REV CODE 250: Performed by: NURSE PRACTITIONER

## 2019-10-25 PROCEDURE — 84100 ASSAY OF PHOSPHORUS: CPT

## 2019-10-25 PROCEDURE — 83735 ASSAY OF MAGNESIUM: CPT

## 2019-10-25 PROCEDURE — 11000001 HC ACUTE MED/SURG PRIVATE ROOM

## 2019-10-25 PROCEDURE — 85610 PROTHROMBIN TIME: CPT

## 2019-10-25 PROCEDURE — 80202 ASSAY OF VANCOMYCIN: CPT

## 2019-10-25 PROCEDURE — 97110 THERAPEUTIC EXERCISES: CPT

## 2019-10-25 PROCEDURE — 63600175 PHARM REV CODE 636 W HCPCS: Performed by: FAMILY MEDICINE

## 2019-10-25 PROCEDURE — 63600175 PHARM REV CODE 636 W HCPCS: Performed by: INTERNAL MEDICINE

## 2019-10-25 PROCEDURE — 94761 N-INVAS EAR/PLS OXIMETRY MLT: CPT

## 2019-10-25 PROCEDURE — 97530 THERAPEUTIC ACTIVITIES: CPT

## 2019-10-25 PROCEDURE — 27100171 HC OXYGEN HIGH FLOW UP TO 24 HOURS

## 2019-10-25 PROCEDURE — 99900035 HC TECH TIME PER 15 MIN (STAT)

## 2019-10-25 PROCEDURE — 94664 DEMO&/EVAL PT USE INHALER: CPT

## 2019-10-25 RX ORDER — INSULIN ASPART 100 [IU]/ML
3 INJECTION, SOLUTION INTRAVENOUS; SUBCUTANEOUS
Status: COMPLETED | OUTPATIENT
Start: 2019-10-25 | End: 2019-10-29

## 2019-10-25 RX ADMIN — GUAIFENESIN AND CODEINE PHOSPHATE 5 ML: 100; 10 SOLUTION ORAL at 05:10

## 2019-10-25 RX ADMIN — GABAPENTIN 100 MG: 100 CAPSULE ORAL at 08:10

## 2019-10-25 RX ADMIN — ASPIRIN 81 MG: 81 TABLET, COATED ORAL at 08:10

## 2019-10-25 RX ADMIN — HYDRALAZINE HYDROCHLORIDE 50 MG: 25 TABLET, FILM COATED ORAL at 08:10

## 2019-10-25 RX ADMIN — CLOPIDOGREL BISULFATE 75 MG: 75 TABLET ORAL at 08:10

## 2019-10-25 RX ADMIN — AMLODIPINE BESYLATE 10 MG: 5 TABLET ORAL at 08:10

## 2019-10-25 RX ADMIN — HEPARIN SODIUM 5000 UNITS: 5000 INJECTION, SOLUTION INTRAVENOUS; SUBCUTANEOUS at 08:10

## 2019-10-25 RX ADMIN — CARVEDILOL 25 MG: 25 TABLET, FILM COATED ORAL at 05:10

## 2019-10-25 RX ADMIN — FUROSEMIDE 80 MG: 10 INJECTION, SOLUTION INTRAVENOUS at 08:10

## 2019-10-25 RX ADMIN — POTASSIUM CHLORIDE 40 MEQ: 20 TABLET, EXTENDED RELEASE ORAL at 08:10

## 2019-10-25 RX ADMIN — CEFTRIAXONE 1 G: 1 INJECTION, SOLUTION INTRAVENOUS at 05:10

## 2019-10-25 RX ADMIN — GUAIFENESIN AND CODEINE PHOSPHATE 5 ML: 100; 10 SOLUTION ORAL at 12:10

## 2019-10-25 RX ADMIN — GUAIFENESIN AND CODEINE PHOSPHATE 5 ML: 100; 10 SOLUTION ORAL at 11:10

## 2019-10-25 RX ADMIN — Medication 24 G: at 07:10

## 2019-10-25 RX ADMIN — INSULIN ASPART 3 UNITS: 100 INJECTION, SOLUTION INTRAVENOUS; SUBCUTANEOUS at 05:10

## 2019-10-25 RX ADMIN — ROSUVASTATIN CALCIUM 20 MG: 10 TABLET, FILM COATED ORAL at 08:10

## 2019-10-25 RX ADMIN — INSULIN DETEMIR 26 UNITS: 100 INJECTION, SOLUTION SUBCUTANEOUS at 08:10

## 2019-10-25 RX ADMIN — AZITHROMYCIN MONOHYDRATE 500 MG: 500 INJECTION, POWDER, LYOPHILIZED, FOR SOLUTION INTRAVENOUS at 03:10

## 2019-10-25 RX ADMIN — CARVEDILOL 25 MG: 25 TABLET, FILM COATED ORAL at 08:10

## 2019-10-25 RX ADMIN — FUROSEMIDE 80 MG: 10 INJECTION, SOLUTION INTRAVENOUS at 05:10

## 2019-10-25 RX ADMIN — INSULIN ASPART 3 UNITS: 100 INJECTION, SOLUTION INTRAVENOUS; SUBCUTANEOUS at 12:10

## 2019-10-25 RX ADMIN — INSULIN ASPART 1 UNITS: 100 INJECTION, SOLUTION INTRAVENOUS; SUBCUTANEOUS at 08:10

## 2019-10-25 NOTE — PROGRESS NOTES
Therapy with vancomycin complete and/or consult discontinued by provider.  Pharmacy will sign off, please re-consult as needed.    Jonna Gonzalez, Maria De JesusD, BCPS

## 2019-10-25 NOTE — PROGRESS NOTES
"[facility] H&P Note     Admitting Team: [facility]  Attending Physician: Christina Tang*   Consulting Physician: Domingo Frerer  Resident: Ulises    Date of Admit: 10/21/2019    Subjective:      Patient doing well this morning, states he feels his breathing has improved.  Tolerated sitting in chair yesterday morning.  This morning patient with episode of hypoglycemia, he states he has not had much of an appetite and has not eaten much since admission.  Denies fever, chills, n/v.  Notes improvement in leg swelling.     Objective:   Last 24 Hour Vital Signs:  BP  Min: 125/90  Max: 207/90  Temp  Av.7 °F (37.1 °C)  Min: 98.3 °F (36.8 °C)  Max: 99.3 °F (37.4 °C)  Pulse  Av.2  Min: 57  Max: 69  Resp  Av.4  Min: 15  Max: 33  SpO2  Av.2 %  Min: 89 %  Max: 96 %  Body mass index is 31.71 kg/m².  I/O last 3 completed shifts:  In: 1145 [P.O.:545; IV Piggyback:600]  Out: 2315 [Urine:2315]    Physical Examination:  BP (!) 173/86   Pulse 61   Temp 98.3 °F (36.8 °C) (Oral)   Resp (!) 21   Ht 6' 3" (1.905 m)   Wt 115.1 kg (253 lb 11.2 oz)   SpO2 (!) 93%   BMI 31.71 kg/m²     General Appearance:    Alert, cooperative, no distress, appears stated age   Head:    Normocephalic, without obvious abnormality, atraumatic   Eyes:    PERRL, conjunctiva/corneas clear   Nose:   Nares normal, septum midline, mucosa normal, no drainage    or sinus tenderness   Throat:   MMM   Neck:   Supple, symmetrical, trachea midline, no adenopathy   Lungs:     Clear to auscultation bilaterally, respirations unlabored   Chest wall:    With port in L chest wall   Heart:    Regular rate and rhythm, S1 and S2 normal, no murmur, rub   or gallop   Abdomen:     Soft, non-tender, bowel sounds active all four quadrants,     no masses, no organomegaly   Extremities:   Extremities normal, atraumatic, no cyanosis or edema   Pulses:   2+ and symmetric all extremities   Skin:   Skin color, texture, turgor normal, no " rashes or lesions   Neurologic:   Grossly normal strength, decreased sensation in R foot         Laboratory:  Most Recent Data:  CBC:   Lab Results   Component Value Date    WBC 8.67 10/24/2019    HGB 8.3 (L) 10/24/2019    HCT 25.6 (L) 10/24/2019     (L) 10/24/2019    MCV 87 10/24/2019    RDW 15.2 (H) 10/24/2019     BMP:   Lab Results   Component Value Date     10/25/2019    K 3.7 10/25/2019     10/25/2019    CO2 24 10/25/2019    BUN 64 (H) 10/25/2019    CREATININE 3.4 (H) 10/25/2019    GLU 51 (L) 10/25/2019    CALCIUM 9.7 10/25/2019    MG 2.1 10/25/2019    PHOS 2.9 10/25/2019     LFTs:   Lab Results   Component Value Date    PROT 8.0 10/21/2019    ALBUMIN 4.1 10/21/2019    BILITOT 0.5 10/21/2019    AST 20 10/21/2019    ALKPHOS 98 10/21/2019    ALT 16 10/21/2019     Coags:   Lab Results   Component Value Date    INR 1.0 10/25/2019     FLP:   Lab Results   Component Value Date    CHOL 126 07/20/2018    HDL 31 (L) 07/20/2018    LDLCALC 76.8 07/20/2018    TRIG 91 07/20/2018    CHOLHDL 24.6 07/20/2018     DM:   Lab Results   Component Value Date    HGBA1C 8.1 (H) 07/26/2019    HGBA1C 8.2 (H) 02/22/2019    HGBA1C 7.3 (H) 11/28/2018    GLUF 106 04/22/2008    LDLCALC 76.8 07/20/2018    CREATININE 3.4 (H) 10/25/2019     Thyroid:   Lab Results   Component Value Date    TSH 1.589 06/13/2017     Anemia:   Lab Results   Component Value Date    IRON 52 06/14/2018    TIBC 259 06/14/2018    FERRITIN 265 06/14/2018     Cardiac:   Lab Results   Component Value Date    TROPONINI 0.027 10/21/2019     (H) 10/22/2019     Urinalysis:   Lab Results   Component Value Date    LABURIN No growth 07/16/2018    COLORU Yellow 12/23/2018    SPECGRAV 1.025 12/23/2018    NITRITE Negative 12/23/2018    KETONESU Negative 12/23/2018    UROBILINOGEN Negative 12/23/2018    WBCUA 2 12/23/2018       Trended Lab Data:  Recent Labs   Lab 10/21/19  1916 10/22/19  0407 10/23/19  0349 10/24/19  0438 10/24/19  0924 10/25/19  0526   WBC  10.78 10.60  --   --  8.67  --    HGB 9.4* 8.5*  --   --  8.3*  --    HCT 29.9* 27.1*  --   --  25.6*  --    * 112*  --   --  114*  --    MCV 90 88  --   --  87  --    RDW 15.5* 15.6*  --   --  15.2*  --     143 143 142  --  143   K 3.6 3.3* 3.7 3.6  --  3.7    107 107 108  --  109   CO2 24 24 25 25  --  24   BUN 60* 62* 65* 65*  --  64*   CREATININE 3.35* 3.5* 3.6* 3.6*  --  3.4*   * 264* 159* 133*  --  51*   PROT 8.0  --   --   --   --   --    ALBUMIN 4.1  --   --   --   --   --    BILITOT 0.5  --   --   --   --   --    AST 20  --   --   --   --   --    ALKPHOS 98  --   --   --   --   --    ALT 16  --   --   --   --   --        Trended Cardiac Data:  Recent Labs   Lab 10/21/19  1916 10/22/19  0407   TROPONINI 0.027  --    BNP  --  941*       Microbiology Data:  Blood Cx - NGTD  Respiratory Cx - normal howie  Respiratory PCR - pending    Other Results:  EKG (my interpretation): NSR rate ~80 LAD with LBBB, l atrial enlargement    Radiology:  Imaging Results          X-Ray Chest AP Portable (Final result)  Result time 10/21/19 19:20:26    Final result by Grey Resendiz Jr., MD (10/21/19 19:20:26)                 Impression:      Findings may relate to pulmonary edema.  Superimposed infection is possible.      Electronically signed by: Grey Resendiz Jr., MD  Date:    10/21/2019  Time:    19:20             Narrative:    EXAMINATION:  XR CHEST AP PORTABLE    CLINICAL HISTORY:  CHF;    COMPARISON:  Prior radiograph from February 14, 2019.    FINDINGS:  Right-sided medical infusion port in place.  Ground-glass opacity within the perihilar regions bilaterally.  Somewhat more confluent within the inferior right upper lobe.  No definite pleural fluid.  No pneumothorax.  The heart is enlarged.No significant bony findings.                                 Assessment:     Domingo Castro is a 74 y.o. male with:  Patient Active Problem List    Diagnosis Date Noted    Pulmonary edema 10/22/2019    Right  upper lobe pneumonia 10/22/2019    Fever 10/22/2019    Diabetic ulcer of left foot 10/22/2019    Acute on chronic congestive heart failure 10/21/2019    Class 1 obesity due to excess calories with serious comorbidity in adult 09/23/2019    Obesity, diabetes, and hypertension syndrome 09/23/2019    Aortic arch atherosclerosis 09/19/2019    Bronchiectasis without complication 09/19/2019    Chronic congestive heart failure with left ventricular diastolic dysfunction 09/19/2019    Uncontrolled type 2 diabetes mellitus with hyperglycemia 09/19/2019    MRSA (methicillin resistant Staphylococcus aureus) infection     PAD (peripheral artery disease) 07/29/2019    Toe osteomyelitis, left 07/26/2019    Thrombocytopenia, unspecified 07/26/2019    Diabetic ulcer of toe of left foot associated with type 2 diabetes mellitus, with necrosis of bone 07/26/2019    Bilateral leg edema 01/16/2019    Acute respiratory failure with hypoxia 12/23/2018    CKD stage 4 due to type 2 diabetes mellitus 12/23/2018    Diabetic neuropathy associated with type 2 diabetes mellitus 12/23/2018    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, with long-term current use of insulin 12/18/2018    Chronic diastolic congestive heart failure 12/03/2018    Anginal equivalent 12/03/2018    Incontinence 06/14/2018    Metabolic bone disease 06/14/2018    Anemia of chronic renal failure, stage 4 (severe) 06/14/2018    Pulmonary nodule 10/28/2016    Normocytic anemia     Impaired mobility and ADLs 12/23/2015    Cervical radiculopathy 08/12/2015    Severe nonproliferative diabetic retinopathy of both eyes 06/03/2013    Hypertensive retinopathy of both eyes 06/03/2013    Retinal hole or tear, left 06/03/2013    Hypogonadism male 04/12/2013    ED (erectile dysfunction) 04/12/2013    Type 2 diabetes mellitus with stage 4 chronic kidney disease, with long-term current use  of insulin 01/10/2013    Hypertension associated with diabetes 01/10/2013    Hyperlipidemia associated with type 2 diabetes mellitus 01/10/2013        Plan:     #Neurological  -no acute issues    #Cardiovascular  Acute on Chronic HFpEF Exacerbation  -patient presenting with acute hypoxia  CXR with bilateral opacities    -EKG with LAD, incomplete LBBB similar to previous   -patient received 2 doses lasix 60 IV on admission  continuing diuresis, UOP 1.6L yesterday  -would recommend repeat echo  last documented EF > 60% with indeterminate diastolic dysfunction in 2018  -home coreg and hydralazine continued    HTN  -patient hypertensive systolic in 180's  consider added bp control  -current regimen includes hydralazine, coreg, amlodipine    CAD  -home ASA, statin, plavix, coreg continued    PVD   -s/p Atherectomy CFA and AT  -s/p Angioplasty FSA, AT, PT  -home ASA, statin, plavix continued    #Pulmonology  Acute on Chronic Hypoxic Respiratory Failure 2/2 CAP and HFpEF exacerbation   -patient presenting with O2 sat in the 70's requiring nonrebreather  ABG on presentation   -stepped up to the ICU for increasing oxygen requirements  currently on high flow at 30 FiO2 and 15L  -appears to have improved   -will continue to monitor    CAP  -patient presented with 1 week fever, chills, general malaise and new cough  CXR findings as above  procal 1.04  -patient initiated on vanc, zosyn, azithromycin  currently on vancomycin and ceftriaxone  recommend deescalating to ceftriazone, azithromycin for CAP coverage   -blood cx NGTD  Respiratory Cx normal howie  viral PCR pending  flu negative    #Renal  CKD IV  -patient with CR 3.6  appears at baseline  -no acute issues    #GI  -no acute issues    #ID  CAP  -recommendations as above    Diabetic Foot Ulcer  -patient with previous presentation 7/19, completed treatment with 6 weeks of vancomycin followed by 2 weeks of doxy  -no acute issues, continue wound care    -follows with podiatry outpatient    #Heme  Normocytic Anemia  -H/H 9/30 MCV 90   -recommend iron studies     #Endocrine  DM II with neuropathy   -patient with A1C 7/19 8.1  -home regimen includes 26u lantus qHS and 10u Humalog with sliding scale continued  -with episode of hypoglycemia  encourage po intake    PPx: SCD  Diet: Diabetic  Dispo: ICU for high flow and currently weaning, if tolerating nasal cannula without high flow okay to step down later today     Caitie Montgomery, DO  [facility]  [facility]

## 2019-10-25 NOTE — ANESTHESIA PROCEDURE NOTES
Peripheral IV Insertion    Diagnosis: I99.8 Other disorder of circulatory system    Patient location during procedure: ICU  Procedure start time: 10/25/2019 2:02 PM  Procedure end time: 10/25/2019 2:04 PM    Staffing  Authorizing Provider: Mervin Concepcion MD  Performing Provider: Mervin Concepcion MD    Anesthesiologist was present at the time of the procedure.    Preanesthetic Checklist  Completed: patient identified, risks and benefits discussed and monitors and equipment checkedPeripheral IV Insertion  Skin Prep: chlorhexidine gluconate  Local Infiltration: none  Orientation: right  Location: forearm  Catheter Size: 20 G  Catheter placement by Ultrasound guidance. Heme positive aspiration all ports.  Vessel Caliber: large, patent, compressibility normal  Vascular Doppler:  not done  Needle advanced into vessel with real time Ultrasound guidance.Insertion Attempts: 1  Assessment  Dressing: secured with tape and tegaderm  Patient: Tolerated well  Line flushed easily.

## 2019-10-25 NOTE — NURSING
0745-  Innocent on unit at patient's bedside. Informed her of BG 46 this am and PRN glucose tablets administered; ordered to hold scheduled Insulin 5 units this am.

## 2019-10-25 NOTE — PT/OT/SLP PROGRESS
Physical Therapy      Patient Name:  Domingo Castro   MRN:  [id number]    Patient not seen today secondary to Other (Comment)(per nurse pt just got back into bed, awaiting transfer out of unit to room). Will follow-up in pm as time permits .    Liyz Sood, PT

## 2019-10-25 NOTE — PLAN OF CARE
Pt transferred to unit via hospital bed w/RANJIT Pedraza from ICU.  Pt chart and meds sent w/pt.  Pt resting comfortably.  VSS.  Family at bedside.  Pt on continuous oxygen.  IV abx infusing.

## 2019-10-25 NOTE — NURSING
IV site to SHANNON leaking and unable to save. Attempted IV site x3; unsuccessful. Notified Dr. Dumont and consult placed for anesthesia. Spoke with Melvin with anesthesia and will be up shortly.

## 2019-10-25 NOTE — NURSING
Pt transported to 420A in bed on tele monitor. Pt stable and NAD noted, RT at side. Pt safety maintained. Wife and daughter at bedside with personal belongings. RANJIT Nuñez at bedside upon patient's arrival to unit.

## 2019-10-25 NOTE — SUBJECTIVE & OBJECTIVE
Interval History: awake and alert, appetite is still low, but feels much better, diuresing, continue IV Lasix.  Now on 15 L 35 %   De-escalate abx to ceftriaxone and Azithromycin  Blood cx NGTD  Viral culture pending    PT/OT, if stable possible step down top the floor today    Review of Systems   Constitutional: Negative for chills and fever.   Respiratory: Positive for cough. Negative for shortness of breath.    Cardiovascular: Negative for chest pain and leg swelling.   Gastrointestinal: Negative for abdominal pain and nausea.   Genitourinary: Negative for dysuria.   Musculoskeletal: Negative for neck pain.   Skin: Negative for rash and wound.   Neurological: Negative for syncope and weakness.   Psychiatric/Behavioral: Negative for agitation.     Objective:     Vital Signs (Most Recent):  Temp: 98.3 °F (36.8 °C) (10/25/19 0710)  Pulse: 60 (10/25/19 1000)  Resp: 13 (10/25/19 1000)  BP: (!) 141/63 (10/25/19 1000)  SpO2: (!) 90 % (10/25/19 1000) Vital Signs (24h Range):  Temp:  [98.3 °F (36.8 °C)-99.3 °F (37.4 °C)] 98.3 °F (36.8 °C)  Pulse:  [57-66] 60  Resp:  [13-33] 13  SpO2:  [90 %-97 %] 90 %  BP: (125-207)/(60-90) 141/63     Weight: 115.1 kg (253 lb 11.2 oz)  Body mass index is 31.71 kg/m².    Intake/Output Summary (Last 24 hours) at 10/25/2019 1056  Last data filed at 10/25/2019 0930  Gross per 24 hour   Intake 390 ml   Output 2025 ml   Net -1635 ml      Physical Exam   Constitutional: He is oriented to person, place, and time. He appears well-developed and well-nourished. No distress.   HENT:   Head: Normocephalic and atraumatic.   Neck: Neck supple.   Cardiovascular: Normal rate, regular rhythm, normal heart sounds and intact distal pulses.   Pulmonary/Chest: Effort normal. No respiratory distress. He has no wheezes.   Breath sounds diminished    Abdominal: Soft. Bowel sounds are normal. He exhibits no distension. There is no tenderness.   Musculoskeletal: Normal range of motion. He exhibits no edema or  tenderness.   Neurological: He is alert and oriented to person, place, and time.   Skin: Skin is warm and dry. Capillary refill takes less than 2 seconds. No erythema.   Psychiatric: He has a normal mood and affect.       Significant Labs:   ABGs:   No results for input(s): PH, PCO2, HCO3, POCSATURATED, BE, TOTALHB, COHB, METHB, O2HB, POCFIO2 in the last 48 hours.  Blood Culture:   No results for input(s): LABBLOO in the last 48 hours.  CBC:   Recent Labs   Lab 10/24/19  0924   WBC 8.67   HGB 8.3*   HCT 25.6*   *     CMP:   Recent Labs   Lab 10/24/19  0438 10/25/19  0526    143   K 3.6 3.7    109   CO2 25 24   * 51*   BUN 65* 64*   CREATININE 3.6* 3.4*   CALCIUM 9.5 9.7   ANIONGAP 9 10   EGFRNONAA 16* 17*     Coagulation:   Recent Labs   Lab 10/25/19  0527   INR 1.0     Lactic Acid:   No results for input(s): LACTATE in the last 48 hours.  TSH: No results for input(s): TSH in the last 4320 hours.  Urine Culture: No results for input(s): LABURIN in the last 48 hours.  Urine Studies: No results for input(s): COLORU, APPEARANCEUA, PHUR, SPECGRAV, PROTEINUA, GLUCUA, KETONESU, BILIRUBINUA, OCCULTUA, NITRITE, UROBILINOGEN, LEUKOCYTESUR, RBCUA, WBCUA, BACTERIA, SQUAMEPITHEL, HYALINECASTS in the last 48 hours.    Invalid input(s): LIOR    Significant Imaging: I have reviewed all pertinent imaging results/findings within the past 24 hours.

## 2019-10-25 NOTE — PROGRESS NOTES
[facility] Hospital Medicine  Progress Note    Patient Name: Domingo Castro  MRN: [id number]  Patient Class: IP- Inpatient   Admission Date: 10/21/2019  Length of Stay: 3 days  Attending Physician: Christina Tang*  Primary Care Provider: Griselda Nugent MD        Subjective:     Principal Problem:Acute respiratory failure with hypoxia        HPI:  Domingo Castro,73 yo male, with HTN, HLD, CAD, chronic diastolic dysfunction, Type 2 Diabetes Mellitus with diabetic neuropathy, CKD stage 4, PAD, chronic diabetic ulcer of left foot and left toe osteomyelitis was advised to report to [facility] by home health nurse for evaluation of oxygen saturation 76%. Patient reports progressively worsening SOB over the past week associated with productive cough and chills. He denies chest pain.   Pt admitted 7/25-8/2/19 for management of Diabetic ulcer of the left foot with osteomyelitis.  Wound culture grew MRSA. He went for LE cath procedure on 7/30. Revascularization procedure was completed on 8/1. Pt was discharged 8/2 with six weeks planned IV vancomycin therapy. Per patient report he most recently on abx for pna and his foot three weeks ago.   Initial labs remarkable for H/H 9/29, BUN/Cr 60/3.3, glucose 434, , glucose 434. CXR dense consolidations to right upper lobe/mid lung field in addition to possible component fluid overload. Influenza negative. Patient hypertensive and tachypneic in ED. He received furosemide 60 mg x 2, cefepime, duonebs and 10 units regular insulin. Patient with progressively worsening dyspnea despite supplemental O2 prior to transfer to [place]. He was placed on no rebreather with noted improved. ABG unremarkable. Patient admitted to [facility] for further care.     Overview/Hospital Course:  Admitted with Pneumonia, requiring high flow oxygen per NC. Weaning O2 very slowing. Blood cx NGTD  De-escalate abx from Vanc/Cefepime/Azithromycin  to Ceftrixone and Azithro. Cough meds, CPT and PT/OT  10/25 diuresing, continue oxygen weaning down to 15 L on 35 % , tolerating therapy. Step down to floor today     Interval History: awake and alert, appetite is still low, but feels much better, diuresing, continue IV Lasix.  Now on 15 L 35 %   De-escalate abx to ceftriaxone and Azithromycin  Blood cx NGTD  Viral culture pending    PT/OT, if stable possible step down top the floor today    Review of Systems   Constitutional: Negative for chills and fever.   Respiratory: Positive for cough. Negative for shortness of breath.    Cardiovascular: Negative for chest pain and leg swelling.   Gastrointestinal: Negative for abdominal pain and nausea.   Genitourinary: Negative for dysuria.   Musculoskeletal: Negative for neck pain.   Skin: Negative for rash and wound.   Neurological: Negative for syncope and weakness.   Psychiatric/Behavioral: Negative for agitation.     Objective:     Vital Signs (Most Recent):  Temp: 98.3 °F (36.8 °C) (10/25/19 0710)  Pulse: 60 (10/25/19 1000)  Resp: 13 (10/25/19 1000)  BP: (!) 141/63 (10/25/19 1000)  SpO2: (!) 90 % (10/25/19 1000) Vital Signs (24h Range):  Temp:  [98.3 °F (36.8 °C)-99.3 °F (37.4 °C)] 98.3 °F (36.8 °C)  Pulse:  [57-66] 60  Resp:  [13-33] 13  SpO2:  [90 %-97 %] 90 %  BP: (125-207)/(60-90) 141/63     Weight: 115.1 kg (253 lb 11.2 oz)  Body mass index is 31.71 kg/m².    Intake/Output Summary (Last 24 hours) at 10/25/2019 1056  Last data filed at 10/25/2019 0930  Gross per 24 hour   Intake 390 ml   Output 2025 ml   Net -1635 ml      Physical Exam   Constitutional: He is oriented to person, place, and time. He appears well-developed and well-nourished. No distress.   HENT:   Head: Normocephalic and atraumatic.   Neck: Neck supple.   Cardiovascular: Normal rate, regular rhythm, normal heart sounds and intact distal pulses.   Pulmonary/Chest: Effort normal. No respiratory distress. He has no wheezes.   Breath sounds diminished     Abdominal: Soft. Bowel sounds are normal. He exhibits no distension. There is no tenderness.   Musculoskeletal: Normal range of motion. He exhibits no edema or tenderness.   Neurological: He is alert and oriented to person, place, and time.   Skin: Skin is warm and dry. Capillary refill takes less than 2 seconds. No erythema.   Psychiatric: He has a normal mood and affect.       Significant Labs:   ABGs:   No results for input(s): PH, PCO2, HCO3, POCSATURATED, BE, TOTALHB, COHB, METHB, O2HB, POCFIO2 in the last 48 hours.  Blood Culture:   No results for input(s): LABBLOO in the last 48 hours.  CBC:   Recent Labs   Lab 10/24/19  0924   WBC 8.67   HGB 8.3*   HCT 25.6*   *     CMP:   Recent Labs   Lab 10/24/19  0438 10/25/19  0526    143   K 3.6 3.7    109   CO2 25 24   * 51*   BUN 65* 64*   CREATININE 3.6* 3.4*   CALCIUM 9.5 9.7   ANIONGAP 9 10   EGFRNONAA 16* 17*     Coagulation:   Recent Labs   Lab 10/25/19  0527   INR 1.0     Lactic Acid:   No results for input(s): LACTATE in the last 48 hours.  TSH: No results for input(s): TSH in the last 4320 hours.  Urine Culture: No results for input(s): LABURIN in the last 48 hours.  Urine Studies: No results for input(s): COLORU, APPEARANCEUA, PHUR, SPECGRAV, PROTEINUA, GLUCUA, KETONESU, BILIRUBINUA, OCCULTUA, NITRITE, UROBILINOGEN, LEUKOCYTESUR, RBCUA, WBCUA, BACTERIA, SQUAMEPITHEL, HYALINECASTS in the last 48 hours.    Invalid input(s): WRIGHTSUR    Significant Imaging: I have reviewed all pertinent imaging results/findings within the past 24 hours.      Assessment/Plan:      * Acute respiratory failure with hypoxia  Pulmonary edema   Acute on chronic congestive heart failure  Right upper lobe pneumonia  Fever  CXR with dense consolidations right upper lobe/mid lung field in addition to possible component fluid overload.   Sputum cx normal howie  blood cx NGTD  Viral cx pending  - continue vanco, azithromycin and cefepime- de-escalate to Azith  and rocephin  - wean to nasal cannula as tolerated               Diabetic ulcer of left foot  Hx of chronic osteo         Fever  Suspect 2/2 pneumonia. Pt also has right-sided medical infusion port and chronic osteo of left foot     Follow cultures   Consult wound care   Continue  Cefepime, vanc and zithromax     Uncontrolled type 2 diabetes mellitus with hyperglycemia   accucheck AC&HS,  Continue levemir and aspart  Low dose SSI,   Diabetic diet       CKD stage 4 due to type 2 diabetes mellitus  Stable  Avoid nephrotoxic meds  renal dose meds   strict intact and output     Anemia of chronic renal failure, stage 4 (severe)  H/H stable monitor       Hypertension associated with diabetes  Hyperlipidemia   PAD     Continue amlodipine and carvedilol   Continue  ASA, plavix and statin   Monitor telemetry       VTE Risk Mitigation (From admission, onward)         Ordered     heparin (porcine) injection 5,000 Units  Every 12 hours      10/22/19 0633     IP VTE HIGH RISK PATIENT  Once      10/22/19 0138     Place sequential compression device  Until discontinued      10/22/19 0138                Critical care time spent on the evaluation and treatment of severe organ dysfunction, review of pertinent labs and imaging studies, discussions with consulting providers and discussions with patient/family: 35 minutes.      Christina Tang MD  Department of Hospital Medicine   [facility]

## 2019-10-25 NOTE — PLAN OF CARE
Pt remains free from falls and injuries. 15L 40% comfort flow and condom cath remain. Blood glucose monitored, supplemental insulin given as ordered. Pt repositioned frequently. Plan of care discussed with pt. VSS, no acute issues overnight.

## 2019-10-25 NOTE — PT/OT/SLP PROGRESS
Occupational Therapy   Treatment    Name: Domingo Castro  MRN: [id number]  Admitting Diagnosis:  Acute respiratory failure with hypoxia       Recommendations:     Discharge Recommendations: home  Discharge Equipment Recommendations:  (TBD)  Barriers to discharge:  None    Assessment:     Domingo Castro is a 74 y.o. male with a medical diagnosis of Acute respiratory failure with hypoxia.  He presents with good activity tolerace for light UE AROM ex and OOB transfers.  No distress. O2 sats above 90% on HFNC . Performance deficits affecting function are weakness, impaired self care skills, impaired functional mobilty, impaired endurance, gait instability, impaired cardiopulmonary response to activity, decreased safety awareness, impaired balance.     Rehab Prognosis:  Good; patient would benefit from acute skilled OT services to address these deficits and reach maximum level of function.       Plan:     Patient to be seen 5 x/week to address the above listed problems via self-care/home management, therapeutic exercises, therapeutic activities  · Plan of Care Expires: 11/24/19  · Plan of Care Reviewed with: patient    Subjective     Pain/Comfort:  · Pain Rating 1: 0/10  · Pain Rating Post-Intervention 1: 0/10    Objective:     Communicated with: nurse prior to session.  Patient found HOB elevated with telemetry, pulse ox (continuous), oxygen, Condom Catheter, blood pressure cuff(HFNC 15 L @35%) upon OT entry to room.    General Precautions: Standard, fall, respiratory   Orthopedic Precautions:N/A   Braces: N/A     Occupational Performance:     Bed Mobility:    · Patient completed Scooting/Bridging with stand by assistance  · Patient completed Supine to Sit with stand by assistance and with side rail     Functional Mobility/Transfers:  · Patient completed Sit <> Stand Transfer with contact guard assistance  with  hand-held assist   · Patient completed Bed <> Chair Transfer using Step Transfer technique with contact guard  assistance with hand-held assist      [facility] 6 Click ADL:      Treatment & Education:  -Purpose of OT visit explained to pt and pt agreeable to OT.  -Pt.peformed 10 reps BUE AROM ex all major joints and planes with emphasis on exhale on exertion having pt count number of reps for each exercises as he lifted arms for breathing rhythm with Supervision. No SOB, O2 saturation remained above 90% on HFNC 15L @35%.  Pt left up in chair, nurse aware.     Patient left up in chair with all lines intact, call button in reach and nurse notifiedEducation:      GOALS:   Multidisciplinary Problems     Occupational Therapy Goals        Problem: Occupational Therapy Goal    Goal Priority Disciplines Outcome Interventions   Occupational Therapy Goal     OT, PT/OT Ongoing, Progressing    Description:  Goals to be met by: 11/24     Patient will increase functional independence with ADLs by performing:    UE Dressing with Modified [place].  LE Dressing with Modified [place].  Grooming while standing with Modified [place].  Toileting from toilet with Modified [place] for hygiene and clothing management.   Toilet transfer to toilet with Modified [place].  Upper extremity exercise program x10 reps per handout, with independence.                      Time Tracking:     OT Date of Treatment: 10/25/19  OT Start Time: 1149  OT Stop Time: 1215  OT Total Time (min): 26 min    Billable Minutes:Therapeutic Activity 10  Therapeutic Exercise 16  Total Time 26    Mei Alfonso OT  10/25/2019

## 2019-10-25 NOTE — PLAN OF CARE
Problem: Occupational Therapy Goal  Goal: Occupational Therapy Goal  Description  Goals to be met by: 11/24     Patient will increase functional independence with ADLs by performing:    UE Dressing with Modified [place].  LE Dressing with Modified [place].  Grooming while standing with Modified [place].  Toileting from toilet with Modified [place] for hygiene and clothing management.   Toilet transfer to toilet with Modified [place].  Upper extremity exercise program x10 reps per handout, with independence.     Outcome: Ongoing, Progressing    Continue with OT POC.

## 2019-10-26 LAB
ANION GAP SERPL CALC-SCNC: 13 MMOL/L (ref 8–16)
BASOPHILS # BLD AUTO: 0.04 K/UL (ref 0–0.2)
BASOPHILS NFR BLD: 0.9 % (ref 0–1.9)
BUN SERPL-MCNC: 70 MG/DL (ref 8–23)
CALCIUM SERPL-MCNC: 9.3 MG/DL (ref 8.7–10.5)
CHLORIDE SERPL-SCNC: 107 MMOL/L (ref 95–110)
CO2 SERPL-SCNC: 20 MMOL/L (ref 23–29)
CREAT SERPL-MCNC: 3.3 MG/DL (ref 0.5–1.4)
DIFFERENTIAL METHOD: ABNORMAL
EOSINOPHIL # BLD AUTO: 0.1 K/UL (ref 0–0.5)
EOSINOPHIL NFR BLD: 2.8 % (ref 0–8)
ERYTHROCYTE [DISTWIDTH] IN BLOOD BY AUTOMATED COUNT: 14.9 % (ref 11.5–14.5)
EST. GFR  (AFRICAN AMERICAN): 20 ML/MIN/1.73 M^2
EST. GFR  (NON AFRICAN AMERICAN): 17 ML/MIN/1.73 M^2
GLUCOSE SERPL-MCNC: 192 MG/DL (ref 70–110)
HCT VFR BLD AUTO: 24 % (ref 40–54)
HGB BLD-MCNC: 7.5 G/DL (ref 14–18)
INR PPP: 1 (ref 0.8–1.2)
LYMPHOCYTES # BLD AUTO: 0.8 K/UL (ref 1–4.8)
LYMPHOCYTES NFR BLD: 16.8 % (ref 18–48)
MAGNESIUM SERPL-MCNC: 2.3 MG/DL (ref 1.6–2.6)
MCH RBC QN AUTO: 27.4 PG (ref 27–31)
MCHC RBC AUTO-ENTMCNC: 31.3 G/DL (ref 32–36)
MCV RBC AUTO: 88 FL (ref 82–98)
MONOCYTES # BLD AUTO: 0.3 K/UL (ref 0.3–1)
MONOCYTES NFR BLD: 5.7 % (ref 4–15)
NEUTROPHILS # BLD AUTO: 3.3 K/UL (ref 1.8–7.7)
NEUTROPHILS NFR BLD: 73.8 % (ref 38–73)
PHOSPHATE SERPL-MCNC: 3.2 MG/DL (ref 2.7–4.5)
PLATELET # BLD AUTO: 152 K/UL (ref 150–350)
PLATELET BLD QL SMEAR: ABNORMAL
PMV BLD AUTO: 12 FL (ref 9.2–12.9)
POCT GLUCOSE: 189 MG/DL (ref 70–110)
POCT GLUCOSE: 244 MG/DL (ref 70–110)
POCT GLUCOSE: 271 MG/DL (ref 70–110)
POTASSIUM SERPL-SCNC: 4.5 MMOL/L (ref 3.5–5.1)
PROTHROMBIN TIME: 10.5 SEC (ref 9–12.5)
RBC # BLD AUTO: 2.74 M/UL (ref 4.6–6.2)
SODIUM SERPL-SCNC: 140 MMOL/L (ref 136–145)
WBC # BLD AUTO: 4.59 K/UL (ref 3.9–12.7)

## 2019-10-26 PROCEDURE — 27100171 HC OXYGEN HIGH FLOW UP TO 24 HOURS

## 2019-10-26 PROCEDURE — 94664 DEMO&/EVAL PT USE INHALER: CPT

## 2019-10-26 PROCEDURE — 94761 N-INVAS EAR/PLS OXIMETRY MLT: CPT

## 2019-10-26 PROCEDURE — 36415 COLL VENOUS BLD VENIPUNCTURE: CPT

## 2019-10-26 PROCEDURE — 85025 COMPLETE CBC W/AUTO DIFF WBC: CPT

## 2019-10-26 PROCEDURE — 83735 ASSAY OF MAGNESIUM: CPT

## 2019-10-26 PROCEDURE — 11000001 HC ACUTE MED/SURG PRIVATE ROOM

## 2019-10-26 PROCEDURE — 99900035 HC TECH TIME PER 15 MIN (STAT)

## 2019-10-26 PROCEDURE — 25000003 PHARM REV CODE 250: Performed by: NURSE PRACTITIONER

## 2019-10-26 PROCEDURE — 63600175 PHARM REV CODE 636 W HCPCS: Performed by: FAMILY MEDICINE

## 2019-10-26 PROCEDURE — 80048 BASIC METABOLIC PNL TOTAL CA: CPT

## 2019-10-26 PROCEDURE — 97530 THERAPEUTIC ACTIVITIES: CPT

## 2019-10-26 PROCEDURE — 97110 THERAPEUTIC EXERCISES: CPT

## 2019-10-26 PROCEDURE — 27000646 HC AEROBIKA DEVICE

## 2019-10-26 PROCEDURE — 27100092 HC HIGH FLOW DELIVERY CANNULA

## 2019-10-26 PROCEDURE — 63600175 PHARM REV CODE 636 W HCPCS: Performed by: NURSE PRACTITIONER

## 2019-10-26 PROCEDURE — 85610 PROTHROMBIN TIME: CPT

## 2019-10-26 PROCEDURE — 97116 GAIT TRAINING THERAPY: CPT

## 2019-10-26 PROCEDURE — 63600175 PHARM REV CODE 636 W HCPCS: Performed by: STUDENT IN AN ORGANIZED HEALTH CARE EDUCATION/TRAINING PROGRAM

## 2019-10-26 PROCEDURE — 63600175 PHARM REV CODE 636 W HCPCS: Performed by: INTERNAL MEDICINE

## 2019-10-26 PROCEDURE — 84100 ASSAY OF PHOSPHORUS: CPT

## 2019-10-26 PROCEDURE — 25000003 PHARM REV CODE 250: Performed by: FAMILY MEDICINE

## 2019-10-26 RX ADMIN — AZITHROMYCIN MONOHYDRATE 500 MG: 500 INJECTION, POWDER, LYOPHILIZED, FOR SOLUTION INTRAVENOUS at 02:10

## 2019-10-26 RX ADMIN — HYDRALAZINE HYDROCHLORIDE 50 MG: 25 TABLET, FILM COATED ORAL at 09:10

## 2019-10-26 RX ADMIN — INSULIN ASPART 3 UNITS: 100 INJECTION, SOLUTION INTRAVENOUS; SUBCUTANEOUS at 11:10

## 2019-10-26 RX ADMIN — GUAIFENESIN AND CODEINE PHOSPHATE 5 ML: 100; 10 SOLUTION ORAL at 05:10

## 2019-10-26 RX ADMIN — CARVEDILOL 25 MG: 25 TABLET, FILM COATED ORAL at 09:10

## 2019-10-26 RX ADMIN — HEPARIN SODIUM 5000 UNITS: 5000 INJECTION, SOLUTION INTRAVENOUS; SUBCUTANEOUS at 09:10

## 2019-10-26 RX ADMIN — FUROSEMIDE 80 MG: 10 INJECTION, SOLUTION INTRAVENOUS at 09:10

## 2019-10-26 RX ADMIN — FUROSEMIDE 80 MG: 10 INJECTION, SOLUTION INTRAVENOUS at 05:10

## 2019-10-26 RX ADMIN — CEFTRIAXONE 1 G: 1 INJECTION, SOLUTION INTRAVENOUS at 05:10

## 2019-10-26 RX ADMIN — ROSUVASTATIN CALCIUM 20 MG: 10 TABLET, FILM COATED ORAL at 09:10

## 2019-10-26 RX ADMIN — ASPIRIN 81 MG: 81 TABLET, COATED ORAL at 09:10

## 2019-10-26 RX ADMIN — GABAPENTIN 100 MG: 100 CAPSULE ORAL at 09:10

## 2019-10-26 RX ADMIN — GUAIFENESIN AND CODEINE PHOSPHATE 5 ML: 100; 10 SOLUTION ORAL at 11:10

## 2019-10-26 RX ADMIN — POTASSIUM CHLORIDE 40 MEQ: 20 TABLET, EXTENDED RELEASE ORAL at 09:10

## 2019-10-26 RX ADMIN — CARVEDILOL 25 MG: 25 TABLET, FILM COATED ORAL at 05:10

## 2019-10-26 RX ADMIN — INSULIN ASPART 1 UNITS: 100 INJECTION, SOLUTION INTRAVENOUS; SUBCUTANEOUS at 09:10

## 2019-10-26 RX ADMIN — INSULIN ASPART 3 UNITS: 100 INJECTION, SOLUTION INTRAVENOUS; SUBCUTANEOUS at 05:10

## 2019-10-26 RX ADMIN — INSULIN DETEMIR 26 UNITS: 100 INJECTION, SOLUTION SUBCUTANEOUS at 09:10

## 2019-10-26 RX ADMIN — AMLODIPINE BESYLATE 10 MG: 5 TABLET ORAL at 09:10

## 2019-10-26 RX ADMIN — CLOPIDOGREL BISULFATE 75 MG: 75 TABLET ORAL at 09:10

## 2019-10-26 NOTE — SUBJECTIVE & OBJECTIVE
Interval History: awake and alert, appetite is still low,   , diuresing, continue IV Lasix.  Now on 15 L 35 %   De-escalate abx to ceftriaxone and Azithromycin  Blood cx NGTD  Viral culture pending    PT/OT, if stable possible step down top the floor 10/25    Review of Systems   Constitutional: Negative for chills and fever.   Respiratory: Positive for cough. Negative for shortness of breath.    Cardiovascular: Negative for chest pain and leg swelling.   Gastrointestinal: Negative for abdominal pain and nausea.   Genitourinary: Negative for dysuria.   Musculoskeletal: Negative for neck pain.   Skin: Negative for rash and wound.   Neurological: Negative for syncope and weakness.   Psychiatric/Behavioral: Negative for agitation.     Objective:     Vital Signs (Most Recent):  Temp: 98.2 °F (36.8 °C) (10/26/19 1201)  Pulse: 75 (10/26/19 1201)  Resp: 19 (10/26/19 0813)  BP: (!) 154/75 (10/26/19 1201)  SpO2: 98 % (10/26/19 1201) Vital Signs (24h Range):  Temp:  [97.1 °F (36.2 °C)-99.6 °F (37.6 °C)] 98.2 °F (36.8 °C)  Pulse:  [59-75] 75  Resp:  [18-20] 19  SpO2:  [90 %-98 %] 98 %  BP: (154-170)/(67-75) 154/75     Weight: 115.1 kg (253 lb 11.2 oz)  Body mass index is 31.71 kg/m².    Intake/Output Summary (Last 24 hours) at 10/26/2019 1607  Last data filed at 10/26/2019 1434  Gross per 24 hour   Intake 1135 ml   Output 1650 ml   Net -515 ml      Physical Exam   Constitutional: He is oriented to person, place, and time. He appears well-developed and well-nourished. No distress.   HENT:   Head: Normocephalic and atraumatic.   Neck: Neck supple.   Cardiovascular: Normal rate, regular rhythm, normal heart sounds and intact distal pulses.   Pulmonary/Chest: Effort normal. No respiratory distress. He has no wheezes.   Breath sounds diminished    Abdominal: Soft. Bowel sounds are normal. He exhibits no distension. There is no tenderness.   Musculoskeletal: Normal range of motion. He exhibits no edema or tenderness.   Neurological:  He is alert and oriented to person, place, and time.   Skin: Skin is warm and dry. Capillary refill takes less than 2 seconds. No erythema.   Psychiatric: He has a normal mood and affect.       Significant Labs:   ABGs:   No results for input(s): PH, PCO2, HCO3, POCSATURATED, BE, TOTALHB, COHB, METHB, O2HB, POCFIO2 in the last 48 hours.  Blood Culture:   No results for input(s): LABBLOO in the last 48 hours.  CBC:   Recent Labs   Lab 10/26/19  0601   WBC 4.59   HGB 7.5*   HCT 24.0*        CMP:   Recent Labs   Lab 10/25/19  0526 10/26/19  0601    140   K 3.7 4.5    107   CO2 24 20*   GLU 51* 192*   BUN 64* 70*   CREATININE 3.4* 3.3*   CALCIUM 9.7 9.3   ANIONGAP 10 13   EGFRNONAA 17* 17*     Coagulation:   Recent Labs   Lab 10/26/19  0601   INR 1.0     Lactic Acid:   No results for input(s): LACTATE in the last 48 hours.  TSH: No results for input(s): TSH in the last 4320 hours.  Urine Culture: No results for input(s): LABURIN in the last 48 hours.  Urine Studies: No results for input(s): COLORU, APPEARANCEUA, PHUR, SPECGRAV, PROTEINUA, GLUCUA, KETONESU, BILIRUBINUA, OCCULTUA, NITRITE, UROBILINOGEN, LEUKOCYTESUR, RBCUA, WBCUA, BACTERIA, SQUAMEPITHEL, HYALINECASTS in the last 48 hours.    Invalid input(s): LIOR    Significant Imaging: I have reviewed all pertinent imaging results/findings within the past 24 hours.

## 2019-10-26 NOTE — PT/OT/SLP PROGRESS
Physical Therapy Treatment    Patient Name:  Domingo Castro   MRN:  [id number]    Recommendations:     Discharge Recommendations:  home   Discharge Equipment Recommendations: none   Barriers to discharge: None    Assessment:     Domingo Castro is a 74 y.o. male admitted with a medical diagnosis of Acute respiratory failure with hypoxia.  He presents with the following impairments/functional limitations:  weakness, impaired endurance, impaired self care skills, impaired functional mobilty, gait instability, impaired balance, impaired cardiopulmonary response to activity, decreased lower extremity function.  Pt demonstrated an increase in ambulation distance today, and would continue to benefit from P.T. To address impairments listed above.    Rehab Prognosis: Fair+; patient would benefit from acute skilled PT services to address these deficits and reach maximum level of function.    Recent Surgery: * No surgery found *      Plan:     During this hospitalization, patient to be seen 5 x/week to address the identified rehab impairments via gait training, therapeutic activities, therapeutic exercises, neuromuscular re-education and progress toward the following goals:    · Plan of Care Expires:  11/24/19    Subjective       Patient/Family Comments/goals: Pt agreed to tx  Pain/Comfort:  · Pain Rating 1: 0/10  · Pain Rating Post-Intervention 1: 0/10      Objective:     Communicated with RN (Grady) prior to session.  Patient found supine with pressure relief boots, oxygen, peripheral IV upon PT entry to room.     General Precautions: Standard, fall   Orthopedic Precautions:N/A   Braces:       Functional Mobility:  · Bed Mobility:     · Rolling Left:  supervision  · Scooting: stand by assistance and to EOB  · Supine to Sit: stand by assistance and HOB elevated  · Sit to Supine: stand by assistance  · Transfers:     · Sit to Stand:  contact guard assistance with rolling walker x 2 reps  · Gait: 80ft with Rw, [place] shoe on L,  and CGA with assist for IV pole and HF O2 line.  Pt ambulated in room 4 bouts of 20ft without rest break secondary to limited by HF O2.  No LOB with fairly steady gait.  No SOB. slow eddy  · Balance: sitting good, standing fair, gait fair      AM-PAC 6 CLICK MOBILITY  Turning over in bed (including adjusting bedclothes, sheets and blankets)?: 4  Sitting down on and standing up from a chair with arms (e.g., wheelchair, bedside commode, etc.): 3  Moving from lying on back to sitting on the side of the bed?: 3  Moving to and from a bed to a chair (including a wheelchair)?: 3  Need to walk in hospital room?: 3  Climbing 3-5 steps with a railing?: 3  Basic Mobility Total Score: 19       Therapeutic Activities and Exercises:   Seated BLE therex: AP, LAQ, hip flexion/ABD/ADd with pillow, and glut sets x 15 reps.  Static standing with Rw and CGA while pt using urinal.    Patient left supine with all lines intact, call button in reach, bed alarm on and RN notified..    GOALS:   Multidisciplinary Problems     Physical Therapy Goals        Problem: Physical Therapy Goal    Goal Priority Disciplines Outcome Goal Variances Interventions   Physical Therapy Goal     PT, PT/OT Ongoing, Progressing     Description:  Goals to be met by: 2019     Patient will increase functional independence with mobility by performin. Bed to chair transfer with Modified [place] using no AD  2. Gait  x 250 feet with Modified [place] using no AD c/ L surgical shoe and heel WB.   3. Lower extremity exercise program x15 reps per handout, with independence                      Time Tracking:     PT Received On: 10/26/19  PT Start Time: 1440     PT Stop Time: 1519  PT Total Time (min): 39 min     Billable Minutes: Gait Training 13 and Therapeutic Exercise 11 and Therapeutic Activity 15    Treatment Type: Treatment  PT/PTA: PTA     PTA Visit Number: 1     Leticia Lobo PTA  10/26/2019

## 2019-10-26 NOTE — PLAN OF CARE
Plan of care reviewed patient verbalized understanding.medications administered. Blood glucose monitoring per sliding scale. IV antibiotics infused. Left foot dressing clean, dry, and intact. Cardiac monitoring NSR Contact precautions maintained. Bed in the lowest position, call bell within reach, bed alarm on.

## 2019-10-26 NOTE — PLAN OF CARE
Problem: Physical Therapy Goal  Goal: Physical Therapy Goal  Description  Goals to be met by: 2019     Patient will increase functional independence with mobility by performin. Bed to chair transfer with Modified [place] using no AD  2. Gait  x 250 feet with Modified [place] using no AD c/ L surgical shoe and heel WB.   3. Lower extremity exercise program x15 reps per handout, with independence     Outcome: Ongoing, Progressing   Continue working toward goals.

## 2019-10-26 NOTE — PLAN OF CARE
Plan of care reviewed with patient, understanding verbalized. Pt remains SR on tele. No complaints of pain or acute distress noted. instructed to call for any assistance, understanding verbalized. BG monitored and SSI administered per orders. Continious o2 maintained. Bed alarm on, call light in reach, fall precautions in place. Will continue to monitor.

## 2019-10-26 NOTE — PROGRESS NOTES
[facility]  Progress Note    Patient Name: Domingo Castro  MRN: [id number]  Patient Class: IP- Inpatient   Admission Date: 10/21/2019  Length of Stay: 4 days  Attending Physician: Christina Tang*  Primary Care Provider: Griselda Nugent MD        Subjective:     Principal Problem:Acute respiratory failure with hypoxia        HPI:  Domingo Castro,75 yo male, with HTN, HLD, CAD, chronic diastolic dysfunction, Type 2 Diabetes Mellitus with diabetic neuropathy, CKD stage 4, PAD, chronic diabetic ulcer of left foot and left toe osteomyelitis was advised to report to [facility] by home health nurse for evaluation of oxygen saturation 76%. Patient reports progressively worsening SOB over the past week associated with productive cough and chills. He denies chest pain.   Pt admitted 7/25-8/2/19 for management of Diabetic ulcer of the left foot with osteomyelitis.  Wound culture grew MRSA. He went for LE cath procedure on 7/30. Revascularization procedure was completed on 8/1. Pt was discharged 8/2 with six weeks planned IV vancomycin therapy. Per patient report he most recently on abx for pna and his foot three weeks ago.   Initial labs remarkable for H/H 9/29, BUN/Cr 60/3.3, glucose 434, , glucose 434. CXR dense consolidations to right upper lobe/mid lung field in addition to possible component fluid overload. Influenza negative. Patient hypertensive and tachypneic in ED. He received furosemide 60 mg x 2, cefepime, duonebs and 10 units regular insulin. Patient with progressively worsening dyspnea despite supplemental O2 prior to transfer to [place]. He was placed on no rebreather with noted improved. ABG unremarkable. Patient admitted to [facility] for further care.     Overview/Hospital Course:  Admitted with Pneumonia, requiring high flow oxygen per NC. Weaning O2 very slowing. Blood cx NGTD  De-escalate abx from Vanc/Cefepime/Azithromycin  to Ceftrixone and Azithro. Cough meds, CPT and PT/OT  10/25 diuresing, continue oxygen weaning down to 15 L on 35 % , tolerating therapy. Step down to floor today     Interval History: awake and alert, appetite is still low,   , diuresing, continue IV Lasix.  Now on 15 L 35 %   De-escalate abx to ceftriaxone and Azithromycin  Blood cx NGTD  Viral culture pending    PT/OT, if stable possible step down top the floor 10/25    Review of Systems   Constitutional: Negative for chills and fever.   Respiratory: Positive for cough. Negative for shortness of breath.    Cardiovascular: Negative for chest pain and leg swelling.   Gastrointestinal: Negative for abdominal pain and nausea.   Genitourinary: Negative for dysuria.   Musculoskeletal: Negative for neck pain.   Skin: Negative for rash and wound.   Neurological: Negative for syncope and weakness.   Psychiatric/Behavioral: Negative for agitation.     Objective:     Vital Signs (Most Recent):  Temp: 98.2 °F (36.8 °C) (10/26/19 1201)  Pulse: 75 (10/26/19 1201)  Resp: 19 (10/26/19 0813)  BP: (!) 154/75 (10/26/19 1201)  SpO2: 98 % (10/26/19 1201) Vital Signs (24h Range):  Temp:  [97.1 °F (36.2 °C)-99.6 °F (37.6 °C)] 98.2 °F (36.8 °C)  Pulse:  [59-75] 75  Resp:  [18-20] 19  SpO2:  [90 %-98 %] 98 %  BP: (154-170)/(67-75) 154/75     Weight: 115.1 kg (253 lb 11.2 oz)  Body mass index is 31.71 kg/m².    Intake/Output Summary (Last 24 hours) at 10/26/2019 1607  Last data filed at 10/26/2019 1434  Gross per 24 hour   Intake 1135 ml   Output 1650 ml   Net -515 ml      Physical Exam   Constitutional: He is oriented to person, place, and time. He appears well-developed and well-nourished. No distress.   HENT:   Head: Normocephalic and atraumatic.   Neck: Neck supple.   Cardiovascular: Normal rate, regular rhythm, normal heart sounds and intact distal pulses.   Pulmonary/Chest: Effort normal. No respiratory distress. He has no wheezes.   Breath sounds diminished    Abdominal: Soft.  Bowel sounds are normal. He exhibits no distension. There is no tenderness.   Musculoskeletal: Normal range of motion. He exhibits no edema or tenderness.   Neurological: He is alert and oriented to person, place, and time.   Skin: Skin is warm and dry. Capillary refill takes less than 2 seconds. No erythema.   Psychiatric: He has a normal mood and affect.       Significant Labs:   ABGs:   No results for input(s): PH, PCO2, HCO3, POCSATURATED, BE, TOTALHB, COHB, METHB, O2HB, POCFIO2 in the last 48 hours.  Blood Culture:   No results for input(s): LABBLOO in the last 48 hours.  CBC:   Recent Labs   Lab 10/26/19  0601   WBC 4.59   HGB 7.5*   HCT 24.0*        CMP:   Recent Labs   Lab 10/25/19  0526 10/26/19  0601    140   K 3.7 4.5    107   CO2 24 20*   GLU 51* 192*   BUN 64* 70*   CREATININE 3.4* 3.3*   CALCIUM 9.7 9.3   ANIONGAP 10 13   EGFRNONAA 17* 17*     Coagulation:   Recent Labs   Lab 10/26/19  0601   INR 1.0     Lactic Acid:   No results for input(s): LACTATE in the last 48 hours.  TSH: No results for input(s): TSH in the last 4320 hours.  Urine Culture: No results for input(s): LABURIN in the last 48 hours.  Urine Studies: No results for input(s): COLORU, APPEARANCEUA, PHUR, SPECGRAV, PROTEINUA, GLUCUA, KETONESU, BILIRUBINUA, OCCULTUA, NITRITE, UROBILINOGEN, LEUKOCYTESUR, RBCUA, WBCUA, BACTERIA, SQUAMEPITHEL, HYALINECASTS in the last 48 hours.    Invalid input(s): WRIGHTSUR    Significant Imaging: I have reviewed all pertinent imaging results/findings within the past 24 hours.      Assessment/Plan:      * Acute respiratory failure with hypoxia  Pulmonary edema   Acute on chronic congestive heart failure  Right upper lobe pneumonia  Fever  CXR with dense consolidations right upper lobe/mid lung field in addition to possible component fluid overload.   Sputum cx normal howie  blood cx NGTD  Viral cx pending  - continue vanco, azithromycin and cefepime- de-escalate to Azith and rocephin  -  wean to nasal cannula as tolerated               Diabetic ulcer of left foot  Hx of chronic osteo         Fever  Suspect 2/2 pneumonia. Pt also has right-sided medical infusion port and chronic osteo of left foot     Follow cultures   Consult wound care   Continue  Cefepime, vanc and zithromax     Uncontrolled type 2 diabetes mellitus with hyperglycemia   accucheck AC&HS,  Continue levemir and aspart  Low dose SSI,   Diabetic diet       CKD stage 4 due to type 2 diabetes mellitus  Stable  Avoid nephrotoxic meds  renal dose meds   strict intact and output     Anemia of chronic renal failure, stage 4 (severe)  H/H stable monitor       Hypertension associated with diabetes  Hyperlipidemia   PAD     Continue amlodipine and carvedilol   Continue  ASA, plavix and statin   Monitor telemetry       VTE Risk Mitigation (From admission, onward)         Ordered     heparin (porcine) injection 5,000 Units  Every 12 hours      10/22/19 0633     IP VTE HIGH RISK PATIENT  Once      10/22/19 0138     Place sequential compression device  Until discontinued      10/22/19 0138                      Christina Tang MD  Department of Hospital Medicine   [facility]

## 2019-10-26 NOTE — PLAN OF CARE
VN cued into room.  Pt resting comfortably in bed with call light and personal belongings within reach.  NADN.  Many family members at bedside.  NC on.  Pt stated no needs or complaints at this time.   VN will continue to follow and be available as needed.

## 2019-10-27 LAB
ANION GAP SERPL CALC-SCNC: 12 MMOL/L (ref 8–16)
BACTERIA BLD CULT: NORMAL
BACTERIA BLD CULT: NORMAL
BASOPHILS # BLD AUTO: 0.04 K/UL (ref 0–0.2)
BASOPHILS NFR BLD: 0.6 % (ref 0–1.9)
BUN SERPL-MCNC: 68 MG/DL (ref 8–23)
CALCIUM SERPL-MCNC: 9.8 MG/DL (ref 8.7–10.5)
CHLORIDE SERPL-SCNC: 105 MMOL/L (ref 95–110)
CO2 SERPL-SCNC: 25 MMOL/L (ref 23–29)
CREAT SERPL-MCNC: 3.4 MG/DL (ref 0.5–1.4)
DIFFERENTIAL METHOD: ABNORMAL
EOSINOPHIL # BLD AUTO: 0.2 K/UL (ref 0–0.5)
EOSINOPHIL NFR BLD: 3.6 % (ref 0–8)
ERYTHROCYTE [DISTWIDTH] IN BLOOD BY AUTOMATED COUNT: 14.7 % (ref 11.5–14.5)
EST. GFR  (AFRICAN AMERICAN): 19 ML/MIN/1.73 M^2
EST. GFR  (NON AFRICAN AMERICAN): 17 ML/MIN/1.73 M^2
GLUCOSE SERPL-MCNC: 178 MG/DL (ref 70–110)
HCT VFR BLD AUTO: 27.4 % (ref 40–54)
HGB BLD-MCNC: 8.6 G/DL (ref 14–18)
INR PPP: 1 (ref 0.8–1.2)
LYMPHOCYTES # BLD AUTO: 1.2 K/UL (ref 1–4.8)
LYMPHOCYTES NFR BLD: 20 % (ref 18–48)
MAGNESIUM SERPL-MCNC: 2.3 MG/DL (ref 1.6–2.6)
MCH RBC QN AUTO: 27.1 PG (ref 27–31)
MCHC RBC AUTO-ENTMCNC: 31.4 G/DL (ref 32–36)
MCV RBC AUTO: 86 FL (ref 82–98)
MONOCYTES # BLD AUTO: 0.5 K/UL (ref 0.3–1)
MONOCYTES NFR BLD: 7.8 % (ref 4–15)
NEUTROPHILS # BLD AUTO: 4.2 K/UL (ref 1.8–7.7)
NEUTROPHILS NFR BLD: 68 % (ref 38–73)
PHOSPHATE SERPL-MCNC: 3.6 MG/DL (ref 2.7–4.5)
PLATELET # BLD AUTO: 164 K/UL (ref 150–350)
PMV BLD AUTO: 11.3 FL (ref 9.2–12.9)
POCT GLUCOSE: 163 MG/DL (ref 70–110)
POCT GLUCOSE: 177 MG/DL (ref 70–110)
POCT GLUCOSE: 235 MG/DL (ref 70–110)
POCT GLUCOSE: 269 MG/DL (ref 70–110)
POTASSIUM SERPL-SCNC: 4.3 MMOL/L (ref 3.5–5.1)
PROTHROMBIN TIME: 10.6 SEC (ref 9–12.5)
RBC # BLD AUTO: 3.17 M/UL (ref 4.6–6.2)
SODIUM SERPL-SCNC: 142 MMOL/L (ref 136–145)
WBC # BLD AUTO: 6.19 K/UL (ref 3.9–12.7)

## 2019-10-27 PROCEDURE — 25000003 PHARM REV CODE 250: Performed by: FAMILY MEDICINE

## 2019-10-27 PROCEDURE — 27000646 HC AEROBIKA DEVICE

## 2019-10-27 PROCEDURE — 94761 N-INVAS EAR/PLS OXIMETRY MLT: CPT

## 2019-10-27 PROCEDURE — 94664 DEMO&/EVAL PT USE INHALER: CPT

## 2019-10-27 PROCEDURE — 27100171 HC OXYGEN HIGH FLOW UP TO 24 HOURS

## 2019-10-27 PROCEDURE — 63600175 PHARM REV CODE 636 W HCPCS: Performed by: STUDENT IN AN ORGANIZED HEALTH CARE EDUCATION/TRAINING PROGRAM

## 2019-10-27 PROCEDURE — 85610 PROTHROMBIN TIME: CPT

## 2019-10-27 PROCEDURE — 63600175 PHARM REV CODE 636 W HCPCS: Performed by: INTERNAL MEDICINE

## 2019-10-27 PROCEDURE — 63600175 PHARM REV CODE 636 W HCPCS: Performed by: FAMILY MEDICINE

## 2019-10-27 PROCEDURE — 99900035 HC TECH TIME PER 15 MIN (STAT)

## 2019-10-27 PROCEDURE — 85025 COMPLETE CBC W/AUTO DIFF WBC: CPT

## 2019-10-27 PROCEDURE — 84100 ASSAY OF PHOSPHORUS: CPT

## 2019-10-27 PROCEDURE — 11000001 HC ACUTE MED/SURG PRIVATE ROOM

## 2019-10-27 PROCEDURE — 25000003 PHARM REV CODE 250: Performed by: NURSE PRACTITIONER

## 2019-10-27 PROCEDURE — 83735 ASSAY OF MAGNESIUM: CPT

## 2019-10-27 PROCEDURE — 36415 COLL VENOUS BLD VENIPUNCTURE: CPT

## 2019-10-27 PROCEDURE — 80048 BASIC METABOLIC PNL TOTAL CA: CPT

## 2019-10-27 RX ORDER — DOCUSATE SODIUM 100 MG/1
100 CAPSULE, LIQUID FILLED ORAL DAILY
Status: DISCONTINUED | OUTPATIENT
Start: 2019-10-28 | End: 2019-10-29 | Stop reason: HOSPADM

## 2019-10-27 RX ORDER — HYDRALAZINE HYDROCHLORIDE 25 MG/1
50 TABLET, FILM COATED ORAL EVERY 12 HOURS
Status: DISCONTINUED | OUTPATIENT
Start: 2019-10-27 | End: 2019-10-28

## 2019-10-27 RX ADMIN — INSULIN ASPART 3 UNITS: 100 INJECTION, SOLUTION INTRAVENOUS; SUBCUTANEOUS at 04:10

## 2019-10-27 RX ADMIN — ROSUVASTATIN CALCIUM 20 MG: 10 TABLET, FILM COATED ORAL at 09:10

## 2019-10-27 RX ADMIN — AZITHROMYCIN MONOHYDRATE 500 MG: 500 INJECTION, POWDER, LYOPHILIZED, FOR SOLUTION INTRAVENOUS at 03:10

## 2019-10-27 RX ADMIN — GABAPENTIN 100 MG: 100 CAPSULE ORAL at 09:10

## 2019-10-27 RX ADMIN — CLOPIDOGREL BISULFATE 75 MG: 75 TABLET ORAL at 09:10

## 2019-10-27 RX ADMIN — INSULIN ASPART 1 UNITS: 100 INJECTION, SOLUTION INTRAVENOUS; SUBCUTANEOUS at 09:10

## 2019-10-27 RX ADMIN — CARVEDILOL 25 MG: 25 TABLET, FILM COATED ORAL at 04:10

## 2019-10-27 RX ADMIN — CARVEDILOL 25 MG: 25 TABLET, FILM COATED ORAL at 09:10

## 2019-10-27 RX ADMIN — GUAIFENESIN AND CODEINE PHOSPHATE 5 ML: 100; 10 SOLUTION ORAL at 12:10

## 2019-10-27 RX ADMIN — HYDRALAZINE HYDROCHLORIDE 50 MG: 25 TABLET, FILM COATED ORAL at 09:10

## 2019-10-27 RX ADMIN — FUROSEMIDE 80 MG: 10 INJECTION, SOLUTION INTRAVENOUS at 09:10

## 2019-10-27 RX ADMIN — ASPIRIN 81 MG: 81 TABLET, COATED ORAL at 09:10

## 2019-10-27 RX ADMIN — HEPARIN SODIUM 5000 UNITS: 5000 INJECTION, SOLUTION INTRAVENOUS; SUBCUTANEOUS at 09:10

## 2019-10-27 RX ADMIN — CEFTRIAXONE 1 G: 1 INJECTION, SOLUTION INTRAVENOUS at 06:10

## 2019-10-27 RX ADMIN — GUAIFENESIN AND CODEINE PHOSPHATE 5 ML: 100; 10 SOLUTION ORAL at 06:10

## 2019-10-27 RX ADMIN — AMLODIPINE BESYLATE 10 MG: 5 TABLET ORAL at 09:10

## 2019-10-27 RX ADMIN — INSULIN DETEMIR 26 UNITS: 100 INJECTION, SOLUTION SUBCUTANEOUS at 09:10

## 2019-10-27 RX ADMIN — GUAIFENESIN AND CODEINE PHOSPHATE 5 ML: 100; 10 SOLUTION ORAL at 11:10

## 2019-10-27 RX ADMIN — GUAIFENESIN AND CODEINE PHOSPHATE 5 ML: 100; 10 SOLUTION ORAL at 05:10

## 2019-10-27 RX ADMIN — INSULIN ASPART 3 UNITS: 100 INJECTION, SOLUTION INTRAVENOUS; SUBCUTANEOUS at 12:10

## 2019-10-27 RX ADMIN — POTASSIUM CHLORIDE 40 MEQ: 20 TABLET, EXTENDED RELEASE ORAL at 09:10

## 2019-10-27 NOTE — PLAN OF CARE
Care plan reviewed with patient, AAOx4, family at bedside, O2 infusing as ordered via n/c. NSR per cardiac monitor, no red alarm noted. Denies any pain of discomfort, education provided on medication effect and side effect, voices understanding. Call light within his reach, no apparent distress noted, bed in low position, bed alarm on, educated on the importance of calling as needed, voices understanding, stable at this time.

## 2019-10-27 NOTE — NURSING
Report to incoming nurse provided, pt stable, denies any pain or discomfort at this time, call light within his reach, bed in low position, bed alarm on. Encourage to call as needed, voices understanding.

## 2019-10-27 NOTE — PLAN OF CARE
Pt received on  comfort flow nasal cannula at   15 lpm and 30% FiO2.  SPO2   94%.  Pt in no apparent respiratory distress.  Will continue to monitor.

## 2019-10-27 NOTE — PROGRESS NOTES
[facility] Medicine  Progress Note    Patient Name: Domingo Castro  MRN: [id number]  Patient Class: IP- Inpatient   Admission Date: 10/21/2019  Length of Stay: 5 days  Attending Physician: Christina Tang*  Primary Care Provider: Griselda Nugent MD        Subjective:     Principal Problem:Acute respiratory failure with hypoxia        HPI:  Domingo Castro,73 yo male, with HTN, HLD, CAD, chronic diastolic dysfunction, Type 2 Diabetes Mellitus with diabetic neuropathy, CKD stage 4, PAD, chronic diabetic ulcer of left foot and left toe osteomyelitis was advised to report to [facility] by home health nurse for evaluation of oxygen saturation 76%. Patient reports progressively worsening SOB over the past week associated with productive cough and chills. He denies chest pain.   Pt admitted 7/25-8/2/19 for management of Diabetic ulcer of the left foot with osteomyelitis.  Wound culture grew MRSA. He went for LE cath procedure on 7/30. Revascularization procedure was completed on 8/1. Pt was discharged 8/2 with six weeks planned IV vancomycin therapy. Per patient report he most recently on abx for pna and his foot three weeks ago.   Initial labs remarkable for H/H 9/29, BUN/Cr 60/3.3, glucose 434, , glucose 434. CXR dense consolidations to right upper lobe/mid lung field in addition to possible component fluid overload. Influenza negative. Patient hypertensive and tachypneic in ED. He received furosemide 60 mg x 2, cefepime, duonebs and 10 units regular insulin. Patient with progressively worsening dyspnea despite supplemental O2 prior to transfer to [place]. He was placed on no rebreather with noted improved. ABG unremarkable. Patient admitted to [facility] for further care.     Overview/Hospital Course:  Admitted with Pneumonia, requiring high flow oxygen per NC. Weaning O2 very slowing. Blood cx NGTD  De-escalate abx from Vanc/Cefepime/Azithromycin  to Ceftrixone and Azithro. Cough meds, CPT and PT/OT  10/25 diuresing, continue oxygen weaning down to 15 L on 35 % , tolerating therapy. Step down to floor today     Interval History: awake and alert, appetite is still low, tolerating PT/OT patient will likely need SNF placement on discharge    Continue ceftriaxone and Azithromycin  Blood cx NGTD  H/H stable    Review of Systems   Constitutional: Negative for chills and fever.   Respiratory: Positive for cough. Negative for shortness of breath.    Cardiovascular: Negative for chest pain and leg swelling.   Gastrointestinal: Negative for abdominal pain and nausea.   Genitourinary: Negative for dysuria.   Musculoskeletal: Negative for neck pain.   Skin: Negative for rash and wound.   Neurological: Negative for syncope and weakness.   Psychiatric/Behavioral: Negative for agitation.     Objective:     Vital Signs (Most Recent):  Temp: 97.1 °F (36.2 °C) (10/27/19 1615)  Pulse: (!) 59 (10/27/19 1615)  Resp: 18 (10/27/19 1615)  BP: (!) 122/56 (10/27/19 1615)  SpO2: (!) 92 % (10/27/19 1629) Vital Signs (24h Range):  Temp:  [97.1 °F (36.2 °C)-98.8 °F (37.1 °C)] 97.1 °F (36.2 °C)  Pulse:  [55-63] 59  Resp:  [16-18] 18  SpO2:  [92 %-96 %] 92 %  BP: (122-166)/(56-77) 122/56     Weight: 115.1 kg (253 lb 11.2 oz)  Body mass index is 31.71 kg/m².    Intake/Output Summary (Last 24 hours) at 10/27/2019 1849  Last data filed at 10/27/2019 1510  Gross per 24 hour   Intake 720 ml   Output 950 ml   Net -230 ml      Physical Exam   Constitutional: He is oriented to person, place, and time. He appears well-developed and well-nourished. No distress.   HENT:   Head: Normocephalic and atraumatic.   Neck: Neck supple.   Cardiovascular: Normal rate, regular rhythm, normal heart sounds and intact distal pulses.   Pulmonary/Chest: Effort normal. No respiratory distress. He has no wheezes.   Breath sounds diminished    Abdominal: Soft. Bowel sounds are normal. He exhibits no distension. There is  no tenderness.   Musculoskeletal: Normal range of motion. He exhibits no edema or tenderness.   Neurological: He is alert and oriented to person, place, and time.   Skin: Skin is warm and dry. Capillary refill takes less than 2 seconds. No erythema.   Psychiatric: He has a normal mood and affect.       Significant Labs:   ABGs:   No results for input(s): PH, PCO2, HCO3, POCSATURATED, BE, TOTALHB, COHB, METHB, O2HB, POCFIO2 in the last 48 hours.  Blood Culture:   No results for input(s): LABBLOO in the last 48 hours.  CBC:   Recent Labs   Lab 10/26/19  0601 10/27/19  0537   WBC 4.59 6.19   HGB 7.5* 8.6*   HCT 24.0* 27.4*    164     CMP:   Recent Labs   Lab 10/26/19  0601 10/27/19  0537    142   K 4.5 4.3    105   CO2 20* 25   * 178*   BUN 70* 68*   CREATININE 3.3* 3.4*   CALCIUM 9.3 9.8   ANIONGAP 13 12   EGFRNONAA 17* 17*     Coagulation:   Recent Labs   Lab 10/27/19  0537   INR 1.0     Lactic Acid:   No results for input(s): LACTATE in the last 48 hours.  TSH: No results for input(s): TSH in the last 4320 hours.  Urine Culture: No results for input(s): LABURIN in the last 48 hours.  Urine Studies: No results for input(s): COLORU, APPEARANCEUA, PHUR, SPECGRAV, PROTEINUA, GLUCUA, KETONESU, BILIRUBINUA, OCCULTUA, NITRITE, UROBILINOGEN, LEUKOCYTESUR, RBCUA, WBCUA, BACTERIA, SQUAMEPITHEL, HYALINECASTS in the last 48 hours.    Invalid input(s): WRIGHTSUR    Significant Imaging: I have reviewed all pertinent imaging results/findings within the past 24 hours.      Assessment/Plan:      * Acute respiratory failure with hypoxia  Pulmonary edema   Acute on chronic congestive heart failure  Right upper lobe pneumonia  Fever  CXR with dense consolidations right upper lobe/mid lung field in addition to possible component fluid overload.   Sputum cx normal howie  blood cx NGTD  Viral cx pending  - continue vanco, azithromycin and cefepime- de-escalate to Azith and rocephin  - wean to nasal cannula as  tolerated               Diabetic ulcer of left foot  Hx of chronic osteo         Fever  Suspect 2/2 pneumonia. Pt also has right-sided medical infusion port and chronic osteo of left foot     Follow cultures   Consult wound care   Continue  Cefepime, vanc and zithromax     Uncontrolled type 2 diabetes mellitus with hyperglycemia   accucheck AC&HS,  Continue levemir and aspart  Low dose SSI,   Diabetic diet       CKD stage 4 due to type 2 diabetes mellitus  Stable  Avoid nephrotoxic meds  renal dose meds   strict intact and output     Anemia of chronic renal failure, stage 4 (severe)  H/H stable monitor       Hypertension associated with diabetes  Hyperlipidemia   PAD     Continue amlodipine and carvedilol   Continue  ASA, plavix and statin   Monitor telemetry       VTE Risk Mitigation (From admission, onward)         Ordered     heparin (porcine) injection 5,000 Units  Every 12 hours      10/22/19 0633     IP VTE HIGH RISK PATIENT  Once      10/22/19 0138     Place sequential compression device  Until discontinued      10/22/19 0138                      Christinazhen Tang MD  Department of Hospital Medicine   [facility]

## 2019-10-28 LAB
ANION GAP SERPL CALC-SCNC: 11 MMOL/L (ref 8–16)
BASOPHILS # BLD AUTO: 0.04 K/UL (ref 0–0.2)
BASOPHILS NFR BLD: 0.6 % (ref 0–1.9)
BUN SERPL-MCNC: 68 MG/DL (ref 8–23)
CALCIUM SERPL-MCNC: 9.6 MG/DL (ref 8.7–10.5)
CHLORIDE SERPL-SCNC: 105 MMOL/L (ref 95–110)
CO2 SERPL-SCNC: 25 MMOL/L (ref 23–29)
CREAT SERPL-MCNC: 3.5 MG/DL (ref 0.5–1.4)
DIFFERENTIAL METHOD: ABNORMAL
EOSINOPHIL # BLD AUTO: 0.3 K/UL (ref 0–0.5)
EOSINOPHIL NFR BLD: 4 % (ref 0–8)
ERYTHROCYTE [DISTWIDTH] IN BLOOD BY AUTOMATED COUNT: 14.8 % (ref 11.5–14.5)
EST. GFR  (AFRICAN AMERICAN): 19 ML/MIN/1.73 M^2
EST. GFR  (NON AFRICAN AMERICAN): 16 ML/MIN/1.73 M^2
GLUCOSE SERPL-MCNC: 150 MG/DL (ref 70–110)
HCT VFR BLD AUTO: 26.6 % (ref 40–54)
HGB BLD-MCNC: 8.3 G/DL (ref 14–18)
INR PPP: 1.1 (ref 0.8–1.2)
LYMPHOCYTES # BLD AUTO: 1.4 K/UL (ref 1–4.8)
LYMPHOCYTES NFR BLD: 19.4 % (ref 18–48)
MAGNESIUM SERPL-MCNC: 2.3 MG/DL (ref 1.6–2.6)
MCH RBC QN AUTO: 27.2 PG (ref 27–31)
MCHC RBC AUTO-ENTMCNC: 31.2 G/DL (ref 32–36)
MCV RBC AUTO: 87 FL (ref 82–98)
MONOCYTES # BLD AUTO: 0.4 K/UL (ref 0.3–1)
MONOCYTES NFR BLD: 5.1 % (ref 4–15)
NEUTROPHILS # BLD AUTO: 5 K/UL (ref 1.8–7.7)
NEUTROPHILS NFR BLD: 70.9 % (ref 38–73)
PHOSPHATE SERPL-MCNC: 4.4 MG/DL (ref 2.7–4.5)
PLATELET # BLD AUTO: 170 K/UL (ref 150–350)
PMV BLD AUTO: 10.8 FL (ref 9.2–12.9)
POCT GLUCOSE: 185 MG/DL (ref 70–110)
POCT GLUCOSE: 186 MG/DL (ref 70–110)
POCT GLUCOSE: 233 MG/DL (ref 70–110)
POCT GLUCOSE: 260 MG/DL (ref 70–110)
POTASSIUM SERPL-SCNC: 4.5 MMOL/L (ref 3.5–5.1)
PROTHROMBIN TIME: 10.9 SEC (ref 9–12.5)
RBC # BLD AUTO: 3.05 M/UL (ref 4.6–6.2)
SODIUM SERPL-SCNC: 141 MMOL/L (ref 136–145)
WBC # BLD AUTO: 7.07 K/UL (ref 3.9–12.7)

## 2019-10-28 PROCEDURE — 97116 GAIT TRAINING THERAPY: CPT

## 2019-10-28 PROCEDURE — 63600175 PHARM REV CODE 636 W HCPCS: Performed by: INTERNAL MEDICINE

## 2019-10-28 PROCEDURE — 97530 THERAPEUTIC ACTIVITIES: CPT

## 2019-10-28 PROCEDURE — 94664 DEMO&/EVAL PT USE INHALER: CPT

## 2019-10-28 PROCEDURE — 27000221 HC OXYGEN, UP TO 24 HOURS

## 2019-10-28 PROCEDURE — 83735 ASSAY OF MAGNESIUM: CPT

## 2019-10-28 PROCEDURE — 36415 COLL VENOUS BLD VENIPUNCTURE: CPT

## 2019-10-28 PROCEDURE — 99900035 HC TECH TIME PER 15 MIN (STAT)

## 2019-10-28 PROCEDURE — 25000003 PHARM REV CODE 250: Performed by: NURSE PRACTITIONER

## 2019-10-28 PROCEDURE — 11000001 HC ACUTE MED/SURG PRIVATE ROOM

## 2019-10-28 PROCEDURE — 25000003 PHARM REV CODE 250: Performed by: FAMILY MEDICINE

## 2019-10-28 PROCEDURE — 85025 COMPLETE CBC W/AUTO DIFF WBC: CPT

## 2019-10-28 PROCEDURE — 84100 ASSAY OF PHOSPHORUS: CPT

## 2019-10-28 PROCEDURE — 80048 BASIC METABOLIC PNL TOTAL CA: CPT

## 2019-10-28 PROCEDURE — 97535 SELF CARE MNGMENT TRAINING: CPT

## 2019-10-28 PROCEDURE — 63600175 PHARM REV CODE 636 W HCPCS: Performed by: FAMILY MEDICINE

## 2019-10-28 PROCEDURE — 85610 PROTHROMBIN TIME: CPT

## 2019-10-28 PROCEDURE — 94761 N-INVAS EAR/PLS OXIMETRY MLT: CPT

## 2019-10-28 RX ORDER — HYDRALAZINE HYDROCHLORIDE 25 MG/1
50 TABLET, FILM COATED ORAL EVERY 8 HOURS
Status: DISCONTINUED | OUTPATIENT
Start: 2019-10-28 | End: 2019-10-29 | Stop reason: HOSPADM

## 2019-10-28 RX ADMIN — INSULIN ASPART 3 UNITS: 100 INJECTION, SOLUTION INTRAVENOUS; SUBCUTANEOUS at 12:10

## 2019-10-28 RX ADMIN — HEPARIN SODIUM 5000 UNITS: 5000 INJECTION, SOLUTION INTRAVENOUS; SUBCUTANEOUS at 10:10

## 2019-10-28 RX ADMIN — GABAPENTIN 100 MG: 100 CAPSULE ORAL at 09:10

## 2019-10-28 RX ADMIN — HYDRALAZINE HYDROCHLORIDE 50 MG: 25 TABLET ORAL at 02:10

## 2019-10-28 RX ADMIN — HYDRALAZINE HYDROCHLORIDE 50 MG: 25 TABLET ORAL at 10:10

## 2019-10-28 RX ADMIN — ROSUVASTATIN CALCIUM 20 MG: 10 TABLET, FILM COATED ORAL at 09:10

## 2019-10-28 RX ADMIN — POTASSIUM CHLORIDE 40 MEQ: 20 TABLET, EXTENDED RELEASE ORAL at 09:10

## 2019-10-28 RX ADMIN — CARVEDILOL 25 MG: 25 TABLET, FILM COATED ORAL at 04:10

## 2019-10-28 RX ADMIN — GUAIFENESIN AND CODEINE PHOSPHATE 5 ML: 100; 10 SOLUTION ORAL at 12:10

## 2019-10-28 RX ADMIN — HEPARIN SODIUM 5000 UNITS: 5000 INJECTION, SOLUTION INTRAVENOUS; SUBCUTANEOUS at 09:10

## 2019-10-28 RX ADMIN — DOCUSATE SODIUM 100 MG: 100 CAPSULE, LIQUID FILLED ORAL at 09:10

## 2019-10-28 RX ADMIN — ASPIRIN 81 MG: 81 TABLET, COATED ORAL at 09:10

## 2019-10-28 RX ADMIN — HYDRALAZINE HYDROCHLORIDE 50 MG: 25 TABLET ORAL at 09:10

## 2019-10-28 RX ADMIN — GUAIFENESIN AND CODEINE PHOSPHATE 5 ML: 100; 10 SOLUTION ORAL at 11:10

## 2019-10-28 RX ADMIN — INSULIN ASPART 1 UNITS: 100 INJECTION, SOLUTION INTRAVENOUS; SUBCUTANEOUS at 10:10

## 2019-10-28 RX ADMIN — AZITHROMYCIN MONOHYDRATE 500 MG: 500 INJECTION, POWDER, LYOPHILIZED, FOR SOLUTION INTRAVENOUS at 04:10

## 2019-10-28 RX ADMIN — GABAPENTIN 100 MG: 100 CAPSULE ORAL at 10:10

## 2019-10-28 RX ADMIN — GUAIFENESIN AND CODEINE PHOSPHATE 5 ML: 100; 10 SOLUTION ORAL at 05:10

## 2019-10-28 RX ADMIN — CARVEDILOL 25 MG: 25 TABLET, FILM COATED ORAL at 09:10

## 2019-10-28 RX ADMIN — INSULIN ASPART 2 UNITS: 100 INJECTION, SOLUTION INTRAVENOUS; SUBCUTANEOUS at 05:10

## 2019-10-28 RX ADMIN — AMLODIPINE BESYLATE 10 MG: 5 TABLET ORAL at 09:10

## 2019-10-28 RX ADMIN — INSULIN DETEMIR 26 UNITS: 100 INJECTION, SOLUTION SUBCUTANEOUS at 10:10

## 2019-10-28 RX ADMIN — CLOPIDOGREL BISULFATE 75 MG: 75 TABLET ORAL at 09:10

## 2019-10-28 RX ADMIN — CEFTRIAXONE 1 G: 1 INJECTION, SOLUTION INTRAVENOUS at 05:10

## 2019-10-28 NOTE — PLAN OF CARE
10/28/19 0946   Discharge Reassessment   Assessment Type Discharge Planning Reassessment   Provided patient/caregiver education on the expected discharge date and the discharge plan Yes   Do you have any problems affording any of your prescribed medications? No   Discharge Plan A Home Health;Home with family  (Alo )   DME Needed Upon Discharge  none   Patient choice form signed by patient/caregiver N/A   Anticipated Discharge Disposition Home-Health  (Alo )   Can the patient answer the patient profile reliably? Yes, cognitively intact   How does the patient rate their overall health at the present time? Good   Describe the patient's ability to walk at the present time. Walks with the help of equipment   How often would a person be available to care for the patient? Whenever needed   Number of comorbid conditions (as recorded on the chart) Five or more   During the past month, has the patient often been bothered by feeling down, depressed or hopeless? No   During the past month, has the patient often been bothered by little interest or pleasure in doing things? No   Post-Acute Status   Post-Acute Authorization Placement   Post-Acute Placement Status Awaiting Orders for LTAC   Discharge Delays None known at this time       Visit with pt, found on 1L O2 after being weaned from high flow O2.  Pt states he uses no O2 at home, eager to d/c without O2.  Encouraged use of IS, CM instructed pt on use.  Communicated with therapy and nursing to encourage same, oob with meals, ambulate with assistance as pt states he hasn't been out of bed.      Pt's plan

## 2019-10-28 NOTE — PT/OT/SLP PROGRESS
Physical Therapy Treatment    Patient Name:  Domingo Castro   MRN:  [id number]    Recommendations:     Discharge Recommendations:  home   Discharge Equipment Recommendations: none   Barriers to discharge: None    Assessment:     Domingo Castro is a 74 y.o. male admitted with a medical diagnosis of Acute respiratory failure with hypoxia.  He presents with the following impairments/functional limitations:  weakness, impaired endurance, gait instability, impaired functional mobilty, decreased lower extremity function, impaired cardiopulmonary response to activity.  Pt demonstrated increased ambulation distance today with O2 sats ranging 87% to 93% on portable O2 @ 2lpm.  Pt would continue to benefit from P.T. To address impairments listed above.  .    Rehab Prognosis: Fair+; patient would benefit from acute skilled PT services to address these deficits and reach maximum level of function.    Recent Surgery: * No surgery found *      Plan:     During this hospitalization, patient to be seen 5 x/week to address the identified rehab impairments via gait training, therapeutic activities, therapeutic exercises, neuromuscular re-education and progress toward the following goals:    · Plan of Care Expires:  11/24/19    Subjective       Patient/Family Comments/goals: Pt agreed to tx.  Pain/Comfort:  · Pain Rating 1: 0/10  · Pain Rating Post-Intervention 1: 0/10      Objective:     Communicated with RN (Grady) prior to session.  Patient found supine with oxygen upon PT entry to room.     General Precautions: Standard, fall   Orthopedic Precautions:N/A   Braces:       Functional Mobility:  · Bed Mobility:     · Rolling Left:  modified independence  · Scooting: modified independence and to EOB  · Supine to Sit: modified independence  · Transfers:     · Sit to Stand:  stand by assistance and contact guard assistance with rolling walker  · Gait: 80ft x 2 with Rw, portable O2 @ 2lpm, and CGA/SBA.  Pt took a standing rest break between  bouts with O2 sats initially 87% and up to 92% within 1 min while talking to PTA.  No LOB or c/o SOB during tx.  · Balance: sitting good, standing good-, gait fair/fair_      AM-PAC 6 CLICK MOBILITY  Turning over in bed (including adjusting bedclothes, sheets and blankets)?: 4  Sitting down on and standing up from a chair with arms (e.g., wheelchair, bedside commode, etc.): 3  Moving from lying on back to sitting on the side of the bed?: 3  Moving to and from a bed to a chair (including a wheelchair)?: 3  Need to walk in hospital room?: 3       Therapeutic Activities and Exercises:   Pt was on wall unit O2 @ 2lpm upon entering the room. Pt used portable O2 for gait training @ 2llpm NC.  O2 sats as above.  Pt was transferred back to wall unit O2 after tx @ 2lpm with O2 sats 98% after ~5 mins seated rest.  Pt was instructed on how to properly adjust his RW at home to assist with improved upright posture.  Pt was assisted with with positioning in b/s chair and lunch set up.    Patient left up in chair with all lines intact, call button in reach, bed alarm on and RN notified..    GOALS:   Multidisciplinary Problems     Physical Therapy Goals        Problem: Physical Therapy Goal    Goal Priority Disciplines Outcome Goal Variances Interventions   Physical Therapy Goal     PT, PT/OT Ongoing, Progressing     Description:  Goals to be met by: 2019     Patient will increase functional independence with mobility by performin. Bed to chair transfer with Modified [place] using no AD  2. Gait  x 250 feet with Modified [place] using no AD c/ L surgical shoe and heel WB.   3. Lower extremity exercise program x15 reps per handout, with independence                      Time Tracking:     PT Received On: 10/28/19  PT Start Time: 1240     PT Stop Time: 1306  PT Total Time (min): 26 min     Billable Minutes: Gait Training 15 and Therapeutic Activity 11    Treatment Type: Treatment  PT/PTA: PTA     PTA Visit  Number: 2     Leticia Lobo, PTA  10/28/2019

## 2019-10-28 NOTE — NURSING
Home Oxygen Evaluation    Date Performed: 10/28/2019    1) Patient's Home O2 Sat on room air, while at rest: 88%        If O2 sats on room air at rest are 88% or below, patient qualifies. No additional testing needed. Document N/A in steps 2 and 3. If 89% or above, complete steps 2.      2) Patient's O2 Sat on room air while exercisin%      If O2 sats on room air while exercising remain 89% or above patient does not qualify, no further testing needed Document N/A in step 3. If O2 sats on room air while exercising are 88% or below, continue to step 3.      3) Patient's O2 Sat while exercising on O2: 92% at 2 LPM         (Must show improvement from #2 for patients to qualify)    If O2 sats improve on oxygen, patient qualifies for portable oxygen. If not, the patient does not qualify.

## 2019-10-28 NOTE — PLAN OF CARE
VN rounds:  VN cued into pt's room with pt's permission.  Pt resting in recliner with legs elevated and non-skid socks on , wife at bedside. Fall risk protocol discussed with pt.  VN instructed to call for assistance.  Pt aware and agreeable.   Inquired about pt's last BM, pt reports having small BM today, not recorded, pt receiving colace daily. No acute distress noted.  Allowed time for questions.  Will continue to be available and intervene as needed.

## 2019-10-28 NOTE — PLAN OF CARE
Plan of care reviewed patient verbalized understanding.medications administered. Blood glucose monitoring per sliding scale. IV antibiotics infused. Left foot dressing clean, dry, and intact. Cardiac monitoring SB Contact precautions maintained. Bed in the lowest position, call bell within reach, bed alarm on.

## 2019-10-28 NOTE — PLAN OF CARE
Problem: Occupational Therapy Goal  Goal: Occupational Therapy Goal  Description  Goals to be met by: 11/24     Patient will increase functional independence with ADLs by performing:    UE Dressing with Modified [place].  LE Dressing with Modified [place].  Grooming while standing with Modified [place].  Toileting from toilet with Modified [place] for hygiene and clothing management.   Toilet transfer to toilet with Modified [place].  Upper extremity exercise program x10 reps per handout, with independence.     Outcome: Met       Pt has met goals adequate for discharge from OT services at this time. Pt educated on home safety, pursed lip breathing, and energy conservation techniques. No further OT services required at this time.

## 2019-10-28 NOTE — SUBJECTIVE & OBJECTIVE
Interval History: awake and alert, weaned to 2l NC, patient looks better, requesting home discharge.     Amb pulse ox, possible discharge home today or tomorrow with home health.     Continue Azithromycin  Blood cx NGTD  H/H stable    Review of Systems   Constitutional: Negative for chills and fever.   Respiratory: Positive for cough. Negative for shortness of breath.    Cardiovascular: Negative for chest pain and leg swelling.   Gastrointestinal: Negative for abdominal pain and nausea.   Genitourinary: Negative for dysuria.   Musculoskeletal: Negative for neck pain.   Skin: Negative for rash and wound.   Neurological: Negative for syncope and weakness.   Psychiatric/Behavioral: Negative for agitation.     Objective:     Vital Signs (Most Recent):  Temp: 98.3 °F (36.8 °C) (10/28/19 1058)  Pulse: (!) 56 (10/28/19 1058)  Resp: 18 (10/28/19 1058)  BP: (!) 171/75 (10/28/19 1058)  SpO2: (!) 92 % (10/28/19 1058) Vital Signs (24h Range):  Temp:  [97.1 °F (36.2 °C)-99 °F (37.2 °C)] 98.3 °F (36.8 °C)  Pulse:  [55-65] 56  Resp:  [16-18] 18  SpO2:  [89 %-96 %] 92 %  BP: (122-171)/(56-75) 171/75     Weight: 115.1 kg (253 lb 11.2 oz)  Body mass index is 31.71 kg/m².    Intake/Output Summary (Last 24 hours) at 10/28/2019 1144  Last data filed at 10/27/2019 2113  Gross per 24 hour   Intake 500 ml   Output 700 ml   Net -200 ml      Physical Exam   Constitutional: He is oriented to person, place, and time. He appears well-developed and well-nourished. No distress.   HENT:   Head: Normocephalic and atraumatic.   Neck: Neck supple.   Cardiovascular: Normal rate, regular rhythm, normal heart sounds and intact distal pulses.   Pulmonary/Chest: Effort normal. No respiratory distress. He has no wheezes.   Breath sounds diminished    Abdominal: Soft. Bowel sounds are normal. He exhibits no distension. There is no tenderness.   Musculoskeletal: Normal range of motion. He exhibits no edema or tenderness.   Neurological: He is alert and  oriented to person, place, and time.   Skin: Skin is warm and dry. Capillary refill takes less than 2 seconds. No erythema.   Psychiatric: He has a normal mood and affect.       Significant Labs:   ABGs:   No results for input(s): PH, PCO2, HCO3, POCSATURATED, BE, TOTALHB, COHB, METHB, O2HB, POCFIO2 in the last 48 hours.  Blood Culture:   No results for input(s): LABBLOO in the last 48 hours.  CBC:   Recent Labs   Lab 10/27/19  0537 10/28/19  0628   WBC 6.19 7.07   HGB 8.6* 8.3*   HCT 27.4* 26.6*    170     CMP:   Recent Labs   Lab 10/27/19  0537 10/28/19  0628    141   K 4.3 4.5    105   CO2 25 25   * 150*   BUN 68* 68*   CREATININE 3.4* 3.5*   CALCIUM 9.8 9.6   ANIONGAP 12 11   EGFRNONAA 17* 16*     Coagulation:   Recent Labs   Lab 10/28/19  0628   INR 1.1     Lactic Acid:   No results for input(s): LACTATE in the last 48 hours.  TSH: No results for input(s): TSH in the last 4320 hours.  Urine Culture: No results for input(s): LABURIN in the last 48 hours.  Urine Studies: No results for input(s): COLORU, APPEARANCEUA, PHUR, SPECGRAV, PROTEINUA, GLUCUA, KETONESU, BILIRUBINUA, OCCULTUA, NITRITE, UROBILINOGEN, LEUKOCYTESUR, RBCUA, WBCUA, BACTERIA, SQUAMEPITHEL, HYALINECASTS in the last 48 hours.    Invalid input(s): LIOR    Significant Imaging: I have reviewed all pertinent imaging results/findings within the past 24 hours.

## 2019-10-28 NOTE — PT/OT/SLP PROGRESS
Occupational Therapy   Treatment/Dc Summary    Name: Domingo Castro  MRN: [id number]  Admitting Diagnosis:  Acute respiratory failure with hypoxia       Recommendations:     Discharge Recommendations: home  Discharge Equipment Recommendations:  none  Barriers to discharge:  None    Assessment:     Domingo Castro is a 74 y.o. male with a medical diagnosis of Acute respiratory failure with hypoxia. Performance deficits affecting function are impaired endurance, impaired skin, impaired functional mobilty, gait instability.     Pt has met goals adequate for discharge from OT services at this time. Pt educated on home safety, pursed lip breathing, and energy conservation techniques. No further OT services required at this time.     Rehab Prognosis:  Good; patient would benefit from acute skilled OT services to address these deficits and reach maximum level of function.       Plan:     Patient to be seen (d/c OT ) to address the above listed problems via self-care/home management, therapeutic exercises, therapeutic activities  · Plan of Care Expires: 10/28/19  · Plan of Care Reviewed with: patient    Subjective     Pain/Comfort:  · Pain Rating 1: 0/10    Objective:     Communicated with: nsg prior to session.     General Precautions: Standard, fall   Orthopedic Precautions:N/A   Braces: (darco shoe )     Occupational Performance:     Bed Mobility:    · Patient completed Scooting/Bridging with modified independence  · Patient completed Supine to Sit with modified independence  · Patient completed Sit to Supine with modified independence     Functional Mobility/Transfers:  · Patient completed Sit <> Stand Transfer with modified independence and supervision  with  no assistive device   · Patient completed Toilet Transfer Step Transfer technique with modified independence and supervision with  no AD  · Functional Mobility: mod I with RW; supervision- mod I without AD     Activities of Daily Living:  · Grooming: modified  independence standing   · Lower Body Dressing: modified independence seated EOB   · Toileting: modified independence standing at toilet to urinate       AMPAC 6 Click ADL: 23    Treatment & Education:  Pt agreeable to treatment   Pt performing LBD mod I while seated and standing- educated on energy conservation techniques with dsg   Stood multiple trials from EOB without AD Mod I   Functional mobility performed in room without AD mod I -superivsion   Toileting standing in bathroom with mod I  Functional mobility performed in hallway without AD supervision- mod I   Nsg present throughout session to monitor O2 for home O2 eval   Educated on/performing pursed lip breathing mod I       Patient left supine with all lines intact, call button in reach, nsg notified and family  presentEducation:      GOALS:   Multidisciplinary Problems     Occupational Therapy Goals     Not on file          Multidisciplinary Problems (Resolved)        Problem: Occupational Therapy Goal    Goal Priority Disciplines Outcome Interventions   Occupational Therapy Goal   (Resolved)     OT, PT/OT Met    Description:  Goals to be met by: 11/24     Patient will increase functional independence with ADLs by performing:    UE Dressing with Modified [place].  LE Dressing with Modified [place].  Grooming while standing with Modified [place].  Toileting from toilet with Modified [place] for hygiene and clothing management.   Toilet transfer to toilet with Modified [place].  Upper extremity exercise program x10 reps per handout, with independence.                      Time Tracking:     OT Date of Treatment: 10/28/19  OT Start Time: 1143  OT Stop Time: 1211  OT Total Time (min): 28 min    Billable Minutes:Self Care/Home Management 14  Therapeutic Activity 14    Laura Alvarado, OT  10/28/2019

## 2019-10-28 NOTE — PLAN OF CARE
ACO/CM met with patient. While CM was at bedside, RRT weaned patient to 2 L NC. Patient is having no SOB at this time. Patient has support at home with a wife but also has children and grandchildren providing support. PLOF is ambulatory and currently also ambulatory. Patient does drive and is able to afford medications. Patient verbalized understanding of importance of FU APPT with PCP. Educated patient on post discharge TCC call for fu and to write down any question that he may need to ask the nurse at that time. Also gave patient education on 24/7 nurse triage line as a free tool to use for himself or anyone in the community. Gave patient info on [facility]. Originally in report was told patient was appropriate for LTAC, but upon interview with patient, he is further along and no longer on the 15L O2. Patient may possible be appropriate for SNF vs HH. IP [place] notified of findings. Will also message Dr Dumont with update. Patient verbalized understanding of all the above.

## 2019-10-28 NOTE — PLAN OF CARE
Problem: Physical Therapy Goal  Goal: Physical Therapy Goal  Description  Goals to be met by: 2019     Patient will increase functional independence with mobility by performin. Bed to chair transfer with Modified [place] using no AD  2. Gait  x 250 feet with Modified [place] using no AD c/ L surgical shoe and heel WB.   3. Lower extremity exercise program x15 reps per handout, with independence     Outcome: Ongoing, Progressing   Continue working toward goals.

## 2019-10-28 NOTE — PROGRESS NOTES
[facility] Medicine  Progress Note    Patient Name: Domingo Castro  MRN: [id number]  Patient Class: IP- Inpatient   Admission Date: 10/21/2019  Length of Stay: 6 days  Attending Physician: Christina Tang*  Primary Care Provider: Griselda Nugent MD        Subjective:     Principal Problem:Acute respiratory failure with hypoxia        HPI:  Domingo Castro,73 yo male, with HTN, HLD, CAD, chronic diastolic dysfunction, Type 2 Diabetes Mellitus with diabetic neuropathy, CKD stage 4, PAD, chronic diabetic ulcer of left foot and left toe osteomyelitis was advised to report to [facility] by home health nurse for evaluation of oxygen saturation 76%. Patient reports progressively worsening SOB over the past week associated with productive cough and chills. He denies chest pain.   Pt admitted 7/25-8/2/19 for management of Diabetic ulcer of the left foot with osteomyelitis.  Wound culture grew MRSA. He went for LE cath procedure on 7/30. Revascularization procedure was completed on 8/1. Pt was discharged 8/2 with six weeks planned IV vancomycin therapy. Per patient report he most recently on abx for pna and his foot three weeks ago.   Initial labs remarkable for H/H 9/29, BUN/Cr 60/3.3, glucose 434, , glucose 434. CXR dense consolidations to right upper lobe/mid lung field in addition to possible component fluid overload. Influenza negative. Patient hypertensive and tachypneic in ED. He received furosemide 60 mg x 2, cefepime, duonebs and 10 units regular insulin. Patient with progressively worsening dyspnea despite supplemental O2 prior to transfer to [place]. He was placed on no rebreather with noted improved. ABG unremarkable. Patient admitted to [facility] for further care.     Overview/Hospital Course:  Admitted with Pneumonia, requiring high flow oxygen per NC. Weaning O2 very slowing. Blood cx NGTD  De-escalate abx from Vanc/Cefepime/Azithromycin  to Ceftrixone and Azithro. Cough meds, CPT and PT/OT  10/25 diuresing, continue oxygen weaning down to 15 L on 35 % , tolerating therapy. Step down to floor today   10/28 possible discharge home today or tomorrow with home health.       Interval History: awake and alert, weaned to 2l NC, patient looks better, requesting home discharge.     Amb pulse ox, possible discharge home today or tomorrow with home health.     Continue Azithromycin  Blood cx NGTD  H/H stable    Review of Systems   Constitutional: Negative for chills and fever.   Respiratory: Positive for cough. Negative for shortness of breath.    Cardiovascular: Negative for chest pain and leg swelling.   Gastrointestinal: Negative for abdominal pain and nausea.   Genitourinary: Negative for dysuria.   Musculoskeletal: Negative for neck pain.   Skin: Negative for rash and wound.   Neurological: Negative for syncope and weakness.   Psychiatric/Behavioral: Negative for agitation.     Objective:     Vital Signs (Most Recent):  Temp: 98.3 °F (36.8 °C) (10/28/19 1058)  Pulse: (!) 56 (10/28/19 1058)  Resp: 18 (10/28/19 1058)  BP: (!) 171/75 (10/28/19 1058)  SpO2: (!) 92 % (10/28/19 1058) Vital Signs (24h Range):  Temp:  [97.1 °F (36.2 °C)-99 °F (37.2 °C)] 98.3 °F (36.8 °C)  Pulse:  [55-65] 56  Resp:  [16-18] 18  SpO2:  [89 %-96 %] 92 %  BP: (122-171)/(56-75) 171/75     Weight: 115.1 kg (253 lb 11.2 oz)  Body mass index is 31.71 kg/m².    Intake/Output Summary (Last 24 hours) at 10/28/2019 1144  Last data filed at 10/27/2019 2113  Gross per 24 hour   Intake 500 ml   Output 700 ml   Net -200 ml      Physical Exam   Constitutional: He is oriented to person, place, and time. He appears well-developed and well-nourished. No distress.   HENT:   Head: Normocephalic and atraumatic.   Neck: Neck supple.   Cardiovascular: Normal rate, regular rhythm, normal heart sounds and intact distal pulses.   Pulmonary/Chest: Effort normal. No respiratory distress. He has no wheezes.    Breath sounds diminished    Abdominal: Soft. Bowel sounds are normal. He exhibits no distension. There is no tenderness.   Musculoskeletal: Normal range of motion. He exhibits no edema or tenderness.   Neurological: He is alert and oriented to person, place, and time.   Skin: Skin is warm and dry. Capillary refill takes less than 2 seconds. No erythema.   Psychiatric: He has a normal mood and affect.       Significant Labs:   ABGs:   No results for input(s): PH, PCO2, HCO3, POCSATURATED, BE, TOTALHB, COHB, METHB, O2HB, POCFIO2 in the last 48 hours.  Blood Culture:   No results for input(s): LABBLOO in the last 48 hours.  CBC:   Recent Labs   Lab 10/27/19  0537 10/28/19  0628   WBC 6.19 7.07   HGB 8.6* 8.3*   HCT 27.4* 26.6*    170     CMP:   Recent Labs   Lab 10/27/19  0537 10/28/19  0628    141   K 4.3 4.5    105   CO2 25 25   * 150*   BUN 68* 68*   CREATININE 3.4* 3.5*   CALCIUM 9.8 9.6   ANIONGAP 12 11   EGFRNONAA 17* 16*     Coagulation:   Recent Labs   Lab 10/28/19  0628   INR 1.1     Lactic Acid:   No results for input(s): LACTATE in the last 48 hours.  TSH: No results for input(s): TSH in the last 4320 hours.  Urine Culture: No results for input(s): LABURIN in the last 48 hours.  Urine Studies: No results for input(s): COLORU, APPEARANCEUA, PHUR, SPECGRAV, PROTEINUA, GLUCUA, KETONESU, BILIRUBINUA, OCCULTUA, NITRITE, UROBILINOGEN, LEUKOCYTESUR, RBCUA, WBCUA, BACTERIA, SQUAMEPITHEL, HYALINECASTS in the last 48 hours.    Invalid input(s): WRIGHTSUR    Significant Imaging: I have reviewed all pertinent imaging results/findings within the past 24 hours.      Assessment/Plan:      * Acute respiratory failure with hypoxia  Pulmonary edema   Acute on chronic congestive heart failure  Right upper lobe pneumonia  Fever  CXR with dense consolidations right upper lobe/mid lung field in addition to possible component fluid overload.   Sputum cx normal howie  blood cx NGTD  Viral cx pending  -  continue vanco, azithromycin and cefepime- de-escalate to Azith and rocephin  - wean to nasal cannula as tolerated               Diabetic ulcer of left foot  Hx of chronic osteo         Fever  Suspect 2/2 pneumonia. Pt also has right-sided medical infusion port and chronic osteo of left foot     Follow cultures   Consult wound care   Continue  Cefepime, vanc and zithromax     Uncontrolled type 2 diabetes mellitus with hyperglycemia   accucheck AC&HS,  Continue levemir and aspart  Low dose SSI,   Diabetic diet       CKD stage 4 due to type 2 diabetes mellitus  Stable  Avoid nephrotoxic meds  renal dose meds   strict intact and output     Anemia of chronic renal failure, stage 4 (severe)  H/H stable monitor       Hypertension associated with diabetes  Hyperlipidemia   PAD     Continue amlodipine and carvedilol   Continue  ASA, plavix and statin   Monitor telemetry       VTE Risk Mitigation (From admission, onward)         Ordered     heparin (porcine) injection 5,000 Units  Every 12 hours      10/22/19 0633     IP VTE HIGH RISK PATIENT  Once      10/22/19 0138     Place sequential compression device  Until discontinued      10/22/19 0138                      Christina N MD [place]  Department of Hospital Medicine   [facility]

## 2019-10-28 NOTE — PLAN OF CARE
Plan of care reviewed with patient, understanding verbalized. Pt remains SB/SR on tele. No complaints of pain or acute distress noted. Instructed to call for any assistance, understanding verbalized. BG monitored and SSI administered per orders. 15L comfort flow maintained. Bed alarm on, call light in reach, fall precautions in place. Will continue to monitor.

## 2019-10-29 ENCOUNTER — TELEPHONE (OUTPATIENT)
Dept: PODIATRY | Facility: CLINIC | Age: 74
End: 2019-10-29

## 2019-10-29 VITALS
WEIGHT: 244.69 LBS | RESPIRATION RATE: 18 BRPM | HEART RATE: 52 BPM | DIASTOLIC BLOOD PRESSURE: 61 MMHG | BODY MASS INDEX: 30.42 KG/M2 | OXYGEN SATURATION: 95 % | TEMPERATURE: 98 F | HEIGHT: 75 IN | SYSTOLIC BLOOD PRESSURE: 141 MMHG

## 2019-10-29 LAB
ANION GAP SERPL CALC-SCNC: 8 MMOL/L (ref 8–16)
BASOPHILS # BLD AUTO: 0.04 K/UL (ref 0–0.2)
BASOPHILS NFR BLD: 0.7 % (ref 0–1.9)
BUN SERPL-MCNC: 70 MG/DL (ref 8–23)
CALCIUM SERPL-MCNC: 9.3 MG/DL (ref 8.7–10.5)
CHLORIDE SERPL-SCNC: 106 MMOL/L (ref 95–110)
CO2 SERPL-SCNC: 25 MMOL/L (ref 23–29)
CREAT SERPL-MCNC: 3.3 MG/DL (ref 0.5–1.4)
DIFFERENTIAL METHOD: ABNORMAL
ENTEROVIRUS: NOT DETECTED
EOSINOPHIL # BLD AUTO: 0.3 K/UL (ref 0–0.5)
EOSINOPHIL NFR BLD: 4.7 % (ref 0–8)
ERYTHROCYTE [DISTWIDTH] IN BLOOD BY AUTOMATED COUNT: 14.7 % (ref 11.5–14.5)
EST. GFR  (AFRICAN AMERICAN): 20 ML/MIN/1.73 M^2
EST. GFR  (NON AFRICAN AMERICAN): 17 ML/MIN/1.73 M^2
GLUCOSE SERPL-MCNC: 127 MG/DL (ref 70–110)
HCT VFR BLD AUTO: 25.5 % (ref 40–54)
HGB BLD-MCNC: 8 G/DL (ref 14–18)
HUMAN BOCAVIRUS: NOT DETECTED
HUMAN CORONAVIRUS, COMMON COLD VIRUS: NOT DETECTED
INFLUENZA A - H1N1-09: NOT DETECTED
INR PPP: 1.1 (ref 0.8–1.2)
LEGIONELLA PNEUMOPHILA: NOT DETECTED
LYMPHOCYTES # BLD AUTO: 1.4 K/UL (ref 1–4.8)
LYMPHOCYTES NFR BLD: 25 % (ref 18–48)
MAGNESIUM SERPL-MCNC: 2.4 MG/DL (ref 1.6–2.6)
MCH RBC QN AUTO: 27.3 PG (ref 27–31)
MCHC RBC AUTO-ENTMCNC: 31.4 G/DL (ref 32–36)
MCV RBC AUTO: 87 FL (ref 82–98)
MONOCYTES # BLD AUTO: 0.3 K/UL (ref 0.3–1)
MONOCYTES NFR BLD: 5.4 % (ref 4–15)
MORAXELLA CATARRHALIS: NOT DETECTED
NEUTROPHILS # BLD AUTO: 3.6 K/UL (ref 1.8–7.7)
NEUTROPHILS NFR BLD: 64.2 % (ref 38–73)
PARAINFLUENZA: NOT DETECTED
PHOSPHATE SERPL-MCNC: 4.5 MG/DL (ref 2.7–4.5)
PLATELET # BLD AUTO: 167 K/UL (ref 150–350)
PMV BLD AUTO: 10.4 FL (ref 9.2–12.9)
POCT GLUCOSE: 103 MG/DL (ref 70–110)
POCT GLUCOSE: 151 MG/DL (ref 70–110)
POTASSIUM SERPL-SCNC: 4.6 MMOL/L (ref 3.5–5.1)
PROTHROMBIN TIME: 11.1 SEC (ref 9–12.5)
RBC # BLD AUTO: 2.93 M/UL (ref 4.6–6.2)
RVP - ADENOVIRUS: NOT DETECTED
RVP - HUMAN METAPNEUMOVIRUS (HMPV): NOT DETECTED
RVP - INFLUENZA A: NOT DETECTED
RVP - INFLUENZA B: NOT DETECTED
RVP - RESPIRATORY SYNCTIAL VIRUS (RSV) A: NOT DETECTED
RVP - RESPIRATORY VIRAL PANEL, SOURCE: ABNORMAL
RVP - RHINOVIRUS: NOT DETECTED
SODIUM SERPL-SCNC: 139 MMOL/L (ref 136–145)
TEM - ACINETOBACTER BAUMANNII: NOT DETECTED
TEM - BORDETELLA PERTUSSIS: NOT DETECTED
TEM - CHLAMYDOPHILA PNEUMONIAE: NOT DETECTED
TEM - KLEBSIELLA PNEUMONIAE: POSITIVE
TEM - MRSA: POSITIVE
TEM - MYCOPLASMA PNEUMONIAE: NOT DETECTED
TEM - NEISSERIA MENINGITIDIS: NOT DETECTED
TEM - PANTON-VALENTINE: POSITIVE
TEM - PSEUDOMONAS AERUGINOSA: NOT DETECTED
TEM - STAPHYLOCOCCUS AUREUS: NOT DETECTED
TEM - STREPTOCOCCUS PNEUMONIAE: NOT DETECTED
TEM - STREPTOCOCCUS PYOGENES A: NOT DETECTED
TEM- HAEMOPHILUS INFLUENZAE B: NOT DETECTED
TEM- HAEMOPHILUS INFLUENZAE: NOT DETECTED
WBC # BLD AUTO: 5.69 K/UL (ref 3.9–12.7)

## 2019-10-29 PROCEDURE — 97116 GAIT TRAINING THERAPY: CPT

## 2019-10-29 PROCEDURE — 80048 BASIC METABOLIC PNL TOTAL CA: CPT

## 2019-10-29 PROCEDURE — 85610 PROTHROMBIN TIME: CPT

## 2019-10-29 PROCEDURE — 25000003 PHARM REV CODE 250: Performed by: FAMILY MEDICINE

## 2019-10-29 PROCEDURE — 85025 COMPLETE CBC W/AUTO DIFF WBC: CPT

## 2019-10-29 PROCEDURE — 63600175 PHARM REV CODE 636 W HCPCS: Performed by: INTERNAL MEDICINE

## 2019-10-29 PROCEDURE — 25000003 PHARM REV CODE 250: Performed by: NURSE PRACTITIONER

## 2019-10-29 PROCEDURE — 83735 ASSAY OF MAGNESIUM: CPT

## 2019-10-29 PROCEDURE — 27000646 HC AEROBIKA DEVICE

## 2019-10-29 PROCEDURE — 94761 N-INVAS EAR/PLS OXIMETRY MLT: CPT

## 2019-10-29 PROCEDURE — 36415 COLL VENOUS BLD VENIPUNCTURE: CPT

## 2019-10-29 PROCEDURE — 84100 ASSAY OF PHOSPHORUS: CPT

## 2019-10-29 PROCEDURE — 27000221 HC OXYGEN, UP TO 24 HOURS

## 2019-10-29 PROCEDURE — 99900035 HC TECH TIME PER 15 MIN (STAT)

## 2019-10-29 PROCEDURE — 94664 DEMO&/EVAL PT USE INHALER: CPT

## 2019-10-29 RX ORDER — IPRATROPIUM BROMIDE AND ALBUTEROL SULFATE 2.5; .5 MG/3ML; MG/3ML
3 SOLUTION RESPIRATORY (INHALATION) EVERY 4 HOURS PRN
Qty: 90 ML | Refills: 0 | Status: SHIPPED | OUTPATIENT
Start: 2019-10-29 | End: 2022-01-01

## 2019-10-29 RX ORDER — AZITHROMYCIN 500 MG/1
500 TABLET, FILM COATED ORAL DAILY
Qty: 5 TABLET | Refills: 0 | Status: SHIPPED | OUTPATIENT
Start: 2019-10-29 | End: 2019-11-03

## 2019-10-29 RX ORDER — POTASSIUM CHLORIDE 750 MG/1
10 TABLET, EXTENDED RELEASE ORAL
Qty: 12 TABLET | Refills: 0 | Status: SHIPPED | OUTPATIENT
Start: 2019-10-29 | End: 2019-11-28

## 2019-10-29 RX ORDER — AZITHROMYCIN 250 MG/1
500 TABLET, FILM COATED ORAL DAILY
Status: DISCONTINUED | OUTPATIENT
Start: 2019-10-29 | End: 2019-10-29 | Stop reason: HOSPADM

## 2019-10-29 RX ORDER — GUAIFENESIN 400 MG/1
400 TABLET ORAL 2 TIMES DAILY PRN
Qty: 16 TABLET | COMMUNITY
Start: 2019-10-29 | End: 2019-11-08

## 2019-10-29 RX ADMIN — DOCUSATE SODIUM 100 MG: 100 CAPSULE, LIQUID FILLED ORAL at 09:10

## 2019-10-29 RX ADMIN — GUAIFENESIN AND CODEINE PHOSPHATE 5 ML: 100; 10 SOLUTION ORAL at 05:10

## 2019-10-29 RX ADMIN — ROSUVASTATIN CALCIUM 20 MG: 10 TABLET, FILM COATED ORAL at 09:10

## 2019-10-29 RX ADMIN — GABAPENTIN 100 MG: 100 CAPSULE ORAL at 09:10

## 2019-10-29 RX ADMIN — CLOPIDOGREL BISULFATE 75 MG: 75 TABLET ORAL at 09:10

## 2019-10-29 RX ADMIN — HEPARIN SODIUM 5000 UNITS: 5000 INJECTION, SOLUTION INTRAVENOUS; SUBCUTANEOUS at 10:10

## 2019-10-29 RX ADMIN — ASPIRIN 81 MG: 81 TABLET, COATED ORAL at 09:10

## 2019-10-29 RX ADMIN — POTASSIUM CHLORIDE 40 MEQ: 20 TABLET, EXTENDED RELEASE ORAL at 09:10

## 2019-10-29 RX ADMIN — HYDRALAZINE HYDROCHLORIDE 50 MG: 25 TABLET ORAL at 01:10

## 2019-10-29 RX ADMIN — CARVEDILOL 25 MG: 25 TABLET, FILM COATED ORAL at 09:10

## 2019-10-29 RX ADMIN — AMLODIPINE BESYLATE 10 MG: 5 TABLET ORAL at 09:10

## 2019-10-29 RX ADMIN — GUAIFENESIN AND CODEINE PHOSPHATE 5 ML: 100; 10 SOLUTION ORAL at 01:10

## 2019-10-29 RX ADMIN — HYDRALAZINE HYDROCHLORIDE 50 MG: 25 TABLET ORAL at 05:10

## 2019-10-29 RX ADMIN — INSULIN ASPART 3 UNITS: 100 INJECTION, SOLUTION INTRAVENOUS; SUBCUTANEOUS at 12:10

## 2019-10-29 NOTE — PLAN OF CARE
CM notified by floor nurse pt is requiring home O2, O2 sats 86% on room air while exercising.  Orders placed, will await delivery.    Faviola Bob RN    [phone]

## 2019-10-29 NOTE — PLAN OF CARE
Patient on oxygen with documented flow.  Will attempt to wean per O2 order protocol. Patient performs CPT via [employer] pep device. Will continue to monitor.

## 2019-10-29 NOTE — PLAN OF CARE
[facility] HOME HEALTH ORDERS  FACE TO FACE ENCOUNTER    Patient Name: Domingo Castro  YOB: 1945    PCP: Griselda Nugent MD   PCP Address: [address] / [place] [id number]  PCP Phone Number: [phone]  PCP Fax: [phone]    Encounter Date: 10/29/2019    Continue [facility]    Diagnoses:  Active Hospital Problems    Diagnosis  POA    *Acute respiratory failure with hypoxia [J96.01]  Yes    Pulmonary edema [J81.1]  Yes    Right upper lobe pneumonia [J18.1]  Yes    Fever [R50.9]  Yes    Diabetic ulcer of left foot [E11.621, L97.529]  Yes    Acute on chronic congestive heart failure [I50.9]  Yes    Uncontrolled type 2 diabetes mellitus with hyperglycemia [E11.65]  Yes    PAD (peripheral artery disease) [I73.9]  Yes    CKD stage 4 due to type 2 diabetes mellitus [E11.22, N18.4]  Yes    Anemia of chronic renal failure, stage 4 (severe) [N18.4, D63.1]  Yes     Chronic    Hypertension associated with diabetes [E11.59, I10]  Yes      Resolved Hospital Problems   No resolved problems to display.       Future Appointments   Date Time Provider Department Center   11/25/2019 10:10 AM LAB, [facility] LAB [facility]   12/2/2019  1:40 PM Griselda Nugent MD [facility]     Follow-up Information     Griselda Nugent MD In 3 days.    Specialty:  Internal Medicine  Contact information:  [address]  [place] [id number]  [phone]             Dimitry Gallegos DPM.    Specialty:  Podiatry  Why:  DR Gallegos's nurse will contact you with an appointment in 1-2 weeks.  If you have not received a call or appointment by Thursday please call his office.  Contact information:  [address]  [address]  [facility] [address]  [phone]                     I have seen and examined this patient face to face today. My clinical findings that support the need for the home health skilled services and home bound status are the  following:  Weakness/numbness causing balance and gait disturbance due to Infection and Weakness/Debility making it taxing to leave home.    Allergies:  Review of patient's allergies indicates:   Allergen Reactions    Atorvastatin Other (See Comments)       Diet: cardiac diet and diabetic diet: 1800 calorie    Activities: activity as tolerated    Nursing:   SN to complete comprehensive assessment including routine vital signs. Instruct on disease process and s/s of complications to report to MD. Review/verify medication list sent home with the patient at time of discharge  and instruct patient/caregiver as needed. Frequency may be adjusted depending on start of care date.    Notify MD if SBP > 160 or < 90; DBP > 90 or < 50; HR > 120 or < 50; Temp > 101; Other:         CONSULTS:     to evaluate for community resources/long-range planning.    MISCELLANEOUS CARE:    WOUND CARE ORDERS  cleanse wound sites left foot with wound cleanser.  Apply saline moist and Trinh to wound base and cover with foam bandage around the toe.  Wrap 3 layers of cast padding to the foot and lightly secured with Coban forming a football dressing 3 times per week.      Medications: Review discharge medications with patient and family and provide education.      Current Discharge Medication List      START taking these medications    Details   albuterol-ipratropium (DUO-NEB) 2.5 mg-0.5 mg/3 mL nebulizer solution Take 3 mLs by nebulization every 4 (four) hours as needed for Wheezing or Shortness of Breath. Rescue  Qty: 1 Box, Refills: 0      azithromycin (ZITHROMAX) 500 MG tablet Take 1 tablet (500 mg total) by mouth once daily. for 5 days  Qty: 5 tablet, Refills: 0      guaiFENesin (HUMIBID E) 400 mg Tab Take 1 tablet (400 mg total) by mouth 2 (two) times daily as needed (cough).  Qty: 16 tablet      potassium chloride SA (K-DUR,KLOR-CON) 10 MEQ tablet Take 1 tablet (10 mEq total) by mouth every Tues, Thurs, Sat.  Qty: 12 tablet,  "Refills: 0         CONTINUE these medications which have NOT CHANGED    Details   acetaminophen (TYLENOL) 325 MG tablet Take 2 tablets (650 mg total) by mouth every 4 (four) hours as needed.  Refills: 0      amLODIPine (NORVASC) 10 MG tablet Take 1 tablet (10 mg total) by mouth once daily.  Qty: 90 tablet, Refills: 3      aspirin (ECOTRIN) 81 MG EC tablet Take 1 tablet (81 mg total) by mouth once daily.  Refills: 0      carvedilol (COREG) 25 MG tablet Take 1 tablet (25 mg total) by mouth 2 (two) times daily with meals.  Qty: 60 tablet, Refills: 0      clopidogrel (PLAVIX) 75 mg tablet Take 1 tablet (75 mg total) by mouth once daily.  Qty: 30 tablet, Refills: 11      furosemide (LASIX) 40 MG tablet TAKE 2 TABLETS BY MOUTH TWICE DAILY. DO NOT TAKE THE EVENING DOSE AFTER 3 PM  Qty: 360 tablet, Refills: 0      gabapentin (NEURONTIN) 100 MG capsule TAKE ONE CAPSULE BY MOUTH IN THE MORNING THEN ONE CAPSULE  IN THE EVENING AND THEN THREE CAPSULES AT BEDTIME  Qty: 450 capsule, Refills: 3      hydrALAZINE (APRESOLINE) 50 MG tablet TAKE 1 TABLET BY MOUTH EVERY 12 HOURS  Qty: 60 tablet, Refills: 11    Comments: Please consider 90 day supplies to promote better adherence      insulin lispro (HUMALOG KWIKPEN INSULIN) 100 unit/mL InPn pen INJECT 10 UNITS SUBCUTANEOUSLY THREE TIMES DAILY BEFORE MEALS WITH CORRECTION SCALE, MAX TDD 50 UNITS  Qty: 9 Box, Refills: 6    Associated Diagnoses: Type 2 diabetes mellitus with stage 4 chronic kidney disease, with long-term current use of insulin      LEVEMIR FLEXTOUCH U-100 INSULN 100 unit/mL (3 mL) InPn pen Inject 26 Units into the skin every evening.  Qty: 36 mL, Refills: 3    Associated Diagnoses: Type 2 diabetes mellitus with stage 4 chronic kidney disease, with long-term current use of insulin      pen needle, diabetic, safety (BD AUTOSHIELD PEN NEEDLE) 29 gauge x 5/16" Ndle For use once daily with levemir flexpen  Qty: 90 each, Refills: 11      rosuvastatin (CRESTOR) 20 MG tablet Take " 20 mg by mouth once daily.              I certify that this patient is confined to his home and needs intermittent skilled nursing care.

## 2019-10-29 NOTE — PLAN OF CARE
Went over Oxygen tank education. Pt verbalized understanding and preformed how to use his tank. Transport has been placed for discharge.

## 2019-10-29 NOTE — PLAN OF CARE
Home Oxygen Evaluation    Date Performed: 10/29/2019    1) Patient's Home O2 Sat on room air, while at rest: 95%        If O2 sats on room air at rest are 88% or below, patient qualifies. No additional testing needed. Document N/A in steps 2 and 3. If 89% or above, complete steps 2.      2) Patient's O2 Sat on room air while exercisin%        If O2 sats on room air while exercising remain 89% or above patient does not qualify, no further testing needed Document N/A in step 3. If O2 sats on room air while exercising are 88% or below, continue to step 3.      3) Patient's O2 Sat while exercising on O2: 95 at 2 LPM         (Must show improvement from #2 for patients to qualify)    If O2 sats improve on oxygen, patient qualifies for portable oxygen. If not, the patient does not qualify.

## 2019-10-29 NOTE — PLAN OF CARE
10/29/19 1312   Post-Acute Status   Post-Acute Authorization Home Health/Hospice   Home Health/Hospice Status Referrals Sent   Discharge Delays None known at this time

## 2019-10-29 NOTE — PLAN OF CARE
Problem: Physical Therapy Goal  Goal: Physical Therapy Goal  Description  Goals to be met by: 2019     Patient will increase functional independence with mobility by performin. Bed to chair transfer with Modified [place] using no AD  2. Gait  x 250 feet with Modified [place] using no AD c/ L surgical shoe and heel WB.   3. Lower extremity exercise program x15 reps per handout, with independence     Outcome: Ongoing, Progressing   Continue working toward goals.

## 2019-10-29 NOTE — PLAN OF CARE
Pt stable. NO distress noted. POC reviewed with pt. Pt verbalized understanding. Pt remains SB in the 50s on the monitor. NO true red alarms noted. Pt denied pain. Blood glucose monitored. SSI administered. Dressing to Left foot CDI. Contact precautions maintained. Fall precautions maintained. Bed in lowest position, call light in reach and bed alarm on.

## 2019-10-29 NOTE — PLAN OF CARE
Visit with pt, wife at bedside for d/c home.  Alo  arranged, pt to be seen tomorrow.  F/u appt scheduled, awaiting Dr Gallegos f/u, clinic to contact pt.    D/C rounds complete. All questions answered.  Nurse to discuss d/c medications.  Discussed need to keep f/u appts, adherence to medication regimen for health maintenance, verbalized understanding.    Future Appointments   Date Time Provider Department Center   11/8/2019 10:40 AM Griselda Nugent MD [facility] [place]   11/25/2019 10:10 AM [facility] LAB [facility]   12/2/2019  1:40 PM Griselda Nugent MD [facility] [place]          10/29/19 3145   Final Note   Assessment Type Final Discharge Note   Anticipated Discharge Disposition Home-Health   Hospital Follow Up  Appt(s) scheduled? Yes   Discharge plans and expectations educations in teach back method with documentation complete? Yes   Right Care Referral Info   Post Acute Recommendation Home-care       Faviola Bob, RN    [phone]

## 2019-10-29 NOTE — PLAN OF CARE
VN reviewed discharge instructions with pt/family. AVS printed and handed to pt/family by bedside nurse. Reviewed follow-up appointments, medications, diet, and importance of medication compliance. Reviewed home care instructions, treatment plan, self-management, and when to seek medical attention. Allowed time for questions. All questions answered. Patient/family verbalized complete understanding of discharge instructions and voices no concerns.    Discharge instructions complete. Waiting on bedside delivery from pharmacy. Bedside nurse notified.

## 2019-10-29 NOTE — DISCHARGE SUMMARY
[facility] Medicine  Discharge Summary      Patient Name: Domingo Castro  MRN: [id number]  Admission Date: 10/21/2019  Hospital Length of Stay: 7 days  Discharge Date and Time: No discharge date for patient encounter.  Attending Physician: Pepito Lamb DO   Discharging Provider: Pepito Lamb DO  Primary Care Provider: Griselda Nugent MD      HPI:   Domingo Castro,75 yo male, with HTN, HLD, CAD, chronic diastolic dysfunction, Type 2 Diabetes Mellitus with diabetic neuropathy, CKD stage 4, PAD, chronic diabetic ulcer of left foot and left toe osteomyelitis was advised to report to [facility] by home health nurse for evaluation of oxygen saturation 76%. Patient reports progressively worsening SOB over the past week associated with productive cough and chills. He denies chest pain.   Pt admitted 7/25-8/2/19 for management of Diabetic ulcer of the left foot with osteomyelitis.  Wound culture grew MRSA. He went for LE cath procedure on 7/30. Revascularization procedure was completed on 8/1. Pt was discharged 8/2 with six weeks planned IV vancomycin therapy. Per patient report he most recently on abx for pna and his foot three weeks ago.   Initial labs remarkable for H/H 9/29, BUN/Cr 60/3.3, glucose 434, , glucose 434. CXR dense consolidations to right upper lobe/mid lung field in addition to possible component fluid overload. Influenza negative. Patient hypertensive and tachypneic in ED. He received furosemide 60 mg x 2, cefepime, duonebs and 10 units regular insulin. Patient with progressively worsening dyspnea despite supplemental O2 prior to transfer to [place]. He was placed on no rebreather with noted improved. ABG unremarkable. Patient admitted to [facility] for further care.     * No surgery found *      Hospital Course:   Admitted with Pneumonia, requiring high flow oxygen per NC. Weaning O2 very slowing. Blood cx NGTD  De-escalate abx from  Vanc/Cefepime/Azithromycin to Ceftrixone and Azithro. Cough meds, CPT and PT/OT  10/25 diuresing, continue oxygen weaning down to 15 L on 35 % , tolerating therapy. Step down to floor today   10/28 possible discharge home today or tomorrow with home health.   10/29 pt is feeling good, he is fine to go home, family at bedside as well. Pt is of oxygen, he has [facility] with [facility] for [facility] care     Consults:   Consults (From admission, onward)        Status Ordering Provider     Inpatient consult to Anesthesiology  Once     Provider:  (Not yet assigned)    Acknowledged INNOCENT-ITUAH, BENSON N.     Inpatient consult to Pulmonology  Once     Provider:  (Not yet assigned)    Completed INNOCENT-ITUAH, BENSON N.     Inpatient consult to Social Work  Once     Provider:  (Not yet assigned)    Acknowledged INNOCENT-ITUAH, BENSON N.     Inpatient consult to Social Work  Once     Provider:  (Not yet assigned)    Acknowledged INNOCENT-ITUAH, BENSON N.     Inpatient consult to Social Work  Once     Provider:  (Not yet assigned)    Acknowledged INNOCENT-ITUAH, BENSON N.          No new Assessment & Plan notes have been filed under this hospital service since the last note was generated.  Service: Hospital Medicine    Final Active Diagnoses:    Diagnosis Date Noted POA    PRINCIPAL PROBLEM:  Acute respiratory failure with hypoxia [J96.01] 12/23/2018 Yes    Pulmonary edema [J81.1] 10/22/2019 Yes    Right upper lobe pneumonia [J18.1] 10/22/2019 Yes    Fever [R50.9] 10/22/2019 Yes    Diabetic ulcer of left foot [E11.621, L97.529] 10/22/2019 Yes    Acute on chronic congestive heart failure [I50.9] 10/21/2019 Yes    Uncontrolled type 2 diabetes mellitus with hyperglycemia [E11.65] 09/19/2019 Yes    PAD (peripheral artery disease) [I73.9] 07/29/2019 Yes    CKD stage 4 due to type 2 diabetes mellitus [E11.22, N18.4] 12/23/2018 Yes    Anemia of chronic renal failure, stage 4 (severe) [N18.4, D63.1] 06/14/2018 Yes     Chronic     Hypertension associated with diabetes [E11.59, I10] 01/10/2013 Yes      Problems Resolved During this Admission:       Discharged Condition: stable    Disposition: Home-Health Care [facility]    Follow Up:  Follow-up Information     Griselda Nugent MD In 3 days.    Specialty:  Internal Medicine  Contact information:  [address] [facility]  [place] [id number]  [phone]                 Patient Instructions:      Diet Cardiac     Diet diabetic     Notify your health care provider if you experience any of the following:  temperature >100.4     Notify your health care provider if you experience any of the following:  persistent nausea and vomiting or diarrhea     Notify your health care provider if you experience any of the following:  severe uncontrolled pain     Notify your health care provider if you experience any of the following:  difficulty breathing or increased cough     Change dressing (specify)   Order Comments: Continue home health dressinh changes by wound care     Activity as tolerated       Significant Diagnostic Studies: Labs:   BMP:   Recent Labs   Lab 10/28/19  0628 10/29/19  0516   * 127*    139   K 4.5 4.6    106   CO2 25 25   BUN 68* 70*   CREATININE 3.5* 3.3*   CALCIUM 9.6 9.3   MG 2.3 2.4   , CMP   Recent Labs   Lab 10/28/19  0628 10/29/19  0516    139   K 4.5 4.6    106   CO2 25 25   * 127*   BUN 68* 70*   CREATININE 3.5* 3.3*   CALCIUM 9.6 9.3   ANIONGAP 11 8   ESTGFRAFRICA 19* 20*   EGFRNONAA 16* 17*   , CBC   Recent Labs   Lab 10/28/19  0628 10/29/19  0516   WBC 7.07 5.69   HGB 8.3* 8.0*   HCT 26.6* 25.5*    167    and A1C:   Recent Labs   Lab 07/26/19  0529   HGBA1C 8.1*       Pending Diagnostic Studies:     None         Medications:  Reconciled Home Medications:      Medication List      START taking these medications    albuterol-ipratropium 2.5 mg-0.5 mg/3 mL nebulizer solution  Commonly known as:  DUO-NEB  Take 3 mLs by nebulization every 4  "(four) hours as needed for Wheezing or Shortness of Breath. Rescue     azithromycin 500 MG tablet  Commonly known as:  ZITHROMAX  Take 1 tablet (500 mg total) by mouth once daily. for 5 days     guaiFENesin 400 mg Tab  Commonly known as:  HUMIBID E  Take 1 tablet (400 mg total) by mouth 2 (two) times daily as needed (cough).     potassium chloride SA 10 MEQ tablet  Commonly known as:  K-DUR,KLOR-CON  Take 1 tablet (10 mEq total) by mouth every Tues, Thurs, Sat.        CONTINUE taking these medications    acetaminophen 325 MG tablet  Commonly known as:  TYLENOL  Take 2 tablets (650 mg total) by mouth every 4 (four) hours as needed.     amLODIPine 10 MG tablet  Commonly known as:  NORVASC  Take 1 tablet (10 mg total) by mouth once daily.     aspirin 81 MG EC tablet  Commonly known as:  ECOTRIN  Take 1 tablet (81 mg total) by mouth once daily.     carvedilol 25 MG tablet  Commonly known as:  COREG  Take 1 tablet (25 mg total) by mouth 2 (two) times daily with meals.     clopidogrel 75 mg tablet  Commonly known as:  PLAVIX  Take 1 tablet (75 mg total) by mouth once daily.     furosemide 40 MG tablet  Commonly known as:  LASIX  TAKE 2 TABLETS BY MOUTH TWICE DAILY. DO NOT TAKE THE EVENING DOSE AFTER 3 PM     gabapentin 100 MG capsule  Commonly known as:  NEURONTIN  TAKE ONE CAPSULE BY MOUTH IN THE MORNING THEN ONE CAPSULE  IN THE EVENING AND THEN THREE CAPSULES AT BEDTIME     hydrALAZINE 50 MG tablet  Commonly known as:  APRESOLINE  TAKE 1 TABLET BY MOUTH EVERY 12 HOURS     insulin lispro 100 unit/mL pen  Commonly known as:  HumaLOG KwikPen Insulin  INJECT 10 UNITS SUBCUTANEOUSLY THREE TIMES DAILY BEFORE MEALS WITH CORRECTION SCALE, MAX TDD 50 UNITS     LEVEMIR FLEXTOUCH U-100 INSULN 100 unit/mL (3 mL) Inpn pen  Generic drug:  insulin detemir U-100  Inject 26 Units into the skin every evening.     pen needle, diabetic, safety 29 gauge x 5/16" Ndle  Commonly known as:  BD AUTOSHIELD PEN NEEDLE  For use once daily with " levemir flexpen     rosuvastatin 20 MG tablet  Commonly known as:  CRESTOR  Take 20 mg by mouth once daily.            Indwelling Lines/Drains at time of discharge:   Lines/Drains/Airways     Central Venous Catheter Line                 Port A Cath Single Lumen 07/25/19 0835 right subclavian 95 days                Time spent on the discharge of patient: 43 minutes  Patient was seen and examined on the date of discharge and determined to be suitable for discharge.         Pepito Lamb DO  Department of Hospital Medicine  [facility]

## 2019-10-29 NOTE — PLAN OF CARE
Discharge instruction and education packet provided. VN notified. IV site removed cath tip intact. Telemetry discontinued without adverse reaction. Patient shows no acute distress. Waiting for pharmacy to deliver meds.

## 2019-10-29 NOTE — PLAN OF CARE
VN rounds: VN cued into pt's room with pt's permission. Pt resting in bed noted to be on oxygen. Pt states he does not wear O2 at home. Family at bedside. Fall risk protocol discussed with pt. VN instructed pt to call for assistance. Pt aware and agreeable. NAD noted. Allowed time for questions. Will cont to be available and intervene as needed.        10/29/19 1157   Type of Frequent Check   Type Patient Rounds;Other (see comments)  (VN rounds)   Safety/Activity   Patient Rounds visualized patient;call light in patient/parent reach   Safety Promotion/Fall Prevention Fall Risk reviewed with patient/family;assistive device/personal item within reach;instructed to call staff for mobility;family to remain at bedside   Positioning   Body Position supine   Pain/Comfort/Sleep   Preferred Pain Scale word (verbal rating pain scale)   Pain Rating: Rest 0 - no pain   Sleep/Rest/Relaxation awake;no problem identified   Assessments (Pre/Post)   Level of Consciousness (AVPU) alert

## 2019-10-29 NOTE — TELEPHONE ENCOUNTER
----- Message from Faviola Bob RN sent at 10/29/2019 12:40 PM CDT -----  Contact: mary Salmeron case manager  Pt will d/c home today with [facility] for wound care.  Will need a follow up appt with DR Gallegos in 1-2 weeks.  Can you schedule and I can place in AVS.  Otherwise call pt for appt at [phone] Ms Castro.    Favioal Bob RN    [phone]

## 2019-10-29 NOTE — PROGRESS NOTES
Pharmacy New Medication Education    Patient and/or Caregiver ACCEPTED medication education.    Pharmacy has provided education on the name, indication, and possible side effects of the medication(s) prescribed, using teach-back method.     Learners of pharmacy medication education includes:  patient      The following medications have also been discussed, during this admission.     Current Facility-Administered Medications   Medication Frequency    acetaminophen tablet 650 mg Q6H PRN    albuterol-ipratropium 2.5 mg-0.5 mg/3 mL nebulizer solution 3 mL Q4H PRN    amLODIPine tablet 10 mg Daily    aspirin EC tablet 81 mg Daily    azithromycin 500 mg in dextrose 5 % 250 mL IVPB (ready to mix system) Q24H    carvedilol tablet 25 mg BID WM    cefTRIAXone (ROCEPHIN) 1 g in dextrose 5 % 50 mL IVPB Q24H    clopidogrel tablet 75 mg Daily    dextrose 10% (D10W) Bolus PRN    dextrose 10% (D10W) Bolus PRN    docusate sodium capsule 100 mg Daily    gabapentin capsule 100 mg BID    glucagon (human recombinant) injection 1 mg PRN    glucose chewable tablet 16 g PRN    glucose chewable tablet 24 g PRN    guaifenesin-codeine 100-10 mg/5 ml syrup 5 mL Q6H    heparin (porcine) injection 5,000 Units Q12H    hydrALAZINE tablet 50 mg Q8H    insulin aspart U-100 pen 0-5 Units QID (AC + HS) PRN    insulin detemir U-100 pen 26 Units QHS    ondansetron injection 4 mg Q8H PRN    potassium chloride SA CR tablet 40 mEq Daily    rosuvastatin tablet 20 mg Daily    sodium chloride 0.9% flush 10 mL PRN          Thank you  Dinesh Mccarthy, PharmD

## 2019-10-29 NOTE — PT/OT/SLP PROGRESS
Physical Therapy Treatment    Patient Name:  Domingo Castro   MRN:  [id number]    Recommendations:     Discharge Recommendations:  home   Discharge Equipment Recommendations: none   Barriers to discharge: decreased strength and endurance.    Assessment:     Domingo Castro is a 74 y.o. male admitted with a medical diagnosis of Acute respiratory failure with hypoxia.  He presents with the following impairments/functional limitations:  weakness, impaired endurance, impaired cardiopulmonary response to activity, decreased lower extremity function, impaired skin. Pt would continue to benefit from P.T. To address impairments listed above.  .    Rehab Prognosis: Good; patient would benefit from acute skilled PT services to address these deficits and reach maximum level of function.    Recent Surgery: * No surgery found *      Plan:     During this hospitalization, patient to be seen 5 x/week to address the identified rehab impairments via gait training, therapeutic activities, therapeutic exercises, neuromuscular re-education and progress toward the following goals:    · Plan of Care Expires:  11/24/19    Subjective       Patient/Family Comments/goals: Pt agreed to tx.  Pain/Comfort:  · Pain Rating 1: 0/10  · Pain Rating Post-Intervention 1: 0/10      Objective:     Communicated with RN (Laura) prior to session.  Patient found up in chair with oxygen upon PT entry to room.     General Precautions: Standard, fall   Orthopedic Precautions:N/A   Braces:       Functional Mobility:  · Transfers:     · Sit to Stand:  modified independence and supervision with rolling walker  · Gait: 80ft x 2 with RW and SBA/S.  RN ambulating with PTA and pt monitoring O2 sats.  All sats taken without O2. O2 sats 95% sitting up in b/s chair at rest.  While ambulating (1st bout) O2 sats were 90% to 92 %.  Pt stood for a standing rest break after 80ft, and O2 sats dropped to 86% and eventually up to 91% with PLB (1 - 2 mins).  Pt ambulated 80ft back to  his room with O2 sats 90% to 92%.  Pt stood after 2nd bout of gait and O2 sats dropped again to 86% and up to 90 -91% after ~2 to 3 mins.  Pt was left sitting up in b/s chair at end of tx.  RN stated she was contacting pt's doctor prior to pt discharging in reference to O2 sats being below 90% without O2 during activity.    · Balance: sitting good, standing fair+/good-, gait fair+/good-      AM-PAC 6 CLICK MOBILITY  Turning over in bed (including adjusting bedclothes, sheets and blankets)?: 4  Sitting down on and standing up from a chair with arms (e.g., wheelchair, bedside commode, etc.): 4  Moving from lying on back to sitting on the side of the bed?: 3  Moving to and from a bed to a chair (including a wheelchair)?: 3  Need to walk in hospital room?: 3  Climbing 3-5 steps with a railing?: 3  Basic Mobility Total Score: 20       Therapeutic Activities and Exercises:   Pt ambulated without LOB, slow eddy, and mild trunk flexion.  VC's for upright posture, which pt followed, but eventually returned to mildly flexed posture.  Static standing with S/Mod I and RW.  No LOB during gait training.    Patient left up in chair with all lines intact, call button in reach, chair alarm on and RN present..    GOALS:   Multidisciplinary Problems     Physical Therapy Goals        Problem: Physical Therapy Goal    Goal Priority Disciplines Outcome Goal Variances Interventions   Physical Therapy Goal     PT, PT/OT Ongoing, Progressing     Description:  Goals to be met by: 2019     Patient will increase functional independence with mobility by performin. Bed to chair transfer with Modified [place] using no AD  2. Gait  x 250 feet with Modified [place] using no AD c/ L surgical shoe and heel WB.   3. Lower extremity exercise program x15 reps per handout, with independence                      Time Tracking:     PT Received On: 10/29/19  PT Start Time: 1406     PT Stop Time: 1425  PT Total Time (min): 19 min      Billable Minutes: Gait Training 19    Treatment Type: Treatment  PT/PTA: PTA     PTA Visit Number: 3     Leticia Lobo PTA  10/29/2019

## 2019-10-29 NOTE — PROGRESS NOTES
Pharmacist Intervention IV to PO Note    Domingo Castro is a 74 y.o. male being treated with IV medication azithromycin    Patient Data:    Vital Signs (Most Recent):  Temp: 98.3 °F (36.8 °C) (10/29/19 1100)  Pulse: (!) 52 (10/29/19 1146)  Resp: 18 (10/29/19 1100)  BP: (!) 141/61 (10/29/19 1100)  SpO2: 95 % (10/29/19 1203)   Vital Signs (72h Range):  Temp:  [96.8 °F (36 °C)-99 °F (37.2 °C)]   Pulse:  [52-67]   Resp:  [16-19]   BP: (101-171)/(50-77)   SpO2:  [89 %-97 %]      CBC:  Recent Labs   Lab 10/27/19  0537 10/28/19  0628 10/29/19  0516   WBC 6.19 7.07 5.69   RBC 3.17* 3.05* 2.93*   HGB 8.6* 8.3* 8.0*   HCT 27.4* 26.6* 25.5*    170 167   MCV 86 87 87   MCH 27.1 27.2 27.3   MCHC 31.4* 31.2* 31.4*     CMP:     Recent Labs   Lab 10/27/19  0537 10/28/19  0628 10/29/19  0516   * 150* 127*   CALCIUM 9.8 9.6 9.3    141 139   K 4.3 4.5 4.6   CO2 25 25 25    105 106   BUN 68* 68* 70*   CREATININE 3.4* 3.5* 3.3*       Dietary Orders:  Diet Orders            Diet diabetic [facility]; 2000 Calorie; Cardiac (Low Na/Chol); Isolation Tray - Regular [place]; Fluid - 2000mL: Diabetic starting at 10/22 0224            Based on the following criteria, this patient qualifies for intravenous to oral conversion:  [x] The patients gastrointestinal tract is functioning (tolerating medications via oral or enteral route for 24 hours and tolerating food or enteral feeds for 24 hours).  [x] The patient is hemodynamically stable for 24 hours (heart rate <100 beats per minute, systolic blood pressure >99 mm Hg, and respiratory rate <20 breaths per minute).  [x] The patient shows clinical improvement (afebrile for at least 24 hours and white blood cell count downtrending or normalized). Additionally, the patient must be non-neutropenic (absolute neutrophil count >500 cells/mm3).  [x] For antimicrobials, the patient has received IV therapy for at least 24 hours.    IV medication azithromycin will be changed to  oral medication azithromycin    Pharmacist's Name: Bruce Mccarthy  Pharmacist's Extension: [phone]

## 2019-10-30 ENCOUNTER — PATIENT OUTREACH (OUTPATIENT)
Dept: ADMINISTRATIVE | Facility: CLINIC | Age: 74
End: 2019-10-30

## 2019-10-30 PROCEDURE — G0180 MD CERTIFICATION HHA PATIENT: HCPCS | Mod: ,,, | Performed by: HOSPITALIST

## 2019-10-30 PROCEDURE — G0180 PR HOME HEALTH MD CERTIFICATION: ICD-10-PCS | Mod: ,,, | Performed by: HOSPITALIST

## 2019-10-30 NOTE — PATIENT INSTRUCTIONS
Treating Pneumonia  Pneumonia is an infection of one or both of the lungs. Pneumonia:  · Is usually caused by either a virus or a bacteria  · Can be very serious, especially in infants, young children, and older adults. Its also serious for those with other long-term health problems or weakened immune systems.  · Is sometimes treated at home and sometimes in the hospital    Antibiotic medicines  Antibiotics may be prescribed for pneumonia caused by bacteria. They may be pills (oral medicines), or shots (injections). Or they may be given by IV (intravenously) into a vein. If you are taking oral medicines at home:  · Fill your prescription and start taking your medicine as soon as you can.  · You will likely start to feel better in a day or 2, but dont stop taking the antibiotic.  · Use a pill organizer to help you remember to take your medicine.  · Let your healthcare provider know if you have side effects.  · Take your medicine exactly as directed on the label. Talk to your provider or pharmacist if you have any questions.  Antiviral medicines  Antiviral medicine may be prescribed for pneumonia caused by a virus. For example, antiviral medicine may be prescribed for pneumonia caused by the flu virus. Antibiotics do not work against viruses. If you are taking antiviral medicine at home:  · Fill your prescription and start taking your medicine as soon as you can.  · Talk with your provider or pharmacist about possible side effects.  · Take the medicine exactly as instructed.  To relieve symptoms  There are many medicines that can help relieve symptoms of pneumonia. Some are prescription and some are over-the-counter.  Your healthcare provider may recommend:  · Acetaminophen or ibuprofen to lower your fever and to lessen headache or other pain  · Cough medicine to loosen mucus or to reduce coughing  Make sure you check with your healthcare provider or pharmacist before taking any over-the-counter  medicines.  Special treatments  If you are hospitalized for pneumonia, you may have other therapies, including:  · Inhaled medicines to help with breathing or chest congestion  · Supplemental oxygen to increase low oxygen levels  Drink fluids and eat healthy  You should eat healthy to help your body fight the infection. Drinking a lot of fluids helps to replace fluids lost from fever and to loosen mucus in your chest.  · Diet. Make healthy food choices, including fruits and vegetables, lean meats and other proteins, 100% whole grain and low- or no-fat dairy products.  · Fluids. Drink at least 6 to 8 tall glasses a day. Water and 100% fruit or vegetable juice are best.  Get plenty of rest and sleep  You may be more tired than usual for a while. It is important to get enough sleep at night. Its also important to rest during the day. Talk with your healthcare provider if coughing or other symptoms are interfering with your sleep.  Preventing the spread of germs  The best thing you can do to prevent spreading germs is to wash your hands often. You should:  · Rub your hand with soap and water for 20 to 30 seconds.  · Clean in between your fingers, the backs of your hands, and around your nails.  · Dry your hands on a separate towel or use paper towels.  You should also:  · Keep alcohol-based hand  nearby.  · Make sure you also clean surfaces that you touch. Use a product that kills all types of germs.  · Stay away from others until you are feeling better.  When to call your healthcare provider  Call your healthcare provider if you have any of the following:  · Symptoms get worse  · Fever continues  · Shortness of breath gets worse  · Increased mucus or mucus that is darker in color  · Coughing gets worse  · Lips or fingers are bluish in color  · Side effects from your medicine   Date Last Reviewed: 12/1/2016  © 9861-2050 [employer]. [address] [place] [address], [place] [address]. All rights reserved.  This information is not intended as a substitute for professional medical care. Always follow your healthcare professional's instructions.

## 2019-11-04 RX ORDER — ROSUVASTATIN CALCIUM 20 MG/1
TABLET, COATED ORAL
Qty: 30 TABLET | Refills: 11 | Status: SHIPPED | OUTPATIENT
Start: 2019-11-04 | End: 2020-07-10 | Stop reason: SDUPTHER

## 2019-11-04 RX ORDER — GABAPENTIN 100 MG/1
CAPSULE ORAL
Qty: 150 CAPSULE | Refills: 11 | Status: SHIPPED | OUTPATIENT
Start: 2019-11-04 | End: 2020-02-18

## 2019-11-08 ENCOUNTER — PATIENT OUTREACH (OUTPATIENT)
Dept: ADMINISTRATIVE | Facility: OTHER | Age: 74
End: 2019-11-08

## 2019-11-08 ENCOUNTER — OFFICE VISIT (OUTPATIENT)
Dept: INTERNAL MEDICINE | Facility: CLINIC | Age: 74
End: 2019-11-08
Payer: MEDICARE

## 2019-11-08 ENCOUNTER — HOSPITAL ENCOUNTER (OUTPATIENT)
Dept: RADIOLOGY | Facility: HOSPITAL | Age: 74
Discharge: HOME | End: 2019-11-08
Attending: INTERNAL MEDICINE
Payer: MEDICARE

## 2019-11-08 VITALS
HEIGHT: 75 IN | HEART RATE: 62 BPM | SYSTOLIC BLOOD PRESSURE: 124 MMHG | TEMPERATURE: 98 F | DIASTOLIC BLOOD PRESSURE: 56 MMHG | RESPIRATION RATE: 18 BRPM | WEIGHT: 232.56 LBS | BODY MASS INDEX: 28.92 KG/M2

## 2019-11-08 DIAGNOSIS — J18.9 PNEUMONIA OF RIGHT UPPER LOBE DUE TO INFECTIOUS ORGANISM: Primary | ICD-10-CM

## 2019-11-08 DIAGNOSIS — E11.59 HYPERTENSION ASSOCIATED WITH DIABETES: ICD-10-CM

## 2019-11-08 DIAGNOSIS — R26.9 GAIT DIFFICULTY: ICD-10-CM

## 2019-11-08 DIAGNOSIS — Z79.4 TYPE 2 DIABETES MELLITUS WITH STAGE 4 CHRONIC KIDNEY DISEASE, WITH LONG-TERM CURRENT USE OF INSULIN: ICD-10-CM

## 2019-11-08 DIAGNOSIS — J18.9 PNEUMONIA OF RIGHT UPPER LOBE DUE TO INFECTIOUS ORGANISM: ICD-10-CM

## 2019-11-08 DIAGNOSIS — E11.22 TYPE 2 DIABETES MELLITUS WITH STAGE 4 CHRONIC KIDNEY DISEASE, WITH LONG-TERM CURRENT USE OF INSULIN: ICD-10-CM

## 2019-11-08 DIAGNOSIS — N18.4 TYPE 2 DIABETES MELLITUS WITH STAGE 4 CHRONIC KIDNEY DISEASE, WITH LONG-TERM CURRENT USE OF INSULIN: ICD-10-CM

## 2019-11-08 DIAGNOSIS — I15.2 HYPERTENSION ASSOCIATED WITH DIABETES: ICD-10-CM

## 2019-11-08 PROCEDURE — 99213 PR OFFICE/OUTPT VISIT, EST, LEVL III, 20-29 MIN: ICD-10-PCS | Mod: S$PBB,,, | Performed by: INTERNAL MEDICINE

## 2019-11-08 PROCEDURE — 1126F AMNT PAIN NOTED NONE PRSNT: CPT | Mod: ,,, | Performed by: INTERNAL MEDICINE

## 2019-11-08 PROCEDURE — 1159F MED LIST DOCD IN RCRD: CPT | Mod: ,,, | Performed by: INTERNAL MEDICINE

## 2019-11-08 PROCEDURE — 71046 X-RAY EXAM CHEST 2 VIEWS: CPT | Mod: TC,PO

## 2019-11-08 PROCEDURE — 71046 XR CHEST PA AND LATERAL: ICD-10-PCS | Mod: 26,,, | Performed by: RADIOLOGY

## 2019-11-08 PROCEDURE — 1126F PR PAIN SEVERITY QUANTIFIED, NO PAIN PRESENT: ICD-10-PCS | Mod: ,,, | Performed by: INTERNAL MEDICINE

## 2019-11-08 PROCEDURE — 71046 X-RAY EXAM CHEST 2 VIEWS: CPT | Mod: 26,,, | Performed by: RADIOLOGY

## 2019-11-08 PROCEDURE — 99214 OFFICE O/P EST MOD 30 MIN: CPT | Mod: PBBFAC,25,PO | Performed by: INTERNAL MEDICINE

## 2019-11-08 PROCEDURE — 1159F PR MEDICATION LIST DOCUMENTED IN MEDICAL RECORD: ICD-10-PCS | Mod: ,,, | Performed by: INTERNAL MEDICINE

## 2019-11-08 PROCEDURE — 99213 OFFICE O/P EST LOW 20 MIN: CPT | Mod: S$PBB,,, | Performed by: INTERNAL MEDICINE

## 2019-11-08 PROCEDURE — 99999 PR PBB SHADOW E&M-EST. PATIENT-LVL IV: ICD-10-PCS | Mod: PBBFAC,,, | Performed by: INTERNAL MEDICINE

## 2019-11-08 PROCEDURE — 99999 PR PBB SHADOW E&M-EST. PATIENT-LVL IV: CPT | Mod: PBBFAC,,, | Performed by: INTERNAL MEDICINE

## 2019-11-08 NOTE — PROGRESS NOTES
Transitional Care Note  Subjective:       Patient ID: Domingo Castro is a 74 y.o. male.  Chief Complaint: Follow-up (hosp -pneumonia)    Family and/or Caretaker present at visit?  No.  Diagnostic tests reviewed/disposition: No diagnosic tests pending after this hospitalization.  Disease/illness education: Pneumonia  Home health/community services discussion/referrals: Patient has home health established at [place].   Establishment or re-establishment of referral orders for community resources: No other necessary community resources.   Discussion with other health care providers: No discussion with other health care providers necessary.   CC: followup of hospitalization for pneumonia  HPI:  The patient is a 74 y.o. year old male who presents to the office for followup of hospitalization for pneumonia.  He presents to the hospital complaining of progressively worsening shortness of breath with productive cough and chills over the past week.  Chest x-ray showed dense consolidation of right upper lobe/mid lung field in addition to possible fluid overload.  Influenza was negative.  He was given furosemide, cefepime, duo nebs and insulin in the emergency department.  His symptoms worsened despite supplemental oxygen.  He was transferred to [place], and placed on a non-rebreather.  Oxygen was weaned slowly.  Antibiotics were de-escalated from vancomycin, cefepime and azithromycin to ceftriaxone and azithromycin.  He was transferred to telemetry.  He was diuresed and oxygen weaned.    PAST MEDICAL HISTORY:  Past Medical History:  No date: Arthritis  No date: Cataract  No date: Chronic diastolic congestive heart failure  No date: Coronary artery disease  No date: Cystoid macular edema of both eyes  No date: Diabetes mellitus type II  No date: Hyperlipemia  No date: Hypertension  No date: Retinal hole  No date: Stage 4 chronic kidney disease  12/23/2018: Streptococcus pyogenes bacteremia      Comment:  Due to left toe  osteomyelitis    SURGICAL HISTORY:  Past Surgical History:  2019: ABDOMINAL AORTOGRAPHY; N/A      Comment:  Procedure: AORTOGRAM-ABDOMINAL;  Surgeon: Amish Hyatt MD;  Location: [facility] CATH LAB/EP;  Service:                Cardiology;  Laterality: N/A;  CO2 angiography  2019: ANGIOGRAPHY OF LOWER EXTREMITY; Left      Comment:  Procedure: Angiogram Extremity Unilateral;  Surgeon:                Amish Hyatt MD;  Location: [facility] CATH LAB/EP;                 Service: Cardiology;  Laterality: Left;  12/15/14: CATARACT EXTRACTION W/  INTRAOCULAR LENS IMPLANT; Left      Comment:  Dr de la cruz  14: CATARACT EXTRACTION W/  INTRAOCULAR LENS IMPLANT; Right      Comment:  dorothy  No date: CIRCUMCISION, NON-  No date: focal laser right eye  2019: INSERTION OF TUNNELED CENTRAL VENOUS CATHETER (CVC) WITH   SUBCUTANEOUS PORT; Right      Comment:  Procedure: [id number];  Surgeon: Denys Aleman Jr., MD;  Location: [facility] OR;  Service:                General;  Laterality: Right;  No date: KNEE SURGERY      Comment:  left  No date: Left medial collateral ligament repair  No date: TONSILLECTOMY    MEDS:  Medcard reviewed and updated    ALLERGIES: Allergy Card reviewed and updated    SOCIAL HISTORY:   The patient is a nonsmoker.        Review of Systems   Constitutional: Positive for appetite change and fatigue. Negative for chills and fever.   HENT: Positive for voice change.    Respiratory: Positive for shortness of breath. Negative for cough, chest tightness and wheezing.    Cardiovascular: Negative for chest pain and palpitations.   Gastrointestinal: Positive for constipation. Negative for abdominal pain and diarrhea.   Musculoskeletal: Positive for gait problem.   Neurological: Negative for dizziness, light-headedness and headaches.   Psychiatric/Behavioral: Negative for sleep disturbance.       Objective:      Physical Exam   Constitutional: He is oriented to  person, place, and time. He appears well-developed and well-nourished.   Cardiovascular: Normal rate, regular rhythm and normal heart sounds.   Pulmonary/Chest: Effort normal and breath sounds normal.   Abdominal: Soft. Bowel sounds are normal. He exhibits no distension. There is no tenderness.   Neurological: He is alert and oriented to person, place, and time.   Skin: Skin is warm and dry.   Psychiatric: He has a normal mood and affect. His behavior is normal. Judgment and thought content normal.       Assessment:       1. Pneumonia of right upper lobe due to infectious organism    2. Hypertensive retinopathy of both eyes    3. Type 2 diabetes mellitus with stage 4 chronic kidney disease, with long-term current use of insulin    4. Gait difficulty        Plan:     Domingo was seen today for follow-up.    Diagnoses and all orders for this visit:    Pneumonia of right upper lobe due to infectious organism  -     X-Ray Chest PA And Lateral; Future    Type 2 diabetes mellitus with stage 4 chronic kidney disease, with long-term current use of insulin  -     Comprehensive metabolic panel; Future  -     Hemoglobin A1c; Future    Gait difficulty  -     Ambulatory consult to Physical Therapy    Hypertension associated with diabetes  -     blood pressure is controlled

## 2019-11-11 ENCOUNTER — OFFICE VISIT (OUTPATIENT)
Dept: PODIATRY | Facility: CLINIC | Age: 74
End: 2019-11-11
Payer: MEDICARE

## 2019-11-11 VITALS
HEART RATE: 55 BPM | HEIGHT: 75 IN | TEMPERATURE: 98 F | WEIGHT: 232 LBS | DIASTOLIC BLOOD PRESSURE: 60 MMHG | BODY MASS INDEX: 28.85 KG/M2 | SYSTOLIC BLOOD PRESSURE: 132 MMHG

## 2019-11-11 DIAGNOSIS — B35.1 ONYCHOMYCOSIS: ICD-10-CM

## 2019-11-11 DIAGNOSIS — Z87.2 HEALED ULCER OF LEFT FOOT ON EXAMINATION: Primary | ICD-10-CM

## 2019-11-11 DIAGNOSIS — E11.42 TYPE 2 DIABETES MELLITUS WITH PERIPHERAL NEUROPATHY: ICD-10-CM

## 2019-11-11 DIAGNOSIS — E11.51 TYPE 2 DIABETES MELLITUS WITH PERIPHERAL ANGIOPATHY: ICD-10-CM

## 2019-11-11 PROCEDURE — 99213 OFFICE O/P EST LOW 20 MIN: CPT | Mod: 25,S$PBB,, | Performed by: PODIATRIST

## 2019-11-11 PROCEDURE — 99999 PR PBB SHADOW E&M-EST. PATIENT-LVL III: CPT | Mod: PBBFAC,,, | Performed by: PODIATRIST

## 2019-11-11 PROCEDURE — 11721 DEBRIDE NAIL 6 OR MORE: CPT | Mod: PBBFAC,PN | Performed by: PODIATRIST

## 2019-11-11 PROCEDURE — 11721 ROUTINE FOOT CARE: ICD-10-PCS | Mod: S$PBB,Q9,, | Performed by: PODIATRIST

## 2019-11-11 PROCEDURE — 99213 OFFICE O/P EST LOW 20 MIN: CPT | Mod: PBBFAC,PN,25 | Performed by: PODIATRIST

## 2019-11-11 PROCEDURE — 99213 PR OFFICE/OUTPT VISIT, EST, LEVL III, 20-29 MIN: ICD-10-PCS | Mod: 25,S$PBB,, | Performed by: PODIATRIST

## 2019-11-11 PROCEDURE — 99999 PR PBB SHADOW E&M-EST. PATIENT-LVL III: ICD-10-PCS | Mod: PBBFAC,,, | Performed by: PODIATRIST

## 2019-11-11 NOTE — PROCEDURES
Routine Foot Care  Date/Time: 11/11/2019 3:45 PM  Performed by: Dimitry Gallegos DPM  Authorized by: Dimitry Gallegos DPM     Consent Done?:  Yes (Verbal)  Hyperkeratotic Skin Lesions?: No      Nail Care Type:  Debride  Location(s): All  (Left 1st Toe, Left 3rd Toe, Left 2nd Toe, Left 4th Toe, Left 5th Toe, Right 1st Toe, Right 2nd Toe, Right 3rd Toe, Right 4th Toe and Right 5th Toe)  Patient tolerance:  Patient tolerated the procedure well with no immediate complications     Used sterile nail nipper.

## 2019-11-12 NOTE — PROGRESS NOTES
Subjective:      Patient ID: Domingo Castro is a 74 y.o. male.    Chief Complaint: Wound Check (left foot )    Domingo is a 74 y.o. male who presents to the clinic for evaluation and treatment of high risk feet. Domingo has a past medical history of Arthritis, Cataract, Chronic diastolic congestive heart failure, Coronary artery disease, Cystoid macular edema of both eyes, Diabetes mellitus type II, Hyperlipemia, Hypertension, Retinal hole, Stage 4 chronic kidney disease, and Streptococcus pyogenes bacteremia (12/23/2018). The patient's chief complaint is diabetic foot ulcer, left hallux. This patient has documented high risk feet requiring routine maintenance secondary to peripheral vascular disease.  Discharged from [facility] on 08/04/2019.  Receiving 6 weeks of IV vancomycin per ID recommendations.  Follow-up per [facility] with 3 times weekly Medi Honey dressing changes. Accompanied by his daughter.  No new complaints.    09/19/2019:  Follow-up for chronic wound to left hallux treated for osteomyelitis.  Post endovascular intervention per .    10/17/2019:  Presents for wound check.  Followed per [facility] .  Believes his wound may be healed.  Says his home health nurse peeled away some dead skin and created a wound at the tip of the left hallux.  No new complaints.    11/11/2019:  Presents for wound check left hallux.  Relates he was told by his home health nurse that the wound is healed.  No new complaints.    PCP: Griselda Nugent MD    Date Last Seen by PCP:  05/29/2019    Current shoe gear:  Affected Foot: Football and Darco shoe on the affected foot     Unaffected Foot: Rx diabetic extra depth shoes and custom accommodative insoles    History of Trauma: negative  Sign of Infection: none    Hemoglobin A1C   Date Value Ref Range Status   11/08/2019 7.8 (H) 4.0 - 5.6 % Final     Comment:     ADA Screening Guidelines:  5.7-6.4%  Consistent with prediabetes  >or=6.5%  Consistent with  diabetes  High levels of fetal hemoglobin interfere with the HbA1C  assay. Heterozygous hemoglobin variants (HbS, HgC, etc)do  not significantly interfere with this assay.   However, presence of multiple variants may affect accuracy.     07/26/2019 8.1 (H) 4.0 - 5.6 % Final     Comment:     ADA Screening Guidelines:  5.7-6.4%  Consistent with prediabetes  >or=6.5%  Consistent with diabetes  High levels of fetal hemoglobin interfere with the HbA1C  assay. Heterozygous hemoglobin variants (HbS, HgC, etc)do  not significantly interfere with this assay.   However, presence of multiple variants may affect accuracy.     02/22/2019 8.2 (H) 4.0 - 5.6 % Final     Comment:     ADA Screening Guidelines:  5.7-6.4%  Consistent with prediabetes  >or=6.5%  Consistent with diabetes  High levels of fetal hemoglobin interfere with the HbA1C  assay. Heterozygous hemoglobin variants (HbS, HgC, etc)do  not significantly interfere with this assay.   However, presence of multiple variants may affect accuracy.         Review of Systems   Constitution: Negative for chills and fever.   HENT: Negative for congestion and hearing loss.    Cardiovascular: Negative for chest pain and claudication.   Respiratory: Negative for cough and shortness of breath.    Skin: Positive for color change, nail changes and poor wound healing.   Musculoskeletal: Negative for back pain and joint pain.   Gastrointestinal: Negative for nausea and vomiting.   Neurological: Positive for numbness.   Psychiatric/Behavioral: Negative for altered mental status.           Objective:      Physical Exam   Constitutional: He is oriented to person, place, and time. No distress.   Cardiovascular:   Pulses:       Dorsalis pedis pulses are 1+ on the right side, and 1+ on the left side.        Posterior tibial pulses are 1+ on the right side, and 1+ on the left side.   Mild lower extremity edema bilateral. No hair growth bilateral lower extremity.   Musculoskeletal:   One MTP  range of motion left is less than 30° dorsiflexion.    No localized pain on palpation left hallux wound site.  No palpable fluctuance or crepitance left foot.   Neurological: He is alert and oriented to person, place, and time. A sensory deficit is present.   Skin: Skin is warm, dry and intact. Capillary refill takes 2 to 3 seconds. No ecchymosis and no rash noted. He is not diaphoretic. No cyanosis or erythema. No pallor. Nails show no clubbing.   Previous wound sites of left hallux for healed over healthy appearing skin.    Left hallux nail is severely loosen and discolored brown with underlying dry residual crusting from previous bleeding.    Nails 2-5 left and 1-5 right are moderately elongated 4-5 mm, thickened, discolored brown yellow with loosening and underlying debris.  The right hallux nail and bilateral 5th toenail are dystrophic.    .             Assessment:       Encounter Diagnoses   Name Primary?    Healed ulcer of left foot on examination Yes    Type 2 diabetes mellitus with peripheral angiopathy     Type 2 diabetes mellitus with peripheral neuropathy     Onychomycosis          Plan:       Domingo was seen today for wound check.    Diagnoses and all orders for this visit:    Healed ulcer of left foot on examination  -     DIABETIC SHOES FOR HOME USE    Type 2 diabetes mellitus with peripheral angiopathy  -     Routine Foot Care  -     DIABETIC SHOES FOR HOME USE    Type 2 diabetes mellitus with peripheral neuropathy  -     Routine Foot Care  -     DIABETIC SHOES FOR HOME USE    Onychomycosis  -     Routine Foot Care      I counseled the patient on his conditions, their implications and medical management.    Shoe inspection. Diabetic Foot Education. Patient reminded of the importance of good nutrition and blood sugar control to help prevent podiatric complications of diabetes. Patient instructed on proper foot hygeine. We discussed wearing proper shoe gear, daily foot inspections, never walking  without protective shoe gear, never putting sharp instruments to feet.    Clinically all wounds are healed.  Patient is to return to his diabetic shoes.  Discontinue home health.    Routine foot care per attached note.    New prescription dispensed for extra-depth shoes with heat molded insoles.    RTC 2-3 months or p.r.n. as discussed.

## 2019-11-14 NOTE — SUBJECTIVE & OBJECTIVE
Interval History: awake and alert, appetite is still low, tolerating PT/OT patient will likely need SNF placement on discharge    Continue ceftriaxone and Azithromycin  Blood cx NGTD  H/H stable    Review of Systems   Constitutional: Negative for chills and fever.   Respiratory: Positive for cough. Negative for shortness of breath.    Cardiovascular: Negative for chest pain and leg swelling.   Gastrointestinal: Negative for abdominal pain and nausea.   Genitourinary: Negative for dysuria.   Musculoskeletal: Negative for neck pain.   Skin: Negative for rash and wound.   Neurological: Negative for syncope and weakness.   Psychiatric/Behavioral: Negative for agitation.     Objective:     Vital Signs (Most Recent):  Temp: 97.1 °F (36.2 °C) (10/27/19 1615)  Pulse: (!) 59 (10/27/19 1615)  Resp: 18 (10/27/19 1615)  BP: (!) 122/56 (10/27/19 1615)  SpO2: (!) 92 % (10/27/19 1629) Vital Signs (24h Range):  Temp:  [97.1 °F (36.2 °C)-98.8 °F (37.1 °C)] 97.1 °F (36.2 °C)  Pulse:  [55-63] 59  Resp:  [16-18] 18  SpO2:  [92 %-96 %] 92 %  BP: (122-166)/(56-77) 122/56     Weight: 115.1 kg (253 lb 11.2 oz)  Body mass index is 31.71 kg/m².    Intake/Output Summary (Last 24 hours) at 10/27/2019 1849  Last data filed at 10/27/2019 1510  Gross per 24 hour   Intake 720 ml   Output 950 ml   Net -230 ml      Physical Exam   Constitutional: He is oriented to person, place, and time. He appears well-developed and well-nourished. No distress.   HENT:   Head: Normocephalic and atraumatic.   Neck: Neck supple.   Cardiovascular: Normal rate, regular rhythm, normal heart sounds and intact distal pulses.   Pulmonary/Chest: Effort normal. No respiratory distress. He has no wheezes.   Breath sounds diminished    Abdominal: Soft. Bowel sounds are normal. He exhibits no distension. There is no tenderness.   Musculoskeletal: Normal range of motion. He exhibits no edema or tenderness.   Neurological: He is alert and oriented to person, place, and time.  Please notify parent of negative strep culture result.     Skin: Skin is warm and dry. Capillary refill takes less than 2 seconds. No erythema.   Psychiatric: He has a normal mood and affect.       Significant Labs:   ABGs:   No results for input(s): PH, PCO2, HCO3, POCSATURATED, BE, TOTALHB, COHB, METHB, O2HB, POCFIO2 in the last 48 hours.  Blood Culture:   No results for input(s): LABBLOO in the last 48 hours.  CBC:   Recent Labs   Lab 10/26/19  0601 10/27/19  0537   WBC 4.59 6.19   HGB 7.5* 8.6*   HCT 24.0* 27.4*    164     CMP:   Recent Labs   Lab 10/26/19  0601 10/27/19  0537    142   K 4.5 4.3    105   CO2 20* 25   * 178*   BUN 70* 68*   CREATININE 3.3* 3.4*   CALCIUM 9.3 9.8   ANIONGAP 13 12   EGFRNONAA 17* 17*     Coagulation:   Recent Labs   Lab 10/27/19  0537   INR 1.0     Lactic Acid:   No results for input(s): LACTATE in the last 48 hours.  TSH: No results for input(s): TSH in the last 4320 hours.  Urine Culture: No results for input(s): LABURIN in the last 48 hours.  Urine Studies: No results for input(s): COLORU, APPEARANCEUA, PHUR, SPECGRAV, PROTEINUA, GLUCUA, KETONESU, BILIRUBINUA, OCCULTUA, NITRITE, UROBILINOGEN, LEUKOCYTESUR, RBCUA, WBCUA, BACTERIA, SQUAMEPITHEL, HYALINECASTS in the last 48 hours.    Invalid input(s): LIOR    Significant Imaging: I have reviewed all pertinent imaging results/findings within the past 24 hours.

## 2019-11-20 ENCOUNTER — TELEPHONE (OUTPATIENT)
Dept: INTERNAL MEDICINE | Facility: CLINIC | Age: 74
End: 2019-11-20

## 2019-11-20 NOTE — TELEPHONE ENCOUNTER
Please inform patient labs are stable.  Hemoglobin A1c is mildly improved at 7.8.  Chest x-ray showed significant improvement.

## 2019-11-21 ENCOUNTER — EXTERNAL HOME HEALTH (OUTPATIENT)
Dept: HOME HEALTH SERVICES | Facility: HOSPITAL | Age: 74
End: 2019-11-21
Payer: MEDICARE

## 2019-12-05 ENCOUNTER — TELEPHONE (OUTPATIENT)
Dept: SURGERY | Facility: CLINIC | Age: 74
End: 2019-12-05

## 2019-12-05 ENCOUNTER — TELEPHONE (OUTPATIENT)
Dept: INTERNAL MEDICINE | Facility: CLINIC | Age: 74
End: 2019-12-05

## 2019-12-05 NOTE — TELEPHONE ENCOUNTER
----- Message from Maria Isabel Fairchild sent at 12/5/2019 12:08 PM CST -----  Contact: Donna leblanc/ Nic [phone]  Donna is requesting to speak with office regarding pt's port insertion. Please advise.        12/05/2019        1550  Contacted Donna regarding the above message. Donna stated that Dr. Aleman had already contacted her. Donna verbalized understanding.

## 2019-12-09 RX ORDER — FUROSEMIDE 40 MG/1
TABLET ORAL
Qty: 360 TABLET | Refills: 0 | Status: SHIPPED | OUTPATIENT
Start: 2019-12-09 | End: 2020-03-09

## 2019-12-09 RX ORDER — AMLODIPINE BESYLATE 10 MG/1
TABLET ORAL
Qty: 30 TABLET | Refills: 11 | Status: SHIPPED | OUTPATIENT
Start: 2019-12-09 | End: 2021-01-12

## 2019-12-11 ENCOUNTER — TELEPHONE (OUTPATIENT)
Dept: INTERNAL MEDICINE | Facility: CLINIC | Age: 74
End: 2019-12-11

## 2019-12-12 ENCOUNTER — TELEPHONE (OUTPATIENT)
Dept: INTERNAL MEDICINE | Facility: CLINIC | Age: 74
End: 2019-12-12

## 2020-01-02 ENCOUNTER — TELEPHONE (OUTPATIENT)
Dept: NEPHROLOGY | Facility: CLINIC | Age: 75
End: 2020-01-02

## 2020-01-02 DIAGNOSIS — N18.4 CHRONIC KIDNEY DISEASE, STAGE IV (SEVERE): Primary | ICD-10-CM

## 2020-01-08 ENCOUNTER — TELEPHONE (OUTPATIENT)
Dept: NEPHROLOGY | Facility: CLINIC | Age: 75
End: 2020-01-08

## 2020-01-08 NOTE — TELEPHONE ENCOUNTER
Called home health spoke to nurse to took c/b number.  States she will give me a c/b in regards to pt.   Labs needs to be completed prior to appt.

## 2020-01-10 ENCOUNTER — LAB VISIT (OUTPATIENT)
Dept: LAB | Facility: HOSPITAL | Age: 75
End: 2020-01-10
Attending: INTERNAL MEDICINE
Payer: MEDICARE

## 2020-01-10 ENCOUNTER — TELEPHONE (OUTPATIENT)
Dept: NEPHROLOGY | Facility: CLINIC | Age: 75
End: 2020-01-10

## 2020-01-10 DIAGNOSIS — N18.4 CHRONIC KIDNEY DISEASE, STAGE IV (SEVERE): ICD-10-CM

## 2020-01-10 LAB
ANION GAP SERPL CALC-SCNC: 8 MMOL/L (ref 8–16)
BASOPHILS # BLD AUTO: 0.05 K/UL (ref 0–0.2)
BASOPHILS NFR BLD: 0.6 % (ref 0–1.9)
BUN SERPL-MCNC: 53 MG/DL (ref 2–20)
CALCIUM SERPL-MCNC: 9.4 MG/DL (ref 8.7–10.5)
CHLORIDE SERPL-SCNC: 104 MMOL/L (ref 95–110)
CO2 SERPL-SCNC: 28 MMOL/L (ref 23–29)
CREAT SERPL-MCNC: 3.1 MG/DL (ref 0.5–1.4)
DIFFERENTIAL METHOD: ABNORMAL
EOSINOPHIL # BLD AUTO: 0.2 K/UL (ref 0–0.5)
EOSINOPHIL NFR BLD: 2.1 % (ref 0–8)
ERYTHROCYTE [DISTWIDTH] IN BLOOD BY AUTOMATED COUNT: 13.9 % (ref 11.5–14.5)
EST. GFR  (AFRICAN AMERICAN): 21.7 ML/MIN/1.73 M^2
EST. GFR  (NON AFRICAN AMERICAN): 18.8 ML/MIN/1.73 M^2
GLUCOSE SERPL-MCNC: 176 MG/DL (ref 70–110)
HCT VFR BLD AUTO: 30.1 % (ref 40–54)
HGB BLD-MCNC: 9.7 G/DL (ref 14–18)
IMM GRANULOCYTES # BLD AUTO: 0.02 K/UL (ref 0–0.04)
IMM GRANULOCYTES NFR BLD AUTO: 0.3 % (ref 0–0.5)
LYMPHOCYTES # BLD AUTO: 1.8 K/UL (ref 1–4.8)
LYMPHOCYTES NFR BLD: 23 % (ref 18–48)
MCH RBC QN AUTO: 27.6 PG (ref 27–31)
MCHC RBC AUTO-ENTMCNC: 32.2 G/DL (ref 32–36)
MCV RBC AUTO: 86 FL (ref 82–98)
MONOCYTES # BLD AUTO: 0.7 K/UL (ref 0.3–1)
MONOCYTES NFR BLD: 8.3 % (ref 4–15)
NEUTROPHILS # BLD AUTO: 5.2 K/UL (ref 1.8–7.7)
NEUTROPHILS NFR BLD: 65.7 % (ref 38–73)
NRBC BLD-RTO: 0 /100 WBC
PLATELET # BLD AUTO: 121 K/UL (ref 150–350)
PMV BLD AUTO: 11.4 FL (ref 9.2–12.9)
POTASSIUM SERPL-SCNC: 4.2 MMOL/L (ref 3.5–5.1)
RBC # BLD AUTO: 3.51 M/UL (ref 4.6–6.2)
SODIUM SERPL-SCNC: 140 MMOL/L (ref 136–145)
WBC # BLD AUTO: 7.93 K/UL (ref 3.9–12.7)

## 2020-01-10 PROCEDURE — 36415 COLL VENOUS BLD VENIPUNCTURE: CPT | Mod: PO

## 2020-01-10 PROCEDURE — 85025 COMPLETE CBC W/AUTO DIFF WBC: CPT | Mod: PO

## 2020-01-10 PROCEDURE — 80048 BASIC METABOLIC PNL TOTAL CA: CPT | Mod: PO

## 2020-01-13 ENCOUNTER — OFFICE VISIT (OUTPATIENT)
Dept: NEPHROLOGY | Facility: CLINIC | Age: 75
End: 2020-01-13
Payer: MEDICARE

## 2020-01-13 VITALS
OXYGEN SATURATION: 97 % | BODY MASS INDEX: 29.25 KG/M2 | WEIGHT: 235.25 LBS | HEIGHT: 75 IN | HEART RATE: 58 BPM | SYSTOLIC BLOOD PRESSURE: 118 MMHG | DIASTOLIC BLOOD PRESSURE: 64 MMHG

## 2020-01-13 DIAGNOSIS — I10 ESSENTIAL HYPERTENSION: ICD-10-CM

## 2020-01-13 DIAGNOSIS — N18.4 CHRONIC KIDNEY DISEASE, STAGE IV (SEVERE): Primary | ICD-10-CM

## 2020-01-13 PROCEDURE — 99214 PR OFFICE/OUTPT VISIT, EST, LEVL IV, 30-39 MIN: ICD-10-PCS | Mod: S$PBB,,, | Performed by: INTERNAL MEDICINE

## 2020-01-13 PROCEDURE — 99999 PR PBB SHADOW E&M-EST. PATIENT-LVL III: CPT | Mod: PBBFAC,,, | Performed by: INTERNAL MEDICINE

## 2020-01-13 PROCEDURE — 1126F AMNT PAIN NOTED NONE PRSNT: CPT | Mod: ,,, | Performed by: INTERNAL MEDICINE

## 2020-01-13 PROCEDURE — 99999 PR PBB SHADOW E&M-EST. PATIENT-LVL III: ICD-10-PCS | Mod: PBBFAC,,, | Performed by: INTERNAL MEDICINE

## 2020-01-13 PROCEDURE — 99214 OFFICE O/P EST MOD 30 MIN: CPT | Mod: S$PBB,,, | Performed by: INTERNAL MEDICINE

## 2020-01-13 PROCEDURE — 1159F PR MEDICATION LIST DOCUMENTED IN MEDICAL RECORD: ICD-10-PCS | Mod: ,,, | Performed by: INTERNAL MEDICINE

## 2020-01-13 PROCEDURE — 1159F MED LIST DOCD IN RCRD: CPT | Mod: ,,, | Performed by: INTERNAL MEDICINE

## 2020-01-13 PROCEDURE — 1126F PR PAIN SEVERITY QUANTIFIED, NO PAIN PRESENT: ICD-10-PCS | Mod: ,,, | Performed by: INTERNAL MEDICINE

## 2020-01-13 PROCEDURE — 99213 OFFICE O/P EST LOW 20 MIN: CPT | Mod: PBBFAC | Performed by: INTERNAL MEDICINE

## 2020-01-13 RX ORDER — OLMESARTAN MEDOXOMIL 20 MG/1
20 TABLET ORAL DAILY
Qty: 90 TABLET | Refills: 10 | Status: SHIPPED | OUTPATIENT
Start: 2020-01-13 | End: 2020-08-03 | Stop reason: SDUPTHER

## 2020-01-13 NOTE — PROGRESS NOTES
Progress Note  Nephrology      Referring physician: Griselda Nugent MD    Reason for visit: CKD     SUBJECTIVE:   74 y.o. male new to me  has a past medical history of Arthritis, Cataract, Chronic diastolic congestive heart failure, Coronary artery disease, Cystoid macular edema of both eyes, Diabetes mellitus type II, Hyperlipemia, Hypertension, Retinal hole, Stage 4 chronic kidney disease, and Streptococcus pyogenes bacteremia (12/23/2018). who has been following up in renal clinic for ckd. Patient feels well today, no urinary or cardiac symptoms, no NSAIDs intake. His renal function has been declining over recent years, not on RASi for unknown reason.        OBJECTIVE:     Vitals:    01/13/20 1347   BP: 118/64   Pulse: (!) 58          Physical Exam:  General: no distress, well nourished  HENT: PERRLA, Normal mouth nose and ears.  Neck: no JVD and thyroid not enlarged, symmetric, no tenderness/mass/nodules  Lungs: clear to auscultation bilaterally and normal respiratory effort  Cardiovascular: regular rate and rhythm, S1, S2 normal, no murmur, click, rub or gallop.   Abdomen: soft, non-tender non-distented; bowel sounds normal  Skin: No rashes or lesions  Musculoskeletal:no edema in LE, no deformities.   Lymph Nodes: No cervical or supraclavicular adenopathy  Neurologic: Normal strength and tone. No focal numbness or weakness    Dialysis Access: Not applicable.        Medications:    Current Outpatient Medications:     acetaminophen (TYLENOL) 325 MG tablet, Take 2 tablets (650 mg total) by mouth every 4 (four) hours as needed., Disp: , Rfl: 0    amLODIPine (NORVASC) 10 MG tablet, TAKE 1 TABLET BY MOUTH ONCE DAILY, Disp: 30 tablet, Rfl: 11    aspirin (ECOTRIN) 81 MG EC tablet, Take 1 tablet (81 mg total) by mouth once daily., Disp: , Rfl: 0    clopidogrel (PLAVIX) 75 mg tablet, Take 1 tablet (75 mg total) by mouth once daily., Disp: 30 tablet, Rfl: 11    furosemide (LASIX) 40 MG tablet, TAKE 2 TABLETS BY  "MOUTH TWICE DAILY DO  NOT  TAKE  EVENING  DOSE  AFTER  3  PM, Disp: 360 tablet, Rfl: 0    insulin lispro (HUMALOG KWIKPEN INSULIN) 100 unit/mL InPn pen, INJECT 10 UNITS SUBCUTANEOUSLY THREE TIMES DAILY BEFORE MEALS WITH CORRECTION SCALE, MAX TDD 50 UNITS, Disp: 9 Box, Rfl: 6    LEVEMIR FLEXTOUCH U-100 INSULN 100 unit/mL (3 mL) InPn pen, Inject 26 Units into the skin every evening., Disp: 36 mL, Rfl: 3    pen needle, diabetic, safety (Crunchbutton AUTOSHIELD PEN NEEDLE) 29 gauge x 5/16" Ndle, For use once daily with levemir flexpen, Disp: 90 each, Rfl: 11    rosuvastatin (CRESTOR) 20 MG tablet, Take 20 mg by mouth once daily. , Disp: , Rfl:     albuterol-ipratropium (DUO-NEB) 2.5 mg-0.5 mg/3 mL nebulizer solution, Take 3 mLs by nebulization every 4 (four) hours as needed for Wheezing or Shortness of Breath. Rescue (Patient not taking: Reported on 11/8/2019), Disp: 90 mL, Rfl: 0    gabapentin (NEURONTIN) 100 MG capsule, TAKE 1 CAPSULE BY MOUTH IN THE MORNING, AND TAKE 1 CAPSULE IN THE EVENING AND TAKE 3 CAPSULES AT BEDTIME (Patient not taking: Reported on 1/13/2020), Disp: 150 capsule, Rfl: 11    nebulizer and compressor Alana, USE AS DIRECTED (Patient not taking: Reported on 11/8/2019), Disp: 1 each, Rfl: 0    olmesartan (BENICAR) 20 MG tablet, Take 1 tablet (20 mg total) by mouth once daily., Disp: 90 tablet, Rfl: 10    rosuvastatin (CRESTOR) 20 MG tablet, TAKE 1 TABLET BY MOUTH ONCE DAILY (Patient not taking: Reported on 11/11/2019), Disp: 30 tablet, Rfl: 11         Laboratory:  Lab Results   Component Value Date    CREATININE 3.10 (H) 01/10/2020       Prot/Creat Ratio, Ur   Date Value Ref Range Status   01/10/2020 2.12 (H) 0.00 - 0.20 Final   06/14/2018 2.32 (H) 0.00 - 0.20 Final   05/16/2013 0.30 (H) 0.0 - 0.2 Final       Lab Results   Component Value Date     01/10/2020    K 4.2 01/10/2020    CO2 28 01/10/2020       last PTH   Lab Results   Component Value Date    .0 (H) 06/14/2018    CALCIUM 9.4 " 01/10/2020    PHOS 4.5 10/29/2019       Lab Results   Component Value Date    HGB 9.7 (L) 01/10/2020        Lab Results   Component Value Date    HGBA1C 7.8 (H) 11/08/2019       Lab Results   Component Value Date    LDLCALC 76.8 07/20/2018       Other Labs were reviewed      ASSESSMENT/PLAN:       CKD   -likely from DMII and HTN  -Cr baseline ~ 2.7 - 3.0, with eGFR ~ 20 ml/min, worse than in 2017 and before.  -UPCR 2.2 g/d    HTN  Controlled on BB, Hydra and Amlodepine    Anemia  -from ckd  -Hgb goal ~ 10  -Iron stores not available    Secondary Hyperparathyroidism  -Phos/Ca acceptable  -PTH na      Acid/Base  -No Metabolic acidosis              PLAN:  -Need RASi for better renal protection, no Hx of CAD/Arrythmia, will stop BB and Hydralazine, will start Benicar 20 mg/d and keep on Amlodipine. BMP in 2 weeks.  -Pt will check Bp at home and report.  -Avoid NSAIDs intake        RTC in 5 months with labs      JOSE RAMIREZ MD  NEPHROLOGY ATTENDING

## 2020-01-13 NOTE — PATIENT INSTRUCTIONS
Stop Carvedilol and Hydralazine, start new meds (Benicar) and check your blood pressure and report.

## 2020-02-11 ENCOUNTER — TELEPHONE (OUTPATIENT)
Dept: INTERNAL MEDICINE | Facility: CLINIC | Age: 75
End: 2020-02-11

## 2020-02-11 NOTE — TELEPHONE ENCOUNTER
----- Message from Olga Gonzalez sent at 2/11/2020 11:59 AM CST -----  Contact: Self   Caller is requesting an earlier appt than we can schedule.  Caller declined first available appointment listed below. Caller will not accept being placed on the wait list and is requesting a message be sent to the provider.  When is the next available appointment:  4/1  Did you offer to schedule the next available appt and put the patient on the wait list?:  Did not want appt  What visit type: ep  Symptoms:  4 month f/u  Patient preference of timeframe to be scheduled:  sooner  What is the reason the patient is requesting a sooner appointment? (insurance terminating, changing jobs):  Pt had appt on 2/10 but missed appt due to personal issues  Would the patient rather a call back or a response via [facility]?:  Call back   Comments:

## 2020-02-18 ENCOUNTER — OFFICE VISIT (OUTPATIENT)
Dept: INTERNAL MEDICINE | Facility: CLINIC | Age: 75
End: 2020-02-18
Payer: MEDICARE

## 2020-02-18 VITALS
TEMPERATURE: 99 F | SYSTOLIC BLOOD PRESSURE: 132 MMHG | DIASTOLIC BLOOD PRESSURE: 60 MMHG | HEIGHT: 76 IN | BODY MASS INDEX: 29.23 KG/M2 | RESPIRATION RATE: 18 BRPM | WEIGHT: 240.06 LBS | HEART RATE: 80 BPM

## 2020-02-18 DIAGNOSIS — I15.2 HYPERTENSION ASSOCIATED WITH DIABETES: ICD-10-CM

## 2020-02-18 DIAGNOSIS — E11.22 TYPE 2 DIABETES MELLITUS WITH STAGE 4 CHRONIC KIDNEY DISEASE, WITH LONG-TERM CURRENT USE OF INSULIN: Primary | ICD-10-CM

## 2020-02-18 DIAGNOSIS — Z79.4 TYPE 2 DIABETES MELLITUS WITH STAGE 4 CHRONIC KIDNEY DISEASE, WITH LONG-TERM CURRENT USE OF INSULIN: Primary | ICD-10-CM

## 2020-02-18 DIAGNOSIS — N18.4 TYPE 2 DIABETES MELLITUS WITH STAGE 4 CHRONIC KIDNEY DISEASE, WITH LONG-TERM CURRENT USE OF INSULIN: Primary | ICD-10-CM

## 2020-02-18 DIAGNOSIS — E11.59 HYPERTENSION ASSOCIATED WITH DIABETES: ICD-10-CM

## 2020-02-18 PROCEDURE — 99213 PR OFFICE/OUTPT VISIT, EST, LEVL III, 20-29 MIN: ICD-10-PCS | Mod: S$PBB,,, | Performed by: INTERNAL MEDICINE

## 2020-02-18 PROCEDURE — 99214 OFFICE O/P EST MOD 30 MIN: CPT | Mod: PBBFAC,PO | Performed by: INTERNAL MEDICINE

## 2020-02-18 PROCEDURE — 99213 OFFICE O/P EST LOW 20 MIN: CPT | Mod: S$PBB,,, | Performed by: INTERNAL MEDICINE

## 2020-02-18 PROCEDURE — 99999 PR PBB SHADOW E&M-EST. PATIENT-LVL IV: CPT | Mod: PBBFAC,,, | Performed by: INTERNAL MEDICINE

## 2020-02-18 PROCEDURE — 99999 PR PBB SHADOW E&M-EST. PATIENT-LVL IV: ICD-10-PCS | Mod: PBBFAC,,, | Performed by: INTERNAL MEDICINE

## 2020-02-18 RX ORDER — HYDRALAZINE HYDROCHLORIDE 50 MG/1
50 TABLET, FILM COATED ORAL 2 TIMES DAILY
COMMUNITY
Start: 2020-02-10 | End: 2020-10-05

## 2020-02-18 NOTE — PROGRESS NOTES
CC: followup of hypertension  HPI:  The patient is a 74 y.o. year old male who presents to the office for followup of hypertension.  The patient denies any chest pain, shortness of breath, headache, nausea or vomiting, but complains of blurred vision and fatigue.  He also complains of a bump on his right eyelid.  Patient also inquired how long he is required to have Port-A-Cath.  Patient does not recall last time it was accessed.    PAST MEDICAL HISTORY:  Past Medical History:   Diagnosis Date    Arthritis     Cataract     Chronic diastolic congestive heart failure     Coronary artery disease     Cystoid macular edema of both eyes     Diabetes mellitus type II     Hyperlipemia     Hypertension     Retinal hole     Stage 4 chronic kidney disease     Streptococcus pyogenes bacteremia 2018    Due to left toe osteomyelitis       SURGICAL HISTORY:  Past Surgical History:   Procedure Laterality Date    ABDOMINAL AORTOGRAPHY N/A 2019    Procedure: AORTOGRAM-ABDOMINAL;  Surgeon: Amish Hyatt MD;  Location: [facility] CATH LAB/EP;  Service: Cardiology;  Laterality: N/A;  CO2 angiography    ANGIOGRAPHY OF LOWER EXTREMITY Left 2019    Procedure: Angiogram Extremity Unilateral;  Surgeon: Amish Hyatt MD;  Location: [facility] CATH LAB/EP;  Service: Cardiology;  Laterality: Left;    CATARACT EXTRACTION W/  INTRAOCULAR LENS IMPLANT Left 12/15/14    Dr de la cruz    CATARACT EXTRACTION W/  INTRAOCULAR LENS IMPLANT Right 14    dorothy    CIRCUMCISION, NON-      focal laser right eye      INSERTION OF TUNNELED CENTRAL VENOUS CATHETER (CVC) WITH SUBCUTANEOUS PORT Right 2019    Procedure: [id number];  Surgeon: Denys Aleman Jr., MD;  Location: [facility] OR;  Service: General;  Laterality: Right;    KNEE SURGERY      left    Left medial collateral ligament repair      TONSILLECTOMY         MEDS:  Medcard reviewed and updated    ALLERGIES: Allergy Card reviewed and updated    SOCIAL  HISTORY:   The patient is a nonsmoker.    PE:   APPEARANCE: Well nourished, well developed, in no acute distress.    EYES:  Positive ordeal lump of right upper eyelid  CHEST: Lungs clear to auscultation with unlabored respirations.  CARDIOVASCULAR: Normal S1, S2. No murmurs. No carotid bruits. No pedal edema.  ABDOMEN: Bowel sounds normal. Not distended. Soft. No tenderness or masses.   PSYCHIATRIC: The patient is oriented to person, place, and time and has a pleasant affect.        ASSESSMENT/PLAN:  Domingo was seen today for follow-up.    Diagnoses and all orders for this visit:    Type 2 diabetes mellitus with stage 4 chronic kidney disease, with long-term current use of insulin  -     Cancel: Basic metabolic panel; Future  -     Hemoglobin A1c; Future  -     Ambulatory referral/consult to Ophthalmology; Future  -     Comprehensive metabolic panel; Future  -     Lipid panel; Future    Hypertension associated with diabetes  -     blood pressure is controlled

## 2020-02-19 ENCOUNTER — TELEPHONE (OUTPATIENT)
Dept: NEPHROLOGY | Facility: CLINIC | Age: 75
End: 2020-02-19

## 2020-02-19 NOTE — TELEPHONE ENCOUNTER
----- Message from Sonam Eddy MA sent at 2/19/2020  4:19 PM CST -----  Contact: Mrs. Castro/[phone]  Per Mrs. Castro(pt wife) stated the  (Pt primary) is requesting to have order in the system to have the Pt's port flushed asap.  Pt is a new pt to Dr. Bravo  And believes that the pt did not mention anything to Dr. Bravo in regards to his port needing to be flushed. requesting call back.  Dr. Nugent is wanting a call or a respond to her office asap.

## 2020-02-20 ENCOUNTER — LAB VISIT (OUTPATIENT)
Dept: LAB | Facility: HOSPITAL | Age: 75
End: 2020-02-20
Attending: INTERNAL MEDICINE
Payer: MEDICARE

## 2020-02-20 ENCOUNTER — PATIENT OUTREACH (OUTPATIENT)
Dept: ADMINISTRATIVE | Facility: OTHER | Age: 75
End: 2020-02-20

## 2020-02-20 DIAGNOSIS — N18.4 TYPE 2 DIABETES MELLITUS WITH STAGE 4 CHRONIC KIDNEY DISEASE, WITH LONG-TERM CURRENT USE OF INSULIN: ICD-10-CM

## 2020-02-20 DIAGNOSIS — Z79.4 TYPE 2 DIABETES MELLITUS WITH STAGE 4 CHRONIC KIDNEY DISEASE, WITH LONG-TERM CURRENT USE OF INSULIN: ICD-10-CM

## 2020-02-20 DIAGNOSIS — E11.22 TYPE 2 DIABETES MELLITUS WITH STAGE 4 CHRONIC KIDNEY DISEASE, WITH LONG-TERM CURRENT USE OF INSULIN: ICD-10-CM

## 2020-02-20 LAB
ALBUMIN SERPL BCP-MCNC: 4.5 G/DL (ref 3.5–5.2)
ALP SERPL-CCNC: 88 U/L (ref 38–126)
ALT SERPL W/O P-5'-P-CCNC: 15 U/L (ref 10–44)
ANION GAP SERPL CALC-SCNC: 11 MMOL/L (ref 8–16)
AST SERPL-CCNC: 33 U/L (ref 15–46)
BILIRUB SERPL-MCNC: 0.5 MG/DL (ref 0.1–1)
BUN SERPL-MCNC: 39 MG/DL (ref 2–20)
CALCIUM SERPL-MCNC: 9.8 MG/DL (ref 8.7–10.5)
CHLORIDE SERPL-SCNC: 108 MMOL/L (ref 95–110)
CHOLEST SERPL-MCNC: 193 MG/DL (ref 120–199)
CHOLEST/HDLC SERPL: 3.4 {RATIO} (ref 2–5)
CO2 SERPL-SCNC: 26 MMOL/L (ref 23–29)
CREAT SERPL-MCNC: 3.19 MG/DL (ref 0.5–1.4)
EST. GFR  (AFRICAN AMERICAN): 21 ML/MIN/1.73 M^2
EST. GFR  (NON AFRICAN AMERICAN): 18.2 ML/MIN/1.73 M^2
ESTIMATED AVG GLUCOSE: 186 MG/DL (ref 68–131)
GLUCOSE SERPL-MCNC: 76 MG/DL (ref 70–110)
HBA1C MFR BLD HPLC: 8.1 % (ref 4–5.6)
HDLC SERPL-MCNC: 56 MG/DL (ref 40–75)
HDLC SERPL: 29 % (ref 20–50)
LDLC SERPL CALC-MCNC: 118.4 MG/DL (ref 63–159)
NONHDLC SERPL-MCNC: 137 MG/DL
POTASSIUM SERPL-SCNC: 4 MMOL/L (ref 3.5–5.1)
PROT SERPL-MCNC: 8.7 G/DL (ref 6–8.4)
SODIUM SERPL-SCNC: 145 MMOL/L (ref 136–145)
TRIGL SERPL-MCNC: 93 MG/DL (ref 30–150)

## 2020-02-20 PROCEDURE — 36415 COLL VENOUS BLD VENIPUNCTURE: CPT | Mod: PO

## 2020-02-20 PROCEDURE — 80061 LIPID PANEL: CPT

## 2020-02-20 PROCEDURE — 80053 COMPREHEN METABOLIC PANEL: CPT | Mod: PO

## 2020-02-20 PROCEDURE — 83036 HEMOGLOBIN GLYCOSYLATED A1C: CPT

## 2020-02-20 NOTE — PROGRESS NOTES
Chart reviewed.   Requested updates from Care Everywhere.  Immunizations reconciled.   HM updated.  A1c previously ordered

## 2020-02-26 ENCOUNTER — TELEPHONE (OUTPATIENT)
Dept: INTERNAL MEDICINE | Facility: CLINIC | Age: 75
End: 2020-02-26

## 2020-02-26 ENCOUNTER — TELEPHONE (OUTPATIENT)
Dept: NEPHROLOGY | Facility: CLINIC | Age: 75
End: 2020-02-26

## 2020-02-26 NOTE — TELEPHONE ENCOUNTER
Phone: [phone] to PCP [address] [address]     Called pt PCP informed Krystal that Dr.Fadi Lamb in ID at the [place] location has to put in he orders, Krystal v/u. No further questions              ----- Message from Shelly Sotelo MD sent at 2/26/2020  8:12 AM CST -----  Not aware of port! Why calling nephrology? Need more details please    ----- Message -----  From: Bere Villanueva MA  Sent: 2/19/2020   4:28 PM CST  To: Shelly Sotelo MD    Good Afternoon. Please advise,      -msg sent through phone staff         (Pt primary) is requesting to have order in the system to have the Pt's port flushed asap.  Pt is a new pt to Dr. Bravo  And believes that the pt did not mention anything to Dr. Bravo in regards to his port needing to be flushed. requesting call back.  Dr. Nugent is wanting a call or a respond to her office asap.       Thanks

## 2020-02-26 NOTE — TELEPHONE ENCOUNTER
Dr Sotelo didn't put the port in patient. Dr Pepito Lamb placed the port in ID not sure of the date and time spoke with   Nurse Aurea ext [phone] Nephrology department

## 2020-03-09 RX ORDER — FUROSEMIDE 40 MG/1
TABLET ORAL
Qty: 360 TABLET | Refills: 0 | Status: SHIPPED | OUTPATIENT
Start: 2020-03-09 | End: 2020-06-08

## 2020-03-16 DIAGNOSIS — Z79.4 TYPE 2 DIABETES MELLITUS WITH STAGE 4 CHRONIC KIDNEY DISEASE, WITH LONG-TERM CURRENT USE OF INSULIN: ICD-10-CM

## 2020-03-16 DIAGNOSIS — E11.22 TYPE 2 DIABETES MELLITUS WITH STAGE 4 CHRONIC KIDNEY DISEASE, WITH LONG-TERM CURRENT USE OF INSULIN: ICD-10-CM

## 2020-03-16 DIAGNOSIS — N18.4 TYPE 2 DIABETES MELLITUS WITH STAGE 4 CHRONIC KIDNEY DISEASE, WITH LONG-TERM CURRENT USE OF INSULIN: ICD-10-CM

## 2020-03-17 RX ORDER — INSULIN LISPRO 100 [IU]/ML
INJECTION, SOLUTION INTRAVENOUS; SUBCUTANEOUS
Qty: 15 ML | Refills: 0 | OUTPATIENT
Start: 2020-03-17

## 2020-04-06 DIAGNOSIS — Z79.4 TYPE 2 DIABETES MELLITUS WITH STAGE 4 CHRONIC KIDNEY DISEASE, WITH LONG-TERM CURRENT USE OF INSULIN: ICD-10-CM

## 2020-04-06 DIAGNOSIS — E11.22 TYPE 2 DIABETES MELLITUS WITH STAGE 4 CHRONIC KIDNEY DISEASE, WITH LONG-TERM CURRENT USE OF INSULIN: ICD-10-CM

## 2020-04-06 DIAGNOSIS — N18.4 TYPE 2 DIABETES MELLITUS WITH STAGE 4 CHRONIC KIDNEY DISEASE, WITH LONG-TERM CURRENT USE OF INSULIN: ICD-10-CM

## 2020-04-07 RX ORDER — INSULIN DETEMIR 100 [IU]/ML
26 INJECTION, SOLUTION SUBCUTANEOUS NIGHTLY
Qty: 36 ML | Refills: 3 | Status: SHIPPED | OUTPATIENT
Start: 2020-04-07 | End: 2020-12-30 | Stop reason: ALTCHOICE

## 2020-04-07 RX ORDER — INSULIN LISPRO 100 [IU]/ML
INJECTION, SOLUTION INTRAVENOUS; SUBCUTANEOUS
Qty: 15 ML | Refills: 3 | Status: SHIPPED | OUTPATIENT
Start: 2020-04-07 | End: 2020-04-17

## 2020-04-17 ENCOUNTER — TELEPHONE (OUTPATIENT)
Dept: INTERNAL MEDICINE | Facility: CLINIC | Age: 75
End: 2020-04-17

## 2020-04-17 DIAGNOSIS — E11.22 TYPE 2 DIABETES MELLITUS WITH STAGE 4 CHRONIC KIDNEY DISEASE, WITH LONG-TERM CURRENT USE OF INSULIN: ICD-10-CM

## 2020-04-17 DIAGNOSIS — N18.4 TYPE 2 DIABETES MELLITUS WITH STAGE 4 CHRONIC KIDNEY DISEASE, WITH LONG-TERM CURRENT USE OF INSULIN: ICD-10-CM

## 2020-04-17 DIAGNOSIS — Z79.4 TYPE 2 DIABETES MELLITUS WITH STAGE 4 CHRONIC KIDNEY DISEASE, WITH LONG-TERM CURRENT USE OF INSULIN: ICD-10-CM

## 2020-04-17 RX ORDER — INSULIN ASPART 100 [IU]/ML
12 INJECTION, SOLUTION INTRAVENOUS; SUBCUTANEOUS
Qty: 15 ML | Refills: 3 | Status: SHIPPED | OUTPATIENT
Start: 2020-04-17 | End: 2020-12-30 | Stop reason: ALTCHOICE

## 2020-04-17 RX ORDER — INSULIN DETEMIR 100 [IU]/ML
26 INJECTION, SOLUTION SUBCUTANEOUS NIGHTLY
Qty: 36 ML | Refills: 3 | Status: CANCELLED | OUTPATIENT
Start: 2020-04-17

## 2020-04-20 ENCOUNTER — LAB VISIT (OUTPATIENT)
Dept: INTERNAL MEDICINE | Facility: CLINIC | Age: 75
End: 2020-04-20
Payer: MEDICARE

## 2020-04-20 ENCOUNTER — OFFICE VISIT (OUTPATIENT)
Dept: INTERNAL MEDICINE | Facility: CLINIC | Age: 75
End: 2020-04-20
Payer: MEDICARE

## 2020-04-20 DIAGNOSIS — R50.9 FEVER, UNSPECIFIED FEVER CAUSE: ICD-10-CM

## 2020-04-20 DIAGNOSIS — R50.9 FEVER, UNSPECIFIED FEVER CAUSE: Primary | ICD-10-CM

## 2020-04-20 LAB — SARS-COV-2 RNA RESP QL NAA+PROBE: NOT DETECTED

## 2020-04-20 PROCEDURE — 99441 PR PHYSICIAN TELEPHONE EVALUATION 5-10 MIN: CPT | Mod: 95,,, | Performed by: INTERNAL MEDICINE

## 2020-04-20 PROCEDURE — U0002 COVID-19 LAB TEST NON-CDC: HCPCS

## 2020-04-20 PROCEDURE — 99441 PR PHYSICIAN TELEPHONE EVALUATION 5-10 MIN: ICD-10-PCS | Mod: 95,,, | Performed by: INTERNAL MEDICINE

## 2020-04-20 NOTE — PROGRESS NOTES
The patient location is:  home  The chief complaint leading to consultation is:  Fever  Visit type: audio only  Total time spent with patient:  5 min  Each patient to whom he or she provides medical services by telemedicine is:  (1) informed of the relationship between the physician and patient and the respective role of any other health care provider with respect to management of the patient; and (2) notified that he or she may decline to receive medical services by telemedicine and may withdraw from such care at any time.    Notes:   CC:  Fever  HPI:  The patient is a 75 y.o. year old male who presents for fever.  He also reports chills and minimally productive cough that started .  He denies any loss of sense of taste or smell.  Reports 1 episode of diarrhea and denies any known exposure to COVID-19.  The patient does report swelling and drainage of toe.      PAST MEDICAL HISTORY:  Past Medical History:   Diagnosis Date    Arthritis     Cataract     Chronic diastolic congestive heart failure     Coronary artery disease     Cystoid macular edema of both eyes     Diabetes mellitus type II     Hyperlipemia     Hypertension     Retinal hole     Stage 4 chronic kidney disease     Streptococcus pyogenes bacteremia 2018    Due to left toe osteomyelitis       SURGICAL HISTORY:  Past Surgical History:   Procedure Laterality Date    ABDOMINAL AORTOGRAPHY N/A 2019    Procedure: AORTOGRAM-ABDOMINAL;  Surgeon: Amish Hyatt MD;  Location: [facility] CATH LAB/EP;  Service: Cardiology;  Laterality: N/A;  CO2 angiography    ANGIOGRAPHY OF LOWER EXTREMITY Left 2019    Procedure: Angiogram Extremity Unilateral;  Surgeon: Amish Hyatt MD;  Location: [facility] CATH LAB/EP;  Service: Cardiology;  Laterality: Left;    CATARACT EXTRACTION W/  INTRAOCULAR LENS IMPLANT Left 12/15/14    Dr de la cruz    CATARACT EXTRACTION W/  INTRAOCULAR LENS IMPLANT Right 14    dorothy    CIRCUMCISION, NON-      focal  laser right eye      INSERTION OF TUNNELED CENTRAL VENOUS CATHETER (CVC) WITH SUBCUTANEOUS PORT Right 1/2/2019    Procedure: [id number];  Surgeon: Denys Aleman Jr., MD;  Location: [facility];  Service: General;  Laterality: Right;    KNEE SURGERY      left    Left medial collateral ligament repair      TONSILLECTOMY         MEDS:  Medcard reviewed and updated    ALLERGIES: Allergy Card reviewed and updated    SOCIAL HISTORY:   The patient is a nonsmoker.    PE:   APPEARANCE:  Sounds to be in no acute distress.    Audio only  PSYCHIATRIC: The patient is oriented to person, place, and time and has a pleasant affect.        ASSESSMENT/PLAN:  Diagnoses and all orders for this visit:    Fever, unspecified fever cause  -     COVID-19 Routine Screening; Future

## 2020-04-21 ENCOUNTER — TELEPHONE (OUTPATIENT)
Dept: INTERNAL MEDICINE | Facility: CLINIC | Age: 75
End: 2020-04-21

## 2020-04-21 DIAGNOSIS — E11.628 DIABETIC INFECTION OF LEFT FOOT: Primary | ICD-10-CM

## 2020-04-21 DIAGNOSIS — L08.9 DIABETIC INFECTION OF LEFT FOOT: Primary | ICD-10-CM

## 2020-04-23 ENCOUNTER — TELEPHONE (OUTPATIENT)
Dept: PODIATRY | Facility: CLINIC | Age: 75
End: 2020-04-23

## 2020-04-23 ENCOUNTER — TELEPHONE (OUTPATIENT)
Dept: INTERNAL MEDICINE | Facility: CLINIC | Age: 75
End: 2020-04-23

## 2020-04-23 NOTE — TELEPHONE ENCOUNTER
----- Message from Elias Hooks sent at 4/23/2020  2:35 PM CDT -----  Contact: Wife  Patty  Mg  Pt primary Dr would like Pt to have [facility] come out to service pt lft big toe needs Dr Gallegos instruction please give pt wife a call    Contact

## 2020-04-23 NOTE — TELEPHONE ENCOUNTER
----- Message from Savannah Grissom sent at 4/23/2020  2:17 PM CDT -----  Contact: [facility] [phone] Caitie Blood would like to speak to the nurse in regards to the Home health orders sent. Please advise.

## 2020-04-23 NOTE — TELEPHONE ENCOUNTER
Called and spoke with Nurse and she advised they don't have any wound care nurses to go and treat the infected area she states they send the orders to another out source company and they will come and treat the area

## 2020-04-23 NOTE — TELEPHONE ENCOUNTER
Spoke with pt wife and she stated that the pt big toe has blood coming from it and has a blister on the big toe. She also stated that it has been like that since Monday. Pt primary care ordered home health and the nurse was out there today, but is only drawing pt blood and doing vital signs. [facility] pt for 4/24/2020 at 9:30am pt wife agreed to this time and date.

## 2020-04-24 ENCOUNTER — TELEPHONE (OUTPATIENT)
Dept: INTERNAL MEDICINE | Facility: CLINIC | Age: 75
End: 2020-04-24

## 2020-04-24 ENCOUNTER — TELEPHONE (OUTPATIENT)
Dept: PODIATRY | Facility: CLINIC | Age: 75
End: 2020-04-24

## 2020-04-24 ENCOUNTER — OFFICE VISIT (OUTPATIENT)
Dept: PODIATRY | Facility: CLINIC | Age: 75
End: 2020-04-24
Payer: MEDICARE

## 2020-04-24 VITALS — BODY MASS INDEX: 29.22 KG/M2 | TEMPERATURE: 99 F | HEIGHT: 76 IN | WEIGHT: 240 LBS

## 2020-04-24 DIAGNOSIS — E11.51 TYPE 2 DIABETES MELLITUS WITH PERIPHERAL ANGIOPATHY: ICD-10-CM

## 2020-04-24 DIAGNOSIS — E11.42 TYPE 2 DIABETES MELLITUS WITH PERIPHERAL NEUROPATHY: ICD-10-CM

## 2020-04-24 DIAGNOSIS — Z95.828 PORT-A-CATH IN PLACE: Primary | ICD-10-CM

## 2020-04-24 DIAGNOSIS — E11.621 DIABETIC ULCER OF TOE OF LEFT FOOT ASSOCIATED WITH TYPE 2 DIABETES MELLITUS, WITH FAT LAYER EXPOSED: Primary | ICD-10-CM

## 2020-04-24 DIAGNOSIS — L97.522 DIABETIC ULCER OF TOE OF LEFT FOOT ASSOCIATED WITH TYPE 2 DIABETES MELLITUS, WITH FAT LAYER EXPOSED: Primary | ICD-10-CM

## 2020-04-24 DIAGNOSIS — Z87.39 HISTORY OF OSTEOMYELITIS: ICD-10-CM

## 2020-04-24 DIAGNOSIS — B35.1 ONYCHOMYCOSIS: ICD-10-CM

## 2020-04-24 PROCEDURE — 99214 OFFICE O/P EST MOD 30 MIN: CPT | Mod: 25,S$PBB,, | Performed by: PODIATRIST

## 2020-04-24 PROCEDURE — 99999 PR PBB SHADOW E&M-EST. PATIENT-LVL III: ICD-10-PCS | Mod: PBBFAC,,, | Performed by: PODIATRIST

## 2020-04-24 PROCEDURE — 99214 PR OFFICE/OUTPT VISIT, EST, LEVL IV, 30-39 MIN: ICD-10-PCS | Mod: 25,S$PBB,, | Performed by: PODIATRIST

## 2020-04-24 PROCEDURE — 99213 OFFICE O/P EST LOW 20 MIN: CPT | Mod: PBBFAC,PN,25 | Performed by: PODIATRIST

## 2020-04-24 PROCEDURE — 11045 WOUND DEBRIDEMENT: ICD-10-PCS | Mod: S$PBB,,, | Performed by: PODIATRIST

## 2020-04-24 PROCEDURE — 11721 DEBRIDE NAIL 6 OR MORE: CPT | Mod: PBBFAC,PN | Performed by: PODIATRIST

## 2020-04-24 PROCEDURE — 87070 CULTURE OTHR SPECIMN AEROBIC: CPT

## 2020-04-24 PROCEDURE — 99999 PR PBB SHADOW E&M-EST. PATIENT-LVL III: CPT | Mod: PBBFAC,,, | Performed by: PODIATRIST

## 2020-04-24 PROCEDURE — 11042 WOUND DEBRIDEMENT: ICD-10-PCS | Mod: S$PBB,,, | Performed by: PODIATRIST

## 2020-04-24 PROCEDURE — 11045 DBRDMT SUBQ TISS EACH ADDL: CPT | Mod: PBBFAC,PN | Performed by: PODIATRIST

## 2020-04-24 PROCEDURE — 11721 ROUTINE FOOT CARE: ICD-10-PCS | Mod: 59,S$PBB,, | Performed by: PODIATRIST

## 2020-04-24 PROCEDURE — 11042 DBRDMT SUBQ TIS 1ST 20SQCM/<: CPT | Mod: S$PBB,,, | Performed by: PODIATRIST

## 2020-04-24 NOTE — PROCEDURES
"Domingo Castro is a 75 y.o. male patient.    Temp: 98.6 °F (37 °C) (04/24/20 0933)  BP: (Unable to obtain) (04/24/20 0933)  Weight: 108.9 kg (240 lb) (04/24/20 0933)  Height: 6' 3.5" (191.8 cm) (04/24/20 0933)       Wound Debridement  Date/Time: 4/24/2020 9:30 AM  Performed by: Dimitry Gallegos DPM  Authorized by: Dimitry Gallegos DPM     Time out: Immediately prior to procedure a "time out" was called to verify the correct patient, procedure, equipment, support staff and site/side marked as required.    Consent Done?:  Yes (Verbal)  Local anesthesia used?: No      Wound Details:    Location:  Left foot    Location:  Left 1st Toe    Type of Debridement:  Excisional       Length (cm):  5       Area (sq cm):  44       Width (cm):  8.8       Percent Debrided (%):  100       Depth (cm):  0.3       Total Area Debrided (sq cm):  44    Depth of debridement:  Subcutaneous tissue    Tissue debrided:  Subcutaneous    Devitalized tissue debrided:  Slough, Fibrin and Callus    Instruments:  Nippers    Bleeding:  Minimal  Hemostasis Achieved: Yes    Method Used:  Pressure  Patient tolerance:  Patient tolerated the procedure well with no immediate complications     Saline moistened hydrothera blue, shar foam, cast padding secured with coban.           Dimitry Gallegos  4/24/2020    "

## 2020-04-24 NOTE — PROGRESS NOTES
Subjective:      Patient ID: Domingo Castro is a 75 y.o. male.    Chief Complaint: Foot Ulcer (Left hallux)    Domingo is a 75 y.o. male who presents to the clinic for evaluation and treatment of high risk feet. Domingo has a past medical history of Arthritis, Cataract, Chronic diastolic congestive heart failure, Coronary artery disease, Cystoid macular edema of both eyes, Diabetes mellitus type II, Hyperlipemia, Hypertension, Retinal hole, Stage 4 chronic kidney disease, and Streptococcus pyogenes bacteremia (12/23/2018). The patient's chief complaint is diabetic foot ulcer, left hallux. This patient has documented high risk feet requiring routine maintenance secondary to peripheral vascular disease.  Discharged from [facility] on 08/04/2019.  Receiving 6 weeks of IV vancomycin per ID recommendations.  Follow-up per [facility] with 3 times weekly Medi Honey dressing changes. Accompanied by his daughter.  No new complaints.    09/19/2019:  Follow-up for chronic wound to left hallux treated for osteomyelitis.  Post endovascular intervention per .    10/17/2019:  Presents for wound check.  Followed per [facility].  Believes his wound may be healed.  Says his home health nurse peeled away some dead skin and created a wound at the tip of the left hallux.  No new complaints.    11/11/2019:  Presents for wound check left hallux.  Relates he was told by his home health nurse that the wound is healed.  No new complaints.    04/24/2020:  Presents today complaining of new onset blister to the left hallux.  He was previously followed per [facility].  He still has a port in his chest that should been removed.  Denies any trauma.    PCP: Griselda Nugent MD    Date Last Seen by PCP:  05/29/2019    Current shoe gear:  Affected Foot: Football and Darco shoe on the affected foot     Unaffected Foot: Rx diabetic extra depth shoes and custom accommodative insoles    History of Trauma: negative  Sign of  Infection: none    Hemoglobin A1C   Date Value Ref Range Status   02/20/2020 8.1 (H) 4.0 - 5.6 % Final     Comment:     ADA Screening Guidelines:  5.7-6.4%  Consistent with prediabetes  >or=6.5%  Consistent with diabetes  High levels of fetal hemoglobin interfere with the HbA1C  assay. Heterozygous hemoglobin variants (HbS, HgC, etc)do  not significantly interfere with this assay.   However, presence of multiple variants may affect accuracy.     11/08/2019 7.8 (H) 4.0 - 5.6 % Final     Comment:     ADA Screening Guidelines:  5.7-6.4%  Consistent with prediabetes  >or=6.5%  Consistent with diabetes  High levels of fetal hemoglobin interfere with the HbA1C  assay. Heterozygous hemoglobin variants (HbS, HgC, etc)do  not significantly interfere with this assay.   However, presence of multiple variants may affect accuracy.     07/26/2019 8.1 (H) 4.0 - 5.6 % Final     Comment:     ADA Screening Guidelines:  5.7-6.4%  Consistent with prediabetes  >or=6.5%  Consistent with diabetes  High levels of fetal hemoglobin interfere with the HbA1C  assay. Heterozygous hemoglobin variants (HbS, HgC, etc)do  not significantly interfere with this assay.   However, presence of multiple variants may affect accuracy.         Review of Systems   Constitution: Negative for chills and fever.   HENT: Negative for congestion and hearing loss.    Cardiovascular: Negative for chest pain and claudication.   Respiratory: Negative for cough and shortness of breath.    Skin: Positive for color change, nail changes and poor wound healing.   Musculoskeletal: Negative for back pain and joint pain.   Gastrointestinal: Negative for nausea and vomiting.   Neurological: Positive for numbness.   Psychiatric/Behavioral: Negative for altered mental status.           Objective:      Physical Exam   Constitutional: He is oriented to person, place, and time. No distress.   Cardiovascular:   Pulses:       Dorsalis pedis pulses are 1+ on the right side, and 1+ on  the left side.        Posterior tibial pulses are 1+ on the right side, and 1+ on the left side.   Mild lower extremity edema bilateral. No hair growth bilateral lower extremity.   Musculoskeletal:   One MTP range of motion left is less than 30° dorsiflexion.    No localized pain on palpation left hallux wound site.  No palpable fluctuance or crepitance left foot.   Neurological: He is alert and oriented to person, place, and time. A sensory deficit is present.   Skin: Skin is warm, dry and intact. Capillary refill takes 2 to 3 seconds. No ecchymosis and no rash noted. He is not diaphoretic. No cyanosis or erythema. No pallor. Nails show no clubbing.   Left hallux nail is moderately elongated and dystrophic with some loosening.  There is no surrounding erythema.    Nails 2-5 left and 1-5 right are moderately elongated 5-7 mm, thickened, discolored brown yellow with loosening and underlying debris.  The right hallux nail and bilateral 5th toenail are dystrophic.    Ulcer Location:  Distal half of the left hallux  Measurements:  Unable to measure pre debridement due to moderate overlying slough from a serous filled partially erupted blister  Periwound: Intact  Drainage:  Serous.  Pus: None.  Malodor: None.  Base:  30% granular 70% fibrous with patches of biofilm  Signs of infection: None.  Does not probe track or undermine post debridement.      .             Assessment:       Encounter Diagnoses   Name Primary?    Diabetic ulcer of toe of left foot associated with type 2 diabetes mellitus, with fat layer exposed Yes    Type 2 diabetes mellitus with peripheral angiopathy     Type 2 diabetes mellitus with peripheral neuropathy     Onychomycosis     History of osteomyelitis          Plan:       Domingo was seen today for foot ulcer.    Diagnoses and all orders for this visit:    Diabetic ulcer of toe of left foot associated with type 2 diabetes mellitus, with fat layer exposed  -     Aerobic culture (Specify  Source)  -     Wound Debridement  -     SUBSEQUENT HOME HEALTH ORDERS    Type 2 diabetes mellitus with peripheral angiopathy  -     Routine Foot Care  -     SUBSEQUENT HOME HEALTH ORDERS    Type 2 diabetes mellitus with peripheral neuropathy  -     Routine Foot Care  -     SUBSEQUENT HOME HEALTH ORDERS    Onychomycosis  -     Routine Foot Care    History of osteomyelitis      I counseled the patient on his conditions, their implications and medical management.    Shoe inspection. Diabetic Foot Education. Patient reminded of the importance of good nutrition and blood sugar control to help prevent podiatric complications of diabetes. Patient instructed on proper foot hygeine. We discussed wearing proper shoe gear, daily foot inspections, never walking without protective shoe gear, never putting sharp instruments to feet.    New onset ulceration to the distal half of the left hallux most likely from shoe gear irritation.    Updated home health orders with [facility].    Wound debridement dressing per attached note.  Dressings remain clean, dry and intact until home health can changes.  A new Darco shoe was dispensed today.    Reviewed activity restrictions modifications.    Encouraged elevation at rest.    Routine foot care per attached note.    RTC 3-4 weeks or p.r.n. as discussed.    A portion of this note was generated by voice recognition software and may contain topical graphical errors.

## 2020-04-24 NOTE — TELEPHONE ENCOUNTER
Pt called and states he has a diabetic ulcer and the area was not looking bad and a culture was completed and 2-5 days then he will send a Rx to treat home health will come and take care of the area. The port in the shoulder needs to come out

## 2020-04-24 NOTE — TELEPHONE ENCOUNTER
Spoke with patient's wife to let her know that the patient got a culture down and we are waiting for his culture come back

## 2020-04-24 NOTE — PROCEDURES
"Routine Foot Care  Date/Time: 4/24/2020 9:30 AM  Performed by: Dimitry Gallegos DPM  Authorized by: Dimitry Gallegos DPM     Time out: Immediately prior to procedure a "time out" was called to verify the correct patient, procedure, equipment, support staff and site/side marked as required.    Consent Done?:  Yes (Verbal)  Hyperkeratotic Skin Lesions?: No      Nail Care Type:  Debride  Location(s): All  (Left 1st Toe, Left 3rd Toe, Left 2nd Toe, Left 4th Toe, Left 5th Toe, Right 1st Toe, Right 2nd Toe, Right 3rd Toe, Right 4th Toe and Right 5th Toe)  Patient tolerance:  Patient tolerated the procedure well with no immediate complications     Used sterile nail nipper.          "

## 2020-04-24 NOTE — TELEPHONE ENCOUNTER
----- Message from Rebecca Echols sent at 4/24/2020 11:23 AM CDT -----  Contact: wife [phone]  Patient's spouse calling in regarding her 's visit. Please call and advise.

## 2020-04-27 LAB — BACTERIA SPEC AEROBE CULT: NORMAL

## 2020-04-28 ENCOUNTER — TELEPHONE (OUTPATIENT)
Dept: SURGERY | Facility: CLINIC | Age: 75
End: 2020-04-28

## 2020-04-28 NOTE — TELEPHONE ENCOUNTER
----- Message from Eden Rosas sent at 4/27/2020  3:38 PM CDT -----      ----- Message -----  From: Rhoda Brown  Sent: 4/27/2020   2:59 PM CDT  To: [facility] General Surgery Clinical Support    Hello.  Patient has a referral for General Surgery by orders of Dr. Griselda Nugent.The port in the shoulder needs to come out. Please contact patient to schedule.   Thanks.     Port-A-Cath in place [Z95.828]      4/28/20  2:02pm  Spoke to patient's wife regarding above message. Patient scheduled as requested. Verbalized understanding.

## 2020-05-04 ENCOUNTER — EXTERNAL HOME HEALTH (OUTPATIENT)
Dept: HOME HEALTH SERVICES | Facility: HOSPITAL | Age: 75
End: 2020-05-04

## 2020-05-05 ENCOUNTER — TELEPHONE (OUTPATIENT)
Dept: INTERNAL MEDICINE | Facility: CLINIC | Age: 75
End: 2020-05-05

## 2020-05-05 ENCOUNTER — PATIENT OUTREACH (OUTPATIENT)
Dept: ADMINISTRATIVE | Facility: OTHER | Age: 75
End: 2020-05-05

## 2020-05-05 NOTE — TELEPHONE ENCOUNTER
Called Caitie and she advised there was an order in the system to have labs collected for the next two weeks advised those orders where not from PCP she advised the orders where from another provider and she will check but she thinks that the order may be an old order and it was sent erroneously. She will check and call back if needed. Termed the call

## 2020-05-05 NOTE — TELEPHONE ENCOUNTER
Caitie called and she advised that the subsequent orders show Dr Nugent ordered the lab studies. And she will collect per orders. Termed the call

## 2020-05-05 NOTE — TELEPHONE ENCOUNTER
----- Message from Rebecca Gottlieb sent at 5/5/2020  9:11 AM CDT -----  Contact: Caitie [facility] Nurse @ [phone]  Good Morning   Nurse would like call please. Patient need to know if it is necessary for him to have other round of blood work for 2 weeks    Thank you

## 2020-05-06 ENCOUNTER — OFFICE VISIT (OUTPATIENT)
Dept: SURGERY | Facility: CLINIC | Age: 75
End: 2020-05-06
Payer: MEDICARE

## 2020-05-06 VITALS
BODY MASS INDEX: 27.77 KG/M2 | DIASTOLIC BLOOD PRESSURE: 76 MMHG | WEIGHT: 228.06 LBS | OXYGEN SATURATION: 98 % | HEIGHT: 76 IN | RESPIRATION RATE: 18 BRPM | SYSTOLIC BLOOD PRESSURE: 157 MMHG | TEMPERATURE: 98 F | HEART RATE: 80 BPM

## 2020-05-06 DIAGNOSIS — Z95.828 PORT-A-CATH IN PLACE: ICD-10-CM

## 2020-05-06 PROCEDURE — 99999 PR PBB SHADOW E&M-EST. PATIENT-LVL IV: ICD-10-PCS | Mod: PBBFAC,,, | Performed by: SURGERY

## 2020-05-06 PROCEDURE — 99213 OFFICE O/P EST LOW 20 MIN: CPT | Mod: 25,S$PBB,, | Performed by: SURGERY

## 2020-05-06 PROCEDURE — 36590 REMOVAL TUNNELED CV CATH: CPT | Mod: S$PBB,RT,, | Performed by: SURGERY

## 2020-05-06 PROCEDURE — 99214 OFFICE O/P EST MOD 30 MIN: CPT | Mod: PBBFAC,PO | Performed by: SURGERY

## 2020-05-06 PROCEDURE — 99999 PR PBB SHADOW E&M-EST. PATIENT-LVL IV: CPT | Mod: PBBFAC,,, | Performed by: SURGERY

## 2020-05-06 PROCEDURE — 36590 REMOVAL TUNNELED CV CATH: CPT | Mod: PBBFAC,PO | Performed by: SURGERY

## 2020-05-06 PROCEDURE — 36590 PR REMOVAL TUNNELED CV CATH W SUBQ PORT OR PUMP: ICD-10-PCS | Mod: S$PBB,RT,, | Performed by: SURGERY

## 2020-05-06 PROCEDURE — 99213 PR OFFICE/OUTPT VISIT, EST, LEVL III, 20-29 MIN: ICD-10-PCS | Mod: 25,S$PBB,, | Performed by: SURGERY

## 2020-05-06 NOTE — PROGRESS NOTES
[facility]  PROGRESS NOTE    HPI: Domingo Castro is a 75 y.o. male who previously had a RIJ PAC placed on 1/2/19 for long term IV abx administration to treat  Streptococcus bacteremia due to left lower pneumonia and osteomyelitis left great toe. ( Not a cadndiate for PICC since he is CKD patient). Completed abx and since has not had need for repeat IV abx. Sent to surgery for PAC removal. Denies f/c, n/v, cp, sob. Has left foot bandaged after debridement of a blister. Recently started on Plavixx for PAD     Vitals:    05/06/20 0943   BP: (!) 157/76   Pulse: 80   Resp: 18   Temp: 98.3 °F (36.8 °C)       PHYSICAL EXAM:  GEN: NAD, AAOx3  HEENT:Anicteric sclera  CHEST: RIJ PAC in place, CDI  CV:RRR  RESP:NLB  ABD:Soft, NT, ND  EXT: LLE with foot in bandage      ASSESSMENT & PLAN:  75 y.o. male     PAC in place  - no longer needed  - no recent abx required  - removed at bedside, no issues, pressure held for 5 minutes  - wound closed with absorbable sutures and glue, pressure dressing applied  - RTC PRN

## 2020-05-06 NOTE — PROGRESS NOTES
05/06/2020    Domingo Castro  [id number]    PROCEDURE PERFORMED: RIJ PAC removal    PERFORMING SURGEON: Denys Aleman Jr    CONSENT:  The risks benefits and alternatives of the procedure were discussed with the patient. The patient was queried for questions and all concerns were addressed.  The patient provided written consent for the procedure.    ANESTHESIA: lidocaine 1% with epi    INDICATION: PAC no longer needed    FINDINGS: APC removed in totality    PROCEDURE IN DETAIL: Informed consent obtained. Area prepped and draped in sterile fashion.Time out performed. Lido w/ epi injected over prior incision site. Skin incised sharply down to port and catheter. Fibrous sheath freed from port and catheter and both were removed in totality. Pressure held at RIJ insertion site for 5 minutes. Hemostasis good. Closed with 4-0 Monocryl subq suture and glue. Pressure dressing applied.     EBL: minimal    COMPLICATIONS: none    CONDITION: stable     DISPO: home

## 2020-05-06 NOTE — LETTER
May 6, 2020      Griselda Nugent MD  [address]  [place] [address]           [facility]  [address]  [facility] [address]  Phone: [phone]          Patient: Domingo Castro   MR Number: [id number]   YOB: 1945   Date of Visit: 5/6/2020       Dear Dr. Griselda Nugent:    Thank you for referring Domingo Castro to me for evaluation. Attached you will find relevant portions of my assessment and plan of care.    If you have questions, please do not hesitate to call me. I look forward to following Domingo Castro along with you.    Sincerely,    Denys Aleman Jr., MD    Enclosure  CC:  No Recipients    If you would like to receive this communication electronically, please contact [email or web address] or [phone] to request more information on See[employer] Link access.    For providers and/or their staff who would like to refer a patient to [facility], please contact us through our one-stop-shop provider referral line, [facility], at [phone].    If you feel you have received this communication in error or would no longer like to receive these types of communications, please e-mail [email or web address]

## 2020-05-07 ENCOUNTER — TELEPHONE (OUTPATIENT)
Dept: INTERNAL MEDICINE | Facility: CLINIC | Age: 75
End: 2020-05-07

## 2020-05-08 NOTE — TELEPHONE ENCOUNTER
Labs are significant for abnormal kidney function, but stable overall and anemia, which is also relatively stable.

## 2020-05-12 ENCOUNTER — PATIENT OUTREACH (OUTPATIENT)
Dept: ADMINISTRATIVE | Facility: OTHER | Age: 75
End: 2020-05-12

## 2020-05-14 ENCOUNTER — OFFICE VISIT (OUTPATIENT)
Dept: PODIATRY | Facility: CLINIC | Age: 75
End: 2020-05-14
Payer: MEDICARE

## 2020-05-14 VITALS — BODY MASS INDEX: 27.76 KG/M2 | WEIGHT: 227.94 LBS | HEIGHT: 76 IN

## 2020-05-14 DIAGNOSIS — E11.51 TYPE 2 DIABETES MELLITUS WITH PERIPHERAL ANGIOPATHY: ICD-10-CM

## 2020-05-14 DIAGNOSIS — E11.621 DIABETIC ULCER OF TOE OF LEFT FOOT ASSOCIATED WITH TYPE 2 DIABETES MELLITUS, WITH FAT LAYER EXPOSED: Primary | ICD-10-CM

## 2020-05-14 DIAGNOSIS — E11.42 TYPE 2 DIABETES MELLITUS WITH PERIPHERAL NEUROPATHY: ICD-10-CM

## 2020-05-14 DIAGNOSIS — L97.522 DIABETIC ULCER OF TOE OF LEFT FOOT ASSOCIATED WITH TYPE 2 DIABETES MELLITUS, WITH FAT LAYER EXPOSED: Primary | ICD-10-CM

## 2020-05-14 PROCEDURE — 99499 NO LOS: ICD-10-PCS | Mod: S$PBB,,, | Performed by: PODIATRIST

## 2020-05-14 PROCEDURE — 99213 OFFICE O/P EST LOW 20 MIN: CPT | Mod: PBBFAC,PN,25 | Performed by: PODIATRIST

## 2020-05-14 PROCEDURE — 11042 DBRDMT SUBQ TIS 1ST 20SQCM/<: CPT | Mod: PBBFAC,PN | Performed by: PODIATRIST

## 2020-05-14 PROCEDURE — 99999 PR PBB SHADOW E&M-EST. PATIENT-LVL III: ICD-10-PCS | Mod: PBBFAC,,, | Performed by: PODIATRIST

## 2020-05-14 PROCEDURE — 99999 PR PBB SHADOW E&M-EST. PATIENT-LVL III: CPT | Mod: PBBFAC,,, | Performed by: PODIATRIST

## 2020-05-14 PROCEDURE — 11042 WOUND DEBRIDEMENT: ICD-10-PCS | Mod: S$PBB,,, | Performed by: PODIATRIST

## 2020-05-14 PROCEDURE — 99499 UNLISTED E&M SERVICE: CPT | Mod: S$PBB,,, | Performed by: PODIATRIST

## 2020-05-14 NOTE — PROCEDURES
"Wound Debridement  Date/Time: 5/14/2020 9:00 AM  Performed by: Dimitry Gallegos DPM  Authorized by: Dimitry Gallegos DPM     Time out: Immediately prior to procedure a "time out" was called to verify the correct patient, procedure, equipment, support staff and site/side marked as required.    Consent Done?:  Yes (Written)    Preparation: Patient was prepped and draped in usual sterile fashion    Local anesthesia used?: Yes    Local anesthetic:  Topical anesthetic    Wound Details:    Location:  Left foot    Location:  Left 1st Toe    Type of Debridement:  Excisional       Length (cm):  0.5       Area (sq cm):  1.65       Width (cm):  3.3       Percent Debrided (%):  100       Depth (cm):  0.2       Total Area Debrided (sq cm):  1.65    Depth of debridement:  Subcutaneous tissue    Tissue debrided:  Subcutaneous    Devitalized tissue debrided:  Slough, Fibrin, Biofilm and Callus    Instruments:  Curette and Blade    Bleeding:  Minimal  Hemostasis Achieved: Yes    Method Used:  Pressure  Patient tolerance:  Patient tolerated the procedure well with no immediate complications     Saline moistened hydrothera blue, shar foam, cast padding secured with coban.         "

## 2020-05-14 NOTE — PROGRESS NOTES
Subjective:      Patient ID: Domingo Castro is a 75 y.o. male.    Chief Complaint: Follow-up (3 weeks); Foot Ulcer (left foot); and Diabetes Mellitus (4/20/2020 ALBA Nugent-PCP)    Domingo is a 75 y.o. male who presents to the clinic for evaluation and treatment of high risk feet. Domingo has a past medical history of Arthritis, Cataract, Chronic diastolic congestive heart failure, Coronary artery disease, Cystoid macular edema of both eyes, Diabetes mellitus type II, Hyperlipemia, Hypertension, Retinal hole, Stage 4 chronic kidney disease, and Streptococcus pyogenes bacteremia (12/23/2018). The patient's chief complaint is diabetic foot ulcer, left hallux. This patient has documented high risk feet requiring routine maintenance secondary to peripheral vascular disease.  Discharged from [facility] on 08/04/2019.  Receiving 6 weeks of IV vancomycin per ID recommendations.  Follow-up per [facility] with 3 times weekly Medi Honey dressing changes. Accompanied by his daughter.  No new complaints.    09/19/2019:  Follow-up for chronic wound to left hallux treated for osteomyelitis.  Post endovascular intervention per .    10/17/2019:  Presents for wound check.  Followed per [facility].  Believes his wound may be healed.  Says his home health nurse peeled away some dead skin and created a wound at the tip of the left hallux.  No new complaints.    11/11/2019:  Presents for wound check left hallux.  Relates he was told by his home health nurse that the wound is healed.  No new complaints.    04/24/2020:  Presents today complaining of new onset blister to the left hallux.  He was previously followed per [facility].  He still has a port in his chest that should been removed.  Denies any trauma.    05/14/2020:  Presents for wound check to left foot.  No new complaints.  [facility] is changing his dressings.    PCP: Griselda Nugent MD    Date Last Seen by PCP:  05/29/2019    Current shoe gear:  " Affected Foot: Football and Darco shoe on the affected foot     Unaffected Foot: Rx diabetic extra depth shoes and custom accommodative insoles    History of Trauma: negative  Sign of Infection: none    Hemoglobin A1C   Date Value Ref Range Status   02/20/2020 8.1 (H) 4.0 - 5.6 % Final     Comment:     ADA Screening Guidelines:  5.7-6.4%  Consistent with prediabetes  >or=6.5%  Consistent with diabetes  High levels of fetal hemoglobin interfere with the HbA1C  assay. Heterozygous hemoglobin variants (HbS, HgC, etc)do  not significantly interfere with this assay.   However, presence of multiple variants may affect accuracy.     11/08/2019 7.8 (H) 4.0 - 5.6 % Final     Comment:     ADA Screening Guidelines:  5.7-6.4%  Consistent with prediabetes  >or=6.5%  Consistent with diabetes  High levels of fetal hemoglobin interfere with the HbA1C  assay. Heterozygous hemoglobin variants (HbS, HgC, etc)do  not significantly interfere with this assay.   However, presence of multiple variants may affect accuracy.     07/26/2019 8.1 (H) 4.0 - 5.6 % Final     Comment:     ADA Screening Guidelines:  5.7-6.4%  Consistent with prediabetes  >or=6.5%  Consistent with diabetes  High levels of fetal hemoglobin interfere with the HbA1C  assay. Heterozygous hemoglobin variants (HbS, HgC, etc)do  not significantly interfere with this assay.   However, presence of multiple variants may affect accuracy.       Vitals:    05/14/20 0922   Weight: 103.4 kg (227 lb 15.3 oz)   Height: 6' 3.5" (1.918 m)   PainSc: 0-No pain      Past Medical History:   Diagnosis Date    Arthritis     Cataract     Chronic diastolic congestive heart failure     Coronary artery disease     Cystoid macular edema of both eyes     Diabetes mellitus type II     Hyperlipemia     Hypertension     Retinal hole     Stage 4 chronic kidney disease     Streptococcus pyogenes bacteremia 12/23/2018    Due to left toe osteomyelitis       Past Surgical History:   Procedure " Laterality Date    ABDOMINAL AORTOGRAPHY N/A 2019    Procedure: AORTOGRAM-ABDOMINAL;  Surgeon: Amish Hyatt MD;  Location: [facility] CATH LAB/EP;  Service: Cardiology;  Laterality: N/A;  CO2 angiography    ANGIOGRAPHY OF LOWER EXTREMITY Left 2019    Procedure: Angiogram Extremity Unilateral;  Surgeon: Amish Hyatt MD;  Location: [facility] CATH LAB/EP;  Service: Cardiology;  Laterality: Left;    CATARACT EXTRACTION W/  INTRAOCULAR LENS IMPLANT Left 12/15/14    Dr de la cruz    CATARACT EXTRACTION W/  INTRAOCULAR LENS IMPLANT Right 14    dorothy    CIRCUMCISION, NON-      focal laser right eye      INSERTION OF TUNNELED CENTRAL VENOUS CATHETER (CVC) WITH SUBCUTANEOUS PORT Right 2019    Procedure: [id number];  Surgeon: Denys Aleman Jr., MD;  Location: [facility] OR;  Service: General;  Laterality: Right;    KNEE SURGERY      left    Left medial collateral ligament repair      TONSILLECTOMY         Family History   Problem Relation Age of Onset    Heart disease Mother     Cancer Mother     Cancer Brother     Heart disease Father     Diabetes Father     Cancer Sister     Cataracts Paternal Grandmother     Glaucoma Paternal Grandmother     Blindness Neg Hx     Amblyopia Neg Hx     Hypertension Neg Hx     Macular degeneration Neg Hx     Retinal detachment Neg Hx     Strabismus Neg Hx        Social History     Socioeconomic History    Marital status:      Spouse name: Not on file    Number of children: Not on file    Years of education: Not on file    Highest education level: Not on file   Occupational History    Not on file   Social Needs    Financial resource strain: Not on file    Food insecurity:     Worry: Not on file     Inability: Not on file    Transportation needs:     Medical: Not on file     Non-medical: Not on file   Tobacco Use    Smoking status: Never Smoker    Smokeless tobacco: Never Used   Substance and Sexual Activity    Alcohol use: No     " Alcohol/week: 0.0 standard drinks    Drug use: No    Sexual activity: Yes     Partners: Female   Lifestyle    Physical activity:     Days per week: Not on file     Minutes per session: Not on file    Stress: Not on file   Relationships    Social connections:     Talks on phone: Not on file     Gets together: Not on file     Attends Rastafarian service: Not on file     Active member of club or organization: Not on file     Attends meetings of clubs or organizations: Not on file     Relationship status: Not on file   Other Topics Concern    Not on file   Social History Narrative    Not on file       Current Outpatient Medications   Medication Sig Dispense Refill    acetaminophen (TYLENOL) 325 MG tablet Take 2 tablets (650 mg total) by mouth every 4 (four) hours as needed.  0    albuterol-ipratropium (DUO-NEB) 2.5 mg-0.5 mg/3 mL nebulizer solution Take 3 mLs by nebulization every 4 (four) hours as needed for Wheezing or Shortness of Breath. Rescue 90 mL 0    amLODIPine (NORVASC) 10 MG tablet TAKE 1 TABLET BY MOUTH ONCE DAILY 30 tablet 11    clopidogrel (PLAVIX) 75 mg tablet Take 1 tablet (75 mg total) by mouth once daily. 30 tablet 11    furosemide (LASIX) 40 MG tablet TAKE 2 TABLETS BY MOUTH TWICE DAILY DO  NOT  TAKE  EVENING  DOSE  AFTER  3   tablet 0    hydrALAZINE (APRESOLINE) 50 MG tablet       insulin aspart U-100 (NOVOLOG FLEXPEN U-100 INSULIN) 100 unit/mL (3 mL) InPn pen Inject 12 Units into the skin 3 (three) times daily with meals. With correction scale maximum 50 units daily. 15 mL 3    LEVEMIR FLEXTOUCH U-100 INSULN 100 unit/mL (3 mL) InPn pen Inject 26 Units into the skin every evening. 36 mL 3    nebulizer and compressor Alana USE AS DIRECTED 1 each 0    olmesartan (BENICAR) 20 MG tablet Take 1 tablet (20 mg total) by mouth once daily. 90 tablet 10    pen needle, diabetic, safety (BD AUTOSHIELD PEN NEEDLE) 29 gauge x 5/16" Ndle For use once daily with levemir flexpen 90 each 11    " rosuvastatin (CRESTOR) 20 MG tablet TAKE 1 TABLET BY MOUTH ONCE DAILY 30 tablet 11     No current facility-administered medications for this visit.        Review of patient's allergies indicates:   Allergen Reactions    Atorvastatin Other (See Comments)         Review of Systems   Constitution: Negative for chills and fever.   HENT: Negative for congestion and hearing loss.    Cardiovascular: Negative for chest pain and claudication.   Respiratory: Negative for cough and shortness of breath.    Skin: Positive for color change, nail changes and poor wound healing.   Musculoskeletal: Negative for back pain and joint pain.   Gastrointestinal: Negative for nausea and vomiting.   Neurological: Positive for numbness.   Psychiatric/Behavioral: Negative for altered mental status.           Objective:      Physical Exam   Constitutional: He is oriented to person, place, and time. No distress.   Cardiovascular:   Pulses:       Dorsalis pedis pulses are 1+ on the right side, and 1+ on the left side.        Posterior tibial pulses are 1+ on the right side, and 1+ on the left side.   Mild lower extremity edema bilateral. No hair growth bilateral lower extremity.   Musculoskeletal:   One MTP range of motion left is less than 30° dorsiflexion.    No localized pain on palpation left hallux wound site.  No palpable fluctuance or crepitance left foot.   Neurological: He is alert and oriented to person, place, and time. A sensory deficit is present.   Skin: Skin is warm, dry and intact. Capillary refill takes 2 to 3 seconds. No ecchymosis and no rash noted. He is not diaphoretic. No cyanosis or erythema. No pallor. Nails show no clubbing.   Left hallux nail is moderately elongated and dystrophic with some loosening.  There is no surrounding erythema.    Nails 2-5 left and 1-5 right are moderately elongated 5-7 mm, thickened, discolored brown yellow with loosening and underlying debris.  The right hallux nail and bilateral 5th toenail  are dystrophic.    Ulcer Location:  Distal plantar left hallux  Measurements:  Pre debridement measures 0.4 x 3.2 x 0.1 cm with post debridement measurements 0.5 x 3.3 x 0.2 cm Periwound: Intact with surrounding hyperkeratotic skin  Drainage:  Serous.  Pus: None.  Malodor: None.  Base:  69% granular 40% fibrous with patches of biofilm  Signs of infection: None.  Does not probe track or undermine post debridement.      .             Assessment:       Encounter Diagnoses   Name Primary?    Diabetic ulcer of toe of left foot associated with type 2 diabetes mellitus, with fat layer exposed Yes    Type 2 diabetes mellitus with peripheral angiopathy     Type 2 diabetes mellitus with peripheral neuropathy          Plan:       Domingo was seen today for follow-up, foot ulcer and diabetes mellitus.    Diagnoses and all orders for this visit:    Diabetic ulcer of toe of left foot associated with type 2 diabetes mellitus, with fat layer exposed  -     Wound Debridement  -     SUBSEQUENT HOME HEALTH ORDERS    Type 2 diabetes mellitus with peripheral angiopathy  -     Wound Debridement  -     SUBSEQUENT HOME HEALTH ORDERS    Type 2 diabetes mellitus with peripheral neuropathy  -     Wound Debridement  -     SUBSEQUENT HOME HEALTH ORDERS      I counseled the patient on his conditions, their implications and medical management.    Shoe inspection. Diabetic Foot Education. Patient reminded of the importance of good nutrition and blood sugar control to help prevent podiatric complications of diabetes. Patient instructed on proper foot hygeine. We discussed wearing proper shoe gear, daily foot inspections, never walking without protective shoe gear, never putting sharp instruments to feet.        Updated home health orders with [facility] health.    Wound debridement dressing per attached note.  Dressings remain clean, dry and intact until home health can changes.  Continue offloading Darco shoe.    Continue activity restrictions  modifications.    Encouraged elevation at rest.    RTC 3-4 weeks or p.r.n. as discussed.    A portion of this note was generated by voice recognition software and may contain topical graphical errors.

## 2020-05-15 ENCOUNTER — TELEPHONE (OUTPATIENT)
Dept: INTERNAL MEDICINE | Facility: CLINIC | Age: 75
End: 2020-05-15

## 2020-05-28 ENCOUNTER — HOSPITAL ENCOUNTER (OUTPATIENT)
Dept: RADIOLOGY | Facility: HOSPITAL | Age: 75
Discharge: HOME | End: 2020-05-28
Attending: INTERNAL MEDICINE
Payer: MEDICARE

## 2020-05-28 DIAGNOSIS — I73.9 PAD (PERIPHERAL ARTERY DISEASE): ICD-10-CM

## 2020-05-28 PROCEDURE — 93926 LOWER EXTREMITY STUDY: CPT | Mod: 26,LT,, | Performed by: RADIOLOGY

## 2020-05-28 PROCEDURE — 93926 US LOWER EXTREMITY ARTERIES LEFT: ICD-10-PCS | Mod: 26,LT,, | Performed by: RADIOLOGY

## 2020-05-28 PROCEDURE — 93926 LOWER EXTREMITY STUDY: CPT | Mod: TC,LT

## 2020-05-29 ENCOUNTER — PATIENT OUTREACH (OUTPATIENT)
Dept: ADMINISTRATIVE | Facility: OTHER | Age: 75
End: 2020-05-29

## 2020-05-29 NOTE — PROGRESS NOTES
Care Everywhere: n/a  Immunization: updated  Health Maintenance: updated  Media Review: n/a  Legacy Review: n/a  Order placed: n/a  Upcoming appts:optometry 6.1.2020

## 2020-06-01 ENCOUNTER — OFFICE VISIT (OUTPATIENT)
Dept: OPTOMETRY | Facility: CLINIC | Age: 75
End: 2020-06-01
Payer: MEDICARE

## 2020-06-01 DIAGNOSIS — H52.4 PRESBYOPIA: ICD-10-CM

## 2020-06-01 DIAGNOSIS — Z13.5 GLAUCOMA SCREENING: ICD-10-CM

## 2020-06-01 PROCEDURE — 99999 PR PBB SHADOW E&M-EST. PATIENT-LVL III: ICD-10-PCS | Mod: PBBFAC,,, | Performed by: OPTOMETRIST

## 2020-06-01 PROCEDURE — 99999 PR PBB SHADOW E&M-EST. PATIENT-LVL III: CPT | Mod: PBBFAC,,, | Performed by: OPTOMETRIST

## 2020-06-01 PROCEDURE — 92015 PR REFRACTION: ICD-10-PCS | Mod: ,,, | Performed by: OPTOMETRIST

## 2020-06-01 PROCEDURE — 92014 COMPRE OPH EXAM EST PT 1/>: CPT | Mod: S$PBB,,, | Performed by: OPTOMETRIST

## 2020-06-01 PROCEDURE — 92014 PR EYE EXAM, EST PATIENT,COMPREHESV: ICD-10-PCS | Mod: S$PBB,,, | Performed by: OPTOMETRIST

## 2020-06-01 PROCEDURE — 92015 DETERMINE REFRACTIVE STATE: CPT | Mod: ,,, | Performed by: OPTOMETRIST

## 2020-06-01 PROCEDURE — 99213 OFFICE O/P EST LOW 20 MIN: CPT | Mod: PBBFAC,PO | Performed by: OPTOMETRIST

## 2020-06-01 NOTE — PROGRESS NOTES
ELIZABETH DIAZ 02/2019 with Dr. Harrison.  Diabetic  this morning.  Patient   has prescription glasses but they don't seem to be working as well and he   is now wearing OTC readers.  Readers are anywhere between +1.25 to +3.00.    Patient would like to be checked for new glasses today. Using [employer]ane prn.      Hemoglobin A1C       Date                     Value               Ref Range             Status                02/20/2020               8.1 (H)             4.0 - 5.6 %           Final                 11/08/2019               7.8 (H)             4.0 - 5.6 %           Final                 07/26/2019               8.1 (H)             4.0 - 5.6 %           Final                  Last edited by Awa Kraft on 6/1/2020  7:48 AM. (History)        ROS     Positive for: Endocrine (DM), Eyes (ret hole repair X 2 OS, focal for   DM/cat surgery OU)    Negative for: Constitutional, Gastrointestinal, Neurological, Skin,   Genitourinary, Musculoskeletal, HENT, Cardiovascular, Respiratory,   Psychiatric, Allergic/Imm, Heme/Lymph    Last edited by Rodolfo Viera, OD on 6/1/2020  8:10 AM. (History)        Assessment /Plan     For exam results, see Encounter Report.    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, with long-term current use of insulin    Glaucoma screening    Presbyopia      1. Sp pciol OU--pt wishes new Rx  2. Sp focal laser for periph holes X 2 OS--stable  3. PDR w hx mac edema/focal laser OU.  Pt was scheduled for PRP OU w Dr Harrison last year, but he no showed appt.  Discussed w pt risks of blindness with untreated diab ret!!!!    PLAN:    Set pt up for appt to see Dr Harrison.  Pt left w appt slip in hand

## 2020-06-05 ENCOUNTER — PATIENT OUTREACH (OUTPATIENT)
Dept: ADMINISTRATIVE | Facility: OTHER | Age: 75
End: 2020-06-05

## 2020-06-11 ENCOUNTER — PATIENT OUTREACH (OUTPATIENT)
Dept: ADMINISTRATIVE | Facility: OTHER | Age: 75
End: 2020-06-11

## 2020-06-11 ENCOUNTER — OFFICE VISIT (OUTPATIENT)
Dept: PODIATRY | Facility: CLINIC | Age: 75
End: 2020-06-11
Payer: MEDICARE

## 2020-06-11 VITALS
BODY MASS INDEX: 27.76 KG/M2 | SYSTOLIC BLOOD PRESSURE: 148 MMHG | HEIGHT: 76 IN | WEIGHT: 227.94 LBS | DIASTOLIC BLOOD PRESSURE: 72 MMHG | HEART RATE: 64 BPM

## 2020-06-11 DIAGNOSIS — Z87.2 HEALED ULCER OF LEFT FOOT ON EXAMINATION: Primary | ICD-10-CM

## 2020-06-11 DIAGNOSIS — L84 CORN OR CALLUS: ICD-10-CM

## 2020-06-11 DIAGNOSIS — E11.42 TYPE 2 DIABETES MELLITUS WITH PERIPHERAL NEUROPATHY: ICD-10-CM

## 2020-06-11 DIAGNOSIS — E11.51 TYPE 2 DIABETES MELLITUS WITH PERIPHERAL ANGIOPATHY: ICD-10-CM

## 2020-06-11 PROCEDURE — 99213 OFFICE O/P EST LOW 20 MIN: CPT | Mod: PBBFAC,PN | Performed by: PODIATRIST

## 2020-06-11 PROCEDURE — 11055 PARING/CUTG B9 HYPRKER LES 1: CPT | Mod: PBBFAC,PN | Performed by: PODIATRIST

## 2020-06-11 PROCEDURE — 99999 PR PBB SHADOW E&M-EST. PATIENT-LVL III: CPT | Mod: PBBFAC,,, | Performed by: PODIATRIST

## 2020-06-11 PROCEDURE — 99999 PR PBB SHADOW E&M-EST. PATIENT-LVL III: ICD-10-PCS | Mod: PBBFAC,,, | Performed by: PODIATRIST

## 2020-06-11 PROCEDURE — 11055 PARING/CUTG B9 HYPRKER LES 1: CPT | Mod: Q9,S$PBB,, | Performed by: PODIATRIST

## 2020-06-11 PROCEDURE — 99499 NO LOS: ICD-10-PCS | Mod: S$PBB,,, | Performed by: PODIATRIST

## 2020-06-11 PROCEDURE — 11055 PR TRIM HYPERKERATOTIC SKIN LESION, ONE: ICD-10-PCS | Mod: Q9,S$PBB,, | Performed by: PODIATRIST

## 2020-06-11 PROCEDURE — 99499 UNLISTED E&M SERVICE: CPT | Mod: S$PBB,,, | Performed by: PODIATRIST

## 2020-06-11 NOTE — PROGRESS NOTES
Subjective:      Patient ID: Domingo Castro is a 75 y.o. male.    Chief Complaint: Follow-up (4 week ulcer check ) and Diabetes Mellitus (4/20/2020 office visit with PCP- ALBA Nugent)    Domingo is a 75 y.o. male who presents to the clinic for evaluation and treatment of high risk feet. Domingo has a past medical history of Arthritis, Cataract, Chronic diastolic congestive heart failure, Coronary artery disease, Cystoid macular edema of both eyes, Diabetes mellitus type II, Diabetic retinopathy, Hyperlipemia, Hypertension, Retinal hole, Stage 4 chronic kidney disease, and Streptococcus pyogenes bacteremia (12/23/2018). The patient's chief complaint is diabetic foot ulcer, left hallux. This patient has documented high risk feet requiring routine maintenance secondary to peripheral vascular disease.  Discharged from [facility] on 08/04/2019.  Receiving 6 weeks of IV vancomycin per ID recommendations.  Follow-up per [facility] with 3 times weekly Medi Honey dressing changes. Accompanied by his daughter.  No new complaints.    09/19/2019:  Follow-up for chronic wound to left hallux treated for osteomyelitis.  Post endovascular intervention per .    10/17/2019:  Presents for wound check.  Followed per [facility].  Believes his wound may be healed.  Says his home health nurse peeled away some dead skin and created a wound at the tip of the left hallux.  No new complaints.    11/11/2019:  Presents for wound check left hallux.  Relates he was told by his home health nurse that the wound is healed.  No new complaints.    04/24/2020:  Presents today complaining of new onset blister to the left hallux.  He was previously followed per [facility].  He still has a port in his chest that should been removed.  Denies any trauma.    05/14/2020:  Presents for wound check to left foot.  No new complaints.  [facility] is changing his dressings.    06/11/2020:  Follow-up for wound check to left hallux.   "Followed by [facility].  No new complaints.    PCP: Griselda Nugent MD    Date Last Seen by PCP:  05/29/2019    Current shoe gear:  Affected Foot: Football and Darco shoe on the affected foot     Unaffected Foot: Rx diabetic extra depth shoes and custom accommodative insoles    History of Trauma: negative  Sign of Infection: none    Hemoglobin A1C   Date Value Ref Range Status   02/20/2020 8.1 (H) 4.0 - 5.6 % Final     Comment:     ADA Screening Guidelines:  5.7-6.4%  Consistent with prediabetes  >or=6.5%  Consistent with diabetes  High levels of fetal hemoglobin interfere with the HbA1C  assay. Heterozygous hemoglobin variants (HbS, HgC, etc)do  not significantly interfere with this assay.   However, presence of multiple variants may affect accuracy.     11/08/2019 7.8 (H) 4.0 - 5.6 % Final     Comment:     ADA Screening Guidelines:  5.7-6.4%  Consistent with prediabetes  >or=6.5%  Consistent with diabetes  High levels of fetal hemoglobin interfere with the HbA1C  assay. Heterozygous hemoglobin variants (HbS, HgC, etc)do  not significantly interfere with this assay.   However, presence of multiple variants may affect accuracy.     07/26/2019 8.1 (H) 4.0 - 5.6 % Final     Comment:     ADA Screening Guidelines:  5.7-6.4%  Consistent with prediabetes  >or=6.5%  Consistent with diabetes  High levels of fetal hemoglobin interfere with the HbA1C  assay. Heterozygous hemoglobin variants (HbS, HgC, etc)do  not significantly interfere with this assay.   However, presence of multiple variants may affect accuracy.       Vitals:    06/11/20 0941   BP: (!) 148/72   Pulse: 64   Weight: 103.4 kg (227 lb 15.3 oz)   Height: 6' 3.5" (1.918 m)   PainSc: 0-No pain      Past Medical History:   Diagnosis Date    Arthritis     Cataract     Chronic diastolic congestive heart failure     Coronary artery disease     Cystoid macular edema of both eyes     Diabetes mellitus type II     Diabetic retinopathy     Hyperlipemia  "    Hypertension     Retinal hole     Stage 4 chronic kidney disease     Streptococcus pyogenes bacteremia 2018    Due to left toe osteomyelitis       Past Surgical History:   Procedure Laterality Date    ABDOMINAL AORTOGRAPHY N/A 2019    Procedure: AORTOGRAM-ABDOMINAL;  Surgeon: Amish Hyatt MD;  Location: [facility] CATH LAB/EP;  Service: Cardiology;  Laterality: N/A;  CO2 angiography    ANGIOGRAPHY OF LOWER EXTREMITY Left 2019    Procedure: Angiogram Extremity Unilateral;  Surgeon: Amish Hyatt MD;  Location: [facility] CATH LAB/EP;  Service: Cardiology;  Laterality: Left;    CATARACT EXTRACTION W/  INTRAOCULAR LENS IMPLANT Left 12/15/14    Dr de la cruz    CATARACT EXTRACTION W/  INTRAOCULAR LENS IMPLANT Right 14    dorothy    CIRCUMCISION, NON-      focal laser right eye      INSERTION OF TUNNELED CENTRAL VENOUS CATHETER (CVC) WITH SUBCUTANEOUS PORT Right 2019    Procedure: [id number]-[id number];  Surgeon: Denys Aleman Jr., MD;  Location: [facility] OR;  Service: General;  Laterality: Right;    KNEE SURGERY      left    Left medial collateral ligament repair      TONSILLECTOMY         Family History   Problem Relation Age of Onset    Heart disease Mother     Cancer Mother     Cancer Brother     Heart disease Father     Diabetes Father     Cancer Sister     Cataracts Paternal Grandmother     Glaucoma Paternal Grandmother     Blindness Neg Hx     Amblyopia Neg Hx     Hypertension Neg Hx     Macular degeneration Neg Hx     Retinal detachment Neg Hx     Strabismus Neg Hx        Social History     Socioeconomic History    Marital status:      Spouse name: Not on file    Number of children: Not on file    Years of education: Not on file    Highest education level: Not on file   Occupational History    Not on file   Social Needs    Financial resource strain: Not on file    Food insecurity:     Worry: Not on file     Inability: Not on file    Transportation  needs:     Medical: Not on file     Non-medical: Not on file   Tobacco Use    Smoking status: Never Smoker    Smokeless tobacco: Never Used   Substance and Sexual Activity    Alcohol use: No     Alcohol/week: 0.0 standard drinks    Drug use: No    Sexual activity: Yes     Partners: Female   Lifestyle    Physical activity:     Days per week: Not on file     Minutes per session: Not on file    Stress: Not on file   Relationships    Social connections:     Talks on phone: Not on file     Gets together: Not on file     Attends Worship service: Not on file     Active member of club or organization: Not on file     Attends meetings of clubs or organizations: Not on file     Relationship status: Not on file   Other Topics Concern    Not on file   Social History Narrative    Not on file       Current Outpatient Medications   Medication Sig Dispense Refill    acetaminophen (TYLENOL) 325 MG tablet Take 2 tablets (650 mg total) by mouth every 4 (four) hours as needed.  0    albuterol-ipratropium (DUO-NEB) 2.5 mg-0.5 mg/3 mL nebulizer solution Take 3 mLs by nebulization every 4 (four) hours as needed for Wheezing or Shortness of Breath. Rescue 90 mL 0    amLODIPine (NORVASC) 10 MG tablet TAKE 1 TABLET BY MOUTH ONCE DAILY 30 tablet 11    clopidogrel (PLAVIX) 75 mg tablet Take 1 tablet (75 mg total) by mouth once daily. 30 tablet 11    furosemide (LASIX) 40 MG tablet TAKE 2 TABLETS BY MOUTH TWICE DAILY DO  NOT  TAKE  EVENING DOSE  AFTER  3   tablet 0    hydrALAZINE (APRESOLINE) 50 MG tablet       insulin aspart U-100 (NOVOLOG FLEXPEN U-100 INSULIN) 100 unit/mL (3 mL) InPn pen Inject 12 Units into the skin 3 (three) times daily with meals. With correction scale maximum 50 units daily. 15 mL 3    LEVEMIR FLEXTOUCH U-100 INSULN 100 unit/mL (3 mL) InPn pen Inject 26 Units into the skin every evening. 36 mL 3    nebulizer and compressor Alana USE AS DIRECTED 1 each 0    olmesartan (BENICAR) 20 MG tablet Take  "1 tablet (20 mg total) by mouth once daily. 90 tablet 10    pen needle, diabetic, safety (BD AUTOSHIELD PEN NEEDLE) 29 gauge x 5/16" Ndle For use once daily with levemir flexpen 90 each 11    rosuvastatin (CRESTOR) 20 MG tablet TAKE 1 TABLET BY MOUTH ONCE DAILY 30 tablet 11     No current facility-administered medications for this visit.        Review of patient's allergies indicates:   Allergen Reactions    Atorvastatin Other (See Comments)         Review of Systems   Constitution: Negative for chills and fever.   HENT: Negative for congestion and hearing loss.    Cardiovascular: Negative for chest pain and claudication.   Respiratory: Negative for cough and shortness of breath.    Skin: Positive for color change, nail changes and poor wound healing.   Musculoskeletal: Negative for back pain and joint pain.   Gastrointestinal: Negative for nausea and vomiting.   Neurological: Positive for numbness.   Psychiatric/Behavioral: Negative for altered mental status.           Objective:      Physical Exam   Constitutional: He is oriented to person, place, and time. No distress.   Cardiovascular:   Pulses:       Dorsalis pedis pulses are 1+ on the right side, and 1+ on the left side.        Posterior tibial pulses are 1+ on the right side, and 1+ on the left side.   Mild lower extremity edema bilateral. No hair growth bilateral lower extremity.   Musculoskeletal:   One MTP range of motion left is less than 30° dorsiflexion.    No localized pain on palpation left hallux wound site.  No palpable fluctuance or crepitance left foot.   Neurological: He is alert and oriented to person, place, and time. A sensory deficit is present.   Skin: Skin is warm, dry and intact. Capillary refill takes 2 to 3 seconds. No ecchymosis and no rash noted. He is not diaphoretic. No cyanosis or erythema. No pallor. Nails show no clubbing.   Left hallux nail is moderately elongated and dystrophic with some loosening.  There is no surrounding " erythema.    Nails 2-5 left and 1-5 right are trimmed, thickened, discolored brown yellow with loosening and underlying debris.  The right hallux nail and bilateral 5th toenail are dystrophic.    Ulceration left hallux healed with moderate amount overlying dry skin and callus.      .             Assessment:       Encounter Diagnoses   Name Primary?    Healed ulcer of left foot on examination Yes    Type 2 diabetes mellitus with peripheral angiopathy     Type 2 diabetes mellitus with peripheral neuropathy     Corn or callus          Plan:       Domingo was seen today for follow-up and diabetes mellitus.    Diagnoses and all orders for this visit:    Healed ulcer of left foot on examination    Type 2 diabetes mellitus with peripheral angiopathy    Type 2 diabetes mellitus with peripheral neuropathy    Corn or callus      I counseled the patient on his conditions, their implications and medical management.    Shoe inspection. Diabetic Foot Education. Patient reminded of the importance of good nutrition and blood sugar control to help prevent podiatric complications of diabetes. Patient instructed on proper foot hygeine. We discussed wearing proper shoe gear, daily foot inspections, never walking without protective shoe gear, never putting sharp instruments to feet.      With the patient's verbal consent utilizing a sterile 15.  Scalpel the hyperkeratotic skin left hallux was trimmed to healthy-appearing skin noting no ulceration.  A protective foot balls applied today.  Home health can be discontinued and patient can return to his diabetic shoes.    RTC 2 months or p.r.n. as discussed for routine diabetic foot check.  Patient is to check his feet twice daily to ensure there is no recurrence of the ulceration avoid barefoot walking    A portion of this note was generated by voice recognition software and may contain topical graphical errors.

## 2020-06-15 ENCOUNTER — TELEPHONE (OUTPATIENT)
Dept: PODIATRY | Facility: CLINIC | Age: 75
End: 2020-06-15

## 2020-06-15 NOTE — TELEPHONE ENCOUNTER
----- Message from Jerica Sheth sent at 6/15/2020  1:18 PM CDT -----  Name of Who is Calling: [facility]aitie [facility]    What is the request in detail: Patient informed Caitie that he has been discharged and they need those orders to be faxed. Fax: [phone]. Please contact to further discuss and advise      [facility]: [phone]

## 2020-06-15 NOTE — TELEPHONE ENCOUNTER
Left message for celia to call office back in regards to the pt no longer needing home health services.

## 2020-06-18 ENCOUNTER — DOCUMENT SCAN (OUTPATIENT)
Dept: HOME HEALTH SERVICES | Facility: HOSPITAL | Age: 75
End: 2020-06-18
Payer: MEDICARE

## 2020-06-19 ENCOUNTER — TELEPHONE (OUTPATIENT)
Dept: NEPHROLOGY | Facility: CLINIC | Age: 75
End: 2020-06-19

## 2020-06-19 ENCOUNTER — LAB VISIT (OUTPATIENT)
Dept: LAB | Facility: HOSPITAL | Age: 75
End: 2020-06-19
Attending: INTERNAL MEDICINE
Payer: MEDICARE

## 2020-06-19 DIAGNOSIS — N18.4 CKD (CHRONIC KIDNEY DISEASE) STAGE 4, GFR 15-29 ML/MIN: ICD-10-CM

## 2020-06-19 DIAGNOSIS — N18.4 CKD (CHRONIC KIDNEY DISEASE) STAGE 4, GFR 15-29 ML/MIN: Primary | ICD-10-CM

## 2020-06-19 LAB
ANION GAP SERPL CALC-SCNC: 9 MMOL/L (ref 8–16)
BASOPHILS # BLD AUTO: 0.06 K/UL (ref 0–0.2)
BASOPHILS NFR BLD: 0.8 % (ref 0–1.9)
BUN SERPL-MCNC: 64 MG/DL (ref 2–20)
CALCIUM SERPL-MCNC: 9.6 MG/DL (ref 8.7–10.5)
CHLORIDE SERPL-SCNC: 108 MMOL/L (ref 95–110)
CO2 SERPL-SCNC: 25 MMOL/L (ref 23–29)
CREAT SERPL-MCNC: 3.59 MG/DL (ref 0.5–1.4)
DIFFERENTIAL METHOD: ABNORMAL
EOSINOPHIL # BLD AUTO: 0.3 K/UL (ref 0–0.5)
EOSINOPHIL NFR BLD: 3.1 % (ref 0–8)
ERYTHROCYTE [DISTWIDTH] IN BLOOD BY AUTOMATED COUNT: 13.6 % (ref 11.5–14.5)
EST. GFR  (AFRICAN AMERICAN): 18.1 ML/MIN/1.73 M^2
EST. GFR  (NON AFRICAN AMERICAN): 15.6 ML/MIN/1.73 M^2
GLUCOSE SERPL-MCNC: 145 MG/DL (ref 70–110)
HCT VFR BLD AUTO: 31 % (ref 40–54)
HGB BLD-MCNC: 10 G/DL (ref 14–18)
IMM GRANULOCYTES # BLD AUTO: 0.02 K/UL (ref 0–0.04)
IMM GRANULOCYTES NFR BLD AUTO: 0.3 % (ref 0–0.5)
LYMPHOCYTES # BLD AUTO: 1.9 K/UL (ref 1–4.8)
LYMPHOCYTES NFR BLD: 24.3 % (ref 18–48)
MCH RBC QN AUTO: 29.1 PG (ref 27–31)
MCHC RBC AUTO-ENTMCNC: 32.3 G/DL (ref 32–36)
MCV RBC AUTO: 90 FL (ref 82–98)
MONOCYTES # BLD AUTO: 0.6 K/UL (ref 0.3–1)
MONOCYTES NFR BLD: 7.9 % (ref 4–15)
NEUTROPHILS # BLD AUTO: 5.1 K/UL (ref 1.8–7.7)
NEUTROPHILS NFR BLD: 63.6 % (ref 38–73)
NRBC BLD-RTO: 0 /100 WBC
PLATELET # BLD AUTO: 136 K/UL (ref 150–350)
PMV BLD AUTO: 11.8 FL (ref 9.2–12.9)
POTASSIUM SERPL-SCNC: 3.9 MMOL/L (ref 3.5–5.1)
RBC # BLD AUTO: 3.44 M/UL (ref 4.6–6.2)
SODIUM SERPL-SCNC: 142 MMOL/L (ref 136–145)
WBC # BLD AUTO: 7.97 K/UL (ref 3.9–12.7)

## 2020-06-19 PROCEDURE — 85025 COMPLETE CBC W/AUTO DIFF WBC: CPT | Mod: PO

## 2020-06-19 PROCEDURE — 80048 BASIC METABOLIC PNL TOTAL CA: CPT | Mod: PO

## 2020-06-19 PROCEDURE — 36415 COLL VENOUS BLD VENIPUNCTURE: CPT | Mod: PO

## 2020-06-22 ENCOUNTER — PATIENT OUTREACH (OUTPATIENT)
Dept: ADMINISTRATIVE | Facility: OTHER | Age: 75
End: 2020-06-22

## 2020-06-25 ENCOUNTER — OFFICE VISIT (OUTPATIENT)
Dept: INTERNAL MEDICINE | Facility: CLINIC | Age: 75
End: 2020-06-25
Payer: MEDICARE

## 2020-06-25 ENCOUNTER — LAB VISIT (OUTPATIENT)
Dept: LAB | Facility: HOSPITAL | Age: 75
End: 2020-06-25
Attending: INTERNAL MEDICINE
Payer: MEDICARE

## 2020-06-25 VITALS
RESPIRATION RATE: 16 BRPM | BODY MASS INDEX: 27.7 KG/M2 | OXYGEN SATURATION: 97 % | SYSTOLIC BLOOD PRESSURE: 136 MMHG | WEIGHT: 227.5 LBS | HEIGHT: 76 IN | TEMPERATURE: 97 F | DIASTOLIC BLOOD PRESSURE: 64 MMHG | HEART RATE: 71 BPM

## 2020-06-25 DIAGNOSIS — E11.59 HYPERTENSION ASSOCIATED WITH DIABETES: ICD-10-CM

## 2020-06-25 DIAGNOSIS — E11.22 TYPE 2 DIABETES MELLITUS WITH STAGE 4 CHRONIC KIDNEY DISEASE, WITH LONG-TERM CURRENT USE OF INSULIN: Primary | ICD-10-CM

## 2020-06-25 DIAGNOSIS — I15.2 HYPERTENSION ASSOCIATED WITH DIABETES: ICD-10-CM

## 2020-06-25 DIAGNOSIS — Z79.4 TYPE 2 DIABETES MELLITUS WITH STAGE 4 CHRONIC KIDNEY DISEASE, WITH LONG-TERM CURRENT USE OF INSULIN: Primary | ICD-10-CM

## 2020-06-25 DIAGNOSIS — E11.22 TYPE 2 DIABETES MELLITUS WITH STAGE 4 CHRONIC KIDNEY DISEASE, WITH LONG-TERM CURRENT USE OF INSULIN: ICD-10-CM

## 2020-06-25 DIAGNOSIS — N18.4 TYPE 2 DIABETES MELLITUS WITH STAGE 4 CHRONIC KIDNEY DISEASE, WITH LONG-TERM CURRENT USE OF INSULIN: ICD-10-CM

## 2020-06-25 DIAGNOSIS — N18.4 TYPE 2 DIABETES MELLITUS WITH STAGE 4 CHRONIC KIDNEY DISEASE, WITH LONG-TERM CURRENT USE OF INSULIN: Primary | ICD-10-CM

## 2020-06-25 DIAGNOSIS — Z79.4 TYPE 2 DIABETES MELLITUS WITH STAGE 4 CHRONIC KIDNEY DISEASE, WITH LONG-TERM CURRENT USE OF INSULIN: ICD-10-CM

## 2020-06-25 PROCEDURE — 99215 OFFICE O/P EST HI 40 MIN: CPT | Mod: PBBFAC,PO | Performed by: INTERNAL MEDICINE

## 2020-06-25 PROCEDURE — 99213 PR OFFICE/OUTPT VISIT, EST, LEVL III, 20-29 MIN: ICD-10-PCS | Mod: S$PBB,,, | Performed by: INTERNAL MEDICINE

## 2020-06-25 PROCEDURE — 99999 PR PBB SHADOW E&M-EST. PATIENT-LVL V: ICD-10-PCS | Mod: PBBFAC,,, | Performed by: INTERNAL MEDICINE

## 2020-06-25 PROCEDURE — 99213 OFFICE O/P EST LOW 20 MIN: CPT | Mod: S$PBB,,, | Performed by: INTERNAL MEDICINE

## 2020-06-25 PROCEDURE — 99999 PR PBB SHADOW E&M-EST. PATIENT-LVL V: CPT | Mod: PBBFAC,,, | Performed by: INTERNAL MEDICINE

## 2020-06-25 PROCEDURE — 36415 COLL VENOUS BLD VENIPUNCTURE: CPT | Mod: PO

## 2020-06-25 PROCEDURE — 83036 HEMOGLOBIN GLYCOSYLATED A1C: CPT

## 2020-06-25 NOTE — PROGRESS NOTES
CC: followup of hypertension and diabetes  HPI:  The patient is a 75 y.o. year old male who presents to the office for followup of hypertension and diabetes.  The patient denies any chest pain, shortness of breath, headache, nausea or vomiting.  He complains of fatigue and occasional blurred vision.  He reports blood sugars have been good.      PAST MEDICAL HISTORY:  Past Medical History:   Diagnosis Date    Arthritis     Cataract     Chronic diastolic congestive heart failure     Coronary artery disease     Cystoid macular edema of both eyes     Diabetes mellitus type II     Diabetic retinopathy     Hyperlipemia     Hypertension     Retinal hole     Stage 4 chronic kidney disease     Streptococcus pyogenes bacteremia 2018    Due to left toe osteomyelitis       SURGICAL HISTORY:  Past Surgical History:   Procedure Laterality Date    ABDOMINAL AORTOGRAPHY N/A 2019    Procedure: AORTOGRAM-ABDOMINAL;  Surgeon: Amish Hyatt MD;  Location: [facility] CATH LAB/EP;  Service: Cardiology;  Laterality: N/A;  CO2 angiography    ANGIOGRAPHY OF LOWER EXTREMITY Left 2019    Procedure: Angiogram Extremity Unilateral;  Surgeon: Amish Hyatt MD;  Location: [facility] CATH LAB/EP;  Service: Cardiology;  Laterality: Left;    CATARACT EXTRACTION W/  INTRAOCULAR LENS IMPLANT Left 12/15/14    Dr de la cruz    CATARACT EXTRACTION W/  INTRAOCULAR LENS IMPLANT Right 14    dorothy    CIRCUMCISION, NON-      focal laser right eye      INSERTION OF TUNNELED CENTRAL VENOUS CATHETER (CVC) WITH SUBCUTANEOUS PORT Right 2019    Procedure: [id number];  Surgeon: Denys Aleman Jr., MD;  Location: [facility] OR;  Service: General;  Laterality: Right;    KNEE SURGERY      left    Left medial collateral ligament repair      TONSILLECTOMY         MEDS:  Medcard reviewed and updated    ALLERGIES: Allergy Card reviewed and updated    SOCIAL HISTORY:   The patient is a nonsmoker.    PE:   APPEARANCE: Well  nourished, well developed, in no acute distress.    CHEST: Lungs clear to auscultation with unlabored respirations.  CARDIOVASCULAR: Normal S1, S2. No murmurs. No carotid bruits. No pedal edema.  ABDOMEN: Bowel sounds normal. Not distended. Soft. No tenderness or masses.   PSYCHIATRIC: The patient is oriented to person, place, and time and has a pleasant affect.        ASSESSMENT/PLAN:  Domingo was seen today for follow-up.    Diagnoses and all orders for this visit:    Type 2 diabetes mellitus with stage 4 chronic kidney disease, with long-term current use of insulin  -     Hemoglobin A1C; Future    Hypertension associated with diabetes  -     blood pressure is controlled

## 2020-06-26 ENCOUNTER — TELEPHONE (OUTPATIENT)
Dept: INTERNAL MEDICINE | Facility: CLINIC | Age: 75
End: 2020-06-26

## 2020-06-26 LAB
ESTIMATED AVG GLUCOSE: 157 MG/DL (ref 68–131)
HBA1C MFR BLD HPLC: 7.1 % (ref 4–5.6)

## 2020-07-06 ENCOUNTER — TELEPHONE (OUTPATIENT)
Dept: CARDIOLOGY | Facility: CLINIC | Age: 75
End: 2020-07-06

## 2020-07-06 DIAGNOSIS — E11.69 HYPERLIPIDEMIA ASSOCIATED WITH TYPE 2 DIABETES MELLITUS: ICD-10-CM

## 2020-07-06 DIAGNOSIS — I73.9 PAD (PERIPHERAL ARTERY DISEASE): Primary | ICD-10-CM

## 2020-07-06 DIAGNOSIS — E78.5 HYPERLIPIDEMIA ASSOCIATED WITH TYPE 2 DIABETES MELLITUS: ICD-10-CM

## 2020-07-06 NOTE — TELEPHONE ENCOUNTER
I called and left V/Message onboth spouse Numbers.    Not abl e to get a Hold of .    Patient made his own appointment .    Patient Needs Test Prior to seeing .    Please call us back [phone] snd schedule test.      Lyndsey Hurley.

## 2020-07-06 NOTE — TELEPHONE ENCOUNTER
Returned patient call.    I left him V/Message.    Mr. Castro , you need Fasting Blood Work test  &  Ultra-sound of your lower extremitys.    Orders are in by .    Please call to set up test at [phone].          Lyndsey Hurley.                                        ----- Message from Patt Soriano sent at 7/6/2020  2:40 PM CDT -----  Regarding: call back  Contact: [phone]  Patient Returning Your Phone Call

## 2020-07-07 NOTE — TELEPHONE ENCOUNTER
Patient called to say he did U/sound 5-28-20 & Lab    Patient has an appointment  With  7-8-20    At the [facility].      Lyndsey Hurley (rita)                                ----- Message from Rebecca Echols sent at 7/7/2020  3:43 PM CDT -----  Regarding: returned call  Type:  Patient Returning Call    Who Called: Patient  Who Left Message for Patient: Iman  Does the patient know what this is regarding?: appt  Would the patient rather a call back or a response via [facility]? Call back  Best Call Back Number: [phone]  Additional Information: n/a

## 2020-07-08 ENCOUNTER — OFFICE VISIT (OUTPATIENT)
Dept: CARDIOLOGY | Facility: CLINIC | Age: 75
End: 2020-07-08
Payer: MEDICARE

## 2020-07-08 ENCOUNTER — LAB VISIT (OUTPATIENT)
Dept: LAB | Facility: HOSPITAL | Age: 75
End: 2020-07-08
Attending: INTERNAL MEDICINE
Payer: MEDICARE

## 2020-07-08 ENCOUNTER — TELEPHONE (OUTPATIENT)
Dept: CARDIOLOGY | Facility: CLINIC | Age: 75
End: 2020-07-08

## 2020-07-08 VITALS
HEIGHT: 75 IN | DIASTOLIC BLOOD PRESSURE: 59 MMHG | HEART RATE: 65 BPM | BODY MASS INDEX: 28.72 KG/M2 | WEIGHT: 231 LBS | SYSTOLIC BLOOD PRESSURE: 116 MMHG

## 2020-07-08 DIAGNOSIS — E11.22 TYPE 2 DIABETES MELLITUS WITH STAGE 4 CHRONIC KIDNEY DISEASE, WITH LONG-TERM CURRENT USE OF INSULIN: Chronic | ICD-10-CM

## 2020-07-08 DIAGNOSIS — N52.9 ERECTILE DYSFUNCTION, UNSPECIFIED ERECTILE DYSFUNCTION TYPE: ICD-10-CM

## 2020-07-08 DIAGNOSIS — N18.4 TYPE 2 DIABETES MELLITUS WITH STAGE 4 CHRONIC KIDNEY DISEASE, WITH LONG-TERM CURRENT USE OF INSULIN: Chronic | ICD-10-CM

## 2020-07-08 DIAGNOSIS — E66.9 OBESITY, DIABETES, AND HYPERTENSION SYNDROME: ICD-10-CM

## 2020-07-08 DIAGNOSIS — I73.9 PAD (PERIPHERAL ARTERY DISEASE): ICD-10-CM

## 2020-07-08 DIAGNOSIS — I70.0 AORTIC ARCH ATHEROSCLEROSIS: ICD-10-CM

## 2020-07-08 DIAGNOSIS — E11.59 OBESITY, DIABETES, AND HYPERTENSION SYNDROME: ICD-10-CM

## 2020-07-08 DIAGNOSIS — E11.69 HYPERLIPIDEMIA ASSOCIATED WITH TYPE 2 DIABETES MELLITUS: ICD-10-CM

## 2020-07-08 DIAGNOSIS — I15.2 HYPERTENSION ASSOCIATED WITH DIABETES: ICD-10-CM

## 2020-07-08 DIAGNOSIS — I50.32 CHRONIC DIASTOLIC CONGESTIVE HEART FAILURE: Chronic | ICD-10-CM

## 2020-07-08 DIAGNOSIS — I15.2 OBESITY, DIABETES, AND HYPERTENSION SYNDROME: ICD-10-CM

## 2020-07-08 DIAGNOSIS — Z79.4 TYPE 2 DIABETES MELLITUS WITH STAGE 4 CHRONIC KIDNEY DISEASE, WITH LONG-TERM CURRENT USE OF INSULIN: Chronic | ICD-10-CM

## 2020-07-08 DIAGNOSIS — E78.5 HYPERLIPIDEMIA ASSOCIATED WITH TYPE 2 DIABETES MELLITUS: ICD-10-CM

## 2020-07-08 DIAGNOSIS — E11.69 OBESITY, DIABETES, AND HYPERTENSION SYNDROME: ICD-10-CM

## 2020-07-08 DIAGNOSIS — I73.9 PAD (PERIPHERAL ARTERY DISEASE): Primary | ICD-10-CM

## 2020-07-08 DIAGNOSIS — E11.59 HYPERTENSION ASSOCIATED WITH DIABETES: ICD-10-CM

## 2020-07-08 LAB
CHOLEST SERPL-MCNC: 162 MG/DL (ref 120–199)
CHOLEST/HDLC SERPL: 3.7 {RATIO} (ref 2–5)
HDLC SERPL-MCNC: 44 MG/DL (ref 40–75)
HDLC SERPL: 27.2 % (ref 20–50)
LDLC SERPL CALC-MCNC: 99 MG/DL (ref 63–159)
NONHDLC SERPL-MCNC: 118 MG/DL
TRIGL SERPL-MCNC: 95 MG/DL (ref 30–150)

## 2020-07-08 PROCEDURE — 36415 COLL VENOUS BLD VENIPUNCTURE: CPT | Mod: PO

## 2020-07-08 PROCEDURE — 99215 OFFICE O/P EST HI 40 MIN: CPT | Mod: S$PBB,,, | Performed by: INTERNAL MEDICINE

## 2020-07-08 PROCEDURE — 80061 LIPID PANEL: CPT

## 2020-07-08 PROCEDURE — 99214 OFFICE O/P EST MOD 30 MIN: CPT | Mod: PBBFAC,PO | Performed by: INTERNAL MEDICINE

## 2020-07-08 PROCEDURE — 99999 PR PBB SHADOW E&M-EST. PATIENT-LVL IV: ICD-10-PCS | Mod: PBBFAC,,, | Performed by: INTERNAL MEDICINE

## 2020-07-08 PROCEDURE — 99999 PR PBB SHADOW E&M-EST. PATIENT-LVL IV: CPT | Mod: PBBFAC,,, | Performed by: INTERNAL MEDICINE

## 2020-07-08 PROCEDURE — 99215 PR OFFICE/OUTPT VISIT, EST, LEVL V, 40-54 MIN: ICD-10-PCS | Mod: S$PBB,,, | Performed by: INTERNAL MEDICINE

## 2020-07-08 NOTE — TELEPHONE ENCOUNTER
Called pt in regards to the appt today with Dr. Hyatt at [facility].   The pt did not answer, but I left a detailed voice message as well as a call back number.  The house number listed does not have a voicemail set up.    ND

## 2020-07-08 NOTE — PROGRESS NOTES
Subjective:   Patient ID:  Domingo Castro is a 75 y.o. male who presents for follow up of Peripheral Arterial Disease, Hyperlipidemia, and Hypertension      HPI:         Domingo Castro 75 y.o. male is here follow up and feeling well without any new complaints. He is here for follow up of healed L great toe wound complicated with osteomyelitis. He is s/p revascularization of left SFA + AT with atherectomy + PTA using DCB. He was treated with IV then oral abx. He denies pain. + aspirin + plavix for DAPT. + statin therapy. No arb or acei because of CKD. He has HTN, HLP, DM II, and CKD III.         Diagnostic angiogram 7/2019      S/p transradial aortogram with run off   Adequate inflow with patent aorta, b/l NOEMÍ , EIA, and CFA    80% mid left SFA stenosis, 99% proximal AT, 80% PT, and 80% PER   90% prox right SFA stenosis, 90% ostial PT + PER with subtotal proximal AT   Patent DP and plantar arch bilaterally   Case was done with CO2 in view of CKD          Peripheral intervention 8/2019     Hybrid approach with righ CFA access and retrograde left AT   Crossed left AT lesions with retrograde wire manipulation   Atherectomy of SFA + AT with 2.0 Classic CSI    PTA of AT with 4.0 x 150 mm Lutonix DCB   PTA of PT with 4.0 x 120 mm balloon after occlusion from distal embolization   PTA of SFA with 7.0 x 60 mm Lutonix DCB   Spasm of PER noted at the end of the case   CFA closed with perclose and retrograde access closed with manual pressure      US 5/2020     No significant stenosis                Patient Active Problem List    Diagnosis Date Noted    Port-A-Cath in place 05/06/2020    Pulmonary edema 10/22/2019    Right upper lobe pneumonia 10/22/2019    Fever 10/22/2019    Diabetic ulcer of left foot 10/22/2019    Acute on chronic congestive heart failure 10/21/2019    Class 1 obesity due to excess calories with serious comorbidity in adult 09/23/2019    Obesity, diabetes, and hypertension syndrome 09/23/2019     Aortic arch atherosclerosis 2019    Bronchiectasis without complication 2019     Ct chest 2018 results      Chronic congestive heart failure with left ventricular diastolic dysfunction 2019 echo      Uncontrolled type 2 diabetes mellitus with hyperglycemia 2019    MRSA (methicillin resistant Staphylococcus aureus) infection     PAD (peripheral artery disease) 2019    Toe osteomyelitis, left 2019    Thrombocytopenia, unspecified 2019    Diabetic ulcer of toe of left foot associated with type 2 diabetes mellitus, with necrosis of bone 2019    Bilateral leg edema 2019    Acute respiratory failure with hypoxia 2018    CKD stage 4 due to type 2 diabetes mellitus 2018    Diabetic neuropathy associated with type 2 diabetes mellitus 2018    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, with long-term current use of insulin 2018    Chronic diastolic congestive heart failure 2018    Anginal equivalent 2018    Incontinence 2018    Metabolic bone disease 2018    Anemia of chronic renal failure, stage 4 (severe) 2018    Pulmonary nodule 10/28/2016    Normocytic anemia     Impaired mobility and ADLs 2015    Cervical radiculopathy 2015    Severe nonproliferative diabetic retinopathy of both eyes 2013    Hypertensive retinopathy of both eyes 2013    Retinal hole or tear, left 2013    Hypogonadism male 2013    ED (erectile dysfunction) 2013    Type 2 diabetes mellitus with stage 4 chronic kidney disease, with long-term current use of insulin 01/10/2013    Hypertension associated with diabetes 01/10/2013    Hyperlipidemia associated with type 2 diabetes mellitus 01/10/2013           Right Arm BP - Sittin/59  Left Arm BP - Sittin/59        LABS      LAST  HbA1c  Lab Results   Component Value Date    HGBA1C 7.1 (H) 06/25/2020       Lipid panel  Lab Results   Component Value Date    CHOL 162 07/08/2020    CHOL 193 02/20/2020    CHOL 126 07/20/2018     Lab Results   Component Value Date    HDL 44 07/08/2020    HDL 56 02/20/2020    HDL 31 (L) 07/20/2018     Lab Results   Component Value Date    LDLCALC 99.0 07/08/2020    LDLCALC 118.4 02/20/2020    LDLCALC 76.8 07/20/2018     Lab Results   Component Value Date    TRIG 95 07/08/2020    TRIG 93 02/20/2020    TRIG 91 07/20/2018     Lab Results   Component Value Date    CHOLHDL 27.2 07/08/2020    CHOLHDL 29.0 02/20/2020    CHOLHDL 24.6 07/20/2018            Review of Systems   Constitution: Negative for diaphoresis, night sweats, weight gain and weight loss.   HENT: Negative for congestion.    Eyes: Negative for blurred vision, discharge and double vision.   Cardiovascular: Negative for chest pain, claudication, cyanosis, dyspnea on exertion, irregular heartbeat, leg swelling, near-syncope, orthopnea, palpitations, paroxysmal nocturnal dyspnea and syncope.   Respiratory: Negative for cough, shortness of breath and wheezing.    Endocrine: Negative for cold intolerance, heat intolerance and polyphagia.   Hematologic/Lymphatic: Negative for adenopathy and bleeding problem. Does not bruise/bleed easily.   Skin: Negative for dry skin, nail changes and poor wound healing.   Musculoskeletal: Negative for arthritis, back pain, falls, joint pain, myalgias and neck pain.   Gastrointestinal: Negative for bloating, abdominal pain, change in bowel habit and constipation.   Genitourinary: Negative for bladder incontinence, dysuria, flank pain, genital sores and missed menses.   Neurological: Negative for aphonia, brief paralysis, difficulty with concentration, dizziness and weakness.   Psychiatric/Behavioral: Negative for altered mental status and memory loss. The patient does not have insomnia.    Allergic/Immunologic: Negative for  environmental allergies.       Objective:   Physical Exam   Constitutional: He is oriented to person, place, and time. He appears well-developed and well-nourished. He is not intubated.   HENT:   Head: Normocephalic and atraumatic.   Right Ear: External ear normal.   Left Ear: External ear normal.   Mouth/Throat: Oropharynx is clear and moist.   Eyes: Pupils are equal, round, and reactive to light. Conjunctivae and EOM are normal. Right eye exhibits no discharge. Left eye exhibits no discharge. No scleral icterus.   Neck: Normal range of motion. Neck supple. Normal carotid pulses, no hepatojugular reflux and no JVD present. Carotid bruit is not present. No tracheal deviation present. No thyromegaly present.   Cardiovascular: Normal rate, regular rhythm, S1 normal and S2 normal.  No extrasystoles are present. PMI is not displaced. Exam reveals no gallop, no S3, no distant heart sounds, no friction rub and no midsystolic click.   No murmur heard.  Pulses:       Carotid pulses are 2+ on the right side and 2+ on the left side.       Radial pulses are 2+ on the right side and 2+ on the left side.        Femoral pulses are 2+ on the right side and 2+ on the left side.       Popliteal pulses are 2+ on the right side and 2+ on the left side.        Dorsalis pedis pulses are 0 on the right side and 1+ on the left side.        Posterior tibial pulses are 0 on the right side and 1+ on the left side.       Triphasic L DP and PT doppler signals       Biphasic R PT and monophasic R DP doppler signals                 Pulmonary/Chest: Effort normal and breath sounds normal. No accessory muscle usage or stridor. No apnea, no tachypnea and no bradypnea. He is not intubated. No respiratory distress. He has no decreased breath sounds. He has no wheezes. He has no rales. He exhibits no tenderness and no bony tenderness.   Abdominal: He exhibits no distension, no pulsatile liver, no abdominal bruit, no ascites, no pulsatile midline mass  and no mass. There is no abdominal tenderness. There is no rebound and no guarding.   Musculoskeletal: Normal range of motion.         General: No tenderness or edema.   Lymphadenopathy:     He has no cervical adenopathy.   Neurological: He is alert and oriented to person, place, and time. He has normal reflexes. No cranial nerve deficit. Coordination normal.   Skin: Skin is warm. No rash noted. No erythema. No pallor.       L great toe wound       No gangrene      Healing well with granulation tissue       See images                    Psychiatric: He has a normal mood and affect. His behavior is normal. Judgment and thought content normal.         8/30/2019 7/8/2020              Assessment:     1. PAD (peripheral artery disease)    2. Chronic diastolic congestive heart failure    3. Hypertension associated with diabetes    4. Hyperlipidemia associated with type 2 diabetes mellitus    5. Aortic arch atherosclerosis    6. Erectile dysfunction, unspecified erectile dysfunction type    7. Type 2 diabetes mellitus with stage 4 chronic kidney disease, with long-term current use of insulin    8. Obesity, diabetes, and hypertension syndrome        Plan:           Proper foot wear  Proper foot care      Aspirin + plavix  Statin therapy; increase crestor to 40 mg qhs   Acei or arb initiation in collaboration with nephrology        Surveillance US in 4 weeks with follow up   Aggressive risk factors modification        Continue with current medical plan and lifestyle changes.  Return sooner for concerns or questions. If symptoms persist go to the ED  I have reviewed all pertinent data on this patient       I have reviewed the patient's medical history in detail and updated the computerized patient record.    Orders Placed This Encounter   Procedures    Lipid Panel     Standing Status:   Future     Standing Expiration Date:   9/8/2021    CV Exercise ROCIO     Standing Status:   Future     Standing Expiration  "Date:   7/10/2021    Echo Color Flow Doppler? Yes     Standing Status:   Future     Standing Expiration Date:   7/10/2021     Order Specific Question:   Color Flow Doppler?     Answer:   Yes       Follow up as scheduled. Return sooner for concerns or questions            He expressed verbal understanding and agreed with the plan        Patient's Medications   New Prescriptions    No medications on file   Previous Medications    ACETAMINOPHEN (TYLENOL) 325 MG TABLET    Take 2 tablets (650 mg total) by mouth every 4 (four) hours as needed.    ALBUTEROL-IPRATROPIUM (DUO-NEB) 2.5 MG-0.5 MG/3 ML NEBULIZER SOLUTION    Take 3 mLs by nebulization every 4 (four) hours as needed for Wheezing or Shortness of Breath. Rescue    AMLODIPINE (NORVASC) 10 MG TABLET    TAKE 1 TABLET BY MOUTH ONCE DAILY    CLOPIDOGREL (PLAVIX) 75 MG TABLET    Take 1 tablet (75 mg total) by mouth once daily.    FUROSEMIDE (LASIX) 40 MG TABLET    TAKE 2 TABLETS BY MOUTH TWICE DAILY DO  NOT  TAKE  EVENING DOSE  AFTER  3  PM    HYDRALAZINE (APRESOLINE) 50 MG TABLET        INSULIN ASPART U-100 (NOVOLOG FLEXPEN U-100 INSULIN) 100 UNIT/ML (3 ML) INPN PEN    Inject 12 Units into the skin 3 (three) times daily with meals. With correction scale maximum 50 units daily.    LEVEMIR FLEXTOUCH U-100 INSULN 100 UNIT/ML (3 ML) INPN PEN    Inject 26 Units into the skin every evening.    NEBULIZER AND COMPRESSOR BROCK    USE AS DIRECTED    OLMESARTAN (BENICAR) 20 MG TABLET    Take 1 tablet (20 mg total) by mouth once daily.    PEN NEEDLE, DIABETIC, SAFETY (BD AUTOSHIELD PEN NEEDLE) 29 GAUGE X 5/16" NDLE    For use once daily with levemir flexpen   Modified Medications    Modified Medication Previous Medication    ROSUVASTATIN (CRESTOR) 40 MG TAB rosuvastatin (CRESTOR) 20 MG tablet       Take 1 tablet (40 mg total) by mouth every evening.    TAKE 1 TABLET BY MOUTH ONCE DAILY   Discontinued Medications    No medications on file        "

## 2020-07-09 ENCOUNTER — OFFICE VISIT (OUTPATIENT)
Dept: OPHTHALMOLOGY | Facility: CLINIC | Age: 75
End: 2020-07-09
Payer: MEDICARE

## 2020-07-09 VITALS — SYSTOLIC BLOOD PRESSURE: 143 MMHG | DIASTOLIC BLOOD PRESSURE: 68 MMHG | HEART RATE: 67 BPM

## 2020-07-09 DIAGNOSIS — H33.302 RETINAL HOLE OR TEAR, LEFT: ICD-10-CM

## 2020-07-09 DIAGNOSIS — H35.033 HYPERTENSIVE RETINOPATHY OF BOTH EYES: ICD-10-CM

## 2020-07-09 PROCEDURE — 92202 OPSCPY EXTND ON/MAC DRAW: CPT | Mod: S$PBB,,, | Performed by: OPHTHALMOLOGY

## 2020-07-09 PROCEDURE — 99999 PR PBB SHADOW E&M-EST. PATIENT-LVL IV: CPT | Mod: PBBFAC,,, | Performed by: OPHTHALMOLOGY

## 2020-07-09 PROCEDURE — 92202 PR OPHTHALMOSCOPY, EXT, W/DRAW OPTIC NERVE/MACULA, I&R, UNI/BI: ICD-10-PCS | Mod: S$PBB,,, | Performed by: OPHTHALMOLOGY

## 2020-07-09 PROCEDURE — 92202 OPSCPY EXTND ON/MAC DRAW: CPT | Mod: PBBFAC,PO | Performed by: OPHTHALMOLOGY

## 2020-07-09 PROCEDURE — 92014 COMPRE OPH EXAM EST PT 1/>: CPT | Mod: S$PBB,,, | Performed by: OPHTHALMOLOGY

## 2020-07-09 PROCEDURE — 92134 CPTRZ OPH DX IMG PST SGM RTA: CPT | Mod: PBBFAC,PO | Performed by: OPHTHALMOLOGY

## 2020-07-09 PROCEDURE — 92134 POSTERIOR SEGMENT OCT RETINA (OCULAR COHERENCE TOMOGRAPHY)-BOTH EYES: ICD-10-PCS | Mod: 26,S$PBB,, | Performed by: OPHTHALMOLOGY

## 2020-07-09 PROCEDURE — 92014 PR EYE EXAM, EST PATIENT,COMPREHESV: ICD-10-PCS | Mod: S$PBB,,, | Performed by: OPHTHALMOLOGY

## 2020-07-09 PROCEDURE — 99999 PR PBB SHADOW E&M-EST. PATIENT-LVL IV: ICD-10-PCS | Mod: PBBFAC,,, | Performed by: OPHTHALMOLOGY

## 2020-07-09 PROCEDURE — 99214 OFFICE O/P EST MOD 30 MIN: CPT | Mod: PBBFAC,PO | Performed by: OPHTHALMOLOGY

## 2020-07-09 RX ORDER — FLUORESCEIN 500 MG/ML
5 INJECTION INTRAVENOUS ONCE
Status: COMPLETED | OUTPATIENT
Start: 2020-07-09 | End: 2020-11-12

## 2020-07-09 NOTE — PROGRESS NOTES
HPI     Overdue f/u   DLS- 02/25/2019 Dr. Harrison     Pt sts vision fluctuates not always the same.   Has been stable lately. Biggest difficulty with near vision.  Recently received glasses RX but did not get it filled yet     (-)Flashes (-)Floaters  (-)Photophobia  (-)Glare    ATs PRN       OCT - [place] OD stable, nasal CME improved  OS- CME slightly worse compared to prior     [place] FA - Peripheral NP and NV  Late macular leakage OU      A/P    1. PDR OU  Improved A1c recently, but still uncontrolled on insulin  But  VH OD  S/p resumed Avastin OD x 1    Was not high risk in past, check FA        2. DME OU  - S/p Focal OU (OS x 2 06/13)  - S/p Ozurdex OD x2 2/19- fluid improved, but pt didn't notice much of a chance  - Some residual DME OD> OS    Stable low grade swelling OU  Pt would prefer to obs at this time.      3. PCIOL OU    4. HTN Ret OU    5. Ret Hole x 2  S/p barrier laser     6. Presbyopia  - Significantly increased since cataract surgery   - Pt unhappy with glasses- may need new MRx     7. 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

## 2020-07-10 ENCOUNTER — TELEPHONE (OUTPATIENT)
Dept: CARDIOLOGY | Facility: CLINIC | Age: 75
End: 2020-07-10

## 2020-07-10 RX ORDER — ROSUVASTATIN CALCIUM 40 MG/1
40 TABLET, COATED ORAL NIGHTLY
Qty: 90 TABLET | Refills: 3 | Status: SHIPPED | OUTPATIENT
Start: 2020-07-10 | End: 2021-02-10 | Stop reason: SDUPTHER

## 2020-07-10 NOTE — PATIENT INSTRUCTIONS

## 2020-07-10 NOTE — TELEPHONE ENCOUNTER
I reached out to patient and left him V/Message.    Advised pt ,   reviewed his Lab  results and they were  higher Than 70.   increased crestor to 40 mg every night .    I  advised to call with further questions or concerns, if needed.     raquel Hurley (rita).                                      ----- Message from Amish Hyatt MD sent at 7/10/2020  3:14 PM CDT -----      Please tell him his LDL (bad cholesterol was higher than 70); we increased crestor to 40 mg every night      Thanks      ZN

## 2020-07-13 ENCOUNTER — TELEPHONE (OUTPATIENT)
Dept: CARDIOLOGY | Facility: CLINIC | Age: 75
End: 2020-07-13

## 2020-07-13 NOTE — TELEPHONE ENCOUNTER
I reached out to ,    Patient was just confirming  That he received my message from last week    Confirming that he will  Crester  40 mg every night.    Lyndsey Hurley (rita).                        ----- Message from Markus Agudelo sent at 7/13/2020 11:50 AM CDT -----  Contact: [phone] / self  Patient states he is returning a call from your office regarding test results. Please Advise.

## 2020-07-15 DIAGNOSIS — Z71.89 COMPLEX CARE COORDINATION: ICD-10-CM

## 2020-07-29 ENCOUNTER — TELEPHONE (OUTPATIENT)
Dept: NEPHROLOGY | Facility: CLINIC | Age: 75
End: 2020-07-29

## 2020-07-29 NOTE — TELEPHONE ENCOUNTER
----- Message from Slime Hernández sent at 7/29/2020 12:29 PM CDT -----  Regarding: Reschedule appt  Pt requesting a call back to reschedule his appt at [phone].    Pt missed previous appt and would like to be rescheduled.    Thank you!

## 2020-07-30 ENCOUNTER — LAB VISIT (OUTPATIENT)
Dept: LAB | Facility: HOSPITAL | Age: 75
End: 2020-07-30
Attending: INTERNAL MEDICINE
Payer: MEDICARE

## 2020-07-30 DIAGNOSIS — I10 ESSENTIAL HYPERTENSION: ICD-10-CM

## 2020-07-30 DIAGNOSIS — N18.4 CHRONIC KIDNEY DISEASE, STAGE IV (SEVERE): ICD-10-CM

## 2020-07-30 LAB
ALBUMIN SERPL BCP-MCNC: 4.3 G/DL (ref 3.5–5.2)
ANION GAP SERPL CALC-SCNC: 12 MMOL/L (ref 8–16)
ANION GAP SERPL CALC-SCNC: 12 MMOL/L (ref 8–16)
BUN SERPL-MCNC: 61 MG/DL (ref 2–20)
BUN SERPL-MCNC: 61 MG/DL (ref 2–20)
CALCIUM SERPL-MCNC: 9.8 MG/DL (ref 8.7–10.5)
CALCIUM SERPL-MCNC: 9.8 MG/DL (ref 8.7–10.5)
CHLORIDE SERPL-SCNC: 107 MMOL/L (ref 95–110)
CHLORIDE SERPL-SCNC: 107 MMOL/L (ref 95–110)
CO2 SERPL-SCNC: 27 MMOL/L (ref 23–29)
CO2 SERPL-SCNC: 27 MMOL/L (ref 23–29)
CREAT SERPL-MCNC: 3.66 MG/DL (ref 0.5–1.4)
CREAT SERPL-MCNC: 3.66 MG/DL (ref 0.5–1.4)
ERYTHROCYTE [DISTWIDTH] IN BLOOD BY AUTOMATED COUNT: 14.1 % (ref 11.5–14.5)
EST. GFR  (AFRICAN AMERICAN): 17.7 ML/MIN/1.73 M^2
EST. GFR  (AFRICAN AMERICAN): 17.7 ML/MIN/1.73 M^2
EST. GFR  (NON AFRICAN AMERICAN): 15.3 ML/MIN/1.73 M^2
EST. GFR  (NON AFRICAN AMERICAN): 15.3 ML/MIN/1.73 M^2
FERRITIN SERPL-MCNC: 192 NG/ML (ref 20–300)
GLUCOSE SERPL-MCNC: 50 MG/DL (ref 70–110)
GLUCOSE SERPL-MCNC: 50 MG/DL (ref 70–110)
HCT VFR BLD AUTO: 31.6 % (ref 40–54)
HGB BLD-MCNC: 10.1 G/DL (ref 14–18)
IRON SERPL-MCNC: 39 UG/DL (ref 45–160)
MCH RBC QN AUTO: 28.8 PG (ref 27–31)
MCHC RBC AUTO-ENTMCNC: 32 G/DL (ref 32–36)
MCV RBC AUTO: 90 FL (ref 82–98)
PHOSPHATE SERPL-MCNC: 4.2 MG/DL (ref 2.7–4.5)
PLATELET # BLD AUTO: 130 K/UL (ref 150–350)
PMV BLD AUTO: 12.2 FL (ref 9.2–12.9)
POTASSIUM SERPL-SCNC: 3.3 MMOL/L (ref 3.5–5.1)
POTASSIUM SERPL-SCNC: 3.3 MMOL/L (ref 3.5–5.1)
PTH-INTACT SERPL-MCNC: 435 PG/ML (ref 9–77)
RBC # BLD AUTO: 3.51 M/UL (ref 4.6–6.2)
SATURATED IRON: 13 % (ref 20–50)
SODIUM SERPL-SCNC: 146 MMOL/L (ref 136–145)
SODIUM SERPL-SCNC: 146 MMOL/L (ref 136–145)
TOTAL IRON BINDING CAPACITY: 289 UG/DL (ref 250–450)
TRANSFERRIN SERPL-MCNC: 195 MG/DL (ref 200–375)
WBC # BLD AUTO: 7.82 K/UL (ref 3.9–12.7)

## 2020-07-30 PROCEDURE — 80069 RENAL FUNCTION PANEL: CPT | Mod: PO

## 2020-07-30 PROCEDURE — 83540 ASSAY OF IRON: CPT | Mod: PO

## 2020-07-30 PROCEDURE — 85027 COMPLETE CBC AUTOMATED: CPT | Mod: PO

## 2020-07-30 PROCEDURE — 83970 ASSAY OF PARATHORMONE: CPT | Mod: PO

## 2020-07-30 PROCEDURE — 36415 COLL VENOUS BLD VENIPUNCTURE: CPT | Mod: PO

## 2020-07-30 PROCEDURE — 82728 ASSAY OF FERRITIN: CPT

## 2020-08-03 ENCOUNTER — OFFICE VISIT (OUTPATIENT)
Dept: NEPHROLOGY | Facility: CLINIC | Age: 75
End: 2020-08-03
Payer: MEDICARE

## 2020-08-03 VITALS
OXYGEN SATURATION: 98 % | HEART RATE: 70 BPM | BODY MASS INDEX: 29.33 KG/M2 | WEIGHT: 235.88 LBS | SYSTOLIC BLOOD PRESSURE: 156 MMHG | HEIGHT: 75 IN | DIASTOLIC BLOOD PRESSURE: 78 MMHG

## 2020-08-03 DIAGNOSIS — E11.22 TYPE 2 DIABETES MELLITUS WITH STAGE 4 CHRONIC KIDNEY DISEASE, WITH LONG-TERM CURRENT USE OF INSULIN: Primary | ICD-10-CM

## 2020-08-03 DIAGNOSIS — N18.4 CHRONIC KIDNEY DISEASE, STAGE IV (SEVERE): ICD-10-CM

## 2020-08-03 DIAGNOSIS — N18.4 TYPE 2 DIABETES MELLITUS WITH STAGE 4 CHRONIC KIDNEY DISEASE, WITH LONG-TERM CURRENT USE OF INSULIN: Primary | ICD-10-CM

## 2020-08-03 DIAGNOSIS — D63.1 ANEMIA OF CHRONIC RENAL FAILURE, STAGE 4 (SEVERE): ICD-10-CM

## 2020-08-03 DIAGNOSIS — N18.4 ANEMIA OF CHRONIC RENAL FAILURE, STAGE 4 (SEVERE): ICD-10-CM

## 2020-08-03 DIAGNOSIS — Z79.4 TYPE 2 DIABETES MELLITUS WITH STAGE 4 CHRONIC KIDNEY DISEASE, WITH LONG-TERM CURRENT USE OF INSULIN: Primary | ICD-10-CM

## 2020-08-03 PROCEDURE — 99214 OFFICE O/P EST MOD 30 MIN: CPT | Mod: PBBFAC | Performed by: INTERNAL MEDICINE

## 2020-08-03 PROCEDURE — 99999 PR PBB SHADOW E&M-EST. PATIENT-LVL IV: CPT | Mod: PBBFAC,,, | Performed by: INTERNAL MEDICINE

## 2020-08-03 PROCEDURE — 99214 PR OFFICE/OUTPT VISIT, EST, LEVL IV, 30-39 MIN: ICD-10-PCS | Mod: S$PBB,,, | Performed by: INTERNAL MEDICINE

## 2020-08-03 PROCEDURE — 99214 OFFICE O/P EST MOD 30 MIN: CPT | Mod: S$PBB,,, | Performed by: INTERNAL MEDICINE

## 2020-08-03 PROCEDURE — 99999 PR PBB SHADOW E&M-EST. PATIENT-LVL IV: ICD-10-PCS | Mod: PBBFAC,,, | Performed by: INTERNAL MEDICINE

## 2020-08-03 RX ORDER — FUROSEMIDE 40 MG/1
80 TABLET ORAL DAILY
Qty: 360 TABLET | Refills: 11 | Status: SHIPPED | OUTPATIENT
Start: 2020-08-03 | End: 2020-08-24

## 2020-08-03 RX ORDER — OLMESARTAN MEDOXOMIL 20 MG/1
40 TABLET ORAL DAILY
Qty: 90 TABLET | Refills: 10 | Status: ON HOLD | OUTPATIENT
Start: 2020-08-03 | End: 2021-01-03 | Stop reason: HOSPADM

## 2020-08-03 RX ORDER — ASPIRIN 81 MG/1
81 TABLET ORAL DAILY
Status: ON HOLD | COMMUNITY
End: 2023-01-01 | Stop reason: HOSPADM

## 2020-08-03 RX ORDER — CALCITRIOL 0.25 UG/1
0.25 CAPSULE ORAL
Qty: 60 CAPSULE | Refills: 6 | Status: SHIPPED | OUTPATIENT
Start: 2020-08-03 | End: 2020-08-03 | Stop reason: SDUPTHER

## 2020-08-03 RX ORDER — CALCITRIOL 0.25 UG/1
0.25 CAPSULE ORAL
Qty: 60 CAPSULE | Refills: 6 | Status: ON HOLD | OUTPATIENT
Start: 2020-08-03 | End: 2021-06-30 | Stop reason: HOSPADM

## 2020-08-03 NOTE — PROGRESS NOTES
Progress Note  Nephrology      Referring physician: Griselda Nugent MD    Reason for visit: CKD     SUBJECTIVE:   75 y.o. male  has a past medical history of Arthritis, Cataract, Chronic diastolic congestive heart failure, Coronary artery disease, Cystoid macular edema of both eyes, Diabetes mellitus type II, Diabetic retinopathy, Hyperlipemia, Hypertension, Retinal hole, Stage 4 chronic kidney disease, and Streptococcus pyogenes bacteremia (12/23/2018). who has been following up in renal clinic for ckd. Patient feels well today, no urinary or cardiac symptoms, no NSAIDs intake. His renal function has been declining over recent years, his BP is high at home.        OBJECTIVE:     Vitals:    08/03/20 0854   BP: (!) 156/78   Pulse: 70          Physical Exam:  General: no distress, well nourished  HENT: PERRLA, Normal mouth nose and ears.  Neck: no JVD and thyroid not enlarged, symmetric, no tenderness/mass/nodules  Lungs: clear to auscultation bilaterally and normal respiratory effort  Cardiovascular: regular rate and rhythm, S1, S2 normal, no murmur, click, rub or gallop.   Abdomen: soft, non-tender non-distented; bowel sounds normal  Skin: No rashes or lesions  Musculoskeletal:2+ edema in LE, no deformities.   Lymph Nodes: No cervical or supraclavicular adenopathy  Neurologic: Normal strength and tone. No focal numbness or weakness    Dialysis Access: Not applicable.        Medications:    Current Outpatient Medications:     amLODIPine (NORVASC) 10 MG tablet, TAKE 1 TABLET BY MOUTH ONCE DAILY, Disp: 30 tablet, Rfl: 11    aspirin (ECOTRIN) 81 MG EC tablet, Take 81 mg by mouth once daily., Disp: , Rfl:     clopidogrel (PLAVIX) 75 mg tablet, Take 1 tablet (75 mg total) by mouth once daily., Disp: 30 tablet, Rfl: 11    furosemide (LASIX) 40 MG tablet, Take 2 tablets (80 mg total) by mouth once daily., Disp: 360 tablet, Rfl: 11    hydrALAZINE (APRESOLINE) 50 MG tablet, , Disp: , Rfl:     insulin aspart U-100  "(NOVOLOG FLEXPEN U-100 INSULIN) 100 unit/mL (3 mL) InPn pen, Inject 12 Units into the skin 3 (three) times daily with meals. With correction scale maximum 50 units daily., Disp: 15 mL, Rfl: 3    LEVEMIR FLEXTOUCH U-100 INSULN 100 unit/mL (3 mL) InPn pen, Inject 26 Units into the skin every evening., Disp: 36 mL, Rfl: 3    nebulizer and compressor Alana, USE AS DIRECTED, Disp: 1 each, Rfl: 0    olmesartan (BENICAR) 20 MG tablet, Take 2 tablets (40 mg total) by mouth once daily., Disp: 90 tablet, Rfl: 10    pen needle, diabetic, safety ([facility] AUTOSHIELD PEN NEEDLE) 29 gauge x 5/16" Ndle, For use once daily with levemir flexpen, Disp: 90 each, Rfl: 11    rosuvastatin (CRESTOR) 40 MG Tab, Take 1 tablet (40 mg total) by mouth every evening., Disp: 90 tablet, Rfl: 3    acetaminophen (TYLENOL) 325 MG tablet, Take 2 tablets (650 mg total) by mouth every 4 (four) hours as needed. (Patient not taking: Reported on 8/3/2020), Disp: , Rfl: 0    albuterol-ipratropium (DUO-NEB) 2.5 mg-0.5 mg/3 mL nebulizer solution, Take 3 mLs by nebulization every 4 (four) hours as needed for Wheezing or Shortness of Breath. Rescue (Patient not taking: Reported on 8/3/2020), Disp: 90 mL, Rfl: 0    calcitRIOL (ROCALTROL) 0.25 MCG Cap, Take 1 capsule (0.25 mcg total) by mouth every Monday and Friday., Disp: 60 capsule, Rfl: 6    Current Facility-Administered Medications:     fluorescein 500 mg/5 mL (10 %) injection 500 mg, 5 mL, Intravenous, Once, D. José Harrison MD         Laboratory:  Lab Results   Component Value Date    CREATININE 3.66 (H) 07/30/2020    CREATININE 3.66 (H) 07/30/2020       Prot/Creat Ratio, Ur   Date Value Ref Range Status   07/30/2020 2.07 (H) 0.00 - 0.20 Final   06/19/2020 1.92 (H) 0.00 - 0.20 Final   01/10/2020 2.12 (H) 0.00 - 0.20 Final       Lab Results   Component Value Date     (H) 07/30/2020     (H) 07/30/2020    K 3.3 (L) 07/30/2020    K 3.3 (L) 07/30/2020    CO2 27 07/30/2020    CO2 27 07/30/2020 "       last PTH   Lab Results   Component Value Date    .0 (H) 07/30/2020    CALCIUM 9.8 07/30/2020    CALCIUM 9.8 07/30/2020    PHOS 4.2 07/30/2020       Lab Results   Component Value Date    HGB 10.1 (L) 07/30/2020        Lab Results   Component Value Date    HGBA1C 7.1 (H) 06/25/2020       Lab Results   Component Value Date    LDLCALC 99.0 07/08/2020       Other Labs were reviewed      ASSESSMENT/PLAN:       CKD IV   -likely from DMII and HTN  -Cr baseline ~ 3.0, with eGFR ~ 20 ml/min, worse than in 2017 and before.  -UPCR 2.0 g/d    HTN  uncontrolled on Benicar 20, Hydra and Amlodipine, lasix 40 bid    Anemia  -from ckd  -Hgb goal ~ 10  -Iron stores low    Secondary Hyperparathyroidism  -Phos/Ca acceptable  -PTH high       Acid/Base  -No Metabolic acidosis              PLAN:  -Increase Benicar to 40 mg/d and make Lasix 80 mg daily, BMP in 2 weeks  -Pt will check Bp at home and report.  -Calcitriol M-F  -Avoid NSAIDs intake        RTC in 3 months with labs      JOSE RAMIREZ MD  NEPHROLOGY ATTENDING

## 2020-08-05 ENCOUNTER — TELEPHONE (OUTPATIENT)
Dept: NEPHROLOGY | Facility: CLINIC | Age: 75
End: 2020-08-05

## 2020-08-05 NOTE — TELEPHONE ENCOUNTER
----- Message from Shelly Sotelo MD sent at 8/5/2020  4:29 PM CDT -----  I called twice, not answering.  He needs to take Lasix 80 mg twice a day, because he had swelling in his legs and blood pressure was high, once swelling is down (1-2 weeks) can go back to 80 daily.    Thanks  ----- Message -----  From: Bere Villanueva MA  Sent: 8/5/2020   1:38 PM CDT  To: Shelly Sotelo MD    Just to clarify t180 ?    Thanks   ----- Message -----  From: Shelly Sotelo MD  Sent: 8/5/2020   1:36 PM CDT  To: Bere Villanueva MA    Please let him take it twice a day.  ----- Message -----  From: Bere Villanueva MA  Sent: 8/5/2020  11:10 AM CDT  To: Shelly Sotelo MD    Good morning,    Pt wanted me to let you know that he already takes 80mg of lasix. Just a fyi..    Thanks

## 2020-08-05 NOTE — TELEPHONE ENCOUNTER
-unsure who left a msg to pt as this office did not reach out to him , pt had visit on 8/3.   -              ----- Message from Cornelia Thompson sent at 8/5/2020 10:38 AM CDT -----  Contact: Domingo:  [phone]  Caller says he is already on 80mgs. A day of the Lasix.     Someone has already increased it.    Pt. Says he is good at 80mgs. And he has changed his diet to eat the correct foods.    Pls call to discuss.

## 2020-08-07 ENCOUNTER — TELEPHONE (OUTPATIENT)
Dept: NEPHROLOGY | Facility: CLINIC | Age: 75
End: 2020-08-07

## 2020-08-14 ENCOUNTER — OFFICE VISIT (OUTPATIENT)
Dept: PODIATRY | Facility: CLINIC | Age: 75
End: 2020-08-14
Payer: MEDICARE

## 2020-08-14 VITALS
HEIGHT: 75 IN | HEART RATE: 59 BPM | WEIGHT: 235.88 LBS | DIASTOLIC BLOOD PRESSURE: 66 MMHG | BODY MASS INDEX: 29.33 KG/M2 | SYSTOLIC BLOOD PRESSURE: 149 MMHG

## 2020-08-14 DIAGNOSIS — L84 PRE-ULCERATIVE CALLUSES: ICD-10-CM

## 2020-08-14 DIAGNOSIS — E11.42 TYPE 2 DIABETES MELLITUS WITH PERIPHERAL NEUROPATHY: ICD-10-CM

## 2020-08-14 DIAGNOSIS — L84 CORN OR CALLUS: ICD-10-CM

## 2020-08-14 DIAGNOSIS — E11.51 TYPE 2 DIABETES MELLITUS WITH PERIPHERAL ANGIOPATHY: Primary | ICD-10-CM

## 2020-08-14 PROCEDURE — 99999 PR PBB SHADOW E&M-EST. PATIENT-LVL IV: ICD-10-PCS | Mod: PBBFAC,,, | Performed by: PODIATRIST

## 2020-08-14 PROCEDURE — 11721 DEBRIDE NAIL 6 OR MORE: CPT | Mod: 59,Q9,S$PBB, | Performed by: PODIATRIST

## 2020-08-14 PROCEDURE — 99214 OFFICE O/P EST MOD 30 MIN: CPT | Mod: PBBFAC,PN | Performed by: PODIATRIST

## 2020-08-14 PROCEDURE — 99499 NO LOS: ICD-10-PCS | Mod: S$PBB,,, | Performed by: PODIATRIST

## 2020-08-14 PROCEDURE — 11055 ROUTINE FOOT CARE: ICD-10-PCS | Mod: Q9,S$PBB,, | Performed by: PODIATRIST

## 2020-08-14 PROCEDURE — 11721 ROUTINE FOOT CARE: ICD-10-PCS | Mod: 59,Q9,S$PBB, | Performed by: PODIATRIST

## 2020-08-14 PROCEDURE — 11055 PARING/CUTG B9 HYPRKER LES 1: CPT | Mod: PBBFAC,PN | Performed by: PODIATRIST

## 2020-08-14 PROCEDURE — 99999 PR PBB SHADOW E&M-EST. PATIENT-LVL IV: CPT | Mod: PBBFAC,,, | Performed by: PODIATRIST

## 2020-08-14 PROCEDURE — 99499 UNLISTED E&M SERVICE: CPT | Mod: S$PBB,,, | Performed by: PODIATRIST

## 2020-08-14 NOTE — PROCEDURES
"Routine Foot Care    Date/Time: 8/14/2020 10:45 AM  Performed by: Dimitry Gallegos DPM  Authorized by: Dimitry Gallegos DPM     Time out: Immediately prior to procedure a "time out" was called to verify the correct patient, procedure, equipment, support staff and site/side marked as required.    Consent Done?:  Yes (Verbal)  Hyperkeratotic Skin Lesions?: Yes    Number of trimmed lesions:  1  Location(s):  Left 1st Toe    Nail Care Type:  Debride  Location(s): All  (Left 1st Toe, Left 3rd Toe, Left 2nd Toe, Left 4th Toe, Left 5th Toe, Right 1st Toe, Right 2nd Toe, Right 3rd Toe, Right 4th Toe and Right 5th Toe)  Patient tolerance:  Patient tolerated the procedure well with no immediate complications     Used sterile nail nipper and #15 scalpel.        "

## 2020-08-14 NOTE — PROGRESS NOTES
Subjective:      Patient ID: Domingo Castro is a 75 y.o. male.    Chief Complaint: Follow-up (2 months; left foot  ) and Diabetes Mellitus (6/25/2020 office visit with PCP-ALBA Nugent)    Domingo is a 75 y.o. male who presents to the clinic for evaluation and treatment of high risk feet. Domingo has a past medical history of Arthritis, Cataract, Chronic diastolic congestive heart failure, Coronary artery disease, Cystoid macular edema of both eyes, Diabetes mellitus type II, Diabetic retinopathy, Hyperlipemia, Hypertension, Retinal hole, Stage 4 chronic kidney disease, and Streptococcus pyogenes bacteremia (12/23/2018). The patient's chief complaint is diabetic foot ulcer, left hallux. This patient has documented high risk feet requiring routine maintenance secondary to peripheral vascular disease.  Discharged from [facility] on 08/04/2019.  Receiving 6 weeks of IV vancomycin per ID recommendations.  Follow-up per [facility] with 3 times weekly Medi Honey dressing changes. Accompanied by his daughter.  No new complaints.    09/19/2019:  Follow-up for chronic wound to left hallux treated for osteomyelitis.  Post endovascular intervention per .    10/17/2019:  Presents for wound check.  Followed per [facility].  Believes his wound may be healed.  Says his home health nurse peeled away some dead skin and created a wound at the tip of the left hallux.  No new complaints.    11/11/2019:  Presents for wound check left hallux.  Relates he was told by his home health nurse that the wound is healed.  No new complaints.    04/24/2020:  Presents today complaining of new onset blister to the left hallux.  He was previously followed per [facility].  He still has a port in his chest that should been removed.  Denies any trauma.    05/14/2020:  Presents for wound check to left foot.  No new complaints.  [facility] is changing his dressings.    06/11/2020:  Follow-up for wound check to left hallux.   "Followed by [facility].  No new complaints.    08/14/2020:  Returns for routine foot care.  No new complaints.    PCP: Griselda Nugent MD    Date Last Seen by PCP:  06/25/2020    Current shoe gear:  Affected Foot: Football and Darco shoe on the affected foot     Unaffected Foot: Rx diabetic extra depth shoes and custom accommodative insoles    History of Trauma: negative  Sign of Infection: none    Hemoglobin A1C   Date Value Ref Range Status   06/25/2020 7.1 (H) 4.0 - 5.6 % Final     Comment:     ADA Screening Guidelines:  5.7-6.4%  Consistent with prediabetes  >or=6.5%  Consistent with diabetes  High levels of fetal hemoglobin interfere with the HbA1C  assay. Heterozygous hemoglobin variants (HbS, HgC, etc)do  not significantly interfere with this assay.   However, presence of multiple variants may affect accuracy.     02/20/2020 8.1 (H) 4.0 - 5.6 % Final     Comment:     ADA Screening Guidelines:  5.7-6.4%  Consistent with prediabetes  >or=6.5%  Consistent with diabetes  High levels of fetal hemoglobin interfere with the HbA1C  assay. Heterozygous hemoglobin variants (HbS, HgC, etc)do  not significantly interfere with this assay.   However, presence of multiple variants may affect accuracy.     11/08/2019 7.8 (H) 4.0 - 5.6 % Final     Comment:     ADA Screening Guidelines:  5.7-6.4%  Consistent with prediabetes  >or=6.5%  Consistent with diabetes  High levels of fetal hemoglobin interfere with the HbA1C  assay. Heterozygous hemoglobin variants (HbS, HgC, etc)do  not significantly interfere with this assay.   However, presence of multiple variants may affect accuracy.       Vitals:    08/14/20 1043   BP: (!) 149/66   Pulse: (!) 59   Weight: 107 kg (235 lb 14.3 oz)   Height: 6' 3" (1.905 m)   PainSc: 0-No pain      Past Medical History:   Diagnosis Date    Arthritis     Cataract     Chronic diastolic congestive heart failure     Coronary artery disease     Cystoid macular edema of both eyes     " Diabetes mellitus type II     Diabetic retinopathy     Hyperlipemia     Hypertension     Retinal hole     Stage 4 chronic kidney disease     Streptococcus pyogenes bacteremia 2018    Due to left toe osteomyelitis       Past Surgical History:   Procedure Laterality Date    ABDOMINAL AORTOGRAPHY N/A 2019    Procedure: AORTOGRAM-ABDOMINAL;  Surgeon: Amish Hyatt MD;  Location: [facility] CATH LAB/EP;  Service: Cardiology;  Laterality: N/A;  CO2 angiography    ANGIOGRAPHY OF LOWER EXTREMITY Left 2019    Procedure: Angiogram Extremity Unilateral;  Surgeon: Amish Hyatt MD;  Location: [facility] CATH LAB/EP;  Service: Cardiology;  Laterality: Left;    CATARACT EXTRACTION W/  INTRAOCULAR LENS IMPLANT Left 12/15/14    Dr de la cruz    CATARACT EXTRACTION W/  INTRAOCULAR LENS IMPLANT Right 14    dorothy    CIRCUMCISION, NON-      focal laser right eye      INSERTION OF TUNNELED CENTRAL VENOUS CATHETER (CVC) WITH SUBCUTANEOUS PORT Right 2019    Procedure: [id number];  Surgeon: Denys Aleman Jr., MD;  Location: [facility] OR;  Service: General;  Laterality: Right;    KNEE SURGERY      left    Left medial collateral ligament repair      TONSILLECTOMY         Family History   Problem Relation Age of Onset    Heart disease Mother     Cancer Mother     Cancer Brother     Heart disease Father     Diabetes Father     Cancer Sister     Cataracts Paternal Grandmother     Glaucoma Paternal Grandmother     Blindness Neg Hx     Amblyopia Neg Hx     Hypertension Neg Hx     Macular degeneration Neg Hx     Retinal detachment Neg Hx     Strabismus Neg Hx        Social History     Socioeconomic History    Marital status:      Spouse name: Not on file    Number of children: Not on file    Years of education: Not on file    Highest education level: Not on file   Occupational History    Not on file   Social Needs    Financial resource strain: Not on file    Food insecurity      Worry: Not on file     Inability: Not on file    Transportation needs     Medical: Not on file     Non-medical: Not on file   Tobacco Use    Smoking status: Never Smoker    Smokeless tobacco: Never Used   Substance and Sexual Activity    Alcohol use: No     Alcohol/week: 0.0 standard drinks    Drug use: No    Sexual activity: Yes     Partners: Female   Lifestyle    Physical activity     Days per week: Not on file     Minutes per session: Not on file    Stress: Not on file   Relationships    Social connections     Talks on phone: Not on file     Gets together: Not on file     Attends [employer] service: Not on file     Active member of club or organization: Not on file     Attends meetings of clubs or organizations: Not on file     Relationship status: Not on file   Other Topics Concern    Not on file   Social History Narrative    Not on file       Current Outpatient Medications   Medication Sig Dispense Refill    amLODIPine (NORVASC) 10 MG tablet TAKE 1 TABLET BY MOUTH ONCE DAILY 30 tablet 11    aspirin (ECOTRIN) 81 MG EC tablet Take 81 mg by mouth once daily.      calcitRIOL (ROCALTROL) 0.25 MCG Cap Take 1 capsule (0.25 mcg total) by mouth every Monday and Friday. 60 capsule 6    clopidogrel (PLAVIX) 75 mg tablet Take 1 tablet (75 mg total) by mouth once daily. 30 tablet 11    furosemide (LASIX) 40 MG tablet Take 2 tablets (80 mg total) by mouth once daily. 360 tablet 11    hydrALAZINE (APRESOLINE) 50 MG tablet       insulin aspart U-100 (NOVOLOG FLEXPEN U-100 INSULIN) 100 unit/mL (3 mL) InPn pen Inject 12 Units into the skin 3 (three) times daily with meals. With correction scale maximum 50 units daily. 15 mL 3    LEVEMIR FLEXTOUCH U-100 INSULN 100 unit/mL (3 mL) InPn pen Inject 26 Units into the skin every evening. 36 mL 3    nebulizer and compressor Alana USE AS DIRECTED 1 each 0    olmesartan (BENICAR) 20 MG tablet Take 2 tablets (40 mg total) by mouth once daily. 90 tablet 10    pen  "needle, diabetic, safety (BD AUTOSHIELD PEN NEEDLE) 29 gauge x 5/16" Ndle For use once daily with levemir flexpen 90 each 11    rosuvastatin (CRESTOR) 40 MG Tab Take 1 tablet (40 mg total) by mouth every evening. 90 tablet 3    acetaminophen (TYLENOL) 325 MG tablet Take 2 tablets (650 mg total) by mouth every 4 (four) hours as needed. (Patient not taking: Reported on 8/3/2020)  0    albuterol-ipratropium (DUO-NEB) 2.5 mg-0.5 mg/3 mL nebulizer solution Take 3 mLs by nebulization every 4 (four) hours as needed for Wheezing or Shortness of Breath. Rescue (Patient not taking: Reported on 8/3/2020) 90 mL 0     Current Facility-Administered Medications   Medication Dose Route Frequency Provider Last Rate Last Dose    fluorescein 500 mg/5 mL (10 %) injection 500 mg  5 mL Intravenous Once D. José Harrison MD           Review of patient's allergies indicates:   Allergen Reactions    Atorvastatin Other (See Comments)         Review of Systems   Constitution: Negative for chills and fever.   HENT: Negative for congestion and hearing loss.    Cardiovascular: Negative for chest pain and claudication.   Respiratory: Negative for cough and shortness of breath.    Skin: Positive for color change, nail changes and poor wound healing.   Musculoskeletal: Negative for back pain and joint pain.   Gastrointestinal: Negative for nausea and vomiting.   Neurological: Positive for numbness.   Psychiatric/Behavioral: Negative for altered mental status.           Objective:      Physical Exam  Constitutional:       General: He is not in acute distress.     Appearance: He is not diaphoretic.   Cardiovascular:      Pulses:           Dorsalis pedis pulses are 1+ on the right side and 1+ on the left side.        Posterior tibial pulses are 1+ on the right side and 1+ on the left side.      Comments: Mild lower extremity edema bilateral. No hair growth bilateral lower extremity.  Musculoskeletal:      Comments: One MTP range of motion left " is less than 30° dorsiflexion.    Semi reducible hallux malleus left hallux.    Semi rigid clawtoe deformities toes 2-5 bilateral foot.    Mild cavus foot structure bilateral foot.   Feet:      Right foot:      Protective Sensation: 10 sites tested. 6 sites sensed.      Skin integrity: Dry skin present.      Toenail Condition: Right toenails are abnormally thick and long. Fungal disease present.     Left foot:      Protective Sensation: 10 sites tested. 6 sites sensed.      Skin integrity: Callus and dry skin present. No ulcer.      Toenail Condition: Left toenails are abnormally thick and long. Fungal disease present.  Skin:     General: Skin is warm and dry.      Capillary Refill: Capillary refill takes 2 to 3 seconds.      Coloration: Skin is not pale.      Findings: No ecchymosis, erythema or rash.      Nails: There is no clubbing.        Comments: Left hallux nail is moderately elongated and dystrophic with some loosening.  There is no surrounding erythema.    Nails 2-5 left and 1-5 right are also elongated 4-5 mm, thickened, discolored brown yellow with loosening and underlying debris.  The right hallux nail and bilateral 5th toenail are dystrophic.    Raised hyperkeratotic skin distal left hallux.      .   Neurological:      Mental Status: He is alert and oriented to person, place, and time.      Sensory: Sensory deficit present.               Assessment:       Encounter Diagnoses   Name Primary?    Type 2 diabetes mellitus with peripheral angiopathy Yes    Type 2 diabetes mellitus with peripheral neuropathy     Pre-ulcerative calluses     Corn or callus          Plan:       Domingo was seen today for follow-up and diabetes mellitus.    Diagnoses and all orders for this visit:    Type 2 diabetes mellitus with peripheral angiopathy  -     Routine Foot Care    Type 2 diabetes mellitus with peripheral neuropathy  -     Routine Foot Care    Pre-ulcerative calluses  -     Routine Foot Care    Ramey or callus  -      Routine Foot Care      I counseled the patient on his conditions, their implications and medical management.    Shoe inspection. Diabetic Foot Education. Patient reminded of the importance of good nutrition and blood sugar control to help prevent podiatric complications of diabetes. Patient instructed on proper foot hygeine. We discussed wearing proper shoe gear, daily foot inspections, never walking without protective shoe gear, never putting sharp instruments to feet.    Routine foot care per attached note.    A portion of this note was generated by voice recognition software and may contain topical graphical errors.

## 2020-08-17 ENCOUNTER — LAB VISIT (OUTPATIENT)
Dept: LAB | Facility: HOSPITAL | Age: 75
End: 2020-08-17
Attending: INTERNAL MEDICINE
Payer: MEDICARE

## 2020-08-17 DIAGNOSIS — D63.1 ANEMIA OF CHRONIC RENAL FAILURE, STAGE 4 (SEVERE): ICD-10-CM

## 2020-08-17 DIAGNOSIS — N18.4 CHRONIC KIDNEY DISEASE, STAGE IV (SEVERE): ICD-10-CM

## 2020-08-17 DIAGNOSIS — N18.4 TYPE 2 DIABETES MELLITUS WITH STAGE 4 CHRONIC KIDNEY DISEASE, WITH LONG-TERM CURRENT USE OF INSULIN: ICD-10-CM

## 2020-08-17 DIAGNOSIS — Z79.4 TYPE 2 DIABETES MELLITUS WITH STAGE 4 CHRONIC KIDNEY DISEASE, WITH LONG-TERM CURRENT USE OF INSULIN: ICD-10-CM

## 2020-08-17 DIAGNOSIS — N18.4 ANEMIA OF CHRONIC RENAL FAILURE, STAGE 4 (SEVERE): ICD-10-CM

## 2020-08-17 DIAGNOSIS — E11.22 TYPE 2 DIABETES MELLITUS WITH STAGE 4 CHRONIC KIDNEY DISEASE, WITH LONG-TERM CURRENT USE OF INSULIN: ICD-10-CM

## 2020-08-17 LAB
ANION GAP SERPL CALC-SCNC: 12 MMOL/L (ref 8–16)
BUN SERPL-MCNC: 71 MG/DL (ref 2–20)
CALCIUM SERPL-MCNC: 9.6 MG/DL (ref 8.7–10.5)
CHLORIDE SERPL-SCNC: 108 MMOL/L (ref 95–110)
CO2 SERPL-SCNC: 26 MMOL/L (ref 23–29)
CREAT SERPL-MCNC: 3.94 MG/DL (ref 0.5–1.4)
EST. GFR  (AFRICAN AMERICAN): 16.1 ML/MIN/1.73 M^2
EST. GFR  (NON AFRICAN AMERICAN): 14 ML/MIN/1.73 M^2
GLUCOSE SERPL-MCNC: 88 MG/DL (ref 70–110)
POTASSIUM SERPL-SCNC: 4.1 MMOL/L (ref 3.5–5.1)
SODIUM SERPL-SCNC: 146 MMOL/L (ref 136–145)

## 2020-08-17 PROCEDURE — 36415 COLL VENOUS BLD VENIPUNCTURE: CPT | Mod: PO

## 2020-08-17 PROCEDURE — 80048 BASIC METABOLIC PNL TOTAL CA: CPT | Mod: PO

## 2020-08-22 ENCOUNTER — NURSE TRIAGE (OUTPATIENT)
Dept: ADMINISTRATIVE | Facility: CLINIC | Age: 75
End: 2020-08-22

## 2020-08-24 ENCOUNTER — TELEPHONE (OUTPATIENT)
Dept: NEPHROLOGY | Facility: CLINIC | Age: 75
End: 2020-08-24

## 2020-08-24 RX ORDER — FUROSEMIDE 40 MG/1
80 TABLET ORAL DAILY
Qty: 360 TABLET | Refills: 11 | Status: SHIPPED | OUTPATIENT
Start: 2020-08-24 | End: 2020-11-18 | Stop reason: SDUPTHER

## 2020-08-24 NOTE — TELEPHONE ENCOUNTER
----- Message from Shelly Sotelo MD sent at 8/24/2020  8:28 AM CDT -----  Please let him take his Lasix 80 mg once a day for now. I sent a refill.    Thanks Bere

## 2020-08-25 ENCOUNTER — TELEPHONE (OUTPATIENT)
Dept: INTERNAL MEDICINE | Facility: CLINIC | Age: 75
End: 2020-08-25

## 2020-08-25 NOTE — TELEPHONE ENCOUNTER
----- Message from Suzanne Zhou sent at 8/25/2020 10:07 AM CDT -----  Regarding: Ms. Valenzuela @ [facility] [employer] # [phone]  Ms. Valenzuela is calling in regards to her wanting to let you know that she will be faxing over a CMN form for oxygen for the patient for you to sign and complete, she would like for you to give her a call if you have any questions please.

## 2020-09-23 ENCOUNTER — TELEPHONE (OUTPATIENT)
Dept: NEPHROLOGY | Facility: CLINIC | Age: 75
End: 2020-09-23

## 2020-09-28 PROBLEM — R50.9 FEVER: Status: RESOLVED | Noted: 2019-10-22 | Resolved: 2020-09-28

## 2020-09-28 PROBLEM — I20.89 ANGINAL EQUIVALENT: Status: RESOLVED | Noted: 2018-12-03 | Resolved: 2020-09-28

## 2020-09-28 PROBLEM — I50.9 ACUTE ON CHRONIC CONGESTIVE HEART FAILURE: Status: RESOLVED | Noted: 2019-10-21 | Resolved: 2020-09-28

## 2020-09-29 ENCOUNTER — PATIENT MESSAGE (OUTPATIENT)
Dept: OTHER | Facility: OTHER | Age: 75
End: 2020-09-29

## 2020-09-29 ENCOUNTER — TELEPHONE (OUTPATIENT)
Dept: PRIMARY CARE CLINIC | Facility: CLINIC | Age: 75
End: 2020-09-29

## 2020-09-29 ENCOUNTER — OFFICE VISIT (OUTPATIENT)
Dept: INTERNAL MEDICINE | Facility: CLINIC | Age: 75
End: 2020-09-29
Payer: MEDICARE

## 2020-09-29 ENCOUNTER — HOSPITAL ENCOUNTER (OUTPATIENT)
Dept: RADIOLOGY | Facility: HOSPITAL | Age: 75
Discharge: HOME | End: 2020-09-29
Attending: INTERNAL MEDICINE
Payer: MEDICARE

## 2020-09-29 VITALS
SYSTOLIC BLOOD PRESSURE: 130 MMHG | HEIGHT: 75 IN | HEART RATE: 68 BPM | DIASTOLIC BLOOD PRESSURE: 60 MMHG | BODY MASS INDEX: 28.48 KG/M2 | WEIGHT: 229.06 LBS | OXYGEN SATURATION: 98 % | RESPIRATION RATE: 15 BRPM | TEMPERATURE: 98 F

## 2020-09-29 VITALS
BODY MASS INDEX: 28.47 KG/M2 | HEIGHT: 75 IN | SYSTOLIC BLOOD PRESSURE: 130 MMHG | RESPIRATION RATE: 15 BRPM | HEART RATE: 68 BPM | OXYGEN SATURATION: 98 % | WEIGHT: 229 LBS | DIASTOLIC BLOOD PRESSURE: 60 MMHG

## 2020-09-29 DIAGNOSIS — N18.4 CKD STAGE 4 DUE TO TYPE 2 DIABETES MELLITUS: ICD-10-CM

## 2020-09-29 DIAGNOSIS — E11.41 DIABETIC MONONEUROPATHY ASSOCIATED WITH TYPE 2 DIABETES MELLITUS: Chronic | ICD-10-CM

## 2020-09-29 DIAGNOSIS — Z99.81 CHRONIC RESPIRATORY FAILURE WITH HYPOXIA, ON HOME OXYGEN THERAPY: ICD-10-CM

## 2020-09-29 DIAGNOSIS — J96.11 CHRONIC RESPIRATORY FAILURE WITH HYPOXIA, ON HOME OXYGEN THERAPY: ICD-10-CM

## 2020-09-29 DIAGNOSIS — N18.4 TYPE 2 DIABETES MELLITUS WITH STAGE 4 CHRONIC KIDNEY DISEASE, WITH LONG-TERM CURRENT USE OF INSULIN: Chronic | ICD-10-CM

## 2020-09-29 DIAGNOSIS — I50.32 CHRONIC CONGESTIVE HEART FAILURE WITH LEFT VENTRICULAR DIASTOLIC DYSFUNCTION: ICD-10-CM

## 2020-09-29 DIAGNOSIS — R60.0 BILATERAL LEG EDEMA: ICD-10-CM

## 2020-09-29 DIAGNOSIS — M89.8X9 METABOLIC BONE DISEASE: ICD-10-CM

## 2020-09-29 DIAGNOSIS — E66.9 OBESITY, DIABETES, AND HYPERTENSION SYNDROME: ICD-10-CM

## 2020-09-29 DIAGNOSIS — M54.9 DORSALGIA, UNSPECIFIED: ICD-10-CM

## 2020-09-29 DIAGNOSIS — E11.22 CKD STAGE 4 DUE TO TYPE 2 DIABETES MELLITUS: ICD-10-CM

## 2020-09-29 DIAGNOSIS — I15.2 HYPERTENSION ASSOCIATED WITH DIABETES: ICD-10-CM

## 2020-09-29 DIAGNOSIS — N52.9 ERECTILE DYSFUNCTION, UNSPECIFIED ERECTILE DYSFUNCTION TYPE: ICD-10-CM

## 2020-09-29 DIAGNOSIS — D64.9 NORMOCYTIC ANEMIA: ICD-10-CM

## 2020-09-29 DIAGNOSIS — E11.22 TYPE 2 DIABETES MELLITUS WITH STAGE 4 CHRONIC KIDNEY DISEASE, WITH LONG-TERM CURRENT USE OF INSULIN: Chronic | ICD-10-CM

## 2020-09-29 DIAGNOSIS — Z99.81 DEPENDENCE ON SUPPLEMENTAL OXYGEN: ICD-10-CM

## 2020-09-29 DIAGNOSIS — Z79.4 TYPE 2 DIABETES MELLITUS WITH STAGE 4 CHRONIC KIDNEY DISEASE, WITH LONG-TERM CURRENT USE OF INSULIN: Chronic | ICD-10-CM

## 2020-09-29 DIAGNOSIS — E11.59 OBESITY, DIABETES, AND HYPERTENSION SYNDROME: ICD-10-CM

## 2020-09-29 DIAGNOSIS — E78.5 HYPERLIPIDEMIA ASSOCIATED WITH TYPE 2 DIABETES MELLITUS: ICD-10-CM

## 2020-09-29 DIAGNOSIS — Z00.00 ENCOUNTER FOR PREVENTIVE HEALTH EXAMINATION: Primary | ICD-10-CM

## 2020-09-29 DIAGNOSIS — R91.1 PULMONARY NODULE: ICD-10-CM

## 2020-09-29 DIAGNOSIS — N18.4 ANEMIA OF CHRONIC RENAL FAILURE, STAGE 4 (SEVERE): Chronic | ICD-10-CM

## 2020-09-29 DIAGNOSIS — E11.59 HYPERTENSION ASSOCIATED WITH DIABETES: ICD-10-CM

## 2020-09-29 DIAGNOSIS — Z78.9 IMPAIRED MOBILITY AND ADLS: ICD-10-CM

## 2020-09-29 DIAGNOSIS — E11.59 HYPERTENSION ASSOCIATED WITH DIABETES: Primary | ICD-10-CM

## 2020-09-29 DIAGNOSIS — M86.9 TOE OSTEOMYELITIS, LEFT: ICD-10-CM

## 2020-09-29 DIAGNOSIS — R32 URINARY INCONTINENCE, UNSPECIFIED TYPE: ICD-10-CM

## 2020-09-29 DIAGNOSIS — E11.65 UNCONTROLLED TYPE 2 DIABETES MELLITUS WITH HYPERGLYCEMIA: ICD-10-CM

## 2020-09-29 DIAGNOSIS — M54.12 CERVICAL RADICULOPATHY: ICD-10-CM

## 2020-09-29 DIAGNOSIS — H61.23 BILATERAL IMPACTED CERUMEN: ICD-10-CM

## 2020-09-29 DIAGNOSIS — I15.2 HYPERTENSION ASSOCIATED WITH DIABETES: Primary | ICD-10-CM

## 2020-09-29 DIAGNOSIS — I73.9 PAD (PERIPHERAL ARTERY DISEASE): ICD-10-CM

## 2020-09-29 DIAGNOSIS — I70.0 AORTIC ARCH ATHEROSCLEROSIS: ICD-10-CM

## 2020-09-29 DIAGNOSIS — I15.2 OBESITY, DIABETES, AND HYPERTENSION SYNDROME: ICD-10-CM

## 2020-09-29 DIAGNOSIS — H35.033 HYPERTENSIVE RETINOPATHY OF BOTH EYES: ICD-10-CM

## 2020-09-29 DIAGNOSIS — E11.69 HYPERLIPIDEMIA ASSOCIATED WITH TYPE 2 DIABETES MELLITUS: ICD-10-CM

## 2020-09-29 DIAGNOSIS — I50.32 CHRONIC DIASTOLIC CONGESTIVE HEART FAILURE: Chronic | ICD-10-CM

## 2020-09-29 DIAGNOSIS — J47.9 BRONCHIECTASIS WITHOUT COMPLICATION: ICD-10-CM

## 2020-09-29 DIAGNOSIS — E08.3413 SEVERE NONPROLIFERATIVE DIABETIC RETINOPATHY OF BOTH EYES WITH MACULAR EDEMA ASSOCIATED WITH DIABETES MELLITUS DUE TO UNDERLYING CONDITION: ICD-10-CM

## 2020-09-29 DIAGNOSIS — E11.69 OBESITY, DIABETES, AND HYPERTENSION SYNDROME: ICD-10-CM

## 2020-09-29 DIAGNOSIS — D69.6 THROMBOCYTOPENIA, UNSPECIFIED: ICD-10-CM

## 2020-09-29 DIAGNOSIS — E29.1 HYPOGONADISM MALE: ICD-10-CM

## 2020-09-29 DIAGNOSIS — D63.1 ANEMIA OF CHRONIC RENAL FAILURE, STAGE 4 (SEVERE): Chronic | ICD-10-CM

## 2020-09-29 DIAGNOSIS — E66.3 OVERWEIGHT (BMI 25.0-29.9): ICD-10-CM

## 2020-09-29 DIAGNOSIS — H33.302 RETINAL HOLE OR TEAR, LEFT: ICD-10-CM

## 2020-09-29 DIAGNOSIS — Z74.09 IMPAIRED MOBILITY AND ADLS: ICD-10-CM

## 2020-09-29 PROBLEM — J81.1 PULMONARY EDEMA: Status: RESOLVED | Noted: 2019-10-22 | Resolved: 2020-09-29

## 2020-09-29 PROBLEM — J18.9 RIGHT UPPER LOBE PNEUMONIA: Status: RESOLVED | Noted: 2019-10-22 | Resolved: 2020-09-29

## 2020-09-29 PROBLEM — L97.529 DIABETIC ULCER OF LEFT FOOT: Status: RESOLVED | Noted: 2019-10-22 | Resolved: 2020-09-29

## 2020-09-29 PROBLEM — Z95.828 PORT-A-CATH IN PLACE: Status: RESOLVED | Noted: 2020-05-06 | Resolved: 2020-09-29

## 2020-09-29 PROBLEM — L97.524 DIABETIC ULCER OF TOE OF LEFT FOOT ASSOCIATED WITH TYPE 2 DIABETES MELLITUS, WITH NECROSIS OF BONE: Status: RESOLVED | Noted: 2019-07-26 | Resolved: 2020-09-29

## 2020-09-29 PROBLEM — E11.621 DIABETIC ULCER OF TOE OF LEFT FOOT ASSOCIATED WITH TYPE 2 DIABETES MELLITUS, WITH NECROSIS OF BONE: Status: RESOLVED | Noted: 2019-07-26 | Resolved: 2020-09-29

## 2020-09-29 PROBLEM — E11.621 DIABETIC ULCER OF LEFT FOOT: Status: RESOLVED | Noted: 2019-10-22 | Resolved: 2020-09-29

## 2020-09-29 PROBLEM — J96.01 ACUTE RESPIRATORY FAILURE WITH HYPOXIA: Status: RESOLVED | Noted: 2018-12-23 | Resolved: 2020-09-29

## 2020-09-29 PROCEDURE — 72100 X-RAY EXAM L-S SPINE 2/3 VWS: CPT | Mod: 26,,, | Performed by: RADIOLOGY

## 2020-09-29 PROCEDURE — 99999 PR PBB SHADOW E&M-EST. PATIENT-LVL V: ICD-10-PCS | Mod: PBBFAC,,, | Performed by: INTERNAL MEDICINE

## 2020-09-29 PROCEDURE — 99999 PR PBB SHADOW E&M-EST. PATIENT-LVL V: ICD-10-PCS | Mod: PBBFAC,,, | Performed by: NURSE PRACTITIONER

## 2020-09-29 PROCEDURE — 99999 PR PBB SHADOW E&M-EST. PATIENT-LVL V: CPT | Mod: PBBFAC,,, | Performed by: NURSE PRACTITIONER

## 2020-09-29 PROCEDURE — 99215 OFFICE O/P EST HI 40 MIN: CPT | Mod: PBBFAC,25,PO | Performed by: NURSE PRACTITIONER

## 2020-09-29 PROCEDURE — 99213 PR OFFICE/OUTPT VISIT, EST, LEVL III, 20-29 MIN: ICD-10-PCS | Mod: S$PBB,,, | Performed by: INTERNAL MEDICINE

## 2020-09-29 PROCEDURE — 72100 XR LUMBAR SPINE AP AND LATERAL: ICD-10-PCS | Mod: 26,,, | Performed by: RADIOLOGY

## 2020-09-29 PROCEDURE — G0439 PPPS, SUBSEQ VISIT: HCPCS | Mod: S$GLB,,, | Performed by: NURSE PRACTITIONER

## 2020-09-29 PROCEDURE — 99999 PR PBB SHADOW E&M-EST. PATIENT-LVL V: CPT | Mod: PBBFAC,,, | Performed by: INTERNAL MEDICINE

## 2020-09-29 PROCEDURE — 72100 X-RAY EXAM L-S SPINE 2/3 VWS: CPT | Mod: TC,PO

## 2020-09-29 PROCEDURE — 99215 OFFICE O/P EST HI 40 MIN: CPT | Mod: PBBFAC,25,27,PO | Performed by: INTERNAL MEDICINE

## 2020-09-29 PROCEDURE — 99213 OFFICE O/P EST LOW 20 MIN: CPT | Mod: S$PBB,,, | Performed by: INTERNAL MEDICINE

## 2020-09-29 PROCEDURE — G0439 PR MEDICARE ANNUAL WELLNESS SUBSEQUENT VISIT: ICD-10-PCS | Mod: S$GLB,,, | Performed by: NURSE PRACTITIONER

## 2020-09-29 NOTE — PROGRESS NOTES
"Domingo Castro presented for a  Medicare AWV and comprehensive Health Risk Assessment today. The following components were reviewed and updated:    · Medical history  · Family History  · Social history--  of Gnosticist  · Allergies and Current Medications  · Health Risk Assessment  · Health Maintenance  · Care Team     ** See Completed Assessments for Annual Wellness Visit within the encounter summary.**       The following assessments were completed:  · Living Situation  · CAGE  · Depression Screening  · Timed Get Up and Go  · Whisper Test  · Cognitive Function Screening  · Nutrition Screening  · ADL Screening  · PAQ Screening    Vitals:    09/29/20 1212   BP: 130/60   Pulse: 68   Resp: 15   SpO2: 98%   Weight: 103.9 kg (229 lb)   Height: 6' 3" (1.905 m)     Body mass index is 28.62 kg/m².  Physical Exam  Constitutional:       Appearance: He is well-developed.   HENT:      Head: Normocephalic.      Comments: Wearing mask     Right Ear: External ear normal.      Left Ear: External ear normal.      Nose: Nose normal.      Mouth/Throat:      Pharynx: No oropharyngeal exudate.   Eyes:      General: No scleral icterus.  Neck:      Vascular: No carotid bruit.   Cardiovascular:      Rate and Rhythm: Normal rate and regular rhythm.   Pulmonary:      Effort: Pulmonary effort is normal. No respiratory distress.      Breath sounds: Normal breath sounds.   Abdominal:      General: Bowel sounds are normal. There is no distension.      Palpations: Abdomen is soft.      Comments: protrudent   Musculoskeletal:         General: Swelling present.   Skin:     General: Skin is warm and dry.      Findings: No rash.   Neurological:      General: No focal deficit present.      Mental Status: He is alert and oriented to person, place, and time.      Cranial Nerves: No cranial nerve deficit.      Motor: No abnormal muscle tone.      Coordination: Coordination normal.      Deep Tendon Reflexes: Reflexes are normal and symmetric. Reflexes " normal.   Psychiatric:         Mood and Affect: Mood normal.         Behavior: Behavior normal.         Thought Content: Thought content normal.         Judgment: Judgment normal.           Lab Results   Component Value Date    HGBA1C 7.1 (H) 06/25/2020    CHOL 162 07/08/2020    HDL 44 07/08/2020    LDLCALC 99.0 07/08/2020    TRIG 95 07/08/2020    CHOLHDL 27.2 07/08/2020      Lab Results   Component Value Date    PSA 4.4 (H) 08/18/2016       Diagnoses and health risks identified today and associated recommendations/orders:    1. Hypertension associated with diabetes  Stable- followed by cardiology     2. Hyperlipidemia associated with type 2 diabetes mellitus  Stable- followed by cardiology    3. Hypogonadism male  Stable- followed by PCP    4. Erectile dysfunction, unspecified erectile dysfunction type  Stable- followed by PCP    5. Hypertensive retinopathy of both eyes  Stable- followed by opth    6. Severe nonproliferative diabetic retinopathy of both eyes with macular edema associated with diabetes mellitus due to underlying condition  Stable- followed by opth    7. Cervical radiculopathy  Stable- followed by PCP    8. Retinal hole or tear, left  Stable- followed by opth    9. Impaired mobility and ADLs  Stable- followed by PCP    10. Normocytic anemia  Stable- followed by PCP    11. Pulmonary nodule  Stable- followed by PCP    12. Urinary incontinence, unspecified type  Stable- followed by PCP    13. Metabolic bone disease  Stable- followed by PCP, nephrology    14. CKD stage 4 due to type 2 diabetes mellitus  Stable- followed by PCP, nephrology    15. Diabetic mononeuropathy associated with type 2 diabetes mellitus  Stable- followed by PCP    16. 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, with long-term current use of insulin  Stable- followed by PCP.opth    17. Chronic diastolic congestive heart failure  Stable- followed by cardiology    18.  Anemia of chronic renal failure, stage 4 (severe)  Stable- followed by PCP    19. Type 2 diabetes mellitus with stage 4 chronic kidney disease, with long-term current use of insulin  Stable- followed by PCP, nephrology    20. Encounter for preventive health examination  Here for Health Risk Assessment/Annual Wellness Visit.  Health maintenance reviewed and updated. Follow up in one year.     21. Obesity, diabetes, and hypertension syndrome  Stable- followed by PCP    22. Uncontrolled type 2 diabetes mellitus with hyperglycemia  Stable- followed by PCP    23. Toe osteomyelitis, left  Stable- followed by PCP, podiatry    24. Thrombocytopenia, unspecified  Stable- followed by PCP    25. PAD (peripheral artery disease)  Stable- followed by PCP    26. Chronic congestive heart failure with left ventricular diastolic dysfunction  Stable- followed by PCP, cardiology    27. Bronchiectasis without complication  Stable- followed by PCP    28. Aortic arch atherosclerosis  Stable- followed by cardiology    29. Overweight (BMI 25.0-29.9)  Chronic s. Followed by PCP.   Centers for Disease Control and Prevention (CDC)  weight recommendations for current BMI & ideal BMI range discussed with patient.  Recommended  Low fat diabetic no added salt  Diet    30. Bilateral leg edema  Stable- followed by PCP    31. Chronic respiratory failure with hypoxia, on home oxygen therapy=- PRN use  Stable- followed by PCP    32. Bilateral impacted cerumen  Stable- followed by PCP  - Ambulatory referral/consult to ENT; Future    33. Dependence on supplemental oxygen  Stable- followed by PCP      Provided Domingo with a 5-10 year written screening schedule and personal prevention plan. Recommendations were developed using the USPSTF age appropriate recommendations. Education, counseling, and referrals were provided as needed. After Visit Summary printed and given to patient which includes a list of additional screenings\tests needed. Has PCP appt after  HRA today. Home BS fasting - had episodes of low bloodd sugar-he adjusted insulin, takes glucose tablets. may  Benefit from dexcom- get PCP opinion.  In PM- range 120's. Declined DM reeducation- states knowledgeable on DM diet. Needs clarification on medication w MD- lasix, apresoline & Benicar- pt taking differently. Denies bleeding. Chronic LBP limits his ability to exercise & be active.    States numbness in finger tips makes it difficult to button shirt. He will get fluvax with PCP. Fluid restriction- has questions-for PCP.    Follow up in about 1 year (around 9/29/2021) for HRA.    VANIA Resendez offered to discuss end of life issues, including information on how to make advance directives that the patient could use to name someone who would make medical decisions on their behalf if they became too ill to make themselves.    ___Patient declined  _X_Patient is interested, I provided paper work and offered to discuss.

## 2020-09-29 NOTE — TELEPHONE ENCOUNTER
----- Message from Cee Dudley MA sent at 9/29/2020  3:16 PM CDT -----  Please call and schedule this patient to be seen. He had some issues with his BG and EMT was called to home at least 2x during the year. He is interested in continuous blood glucose monitoring. Please call and schedule. Thanks

## 2020-09-29 NOTE — PROGRESS NOTES
CC: followup of hypertension  HPI:  The patient is a 75 y.o. year old male who presents to the office for followup of hypertension.  The patient denies any chest pain, shortness of breath, headache, excessive fatigue, nausea or vomiting, but complains of blurred vision.  He reports difficulty walking for 5 to 6 months ago.  He complains of chronic low back pain.  He denies any numbness or tingling in his lower extremities, but does report numbness and tingling in his hands.  He denies any bowel incontinence, but does report some bladder leakage.  The patient complains of left ankle swelling.    PAST MEDICAL HISTORY:  Past Medical History:   Diagnosis Date    Arthritis     Cataract     Chronic diastolic congestive heart failure     Coronary artery disease     Cystoid macular edema of both eyes     Diabetes mellitus type II     Diabetic retinopathy     Hyperlipemia     Hypertension     Retinal hole     Stage 4 chronic kidney disease     Streptococcus pyogenes bacteremia 2018    Due to left toe osteomyelitis       SURGICAL HISTORY:  Past Surgical History:   Procedure Laterality Date    ABDOMINAL AORTOGRAPHY N/A 2019    Procedure: AORTOGRAM-ABDOMINAL;  Surgeon: Amish Hyatt MD;  Location: [facility] CATH LAB/EP;  Service: Cardiology;  Laterality: N/A;  CO2 angiography    ANGIOGRAPHY OF LOWER EXTREMITY Left 2019    Procedure: Angiogram Extremity Unilateral;  Surgeon: Amish Hyatt MD;  Location: [facility] CATH LAB/EP;  Service: Cardiology;  Laterality: Left;    CATARACT EXTRACTION W/  INTRAOCULAR LENS IMPLANT Left 12/15/14    Dr de la cruz    CATARACT EXTRACTION W/  INTRAOCULAR LENS IMPLANT Right 14    dorothy    CIRCUMCISION, NON-      focal laser right eye      INSERTION OF TUNNELED CENTRAL VENOUS CATHETER (CVC) WITH SUBCUTANEOUS PORT Right 2019    Procedure: [id number];  Surgeon: Denys Aleman Jr., MD;  Location: [facility] OR;  Service: General;  Laterality: Right;     KNEE SURGERY      left    Left medial collateral ligament repair      TONSILLECTOMY         MEDS:  Medcard reviewed and updated    ALLERGIES: Allergy Card reviewed and updated    SOCIAL HISTORY:   The patient is a nonsmoker.    PE:   APPEARANCE: Well nourished, well developed, in no acute distress.    EARS: TM's intact. No retraction or perforation.    CHEST: Lungs clear to auscultation with unlabored respirations.  CARDIOVASCULAR: Normal S1, S2. No murmurs. No carotid bruits. Positive pedal edema.  ABDOMEN: Bowel sounds normal. Not distended. Soft. No tenderness or masses.   MUSCULOSKELETAL: Abnormal gait.  PSYCHIATRIC: The patient is oriented to person, place, and time and has a pleasant affect.        ASSESSMENT/PLAN:  Domingo was seen today for follow-up.    Diagnoses and all orders for this visit:    Hypertension associated with diabetes  -     blood pressure is controlled    Dorsalgia, unspecified  -     X-Ray Lumbar Spine Ap And Lateral; Future  -     Ambulatory referral/consult to [facility]; Future    CKD stage 4 due to type 2 diabetes mellitus  -       follow-up by Nephrology    Addendum:  Pulse ox readings range from 86-88%; 86% with ambulation and 88% at rest.

## 2020-09-29 NOTE — PATIENT INSTRUCTIONS
Counseling and Referral of Other Preventative  (Italic type indicates deductible and co-insurance are waived)    Patient Name: Domingo Castro  Today's Date: 9/29/2020    Health Maintenance       Date Due Completion Date    Shingles Vaccine (1 of 2) Discuss need w DR Nugent- pandemic   ---    Influenza Vaccine (1) w PCP   10/17/2019    Hemoglobin A1c 12/25/2020 6/25/2020    Lipid Panel 07/08/2021 7/8/2020    Eye Exam 07/09/2021 7/9/2020    High Dose Statin 08/14/2021 8/14/2020    Foot Exam 08/14/2021 8/14/2020    Colorectal Cancer Screening 08/24/2022 8/24/2012    TETANUS VACCINE   08/26/2023 8/26/2013        Orders Placed This Encounter   Procedures    Ambulatory referral/consult to ENT     The following information is provided to all patients.  This information is to help you find resources for any of the problems found today that may be affecting your health:                Living healthy guide: www.[email or web address]      Understanding Diabetes: www.diabetes.org      Eating healthy: www.cdc.gov/healthyweight      CDC home safety checklist: www.cdc.gov/steadi/patient.html      Agency on Aging: www.goea.[place].[facility]      Alcoholics anonymous (AA): www.aa.org      Physical Activity: www.sean.nih.gov/kn9qewr      Tobacco use: www.quitwithusla.org     Diet: Diabetes  Food is an important tool that you can use to control diabetes and stay healthy. Eating well-balanced meals in the correct amounts will help you control your blood glucose levels and prevent low blood sugar reactions. It will also help you reduce the health risks of diabetes. There is no one specific diet that is right for everyone with diabetes. But there are general guidelines to follow. A registered dietitian (RD) will create a tailored diet approach thats just right for you. He or she will also help you plan healthy meals and snacks. If you have any questions, call your dietitian for advice.     Guidelines for success  Talk with  your healthcare provider before starting a diabetes diet or weight loss program. If you haven't talked with a dietitian yet, ask your provider for a referral. The following guidelines can help you succeed:  · Select foods from the 6 food groups below. Your dietitian will help you find food choices within each group. He or she will also show you serving sizes and how many servings you can have at each meal.  ¨ Grains, beans, and starchy vegetables  ¨ Vegetables  ¨ Fruit  ¨ Milk or yogurt  ¨ Meat, poultry, fish, or tofu  ¨ Healthy fats  · Check your blood sugar levels as directed by your provider. Take any medicine as prescribed by your provider.  · Learn to read food labels and pick the right portion sizes.  · Eat only the amount of food in your meal plan. Eat about the same amount of food at regular times each day. Dont skip meals. Eat meals 4 to 5 hours apart, with snacks in between.  · Limit alcohol. It raises blood sugar levels. Drink water or calorie-free diet drinks that use safe sweeteners.  · Eat less fat to help lower your risk of heart disease. Use nonfat or low-fat dairy products and lean meats. Avoid fried foods. Use cooking oils that are unsaturated, such as olive, canola, or peanut oil.  · Talk with your dietitian about safe sugar substitutes.  · Avoid added salt. It can contribute to high blood pressure, which can cause heart disease. People with diabetes already have a risk of high blood pressure and heart disease.  · Stay at a healthy weight. If you need to lose weight, cut down on your portion sizes. But dont skip meals. Exercise is an important part of any weight management program. Talk with your provider about an exercise program thats right for you.  · For more information about the best diet plan for you, talk with a registered dietitian (RD). To find an RD in your area, contact:  ¨ Academy of Nutrition and Dietetics www.eatright.org  ¨ The American Diabetes Association 662-656-6375  www.diabetes.org  Date Last Reviewed: 8/1/2016  © 6760-4392 [employer]. [address] [place] [address], [place] [address]. All rights reserved. This information is not intended as a substitute for professional medical care. Always follow your healthcare professional's instructions.        Understanding Carbohydrates, Fats, and Protein  Food is a source of fuel and nourishment for your body. Its also a source of pleasure. Having diabetes doesnt mean you have to eat special foods or give up desserts. Instead, your dietitian can show you how to plan meals to suit your body. To start, learn how different foods affect blood sugar.  Carbohydrates  Carbohydrates are the main source of fuel for the body. Carbohydrates raise blood sugar. Many people think carbohydrates are only found in pasta or bread. But carbohydrates are actually in many kinds of foods:  · Sugars occur naturally in foods such as fruit, milk, honey, and molasses. Sugars can also be added to many foods, from cereals and yogurt to candy and desserts. Sugars raise blood sugar.  · Starches are found in bread, cereals, pasta, and dried beans. Theyre also found in corn, peas, potatoes, yam, acorn squash, and butternut squash. Starches also raise blood sugar.   · Fiber is found in foods such as vegetables, fruits, beans, and whole grains. Unlike other carbs, fiber isnt digested or absorbed. So it doesnt raise blood sugar. In fact, fiber can help keep blood sugar from rising too fast. It also helps keep blood cholesterol at a healthy level.  Did you know?  Even though carbohydrates raise blood sugar, its best to have some in every meal. They are an important part of a healthy diet.   Fat  Fat is an energy source that can be stored until needed. Fat does not raise blood sugar. However, it can raise blood cholesterol, increasing the risk of heart disease. Fat is also high in calories, which can cause weight gain. Not all types of fat are the same.  More  Healthy:  · Monounsaturated fats are mostly found in vegetable oils, such as olive, canola, and peanut oils. They are also found in avocados and some nuts. Monounsaturated fats are healthy for your heart. Thats because they lower LDL (unhealthy) cholesterol.  · Polyunsaturated fats are mostly found in vegetable oils, such as corn, safflower, and soybean oils. They are also found in some seeds, nuts, and fish. Polyunsaturated fats lower LDL (unhealthy) cholesterol. So, choosing them instead of saturated fats is healthy for your heart. Certain unsaturated fats can help lower triglycerides.   Less Healthy:  · Saturated fats are found in animal products, such as meat, poultry, whole milk, lard, and butter. Saturated fats raise LDL cholesterol and are not healthy for your heart.  · Hydrogenated oils and trans fats are formed when vegetable oils are processed into solid fats. They are found in many processed foods. Hydrogenated oils and trans fats raise LDL cholesterol and lower HDL (healthy) cholesterol. They are not healthy for your heart.  Protein  Protein helps the body build and repair muscle and other tissue. Protein has little or no effect on blood sugar. However, many foods that contain protein also contain saturated fat. By choosing low-fat protein sources, you can get the benefits of protein without the extra fat:  · Plant protein is found in dry beans and peas, nuts, and soy products, such as tofu and soymilk. These sources tend to be cholesterol-free and low in saturated fat.  · Animal protein is found in fish, poultry, meat, cheese, milk, and eggs. These contain cholesterol and can be high in saturated fat. Aim for lean, lower-fat choices.  Date Last Reviewed: 3/1/2016  © 9641-5774 [employer]. [address] [place] [address]. All rights reserved. This information is not intended as a substitute for professional medical care. Always follow your healthcare professional's  instructions.        Hypoglycemia (Low Blood Sugar)     Fast-acting sugar includes a cup of nonfat milk.     Too little sugar (glucose) in your blood is called hypoglycemia or low blood sugar. Low blood sugar usually means anything lower than 70 mg/dL. Talk with your healthcare provider about your target range and what level is too low for you. Diabetes itself doesnt cause low blood sugar. But some of the treatments for diabetes, such as pills or insulin, may raise your risk for it. Low blood sugar may cause you to pass out or have a seizure. So always treat low blood sugar right away, but don't overeat.  Special note: Always carry a source of fast-acting sugar and a snack in case of hypoglycemia.   What you may notice  If you have low blood sugar, you may have one or more of these symptoms:  · Shakiness or dizziness  · Cold, clammy skin or sweating  · Feelings of hunger  · Headache  · Nervousness  · A hard, fast heartbeat  · Weakness  · Confusion or irritability  · Blurred vision  · Having nightmares or waking up confused or sweating  · Numbness or tingling in the lips or tongue  What you should do  Here are tips to follow if you have hypoglycemia:   · First check your blood sugar. If it is too low (out of your target range), eat or drink 15 to 20 grams of fast-acting sugar. This may be 3 to 4 glucose tablets, 4 ounces (half a cup) of fruit juice or regular (nondiet) soda, 8 ounces (1 cup) of fat-free milk, or 1 tablespoon of honey. Dont take more than this, or your blood sugar may go too high.  · Wait 15 minutes. Then recheck your blood sugar if you can.  · If your blood sugar is still too low, repeat the steps above and check your blood sugar again. If your blood sugar still has not returned to your target range, contact your healthcare provider or seek emergency care.  · Once your blood sugar returns to target range, eat a snack or meal.  Preventing low blood sugar  Things you can do include the  following:   · If your condition needs a strict treatment plan, eat your meals and snacks at the same times each day. Dont skip meals!  · If your treatment plan lets you change when you eat and what you eat, learn how to change the time and dose of your rapid-acting insulin to match this.   · Ask your healthcare provider if it is safe for you to drink alcohol. Never drink on an empty stomach.  · Take your medicine at the prescribed times.  · Always carry a source of fast-acting sugar and a snack when youre away from home.  Other things to do  Additional tips include the following:  · Carry a medical ID card, a compact USB drive, or wear a medical alert bracelet or necklace. It should say that you have diabetes. It should also say what to do if you pass out or have a seizure.  · Make sure your family, friends, and coworkers know the signs of low blood sugar. Tell them what to do if your blood sugar falls very low and you cant treat yourself.  · Keep a glucagon emergency kit handy. Be sure your family, friends, and coworkers know how and when to use it. Check it regularly and replace the glucagon before it expires.  · Talk with your health care team about other things you can do to prevent low blood sugar.     If you have unexplained hypoglycemia or hypoglycemia several times, call your healthcare provider.   Date Last Reviewed: 5/1/2016  © 6743-9486 [employer]. [address] [place] [address], [place] [address]. All rights reserved. This information is not intended as a substitute for professional medical care. Always follow your healthcare professional's instructions.

## 2020-09-30 ENCOUNTER — TELEPHONE (OUTPATIENT)
Dept: INTERNAL MEDICINE | Facility: CLINIC | Age: 75
End: 2020-09-30

## 2020-09-30 NOTE — TELEPHONE ENCOUNTER
----- Message from Griselda Nugent MD sent at 9/30/2020  4:26 AM CDT -----  Please inform patient that x-ray reveals arthritic changes.

## 2020-09-30 NOTE — TELEPHONE ENCOUNTER
----- Message from Ela Garcia sent at 9/30/2020 11:19 AM CDT -----  Regarding: self   Patient is returning a phone call.  Who left a message for the patient: Mira Martel MA  Does patient know what this is regarding:  Need to go over results he does not understand   Comments:

## 2020-09-30 NOTE — TELEPHONE ENCOUNTER
Called patient to go over xray report. Patient didn't answer left message on vm to give the office a call.

## 2020-10-05 ENCOUNTER — PATIENT MESSAGE (OUTPATIENT)
Dept: ADMINISTRATIVE | Facility: HOSPITAL | Age: 75
End: 2020-10-05

## 2020-10-07 ENCOUNTER — TELEPHONE (OUTPATIENT)
Dept: INTERNAL MEDICINE | Facility: CLINIC | Age: 75
End: 2020-10-07

## 2020-10-07 NOTE — TELEPHONE ENCOUNTER
----- Message from Daysi Sue sent at 10/7/2020 11:31 AM CDT -----  Contact: [facility]/[phone]  Requesting a call about his oxygen.    Please call and advise.    Thank You

## 2020-10-08 DIAGNOSIS — E11.9 TYPE 2 DIABETES MELLITUS WITHOUT COMPLICATION: ICD-10-CM

## 2020-10-09 ENCOUNTER — OFFICE VISIT (OUTPATIENT)
Dept: INTERNAL MEDICINE | Facility: CLINIC | Age: 75
End: 2020-10-09
Payer: MEDICARE

## 2020-10-09 ENCOUNTER — TELEPHONE (OUTPATIENT)
Dept: INTERNAL MEDICINE | Facility: CLINIC | Age: 75
End: 2020-10-09

## 2020-10-09 VITALS
BODY MASS INDEX: 28.81 KG/M2 | WEIGHT: 231.69 LBS | TEMPERATURE: 98 F | RESPIRATION RATE: 16 BRPM | HEIGHT: 75 IN | DIASTOLIC BLOOD PRESSURE: 68 MMHG | SYSTOLIC BLOOD PRESSURE: 140 MMHG | HEART RATE: 69 BPM

## 2020-10-09 DIAGNOSIS — N18.4 CKD STAGE 4 DUE TO TYPE 2 DIABETES MELLITUS: ICD-10-CM

## 2020-10-09 DIAGNOSIS — H35.033 HYPERTENSIVE RETINOPATHY OF BOTH EYES: ICD-10-CM

## 2020-10-09 DIAGNOSIS — I70.0 AORTIC ARCH ATHEROSCLEROSIS: ICD-10-CM

## 2020-10-09 DIAGNOSIS — E11.69 HYPERLIPIDEMIA ASSOCIATED WITH TYPE 2 DIABETES MELLITUS: ICD-10-CM

## 2020-10-09 DIAGNOSIS — E66.3 OVERWEIGHT (BMI 25.0-29.9): ICD-10-CM

## 2020-10-09 DIAGNOSIS — N52.9 ERECTILE DYSFUNCTION, UNSPECIFIED ERECTILE DYSFUNCTION TYPE: ICD-10-CM

## 2020-10-09 DIAGNOSIS — E08.3413 SEVERE NONPROLIFERATIVE DIABETIC RETINOPATHY OF BOTH EYES WITH MACULAR EDEMA ASSOCIATED WITH DIABETES MELLITUS DUE TO UNDERLYING CONDITION: ICD-10-CM

## 2020-10-09 DIAGNOSIS — E11.59 HYPERTENSION ASSOCIATED WITH DIABETES: ICD-10-CM

## 2020-10-09 DIAGNOSIS — I50.32 CHRONIC CONGESTIVE HEART FAILURE WITH LEFT VENTRICULAR DIASTOLIC DYSFUNCTION: ICD-10-CM

## 2020-10-09 DIAGNOSIS — E11.22 CKD STAGE 4 DUE TO TYPE 2 DIABETES MELLITUS: ICD-10-CM

## 2020-10-09 DIAGNOSIS — E78.5 HYPERLIPIDEMIA ASSOCIATED WITH TYPE 2 DIABETES MELLITUS: ICD-10-CM

## 2020-10-09 DIAGNOSIS — I15.2 HYPERTENSION ASSOCIATED WITH DIABETES: ICD-10-CM

## 2020-10-09 PROBLEM — E11.40 DIABETIC NEUROPATHY ASSOCIATED WITH TYPE 2 DIABETES MELLITUS: Chronic | Status: RESOLVED | Noted: 2018-12-23 | Resolved: 2020-10-09

## 2020-10-09 PROCEDURE — 99215 PR OFFICE/OUTPT VISIT, EST, LEVL V, 40-54 MIN: ICD-10-PCS | Mod: S$PBB,,, | Performed by: NURSE PRACTITIONER

## 2020-10-09 PROCEDURE — 99999 PR PBB SHADOW E&M-EST. PATIENT-LVL V: CPT | Mod: PBBFAC,,, | Performed by: NURSE PRACTITIONER

## 2020-10-09 PROCEDURE — 99215 OFFICE O/P EST HI 40 MIN: CPT | Mod: S$PBB,,, | Performed by: NURSE PRACTITIONER

## 2020-10-09 PROCEDURE — 99215 OFFICE O/P EST HI 40 MIN: CPT | Mod: PBBFAC,PO | Performed by: NURSE PRACTITIONER

## 2020-10-09 PROCEDURE — 99999 PR PBB SHADOW E&M-EST. PATIENT-LVL V: ICD-10-PCS | Mod: PBBFAC,,, | Performed by: NURSE PRACTITIONER

## 2020-10-09 NOTE — TELEPHONE ENCOUNTER
----- Message from Cee Dudley MA sent at 10/9/2020 10:41 AM CDT -----  Regarding: FW: Requesting a callback  Contact: Domingo Moya morning,  The patient forgot about the appointment and missed it. If you don't mind calling him back and scheduling the visit . Thanks  Cee  ----- Message -----  From: Liliana Jang  Sent: 10/9/2020  10:04 AM CDT  To: [facility] Staff  Subject: Requesting a callback                            Type:  Needs Medical Advice    Who Called: Pt  Would the patient rather a call back or a response via [facility]? Callback   Best Call Back Number:  [phone]  Additional Information: Says he missed his DM appt on 10/7 and asking what should he do need to know if he need to r/s with someone else

## 2020-10-09 NOTE — PROGRESS NOTES
HPI: Domingo Castro is a 75 y.o.  male c/I for visit to address Diabetes Type 2  This is the first time I am seeing this patient.   Sees Dr. Nugent for primary care needs.     was diagnosed with T2DM 28 years ago - right before he retired.   Has been on metformin in past - side effects, so no longer taking.   Changed to insulin once kidney function worsened.     Taking insulin 4 injections per day.   Is on MDI - levemir 26 units every HS,   novolog 12 units tid ac meals. titrates sugars based on self testing results.   If bg is less than 100, he will not take the insulin - he will eat only.     History of frequent hypoglycemic episodes - recent occasion where he had to call EMS and went into coma (3 months ago approx).   Has CKD, so prohibited from taking many other medications.   Also has history of CAD and on plavix and aspirin daily.   Also history of heart failure - on lasix, and fluid limits daily intake.   No acute complaints today's visit.     Past medical History:   Past Medical History:   Diagnosis Date    Arthritis     Cataract     Chronic diastolic congestive heart failure     Coronary artery disease     Cystoid macular edema of both eyes     Diabetes mellitus type II     Diabetic retinopathy     Hyperlipemia     Hypertension     Retinal hole     Stage 4 chronic kidney disease     Streptococcus pyogenes bacteremia 12/23/2018    Due to left toe osteomyelitis      Family hx:   Family History   Problem Relation Age of Onset    Heart disease Mother     Cancer Mother     Cancer Brother     Heart disease Father     Diabetes Father     Cancer Sister     Cataracts Paternal Grandmother     Glaucoma Paternal Grandmother     Blindness Neg Hx     Amblyopia Neg Hx     Hypertension Neg Hx     Macular degeneration Neg Hx     Retinal detachment Neg Hx     Strabismus Neg Hx       Current meds:   Current Outpatient Medications:     acetaminophen (TYLENOL) 325 MG tablet, Take 2 tablets (650 mg  "total) by mouth every 4 (four) hours as needed., Disp: , Rfl: 0    albuterol-ipratropium (DUO-NEB) 2.5 mg-0.5 mg/3 mL nebulizer solution, Take 3 mLs by nebulization every 4 (four) hours as needed for Wheezing or Shortness of Breath. Rescue, Disp: 90 mL, Rfl: 0    amLODIPine (NORVASC) 10 MG tablet, TAKE 1 TABLET BY MOUTH ONCE DAILY, Disp: 30 tablet, Rfl: 11    aspirin (ECOTRIN) 81 MG EC tablet, Take 81 mg by mouth once daily., Disp: , Rfl:     calcitRIOL (ROCALTROL) 0.25 MCG Cap, Take 1 capsule (0.25 mcg total) by mouth every Monday and Friday., Disp: 60 capsule, Rfl: 6    clopidogreL (PLAVIX) 75 mg tablet, Take 1 tablet by mouth once daily, Disp: 30 tablet, Rfl: 0    furosemide (LASIX) 40 MG tablet, Take 2 tablets (80 mg total) by mouth once daily. (Patient taking differently: Take 40 mg by mouth 2 (two) times a day. ), Disp: 360 tablet, Rfl: 11    hydrALAZINE (APRESOLINE) 50 MG tablet, TAKE 1 TABLET BY MOUTH EVERY 12 HOURS, Disp: 60 tablet, Rfl: 0    insulin aspart U-100 (NOVOLOG FLEXPEN U-100 INSULIN) 100 unit/mL (3 mL) InPn pen, Inject 12 Units into the skin 3 (three) times daily with meals. With correction scale maximum 50 units daily., Disp: 15 mL, Rfl: 3    LEVEMIR FLEXTOUCH U-100 INSULN 100 unit/mL (3 mL) InPn pen, Inject 26 Units into the skin every evening., Disp: 36 mL, Rfl: 3    nebulizer and compressor Alana, USE AS DIRECTED, Disp: 1 each, Rfl: 0    olmesartan (BENICAR) 20 MG tablet, Take 2 tablets (40 mg total) by mouth once daily., Disp: 90 tablet, Rfl: 10    pen needle, diabetic, safety (BD AUTOSHIELD PEN NEEDLE) 29 gauge x 5/16" Ndle, For use once daily with levemir flexpen, Disp: 90 each, Rfl: 11    rosuvastatin (CRESTOR) 40 MG Tab, Take 1 tablet (40 mg total) by mouth every evening., Disp: 90 tablet, Rfl: 3    Current Facility-Administered Medications:     fluorescein 500 mg/5 mL (10 %) injection 500 mg, 5 mL, Intravenous, Once, D. José Harrison MD     Current Diabetes medications: " "  levemir   novolog tid    Medications Tried and Failed:   lantus  humalog  metformin    Review of Pertinent co-morbidities/risk factors:   CV: Denies history of MI nor stroke.   CAD: yes - on plavix daily.   Takes aspirin 81mg tablet daily  BP: has history of HTN  Statin: Taking  ACE/ARB: Taking    Social History     Tobacco Use   Smoking Status Never Smoker   Smokeless Tobacco Never Used        Social:   Lives at home with: his wife. 2 grandchildren live at home with them - ages 2 and age 5.   Diet: following ADA diet   Meals: 2 per day and snacks.        Breakfast - Sometimes juice or a sandwich/sausage biscuit from [employer].        Lunch - rice, chicken.        Dinner - fish, fried fish.        Snacks - fruits often - apples, bananas, oranges, peaches, strawberries        Drinks - orange juice on occasion, water. (has to limit because of kidney and heart failure).   Exercise: none. Prohibited from back pain.   Activities: was an  at [employer] - retired 26 years ago.   Is a  full time.     Glucose Monitoring:   Checking 4 times per day -   Fasting in am - ranges 50 - 100's.  Variable bg's      Standards of care:   Eyes: .: 07/09/2020  Foot exam: : 08/14/2020   Diabetes education: None.    Vital Signs  BP (!) 140/68 (BP Location: Left arm, Patient Position: Sitting, BP Method: Medium (Manual))   Pulse 69   Temp 97.6 °F (36.4 °C) (Temporal)   Resp 16   Ht 6' 3" (1.905 m)   Wt 105.1 kg (231 lb 11.3 oz)   BMI 28.96 kg/m²     Pertinent Labs:   Hgba1c   Lab Results   Component Value Date    HGBA1C 7.1 (H) 06/25/2020     Lipid panel   Lab Results   Component Value Date    CHOL 162 07/08/2020    CHOL 193 02/20/2020    CHOL 126 07/20/2018     Lab Results   Component Value Date    HDL 44 07/08/2020    HDL 56 02/20/2020    HDL 31 (L) 07/20/2018     Lab Results   Component Value Date    LDLCALC 99.0 07/08/2020    LDLCALC 118.4 02/20/2020    LDLCALC 76.8 07/20/2018     Lab Results   Component Value " Date    TRIG 95 07/08/2020    TRIG 93 02/20/2020    TRIG 91 07/20/2018     Lab Results   Component Value Date    CHOLHDL 27.2 07/08/2020    CHOLHDL 29.0 02/20/2020    CHOLHDL 24.6 07/20/2018      CMP  Glucose   Date Value Ref Range Status   08/17/2020 88 70 - 110 mg/dL Final     BUN, Bld   Date Value Ref Range Status   08/17/2020 71 (H) 2 - 20 mg/dL Final     Creatinine   Date Value Ref Range Status   08/17/2020 3.94 (H) 0.50 - 1.40 mg/dL Final     eGFR if    Date Value Ref Range Status   08/17/2020 16.1 (A) >60 mL/min/1.73 m^2 Final     eGFR if non    Date Value Ref Range Status   08/17/2020 14.0 (A) >60 mL/min/1.73 m^2 Final     Comment:     Calculation used to obtain the estimated glomerular filtration  rate (eGFR) is the CKD-EPI equation.         Microalbumin creatinine ratio:   Lab Results   Component Value Date    MICALBCREAT 159.2 (H) 03/08/2016       Review Of Systems:   Gen: Appetite good, no weight gain or loss, denies fatigue and weakness. Denies polydipsia.  Skin: Skin is intact and heals well, denies any rashes or hair changes.   Eyes: Denies any acute visual disturbances, nor blurred vision.   Resp: Denies SOB or Dyspnea on exertion, denies cough.   Cardiac: Denies chest pain, palpitations, or swelling.   GI: Denies abdominal pain, nausea or vomiting, diarrhea, or constipation.   /GYN: Denies nocturia, nor burning, frequency or pain on urination.  MS/Neuro: Denies numbness/ tingling in BLE; Gait steady, speech clear  Psych: Denies drug/ETOH abuse, no hx of depression.  Other systems: negative.    Physical Exam:   GENERAL: Well developed, well nourished in appearance.   PSYCH: AAOx3, appropriate mood and affect, pleasant expression, conversant, appears relaxed, well groomed.   EYES: PERRL, Conjunctiva and corneas clear  NECK: Soft and Supple, trachea midline, No thyroid enlargement noted  CHEST: Even, regular, and unlabored respirations  ABDOMEN: Soft, non-tender,  non-distended   VASCULAR: pedal pulses palpable bilaterally, no edema.  NEURO:  cranial nerves II - XII intact   MUSCULOSKELETAL: Good ROM, equal strength against resistance to bilateral lower extremities, equal hand grasp, steady gait.   SKIN: Skin warm, dry, and intact - no acanthosis nigracans.    Assessment and Plan of Care:     Domingo was seen today for diabetes.    Diagnoses and all orders for this visit:    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, with long-term current use of insulin    Hypertensive retinopathy of both eyes    Severe nonproliferative diabetic retinopathy of both eyes with macular edema associated with diabetes mellitus due to underlying condition    Chronic congestive heart failure with left ventricular diastolic dysfunction    Aortic arch atherosclerosis    Hyperlipidemia associated with type 2 diabetes mellitus    Hypertension associated with diabetes    Erectile dysfunction, unspecified erectile dysfunction type    CKD stage 4 due to type 2 diabetes mellitus    Overweight (BMI 25.0-29.9)     1. T2DM with hyperglycemia- Hgba1c goal is 7.5% or less without hypoglycemia - is at 7.1% with hypoglycemic episodes in past.   For now, continue same regimen of MDI -   levemir 26 units every HS   novolog 12 units tid ac meals.   Start new dexcom G6 cgm - personal and will send rx for sensors to [facility].   Downloading hellen on his android phone and will set up alarms for him.   Patient is taking insulin injections 4 times per day.   He is checking his sugar currently 4 times per day.   This CGM is medically necessary for him as he has history of hypoglycemic episodes and hypoglycemic unawareness.   This will help to prevent lows in future and also help with management of his diabetes during the covid pandemic.   discussed DM, progression of disease, long term complications, CV risk factors and tx options.   Advise compliance with ADA  diet and encourage exercise  Reviewed  hypoglycemia, s/s and appropriate tx. Have/get quick acting glucose tablets at hand.   eyes- has history of Diabetic retinopathy.     2. HTN - controlled for most part - continue meds as previously prescribed and monitor.   Urine mac last done in 2016 - need to get new one next labs due.   Benicar, amlodipine, lasix, olmesartan, hydralazine.      3. HLD- LDL goal < 100. Minimally at goal.   Currently on statin therapy    4. Weight - BMI Body mass index is 28.96 kg/m².   Encourage Ada diet and exercise.      5. CKD stage 4 -  Can only do insulin at present.     6. CHF - lasix. Stable. No acute exacerbations.   No actos - CI    7. CAD - on plavix 75 daily.     8. ED - hypogonadism history. No current complaints.       Follow up in 10 days to review dexcom download -           1

## 2020-10-09 NOTE — PATIENT INSTRUCTIONS
Continue levemir every HS - 26 units.   Continue novolog 12 units with meals     Keep checking your blood sugars 4 times per day.     Start new Dexcom G6 personal cgm with alerts and alarms.     Make sure to keep glucose tablets with you at all times in case your blood sugar is low. Also can drink/eat 15 grams of quick acting carbohydrates - such as coke or orange juice.

## 2020-10-21 ENCOUNTER — OFFICE VISIT (OUTPATIENT)
Dept: INTERNAL MEDICINE | Facility: CLINIC | Age: 75
End: 2020-10-21
Payer: MEDICARE

## 2020-10-21 ENCOUNTER — CLINICAL SUPPORT (OUTPATIENT)
Dept: INTERNAL MEDICINE | Facility: CLINIC | Age: 75
End: 2020-10-21
Payer: MEDICARE

## 2020-10-21 VITALS
SYSTOLIC BLOOD PRESSURE: 150 MMHG | RESPIRATION RATE: 16 BRPM | HEIGHT: 75 IN | DIASTOLIC BLOOD PRESSURE: 60 MMHG | HEART RATE: 72 BPM | TEMPERATURE: 98 F | BODY MASS INDEX: 28.81 KG/M2 | WEIGHT: 231.69 LBS

## 2020-10-21 DIAGNOSIS — E11.69 OBESITY, DIABETES, AND HYPERTENSION SYNDROME: ICD-10-CM

## 2020-10-21 DIAGNOSIS — E78.5 HYPERLIPIDEMIA ASSOCIATED WITH TYPE 2 DIABETES MELLITUS: ICD-10-CM

## 2020-10-21 DIAGNOSIS — E11.22 CKD STAGE 4 DUE TO TYPE 2 DIABETES MELLITUS: ICD-10-CM

## 2020-10-21 DIAGNOSIS — E66.9 OBESITY, DIABETES, AND HYPERTENSION SYNDROME: ICD-10-CM

## 2020-10-21 DIAGNOSIS — E11.59 HYPERTENSION ASSOCIATED WITH DIABETES: ICD-10-CM

## 2020-10-21 DIAGNOSIS — N18.4 TYPE 2 DIABETES MELLITUS WITH STAGE 4 CHRONIC KIDNEY DISEASE, WITH LONG-TERM CURRENT USE OF INSULIN: Primary | Chronic | ICD-10-CM

## 2020-10-21 DIAGNOSIS — E11.69 HYPERLIPIDEMIA ASSOCIATED WITH TYPE 2 DIABETES MELLITUS: ICD-10-CM

## 2020-10-21 DIAGNOSIS — I50.32 CHRONIC DIASTOLIC CONGESTIVE HEART FAILURE: Chronic | ICD-10-CM

## 2020-10-21 DIAGNOSIS — I15.2 OBESITY, DIABETES, AND HYPERTENSION SYNDROME: ICD-10-CM

## 2020-10-21 DIAGNOSIS — N18.4 CKD STAGE 4 DUE TO TYPE 2 DIABETES MELLITUS: ICD-10-CM

## 2020-10-21 DIAGNOSIS — H35.033 HYPERTENSIVE RETINOPATHY OF BOTH EYES: ICD-10-CM

## 2020-10-21 DIAGNOSIS — Z79.4 TYPE 2 DIABETES MELLITUS WITH STAGE 4 CHRONIC KIDNEY DISEASE, WITH LONG-TERM CURRENT USE OF INSULIN: Primary | Chronic | ICD-10-CM

## 2020-10-21 DIAGNOSIS — E11.59 OBESITY, DIABETES, AND HYPERTENSION SYNDROME: ICD-10-CM

## 2020-10-21 DIAGNOSIS — E11.22 TYPE 2 DIABETES MELLITUS WITH STAGE 4 CHRONIC KIDNEY DISEASE, WITH LONG-TERM CURRENT USE OF INSULIN: Primary | Chronic | ICD-10-CM

## 2020-10-21 DIAGNOSIS — E08.3413 SEVERE NONPROLIFERATIVE DIABETIC RETINOPATHY OF BOTH EYES WITH MACULAR EDEMA ASSOCIATED WITH DIABETES MELLITUS DUE TO UNDERLYING CONDITION: ICD-10-CM

## 2020-10-21 DIAGNOSIS — I15.2 HYPERTENSION ASSOCIATED WITH DIABETES: ICD-10-CM

## 2020-10-21 PROCEDURE — 99999 PR PBB SHADOW E&M-EST. PATIENT-LVL V: ICD-10-PCS | Mod: PBBFAC,,, | Performed by: NURSE PRACTITIONER

## 2020-10-21 PROCEDURE — 95249 CONT GLUC MNTR PT PROV EQP: CPT | Mod: PBBFAC,PO | Performed by: NURSE PRACTITIONER

## 2020-10-21 PROCEDURE — 99215 OFFICE O/P EST HI 40 MIN: CPT | Mod: PBBFAC,PO | Performed by: NURSE PRACTITIONER

## 2020-10-21 PROCEDURE — 99215 PR OFFICE/OUTPT VISIT, EST, LEVL V, 40-54 MIN: ICD-10-PCS | Mod: S$PBB,,, | Performed by: NURSE PRACTITIONER

## 2020-10-21 PROCEDURE — 95251 PR GLUCOSE MONITOR, 72 HOUR, PHYS INTERP: ICD-10-PCS | Mod: ,,, | Performed by: NURSE PRACTITIONER

## 2020-10-21 PROCEDURE — 99999 PR PBB SHADOW E&M-EST. PATIENT-LVL V: CPT | Mod: PBBFAC,,, | Performed by: NURSE PRACTITIONER

## 2020-10-21 PROCEDURE — 95251 CONT GLUC MNTR ANALYSIS I&R: CPT | Mod: ,,, | Performed by: NURSE PRACTITIONER

## 2020-10-21 PROCEDURE — 99215 OFFICE O/P EST HI 40 MIN: CPT | Mod: S$PBB,,, | Performed by: NURSE PRACTITIONER

## 2020-10-21 RX ORDER — INSULIN ASPART 100 [IU]/ML
40 INJECTION, SOLUTION INTRAVENOUS; SUBCUTANEOUS
Qty: 30 ML | Refills: 11 | Status: SHIPPED | OUTPATIENT
Start: 2020-10-21 | End: 2020-12-30 | Stop reason: SDUPTHER

## 2020-10-21 RX ORDER — SUB-Q INSULIN DEVICE, 40 UNIT
1 EACH MISCELLANEOUS DAILY
Qty: 30 EACH | Refills: 11 | Status: SHIPPED | OUTPATIENT
Start: 2020-10-21 | End: 2020-12-18

## 2020-10-21 NOTE — PATIENT INSTRUCTIONS
Do labs fasting in the morning.     Continue levemir - decrease from 26 to 22 units every night.   Continue novolog - decrease from 12 to 8 untis 10 with meals.   Will arrange to switch from multi dose injections *(4 per day) to VGO 20 with novolog insulin in it instead.   Will have you meet with diabetic educator - Saima Corrigan, and she will show you how to use your VGO.     Continue to use dexcom - will send prescription for you to DME company - they will call you and then ship you refills for your dexcom sensors and transmitter.     Follow ADA diet - limit refined sugars, pastas, rices, breads.     Get labs done to test for type 1 diabetes.

## 2020-10-21 NOTE — PROGRESS NOTES
Pt came in today to get the Dexcom device report downloaded. Report pulled from device and pt has an appointment with Bisi to discuss next steps in care.

## 2020-10-21 NOTE — PROGRESS NOTES
75-year-old gentleman, follow-up for type 2 diabetes.  His last seen October 9, 2020-those notes are below.  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    Chronic kidney disease/end-stage kidney disease with GFR at 16.  He is on insulin only for management of his diabetes.  He continues on 4 injections per day.  A1c has been very well controlled at 7.1%, but frequent hypoglycemic episode with recent EMS called with a very low blood sugar.    Last visit, started patient on Dexcom personal CGM T6-he is using with his iPhone.  He returns for interpretation today.  States he enjoys using the Dexcom, however it alarms a lot and admits he is not taking some of his insulin because he is scared to go too low.  Normally on Levemir 26 units every night, but if the few nights a week.  Also on NovoLog 12 units with meals plus some sliding scale, however will normally take only 5-7 units if blood sugar is low or not take it all if his sugar is low going into the meal.    Fourteen day interpretation reviewed -   Average glucose of 202.  34% time in range.  3% lows.  2% extreme lows.  32% highs.  29% extreme highs.      Last visit notes from October 9th, 2020:   HPI: Domingo JEAN CARLOS Raineyer is a 75 y.o.  male c/I for visit to address Diabetes Type 2  This is the first time I am seeing this patient.   Sees Dr. Nugent for primary care needs.     was diagnosed with T2DM 28 years ago - right before he retired.   Has been on metformin in past - side effects, so no longer taking.   Changed to insulin once kidney function worsened.     Taking insulin 4 injections per day.   Is on MDI - levemir 26 units every HS,   novolog 12 units tid ac meals. titrates sugars based on self testing results.   If bg is less than 100, he will not take the insulin - he will eat only.     History of frequent hypoglycemic episodes - recent occasion where he had to call EMS and went into coma (3 months ago approx).   Has CKD, so  prohibited from taking many other medications.   Also has history of CAD and on plavix and aspirin daily.   Also history of heart failure - on lasix, and fluid limits daily intake.   No acute complaints today's visit.     Past medical History:   Past Medical History:   Diagnosis Date    Arthritis     Cataract     Chronic diastolic congestive heart failure     Coronary artery disease     Cystoid macular edema of both eyes     Diabetes mellitus type II     Diabetic retinopathy     Hyperlipemia     Hypertension     Retinal hole     Stage 4 chronic kidney disease     Streptococcus pyogenes bacteremia 12/23/2018    Due to left toe osteomyelitis      Family hx:   Family History   Problem Relation Age of Onset    Heart disease Mother     Cancer Mother     Cancer Brother     Heart disease Father     Diabetes Father     Cancer Sister     Cataracts Paternal Grandmother     Glaucoma Paternal Grandmother     Blindness Neg Hx     Amblyopia Neg Hx     Hypertension Neg Hx     Macular degeneration Neg Hx     Retinal detachment Neg Hx     Strabismus Neg Hx       Current meds:   Current Outpatient Medications:     acetaminophen (TYLENOL) 325 MG tablet, Take 2 tablets (650 mg total) by mouth every 4 (four) hours as needed., Disp: , Rfl: 0    albuterol-ipratropium (DUO-NEB) 2.5 mg-0.5 mg/3 mL nebulizer solution, Take 3 mLs by nebulization every 4 (four) hours as needed for Wheezing or Shortness of Breath. Rescue, Disp: 90 mL, Rfl: 0    amLODIPine (NORVASC) 10 MG tablet, TAKE 1 TABLET BY MOUTH ONCE DAILY, Disp: 30 tablet, Rfl: 11    aspirin (ECOTRIN) 81 MG EC tablet, Take 81 mg by mouth once daily., Disp: , Rfl:     calcitRIOL (ROCALTROL) 0.25 MCG Cap, Take 1 capsule (0.25 mcg total) by mouth every Monday and Friday., Disp: 60 capsule, Rfl: 6    clopidogreL (PLAVIX) 75 mg tablet, Take 1 tablet by mouth once daily, Disp: 30 tablet, Rfl: 0    furosemide (LASIX) 40 MG tablet, Take 2 tablets (80 mg total) by  "mouth once daily. (Patient taking differently: Take 40 mg by mouth 2 (two) times a day. ), Disp: 360 tablet, Rfl: 11    hydrALAZINE (APRESOLINE) 50 MG tablet, TAKE 1 TABLET BY MOUTH EVERY 12 HOURS, Disp: 60 tablet, Rfl: 0    insulin aspart U-100 (NOVOLOG FLEXPEN U-100 INSULIN) 100 unit/mL (3 mL) InPn pen, Inject 12 Units into the skin 3 (three) times daily with meals. With correction scale maximum 50 units daily., Disp: 15 mL, Rfl: 3    LEVEMIR FLEXTOUCH U-100 INSULN 100 unit/mL (3 mL) InPn pen, Inject 26 Units into the skin every evening., Disp: 36 mL, Rfl: 3    nebulizer and compressor Alana, USE AS DIRECTED, Disp: 1 each, Rfl: 0    olmesartan (BENICAR) 20 MG tablet, Take 2 tablets (40 mg total) by mouth once daily., Disp: 90 tablet, Rfl: 10    pen needle, diabetic, safety (BD AUTOSHIELD PEN NEEDLE) 29 gauge x 5/16" Ndle, For use once daily with levemir flexpen, Disp: 90 each, Rfl: 11    rosuvastatin (CRESTOR) 40 MG Tab, Take 1 tablet (40 mg total) by mouth every evening., Disp: 90 tablet, Rfl: 3    insulin aspart U-100 (NOVOLOG U-100 INSULIN ASPART) 100 unit/mL injection, Inject 40 Units into the skin 3 (three) times daily before meals. USE WITHIN VGO ONLY., Disp: 30 mL, Rfl: 11    sub-q insulin device, 20 unit (V-GO 20) Alana, 1 each by Misc.(Non-Drug; Combo Route) route once daily., Disp: 30 each, Rfl: 11    Current Facility-Administered Medications:     fluorescein 500 mg/5 mL (10 %) injection 500 mg, 5 mL, Intravenous, Once, DZuly Harrison MD     Current Diabetes medications:   levemir   novolog tid    Medications Tried and Failed:   lantus  humalog  metformin    Review of Pertinent co-morbidities/risk factors:   CV: Denies history of MI nor stroke.   CAD: yes - on plavix daily.   Takes aspirin 81mg tablet daily  BP: has history of HTN  Statin: Taking  ACE/ARB: Taking    Social History     Tobacco Use   Smoking Status Never Smoker   Smokeless Tobacco Never Used        Social:   Lives at home " "with: his wife. 2 grandchildren live at home with them - ages 2 and age 5.   Diet: following ADA diet   Meals: 2 per day and snacks.        Breakfast - Sometimes juice or a sandwich/sausage biscuit from [employer].        Lunch - rice, chicken.        Dinner - fish, fried fish.        Snacks - fruits often - apples, bananas, oranges, peaches, strawberries        Drinks - orange juice on occasion, water. (has to limit because of kidney and heart failure).   Exercise: none. Prohibited from back pain.   Activities: was an  at [employer] - retired 26 years ago.   Is a  full time.     Glucose Monitoring:   Checking 4 times per day -   Fasting in am - ranges 50 - 100's.  Variable bg's    Now using dexcom G6 personal.     Standards of care:   Eyes: .: 07/09/2020  Foot exam: : 10/09/2020   Diabetes education: None.    Vital Signs  BP (!) 150/60 (BP Location: Left arm, Patient Position: Sitting, BP Method: Medium (Manual))   Pulse 72   Temp 97.7 °F (36.5 °C) (Temporal)   Resp 16   Ht 6' 3" (1.905 m)   Wt 105.1 kg (231 lb 11.3 oz)   BMI 28.96 kg/m²     Pertinent Labs:   Hgba1c   Lab Results   Component Value Date    HGBA1C 7.1 (H) 06/25/2020     Lipid panel   Lab Results   Component Value Date    CHOL 162 07/08/2020    CHOL 193 02/20/2020    CHOL 126 07/20/2018     Lab Results   Component Value Date    HDL 44 07/08/2020    HDL 56 02/20/2020    HDL 31 (L) 07/20/2018     Lab Results   Component Value Date    LDLCALC 99.0 07/08/2020    LDLCALC 118.4 02/20/2020    LDLCALC 76.8 07/20/2018     Lab Results   Component Value Date    TRIG 95 07/08/2020    TRIG 93 02/20/2020    TRIG 91 07/20/2018     Lab Results   Component Value Date    CHOLHDL 27.2 07/08/2020    CHOLHDL 29.0 02/20/2020    CHOLHDL 24.6 07/20/2018      CMP  Glucose   Date Value Ref Range Status   08/17/2020 88 70 - 110 mg/dL Final     BUN, Bld   Date Value Ref Range Status   08/17/2020 71 (H) 2 - 20 mg/dL Final     Creatinine   Date Value Ref " Range Status   08/17/2020 3.94 (H) 0.50 - 1.40 mg/dL Final     eGFR if    Date Value Ref Range Status   08/17/2020 16.1 (A) >60 mL/min/1.73 m^2 Final     eGFR if non    Date Value Ref Range Status   08/17/2020 14.0 (A) >60 mL/min/1.73 m^2 Final     Comment:     Calculation used to obtain the estimated glomerular filtration  rate (eGFR) is the CKD-EPI equation.         Microalbumin creatinine ratio:   Lab Results   Component Value Date    MICALBCREAT 159.2 (H) 03/08/2016       Review Of Systems:   Gen: Appetite good, no weight gain or loss, denies fatigue and weakness. Denies polydipsia. Reports history of low glucose readings.   Skin: Skin is intact and heals well, denies any rashes or hair changes.   Eyes: Denies any acute visual disturbances, nor blurred vision.   Resp: Denies SOB or Dyspnea on exertion, denies cough.   Cardiac: Denies chest pain, palpitations, or swelling.   GI: Denies abdominal pain, nausea or vomiting, diarrhea, or constipation.   /GYN: Denies nocturia, nor burning, frequency or pain on urination.  MS/Neuro: Denies numbness/ tingling in BLE; Gait steady, speech clear  Psych: Denies drug/ETOH abuse, no hx of depression.  Other systems: negative.    Physical Exam:   GENERAL: Well developed, well nourished in appearance.   PSYCH: AAOx3, appropriate mood and affect, pleasant expression, conversant, appears relaxed, well groomed.   EYES: PERRL, Conjunctiva and corneas clear  NECK: Soft and Supple, trachea midline, No thyroid enlargement noted  CHEST: Even, regular, and unlabored respirations  ABDOMEN: Soft, non-distended   VASCULAR: pedal pulses palpable bilaterally, no edema.  NEURO:  cranial nerves II - XII intact   MUSCULOSKELETAL: Good ROM, steady gait.   SKIN: Skin warm, dry, and intact     Assessment and Plan of Care:     Domingo was seen today for diabetes.    Diagnoses and all orders for this visit:    Type 2 diabetes mellitus with stage 4 chronic kidney  disease, with long-term current use of insulin  -     C-Peptide; Future  -     Comprehensive Metabolic Panel; Future  -     Glutamic Acid Decarboxylase; Future  -     Ambulatory referral/consult to Diabetes Education; Future    Chronic diastolic congestive heart failure    Hyperlipidemia associated with type 2 diabetes mellitus    CKD stage 4 due to type 2 diabetes mellitus    Hypertensive retinopathy of both eyes    Severe nonproliferative diabetic retinopathy of both eyes with macular edema associated with diabetes mellitus due to underlying condition    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, with long-term current use of insulin    Hypertension associated with diabetes    Obesity, diabetes, and hypertension syndrome    Other orders  -     sub-q insulin device, 20 unit (V-GO 20) Alana; 1 each by Misc.(Non-Drug; Combo Route) route once daily.  -     insulin aspart U-100 (NOVOLOG U-100 INSULIN ASPART) 100 unit/mL injection; Inject 40 Units into the skin 3 (three) times daily before meals. USE WITHIN VGO ONLY.     1. T2DM with hyperglycemia- Hgba1c goal is 7.5% or less without hypoglycemia - is at 7.1% with hypoglycemic episodes in past.   Get labs - cmp, cpeptide, BENNETT antibodies fasting in am to r/o type 1.   For now, continue same regimen of MDI but decrease doses as he is skipping insulin b/c scared of lows.   levemir 26 units every HS --> decrease to 22 units every night.   novolog 12 units tid ac meals --> decrease to 8 units tid ac meals.   If you don't eat - don't take novolog.   Will organize to switch to vgo 20 for smoother control and less hyperglycemic and hypoglycemic episodes.   Continue use of personal sampled dexcom G6 cgm - will send rx for sensors to medicare.   Downloading hellen on his android phone and will set up alarms for him.   Patient is taking insulin injections 4 times per day.   He is checking his sugar currently 4 times  per day.   This CGM is medically necessary for him as he has history of hypoglycemic episodes and hypoglycemic unawareness.   This will help to prevent lows in future and also help with management of his diabetes during the covid pandemic.   discussed DM, progression of disease, long term complications, CV risk factors and tx options.   Advise compliance with ADA diet and encourage exercise  Reviewed  hypoglycemia, s/s and appropriate tx. Have/get quick acting glucose tablets at hand.  Eyes- has history of Diabetic retinopathy. Dr. Paz 7/2020.    Feet - following with podiatry. History of left foot ulcer hallux with debridement. Dr. Gallegos.     2. HTN - controlled for most part - continue meds as previously prescribed and monitor.   Urine mac last done in 2016 - need to get new one next labs due.   Benicar, amlodipine, lasix, olmesartan, hydralazine.      3. HLD- LDL goal < 100. Minimally at goal.   Currently on statin therapy- crestor 40mg every Hs. Continue.     4. Weight - BMI Body mass index is 28.96 kg/m².   Encourage Ada diet and exercise.      5. CKD stage 4 -  Can only do insulin at present.   Following with nephrology - Dr. Sotelo - increased lasix to 80mg every day.  Last seen 8/2020.   Urine mac 150+     6. CHF - lasix. Stable. No acute exacerbations.   No actos - CI    7. CAD - on plavix 75 daily.     8. ED - hypogonadism history. No current complaints.       Get labs fasting - r/o type 1 - can call with results.   Switch from MDI to vgo 20 -   Order personal dexcom sensors so he can continue using dexcom - send info to medicare.   Organize for meeting with diabetic educator to train/switch from MDI.   Follow up visit in 4 weeks - labs done prior.     -Bisi Escobedo NP

## 2020-10-28 ENCOUNTER — LAB VISIT (OUTPATIENT)
Dept: LAB | Facility: HOSPITAL | Age: 75
End: 2020-10-28
Attending: INTERNAL MEDICINE
Payer: MEDICARE

## 2020-10-28 DIAGNOSIS — N18.4 CHRONIC KIDNEY DISEASE, STAGE IV (SEVERE): ICD-10-CM

## 2020-10-28 DIAGNOSIS — N18.4 ANEMIA OF CHRONIC RENAL FAILURE, STAGE 4 (SEVERE): ICD-10-CM

## 2020-10-28 DIAGNOSIS — E11.22 TYPE 2 DIABETES MELLITUS WITH STAGE 4 CHRONIC KIDNEY DISEASE, WITH LONG-TERM CURRENT USE OF INSULIN: ICD-10-CM

## 2020-10-28 DIAGNOSIS — Z79.4 TYPE 2 DIABETES MELLITUS WITH STAGE 4 CHRONIC KIDNEY DISEASE, WITH LONG-TERM CURRENT USE OF INSULIN: ICD-10-CM

## 2020-10-28 DIAGNOSIS — D63.1 ANEMIA OF CHRONIC RENAL FAILURE, STAGE 4 (SEVERE): ICD-10-CM

## 2020-10-28 DIAGNOSIS — N18.4 TYPE 2 DIABETES MELLITUS WITH STAGE 4 CHRONIC KIDNEY DISEASE, WITH LONG-TERM CURRENT USE OF INSULIN: ICD-10-CM

## 2020-10-28 LAB
CREAT UR-MCNC: 62.9 MG/DL (ref 23–375)
PROT UR-MCNC: 206 MG/DL (ref 0–15)
PROT/CREAT UR: 3.28 MG/G{CREAT} (ref 0–0.2)

## 2020-10-28 PROCEDURE — 82570 ASSAY OF URINE CREATININE: CPT

## 2020-11-02 ENCOUNTER — PATIENT OUTREACH (OUTPATIENT)
Dept: ADMINISTRATIVE | Facility: OTHER | Age: 75
End: 2020-11-02

## 2020-11-03 ENCOUNTER — TELEPHONE (OUTPATIENT)
Dept: INTERNAL MEDICINE | Facility: CLINIC | Age: 75
End: 2020-11-03

## 2020-11-03 NOTE — TELEPHONE ENCOUNTER
----- Message from Kacie Jurado LPN sent at 11/2/2020  5:10 PM CST -----  Regarding: FW: self  [phone]    ----- Message -----  From: Ela Garcia  Sent: 11/2/2020   4:47 PM CST  To: [facility] Staff  Subject: self  [phone]                               Patient called in requesting to speaking with the nurse regarding diabetic supplies please advise

## 2020-11-05 ENCOUNTER — TELEPHONE (OUTPATIENT)
Dept: INTERNAL MEDICINE | Facility: CLINIC | Age: 75
End: 2020-11-05

## 2020-11-05 RX ORDER — HYDRALAZINE HYDROCHLORIDE 50 MG/1
TABLET, FILM COATED ORAL
Qty: 60 TABLET | Refills: 0 | Status: SHIPPED | OUTPATIENT
Start: 2020-11-05 | End: 2020-12-07 | Stop reason: SDUPTHER

## 2020-11-06 NOTE — TELEPHONE ENCOUNTER
"Patient called and I got this message from Jonas Seth on teams:     "JT Chavez. I just called Pt [id number], Domingo Castro to schedule his appt. The pt is stating that he does not need another appt and that he sent you a message because his sensor became dislodged and he was out of strips and needed to know how to obtain more strips and if he could use the same sensor again? He also said that his insurance would not pay for the Vgo. He spoke with someone from your office and requested the dollar amount for out of pocket costs from someone but has not received any information regarding that. He is requesting that you give him a call at [phone]. "     Can you call him - see what brand strips he needs?   Also can you explain to him how to use his dexcom - it sounds like the transmitter came out of the sensor, but may need to trouble shoot.     Then can you touch base with ivana and see if he is covered for vgo or not?   I wanted to get him set up to see diabetic educator to show him how to use the vgo.   So not sure if that's the appt. She was referring to in above message (?) - but the appt with educator was to teach him/show him how to use vgo.     Let me know outcome so I can send rx's.     Thanks,  david   "

## 2020-11-11 ENCOUNTER — TELEPHONE (OUTPATIENT)
Dept: SPINE | Facility: CLINIC | Age: 75
End: 2020-11-11

## 2020-11-11 ENCOUNTER — TELEPHONE (OUTPATIENT)
Dept: INTERNAL MEDICINE | Facility: CLINIC | Age: 75
End: 2020-11-11

## 2020-11-11 NOTE — TELEPHONE ENCOUNTER
Spoke with patient and confirmed appointment with Dr. Blunt.  Date, time and location addressed with patient.  Patient instructed to arrive 15 minutes prior to appointment.  No questions or concerns from patient.

## 2020-11-11 NOTE — PROGRESS NOTES
Subjective:      Patient ID: Domingo Castro is a 75 y.o. male.    Chief Complaint: Low-back Pain    Mr Castro is a 74 yo male sent in consultation by Dr. Nugent for evaluation of low back pain.  He has had back pain most of his life.  He has had back pain since 17 yo.  He was scheduled to surgery.  The pain has been worse recently.  He feels like it got worse 2-3 months ago.  The pain is in the center of the lower back and down the right side of the leg and to the knee.  He feels like back pain is worse than the leg pain.  The pain is not constant.  The pain will go away with sitting, but severe getting up from sitting, getting out of bed, getting in car lifts leg.  He has pain in the right groin as well when flexing the right hip.  The pain is all words.  He has numbness and tingling in the back.  The pain is 0/10 now, worst 9/10 getting up from sitting and needs t10-15 steps to feel better, best 0/10.  He has been using crutches to help when first start walking.  He has not done PT for his back recently.  He has not been to chiropractor, he has had injection over 10 years ago, he is not sure exactly what, he has not had surgery    X-ray lumbar 9/29/20  Bony structures are intact.  The lumbosacral disc space is mildly narrowed, almost fused with the sacrum.  Degenerative changes seen around the facet joints remainder of the disc spaces are preserved.  No bony destruction is noted.     Impression:     See above    Past Medical History:  No date: Arthritis  No date: Cataract  No date: Chronic diastolic congestive heart failure  No date: Coronary artery disease  No date: Cystoid macular edema of both eyes  No date: Diabetes mellitus type II  No date: Diabetic retinopathy  No date: Hyperlipemia  No date: Hypertension  No date: Retinal hole  No date: Stage 4 chronic kidney disease  12/23/2018: Streptococcus pyogenes bacteremia      Comment:  Due to left toe osteomyelitis    Past Surgical History:  7/30/2019: ABDOMINAL  AORTOGRAPHY; N/A      Comment:  Procedure: AORTOGRAM-ABDOMINAL;  Surgeon: Amish Hyatt MD;  Location: [facility] CATH LAB/EP;  Service:                Cardiology;  Laterality: N/A;  CO2 angiography  2019: ANGIOGRAPHY OF LOWER EXTREMITY; Left      Comment:  Procedure: Angiogram Extremity Unilateral;  Surgeon:                Amish Hyatt MD;  Location: [facility] CATH LAB/EP;                 Service: Cardiology;  Laterality: Left;  12/15/14: CATARACT EXTRACTION W/  INTRAOCULAR LENS IMPLANT; Left      Comment:  Dr de la cruz  14: CATARACT EXTRACTION W/  INTRAOCULAR LENS IMPLANT; Right      Comment:  dorothy  No date: CIRCUMCISION, NON-  No date: focal laser right eye  2019: INSERTION OF TUNNELED CENTRAL VENOUS CATHETER (CVC) WITH   SUBCUTANEOUS PORT; Right      Comment:  Procedure: [id number];  Surgeon: Denys Aleman Jr., MD;  Location: [facility] OR;  Service:                General;  Laterality: Right;  No date: KNEE SURGERY      Comment:  left  No date: Left medial collateral ligament repair  No date: TONSILLECTOMY    Review of patient's family history indicates:  Problem: Heart disease      Relation: Mother          Age of Onset: (Not Specified)  Problem: Cancer      Relation: Mother          Age of Onset: (Not Specified)  Problem: Cancer      Relation: Brother          Age of Onset: (Not Specified)  Problem: Heart disease      Relation: Father          Age of Onset: (Not Specified)  Problem: Diabetes      Relation: Father          Age of Onset: (Not Specified)  Problem: Cancer      Relation: Sister          Age of Onset: (Not Specified)  Problem: Cataracts      Relation: Paternal Grandmother          Age of Onset: (Not Specified)  Problem: Glaucoma      Relation: Paternal Grandmother          Age of Onset: (Not Specified)  Problem: Blindness      Relation: Neg Hx          Age of Onset: (Not Specified)  Problem: Amblyopia      Relation: Neg Hx          Age of Onset:  (Not Specified)  Problem: Hypertension      Relation: Neg Hx          Age of Onset: (Not Specified)  Problem: Macular degeneration      Relation: Neg Hx          Age of Onset: (Not Specified)  Problem: Retinal detachment      Relation: Neg Hx          Age of Onset: (Not Specified)  Problem: Strabismus      Relation: Neg Hx          Age of Onset: (Not Specified)      Social History    Socioeconomic History      Marital status:       Spouse name: Not on file      Number of children: Not on file      Years of education: Not on file      Highest education level: Not on file    Occupational History      Not on file    Social Needs      Financial resource strain: Not on file      Food insecurity        Worry: Not on file        Inability: Not on file      Transportation needs        Medical: Not on file        Non-medical: Not on file    Tobacco Use      Smoking status: Never Smoker      Smokeless tobacco: Never Used    Substance and Sexual Activity      Alcohol use: No        Alcohol/week: 0.0 standard drinks      Drug use: No      Sexual activity: Yes        Partners: Female    Lifestyle      Physical activity        Days per week: Not on file        Minutes per session: Not on file      Stress: Not on file    Relationships      Social connections        Talks on phone: Not on file        Gets together: Not on file        Attends Yarsanism service: Not on file        Active member of club or organization: Not on file        Attends meetings of clubs or organizations: Not on file        Relationship status: Not on file    Other Topics      Concerns:        Not on file    Social History Narrative      Not on file      Current Outpatient Medications:  acetaminophen (TYLENOL) 325 MG tablet, Take 2 tablets (650 mg total) by mouth every 4 (four) hours as needed., Disp: , Rfl: 0  albuterol-ipratropium (DUO-NEB) 2.5 mg-0.5 mg/3 mL nebulizer solution, Take 3 mLs by nebulization every 4 (four) hours as needed for Wheezing  "or Shortness of Breath. Rescue, Disp: 90 mL, Rfl: 0  amLODIPine (NORVASC) 10 MG tablet, TAKE 1 TABLET BY MOUTH ONCE DAILY, Disp: 30 tablet, Rfl: 11  aspirin (ECOTRIN) 81 MG EC tablet, Take 81 mg by mouth once daily., Disp: , Rfl:   calcitRIOL (ROCALTROL) 0.25 MCG Cap, Take 1 capsule (0.25 mcg total) by mouth every Monday and Friday., Disp: 60 capsule, Rfl: 6  clopidogreL (PLAVIX) 75 mg tablet, Take 1 tablet by mouth once daily, Disp: 30 tablet, Rfl: 0  furosemide (LASIX) 40 MG tablet, Take 2 tablets (80 mg total) by mouth once daily. (Patient taking differently: Take 40 mg by mouth 2 (two) times a day. ), Disp: 360 tablet, Rfl: 11  hydrALAZINE (APRESOLINE) 50 MG tablet, TAKE 1 TABLET BY MOUTH EVERY 12 HOURS, Disp: 60 tablet, Rfl: 0  insulin aspart U-100 (NOVOLOG FLEXPEN U-100 INSULIN) 100 unit/mL (3 mL) InPn pen, Inject 12 Units into the skin 3 (three) times daily with meals. With correction scale maximum 50 units daily., Disp: 15 mL, Rfl: 3  insulin aspart U-100 (NOVOLOG U-100 INSULIN ASPART) 100 unit/mL injection, Inject 40 Units into the skin 3 (three) times daily before meals. USE WITHIN VGO ONLY., Disp: 30 mL, Rfl: 11  LEVEMIR FLEXTOUCH U-100 INSULN 100 unit/mL (3 mL) InPn pen, Inject 26 Units into the skin every evening., Disp: 36 mL, Rfl: 3  nebulizer and compressor Alana, USE AS DIRECTED, Disp: 1 each, Rfl: 0  olmesartan (BENICAR) 20 MG tablet, Take 2 tablets (40 mg total) by mouth once daily., Disp: 90 tablet, Rfl: 10  pen needle, diabetic, safety (BD AUTOSHIELD PEN NEEDLE) 29 gauge x 5/16" Ndle, For use once daily with levemir flexpen, Disp: 90 each, Rfl: 11  rosuvastatin (CRESTOR) 40 MG Tab, Take 1 tablet (40 mg total) by mouth every evening., Disp: 90 tablet, Rfl: 3  sub-q insulin device, 20 unit (V-GO 20) Alana, 1 each by Misc.(Non-Drug; Combo Route) route once daily., Disp: 30 each, Rfl: 11    Current Facility-Administered Medications:  fluorescein 500 mg/5 mL (10 %) injection 500 mg, 5 mL, Intravenous, " LIZETH Herron MD        Review of patient's allergies indicates:   -- Atorvastatin -- Other (See Comments)        Review of Systems   Constitution: Negative for weight gain and weight loss.   Cardiovascular: Positive for dyspnea on exertion. Negative for chest pain.   Respiratory: Negative for shortness of breath.    Musculoskeletal: Positive for back pain (right leg). Negative for joint pain and joint swelling.   Gastrointestinal: Negative for abdominal pain, bowel incontinence, nausea and vomiting.   Genitourinary: Negative for bladder incontinence.   Neurological: Positive for numbness (back).         Objective:        General: Domingo is well-developed, well-nourished, appears stated age, in no acute distress, alert and oriented to time, place and person.     General    Vitals reviewed.  Constitutional: He is oriented to person, place, and time. He appears well-developed and well-nourished.   HENT:   Head: Normocephalic and atraumatic.   Pulmonary/Chest: Effort normal.   Neurological: He is alert and oriented to person, place, and time.   Psychiatric: He has a normal mood and affect. His behavior is normal. Judgment and thought content normal.     General Musculoskeletal Exam   Gait: antalgic (right leg and slow turning)     Back (L-Spine & T-Spine) / Neck (C-Spine) Exam     Back (L-Spine & T-Spine) Range of Motion   Extension: 10   Flexion: 90   Lateral bend right: 10   Lateral bend left: 10   Rotation right: 30   Rotation left: 30     Spinal Sensation   Right Side Sensation  C-Spine Level: normal   L-Spine Level: normal  S-Spine Level: normal  Left Side Sensation  C-Spine Level: normal  L-Spine Level: normal  S-Spine Level: normal    Back (L-Spine & T-Spine) Tests   Right Side Tests  Straight leg raise:      Sitting SLR: > 70 degrees      Left Side Tests  Straight leg raise:     Sitting SLR: > 70 degrees          Other He has no scoliosis .  Spinal Kyphosis:  Absent    Comments:  Limited ROM  bilateral hips right greater than left    Limited ER with recreation of the pain      Muscle Strength   Right Upper Extremity   Biceps: 5/5   Deltoid:  5/5  Triceps:  5/5  Wrist extension: 5/5   Finger Flexors:  5/5  Left Upper Extremity  Biceps: 5/5   Deltoid:  5/5  Triceps:  5/5  Wrist extension: 5/5   Finger Flexors:  5/5  Right Lower Extremity   Hip Flexion: 5/5   Quadriceps:  5/5   Anterior tibial:  5/5   EHL:  5/5  Left Lower Extremity   Hip Flexion: 5/5   Quadriceps:  5/5   Anterior tibial:  5/5   EHL:  5/5    Reflexes     Left Side  Biceps:  2+  Triceps:  2+  Brachioradialis:  2+  Achilles:  2+  Left Willson's Sign:  Absent  Babinski Sign:  absent  Quadriceps:  2+    Right Side   Biceps:  2+  Triceps:  2+  Brachioradialis:  2+  Achilles:  2+  Right Willson's Sign:  absent  Babinski Sign:  absent  Quadriceps:  2+    Vascular Exam     Right Pulses        Carotid:                  2+    Left Pulses        Carotid:                  2+              Assessment:       1. Hip pain    2. Dorsalgia, unspecified    3. Spondylosis of lumbar region without myelopathy or radiculopathy           Plan:       Orders Placed This Encounter    X-Ray Hips Bilateral 2 View Inc AP Pelvis    Ambulatory referral/consult to Physical/Occupational Therapy     1. We discussed back and hip pain and the nature of back and hip pain.  We discussed that it will likely improve and that it is not one thing that causes the pain but an accumulation of multiple things that we do.  We discussed his low back x-ray and degenerative changes, however with antalgic gait and reproduction of pain with ROM, I think it is right hip.  We will get x-ray  2. We discussed posture sitting and the importance of trying to sit better.  We discussed sitting better, to help pain getting up  3. We discussed the benefits of therapy and exercise and continuing to move.  He does feel like needs to be stonger  4. Hip x-ray bilaterally, right greater then left block  ROM  5. PT for back and core strengthening, hip ROM, strenghtening and HEP in evelyn  6. We did discuss hip injections vs ortho for hip.  If hisp looks ok, may need to take a closer look at back  7. RTC 6 weeks    More than 50% of the total time  of 45 minutes was spent face to face in counseling on diagnosis and treatment options. I also counseled patient  on common and most usual side effect of prescribed medications.  I reviewed Primary care , and other specialty's notes to better coordinate patient's care. All questions were answered, and patient voiced understanding.     A consultation note will be sent to Dr. Nugent through epic.  Thanks for the consult      Follow-up: No follow-ups on file. If there are any questions prior to this, the patient was instructed to contact the office.

## 2020-11-11 NOTE — TELEPHONE ENCOUNTER
S/w patient - states he never heard from dexcom - was using it - still has his transmitter that we gave him but needs more sensors.   He is back to [employer].   Can you check on his dexcom order?     Also - can you check with ivana to see if vgo covered or not? He has medicare part a and b - but also has aetna. I had tried to get him on vgo, and thought he'd be covered by his aetna policy  - but patient states pharmacy said he would not be covered. Hoping they ran insurance correctly (?) -     I have an appt. With him next week so can hopefully figure out by that point.     I also called and reviewed labs with him over phone.   Advised he is positive for type 1 diabetes -     Thanks,  david

## 2020-11-12 ENCOUNTER — OFFICE VISIT (OUTPATIENT)
Dept: OPHTHALMOLOGY | Facility: CLINIC | Age: 75
End: 2020-11-12
Payer: MEDICARE

## 2020-11-12 ENCOUNTER — OFFICE VISIT (OUTPATIENT)
Dept: SPINE | Facility: CLINIC | Age: 75
End: 2020-11-12
Attending: PHYSICAL MEDICINE & REHABILITATION
Payer: MEDICARE

## 2020-11-12 ENCOUNTER — HOSPITAL ENCOUNTER (OUTPATIENT)
Dept: RADIOLOGY | Facility: HOSPITAL | Age: 75
Discharge: HOME | End: 2020-11-12
Attending: PHYSICAL MEDICINE & REHABILITATION
Payer: MEDICARE

## 2020-11-12 VITALS
WEIGHT: 231.69 LBS | HEIGHT: 75 IN | HEART RATE: 66 BPM | SYSTOLIC BLOOD PRESSURE: 156 MMHG | DIASTOLIC BLOOD PRESSURE: 75 MMHG | BODY MASS INDEX: 28.81 KG/M2

## 2020-11-12 DIAGNOSIS — M54.9 DORSALGIA, UNSPECIFIED: ICD-10-CM

## 2020-11-12 DIAGNOSIS — H35.033 HYPERTENSIVE RETINOPATHY OF BOTH EYES: ICD-10-CM

## 2020-11-12 DIAGNOSIS — M47.816 SPONDYLOSIS OF LUMBAR REGION WITHOUT MYELOPATHY OR RADICULOPATHY: ICD-10-CM

## 2020-11-12 DIAGNOSIS — M25.559 HIP PAIN: ICD-10-CM

## 2020-11-12 DIAGNOSIS — M25.559 HIP PAIN: Primary | ICD-10-CM

## 2020-11-12 PROCEDURE — 99204 PR OFFICE/OUTPT VISIT, NEW, LEVL IV, 45-59 MIN: ICD-10-PCS | Mod: S$PBB,,, | Performed by: PHYSICAL MEDICINE & REHABILITATION

## 2020-11-12 PROCEDURE — 73521 XR HIPS BILATERAL 2 VIEW INCL AP PELVIS: ICD-10-PCS | Mod: 26,,, | Performed by: RADIOLOGY

## 2020-11-12 PROCEDURE — 73521 X-RAY EXAM HIPS BI 2 VIEWS: CPT | Mod: 26,,, | Performed by: RADIOLOGY

## 2020-11-12 PROCEDURE — 99999 PR PBB SHADOW E&M-EST. PATIENT-LVL V: ICD-10-PCS | Mod: PBBFAC,,, | Performed by: PHYSICAL MEDICINE & REHABILITATION

## 2020-11-12 PROCEDURE — 92134 CPTRZ OPH DX IMG PST SGM RTA: CPT | Mod: PBBFAC,PO | Performed by: OPHTHALMOLOGY

## 2020-11-12 PROCEDURE — 92202 PR OPHTHALMOSCOPY, EXT, W/DRAW OPTIC NERVE/MACULA, I&R, UNI/BI: ICD-10-PCS | Mod: S$PBB,,, | Performed by: OPHTHALMOLOGY

## 2020-11-12 PROCEDURE — 92235 FLUORESCEIN ANGIOGRAPHY - OU - BOTH EYES: ICD-10-PCS | Mod: 26,S$PBB,, | Performed by: OPHTHALMOLOGY

## 2020-11-12 PROCEDURE — 99214 OFFICE O/P EST MOD 30 MIN: CPT | Mod: PBBFAC,25,27,PO | Performed by: OPHTHALMOLOGY

## 2020-11-12 PROCEDURE — 92014 PR EYE EXAM, EST PATIENT,COMPREHESV: ICD-10-PCS | Mod: S$PBB,,, | Performed by: OPHTHALMOLOGY

## 2020-11-12 PROCEDURE — 99204 OFFICE O/P NEW MOD 45 MIN: CPT | Mod: S$PBB,,, | Performed by: PHYSICAL MEDICINE & REHABILITATION

## 2020-11-12 PROCEDURE — 92014 COMPRE OPH EXAM EST PT 1/>: CPT | Mod: S$PBB,,, | Performed by: OPHTHALMOLOGY

## 2020-11-12 PROCEDURE — 92202 OPSCPY EXTND ON/MAC DRAW: CPT | Mod: S$PBB,,, | Performed by: OPHTHALMOLOGY

## 2020-11-12 PROCEDURE — 73521 X-RAY EXAM HIPS BI 2 VIEWS: CPT | Mod: TC,PO

## 2020-11-12 PROCEDURE — 92235 FLUORESCEIN ANGRPH MLTIFRAME: CPT | Mod: 50,PBBFAC,PO | Performed by: OPHTHALMOLOGY

## 2020-11-12 PROCEDURE — 99999 PR PBB SHADOW E&M-EST. PATIENT-LVL IV: ICD-10-PCS | Mod: PBBFAC,,, | Performed by: OPHTHALMOLOGY

## 2020-11-12 PROCEDURE — 92202 OPSCPY EXTND ON/MAC DRAW: CPT | Mod: PBBFAC,PO | Performed by: OPHTHALMOLOGY

## 2020-11-12 PROCEDURE — 99999 PR PBB SHADOW E&M-EST. PATIENT-LVL IV: CPT | Mod: PBBFAC,,, | Performed by: OPHTHALMOLOGY

## 2020-11-12 PROCEDURE — 99999 PR PBB SHADOW E&M-EST. PATIENT-LVL V: CPT | Mod: PBBFAC,,, | Performed by: PHYSICAL MEDICINE & REHABILITATION

## 2020-11-12 PROCEDURE — 92134 POSTERIOR SEGMENT OCT RETINA (OCULAR COHERENCE TOMOGRAPHY)-BOTH EYES: ICD-10-PCS | Mod: 26,S$PBB,, | Performed by: OPHTHALMOLOGY

## 2020-11-12 PROCEDURE — 99215 OFFICE O/P EST HI 40 MIN: CPT | Mod: PBBFAC,25 | Performed by: PHYSICAL MEDICINE & REHABILITATION

## 2020-11-12 RX ADMIN — FLUORESCEIN 500 MG: 500 INJECTION INTRAVENOUS at 02:11

## 2020-11-12 NOTE — LETTER
November 12, 2020      Griselda Nugent MD  [address]  [place] [address]           [facility] [address]  [address], [address]  [facility] [address]  Phone: [phone]  Fax: [phone]          Patient: Domingo Castro   MR Number: [id number]   YOB: 1945   Date of Visit: 11/12/2020       Dear Dr. Griselda Nugent:    Thank you for referring Domingo Castro to me for evaluation. Attached you will find relevant portions of my assessment and plan of care.    If you have questions, please do not hesitate to call me. I look forward to following Domingo Castro along with you.    Sincerely,    Awa Blunt MD    Enclosure  CC:  No Recipients    If you would like to receive this communication electronically, please contact [email or web address] or [phone] to request more information on [employer] Link access.    For providers and/or their staff who would like to refer a patient to [facility], please contact us through our one-stop-shop provider referral line, [facility], at [phone].    If you feel you have received this communication in error or would no longer like to receive these types of communications, please e-mail [email or web address]

## 2020-11-12 NOTE — PROGRESS NOTES
HPI     3 month OCT/ FA OD  DLS: 07/09/2019 Dr. Harrison     Patient states his vision has been stable since his last visit.   (-)Flashes (-)Floaters  (-)Photophobia  (-)Glare    ATs PRN       OCT - Central ME OD stable, nasal CME improved  OS- CME slightly worse compared to prior     FA - Peripheral NP - but no obvious NV  Late macular leakage OU, macular ischemia OD      A/P    1. Severe NPDR OU  Improved A1c recently, but still uncontrolled on insulin  But  VH OD  S/p resumed Avastin OD x 1  FA shows NP, but no obvious NV        2. DME OU  - S/p Focal OU (OS x 2 06/13)  - S/p Ozurdex OD x2 2/19- fluid improved, but pt didn't notice much of a chance  - Some residual DME OD> OS    Stable low grade swelling OU - some macular ischemia OD   Pt would prefer to obs at this time.      3. PCIOL OU    4. HTN Ret OU    5. Ret Hole x 2  S/p barrier laser     6. Presbyopia  - Significantly increased since cataract surgery   - Pt unhappy with glasses- may need new MRx     7. 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

## 2020-11-12 NOTE — PATIENT INSTRUCTIONS
Fluorescein Angiography     A fluorescein angiogram of the retina   Fluorescein angiography is an eye test. It is done to look at the back of your eye, including:  · The blood vessels in your eye  · The layer of tissue at the back of your eye (the retina)  · The center of your retina (the macula)  · The optic nerve  This test can diagnose diseases found in these areas. It can also diagnose other conditions that affect these areas. To do this test, a dye called fluorescein is shot (injected) into your arm. The dye goes into your bloodstream and up into the blood vessels in your eyes. A special camera is then used to take images (angiograms) of your eyes.  Getting ready for your test  Tell your healthcare provider if you:  · Are pregnant or think you may be pregnant  · Are breastfeeding  · Have a history of severe allergic reactions, including to X-ray dye or other medicines  · Have kidney problems  Tell your provider about any medicines you are taking. You may need to stop taking all or some of these before the test. This includes:  · All prescription medicines  · Over-the-counter medicines such as aspirin or ibuprofen  · Street drugs  · Herbs, vitamins, and other supplements  You should arrange for an adult family member or friend to drive you home after your test. Your vision will be blurry for up to 12 hours.  Follow any other instructions from your healthcare provider.  During your test  · You are given eye drops to enlarge (dilate) your pupils.  · You then sit in front of a special camera. You place your chin on the chin rest and look into the camera.  · Images are taken of your eyes, one eye at a time.  · Fluorescein dye is then injected into your arm. The lights in the room are turned off. You may have mild nausea. You may have a warm feeling in your arm or upper body. Tell your provider if your skin feels itchy or if you are having trouble breathing. If so, you could be having an allergic reaction to the  dye.  · More pictures of your eyes are taken over 15 to 30 minutes. The camera shines a bright light into your eyes. Try to keep your head still and your eyes open.  · When enough images have been taken, the test is over.  After your test  Your vision will be blurry for up to 4 to 12 hours. This is because of your dilated pupils. Your eye will be more sensitive to light for up to 12 hours. You may want to wear sunglasses during this time. Do not drive if your vision is very blurry. You may also find it uncomfortable to read. Your skin may look yellow for a few hours. This is from the dye. Your urine will be bright yellow or orange for 24 to 48 hours after the test.     Risks and possible complications  All procedures have some risks. Possible risks of fluorescein angiography include:  · Upset stomach (nausea) and vomiting  · Leaking dye around the injection site that causes pain and swelling  · Metallic taste in your mouth  · Infection at injection site  · Allergic reaction to the dye  · Dry mouth or too much saliva  · Faster heart rate  · Sweating  · Lower back pain   Date Last Reviewed: 5/30/2015  © 5775-4720 [employer]. [address] [place] [address], [place] [address]. All rights reserved. This information is not intended as a substitute for professional medical care. Always follow your healthcare professional's instructions.

## 2020-11-18 ENCOUNTER — OFFICE VISIT (OUTPATIENT)
Dept: NEPHROLOGY | Facility: CLINIC | Age: 75
End: 2020-11-18
Payer: MEDICARE

## 2020-11-18 ENCOUNTER — OFFICE VISIT (OUTPATIENT)
Dept: INTERNAL MEDICINE | Facility: CLINIC | Age: 75
End: 2020-11-18
Payer: MEDICARE

## 2020-11-18 VITALS
BODY MASS INDEX: 29.22 KG/M2 | SYSTOLIC BLOOD PRESSURE: 174 MMHG | HEIGHT: 75 IN | WEIGHT: 235 LBS | HEART RATE: 81 BPM | OXYGEN SATURATION: 98 % | DIASTOLIC BLOOD PRESSURE: 84 MMHG

## 2020-11-18 VITALS
HEART RATE: 84 BPM | RESPIRATION RATE: 16 BRPM | TEMPERATURE: 98 F | DIASTOLIC BLOOD PRESSURE: 90 MMHG | BODY MASS INDEX: 28.97 KG/M2 | HEIGHT: 75 IN | SYSTOLIC BLOOD PRESSURE: 158 MMHG | WEIGHT: 233 LBS | OXYGEN SATURATION: 97 %

## 2020-11-18 DIAGNOSIS — E78.5 HYPERLIPIDEMIA ASSOCIATED WITH TYPE 2 DIABETES MELLITUS: ICD-10-CM

## 2020-11-18 DIAGNOSIS — I10 ESSENTIAL HYPERTENSION: ICD-10-CM

## 2020-11-18 DIAGNOSIS — I15.2 HYPERTENSION ASSOCIATED WITH DIABETES: ICD-10-CM

## 2020-11-18 DIAGNOSIS — I15.2 OBESITY, DIABETES, AND HYPERTENSION SYNDROME: ICD-10-CM

## 2020-11-18 DIAGNOSIS — E66.9 OBESITY, DIABETES, AND HYPERTENSION SYNDROME: ICD-10-CM

## 2020-11-18 DIAGNOSIS — E11.59 HYPERTENSION ASSOCIATED WITH DIABETES: ICD-10-CM

## 2020-11-18 DIAGNOSIS — N18.4 CHRONIC KIDNEY DISEASE, STAGE IV (SEVERE): ICD-10-CM

## 2020-11-18 DIAGNOSIS — N18.4 CKD STAGE 4 DUE TO TYPE 2 DIABETES MELLITUS: ICD-10-CM

## 2020-11-18 DIAGNOSIS — E66.3 OVERWEIGHT (BMI 25.0-29.9): ICD-10-CM

## 2020-11-18 DIAGNOSIS — E11.69 OBESITY, DIABETES, AND HYPERTENSION SYNDROME: ICD-10-CM

## 2020-11-18 DIAGNOSIS — M86.9 TOE OSTEOMYELITIS, LEFT: ICD-10-CM

## 2020-11-18 DIAGNOSIS — N18.4 ANEMIA OF CHRONIC RENAL FAILURE, STAGE 4 (SEVERE): ICD-10-CM

## 2020-11-18 DIAGNOSIS — E11.22 CKD STAGE 4 DUE TO TYPE 2 DIABETES MELLITUS: ICD-10-CM

## 2020-11-18 DIAGNOSIS — E11.69 HYPERLIPIDEMIA ASSOCIATED WITH TYPE 2 DIABETES MELLITUS: ICD-10-CM

## 2020-11-18 DIAGNOSIS — I50.32 CHRONIC DIASTOLIC CONGESTIVE HEART FAILURE: Chronic | ICD-10-CM

## 2020-11-18 DIAGNOSIS — E11.22 TYPE 2 DIABETES MELLITUS WITH STAGE 4 CHRONIC KIDNEY DISEASE, WITH LONG-TERM CURRENT USE OF INSULIN: Primary | ICD-10-CM

## 2020-11-18 DIAGNOSIS — D63.1 ANEMIA OF CHRONIC RENAL FAILURE, STAGE 4 (SEVERE): ICD-10-CM

## 2020-11-18 DIAGNOSIS — E11.59 OBESITY, DIABETES, AND HYPERTENSION SYNDROME: ICD-10-CM

## 2020-11-18 DIAGNOSIS — Z79.4 TYPE 2 DIABETES MELLITUS WITH STAGE 4 CHRONIC KIDNEY DISEASE, WITH LONG-TERM CURRENT USE OF INSULIN: Primary | ICD-10-CM

## 2020-11-18 DIAGNOSIS — N18.4 TYPE 2 DIABETES MELLITUS WITH STAGE 4 CHRONIC KIDNEY DISEASE, WITH LONG-TERM CURRENT USE OF INSULIN: Primary | ICD-10-CM

## 2020-11-18 PROCEDURE — 99215 PR OFFICE/OUTPT VISIT, EST, LEVL V, 40-54 MIN: ICD-10-PCS | Mod: S$PBB,,, | Performed by: NURSE PRACTITIONER

## 2020-11-18 PROCEDURE — 99214 PR OFFICE/OUTPT VISIT, EST, LEVL IV, 30-39 MIN: ICD-10-PCS | Mod: S$PBB,,, | Performed by: INTERNAL MEDICINE

## 2020-11-18 PROCEDURE — 99214 OFFICE O/P EST MOD 30 MIN: CPT | Mod: S$PBB,,, | Performed by: INTERNAL MEDICINE

## 2020-11-18 PROCEDURE — 99999 PR PBB SHADOW E&M-EST. PATIENT-LVL IV: CPT | Mod: PBBFAC,,, | Performed by: INTERNAL MEDICINE

## 2020-11-18 PROCEDURE — 99999 PR PBB SHADOW E&M-EST. PATIENT-LVL V: CPT | Mod: PBBFAC,,, | Performed by: NURSE PRACTITIONER

## 2020-11-18 PROCEDURE — 99215 OFFICE O/P EST HI 40 MIN: CPT | Mod: S$PBB,,, | Performed by: NURSE PRACTITIONER

## 2020-11-18 PROCEDURE — 99214 OFFICE O/P EST MOD 30 MIN: CPT | Mod: PBBFAC | Performed by: INTERNAL MEDICINE

## 2020-11-18 PROCEDURE — 99999 PR PBB SHADOW E&M-EST. PATIENT-LVL IV: ICD-10-PCS | Mod: PBBFAC,,, | Performed by: INTERNAL MEDICINE

## 2020-11-18 PROCEDURE — 95251 CONT GLUC MNTR ANALYSIS I&R: CPT | Mod: ,,, | Performed by: NURSE PRACTITIONER

## 2020-11-18 PROCEDURE — 99999 PR PBB SHADOW E&M-EST. PATIENT-LVL V: ICD-10-PCS | Mod: PBBFAC,,, | Performed by: NURSE PRACTITIONER

## 2020-11-18 PROCEDURE — 99215 OFFICE O/P EST HI 40 MIN: CPT | Mod: PBBFAC,27,PO | Performed by: NURSE PRACTITIONER

## 2020-11-18 PROCEDURE — 95251 PR GLUCOSE MONITOR, 72 HOUR, PHYS INTERP: ICD-10-PCS | Mod: ,,, | Performed by: NURSE PRACTITIONER

## 2020-11-18 RX ORDER — HEPARIN 100 UNIT/ML
5 SYRINGE INTRAVENOUS
Status: CANCELLED | OUTPATIENT
Start: 2020-11-23

## 2020-11-18 RX ORDER — EPINEPHRINE 0.3 MG/.3ML
0.3 INJECTION SUBCUTANEOUS ONCE AS NEEDED
Status: CANCELLED | OUTPATIENT
Start: 2020-11-23

## 2020-11-18 RX ORDER — SODIUM CHLORIDE 9 MG/ML
INJECTION, SOLUTION INTRAVENOUS CONTINUOUS
Status: CANCELLED | OUTPATIENT
Start: 2020-11-23

## 2020-11-18 RX ORDER — METHYLPREDNISOLONE SOD SUCC 125 MG
125 VIAL (EA) INJECTION ONCE AS NEEDED
Status: CANCELLED | OUTPATIENT
Start: 2020-11-23

## 2020-11-18 RX ORDER — FUROSEMIDE 80 MG/1
80 TABLET ORAL DAILY
Qty: 180 TABLET | Refills: 11 | Status: ON HOLD | OUTPATIENT
Start: 2020-11-18 | End: 2021-01-03 | Stop reason: HOSPADM

## 2020-11-18 RX ORDER — DIPHENHYDRAMINE HYDROCHLORIDE 50 MG/ML
50 INJECTION INTRAMUSCULAR; INTRAVENOUS ONCE AS NEEDED
Status: CANCELLED | OUTPATIENT
Start: 2020-11-23

## 2020-11-18 RX ORDER — SODIUM CHLORIDE 0.9 % (FLUSH) 0.9 %
10 SYRINGE (ML) INJECTION
Status: CANCELLED | OUTPATIENT
Start: 2020-11-23

## 2020-11-18 NOTE — PATIENT INSTRUCTIONS
"Continue on Insulin injections for now -   Levemir 22 units every HS -   humalog 8 units with breakfast.   humalog 10 units with lunch.   humalog 10 units with dinner.       Continue to check blood sugars - before meals.   Restart Dexcom G6 CGM.       Follow up with Saima Corrigan, dietician, she will show you how to use your VGO patch.   You will switch from multi dose insulin injections to the VGO 20 patch instead.   Your sugars should be much more controlled once you switch to vgo patch from the injections.       For low blood sugar - remember to keep glucose tablets on hand. You can purchase these at the pharmacy check out desk/over the counter.   If blood sugar is less than 70, take/eat 2 - 3 tablets quickly to bring your sugar up.   Always keep 15 grams of Quick acting carbohydrates on hand to eat/drink if your sugar is low - examples are 1/2 cup of juice, coke, or crackers, granola bars.   Remember to eat meals frequently to prevent low blood blood sugars.     Low Carb Snacks & Diabetes friendly foods   Aim for 30 - 40 grams of carbs for 3 meals per day (or 2 meals and  1 - 2 snacks - however you prefer)  Snacks can be 15 - 20 grams of carbs.     Eating small, frequent meals will help you to feel more satisfied, and more full so that you don't over eat or eat the wrong foods later.   Also, naomy meals/snacks allow you to spread carbohydrates evenly, which may stabilize blood sugars.   Below you will find a list of ideas of foods that I like/prefer.   Feel free to give me your ideas and tips if you find good ones too!   Overall - please remember to limit refined sugars such as soft drinks, juices, rices, pastas, breads, cakes.   Look at the back of the label - look at the amount of "carbohydrates", then look at the amount of "fiber" - subtract the amount of fiber from the amount of carbs - this is your "net carb intake".  The more fiber a food has, the better generally, as you get to subtract this from the net " "carbs.     0-5 gm carb   Crystal Light flavoring (I like fruit punch flavor!)   Vitamin Water Zero   Catia antioxidant drinks.    Sparkling ice (long skinny flavored water bottles for $1 that are sugar free).    Parag water, WaterLoo - carbonated flavored saunders, various flavors.   Diet coke, diet barqs, sprite zero.   Diet sunkist (orange drink)   Sugar free powerade   Herbal tea, unsweetened   2 tsp peanut butter on celery or carrots   Mitra's original Candies - (the Sugar Free ones!)   1/2 cup sugar-free jell-o   1 sugar-free popsicle   ¼ cup blueberries or strawberries   8oz Blue Heidi unsweetened almond milk (or there is sugar free vanilla flavored as well).   5 baby carrots & celery sticks, cucumbers, bell peppers dipped in ¼ cup salsa, 2Tbsp light ranch dressing or 2Tbsp plain Greek yogurt   10 Goldfish crackers   ½ oz low-fat cheese or string cheese   1 closed handful of nuts, unsalted (example-->almonds, pistachios, cashews, peanuts, etc).   1 Tbsp of sunflower seeds, unsalted   1 cup Smart Pop popcorn   1 whole grain brown rice cake    "Think Thin" protein bars - my personal favorite is Creamy Peanut butter, chocolate brownie, or oreo flavors.   "Barrett" bars  - can be found at [employer] grocery store - they have 5 grams of net carbohydrates.   Quest bars (my favorite is birthday cake, and also cinnamon roll is good melted for 10 seconds in the microwave - please remove the wrapper first!)   Premier protein shakes - sold at [employer], or other brand alternatives - usually 1 - 2 grams of carbs (strawberry, vanilla, chocolate flavors) Coffee flavor is my new morning favorite!    Scrambled eggs! Or a fried egg or boiled eggs - add sliced tomatoes, cilantro or some chopped green onions.    Eggs are good with any and all veggies! You can even eat them for dinner or in a low carb tortilla, or served with your favorite grilled meat/sausage or larson is my favorite.    Veggie spirals " "- zucchini - can buy in the frozen foods section. Birds eye brand is usually cheap. Tip - saute with oil/onions or italian seasoning and use this as a pasta substitution.   Milk - is usually high in carbs/sugar - use Divesquare milk brand instead - it is "filtered" milk - with half the amount of carbs of regular milk. (next to the milk in milk aisle).    Smuckers sugar free Breakfast syrup (or 1 tablespoon of low sugar breakfast syrup) instead of regular syrup.        15 gm carb   1 small piece of fruit or ½ banana or 1/2 cup lite canned fruit   3 katerina cracker squares   3 cups Smart Pop popcorn, top spray butter, Pritchett lite salt or cinnamon and Truvia   5 Vanilla Wafers   ½ cup low fat, no added sugar ice cream or frozen yogurt (Blue bell, Blue Bunny, Weight Watchers, Skinny Cow)   1/2 - 1 cup Light n' fit Vanilla yogurt (has added protein in it to make you feel full).    ½ turkey, ham, or chicken sandwich   ½ c fruit with ½ c Cottage cheese   4-6 unsalted wheat crackers with 1 oz low fat cheese or 1 tbsp peanut butter    30-45 goldfish crackers (depending on flavor)    7-8 Hinduism mini brown rice cakes (caramel, apple cinnamon, chocolate)    12 Hinduism mini brown rice cakes (cheddar, bbq, ranch)    1/3 cup hummus dip with raw veg   1/2 whole wheat gal, 1Tbsp hummus   Mini Pizza (1/2 whole wheat English muffin, low-fat  cheese, tomato sauce)   100 calorie snack pack (Oreo, Chips Ahoy, Ritz Mix, Baked Cheetos)   4-6 oz. light or Greek Style yogurt (Chobani, Yoplait, Okios, [place]oneyfield)   ½ cup sugar-free pudding     6 in. wheat tortilla or gal oven toasted chips (topped with spray butter flavoring, cinnamon, Truvia OR spray butter, garlic powder, chili powder)    18 BBQ Popchips (available at [employer], [employer], [employer])   Mini bagel (small size) toasted - add fat free cream cheese or avocado and sliced tomato on top - yum!    1/2 cup Halo top icecream - birthday cake is my go-to " "flavor.    Truth Bars - can be found at [employer] - Net carbs is 11 - 12 grams depending on the flavor.    Kind bars = mostly nuts and dried berries - find at most local groceries stores, drug stores, whole foods, fresh market. Net carbs is around 10 grams.     Smoothie Luis Antonio - I'm often asked "what smoothie is healthy for me".   Do not be decieved to think that all smoothies are "healthy". In fact, most are loaded with hidden sugars.   Here are some from the menu that you are allowed to have being diabetic:   The gladiator - any flavor.   Keto champ (berry, chocolate or coffee - all have 10 grams of carbs).   The Lean 1 smoothies all have 15 - 20 grams of carbs, so this is slightly more. But would be ok for a meal substitute or snack.   The Shredder in Vanilla or chocolate only. (the strawberry has more sugar considerably)    Tips and Tricks:     Eat an extra vegetable once per day - green veggies (such as broccoli, cauliflower, green beans) - make you feel full and are low in sugar.     My favorite "secret" seasoning to make things extra yummy is cinnamon for sweet taste   For an added secret "salty" taste - add "everything but the bagel" seasoning (you can get on amazon.com or at  joes. I have seen at local groceries such as [employer] too!).     Dark colored fruits are your friend -- blueberries, strawberries, raspberries, blackberries. They have a lower sugar content. So will be the best choice for you.   Light colored fruits are NOT your friend - they tend to be higher in sugar and are not the best options for an ADA diet - examples are oranges, bananas, peaches, watermelon, -- you can eat these, but carefully and in moderation.     WATER. I cannot emphasize drinking water enough - it will hydrate you, make you full. Your body needs it - it's a natural appetite suppressant.   Sometimes hunger and thirst can feel the same. Try drinking some water first, then eat if you are still hungry.     Other snack " "choices    Celery with peanut butter   Celery with tuna salad   Dill pickles and cheddar cheese (no kidding, it's a great combo)   Nuts (keep raw ones in the freezer if you think you'll overeat them)   Sunflower seeds (get them in the shell so it will take longer to eat them)   Other seeds (How to Toast Pumpkin or Squash Seeds)    Pistachios or almonds - will fill you up and are tasty!    Low-Carb Trail Mix   Jerky (beef or turkey -- try to find low-sugar varieties)   Salami slices (you can find in the deli section)   Cheese sticks, such as string cheese   Sugar-free Jello, alone or with cottage cheese and a sprinkling of nuts. Make sugar-free lime Jello with part coconut milk -- For a large package, dissolve the powder in a cup of boiling water, add a can of coconut milk, and then add the rest of the water. Stir well.   Pepperoni "chips" -- Zap the slices in the microwave   Cheese with a few apple slices   4-ounce plain or sugar-free yogurt with berries and flax seed meal   Smoked salmon and cream cheese on cucumber slices   Lettuce Roll-ups -- Roll luncheon meat, egg salad, tuna or other filling and veggies in lettuce leaves   Lunch Meat Roll-ups -- Roll cheese or veggies in lunch meat (read the labels for carbs on the lunch meat)   Spread bean dip, spinach dip, or other low-carb dip or spread on the lunch meat or lettuce and then roll it up   Raw veggies and spinach dip, or other low-carb dip   Pork rinds (Chicharrón), with or without dip   Ricotta cheese with fruit and/or nuts and/or flax seed meal   Mushrooms with cheese spread inside (or other spreads or dips)   Low-carb snack bars (watch out for sugar alcohols, especially maltitol)   Product Review: Atkins Advantage Bars   Pepperoni Chips -- Microwave pepperoni slices until crisp. Great with cheeses and dips   Garlic Parmesan Flax Seed Crackers   Parmesan Crisps -- Good when you want a crunchy snack.   Peanut Butter Protein " Balls

## 2020-11-18 NOTE — PROGRESS NOTES
Progress Note  Nephrology      Referring physician: Griselda Nugent MD    Reason for visit: CKD     SUBJECTIVE:   75 y.o. male  has a past medical history of Arthritis, Cataract, Chronic diastolic congestive heart failure, Coronary artery disease, Cystoid macular edema of both eyes, Diabetes mellitus type II, Diabetic retinopathy, Hyperlipemia, Hypertension, Retinal hole, Stage 4 chronic kidney disease, and Streptococcus pyogenes bacteremia (12/23/2018). who has been following up in renal clinic for ckd. Patient feels well today, no urinary or cardiac symptoms, no NSAIDs intake. His renal function has been declining over recent years, his BP is 140-150 but he's running out of Lasix.        OBJECTIVE:     Vitals:    11/18/20 0902   BP: (!) 174/84   Pulse: 81          Physical Exam:  General: no distress, well nourished  HENT: PERRLA, Normal mouth nose and ears.  Neck: no JVD and thyroid not enlarged, symmetric, no tenderness/mass/nodules  Lungs: clear to auscultation bilaterally and normal respiratory effort  Cardiovascular: regular rate and rhythm, S1, S2 normal, no murmur, click, rub or gallop.   Abdomen: soft, non-tender non-distented; bowel sounds normal  Skin: No rashes or lesions  Musculoskeletal:2+ edema in LE, no deformities.   Lymph Nodes: No cervical or supraclavicular adenopathy  Neurologic: Normal strength and tone. No focal numbness or weakness    Dialysis Access: Not applicable.        Medications:    Current Outpatient Medications:     acetaminophen (TYLENOL) 325 MG tablet, Take 2 tablets (650 mg total) by mouth every 4 (four) hours as needed., Disp: , Rfl: 0    amLODIPine (NORVASC) 10 MG tablet, TAKE 1 TABLET BY MOUTH ONCE DAILY, Disp: 30 tablet, Rfl: 11    aspirin (ECOTRIN) 81 MG EC tablet, Take 81 mg by mouth once daily., Disp: , Rfl:     calcitRIOL (ROCALTROL) 0.25 MCG Cap, Take 1 capsule (0.25 mcg total) by mouth every Monday and Friday., Disp: 60 capsule, Rfl: 6    clopidogreL (PLAVIX) 75  "mg tablet, Take 1 tablet by mouth once daily, Disp: 30 tablet, Rfl: 0    furosemide (LASIX) 80 MG tablet, Take 1 tablet (80 mg total) by mouth once daily., Disp: 180 tablet, Rfl: 11    hydrALAZINE (APRESOLINE) 50 MG tablet, TAKE 1 TABLET BY MOUTH EVERY 12 HOURS, Disp: 60 tablet, Rfl: 0    insulin aspart U-100 (NOVOLOG FLEXPEN U-100 INSULIN) 100 unit/mL (3 mL) InPn pen, Inject 12 Units into the skin 3 (three) times daily with meals. With correction scale maximum 50 units daily., Disp: 15 mL, Rfl: 3    insulin aspart U-100 (NOVOLOG U-100 INSULIN ASPART) 100 unit/mL injection, Inject 40 Units into the skin 3 (three) times daily before meals. USE WITHIN VGO ONLY., Disp: 30 mL, Rfl: 11    LEVEMIR FLEXTOUCH U-100 INSULN 100 unit/mL (3 mL) InPn pen, Inject 26 Units into the skin every evening., Disp: 36 mL, Rfl: 3    olmesartan (BENICAR) 20 MG tablet, Take 2 tablets (40 mg total) by mouth once daily., Disp: 90 tablet, Rfl: 10    rosuvastatin (CRESTOR) 40 MG Tab, Take 1 tablet (40 mg total) by mouth every evening., Disp: 90 tablet, Rfl: 3    albuterol-ipratropium (DUO-NEB) 2.5 mg-0.5 mg/3 mL nebulizer solution, Take 3 mLs by nebulization every 4 (four) hours as needed for Wheezing or Shortness of Breath. Rescue, Disp: 90 mL, Rfl: 0    nebulizer and compressor Alana, USE AS DIRECTED, Disp: 1 each, Rfl: 0    pen needle, diabetic, safety (BD AUTOSHIELD PEN NEEDLE) 29 gauge x 5/16" Ndle, For use once daily with levemir flexpen, Disp: 90 each, Rfl: 11    sub-q insulin device, 20 unit (V-GO 20) Alana, 1 each by Misc.(Non-Drug; Combo Route) route once daily., Disp: 30 each, Rfl: 11         Laboratory:  Lab Results   Component Value Date    CREATININE 3.99 (H) 10/28/2020    CREATININE 3.99 (H) 10/28/2020       Prot/Creat Ratio, Urine   Date Value Ref Range Status   10/28/2020 3.28 (H) 0.00 - 0.20 Final   07/30/2020 2.07 (H) 0.00 - 0.20 Final   06/19/2020 1.92 (H) 0.00 - 0.20 Final       Lab Results   Component Value Date "     10/28/2020     10/28/2020    K 3.8 10/28/2020    K 3.8 10/28/2020    CO2 26 10/28/2020    CO2 26 10/28/2020       last PTH   Lab Results   Component Value Date    .0 (H) 10/28/2020    CALCIUM 9.5 10/28/2020    CALCIUM 9.5 10/28/2020    PHOS 4.1 10/28/2020       Lab Results   Component Value Date    HGB 9.6 (L) 10/28/2020        Lab Results   Component Value Date    HGBA1C 7.1 (H) 06/25/2020       Lab Results   Component Value Date    LDLCALC 99.0 07/08/2020       Other Labs were reviewed      ASSESSMENT/PLAN:       CKD IV-V   -likely from DMII and HTN  -Cr baseline ~ 3.5, with eGFR ~ 15 ml/min, declining ~ 3 ml/mi/yr.  -UPCR 2.0 g/d    HTN  uncontrolled on Benicar 20, Hydra and Amlodipine, lasix 40 bid (running out)    Anemia  -from ckd  -Hgb goal ~ 10  -Iron stores low    Secondary Hyperparathyroidism  -Phos/Ca acceptable  -PTH down on VitD      Acid/Base  -No Metabolic acidosis              PLAN:  -Instructed to adhere with Lasix 80 mg/d (might take extra pill for edema)/Low salt diet  -Pt will check Bp at home and report.  -Dialysis education   -Avoid NSAIDs intake        RTC in 3 months with labs      JOSE RAMIREZ MD  NEPHROLOGY ATTENDING

## 2020-11-18 NOTE — PROGRESS NOTES
Pleasant 75 y.o. gentleman, here for 1 month follow up for mangement of diabetes.   Was previously treated as T2dm, cpeptide came back low at 0.11   Also Pk slightly elevated at 0.03.   so treating as insulin dependent T1dm.     On insulin only prior to diagnosis of t1dm due to ESRD.   Has now seen nephrology per my recommendation for consult - has discussed the possibility of dialysis in the future - if patient needs.     Newly on Dexcom G6 cgm. Applied at last visit. He is wearing- requesting refill of sensors.   Already sent to dme. Awaiting shipment.   Has history of hypoglycemic episodes and was proven on recent darshana pro done in clinic - 5% lows.     Decreased his levemir from 26 to 22 units daily to eliminate low blood sugars.   Also recommended to decrease his novolog from 12 to 8 units with meals. He is doing for the most part, but sometimes taking up to 10 units with lunch and dinner.   Sent in/organized for him to switch to VGO 20 instead for more stable insulin delivery.   He would do well on pump therapy, but vgo better suited for him as it's simple for him with quick/easy use of buttons.   He hasn't received yet from pharmacy however. Has been approved for $0/month.     dexcom G6 downloaded today.   October 20th - October 23rd  Reviewed -   Average glucose is 167   56% TIR.   3% lows.   1% extreme lows.   31% highs.   9% extreme highs.       Last visit notes from 10/21/2020:   75-year-old gentleman, follow-up for type 2 diabetes.  His last seen October 9, 2020-those notes are below.  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    Chronic kidney disease/end-stage kidney disease with GFR at 16.  He is on insulin only for management of his diabetes.  He continues on 4 injections per day.  A1c has been very well controlled at 7.1%, but frequent hypoglycemic episode with recent EMS called with a very low blood sugar.    Last visit, started patient on Dexcom personal CGM T6-he  is using with his iPhone.  He returns for interpretation today.  States he enjoys using the Dexcom, however it alarms a lot and admits he is not taking some of his insulin because he is scared to go too low.  Normally on Levemir 26 units every night, but if the few nights a week.  Also on NovoLog 12 units with meals plus some sliding scale, however will normally take only 5-7 units if blood sugar is low or not take it all if his sugar is low going into the meal.    Fourteen day interpretation reviewed -   Average glucose of 202.  34% time in range.  3% lows.  2% extreme lows.  32% highs.  29% extreme highs.      Last visit notes from October 9th, 2020:   HPI: Domingo EVANGELISTA Matthew is a 75 y.o.  male c/I for visit to address Diabetes Type 2  This is the first time I am seeing this patient.   Sees Dr. Nugent for primary care needs.     was diagnosed with T2DM 28 years ago - right before he retired.   Has been on metformin in past - side effects, so no longer taking.   Changed to insulin once kidney function worsened.     Taking insulin 4 injections per day.   Is on MDI - levemir 26 units every HS,   novolog 12 units tid ac meals. titrates sugars based on self testing results.   If bg is less than 100, he will not take the insulin - he will eat only.     History of frequent hypoglycemic episodes - recent occasion where he had to call EMS and went into coma (3 months ago approx).   Has CKD, so prohibited from taking many other medications.   Also has history of CAD and on plavix and aspirin daily.   Also history of heart failure - on lasix, and fluid limits daily intake.   No acute complaints today's visit.     Past medical History:   Past Medical History:   Diagnosis Date    Arthritis     Cataract     Chronic diastolic congestive heart failure     Coronary artery disease     Cystoid macular edema of both eyes     Diabetes mellitus type II     Diabetic retinopathy     Hyperlipemia     Hypertension     Retinal hole      Stage 4 chronic kidney disease     Streptococcus pyogenes bacteremia 12/23/2018    Due to left toe osteomyelitis      Family hx:   Family History   Problem Relation Age of Onset    Heart disease Mother     Cancer Mother     Cancer Brother     Heart disease Father     Diabetes Father     Cancer Sister     Cataracts Paternal Grandmother     Glaucoma Paternal Grandmother     Blindness Neg Hx     Amblyopia Neg Hx     Hypertension Neg Hx     Macular degeneration Neg Hx     Retinal detachment Neg Hx     Strabismus Neg Hx       Current meds:   Current Outpatient Medications:     acetaminophen (TYLENOL) 325 MG tablet, Take 2 tablets (650 mg total) by mouth every 4 (four) hours as needed., Disp: , Rfl: 0    amLODIPine (NORVASC) 10 MG tablet, TAKE 1 TABLET BY MOUTH ONCE DAILY, Disp: 30 tablet, Rfl: 11    aspirin (ECOTRIN) 81 MG EC tablet, Take 81 mg by mouth once daily., Disp: , Rfl:     calcitRIOL (ROCALTROL) 0.25 MCG Cap, Take 1 capsule (0.25 mcg total) by mouth every Monday and Friday., Disp: 60 capsule, Rfl: 6    clopidogreL (PLAVIX) 75 mg tablet, Take 1 tablet by mouth once daily, Disp: 30 tablet, Rfl: 0    furosemide (LASIX) 80 MG tablet, Take 1 tablet (80 mg total) by mouth once daily., Disp: 180 tablet, Rfl: 11    hydrALAZINE (APRESOLINE) 50 MG tablet, TAKE 1 TABLET BY MOUTH EVERY 12 HOURS, Disp: 60 tablet, Rfl: 0    insulin aspart U-100 (NOVOLOG FLEXPEN U-100 INSULIN) 100 unit/mL (3 mL) InPn pen, Inject 12 Units into the skin 3 (three) times daily with meals. With correction scale maximum 50 units daily., Disp: 15 mL, Rfl: 3    insulin aspart U-100 (NOVOLOG U-100 INSULIN ASPART) 100 unit/mL injection, Inject 40 Units into the skin 3 (three) times daily before meals. USE WITHIN VGO ONLY., Disp: 30 mL, Rfl: 11    LEVEMIR FLEXTOUCH U-100 INSULN 100 unit/mL (3 mL) InPn pen, Inject 26 Units into the skin every evening., Disp: 36 mL, Rfl: 3    nebulizer and compressor Alana, USE AS DIRECTED,  "Disp: 1 each, Rfl: 0    olmesartan (BENICAR) 20 MG tablet, Take 2 tablets (40 mg total) by mouth once daily., Disp: 90 tablet, Rfl: 10    pen needle, diabetic, safety (BD AUTOSHIELD PEN NEEDLE) 29 gauge x 5/16" Ndle, For use once daily with levemir flexpen, Disp: 90 each, Rfl: 11    rosuvastatin (CRESTOR) 40 MG Tab, Take 1 tablet (40 mg total) by mouth every evening., Disp: 90 tablet, Rfl: 3    sub-q insulin device, 20 unit (V-GO 20) Alana, 1 each by Misc.(Non-Drug; Combo Route) route once daily., Disp: 30 each, Rfl: 11    albuterol-ipratropium (DUO-NEB) 2.5 mg-0.5 mg/3 mL nebulizer solution, Take 3 mLs by nebulization every 4 (four) hours as needed for Wheezing or Shortness of Breath. Rescue, Disp: 90 mL, Rfl: 0     Current Diabetes medications:   levemir   Humalog tid    Medications Tried and Failed:   lantus  humalog  metformin    Review of Pertinent co-morbidities/risk factors:   CV: Denies history of MI nor stroke.   CAD: yes - on plavix daily.   Takes aspirin 81mg tablet daily  BP: has history of HTN  Statin: Taking  ACE/ARB: Taking    Social History     Tobacco Use   Smoking Status Never Smoker   Smokeless Tobacco Never Used      Social:   Lives at home with: his wife. 2 grandchildren live at home with them - ages 2 and age 5.   Diet: following ADA diet   Meals: 2 per day and snacks.        Breakfast - Sometimes juice or a sandwich/sausage biscuit from [email or web address].        Lunch - rice, chicken.        Dinner - fish, fried fish.        Snacks - fruits often - apples, bananas, oranges, peaches, strawberries        Drinks - orange juice on occasion, water. (has to limit because of kidney and heart failure).   Exercise: none. Prohibited from back pain.   Activities: was an  at [employer] - retired 26 years ago.   Is a  full time.     Glucose Monitoring:   Checking 4 times per day -   Fasting in am - ranges 50 - 100's.  Variable bg's    Now using dexcom G6 personal   Is still having lows. See " "download.     Standards of care:   Eyes: .: 11/12/2020  Foot exam: : 10/09/2020   Diabetes education: None.    Vital Signs  BP (!) 158/90 (BP Location: Right arm, Patient Position: Sitting, BP Method: Medium (Manual))   Pulse 84   Temp 97.6 °F (36.4 °C) (Temporal)   Resp 16   Ht 6' 3" (1.905 m)   Wt 105.7 kg (233 lb)   SpO2 97%   BMI 29.12 kg/m²     Pertinent Labs:   Hgba1c   Lab Results   Component Value Date    HGBA1C 7.1 (H) 06/25/2020     Lipid panel   Lab Results   Component Value Date    CHOL 162 07/08/2020    CHOL 193 02/20/2020    CHOL 126 07/20/2018     Lab Results   Component Value Date    HDL 44 07/08/2020    HDL 56 02/20/2020    HDL 31 (L) 07/20/2018     Lab Results   Component Value Date    LDLCALC 99.0 07/08/2020    LDLCALC 118.4 02/20/2020    LDLCALC 76.8 07/20/2018     Lab Results   Component Value Date    TRIG 95 07/08/2020    TRIG 93 02/20/2020    TRIG 91 07/20/2018     Lab Results   Component Value Date    CHOLHDL 27.2 07/08/2020    CHOLHDL 29.0 02/20/2020    CHOLHDL 24.6 07/20/2018      CMP  Glucose   Date Value Ref Range Status   10/28/2020 50 (L) 70 - 110 mg/dL Final   10/28/2020 50 (L) 70 - 110 mg/dL Final     BUN   Date Value Ref Range Status   10/28/2020 49 (H) 2 - 20 mg/dL Final   10/28/2020 49 (H) 2 - 20 mg/dL Final     Creatinine   Date Value Ref Range Status   10/28/2020 3.99 (H) 0.50 - 1.40 mg/dL Final   10/28/2020 3.99 (H) 0.50 - 1.40 mg/dL Final     eGFR if    Date Value Ref Range Status   10/28/2020 15.9 (A) >60 mL/min/1.73 m^2 Final   10/28/2020 15.9 (A) >60 mL/min/1.73 m^2 Final     eGFR if non    Date Value Ref Range Status   10/28/2020 13.8 (A) >60 mL/min/1.73 m^2 Final     Comment:     Calculation used to obtain the estimated glomerular filtration  rate (eGFR) is the CKD-EPI equation.      10/28/2020 13.8 (A) >60 mL/min/1.73 m^2 Final     Comment:     Calculation used to obtain the estimated glomerular filtration  rate (eGFR) is the CKD-EPI " equation.         Microalbumin creatinine ratio:   Lab Results   Component Value Date    RAHLBCREAT 159.2 (H) 03/08/2016       Review Of Systems:   Gen: Appetite good, no weight gain or loss, denies fatigue and weakness. Denies polydipsia. Reports history of low glucose readings.   Skin: Skin is intact and heals well, denies any rashes or hair changes.   Eyes: Denies any acute visual disturbances, nor blurred vision.   Resp: Denies SOB or Dyspnea on exertion, denies cough.   Cardiac: Denies chest pain, palpitations, or swelling.   GI: Denies abdominal pain, nausea or vomiting, diarrhea, or constipation.   /GYN: Denies nocturia, nor burning, frequency or pain on urination.  MS/Neuro: Denies numbness/ tingling in BLE; Gait steady, speech clear  Psych: Denies drug/ETOH abuse, no hx of depression.  Other systems: negative.    Physical Exam:   GENERAL: Well developed, well nourished in appearance.   PSYCH: AAOx3, appropriate mood and affect, pleasant expression, conversant, appears relaxed, well groomed.   EYES: PERRL, Conjunctiva and corneas clear  NECK: Soft and Supple, trachea midline, No thyroid enlargement noted  CHEST: Even, regular, and unlabored respirations  ABDOMEN: Soft, non-distended   VASCULAR: pedal pulses palpable bilaterally, no edema.  NEURO:  cranial nerves II - XII intact   MUSCULOSKELETAL: Good ROM, steady gait.   SKIN: Skin warm, dry, and intact     Assessment and Plan of Care:     Domingo was seen today for diabetes.    Diagnoses and all orders for this visit:    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, with long-term current use of insulin  -     Ambulatory referral/consult to Diabetes Education; Future    Chronic diastolic congestive heart failure    Hypertension associated with diabetes    Hyperlipidemia associated with type 2 diabetes mellitus    Overweight (BMI 25.0-29.9)    CKD stage 4 due to type 2 diabetes  mellitus    Obesity, diabetes, and hypertension syndrome     1. T1DM with hyperglycemia- Hgba1c goal is 7.5% or less without hypoglycemia - is at 7.1% with hypoglycemic episodes in past.   Previously treated as T2dm.   cpeptide low at 0.11, BENNETT + 0.03.   For now, continue same regimen of MDI but decrease doses as he is skipping insulin b/c scared of lows.   levemir 26 units every HS --> decreased to 22 units every night at last visit. Advised to decrease further to 20 units every HS.   novolog 12 units tid ac meals --> decreased to 8 units tid ac meals. Advised to continue same. May need 8 with breakfast, 10 with lunch and 10 with dinner.   If you don't eat - don't take novolog.   Will organize to switch to vgo 20 for smoother control and less hyperglycemic and hypoglycemic episodes.   Continue use of personal sampled dexcom G6 cgm - will send rx for sensors to medicare.   Downloading hellen on his android phone and will set up alarms for him.   Patient is taking insulin injections 4 times per day.   He is checking his sugar currently 4 times per day.   This CGM is medically necessary for him as he has history of hypoglycemic episodes and hypoglycemic unawareness.   This will help to prevent lows in future and also help with management of his diabetes during the covid pandemic.   discussed DM, progression of disease, long term complications, CV risk factors and tx options.   Advise compliance with ADA diet and encourage exercise  Reviewed  hypoglycemia, s/s and appropriate tx. Have/get quick acting glucose tablets at hand.  Eyes- has history of Diabetic retinopathy. Dr. Paz 7/2020.    Feet - following with podiatry. History of left foot ulcer hallux with debridement. Dr. Gallegos.   History of left big toe osteomyelitis.     2. HTN - controlled for most part - continue meds as previously prescribed and monitor.   Has not taken medications today/this a.m.   Urine mac last done in 2016 - need to get new one next labs  due.   Benicar, amlodipine, lasix, olmesartan, hydralazine.      3. HLD- LDL goal < 100. Minimally at goal.   Currently on statin therapy- crestor 40mg every Hs. Continue.     4. Weight - BMI Body mass index is 29.12 kg/m².   Encourage Ada diet and exercise.      5. CKD stage 4 -  Can only do insulin at present.   Following with nephrology - Dr. Sotelo - increased lasix to 80mg every day.  Last seen 8/2020.   Urine mac 150+   Also with secondary Hyperparathyroidisim - pth intact elevated at 386.   Discussed recently the option of HD potentially in the next 1 - 2 years.     6. CHF - lasix. Stable. No acute exacerbations.   No actos - CI    7. CAD - on plavix 75 daily.     8. ED - hypogonadism history. No current complaints.       Meet with LEWIS Joe to train on vgo -   Follow up with me in 6 weeks thereafter.       12/16/2020 - Addendum:   vgo follow up with jitendra - reviewed dexcom - see below:   Reviewed notes -   Continues on vgo with 3 clicks   dexcom reviewed -   Average bg is 142.   77% time in range.   1% lows.   < 1% extreme low (happening early morning).   21% highs.   0% extreme highs.   Patient had lows 2 - 3 days.   Early morning.   Going to watch for now - no changes.   The lows were around bg of 70.   Will decrease dinner time clicks or have him take vgo off at bedtime, then reapply in morning.   He is on lowest strength (vgo 20)   Reassess at next OV/download dexcom G6   -Bisi Escobedo NP

## 2020-11-22 RX ORDER — CLOPIDOGREL BISULFATE 75 MG/1
TABLET ORAL
Qty: 30 TABLET | Refills: 3 | Status: SHIPPED | OUTPATIENT
Start: 2020-11-22 | End: 2021-05-03

## 2020-11-25 ENCOUNTER — CLINICAL SUPPORT (OUTPATIENT)
Dept: REHABILITATION | Facility: HOSPITAL | Age: 75
End: 2020-11-25
Attending: PHYSICAL MEDICINE & REHABILITATION
Payer: MEDICARE

## 2020-11-25 DIAGNOSIS — R53.1 DECREASED STRENGTH: ICD-10-CM

## 2020-11-25 DIAGNOSIS — M25.651 DECREASED RANGE OF RIGHT HIP MOVEMENT: ICD-10-CM

## 2020-11-25 DIAGNOSIS — R26.89 DECREASED MOBILITY: ICD-10-CM

## 2020-11-25 PROCEDURE — 97110 THERAPEUTIC EXERCISES: CPT | Mod: PN

## 2020-11-25 PROCEDURE — 97161 PT EVAL LOW COMPLEX 20 MIN: CPT | Mod: PN

## 2020-11-25 NOTE — PLAN OF CARE
[facility]  Physical Therapy Initial Evaluation    Name: Domingo Castro  Clinic Number: [id number]       Therapy Diagnosis:   Encounter Diagnoses   Name Primary?    Decreased range of right hip movement     Decreased strength     Decreased mobility      Physician: Awa Blunt, *    Physician Orders: PT Eval and Treat: Back and core strengthening and HIp rom, and strengthening, quad strength and hep  Medical Diagnosis from Referral:   M54.9 (ICD-10-CM) - Dorsalgia, unspecified   M25.559 (ICD-10-CM) - Hip pain   M47.816 (ICD-10-CM) - Spondylosis of lumbar region without myelopathy or radiculopathy     Evaluation Date: 11/25/2020  Authorization Period Expiration: 12/31/2020  Plan of Care Expiration: 1/22/2020  Visit # / Visits authorized: 1/50  FOTO: 1/5  PTA Visit: 0/6    Time In: 7:20 AM  Time Out: 7:45 AM  Total Billable Time: 25 minutes (1 TE + 1 LCE)    Precautions: Standard and CHF, CAD, DM II, HTN, HLD, CKD stage 4    Subjective   Date of onset: 8/2020  History of current condition - Domingo reports: he has been having low back pain since 1978 when he almost has a surgery on his lower back due to degenerative discs. He pain has been on and off, but recently worse this past 2-3 months. His pain is located in B low back area, R buttock especially, and pain that will radiate down the side of his buttock and thigh (unsure about any past past his knee). He has increased pain with transitions and transfers especially with STS and rolling in bed. Pt denies any numbness or tingling or any bowel/bladder changes. He is also currently managing his diabetes and chronic kidney disease; taking insulin shots.     Medical History:   Past Medical History:   Diagnosis Date    Arthritis     Cataract     Chronic diastolic congestive heart failure     Coronary artery disease     Cystoid macular edema of both eyes     Diabetes mellitus type II     Diabetic retinopathy     Hyperlipemia      Hypertension     Retinal hole     Stage 4 chronic kidney disease     Streptococcus pyogenes bacteremia 2018    Due to left toe osteomyelitis       Surgical History:   Domingo Castro  has a past surgical history that includes Tonsillectomy; Knee surgery; Left medial collateral ligament repair; focal laser right eye; Cataract extraction w/  intraocular lens implant (Left, 12/15/14); Cataract extraction w/  intraocular lens implant (Right, 14); Circumcision, non-; Insertion of tunneled central venous catheter (CVC) with subcutaneous port (Right, 2019); Abdominal aortography (N/A, 2019); and Angiography of lower extremity (Left, 2019).    Medications:   Domingo has a current medication list which includes the following prescription(s): acetaminophen, albuterol-ipratropium, amlodipine, aspirin, calcitriol, clopidogrel, furosemide, hydralazine, insulin aspart u-100, insulin aspart u-100, levemir flextouch u-100 insuln, nebulizer and compressor, olmesartan, pen needle, diabetic, safety, rosuvastatin, v-go 20, clopidogrel, and clopidogrel.    Allergies:   Review of patient's allergies indicates:   Allergen Reactions    Atorvastatin Other (See Comments)        Imaging: See imaging in EMR  2020 lumbar x-ray: Bony structures are intact.  The lumbosacral disc space is mildly narrowed, almost fused with the sacrum.  Degenerative changes seen around the facet joints remainder of the disc spaces are preserved.  No bony destruction is noted.    2020: Right: There is severe advanced DJD.  No fracture dislocation bone destruction seen.  Left: There is mild-moderate DJD.  No fracture dislocation bone destruction seen.    Prior Therapy: yes for low back in the past  Social History: home with wife  Occupation:   Prior Level of Function: mild low back and lateral R hip pain  Current Level of Function Limitations: increased pain with transitions/transfers  Pts goals: to walk better and  move with less pain    Pain:  Current 4/10, worst 8/10, best 3/10   Location: right groin, R anterior/lateral thigh, R lateral hip, B low back  Description: Aching, Dull, Throbbing, Tight and Sharp  Aggravating Factors: see limitations noted above  Easing Factors: tylenol 3x a day    Objective     Gait: antalgic gait, decreased RLE WB, decreased RLE stance time, decreased L step length  Posture: decreased lumbar lordosis, decreased  Sensation: WNL  Palpation: +TTP at R greater trochanter, glut med, PSIS bilaterally, and mdline low back L2-S1  Joint mobility: poor R>L hip IR/ER/abd  Flexibility: decreased hip flexor length B    A/PROM and MMT:  * = R groin and low back pain with testing  NT = Not tested  AROM: (degrees) LUMBAR   Flexion (40-50) 75%   Extension (15-20) 50%   Right side bending (~20) 80%   Left side bending  (~20) 80%   Right rotation (5-10) 80%   Left rotation (5-10) 80%     Hip  Right   Left  Pain/Dysfunction with Movement    AROM PROM MMT AROM PROM MMT    Flexion (L2,120 deg) 90 100 2+/5 100 105 3+/5    Extension (20 deg) NT NT 2+/5 NT NT 4-/5    Abduction (L5, 45 deg) 30 35 2+/5 45 45 4-/5    Adduction (30 deg) 20 20 4-/5 WFL WFL 5/5    IR (30 deg) 0 5 4/5 5 10 4+/5    ER (45 deg) 10 15 3+/5 WFL WFL 4/5       Knee  Right   Left  Pain/Dysfunction with Movement    AROM PROM MMT AROM PROM MMT    Flexion (S2, 140 deg) WFL WFL 4/5 WFL WFL 5/5    Extension (L3, 0-5 deg) WFL WFL 4/5 WFL WFL 5/5      Ankle  Right   Left  Pain/Dysfunction with Movement    AROM PROM MMT AROM PROM MMT    Dorsiflexion (L4, 20 deg) WFL WFL 5/5 WFL WFL 5/5    Plantarflexion (S1, 50 deg) WFL WFL 5/5 WFL WFL 5/5    Inversion (20-30 deg) WFL WFL NT WFL WFL NT    Eversion (5-10 deg) WFL WFL NT WFL WFL NT    Great toe extension (L5, 70 deg) WFL WFL NT WFL WFL NT      Lumbar Tests:  Vertical compression, elbow flexion, lumbar protective mechanism testing: not tested  Slump test = not tested  Quadrant test = positive on R  SLR Test  (L4-S3) = negative B  Lumbar distraction = not tested  SL Bridge Test (glut med strength) = not tested  Modified slump test in S/L (femoral nerve) = not tested  Ely's test (L2,L3,L4) = not tested    Cluster for spinal stenosis:   Jalil s/s, leg > LBP, pain during walking/standing, relief upon sitting, > 49 y/o = Met 3/4    Hip Special Tests:  FADIR = positive on R>L  ELICIA (+ if knee higher than other knee) = positive on R>L  Scour Test = positive on R>L    Hip OA cluster: (4/5 = 24.3 LR), x-ray gold standard  - pain with squatting, pain with active hip ext, lateral hip pain with active hip flexion, passive hip IR<25 degrees, (+) scour test with add causing lateral hip or groin pain    CMS Impairment/Limitation/Restriction for FOTO Lumbar Spine Survey    Therapist reviewed FOTO scores for Domingo EVANGELISTA Matthew on 11/25/2020.   FOTO documents entered into [employer] - see Media section.    Limitation Score: 71%  Category: Mobility  Predicted: 51%     TREATMENT   Treatment Time In: 7:37 AM  Treatment Time Out: 7:45 AM  Total Treatment time separate from Evaluation: 8 minutes    Domingo received therapeutic exercises to develop strength, endurance, ROM, flexibility, posture and core stabilization for 10 minutes including:  LTRs: 10x B  SKTC: 1x20'' B (cues to avoid heavy flexion B)  Bridges: 10x DL  S/L clams: 10x R  Supine hip add: 10x DL with ball    Next:   Manual RLE long axis traction, hip mobs B  Supine marching (cues to avoid groin pain)  Reverse clams (to pt tolerance)      Home Exercises and Patient Education Provided:  Education provided:   - abdominal bracing, lifting and carrying body mechanics, avoid activities that increase concordant pain  - course of therapy, prognosis    Written Home Exercises Provided: yes.  Exercises were reviewed and Domingo was able to demonstrate them prior to the end of the session.  Domingo demonstrated good  understanding of the education provided.     See EMR under Media for exercises provided  11/25/2020.    Assessment   Domingo is a 75 y.o. male referred to outpatient Physical Therapy at [facility] with a medical diagnosis of Hip pain, Dorsalgia, and Spondylosis of lumbar region without myelopathy or radiculopathy. Pt currently presents with R groin and R sided low back pain primarily, decreased lumbar ROM, significantly decreased R>L hip ROM, decreased BLE and lumbar/core strength, impaired posture, impaired balance and gait, and functional deficits with squatting, stair negotiation, prolonged walking/standing, and transfers/transitions. Pt would benefit from skilled PT consisting of gait training, muscular skeletal stretching/strengthening, manual therapy, neuro muscular re-education, and modalities prn to address limitations and increase functional mobility.    Pt prognosis is Fair.   Pt will benefit from skilled outpatient Physical Therapy to address the deficits stated above and in the chart below, provide pt/family education, and to maximize pt's level of independence.     Plan of care discussed with patient: Yes  Pt's spiritual, cultural and educational needs considered and patient is agreeable to the plan of care and goals as stated below:     Anticipated Barriers for therapy: advanced R>L hip OA, L5-S1 lumbosacral fusion, chronicity of LBP    Medical Necessity is demonstrated by the following  History  Co-morbidities and personal factors that may impact the plan of care Co-morbidities:   CHF, CAD, DM II, HTN, HLD, CKD stage 4    Personal Factors:   no deficits     high   Examination  Body Structures and Functions, activity limitations and participation restrictions that may impact the plan of care Body Regions:   back  lower extremities  trunk    Body Systems:    gross symmetry  ROM  strength  gross coordinated movement  balance  gait  transfers  transitions  motor control    Participation Restrictions:   None    Activity limitations:   Learning and applying knowledge  no deficits    General  Tasks and Commands  no deficits    Communication  no deficits    Mobility  lifting and carrying objects  walking    Self care  no deficits    Domestic Life  shopping  cooking  doing house work (cleaning house, washing dishes, laundry)    Interactions/Relationships  no deficits    Life Areas  no deficits    Community and Social Life  no deficits         high   Clinical Presentation stable and uncomplicated low   Decision Making/ Complexity Score: low     GOALS: Short Term Goals: 4 weeks  1. Pt will demo good TA muscle contraction for improved deep abdominal strength and lumbar stability.  2. Increase lumbar ROM to 80% of WNL in order to improve functional mobility.    3. Pt will demo good sitting/standing posture and body mechanics for improved spine health and decreased risk of future injury.  4. Pt to tolerate HEP to improve ROM and independence with ADL's.    Long Term Goals: 8 weeks  1. Report decreased low back and right groin pain without radiculopathy to </= 2/10 to increase tolerance for ADLs and increased QoL.  2. Increase strength to >/= 4/5 MMT grade for core and BLE to increase tolerance for ADL and work activities.  3. Pt to demonstrate negative SLR and/or Slump Test in order to show improved core strength and decreased nerve/dural tension.  4. Patient's goal: to walk better and move with less pain  5. Pt will report at </= 51% impaired on FOTO lumbar score for low back pain disability to demonstrate decrease in disability and improvement in back pain.    Plan   Plan of care Certification: 11/25/2020 to 1/22/2020.    Outpatient Physical Therapy 2 times weekly for 8 weeks to include the following interventions: Aquatic Therapy, Cervical/Lumbar Traction, Electrical Stimulation, Gait Training, Manual Therapy, Moist Heat/ Ice, Neuromuscular Re-ed, Orthotic Management and Training, Patient Education, Self Care, Therapeutic Activites and Therapeutic Exercise.     Tobias Kern, PT

## 2020-11-25 NOTE — PLAN OF CARE
[facility]  Physical Therapy Initial Evaluation    Name: Domingo Castro  Clinic Number: [id number]    Therapy Diagnosis: No diagnosis found.  Physician: Awa Blunt, *    Physician Orders: PT Eval and Treat ***  Medical Diagnosis from Referral: ***  Evaluation Date: 2020  Authorization Period Expiration: ***  Plan of Care Expiration: ***  Visit # / Visits authorized: 1/ ***  FOTO:   PTA Visit: 06    Cap Visit: 113.20  Cap Total: 113.20    Time In: ***  Time Out: ***  Total Billable Time: *** minutes (1 TE + 1 LCE)    Precautions: {IP WOUND PRECAUTIONS OHS:38429}    Subjective   Date of onset: ***  History of current condition - Domingo reports: ***     Medical History:   Past Medical History:   Diagnosis Date    Arthritis     Cataract     Chronic diastolic congestive heart failure     Coronary artery disease     Cystoid macular edema of both eyes     Diabetes mellitus type II     Diabetic retinopathy     Hyperlipemia     Hypertension     Retinal hole     Stage 4 chronic kidney disease     Streptococcus pyogenes bacteremia 2018    Due to left toe osteomyelitis       Surgical History:   Domingo Castro  has a past surgical history that includes Tonsillectomy; Knee surgery; Left medial collateral ligament repair; focal laser right eye; Cataract extraction w/  intraocular lens implant (Left, 12/15/14); Cataract extraction w/  intraocular lens implant (Right, 14); Circumcision, non-; Insertion of tunneled central venous catheter (CVC) with subcutaneous port (Right, 2019); Abdominal aortography (N/A, 2019); and Angiography of lower extremity (Left, 2019).    Medications:   Domingo has a current medication list which includes the following prescription(s): acetaminophen, albuterol-ipratropium, amlodipine, aspirin, calcitriol, clopidogrel, furosemide, hydralazine, insulin aspart u-100, insulin aspart u-100, levemir flextouch u-100 insuln,  "nebulizer and compressor, olmesartan, pen needle, diabetic, safety, rosuvastatin, v-go 20, clopidogrel, and clopidogrel.    Allergies:   Review of patient's allergies indicates:   Allergen Reactions    Atorvastatin Other (See Comments)        Imaging: See imaging in EMR  9/29/2020 lumbar x-ray: Bony structures are intact.  The lumbosacral disc space is mildly narrowed, almost fused with the sacrum.  Degenerative changes seen around the facet joints remainder of the disc spaces are preserved.  No bony destruction is noted.    11/12/2020: Right: There is severe advanced DJD.  No fracture dislocation bone destruction seen.  Left: There is mild-moderate DJD.  No fracture dislocation bone destruction seen.    Prior Therapy: ***  Social History: ***  Occupation: ***  Prior Level of Function: ***  Current Level of Function Limitations: ***  Pts goals: ***    Pain:  Current {0-10:95344::"0"}/10, worst {0-10:32878::"0"}/10, best {0-10:24778::"0"}/10   Location: {RIGHT LEFT BILATERAL:04427} {LOCATION ON BODY:76046}  Description: {Pain Description:11881}  Aggravating Factors: see limitations noted above  Easing Factors: {Pain (activities that relieve):90995}    Objective     Gait: ***  Posture: ***  Reflexes:    Clonus: ***   Willson's Test: negative   Babinski Test: not tested  DTR:    Right Left Comment   Patellar (L3-4) {Reflexes:78003} {Reflexes:56995}    Achilles (S1) {Reflexes:13800} {Reflexes:07417}      Sensation: ***  Palpation: ***  Joint mobility: ***  Flexibility: ***    A/PROM and MMT:  * = *** pain with testing  NT = Not tested  AROM: (degrees) LUMBAR   Flexion (40-50) ***   Extension (15-20) ***   Right side bending (~20) ***   Left side bending  (~20) ***   Right rotation (5-10) ***   Left rotation (5-10) ***     Hip  Right   Left  Pain/Dysfunction with Movement    AROM PROM MMT AROM PROM MMT    Flexion (L2,120 deg) WFL WFL *** WFL WFL ***    Extension (20 deg) WFL WFL *** WFL WFL ***    Abduction (L5, 45 " deg) WFL WFL *** WFL WFL ***    Adduction (30 deg) WFL WFL *** WFL WFL ***    IR (30 deg) WFL WFL *** WFL WFL ***    ER (45 deg) WFL WFL *** WFL WFL ***       Knee  Right   Left  Pain/Dysfunction with Movement    AROM PROM MMT AROM PROM MMT    Flexion (S2, 140 deg) WFL WFL *** WFL WFL ***    Extension (L3, 0-5 deg) WFL WFL *** WFL WFL ***      Ankle  Right   Left  Pain/Dysfunction with Movement    AROM PROM MMT AROM PROM MMT    Dorsiflexion (L4, 20 deg) WFL WFL *** WFL WFL ***    Plantarflexion (S1, 50 deg) WFL WFL *** WFL WFL ***    Inversion (20-30 deg) WFL WFL *** WFL WFL ***    Eversion (5-10 deg) WFL WFL *** WFL WFL ***    Great toe extension (L5, 70 deg) WFL WFL *** WFL WFL ***      Lumbar Tests:  Vertical compression, elbow flexion, lumbar protective mechanism testing: ***  Slump test = ***  Quadrant test = ***  SLR Test (L4-S3) = ***  Lumbar distraction = ***  SL Bridge Test (glut med strength) = not tested  Modified slump test in S/L (femoral nerve) = not tested  Ely's test (L2,L3,L4) = ***  Prone Instability Test = not tested  Sign of the buttock (very poor SLR, PROM hip flex with knee bent, and non-capsular pattern) = negative    Muscle length Tests:  Jose Test (thigh flat, 80 knee flexion) = ***  HS Length Test (80 degrees) = ***  Piriformis length test (flex 90, 20 add, 20 IR) = ***  Hip adductors (45 degrees) = ***  Kajal's test (+ if 10 deg below horizontal) = ***  Leg length, hip level = ***    SI Cluster Tests: (2/4 rule in, 0/4 rule out)  SI compression test = ***  SI distraction test (sidelying) = ***  Thigh Thrust Test = ***  Sacral Spring/Thrust = ***  Flick test = ***    Repeated Measures:  Lumbar flexion = ***  Lumbar extension = ***    Cluster for stabilization:   Age < 40,  SLR > 91, +PIT, aberrant motion = Met *** /4    Cluster for spinal stenosis:   Jalil s/s, leg > LBP, pain during walking/standing, relief upon sitting, > 47 y/o = Met *** /4    Hip Special Tests:  FADIR = ***  ELICIA (+ if  "knee higher than other knee) = ***  Scour Test = ***  Kajal's Test (+ if 10 deg below horizontal)= ***  Ameya's Test = ***  Trendelenburg Sign = ***  Jose Test (thigh flat, 80 degrees knee flexion) = ***   J-Sign = ***  Lucas's Test (norm is 8-15 deg): ***    Hip OA cluster: (4/5 = 24.3 LR), x-ray gold standard  - pain with squatting, pain with active hip ext, lateral hip pain with active hip flexion, passive hip IR<25 degrees, (+) scour test with add causing lateral hip or groin pain      CMS Impairment/Limitation/Restriction for FOTO Lumbar Spine Survey    Therapist reviewed FOTO scores for Domingo Castro on 11/25/2020.   FOTO documents entered into [employer] - see Media section.    Limitation Score: ***%  Category: Mobility  Predicted: ***%     TREATMENT   Treatment Time In: ***  Treatment Time Out: ***  Total Treatment time separate from Evaluation: 10 minutes    Domingo received therapeutic exercises to develop strength, endurance, ROM, flexibility, posture and core stabilization for 10 minutes including:    ***    Home Exercises and Patient Education Provided:  Education provided:   - abdominal bracing, lifting and carrying body mechanics, avoid activities that increase concordant pain  - course of therapy, prognosis    Written Home Exercises Provided: {Blank single:09713::"yes","Patient instructed to cont prior HEP"}.  Exercises were reviewed and Domingo was able to demonstrate them prior to the end of the session.  Domingo demonstrated {Desc; good/fair/poor:60345} understanding of the education provided.     See EMR under {Blank single:33859::"Media","Patient Instructions"} for exercises provided {Blank single:71726::"11/25/2020","prior visit"}.    Assessment   Domingo is a 75 y.o. male referred to outpatient Physical Therapy at [facility] with a medical diagnosis of ***. Pt currently presents with *** pain, decreased lumbar *** ROM, decreased {RIGHT /LEFT:36529} hip ROM, decreased *** strength, *** posture, impaired " balance and gait, and functional deficits with ***. Pt would benefit from skilled PT consisting of gait training, muscular skeletal stretching/strengthening, manual therapy, neuro muscular re-education, and modalities prn to address limitations and increase functional mobility.    Pt prognosis is {REHAB PROGNOSIS OHS:44190}.   Pt will benefit from skilled outpatient Physical Therapy to address the deficits stated above and in the chart below, provide pt/family education, and to maximize pt's level of independence.     Plan of care discussed with patient: Yes  Pt's spiritual, cultural and educational needs considered and patient is agreeable to the plan of care and goals as stated below:     Anticipated Barriers for therapy: ***    Medical Necessity is demonstrated by the following  History  Co-morbidities and personal factors that may impact the plan of care Co-morbidities:   {Co-morbidities:81836}    Personal Factors:   no deficits     {Desc; low/moderate/high:015340}   Examination  Body Structures and Functions, activity limitations and participation restrictions that may impact the plan of care Body Regions:   {Body Regions:98067}    Body Systems:    gross symmetry  ROM  strength  gross coordinated movement  balance  gait  transfers  transitions  motor control    Participation Restrictions:   ***    Activity limitations:   Learning and applying knowledge  no deficits    General Tasks and Commands  no deficits    Communication  no deficits    Mobility  {Mobility:99387}    Self care  {Self Care:66301}    Domestic Life  {Domestic Life:26629}    Interactions/Relationships  no deficits    Life Areas  no deficits    Community and Social Life  no deficits         high   Clinical Presentation stable and uncomplicated low   Decision Making/ Complexity Score: low     GOALS: Short Term Goals: 4 weeks  1. Pt will demo good TA muscle contraction for improved deep abdominal strength and lumbar stability.  2. Increase lumbar  ROM to ***% of WNL in order to improve functional mobility.    3. Pt will demo good sitting/standing posture and body mechanics for improved spine health and decreased risk of future injury.  4. Pt to tolerate HEP to improve ROM and independence with ADL's.    Long Term Goals: 8 weeks  1. Report decreased low back pain without radiculopathy to </= ***/10 to increase tolerance for ADLs and increased QoL.  2. Increase strength to >/= 4/5 MMT grade for core and BLE to increase tolerance for ADL and work activities.  3. Pt to demonstrate negative SLR and/or Slump Test in order to show improved core strength and decreased nerve/dural tension.  4. Patient's goal: ***  5. Pt will report at </= ***% impaired on FOTO lumbar score for low back pain disability to demonstrate decrease in disability and improvement in back pain.    Plan   Plan of care Certification: 11/25/2020 to ***.    Outpatient Physical Therapy 2 times weekly for 8 weeks to include the following interventions: Aquatic Therapy, Cervical/Lumbar Traction***, Electrical Stimulation ***, Gait Training, Manual Therapy, Moist Heat/ Ice, Neuromuscular Re-ed, Orthotic Management and Training, Patient Education, Self Care, Therapeutic Activites and Therapeutic Exercise.     Tobias Kern, PT

## 2020-11-27 ENCOUNTER — TELEPHONE (OUTPATIENT)
Dept: INFECTIOUS DISEASES | Facility: HOSPITAL | Age: 75
End: 2020-11-27

## 2020-11-27 PROBLEM — M25.651 DECREASED RANGE OF RIGHT HIP MOVEMENT: Status: ACTIVE | Noted: 2020-11-27

## 2020-11-27 PROBLEM — R26.89 DECREASED MOBILITY: Status: ACTIVE | Noted: 2020-11-27

## 2020-11-27 PROBLEM — R53.1 DECREASED STRENGTH: Status: ACTIVE | Noted: 2020-11-27

## 2020-11-27 NOTE — TELEPHONE ENCOUNTER
Attempted to contact patient to schedule iron infusion.  No answer, left voicemail with direct line to infusion suite asking patient to return our call.

## 2020-12-03 ENCOUNTER — CLINICAL SUPPORT (OUTPATIENT)
Dept: REHABILITATION | Facility: HOSPITAL | Age: 75
End: 2020-12-03
Attending: PHYSICAL MEDICINE & REHABILITATION
Payer: MEDICARE

## 2020-12-03 DIAGNOSIS — R53.1 DECREASED STRENGTH: ICD-10-CM

## 2020-12-03 DIAGNOSIS — M25.651 DECREASED RANGE OF RIGHT HIP MOVEMENT: ICD-10-CM

## 2020-12-03 DIAGNOSIS — R26.89 DECREASED MOBILITY: ICD-10-CM

## 2020-12-03 PROCEDURE — 97110 THERAPEUTIC EXERCISES: CPT | Mod: PN | Performed by: PHYSICAL THERAPIST

## 2020-12-03 PROCEDURE — 97140 MANUAL THERAPY 1/> REGIONS: CPT | Mod: PN | Performed by: PHYSICAL THERAPIST

## 2020-12-03 NOTE — PROGRESS NOTES
Physical Therapy Treatment Note     Name: Domingo Castro  Clinic Number: [id number]    Therapy Diagnosis: No diagnosis found.  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 He feels stiff this morning.  He was compliant with home exercise program.  Response to previous treatment: He had R LBP with L sided LTR and SKTC exercises  Functional change: none since evaluation    Pain: 7/10  Location: right back  and buttocks      Objective     Domingo received therapeutic exercises to develop strength, endurance, ROM, flexibility, posture and core stabilization for 15 minutes including:  LTRs: 15x B  SKTC: 1x20'' B (cues to avoid heavy flexion B) (NP)  Bridges: 10x DL  S/L clams: 10x R  Supine hip add and abd alternating: 10x DL with ball and gait belt     Next:   Manual RLE long axis traction, hip mobs B  Supine marching (cues to avoid groin pain)  Reverse clams (to pt tolerance)    Domingo received the following manual therapy techniques: Joint mobilizations, Manual traction, Myofacial release and Soft tissue Mobilization were applied to the: R hip and lumbar spine for 25 minutes, including:  Manual R long axis distraction of hip  Manual R lateral glides  using belt  Manual R hip ER/IR PROM and piriformis stretch  Manual intermittent lumbar traction      Home Exercises and Patient Education Provided:  Education provided:   - abdominal bracing, lifting and carrying body mechanics, avoid activities that increase concordant pain  - course of therapy, prognosis     Written Home Exercises Provided: yes.  Exercises were reviewed and Domingo was able to demonstrate them prior to the end of the session.  Domingo demonstrated good  understanding of the education provided.      See EMR under Media for exercises provided 11/25/2020.    Assessment      Domingo arrived today with complaint of R hip/buttock pain. His session today concentrated on pain relief, mostly with manual hip joint mobilizations and manual lumbar traction. Prior to the hip joint mobilizations, a piriformis stretch was unable to be achieved due to pinching pain in his groin. Afterwards, he felt an appropriate stretch. After manual lumbar traction, he denied any pain at all and reported being very happy.    Domingo is progressing well towards his goals.   Pt prognosis is Fair.     Pt will continue to benefit from skilled outpatient physical therapy to address the deficits listed in the problem list box on initial evaluation, provide pt/family education and to maximize pt's level of independence in the home and community environment.     Pt's spiritual, cultural and educational needs considered and pt agreeable to plan of care and goals.     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 Pt will demo good TA muscle contraction for improved deep abdominal strength and lumbar stability.  2. Increase lumbar ROM to 80% of WNL in order to improve functional mobility.    3. Pt will demo good sitting/standing posture and body mechanics for improved spine health and decreased risk of future injury.  4. Pt to tolerate HEP to  improve ROM and independence with ADL's.     Long Term Goals: 8 weeks  1. Report decreased low back and right groin pain without radiculopathy to </= 2/10 to increase tolerance for ADLs and increased QoL.  2. Increase strength to >/= 4/5 MMT grade for core and BLE to increase tolerance for ADL and work activities.  3. Pt to demonstrate negative SLR and/or Slump Test in order to show improved core strength and decreased nerve/dural tension.  4. Patient's goal: to walk better and move with less pain  5. Pt will report at </= 51% impaired on FOTO lumbar score for low back pain disability to demonstrate decrease in disability and improvement in back pain.      Plan     Continue with POC    Alexis Champagne, PT

## 2020-12-04 ENCOUNTER — PATIENT OUTREACH (OUTPATIENT)
Dept: ADMINISTRATIVE | Facility: OTHER | Age: 75
End: 2020-12-04

## 2020-12-04 NOTE — PROGRESS NOTES
Updates were requested from care everywhere.  Chart was reviewed for overdue Proactive [facility] Encounters ([facility]) topics (CRS, Breast Cancer Screening, Eye exam)  Health Maintenance has been updated.  LINKS not responding.

## 2020-12-08 ENCOUNTER — CLINICAL SUPPORT (OUTPATIENT)
Dept: DIABETES | Facility: CLINIC | Age: 75
End: 2020-12-08
Payer: MEDICARE

## 2020-12-08 ENCOUNTER — CLINICAL SUPPORT (OUTPATIENT)
Dept: REHABILITATION | Facility: HOSPITAL | Age: 75
End: 2020-12-08
Attending: PHYSICAL MEDICINE & REHABILITATION
Payer: MEDICARE

## 2020-12-08 DIAGNOSIS — R53.1 DECREASED STRENGTH: ICD-10-CM

## 2020-12-08 DIAGNOSIS — R26.89 DECREASED MOBILITY: ICD-10-CM

## 2020-12-08 DIAGNOSIS — M25.651 DECREASED RANGE OF RIGHT HIP MOVEMENT: ICD-10-CM

## 2020-12-08 PROCEDURE — 95251 PR GLUCOSE MONITOR, 72 HOUR, PHYS INTERP: ICD-10-PCS | Mod: ,,, | Performed by: NURSE PRACTITIONER

## 2020-12-08 PROCEDURE — 97140 MANUAL THERAPY 1/> REGIONS: CPT | Mod: PN

## 2020-12-08 PROCEDURE — 95251 CONT GLUC MNTR ANALYSIS I&R: CPT | Mod: ,,, | Performed by: NURSE PRACTITIONER

## 2020-12-08 PROCEDURE — G0108 DIAB MANAGE TRN  PER INDIV: HCPCS | Mod: PBBFAC,PO | Performed by: DIETITIAN, REGISTERED

## 2020-12-08 PROCEDURE — 97110 THERAPEUTIC EXERCISES: CPT | Mod: PN

## 2020-12-08 PROCEDURE — 95249 CONT GLUC MNTR PT PROV EQP: CPT | Mod: PBBFAC,PO | Performed by: NURSE PRACTITIONER

## 2020-12-08 NOTE — PROGRESS NOTES
Diabetes Care Specialist Progress Note  Author: Saima Corrigan RD, CDE  Date: 12/8/2020    Program Intake  Reason for Diabetes Program Visit:: Initial Diabetes Assessment  Current diabetes risk level:: low  In the last 12 months, have you:: none  Permission to speak with others about care:: yes(Spouse)    Clinical    Patient Health Rating  Compared to other people your age, how would you rate your health?: Fair    Problem Review  Reviewed Problem List with Patient: yes  Active comorbidities affecting diabetes self-care.: yes  Comorbidities: Hypertension, Retinopathy, Chronic Kidney Disease  Reviewed health maintenance: yes    Clinical Assessment  Current Diabetes Treatment: Insulin(Starting on VGO today)  Have you ever experienced hypoglycemia (low blood sugar)?: no(None in the last 30 days; reports symptoms of shaky, sweaty)  Have you ever experienced hyperglycemia (high blood sugar)?: yes  Are you able to tell when your blood sugar is high?: more than once a day  What are your symptoms?: other (see comments)(No symptoms)  Have you ever been hospitalized because your blood sugar was high?: no    Medication Information  How do you obtain your medications?: Patient drives  How many days a week do you miss your medications?: Never(Very rarely if does)  Do you use a pill box or medication chart to help you manage your medications?: No  Do you sometimes have difficulty refilling your medications?: No  Medication adherence impacting ability to self-manage diabetes?: No    Labs  Do you have regular lab work to monitor your medications?: Yes  Type of Regular Lab Work: A1c, Cholesterol, BMP, CBC    Nutritional Status  Diet: Regular(Eats 3 meals a day; may have a snack; water, sweet tea)  Change in appetite?: Yes(Decrease in appetite in the AM)  Dentation:: Intact  Recent Changes in Weight: No Recent Weight Change  Current nutritional status an area of need that is impacting patient's ability to self-manage diabetes?:  Yes    Additional Social History    Support  Does anyone support you with your diabetes care?: yes  Primary Caregiver: spouse  Who takes you to your medical appointments?: patient  Does the current primary caregiver meet the patient's needs?: Yes  Is Caregiver an area of support impacting ability to self-manage diabetes?: No    Access to Mass Media & Technology  Does the patient have access to any of the following devices or technologies?: Smart phone, Home computer, Tablet, Internet Access  Media or technology needs impacting ability to self-manage diabetes?: No    Cognitive/Behavioral Health  Alert and Oriented: Yes  Difficulty Thinking: No  Requires Prompting: No  Requires assistance for routine expression?: No  Cognitive or behavioral barriers impacting ability to self-manage diabetes?: No    Culture/Anabaptism  Culture or Restorationist beliefs that may impact ability to access healthcare: No    Communication  Language preference: English  Hearing Problems: No  Vision Problems: No  Communication needs impacting ability to self-manage diabetes?: No    Health Literacy  Preferred Learning Method: Face to Face  How often do you need to have someone help you read instructions, pamphlets, or written material from your doctor or pharmacy?: Never  Health literacy needs impacting ability to self-manage diabetes?: No      Diabetes Self-Management Skills Assessment    Diabetes Disease Process/Treatment Options  Patient/caregiver able to state what happens when someone has diabetes.: yes  Patient/caregiver knows what type of diabetes they have.: yes  Diabetes Type : Type II  Patient/caregiver able to identify at least three signs and symptoms of diabetes.: yes  Identified signs and symptoms:: blurred vision, fatigue, frequent infections, frequent urination, increased thirst, sexual dysfunction  Patient able to identify at least three risk factors for diabetes.: yes  Identified risk factors:: age over 40, being overweight,  ethnicity, family history, history of gestational diabetes, reduced activity  Diabetes Disease Process/Treatment Options: Skills Assessment Completed: Yes  Assessment indicates:: Adequate understanding  Area of need?: No    Nutrition/Healthy Eating  Method of carbohydrate measurement:: no method  Patient can identify foods that impact blood sugar.: yes  Patient-identified foods:: fruit/fruit juice, soda, milk, starchy vegetables (corn, peas, beans), starches (bread, pasta, rice, cereal), sweets, yogurt  Nutrition/Healthy Eating Skills Assessment Completed:: Yes  Assessment indicates:: Adequate understanding  Area of need?: No    Physical Activity/Exercise  Patient's daily activity level:: lightly active(Currently working with PT)  Physical Activity/Exercise Skills Assessment Completed: : Yes  Assessment indicates:: Adequate understanding  Area of need?: No    Medications  Patient is able to describe current diabetes management routine.: yes  Diabetes management routine:: diet, exercise, insulin  Patient is able to identify current diabetes medications, dosages, and appropriate timing of medications.: yes  Patient understands the purpose of the medications taken for diabetes.: yes  Patient reports problems or concerns with current medication regimen.: no  Medication Skills Assessment Completed:: Yes  Assessment indicates:: Adequate understanding  Area of need?: No    Patient is here for training on the VGo 20 which will provide 20 units of basal insulin given over 24 hours (0.833 units/hr) and up to 36 units of on-demand bolus dosing in 2-unit increments.     Patient will be giving 3 clicks at each meal, 4 clicks if BG over 200, 2 clicks if BG less than 100 and 1 click at each snack.    Patient was instructed on the following:    Insert vial into the EZ Fill and leave in place until vial is empty   Remove plug and insert VGo    Draw insulin into EZ Fill and fill VGo with insulin (check that VGo is full of insulin)     Remove VGo and clean and replace plug (advised to wipe the circular opening with an alcohol swab before replacing)    Select site for VGo (site selection reviewed) and prepare infusion site with aseptic technique    Remove button cover and adhesive liner    Attach VGo to site selected and insert needle by pushing needle button in to activate basal rate    Instructed patient on how to activate the bolus ready button and to push the bolus delivery button into the VGo to deliver 2 units of insulin (1 click = 2 units). Patient advised that once all 36 units of the on-demand bolus have been given, the bolus buttons will lock, but the basal insulin will continue for the remainder of the 24 hours    To remove, release needle by sliding and pressing the needle release button    Remove the VGo and discard (the VGo is 100% disposable)   Patient instructed that the VGo needs to be changed every 24 hours    Patient was able to perform successful return demonstration of all.     General precautions reviewed with the patient:    VGo needs to be removed before having an MRI. Replace with a new V-Go after the test or procedure is completed. Patient also advised to check with her doctor before any type of surgical procedure to see if the V-Go can be worn.    Patient advised to keep back up long-acting insulin pens (non-) should a problem develop with the VGo. Patient also advised that he/she should have directions from her MD regarding insulin doses should he/she need to switch back to multiple daily injections temporarily.    The VGo should not be exposed to direct sunlight, hot tub, whirlpool or sauna use (replace with a new filled VGo afterward)    Check that the VGo is securely in place during and after periods of increased physical activity, exposure to water, or gone under water to the depth of 3 feet, 3 inches (the VGo can go under water and continue to work safely)     Home Blood Glucose  "Monitoring  Patient states that blood sugar is checked at home daily.: (P) yes  Monitoring Method:: (P) home glucometer; patient will be starting Dexcom today  How often do you check your blood sugar?: (P) 3 times a day  Blood glucose logs:: (P) no  Blood glucose logs reviewed today?: (P) no  Home Blood Glucose Monitoring Skills Assessment Completed: : (P) Yes  Assessment indicates:: (P) Adequate understanding  Area of need?: (P) No    Patient referred to clinic today for Dexcom G6 continuous glucose sensor system training.  Patient arrived with Dexcom G6 mobile hellen downloaded to phone. Education was provided using "Quick Start Guide" per Dexcom protocol.     Pt will be using his phone as the primary .  § Overview:  5min glucose reading updates, trending arrows, BG graph screens, battery life indicator, Blue Tooth Symbol.  § Menus: trend Graph, start sensor, enter BG, events, Alerts, Settings, Shutdown, Stop Sensor  §  Settings:                          * Urgent Low: 55 mg/dL                          * Low alert: 80 mg/dl                           * High alert: 250 mg/dl                          * Signal Loss: on repeat 30 min    · Reviewed additional setting options with patient, including Graph Height and Transmitter ID. Transmitter was paired with .    · Reviewed where to find sensor insertion time and sensor expiration date.   · Discussed no need to calibrate sensor during the entirety of the 10 day wear. Discussed that pt can calibrate sensor if desired, but at that time he will need to continue calibrating every 12 hours for sensor to remain accurate. Reviewed appropriate calibration techniques.  · Reviewed sensor site selection. Site selected and prepped using aseptic technique Inserted to left abdomen. Transmitter placed in pod and secured.  · Practiced sensor pod/transmitter removal from site, and removal of transmitter from sensor pod.  · Patient able to demonstrate without " difficulty.  Encouraged to review manual prior to starting another sensor. Link to video provided as well.  · Reviewed problem solving aspects of sensor transmission/ variables that can disrupt RF transmission.  range 20 feet, but the first 3 hrs keep within 3 feet of transmitter.  · Pt instructed on lag time of interstitial fluid from CBG and was advised to tx hypoglycemia and dose insulin based on SMBG values.  · Dexcom technical support contact number given and examples of when to contact them discussed. Patient is linked to [facility].     Acute Complications  Patient is able to identify types of acute complications: (P) Yes  Patient Identified:: (P) Hypoglycemia, Hyperglycemia  Patient is able to state the basic meaning of hypoglycemia?: (P) Yes  Patient can identify general symptoms of hypoglycemia: (P) yes  Patient identified:: (P) sweating, shakiness, dizziness  Able to state proper treatment of hypoglycemia?: (P) yes  Patient identified:: (P) 1/2 can soda/fruit juice, 1 tablespoon sugar/honey, 1 tube glucose gel, 4 glucose tablets, 5-6 pieces of hard candy  Patient is able to state the basic meaning of hyperglycemia?: (P) Yes  Patient able to state proper treatment of hyperglycemia?: (P) yes  Patient identified:: (P) contact provider, increase water intake, monitor blood sugar, take medication as recommended  Patient able to verbalize sick day plan?: (P) no  Acute Complications Skills Assessment Completed: : (P) Yes  Assessment indicates:: (P) Adequate understanding  Area of need?: (P) No    Chronic Complications  Patient can identify major chronic complications of diabetes.: (P) yes  Stated chronic complications:: (P) heart disease/heart attack, kidney disease, neuropathy/nerve damage, retinopathy, sexual dysfunction, stroke  Patient can identify ways to prevent or delay diabetes complications.: (P) yes  Stated ways to prevent complications:: (P) checking feet daily and having routine comprehensive  foot exams, controlling blood sugar, controlling cholesterol and triglycerides, having regular diabetic eye exams, healthy eating and regular activity, maintaining optimal blood glucose control  Patient is aware that having diabetes increases risk of heart disease?: (P) Yes  Chronic Complications Skills Assessment Completed: : (P) Yes  Assessment indicates:: (P) Adequate understanding  Area of need?: (P) No    Psychosocial/Coping  Psychosocial/Coping Skills Assessment Completed: : (P) No  Deffered due to:: (P) Time    Assessment Summary and Plan  Based on today's diabetes care assessment, the following areas of need were identified:      Social 12/8/2020   Support No   Access to Mass Media/Tech No   Cognitive/Behavioral Health No   Culture/Scientologist No   Communication No   Health Literacy No        Clinical 12/8/2020   Medication Adherence No   Nutritional Status Yes        Diabetes Self-Management Skills 12/8/2020   Diabetes Disease Process/Treatment Options No   Nutrition/Healthy Eating No   Physical Activity/Exercise No   Medication No   Home Blood Glucose Monitoring No   Acute Complications No   Chronic Complications No      Diabetes Self-Management Care Plan:    Today's Self-Management Care Plan was developed with Domingo's input. Domingo   has agreed to work toward these goals to improve his/her overall diabetes control.      Care Plan: Diabetes Management   Updates made since 12/8/2020 12:00 AM      Problem: Medications       Goal: Use VGO as instructed    Start Date: 12/8/2020   Expected End Date: 3/8/2021            Task: Instructed on VGO device Completed 12/8/2020               Today's self-management plan was reviewed, and Domingo verbalized understanding of the care plan, goals, and all of today's instructions.       The patient was encouraged to communicate with his physician and care team regarding@ condition(s) and treatment.  I provided the patient with my contact information today and encouraged to  contact me via phone or patient portal as needed.     Follow Up  Follow up in about 2 weeks (around 12/22/2020).    Total Time: 160

## 2020-12-08 NOTE — PROGRESS NOTES
Physical Therapy Treatment Note     Name: Domingo Castro  Clinic Number: [id number]    Therapy Diagnosis:   Encounter Diagnoses   Name Primary?   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 He can get up easier and with less pain as well.  He was compliant with home exercise program.  Response to previous treatment: He had R LBP with L sided LTR and SKTC exercises  Functional change: none since evaluation    Pain: 7/10  Location: right back  and buttocks      Objective     Domingo received therapeutic exercises to develop strength, endurance, ROM, flexibility, posture and core stabilization for 13 minutes including:    Mat:  LTRs: 20x B  SKTC: 3x20'' B (cues to avoid heavy flexion B)  Bridges: 2x12 DL with hip add  S/L clams: 3x10 R, pillow between legs to decrease adduction depth  Supine hip add and abd alternating: 10x DL with ball and gait belt  hooklying marching: next (perform in pain free range)    Standing:  Standing hip abd: next in pain free range without compensation    Domingo received the following manual therapy techniques: Joint mobilizations, Manual  traction, Myofacial release and Soft tissue Mobilization were applied to the: R hip and lumbar spine for 25 minutes, including:  Manual R long axis distraction of hip  Manual R lateral glides using belt  Manual R hip ER/IR PROM and piriformis stretch  Manual intermittent lumbar traction      Home Exercises and Patient Education Provided:  Education provided:   - abdominal bracing, lifting and carrying body mechanics, avoid activities that increase concordant pain  - course of therapy, prognosis     Written Home Exercises Provided: yes.  Exercises were reviewed and Domingo was able to demonstrate them prior to the end of the session.  Domingo demonstrated good  understanding of the education provided.      See EMR under Media for exercises provided 11/25/2020.    Assessment     Pt doing well and slowly increased R glut and hip ER/IR strength. Fair to poor R hip ER strength present though improving since evaluation. Increase exercise intensity focusing on hip stabilizers. Decreased pain with manual especially short axis right hip traction and lateral hip traction.    Domingo is progressing well towards his goals.   Pt prognosis is Fair.     Pt will continue to benefit from skilled outpatient physical therapy to address the deficits listed in the problem list box on initial evaluation, provide pt/family education and to maximize pt's level of independence in the home and community environment.     Pt's spiritual, cultural and educational needs considered and pt agreeable to plan of care and goals.     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 Pt will demo good TA muscle contraction for improved deep abdominal strength and lumbar stability.  2. Increase lumbar ROM to 80% of WNL in order to improve functional mobility.    3. Pt will demo good sitting/standing posture and body mechanics for improved spine health and  decreased risk of future injury.  4. Pt to tolerate HEP to improve ROM and independence with ADL's.     Long Term Goals: 8 weeks  1. Report decreased low back and right groin pain without radiculopathy to </= 2/10 to increase tolerance for ADLs and increased QoL.  2. Increase strength to >/= 4/5 MMT grade for core and BLE to increase tolerance for ADL and work activities.  3. Pt to demonstrate negative SLR and/or Slump Test in order to show improved core strength and decreased nerve/dural tension.  4. Patient's goal: to walk better and move with less pain  5. Pt will report at </= 51% impaired on FOTO lumbar score for low back pain disability to demonstrate decrease in disability and improvement in back pain.      Plan     Continue with POC    Tobias Kern, PT

## 2020-12-09 ENCOUNTER — INFUSION (OUTPATIENT)
Dept: INFECTIOUS DISEASES | Facility: HOSPITAL | Age: 75
End: 2020-12-09
Attending: INTERNAL MEDICINE
Payer: MEDICARE

## 2020-12-09 VITALS
TEMPERATURE: 99 F | BODY MASS INDEX: 29.34 KG/M2 | DIASTOLIC BLOOD PRESSURE: 80 MMHG | HEIGHT: 75 IN | WEIGHT: 236 LBS | HEART RATE: 70 BPM | OXYGEN SATURATION: 96 % | RESPIRATION RATE: 18 BRPM | SYSTOLIC BLOOD PRESSURE: 175 MMHG

## 2020-12-09 DIAGNOSIS — N18.4 ANEMIA OF CHRONIC RENAL FAILURE, STAGE 4 (SEVERE): Primary | ICD-10-CM

## 2020-12-09 DIAGNOSIS — D63.1 ANEMIA OF CHRONIC RENAL FAILURE, STAGE 4 (SEVERE): Primary | ICD-10-CM

## 2020-12-09 PROCEDURE — 63600175 PHARM REV CODE 636 W HCPCS: Mod: JG | Performed by: INTERNAL MEDICINE

## 2020-12-09 PROCEDURE — 25000003 PHARM REV CODE 250: Performed by: INTERNAL MEDICINE

## 2020-12-09 PROCEDURE — 96365 THER/PROPH/DIAG IV INF INIT: CPT

## 2020-12-09 RX ORDER — SODIUM CHLORIDE 0.9 % (FLUSH) 0.9 %
10 SYRINGE (ML) INJECTION
Status: DISCONTINUED | OUTPATIENT
Start: 2020-12-09 | End: 2020-12-09 | Stop reason: HOSPADM

## 2020-12-09 RX ORDER — METHYLPREDNISOLONE SOD SUCC 125 MG
125 VIAL (EA) INJECTION ONCE AS NEEDED
Status: DISCONTINUED | OUTPATIENT
Start: 2020-12-09 | End: 2020-12-09 | Stop reason: HOSPADM

## 2020-12-09 RX ORDER — DIPHENHYDRAMINE HYDROCHLORIDE 50 MG/ML
50 INJECTION INTRAMUSCULAR; INTRAVENOUS ONCE AS NEEDED
Status: DISCONTINUED | OUTPATIENT
Start: 2020-12-09 | End: 2020-12-09 | Stop reason: HOSPADM

## 2020-12-09 RX ORDER — SODIUM CHLORIDE 0.9 % (FLUSH) 0.9 %
10 SYRINGE (ML) INJECTION
Status: CANCELLED | OUTPATIENT
Start: 2020-12-16

## 2020-12-09 RX ORDER — EPINEPHRINE 0.3 MG/.3ML
0.3 INJECTION SUBCUTANEOUS ONCE AS NEEDED
Status: CANCELLED | OUTPATIENT
Start: 2020-12-16

## 2020-12-09 RX ORDER — METHYLPREDNISOLONE SOD SUCC 125 MG
125 VIAL (EA) INJECTION ONCE AS NEEDED
Status: CANCELLED | OUTPATIENT
Start: 2020-12-16

## 2020-12-09 RX ORDER — HEPARIN 100 UNIT/ML
5 SYRINGE INTRAVENOUS
Status: DISCONTINUED | OUTPATIENT
Start: 2020-12-09 | End: 2020-12-09 | Stop reason: HOSPADM

## 2020-12-09 RX ORDER — SODIUM CHLORIDE 9 MG/ML
INJECTION, SOLUTION INTRAVENOUS CONTINUOUS
Status: DISCONTINUED | OUTPATIENT
Start: 2020-12-09 | End: 2020-12-09 | Stop reason: HOSPADM

## 2020-12-09 RX ORDER — DIPHENHYDRAMINE HYDROCHLORIDE 50 MG/ML
50 INJECTION INTRAMUSCULAR; INTRAVENOUS ONCE AS NEEDED
Status: CANCELLED | OUTPATIENT
Start: 2020-12-16

## 2020-12-09 RX ORDER — SODIUM CHLORIDE 9 MG/ML
INJECTION, SOLUTION INTRAVENOUS CONTINUOUS
Status: CANCELLED | OUTPATIENT
Start: 2020-12-16

## 2020-12-09 RX ORDER — HEPARIN 100 UNIT/ML
5 SYRINGE INTRAVENOUS
Status: CANCELLED | OUTPATIENT
Start: 2020-12-16

## 2020-12-09 RX ORDER — EPINEPHRINE 0.3 MG/.3ML
0.3 INJECTION SUBCUTANEOUS ONCE AS NEEDED
Status: DISCONTINUED | OUTPATIENT
Start: 2020-12-09 | End: 2020-12-09 | Stop reason: HOSPADM

## 2020-12-09 RX ADMIN — FERRIC CARBOXYMALTOSE INJECTION 750 MG: 50 INJECTION, SOLUTION INTRAVENOUS at 09:12

## 2020-12-09 NOTE — PROGRESS NOTES
Patient arrived for Injectafer 1/2.  Patient observed for one hour post infusion.  No signs/symptoms of of adverse reaction observed. Tolerated infusion well and left in NAD.

## 2020-12-11 ENCOUNTER — CLINICAL SUPPORT (OUTPATIENT)
Dept: REHABILITATION | Facility: HOSPITAL | Age: 75
End: 2020-12-11
Attending: PHYSICAL MEDICINE & REHABILITATION
Payer: MEDICARE

## 2020-12-11 ENCOUNTER — PATIENT MESSAGE (OUTPATIENT)
Dept: OTHER | Facility: OTHER | Age: 75
End: 2020-12-11

## 2020-12-11 DIAGNOSIS — R53.1 DECREASED STRENGTH: ICD-10-CM

## 2020-12-11 DIAGNOSIS — R26.89 DECREASED MOBILITY: ICD-10-CM

## 2020-12-11 DIAGNOSIS — M25.651 DECREASED RANGE OF RIGHT HIP MOVEMENT: ICD-10-CM

## 2020-12-11 PROCEDURE — 97140 MANUAL THERAPY 1/> REGIONS: CPT | Mod: PN | Performed by: PHYSICAL THERAPIST

## 2020-12-11 PROCEDURE — 97110 THERAPEUTIC EXERCISES: CPT | Mod: PN | Performed by: PHYSICAL THERAPIST

## 2020-12-11 NOTE — PROGRESS NOTES
"  Physical Therapy Treatment Note     Name: Domingo Castro  Clinic Number: [id number]    Therapy Diagnosis:   Encounter Diagnoses   Name Primary?    Decreased range of right hip movement     Decreased strength     Decreased mobility      Physician: Awa Blunt, *    Visit Date: 12/11/2020    Physician Orders: PT Eval and Treat: Back and core strengthening and HIp rom, and strengthening, quad strength and hep  Medical Diagnosis from Referral:   M54.9 (ICD-10-CM) - Dorsalgia, unspecified   M25.559 (ICD-10-CM) - Hip pain   M47.816 (ICD-10-CM) - Spondylosis of lumbar region without myelopathy or radiculopathy      Evaluation Date: 11/25/2020  Authorization Period Expiration: 12/31/2020  Plan of Care Expiration: 1/22/2020  Visit # / Visits authorized: 4/50  FOTO: 4/5  PTA Visit: 0/6    Time In: 11:00 am  Time Out: 11:45 am  Total Billable Time: 40 minutes (2 MT, 1 TE)    Precautions: Standard and CHF, CAD, DM II, HTN, HLD, CKD stage 4    Subjective     Pt reports: "I don't have a lot of pain, just stiffness." He reports it's worse when standing after sitting for a while.  He was compliant with home exercise program.  Response to previous treatment: He had R LBP with L sided LTR and SKTC exercises  Functional change: none since evaluation    Pain: 3/10  Location: right back  and buttocks      Objective     Domingo received therapeutic exercises to develop strength, endurance, ROM, flexibility, posture and core stabilization for 20 minutes including:    Mat:  LTRs: 20x B  SKTC: 3x20'' B (cues to avoid heavy flexion B)  Bridges: 2x12 DL with hip add  S/L clams: 3x10 R, pillow between legs to decrease adduction depth  Supine hip add and abd alternating: 20x5" DL with ball and gait belt  hooklying marching: 2x10     Standing:  Standing hip abd: R side only 2x10    Domingo received the following manual therapy techniques: Joint mobilizations, Manual traction, Myofacial release and Soft tissue Mobilization were applied to " the: R hip and lumbar spine for 20 minutes, including:  Manual R long axis distraction of hip  Manual R lateral glides using belt  Manual R hip ER/IR PROM and piriformis stretch  Manual intermittent lumbar traction      Home Exercises and Patient Education Provided:  Education provided:   - abdominal bracing, lifting and carrying body mechanics, avoid activities that increase concordant pain  - course of therapy, prognosis     Written Home Exercises Provided: yes.  Exercises were reviewed and Domingo was able to demonstrate them prior to the end of the session.  Domingo demonstrated good  understanding of the education provided.      See EMR under Media for exercises provided 11/25/2020.    Assessment     Pt continues to feel better upon leaving his visit. He responds especially well to his hip joint mobs and manual lumbar traction. He was able to perform hooklying marches and standing hip abduction without any complaints.    Domingo is progressing well towards his goals.   Pt prognosis is Fair.     Pt will continue to benefit from skilled outpatient physical therapy to address the deficits listed in the problem list box on initial evaluation, provide pt/family education and to maximize pt's level of independence in the home and community environment.     Pt's spiritual, cultural and educational needs considered and pt agreeable to plan of care and goals.     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 Pt will demo good TA muscle contraction for improved deep abdominal strength and lumbar stability. PROGRESSING; NOT MET  2. Increase lumbar ROM to 80% of WNL in order to improve functional mobility.  PROGRESSING; NOT MET  3. Pt will demo good sitting/standing posture and body mechanics for improved spine health and decreased risk of future injury. PROGRESSING; NOT MET  4. Pt to tolerate HEP to improve ROM and independence  with ADL's. PROGRESSING; NOT MET     Long Term Goals: 8 weeks  1. Report decreased low back and right groin pain without radiculopathy to </= 2/10 to increase tolerance for ADLs and increased QoL. PROGRESSING; NOT MET  2. Increase strength to >/= 4/5 MMT grade for core and BLE to increase tolerance for ADL and work activities. PROGRESSING; NOT MET  3. Pt to demonstrate negative SLR and/or Slump Test in order to show improved core strength and decreased nerve/dural tension. PROGRESSING; NOT MET  4. Patient's goal: to walk better and move with less pain PROGRESSING; NOT MET  5. Pt will report at </= 51% impaired on FOTO lumbar score for low back pain disability to demonstrate decrease in disability and improvement in back pain. PROGRESSING; NOT MET      Plan     Continue with POC    Alexis Champagne, PT

## 2020-12-14 ENCOUNTER — PATIENT OUTREACH (OUTPATIENT)
Dept: DIABETES | Facility: CLINIC | Age: 75
End: 2020-12-14

## 2020-12-14 NOTE — PROGRESS NOTES
Attempted to contact patient for phone follow up for his VGO start last week. Message left with clinic contact number for patient to call.

## 2020-12-16 ENCOUNTER — INFUSION (OUTPATIENT)
Dept: INFECTIOUS DISEASES | Facility: HOSPITAL | Age: 75
End: 2020-12-16
Attending: INTERNAL MEDICINE
Payer: MEDICARE

## 2020-12-16 VITALS
HEART RATE: 70 BPM | SYSTOLIC BLOOD PRESSURE: 173 MMHG | OXYGEN SATURATION: 98 % | TEMPERATURE: 98 F | BODY MASS INDEX: 29.26 KG/M2 | WEIGHT: 235.31 LBS | RESPIRATION RATE: 18 BRPM | HEIGHT: 75 IN | DIASTOLIC BLOOD PRESSURE: 79 MMHG

## 2020-12-16 DIAGNOSIS — D63.1 ANEMIA OF CHRONIC RENAL FAILURE, STAGE 4 (SEVERE): Primary | ICD-10-CM

## 2020-12-16 DIAGNOSIS — N18.4 ANEMIA OF CHRONIC RENAL FAILURE, STAGE 4 (SEVERE): Primary | ICD-10-CM

## 2020-12-16 PROCEDURE — 25000003 PHARM REV CODE 250: Performed by: INTERNAL MEDICINE

## 2020-12-16 PROCEDURE — 63600175 PHARM REV CODE 636 W HCPCS: Mod: JG | Performed by: INTERNAL MEDICINE

## 2020-12-16 PROCEDURE — 96365 THER/PROPH/DIAG IV INF INIT: CPT

## 2020-12-16 RX ORDER — SODIUM CHLORIDE 9 MG/ML
INJECTION, SOLUTION INTRAVENOUS CONTINUOUS
Status: CANCELLED | OUTPATIENT
Start: 2020-12-16

## 2020-12-16 RX ORDER — SODIUM CHLORIDE 0.9 % (FLUSH) 0.9 %
10 SYRINGE (ML) INJECTION
Status: DISCONTINUED | OUTPATIENT
Start: 2020-12-16 | End: 2020-12-16 | Stop reason: HOSPADM

## 2020-12-16 RX ORDER — HEPARIN 100 UNIT/ML
5 SYRINGE INTRAVENOUS
Status: DISCONTINUED | OUTPATIENT
Start: 2020-12-16 | End: 2020-12-16 | Stop reason: HOSPADM

## 2020-12-16 RX ORDER — SODIUM CHLORIDE 0.9 % (FLUSH) 0.9 %
10 SYRINGE (ML) INJECTION
Status: CANCELLED | OUTPATIENT
Start: 2020-12-16

## 2020-12-16 RX ORDER — DIPHENHYDRAMINE HYDROCHLORIDE 50 MG/ML
50 INJECTION INTRAMUSCULAR; INTRAVENOUS ONCE AS NEEDED
Status: CANCELLED | OUTPATIENT
Start: 2020-12-16

## 2020-12-16 RX ORDER — EPINEPHRINE 0.3 MG/.3ML
0.3 INJECTION SUBCUTANEOUS ONCE AS NEEDED
Status: CANCELLED | OUTPATIENT
Start: 2020-12-16

## 2020-12-16 RX ORDER — HEPARIN 100 UNIT/ML
5 SYRINGE INTRAVENOUS
Status: CANCELLED | OUTPATIENT
Start: 2020-12-16

## 2020-12-16 RX ORDER — METHYLPREDNISOLONE SOD SUCC 125 MG
125 VIAL (EA) INJECTION ONCE AS NEEDED
Status: CANCELLED | OUTPATIENT
Start: 2020-12-16

## 2020-12-16 RX ORDER — EPINEPHRINE 0.3 MG/.3ML
0.3 INJECTION SUBCUTANEOUS ONCE AS NEEDED
Status: DISCONTINUED | OUTPATIENT
Start: 2020-12-16 | End: 2020-12-16 | Stop reason: HOSPADM

## 2020-12-16 RX ORDER — METHYLPREDNISOLONE SOD SUCC 125 MG
125 VIAL (EA) INJECTION ONCE AS NEEDED
Status: DISCONTINUED | OUTPATIENT
Start: 2020-12-16 | End: 2020-12-16 | Stop reason: HOSPADM

## 2020-12-16 RX ORDER — DIPHENHYDRAMINE HYDROCHLORIDE 50 MG/ML
50 INJECTION INTRAMUSCULAR; INTRAVENOUS ONCE AS NEEDED
Status: DISCONTINUED | OUTPATIENT
Start: 2020-12-16 | End: 2020-12-16 | Stop reason: HOSPADM

## 2020-12-16 RX ADMIN — FERRIC CARBOXYMALTOSE INJECTION 750 MG: 50 INJECTION, SOLUTION INTRAVENOUS at 09:12

## 2020-12-16 NOTE — PROGRESS NOTES
Reviewed notes -   Continues on vgo with 3 clicks     dexcom reviewed -   Average bg is 142.   77% time in range.   1% lows.   < 1% extreme low (happening early morning).   21% highs.   0% extreme highs.     Patient had lows 2 - 3 days.   Early morning.   Going to watch for now - no changes.   The lows were around bg of 70.     Will decrease dinner time clicks or have him take vgo off at bedtime, then reapply in morning.   He is on lowest strength (vgo 20)   Reassess at next OV/download dexcom G6   -Bisi Escobedo NP

## 2020-12-16 NOTE — PROGRESS NOTES
Patient arrived for Injectafer 2/2.  Patient observed for one hour post infusion.  No signs/symptoms of of adverse reaction observed. Tolerated infusion well and left in NAD.

## 2020-12-18 ENCOUNTER — CLINICAL SUPPORT (OUTPATIENT)
Dept: REHABILITATION | Facility: HOSPITAL | Age: 75
End: 2020-12-18
Attending: PHYSICAL MEDICINE & REHABILITATION
Payer: MEDICARE

## 2020-12-18 DIAGNOSIS — R53.1 DECREASED STRENGTH: ICD-10-CM

## 2020-12-18 DIAGNOSIS — R26.89 DECREASED MOBILITY: ICD-10-CM

## 2020-12-18 DIAGNOSIS — M25.651 DECREASED RANGE OF RIGHT HIP MOVEMENT: ICD-10-CM

## 2020-12-18 PROCEDURE — 97140 MANUAL THERAPY 1/> REGIONS: CPT | Mod: PN | Performed by: PHYSICAL THERAPIST

## 2020-12-18 PROCEDURE — 97110 THERAPEUTIC EXERCISES: CPT | Mod: PN | Performed by: PHYSICAL THERAPIST

## 2020-12-18 NOTE — PROGRESS NOTES
"  Physical Therapy Treatment Note     Name: Domingo Castro  Clinic Number: [id number]    Therapy Diagnosis:   Encounter Diagnoses   Name Primary?    Decreased range of right hip movement     Decreased strength     Decreased mobility      Physician: Awa Blunt, *    Visit Date: 12/18/2020    Physician Orders: PT Eval and Treat: Back and core strengthening and HIp rom, and strengthening, quad strength and hep  Medical Diagnosis from Referral:   M54.9 (ICD-10-CM) - Dorsalgia, unspecified   M25.559 (ICD-10-CM) - Hip pain   M47.816 (ICD-10-CM) - Spondylosis of lumbar region without myelopathy or radiculopathy      Evaluation Date: 11/25/2020  Authorization Period Expiration: 12/31/2020  Plan of Care Expiration: 1/22/2020  Visit # / Visits authorized: 5/50  FOTO: 5/5 (PERFORM NEXT VISIT WHEN HE HAS GLASSES)  PTA Visit: 0/6    Time In: 9:30 am  Time Out: 10:15 am  Total Billable Time: 42 minutes (2 MT, 1 TE)    Precautions: Standard and CHF, CAD, DM II, HTN, HLD, CKD stage 4    Subjective     Pt reports: he put a lidocaine patch on this morning, which helped. He had an infusion yesterday.  He was compliant with home exercise program.  Response to previous treatment: He had R LBP with L sided LTR and SKTC exercises  Functional change: none since evaluation    Pain: 3/10  Location: right back  and buttocks      Objective     Domingo received therapeutic exercises to develop strength, endurance, ROM, flexibility, posture and core stabilization for 22 minutes including:    Mat:  LTRs: 20x B  SKTC: 3x20'' B (cues to avoid heavy flexion B)  Bridges: 2x12 DL with hip add  S/L clams: 3x10 R, pillow between legs to decrease adduction depth  Supine hip add and abd alternating: 20x5" DL with ball and gait belt  hooklying marching: 2x10     Standing:  Standing hip abd: B 2x10  Standing hip ext: B 2x10    Domingo received the following manual therapy techniques: Joint mobilizations, Manual traction, Myofacial release and Soft " tissue Mobilization were applied to the: R hip and lumbar spine for 20 minutes, including:  Manual R long axis distraction of hip  Manual R lateral glides using belt  Manual R hip ER/IR PROM and piriformis stretch  Manual intermittent lumbar traction      Home Exercises and Patient Education Provided:  Education provided:   - abdominal bracing, lifting and carrying body mechanics, avoid activities that increase concordant pain  - course of therapy, prognosis  - Anatomy and ACE procedure/indications     Written Home Exercises Provided: yes.  Exercises were reviewed and Domingo was able to demonstrate them prior to the end of the session.  Domingo demonstrated good  understanding of the education provided.      See EMR under Media for exercises provided 11/25/2020.    Assessment     Pt continues to be unable to get an appropriate piriformis stretch due to anterior hip/groin pain that appears related to hip OA. We discussed the anatomy of hip OA and the ACE procedure in some detail today, but he is concerned with his comorbidities (DM, CHF). He does continue to show slow improvements in his strength during his session, but he continues to require a lidocaine patch.     Domingo is progressing well towards his goals.   Pt prognosis is Fair.     Pt will continue to benefit from skilled outpatient physical therapy to address the deficits listed in the problem list box on initial evaluation, provide pt/family education and to maximize pt's level of independence in the home and community environment.     Pt's spiritual, cultural and educational needs considered and pt agreeable to plan of care and goals.     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 Pt will demo good TA muscle contraction for improved deep abdominal strength and lumbar stability. PROGRESSING; NOT MET  2. Increase lumbar ROM to 80% of WNL in order to improve  functional mobility.  PROGRESSING; NOT MET  3. Pt will demo good sitting/standing posture and body mechanics for improved spine health and decreased risk of future injury. PROGRESSING; NOT MET  4. Pt to tolerate HEP to improve ROM and independence with ADL's. PROGRESSING; NOT MET     Long Term Goals: 8 weeks  1. Report decreased low back and right groin pain without radiculopathy to </= 2/10 to increase tolerance for ADLs and increased QoL. PROGRESSING; NOT MET  2. Increase strength to >/= 4/5 MMT grade for core and BLE to increase tolerance for ADL and work activities. PROGRESSING; NOT MET  3. Pt to demonstrate negative SLR and/or Slump Test in order to show improved core strength and decreased nerve/dural tension. PROGRESSING; NOT MET  4. Patient's goal: to walk better and move with less pain PROGRESSING; NOT MET  5. Pt will report at </= 51% impaired on FOTO lumbar score for low back pain disability to demonstrate decrease in disability and improvement in back pain. PROGRESSING; NOT MET      Plan     Continue with SOFYA Champagne, PT

## 2020-12-21 ENCOUNTER — OFFICE VISIT (OUTPATIENT)
Dept: PODIATRY | Facility: CLINIC | Age: 75
End: 2020-12-21
Payer: MEDICARE

## 2020-12-21 VITALS
HEART RATE: 67 BPM | WEIGHT: 235 LBS | DIASTOLIC BLOOD PRESSURE: 61 MMHG | SYSTOLIC BLOOD PRESSURE: 142 MMHG | BODY MASS INDEX: 29.22 KG/M2 | HEIGHT: 75 IN

## 2020-12-21 DIAGNOSIS — E11.42 TYPE 2 DIABETES MELLITUS WITH PERIPHERAL NEUROPATHY: ICD-10-CM

## 2020-12-21 DIAGNOSIS — L84 PRE-ULCERATIVE CALLUSES: ICD-10-CM

## 2020-12-21 DIAGNOSIS — E11.51 TYPE 2 DIABETES MELLITUS WITH PERIPHERAL ANGIOPATHY: Primary | ICD-10-CM

## 2020-12-21 DIAGNOSIS — B35.1 ONYCHOMYCOSIS: ICD-10-CM

## 2020-12-21 PROCEDURE — 99999 PR PBB SHADOW E&M-EST. PATIENT-LVL IV: CPT | Mod: PBBFAC,,, | Performed by: PODIATRIST

## 2020-12-21 PROCEDURE — 11721 ROUTINE FOOT CARE: ICD-10-PCS | Mod: Q9,S$PBB,, | Performed by: PODIATRIST

## 2020-12-21 PROCEDURE — 11055 ROUTINE FOOT CARE: ICD-10-PCS | Mod: Q9,59,S$PBB, | Performed by: PODIATRIST

## 2020-12-21 PROCEDURE — 99214 OFFICE O/P EST MOD 30 MIN: CPT | Mod: PBBFAC,PN | Performed by: PODIATRIST

## 2020-12-21 PROCEDURE — 11721 DEBRIDE NAIL 6 OR MORE: CPT | Mod: Q9,S$PBB,, | Performed by: PODIATRIST

## 2020-12-21 PROCEDURE — 99499 UNLISTED E&M SERVICE: CPT | Mod: S$PBB,,, | Performed by: PODIATRIST

## 2020-12-21 PROCEDURE — 99499 NO LOS: ICD-10-PCS | Mod: S$PBB,,, | Performed by: PODIATRIST

## 2020-12-21 PROCEDURE — 99999 PR PBB SHADOW E&M-EST. PATIENT-LVL IV: ICD-10-PCS | Mod: PBBFAC,,, | Performed by: PODIATRIST

## 2020-12-21 PROCEDURE — 11055 PARING/CUTG B9 HYPRKER LES 1: CPT | Mod: PBBFAC,PN | Performed by: PODIATRIST

## 2020-12-21 NOTE — PROGRESS NOTES
Subjective:      Patient ID: Domingo Castro is a 75 y.o. male.    Chief Complaint: Diabetes Mellitus (Dr. Nugent 11/18/2020 Bisi Escobedo NP) and Diabetic Foot Exam    Domingo is a 75 y.o. male who presents to the clinic for evaluation and treatment of high risk feet. Domingo has a past medical history of Arthritis, Cataract, Chronic diastolic congestive heart failure, Coronary artery disease, Cystoid macular edema of both eyes, Diabetes mellitus type II, Diabetic retinopathy, Hyperlipemia, Hypertension, Retinal hole, Stage 4 chronic kidney disease, and Streptococcus pyogenes bacteremia (12/23/2018). The patient's chief complaint is diabetic foot ulcer, left hallux. This patient has documented high risk feet requiring routine maintenance secondary to peripheral vascular disease.  Discharged from [facility] on 08/04/2019.  Receiving 6 weeks of IV vancomycin per ID recommendations.  Follow-up per [facility] with 3 times weekly Medi Honey dressing changes. Accompanied by his daughter.  No new complaints.    09/19/2019:  Follow-up for chronic wound to left hallux treated for osteomyelitis.  Post endovascular intervention per .    10/17/2019:  Presents for wound check.  Followed per [facility].  Believes his wound may be healed.  Says his home health nurse peeled away some dead skin and created a wound at the tip of the left hallux.  No new complaints.    11/11/2019:  Presents for wound check left hallux.  Relates he was told by his home health nurse that the wound is healed.  No new complaints.    04/24/2020:  Presents today complaining of new onset blister to the left hallux.  He was previously followed per [facility].  He still has a port in his chest that should been removed.  Denies any trauma.    05/14/2020:  Presents for wound check to left foot.  No new complaints.  [facility] is changing his dressings.    06/11/2020:  Follow-up for wound check to left hallux.  Followed by [facility]  "health.  No new complaints.    08/14/2020:  Returns for routine foot care.  No new complaints.    12/21/2020:  Returns for routine foot care.  No new complaints.    PCP: Griselda Nugent MD    Date Last Seen by PCP:  9/29/2020    Current shoe gear:  Affected Foot: Football and Darco shoe on the affected foot     Unaffected Foot: Rx diabetic extra depth shoes and custom accommodative insoles    History of Trauma: negative  Sign of Infection: none    Hemoglobin A1C   Date Value Ref Range Status   06/25/2020 7.1 (H) 4.0 - 5.6 % Final     Comment:     ADA Screening Guidelines:  5.7-6.4%  Consistent with prediabetes  >or=6.5%  Consistent with diabetes  High levels of fetal hemoglobin interfere with the HbA1C  assay. Heterozygous hemoglobin variants (HbS, HgC, etc)do  not significantly interfere with this assay.   However, presence of multiple variants may affect accuracy.     02/20/2020 8.1 (H) 4.0 - 5.6 % Final     Comment:     ADA Screening Guidelines:  5.7-6.4%  Consistent with prediabetes  >or=6.5%  Consistent with diabetes  High levels of fetal hemoglobin interfere with the HbA1C  assay. Heterozygous hemoglobin variants (HbS, HgC, etc)do  not significantly interfere with this assay.   However, presence of multiple variants may affect accuracy.     11/08/2019 7.8 (H) 4.0 - 5.6 % Final     Comment:     ADA Screening Guidelines:  5.7-6.4%  Consistent with prediabetes  >or=6.5%  Consistent with diabetes  High levels of fetal hemoglobin interfere with the HbA1C  assay. Heterozygous hemoglobin variants (HbS, HgC, etc)do  not significantly interfere with this assay.   However, presence of multiple variants may affect accuracy.       Vitals:    12/21/20 1112   BP: (!) 142/61   Pulse: 67   Weight: 106.6 kg (235 lb)   Height: 6' 3" (1.905 m)   PainSc: 0-No pain      Past Medical History:   Diagnosis Date    Arthritis     Cataract     Chronic diastolic congestive heart failure     Coronary artery disease     Cystoid " macular edema of both eyes     Diabetes mellitus type II     Diabetic retinopathy     Hyperlipemia     Hypertension     Retinal hole     Stage 4 chronic kidney disease     Streptococcus pyogenes bacteremia 2018    Due to left toe osteomyelitis       Past Surgical History:   Procedure Laterality Date    ABDOMINAL AORTOGRAPHY N/A 2019    Procedure: AORTOGRAM-ABDOMINAL;  Surgeon: Amish Hyatt MD;  Location: [facility] CATH LAB/EP;  Service: Cardiology;  Laterality: N/A;  CO2 angiography    ANGIOGRAPHY OF LOWER EXTREMITY Left 2019    Procedure: Angiogram Extremity Unilateral;  Surgeon: Amish Hyatt MD;  Location: [facility] CATH LAB/EP;  Service: Cardiology;  Laterality: Left;    CATARACT EXTRACTION W/  INTRAOCULAR LENS IMPLANT Left 12/15/14    Dr de la cruz    CATARACT EXTRACTION W/  INTRAOCULAR LENS IMPLANT Right 14    dorothy    CIRCUMCISION, NON-      focal laser right eye      INSERTION OF TUNNELED CENTRAL VENOUS CATHETER (CVC) WITH SUBCUTANEOUS PORT Right 2019    Procedure: [id number];  Surgeon: Denys Aleman Jr., MD;  Location: [facility] OR;  Service: General;  Laterality: Right;    KNEE SURGERY      left    Left medial collateral ligament repair      TONSILLECTOMY         Family History   Problem Relation Age of Onset    Heart disease Mother     Cancer Mother     Cancer Brother     Heart disease Father     Diabetes Father     Cancer Sister     Cataracts Paternal Grandmother     Glaucoma Paternal Grandmother     Blindness Neg Hx     Amblyopia Neg Hx     Hypertension Neg Hx     Macular degeneration Neg Hx     Retinal detachment Neg Hx     Strabismus Neg Hx        Social History     Socioeconomic History    Marital status:      Spouse name: Not on file    Number of children: Not on file    Years of education: Not on file    Highest education level: Not on file   Occupational History    Not on file   Social Needs    Financial resource strain:  Not on file    Food insecurity     Worry: Not on file     Inability: Not on file    Transportation needs     Medical: Not on file     Non-medical: Not on file   Tobacco Use    Smoking status: Never Smoker    Smokeless tobacco: Never Used   Substance and Sexual Activity    Alcohol use: No     Alcohol/week: 0.0 standard drinks    Drug use: No    Sexual activity: Yes     Partners: Female   Lifestyle    Physical activity     Days per week: Not on file     Minutes per session: Not on file    Stress: Not on file   Relationships    Social connections     Talks on phone: Not on file     Gets together: Not on file     Attends Hinduism service: Not on file     Active member of club or organization: Not on file     Attends meetings of clubs or organizations: Not on file     Relationship status: Not on file   Other Topics Concern    Not on file   Social History Narrative    Not on file       Current Outpatient Medications   Medication Sig Dispense Refill    acetaminophen (TYLENOL) 325 MG tablet Take 2 tablets (650 mg total) by mouth every 4 (four) hours as needed.  0    amLODIPine (NORVASC) 10 MG tablet TAKE 1 TABLET BY MOUTH ONCE DAILY 30 tablet 11    aspirin (ECOTRIN) 81 MG EC tablet Take 81 mg by mouth once daily.      calcitRIOL (ROCALTROL) 0.25 MCG Cap Take 1 capsule (0.25 mcg total) by mouth every Monday and Friday. 60 capsule 6    clopidogreL (PLAVIX) 75 mg tablet Take 1 tablet by mouth once daily 30 tablet 3    furosemide (LASIX) 80 MG tablet Take 1 tablet (80 mg total) by mouth once daily. 180 tablet 11    hydrALAZINE (APRESOLINE) 50 MG tablet TAKE 1 TABLET BY MOUTH EVERY 12 HOURS 60 tablet 0    insulin aspart U-100 (NOVOLOG FLEXPEN U-100 INSULIN) 100 unit/mL (3 mL) InPn pen Inject 12 Units into the skin 3 (three) times daily with meals. With correction scale maximum 50 units daily. 15 mL 3    insulin aspart U-100 (NOVOLOG U-100 INSULIN ASPART) 100 unit/mL injection Inject 40 Units into the skin 3  "(three) times daily before meals. USE WITHIN VGO ONLY. 30 mL 11    LEVEMIR FLEXTOUCH U-100 INSULN 100 unit/mL (3 mL) InPn pen Inject 26 Units into the skin every evening. 36 mL 3    nebulizer and compressor Alana USE AS DIRECTED 1 each 0    olmesartan (BENICAR) 20 MG tablet Take 2 tablets (40 mg total) by mouth once daily. 90 tablet 10    pen needle, diabetic, safety (BD AUTOSHIELD PEN NEEDLE) 29 gauge x 5/16" Ndle For use once daily with levemir flexpen 90 each 11    rosuvastatin (CRESTOR) 40 MG Tab Take 1 tablet (40 mg total) by mouth every evening. 90 tablet 3    albuterol-ipratropium (DUO-NEB) 2.5 mg-0.5 mg/3 mL nebulizer solution Take 3 mLs by nebulization every 4 (four) hours as needed for Wheezing or Shortness of Breath. Rescue 90 mL 0     No current facility-administered medications for this visit.        Review of patient's allergies indicates:   Allergen Reactions    Atorvastatin Other (See Comments)         Review of Systems   Constitution: Negative for chills and fever.   HENT: Negative for congestion and hearing loss.    Cardiovascular: Negative for chest pain and claudication.   Respiratory: Negative for cough and shortness of breath.    Skin: Positive for color change, nail changes and poor wound healing.   Musculoskeletal: Negative for back pain and joint pain.   Gastrointestinal: Negative for nausea and vomiting.   Neurological: Positive for numbness.   Psychiatric/Behavioral: Negative for altered mental status.           Objective:      Physical Exam  Constitutional:       General: He is not in acute distress.     Appearance: He is not diaphoretic.   Cardiovascular:      Pulses:           Dorsalis pedis pulses are 1+ on the right side and 1+ on the left side.        Posterior tibial pulses are 1+ on the right side and 1+ on the left side.      Comments: Mild lower extremity edema bilateral. No hair growth bilateral lower extremity.  Musculoskeletal:      Comments: One MTP range of motion left " is less than 30° dorsiflexion.    Semi reducible hallux malleus left hallux.    Semi rigid clawtoe deformities toes 2-5 bilateral foot.    Mild cavus foot structure bilateral foot.   Feet:      Right foot:      Protective Sensation: 10 sites tested. 6 sites sensed.      Skin integrity: Dry skin present.      Toenail Condition: Right toenails are abnormally thick and long. Fungal disease present.     Left foot:      Protective Sensation: 10 sites tested. 6 sites sensed.      Skin integrity: Callus and dry skin present. No ulcer.      Toenail Condition: Left toenails are abnormally thick and long. Fungal disease present.  Skin:     General: Skin is warm and dry.      Capillary Refill: Capillary refill takes 2 to 3 seconds.      Coloration: Skin is not pale.      Findings: No ecchymosis, erythema or rash.      Nails: There is no clubbing.        Comments: Left hallux nail is moderately elongated and dystrophic with some loosening.  There is no surrounding erythema.    Nails 2-5 left and 1-5 right are also elongated 4-5 mm, thickened, discolored brown yellow with loosening and underlying debris.  The right hallux nail and bilateral 5th toenail are dystrophic.    Raised hyperkeratotic skin distal left hallux.      .   Neurological:      Mental Status: He is alert and oriented to person, place, and time.      Sensory: Sensory deficit present.               Assessment:       Encounter Diagnoses   Name Primary?    Type 2 diabetes mellitus with peripheral angiopathy Yes    Type 2 diabetes mellitus with peripheral neuropathy     Pre-ulcerative calluses     Onychomycosis          Plan:       Domingo was seen today for diabetes mellitus and diabetic foot exam.    Diagnoses and all orders for this visit:    Type 2 diabetes mellitus with peripheral angiopathy  -     Routine Foot Care    Type 2 diabetes mellitus with peripheral neuropathy  -     Routine Foot Care    Pre-ulcerative calluses  -     Routine Foot  Care    Onychomycosis  -     Routine Foot Care      I counseled the patient on his conditions, their implications and medical management.    Shoe inspection. Diabetic Foot Education. Patient reminded of the importance of good nutrition and blood sugar control to help prevent podiatric complications of diabetes. Patient instructed on proper foot hygeine. We discussed wearing proper shoe gear, daily foot inspections, never walking without protective shoe gear, never putting sharp instruments to feet.    Routine foot care per attached note.    A portion of this note was generated by voice recognition software and may contain topical graphical errors.

## 2020-12-21 NOTE — PROCEDURES
"Routine Foot Care    Date/Time: 12/21/2020 10:45 AM  Performed by: Dimitry Gallegos DPM  Authorized by: Dimitry Gallegos DPM     Time out: Immediately prior to procedure a "time out" was called to verify the correct patient, procedure, equipment, support staff and site/side marked as required.    Consent Done?:  Yes (Verbal)  Hyperkeratotic Skin Lesions?: Yes    Number of trimmed lesions:  1  Location(s):  Left 1st Toe    Nail Care Type:  Debride  Location(s): All  (Left 1st Toe, Left 3rd Toe, Left 2nd Toe, Left 4th Toe, Left 5th Toe, Right 1st Toe, Right 2nd Toe, Right 3rd Toe, Right 4th Toe and Right 5th Toe)  Patient tolerance:  Patient tolerated the procedure well with no immediate complications     Used sterile nail nipper.        "

## 2020-12-22 ENCOUNTER — CLINICAL SUPPORT (OUTPATIENT)
Dept: REHABILITATION | Facility: HOSPITAL | Age: 75
End: 2020-12-22
Attending: PHYSICAL MEDICINE & REHABILITATION
Payer: MEDICARE

## 2020-12-22 DIAGNOSIS — R26.89 DECREASED MOBILITY: ICD-10-CM

## 2020-12-22 DIAGNOSIS — M25.651 DECREASED RANGE OF RIGHT HIP MOVEMENT: ICD-10-CM

## 2020-12-22 DIAGNOSIS — R53.1 DECREASED STRENGTH: ICD-10-CM

## 2020-12-22 PROCEDURE — 97140 MANUAL THERAPY 1/> REGIONS: CPT | Mod: PN

## 2020-12-22 PROCEDURE — 97110 THERAPEUTIC EXERCISES: CPT | Mod: PN

## 2020-12-22 NOTE — PROGRESS NOTES
"  Physical Therapy Treatment Note     Name: Domingo Castro  Clinic Number: [id number]    Therapy Diagnosis:   Encounter Diagnoses   Name Primary?   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 Pt saw MD recently, and topic on R hip replacement was talked about  He was compliant with home exercise program.  Response to previous treatment: He had R LBP with L sided LTR and SKTC exercises  Functional change: none since evaluation    Pain: 3/10  Location: right back  and buttocks      Objective     Domingo received therapeutic exercises to develop strength, endurance, ROM, flexibility, posture and core stabilization for 15 minutes including: FOTO    Mat:  LTRs: 20x B  SKTC: 3x20'' B (cues to avoid heavy flexion B)  Bridges: 2x12 DL with hip add  S/L clams: 3x10 R, 2# on knee; pillow between legs to decrease adduction depth  Supine hip add and abd alternating: 20x5" DL with ball and gait belt    Standing:  Standing hip abd: 2x15 B, 2# on RLE  Standing hip ext: 2x15 B, 2# on RLE  Standing hip flexion: 2x15 B, 2# on RLE    Domingo received the following manual therapy techniques: Joint " mobilizations, Manual traction, Myofacial release and Soft tissue Mobilization were applied to the: R hip and lumbar spine for 15 minutes, including:  Manual R long axis distraction of hip  Manual R lateral glides using belt  Manual R hip ER/IR PROM and piriformis stretch  Manual intermittent lumbar traction    Home Exercises and Patient Education Provided:  Education provided:   - abdominal bracing, lifting and carrying body mechanics, avoid activities that increase concordant pain  - course of therapy, prognosis  - Anatomy and ACE procedure/indications     Written Home Exercises Provided: yes.  Exercises were reviewed and Domingo was able to demonstrate them prior to the end of the session.  Domingo demonstrated good  understanding of the education provided.      See EMR under Media for exercises provided 11/25/2020.    Assessment     Pt pain controlled despite poor R hip mobility, antalgic gait that improves over time, and R hip intrinsic/extrinsic ER/IR improving with less pain throughout exercise. Pt tolerated standing exercises well today and continue PT.    Domingo is progressing well towards his goals.   Pt prognosis is Fair.     Pt will continue to benefit from skilled outpatient physical therapy to address the deficits listed in the problem list box on initial evaluation, provide pt/family education and to maximize pt's level of independence in the home and community environment.     Pt's spiritual, cultural and educational needs considered and pt agreeable to plan of care and goals.     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 Pt will demo good TA muscle contraction for improved deep abdominal strength and lumbar stability. PROGRESSING; NOT MET  2. Increase lumbar ROM to 80% of WNL in order to improve functional mobility.  PROGRESSING; NOT MET  3. Pt will demo good sitting/standing posture and body  mechanics for improved spine health and decreased risk of future injury. PROGRESSING; NOT MET  4. Pt to tolerate HEP to improve ROM and independence with ADL's. PROGRESSING; NOT MET     Long Term Goals: 8 weeks  1. Report decreased low back and right groin pain without radiculopathy to </= 2/10 to increase tolerance for ADLs and increased QoL. PROGRESSING; NOT MET  2. Increase strength to >/= 4/5 MMT grade for core and BLE to increase tolerance for ADL and work activities. PROGRESSING; NOT MET  3. Pt to demonstrate negative SLR and/or Slump Test in order to show improved core strength and decreased nerve/dural tension. PROGRESSING; NOT MET  4. Patient's goal: to walk better and move with less pain PROGRESSING; NOT MET  5. Pt will report at </= 51% impaired on FOTO lumbar score for low back pain disability to demonstrate decrease in disability and improvement in back pain. PROGRESSING; NOT MET      Plan     Continue with POC    Tobias Kern, PT

## 2020-12-28 ENCOUNTER — CLINICAL SUPPORT (OUTPATIENT)
Dept: REHABILITATION | Facility: HOSPITAL | Age: 75
End: 2020-12-28
Attending: PHYSICAL MEDICINE & REHABILITATION
Payer: MEDICARE

## 2020-12-28 DIAGNOSIS — R26.89 DECREASED MOBILITY: ICD-10-CM

## 2020-12-28 DIAGNOSIS — R53.1 DECREASED STRENGTH: ICD-10-CM

## 2020-12-28 DIAGNOSIS — M25.651 DECREASED RANGE OF RIGHT HIP MOVEMENT: ICD-10-CM

## 2020-12-28 PROCEDURE — 97110 THERAPEUTIC EXERCISES: CPT | Mod: PN

## 2020-12-28 PROCEDURE — 97140 MANUAL THERAPY 1/> REGIONS: CPT | Mod: PN

## 2020-12-28 NOTE — PROGRESS NOTES
Physical Therapy Treatment Note     Name: Domingo Castro  Clinic Number: [id number]    Therapy Diagnosis:   Encounter Diagnoses   Name Primary?   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  Response to previous treatment: decreased pain/stiffness overall  Functional change: able to stand up, bend over, and walked with less pain, stiffness, and quicker than before    Pain: 1-2/10  Location: right anterior hip and R side of low back    Objective     Domingo received therapeutic exercises to develop strength, endurance, ROM, flexibility, posture and core stabilization for 25 minutes including:     Mat:  LTRs: 15x B  SKTC: 3x20'' B (cues to avoid heavy flexion B)  Bridges: 3x10 DL with hip abd with Blue TB  S/L clams: 4x15 R, 3# on knee; no pillow needed today between legs to decrease adduction depth  Supine marchinx B, 2#    Standing:  Standing hip abd: 2x20 B, 2# on RLE  Standing hip ext: 2x15 B, 2# on RLE - not done today  Standing hip flexion: 2x15 B, 2# on RLE - not done  today    Domingo received the following manual therapy techniques: Joint mobilizations, Manual traction, Myofacial release and Soft tissue Mobilization were applied to the: R hip and lumbar spine for 15 minutes, including:  Manual R long axis distraction of hip  Manual R lateral glides using belt  Manual R hip ER/IR PROM and piriformis stretch  Manual intermittent lumbar traction    Home Exercises and Patient Education Provided:  Education provided:   - abdominal bracing, lifting and carrying body mechanics, avoid activities that increase concordant pain  - course of therapy, prognosis  - Anatomy and ACE procedure/indications     Written Home Exercises Provided: yes.  Exercises were reviewed and Domingo was able to demonstrate them prior to the end of the session.  Domingo demonstrated good  understanding of the education provided.      See EMR under Media for exercises provided 11/25/2020.    Assessment     Pt pain controlled despite poor R hip mobility, antalgic gait that improves over time, and R hip intrinsic/extrinsic ER/IR improving with less pain throughout exercise. Continue with manual before starting session to improve ROM before starting exercises. Pt progressing well and will await MD orders if R hip replacement is pursued.  Domingo is progressing well towards his goals.   Pt prognosis is Fair.     Pt will continue to benefit from skilled outpatient physical therapy to address the deficits listed in the problem list box on initial evaluation, provide pt/family education and to maximize pt's level of independence in the home and community environment.     Pt's spiritual, cultural and educational needs considered and pt agreeable to plan of care and goals.     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 Pt will demo good TA muscle contraction for improved deep abdominal strength and lumbar stability.  PROGRESSING; NOT MET  2. Increase lumbar ROM to 80% of WNL in order to improve functional mobility.  PROGRESSING; NOT MET  3. Pt will demo good sitting/standing posture and body mechanics for improved spine health and decreased risk of future injury. PROGRESSING; NOT MET  4. Pt to tolerate HEP to improve ROM and independence with ADL's. PROGRESSING; NOT MET     Long Term Goals: 8 weeks  1. Report decreased low back and right groin pain without radiculopathy to </= 2/10 to increase tolerance for ADLs and increased QoL. PROGRESSING; NOT MET  2. Increase strength to >/= 4/5 MMT grade for core and BLE to increase tolerance for ADL and work activities. PROGRESSING; NOT MET  3. Pt to demonstrate negative SLR and/or Slump Test in order to show improved core strength and decreased nerve/dural tension. PROGRESSING; NOT MET  4. Patient's goal: to walk better and move with less pain PROGRESSING; NOT MET  5. Pt will report at </= 51% impaired on FOTO lumbar score for low back pain disability to demonstrate decrease in disability and improvement in back pain. PROGRESSING; NOT MET      Plan     Continue with POC, continue with manual for pain relief.    Tobias Kern, PT

## 2020-12-30 ENCOUNTER — OFFICE VISIT (OUTPATIENT)
Dept: INTERNAL MEDICINE | Facility: CLINIC | Age: 75
DRG: 291 | End: 2020-12-30
Payer: MEDICARE

## 2020-12-30 ENCOUNTER — HOSPITAL ENCOUNTER (INPATIENT)
Facility: HOSPITAL | Age: 75
LOS: 4 days | Discharge: HOME | DRG: 291 | End: 2021-01-03
Attending: EMERGENCY MEDICINE | Admitting: EMERGENCY MEDICINE
Payer: MEDICARE

## 2020-12-30 VITALS
DIASTOLIC BLOOD PRESSURE: 96 MMHG | RESPIRATION RATE: 17 BRPM | HEIGHT: 75 IN | TEMPERATURE: 98 F | WEIGHT: 240.31 LBS | HEART RATE: 73 BPM | SYSTOLIC BLOOD PRESSURE: 160 MMHG | BODY MASS INDEX: 29.88 KG/M2

## 2020-12-30 DIAGNOSIS — N18.4 TYPE 1 DIABETES MELLITUS WITH STAGE 4 CHRONIC KIDNEY DISEASE: ICD-10-CM

## 2020-12-30 DIAGNOSIS — E66.9 OBESITY, DIABETES, AND HYPERTENSION SYNDROME: ICD-10-CM

## 2020-12-30 DIAGNOSIS — E11.22 TYPE 2 DIABETES MELLITUS WITH STAGE 4 CHRONIC KIDNEY DISEASE, WITH LONG-TERM CURRENT USE OF INSULIN: Chronic | ICD-10-CM

## 2020-12-30 DIAGNOSIS — R06.09 DYSPNEA ON EXERTION: ICD-10-CM

## 2020-12-30 DIAGNOSIS — N17.9 AKI (ACUTE KIDNEY INJURY): ICD-10-CM

## 2020-12-30 DIAGNOSIS — Z74.09 IMPAIRED MOBILITY AND ADLS: ICD-10-CM

## 2020-12-30 DIAGNOSIS — M86.9 TOE OSTEOMYELITIS, LEFT: ICD-10-CM

## 2020-12-30 DIAGNOSIS — E10.22 TYPE 1 DIABETES MELLITUS WITH STAGE 4 CHRONIC KIDNEY DISEASE: ICD-10-CM

## 2020-12-30 DIAGNOSIS — E11.69 HYPERLIPIDEMIA ASSOCIATED WITH TYPE 2 DIABETES MELLITUS: ICD-10-CM

## 2020-12-30 DIAGNOSIS — R60.0 PEDAL EDEMA: ICD-10-CM

## 2020-12-30 DIAGNOSIS — E78.5 HYPERLIPIDEMIA ASSOCIATED WITH TYPE 2 DIABETES MELLITUS: ICD-10-CM

## 2020-12-30 DIAGNOSIS — Z78.9 IMPAIRED MOBILITY AND ADLS: ICD-10-CM

## 2020-12-30 DIAGNOSIS — Z79.4 TYPE 2 DIABETES MELLITUS WITH STAGE 4 CHRONIC KIDNEY DISEASE, WITH LONG-TERM CURRENT USE OF INSULIN: Chronic | ICD-10-CM

## 2020-12-30 DIAGNOSIS — I50.33 ACUTE ON CHRONIC DIASTOLIC CONGESTIVE HEART FAILURE: Primary | ICD-10-CM

## 2020-12-30 DIAGNOSIS — I50.32 CHRONIC DIASTOLIC CHF (CONGESTIVE HEART FAILURE): ICD-10-CM

## 2020-12-30 DIAGNOSIS — I50.33 ACUTE ON CHRONIC DIASTOLIC CHF (CONGESTIVE HEART FAILURE), NYHA CLASS 3: ICD-10-CM

## 2020-12-30 DIAGNOSIS — I15.2 OBESITY, DIABETES, AND HYPERTENSION SYNDROME: ICD-10-CM

## 2020-12-30 DIAGNOSIS — N18.5 CKD (CHRONIC KIDNEY DISEASE) STAGE 5, GFR LESS THAN 15 ML/MIN: ICD-10-CM

## 2020-12-30 DIAGNOSIS — E11.59 HYPERTENSION ASSOCIATED WITH DIABETES: ICD-10-CM

## 2020-12-30 DIAGNOSIS — E10.22 CKD STAGE 4 DUE TO TYPE 1 DIABETES MELLITUS: ICD-10-CM

## 2020-12-30 DIAGNOSIS — I50.9 HEART FAILURE: ICD-10-CM

## 2020-12-30 DIAGNOSIS — E11.69 OBESITY, DIABETES, AND HYPERTENSION SYNDROME: ICD-10-CM

## 2020-12-30 DIAGNOSIS — N18.4 TYPE 2 DIABETES MELLITUS WITH STAGE 4 CHRONIC KIDNEY DISEASE, WITH LONG-TERM CURRENT USE OF INSULIN: Chronic | ICD-10-CM

## 2020-12-30 DIAGNOSIS — I50.32 CHRONIC CONGESTIVE HEART FAILURE WITH LEFT VENTRICULAR DIASTOLIC DYSFUNCTION: ICD-10-CM

## 2020-12-30 DIAGNOSIS — I15.2 HYPERTENSION ASSOCIATED WITH DIABETES: ICD-10-CM

## 2020-12-30 DIAGNOSIS — R05.9 COUGH: ICD-10-CM

## 2020-12-30 DIAGNOSIS — N18.4 CKD STAGE 4 DUE TO TYPE 1 DIABETES MELLITUS: ICD-10-CM

## 2020-12-30 DIAGNOSIS — E11.59 OBESITY, DIABETES, AND HYPERTENSION SYNDROME: ICD-10-CM

## 2020-12-30 PROBLEM — E11.29 TYPE 2 DIABETES MELLITUS WITH KIDNEY COMPLICATION, WITH LONG-TERM CURRENT USE OF INSULIN: Status: ACTIVE | Noted: 2018-12-18

## 2020-12-30 PROBLEM — E10.29 TYPE 1 DIABETES MELLITUS WITH KIDNEY COMPLICATION: Status: ACTIVE | Noted: 2020-12-30

## 2020-12-30 PROBLEM — I25.10 CAD (CORONARY ARTERY DISEASE): Status: ACTIVE | Noted: 2020-12-30

## 2020-12-30 LAB
ALBUMIN SERPL BCP-MCNC: 3.7 G/DL (ref 3.5–5.2)
ALP SERPL-CCNC: 81 U/L (ref 55–135)
ALT SERPL W/O P-5'-P-CCNC: 15 U/L (ref 10–44)
ANION GAP SERPL CALC-SCNC: 13 MMOL/L (ref 8–16)
AST SERPL-CCNC: 29 U/L (ref 10–40)
BASOPHILS # BLD AUTO: 0.08 K/UL (ref 0–0.2)
BASOPHILS NFR BLD: 1.2 % (ref 0–1.9)
BILIRUB SERPL-MCNC: 0.4 MG/DL (ref 0.1–1)
BNP SERPL-MCNC: 573 PG/ML (ref 0–99)
BUN SERPL-MCNC: 50 MG/DL (ref 8–23)
CALCIUM SERPL-MCNC: 8.9 MG/DL (ref 8.7–10.5)
CHLORIDE SERPL-SCNC: 111 MMOL/L (ref 95–110)
CO2 SERPL-SCNC: 17 MMOL/L (ref 23–29)
CREAT SERPL-MCNC: 4.6 MG/DL (ref 0.5–1.4)
CTP QC/QA: YES
DIFFERENTIAL METHOD: ABNORMAL
EOSINOPHIL # BLD AUTO: 0.2 K/UL (ref 0–0.5)
EOSINOPHIL NFR BLD: 3.1 % (ref 0–8)
ERYTHROCYTE [DISTWIDTH] IN BLOOD BY AUTOMATED COUNT: 16.6 % (ref 11.5–14.5)
EST. GFR  (AFRICAN AMERICAN): 13.4 ML/MIN/1.73 M^2
EST. GFR  (NON AFRICAN AMERICAN): 11.6 ML/MIN/1.73 M^2
GLUCOSE SERPL-MCNC: 92 MG/DL (ref 70–110)
HCT VFR BLD AUTO: 27.6 % (ref 40–54)
HGB BLD-MCNC: 8.5 G/DL (ref 14–18)
IMM GRANULOCYTES # BLD AUTO: 0.02 K/UL (ref 0–0.04)
IMM GRANULOCYTES NFR BLD AUTO: 0.3 % (ref 0–0.5)
INR PPP: 1 (ref 0.8–1.2)
LYMPHOCYTES # BLD AUTO: 1.3 K/UL (ref 1–4.8)
LYMPHOCYTES NFR BLD: 18.5 % (ref 18–48)
MCH RBC QN AUTO: 29.1 PG (ref 27–31)
MCHC RBC AUTO-ENTMCNC: 30.8 G/DL (ref 32–36)
MCV RBC AUTO: 95 FL (ref 82–98)
MONOCYTES # BLD AUTO: 0.6 K/UL (ref 0.3–1)
MONOCYTES NFR BLD: 9.1 % (ref 4–15)
NEUTROPHILS # BLD AUTO: 4.6 K/UL (ref 1.8–7.7)
NEUTROPHILS NFR BLD: 67.8 % (ref 38–73)
NRBC BLD-RTO: 0 /100 WBC
PLATELET # BLD AUTO: 139 K/UL (ref 150–350)
PMV BLD AUTO: 12 FL (ref 9.2–12.9)
POTASSIUM SERPL-SCNC: 4.1 MMOL/L (ref 3.5–5.1)
PROT SERPL-MCNC: 8.2 G/DL (ref 6–8.4)
PROTHROMBIN TIME: 11 SEC (ref 9–12.5)
RBC # BLD AUTO: 2.92 M/UL (ref 4.6–6.2)
SARS-COV-2 RDRP RESP QL NAA+PROBE: NEGATIVE
SODIUM SERPL-SCNC: 141 MMOL/L (ref 136–145)
TROPONIN I SERPL DL<=0.01 NG/ML-MCNC: 0.05 NG/ML (ref 0–0.03)
WBC # BLD AUTO: 6.81 K/UL (ref 3.9–12.7)

## 2020-12-30 PROCEDURE — 85610 PROTHROMBIN TIME: CPT

## 2020-12-30 PROCEDURE — 99999 PR PBB SHADOW E&M-EST. PATIENT-LVL IV: ICD-10-PCS | Mod: PBBFAC,,, | Performed by: NURSE PRACTITIONER

## 2020-12-30 PROCEDURE — 99291 PR CRITICAL CARE, E/M 30-74 MINUTES: ICD-10-PCS | Mod: CS,,, | Performed by: EMERGENCY MEDICINE

## 2020-12-30 PROCEDURE — U0002 COVID-19 LAB TEST NON-CDC: HCPCS | Performed by: EMERGENCY MEDICINE

## 2020-12-30 PROCEDURE — 93010 EKG 12-LEAD: ICD-10-PCS | Mod: ,,, | Performed by: INTERNAL MEDICINE

## 2020-12-30 PROCEDURE — 94761 N-INVAS EAR/PLS OXIMETRY MLT: CPT

## 2020-12-30 PROCEDURE — 99214 OFFICE O/P EST MOD 30 MIN: CPT | Mod: PBBFAC,PO,25 | Performed by: NURSE PRACTITIONER

## 2020-12-30 PROCEDURE — 93005 ELECTROCARDIOGRAM TRACING: CPT

## 2020-12-30 PROCEDURE — 99291 CRITICAL CARE FIRST HOUR: CPT | Mod: CS,,, | Performed by: EMERGENCY MEDICINE

## 2020-12-30 PROCEDURE — 95251 PR GLUCOSE MONITOR, 72 HOUR, PHYS INTERP: ICD-10-PCS | Mod: ,,, | Performed by: NURSE PRACTITIONER

## 2020-12-30 PROCEDURE — 99999 PR PBB SHADOW E&M-EST. PATIENT-LVL IV: CPT | Mod: PBBFAC,,, | Performed by: NURSE PRACTITIONER

## 2020-12-30 PROCEDURE — 85025 COMPLETE CBC W/AUTO DIFF WBC: CPT

## 2020-12-30 PROCEDURE — 93010 ELECTROCARDIOGRAM REPORT: CPT | Mod: ,,, | Performed by: INTERNAL MEDICINE

## 2020-12-30 PROCEDURE — 96374 THER/PROPH/DIAG INJ IV PUSH: CPT

## 2020-12-30 PROCEDURE — 25000003 PHARM REV CODE 250: Performed by: STUDENT IN AN ORGANIZED HEALTH CARE EDUCATION/TRAINING PROGRAM

## 2020-12-30 PROCEDURE — 11000001 HC ACUTE MED/SURG PRIVATE ROOM

## 2020-12-30 PROCEDURE — 63600175 PHARM REV CODE 636 W HCPCS: Performed by: STUDENT IN AN ORGANIZED HEALTH CARE EDUCATION/TRAINING PROGRAM

## 2020-12-30 PROCEDURE — 95251 CONT GLUC MNTR ANALYSIS I&R: CPT | Mod: ,,, | Performed by: NURSE PRACTITIONER

## 2020-12-30 PROCEDURE — 99285 EMERGENCY DEPT VISIT HI MDM: CPT | Mod: 25,27

## 2020-12-30 PROCEDURE — 83880 ASSAY OF NATRIURETIC PEPTIDE: CPT

## 2020-12-30 PROCEDURE — 99215 OFFICE O/P EST HI 40 MIN: CPT | Mod: S$PBB,,, | Performed by: NURSE PRACTITIONER

## 2020-12-30 PROCEDURE — 84484 ASSAY OF TROPONIN QUANT: CPT

## 2020-12-30 PROCEDURE — 93010 ELECTROCARDIOGRAM REPORT: CPT | Mod: 77,,, | Performed by: INTERNAL MEDICINE

## 2020-12-30 PROCEDURE — 99215 PR OFFICE/OUTPT VISIT, EST, LEVL V, 40-54 MIN: ICD-10-PCS | Mod: S$PBB,,, | Performed by: NURSE PRACTITIONER

## 2020-12-30 PROCEDURE — 93010 EKG 12-LEAD: ICD-10-PCS | Mod: 77,,, | Performed by: INTERNAL MEDICINE

## 2020-12-30 PROCEDURE — 63600175 PHARM REV CODE 636 W HCPCS: Performed by: HOSPITALIST

## 2020-12-30 PROCEDURE — 80053 COMPREHEN METABOLIC PANEL: CPT

## 2020-12-30 RX ORDER — INSULIN ASPART 100 [IU]/ML
40 INJECTION, SOLUTION INTRAVENOUS; SUBCUTANEOUS CONTINUOUS
Qty: 40 ML | Refills: 3 | Status: SHIPPED | OUTPATIENT
Start: 2020-12-30 | End: 2021-05-28 | Stop reason: SDUPTHER

## 2020-12-30 RX ORDER — TALC
6 POWDER (GRAM) TOPICAL NIGHTLY PRN
Status: CANCELLED | OUTPATIENT
Start: 2020-12-30

## 2020-12-30 RX ORDER — AMLODIPINE BESYLATE 10 MG/1
10 TABLET ORAL DAILY
Status: DISCONTINUED | OUTPATIENT
Start: 2020-12-31 | End: 2021-01-03 | Stop reason: HOSPADM

## 2020-12-30 RX ORDER — ACETAMINOPHEN 325 MG/1
650 TABLET ORAL EVERY 4 HOURS PRN
Status: DISCONTINUED | OUTPATIENT
Start: 2020-12-30 | End: 2021-01-03 | Stop reason: HOSPADM

## 2020-12-30 RX ORDER — SUB-Q INSULIN DEVICE, 40 UNIT
1 EACH MISCELLANEOUS DAILY
Qty: 30 EACH | Refills: 11 | Status: SHIPPED | OUTPATIENT
Start: 2020-12-30 | End: 2021-01-29

## 2020-12-30 RX ORDER — IBUPROFEN 200 MG
16 TABLET ORAL
Status: DISCONTINUED | OUTPATIENT
Start: 2020-12-30 | End: 2020-12-31

## 2020-12-30 RX ORDER — FUROSEMIDE 10 MG/ML
80 INJECTION INTRAMUSCULAR; INTRAVENOUS
Status: COMPLETED | OUTPATIENT
Start: 2020-12-30 | End: 2020-12-30

## 2020-12-30 RX ORDER — MUPIROCIN 20 MG/G
OINTMENT TOPICAL 2 TIMES DAILY
Status: DISCONTINUED | OUTPATIENT
Start: 2020-12-31 | End: 2021-01-03 | Stop reason: HOSPADM

## 2020-12-30 RX ORDER — INSULIN ASPART 100 [IU]/ML
0-5 INJECTION, SOLUTION INTRAVENOUS; SUBCUTANEOUS
Status: DISCONTINUED | OUTPATIENT
Start: 2020-12-30 | End: 2020-12-31

## 2020-12-30 RX ORDER — IBUPROFEN 200 MG
24 TABLET ORAL
Status: DISCONTINUED | OUTPATIENT
Start: 2020-12-30 | End: 2020-12-31

## 2020-12-30 RX ORDER — OLMESARTAN MEDOXOMIL 40 MG/1
40 TABLET ORAL DAILY
Status: DISCONTINUED | OUTPATIENT
Start: 2020-12-31 | End: 2020-12-30

## 2020-12-30 RX ORDER — HYDRALAZINE HYDROCHLORIDE 50 MG/1
50 TABLET, FILM COATED ORAL EVERY 12 HOURS
Status: DISCONTINUED | OUTPATIENT
Start: 2020-12-30 | End: 2020-12-31

## 2020-12-30 RX ORDER — ASPIRIN 81 MG/1
81 TABLET ORAL DAILY
Status: DISCONTINUED | OUTPATIENT
Start: 2020-12-31 | End: 2021-01-03 | Stop reason: HOSPADM

## 2020-12-30 RX ORDER — ROSUVASTATIN CALCIUM 20 MG/1
40 TABLET, COATED ORAL NIGHTLY
Status: DISCONTINUED | OUTPATIENT
Start: 2020-12-31 | End: 2021-01-03 | Stop reason: HOSPADM

## 2020-12-30 RX ORDER — SODIUM CHLORIDE 0.9 % (FLUSH) 0.9 %
10 SYRINGE (ML) INJECTION
Status: DISCONTINUED | OUTPATIENT
Start: 2020-12-30 | End: 2021-01-03 | Stop reason: HOSPADM

## 2020-12-30 RX ORDER — OLMESARTAN MEDOXOMIL 40 MG/1
40 TABLET ORAL DAILY
Status: DISCONTINUED | OUTPATIENT
Start: 2020-12-31 | End: 2020-12-31

## 2020-12-30 RX ORDER — GLUCAGON 1 MG
1 KIT INJECTION
Status: DISCONTINUED | OUTPATIENT
Start: 2020-12-30 | End: 2020-12-31

## 2020-12-30 RX ORDER — SODIUM CHLORIDE 0.9 % (FLUSH) 0.9 %
10 SYRINGE (ML) INJECTION
Status: CANCELLED | OUTPATIENT
Start: 2020-12-30

## 2020-12-30 RX ORDER — CLOPIDOGREL BISULFATE 75 MG/1
75 TABLET ORAL DAILY
Status: DISCONTINUED | OUTPATIENT
Start: 2020-12-31 | End: 2021-01-03 | Stop reason: HOSPADM

## 2020-12-30 RX ORDER — HEPARIN SODIUM 5000 [USP'U]/ML
5000 INJECTION, SOLUTION INTRAVENOUS; SUBCUTANEOUS EVERY 12 HOURS
Status: DISCONTINUED | OUTPATIENT
Start: 2020-12-30 | End: 2021-01-03 | Stop reason: HOSPADM

## 2020-12-30 RX ADMIN — FUROSEMIDE 20 MG/HR: 10 INJECTION, SOLUTION INTRAMUSCULAR; INTRAVENOUS at 09:12

## 2020-12-30 RX ADMIN — HEPARIN SODIUM 5000 UNITS: 5000 INJECTION INTRAVENOUS; SUBCUTANEOUS at 10:12

## 2020-12-30 RX ADMIN — FUROSEMIDE 80 MG: 10 INJECTION, SOLUTION INTRAMUSCULAR; INTRAVENOUS at 09:12

## 2020-12-30 RX ADMIN — HYDRALAZINE HYDROCHLORIDE 50 MG: 50 TABLET, FILM COATED ORAL at 10:12

## 2020-12-30 RX ADMIN — FUROSEMIDE 80 MG: 10 INJECTION, SOLUTION INTRAMUSCULAR; INTRAVENOUS at 06:12

## 2020-12-30 NOTE — PROGRESS NOTES
"75 y.o. gentleman, here for 6 week follow up for management of T1dm.   Last seen 11/18/2020 - those notes are below.     No new labs for today's visit.   Has been on MDI in the past, with hypoglycemic episodes.   I switched him from MDI to vgo 20 in the interim.   He seems to be much more controlled on vgo and dexcom combo, with increased time in range.   He trained with LEWIS Azul on 12/8/2020 -   Had follow up on 12/16/2020 and was doing well.     He is on VGO 20 -   Taking 2 - 3 clicks with meals.   States sugars seem very controlled during middle of day, he is rarely needing meal time insulin    Wearing dexcom G6 - downloaded and reviewed today -   Average glucose is 128   87% time in range.   12% severe highs.   0% very high.   1% lows.     He was having daily lows in the past on mdi, so much less lows on vgo. Usually lowest sugar is overnight around 70 - 80's between 2 am and 10 am   Today - he feels tired.   Reports some shortness of breath increasing over the past few weeks.   Also reports increased swelling in his legs - was the left leg at first, now the right as well.   He has history of heart failure, and continues on lasix 80mg daily - admits he takes an additional 1/2 tablet every few days as of lately.   He has been coughing up dark red/brown sputum and frothy for the past month (per his description)   Also admits he is just "not feeling right".   No other acute complaints today's visit.         Last visit notes from 11/18/2020 as follows:   Pleasant 75 y.o. gentleman, here for 1 month follow up for mangement of diabetes.   Was previously treated as T2dm, cpeptide came back low at 0.11   Also Pk slightly elevated at 0.03.   so treating as insulin dependent T1dm.     On insulin only prior to diagnosis of t1dm due to ESRD.   Has now seen nephrology per my recommendation for consult - has discussed the possibility of dialysis in the future - if patient needs.     Newly on Dexcom G6 cgm. Applied at " last visit. He is wearing- requesting refill of sensors.   Already sent to dme. Awaiting shipment.   Has history of hypoglycemic episodes and was proven on recent darshana pro done in clinic - 5% lows.     Decreased his levemir from 26 to 22 units daily to eliminate low blood sugars.   Also recommended to decrease his novolog from 12 to 8 units with meals. He is doing for the most part, but sometimes taking up to 10 units with lunch and dinner.   Sent in/organized for him to switch to VGO 20 instead for more stable insulin delivery.   He would do well on pump therapy, but vgo better suited for him as it's simple for him with quick/easy use of buttons.   He hasn't received yet from pharmacy however. Has been approved for $0/month.     dexcom G6 downloaded today.   October 20th - October 23rd  Reviewed -   Average glucose is 167   56% TIR.   3% lows.   1% extreme lows.   31% highs.   9% extreme highs.       Last visit notes from 10/21/2020:   75-year-old gentleman, follow-up for type 2 diabetes.  His last seen October 9, 2020-those notes are below.  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    Chronic kidney disease/end-stage kidney disease with GFR at 16.  He is on insulin only for management of his diabetes.  He continues on 4 injections per day.  A1c has been very well controlled at 7.1%, but frequent hypoglycemic episode with recent EMS called with a very low blood sugar.    Last visit, started patient on Dexcom personal CGM T6-he is using with his iPhone.  He returns for interpretation today.  States he enjoys using the Dexcom, however it alarms a lot and admits he is not taking some of his insulin because he is scared to go too low.  Normally on Levemir 26 units every night, but if the few nights a week.  Also on NovoLog 12 units with meals plus some sliding scale, however will normally take only 5-7 units if blood sugar is low or not take it all if his sugar is low going into the  meal.    Fourteen day interpretation reviewed -   Average glucose of 202.  34% time in range.  3% lows.  2% extreme lows.  32% highs.  29% extreme highs.      Last visit notes from October 9th, 2020:   HPI: Domingo Castro is a 75 y.o.  male c/I for visit to address Diabetes Type 2  This is the first time I am seeing this patient.   Sees Dr. Nugent for primary care needs.     was diagnosed with T2DM 28 years ago - right before he retired.   Has been on metformin in past - side effects, so no longer taking.   Changed to insulin once kidney function worsened.     Taking insulin 4 injections per day.   Is on MDI - levemir 26 units every HS,   novolog 12 units tid ac meals. titrates sugars based on self testing results.   If bg is less than 100, he will not take the insulin - he will eat only.     History of frequent hypoglycemic episodes - recent occasion where he had to call EMS and went into coma (3 months ago approx).   Has CKD, so prohibited from taking many other medications.   Also has history of CAD and on plavix and aspirin daily.   Also history of heart failure - on lasix, and fluid limits daily intake.   No acute complaints today's visit.     Past medical History:   Past Medical History:   Diagnosis Date    Arthritis     Cataract     Chronic diastolic congestive heart failure     Coronary artery disease     Cystoid macular edema of both eyes     Diabetes mellitus type II     Diabetic retinopathy     Hyperlipemia     Hypertension     Retinal hole     Stage 4 chronic kidney disease     Streptococcus pyogenes bacteremia 12/23/2018    Due to left toe osteomyelitis      Family hx:   Family History   Problem Relation Age of Onset    Heart disease Mother     Cancer Mother     Cancer Brother     Heart disease Father     Diabetes Father     Cancer Sister     Cataracts Paternal Grandmother     Glaucoma Paternal Grandmother     Blindness Neg Hx     Amblyopia Neg Hx     Hypertension Neg Hx      "Macular degeneration Neg Hx     Retinal detachment Neg Hx     Strabismus Neg Hx       Current meds:   Current Outpatient Medications:     acetaminophen (TYLENOL) 325 MG tablet, Take 2 tablets (650 mg total) by mouth every 4 (four) hours as needed., Disp: , Rfl: 0    amLODIPine (NORVASC) 10 MG tablet, TAKE 1 TABLET BY MOUTH ONCE DAILY, Disp: 30 tablet, Rfl: 11    aspirin (ECOTRIN) 81 MG EC tablet, Take 81 mg by mouth once daily., Disp: , Rfl:     calcitRIOL (ROCALTROL) 0.25 MCG Cap, Take 1 capsule (0.25 mcg total) by mouth every Monday and Friday., Disp: 60 capsule, Rfl: 6    clopidogreL (PLAVIX) 75 mg tablet, Take 1 tablet by mouth once daily, Disp: 30 tablet, Rfl: 3    furosemide (LASIX) 80 MG tablet, Take 1 tablet (80 mg total) by mouth once daily., Disp: 180 tablet, Rfl: 11    hydrALAZINE (APRESOLINE) 50 MG tablet, TAKE 1 TABLET BY MOUTH EVERY 12 HOURS, Disp: 60 tablet, Rfl: 0    insulin aspart U-100 (NOVOLOG U-100 INSULIN ASPART) 100 unit/mL injection, Inject 40 Units into the skin continuous. USE up to 40 units WITHIN VGO ONLY., Disp: 40 mL, Rfl: 3    nebulizer and compressor Alana, USE AS DIRECTED, Disp: 1 each, Rfl: 0    olmesartan (BENICAR) 20 MG tablet, Take 2 tablets (40 mg total) by mouth once daily., Disp: 90 tablet, Rfl: 10    pen needle, diabetic, safety (BD AUTOSHIELD PEN NEEDLE) 29 gauge x 5/16" Ndle, For use once daily with levemir flexpen, Disp: 90 each, Rfl: 11    rosuvastatin (CRESTOR) 40 MG Tab, Take 1 tablet (40 mg total) by mouth every evening., Disp: 90 tablet, Rfl: 3    albuterol-ipratropium (DUO-NEB) 2.5 mg-0.5 mg/3 mL nebulizer solution, Take 3 mLs by nebulization every 4 (four) hours as needed for Wheezing or Shortness of Breath. Rescue, Disp: 90 mL, Rfl: 0    sub-q insulin device, 20 unit (V-GO 20) Alana, 1 each by Misc.(Non-Drug; Combo Route) route once daily., Disp: 30 each, Rfl: 11     Current Diabetes medications:   novolog vials - using in VGO 20 - taking 1 - 2 clicks " "with meals.     Medications Tried and Failed:   lantus  humalog  metformin    Review of Pertinent co-morbidities/risk factors:   CV: Denies history of MI nor stroke.   CAD: yes - on plavix daily.   Also history of heart failure.   Takes aspirin 81mg tablet daily  BP: has history of HTN  Statin: Taking  ACE/ARB: Taking    Social History     Tobacco Use   Smoking Status Never Smoker   Smokeless Tobacco Never Used      Social:   Lives at home with: his wife. 2 grandchildren live at home with them - ages 2 and age 5.   Diet: following ADA diet   Meals: 2 per day and snacks.        Breakfast - Sometimes juice or a sandwich/sausage biscuit from [employer].        Lunch - rice, chicken.        Dinner - fish, fried fish.        Snacks - fruits often - apples, bananas, oranges, peaches, strawberries        Drinks - orange juice on occasion, water. (has to limit because of kidney and heart failure).   Exercise: none. Prohibited from back pain.   Activities: was an  at [employer] - retired 26 years ago.   Is a  full time.     Glucose Monitoring:   Checking continuously.   Fasting in am - ranges 90 - 150's on average.   Variable bg's    Now using dexcom G6 personal   Is still having lows. See download.     Standards of care:   Eyes: .: 11/12/2020  Foot exam: : 12/21/2020   Diabetes education: None.    Vital Signs  BP (!) 160/96 (BP Location: Right arm, Patient Position: Sitting, BP Method: Large (Manual))   Pulse 73   Temp 98 °F (36.7 °C) (Temporal)   Resp 17   Ht 6' 3" (1.905 m)   Wt 109 kg (240 lb 4.8 oz)   BMI 30.04 kg/m²     Pertinent Labs:   Hgba1c   Lab Results   Component Value Date    HGBA1C 7.1 (H) 06/25/2020     Lipid panel   Lab Results   Component Value Date    CHOL 162 07/08/2020    CHOL 193 02/20/2020    CHOL 126 07/20/2018     Lab Results   Component Value Date    HDL 44 07/08/2020    HDL 56 02/20/2020    HDL 31 (L) 07/20/2018     Lab Results   Component Value Date    LDLCALC 99.0 " 07/08/2020    LDLCALC 118.4 02/20/2020    LDLCALC 76.8 07/20/2018     Lab Results   Component Value Date    TRIG 95 07/08/2020    TRIG 93 02/20/2020    TRIG 91 07/20/2018     Lab Results   Component Value Date    CHOLHDL 27.2 07/08/2020    CHOLHDL 29.0 02/20/2020    CHOLHDL 24.6 07/20/2018      CMP  Glucose   Date Value Ref Range Status   10/28/2020 50 (L) 70 - 110 mg/dL Final   10/28/2020 50 (L) 70 - 110 mg/dL Final     BUN   Date Value Ref Range Status   10/28/2020 49 (H) 2 - 20 mg/dL Final   10/28/2020 49 (H) 2 - 20 mg/dL Final     Creatinine   Date Value Ref Range Status   10/28/2020 3.99 (H) 0.50 - 1.40 mg/dL Final   10/28/2020 3.99 (H) 0.50 - 1.40 mg/dL Final     eGFR if    Date Value Ref Range Status   10/28/2020 15.9 (A) >60 mL/min/1.73 m^2 Final   10/28/2020 15.9 (A) >60 mL/min/1.73 m^2 Final     eGFR if non    Date Value Ref Range Status   10/28/2020 13.8 (A) >60 mL/min/1.73 m^2 Final     Comment:     Calculation used to obtain the estimated glomerular filtration  rate (eGFR) is the CKD-EPI equation.      10/28/2020 13.8 (A) >60 mL/min/1.73 m^2 Final     Comment:     Calculation used to obtain the estimated glomerular filtration  rate (eGFR) is the CKD-EPI equation.         Microalbumin creatinine ratio:   Lab Results   Component Value Date    MICALBCREAT 159.2 (H) 03/08/2016       Review Of Systems:   Gen: Appetite good, no weight gain or loss, denies fatigue and weakness. Denies polydipsia. Reports history of low glucose readings.   Skin: Skin is intact and heals well, denies any rashes or hair changes.   Eyes: Denies any acute visual disturbances, nor blurred vision.   Resp: increased SOB at rest, + cough with frothy bloody sputum.   Cardiac: Denies chest pain, palpitations, + increased swelling in bilat. Lower extremities.   GI: Denies abdominal pain, nausea or vomiting, diarrhea, or constipation.   /GYN: Denies nocturia, nor burning, frequency or pain on  urination.  MS/Neuro: Denies numbness/ tingling in BLE; Gait steady, speech clear  Psych: Denies drug/ETOH abuse, no hx of depression.  Other systems: negative.    Physical Exam:   GENERAL: Well developed, well nourished in appearance.   PSYCH: AAOx3, appropriate mood and affect, pleasant expression, conversant, appears relaxed, well groomed.   EYES: PERRL, Conjunctiva and corneas clear  NECK: Soft and Supple, trachea midline, No thyroid enlargement noted  CHEST: Even, regular, and unlabored respirations  Some crackles in lungs posteriorly at bases bilaterally.   ABDOMEN: Soft, non-distended   VASCULAR: pedal pulses palpable bilaterally, 2+ pitting edema bilaterally.   NEURO:  cranial nerves II - XII intact   MUSCULOSKELETAL: Good ROM, steady gait.   SKIN: Skin warm, dry, and intact     Assessment and Plan of Care:     Domingo was seen today for diabetes.    Diagnoses and all orders for this visit:    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, with long-term current use of insulin  -     Hemoglobin A1C; Future  -     Comprehensive Metabolic Panel; Future    Hypertension associated with diabetes  -     Microalbumin/Creatinine Ratio, Urine; Future    Hyperlipidemia associated with type 2 diabetes mellitus  -     Lipid Panel; Future    Type 2 diabetes mellitus with stage 4 chronic kidney disease, with long-term current use of insulin    Obesity, diabetes, and hypertension syndrome    Impaired mobility and ADLs    Toe osteomyelitis, left    Chronic congestive heart failure with left ventricular diastolic dysfunction    Type 1 diabetes mellitus with stage 4 chronic kidney disease    CKD stage 4 due to type 1 diabetes mellitus    Other orders  -     insulin aspart U-100 (NOVOLOG U-100 INSULIN ASPART) 100 unit/mL injection; Inject 40 Units into the skin continuous. USE up to 40 units WITHIN VGO ONLY.  -     sub-q insulin device, 20 unit (V-GO 20) Alana; 1 each  by Misc.(Non-Drug; Combo Route) route once daily.         1. T1DM with hyperglycemia- Hgba1c goal is 7.5% or less without hypoglycemia - is at 7.1% with hypoglycemic episodes in past.   (Previously treated as T2dm. )  cpeptide low at 0.11, BENNETT + 0.03.   Continue VGO 20 - except put it on in the morning, and take it off at night time before bed.   This should eliminate the bedtime/sleeping hours lows.   Continue use of personal dexcom G6 cgm -   Patient is taking insulin injections 4 times per day.   He is checking his sugar currently 4 times per day.   This CGM is medically necessary for him as he has history of hypoglycemic episodes and hypoglycemic unawareness.   This will help to prevent lows in future and also help with management of his diabetes during the covid pandemic.   discussed DM, progression of disease, long term complications, CV risk factors and tx options.   Advise compliance with ADA diet and encourage exercise  Reviewed  hypoglycemia, s/s and appropriate tx. Have/get quick acting glucose tablets at hand.  Eyes- has history of Diabetic retinopathy. Dr. Paz 7/2020.    Feet - following with podiatry. History of left foot ulcer hallux with debridement. Dr. Gallegos.   History of left big toe osteomyelitis.     2. HTN and CHF- controlled for most part - but is elevated today's visit.   He is also short of breath and legs are edemtous    continue meds as previously prescribed and monitor.   Urine mac last done in 2016 - need to get new one next labs due.   Benicar, amlodipine, lasix, olmesartan, hydralazine.   Reports of frothy bloody cough/sputum increasing over the past month.   Legs swollen and shortness of breath -combined with not feeling well.   Recommended he go to ER and get evaluated. Of note he had admission last fall 2019 with pulmonary edema.      3. HLD- LDL goal < 100. Minimally at goal.   Currently on statin therapy- crestor 40mg every Hs. Continue.   Recheck lipid panel.     4. Weight -  BMI Body mass index is 30.04 kg/m².   Encourage Ada diet and exercise.      5. CKD stage 4 -  Can only do insulin at present.   Following with nephrology - Dr. Sotelo - increased lasix to 80mg every day.  Last seen 8/2020.   Urine mac 150+   Also with secondary Hyperparathyroidisim - pth intact elevated at 386.   Discussed recently the option of HD potentially in the next 1 - 2 years.     6. CHF - lasix. Stable. No acute exacerbations.   No actos - CI  Sending to ER today to get checked out as has been increasingly short of breath with bloody and frothy sputum, and increasing edema in bilat. Lower extremities.     7. CAD - on plavix 75 daily.     8. ED - hypogonadism history. No current complaints.       Follow up with me in 6 weeks  - please do labs in the next week for recheck.   Go to ER today to eval for MAGDA on CKD and Acute CHF exacerbation.

## 2020-12-30 NOTE — PATIENT INSTRUCTIONS
"Continue VGO 20 -   Put it on in the morning, then take the patch off before you to go bed.   This will eliminate the low sugar readings over night.     Continue use of Dexcom G6 cgm.     For low blood sugar - remember to keep glucose tablets on hand. You can purchase these at the pharmacy check out desk/over the counter.   If blood sugar is less than 70, take/eat 2 - 3 tablets quickly to bring your sugar up.   Always keep 15 grams of Quick acting carbohydrates on hand to eat/drink if your sugar is low - examples are 1/2 cup of juice, coke, or crackers, granola bars.   Remember to eat meals frequently to prevent low blood blood sugars.     Remember to continue to follow diabetic diet.   Low Carb Snacks & Diabetes friendly foods   Aim for 30 - 40 grams of carbs for 3 meals per day (or 2 meals and  1 - 2 snacks - however you prefer)  Snacks can be 15 - 20 grams of carbs.     Eating small, frequent meals will help you to feel more satisfied, and more full so that you don't over eat or eat the wrong foods later.   Also, naomy meals/snacks allow you to spread carbohydrates evenly, which may stabilize blood sugars.   Below you will find a list of ideas of foods that I like/prefer.   Feel free to give me your ideas and tips if you find good ones too!   Overall - please remember to limit refined sugars such as soft drinks, juices, rices, pastas, breads, cakes.   Look at the back of the label - look at the amount of "carbohydrates", then look at the amount of "fiber" - subtract the amount of fiber from the amount of carbs - this is your "net carb intake".  The more fiber a food has, the better generally, as you get to subtract this from the net carbs.     0-5 gm carb   Crystal Light flavoring (I like fruit punch flavor!)   Vitamin Water Zero   Catia antioxidant drinks.    Sparkling ice (long skinny flavored water bottles for $1 that are sugar free).    Parag water, WaterLoo - carbonated flavored saunders, various " "flavors.   Diet coke, diet barqs, sprite zero.   Diet sunkist (orange drink)   Sugar free powerade   Herbal tea, unsweetened   2 tsp peanut butter on celery or carrots   Mitra's original Candies - (the Sugar Free ones!)   1/2 cup sugar-free jell-o   1 sugar-free popsicle   ¼ cup blueberries or strawberries   8oz Blue Heidi unsweetened almond milk (or there is sugar free vanilla flavored as well).   5 baby carrots & celery sticks, cucumbers, bell peppers dipped in ¼ cup salsa, 2Tbsp light ranch dressing or 2Tbsp plain Greek yogurt   10 Goldfish crackers   ½ oz low-fat cheese or string cheese   1 closed handful of nuts, unsalted (example-->almonds, pistachios, cashews, peanuts, etc).   1 Tbsp of sunflower seeds, unsalted   1 cup Smart Pop popcorn   1 whole grain brown rice cake    "Think Thin" protein bars - my personal favorite is Creamy Peanut butter, chocolate brownie, or oreo flavors.   "Barrett" bars  - can be found at [employer] grocery store - they have 5 grams of net carbohydrates.   Quest bars (my favorite is birthday cake, and also cinnamon roll is good melted for 10 seconds in the microwave - please remove the wrapper first!)   Premier protein shakes - sold at [employer], or other brand alternatives - usually 1 - 2 grams of carbs (strawberry, vanilla, chocolate flavors) Coffee flavor is my new morning favorite!    Scrambled eggs! Or a fried egg or boiled eggs - add sliced tomatoes, cilantro or some chopped green onions.    Eggs are good with any and all veggies! You can even eat them for dinner or in a low carb tortilla, or served with your favorite grilled meat/sausage or larson is my favorite.    Veggmeseret bryant - zucchini - can buy in the frozen foods section. Birds eye brand is usually cheap. Tip - saute with oil/onions or italian seasoning and use this as a pasta substitution.   Milk - is usually high in carbs/sugar - use Fairlife milk brand instead - it is "filtered" milk - with " half the amount of carbs of regular milk. (next to the milk in milk aisle).    Smuckers sugar free Breakfast syrup (or 1 tablespoon of low sugar breakfast syrup) instead of regular syrup.        15 gm carb   1 small piece of fruit or ½ banana or 1/2 cup lite canned fruit   3 katerina cracker squares   3 cups Smart Pop popcorn, top spray butter, Pritchett lite salt or cinnamon and Truvia   5 Vanilla Wafers   ½ cup low fat, no added sugar ice cream or frozen yogurt (Blue bell, Blue Bunny, Weight Watchers, Skinny Cow)   1/2 - 1 cup Light n' fit Vanilla yogurt (has added protein in it to make you feel full).    ½ turkey, ham, or chicken sandwich   ½ c fruit with ½ c Cottage cheese   4-6 unsalted wheat crackers with 1 oz low fat cheese or 1 tbsp peanut butter    30-45 goldfish crackers (depending on flavor)    7-8 Presybeterian mini brown rice cakes (caramel, apple cinnamon, chocolate)    12 Presybeterian mini brown rice cakes (cheddar, bbq, ranch)    1/3 cup hummus dip with raw veg   1/2 whole wheat gal, 1Tbsp hummus   Mini Pizza (1/2 whole wheat English muffin, low-fat  cheese, tomato sauce)   100 calorie snack pack (Oreo, Chips Ahoy, Ritz Mix, Baked Cheetos)   4-6 oz. light or Greek Style yogurt (Chobani, Yoplait, Okios, Stoneyfield)   ½ cup sugar-free pudding     6 in. wheat tortilla or gal oven toasted chips (topped with spray butter flavoring, cinnamon, Truvia OR spray butter, garlic powder, chili powder)    18 BBQ Popchips (available at [employer], Whole Foods, [employer])   Mini bagel (small size) toasted - add fat free cream cheese or avocado and sliced tomato on top - yum!    1/2 cup Halo top icecream - birthday cake is my go-to flavor.    Truth Bars - can be found at [employer] - Net carbs is 11 - 12 grams depending on the flavor.    Kind bars = mostly nuts and dried berries - find at most local groceries stores, drug stores, whole foods, fresh market. Net carbs is around 10 grams.     Irvin Roberts  "I'm often asked "what smoothie is healthy for me".   Do not be decieved to think that all smoothies are "healthy". In fact, most are loaded with hidden sugars.   Here are some from the menu that you are allowed to have being diabetic:   The gladiator - any flavor.   Keto champ (berry, chocolate or coffee - all have 10 grams of carbs).   The Lean 1 smoothies all have 15 - 20 grams of carbs, so this is slightly more. But would be ok for a meal substitute or snack.   The Shredder in Vanilla or chocolate only. (the strawberry has more sugar considerably)    Tips and Tricks:     Eat an extra vegetable once per day - green veggies (such as broccoli, cauliflower, green beans) - make you feel full and are low in sugar.     My favorite "secret" seasoning to make things extra yummy is cinnamon for sweet taste   For an added secret "salty" taste - add "everything but the bagel" seasoning (you can get on amazon.com or at  joes. I have seen at local groceries such as [employer] too!).     Dark colored fruits are your friend -- blueberries, strawberries, raspberries, blackberries. They have a lower sugar content. So will be the best choice for you.   Light colored fruits are NOT your friend - they tend to be higher in sugar and are not the best options for an ADA diet - examples are oranges, bananas, peaches, watermelon, -- you can eat these, but carefully and in moderation.     WATER. I cannot emphasize drinking water enough - it will hydrate you, make you full. Your body needs it - it's a natural appetite suppressant.   Sometimes hunger and thirst can feel the same. Try drinking some water first, then eat if you are still hungry.     Other snack choices    Celery with peanut butter   Celery with tuna salad   Dill pickles and cheddar cheese (no kidding, it's a great combo)   Nuts (keep raw ones in the freezer if you think you'll overeat them)   Sunflower seeds (get them in the shell so it will take longer to eat " "them)   Other seeds (How to Toast Pumpkin or Squash Seeds)    Pistachios or almonds - will fill you up and are tasty!    Low-Carb Trail Mix   Jerky (beef or turkey -- try to find low-sugar varieties)   Salami slices (you can find in the deli section)   Cheese sticks, such as string cheese   Sugar-free Jello, alone or with cottage cheese and a sprinkling of nuts. Make sugar-free lime Jello with part coconut milk -- For a large package, dissolve the powder in a cup of boiling water, add a can of coconut milk, and then add the rest of the water. Stir well.   Pepperoni "chips" -- Zap the slices in the microwave   Cheese with a few apple slices   4-ounce plain or sugar-free yogurt with berries and flax seed meal   Smoked salmon and cream cheese on cucumber slices   Lettuce Roll-ups -- Roll luncheon meat, egg salad, tuna or other filling and veggies in lettuce leaves   Lunch Meat Roll-ups -- Roll cheese or veggies in lunch meat (read the labels for carbs on the lunch meat)   Spread bean dip, spinach dip, or other low-carb dip or spread on the lunch meat or lettuce and then roll it up   Raw veggies and spinach dip, or other low-carb dip   Pork rinds (Chicharrón), with or without dip   Ricotta cheese with fruit and/or nuts and/or flax seed meal   Mushrooms with cheese spread inside (or other spreads or dips)   Low-carb snack bars (watch out for sugar alcohols, especially maltitol)   Product Review: Atkins Advantage Bars   Pepperoni Chips -- Microwave pepperoni slices until crisp. Great with cheeses and dips   Garlic Parmesan Flax Seed Crackers   Parmesan Crisps -- Good when you want a crunchy snack.   Peanut Butter Protein Balls      "

## 2020-12-31 ENCOUNTER — TELEPHONE (OUTPATIENT)
Dept: REHABILITATION | Facility: HOSPITAL | Age: 75
End: 2020-12-31

## 2020-12-31 PROBLEM — N17.9 AKI (ACUTE KIDNEY INJURY): Status: ACTIVE | Noted: 2020-12-30

## 2020-12-31 LAB
ANION GAP SERPL CALC-SCNC: 12 MMOL/L (ref 8–16)
ANION GAP SERPL CALC-SCNC: 13 MMOL/L (ref 8–16)
ASCENDING AORTA: 3.26 CM
AV INDEX (PROSTH): 0.95
AV MEAN GRADIENT: 2 MMHG
AV PEAK GRADIENT: 3 MMHG
AV VALVE AREA: 4.05 CM2
AV VELOCITY RATIO: 1.05
BASOPHILS # BLD AUTO: 0.07 K/UL (ref 0–0.2)
BASOPHILS NFR BLD: 1 % (ref 0–1.9)
BSA FOR ECHO PROCEDURE: 2.37 M2
BUN SERPL-MCNC: 54 MG/DL (ref 8–23)
BUN SERPL-MCNC: 55 MG/DL (ref 8–23)
CALCIUM SERPL-MCNC: 8.5 MG/DL (ref 8.7–10.5)
CALCIUM SERPL-MCNC: 8.7 MG/DL (ref 8.7–10.5)
CHLORIDE SERPL-SCNC: 109 MMOL/L (ref 95–110)
CHLORIDE SERPL-SCNC: 111 MMOL/L (ref 95–110)
CO2 SERPL-SCNC: 20 MMOL/L (ref 23–29)
CO2 SERPL-SCNC: 21 MMOL/L (ref 23–29)
CREAT SERPL-MCNC: 4.6 MG/DL (ref 0.5–1.4)
CREAT SERPL-MCNC: 4.6 MG/DL (ref 0.5–1.4)
CV ECHO LV RWT: 0.32 CM
DIFFERENTIAL METHOD: ABNORMAL
DOP CALC AO PEAK VEL: 0.83 M/S
DOP CALC AO VTI: 19.58 CM
DOP CALC LVOT AREA: 4.3 CM2
DOP CALC LVOT DIAMETER: 2.33 CM
DOP CALC LVOT PEAK VEL: 0.87 M/S
DOP CALC LVOT STROKE VOLUME: 79.22 CM3
DOP CALCLVOT PEAK VEL VTI: 18.59 CM
E WAVE DECELERATION TIME: 214.29 MSEC
E/A RATIO: 0.98
E/E' RATIO: 11.13 M/S
ECHO LV POSTERIOR WALL: 0.9 CM (ref 0.6–1.1)
EOSINOPHIL # BLD AUTO: 0.2 K/UL (ref 0–0.5)
EOSINOPHIL NFR BLD: 3.1 % (ref 0–8)
ERYTHROCYTE [DISTWIDTH] IN BLOOD BY AUTOMATED COUNT: 16.4 % (ref 11.5–14.5)
EST. GFR  (AFRICAN AMERICAN): 13.4 ML/MIN/1.73 M^2
EST. GFR  (AFRICAN AMERICAN): 13.4 ML/MIN/1.73 M^2
EST. GFR  (NON AFRICAN AMERICAN): 11.6 ML/MIN/1.73 M^2
EST. GFR  (NON AFRICAN AMERICAN): 11.6 ML/MIN/1.73 M^2
ESTIMATED AVG GLUCOSE: 120 MG/DL (ref 68–131)
FRACTIONAL SHORTENING: 32 % (ref 28–44)
GLUCOSE SERPL-MCNC: 147 MG/DL (ref 70–110)
GLUCOSE SERPL-MCNC: 156 MG/DL (ref 70–110)
HBA1C MFR BLD HPLC: 5.8 % (ref 4–5.6)
HCT VFR BLD AUTO: 26.1 % (ref 40–54)
HGB BLD-MCNC: 8.2 G/DL (ref 14–18)
IMM GRANULOCYTES # BLD AUTO: 0.02 K/UL (ref 0–0.04)
IMM GRANULOCYTES NFR BLD AUTO: 0.3 % (ref 0–0.5)
INTERVENTRICULAR SEPTUM: 1 CM (ref 0.6–1.1)
LA MAJOR: 5.77 CM
LA MINOR: 5.7 CM
LA WIDTH: 4.07 CM
LEFT ATRIUM SIZE: 4.53 CM
LEFT ATRIUM VOLUME INDEX MOD: 36.3 ML/M2
LEFT ATRIUM VOLUME INDEX: 38.4 ML/M2
LEFT ATRIUM VOLUME MOD: 85 CM3
LEFT ATRIUM VOLUME: 89.87 CM3
LEFT INTERNAL DIMENSION IN SYSTOLE: 3.9 CM (ref 2.1–4)
LEFT VENTRICLE DIASTOLIC VOLUME INDEX: 64.72 ML/M2
LEFT VENTRICLE DIASTOLIC VOLUME: 151.61 ML
LEFT VENTRICLE MASS INDEX: 90 G/M2
LEFT VENTRICLE SYSTOLIC VOLUME INDEX: 28.2 ML/M2
LEFT VENTRICLE SYSTOLIC VOLUME: 66.03 ML
LEFT VENTRICULAR INTERNAL DIMENSION IN DIASTOLE: 5.7 CM (ref 3.5–6)
LEFT VENTRICULAR MASS: 211.75 G
LV LATERAL E/E' RATIO: 11.13 M/S
LV SEPTAL E/E' RATIO: 11.13 M/S
LYMPHOCYTES # BLD AUTO: 0.9 K/UL (ref 1–4.8)
LYMPHOCYTES NFR BLD: 12.5 % (ref 18–48)
MAGNESIUM SERPL-MCNC: 1.9 MG/DL (ref 1.6–2.6)
MCH RBC QN AUTO: 29.2 PG (ref 27–31)
MCHC RBC AUTO-ENTMCNC: 31.4 G/DL (ref 32–36)
MCV RBC AUTO: 93 FL (ref 82–98)
MONOCYTES # BLD AUTO: 0.5 K/UL (ref 0.3–1)
MONOCYTES NFR BLD: 6.8 % (ref 4–15)
MV PEAK A VEL: 0.91 M/S
MV PEAK E VEL: 0.89 M/S
NEUTROPHILS # BLD AUTO: 5.4 K/UL (ref 1.8–7.7)
NEUTROPHILS NFR BLD: 76.3 % (ref 38–73)
NRBC BLD-RTO: 0 /100 WBC
PHOSPHATE SERPL-MCNC: 3.1 MG/DL (ref 2.7–4.5)
PLATELET # BLD AUTO: 134 K/UL (ref 150–350)
PMV BLD AUTO: 11.8 FL (ref 9.2–12.9)
POCT GLUCOSE: 175 MG/DL (ref 70–110)
POCT GLUCOSE: 188 MG/DL (ref 70–110)
POCT GLUCOSE: 191 MG/DL (ref 70–110)
POTASSIUM SERPL-SCNC: 3.5 MMOL/L (ref 3.5–5.1)
POTASSIUM SERPL-SCNC: 3.6 MMOL/L (ref 3.5–5.1)
RA MAJOR: 6.37 CM
RA PRESSURE: 8 MMHG
RA WIDTH: 3.71 CM
RBC # BLD AUTO: 2.81 M/UL (ref 4.6–6.2)
RIGHT VENTRICULAR END-DIASTOLIC DIMENSION: 4.47 CM
RV TISSUE DOPPLER FREE WALL SYSTOLIC VELOCITY 1 (APICAL 4 CHAMBER VIEW): 10.14 CM/S
SINUS: 3.61 CM
SODIUM SERPL-SCNC: 143 MMOL/L (ref 136–145)
SODIUM SERPL-SCNC: 143 MMOL/L (ref 136–145)
SODIUM UR-SCNC: 115 MMOL/L (ref 20–250)
STJ: 3.07 CM
TDI LATERAL: 0.08 M/S
TDI SEPTAL: 0.08 M/S
TDI: 0.08 M/S
TRICUSPID ANNULAR PLANE SYSTOLIC EXCURSION: 2.34 CM
TROPONIN I SERPL DL<=0.01 NG/ML-MCNC: 0.04 NG/ML (ref 0–0.03)
UUN UR-MCNC: 185 MG/DL (ref 140–1050)
WBC # BLD AUTO: 7.02 K/UL (ref 3.9–12.7)

## 2020-12-31 PROCEDURE — 99223 PR INITIAL HOSPITAL CARE,LEVL III: ICD-10-PCS | Mod: ,,, | Performed by: HOSPITALIST

## 2020-12-31 PROCEDURE — 84100 ASSAY OF PHOSPHORUS: CPT

## 2020-12-31 PROCEDURE — 84484 ASSAY OF TROPONIN QUANT: CPT

## 2020-12-31 PROCEDURE — 99222 PR INITIAL HOSPITAL CARE,LEVL II: ICD-10-PCS | Mod: ,,, | Performed by: NURSE PRACTITIONER

## 2020-12-31 PROCEDURE — 63600175 PHARM REV CODE 636 W HCPCS: Performed by: STUDENT IN AN ORGANIZED HEALTH CARE EDUCATION/TRAINING PROGRAM

## 2020-12-31 PROCEDURE — 84300 ASSAY OF URINE SODIUM: CPT

## 2020-12-31 PROCEDURE — 84540 ASSAY OF URINE/UREA-N: CPT

## 2020-12-31 PROCEDURE — 11000001 HC ACUTE MED/SURG PRIVATE ROOM

## 2020-12-31 PROCEDURE — 99232 SBSQ HOSP IP/OBS MODERATE 35: CPT | Mod: ,,, | Performed by: INTERNAL MEDICINE

## 2020-12-31 PROCEDURE — 99222 1ST HOSP IP/OBS MODERATE 55: CPT | Mod: ,,, | Performed by: NURSE PRACTITIONER

## 2020-12-31 PROCEDURE — 63600175 PHARM REV CODE 636 W HCPCS: Performed by: NURSE PRACTITIONER

## 2020-12-31 PROCEDURE — 83036 HEMOGLOBIN GLYCOSYLATED A1C: CPT

## 2020-12-31 PROCEDURE — 25000003 PHARM REV CODE 250: Performed by: STUDENT IN AN ORGANIZED HEALTH CARE EDUCATION/TRAINING PROGRAM

## 2020-12-31 PROCEDURE — 99223 1ST HOSP IP/OBS HIGH 75: CPT | Mod: ,,, | Performed by: HOSPITALIST

## 2020-12-31 PROCEDURE — 85025 COMPLETE CBC W/AUTO DIFF WBC: CPT

## 2020-12-31 PROCEDURE — 80048 BASIC METABOLIC PNL TOTAL CA: CPT

## 2020-12-31 PROCEDURE — 83735 ASSAY OF MAGNESIUM: CPT

## 2020-12-31 PROCEDURE — 36415 COLL VENOUS BLD VENIPUNCTURE: CPT

## 2020-12-31 PROCEDURE — C9399 UNCLASSIFIED DRUGS OR BIOLOG: HCPCS | Performed by: NURSE PRACTITIONER

## 2020-12-31 PROCEDURE — 99232 PR SUBSEQUENT HOSPITAL CARE,LEVL II: ICD-10-PCS | Mod: ,,, | Performed by: INTERNAL MEDICINE

## 2020-12-31 PROCEDURE — 25000003 PHARM REV CODE 250: Performed by: NURSE PRACTITIONER

## 2020-12-31 RX ORDER — IBUPROFEN 200 MG
24 TABLET ORAL
Status: DISCONTINUED | OUTPATIENT
Start: 2020-12-31 | End: 2021-01-03 | Stop reason: HOSPADM

## 2020-12-31 RX ORDER — INSULIN ASPART 100 [IU]/ML
3 INJECTION, SOLUTION INTRAVENOUS; SUBCUTANEOUS
Status: DISCONTINUED | OUTPATIENT
Start: 2020-12-31 | End: 2021-01-02

## 2020-12-31 RX ORDER — IBUPROFEN 200 MG
16 TABLET ORAL
Status: DISCONTINUED | OUTPATIENT
Start: 2020-12-31 | End: 2021-01-03 | Stop reason: HOSPADM

## 2020-12-31 RX ORDER — GLUCAGON 1 MG
1 KIT INJECTION
Status: DISCONTINUED | OUTPATIENT
Start: 2020-12-31 | End: 2021-01-03 | Stop reason: HOSPADM

## 2020-12-31 RX ORDER — HYDRALAZINE HYDROCHLORIDE 50 MG/1
50 TABLET, FILM COATED ORAL EVERY 8 HOURS
Status: DISCONTINUED | OUTPATIENT
Start: 2020-12-31 | End: 2021-01-01

## 2020-12-31 RX ORDER — INSULIN ASPART 100 [IU]/ML
0-5 INJECTION, SOLUTION INTRAVENOUS; SUBCUTANEOUS
Status: DISCONTINUED | OUTPATIENT
Start: 2020-12-31 | End: 2021-01-03 | Stop reason: HOSPADM

## 2020-12-31 RX ADMIN — FUROSEMIDE 20 MG/HR: 10 INJECTION, SOLUTION INTRAMUSCULAR; INTRAVENOUS at 09:12

## 2020-12-31 RX ADMIN — AMLODIPINE BESYLATE 10 MG: 10 TABLET ORAL at 08:12

## 2020-12-31 RX ADMIN — INSULIN DETEMIR 10 UNITS: 100 INJECTION, SOLUTION SUBCUTANEOUS at 04:12

## 2020-12-31 RX ADMIN — CLOPIDOGREL 75 MG: 75 TABLET, FILM COATED ORAL at 09:12

## 2020-12-31 RX ADMIN — MUPIROCIN: 20 OINTMENT TOPICAL at 09:12

## 2020-12-31 RX ADMIN — INSULIN ASPART 3 UNITS: 100 INJECTION, SOLUTION INTRAVENOUS; SUBCUTANEOUS at 04:12

## 2020-12-31 RX ADMIN — HEPARIN SODIUM 5000 UNITS: 5000 INJECTION INTRAVENOUS; SUBCUTANEOUS at 09:12

## 2020-12-31 RX ADMIN — OLMESARTAN MEDOXOMIL 40 MG: 40 TABLET, FILM COATED ORAL at 08:12

## 2020-12-31 RX ADMIN — ASPIRIN 81 MG: 81 TABLET, COATED ORAL at 09:12

## 2020-12-31 RX ADMIN — HYDRALAZINE HYDROCHLORIDE 50 MG: 50 TABLET, FILM COATED ORAL at 09:12

## 2020-12-31 RX ADMIN — HEPARIN SODIUM 5000 UNITS: 5000 INJECTION INTRAVENOUS; SUBCUTANEOUS at 08:12

## 2020-12-31 RX ADMIN — HYDRALAZINE HYDROCHLORIDE 50 MG: 50 TABLET, FILM COATED ORAL at 08:12

## 2020-12-31 RX ADMIN — ROSUVASTATIN CALCIUM 40 MG: 20 TABLET, FILM COATED ORAL at 09:12

## 2021-01-01 LAB
ANION GAP SERPL CALC-SCNC: 12 MMOL/L (ref 8–16)
ANION GAP SERPL CALC-SCNC: 13 MMOL/L (ref 8–16)
BACTERIA #/AREA URNS AUTO: ABNORMAL /HPF
BASOPHILS # BLD AUTO: 0.1 K/UL (ref 0–0.2)
BASOPHILS NFR BLD: 1.3 % (ref 0–1.9)
BILIRUB UR QL STRIP: NEGATIVE
BUN SERPL-MCNC: 55 MG/DL (ref 8–23)
BUN SERPL-MCNC: 56 MG/DL (ref 8–23)
CALCIUM SERPL-MCNC: 9.1 MG/DL (ref 8.7–10.5)
CALCIUM SERPL-MCNC: 9.6 MG/DL (ref 8.7–10.5)
CHLORIDE SERPL-SCNC: 106 MMOL/L (ref 95–110)
CHLORIDE SERPL-SCNC: 109 MMOL/L (ref 95–110)
CLARITY UR REFRACT.AUTO: CLEAR
CO2 SERPL-SCNC: 20 MMOL/L (ref 23–29)
CO2 SERPL-SCNC: 24 MMOL/L (ref 23–29)
COLOR UR AUTO: ABNORMAL
CREAT SERPL-MCNC: 4.5 MG/DL (ref 0.5–1.4)
CREAT SERPL-MCNC: 4.5 MG/DL (ref 0.5–1.4)
CREAT UR-MCNC: 24 MG/DL (ref 23–375)
DIFFERENTIAL METHOD: ABNORMAL
EOSINOPHIL # BLD AUTO: 0.3 K/UL (ref 0–0.5)
EOSINOPHIL NFR BLD: 3.8 % (ref 0–8)
ERYTHROCYTE [DISTWIDTH] IN BLOOD BY AUTOMATED COUNT: 16.4 % (ref 11.5–14.5)
EST. GFR  (AFRICAN AMERICAN): 13.8 ML/MIN/1.73 M^2
EST. GFR  (AFRICAN AMERICAN): 13.8 ML/MIN/1.73 M^2
EST. GFR  (NON AFRICAN AMERICAN): 11.9 ML/MIN/1.73 M^2
EST. GFR  (NON AFRICAN AMERICAN): 11.9 ML/MIN/1.73 M^2
GLUCOSE SERPL-MCNC: 102 MG/DL (ref 70–110)
GLUCOSE SERPL-MCNC: 138 MG/DL (ref 70–110)
GLUCOSE UR QL STRIP: NEGATIVE
HCT VFR BLD AUTO: 33 % (ref 40–54)
HGB BLD-MCNC: 9.4 G/DL (ref 14–18)
HGB UR QL STRIP: ABNORMAL
HYALINE CASTS UR QL AUTO: 0 /LPF
IMM GRANULOCYTES # BLD AUTO: 0.02 K/UL (ref 0–0.04)
IMM GRANULOCYTES NFR BLD AUTO: 0.3 % (ref 0–0.5)
KETONES UR QL STRIP: NEGATIVE
LEUKOCYTE ESTERASE UR QL STRIP: ABNORMAL
LYMPHOCYTES # BLD AUTO: 1.3 K/UL (ref 1–4.8)
LYMPHOCYTES NFR BLD: 17.9 % (ref 18–48)
MAGNESIUM SERPL-MCNC: 2 MG/DL (ref 1.6–2.6)
MAGNESIUM SERPL-MCNC: 2.1 MG/DL (ref 1.6–2.6)
MCH RBC QN AUTO: 29.7 PG (ref 27–31)
MCHC RBC AUTO-ENTMCNC: 28.5 G/DL (ref 32–36)
MCV RBC AUTO: 104 FL (ref 82–98)
MICROSCOPIC COMMENT: ABNORMAL
MONOCYTES # BLD AUTO: 0.6 K/UL (ref 0.3–1)
MONOCYTES NFR BLD: 7.8 % (ref 4–15)
NEUTROPHILS # BLD AUTO: 5.1 K/UL (ref 1.8–7.7)
NEUTROPHILS NFR BLD: 68.9 % (ref 38–73)
NITRITE UR QL STRIP: NEGATIVE
NRBC BLD-RTO: 0 /100 WBC
PH UR STRIP: 6 [PH] (ref 5–8)
PHOSPHATE SERPL-MCNC: 3.4 MG/DL (ref 2.7–4.5)
PLATELET # BLD AUTO: 108 K/UL (ref 150–350)
PMV BLD AUTO: 11.7 FL (ref 9.2–12.9)
POCT GLUCOSE: 106 MG/DL (ref 70–110)
POCT GLUCOSE: 131 MG/DL (ref 70–110)
POCT GLUCOSE: 185 MG/DL (ref 70–110)
POTASSIUM SERPL-SCNC: 3.5 MMOL/L (ref 3.5–5.1)
POTASSIUM SERPL-SCNC: 4 MMOL/L (ref 3.5–5.1)
PROT UR QL STRIP: ABNORMAL
RBC # BLD AUTO: 3.16 M/UL (ref 4.6–6.2)
RBC #/AREA URNS AUTO: 3 /HPF (ref 0–4)
SODIUM SERPL-SCNC: 142 MMOL/L (ref 136–145)
SODIUM SERPL-SCNC: 142 MMOL/L (ref 136–145)
SP GR UR STRIP: 1.01 (ref 1–1.03)
SQUAMOUS #/AREA URNS AUTO: 0 /HPF
URN SPEC COLLECT METH UR: ABNORMAL
WBC # BLD AUTO: 7.43 K/UL (ref 3.9–12.7)
WBC #/AREA URNS AUTO: 42 /HPF (ref 0–5)

## 2021-01-01 PROCEDURE — 25000003 PHARM REV CODE 250: Performed by: STUDENT IN AN ORGANIZED HEALTH CARE EDUCATION/TRAINING PROGRAM

## 2021-01-01 PROCEDURE — 87086 URINE CULTURE/COLONY COUNT: CPT

## 2021-01-01 PROCEDURE — 36415 COLL VENOUS BLD VENIPUNCTURE: CPT

## 2021-01-01 PROCEDURE — 99233 PR SUBSEQUENT HOSPITAL CARE,LEVL III: ICD-10-PCS | Mod: GC,,, | Performed by: INTERNAL MEDICINE

## 2021-01-01 PROCEDURE — 99233 SBSQ HOSP IP/OBS HIGH 50: CPT | Mod: GC,,, | Performed by: INTERNAL MEDICINE

## 2021-01-01 PROCEDURE — 63600175 PHARM REV CODE 636 W HCPCS: Performed by: STUDENT IN AN ORGANIZED HEALTH CARE EDUCATION/TRAINING PROGRAM

## 2021-01-01 PROCEDURE — 82570 ASSAY OF URINE CREATININE: CPT

## 2021-01-01 PROCEDURE — 84100 ASSAY OF PHOSPHORUS: CPT

## 2021-01-01 PROCEDURE — 83735 ASSAY OF MAGNESIUM: CPT

## 2021-01-01 PROCEDURE — 87088 URINE BACTERIA CULTURE: CPT

## 2021-01-01 PROCEDURE — 63600175 PHARM REV CODE 636 W HCPCS: Performed by: NURSE PRACTITIONER

## 2021-01-01 PROCEDURE — 99223 PR INITIAL HOSPITAL CARE,LEVL III: ICD-10-PCS | Mod: GC,,, | Performed by: INTERNAL MEDICINE

## 2021-01-01 PROCEDURE — 11000001 HC ACUTE MED/SURG PRIVATE ROOM

## 2021-01-01 PROCEDURE — 80048 BASIC METABOLIC PNL TOTAL CA: CPT

## 2021-01-01 PROCEDURE — 99223 1ST HOSP IP/OBS HIGH 75: CPT | Mod: GC,,, | Performed by: INTERNAL MEDICINE

## 2021-01-01 PROCEDURE — 85025 COMPLETE CBC W/AUTO DIFF WBC: CPT

## 2021-01-01 PROCEDURE — 81001 URINALYSIS AUTO W/SCOPE: CPT

## 2021-01-01 RX ORDER — POTASSIUM CHLORIDE 750 MG/1
30 CAPSULE, EXTENDED RELEASE ORAL ONCE
Status: COMPLETED | OUTPATIENT
Start: 2021-01-01 | End: 2021-01-01

## 2021-01-01 RX ADMIN — HEPARIN SODIUM 5000 UNITS: 5000 INJECTION INTRAVENOUS; SUBCUTANEOUS at 09:01

## 2021-01-01 RX ADMIN — MUPIROCIN: 20 OINTMENT TOPICAL at 08:01

## 2021-01-01 RX ADMIN — POTASSIUM CHLORIDE 30 MEQ: 750 CAPSULE, EXTENDED RELEASE ORAL at 06:01

## 2021-01-01 RX ADMIN — INSULIN DETEMIR 10 UNITS: 100 INJECTION, SOLUTION SUBCUTANEOUS at 08:01

## 2021-01-01 RX ADMIN — INSULIN ASPART 3 UNITS: 100 INJECTION, SOLUTION INTRAVENOUS; SUBCUTANEOUS at 05:01

## 2021-01-01 RX ADMIN — HEPARIN SODIUM 5000 UNITS: 5000 INJECTION INTRAVENOUS; SUBCUTANEOUS at 08:01

## 2021-01-01 RX ADMIN — HYDRALAZINE HYDROCHLORIDE 75 MG: 50 TABLET, FILM COATED ORAL at 09:01

## 2021-01-01 RX ADMIN — CLOPIDOGREL 75 MG: 75 TABLET, FILM COATED ORAL at 08:01

## 2021-01-01 RX ADMIN — INSULIN ASPART 1 UNITS: 100 INJECTION, SOLUTION INTRAVENOUS; SUBCUTANEOUS at 09:01

## 2021-01-01 RX ADMIN — HYDRALAZINE HYDROCHLORIDE 75 MG: 50 TABLET, FILM COATED ORAL at 06:01

## 2021-01-01 RX ADMIN — ASPIRIN 81 MG: 81 TABLET, COATED ORAL at 08:01

## 2021-01-01 RX ADMIN — INSULIN ASPART 3 UNITS: 100 INJECTION, SOLUTION INTRAVENOUS; SUBCUTANEOUS at 08:01

## 2021-01-01 RX ADMIN — HYDRALAZINE HYDROCHLORIDE 75 MG: 50 TABLET, FILM COATED ORAL at 03:01

## 2021-01-01 RX ADMIN — INSULIN ASPART 3 UNITS: 100 INJECTION, SOLUTION INTRAVENOUS; SUBCUTANEOUS at 12:01

## 2021-01-01 RX ADMIN — ROSUVASTATIN CALCIUM 40 MG: 20 TABLET, FILM COATED ORAL at 09:01

## 2021-01-01 RX ADMIN — POTASSIUM CHLORIDE 30 MEQ: 750 CAPSULE, EXTENDED RELEASE ORAL at 04:01

## 2021-01-01 RX ADMIN — AMLODIPINE BESYLATE 10 MG: 10 TABLET ORAL at 08:01

## 2021-01-01 RX ADMIN — MUPIROCIN: 20 OINTMENT TOPICAL at 09:01

## 2021-01-02 LAB
ANION GAP SERPL CALC-SCNC: 12 MMOL/L (ref 8–16)
ANION GAP SERPL CALC-SCNC: 15 MMOL/L (ref 8–16)
BASOPHILS # BLD AUTO: 0.05 K/UL (ref 0–0.2)
BASOPHILS NFR BLD: 0.5 % (ref 0–1.9)
BUN SERPL-MCNC: 58 MG/DL (ref 8–23)
BUN SERPL-MCNC: 59 MG/DL (ref 8–23)
CALCIUM SERPL-MCNC: 9.1 MG/DL (ref 8.7–10.5)
CALCIUM SERPL-MCNC: 9.2 MG/DL (ref 8.7–10.5)
CHLORIDE SERPL-SCNC: 106 MMOL/L (ref 95–110)
CHLORIDE SERPL-SCNC: 106 MMOL/L (ref 95–110)
CO2 SERPL-SCNC: 21 MMOL/L (ref 23–29)
CO2 SERPL-SCNC: 25 MMOL/L (ref 23–29)
CREAT SERPL-MCNC: 4.8 MG/DL (ref 0.5–1.4)
CREAT SERPL-MCNC: 5 MG/DL (ref 0.5–1.4)
DIFFERENTIAL METHOD: ABNORMAL
EOSINOPHIL # BLD AUTO: 0.3 K/UL (ref 0–0.5)
EOSINOPHIL NFR BLD: 2.7 % (ref 0–8)
ERYTHROCYTE [DISTWIDTH] IN BLOOD BY AUTOMATED COUNT: 16.6 % (ref 11.5–14.5)
EST. GFR  (AFRICAN AMERICAN): 12.1 ML/MIN/1.73 M^2
EST. GFR  (AFRICAN AMERICAN): 12.7 ML/MIN/1.73 M^2
EST. GFR  (NON AFRICAN AMERICAN): 10.5 ML/MIN/1.73 M^2
EST. GFR  (NON AFRICAN AMERICAN): 11 ML/MIN/1.73 M^2
GLUCOSE SERPL-MCNC: 162 MG/DL (ref 70–110)
GLUCOSE SERPL-MCNC: 165 MG/DL (ref 70–110)
HCT VFR BLD AUTO: 28.8 % (ref 40–54)
HGB BLD-MCNC: 8.9 G/DL (ref 14–18)
IMM GRANULOCYTES # BLD AUTO: 0.03 K/UL (ref 0–0.04)
IMM GRANULOCYTES NFR BLD AUTO: 0.3 % (ref 0–0.5)
LYMPHOCYTES # BLD AUTO: 1 K/UL (ref 1–4.8)
LYMPHOCYTES NFR BLD: 10.6 % (ref 18–48)
MAGNESIUM SERPL-MCNC: 2 MG/DL (ref 1.6–2.6)
MAGNESIUM SERPL-MCNC: 2 MG/DL (ref 1.6–2.6)
MCH RBC QN AUTO: 29 PG (ref 27–31)
MCHC RBC AUTO-ENTMCNC: 30.9 G/DL (ref 32–36)
MCV RBC AUTO: 94 FL (ref 82–98)
MONOCYTES # BLD AUTO: 0.8 K/UL (ref 0.3–1)
MONOCYTES NFR BLD: 8 % (ref 4–15)
NEUTROPHILS # BLD AUTO: 7.4 K/UL (ref 1.8–7.7)
NEUTROPHILS NFR BLD: 77.9 % (ref 38–73)
NRBC BLD-RTO: 0 /100 WBC
PHOSPHATE SERPL-MCNC: 3.2 MG/DL (ref 2.7–4.5)
PLATELET # BLD AUTO: 142 K/UL (ref 150–350)
PMV BLD AUTO: 12 FL (ref 9.2–12.9)
POCT GLUCOSE: 153 MG/DL (ref 70–110)
POCT GLUCOSE: 159 MG/DL (ref 70–110)
POCT GLUCOSE: 164 MG/DL (ref 70–110)
POCT GLUCOSE: 196 MG/DL (ref 70–110)
POCT GLUCOSE: 265 MG/DL (ref 70–110)
POTASSIUM SERPL-SCNC: 3.6 MMOL/L (ref 3.5–5.1)
POTASSIUM SERPL-SCNC: 3.9 MMOL/L (ref 3.5–5.1)
RBC # BLD AUTO: 3.07 M/UL (ref 4.6–6.2)
SODIUM SERPL-SCNC: 142 MMOL/L (ref 136–145)
SODIUM SERPL-SCNC: 143 MMOL/L (ref 136–145)
WBC # BLD AUTO: 9.53 K/UL (ref 3.9–12.7)

## 2021-01-02 PROCEDURE — 63600175 PHARM REV CODE 636 W HCPCS: Performed by: STUDENT IN AN ORGANIZED HEALTH CARE EDUCATION/TRAINING PROGRAM

## 2021-01-02 PROCEDURE — 84100 ASSAY OF PHOSPHORUS: CPT

## 2021-01-02 PROCEDURE — 99232 PR SUBSEQUENT HOSPITAL CARE,LEVL II: ICD-10-PCS | Mod: GC,,, | Performed by: STUDENT IN AN ORGANIZED HEALTH CARE EDUCATION/TRAINING PROGRAM

## 2021-01-02 PROCEDURE — 85025 COMPLETE CBC W/AUTO DIFF WBC: CPT

## 2021-01-02 PROCEDURE — 25000003 PHARM REV CODE 250: Performed by: STUDENT IN AN ORGANIZED HEALTH CARE EDUCATION/TRAINING PROGRAM

## 2021-01-02 PROCEDURE — 99232 SBSQ HOSP IP/OBS MODERATE 35: CPT | Mod: GC,,, | Performed by: STUDENT IN AN ORGANIZED HEALTH CARE EDUCATION/TRAINING PROGRAM

## 2021-01-02 PROCEDURE — 97165 OT EVAL LOW COMPLEX 30 MIN: CPT

## 2021-01-02 PROCEDURE — 36415 COLL VENOUS BLD VENIPUNCTURE: CPT

## 2021-01-02 PROCEDURE — 11000001 HC ACUTE MED/SURG PRIVATE ROOM

## 2021-01-02 PROCEDURE — 80048 BASIC METABOLIC PNL TOTAL CA: CPT

## 2021-01-02 PROCEDURE — 97161 PT EVAL LOW COMPLEX 20 MIN: CPT

## 2021-01-02 PROCEDURE — 83735 ASSAY OF MAGNESIUM: CPT | Mod: 91

## 2021-01-02 RX ORDER — FUROSEMIDE 40 MG/1
80 TABLET ORAL 2 TIMES DAILY
Status: DISCONTINUED | OUTPATIENT
Start: 2021-01-02 | End: 2021-01-03 | Stop reason: HOSPADM

## 2021-01-02 RX ORDER — POTASSIUM CHLORIDE 20 MEQ/1
40 TABLET, EXTENDED RELEASE ORAL ONCE
Status: COMPLETED | OUTPATIENT
Start: 2021-01-02 | End: 2021-01-02

## 2021-01-02 RX ORDER — HYDRALAZINE HYDROCHLORIDE 50 MG/1
100 TABLET, FILM COATED ORAL EVERY 8 HOURS
Status: DISCONTINUED | OUTPATIENT
Start: 2021-01-02 | End: 2021-01-03 | Stop reason: HOSPADM

## 2021-01-02 RX ORDER — INSULIN ASPART 100 [IU]/ML
4 INJECTION, SOLUTION INTRAVENOUS; SUBCUTANEOUS
Status: DISCONTINUED | OUTPATIENT
Start: 2021-01-02 | End: 2021-01-03 | Stop reason: HOSPADM

## 2021-01-02 RX ADMIN — INSULIN ASPART 4 UNITS: 100 INJECTION, SOLUTION INTRAVENOUS; SUBCUTANEOUS at 09:01

## 2021-01-02 RX ADMIN — HYDRALAZINE HYDROCHLORIDE 100 MG: 50 TABLET, FILM COATED ORAL at 06:01

## 2021-01-02 RX ADMIN — HEPARIN SODIUM 5000 UNITS: 5000 INJECTION INTRAVENOUS; SUBCUTANEOUS at 08:01

## 2021-01-02 RX ADMIN — ROSUVASTATIN CALCIUM 40 MG: 20 TABLET, FILM COATED ORAL at 09:01

## 2021-01-02 RX ADMIN — AMLODIPINE BESYLATE 10 MG: 10 TABLET ORAL at 08:01

## 2021-01-02 RX ADMIN — INSULIN ASPART 4 UNITS: 100 INJECTION, SOLUTION INTRAVENOUS; SUBCUTANEOUS at 01:01

## 2021-01-02 RX ADMIN — POTASSIUM CHLORIDE 40 MEQ: 1500 TABLET, EXTENDED RELEASE ORAL at 06:01

## 2021-01-02 RX ADMIN — ASPIRIN 81 MG: 81 TABLET, COATED ORAL at 08:01

## 2021-01-02 RX ADMIN — FUROSEMIDE 80 MG: 40 TABLET ORAL at 05:01

## 2021-01-02 RX ADMIN — HYDRALAZINE HYDROCHLORIDE 100 MG: 50 TABLET, FILM COATED ORAL at 09:01

## 2021-01-02 RX ADMIN — HYDRALAZINE HYDROCHLORIDE 100 MG: 50 TABLET, FILM COATED ORAL at 01:01

## 2021-01-02 RX ADMIN — INSULIN ASPART 4 UNITS: 100 INJECTION, SOLUTION INTRAVENOUS; SUBCUTANEOUS at 05:01

## 2021-01-02 RX ADMIN — HEPARIN SODIUM 5000 UNITS: 5000 INJECTION INTRAVENOUS; SUBCUTANEOUS at 09:01

## 2021-01-02 RX ADMIN — INSULIN DETEMIR 10 UNITS: 100 INJECTION, SOLUTION SUBCUTANEOUS at 09:01

## 2021-01-02 RX ADMIN — MUPIROCIN: 20 OINTMENT TOPICAL at 09:01

## 2021-01-02 RX ADMIN — CLOPIDOGREL 75 MG: 75 TABLET, FILM COATED ORAL at 08:01

## 2021-01-02 RX ADMIN — MUPIROCIN: 20 OINTMENT TOPICAL at 08:01

## 2021-01-03 VITALS
RESPIRATION RATE: 18 BRPM | OXYGEN SATURATION: 93 % | WEIGHT: 226 LBS | DIASTOLIC BLOOD PRESSURE: 69 MMHG | SYSTOLIC BLOOD PRESSURE: 161 MMHG | HEIGHT: 75 IN | BODY MASS INDEX: 28.1 KG/M2 | TEMPERATURE: 98 F | HEART RATE: 63 BPM

## 2021-01-03 LAB
ANION GAP SERPL CALC-SCNC: 11 MMOL/L (ref 8–16)
BACTERIA UR CULT: ABNORMAL
BASOPHILS # BLD AUTO: 0.07 K/UL (ref 0–0.2)
BASOPHILS NFR BLD: 0.8 % (ref 0–1.9)
BUN SERPL-MCNC: 60 MG/DL (ref 8–23)
CALCIUM SERPL-MCNC: 9 MG/DL (ref 8.7–10.5)
CHLORIDE SERPL-SCNC: 106 MMOL/L (ref 95–110)
CO2 SERPL-SCNC: 26 MMOL/L (ref 23–29)
CREAT SERPL-MCNC: 4.9 MG/DL (ref 0.5–1.4)
DIFFERENTIAL METHOD: ABNORMAL
EOSINOPHIL # BLD AUTO: 0.3 K/UL (ref 0–0.5)
EOSINOPHIL NFR BLD: 3.4 % (ref 0–8)
ERYTHROCYTE [DISTWIDTH] IN BLOOD BY AUTOMATED COUNT: 16.6 % (ref 11.5–14.5)
EST. GFR  (AFRICAN AMERICAN): 12.4 ML/MIN/1.73 M^2
EST. GFR  (NON AFRICAN AMERICAN): 10.7 ML/MIN/1.73 M^2
GLUCOSE SERPL-MCNC: 150 MG/DL (ref 70–110)
HCT VFR BLD AUTO: 29.1 % (ref 40–54)
HGB BLD-MCNC: 8.7 G/DL (ref 14–18)
IMM GRANULOCYTES # BLD AUTO: 0.03 K/UL (ref 0–0.04)
IMM GRANULOCYTES NFR BLD AUTO: 0.4 % (ref 0–0.5)
LYMPHOCYTES # BLD AUTO: 1.1 K/UL (ref 1–4.8)
LYMPHOCYTES NFR BLD: 13.1 % (ref 18–48)
MAGNESIUM SERPL-MCNC: 2 MG/DL (ref 1.6–2.6)
MCH RBC QN AUTO: 28.5 PG (ref 27–31)
MCHC RBC AUTO-ENTMCNC: 29.9 G/DL (ref 32–36)
MCV RBC AUTO: 95 FL (ref 82–98)
MONOCYTES # BLD AUTO: 0.8 K/UL (ref 0.3–1)
MONOCYTES NFR BLD: 9.7 % (ref 4–15)
NEUTROPHILS # BLD AUTO: 6.2 K/UL (ref 1.8–7.7)
NEUTROPHILS NFR BLD: 72.6 % (ref 38–73)
NRBC BLD-RTO: 0 /100 WBC
PHOSPHATE SERPL-MCNC: 3.6 MG/DL (ref 2.7–4.5)
PLATELET # BLD AUTO: 136 K/UL (ref 150–350)
PMV BLD AUTO: 12.2 FL (ref 9.2–12.9)
POCT GLUCOSE: 135 MG/DL (ref 70–110)
POCT GLUCOSE: 266 MG/DL (ref 70–110)
POTASSIUM SERPL-SCNC: 3.8 MMOL/L (ref 3.5–5.1)
RBC # BLD AUTO: 3.05 M/UL (ref 4.6–6.2)
SODIUM SERPL-SCNC: 143 MMOL/L (ref 136–145)
WBC # BLD AUTO: 8.56 K/UL (ref 3.9–12.7)

## 2021-01-03 PROCEDURE — 99239 HOSP IP/OBS DSCHRG MGMT >30: CPT | Mod: GC,,, | Performed by: STUDENT IN AN ORGANIZED HEALTH CARE EDUCATION/TRAINING PROGRAM

## 2021-01-03 PROCEDURE — 83735 ASSAY OF MAGNESIUM: CPT

## 2021-01-03 PROCEDURE — 85025 COMPLETE CBC W/AUTO DIFF WBC: CPT

## 2021-01-03 PROCEDURE — 99239 PR HOSPITAL DISCHARGE DAY,>30 MIN: ICD-10-PCS | Mod: GC,,, | Performed by: STUDENT IN AN ORGANIZED HEALTH CARE EDUCATION/TRAINING PROGRAM

## 2021-01-03 PROCEDURE — 99232 SBSQ HOSP IP/OBS MODERATE 35: CPT | Mod: ,,, | Performed by: NURSE PRACTITIONER

## 2021-01-03 PROCEDURE — 63600175 PHARM REV CODE 636 W HCPCS: Performed by: STUDENT IN AN ORGANIZED HEALTH CARE EDUCATION/TRAINING PROGRAM

## 2021-01-03 PROCEDURE — 25000003 PHARM REV CODE 250: Performed by: STUDENT IN AN ORGANIZED HEALTH CARE EDUCATION/TRAINING PROGRAM

## 2021-01-03 PROCEDURE — 36415 COLL VENOUS BLD VENIPUNCTURE: CPT

## 2021-01-03 PROCEDURE — 99232 PR SUBSEQUENT HOSPITAL CARE,LEVL II: ICD-10-PCS | Mod: ,,, | Performed by: NURSE PRACTITIONER

## 2021-01-03 PROCEDURE — 84100 ASSAY OF PHOSPHORUS: CPT

## 2021-01-03 PROCEDURE — 80048 BASIC METABOLIC PNL TOTAL CA: CPT

## 2021-01-03 RX ORDER — HYDRALAZINE HYDROCHLORIDE 100 MG/1
100 TABLET, FILM COATED ORAL EVERY 8 HOURS
Qty: 90 TABLET | Refills: 2 | Status: SHIPPED | OUTPATIENT
Start: 2021-01-03 | End: 2021-02-10 | Stop reason: SDUPTHER

## 2021-01-03 RX ORDER — POTASSIUM CHLORIDE 750 MG/1
30 CAPSULE, EXTENDED RELEASE ORAL ONCE
Status: COMPLETED | OUTPATIENT
Start: 2021-01-03 | End: 2021-01-03

## 2021-01-03 RX ORDER — FUROSEMIDE 80 MG/1
80 TABLET ORAL 2 TIMES DAILY
Qty: 60 TABLET | Refills: 2 | Status: SHIPPED | OUTPATIENT
Start: 2021-01-03 | End: 2021-02-10 | Stop reason: SDUPTHER

## 2021-01-03 RX ADMIN — MUPIROCIN: 20 OINTMENT TOPICAL at 08:01

## 2021-01-03 RX ADMIN — CLOPIDOGREL 75 MG: 75 TABLET, FILM COATED ORAL at 08:01

## 2021-01-03 RX ADMIN — HYDRALAZINE HYDROCHLORIDE 100 MG: 50 TABLET, FILM COATED ORAL at 01:01

## 2021-01-03 RX ADMIN — HEPARIN SODIUM 5000 UNITS: 5000 INJECTION INTRAVENOUS; SUBCUTANEOUS at 08:01

## 2021-01-03 RX ADMIN — HYDRALAZINE HYDROCHLORIDE 100 MG: 50 TABLET, FILM COATED ORAL at 06:01

## 2021-01-03 RX ADMIN — INSULIN ASPART 4 UNITS: 100 INJECTION, SOLUTION INTRAVENOUS; SUBCUTANEOUS at 12:01

## 2021-01-03 RX ADMIN — AMLODIPINE BESYLATE 10 MG: 10 TABLET ORAL at 08:01

## 2021-01-03 RX ADMIN — ASPIRIN 81 MG: 81 TABLET, COATED ORAL at 08:01

## 2021-01-03 RX ADMIN — INSULIN ASPART 4 UNITS: 100 INJECTION, SOLUTION INTRAVENOUS; SUBCUTANEOUS at 08:01

## 2021-01-03 RX ADMIN — POTASSIUM CHLORIDE 30 MEQ: 750 CAPSULE, EXTENDED RELEASE ORAL at 06:01

## 2021-01-03 RX ADMIN — POTASSIUM CHLORIDE 30 MEQ: 750 CAPSULE, EXTENDED RELEASE ORAL at 08:01

## 2021-01-03 RX ADMIN — INSULIN DETEMIR 10 UNITS: 100 INJECTION, SOLUTION SUBCUTANEOUS at 08:01

## 2021-01-03 RX ADMIN — FUROSEMIDE 80 MG: 40 TABLET ORAL at 08:01

## 2021-01-05 ENCOUNTER — CLINICAL SUPPORT (OUTPATIENT)
Dept: REHABILITATION | Facility: HOSPITAL | Age: 76
End: 2021-01-05
Attending: PHYSICAL MEDICINE & REHABILITATION
Payer: MEDICARE

## 2021-01-05 DIAGNOSIS — R26.89 DECREASED MOBILITY: ICD-10-CM

## 2021-01-05 DIAGNOSIS — R53.1 DECREASED STRENGTH: ICD-10-CM

## 2021-01-05 DIAGNOSIS — M25.651 DECREASED RANGE OF RIGHT HIP MOVEMENT: ICD-10-CM

## 2021-01-05 PROCEDURE — 97140 MANUAL THERAPY 1/> REGIONS: CPT | Mod: PN

## 2021-01-05 PROCEDURE — 97110 THERAPEUTIC EXERCISES: CPT | Mod: PN

## 2021-01-06 ENCOUNTER — PATIENT OUTREACH (OUTPATIENT)
Dept: ADMINISTRATIVE | Facility: CLINIC | Age: 76
End: 2021-01-06

## 2021-01-07 ENCOUNTER — CLINICAL SUPPORT (OUTPATIENT)
Dept: REHABILITATION | Facility: HOSPITAL | Age: 76
End: 2021-01-07
Attending: PHYSICAL MEDICINE & REHABILITATION
Payer: MEDICARE

## 2021-01-07 DIAGNOSIS — R26.89 DECREASED MOBILITY: ICD-10-CM

## 2021-01-07 DIAGNOSIS — R53.1 DECREASED STRENGTH: ICD-10-CM

## 2021-01-07 DIAGNOSIS — M25.651 DECREASED RANGE OF RIGHT HIP MOVEMENT: ICD-10-CM

## 2021-01-07 PROCEDURE — 97140 MANUAL THERAPY 1/> REGIONS: CPT | Mod: PN

## 2021-01-07 PROCEDURE — 97110 THERAPEUTIC EXERCISES: CPT | Mod: PN

## 2021-01-15 ENCOUNTER — OFFICE VISIT (OUTPATIENT)
Dept: INTERNAL MEDICINE | Facility: CLINIC | Age: 76
End: 2021-01-15
Payer: MEDICARE

## 2021-01-15 VITALS
WEIGHT: 219.38 LBS | SYSTOLIC BLOOD PRESSURE: 140 MMHG | HEIGHT: 75 IN | DIASTOLIC BLOOD PRESSURE: 70 MMHG | TEMPERATURE: 98 F | BODY MASS INDEX: 27.28 KG/M2 | HEART RATE: 84 BPM

## 2021-01-15 DIAGNOSIS — I50.9 CONGESTIVE HEART FAILURE, UNSPECIFIED HF CHRONICITY, UNSPECIFIED HEART FAILURE TYPE: Primary | ICD-10-CM

## 2021-01-15 PROCEDURE — 99999 PR PBB SHADOW E&M-EST. PATIENT-LVL V: CPT | Mod: PBBFAC,,, | Performed by: INTERNAL MEDICINE

## 2021-01-15 PROCEDURE — 99215 OFFICE O/P EST HI 40 MIN: CPT | Mod: PBBFAC,PO | Performed by: INTERNAL MEDICINE

## 2021-01-15 PROCEDURE — 99213 OFFICE O/P EST LOW 20 MIN: CPT | Mod: S$PBB,,, | Performed by: INTERNAL MEDICINE

## 2021-01-15 PROCEDURE — 99999 PR PBB SHADOW E&M-EST. PATIENT-LVL V: ICD-10-PCS | Mod: PBBFAC,,, | Performed by: INTERNAL MEDICINE

## 2021-01-15 PROCEDURE — 99213 PR OFFICE/OUTPT VISIT, EST, LEVL III, 20-29 MIN: ICD-10-PCS | Mod: S$PBB,,, | Performed by: INTERNAL MEDICINE

## 2021-02-03 ENCOUNTER — PES CALL (OUTPATIENT)
Dept: ADMINISTRATIVE | Facility: CLINIC | Age: 76
End: 2021-02-03

## 2021-02-09 RX ORDER — AMLODIPINE BESYLATE 10 MG/1
TABLET ORAL
Qty: 30 TABLET | Refills: 3 | Status: SHIPPED | OUTPATIENT
Start: 2021-02-09 | End: 2021-05-28 | Stop reason: SDUPTHER

## 2021-02-10 ENCOUNTER — PATIENT MESSAGE (OUTPATIENT)
Dept: INTERNAL MEDICINE | Facility: CLINIC | Age: 76
End: 2021-02-10

## 2021-02-10 RX ORDER — HYDRALAZINE HYDROCHLORIDE 100 MG/1
100 TABLET, FILM COATED ORAL EVERY 8 HOURS
Qty: 90 TABLET | Refills: 2 | Status: SHIPPED | OUTPATIENT
Start: 2021-02-10 | End: 2021-05-26 | Stop reason: ALTCHOICE

## 2021-02-10 RX ORDER — ROSUVASTATIN CALCIUM 40 MG/1
40 TABLET, COATED ORAL NIGHTLY
Qty: 90 TABLET | Refills: 3 | Status: ON HOLD | OUTPATIENT
Start: 2021-02-10 | End: 2023-01-01 | Stop reason: HOSPADM

## 2021-02-10 RX ORDER — FUROSEMIDE 80 MG/1
80 TABLET ORAL 2 TIMES DAILY
Qty: 90 TABLET | Refills: 6 | Status: ON HOLD | OUTPATIENT
Start: 2021-02-10 | End: 2021-06-30 | Stop reason: HOSPADM

## 2021-02-10 RX ORDER — FUROSEMIDE 80 MG/1
80 TABLET ORAL 2 TIMES DAILY
Qty: 60 TABLET | Refills: 2 | Status: CANCELLED | OUTPATIENT
Start: 2021-02-10 | End: 2022-01-01

## 2021-02-11 ENCOUNTER — OFFICE VISIT (OUTPATIENT)
Dept: INTERNAL MEDICINE | Facility: CLINIC | Age: 76
End: 2021-02-11
Payer: MEDICARE

## 2021-02-11 VITALS
WEIGHT: 225.75 LBS | SYSTOLIC BLOOD PRESSURE: 140 MMHG | BODY MASS INDEX: 28.07 KG/M2 | HEIGHT: 75 IN | TEMPERATURE: 99 F | OXYGEN SATURATION: 96 % | RESPIRATION RATE: 15 BRPM | DIASTOLIC BLOOD PRESSURE: 60 MMHG | HEART RATE: 64 BPM

## 2021-02-11 DIAGNOSIS — E11.69 OBESITY, DIABETES, AND HYPERTENSION SYNDROME: ICD-10-CM

## 2021-02-11 DIAGNOSIS — E66.3 OVERWEIGHT (BMI 25.0-29.9): ICD-10-CM

## 2021-02-11 DIAGNOSIS — N18.4 TYPE 1 DIABETES MELLITUS WITH STAGE 4 CHRONIC KIDNEY DISEASE: ICD-10-CM

## 2021-02-11 DIAGNOSIS — E66.9 OBESITY, DIABETES, AND HYPERTENSION SYNDROME: ICD-10-CM

## 2021-02-11 DIAGNOSIS — Z74.09 IMPAIRED MOBILITY AND ADLS: ICD-10-CM

## 2021-02-11 DIAGNOSIS — I25.10 CORONARY ARTERY DISEASE, ANGINA PRESENCE UNSPECIFIED, UNSPECIFIED VESSEL OR LESION TYPE, UNSPECIFIED WHETHER NATIVE OR TRANSPLANTED HEART: ICD-10-CM

## 2021-02-11 DIAGNOSIS — E10.22 TYPE 1 DIABETES MELLITUS WITH STAGE 4 CHRONIC KIDNEY DISEASE: ICD-10-CM

## 2021-02-11 DIAGNOSIS — I15.2 HYPERTENSION ASSOCIATED WITH DIABETES: ICD-10-CM

## 2021-02-11 DIAGNOSIS — E10.22 CKD STAGE 4 DUE TO TYPE 1 DIABETES MELLITUS: ICD-10-CM

## 2021-02-11 DIAGNOSIS — I15.2 OBESITY, DIABETES, AND HYPERTENSION SYNDROME: ICD-10-CM

## 2021-02-11 DIAGNOSIS — N17.9 AKI (ACUTE KIDNEY INJURY): ICD-10-CM

## 2021-02-11 DIAGNOSIS — H35.033 HYPERTENSIVE RETINOPATHY OF BOTH EYES: ICD-10-CM

## 2021-02-11 DIAGNOSIS — I50.32 CHRONIC DIASTOLIC CONGESTIVE HEART FAILURE: Chronic | ICD-10-CM

## 2021-02-11 DIAGNOSIS — E11.59 HYPERTENSION ASSOCIATED WITH DIABETES: ICD-10-CM

## 2021-02-11 DIAGNOSIS — E11.59 OBESITY, DIABETES, AND HYPERTENSION SYNDROME: ICD-10-CM

## 2021-02-11 DIAGNOSIS — E78.5 HYPERLIPIDEMIA ASSOCIATED WITH TYPE 2 DIABETES MELLITUS: ICD-10-CM

## 2021-02-11 DIAGNOSIS — Z78.9 IMPAIRED MOBILITY AND ADLS: ICD-10-CM

## 2021-02-11 DIAGNOSIS — E11.69 HYPERLIPIDEMIA ASSOCIATED WITH TYPE 2 DIABETES MELLITUS: ICD-10-CM

## 2021-02-11 DIAGNOSIS — Z79.4 TYPE 2 DIABETES MELLITUS WITH STAGE 4 CHRONIC KIDNEY DISEASE, WITH LONG-TERM CURRENT USE OF INSULIN: Primary | ICD-10-CM

## 2021-02-11 DIAGNOSIS — E11.22 TYPE 2 DIABETES MELLITUS WITH STAGE 4 CHRONIC KIDNEY DISEASE, WITH LONG-TERM CURRENT USE OF INSULIN: Primary | ICD-10-CM

## 2021-02-11 DIAGNOSIS — N18.4 CKD STAGE 4 DUE TO TYPE 1 DIABETES MELLITUS: ICD-10-CM

## 2021-02-11 DIAGNOSIS — N18.4 TYPE 2 DIABETES MELLITUS WITH STAGE 4 CHRONIC KIDNEY DISEASE, WITH LONG-TERM CURRENT USE OF INSULIN: Primary | ICD-10-CM

## 2021-02-11 PROCEDURE — 99999 PR PBB SHADOW E&M-EST. PATIENT-LVL V: CPT | Mod: PBBFAC,,, | Performed by: NURSE PRACTITIONER

## 2021-02-11 PROCEDURE — 99215 OFFICE O/P EST HI 40 MIN: CPT | Mod: PBBFAC,PO | Performed by: NURSE PRACTITIONER

## 2021-02-11 PROCEDURE — 99215 OFFICE O/P EST HI 40 MIN: CPT | Mod: S$PBB,,, | Performed by: NURSE PRACTITIONER

## 2021-02-11 PROCEDURE — 99215 PR OFFICE/OUTPT VISIT, EST, LEVL V, 40-54 MIN: ICD-10-PCS | Mod: S$PBB,,, | Performed by: NURSE PRACTITIONER

## 2021-02-11 PROCEDURE — 99999 PR PBB SHADOW E&M-EST. PATIENT-LVL V: ICD-10-PCS | Mod: PBBFAC,,, | Performed by: NURSE PRACTITIONER

## 2021-02-12 ENCOUNTER — PATIENT OUTREACH (OUTPATIENT)
Dept: ADMINISTRATIVE | Facility: OTHER | Age: 76
End: 2021-02-12

## 2021-02-12 ENCOUNTER — LAB VISIT (OUTPATIENT)
Dept: LAB | Facility: HOSPITAL | Age: 76
End: 2021-02-12
Attending: NURSE PRACTITIONER
Payer: MEDICARE

## 2021-02-12 DIAGNOSIS — Z79.4 TYPE 2 DIABETES MELLITUS WITH STAGE 4 CHRONIC KIDNEY DISEASE, WITH LONG-TERM CURRENT USE OF INSULIN: ICD-10-CM

## 2021-02-12 DIAGNOSIS — E78.5 HYPERLIPIDEMIA ASSOCIATED WITH TYPE 2 DIABETES MELLITUS: ICD-10-CM

## 2021-02-12 DIAGNOSIS — E11.22 TYPE 2 DIABETES MELLITUS WITH STAGE 4 CHRONIC KIDNEY DISEASE, WITH LONG-TERM CURRENT USE OF INSULIN: ICD-10-CM

## 2021-02-12 DIAGNOSIS — E11.69 HYPERLIPIDEMIA ASSOCIATED WITH TYPE 2 DIABETES MELLITUS: ICD-10-CM

## 2021-02-12 DIAGNOSIS — N18.4 TYPE 2 DIABETES MELLITUS WITH STAGE 4 CHRONIC KIDNEY DISEASE, WITH LONG-TERM CURRENT USE OF INSULIN: ICD-10-CM

## 2021-02-12 LAB
ALBUMIN SERPL BCP-MCNC: 4.3 G/DL (ref 3.5–5.2)
ALP SERPL-CCNC: 83 U/L (ref 38–126)
ALT SERPL W/O P-5'-P-CCNC: 12 U/L (ref 10–44)
ANION GAP SERPL CALC-SCNC: 10 MMOL/L (ref 8–16)
AST SERPL-CCNC: 20 U/L (ref 15–46)
BILIRUB SERPL-MCNC: 0.4 MG/DL (ref 0.1–1)
CALCIUM SERPL-MCNC: 9.7 MG/DL (ref 8.7–10.5)
CHLORIDE SERPL-SCNC: 109 MMOL/L (ref 95–110)
CHOLEST SERPL-MCNC: 148 MG/DL (ref 120–199)
CHOLEST/HDLC SERPL: 3.1 {RATIO} (ref 2–5)
CO2 SERPL-SCNC: 27 MMOL/L (ref 23–29)
CREAT SERPL-MCNC: 4.22 MG/DL (ref 0.5–1.4)
EST. GFR  (AFRICAN AMERICAN): 14.9 ML/MIN/1.73 M^2
EST. GFR  (NON AFRICAN AMERICAN): 12.9 ML/MIN/1.73 M^2
GLUCOSE SERPL-MCNC: 66 MG/DL (ref 70–110)
HDLC SERPL-MCNC: 47 MG/DL (ref 40–75)
HDLC SERPL: 31.8 % (ref 20–50)
LDLC SERPL CALC-MCNC: 85.8 MG/DL (ref 63–159)
NONHDLC SERPL-MCNC: 101 MG/DL
POTASSIUM SERPL-SCNC: 4.1 MMOL/L (ref 3.5–5.1)
PROT SERPL-MCNC: 8 G/DL (ref 6–8.4)
SODIUM SERPL-SCNC: 146 MMOL/L (ref 136–145)
TRIGL SERPL-MCNC: 76 MG/DL (ref 30–150)
UUN UR-MCNC: 61 MG/DL (ref 2–20)

## 2021-02-12 PROCEDURE — 80053 COMPREHEN METABOLIC PANEL: CPT | Mod: PO

## 2021-02-12 PROCEDURE — 36415 COLL VENOUS BLD VENIPUNCTURE: CPT | Mod: PO

## 2021-02-12 PROCEDURE — 80061 LIPID PANEL: CPT

## 2021-02-15 ENCOUNTER — OFFICE VISIT (OUTPATIENT)
Dept: NEPHROLOGY | Facility: CLINIC | Age: 76
End: 2021-02-15
Payer: MEDICARE

## 2021-02-15 VITALS
OXYGEN SATURATION: 97 % | BODY MASS INDEX: 28.43 KG/M2 | SYSTOLIC BLOOD PRESSURE: 150 MMHG | WEIGHT: 228.63 LBS | DIASTOLIC BLOOD PRESSURE: 62 MMHG | HEIGHT: 75 IN | HEART RATE: 74 BPM

## 2021-02-15 DIAGNOSIS — I10 ESSENTIAL HYPERTENSION: Primary | ICD-10-CM

## 2021-02-15 DIAGNOSIS — N18.4 ANEMIA OF CHRONIC RENAL FAILURE, STAGE 4 (SEVERE): ICD-10-CM

## 2021-02-15 DIAGNOSIS — N17.9 AKI (ACUTE KIDNEY INJURY): ICD-10-CM

## 2021-02-15 DIAGNOSIS — D63.1 ANEMIA OF CHRONIC RENAL FAILURE, STAGE 4 (SEVERE): ICD-10-CM

## 2021-02-15 PROCEDURE — 99214 PR OFFICE/OUTPT VISIT, EST, LEVL IV, 30-39 MIN: ICD-10-PCS | Mod: S$PBB,,, | Performed by: INTERNAL MEDICINE

## 2021-02-15 PROCEDURE — 99214 OFFICE O/P EST MOD 30 MIN: CPT | Mod: PBBFAC | Performed by: INTERNAL MEDICINE

## 2021-02-15 PROCEDURE — 99999 PR PBB SHADOW E&M-EST. PATIENT-LVL IV: ICD-10-PCS | Mod: PBBFAC,,, | Performed by: INTERNAL MEDICINE

## 2021-02-15 PROCEDURE — 99214 OFFICE O/P EST MOD 30 MIN: CPT | Mod: S$PBB,,, | Performed by: INTERNAL MEDICINE

## 2021-02-15 PROCEDURE — 99999 PR PBB SHADOW E&M-EST. PATIENT-LVL IV: CPT | Mod: PBBFAC,,, | Performed by: INTERNAL MEDICINE

## 2021-02-15 RX ORDER — OLMESARTAN MEDOXOMIL 20 MG/1
40 TABLET ORAL DAILY
Qty: 180 TABLET | Refills: 6 | Status: SHIPPED | OUTPATIENT
Start: 2021-02-15 | End: 2021-03-09

## 2021-03-23 ENCOUNTER — PATIENT OUTREACH (OUTPATIENT)
Dept: ADMINISTRATIVE | Facility: OTHER | Age: 76
End: 2021-03-23

## 2021-04-09 NOTE — PROGRESS NOTES
CC: followup of hypertension and diabetes  HPI:  The patient is a 74 y.o. year old male who presents to the office for followup of hypertension and diabetes.  The patient denies any chest pain, shortness of breath, headache, blurred vision, excessive fatigue, nausea or vomiting.  The patient was recently hospitalized for osteomyelitis.  He had been treated in the past for osteomyelitis of the same foot.  He completed a course of antibiotics he and was still receiving wound care and podiatry follow-up.  Home health nurse was concerned that his toe appeared infected and notified his podiatrist who advised in the go to the emergency department.  MRI of his left forefoot showed osteomyelitis of the distal aspect of the 1st distal phalanx with adjacent marrow edema.  He was admitted and started on Pipracil and tazobactam Podiatry and Infectious Disease were consulted.  Wound culture grew MRSA.  Infectious Disease recommended 6 weeks of IV antibiotic therapy.  He also underwent lower extremity catheterization on July 30th with successful revascularization.  He was discharged on August 2nd with 6 weeks of planned IV vancomycin therapy.  He reports wound is healing.  He will discontinue IV antibiotics next week and start oral therapy.  He reports feeling lightheaded when getting up.  Symptoms improve if he takes his time.    PAST MEDICAL HISTORY:  Past Medical History:   Diagnosis Date    Arthritis     Cataract     Chronic diastolic congestive heart failure     Coronary artery disease     Cystoid macular edema of both eyes     Diabetes mellitus type II     Hyperlipemia     Hypertension     Retinal hole     Stage 4 chronic kidney disease     Streptococcus pyogenes bacteremia 12/23/2018    Due to left toe osteomyelitis       SURGICAL HISTORY:  Past Surgical History:   Procedure Laterality Date    Angiogram Extremity Unilateral Left 8/1/2019    Performed by [facility]ish [facility]att [facility] at [facility] CATH LAB/EP     Patient's chart accessed to print order form for crutches to fax to [employer].    AORTOGRAM-ABDOMINAL N/A 2019    [facility] CATH LAB[facility]EP    Atherectomy, Peripheral Blood Vessel Left 2019    Performed by Amish [facility]att [facility] at [facility] CATH LAB/EP    BLEPHAROPLASTY Bilateral 2017    Performed by [facility] at [facility] 1ST FLR    CATARACT EXTRACTION W/  INTRAOCULAR LENS IMPLANT Left 12/15/14    Dr martin    CATARACT EXTRACTION W/  INTRAOCULAR LENS IMPLANT Right 14    dorothy    CIRCUMCISION, NON-      focal laser right eye      INSERTION-INTRAOCULAR LENS (IOL) Right 2014    [facility]    INSERTION-INTRAOCULAR LENS (IOL) Left 12/15/2014    [facility] at [facility] OR    FGXISIGLO-QYRH-R-CATH Right 2019    Performed by [facility]ys [facility]man Jr., [facility] at [facility] OR    KNEE SURGERY      left    Left medial collateral ligament repair      PHACOEMULSIFICATION-ASPIRATION-CATARACT Right 2014    [facility]formed [facility] at [facility] OR    PHACOEMULSIFICATION-ASPIRATION-CATARACT Left 12/15/2014    [facility] at [facility] OR    PTA, Anterior Tibial Left 2019    Performed by [facility]ish [facility]att [facility] at [facility] CATH LAB/EP    PTA, Posterior Tibial Left 2019    Performed by Amish Hyatt [facility] at [facility] CATH LAB/EP    REPAIR-PTOSIS/BILATERAL EXTERNAL LEVATORS Bilateral 2017    Performed by [facility] at [facility] OR 1ST FLR    TONSILLECTOMY         MEDS:  Medcard reviewed and updated    ALLERGIES: Allergy Card reviewed and updated    SOCIAL HISTORY:   The patient is a nonsmoker.    PE:   APPEARANCE: Well nourished, well developed, in no acute distress.    CHEST: Lungs clear to auscultation with unlabored respirations.  CARDIOVASCULAR: Normal S1, S2. No murmurs. No carotid bruits. No pedal edema.  ABDOMEN: Bowel sounds normal. Not distended. Soft. No tenderness or masses.   MUSCULOSKELETAL:  Left foot bandage intact.  PSYCHIATRIC: The patient is oriented to person, place,  and time and has a pleasant affect.        ASSESSMENT/PLAN:  Domingo was seen today for follow-up.    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

## 2021-04-27 ENCOUNTER — LAB VISIT (OUTPATIENT)
Dept: LAB | Facility: HOSPITAL | Age: 76
End: 2021-04-27
Attending: INTERNAL MEDICINE
Payer: MEDICARE

## 2021-04-27 ENCOUNTER — PATIENT OUTREACH (OUTPATIENT)
Dept: ADMINISTRATIVE | Facility: OTHER | Age: 76
End: 2021-04-27

## 2021-04-27 DIAGNOSIS — N18.4 ANEMIA OF CHRONIC RENAL FAILURE, STAGE 4 (SEVERE): ICD-10-CM

## 2021-04-27 DIAGNOSIS — N17.9 AKI (ACUTE KIDNEY INJURY): ICD-10-CM

## 2021-04-27 DIAGNOSIS — I10 ESSENTIAL HYPERTENSION: ICD-10-CM

## 2021-04-27 DIAGNOSIS — D63.1 ANEMIA OF CHRONIC RENAL FAILURE, STAGE 4 (SEVERE): ICD-10-CM

## 2021-04-27 LAB
ALBUMIN SERPL BCP-MCNC: 4.3 G/DL (ref 3.5–5.2)
ANION GAP SERPL CALC-SCNC: 15 MMOL/L (ref 8–16)
ANION GAP SERPL CALC-SCNC: 15 MMOL/L (ref 8–16)
CALCIUM SERPL-MCNC: 9.5 MG/DL (ref 8.7–10.5)
CALCIUM SERPL-MCNC: 9.5 MG/DL (ref 8.7–10.5)
CHLORIDE SERPL-SCNC: 103 MMOL/L (ref 95–110)
CHLORIDE SERPL-SCNC: 103 MMOL/L (ref 95–110)
CO2 SERPL-SCNC: 22 MMOL/L (ref 23–29)
CO2 SERPL-SCNC: 22 MMOL/L (ref 23–29)
CREAT SERPL-MCNC: 4.98 MG/DL (ref 0.5–1.4)
CREAT SERPL-MCNC: 4.98 MG/DL (ref 0.5–1.4)
ERYTHROCYTE [DISTWIDTH] IN BLOOD BY AUTOMATED COUNT: 13.3 % (ref 11.5–14.5)
EST. GFR  (AFRICAN AMERICAN): 12.1 ML/MIN/1.73 M^2
EST. GFR  (AFRICAN AMERICAN): 12.1 ML/MIN/1.73 M^2
EST. GFR  (NON AFRICAN AMERICAN): 10.4 ML/MIN/1.73 M^2
EST. GFR  (NON AFRICAN AMERICAN): 10.4 ML/MIN/1.73 M^2
FERRITIN SERPL-MCNC: 490 NG/ML (ref 20–300)
GLUCOSE SERPL-MCNC: 356 MG/DL (ref 70–110)
GLUCOSE SERPL-MCNC: 356 MG/DL (ref 70–110)
HCT VFR BLD AUTO: 25.6 % (ref 40–54)
HGB BLD-MCNC: 8.3 G/DL (ref 14–18)
IRON SERPL-MCNC: 52 UG/DL (ref 45–160)
MCH RBC QN AUTO: 30.3 PG (ref 27–31)
MCHC RBC AUTO-ENTMCNC: 32.4 G/DL (ref 32–36)
MCV RBC AUTO: 93 FL (ref 82–98)
PHOSPHATE SERPL-MCNC: 4.4 MG/DL (ref 2.7–4.5)
PLATELET # BLD AUTO: 115 K/UL (ref 150–450)
PMV BLD AUTO: 11.9 FL (ref 9.2–12.9)
POTASSIUM SERPL-SCNC: 3.9 MMOL/L (ref 3.5–5.1)
POTASSIUM SERPL-SCNC: 3.9 MMOL/L (ref 3.5–5.1)
PTH-INTACT SERPL-MCNC: 520 PG/ML (ref 9–77)
RBC # BLD AUTO: 2.74 M/UL (ref 4.6–6.2)
SATURATED IRON: 22 % (ref 20–50)
SODIUM SERPL-SCNC: 140 MMOL/L (ref 136–145)
SODIUM SERPL-SCNC: 140 MMOL/L (ref 136–145)
TOTAL IRON BINDING CAPACITY: 241 UG/DL (ref 250–450)
TRANSFERRIN SERPL-MCNC: 163 MG/DL (ref 200–375)
UUN UR-MCNC: 73 MG/DL (ref 2–20)
UUN UR-MCNC: 73 MG/DL (ref 2–20)
WBC # BLD AUTO: 7.88 K/UL (ref 3.9–12.7)

## 2021-04-27 PROCEDURE — 80069 RENAL FUNCTION PANEL: CPT | Mod: PO | Performed by: INTERNAL MEDICINE

## 2021-04-27 PROCEDURE — 85027 COMPLETE CBC AUTOMATED: CPT | Mod: PO | Performed by: INTERNAL MEDICINE

## 2021-04-27 PROCEDURE — 83540 ASSAY OF IRON: CPT | Mod: PO | Performed by: INTERNAL MEDICINE

## 2021-04-27 PROCEDURE — 36415 COLL VENOUS BLD VENIPUNCTURE: CPT | Mod: PO | Performed by: INTERNAL MEDICINE

## 2021-04-27 PROCEDURE — 82728 ASSAY OF FERRITIN: CPT | Performed by: INTERNAL MEDICINE

## 2021-04-27 PROCEDURE — 83970 ASSAY OF PARATHORMONE: CPT | Mod: PO | Performed by: INTERNAL MEDICINE

## 2021-04-28 ENCOUNTER — OFFICE VISIT (OUTPATIENT)
Dept: NEPHROLOGY | Facility: CLINIC | Age: 76
End: 2021-04-28
Payer: MEDICARE

## 2021-04-28 VITALS
SYSTOLIC BLOOD PRESSURE: 122 MMHG | BODY MASS INDEX: 26.86 KG/M2 | WEIGHT: 216.06 LBS | DIASTOLIC BLOOD PRESSURE: 80 MMHG | OXYGEN SATURATION: 97 % | HEART RATE: 81 BPM | HEIGHT: 75 IN

## 2021-04-28 DIAGNOSIS — I10 ESSENTIAL HYPERTENSION: Primary | ICD-10-CM

## 2021-04-28 DIAGNOSIS — N18.4 ANEMIA OF CHRONIC RENAL FAILURE, STAGE 4 (SEVERE): ICD-10-CM

## 2021-04-28 DIAGNOSIS — N18.5 CKD (CHRONIC KIDNEY DISEASE) STAGE 5, GFR LESS THAN 15 ML/MIN: ICD-10-CM

## 2021-04-28 DIAGNOSIS — D63.1 ANEMIA OF CHRONIC RENAL FAILURE, STAGE 4 (SEVERE): ICD-10-CM

## 2021-04-28 DIAGNOSIS — N18.5 CKD (CHRONIC KIDNEY DISEASE), SYMPTOM MANAGEMENT ONLY, STAGE 5: ICD-10-CM

## 2021-04-28 PROCEDURE — 99214 OFFICE O/P EST MOD 30 MIN: CPT | Mod: PBBFAC | Performed by: INTERNAL MEDICINE

## 2021-04-28 PROCEDURE — 99999 PR PBB SHADOW E&M-EST. PATIENT-LVL IV: CPT | Mod: PBBFAC,,, | Performed by: INTERNAL MEDICINE

## 2021-04-28 PROCEDURE — 99999 PR PBB SHADOW E&M-EST. PATIENT-LVL IV: ICD-10-PCS | Mod: PBBFAC,,, | Performed by: INTERNAL MEDICINE

## 2021-04-28 PROCEDURE — 99214 OFFICE O/P EST MOD 30 MIN: CPT | Mod: S$PBB,,, | Performed by: INTERNAL MEDICINE

## 2021-04-28 PROCEDURE — 99214 PR OFFICE/OUTPT VISIT, EST, LEVL IV, 30-39 MIN: ICD-10-PCS | Mod: S$PBB,,, | Performed by: INTERNAL MEDICINE

## 2021-05-03 RX ORDER — CLOPIDOGREL BISULFATE 75 MG/1
TABLET ORAL
Qty: 30 TABLET | Refills: 3 | Status: SHIPPED | OUTPATIENT
Start: 2021-05-03 | End: 2022-01-01

## 2021-05-09 ENCOUNTER — HOSPITAL ENCOUNTER (INPATIENT)
Facility: HOSPITAL | Age: 76
LOS: 11 days | Discharge: HOME HEALTH | DRG: 264 | End: 2021-05-20
Attending: EMERGENCY MEDICINE | Admitting: FAMILY MEDICINE
Payer: MEDICARE

## 2021-05-09 DIAGNOSIS — R07.9 CHEST PAIN: ICD-10-CM

## 2021-05-09 DIAGNOSIS — N18.5 CKD (CHRONIC KIDNEY DISEASE) STAGE 5, GFR LESS THAN 15 ML/MIN: ICD-10-CM

## 2021-05-09 DIAGNOSIS — I21.4 NSTEMI (NON-ST ELEVATED MYOCARDIAL INFARCTION): Primary | ICD-10-CM

## 2021-05-09 DIAGNOSIS — L97.529 ULCER OF LEFT FOOT, UNSPECIFIED ULCER STAGE: ICD-10-CM

## 2021-05-09 DIAGNOSIS — N18.4 CKD STAGE 4 DUE TO TYPE 2 DIABETES MELLITUS: ICD-10-CM

## 2021-05-09 DIAGNOSIS — N18.5 CKD (CHRONIC KIDNEY DISEASE), SYMPTOM MANAGEMENT ONLY, STAGE 5: ICD-10-CM

## 2021-05-09 DIAGNOSIS — D64.9 ANEMIA, UNSPECIFIED TYPE: ICD-10-CM

## 2021-05-09 DIAGNOSIS — E11.22 CKD STAGE 4 DUE TO TYPE 2 DIABETES MELLITUS: ICD-10-CM

## 2021-05-09 DIAGNOSIS — M86.9 TOE OSTEOMYELITIS, LEFT: ICD-10-CM

## 2021-05-09 PROBLEM — E78.00 PURE HYPERCHOLESTEROLEMIA: Status: ACTIVE | Noted: 2021-05-09

## 2021-05-09 PROBLEM — R79.89 ELEVATED TROPONIN: Status: ACTIVE | Noted: 2021-05-09

## 2021-05-09 PROBLEM — E11.59 DIABETES MELLITUS WITH CIRCULATORY COMPLICATION: Status: ACTIVE | Noted: 2021-05-09

## 2021-05-09 LAB
ALBUMIN SERPL BCP-MCNC: 4.2 G/DL (ref 3.5–5.2)
ALP SERPL-CCNC: 99 U/L (ref 38–126)
ALT SERPL W/O P-5'-P-CCNC: 12 U/L (ref 10–44)
ANION GAP SERPL CALC-SCNC: 12 MMOL/L (ref 8–16)
APTT BLDCRRT: 129.5 SEC (ref 21–32)
APTT BLDCRRT: 32.9 SEC (ref 21–32)
APTT BLDCRRT: 33.1 SEC (ref 21–32)
AST SERPL-CCNC: 20 U/L (ref 15–46)
BACTERIA #/AREA URNS HPF: ABNORMAL /HPF
BASOPHILS # BLD AUTO: 0.05 K/UL (ref 0–0.2)
BASOPHILS # BLD AUTO: 0.06 K/UL (ref 0–0.2)
BASOPHILS # BLD AUTO: 0.06 K/UL (ref 0–0.2)
BASOPHILS NFR BLD: 0.9 % (ref 0–1.9)
BASOPHILS NFR BLD: 0.9 % (ref 0–1.9)
BASOPHILS NFR BLD: 1 % (ref 0–1.9)
BILIRUB SERPL-MCNC: 0.4 MG/DL (ref 0.1–1)
BILIRUB UR QL STRIP: NEGATIVE
CALCIUM SERPL-MCNC: 9.2 MG/DL (ref 8.7–10.5)
CHLORIDE SERPL-SCNC: 104 MMOL/L (ref 95–110)
CLARITY UR: ABNORMAL
CO2 SERPL-SCNC: 22 MMOL/L (ref 23–29)
COLOR UR: YELLOW
CREAT SERPL-MCNC: 4.88 MG/DL (ref 0.5–1.4)
CREAT UR-MCNC: 37.7 MG/DL (ref 23–375)
DIFFERENTIAL METHOD: ABNORMAL
EOSINOPHIL # BLD AUTO: 0.2 K/UL (ref 0–0.5)
EOSINOPHIL NFR BLD: 2.2 % (ref 0–8)
EOSINOPHIL NFR BLD: 2.6 % (ref 0–8)
EOSINOPHIL NFR BLD: 3.3 % (ref 0–8)
ERYTHROCYTE [DISTWIDTH] IN BLOOD BY AUTOMATED COUNT: 13.1 % (ref 11.5–14.5)
ERYTHROCYTE [DISTWIDTH] IN BLOOD BY AUTOMATED COUNT: 13.2 % (ref 11.5–14.5)
ERYTHROCYTE [DISTWIDTH] IN BLOOD BY AUTOMATED COUNT: 13.2 % (ref 11.5–14.5)
EST. GFR  (AFRICAN AMERICAN): 12.4 ML/MIN/1.73 M^2
EST. GFR  (NON AFRICAN AMERICAN): 10.7 ML/MIN/1.73 M^2
ESTIMATED AVG GLUCOSE: 197 MG/DL (ref 68–131)
GLUCOSE SERPL-MCNC: 217 MG/DL (ref 70–110)
GLUCOSE UR QL STRIP: NEGATIVE
HBA1C MFR BLD: 8.5 % (ref 4–5.6)
HCT VFR BLD AUTO: 24.5 % (ref 40–54)
HCT VFR BLD AUTO: 24.8 % (ref 40–54)
HCT VFR BLD AUTO: 24.9 % (ref 40–54)
HGB BLD-MCNC: 8.1 G/DL (ref 14–18)
HGB BLD-MCNC: 8.5 G/DL (ref 14–18)
HGB BLD-MCNC: 8.5 G/DL (ref 14–18)
HGB UR QL STRIP: NEGATIVE
HYALINE CASTS #/AREA URNS LPF: 0 /LPF
IMM GRANULOCYTES # BLD AUTO: 0.01 K/UL (ref 0–0.04)
IMM GRANULOCYTES # BLD AUTO: 0.02 K/UL (ref 0–0.04)
IMM GRANULOCYTES # BLD AUTO: 0.03 K/UL (ref 0–0.04)
IMM GRANULOCYTES NFR BLD AUTO: 0.2 % (ref 0–0.5)
IMM GRANULOCYTES NFR BLD AUTO: 0.3 % (ref 0–0.5)
IMM GRANULOCYTES NFR BLD AUTO: 0.5 % (ref 0–0.5)
INR PPP: 1.1 (ref 0.8–1.2)
KETONES UR QL STRIP: NEGATIVE
LEUKOCYTE ESTERASE UR QL STRIP: ABNORMAL
LYMPHOCYTES # BLD AUTO: 1.5 K/UL (ref 1–4.8)
LYMPHOCYTES # BLD AUTO: 1.5 K/UL (ref 1–4.8)
LYMPHOCYTES # BLD AUTO: 1.6 K/UL (ref 1–4.8)
LYMPHOCYTES NFR BLD: 22.3 % (ref 18–48)
LYMPHOCYTES NFR BLD: 26 % (ref 18–48)
LYMPHOCYTES NFR BLD: 27.4 % (ref 18–48)
MCH RBC QN AUTO: 30.9 PG (ref 27–31)
MCH RBC QN AUTO: 30.9 PG (ref 27–31)
MCH RBC QN AUTO: 31.1 PG (ref 27–31)
MCHC RBC AUTO-ENTMCNC: 33.1 G/DL (ref 32–36)
MCHC RBC AUTO-ENTMCNC: 34.1 G/DL (ref 32–36)
MCHC RBC AUTO-ENTMCNC: 34.3 G/DL (ref 32–36)
MCV RBC AUTO: 91 FL (ref 82–98)
MCV RBC AUTO: 91 FL (ref 82–98)
MCV RBC AUTO: 94 FL (ref 82–98)
MICROSCOPIC COMMENT: ABNORMAL
MONOCYTES # BLD AUTO: 0.6 K/UL (ref 0.3–1)
MONOCYTES # BLD AUTO: 0.6 K/UL (ref 0.3–1)
MONOCYTES # BLD AUTO: 0.8 K/UL (ref 0.3–1)
MONOCYTES NFR BLD: 10.5 % (ref 4–15)
MONOCYTES NFR BLD: 10.8 % (ref 4–15)
MONOCYTES NFR BLD: 11.1 % (ref 4–15)
NEUTROPHILS # BLD AUTO: 3.4 K/UL (ref 1.8–7.7)
NEUTROPHILS # BLD AUTO: 3.4 K/UL (ref 1.8–7.7)
NEUTROPHILS # BLD AUTO: 4.3 K/UL (ref 1.8–7.7)
NEUTROPHILS NFR BLD: 58 % (ref 38–73)
NEUTROPHILS NFR BLD: 58.8 % (ref 38–73)
NEUTROPHILS NFR BLD: 63.2 % (ref 38–73)
NITRITE UR QL STRIP: NEGATIVE
NRBC BLD-RTO: 0 /100 WBC
NT-PROBNP SERPL-MCNC: 1080 PG/ML (ref 5–1800)
PH UR STRIP: 5 [PH] (ref 5–8)
PLATELET # BLD AUTO: 114 K/UL (ref 150–450)
PLATELET # BLD AUTO: 115 K/UL (ref 150–450)
PLATELET # BLD AUTO: 119 K/UL (ref 150–450)
PMV BLD AUTO: 11.5 FL (ref 9.2–12.9)
PMV BLD AUTO: 11.8 FL (ref 9.2–12.9)
PMV BLD AUTO: 11.9 FL (ref 9.2–12.9)
POCT GLUCOSE: 176 MG/DL (ref 70–110)
POCT GLUCOSE: 227 MG/DL (ref 70–110)
POCT GLUCOSE: 242 MG/DL (ref 70–110)
POTASSIUM SERPL-SCNC: 3.6 MMOL/L (ref 3.5–5.1)
PROT SERPL-MCNC: 8 G/DL (ref 6–8.4)
PROT UR QL STRIP: ABNORMAL
PROT UR-MCNC: 122 MG/DL (ref 0–15)
PROT/CREAT UR: 3.24 MG/G{CREAT} (ref 0–0.2)
PROTHROMBIN TIME: 11.4 SEC (ref 9–12.5)
PROTHROMBIN TIME: 11.5 SEC (ref 9–12.5)
PROTHROMBIN TIME: 11.5 SEC (ref 9–12.5)
RBC # BLD AUTO: 2.62 M/UL (ref 4.6–6.2)
RBC # BLD AUTO: 2.73 M/UL (ref 4.6–6.2)
RBC # BLD AUTO: 2.75 M/UL (ref 4.6–6.2)
RBC #/AREA URNS HPF: 0 /HPF (ref 0–4)
SARS-COV-2 RDRP RESP QL NAA+PROBE: NEGATIVE
SODIUM SERPL-SCNC: 138 MMOL/L (ref 136–145)
SP GR UR STRIP: 1.01 (ref 1–1.03)
SQUAMOUS #/AREA URNS HPF: 1 /HPF
TROPONIN I SERPL DL<=0.01 NG/ML-MCNC: 4.39 NG/ML (ref 0–0.03)
TROPONIN I SERPL DL<=0.01 NG/ML-MCNC: 6.53 NG/ML (ref 0–0.03)
TROPONIN I SERPL-MCNC: 0.46 NG/ML (ref 0.01–0.03)
URN SPEC COLLECT METH UR: ABNORMAL
UROBILINOGEN UR STRIP-ACNC: NEGATIVE EU/DL
UUN UR-MCNC: 80 MG/DL (ref 2–20)
WBC # BLD AUTO: 5.81 K/UL (ref 3.9–12.7)
WBC # BLD AUTO: 5.83 K/UL (ref 3.9–12.7)
WBC # BLD AUTO: 6.74 K/UL (ref 3.9–12.7)
WBC #/AREA URNS HPF: 99 /HPF (ref 0–5)
WBC CLUMPS URNS QL MICRO: ABNORMAL

## 2021-05-09 PROCEDURE — 85610 PROTHROMBIN TIME: CPT | Performed by: INTERNAL MEDICINE

## 2021-05-09 PROCEDURE — 63600175 PHARM REV CODE 636 W HCPCS: Performed by: NURSE PRACTITIONER

## 2021-05-09 PROCEDURE — 93010 ELECTROCARDIOGRAM REPORT: CPT | Mod: ,,, | Performed by: INTERNAL MEDICINE

## 2021-05-09 PROCEDURE — 85610 PROTHROMBIN TIME: CPT | Mod: 91 | Performed by: NURSE PRACTITIONER

## 2021-05-09 PROCEDURE — 85027 COMPLETE CBC AUTOMATED: CPT | Performed by: FAMILY MEDICINE

## 2021-05-09 PROCEDURE — 84484 ASSAY OF TROPONIN QUANT: CPT | Mod: ER | Performed by: EMERGENCY MEDICINE

## 2021-05-09 PROCEDURE — 93010 EKG 12-LEAD: ICD-10-PCS | Mod: ,,, | Performed by: INTERNAL MEDICINE

## 2021-05-09 PROCEDURE — 85025 COMPLETE CBC W/AUTO DIFF WBC: CPT | Mod: ER | Performed by: EMERGENCY MEDICINE

## 2021-05-09 PROCEDURE — 36415 COLL VENOUS BLD VENIPUNCTURE: CPT | Performed by: NURSE PRACTITIONER

## 2021-05-09 PROCEDURE — 25000003 PHARM REV CODE 250: Performed by: INTERNAL MEDICINE

## 2021-05-09 PROCEDURE — 96372 THER/PROPH/DIAG INJ SC/IM: CPT | Mod: ER

## 2021-05-09 PROCEDURE — 85025 COMPLETE CBC W/AUTO DIFF WBC: CPT | Mod: 91 | Performed by: INTERNAL MEDICINE

## 2021-05-09 PROCEDURE — 85025 COMPLETE CBC W/AUTO DIFF WBC: CPT | Mod: 91 | Performed by: NURSE PRACTITIONER

## 2021-05-09 PROCEDURE — 25000003 PHARM REV CODE 250: Performed by: FAMILY MEDICINE

## 2021-05-09 PROCEDURE — 99222 1ST HOSP IP/OBS MODERATE 55: CPT | Mod: ,,, | Performed by: INTERNAL MEDICINE

## 2021-05-09 PROCEDURE — 99291 CRITICAL CARE FIRST HOUR: CPT | Mod: 25,ER

## 2021-05-09 PROCEDURE — 87086 URINE CULTURE/COLONY COUNT: CPT | Performed by: NURSE PRACTITIONER

## 2021-05-09 PROCEDURE — 93005 ELECTROCARDIOGRAM TRACING: CPT | Mod: ER

## 2021-05-09 PROCEDURE — C9399 UNCLASSIFIED DRUGS OR BIOLOG: HCPCS | Performed by: FAMILY MEDICINE

## 2021-05-09 PROCEDURE — 83880 ASSAY OF NATRIURETIC PEPTIDE: CPT | Mod: ER | Performed by: EMERGENCY MEDICINE

## 2021-05-09 PROCEDURE — 63600175 PHARM REV CODE 636 W HCPCS: Performed by: INTERNAL MEDICINE

## 2021-05-09 PROCEDURE — 85730 THROMBOPLASTIN TIME PARTIAL: CPT | Mod: 91 | Performed by: NURSE PRACTITIONER

## 2021-05-09 PROCEDURE — 63600175 PHARM REV CODE 636 W HCPCS: Mod: ER | Performed by: EMERGENCY MEDICINE

## 2021-05-09 PROCEDURE — 94761 N-INVAS EAR/PLS OXIMETRY MLT: CPT

## 2021-05-09 PROCEDURE — 82043 UR ALBUMIN QUANTITATIVE: CPT | Performed by: INTERNAL MEDICINE

## 2021-05-09 PROCEDURE — 25000003 PHARM REV CODE 250: Mod: ER | Performed by: EMERGENCY MEDICINE

## 2021-05-09 PROCEDURE — U0002 COVID-19 LAB TEST NON-CDC: HCPCS | Mod: ER | Performed by: EMERGENCY MEDICINE

## 2021-05-09 PROCEDURE — 25000003 PHARM REV CODE 250: Performed by: NURSE PRACTITIONER

## 2021-05-09 PROCEDURE — 83036 HEMOGLOBIN GLYCOSYLATED A1C: CPT | Performed by: NURSE PRACTITIONER

## 2021-05-09 PROCEDURE — 85730 THROMBOPLASTIN TIME PARTIAL: CPT | Mod: 91 | Performed by: FAMILY MEDICINE

## 2021-05-09 PROCEDURE — 85730 THROMBOPLASTIN TIME PARTIAL: CPT | Performed by: INTERNAL MEDICINE

## 2021-05-09 PROCEDURE — 81000 URINALYSIS NONAUTO W/SCOPE: CPT | Performed by: NURSE PRACTITIONER

## 2021-05-09 PROCEDURE — 80053 COMPREHEN METABOLIC PANEL: CPT | Mod: ER | Performed by: EMERGENCY MEDICINE

## 2021-05-09 PROCEDURE — 87088 URINE BACTERIA CULTURE: CPT | Performed by: NURSE PRACTITIONER

## 2021-05-09 PROCEDURE — 20000000 HC ICU ROOM

## 2021-05-09 PROCEDURE — 99222 PR INITIAL HOSPITAL CARE,LEVL II: ICD-10-PCS | Mod: ,,, | Performed by: INTERNAL MEDICINE

## 2021-05-09 PROCEDURE — 84156 ASSAY OF PROTEIN URINE: CPT | Performed by: INTERNAL MEDICINE

## 2021-05-09 PROCEDURE — 36415 COLL VENOUS BLD VENIPUNCTURE: CPT | Performed by: FAMILY MEDICINE

## 2021-05-09 PROCEDURE — 84145 PROCALCITONIN (PCT): CPT | Performed by: FAMILY MEDICINE

## 2021-05-09 PROCEDURE — 84484 ASSAY OF TROPONIN QUANT: CPT | Mod: 91 | Performed by: NURSE PRACTITIONER

## 2021-05-09 RX ORDER — ASPIRIN 81 MG/1
81 TABLET ORAL DAILY
Status: DISCONTINUED | OUTPATIENT
Start: 2021-05-09 | End: 2021-05-20 | Stop reason: HOSPADM

## 2021-05-09 RX ORDER — INSULIN ASPART 100 [IU]/ML
0-5 INJECTION, SOLUTION INTRAVENOUS; SUBCUTANEOUS
Status: DISCONTINUED | OUTPATIENT
Start: 2021-05-09 | End: 2021-05-20 | Stop reason: HOSPADM

## 2021-05-09 RX ORDER — HYDRALAZINE HYDROCHLORIDE 25 MG/1
50 TABLET, FILM COATED ORAL EVERY 6 HOURS
Status: DISCONTINUED | OUTPATIENT
Start: 2021-05-09 | End: 2021-05-20 | Stop reason: HOSPADM

## 2021-05-09 RX ORDER — TALC
6 POWDER (GRAM) TOPICAL NIGHTLY PRN
Status: DISCONTINUED | OUTPATIENT
Start: 2021-05-09 | End: 2021-05-20 | Stop reason: HOSPADM

## 2021-05-09 RX ORDER — LOSARTAN POTASSIUM 25 MG/1
25 TABLET ORAL DAILY
Refills: 0 | Status: DISCONTINUED | OUTPATIENT
Start: 2021-05-10 | End: 2021-05-20 | Stop reason: HOSPADM

## 2021-05-09 RX ORDER — IBUPROFEN 200 MG
24 TABLET ORAL
Status: DISCONTINUED | OUTPATIENT
Start: 2021-05-09 | End: 2021-05-20 | Stop reason: HOSPADM

## 2021-05-09 RX ORDER — SODIUM CHLORIDE 0.9 % (FLUSH) 0.9 %
10 SYRINGE (ML) INJECTION
Status: DISCONTINUED | OUTPATIENT
Start: 2021-05-09 | End: 2021-05-20 | Stop reason: HOSPADM

## 2021-05-09 RX ORDER — FUROSEMIDE 40 MG/1
80 TABLET ORAL 2 TIMES DAILY
Status: DISCONTINUED | OUTPATIENT
Start: 2021-05-09 | End: 2021-05-20 | Stop reason: HOSPADM

## 2021-05-09 RX ORDER — ENOXAPARIN SODIUM 100 MG/ML
1 INJECTION SUBCUTANEOUS
Status: COMPLETED | OUTPATIENT
Start: 2021-05-09 | End: 2021-05-09

## 2021-05-09 RX ORDER — TALC
6 POWDER (GRAM) TOPICAL NIGHTLY PRN
Status: DISCONTINUED | OUTPATIENT
Start: 2021-05-09 | End: 2021-05-09

## 2021-05-09 RX ORDER — HEPARIN SODIUM,PORCINE/D5W 25000/250
0-40 INTRAVENOUS SOLUTION INTRAVENOUS CONTINUOUS
Status: CANCELLED | OUTPATIENT
Start: 2021-05-09

## 2021-05-09 RX ORDER — GLUCAGON 1 MG
1 KIT INJECTION
Status: DISCONTINUED | OUTPATIENT
Start: 2021-05-09 | End: 2021-05-20 | Stop reason: HOSPADM

## 2021-05-09 RX ORDER — NAPROXEN SODIUM 220 MG/1
324 TABLET, FILM COATED ORAL
Status: COMPLETED | OUTPATIENT
Start: 2021-05-09 | End: 2021-05-09

## 2021-05-09 RX ORDER — ONDANSETRON 2 MG/ML
4 INJECTION INTRAMUSCULAR; INTRAVENOUS EVERY 8 HOURS PRN
Status: DISCONTINUED | OUTPATIENT
Start: 2021-05-09 | End: 2021-05-20 | Stop reason: HOSPADM

## 2021-05-09 RX ORDER — IBUPROFEN 200 MG
16 TABLET ORAL
Status: DISCONTINUED | OUTPATIENT
Start: 2021-05-09 | End: 2021-05-20 | Stop reason: HOSPADM

## 2021-05-09 RX ORDER — ACETAMINOPHEN 325 MG/1
650 TABLET ORAL EVERY 4 HOURS PRN
Status: DISCONTINUED | OUTPATIENT
Start: 2021-05-09 | End: 2021-05-20 | Stop reason: HOSPADM

## 2021-05-09 RX ORDER — HYDRALAZINE HYDROCHLORIDE 25 MG/1
50 TABLET, FILM COATED ORAL 4 TIMES DAILY
Status: DISCONTINUED | OUTPATIENT
Start: 2021-05-09 | End: 2021-05-09

## 2021-05-09 RX ORDER — HEPARIN SODIUM,PORCINE/D5W 25000/250
0-40 INTRAVENOUS SOLUTION INTRAVENOUS CONTINUOUS
Status: DISCONTINUED | OUTPATIENT
Start: 2021-05-09 | End: 2021-05-09

## 2021-05-09 RX ORDER — AMLODIPINE BESYLATE 5 MG/1
10 TABLET ORAL DAILY
Status: DISCONTINUED | OUTPATIENT
Start: 2021-05-09 | End: 2021-05-20 | Stop reason: HOSPADM

## 2021-05-09 RX ORDER — METOPROLOL SUCCINATE 50 MG/1
50 TABLET, EXTENDED RELEASE ORAL DAILY
Status: DISCONTINUED | OUTPATIENT
Start: 2021-05-09 | End: 2021-05-20 | Stop reason: HOSPADM

## 2021-05-09 RX ORDER — HEPARIN SODIUM,PORCINE/D5W 25000/250
0-40 INTRAVENOUS SOLUTION INTRAVENOUS CONTINUOUS
Status: DISCONTINUED | OUTPATIENT
Start: 2021-05-09 | End: 2021-05-11

## 2021-05-09 RX ORDER — CLOPIDOGREL BISULFATE 75 MG/1
300 TABLET ORAL
Status: COMPLETED | OUTPATIENT
Start: 2021-05-09 | End: 2021-05-09

## 2021-05-09 RX ORDER — ROSUVASTATIN CALCIUM 10 MG/1
40 TABLET, COATED ORAL NIGHTLY
Status: DISCONTINUED | OUTPATIENT
Start: 2021-05-09 | End: 2021-05-20 | Stop reason: HOSPADM

## 2021-05-09 RX ORDER — MUPIROCIN 20 MG/G
OINTMENT TOPICAL 2 TIMES DAILY
Status: DISPENSED | OUTPATIENT
Start: 2021-05-09 | End: 2021-05-14

## 2021-05-09 RX ORDER — CLOPIDOGREL BISULFATE 75 MG/1
75 TABLET ORAL DAILY
Status: DISCONTINUED | OUTPATIENT
Start: 2021-05-09 | End: 2021-05-20 | Stop reason: HOSPADM

## 2021-05-09 RX ORDER — ISOSORBIDE MONONITRATE 30 MG/1
60 TABLET, EXTENDED RELEASE ORAL 2 TIMES DAILY
Status: DISCONTINUED | OUTPATIENT
Start: 2021-05-09 | End: 2021-05-11

## 2021-05-09 RX ADMIN — HYDRALAZINE HYDROCHLORIDE 50 MG: 25 TABLET, FILM COATED ORAL at 02:05

## 2021-05-09 RX ADMIN — CLOPIDOGREL 300 MG: 75 TABLET, FILM COATED ORAL at 04:05

## 2021-05-09 RX ADMIN — HEPARIN SODIUM 18 UNITS/KG/HR: 10000 INJECTION, SOLUTION INTRAVENOUS at 09:05

## 2021-05-09 RX ADMIN — ISOSORBIDE MONONITRATE 60 MG: 30 TABLET, EXTENDED RELEASE ORAL at 07:05

## 2021-05-09 RX ADMIN — INSULIN DETEMIR 20 UNITS: 100 INJECTION, SOLUTION SUBCUTANEOUS at 08:05

## 2021-05-09 RX ADMIN — CLOPIDOGREL 75 MG: 75 TABLET, FILM COATED ORAL at 08:05

## 2021-05-09 RX ADMIN — FUROSEMIDE 80 MG: 40 TABLET ORAL at 08:05

## 2021-05-09 RX ADMIN — INSULIN ASPART 1 UNITS: 100 INJECTION, SOLUTION INTRAVENOUS; SUBCUTANEOUS at 08:05

## 2021-05-09 RX ADMIN — ASPIRIN 81 MG: 81 TABLET, COATED ORAL at 08:05

## 2021-05-09 RX ADMIN — ROSUVASTATIN CALCIUM 40 MG: 10 TABLET, FILM COATED ORAL at 08:05

## 2021-05-09 RX ADMIN — INSULIN ASPART 2 UNITS: 100 INJECTION, SOLUTION INTRAVENOUS; SUBCUTANEOUS at 04:05

## 2021-05-09 RX ADMIN — NEPHROCAP 1 CAPSULE: 1 CAP ORAL at 02:05

## 2021-05-09 RX ADMIN — HEPARIN SODIUM 14 UNITS/KG/HR: 10000 INJECTION, SOLUTION INTRAVENOUS at 10:05

## 2021-05-09 RX ADMIN — MUPIROCIN: 20 OINTMENT TOPICAL at 08:05

## 2021-05-09 RX ADMIN — METOPROLOL SUCCINATE 50 MG: 50 TABLET, EXTENDED RELEASE ORAL at 08:05

## 2021-05-09 RX ADMIN — AMLODIPINE BESYLATE 10 MG: 5 TABLET ORAL at 08:05

## 2021-05-09 RX ADMIN — ENOXAPARIN SODIUM 100 MG: 100 INJECTION SUBCUTANEOUS at 06:05

## 2021-05-09 RX ADMIN — ASPIRIN 324 MG: 81 TABLET, CHEWABLE ORAL at 03:05

## 2021-05-09 RX ADMIN — MUPIROCIN: 20 OINTMENT TOPICAL at 09:05

## 2021-05-09 RX ADMIN — HYDRALAZINE HYDROCHLORIDE 50 MG: 25 TABLET, FILM COATED ORAL at 11:05

## 2021-05-10 LAB
25(OH)D3+25(OH)D2 SERPL-MCNC: 13 NG/ML (ref 30–96)
ALBUMIN SERPL BCP-MCNC: 3.1 G/DL (ref 3.5–5.2)
ALBUMIN/CREAT UR: 1830.2 UG/MG (ref 0–30)
ALP SERPL-CCNC: 75 U/L (ref 55–135)
ALT SERPL W/O P-5'-P-CCNC: 8 U/L (ref 10–44)
ANION GAP SERPL CALC-SCNC: 13 MMOL/L (ref 8–16)
AORTIC ROOT ANNULUS: 3.09 CM
AORTIC VALVE CUSP SEPERATION: 1.93 CM
APTT BLDCRRT: 56.8 SEC (ref 21–32)
APTT BLDCRRT: 56.8 SEC (ref 21–32)
APTT BLDCRRT: 64.5 SEC (ref 21–32)
AST SERPL-CCNC: 15 U/L (ref 10–40)
AV INDEX (PROSTH): 0.78
AV MEAN GRADIENT: 3 MMHG
AV PEAK GRADIENT: 7 MMHG
AV VALVE AREA: 2.72 CM2
AV VELOCITY RATIO: 0.71
BACTERIA UR CULT: ABNORMAL
BASOPHILS # BLD AUTO: 0.07 K/UL (ref 0–0.2)
BASOPHILS NFR BLD: 1.2 % (ref 0–1.9)
BILIRUB SERPL-MCNC: 0.3 MG/DL (ref 0.1–1)
BSA FOR ECHO PROCEDURE: 2.23 M2
BUN SERPL-MCNC: 80 MG/DL (ref 8–23)
CALCIUM SERPL-MCNC: 8.5 MG/DL (ref 8.7–10.5)
CHLORIDE SERPL-SCNC: 107 MMOL/L (ref 95–110)
CO2 SERPL-SCNC: 20 MMOL/L (ref 23–29)
CREAT SERPL-MCNC: 4.8 MG/DL (ref 0.5–1.4)
CREAT UR-MCNC: 37.7 MG/DL (ref 23–375)
CV ECHO LV RWT: 0.54 CM
CV STRESS BASE HR: 58 BPM
DIASTOLIC BLOOD PRESSURE: 67 MMHG
DIFFERENTIAL METHOD: ABNORMAL
DOP CALC AO PEAK VEL: 1.34 M/S
DOP CALC AO VTI: 32.86 CM
DOP CALC LVOT AREA: 3.5 CM2
DOP CALC LVOT DIAMETER: 2.1 CM
DOP CALC LVOT PEAK VEL: 0.95 M/S
DOP CALC LVOT STROKE VOLUME: 89.28 CM3
DOP CALCLVOT PEAK VEL VTI: 25.79 CM
E WAVE DECELERATION TIME: 212.14 MSEC
E/A RATIO: 1.02
E/E' RATIO: 14.71 M/S
ECHO LV POSTERIOR WALL: 1.41 CM (ref 0.6–1.1)
EJECTION FRACTION: 55 %
EOSINOPHIL # BLD AUTO: 0.1 K/UL (ref 0–0.5)
EOSINOPHIL NFR BLD: 2.4 % (ref 0–8)
ERYTHROCYTE [DISTWIDTH] IN BLOOD BY AUTOMATED COUNT: 13.2 % (ref 11.5–14.5)
ERYTHROCYTE [DISTWIDTH] IN BLOOD BY AUTOMATED COUNT: 13.2 % (ref 11.5–14.5)
EST. GFR  (AFRICAN AMERICAN): 13 ML/MIN/1.73 M^2
EST. GFR  (NON AFRICAN AMERICAN): 11 ML/MIN/1.73 M^2
FRACTIONAL SHORTENING: 34 % (ref 28–44)
GLUCOSE SERPL-MCNC: 222 MG/DL (ref 70–110)
HCT VFR BLD AUTO: 22.7 % (ref 40–54)
HCT VFR BLD AUTO: 25.1 % (ref 40–54)
HGB BLD-MCNC: 7.6 G/DL (ref 14–18)
HGB BLD-MCNC: 8.2 G/DL (ref 14–18)
IMM GRANULOCYTES # BLD AUTO: 0.02 K/UL (ref 0–0.04)
IMM GRANULOCYTES NFR BLD AUTO: 0.3 % (ref 0–0.5)
INTERVENTRICULAR SEPTUM: 1.42 CM (ref 0.6–1.1)
IVRT: 91.34 MSEC
LA MAJOR: 6.24 CM
LA MINOR: 5.56 CM
LA WIDTH: 5.11 CM
LEFT ATRIUM SIZE: 4.66 CM
LEFT ATRIUM VOLUME INDEX MOD: 42.4 ML/M2
LEFT ATRIUM VOLUME INDEX: 53.4 ML/M2
LEFT ATRIUM VOLUME MOD: 94.63 CM3
LEFT ATRIUM VOLUME: 119.02 CM3
LEFT INTERNAL DIMENSION IN SYSTOLE: 3.45 CM (ref 2.1–4)
LEFT VENTRICLE DIASTOLIC VOLUME INDEX: 58.1 ML/M2
LEFT VENTRICLE DIASTOLIC VOLUME: 129.56 ML
LEFT VENTRICLE MASS INDEX: 141 G/M2
LEFT VENTRICLE SYSTOLIC VOLUME INDEX: 22.1 ML/M2
LEFT VENTRICLE SYSTOLIC VOLUME: 49.18 ML
LEFT VENTRICULAR INTERNAL DIMENSION IN DIASTOLE: 5.2 CM (ref 3.5–6)
LEFT VENTRICULAR MASS: 314.41 G
LV LATERAL E/E' RATIO: 12.88 M/S
LV SEPTAL E/E' RATIO: 17.17 M/S
LYMPHOCYTES # BLD AUTO: 1.4 K/UL (ref 1–4.8)
LYMPHOCYTES NFR BLD: 23.9 % (ref 18–48)
MCH RBC QN AUTO: 29.4 PG (ref 27–31)
MCH RBC QN AUTO: 30 PG (ref 27–31)
MCHC RBC AUTO-ENTMCNC: 32.7 G/DL (ref 32–36)
MCHC RBC AUTO-ENTMCNC: 33.5 G/DL (ref 32–36)
MCV RBC AUTO: 90 FL (ref 82–98)
MCV RBC AUTO: 90 FL (ref 82–98)
MICROALBUMIN UR DL<=1MG/L-MCNC: 690 UG/ML
MONOCYTES # BLD AUTO: 0.6 K/UL (ref 0.3–1)
MONOCYTES NFR BLD: 10.3 % (ref 4–15)
MV A" WAVE DURATION": 17.51 MSEC
MV MEAN GRADIENT: 1 MMHG
MV PEAK A VEL: 1.01 M/S
MV PEAK E VEL: 1.03 M/S
MV PEAK GRADIENT: 5 MMHG
MV STENOSIS PRESSURE HALF TIME: 61.52 MS
MV VALVE AREA P 1/2 METHOD: 3.58 CM2
NEUTROPHILS # BLD AUTO: 3.6 K/UL (ref 1.8–7.7)
NEUTROPHILS NFR BLD: 61.9 % (ref 38–73)
NRBC BLD-RTO: 0 /100 WBC
OHS CV CPX 85 PERCENT MAX PREDICTED HEART RATE MALE: 122
OHS CV CPX MAX PREDICTED HEART RATE: 144
OHS CV CPX PATIENT IS FEMALE: 0
OHS CV CPX PATIENT IS MALE: 1
OHS CV CPX PEAK DIASTOLIC BLOOD PRESSURE: 108 MMHG
OHS CV CPX PEAK HEAR RATE: 77 BPM
OHS CV CPX PEAK RATE PRESSURE PRODUCT: NORMAL
OHS CV CPX PEAK SYSTOLIC BLOOD PRESSURE: 138 MMHG
OHS CV CPX PERCENT MAX PREDICTED HEART RATE ACHIEVED: 53
OHS CV CPX RATE PRESSURE PRODUCT PRESENTING: 7830
PHOSPHATE SERPL-MCNC: 4.6 MG/DL (ref 2.7–4.5)
PISA MRMAX VEL: 0.04 M/S
PISA TR MAX VEL: 1.37 M/S
PLATELET # BLD AUTO: 124 K/UL (ref 150–450)
PLATELET # BLD AUTO: 135 K/UL (ref 150–450)
PMV BLD AUTO: 12.3 FL (ref 9.2–12.9)
PMV BLD AUTO: 12.5 FL (ref 9.2–12.9)
POCT GLUCOSE: 197 MG/DL (ref 70–110)
POCT GLUCOSE: 206 MG/DL (ref 70–110)
POCT GLUCOSE: 283 MG/DL (ref 70–110)
POCT GLUCOSE: 291 MG/DL (ref 70–110)
POTASSIUM SERPL-SCNC: 4.1 MMOL/L (ref 3.5–5.1)
PROCALCITONIN SERPL IA-MCNC: 0.58 NG/ML
PROT SERPL-MCNC: 7 G/DL (ref 6–8.4)
PULM VEIN S/D RATIO: 1.15
PV PEAK D VEL: 0.87 M/S
PV PEAK S VEL: 1 M/S
PV PEAK VELOCITY: 0.99 CM/S
RA MAJOR: 5.98 CM
RA PRESSURE: 8 MMHG
RA WIDTH: 4.12 CM
RBC # BLD AUTO: 2.53 M/UL (ref 4.6–6.2)
RBC # BLD AUTO: 2.79 M/UL (ref 4.6–6.2)
RIGHT VENTRICULAR END-DIASTOLIC DIMENSION: 3.03 CM
SODIUM SERPL-SCNC: 140 MMOL/L (ref 136–145)
SYSTOLIC BLOOD PRESSURE: 135 MMHG
TDI LATERAL: 0.08 M/S
TDI SEPTAL: 0.06 M/S
TDI: 0.07 M/S
TR MAX PG: 8 MMHG
TROPONIN I SERPL DL<=0.01 NG/ML-MCNC: 4.42 NG/ML (ref 0–0.03)
TV REST PULMONARY ARTERY PRESSURE: 16 MMHG
WBC # BLD AUTO: 5.82 K/UL (ref 3.9–12.7)
WBC # BLD AUTO: 7.03 K/UL (ref 3.9–12.7)

## 2021-05-10 PROCEDURE — 25000003 PHARM REV CODE 250: Performed by: INTERNAL MEDICINE

## 2021-05-10 PROCEDURE — 93005 ELECTROCARDIOGRAM TRACING: CPT

## 2021-05-10 PROCEDURE — 99233 SBSQ HOSP IP/OBS HIGH 50: CPT | Mod: ,,, | Performed by: NURSE PRACTITIONER

## 2021-05-10 PROCEDURE — C9399 UNCLASSIFIED DRUGS OR BIOLOG: HCPCS | Performed by: FAMILY MEDICINE

## 2021-05-10 PROCEDURE — 80053 COMPREHEN METABOLIC PANEL: CPT | Performed by: FAMILY MEDICINE

## 2021-05-10 PROCEDURE — 82306 VITAMIN D 25 HYDROXY: CPT | Performed by: INTERNAL MEDICINE

## 2021-05-10 PROCEDURE — 25000003 PHARM REV CODE 250: Performed by: FAMILY MEDICINE

## 2021-05-10 PROCEDURE — 85730 THROMBOPLASTIN TIME PARTIAL: CPT | Performed by: NURSE PRACTITIONER

## 2021-05-10 PROCEDURE — 63600175 PHARM REV CODE 636 W HCPCS: Performed by: INTERNAL MEDICINE

## 2021-05-10 PROCEDURE — 25000242 PHARM REV CODE 250 ALT 637 W/ HCPCS: Performed by: NURSE PRACTITIONER

## 2021-05-10 PROCEDURE — 94761 N-INVAS EAR/PLS OXIMETRY MLT: CPT

## 2021-05-10 PROCEDURE — 93010 EKG 12-LEAD: ICD-10-PCS | Mod: ,,, | Performed by: INTERNAL MEDICINE

## 2021-05-10 PROCEDURE — 85025 COMPLETE CBC W/AUTO DIFF WBC: CPT | Performed by: NURSE PRACTITIONER

## 2021-05-10 PROCEDURE — 25000003 PHARM REV CODE 250: Performed by: NURSE PRACTITIONER

## 2021-05-10 PROCEDURE — 11000001 HC ACUTE MED/SURG PRIVATE ROOM

## 2021-05-10 PROCEDURE — 84100 ASSAY OF PHOSPHORUS: CPT | Performed by: FAMILY MEDICINE

## 2021-05-10 PROCEDURE — 93010 ELECTROCARDIOGRAM REPORT: CPT | Mod: ,,, | Performed by: INTERNAL MEDICINE

## 2021-05-10 PROCEDURE — 84484 ASSAY OF TROPONIN QUANT: CPT | Performed by: NURSE PRACTITIONER

## 2021-05-10 PROCEDURE — 63600175 PHARM REV CODE 636 W HCPCS: Performed by: FAMILY MEDICINE

## 2021-05-10 PROCEDURE — 99233 PR SUBSEQUENT HOSPITAL CARE,LEVL III: ICD-10-PCS | Mod: ,,, | Performed by: NURSE PRACTITIONER

## 2021-05-10 RX ORDER — CALCITRIOL 0.25 UG/1
0.25 CAPSULE ORAL DAILY
Status: DISCONTINUED | OUTPATIENT
Start: 2021-05-11 | End: 2021-05-20 | Stop reason: HOSPADM

## 2021-05-10 RX ORDER — REGADENOSON 0.08 MG/ML
0.4 INJECTION, SOLUTION INTRAVENOUS ONCE
Status: COMPLETED | OUTPATIENT
Start: 2021-05-10 | End: 2021-05-10

## 2021-05-10 RX ORDER — NITROGLYCERIN 0.4 MG/1
0.4 TABLET SUBLINGUAL EVERY 5 MIN PRN
Status: DISCONTINUED | OUTPATIENT
Start: 2021-05-10 | End: 2021-05-20 | Stop reason: HOSPADM

## 2021-05-10 RX ORDER — SODIUM CITRATE AND CITRIC ACID MONOHYDRATE 334; 500 MG/5ML; MG/5ML
15 SOLUTION ORAL 3 TIMES DAILY
Status: DISCONTINUED | OUTPATIENT
Start: 2021-05-10 | End: 2021-05-15

## 2021-05-10 RX ADMIN — INSULIN ASPART 1 UNITS: 100 INJECTION, SOLUTION INTRAVENOUS; SUBCUTANEOUS at 09:05

## 2021-05-10 RX ADMIN — INSULIN ASPART 3 UNITS: 100 INJECTION, SOLUTION INTRAVENOUS; SUBCUTANEOUS at 05:05

## 2021-05-10 RX ADMIN — NITROGLYCERIN 0.4 MG: 0.4 TABLET, ORALLY DISINTEGRATING SUBLINGUAL at 11:05

## 2021-05-10 RX ADMIN — FUROSEMIDE 80 MG: 40 TABLET ORAL at 08:05

## 2021-05-10 RX ADMIN — HYDRALAZINE HYDROCHLORIDE 50 MG: 25 TABLET, FILM COATED ORAL at 11:05

## 2021-05-10 RX ADMIN — MUPIROCIN: 20 OINTMENT TOPICAL at 08:05

## 2021-05-10 RX ADMIN — LOSARTAN POTASSIUM 25 MG: 25 TABLET, FILM COATED ORAL at 08:05

## 2021-05-10 RX ADMIN — AMLODIPINE BESYLATE 10 MG: 5 TABLET ORAL at 08:05

## 2021-05-10 RX ADMIN — ISOSORBIDE MONONITRATE 60 MG: 30 TABLET, EXTENDED RELEASE ORAL at 08:05

## 2021-05-10 RX ADMIN — HEPARIN SODIUM 14 UNITS/KG/HR: 10000 INJECTION, SOLUTION INTRAVENOUS at 07:05

## 2021-05-10 RX ADMIN — ROSUVASTATIN CALCIUM 40 MG: 10 TABLET, FILM COATED ORAL at 08:05

## 2021-05-10 RX ADMIN — HYDRALAZINE HYDROCHLORIDE 50 MG: 25 TABLET, FILM COATED ORAL at 05:05

## 2021-05-10 RX ADMIN — SODIUM CITRATE AND CITRIC ACID MONOHYDRATE 15 ML: 500; 334 SOLUTION ORAL at 08:05

## 2021-05-10 RX ADMIN — INSULIN DETEMIR 20 UNITS: 100 INJECTION, SOLUTION SUBCUTANEOUS at 09:05

## 2021-05-10 RX ADMIN — REGADENOSON 0.4 MG: 0.08 INJECTION, SOLUTION INTRAVENOUS at 11:05

## 2021-05-10 RX ADMIN — NEPHROCAP 1 CAPSULE: 1 CAP ORAL at 08:05

## 2021-05-10 RX ADMIN — CLOPIDOGREL 75 MG: 75 TABLET, FILM COATED ORAL at 08:05

## 2021-05-10 RX ADMIN — HYDRALAZINE HYDROCHLORIDE 50 MG: 25 TABLET, FILM COATED ORAL at 06:05

## 2021-05-10 RX ADMIN — METOPROLOL SUCCINATE 50 MG: 50 TABLET, EXTENDED RELEASE ORAL at 08:05

## 2021-05-10 RX ADMIN — ASPIRIN 81 MG: 81 TABLET, COATED ORAL at 08:05

## 2021-05-10 RX ADMIN — INSULIN ASPART 2 UNITS: 100 INJECTION, SOLUTION INTRAVENOUS; SUBCUTANEOUS at 08:05

## 2021-05-11 LAB
ALBUMIN SERPL BCP-MCNC: 3.2 G/DL (ref 3.5–5.2)
ALP SERPL-CCNC: 71 U/L (ref 55–135)
ALT SERPL W/O P-5'-P-CCNC: 8 U/L (ref 10–44)
ANION GAP SERPL CALC-SCNC: 11 MMOL/L (ref 8–16)
APTT BLDCRRT: 54.2 SEC (ref 21–32)
APTT BLDCRRT: 54.2 SEC (ref 21–32)
AST SERPL-CCNC: 13 U/L (ref 10–40)
BASOPHILS # BLD AUTO: 0.05 K/UL (ref 0–0.2)
BASOPHILS NFR BLD: 0.7 % (ref 0–1.9)
BASOPHILS NFR BLD: 0.8 % (ref 0–1.9)
BASOPHILS NFR BLD: 0.8 % (ref 0–1.9)
BILIRUB SERPL-MCNC: 0.3 MG/DL (ref 0.1–1)
BUN SERPL-MCNC: 76 MG/DL (ref 8–23)
CALCIUM SERPL-MCNC: 9 MG/DL (ref 8.7–10.5)
CHLORIDE SERPL-SCNC: 106 MMOL/L (ref 95–110)
CO2 SERPL-SCNC: 22 MMOL/L (ref 23–29)
CREAT SERPL-MCNC: 4.6 MG/DL (ref 0.5–1.4)
DIFFERENTIAL METHOD: ABNORMAL
EOSINOPHIL # BLD AUTO: 0.1 K/UL (ref 0–0.5)
EOSINOPHIL NFR BLD: 1.7 % (ref 0–8)
EOSINOPHIL NFR BLD: 1.7 % (ref 0–8)
EOSINOPHIL NFR BLD: 1.9 % (ref 0–8)
ERYTHROCYTE [DISTWIDTH] IN BLOOD BY AUTOMATED COUNT: 13.1 % (ref 11.5–14.5)
ERYTHROCYTE [DISTWIDTH] IN BLOOD BY AUTOMATED COUNT: 13.2 % (ref 11.5–14.5)
ERYTHROCYTE [DISTWIDTH] IN BLOOD BY AUTOMATED COUNT: 13.2 % (ref 11.5–14.5)
EST. GFR  (AFRICAN AMERICAN): 13 ML/MIN/1.73 M^2
EST. GFR  (NON AFRICAN AMERICAN): 12 ML/MIN/1.73 M^2
GLUCOSE SERPL-MCNC: 182 MG/DL (ref 70–110)
HCT VFR BLD AUTO: 24.2 % (ref 40–54)
HCT VFR BLD AUTO: 24.5 % (ref 40–54)
HCT VFR BLD AUTO: 24.5 % (ref 40–54)
HGB BLD-MCNC: 7.9 G/DL (ref 14–18)
IMM GRANULOCYTES # BLD AUTO: 0.01 K/UL (ref 0–0.04)
IMM GRANULOCYTES # BLD AUTO: 0.02 K/UL (ref 0–0.04)
IMM GRANULOCYTES # BLD AUTO: 0.02 K/UL (ref 0–0.04)
IMM GRANULOCYTES NFR BLD AUTO: 0.1 % (ref 0–0.5)
IMM GRANULOCYTES NFR BLD AUTO: 0.3 % (ref 0–0.5)
IMM GRANULOCYTES NFR BLD AUTO: 0.3 % (ref 0–0.5)
LYMPHOCYTES # BLD AUTO: 1.5 K/UL (ref 1–4.8)
LYMPHOCYTES NFR BLD: 22.6 % (ref 18–48)
LYMPHOCYTES NFR BLD: 22.7 % (ref 18–48)
LYMPHOCYTES NFR BLD: 22.7 % (ref 18–48)
MCH RBC QN AUTO: 29.7 PG (ref 27–31)
MCH RBC QN AUTO: 29.7 PG (ref 27–31)
MCH RBC QN AUTO: 30 PG (ref 27–31)
MCHC RBC AUTO-ENTMCNC: 32.2 G/DL (ref 32–36)
MCHC RBC AUTO-ENTMCNC: 32.2 G/DL (ref 32–36)
MCHC RBC AUTO-ENTMCNC: 32.6 G/DL (ref 32–36)
MCV RBC AUTO: 92 FL (ref 82–98)
MONOCYTES # BLD AUTO: 0.8 K/UL (ref 0.3–1)
MONOCYTES # BLD AUTO: 0.8 K/UL (ref 0.3–1)
MONOCYTES # BLD AUTO: 0.9 K/UL (ref 0.3–1)
MONOCYTES NFR BLD: 12.2 % (ref 4–15)
MONOCYTES NFR BLD: 12.2 % (ref 4–15)
MONOCYTES NFR BLD: 12.7 % (ref 4–15)
NEUTROPHILS # BLD AUTO: 4.1 K/UL (ref 1.8–7.7)
NEUTROPHILS # BLD AUTO: 4.2 K/UL (ref 1.8–7.7)
NEUTROPHILS # BLD AUTO: 4.2 K/UL (ref 1.8–7.7)
NEUTROPHILS NFR BLD: 62 % (ref 38–73)
NEUTROPHILS NFR BLD: 62.3 % (ref 38–73)
NEUTROPHILS NFR BLD: 62.3 % (ref 38–73)
NRBC BLD-RTO: 0 /100 WBC
PHOSPHATE SERPL-MCNC: 3.9 MG/DL (ref 2.7–4.5)
PLATELET # BLD AUTO: 125 K/UL (ref 150–450)
PMV BLD AUTO: 12 FL (ref 9.2–12.9)
PMV BLD AUTO: 12.1 FL (ref 9.2–12.9)
PMV BLD AUTO: 12.1 FL (ref 9.2–12.9)
POCT GLUCOSE: 214 MG/DL (ref 70–110)
POCT GLUCOSE: 303 MG/DL (ref 70–110)
POCT GLUCOSE: 355 MG/DL (ref 70–110)
POTASSIUM SERPL-SCNC: 4.1 MMOL/L (ref 3.5–5.1)
PROT SERPL-MCNC: 7.3 G/DL (ref 6–8.4)
RBC # BLD AUTO: 2.63 M/UL (ref 4.6–6.2)
RBC # BLD AUTO: 2.66 M/UL (ref 4.6–6.2)
RBC # BLD AUTO: 2.66 M/UL (ref 4.6–6.2)
SODIUM SERPL-SCNC: 139 MMOL/L (ref 136–145)
WBC # BLD AUTO: 6.65 K/UL (ref 3.9–12.7)
WBC # BLD AUTO: 6.65 K/UL (ref 3.9–12.7)
WBC # BLD AUTO: 6.69 K/UL (ref 3.9–12.7)

## 2021-05-11 PROCEDURE — 25000003 PHARM REV CODE 250: Performed by: INTERNAL MEDICINE

## 2021-05-11 PROCEDURE — 25000003 PHARM REV CODE 250: Performed by: NURSE PRACTITIONER

## 2021-05-11 PROCEDURE — 11000001 HC ACUTE MED/SURG PRIVATE ROOM

## 2021-05-11 PROCEDURE — 80053 COMPREHEN METABOLIC PANEL: CPT | Performed by: FAMILY MEDICINE

## 2021-05-11 PROCEDURE — 85730 THROMBOPLASTIN TIME PARTIAL: CPT | Performed by: NURSE PRACTITIONER

## 2021-05-11 PROCEDURE — 99233 PR SUBSEQUENT HOSPITAL CARE,LEVL III: ICD-10-PCS | Mod: ,,, | Performed by: NURSE PRACTITIONER

## 2021-05-11 PROCEDURE — 99233 SBSQ HOSP IP/OBS HIGH 50: CPT | Mod: ,,, | Performed by: NURSE PRACTITIONER

## 2021-05-11 PROCEDURE — 25000242 PHARM REV CODE 250 ALT 637 W/ HCPCS: Performed by: NURSE PRACTITIONER

## 2021-05-11 PROCEDURE — 84100 ASSAY OF PHOSPHORUS: CPT | Performed by: FAMILY MEDICINE

## 2021-05-11 PROCEDURE — 36415 COLL VENOUS BLD VENIPUNCTURE: CPT | Performed by: NURSE PRACTITIONER

## 2021-05-11 PROCEDURE — 85025 COMPLETE CBC W/AUTO DIFF WBC: CPT | Performed by: NURSE PRACTITIONER

## 2021-05-11 PROCEDURE — 25000003 PHARM REV CODE 250: Performed by: FAMILY MEDICINE

## 2021-05-11 RX ORDER — ISOSORBIDE MONONITRATE 30 MG/1
120 TABLET, EXTENDED RELEASE ORAL DAILY
Status: DISCONTINUED | OUTPATIENT
Start: 2021-05-12 | End: 2021-05-20 | Stop reason: HOSPADM

## 2021-05-11 RX ORDER — ISOSORBIDE MONONITRATE 30 MG/1
60 TABLET, EXTENDED RELEASE ORAL ONCE
Status: COMPLETED | OUTPATIENT
Start: 2021-05-11 | End: 2021-05-11

## 2021-05-11 RX ADMIN — FUROSEMIDE 80 MG: 40 TABLET ORAL at 08:05

## 2021-05-11 RX ADMIN — MUPIROCIN: 20 OINTMENT TOPICAL at 08:05

## 2021-05-11 RX ADMIN — SODIUM CITRATE AND CITRIC ACID MONOHYDRATE 15 ML: 500; 334 SOLUTION ORAL at 08:05

## 2021-05-11 RX ADMIN — ISOSORBIDE MONONITRATE 60 MG: 30 TABLET, EXTENDED RELEASE ORAL at 08:05

## 2021-05-11 RX ADMIN — AMLODIPINE BESYLATE 10 MG: 5 TABLET ORAL at 08:05

## 2021-05-11 RX ADMIN — ASPIRIN 81 MG: 81 TABLET, COATED ORAL at 08:05

## 2021-05-11 RX ADMIN — HYDRALAZINE HYDROCHLORIDE 50 MG: 25 TABLET, FILM COATED ORAL at 11:05

## 2021-05-11 RX ADMIN — HYDRALAZINE HYDROCHLORIDE 50 MG: 25 TABLET, FILM COATED ORAL at 01:05

## 2021-05-11 RX ADMIN — CLOPIDOGREL 75 MG: 75 TABLET, FILM COATED ORAL at 08:05

## 2021-05-11 RX ADMIN — ISOSORBIDE MONONITRATE 60 MG: 30 TABLET, EXTENDED RELEASE ORAL at 01:05

## 2021-05-11 RX ADMIN — INSULIN ASPART 4 UNITS: 100 INJECTION, SOLUTION INTRAVENOUS; SUBCUTANEOUS at 04:05

## 2021-05-11 RX ADMIN — ROSUVASTATIN CALCIUM 40 MG: 10 TABLET, FILM COATED ORAL at 08:05

## 2021-05-11 RX ADMIN — INSULIN DETEMIR 20 UNITS: 100 INJECTION, SOLUTION SUBCUTANEOUS at 08:05

## 2021-05-11 RX ADMIN — LOSARTAN POTASSIUM 25 MG: 25 TABLET, FILM COATED ORAL at 08:05

## 2021-05-11 RX ADMIN — NITROGLYCERIN 0.4 MG: 0.4 TABLET, ORALLY DISINTEGRATING SUBLINGUAL at 08:05

## 2021-05-11 RX ADMIN — HYDRALAZINE HYDROCHLORIDE 50 MG: 25 TABLET, FILM COATED ORAL at 06:05

## 2021-05-11 RX ADMIN — SODIUM CITRATE AND CITRIC ACID MONOHYDRATE 15 ML: 500; 334 SOLUTION ORAL at 02:05

## 2021-05-11 RX ADMIN — CALCITRIOL CAPSULES 0.25 MCG 0.25 MCG: 0.25 CAPSULE ORAL at 08:05

## 2021-05-11 RX ADMIN — METOPROLOL SUCCINATE 50 MG: 50 TABLET, EXTENDED RELEASE ORAL at 08:05

## 2021-05-11 RX ADMIN — INSULIN ASPART 2 UNITS: 100 INJECTION, SOLUTION INTRAVENOUS; SUBCUTANEOUS at 08:05

## 2021-05-11 RX ADMIN — INSULIN ASPART 3 UNITS: 100 INJECTION, SOLUTION INTRAVENOUS; SUBCUTANEOUS at 08:05

## 2021-05-11 RX ADMIN — NEPHROCAP 1 CAPSULE: 1 CAP ORAL at 08:05

## 2021-05-12 PROBLEM — S91.332A PENETRATING WOUND OF LEFT FOOT: Status: ACTIVE | Noted: 2021-05-12

## 2021-05-12 PROBLEM — L97.529 ULCER OF LEFT FOOT: Status: ACTIVE | Noted: 2021-05-12

## 2021-05-12 LAB
ABO + RH BLD: NORMAL
ALBUMIN SERPL BCP-MCNC: 3.1 G/DL (ref 3.5–5.2)
ALP SERPL-CCNC: 74 U/L (ref 55–135)
ALT SERPL W/O P-5'-P-CCNC: 9 U/L (ref 10–44)
ANION GAP SERPL CALC-SCNC: 11 MMOL/L (ref 8–16)
APTT BLDCRRT: 26.5 SEC (ref 21–32)
AST SERPL-CCNC: 16 U/L (ref 10–40)
BASOPHILS # BLD AUTO: 0.03 K/UL (ref 0–0.2)
BASOPHILS # BLD AUTO: 0.04 K/UL (ref 0–0.2)
BASOPHILS NFR BLD: 0.4 % (ref 0–1.9)
BASOPHILS NFR BLD: 0.5 % (ref 0–1.9)
BILIRUB SERPL-MCNC: 0.3 MG/DL (ref 0.1–1)
BLD GP AB SCN CELLS X3 SERPL QL: NORMAL
BLD PROD TYP BPU: NORMAL
BLOOD UNIT EXPIRATION DATE: NORMAL
BLOOD UNIT TYPE CODE: 7300
BLOOD UNIT TYPE: NORMAL
BUN SERPL-MCNC: 72 MG/DL (ref 8–23)
CALCIUM SERPL-MCNC: 9 MG/DL (ref 8.7–10.5)
CHLORIDE SERPL-SCNC: 105 MMOL/L (ref 95–110)
CO2 SERPL-SCNC: 22 MMOL/L (ref 23–29)
CODING SYSTEM: NORMAL
CREAT SERPL-MCNC: 4.8 MG/DL (ref 0.5–1.4)
DIFFERENTIAL METHOD: ABNORMAL
DIFFERENTIAL METHOD: ABNORMAL
DISPENSE STATUS: NORMAL
EOSINOPHIL # BLD AUTO: 0.1 K/UL (ref 0–0.5)
EOSINOPHIL # BLD AUTO: 0.2 K/UL (ref 0–0.5)
EOSINOPHIL NFR BLD: 1.8 % (ref 0–8)
EOSINOPHIL NFR BLD: 2 % (ref 0–8)
ERYTHROCYTE [DISTWIDTH] IN BLOOD BY AUTOMATED COUNT: 12.9 % (ref 11.5–14.5)
ERYTHROCYTE [DISTWIDTH] IN BLOOD BY AUTOMATED COUNT: 13.1 % (ref 11.5–14.5)
EST. GFR  (AFRICAN AMERICAN): 13 ML/MIN/1.73 M^2
EST. GFR  (NON AFRICAN AMERICAN): 11 ML/MIN/1.73 M^2
GLUCOSE SERPL-MCNC: 218 MG/DL (ref 70–110)
HCT VFR BLD AUTO: 21.7 % (ref 40–54)
HCT VFR BLD AUTO: 22.1 % (ref 40–54)
HGB BLD-MCNC: 7.2 G/DL (ref 14–18)
HGB BLD-MCNC: 7.4 G/DL (ref 14–18)
IMM GRANULOCYTES # BLD AUTO: 0.02 K/UL (ref 0–0.04)
IMM GRANULOCYTES # BLD AUTO: 0.03 K/UL (ref 0–0.04)
IMM GRANULOCYTES NFR BLD AUTO: 0.3 % (ref 0–0.5)
IMM GRANULOCYTES NFR BLD AUTO: 0.4 % (ref 0–0.5)
LYMPHOCYTES # BLD AUTO: 1.7 K/UL (ref 1–4.8)
LYMPHOCYTES # BLD AUTO: 1.8 K/UL (ref 1–4.8)
LYMPHOCYTES NFR BLD: 23.5 % (ref 18–48)
LYMPHOCYTES NFR BLD: 23.8 % (ref 18–48)
MCH RBC QN AUTO: 30 PG (ref 27–31)
MCH RBC QN AUTO: 30.8 PG (ref 27–31)
MCHC RBC AUTO-ENTMCNC: 33.2 G/DL (ref 32–36)
MCHC RBC AUTO-ENTMCNC: 33.5 G/DL (ref 32–36)
MCV RBC AUTO: 90 FL (ref 82–98)
MCV RBC AUTO: 92 FL (ref 82–98)
MONOCYTES # BLD AUTO: 0.8 K/UL (ref 0.3–1)
MONOCYTES # BLD AUTO: 0.8 K/UL (ref 0.3–1)
MONOCYTES NFR BLD: 10.8 % (ref 4–15)
MONOCYTES NFR BLD: 9.9 % (ref 4–15)
NEUTROPHILS # BLD AUTO: 4.7 K/UL (ref 1.8–7.7)
NEUTROPHILS # BLD AUTO: 4.9 K/UL (ref 1.8–7.7)
NEUTROPHILS NFR BLD: 63.2 % (ref 38–73)
NEUTROPHILS NFR BLD: 63.4 % (ref 38–73)
NRBC BLD-RTO: 0 /100 WBC
NRBC BLD-RTO: 0 /100 WBC
PHOSPHATE SERPL-MCNC: 3.7 MG/DL (ref 2.7–4.5)
PLATELET # BLD AUTO: 114 K/UL (ref 150–450)
PLATELET # BLD AUTO: 119 K/UL (ref 150–450)
PMV BLD AUTO: 12 FL (ref 9.2–12.9)
PMV BLD AUTO: 12.1 FL (ref 9.2–12.9)
POCT GLUCOSE: 200 MG/DL (ref 70–110)
POCT GLUCOSE: 297 MG/DL (ref 70–110)
POCT GLUCOSE: 329 MG/DL (ref 70–110)
POCT GLUCOSE: 359 MG/DL (ref 70–110)
POTASSIUM SERPL-SCNC: 4 MMOL/L (ref 3.5–5.1)
PROT SERPL-MCNC: 7 G/DL (ref 6–8.4)
RBC # BLD AUTO: 2.4 M/UL (ref 4.6–6.2)
RBC # BLD AUTO: 2.4 M/UL (ref 4.6–6.2)
SODIUM SERPL-SCNC: 138 MMOL/L (ref 136–145)
TRANS ERYTHROCYTES VOL PATIENT: NORMAL ML
WBC # BLD AUTO: 7.4 K/UL (ref 3.9–12.7)
WBC # BLD AUTO: 7.65 K/UL (ref 3.9–12.7)

## 2021-05-12 PROCEDURE — 85025 COMPLETE CBC W/AUTO DIFF WBC: CPT | Performed by: NURSE PRACTITIONER

## 2021-05-12 PROCEDURE — 87077 CULTURE AEROBIC IDENTIFY: CPT | Mod: 59 | Performed by: STUDENT IN AN ORGANIZED HEALTH CARE EDUCATION/TRAINING PROGRAM

## 2021-05-12 PROCEDURE — 99233 SBSQ HOSP IP/OBS HIGH 50: CPT | Mod: ,,, | Performed by: NURSE PRACTITIONER

## 2021-05-12 PROCEDURE — 97535 SELF CARE MNGMENT TRAINING: CPT

## 2021-05-12 PROCEDURE — 99233 PR SUBSEQUENT HOSPITAL CARE,LEVL III: ICD-10-PCS | Mod: ,,, | Performed by: NURSE PRACTITIONER

## 2021-05-12 PROCEDURE — 11000001 HC ACUTE MED/SURG PRIVATE ROOM

## 2021-05-12 PROCEDURE — 84100 ASSAY OF PHOSPHORUS: CPT | Performed by: FAMILY MEDICINE

## 2021-05-12 PROCEDURE — 25000003 PHARM REV CODE 250: Performed by: INTERNAL MEDICINE

## 2021-05-12 PROCEDURE — 11042 DBRDMT SUBQ TIS 1ST 20SQCM/<: CPT | Mod: ,,, | Performed by: STUDENT IN AN ORGANIZED HEALTH CARE EDUCATION/TRAINING PROGRAM

## 2021-05-12 PROCEDURE — 87070 CULTURE OTHR SPECIMN AEROBIC: CPT | Performed by: STUDENT IN AN ORGANIZED HEALTH CARE EDUCATION/TRAINING PROGRAM

## 2021-05-12 PROCEDURE — 80053 COMPREHEN METABOLIC PANEL: CPT | Performed by: FAMILY MEDICINE

## 2021-05-12 PROCEDURE — 87075 CULTR BACTERIA EXCEPT BLOOD: CPT | Performed by: STUDENT IN AN ORGANIZED HEALTH CARE EDUCATION/TRAINING PROGRAM

## 2021-05-12 PROCEDURE — 36415 COLL VENOUS BLD VENIPUNCTURE: CPT | Performed by: HOSPITALIST

## 2021-05-12 PROCEDURE — 86900 BLOOD TYPING SEROLOGIC ABO: CPT | Performed by: HOSPITALIST

## 2021-05-12 PROCEDURE — 85730 THROMBOPLASTIN TIME PARTIAL: CPT | Performed by: NURSE PRACTITIONER

## 2021-05-12 PROCEDURE — 25000003 PHARM REV CODE 250: Performed by: FAMILY MEDICINE

## 2021-05-12 PROCEDURE — 36430 TRANSFUSION BLD/BLD COMPNT: CPT

## 2021-05-12 PROCEDURE — P9021 RED BLOOD CELLS UNIT: HCPCS | Performed by: HOSPITALIST

## 2021-05-12 PROCEDURE — 87186 SC STD MICRODIL/AGAR DIL: CPT | Performed by: STUDENT IN AN ORGANIZED HEALTH CARE EDUCATION/TRAINING PROGRAM

## 2021-05-12 PROCEDURE — 25000003 PHARM REV CODE 250: Performed by: NURSE PRACTITIONER

## 2021-05-12 PROCEDURE — 99223 1ST HOSP IP/OBS HIGH 75: CPT | Mod: 25,,, | Performed by: STUDENT IN AN ORGANIZED HEALTH CARE EDUCATION/TRAINING PROGRAM

## 2021-05-12 PROCEDURE — 97530 THERAPEUTIC ACTIVITIES: CPT

## 2021-05-12 PROCEDURE — 94761 N-INVAS EAR/PLS OXIMETRY MLT: CPT

## 2021-05-12 PROCEDURE — 97165 OT EVAL LOW COMPLEX 30 MIN: CPT

## 2021-05-12 PROCEDURE — 99223 PR INITIAL HOSPITAL CARE,LEVL III: ICD-10-PCS | Mod: 25,,, | Performed by: STUDENT IN AN ORGANIZED HEALTH CARE EDUCATION/TRAINING PROGRAM

## 2021-05-12 PROCEDURE — 11042 DEBRIDEMENT: ICD-10-PCS | Mod: ,,, | Performed by: STUDENT IN AN ORGANIZED HEALTH CARE EDUCATION/TRAINING PROGRAM

## 2021-05-12 PROCEDURE — 97162 PT EVAL MOD COMPLEX 30 MIN: CPT | Performed by: PHYSICAL THERAPIST

## 2021-05-12 PROCEDURE — 86920 COMPATIBILITY TEST SPIN: CPT | Performed by: HOSPITALIST

## 2021-05-12 PROCEDURE — 25000003 PHARM REV CODE 250: Performed by: HOSPITALIST

## 2021-05-12 RX ORDER — HYDROCODONE BITARTRATE AND ACETAMINOPHEN 500; 5 MG/1; MG/1
TABLET ORAL
Status: DISCONTINUED | OUTPATIENT
Start: 2021-05-12 | End: 2021-05-20 | Stop reason: HOSPADM

## 2021-05-12 RX ORDER — CALCIUM CARBONATE 200(500)MG
500 TABLET,CHEWABLE ORAL DAILY PRN
Status: DISCONTINUED | OUTPATIENT
Start: 2021-05-12 | End: 2021-05-20 | Stop reason: HOSPADM

## 2021-05-12 RX ORDER — ISOSORBIDE MONONITRATE 120 MG/1
120 TABLET, EXTENDED RELEASE ORAL DAILY
Qty: 30 TABLET | Refills: 0 | Status: SHIPPED | OUTPATIENT
Start: 2021-05-13 | End: 2021-06-21 | Stop reason: SDUPTHER

## 2021-05-12 RX ORDER — METOPROLOL SUCCINATE 50 MG/1
50 TABLET, EXTENDED RELEASE ORAL DAILY
Qty: 30 TABLET | Refills: 0 | Status: SHIPPED | OUTPATIENT
Start: 2021-05-13 | End: 2021-06-21 | Stop reason: SDUPTHER

## 2021-05-12 RX ORDER — NITROGLYCERIN 0.4 MG/1
0.4 TABLET SUBLINGUAL EVERY 5 MIN PRN
Qty: 25 TABLET | Refills: 0 | Status: SHIPPED | OUTPATIENT
Start: 2021-05-12 | End: 2022-01-01 | Stop reason: SDUPTHER

## 2021-05-12 RX ADMIN — ROSUVASTATIN CALCIUM 40 MG: 10 TABLET, FILM COATED ORAL at 09:05

## 2021-05-12 RX ADMIN — SODIUM CITRATE AND CITRIC ACID MONOHYDRATE 15 ML: 500; 334 SOLUTION ORAL at 08:05

## 2021-05-12 RX ADMIN — ISOSORBIDE MONONITRATE 120 MG: 30 TABLET, EXTENDED RELEASE ORAL at 08:05

## 2021-05-12 RX ADMIN — INSULIN DETEMIR 20 UNITS: 100 INJECTION, SOLUTION SUBCUTANEOUS at 09:05

## 2021-05-12 RX ADMIN — FUROSEMIDE 80 MG: 40 TABLET ORAL at 08:05

## 2021-05-12 RX ADMIN — MUPIROCIN: 20 OINTMENT TOPICAL at 09:05

## 2021-05-12 RX ADMIN — FUROSEMIDE 80 MG: 40 TABLET ORAL at 09:05

## 2021-05-12 RX ADMIN — CALCITRIOL CAPSULES 0.25 MCG 0.25 MCG: 0.25 CAPSULE ORAL at 08:05

## 2021-05-12 RX ADMIN — METOPROLOL SUCCINATE 50 MG: 50 TABLET, EXTENDED RELEASE ORAL at 08:05

## 2021-05-12 RX ADMIN — HYDRALAZINE HYDROCHLORIDE 50 MG: 25 TABLET, FILM COATED ORAL at 11:05

## 2021-05-12 RX ADMIN — NEPHROCAP 1 CAPSULE: 1 CAP ORAL at 08:05

## 2021-05-12 RX ADMIN — ASPIRIN 81 MG: 81 TABLET, COATED ORAL at 08:05

## 2021-05-12 RX ADMIN — INSULIN ASPART 3 UNITS: 100 INJECTION, SOLUTION INTRAVENOUS; SUBCUTANEOUS at 09:05

## 2021-05-12 RX ADMIN — AMLODIPINE BESYLATE 10 MG: 5 TABLET ORAL at 08:05

## 2021-05-12 RX ADMIN — MUPIROCIN: 20 OINTMENT TOPICAL at 08:05

## 2021-05-12 RX ADMIN — HYDRALAZINE HYDROCHLORIDE 50 MG: 25 TABLET, FILM COATED ORAL at 05:05

## 2021-05-12 RX ADMIN — INSULIN ASPART 4 UNITS: 100 INJECTION, SOLUTION INTRAVENOUS; SUBCUTANEOUS at 11:05

## 2021-05-12 RX ADMIN — INSULIN ASPART 3 UNITS: 100 INJECTION, SOLUTION INTRAVENOUS; SUBCUTANEOUS at 05:05

## 2021-05-12 RX ADMIN — CLOPIDOGREL 75 MG: 75 TABLET, FILM COATED ORAL at 08:05

## 2021-05-12 RX ADMIN — LOSARTAN POTASSIUM 25 MG: 25 TABLET, FILM COATED ORAL at 08:05

## 2021-05-12 RX ADMIN — SODIUM CITRATE AND CITRIC ACID MONOHYDRATE 15 ML: 500; 334 SOLUTION ORAL at 03:05

## 2021-05-12 RX ADMIN — SODIUM CITRATE AND CITRIC ACID MONOHYDRATE 15 ML: 500; 334 SOLUTION ORAL at 09:05

## 2021-05-12 RX ADMIN — CALCIUM CARBONATE (ANTACID) CHEW TAB 500 MG 500 MG: 500 CHEW TAB at 10:05

## 2021-05-12 RX ADMIN — HYDRALAZINE HYDROCHLORIDE 50 MG: 25 TABLET, FILM COATED ORAL at 12:05

## 2021-05-13 ENCOUNTER — TELEPHONE (OUTPATIENT)
Dept: INTERNAL MEDICINE | Facility: CLINIC | Age: 76
End: 2021-05-13
Payer: MEDICARE

## 2021-05-13 ENCOUNTER — TELEPHONE (OUTPATIENT)
Dept: PODIATRY | Facility: CLINIC | Age: 76
End: 2021-05-13

## 2021-05-13 LAB
ALBUMIN SERPL BCP-MCNC: 3 G/DL (ref 3.5–5.2)
ALP SERPL-CCNC: 65 U/L (ref 55–135)
ALT SERPL W/O P-5'-P-CCNC: 12 U/L (ref 10–44)
ANION GAP SERPL CALC-SCNC: 11 MMOL/L (ref 8–16)
APTT BLDCRRT: 26.5 SEC (ref 21–32)
AST SERPL-CCNC: 19 U/L (ref 10–40)
BASOPHILS # BLD AUTO: 0.04 K/UL (ref 0–0.2)
BASOPHILS # BLD AUTO: 0.05 K/UL (ref 0–0.2)
BASOPHILS NFR BLD: 0.5 % (ref 0–1.9)
BASOPHILS NFR BLD: 0.6 % (ref 0–1.9)
BILIRUB SERPL-MCNC: 0.2 MG/DL (ref 0.1–1)
BUN SERPL-MCNC: 75 MG/DL (ref 8–23)
CALCIUM SERPL-MCNC: 9 MG/DL (ref 8.7–10.5)
CHLORIDE SERPL-SCNC: 105 MMOL/L (ref 95–110)
CO2 SERPL-SCNC: 23 MMOL/L (ref 23–29)
CREAT SERPL-MCNC: 4.6 MG/DL (ref 0.5–1.4)
DIFFERENTIAL METHOD: ABNORMAL
DIFFERENTIAL METHOD: ABNORMAL
EOSINOPHIL # BLD AUTO: 0.1 K/UL (ref 0–0.5)
EOSINOPHIL # BLD AUTO: 0.1 K/UL (ref 0–0.5)
EOSINOPHIL NFR BLD: 1.5 % (ref 0–8)
EOSINOPHIL NFR BLD: 1.7 % (ref 0–8)
ERYTHROCYTE [DISTWIDTH] IN BLOOD BY AUTOMATED COUNT: 13 % (ref 11.5–14.5)
ERYTHROCYTE [DISTWIDTH] IN BLOOD BY AUTOMATED COUNT: 13 % (ref 11.5–14.5)
EST. GFR  (AFRICAN AMERICAN): 13 ML/MIN/1.73 M^2
EST. GFR  (NON AFRICAN AMERICAN): 12 ML/MIN/1.73 M^2
GLUCOSE SERPL-MCNC: 212 MG/DL (ref 70–110)
HCT VFR BLD AUTO: 23.1 % (ref 40–54)
HCT VFR BLD AUTO: 23.3 % (ref 40–54)
HGB BLD-MCNC: 7.8 G/DL (ref 14–18)
HGB BLD-MCNC: 7.8 G/DL (ref 14–18)
IMM GRANULOCYTES # BLD AUTO: 0.04 K/UL (ref 0–0.04)
IMM GRANULOCYTES # BLD AUTO: 0.04 K/UL (ref 0–0.04)
IMM GRANULOCYTES NFR BLD AUTO: 0.5 % (ref 0–0.5)
IMM GRANULOCYTES NFR BLD AUTO: 0.5 % (ref 0–0.5)
LYMPHOCYTES # BLD AUTO: 1.4 K/UL (ref 1–4.8)
LYMPHOCYTES # BLD AUTO: 1.4 K/UL (ref 1–4.8)
LYMPHOCYTES NFR BLD: 17.8 % (ref 18–48)
LYMPHOCYTES NFR BLD: 18 % (ref 18–48)
MCH RBC QN AUTO: 30 PG (ref 27–31)
MCH RBC QN AUTO: 30.4 PG (ref 27–31)
MCHC RBC AUTO-ENTMCNC: 33.5 G/DL (ref 32–36)
MCHC RBC AUTO-ENTMCNC: 33.8 G/DL (ref 32–36)
MCV RBC AUTO: 90 FL (ref 82–98)
MCV RBC AUTO: 90 FL (ref 82–98)
MONOCYTES # BLD AUTO: 0.6 K/UL (ref 0.3–1)
MONOCYTES # BLD AUTO: 0.7 K/UL (ref 0.3–1)
MONOCYTES NFR BLD: 7.8 % (ref 4–15)
MONOCYTES NFR BLD: 8.1 % (ref 4–15)
NEUTROPHILS # BLD AUTO: 5.6 K/UL (ref 1.8–7.7)
NEUTROPHILS # BLD AUTO: 5.8 K/UL (ref 1.8–7.7)
NEUTROPHILS NFR BLD: 71.3 % (ref 38–73)
NEUTROPHILS NFR BLD: 71.7 % (ref 38–73)
NRBC BLD-RTO: 0 /100 WBC
NRBC BLD-RTO: 0 /100 WBC
PHOSPHATE SERPL-MCNC: 3.7 MG/DL (ref 2.7–4.5)
PLATELET # BLD AUTO: 114 K/UL (ref 150–450)
PLATELET # BLD AUTO: 114 K/UL (ref 150–450)
PMV BLD AUTO: 12.3 FL (ref 9.2–12.9)
PMV BLD AUTO: 12.4 FL (ref 9.2–12.9)
POCT GLUCOSE: 177 MG/DL (ref 70–110)
POCT GLUCOSE: 252 MG/DL (ref 70–110)
POCT GLUCOSE: 262 MG/DL (ref 70–110)
POCT GLUCOSE: 275 MG/DL (ref 70–110)
POCT GLUCOSE: 301 MG/DL (ref 70–110)
POTASSIUM SERPL-SCNC: 4 MMOL/L (ref 3.5–5.1)
PROT SERPL-MCNC: 7 G/DL (ref 6–8.4)
RBC # BLD AUTO: 2.57 M/UL (ref 4.6–6.2)
RBC # BLD AUTO: 2.6 M/UL (ref 4.6–6.2)
SODIUM SERPL-SCNC: 139 MMOL/L (ref 136–145)
WBC # BLD AUTO: 7.79 K/UL (ref 3.9–12.7)
WBC # BLD AUTO: 8.07 K/UL (ref 3.9–12.7)

## 2021-05-13 PROCEDURE — 25000003 PHARM REV CODE 250: Performed by: INTERNAL MEDICINE

## 2021-05-13 PROCEDURE — 85730 THROMBOPLASTIN TIME PARTIAL: CPT | Performed by: NURSE PRACTITIONER

## 2021-05-13 PROCEDURE — 85025 COMPLETE CBC W/AUTO DIFF WBC: CPT | Performed by: NURSE PRACTITIONER

## 2021-05-13 PROCEDURE — 99233 PR SUBSEQUENT HOSPITAL CARE,LEVL III: ICD-10-PCS | Mod: ,,, | Performed by: NURSE PRACTITIONER

## 2021-05-13 PROCEDURE — 11000001 HC ACUTE MED/SURG PRIVATE ROOM

## 2021-05-13 PROCEDURE — 36415 COLL VENOUS BLD VENIPUNCTURE: CPT | Performed by: FAMILY MEDICINE

## 2021-05-13 PROCEDURE — 25000003 PHARM REV CODE 250: Performed by: NURSE PRACTITIONER

## 2021-05-13 PROCEDURE — 25000003 PHARM REV CODE 250: Performed by: FAMILY MEDICINE

## 2021-05-13 PROCEDURE — 97110 THERAPEUTIC EXERCISES: CPT | Performed by: PHYSICAL THERAPIST

## 2021-05-13 PROCEDURE — 97530 THERAPEUTIC ACTIVITIES: CPT | Performed by: PHYSICAL THERAPIST

## 2021-05-13 PROCEDURE — 80053 COMPREHEN METABOLIC PANEL: CPT | Performed by: FAMILY MEDICINE

## 2021-05-13 PROCEDURE — 94761 N-INVAS EAR/PLS OXIMETRY MLT: CPT

## 2021-05-13 PROCEDURE — 84100 ASSAY OF PHOSPHORUS: CPT | Performed by: FAMILY MEDICINE

## 2021-05-13 PROCEDURE — 99233 SBSQ HOSP IP/OBS HIGH 50: CPT | Mod: ,,, | Performed by: NURSE PRACTITIONER

## 2021-05-13 RX ADMIN — MUPIROCIN: 20 OINTMENT TOPICAL at 09:05

## 2021-05-13 RX ADMIN — HYDRALAZINE HYDROCHLORIDE 50 MG: 25 TABLET, FILM COATED ORAL at 11:05

## 2021-05-13 RX ADMIN — NEPHROCAP 1 CAPSULE: 1 CAP ORAL at 08:05

## 2021-05-13 RX ADMIN — AMLODIPINE BESYLATE 10 MG: 5 TABLET ORAL at 08:05

## 2021-05-13 RX ADMIN — FUROSEMIDE 80 MG: 40 TABLET ORAL at 09:05

## 2021-05-13 RX ADMIN — ISOSORBIDE MONONITRATE 120 MG: 30 TABLET, EXTENDED RELEASE ORAL at 08:05

## 2021-05-13 RX ADMIN — MUPIROCIN: 20 OINTMENT TOPICAL at 08:05

## 2021-05-13 RX ADMIN — SODIUM CITRATE AND CITRIC ACID MONOHYDRATE 15 ML: 500; 334 SOLUTION ORAL at 09:05

## 2021-05-13 RX ADMIN — CALCITRIOL CAPSULES 0.25 MCG 0.25 MCG: 0.25 CAPSULE ORAL at 08:05

## 2021-05-13 RX ADMIN — CLOPIDOGREL 75 MG: 75 TABLET, FILM COATED ORAL at 08:05

## 2021-05-13 RX ADMIN — HYDRALAZINE HYDROCHLORIDE 50 MG: 25 TABLET, FILM COATED ORAL at 05:05

## 2021-05-13 RX ADMIN — INSULIN ASPART 2 UNITS: 100 INJECTION, SOLUTION INTRAVENOUS; SUBCUTANEOUS at 09:05

## 2021-05-13 RX ADMIN — ROSUVASTATIN CALCIUM 40 MG: 10 TABLET, FILM COATED ORAL at 09:05

## 2021-05-13 RX ADMIN — HYDRALAZINE HYDROCHLORIDE 50 MG: 25 TABLET, FILM COATED ORAL at 01:05

## 2021-05-13 RX ADMIN — INSULIN ASPART 3 UNITS: 100 INJECTION, SOLUTION INTRAVENOUS; SUBCUTANEOUS at 01:05

## 2021-05-13 RX ADMIN — INSULIN DETEMIR 20 UNITS: 100 INJECTION, SOLUTION SUBCUTANEOUS at 09:05

## 2021-05-13 RX ADMIN — INSULIN ASPART 3 UNITS: 100 INJECTION, SOLUTION INTRAVENOUS; SUBCUTANEOUS at 05:05

## 2021-05-13 RX ADMIN — METOPROLOL SUCCINATE 50 MG: 50 TABLET, EXTENDED RELEASE ORAL at 08:05

## 2021-05-13 RX ADMIN — FUROSEMIDE 80 MG: 40 TABLET ORAL at 08:05

## 2021-05-13 RX ADMIN — ASPIRIN 81 MG: 81 TABLET, COATED ORAL at 08:05

## 2021-05-13 RX ADMIN — SODIUM CITRATE AND CITRIC ACID MONOHYDRATE 15 ML: 500; 334 SOLUTION ORAL at 08:05

## 2021-05-13 RX ADMIN — LOSARTAN POTASSIUM 25 MG: 25 TABLET, FILM COATED ORAL at 08:05

## 2021-05-14 LAB
ALBUMIN SERPL BCP-MCNC: 2.9 G/DL (ref 3.5–5.2)
ALP SERPL-CCNC: 64 U/L (ref 55–135)
ALT SERPL W/O P-5'-P-CCNC: 20 U/L (ref 10–44)
ANION GAP SERPL CALC-SCNC: 13 MMOL/L (ref 8–16)
APTT BLDCRRT: 25.6 SEC (ref 21–32)
AST SERPL-CCNC: 23 U/L (ref 10–40)
BASOPHILS # BLD AUTO: 0.05 K/UL (ref 0–0.2)
BASOPHILS # BLD AUTO: 0.07 K/UL (ref 0–0.2)
BASOPHILS NFR BLD: 0.6 % (ref 0–1.9)
BASOPHILS NFR BLD: 0.8 % (ref 0–1.9)
BILIRUB SERPL-MCNC: 0.2 MG/DL (ref 0.1–1)
BUN SERPL-MCNC: 76 MG/DL (ref 8–23)
CALCIUM SERPL-MCNC: 8.9 MG/DL (ref 8.7–10.5)
CHLORIDE SERPL-SCNC: 106 MMOL/L (ref 95–110)
CO2 SERPL-SCNC: 19 MMOL/L (ref 23–29)
CREAT SERPL-MCNC: 4.7 MG/DL (ref 0.5–1.4)
DIFFERENTIAL METHOD: ABNORMAL
DIFFERENTIAL METHOD: ABNORMAL
EOSINOPHIL # BLD AUTO: 0.2 K/UL (ref 0–0.5)
EOSINOPHIL # BLD AUTO: 0.2 K/UL (ref 0–0.5)
EOSINOPHIL NFR BLD: 2.2 % (ref 0–8)
EOSINOPHIL NFR BLD: 2.3 % (ref 0–8)
ERYTHROCYTE [DISTWIDTH] IN BLOOD BY AUTOMATED COUNT: 12.8 % (ref 11.5–14.5)
ERYTHROCYTE [DISTWIDTH] IN BLOOD BY AUTOMATED COUNT: 12.8 % (ref 11.5–14.5)
EST. GFR  (AFRICAN AMERICAN): 13 ML/MIN/1.73 M^2
EST. GFR  (NON AFRICAN AMERICAN): 11 ML/MIN/1.73 M^2
GLUCOSE SERPL-MCNC: 182 MG/DL (ref 70–110)
HCT VFR BLD AUTO: 21.9 % (ref 40–54)
HCT VFR BLD AUTO: 22.2 % (ref 40–54)
HGB BLD-MCNC: 7.5 G/DL (ref 14–18)
HGB BLD-MCNC: 7.5 G/DL (ref 14–18)
IMM GRANULOCYTES # BLD AUTO: 0.03 K/UL (ref 0–0.04)
IMM GRANULOCYTES # BLD AUTO: 0.04 K/UL (ref 0–0.04)
IMM GRANULOCYTES NFR BLD AUTO: 0.3 % (ref 0–0.5)
IMM GRANULOCYTES NFR BLD AUTO: 0.5 % (ref 0–0.5)
LYMPHOCYTES # BLD AUTO: 1.5 K/UL (ref 1–4.8)
LYMPHOCYTES # BLD AUTO: 1.7 K/UL (ref 1–4.8)
LYMPHOCYTES NFR BLD: 18 % (ref 18–48)
LYMPHOCYTES NFR BLD: 19.5 % (ref 18–48)
MCH RBC QN AUTO: 30.7 PG (ref 27–31)
MCH RBC QN AUTO: 31.3 PG (ref 27–31)
MCHC RBC AUTO-ENTMCNC: 33.8 G/DL (ref 32–36)
MCHC RBC AUTO-ENTMCNC: 34.2 G/DL (ref 32–36)
MCV RBC AUTO: 91 FL (ref 82–98)
MCV RBC AUTO: 91 FL (ref 82–98)
MONOCYTES # BLD AUTO: 0.6 K/UL (ref 0.3–1)
MONOCYTES # BLD AUTO: 0.6 K/UL (ref 0.3–1)
MONOCYTES NFR BLD: 7.2 % (ref 4–15)
MONOCYTES NFR BLD: 7.4 % (ref 4–15)
NEUTROPHILS # BLD AUTO: 5.9 K/UL (ref 1.8–7.7)
NEUTROPHILS # BLD AUTO: 6 K/UL (ref 1.8–7.7)
NEUTROPHILS NFR BLD: 70.1 % (ref 38–73)
NEUTROPHILS NFR BLD: 71.1 % (ref 38–73)
NRBC BLD-RTO: 0 /100 WBC
NRBC BLD-RTO: 0 /100 WBC
PHOSPHATE SERPL-MCNC: 4 MG/DL (ref 2.7–4.5)
PLATELET # BLD AUTO: 115 K/UL (ref 150–450)
PLATELET # BLD AUTO: 115 K/UL (ref 150–450)
PMV BLD AUTO: 12.2 FL (ref 9.2–12.9)
PMV BLD AUTO: 12.2 FL (ref 9.2–12.9)
POCT GLUCOSE: 208 MG/DL (ref 70–110)
POCT GLUCOSE: 273 MG/DL (ref 70–110)
POCT GLUCOSE: 299 MG/DL (ref 70–110)
POCT GLUCOSE: 318 MG/DL (ref 70–110)
POTASSIUM SERPL-SCNC: 4.1 MMOL/L (ref 3.5–5.1)
PROT SERPL-MCNC: 7 G/DL (ref 6–8.4)
RBC # BLD AUTO: 2.4 M/UL (ref 4.6–6.2)
RBC # BLD AUTO: 2.44 M/UL (ref 4.6–6.2)
SODIUM SERPL-SCNC: 138 MMOL/L (ref 136–145)
WBC # BLD AUTO: 8.29 K/UL (ref 3.9–12.7)
WBC # BLD AUTO: 8.62 K/UL (ref 3.9–12.7)

## 2021-05-14 PROCEDURE — 25000003 PHARM REV CODE 250: Performed by: INTERNAL MEDICINE

## 2021-05-14 PROCEDURE — 87075 CULTR BACTERIA EXCEPT BLOOD: CPT | Performed by: STUDENT IN AN ORGANIZED HEALTH CARE EDUCATION/TRAINING PROGRAM

## 2021-05-14 PROCEDURE — 11000001 HC ACUTE MED/SURG PRIVATE ROOM

## 2021-05-14 PROCEDURE — 88305 TISSUE EXAM BY PATHOLOGIST: CPT | Mod: 26,,, | Performed by: PATHOLOGY

## 2021-05-14 PROCEDURE — 11042 DBRDMT SUBQ TIS 1ST 20SQCM/<: CPT | Mod: 51,,, | Performed by: STUDENT IN AN ORGANIZED HEALTH CARE EDUCATION/TRAINING PROGRAM

## 2021-05-14 PROCEDURE — 88305 TISSUE EXAM BY PATHOLOGIST: CPT | Performed by: PATHOLOGY

## 2021-05-14 PROCEDURE — 88305 TISSUE EXAM BY PATHOLOGIST: ICD-10-PCS | Mod: 26,,, | Performed by: PATHOLOGY

## 2021-05-14 PROCEDURE — 88311 DECALCIFY TISSUE: CPT | Mod: 26,,, | Performed by: PATHOLOGY

## 2021-05-14 PROCEDURE — 97110 THERAPEUTIC EXERCISES: CPT

## 2021-05-14 PROCEDURE — 63600175 PHARM REV CODE 636 W HCPCS: Performed by: HOSPITALIST

## 2021-05-14 PROCEDURE — 84100 ASSAY OF PHOSPHORUS: CPT | Performed by: FAMILY MEDICINE

## 2021-05-14 PROCEDURE — 25000003 PHARM REV CODE 250: Performed by: NURSE PRACTITIONER

## 2021-05-14 PROCEDURE — 36415 COLL VENOUS BLD VENIPUNCTURE: CPT | Performed by: FAMILY MEDICINE

## 2021-05-14 PROCEDURE — 11042 DEBRIDEMENT: ICD-10-PCS | Mod: 51,,, | Performed by: STUDENT IN AN ORGANIZED HEALTH CARE EDUCATION/TRAINING PROGRAM

## 2021-05-14 PROCEDURE — 94761 N-INVAS EAR/PLS OXIMETRY MLT: CPT

## 2021-05-14 PROCEDURE — 25000003 PHARM REV CODE 250: Performed by: FAMILY MEDICINE

## 2021-05-14 PROCEDURE — 20220 BONE BIOPSY TROCAR/NDL SUPFC: CPT | Mod: ,,, | Performed by: STUDENT IN AN ORGANIZED HEALTH CARE EDUCATION/TRAINING PROGRAM

## 2021-05-14 PROCEDURE — 85025 COMPLETE CBC W/AUTO DIFF WBC: CPT | Performed by: NURSE PRACTITIONER

## 2021-05-14 PROCEDURE — 85730 THROMBOPLASTIN TIME PARTIAL: CPT | Performed by: NURSE PRACTITIONER

## 2021-05-14 PROCEDURE — 99233 SBSQ HOSP IP/OBS HIGH 50: CPT | Mod: 25,GC,, | Performed by: STUDENT IN AN ORGANIZED HEALTH CARE EDUCATION/TRAINING PROGRAM

## 2021-05-14 PROCEDURE — 97530 THERAPEUTIC ACTIVITIES: CPT

## 2021-05-14 PROCEDURE — 99233 PR SUBSEQUENT HOSPITAL CARE,LEVL III: ICD-10-PCS | Mod: 25,GC,, | Performed by: STUDENT IN AN ORGANIZED HEALTH CARE EDUCATION/TRAINING PROGRAM

## 2021-05-14 PROCEDURE — 97116 GAIT TRAINING THERAPY: CPT

## 2021-05-14 PROCEDURE — 87070 CULTURE OTHR SPECIMN AEROBIC: CPT | Performed by: STUDENT IN AN ORGANIZED HEALTH CARE EDUCATION/TRAINING PROGRAM

## 2021-05-14 PROCEDURE — 88311 PR  DECALCIFY TISSUE: ICD-10-PCS | Mod: 26,,, | Performed by: PATHOLOGY

## 2021-05-14 PROCEDURE — 88311 DECALCIFY TISSUE: CPT | Performed by: PATHOLOGY

## 2021-05-14 PROCEDURE — 80053 COMPREHEN METABOLIC PANEL: CPT | Performed by: FAMILY MEDICINE

## 2021-05-14 PROCEDURE — 87077 CULTURE AEROBIC IDENTIFY: CPT | Performed by: STUDENT IN AN ORGANIZED HEALTH CARE EDUCATION/TRAINING PROGRAM

## 2021-05-14 PROCEDURE — 20220 PR BONE BIOPSY,TROCAR/NEEDLE SUPERF: ICD-10-PCS | Mod: ,,, | Performed by: STUDENT IN AN ORGANIZED HEALTH CARE EDUCATION/TRAINING PROGRAM

## 2021-05-14 PROCEDURE — 25000003 PHARM REV CODE 250: Performed by: HOSPITALIST

## 2021-05-14 PROCEDURE — 87186 SC STD MICRODIL/AGAR DIL: CPT | Performed by: STUDENT IN AN ORGANIZED HEALTH CARE EDUCATION/TRAINING PROGRAM

## 2021-05-14 RX ORDER — METRONIDAZOLE 500 MG/1
500 TABLET ORAL EVERY 8 HOURS
Status: DISCONTINUED | OUTPATIENT
Start: 2021-05-14 | End: 2021-05-20 | Stop reason: HOSPADM

## 2021-05-14 RX ADMIN — HYDRALAZINE HYDROCHLORIDE 50 MG: 25 TABLET, FILM COATED ORAL at 05:05

## 2021-05-14 RX ADMIN — SODIUM CITRATE AND CITRIC ACID MONOHYDRATE 15 ML: 500; 334 SOLUTION ORAL at 08:05

## 2021-05-14 RX ADMIN — INSULIN ASPART 3 UNITS: 100 INJECTION, SOLUTION INTRAVENOUS; SUBCUTANEOUS at 04:05

## 2021-05-14 RX ADMIN — CALCITRIOL CAPSULES 0.25 MCG 0.25 MCG: 0.25 CAPSULE ORAL at 08:05

## 2021-05-14 RX ADMIN — ASPIRIN 81 MG: 81 TABLET, COATED ORAL at 08:05

## 2021-05-14 RX ADMIN — INSULIN ASPART 2 UNITS: 100 INJECTION, SOLUTION INTRAVENOUS; SUBCUTANEOUS at 08:05

## 2021-05-14 RX ADMIN — INSULIN DETEMIR 20 UNITS: 100 INJECTION, SOLUTION SUBCUTANEOUS at 08:05

## 2021-05-14 RX ADMIN — FUROSEMIDE 80 MG: 40 TABLET ORAL at 08:05

## 2021-05-14 RX ADMIN — METRONIDAZOLE 500 MG: 500 TABLET ORAL at 10:05

## 2021-05-14 RX ADMIN — CLOPIDOGREL 75 MG: 75 TABLET, FILM COATED ORAL at 08:05

## 2021-05-14 RX ADMIN — NEPHROCAP 1 CAPSULE: 1 CAP ORAL at 08:05

## 2021-05-14 RX ADMIN — METOPROLOL SUCCINATE 50 MG: 50 TABLET, EXTENDED RELEASE ORAL at 08:05

## 2021-05-14 RX ADMIN — ISOSORBIDE MONONITRATE 120 MG: 30 TABLET, EXTENDED RELEASE ORAL at 08:05

## 2021-05-14 RX ADMIN — VANCOMYCIN HYDROCHLORIDE 1250 MG: 1.25 INJECTION, POWDER, LYOPHILIZED, FOR SOLUTION INTRAVENOUS at 03:05

## 2021-05-14 RX ADMIN — LOSARTAN POTASSIUM 25 MG: 25 TABLET, FILM COATED ORAL at 08:05

## 2021-05-14 RX ADMIN — SODIUM CITRATE AND CITRIC ACID MONOHYDRATE 15 ML: 500; 334 SOLUTION ORAL at 02:05

## 2021-05-14 RX ADMIN — HYDRALAZINE HYDROCHLORIDE 50 MG: 25 TABLET, FILM COATED ORAL at 11:05

## 2021-05-14 RX ADMIN — AMLODIPINE BESYLATE 10 MG: 5 TABLET ORAL at 08:05

## 2021-05-14 RX ADMIN — ROSUVASTATIN CALCIUM 40 MG: 10 TABLET, FILM COATED ORAL at 08:05

## 2021-05-14 RX ADMIN — CEFTRIAXONE 2 G: 2 INJECTION, SOLUTION INTRAVENOUS at 02:05

## 2021-05-14 RX ADMIN — INSULIN ASPART 4 UNITS: 100 INJECTION, SOLUTION INTRAVENOUS; SUBCUTANEOUS at 11:05

## 2021-05-14 RX ADMIN — METRONIDAZOLE 500 MG: 500 TABLET ORAL at 02:05

## 2021-05-15 LAB
APTT BLDCRRT: 25.4 SEC (ref 21–32)
BASOPHILS # BLD AUTO: 0.06 K/UL (ref 0–0.2)
BASOPHILS NFR BLD: 0.7 % (ref 0–1.9)
DIFFERENTIAL METHOD: ABNORMAL
EOSINOPHIL # BLD AUTO: 0.2 K/UL (ref 0–0.5)
EOSINOPHIL NFR BLD: 2.2 % (ref 0–8)
ERYTHROCYTE [DISTWIDTH] IN BLOOD BY AUTOMATED COUNT: 13 % (ref 11.5–14.5)
HCT VFR BLD AUTO: 22.9 % (ref 40–54)
HGB BLD-MCNC: 7.5 G/DL (ref 14–18)
IMM GRANULOCYTES # BLD AUTO: 0.03 K/UL (ref 0–0.04)
IMM GRANULOCYTES NFR BLD AUTO: 0.4 % (ref 0–0.5)
LYMPHOCYTES # BLD AUTO: 1.5 K/UL (ref 1–4.8)
LYMPHOCYTES NFR BLD: 18.6 % (ref 18–48)
MCH RBC QN AUTO: 29.9 PG (ref 27–31)
MCHC RBC AUTO-ENTMCNC: 32.8 G/DL (ref 32–36)
MCV RBC AUTO: 91 FL (ref 82–98)
MONOCYTES # BLD AUTO: 0.6 K/UL (ref 0.3–1)
MONOCYTES NFR BLD: 7.4 % (ref 4–15)
NEUTROPHILS # BLD AUTO: 5.7 K/UL (ref 1.8–7.7)
NEUTROPHILS NFR BLD: 70.7 % (ref 38–73)
NRBC BLD-RTO: 0 /100 WBC
PLATELET # BLD AUTO: 121 K/UL (ref 150–450)
PMV BLD AUTO: 12 FL (ref 9.2–12.9)
POCT GLUCOSE: 219 MG/DL (ref 70–110)
POCT GLUCOSE: 246 MG/DL (ref 70–110)
POCT GLUCOSE: 272 MG/DL (ref 70–110)
POCT GLUCOSE: 306 MG/DL (ref 70–110)
POCT GLUCOSE: 325 MG/DL (ref 70–110)
RBC # BLD AUTO: 2.51 M/UL (ref 4.6–6.2)
VANCOMYCIN SERPL-MCNC: 10 UG/ML
WBC # BLD AUTO: 8.06 K/UL (ref 3.9–12.7)

## 2021-05-15 PROCEDURE — 99024 PR POST-OP FOLLOW-UP VISIT: ICD-10-PCS | Mod: POP,,, | Performed by: PODIATRIST

## 2021-05-15 PROCEDURE — 85730 THROMBOPLASTIN TIME PARTIAL: CPT | Performed by: NURSE PRACTITIONER

## 2021-05-15 PROCEDURE — 25000003 PHARM REV CODE 250: Performed by: FAMILY MEDICINE

## 2021-05-15 PROCEDURE — 97530 THERAPEUTIC ACTIVITIES: CPT | Mod: CQ

## 2021-05-15 PROCEDURE — 25000003 PHARM REV CODE 250: Performed by: INTERNAL MEDICINE

## 2021-05-15 PROCEDURE — 85025 COMPLETE CBC W/AUTO DIFF WBC: CPT | Performed by: NURSE PRACTITIONER

## 2021-05-15 PROCEDURE — 25000003 PHARM REV CODE 250: Performed by: HOSPITALIST

## 2021-05-15 PROCEDURE — 25000003 PHARM REV CODE 250: Performed by: NURSE PRACTITIONER

## 2021-05-15 PROCEDURE — 99024 POSTOP FOLLOW-UP VISIT: CPT | Mod: POP,,, | Performed by: PODIATRIST

## 2021-05-15 PROCEDURE — 36415 COLL VENOUS BLD VENIPUNCTURE: CPT | Performed by: NURSE PRACTITIONER

## 2021-05-15 PROCEDURE — 63600175 PHARM REV CODE 636 W HCPCS: Performed by: HOSPITALIST

## 2021-05-15 PROCEDURE — 36415 COLL VENOUS BLD VENIPUNCTURE: CPT | Performed by: HOSPITALIST

## 2021-05-15 PROCEDURE — 11000001 HC ACUTE MED/SURG PRIVATE ROOM

## 2021-05-15 PROCEDURE — 97116 GAIT TRAINING THERAPY: CPT | Mod: CQ

## 2021-05-15 PROCEDURE — 80202 ASSAY OF VANCOMYCIN: CPT | Performed by: HOSPITALIST

## 2021-05-15 RX ORDER — CEFEPIME HYDROCHLORIDE 1 G/50ML
2 INJECTION, SOLUTION INTRAVENOUS
Status: DISCONTINUED | OUTPATIENT
Start: 2021-05-16 | End: 2021-05-20 | Stop reason: HOSPADM

## 2021-05-15 RX ORDER — SODIUM CITRATE AND CITRIC ACID MONOHYDRATE 334; 500 MG/5ML; MG/5ML
30 SOLUTION ORAL 3 TIMES DAILY
Status: DISCONTINUED | OUTPATIENT
Start: 2021-05-15 | End: 2021-05-20 | Stop reason: HOSPADM

## 2021-05-15 RX ADMIN — INSULIN ASPART 2 UNITS: 100 INJECTION, SOLUTION INTRAVENOUS; SUBCUTANEOUS at 08:05

## 2021-05-15 RX ADMIN — FUROSEMIDE 80 MG: 40 TABLET ORAL at 08:05

## 2021-05-15 RX ADMIN — INSULIN ASPART 2 UNITS: 100 INJECTION, SOLUTION INTRAVENOUS; SUBCUTANEOUS at 12:05

## 2021-05-15 RX ADMIN — METRONIDAZOLE 500 MG: 500 TABLET ORAL at 02:05

## 2021-05-15 RX ADMIN — VANCOMYCIN HYDROCHLORIDE 1250 MG: 1.25 INJECTION, POWDER, LYOPHILIZED, FOR SOLUTION INTRAVENOUS at 08:05

## 2021-05-15 RX ADMIN — INSULIN DETEMIR 20 UNITS: 100 INJECTION, SOLUTION SUBCUTANEOUS at 08:05

## 2021-05-15 RX ADMIN — HYDRALAZINE HYDROCHLORIDE 50 MG: 25 TABLET, FILM COATED ORAL at 05:05

## 2021-05-15 RX ADMIN — ISOSORBIDE MONONITRATE 120 MG: 30 TABLET, EXTENDED RELEASE ORAL at 08:05

## 2021-05-15 RX ADMIN — METRONIDAZOLE 500 MG: 500 TABLET ORAL at 09:05

## 2021-05-15 RX ADMIN — CALCITRIOL CAPSULES 0.25 MCG 0.25 MCG: 0.25 CAPSULE ORAL at 08:05

## 2021-05-15 RX ADMIN — HYDRALAZINE HYDROCHLORIDE 50 MG: 25 TABLET, FILM COATED ORAL at 12:05

## 2021-05-15 RX ADMIN — SODIUM CITRATE AND CITRIC ACID MONOHYDRATE 30 ML: 500; 334 SOLUTION ORAL at 08:05

## 2021-05-15 RX ADMIN — HYDRALAZINE HYDROCHLORIDE 50 MG: 25 TABLET, FILM COATED ORAL at 11:05

## 2021-05-15 RX ADMIN — METOPROLOL SUCCINATE 50 MG: 50 TABLET, EXTENDED RELEASE ORAL at 08:05

## 2021-05-15 RX ADMIN — AMLODIPINE BESYLATE 10 MG: 5 TABLET ORAL at 08:05

## 2021-05-15 RX ADMIN — ROSUVASTATIN CALCIUM 40 MG: 10 TABLET, FILM COATED ORAL at 08:05

## 2021-05-15 RX ADMIN — LOSARTAN POTASSIUM 25 MG: 25 TABLET, FILM COATED ORAL at 08:05

## 2021-05-15 RX ADMIN — CLOPIDOGREL 75 MG: 75 TABLET, FILM COATED ORAL at 08:05

## 2021-05-15 RX ADMIN — INSULIN ASPART 3 UNITS: 100 INJECTION, SOLUTION INTRAVENOUS; SUBCUTANEOUS at 05:05

## 2021-05-15 RX ADMIN — METRONIDAZOLE 500 MG: 500 TABLET ORAL at 05:05

## 2021-05-15 RX ADMIN — SODIUM CITRATE AND CITRIC ACID MONOHYDRATE 15 ML: 500; 334 SOLUTION ORAL at 02:05

## 2021-05-15 RX ADMIN — NEPHROCAP 1 CAPSULE: 1 CAP ORAL at 08:05

## 2021-05-15 RX ADMIN — CEFTRIAXONE 2 G: 2 INJECTION, SOLUTION INTRAVENOUS at 02:05

## 2021-05-15 RX ADMIN — SODIUM CITRATE AND CITRIC ACID MONOHYDRATE 15 ML: 500; 334 SOLUTION ORAL at 08:05

## 2021-05-15 RX ADMIN — ASPIRIN 81 MG: 81 TABLET, COATED ORAL at 08:05

## 2021-05-16 LAB
APTT BLDCRRT: 26 SEC (ref 21–32)
BACTERIA SPEC AEROBE CULT: ABNORMAL
BASOPHILS # BLD AUTO: 0.06 K/UL (ref 0–0.2)
BASOPHILS NFR BLD: 0.8 % (ref 0–1.9)
DIFFERENTIAL METHOD: ABNORMAL
EOSINOPHIL # BLD AUTO: 0.2 K/UL (ref 0–0.5)
EOSINOPHIL NFR BLD: 2.9 % (ref 0–8)
ERYTHROCYTE [DISTWIDTH] IN BLOOD BY AUTOMATED COUNT: 12.8 % (ref 11.5–14.5)
HCT VFR BLD AUTO: 23 % (ref 40–54)
HGB BLD-MCNC: 7.8 G/DL (ref 14–18)
IMM GRANULOCYTES # BLD AUTO: 0.01 K/UL (ref 0–0.04)
IMM GRANULOCYTES NFR BLD AUTO: 0.1 % (ref 0–0.5)
LYMPHOCYTES # BLD AUTO: 1.3 K/UL (ref 1–4.8)
LYMPHOCYTES NFR BLD: 17.3 % (ref 18–48)
MCH RBC QN AUTO: 30.8 PG (ref 27–31)
MCHC RBC AUTO-ENTMCNC: 33.9 G/DL (ref 32–36)
MCV RBC AUTO: 91 FL (ref 82–98)
MONOCYTES # BLD AUTO: 0.6 K/UL (ref 0.3–1)
MONOCYTES NFR BLD: 7.6 % (ref 4–15)
NEUTROPHILS # BLD AUTO: 5.3 K/UL (ref 1.8–7.7)
NEUTROPHILS NFR BLD: 71.3 % (ref 38–73)
NRBC BLD-RTO: 0 /100 WBC
PLATELET # BLD AUTO: 129 K/UL (ref 150–450)
PMV BLD AUTO: 11.9 FL (ref 9.2–12.9)
POCT GLUCOSE: 186 MG/DL (ref 70–110)
POCT GLUCOSE: 215 MG/DL (ref 70–110)
POCT GLUCOSE: 235 MG/DL (ref 70–110)
RBC # BLD AUTO: 2.53 M/UL (ref 4.6–6.2)
WBC # BLD AUTO: 7.46 K/UL (ref 3.9–12.7)

## 2021-05-16 PROCEDURE — 25000003 PHARM REV CODE 250: Performed by: FAMILY MEDICINE

## 2021-05-16 PROCEDURE — 25000003 PHARM REV CODE 250: Performed by: INTERNAL MEDICINE

## 2021-05-16 PROCEDURE — 11000001 HC ACUTE MED/SURG PRIVATE ROOM

## 2021-05-16 PROCEDURE — 25000003 PHARM REV CODE 250: Performed by: HOSPITALIST

## 2021-05-16 PROCEDURE — 25000003 PHARM REV CODE 250: Performed by: NURSE PRACTITIONER

## 2021-05-16 PROCEDURE — 85730 THROMBOPLASTIN TIME PARTIAL: CPT | Performed by: NURSE PRACTITIONER

## 2021-05-16 PROCEDURE — 36415 COLL VENOUS BLD VENIPUNCTURE: CPT | Performed by: NURSE PRACTITIONER

## 2021-05-16 PROCEDURE — 63600175 PHARM REV CODE 636 W HCPCS: Performed by: INTERNAL MEDICINE

## 2021-05-16 PROCEDURE — 85025 COMPLETE CBC W/AUTO DIFF WBC: CPT | Performed by: NURSE PRACTITIONER

## 2021-05-16 RX ADMIN — LOSARTAN POTASSIUM 25 MG: 25 TABLET, FILM COATED ORAL at 08:05

## 2021-05-16 RX ADMIN — CALCITRIOL CAPSULES 0.25 MCG 0.25 MCG: 0.25 CAPSULE ORAL at 08:05

## 2021-05-16 RX ADMIN — INSULIN ASPART 2 UNITS: 100 INJECTION, SOLUTION INTRAVENOUS; SUBCUTANEOUS at 12:05

## 2021-05-16 RX ADMIN — AMLODIPINE BESYLATE 10 MG: 5 TABLET ORAL at 08:05

## 2021-05-16 RX ADMIN — METRONIDAZOLE 500 MG: 500 TABLET ORAL at 09:05

## 2021-05-16 RX ADMIN — CLOPIDOGREL 75 MG: 75 TABLET, FILM COATED ORAL at 08:05

## 2021-05-16 RX ADMIN — METRONIDAZOLE 500 MG: 500 TABLET ORAL at 05:05

## 2021-05-16 RX ADMIN — NEPHROCAP 1 CAPSULE: 1 CAP ORAL at 08:05

## 2021-05-16 RX ADMIN — METOPROLOL SUCCINATE 50 MG: 50 TABLET, EXTENDED RELEASE ORAL at 08:05

## 2021-05-16 RX ADMIN — SODIUM CITRATE AND CITRIC ACID MONOHYDRATE 30 ML: 500; 334 SOLUTION ORAL at 08:05

## 2021-05-16 RX ADMIN — ROSUVASTATIN CALCIUM 40 MG: 10 TABLET, FILM COATED ORAL at 08:05

## 2021-05-16 RX ADMIN — HYDRALAZINE HYDROCHLORIDE 50 MG: 25 TABLET, FILM COATED ORAL at 05:05

## 2021-05-16 RX ADMIN — FUROSEMIDE 80 MG: 40 TABLET ORAL at 08:05

## 2021-05-16 RX ADMIN — CEFEPIME HYDROCHLORIDE 2 G: 2 INJECTION, SOLUTION INTRAVENOUS at 08:05

## 2021-05-16 RX ADMIN — METRONIDAZOLE 500 MG: 500 TABLET ORAL at 02:05

## 2021-05-16 RX ADMIN — HYDRALAZINE HYDROCHLORIDE 50 MG: 25 TABLET, FILM COATED ORAL at 11:05

## 2021-05-16 RX ADMIN — HYDRALAZINE HYDROCHLORIDE 50 MG: 25 TABLET, FILM COATED ORAL at 12:05

## 2021-05-16 RX ADMIN — INSULIN ASPART 2 UNITS: 100 INJECTION, SOLUTION INTRAVENOUS; SUBCUTANEOUS at 05:05

## 2021-05-16 RX ADMIN — ASPIRIN 81 MG: 81 TABLET, COATED ORAL at 08:05

## 2021-05-16 RX ADMIN — SODIUM CITRATE AND CITRIC ACID MONOHYDRATE 30 ML: 500; 334 SOLUTION ORAL at 02:05

## 2021-05-16 RX ADMIN — ISOSORBIDE MONONITRATE 120 MG: 30 TABLET, EXTENDED RELEASE ORAL at 08:05

## 2021-05-16 RX ADMIN — INSULIN DETEMIR 20 UNITS: 100 INJECTION, SOLUTION SUBCUTANEOUS at 08:05

## 2021-05-16 RX ADMIN — INSULIN ASPART 1 UNITS: 100 INJECTION, SOLUTION INTRAVENOUS; SUBCUTANEOUS at 08:05

## 2021-05-17 LAB
ALBUMIN SERPL BCP-MCNC: 2.9 G/DL (ref 3.5–5.2)
ALP SERPL-CCNC: 61 U/L (ref 55–135)
ALT SERPL W/O P-5'-P-CCNC: 16 U/L (ref 10–44)
ANION GAP SERPL CALC-SCNC: 15 MMOL/L (ref 8–16)
APTT BLDCRRT: 25.9 SEC (ref 21–32)
AST SERPL-CCNC: 18 U/L (ref 10–40)
BACTERIA SPEC ANAEROBE CULT: NORMAL
BASOPHILS # BLD AUTO: 0.05 K/UL (ref 0–0.2)
BASOPHILS NFR BLD: 0.7 % (ref 0–1.9)
BILIRUB SERPL-MCNC: 0.3 MG/DL (ref 0.1–1)
BUN SERPL-MCNC: 77 MG/DL (ref 8–23)
CALCIUM SERPL-MCNC: 9.2 MG/DL (ref 8.7–10.5)
CHLORIDE SERPL-SCNC: 103 MMOL/L (ref 95–110)
CO2 SERPL-SCNC: 22 MMOL/L (ref 23–29)
CREAT SERPL-MCNC: 4.6 MG/DL (ref 0.5–1.4)
DIFFERENTIAL METHOD: ABNORMAL
EOSINOPHIL # BLD AUTO: 0.2 K/UL (ref 0–0.5)
EOSINOPHIL NFR BLD: 2 % (ref 0–8)
ERYTHROCYTE [DISTWIDTH] IN BLOOD BY AUTOMATED COUNT: 13 % (ref 11.5–14.5)
EST. GFR  (AFRICAN AMERICAN): 13 ML/MIN/1.73 M^2
EST. GFR  (NON AFRICAN AMERICAN): 12 ML/MIN/1.73 M^2
GLUCOSE SERPL-MCNC: 113 MG/DL (ref 70–110)
HCT VFR BLD AUTO: 22.7 % (ref 40–54)
HGB BLD-MCNC: 7.5 G/DL (ref 14–18)
IMM GRANULOCYTES # BLD AUTO: 0.03 K/UL (ref 0–0.04)
IMM GRANULOCYTES NFR BLD AUTO: 0.4 % (ref 0–0.5)
LYMPHOCYTES # BLD AUTO: 1.4 K/UL (ref 1–4.8)
LYMPHOCYTES NFR BLD: 17.7 % (ref 18–48)
MCH RBC QN AUTO: 30 PG (ref 27–31)
MCHC RBC AUTO-ENTMCNC: 33 G/DL (ref 32–36)
MCV RBC AUTO: 91 FL (ref 82–98)
MONOCYTES # BLD AUTO: 0.7 K/UL (ref 0.3–1)
MONOCYTES NFR BLD: 8.9 % (ref 4–15)
NEUTROPHILS # BLD AUTO: 5.4 K/UL (ref 1.8–7.7)
NEUTROPHILS NFR BLD: 70.3 % (ref 38–73)
NRBC BLD-RTO: 0 /100 WBC
PLATELET # BLD AUTO: 129 K/UL (ref 150–450)
PMV BLD AUTO: 11.6 FL (ref 9.2–12.9)
POCT GLUCOSE: 116 MG/DL (ref 70–110)
POCT GLUCOSE: 184 MG/DL (ref 70–110)
POCT GLUCOSE: 251 MG/DL (ref 70–110)
POCT GLUCOSE: 296 MG/DL (ref 70–110)
POTASSIUM SERPL-SCNC: 3.9 MMOL/L (ref 3.5–5.1)
PROT SERPL-MCNC: 6.9 G/DL (ref 6–8.4)
RBC # BLD AUTO: 2.5 M/UL (ref 4.6–6.2)
SODIUM SERPL-SCNC: 140 MMOL/L (ref 136–145)
WBC # BLD AUTO: 7.68 K/UL (ref 3.9–12.7)

## 2021-05-17 PROCEDURE — 25000003 PHARM REV CODE 250: Performed by: NURSE PRACTITIONER

## 2021-05-17 PROCEDURE — 85025 COMPLETE CBC W/AUTO DIFF WBC: CPT | Performed by: NURSE PRACTITIONER

## 2021-05-17 PROCEDURE — 85730 THROMBOPLASTIN TIME PARTIAL: CPT | Performed by: NURSE PRACTITIONER

## 2021-05-17 PROCEDURE — 25000003 PHARM REV CODE 250: Performed by: INTERNAL MEDICINE

## 2021-05-17 PROCEDURE — 80053 COMPREHEN METABOLIC PANEL: CPT | Performed by: HOSPITALIST

## 2021-05-17 PROCEDURE — 25000003 PHARM REV CODE 250: Performed by: HOSPITALIST

## 2021-05-17 PROCEDURE — 97116 GAIT TRAINING THERAPY: CPT | Mod: CQ

## 2021-05-17 PROCEDURE — 36415 COLL VENOUS BLD VENIPUNCTURE: CPT | Performed by: NURSE PRACTITIONER

## 2021-05-17 PROCEDURE — 97530 THERAPEUTIC ACTIVITIES: CPT | Mod: CQ

## 2021-05-17 PROCEDURE — 63600175 PHARM REV CODE 636 W HCPCS: Performed by: INTERNAL MEDICINE

## 2021-05-17 PROCEDURE — 11000001 HC ACUTE MED/SURG PRIVATE ROOM

## 2021-05-17 PROCEDURE — 25000003 PHARM REV CODE 250: Performed by: FAMILY MEDICINE

## 2021-05-17 RX ADMIN — HYDRALAZINE HYDROCHLORIDE 50 MG: 25 TABLET, FILM COATED ORAL at 11:05

## 2021-05-17 RX ADMIN — CLOPIDOGREL 75 MG: 75 TABLET, FILM COATED ORAL at 08:05

## 2021-05-17 RX ADMIN — NEPHROCAP 1 CAPSULE: 1 CAP ORAL at 08:05

## 2021-05-17 RX ADMIN — HYDRALAZINE HYDROCHLORIDE 50 MG: 25 TABLET, FILM COATED ORAL at 05:05

## 2021-05-17 RX ADMIN — ROSUVASTATIN CALCIUM 40 MG: 10 TABLET, FILM COATED ORAL at 08:05

## 2021-05-17 RX ADMIN — METRONIDAZOLE 500 MG: 500 TABLET ORAL at 09:05

## 2021-05-17 RX ADMIN — SODIUM CITRATE AND CITRIC ACID MONOHYDRATE 30 ML: 500; 334 SOLUTION ORAL at 08:05

## 2021-05-17 RX ADMIN — ISOSORBIDE MONONITRATE 120 MG: 30 TABLET, EXTENDED RELEASE ORAL at 08:05

## 2021-05-17 RX ADMIN — CEFEPIME HYDROCHLORIDE 2 G: 2 INJECTION, SOLUTION INTRAVENOUS at 08:05

## 2021-05-17 RX ADMIN — Medication: at 09:05

## 2021-05-17 RX ADMIN — INSULIN DETEMIR 20 UNITS: 100 INJECTION, SOLUTION SUBCUTANEOUS at 08:05

## 2021-05-17 RX ADMIN — METOPROLOL SUCCINATE 50 MG: 50 TABLET, EXTENDED RELEASE ORAL at 08:05

## 2021-05-17 RX ADMIN — METRONIDAZOLE 500 MG: 500 TABLET ORAL at 02:05

## 2021-05-17 RX ADMIN — FUROSEMIDE 80 MG: 40 TABLET ORAL at 08:05

## 2021-05-17 RX ADMIN — HYDRALAZINE HYDROCHLORIDE 50 MG: 25 TABLET, FILM COATED ORAL at 06:05

## 2021-05-17 RX ADMIN — LOSARTAN POTASSIUM 25 MG: 25 TABLET, FILM COATED ORAL at 08:05

## 2021-05-17 RX ADMIN — SODIUM CITRATE AND CITRIC ACID MONOHYDRATE 30 ML: 500; 334 SOLUTION ORAL at 02:05

## 2021-05-17 RX ADMIN — ASPIRIN 81 MG: 81 TABLET, COATED ORAL at 08:05

## 2021-05-17 RX ADMIN — CALCITRIOL CAPSULES 0.25 MCG 0.25 MCG: 0.25 CAPSULE ORAL at 08:05

## 2021-05-17 RX ADMIN — AMLODIPINE BESYLATE 10 MG: 5 TABLET ORAL at 08:05

## 2021-05-17 RX ADMIN — METRONIDAZOLE 500 MG: 500 TABLET ORAL at 05:05

## 2021-05-17 RX ADMIN — INSULIN ASPART 1 UNITS: 100 INJECTION, SOLUTION INTRAVENOUS; SUBCUTANEOUS at 08:05

## 2021-05-18 ENCOUNTER — ANESTHESIA EVENT (OUTPATIENT)
Dept: SURGERY | Facility: HOSPITAL | Age: 76
DRG: 264 | End: 2021-05-18
Payer: MEDICARE

## 2021-05-18 ENCOUNTER — TELEPHONE (OUTPATIENT)
Dept: INTERNAL MEDICINE | Facility: CLINIC | Age: 76
End: 2021-05-18
Payer: MEDICARE

## 2021-05-18 LAB
ALBUMIN SERPL BCP-MCNC: 2.8 G/DL (ref 3.5–5.2)
ALP SERPL-CCNC: 64 U/L (ref 55–135)
ALT SERPL W/O P-5'-P-CCNC: 12 U/L (ref 10–44)
ANION GAP SERPL CALC-SCNC: 13 MMOL/L (ref 8–16)
APTT BLDCRRT: 25.6 SEC (ref 21–32)
AST SERPL-CCNC: 17 U/L (ref 10–40)
BACTERIA SPEC ANAEROBE CULT: NORMAL
BASOPHILS # BLD AUTO: 0.07 K/UL (ref 0–0.2)
BASOPHILS NFR BLD: 0.9 % (ref 0–1.9)
BILIRUB SERPL-MCNC: 0.3 MG/DL (ref 0.1–1)
BUN SERPL-MCNC: 74 MG/DL (ref 8–23)
CALCIUM SERPL-MCNC: 8.8 MG/DL (ref 8.7–10.5)
CHLORIDE SERPL-SCNC: 104 MMOL/L (ref 95–110)
CO2 SERPL-SCNC: 26 MMOL/L (ref 23–29)
CREAT SERPL-MCNC: 4.6 MG/DL (ref 0.5–1.4)
DIFFERENTIAL METHOD: ABNORMAL
EOSINOPHIL # BLD AUTO: 0.1 K/UL (ref 0–0.5)
EOSINOPHIL NFR BLD: 1.7 % (ref 0–8)
ERYTHROCYTE [DISTWIDTH] IN BLOOD BY AUTOMATED COUNT: 12.7 % (ref 11.5–14.5)
EST. GFR  (AFRICAN AMERICAN): 13 ML/MIN/1.73 M^2
EST. GFR  (NON AFRICAN AMERICAN): 12 ML/MIN/1.73 M^2
FINAL PATHOLOGIC DIAGNOSIS: NORMAL
GLUCOSE SERPL-MCNC: 120 MG/DL (ref 70–110)
GROSS: NORMAL
HCT VFR BLD AUTO: 24.2 % (ref 40–54)
HGB BLD-MCNC: 8 G/DL (ref 14–18)
IMM GRANULOCYTES # BLD AUTO: 0.03 K/UL (ref 0–0.04)
IMM GRANULOCYTES NFR BLD AUTO: 0.4 % (ref 0–0.5)
LYMPHOCYTES # BLD AUTO: 1.5 K/UL (ref 1–4.8)
LYMPHOCYTES NFR BLD: 18 % (ref 18–48)
Lab: NORMAL
MCH RBC QN AUTO: 30.5 PG (ref 27–31)
MCHC RBC AUTO-ENTMCNC: 33.1 G/DL (ref 32–36)
MCV RBC AUTO: 92 FL (ref 82–98)
MONOCYTES # BLD AUTO: 0.6 K/UL (ref 0.3–1)
MONOCYTES NFR BLD: 7.6 % (ref 4–15)
NEUTROPHILS # BLD AUTO: 5.8 K/UL (ref 1.8–7.7)
NEUTROPHILS NFR BLD: 71.4 % (ref 38–73)
NRBC BLD-RTO: 0 /100 WBC
PLATELET # BLD AUTO: 139 K/UL (ref 150–450)
PMV BLD AUTO: 12.1 FL (ref 9.2–12.9)
POCT GLUCOSE: 145 MG/DL (ref 70–110)
POCT GLUCOSE: 200 MG/DL (ref 70–110)
POCT GLUCOSE: 261 MG/DL (ref 70–110)
POCT GLUCOSE: 88 MG/DL (ref 70–110)
POTASSIUM SERPL-SCNC: 4 MMOL/L (ref 3.5–5.1)
PROT SERPL-MCNC: 7.1 G/DL (ref 6–8.4)
RBC # BLD AUTO: 2.62 M/UL (ref 4.6–6.2)
SARS-COV-2 RDRP RESP QL NAA+PROBE: NEGATIVE
SODIUM SERPL-SCNC: 143 MMOL/L (ref 136–145)
WBC # BLD AUTO: 8.15 K/UL (ref 3.9–12.7)

## 2021-05-18 PROCEDURE — 36415 COLL VENOUS BLD VENIPUNCTURE: CPT | Performed by: NURSE PRACTITIONER

## 2021-05-18 PROCEDURE — U0002 COVID-19 LAB TEST NON-CDC: HCPCS | Performed by: FAMILY MEDICINE

## 2021-05-18 PROCEDURE — 25000003 PHARM REV CODE 250: Performed by: NURSE PRACTITIONER

## 2021-05-18 PROCEDURE — 11000001 HC ACUTE MED/SURG PRIVATE ROOM

## 2021-05-18 PROCEDURE — 99221 1ST HOSP IP/OBS SF/LOW 40: CPT | Mod: ,,, | Performed by: SURGERY

## 2021-05-18 PROCEDURE — 80053 COMPREHEN METABOLIC PANEL: CPT | Performed by: HOSPITALIST

## 2021-05-18 PROCEDURE — 25000003 PHARM REV CODE 250: Performed by: INTERNAL MEDICINE

## 2021-05-18 PROCEDURE — 85025 COMPLETE CBC W/AUTO DIFF WBC: CPT | Performed by: NURSE PRACTITIONER

## 2021-05-18 PROCEDURE — 25000003 PHARM REV CODE 250: Performed by: HOSPITALIST

## 2021-05-18 PROCEDURE — 99221 PR INITIAL HOSPITAL CARE,LEVL I: ICD-10-PCS | Mod: ,,, | Performed by: SURGERY

## 2021-05-18 PROCEDURE — 97116 GAIT TRAINING THERAPY: CPT | Mod: CQ

## 2021-05-18 PROCEDURE — 63600175 PHARM REV CODE 636 W HCPCS: Performed by: INTERNAL MEDICINE

## 2021-05-18 PROCEDURE — 85730 THROMBOPLASTIN TIME PARTIAL: CPT | Performed by: NURSE PRACTITIONER

## 2021-05-18 PROCEDURE — 25000003 PHARM REV CODE 250: Performed by: FAMILY MEDICINE

## 2021-05-18 RX ADMIN — AMLODIPINE BESYLATE 10 MG: 5 TABLET ORAL at 09:05

## 2021-05-18 RX ADMIN — LOSARTAN POTASSIUM 25 MG: 25 TABLET, FILM COATED ORAL at 09:05

## 2021-05-18 RX ADMIN — METRONIDAZOLE 500 MG: 500 TABLET ORAL at 05:05

## 2021-05-18 RX ADMIN — METRONIDAZOLE 500 MG: 500 TABLET ORAL at 01:05

## 2021-05-18 RX ADMIN — HYDRALAZINE HYDROCHLORIDE 50 MG: 25 TABLET, FILM COATED ORAL at 05:05

## 2021-05-18 RX ADMIN — CALCITRIOL CAPSULES 0.25 MCG 0.25 MCG: 0.25 CAPSULE ORAL at 09:05

## 2021-05-18 RX ADMIN — NEPHROCAP 1 CAPSULE: 1 CAP ORAL at 09:05

## 2021-05-18 RX ADMIN — FUROSEMIDE 80 MG: 40 TABLET ORAL at 08:05

## 2021-05-18 RX ADMIN — ROSUVASTATIN CALCIUM 40 MG: 10 TABLET, FILM COATED ORAL at 08:05

## 2021-05-18 RX ADMIN — METOPROLOL SUCCINATE 50 MG: 50 TABLET, EXTENDED RELEASE ORAL at 09:05

## 2021-05-18 RX ADMIN — SODIUM CITRATE AND CITRIC ACID MONOHYDRATE 30 ML: 500; 334 SOLUTION ORAL at 09:05

## 2021-05-18 RX ADMIN — INSULIN DETEMIR 20 UNITS: 100 INJECTION, SOLUTION SUBCUTANEOUS at 08:05

## 2021-05-18 RX ADMIN — ISOSORBIDE MONONITRATE 120 MG: 30 TABLET, EXTENDED RELEASE ORAL at 09:05

## 2021-05-18 RX ADMIN — INSULIN ASPART 1 UNITS: 100 INJECTION, SOLUTION INTRAVENOUS; SUBCUTANEOUS at 08:05

## 2021-05-18 RX ADMIN — CLOPIDOGREL 75 MG: 75 TABLET, FILM COATED ORAL at 09:05

## 2021-05-18 RX ADMIN — HYDRALAZINE HYDROCHLORIDE 50 MG: 25 TABLET, FILM COATED ORAL at 12:05

## 2021-05-18 RX ADMIN — Medication: at 10:05

## 2021-05-18 RX ADMIN — METRONIDAZOLE 500 MG: 500 TABLET ORAL at 09:05

## 2021-05-18 RX ADMIN — ASPIRIN 81 MG: 81 TABLET, COATED ORAL at 09:05

## 2021-05-18 RX ADMIN — SODIUM CITRATE AND CITRIC ACID MONOHYDRATE 30 ML: 500; 334 SOLUTION ORAL at 02:05

## 2021-05-18 RX ADMIN — CEFEPIME HYDROCHLORIDE 2 G: 2 INJECTION, SOLUTION INTRAVENOUS at 07:05

## 2021-05-18 RX ADMIN — SODIUM CITRATE AND CITRIC ACID MONOHYDRATE 30 ML: 500; 334 SOLUTION ORAL at 08:05

## 2021-05-18 RX ADMIN — FUROSEMIDE 80 MG: 40 TABLET ORAL at 09:05

## 2021-05-19 ENCOUNTER — ANESTHESIA (OUTPATIENT)
Dept: SURGERY | Facility: HOSPITAL | Age: 76
DRG: 264 | End: 2021-05-19
Payer: MEDICARE

## 2021-05-19 LAB
ALBUMIN SERPL BCP-MCNC: 3 G/DL (ref 3.5–5.2)
ALP SERPL-CCNC: 66 U/L (ref 55–135)
ALT SERPL W/O P-5'-P-CCNC: 13 U/L (ref 10–44)
ANION GAP SERPL CALC-SCNC: 12 MMOL/L (ref 8–16)
APTT BLDCRRT: 26 SEC (ref 21–32)
AST SERPL-CCNC: 17 U/L (ref 10–40)
BACTERIA SPEC AEROBE CULT: ABNORMAL
BACTERIA SPEC AEROBE CULT: ABNORMAL
BASOPHILS # BLD AUTO: 0.08 K/UL (ref 0–0.2)
BASOPHILS NFR BLD: 0.9 % (ref 0–1.9)
BILIRUB SERPL-MCNC: 0.3 MG/DL (ref 0.1–1)
BUN SERPL-MCNC: 75 MG/DL (ref 8–23)
CALCIUM SERPL-MCNC: 9 MG/DL (ref 8.7–10.5)
CHLORIDE SERPL-SCNC: 103 MMOL/L (ref 95–110)
CO2 SERPL-SCNC: 28 MMOL/L (ref 23–29)
CREAT SERPL-MCNC: 4.7 MG/DL (ref 0.5–1.4)
DIFFERENTIAL METHOD: ABNORMAL
EOSINOPHIL # BLD AUTO: 0.2 K/UL (ref 0–0.5)
EOSINOPHIL NFR BLD: 2.2 % (ref 0–8)
ERYTHROCYTE [DISTWIDTH] IN BLOOD BY AUTOMATED COUNT: 13 % (ref 11.5–14.5)
EST. GFR  (AFRICAN AMERICAN): 13 ML/MIN/1.73 M^2
EST. GFR  (NON AFRICAN AMERICAN): 11 ML/MIN/1.73 M^2
GLUCOSE SERPL-MCNC: 130 MG/DL (ref 70–110)
HCT VFR BLD AUTO: 25.2 % (ref 40–54)
HGB BLD-MCNC: 8.2 G/DL (ref 14–18)
IMM GRANULOCYTES # BLD AUTO: 0.03 K/UL (ref 0–0.04)
IMM GRANULOCYTES NFR BLD AUTO: 0.4 % (ref 0–0.5)
LYMPHOCYTES # BLD AUTO: 1.4 K/UL (ref 1–4.8)
LYMPHOCYTES NFR BLD: 16.2 % (ref 18–48)
MCH RBC QN AUTO: 29.8 PG (ref 27–31)
MCHC RBC AUTO-ENTMCNC: 32.5 G/DL (ref 32–36)
MCV RBC AUTO: 92 FL (ref 82–98)
MONOCYTES # BLD AUTO: 0.7 K/UL (ref 0.3–1)
MONOCYTES NFR BLD: 8.4 % (ref 4–15)
NEUTROPHILS # BLD AUTO: 6.2 K/UL (ref 1.8–7.7)
NEUTROPHILS NFR BLD: 71.9 % (ref 38–73)
NRBC BLD-RTO: 0 /100 WBC
PLATELET # BLD AUTO: 140 K/UL (ref 150–450)
PMV BLD AUTO: 11.8 FL (ref 9.2–12.9)
POCT GLUCOSE: 116 MG/DL (ref 70–110)
POCT GLUCOSE: 119 MG/DL (ref 70–110)
POCT GLUCOSE: 144 MG/DL (ref 70–110)
POCT GLUCOSE: 163 MG/DL (ref 70–110)
POTASSIUM SERPL-SCNC: 3.9 MMOL/L (ref 3.5–5.1)
PROT SERPL-MCNC: 7.2 G/DL (ref 6–8.4)
RBC # BLD AUTO: 2.75 M/UL (ref 4.6–6.2)
SODIUM SERPL-SCNC: 143 MMOL/L (ref 136–145)
WBC # BLD AUTO: 8.57 K/UL (ref 3.9–12.7)

## 2021-05-19 PROCEDURE — 25000003 PHARM REV CODE 250: Performed by: INTERNAL MEDICINE

## 2021-05-19 PROCEDURE — 63600175 PHARM REV CODE 636 W HCPCS: Performed by: SURGERY

## 2021-05-19 PROCEDURE — 85025 COMPLETE CBC W/AUTO DIFF WBC: CPT | Performed by: NURSE PRACTITIONER

## 2021-05-19 PROCEDURE — 36561 PR INSERT TUNNELED CV CATH WITH PORT: ICD-10-PCS | Mod: RT,,, | Performed by: SURGERY

## 2021-05-19 PROCEDURE — 80053 COMPREHEN METABOLIC PANEL: CPT | Performed by: HOSPITALIST

## 2021-05-19 PROCEDURE — 25000003 PHARM REV CODE 250: Performed by: FAMILY MEDICINE

## 2021-05-19 PROCEDURE — 25000003 PHARM REV CODE 250: Performed by: SURGERY

## 2021-05-19 PROCEDURE — 71000033 HC RECOVERY, INTIAL HOUR: Performed by: SURGERY

## 2021-05-19 PROCEDURE — 25000003 PHARM REV CODE 250: Performed by: NURSE PRACTITIONER

## 2021-05-19 PROCEDURE — 63600175 PHARM REV CODE 636 W HCPCS: Performed by: STUDENT IN AN ORGANIZED HEALTH CARE EDUCATION/TRAINING PROGRAM

## 2021-05-19 PROCEDURE — 36561 INSERT TUNNELED CV CATH: CPT | Mod: RT,,, | Performed by: SURGERY

## 2021-05-19 PROCEDURE — 36000706: Performed by: SURGERY

## 2021-05-19 PROCEDURE — 25000003 PHARM REV CODE 250: Performed by: HOSPITALIST

## 2021-05-19 PROCEDURE — 63600175 PHARM REV CODE 636 W HCPCS: Performed by: INTERNAL MEDICINE

## 2021-05-19 PROCEDURE — 37000008 HC ANESTHESIA 1ST 15 MINUTES: Performed by: SURGERY

## 2021-05-19 PROCEDURE — 77001 FLUOROGUIDE FOR VEIN DEVICE: CPT | Mod: 26,,, | Performed by: SURGERY

## 2021-05-19 PROCEDURE — 36000707: Performed by: SURGERY

## 2021-05-19 PROCEDURE — 77001 CHG FLUOROGUIDE CNTRL VEN ACCESS,PLACE,REPLACE,REMOVE: ICD-10-PCS | Mod: 26,,, | Performed by: SURGERY

## 2021-05-19 PROCEDURE — 11000001 HC ACUTE MED/SURG PRIVATE ROOM

## 2021-05-19 PROCEDURE — 37000009 HC ANESTHESIA EA ADD 15 MINS: Performed by: SURGERY

## 2021-05-19 PROCEDURE — 85730 THROMBOPLASTIN TIME PARTIAL: CPT | Performed by: NURSE PRACTITIONER

## 2021-05-19 PROCEDURE — 36415 COLL VENOUS BLD VENIPUNCTURE: CPT | Performed by: NURSE PRACTITIONER

## 2021-05-19 PROCEDURE — C1751 CATH, INF, PER/CENT/MIDLINE: HCPCS | Performed by: SURGERY

## 2021-05-19 DEVICE — CATH POWERHICKMAN 9.5FR 5CM: Type: IMPLANTABLE DEVICE | Site: CHEST | Status: FUNCTIONAL

## 2021-05-19 RX ORDER — SODIUM CHLORIDE 0.9 % (FLUSH) 0.9 %
10 SYRINGE (ML) INJECTION
Status: DISCONTINUED | OUTPATIENT
Start: 2021-05-19 | End: 2021-05-19 | Stop reason: HOSPADM

## 2021-05-19 RX ORDER — HYDROMORPHONE HYDROCHLORIDE 2 MG/ML
0.2 INJECTION, SOLUTION INTRAMUSCULAR; INTRAVENOUS; SUBCUTANEOUS EVERY 5 MIN PRN
Status: DISCONTINUED | OUTPATIENT
Start: 2021-05-19 | End: 2021-05-19 | Stop reason: HOSPADM

## 2021-05-19 RX ORDER — HEPARIN SODIUM 1000 [USP'U]/ML
INJECTION, SOLUTION INTRAVENOUS; SUBCUTANEOUS
Status: DISCONTINUED | OUTPATIENT
Start: 2021-05-19 | End: 2021-05-19 | Stop reason: HOSPADM

## 2021-05-19 RX ORDER — CALCIUM CARBONATE 200(500)MG
500 TABLET,CHEWABLE ORAL DAILY PRN
Qty: 30 TABLET | Refills: 1 | Status: CANCELLED | OUTPATIENT
Start: 2021-05-19 | End: 2022-01-01

## 2021-05-19 RX ORDER — PROPOFOL 10 MG/ML
VIAL (ML) INTRAVENOUS
Status: DISCONTINUED | OUTPATIENT
Start: 2021-05-19 | End: 2021-05-19

## 2021-05-19 RX ORDER — SODIUM CHLORIDE, SODIUM LACTATE, POTASSIUM CHLORIDE, CALCIUM CHLORIDE 600; 310; 30; 20 MG/100ML; MG/100ML; MG/100ML; MG/100ML
INJECTION, SOLUTION INTRAVENOUS CONTINUOUS PRN
Status: DISCONTINUED | OUTPATIENT
Start: 2021-05-19 | End: 2021-05-19

## 2021-05-19 RX ORDER — CEFEPIME HYDROCHLORIDE 1 G/50ML
2 INJECTION, SOLUTION INTRAVENOUS DAILY
Qty: 1500 ML | Refills: 1 | Status: CANCELLED
Start: 2021-05-19 | End: 2021-06-27

## 2021-05-19 RX ORDER — PROPOFOL 10 MG/ML
VIAL (ML) INTRAVENOUS CONTINUOUS PRN
Status: DISCONTINUED | OUTPATIENT
Start: 2021-05-19 | End: 2021-05-19

## 2021-05-19 RX ORDER — FENTANYL CITRATE 50 UG/ML
INJECTION, SOLUTION INTRAMUSCULAR; INTRAVENOUS
Status: DISCONTINUED | OUTPATIENT
Start: 2021-05-19 | End: 2021-05-19

## 2021-05-19 RX ORDER — BUPIVACAINE HYDROCHLORIDE 2.5 MG/ML
INJECTION, SOLUTION EPIDURAL; INFILTRATION; INTRACAUDAL
Status: DISCONTINUED | OUTPATIENT
Start: 2021-05-19 | End: 2021-05-19 | Stop reason: HOSPADM

## 2021-05-19 RX ORDER — METRONIDAZOLE 500 MG/1
500 TABLET ORAL EVERY 8 HOURS
Qty: 117 TABLET | Refills: 0 | Status: CANCELLED | OUTPATIENT
Start: 2021-05-19 | End: 2021-06-27

## 2021-05-19 RX ORDER — LIDOCAINE HYDROCHLORIDE 10 MG/ML
INJECTION INFILTRATION; PERINEURAL
Status: DISCONTINUED | OUTPATIENT
Start: 2021-05-19 | End: 2021-05-19 | Stop reason: HOSPADM

## 2021-05-19 RX ADMIN — HYDRALAZINE HYDROCHLORIDE 50 MG: 25 TABLET, FILM COATED ORAL at 12:05

## 2021-05-19 RX ADMIN — SODIUM CHLORIDE, SODIUM LACTATE, POTASSIUM CHLORIDE, AND CALCIUM CHLORIDE: .6; .31; .03; .02 INJECTION, SOLUTION INTRAVENOUS at 12:05

## 2021-05-19 RX ADMIN — ASPIRIN 81 MG: 81 TABLET, COATED ORAL at 08:05

## 2021-05-19 RX ADMIN — ROSUVASTATIN CALCIUM 40 MG: 10 TABLET, FILM COATED ORAL at 09:05

## 2021-05-19 RX ADMIN — LOSARTAN POTASSIUM 25 MG: 25 TABLET, FILM COATED ORAL at 08:05

## 2021-05-19 RX ADMIN — ISOSORBIDE MONONITRATE 120 MG: 30 TABLET, EXTENDED RELEASE ORAL at 08:05

## 2021-05-19 RX ADMIN — FENTANYL CITRATE 25 MCG: 50 INJECTION, SOLUTION INTRAMUSCULAR; INTRAVENOUS at 12:05

## 2021-05-19 RX ADMIN — METOPROLOL SUCCINATE 50 MG: 50 TABLET, EXTENDED RELEASE ORAL at 08:05

## 2021-05-19 RX ADMIN — FUROSEMIDE 80 MG: 40 TABLET ORAL at 08:05

## 2021-05-19 RX ADMIN — METRONIDAZOLE 500 MG: 500 TABLET ORAL at 09:05

## 2021-05-19 RX ADMIN — SODIUM CITRATE AND CITRIC ACID MONOHYDRATE 30 ML: 500; 334 SOLUTION ORAL at 08:05

## 2021-05-19 RX ADMIN — SODIUM CITRATE AND CITRIC ACID MONOHYDRATE 30 ML: 500; 334 SOLUTION ORAL at 09:05

## 2021-05-19 RX ADMIN — NEPHROCAP 1 CAPSULE: 1 CAP ORAL at 08:05

## 2021-05-19 RX ADMIN — CALCITRIOL CAPSULES 0.25 MCG 0.25 MCG: 0.25 CAPSULE ORAL at 08:05

## 2021-05-19 RX ADMIN — AMLODIPINE BESYLATE 10 MG: 5 TABLET ORAL at 08:05

## 2021-05-19 RX ADMIN — PROPOFOL 20 MG: 10 INJECTION, EMULSION INTRAVENOUS at 12:05

## 2021-05-19 RX ADMIN — HYDRALAZINE HYDROCHLORIDE 50 MG: 25 TABLET, FILM COATED ORAL at 11:05

## 2021-05-19 RX ADMIN — PROPOFOL 75 MCG/KG/MIN: 10 INJECTION, EMULSION INTRAVENOUS at 12:05

## 2021-05-19 RX ADMIN — FUROSEMIDE 80 MG: 40 TABLET ORAL at 09:05

## 2021-05-19 RX ADMIN — CLOPIDOGREL 75 MG: 75 TABLET, FILM COATED ORAL at 08:05

## 2021-05-19 RX ADMIN — INSULIN DETEMIR 20 UNITS: 100 INJECTION, SOLUTION SUBCUTANEOUS at 09:05

## 2021-05-19 RX ADMIN — HYDRALAZINE HYDROCHLORIDE 50 MG: 25 TABLET, FILM COATED ORAL at 05:05

## 2021-05-19 RX ADMIN — SODIUM CITRATE AND CITRIC ACID MONOHYDRATE 30 ML: 500; 334 SOLUTION ORAL at 02:05

## 2021-05-19 RX ADMIN — METRONIDAZOLE 500 MG: 500 TABLET ORAL at 02:05

## 2021-05-19 RX ADMIN — PROPOFOL 10 MG: 10 INJECTION, EMULSION INTRAVENOUS at 12:05

## 2021-05-19 RX ADMIN — CEFEPIME HYDROCHLORIDE 2 G: 2 INJECTION, SOLUTION INTRAVENOUS at 08:05

## 2021-05-19 RX ADMIN — METRONIDAZOLE 500 MG: 500 TABLET ORAL at 05:05

## 2021-05-20 ENCOUNTER — PATIENT MESSAGE (OUTPATIENT)
Dept: CASE MANAGEMENT | Facility: HOSPITAL | Age: 76
End: 2021-05-20

## 2021-05-20 VITALS
DIASTOLIC BLOOD PRESSURE: 58 MMHG | HEIGHT: 75 IN | RESPIRATION RATE: 18 BRPM | BODY MASS INDEX: 27.49 KG/M2 | OXYGEN SATURATION: 94 % | SYSTOLIC BLOOD PRESSURE: 124 MMHG | TEMPERATURE: 98 F | HEART RATE: 66 BPM | WEIGHT: 221.13 LBS

## 2021-05-20 LAB
ALBUMIN SERPL BCP-MCNC: 2.9 G/DL (ref 3.5–5.2)
ALP SERPL-CCNC: 60 U/L (ref 55–135)
ALT SERPL W/O P-5'-P-CCNC: 9 U/L (ref 10–44)
ANION GAP SERPL CALC-SCNC: 14 MMOL/L (ref 8–16)
APTT BLDCRRT: 26.2 SEC (ref 21–32)
AST SERPL-CCNC: 16 U/L (ref 10–40)
BASOPHILS # BLD AUTO: 0.09 K/UL (ref 0–0.2)
BASOPHILS NFR BLD: 1.1 % (ref 0–1.9)
BILIRUB SERPL-MCNC: 0.3 MG/DL (ref 0.1–1)
BUN SERPL-MCNC: 75 MG/DL (ref 8–23)
CALCIUM SERPL-MCNC: 9 MG/DL (ref 8.7–10.5)
CHLORIDE SERPL-SCNC: 105 MMOL/L (ref 95–110)
CO2 SERPL-SCNC: 26 MMOL/L (ref 23–29)
CREAT SERPL-MCNC: 4.6 MG/DL (ref 0.5–1.4)
DIFFERENTIAL METHOD: ABNORMAL
EOSINOPHIL # BLD AUTO: 0.2 K/UL (ref 0–0.5)
EOSINOPHIL NFR BLD: 2.3 % (ref 0–8)
ERYTHROCYTE [DISTWIDTH] IN BLOOD BY AUTOMATED COUNT: 12.7 % (ref 11.5–14.5)
EST. GFR  (AFRICAN AMERICAN): 13 ML/MIN/1.73 M^2
EST. GFR  (NON AFRICAN AMERICAN): 12 ML/MIN/1.73 M^2
GLUCOSE SERPL-MCNC: 67 MG/DL (ref 70–110)
HCT VFR BLD AUTO: 24.2 % (ref 40–54)
HGB BLD-MCNC: 7.7 G/DL (ref 14–18)
IMM GRANULOCYTES # BLD AUTO: 0.04 K/UL (ref 0–0.04)
IMM GRANULOCYTES NFR BLD AUTO: 0.5 % (ref 0–0.5)
LYMPHOCYTES # BLD AUTO: 1.4 K/UL (ref 1–4.8)
LYMPHOCYTES NFR BLD: 17.3 % (ref 18–48)
MCH RBC QN AUTO: 30.2 PG (ref 27–31)
MCHC RBC AUTO-ENTMCNC: 31.8 G/DL (ref 32–36)
MCV RBC AUTO: 95 FL (ref 82–98)
MONOCYTES # BLD AUTO: 0.7 K/UL (ref 0.3–1)
MONOCYTES NFR BLD: 8.2 % (ref 4–15)
NEUTROPHILS # BLD AUTO: 5.9 K/UL (ref 1.8–7.7)
NEUTROPHILS NFR BLD: 70.6 % (ref 38–73)
NRBC BLD-RTO: 0 /100 WBC
PLATELET # BLD AUTO: 148 K/UL (ref 150–450)
PMV BLD AUTO: 11.9 FL (ref 9.2–12.9)
POCT GLUCOSE: 103 MG/DL (ref 70–110)
POCT GLUCOSE: 115 MG/DL (ref 70–110)
POCT GLUCOSE: 80 MG/DL (ref 70–110)
POTASSIUM SERPL-SCNC: 3.8 MMOL/L (ref 3.5–5.1)
PROT SERPL-MCNC: 7.1 G/DL (ref 6–8.4)
RBC # BLD AUTO: 2.55 M/UL (ref 4.6–6.2)
SODIUM SERPL-SCNC: 145 MMOL/L (ref 136–145)
WBC # BLD AUTO: 8.31 K/UL (ref 3.9–12.7)

## 2021-05-20 PROCEDURE — 97116 GAIT TRAINING THERAPY: CPT

## 2021-05-20 PROCEDURE — 25000003 PHARM REV CODE 250: Performed by: SURGERY

## 2021-05-20 PROCEDURE — 85730 THROMBOPLASTIN TIME PARTIAL: CPT | Performed by: SURGERY

## 2021-05-20 PROCEDURE — 36415 COLL VENOUS BLD VENIPUNCTURE: CPT | Performed by: SURGERY

## 2021-05-20 PROCEDURE — 85025 COMPLETE CBC W/AUTO DIFF WBC: CPT | Performed by: SURGERY

## 2021-05-20 PROCEDURE — 80053 COMPREHEN METABOLIC PANEL: CPT | Performed by: SURGERY

## 2021-05-20 PROCEDURE — 63600175 PHARM REV CODE 636 W HCPCS: Performed by: SURGERY

## 2021-05-20 PROCEDURE — 25000003 PHARM REV CODE 250: Performed by: FAMILY MEDICINE

## 2021-05-20 PROCEDURE — 97110 THERAPEUTIC EXERCISES: CPT

## 2021-05-20 RX ADMIN — METRONIDAZOLE 500 MG: 500 TABLET ORAL at 05:05

## 2021-05-20 RX ADMIN — NEPHROCAP 1 CAPSULE: 1 CAP ORAL at 08:05

## 2021-05-20 RX ADMIN — CEFEPIME HYDROCHLORIDE 2 G: 2 INJECTION, SOLUTION INTRAVENOUS at 08:05

## 2021-05-20 RX ADMIN — METRONIDAZOLE 500 MG: 500 TABLET ORAL at 01:05

## 2021-05-20 RX ADMIN — CALCITRIOL CAPSULES 0.25 MCG 0.25 MCG: 0.25 CAPSULE ORAL at 08:05

## 2021-05-20 RX ADMIN — FUROSEMIDE 80 MG: 40 TABLET ORAL at 08:05

## 2021-05-20 RX ADMIN — CLOPIDOGREL 75 MG: 75 TABLET, FILM COATED ORAL at 08:05

## 2021-05-20 RX ADMIN — SODIUM CITRATE AND CITRIC ACID MONOHYDRATE 30 ML: 500; 334 SOLUTION ORAL at 08:05

## 2021-05-20 RX ADMIN — METOPROLOL SUCCINATE 50 MG: 50 TABLET, EXTENDED RELEASE ORAL at 08:05

## 2021-05-20 RX ADMIN — HYDRALAZINE HYDROCHLORIDE 50 MG: 25 TABLET, FILM COATED ORAL at 11:05

## 2021-05-20 RX ADMIN — ISOSORBIDE MONONITRATE 120 MG: 30 TABLET, EXTENDED RELEASE ORAL at 08:05

## 2021-05-20 RX ADMIN — HYDRALAZINE HYDROCHLORIDE 50 MG: 25 TABLET, FILM COATED ORAL at 05:05

## 2021-05-20 RX ADMIN — AMLODIPINE BESYLATE 10 MG: 5 TABLET ORAL at 08:05

## 2021-05-20 RX ADMIN — LOSARTAN POTASSIUM 25 MG: 25 TABLET, FILM COATED ORAL at 08:05

## 2021-05-20 RX ADMIN — ASPIRIN 81 MG: 81 TABLET, COATED ORAL at 08:05

## 2021-05-21 ENCOUNTER — PATIENT OUTREACH (OUTPATIENT)
Dept: ADMINISTRATIVE | Facility: CLINIC | Age: 76
End: 2021-05-21

## 2021-05-21 ENCOUNTER — PATIENT MESSAGE (OUTPATIENT)
Dept: ADMINISTRATIVE | Facility: CLINIC | Age: 76
End: 2021-05-21

## 2021-05-21 PROCEDURE — G0180 PR HOME HEALTH MD CERTIFICATION: ICD-10-PCS | Mod: ,,, | Performed by: FAMILY MEDICINE

## 2021-05-21 PROCEDURE — G0180 MD CERTIFICATION HHA PATIENT: HCPCS | Mod: ,,, | Performed by: FAMILY MEDICINE

## 2021-05-24 DIAGNOSIS — N18.4 ANEMIA OF CHRONIC RENAL FAILURE, STAGE 4 (SEVERE): Primary | ICD-10-CM

## 2021-05-24 DIAGNOSIS — D63.1 ANEMIA OF CHRONIC RENAL FAILURE, STAGE 4 (SEVERE): Primary | ICD-10-CM

## 2021-05-24 RX ORDER — OLMESARTAN MEDOXOMIL 20 MG/1
TABLET ORAL
Qty: 90 TABLET | Refills: 6 | Status: SHIPPED | OUTPATIENT
Start: 2021-05-24 | End: 2021-06-14

## 2021-05-25 ENCOUNTER — OFFICE VISIT (OUTPATIENT)
Dept: NEPHROLOGY | Facility: CLINIC | Age: 76
End: 2021-05-25
Payer: MEDICARE

## 2021-05-25 VITALS
HEIGHT: 75 IN | OXYGEN SATURATION: 97 % | DIASTOLIC BLOOD PRESSURE: 60 MMHG | SYSTOLIC BLOOD PRESSURE: 140 MMHG | HEART RATE: 64 BPM | BODY MASS INDEX: 26.18 KG/M2 | WEIGHT: 210.56 LBS

## 2021-05-25 DIAGNOSIS — I10 ESSENTIAL HYPERTENSION: Primary | ICD-10-CM

## 2021-05-25 DIAGNOSIS — N18.5 CKD (CHRONIC KIDNEY DISEASE) STAGE 5, GFR LESS THAN 15 ML/MIN: ICD-10-CM

## 2021-05-25 PROCEDURE — 99213 OFFICE O/P EST LOW 20 MIN: CPT | Mod: PBBFAC | Performed by: INTERNAL MEDICINE

## 2021-05-25 PROCEDURE — 99999 PR PBB SHADOW E&M-EST. PATIENT-LVL III: ICD-10-PCS | Mod: PBBFAC,,, | Performed by: INTERNAL MEDICINE

## 2021-05-25 PROCEDURE — 99214 OFFICE O/P EST MOD 30 MIN: CPT | Mod: S$PBB,,, | Performed by: INTERNAL MEDICINE

## 2021-05-25 PROCEDURE — 99999 PR PBB SHADOW E&M-EST. PATIENT-LVL III: CPT | Mod: PBBFAC,,, | Performed by: INTERNAL MEDICINE

## 2021-05-25 PROCEDURE — 99214 PR OFFICE/OUTPT VISIT, EST, LEVL IV, 30-39 MIN: ICD-10-PCS | Mod: S$PBB,,, | Performed by: INTERNAL MEDICINE

## 2021-05-26 ENCOUNTER — OFFICE VISIT (OUTPATIENT)
Dept: CARDIOLOGY | Facility: CLINIC | Age: 76
End: 2021-05-26
Payer: MEDICARE

## 2021-05-26 VITALS
HEART RATE: 65 BPM | HEIGHT: 75 IN | SYSTOLIC BLOOD PRESSURE: 100 MMHG | OXYGEN SATURATION: 97 % | WEIGHT: 210 LBS | DIASTOLIC BLOOD PRESSURE: 50 MMHG | BODY MASS INDEX: 26.11 KG/M2

## 2021-05-26 DIAGNOSIS — R79.89 ELEVATED TROPONIN: ICD-10-CM

## 2021-05-26 DIAGNOSIS — I25.10 CORONARY ARTERY DISEASE WITHOUT ANGINA PECTORIS, UNSPECIFIED VESSEL OR LESION TYPE, UNSPECIFIED WHETHER NATIVE OR TRANSPLANTED HEART: Primary | ICD-10-CM

## 2021-05-26 DIAGNOSIS — I73.9 PAD (PERIPHERAL ARTERY DISEASE): ICD-10-CM

## 2021-05-26 DIAGNOSIS — I21.4 NSTEMI (NON-ST ELEVATED MYOCARDIAL INFARCTION): ICD-10-CM

## 2021-05-26 DIAGNOSIS — N18.5 CKD (CHRONIC KIDNEY DISEASE), SYMPTOM MANAGEMENT ONLY, STAGE 5: ICD-10-CM

## 2021-05-26 DIAGNOSIS — I15.2 HYPERTENSION ASSOCIATED WITH DIABETES: ICD-10-CM

## 2021-05-26 DIAGNOSIS — E11.59 HYPERTENSION ASSOCIATED WITH DIABETES: ICD-10-CM

## 2021-05-26 DIAGNOSIS — E11.69 HYPERLIPIDEMIA ASSOCIATED WITH TYPE 2 DIABETES MELLITUS: ICD-10-CM

## 2021-05-26 DIAGNOSIS — E78.5 HYPERLIPIDEMIA ASSOCIATED WITH TYPE 2 DIABETES MELLITUS: ICD-10-CM

## 2021-05-26 DIAGNOSIS — I50.32 CHRONIC DIASTOLIC CONGESTIVE HEART FAILURE: Chronic | ICD-10-CM

## 2021-05-26 PROCEDURE — 99215 PR OFFICE/OUTPT VISIT, EST, LEVL V, 40-54 MIN: ICD-10-PCS | Mod: S$PBB,,, | Performed by: INTERNAL MEDICINE

## 2021-05-26 PROCEDURE — 99215 OFFICE O/P EST HI 40 MIN: CPT | Mod: PBBFAC,PO | Performed by: INTERNAL MEDICINE

## 2021-05-26 PROCEDURE — 99999 PR PBB SHADOW E&M-EST. PATIENT-LVL V: ICD-10-PCS | Mod: PBBFAC,,, | Performed by: INTERNAL MEDICINE

## 2021-05-26 PROCEDURE — 99215 OFFICE O/P EST HI 40 MIN: CPT | Mod: S$PBB,,, | Performed by: INTERNAL MEDICINE

## 2021-05-26 PROCEDURE — 99999 PR PBB SHADOW E&M-EST. PATIENT-LVL V: CPT | Mod: PBBFAC,,, | Performed by: INTERNAL MEDICINE

## 2021-05-27 ENCOUNTER — OFFICE VISIT (OUTPATIENT)
Dept: PODIATRY | Facility: CLINIC | Age: 76
End: 2021-05-27
Payer: MEDICARE

## 2021-05-27 VITALS — BODY MASS INDEX: 26.13 KG/M2 | HEIGHT: 75 IN | WEIGHT: 210.13 LBS

## 2021-05-27 DIAGNOSIS — B35.1 ONYCHOMYCOSIS: Primary | ICD-10-CM

## 2021-05-27 DIAGNOSIS — L84 CORN OR CALLUS: ICD-10-CM

## 2021-05-27 DIAGNOSIS — E11.51 TYPE 2 DIABETES MELLITUS WITH PERIPHERAL ANGIOPATHY: ICD-10-CM

## 2021-05-27 DIAGNOSIS — E11.42 TYPE 2 DIABETES MELLITUS WITH PERIPHERAL NEUROPATHY: ICD-10-CM

## 2021-05-27 DIAGNOSIS — Z87.39 HISTORY OF OSTEOMYELITIS: ICD-10-CM

## 2021-05-27 DIAGNOSIS — L84 PRE-ULCERATIVE CALLUSES: ICD-10-CM

## 2021-05-27 PROCEDURE — 99999 PR PBB SHADOW E&M-EST. PATIENT-LVL III: ICD-10-PCS | Mod: PBBFAC,,, | Performed by: STUDENT IN AN ORGANIZED HEALTH CARE EDUCATION/TRAINING PROGRAM

## 2021-05-27 PROCEDURE — 11056 PARNG/CUTG B9 HYPRKR LES 2-4: CPT | Mod: Q9,PBBFAC,PN | Performed by: STUDENT IN AN ORGANIZED HEALTH CARE EDUCATION/TRAINING PROGRAM

## 2021-05-27 PROCEDURE — 99999 PR PBB SHADOW E&M-EST. PATIENT-LVL III: CPT | Mod: PBBFAC,,, | Performed by: STUDENT IN AN ORGANIZED HEALTH CARE EDUCATION/TRAINING PROGRAM

## 2021-05-27 PROCEDURE — 11721 ROUTINE FOOT CARE: ICD-10-PCS | Mod: 59,Q9,S$PBB, | Performed by: STUDENT IN AN ORGANIZED HEALTH CARE EDUCATION/TRAINING PROGRAM

## 2021-05-27 PROCEDURE — 11721 DEBRIDE NAIL 6 OR MORE: CPT | Mod: 59,Q9,S$PBB, | Performed by: STUDENT IN AN ORGANIZED HEALTH CARE EDUCATION/TRAINING PROGRAM

## 2021-05-27 PROCEDURE — 99213 OFFICE O/P EST LOW 20 MIN: CPT | Mod: PBBFAC,PN,25 | Performed by: STUDENT IN AN ORGANIZED HEALTH CARE EDUCATION/TRAINING PROGRAM

## 2021-05-27 PROCEDURE — 11056 ROUTINE FOOT CARE: ICD-10-PCS | Mod: Q9,S$PBB,, | Performed by: STUDENT IN AN ORGANIZED HEALTH CARE EDUCATION/TRAINING PROGRAM

## 2021-05-27 PROCEDURE — 99214 OFFICE O/P EST MOD 30 MIN: CPT | Mod: 25,S$PBB,, | Performed by: STUDENT IN AN ORGANIZED HEALTH CARE EDUCATION/TRAINING PROGRAM

## 2021-05-27 PROCEDURE — 99214 PR OFFICE/OUTPT VISIT, EST, LEVL IV, 30-39 MIN: ICD-10-PCS | Mod: 25,S$PBB,, | Performed by: STUDENT IN AN ORGANIZED HEALTH CARE EDUCATION/TRAINING PROGRAM

## 2021-05-28 ENCOUNTER — HOSPITAL ENCOUNTER (OUTPATIENT)
Dept: RADIOLOGY | Facility: HOSPITAL | Age: 76
Discharge: HOME | End: 2021-05-28
Attending: INTERNAL MEDICINE
Payer: MEDICARE

## 2021-05-28 DIAGNOSIS — I73.9 PAD (PERIPHERAL ARTERY DISEASE): ICD-10-CM

## 2021-05-28 PROCEDURE — 93925 LOWER EXTREMITY STUDY: CPT | Mod: TC,PO

## 2021-05-28 RX ORDER — INSULIN ASPART 100 [IU]/ML
40 INJECTION, SOLUTION INTRAVENOUS; SUBCUTANEOUS CONTINUOUS
Qty: 40 ML | Refills: 3 | Status: ON HOLD | OUTPATIENT
Start: 2021-05-28 | End: 2021-06-30 | Stop reason: HOSPADM

## 2021-05-28 RX ORDER — AMLODIPINE BESYLATE 10 MG/1
10 TABLET ORAL DAILY
Qty: 30 TABLET | Refills: 3 | Status: SHIPPED | OUTPATIENT
Start: 2021-05-28 | End: 2021-11-24

## 2021-05-31 ENCOUNTER — OFFICE VISIT (OUTPATIENT)
Dept: INFECTIOUS DISEASES | Facility: CLINIC | Age: 76
End: 2021-05-31
Payer: MEDICARE

## 2021-05-31 ENCOUNTER — HOSPITAL ENCOUNTER (OUTPATIENT)
Dept: WOUND CARE | Facility: HOSPITAL | Age: 76
Discharge: HOME | End: 2021-05-31
Attending: PREVENTIVE MEDICINE
Payer: MEDICARE

## 2021-05-31 VITALS
HEIGHT: 75 IN | WEIGHT: 210 LBS | HEART RATE: 80 BPM | BODY MASS INDEX: 26.11 KG/M2 | SYSTOLIC BLOOD PRESSURE: 165 MMHG | DIASTOLIC BLOOD PRESSURE: 63 MMHG

## 2021-05-31 DIAGNOSIS — I73.9 PAD (PERIPHERAL ARTERY DISEASE): Primary | ICD-10-CM

## 2021-05-31 DIAGNOSIS — Z79.4 TYPE 2 DIABETES MELLITUS WITH DIABETIC NEPHROPATHY, WITH LONG-TERM CURRENT USE OF INSULIN: ICD-10-CM

## 2021-05-31 DIAGNOSIS — M86.8X7 OTHER OSTEOMYELITIS OF LEFT FOOT: Primary | ICD-10-CM

## 2021-05-31 DIAGNOSIS — E11.21 TYPE 2 DIABETES MELLITUS WITH DIABETIC NEPHROPATHY, WITH LONG-TERM CURRENT USE OF INSULIN: ICD-10-CM

## 2021-05-31 PROCEDURE — 99213 OFFICE O/P EST LOW 20 MIN: CPT | Mod: PBBFAC,PN,25 | Performed by: INTERNAL MEDICINE

## 2021-05-31 PROCEDURE — 99213 PR OFFICE/OUTPT VISIT, EST, LEVL III, 20-29 MIN: ICD-10-PCS | Mod: S$PBB,,, | Performed by: INTERNAL MEDICINE

## 2021-05-31 PROCEDURE — 99999 PR PBB SHADOW E&M-EST. PATIENT-LVL III: CPT | Mod: PBBFAC,,, | Performed by: INTERNAL MEDICINE

## 2021-05-31 PROCEDURE — 99213 OFFICE O/P EST LOW 20 MIN: CPT | Mod: S$PBB,,, | Performed by: INTERNAL MEDICINE

## 2021-05-31 PROCEDURE — 93923 UPR/LXTR ART STDY 3+ LVLS: CPT | Mod: CCAT

## 2021-05-31 PROCEDURE — 99999 PR PBB SHADOW E&M-EST. PATIENT-LVL III: ICD-10-PCS | Mod: PBBFAC,,, | Performed by: INTERNAL MEDICINE

## 2021-06-03 ENCOUNTER — EXTERNAL HOME HEALTH (OUTPATIENT)
Dept: HOME HEALTH SERVICES | Facility: HOSPITAL | Age: 76
End: 2021-06-03
Payer: MEDICARE

## 2021-06-07 ENCOUNTER — OFFICE VISIT (OUTPATIENT)
Dept: INTERNAL MEDICINE | Facility: CLINIC | Age: 76
End: 2021-06-07
Payer: MEDICARE

## 2021-06-07 ENCOUNTER — PATIENT MESSAGE (OUTPATIENT)
Dept: INTERNAL MEDICINE | Facility: CLINIC | Age: 76
End: 2021-06-07

## 2021-06-07 VITALS
TEMPERATURE: 98 F | BODY MASS INDEX: 26.26 KG/M2 | RESPIRATION RATE: 18 BRPM | WEIGHT: 211.19 LBS | DIASTOLIC BLOOD PRESSURE: 58 MMHG | SYSTOLIC BLOOD PRESSURE: 112 MMHG | HEIGHT: 75 IN | OXYGEN SATURATION: 100 % | HEART RATE: 63 BPM

## 2021-06-07 DIAGNOSIS — R63.4 WEIGHT LOSS: ICD-10-CM

## 2021-06-07 DIAGNOSIS — E10.22 TYPE 1 DIABETES MELLITUS WITH STAGE 4 CHRONIC KIDNEY DISEASE: Primary | ICD-10-CM

## 2021-06-07 DIAGNOSIS — N18.4 TYPE 1 DIABETES MELLITUS WITH STAGE 4 CHRONIC KIDNEY DISEASE: Primary | ICD-10-CM

## 2021-06-07 PROCEDURE — 99213 OFFICE O/P EST LOW 20 MIN: CPT | Mod: S$PBB,,, | Performed by: INTERNAL MEDICINE

## 2021-06-07 PROCEDURE — 99215 OFFICE O/P EST HI 40 MIN: CPT | Mod: PBBFAC,PO | Performed by: INTERNAL MEDICINE

## 2021-06-07 PROCEDURE — 99999 PR PBB SHADOW E&M-EST. PATIENT-LVL V: ICD-10-PCS | Mod: PBBFAC,,, | Performed by: INTERNAL MEDICINE

## 2021-06-07 PROCEDURE — 99999 PR PBB SHADOW E&M-EST. PATIENT-LVL V: CPT | Mod: PBBFAC,,, | Performed by: INTERNAL MEDICINE

## 2021-06-07 PROCEDURE — 99213 PR OFFICE/OUTPT VISIT, EST, LEVL III, 20-29 MIN: ICD-10-PCS | Mod: S$PBB,,, | Performed by: INTERNAL MEDICINE

## 2021-06-08 ENCOUNTER — DOCUMENT SCAN (OUTPATIENT)
Dept: HOME HEALTH SERVICES | Facility: HOSPITAL | Age: 76
End: 2021-06-08

## 2021-06-08 ENCOUNTER — DOCUMENT SCAN (OUTPATIENT)
Dept: HOME HEALTH SERVICES | Facility: HOSPITAL | Age: 76
End: 2021-06-08
Payer: MEDICARE

## 2021-06-09 ENCOUNTER — TELEPHONE (OUTPATIENT)
Dept: INTERNAL MEDICINE | Facility: CLINIC | Age: 76
End: 2021-06-09
Payer: MEDICARE

## 2021-06-09 ENCOUNTER — OFFICE VISIT (OUTPATIENT)
Dept: CARDIOLOGY | Facility: CLINIC | Age: 76
End: 2021-06-09
Payer: MEDICARE

## 2021-06-09 VITALS
DIASTOLIC BLOOD PRESSURE: 64 MMHG | WEIGHT: 212 LBS | HEIGHT: 75 IN | HEART RATE: 66 BPM | BODY MASS INDEX: 26.36 KG/M2 | SYSTOLIC BLOOD PRESSURE: 135 MMHG

## 2021-06-09 DIAGNOSIS — E11.22 CKD STAGE 4 DUE TO TYPE 2 DIABETES MELLITUS: ICD-10-CM

## 2021-06-09 DIAGNOSIS — I50.32 CHRONIC DIASTOLIC CONGESTIVE HEART FAILURE: Chronic | ICD-10-CM

## 2021-06-09 DIAGNOSIS — N18.4 CKD STAGE 4 DUE TO TYPE 2 DIABETES MELLITUS: ICD-10-CM

## 2021-06-09 DIAGNOSIS — I70.0 AORTIC ARCH ATHEROSCLEROSIS: ICD-10-CM

## 2021-06-09 DIAGNOSIS — I15.2 HYPERTENSION ASSOCIATED WITH DIABETES: ICD-10-CM

## 2021-06-09 DIAGNOSIS — E78.5 HYPERLIPIDEMIA ASSOCIATED WITH TYPE 2 DIABETES MELLITUS: ICD-10-CM

## 2021-06-09 DIAGNOSIS — E11.69 HYPERLIPIDEMIA ASSOCIATED WITH TYPE 2 DIABETES MELLITUS: ICD-10-CM

## 2021-06-09 DIAGNOSIS — I25.10 CORONARY ARTERY DISEASE WITHOUT ANGINA PECTORIS, UNSPECIFIED VESSEL OR LESION TYPE, UNSPECIFIED WHETHER NATIVE OR TRANSPLANTED HEART: ICD-10-CM

## 2021-06-09 DIAGNOSIS — E11.59 HYPERTENSION ASSOCIATED WITH DIABETES: ICD-10-CM

## 2021-06-09 DIAGNOSIS — I73.9 PAD (PERIPHERAL ARTERY DISEASE): Primary | ICD-10-CM

## 2021-06-09 PROCEDURE — 99215 OFFICE O/P EST HI 40 MIN: CPT | Mod: S$PBB,,, | Performed by: INTERNAL MEDICINE

## 2021-06-09 PROCEDURE — 99213 OFFICE O/P EST LOW 20 MIN: CPT | Mod: PBBFAC,PO | Performed by: INTERNAL MEDICINE

## 2021-06-09 PROCEDURE — 99215 PR OFFICE/OUTPT VISIT, EST, LEVL V, 40-54 MIN: ICD-10-PCS | Mod: S$PBB,,, | Performed by: INTERNAL MEDICINE

## 2021-06-09 PROCEDURE — 99999 PR PBB SHADOW E&M-EST. PATIENT-LVL III: CPT | Mod: PBBFAC,,, | Performed by: INTERNAL MEDICINE

## 2021-06-09 PROCEDURE — 99999 PR PBB SHADOW E&M-EST. PATIENT-LVL III: ICD-10-PCS | Mod: PBBFAC,,, | Performed by: INTERNAL MEDICINE

## 2021-06-10 ENCOUNTER — TELEPHONE (OUTPATIENT)
Dept: CARDIOLOGY | Facility: CLINIC | Age: 76
End: 2021-06-10

## 2021-06-10 DIAGNOSIS — Z01.818 PREOP EXAMINATION: Primary | ICD-10-CM

## 2021-06-11 ENCOUNTER — TELEPHONE (OUTPATIENT)
Dept: INTERNAL MEDICINE | Facility: CLINIC | Age: 76
End: 2021-06-11

## 2021-06-14 ENCOUNTER — LAB VISIT (OUTPATIENT)
Dept: LAB | Facility: HOSPITAL | Age: 76
End: 2021-06-14
Attending: INTERNAL MEDICINE
Payer: MEDICARE

## 2021-06-14 DIAGNOSIS — E11.621 TYPE 2 DIABETES MELLITUS WITH FOOT ULCER: Primary | ICD-10-CM

## 2021-06-14 DIAGNOSIS — L97.509 TYPE 2 DIABETES MELLITUS WITH FOOT ULCER: Primary | ICD-10-CM

## 2021-06-14 DIAGNOSIS — L97.529 NON-PRESSURE CHRONIC ULCER OF OTHER PART OF LEFT FOOT WITH UNSPECIFIED SEVERITY: ICD-10-CM

## 2021-06-14 LAB
ALBUMIN SERPL BCP-MCNC: 3.6 G/DL (ref 3.5–5.2)
ALP SERPL-CCNC: 70 U/L (ref 38–126)
ALT SERPL W/O P-5'-P-CCNC: 13 U/L (ref 10–44)
ANION GAP SERPL CALC-SCNC: 18 MMOL/L (ref 8–16)
AST SERPL-CCNC: 21 U/L (ref 15–46)
BASOPHILS # BLD AUTO: 0.07 K/UL (ref 0–0.2)
BASOPHILS NFR BLD: 1 % (ref 0–1.9)
BILIRUB SERPL-MCNC: 0.5 MG/DL (ref 0.1–1)
CALCIUM SERPL-MCNC: 9.2 MG/DL (ref 8.7–10.5)
CHLORIDE SERPL-SCNC: 105 MMOL/L (ref 95–110)
CO2 SERPL-SCNC: 18 MMOL/L (ref 23–29)
CREAT SERPL-MCNC: 14.53 MG/DL (ref 0.5–1.4)
CRP SERPL-MCNC: 0.54 MG/DL (ref 0–1)
DIFFERENTIAL METHOD: ABNORMAL
EOSINOPHIL # BLD AUTO: 0.2 K/UL (ref 0–0.5)
EOSINOPHIL NFR BLD: 3 % (ref 0–8)
ERYTHROCYTE [DISTWIDTH] IN BLOOD BY AUTOMATED COUNT: 13.1 % (ref 11.5–14.5)
ERYTHROCYTE [SEDIMENTATION RATE] IN BLOOD BY WESTERGREN METHOD: >120 MM/HR (ref 0–10)
EST. GFR  (AFRICAN AMERICAN): 3.3 ML/MIN/1.73 M^2
EST. GFR  (NON AFRICAN AMERICAN): 2.9 ML/MIN/1.73 M^2
GLUCOSE SERPL-MCNC: 97 MG/DL (ref 70–110)
HCT VFR BLD AUTO: 22.3 % (ref 40–54)
HGB BLD-MCNC: 7.2 G/DL (ref 14–18)
IMM GRANULOCYTES # BLD AUTO: 0.02 K/UL (ref 0–0.04)
IMM GRANULOCYTES NFR BLD AUTO: 0.3 % (ref 0–0.5)
IRON SERPL-MCNC: 55 UG/DL (ref 45–160)
LYMPHOCYTES # BLD AUTO: 1.5 K/UL (ref 1–4.8)
LYMPHOCYTES NFR BLD: 21 % (ref 18–48)
MCH RBC QN AUTO: 29.5 PG (ref 27–31)
MCHC RBC AUTO-ENTMCNC: 32.3 G/DL (ref 32–36)
MCV RBC AUTO: 91 FL (ref 82–98)
MONOCYTES # BLD AUTO: 0.6 K/UL (ref 0.3–1)
MONOCYTES NFR BLD: 8.8 % (ref 4–15)
NEUTROPHILS # BLD AUTO: 4.8 K/UL (ref 1.8–7.7)
NEUTROPHILS NFR BLD: 65.9 % (ref 38–73)
NRBC BLD-RTO: 0 /100 WBC
PLATELET # BLD AUTO: 87 K/UL (ref 150–450)
PMV BLD AUTO: 12.8 FL (ref 9.2–12.9)
POTASSIUM SERPL-SCNC: 4.6 MMOL/L (ref 3.5–5.1)
PROT SERPL-MCNC: 7.2 G/DL (ref 6–8.4)
RBC # BLD AUTO: 2.44 M/UL (ref 4.6–6.2)
SATURATED IRON: 28 % (ref 20–50)
SODIUM SERPL-SCNC: 141 MMOL/L (ref 136–145)
TOTAL IRON BINDING CAPACITY: 194 UG/DL (ref 250–450)
TRANSFERRIN SERPL-MCNC: 131 MG/DL (ref 200–375)
UUN UR-MCNC: 148 MG/DL (ref 2–20)
WBC # BLD AUTO: 7.25 K/UL (ref 3.9–12.7)

## 2021-06-14 PROCEDURE — 83540 ASSAY OF IRON: CPT | Mod: PO | Performed by: INTERNAL MEDICINE

## 2021-06-14 PROCEDURE — 85652 RBC SED RATE AUTOMATED: CPT | Performed by: INTERNAL MEDICINE

## 2021-06-14 PROCEDURE — 86140 C-REACTIVE PROTEIN: CPT | Mod: PO | Performed by: INTERNAL MEDICINE

## 2021-06-14 PROCEDURE — 85025 COMPLETE CBC W/AUTO DIFF WBC: CPT | Mod: PO | Performed by: INTERNAL MEDICINE

## 2021-06-14 PROCEDURE — 36415 COLL VENOUS BLD VENIPUNCTURE: CPT | Mod: PO | Performed by: INTERNAL MEDICINE

## 2021-06-14 PROCEDURE — 80053 COMPREHEN METABOLIC PANEL: CPT | Mod: PO | Performed by: INTERNAL MEDICINE

## 2021-06-15 ENCOUNTER — TELEPHONE (OUTPATIENT)
Dept: NEPHROLOGY | Facility: CLINIC | Age: 76
End: 2021-06-15

## 2021-06-15 ENCOUNTER — DOCUMENT SCAN (OUTPATIENT)
Dept: HOME HEALTH SERVICES | Facility: HOSPITAL | Age: 76
End: 2021-06-15
Payer: MEDICARE

## 2021-06-15 RX ORDER — HEPARIN 100 UNIT/ML
5 SYRINGE INTRAVENOUS
Status: CANCELLED | OUTPATIENT
Start: 2021-06-21

## 2021-06-15 RX ORDER — METHYLPREDNISOLONE SOD SUCC 125 MG
125 VIAL (EA) INJECTION ONCE AS NEEDED
Status: CANCELLED | OUTPATIENT
Start: 2021-06-21

## 2021-06-15 RX ORDER — EPINEPHRINE 0.3 MG/.3ML
0.3 INJECTION SUBCUTANEOUS ONCE AS NEEDED
Status: CANCELLED | OUTPATIENT
Start: 2021-06-21

## 2021-06-15 RX ORDER — SODIUM CHLORIDE 9 MG/ML
INJECTION, SOLUTION INTRAVENOUS CONTINUOUS
Status: CANCELLED | OUTPATIENT
Start: 2021-06-21

## 2021-06-15 RX ORDER — SODIUM CHLORIDE 0.9 % (FLUSH) 0.9 %
10 SYRINGE (ML) INJECTION
Status: CANCELLED | OUTPATIENT
Start: 2021-06-21

## 2021-06-15 RX ORDER — DIPHENHYDRAMINE HYDROCHLORIDE 50 MG/ML
50 INJECTION INTRAMUSCULAR; INTRAVENOUS ONCE AS NEEDED
Status: CANCELLED | OUTPATIENT
Start: 2021-06-21

## 2021-06-16 ENCOUNTER — TELEPHONE (OUTPATIENT)
Dept: INFECTIOUS DISEASES | Facility: HOSPITAL | Age: 76
End: 2021-06-16

## 2021-06-17 ENCOUNTER — INFUSION (OUTPATIENT)
Dept: INFECTIOUS DISEASES | Facility: HOSPITAL | Age: 76
End: 2021-06-17
Attending: INTERNAL MEDICINE
Payer: MEDICARE

## 2021-06-17 VITALS
RESPIRATION RATE: 20 BRPM | WEIGHT: 208.56 LBS | BODY MASS INDEX: 25.93 KG/M2 | DIASTOLIC BLOOD PRESSURE: 69 MMHG | TEMPERATURE: 98 F | HEART RATE: 71 BPM | HEIGHT: 75 IN | OXYGEN SATURATION: 99 % | SYSTOLIC BLOOD PRESSURE: 159 MMHG

## 2021-06-17 DIAGNOSIS — D63.1 ANEMIA OF CHRONIC RENAL FAILURE, STAGE 4 (SEVERE): Primary | ICD-10-CM

## 2021-06-17 DIAGNOSIS — N18.4 ANEMIA OF CHRONIC RENAL FAILURE, STAGE 4 (SEVERE): Primary | ICD-10-CM

## 2021-06-17 PROCEDURE — 25000003 PHARM REV CODE 250: Performed by: INTERNAL MEDICINE

## 2021-06-17 PROCEDURE — 63600175 PHARM REV CODE 636 W HCPCS: Performed by: INTERNAL MEDICINE

## 2021-06-17 PROCEDURE — 96365 THER/PROPH/DIAG IV INF INIT: CPT

## 2021-06-17 RX ORDER — SODIUM CHLORIDE 0.9 % (FLUSH) 0.9 %
10 SYRINGE (ML) INJECTION
Status: DISCONTINUED | OUTPATIENT
Start: 2021-06-17 | End: 2021-06-17 | Stop reason: HOSPADM

## 2021-06-17 RX ORDER — METHYLPREDNISOLONE SOD SUCC 125 MG
125 VIAL (EA) INJECTION ONCE AS NEEDED
Status: CANCELLED | OUTPATIENT
Start: 2021-06-24

## 2021-06-17 RX ORDER — DIPHENHYDRAMINE HYDROCHLORIDE 50 MG/ML
50 INJECTION INTRAMUSCULAR; INTRAVENOUS ONCE AS NEEDED
Status: DISCONTINUED | OUTPATIENT
Start: 2021-06-17 | End: 2021-06-17 | Stop reason: HOSPADM

## 2021-06-17 RX ORDER — SODIUM CHLORIDE 9 MG/ML
INJECTION, SOLUTION INTRAVENOUS CONTINUOUS
Status: CANCELLED | OUTPATIENT
Start: 2021-06-24

## 2021-06-17 RX ORDER — SODIUM CHLORIDE 0.9 % (FLUSH) 0.9 %
10 SYRINGE (ML) INJECTION
Status: CANCELLED | OUTPATIENT
Start: 2021-06-24

## 2021-06-17 RX ORDER — METHYLPREDNISOLONE SOD SUCC 125 MG
125 VIAL (EA) INJECTION ONCE AS NEEDED
Status: DISCONTINUED | OUTPATIENT
Start: 2021-06-17 | End: 2021-06-17 | Stop reason: HOSPADM

## 2021-06-17 RX ORDER — EPINEPHRINE 0.3 MG/.3ML
0.3 INJECTION SUBCUTANEOUS ONCE AS NEEDED
Status: DISCONTINUED | OUTPATIENT
Start: 2021-06-17 | End: 2021-06-17 | Stop reason: HOSPADM

## 2021-06-17 RX ORDER — HEPARIN 100 UNIT/ML
5 SYRINGE INTRAVENOUS
Status: DISCONTINUED | OUTPATIENT
Start: 2021-06-17 | End: 2021-06-17 | Stop reason: HOSPADM

## 2021-06-17 RX ORDER — EPINEPHRINE 0.3 MG/.3ML
0.3 INJECTION SUBCUTANEOUS ONCE AS NEEDED
Status: CANCELLED | OUTPATIENT
Start: 2021-06-24

## 2021-06-17 RX ORDER — SODIUM CHLORIDE 9 MG/ML
INJECTION, SOLUTION INTRAVENOUS CONTINUOUS
Status: DISCONTINUED | OUTPATIENT
Start: 2021-06-17 | End: 2021-06-17 | Stop reason: HOSPADM

## 2021-06-17 RX ORDER — HEPARIN 100 UNIT/ML
5 SYRINGE INTRAVENOUS
Status: CANCELLED | OUTPATIENT
Start: 2021-06-24

## 2021-06-17 RX ORDER — DIPHENHYDRAMINE HYDROCHLORIDE 50 MG/ML
50 INJECTION INTRAMUSCULAR; INTRAVENOUS ONCE AS NEEDED
Status: CANCELLED | OUTPATIENT
Start: 2021-06-24

## 2021-06-17 RX ADMIN — FERRIC CARBOXYMALTOSE INJECTION 750 MG: 50 INJECTION, SOLUTION INTRAVENOUS at 11:06

## 2021-06-17 RX ADMIN — HEPARIN 500 UNITS: 100 SYRINGE at 01:06

## 2021-06-17 RX ADMIN — SODIUM CHLORIDE 250 ML/HR: 0.9 INJECTION, SOLUTION INTRAVENOUS at 11:06

## 2021-06-18 DIAGNOSIS — N18.4 ANEMIA OF CHRONIC RENAL FAILURE, STAGE 4 (SEVERE): Primary | ICD-10-CM

## 2021-06-18 DIAGNOSIS — D63.1 ANEMIA OF CHRONIC RENAL FAILURE, STAGE 4 (SEVERE): Primary | ICD-10-CM

## 2021-06-20 ENCOUNTER — PATIENT MESSAGE (OUTPATIENT)
Dept: INTERNAL MEDICINE | Facility: CLINIC | Age: 76
End: 2021-06-20

## 2021-06-21 ENCOUNTER — TELEPHONE (OUTPATIENT)
Dept: NEPHROLOGY | Facility: CLINIC | Age: 76
End: 2021-06-21

## 2021-06-21 ENCOUNTER — LAB VISIT (OUTPATIENT)
Dept: LAB | Facility: HOSPITAL | Age: 76
End: 2021-06-21
Attending: INTERNAL MEDICINE
Payer: MEDICARE

## 2021-06-21 DIAGNOSIS — E11.621 TYPE 2 DIABETES MELLITUS WITH FOOT ULCER: Primary | ICD-10-CM

## 2021-06-21 DIAGNOSIS — L97.509 TYPE 2 DIABETES MELLITUS WITH FOOT ULCER: Primary | ICD-10-CM

## 2021-06-21 LAB
ALBUMIN SERPL BCP-MCNC: 4 G/DL (ref 3.5–5.2)
ALP SERPL-CCNC: 70 U/L (ref 38–126)
ALT SERPL W/O P-5'-P-CCNC: 12 U/L (ref 10–44)
ANION GAP SERPL CALC-SCNC: 22 MMOL/L (ref 8–16)
AST SERPL-CCNC: 22 U/L (ref 15–46)
BASOPHILS # BLD AUTO: 0.05 K/UL (ref 0–0.2)
BASOPHILS NFR BLD: 0.7 % (ref 0–1.9)
BILIRUB SERPL-MCNC: 0.4 MG/DL (ref 0.1–1)
CALCIUM SERPL-MCNC: 9.4 MG/DL (ref 8.7–10.5)
CHLORIDE SERPL-SCNC: 101 MMOL/L (ref 95–110)
CO2 SERPL-SCNC: 17 MMOL/L (ref 23–29)
CREAT SERPL-MCNC: 13.96 MG/DL (ref 0.5–1.4)
CRP SERPL-MCNC: 0.3 MG/DL (ref 0–1)
DIFFERENTIAL METHOD: ABNORMAL
EOSINOPHIL # BLD AUTO: 0.2 K/UL (ref 0–0.5)
EOSINOPHIL NFR BLD: 3.1 % (ref 0–8)
ERYTHROCYTE [DISTWIDTH] IN BLOOD BY AUTOMATED COUNT: 13.2 % (ref 11.5–14.5)
ERYTHROCYTE [SEDIMENTATION RATE] IN BLOOD BY WESTERGREN METHOD: 109 MM/HR (ref 0–10)
EST. GFR  (AFRICAN AMERICAN): 3.5 ML/MIN/1.73 M^2
EST. GFR  (NON AFRICAN AMERICAN): 3 ML/MIN/1.73 M^2
GLUCOSE SERPL-MCNC: 143 MG/DL (ref 70–110)
HCT VFR BLD AUTO: 22.6 % (ref 40–54)
HGB BLD-MCNC: 7.6 G/DL (ref 14–18)
IMM GRANULOCYTES # BLD AUTO: 0.04 K/UL (ref 0–0.04)
IMM GRANULOCYTES NFR BLD AUTO: 0.5 % (ref 0–0.5)
LYMPHOCYTES # BLD AUTO: 1.2 K/UL (ref 1–4.8)
LYMPHOCYTES NFR BLD: 15.4 % (ref 18–48)
MCH RBC QN AUTO: 30.4 PG (ref 27–31)
MCHC RBC AUTO-ENTMCNC: 33.6 G/DL (ref 32–36)
MCV RBC AUTO: 90 FL (ref 82–98)
MONOCYTES # BLD AUTO: 0.6 K/UL (ref 0.3–1)
MONOCYTES NFR BLD: 8.4 % (ref 4–15)
NEUTROPHILS # BLD AUTO: 5.4 K/UL (ref 1.8–7.7)
NEUTROPHILS NFR BLD: 71.9 % (ref 38–73)
NRBC BLD-RTO: 0 /100 WBC
PLATELET # BLD AUTO: 94 K/UL (ref 150–450)
PMV BLD AUTO: 12.7 FL (ref 9.2–12.9)
POTASSIUM SERPL-SCNC: 3.7 MMOL/L (ref 3.5–5.1)
PROT SERPL-MCNC: 7.8 G/DL (ref 6–8.4)
RBC # BLD AUTO: 2.5 M/UL (ref 4.6–6.2)
SODIUM SERPL-SCNC: 140 MMOL/L (ref 136–145)
UUN UR-MCNC: 153 MG/DL (ref 2–20)
WBC # BLD AUTO: 7.51 K/UL (ref 3.9–12.7)

## 2021-06-21 PROCEDURE — 85025 COMPLETE CBC W/AUTO DIFF WBC: CPT | Mod: PO | Performed by: INTERNAL MEDICINE

## 2021-06-21 PROCEDURE — 86140 C-REACTIVE PROTEIN: CPT | Mod: PO | Performed by: INTERNAL MEDICINE

## 2021-06-21 PROCEDURE — 85652 RBC SED RATE AUTOMATED: CPT | Performed by: INTERNAL MEDICINE

## 2021-06-21 PROCEDURE — 80053 COMPREHEN METABOLIC PANEL: CPT | Mod: PO | Performed by: INTERNAL MEDICINE

## 2021-06-21 PROCEDURE — 36415 COLL VENOUS BLD VENIPUNCTURE: CPT | Mod: PO | Performed by: INTERNAL MEDICINE

## 2021-06-21 RX ORDER — METOPROLOL SUCCINATE 50 MG/1
50 TABLET, EXTENDED RELEASE ORAL DAILY
Qty: 30 TABLET | Refills: 3 | Status: SHIPPED | OUTPATIENT
Start: 2021-06-21 | End: 2022-01-01

## 2021-06-21 RX ORDER — ISOSORBIDE MONONITRATE 120 MG/1
120 TABLET, EXTENDED RELEASE ORAL DAILY
Qty: 30 TABLET | Refills: 3 | Status: SHIPPED | OUTPATIENT
Start: 2021-06-21 | End: 2022-01-01

## 2021-06-22 ENCOUNTER — OFFICE VISIT (OUTPATIENT)
Dept: NEPHROLOGY | Facility: CLINIC | Age: 76
End: 2021-06-22
Payer: MEDICARE

## 2021-06-22 ENCOUNTER — HOSPITAL ENCOUNTER (INPATIENT)
Facility: HOSPITAL | Age: 76
LOS: 8 days | Discharge: HOME | DRG: 674 | End: 2021-06-30
Attending: EMERGENCY MEDICINE | Admitting: HOSPITALIST
Payer: MEDICARE

## 2021-06-22 VITALS
HEIGHT: 75 IN | OXYGEN SATURATION: 98 % | HEART RATE: 68 BPM | SYSTOLIC BLOOD PRESSURE: 120 MMHG | BODY MASS INDEX: 26.1 KG/M2 | WEIGHT: 209.88 LBS | DIASTOLIC BLOOD PRESSURE: 60 MMHG

## 2021-06-22 DIAGNOSIS — N17.9 ACUTE RENAL FAILURE: ICD-10-CM

## 2021-06-22 DIAGNOSIS — R53.83 FATIGUE: ICD-10-CM

## 2021-06-22 DIAGNOSIS — N17.9 ACUTE KIDNEY INJURY SUPERIMPOSED ON CHRONIC KIDNEY DISEASE: Primary | ICD-10-CM

## 2021-06-22 DIAGNOSIS — N17.9 AKI (ACUTE KIDNEY INJURY): ICD-10-CM

## 2021-06-22 DIAGNOSIS — Z01.818 PRE-OP EVALUATION: ICD-10-CM

## 2021-06-22 DIAGNOSIS — N18.9 ACUTE KIDNEY INJURY SUPERIMPOSED ON CKD: ICD-10-CM

## 2021-06-22 DIAGNOSIS — L97.529 ULCER OF LEFT FOOT, UNSPECIFIED ULCER STAGE: ICD-10-CM

## 2021-06-22 DIAGNOSIS — N17.9 AKI (ACUTE KIDNEY INJURY): Primary | ICD-10-CM

## 2021-06-22 DIAGNOSIS — N18.9 ACUTE KIDNEY INJURY SUPERIMPOSED ON CHRONIC KIDNEY DISEASE: Primary | ICD-10-CM

## 2021-06-22 DIAGNOSIS — R07.9 CHEST PAIN: ICD-10-CM

## 2021-06-22 DIAGNOSIS — Z99.2 ESRD (END STAGE RENAL DISEASE) ON DIALYSIS: ICD-10-CM

## 2021-06-22 DIAGNOSIS — M86.372 CHRONIC MULTIFOCAL OSTEOMYELITIS OF LEFT FOOT: ICD-10-CM

## 2021-06-22 DIAGNOSIS — N18.6 ESRD (END STAGE RENAL DISEASE) ON DIALYSIS: ICD-10-CM

## 2021-06-22 DIAGNOSIS — N17.9 ACUTE KIDNEY INJURY SUPERIMPOSED ON CKD: ICD-10-CM

## 2021-06-22 LAB
ALBUMIN SERPL BCP-MCNC: 3 G/DL (ref 3.5–5.2)
ALP SERPL-CCNC: 64 U/L (ref 55–135)
ALT SERPL W/O P-5'-P-CCNC: 11 U/L (ref 10–44)
AMORPH CRY UR QL COMP ASSIST: NORMAL
ANION GAP SERPL CALC-SCNC: 22 MMOL/L (ref 8–16)
AST SERPL-CCNC: 16 U/L (ref 10–40)
BACTERIA #/AREA URNS AUTO: NORMAL /HPF
BASOPHILS # BLD AUTO: 0.08 K/UL (ref 0–0.2)
BASOPHILS NFR BLD: 0.9 % (ref 0–1.9)
BILIRUB SERPL-MCNC: 0.3 MG/DL (ref 0.1–1)
BILIRUB UR QL STRIP: NEGATIVE
BNP SERPL-MCNC: 389 PG/ML (ref 0–99)
BUN SERPL-MCNC: 151 MG/DL (ref 8–23)
CALCIUM SERPL-MCNC: 9.5 MG/DL (ref 8.7–10.5)
CHLORIDE SERPL-SCNC: 103 MMOL/L (ref 95–110)
CLARITY UR REFRACT.AUTO: ABNORMAL
CO2 SERPL-SCNC: 15 MMOL/L (ref 23–29)
COLOR UR AUTO: YELLOW
CREAT SERPL-MCNC: 14.4 MG/DL (ref 0.5–1.4)
CTP QC/QA: YES
DIFFERENTIAL METHOD: ABNORMAL
EOSINOPHIL # BLD AUTO: 0.3 K/UL (ref 0–0.5)
EOSINOPHIL NFR BLD: 3.2 % (ref 0–8)
ERYTHROCYTE [DISTWIDTH] IN BLOOD BY AUTOMATED COUNT: 13.3 % (ref 11.5–14.5)
EST. GFR  (AFRICAN AMERICAN): 3.3 ML/MIN/1.73 M^2
EST. GFR  (NON AFRICAN AMERICAN): 2.9 ML/MIN/1.73 M^2
GLUCOSE SERPL-MCNC: 89 MG/DL (ref 70–110)
GLUCOSE UR QL STRIP: ABNORMAL
HCT VFR BLD AUTO: 23.1 % (ref 40–54)
HGB BLD-MCNC: 7.7 G/DL (ref 14–18)
HGB UR QL STRIP: ABNORMAL
HYALINE CASTS UR QL AUTO: 0 /LPF
IMM GRANULOCYTES # BLD AUTO: 0.04 K/UL (ref 0–0.04)
IMM GRANULOCYTES NFR BLD AUTO: 0.4 % (ref 0–0.5)
KETONES UR QL STRIP: NEGATIVE
LEUKOCYTE ESTERASE UR QL STRIP: NEGATIVE
LYMPHOCYTES # BLD AUTO: 1.9 K/UL (ref 1–4.8)
LYMPHOCYTES NFR BLD: 20.8 % (ref 18–48)
MAGNESIUM SERPL-MCNC: 1.8 MG/DL (ref 1.6–2.6)
MCH RBC QN AUTO: 30.2 PG (ref 27–31)
MCHC RBC AUTO-ENTMCNC: 33.3 G/DL (ref 32–36)
MCV RBC AUTO: 91 FL (ref 82–98)
MICROSCOPIC COMMENT: NORMAL
MONOCYTES # BLD AUTO: 0.7 K/UL (ref 0.3–1)
MONOCYTES NFR BLD: 8.2 % (ref 4–15)
NEUTROPHILS # BLD AUTO: 6 K/UL (ref 1.8–7.7)
NEUTROPHILS NFR BLD: 66.5 % (ref 38–73)
NITRITE UR QL STRIP: NEGATIVE
NRBC BLD-RTO: 0 /100 WBC
PH UR STRIP: 5 [PH] (ref 5–8)
PHOSPHATE SERPL-MCNC: 8.3 MG/DL (ref 2.7–4.5)
PLATELET # BLD AUTO: 97 K/UL (ref 150–450)
PMV BLD AUTO: 12.4 FL (ref 9.2–12.9)
POCT GLUCOSE: 75 MG/DL (ref 70–110)
POCT GLUCOSE: 84 MG/DL (ref 70–110)
POTASSIUM SERPL-SCNC: 3.5 MMOL/L (ref 3.5–5.1)
PROT SERPL-MCNC: 8.1 G/DL (ref 6–8.4)
PROT UR QL STRIP: ABNORMAL
RBC # BLD AUTO: 2.55 M/UL (ref 4.6–6.2)
RBC #/AREA URNS AUTO: 2 /HPF (ref 0–4)
SARS-COV-2 RDRP RESP QL NAA+PROBE: NEGATIVE
SODIUM SERPL-SCNC: 140 MMOL/L (ref 136–145)
SP GR UR STRIP: 1.01 (ref 1–1.03)
SQUAMOUS #/AREA URNS AUTO: 6 /HPF
TROPONIN I SERPL DL<=0.01 NG/ML-MCNC: 0.07 NG/ML (ref 0–0.03)
URN SPEC COLLECT METH UR: ABNORMAL
WBC # BLD AUTO: 9.04 K/UL (ref 3.9–12.7)
WBC #/AREA URNS AUTO: 4 /HPF (ref 0–5)

## 2021-06-22 PROCEDURE — U0002 COVID-19 LAB TEST NON-CDC: HCPCS | Performed by: EMERGENCY MEDICINE

## 2021-06-22 PROCEDURE — 99285 EMERGENCY DEPT VISIT HI MDM: CPT | Mod: 25,27

## 2021-06-22 PROCEDURE — 80053 COMPREHEN METABOLIC PANEL: CPT | Performed by: PHYSICIAN ASSISTANT

## 2021-06-22 PROCEDURE — 99999 PR PBB SHADOW E&M-EST. PATIENT-LVL IV: ICD-10-PCS | Mod: PBBFAC,,, | Performed by: INTERNAL MEDICINE

## 2021-06-22 PROCEDURE — 25000003 PHARM REV CODE 250: Performed by: PHYSICIAN ASSISTANT

## 2021-06-22 PROCEDURE — 84100 ASSAY OF PHOSPHORUS: CPT | Performed by: PHYSICIAN ASSISTANT

## 2021-06-22 PROCEDURE — 20600001 HC STEP DOWN PRIVATE ROOM

## 2021-06-22 PROCEDURE — 93010 ELECTROCARDIOGRAM REPORT: CPT | Mod: ,,, | Performed by: INTERNAL MEDICINE

## 2021-06-22 PROCEDURE — 25000003 PHARM REV CODE 250: Performed by: HOSPITALIST

## 2021-06-22 PROCEDURE — 99284 PR EMERGENCY DEPT VISIT,LEVEL IV: ICD-10-PCS | Mod: CS,,, | Performed by: EMERGENCY MEDICINE

## 2021-06-22 PROCEDURE — 93005 ELECTROCARDIOGRAM TRACING: CPT

## 2021-06-22 PROCEDURE — 83880 ASSAY OF NATRIURETIC PEPTIDE: CPT | Performed by: PHYSICIAN ASSISTANT

## 2021-06-22 PROCEDURE — 99999 PR PBB SHADOW E&M-EST. PATIENT-LVL IV: CPT | Mod: PBBFAC,,, | Performed by: INTERNAL MEDICINE

## 2021-06-22 PROCEDURE — 99214 OFFICE O/P EST MOD 30 MIN: CPT | Mod: PBBFAC,25 | Performed by: INTERNAL MEDICINE

## 2021-06-22 PROCEDURE — 93010 EKG 12-LEAD: ICD-10-PCS | Mod: 76,,, | Performed by: INTERNAL MEDICINE

## 2021-06-22 PROCEDURE — 83735 ASSAY OF MAGNESIUM: CPT | Performed by: PHYSICIAN ASSISTANT

## 2021-06-22 PROCEDURE — 85025 COMPLETE CBC W/AUTO DIFF WBC: CPT | Performed by: PHYSICIAN ASSISTANT

## 2021-06-22 PROCEDURE — 99215 PR OFFICE/OUTPT VISIT, EST, LEVL V, 40-54 MIN: ICD-10-PCS | Mod: S$PBB,,, | Performed by: INTERNAL MEDICINE

## 2021-06-22 PROCEDURE — 99284 EMERGENCY DEPT VISIT MOD MDM: CPT | Mod: CS,,, | Performed by: EMERGENCY MEDICINE

## 2021-06-22 PROCEDURE — 96360 HYDRATION IV INFUSION INIT: CPT

## 2021-06-22 PROCEDURE — 84484 ASSAY OF TROPONIN QUANT: CPT | Performed by: PHYSICIAN ASSISTANT

## 2021-06-22 PROCEDURE — 93010 ELECTROCARDIOGRAM REPORT: CPT | Mod: 76,,, | Performed by: INTERNAL MEDICINE

## 2021-06-22 PROCEDURE — 99223 PR INITIAL HOSPITAL CARE,LEVL III: ICD-10-PCS | Mod: AI,,, | Performed by: HOSPITALIST

## 2021-06-22 PROCEDURE — 99215 OFFICE O/P EST HI 40 MIN: CPT | Mod: S$PBB,,, | Performed by: INTERNAL MEDICINE

## 2021-06-22 PROCEDURE — 99223 1ST HOSP IP/OBS HIGH 75: CPT | Mod: AI,,, | Performed by: HOSPITALIST

## 2021-06-22 PROCEDURE — 81001 URINALYSIS AUTO W/SCOPE: CPT | Performed by: PHYSICIAN ASSISTANT

## 2021-06-22 RX ORDER — ASPIRIN 81 MG/1
81 TABLET ORAL DAILY
Status: DISCONTINUED | OUTPATIENT
Start: 2021-06-23 | End: 2021-06-25

## 2021-06-22 RX ORDER — POLYETHYLENE GLYCOL 3350 17 G/17G
17 POWDER, FOR SOLUTION ORAL 2 TIMES DAILY PRN
Status: DISCONTINUED | OUTPATIENT
Start: 2021-06-22 | End: 2021-06-30 | Stop reason: HOSPADM

## 2021-06-22 RX ORDER — CLOPIDOGREL BISULFATE 75 MG/1
75 TABLET ORAL DAILY
Status: DISCONTINUED | OUTPATIENT
Start: 2021-06-23 | End: 2021-06-24

## 2021-06-22 RX ORDER — INSULIN ASPART 100 [IU]/ML
0-5 INJECTION, SOLUTION INTRAVENOUS; SUBCUTANEOUS
Status: DISCONTINUED | OUTPATIENT
Start: 2021-06-22 | End: 2021-06-30 | Stop reason: HOSPADM

## 2021-06-22 RX ORDER — ACETAMINOPHEN 325 MG/1
650 TABLET ORAL EVERY 4 HOURS PRN
Status: DISCONTINUED | OUTPATIENT
Start: 2021-06-22 | End: 2021-06-30 | Stop reason: HOSPADM

## 2021-06-22 RX ORDER — SODIUM CHLORIDE 0.9 % (FLUSH) 0.9 %
10 SYRINGE (ML) INJECTION
Status: DISCONTINUED | OUTPATIENT
Start: 2021-06-22 | End: 2021-06-30 | Stop reason: HOSPADM

## 2021-06-22 RX ORDER — GLUCAGON 1 MG
1 KIT INJECTION
Status: DISCONTINUED | OUTPATIENT
Start: 2021-06-22 | End: 2021-06-30 | Stop reason: HOSPADM

## 2021-06-22 RX ORDER — IBUPROFEN 200 MG
24 TABLET ORAL
Status: DISCONTINUED | OUTPATIENT
Start: 2021-06-22 | End: 2021-06-30 | Stop reason: HOSPADM

## 2021-06-22 RX ORDER — AMLODIPINE BESYLATE 10 MG/1
10 TABLET ORAL DAILY
Status: DISCONTINUED | OUTPATIENT
Start: 2021-06-23 | End: 2021-06-30 | Stop reason: HOSPADM

## 2021-06-22 RX ORDER — TALC
6 POWDER (GRAM) TOPICAL NIGHTLY PRN
Status: DISCONTINUED | OUTPATIENT
Start: 2021-06-22 | End: 2021-06-30 | Stop reason: HOSPADM

## 2021-06-22 RX ORDER — HEPARIN SODIUM 5000 [USP'U]/ML
5000 INJECTION, SOLUTION INTRAVENOUS; SUBCUTANEOUS EVERY 8 HOURS
Status: DISCONTINUED | OUTPATIENT
Start: 2021-06-22 | End: 2021-06-22

## 2021-06-22 RX ORDER — MUPIROCIN 20 MG/G
OINTMENT TOPICAL 2 TIMES DAILY
Status: COMPLETED | OUTPATIENT
Start: 2021-06-22 | End: 2021-06-27

## 2021-06-22 RX ORDER — ONDANSETRON 2 MG/ML
4 INJECTION INTRAMUSCULAR; INTRAVENOUS EVERY 8 HOURS PRN
Status: DISCONTINUED | OUTPATIENT
Start: 2021-06-22 | End: 2021-06-30 | Stop reason: HOSPADM

## 2021-06-22 RX ORDER — SODIUM CHLORIDE 9 MG/ML
INJECTION, SOLUTION INTRAVENOUS CONTINUOUS
Status: DISCONTINUED | OUTPATIENT
Start: 2021-06-22 | End: 2021-06-25

## 2021-06-22 RX ORDER — ISOSORBIDE MONONITRATE 30 MG/1
120 TABLET, EXTENDED RELEASE ORAL DAILY
Status: DISCONTINUED | OUTPATIENT
Start: 2021-06-23 | End: 2021-06-30 | Stop reason: HOSPADM

## 2021-06-22 RX ORDER — LOSARTAN POTASSIUM 50 MG/1
50 TABLET ORAL DAILY
Refills: 0 | Status: DISCONTINUED | OUTPATIENT
Start: 2021-06-23 | End: 2021-06-22

## 2021-06-22 RX ORDER — METOPROLOL SUCCINATE 50 MG/1
50 TABLET, EXTENDED RELEASE ORAL DAILY
Status: DISCONTINUED | OUTPATIENT
Start: 2021-06-23 | End: 2021-06-30 | Stop reason: HOSPADM

## 2021-06-22 RX ORDER — CALCITRIOL 0.25 UG/1
0.25 CAPSULE ORAL
Status: DISCONTINUED | OUTPATIENT
Start: 2021-06-25 | End: 2021-06-30 | Stop reason: HOSPADM

## 2021-06-22 RX ORDER — IBUPROFEN 200 MG
16 TABLET ORAL
Status: DISCONTINUED | OUTPATIENT
Start: 2021-06-22 | End: 2021-06-30 | Stop reason: HOSPADM

## 2021-06-22 RX ADMIN — SODIUM CHLORIDE: 0.9 INJECTION, SOLUTION INTRAVENOUS at 11:06

## 2021-06-22 RX ADMIN — SODIUM CHLORIDE: 0.9 INJECTION, SOLUTION INTRAVENOUS at 08:06

## 2021-06-22 RX ADMIN — SODIUM CHLORIDE 1000 ML: 0.9 INJECTION, SOLUTION INTRAVENOUS at 12:06

## 2021-06-22 RX ADMIN — MUPIROCIN: 20 OINTMENT TOPICAL at 08:06

## 2021-06-23 PROBLEM — N18.9 ACUTE KIDNEY INJURY SUPERIMPOSED ON CKD: Status: ACTIVE | Noted: 2021-06-23

## 2021-06-23 PROBLEM — N17.9 ACUTE KIDNEY INJURY SUPERIMPOSED ON CKD: Status: ACTIVE | Noted: 2021-06-23

## 2021-06-23 LAB
ALBUMIN SERPL BCP-MCNC: 2.9 G/DL (ref 3.5–5.2)
ALBUMIN/CREAT UR: 1096.6 UG/MG (ref 0–30)
ALP SERPL-CCNC: 54 U/L (ref 55–135)
ALT SERPL W/O P-5'-P-CCNC: 10 U/L (ref 10–44)
ANION GAP SERPL CALC-SCNC: 20 MMOL/L (ref 8–16)
AST SERPL-CCNC: 16 U/L (ref 10–40)
BASOPHILS # BLD AUTO: 0.07 K/UL (ref 0–0.2)
BASOPHILS NFR BLD: 0.9 % (ref 0–1.9)
BILIRUB SERPL-MCNC: 0.4 MG/DL (ref 0.1–1)
BUN SERPL-MCNC: 144 MG/DL (ref 8–23)
CALCIUM SERPL-MCNC: 8.9 MG/DL (ref 8.7–10.5)
CHLORIDE SERPL-SCNC: 105 MMOL/L (ref 95–110)
CO2 SERPL-SCNC: 16 MMOL/L (ref 23–29)
CREAT SERPL-MCNC: 13.8 MG/DL (ref 0.5–1.4)
CREAT UR-MCNC: 59 MG/DL (ref 23–375)
CREAT UR-MCNC: 59 MG/DL (ref 23–375)
CRP SERPL-MCNC: 2.1 MG/L (ref 0–8.2)
DIFFERENTIAL METHOD: ABNORMAL
EOSINOPHIL # BLD AUTO: 0.3 K/UL (ref 0–0.5)
EOSINOPHIL NFR BLD: 4 % (ref 0–8)
ERYTHROCYTE [DISTWIDTH] IN BLOOD BY AUTOMATED COUNT: 13.5 % (ref 11.5–14.5)
ERYTHROCYTE [SEDIMENTATION RATE] IN BLOOD BY WESTERGREN METHOD: 79 MM/HR (ref 0–23)
EST. GFR  (AFRICAN AMERICAN): 3.5 ML/MIN/1.73 M^2
EST. GFR  (NON AFRICAN AMERICAN): 3 ML/MIN/1.73 M^2
GLUCOSE SERPL-MCNC: 126 MG/DL (ref 70–110)
HCT VFR BLD AUTO: 21.9 % (ref 40–54)
HGB BLD-MCNC: 7.4 G/DL (ref 14–18)
IMM GRANULOCYTES # BLD AUTO: 0.02 K/UL (ref 0–0.04)
IMM GRANULOCYTES NFR BLD AUTO: 0.3 % (ref 0–0.5)
LYMPHOCYTES # BLD AUTO: 1.6 K/UL (ref 1–4.8)
LYMPHOCYTES NFR BLD: 21.7 % (ref 18–48)
MAGNESIUM SERPL-MCNC: 1.8 MG/DL (ref 1.6–2.6)
MCH RBC QN AUTO: 29.7 PG (ref 27–31)
MCHC RBC AUTO-ENTMCNC: 33.8 G/DL (ref 32–36)
MCV RBC AUTO: 88 FL (ref 82–98)
MICROALBUMIN UR DL<=1MG/L-MCNC: 647 UG/ML
MONOCYTES # BLD AUTO: 0.7 K/UL (ref 0.3–1)
MONOCYTES NFR BLD: 9.9 % (ref 4–15)
NEUTROPHILS # BLD AUTO: 4.7 K/UL (ref 1.8–7.7)
NEUTROPHILS NFR BLD: 63.2 % (ref 38–73)
NRBC BLD-RTO: 0 /100 WBC
PLATELET # BLD AUTO: 88 K/UL (ref 150–450)
PMV BLD AUTO: 12.9 FL (ref 9.2–12.9)
POCT GLUCOSE: 118 MG/DL (ref 70–110)
POCT GLUCOSE: 127 MG/DL (ref 70–110)
POCT GLUCOSE: 165 MG/DL (ref 70–110)
POTASSIUM SERPL-SCNC: 3.3 MMOL/L (ref 3.5–5.1)
PROT SERPL-MCNC: 7.5 G/DL (ref 6–8.4)
PROT UR-MCNC: 143 MG/DL (ref 0–15)
PROT/CREAT UR: 2.42 MG/G{CREAT} (ref 0–0.2)
RBC # BLD AUTO: 2.49 M/UL (ref 4.6–6.2)
SODIUM SERPL-SCNC: 141 MMOL/L (ref 136–145)
WBC # BLD AUTO: 7.46 K/UL (ref 3.9–12.7)

## 2021-06-23 PROCEDURE — 97535 SELF CARE MNGMENT TRAINING: CPT

## 2021-06-23 PROCEDURE — 99233 PR SUBSEQUENT HOSPITAL CARE,LEVL III: ICD-10-PCS | Mod: ,,, | Performed by: HOSPITALIST

## 2021-06-23 PROCEDURE — 97530 THERAPEUTIC ACTIVITIES: CPT

## 2021-06-23 PROCEDURE — 99223 1ST HOSP IP/OBS HIGH 75: CPT | Mod: GC,,, | Performed by: INTERNAL MEDICINE

## 2021-06-23 PROCEDURE — 99233 SBSQ HOSP IP/OBS HIGH 50: CPT | Mod: ,,, | Performed by: HOSPITALIST

## 2021-06-23 PROCEDURE — 84165 PROTEIN E-PHORESIS SERUM: CPT | Mod: 26,,, | Performed by: PATHOLOGY

## 2021-06-23 PROCEDURE — 25000003 PHARM REV CODE 250: Performed by: HOSPITALIST

## 2021-06-23 PROCEDURE — 83735 ASSAY OF MAGNESIUM: CPT | Performed by: HOSPITALIST

## 2021-06-23 PROCEDURE — 80053 COMPREHEN METABOLIC PANEL: CPT | Performed by: HOSPITALIST

## 2021-06-23 PROCEDURE — 86140 C-REACTIVE PROTEIN: CPT | Performed by: HOSPITALIST

## 2021-06-23 PROCEDURE — 85025 COMPLETE CBC W/AUTO DIFF WBC: CPT | Performed by: HOSPITALIST

## 2021-06-23 PROCEDURE — 99223 PR INITIAL HOSPITAL CARE,LEVL III: ICD-10-PCS | Mod: ,,, | Performed by: INTERNAL MEDICINE

## 2021-06-23 PROCEDURE — 86334 PATHOLOGIST INTERPRETATION IFE: ICD-10-PCS | Mod: 26,,, | Performed by: PATHOLOGY

## 2021-06-23 PROCEDURE — 84165 PATHOLOGIST INTERPRETATION SPE: ICD-10-PCS | Mod: 26,,, | Performed by: PATHOLOGY

## 2021-06-23 PROCEDURE — 97166 OT EVAL MOD COMPLEX 45 MIN: CPT

## 2021-06-23 PROCEDURE — 82043 UR ALBUMIN QUANTITATIVE: CPT | Performed by: STUDENT IN AN ORGANIZED HEALTH CARE EDUCATION/TRAINING PROGRAM

## 2021-06-23 PROCEDURE — 86334 IMMUNOFIX E-PHORESIS SERUM: CPT | Mod: 26,,, | Performed by: PATHOLOGY

## 2021-06-23 PROCEDURE — 99223 1ST HOSP IP/OBS HIGH 75: CPT | Mod: ,,, | Performed by: INTERNAL MEDICINE

## 2021-06-23 PROCEDURE — 85652 RBC SED RATE AUTOMATED: CPT | Performed by: HOSPITALIST

## 2021-06-23 PROCEDURE — 97162 PT EVAL MOD COMPLEX 30 MIN: CPT

## 2021-06-23 PROCEDURE — 94761 N-INVAS EAR/PLS OXIMETRY MLT: CPT

## 2021-06-23 PROCEDURE — 99223 PR INITIAL HOSPITAL CARE,LEVL III: ICD-10-PCS | Mod: GC,,, | Performed by: INTERNAL MEDICINE

## 2021-06-23 PROCEDURE — 20600001 HC STEP DOWN PRIVATE ROOM

## 2021-06-23 PROCEDURE — 86334 IMMUNOFIX E-PHORESIS SERUM: CPT | Performed by: STUDENT IN AN ORGANIZED HEALTH CARE EDUCATION/TRAINING PROGRAM

## 2021-06-23 PROCEDURE — 84165 PROTEIN E-PHORESIS SERUM: CPT | Performed by: STUDENT IN AN ORGANIZED HEALTH CARE EDUCATION/TRAINING PROGRAM

## 2021-06-23 PROCEDURE — 82570 ASSAY OF URINE CREATININE: CPT | Performed by: STUDENT IN AN ORGANIZED HEALTH CARE EDUCATION/TRAINING PROGRAM

## 2021-06-23 RX ORDER — POTASSIUM CHLORIDE 20 MEQ/1
20 TABLET, EXTENDED RELEASE ORAL ONCE
Status: COMPLETED | OUTPATIENT
Start: 2021-06-24 | End: 2021-06-24

## 2021-06-23 RX ADMIN — METOPROLOL SUCCINATE 50 MG: 50 TABLET, EXTENDED RELEASE ORAL at 10:06

## 2021-06-23 RX ADMIN — MUPIROCIN: 20 OINTMENT TOPICAL at 08:06

## 2021-06-23 RX ADMIN — AMLODIPINE BESYLATE 10 MG: 10 TABLET ORAL at 10:06

## 2021-06-23 RX ADMIN — SODIUM CHLORIDE: 0.9 INJECTION, SOLUTION INTRAVENOUS at 08:06

## 2021-06-23 RX ADMIN — MUPIROCIN: 20 OINTMENT TOPICAL at 09:06

## 2021-06-23 RX ADMIN — ASPIRIN 81 MG: 81 TABLET, COATED ORAL at 10:06

## 2021-06-23 RX ADMIN — ISOSORBIDE MONONITRATE 120 MG: 30 TABLET, EXTENDED RELEASE ORAL at 10:06

## 2021-06-24 LAB
ALBUMIN SERPL BCP-MCNC: 2.9 G/DL (ref 3.5–5.2)
ALBUMIN SERPL ELPH-MCNC: 3.85 G/DL (ref 3.35–5.55)
ALP SERPL-CCNC: 66 U/L (ref 55–135)
ALPHA1 GLOB SERPL ELPH-MCNC: 0.36 G/DL (ref 0.17–0.41)
ALPHA2 GLOB SERPL ELPH-MCNC: 0.63 G/DL (ref 0.43–0.99)
ALT SERPL W/O P-5'-P-CCNC: 9 U/L (ref 10–44)
ANION GAP SERPL CALC-SCNC: 20 MMOL/L (ref 8–16)
AST SERPL-CCNC: 15 U/L (ref 10–40)
B-GLOBULIN SERPL ELPH-MCNC: 0.95 G/DL (ref 0.5–1.1)
BASOPHILS # BLD AUTO: 0.07 K/UL (ref 0–0.2)
BASOPHILS NFR BLD: 0.8 % (ref 0–1.9)
BILIRUB SERPL-MCNC: 0.3 MG/DL (ref 0.1–1)
BUN SERPL-MCNC: 137 MG/DL (ref 8–23)
CALCIUM SERPL-MCNC: 8.8 MG/DL (ref 8.7–10.5)
CHLORIDE SERPL-SCNC: 107 MMOL/L (ref 95–110)
CO2 SERPL-SCNC: 15 MMOL/L (ref 23–29)
CREAT SERPL-MCNC: 13.1 MG/DL (ref 0.5–1.4)
DIFFERENTIAL METHOD: ABNORMAL
EOSINOPHIL # BLD AUTO: 0.4 K/UL (ref 0–0.5)
EOSINOPHIL NFR BLD: 4.7 % (ref 0–8)
ERYTHROCYTE [DISTWIDTH] IN BLOOD BY AUTOMATED COUNT: 13.6 % (ref 11.5–14.5)
EST. GFR  (AFRICAN AMERICAN): 3.8 ML/MIN/1.73 M^2
EST. GFR  (NON AFRICAN AMERICAN): 3.2 ML/MIN/1.73 M^2
GAMMA GLOB SERPL ELPH-MCNC: 1.51 G/DL (ref 0.67–1.58)
GLUCOSE SERPL-MCNC: 127 MG/DL (ref 70–110)
HCT VFR BLD AUTO: 21.9 % (ref 40–54)
HGB BLD-MCNC: 7.4 G/DL (ref 14–18)
IMM GRANULOCYTES # BLD AUTO: 0.03 K/UL (ref 0–0.04)
IMM GRANULOCYTES NFR BLD AUTO: 0.4 % (ref 0–0.5)
LYMPHOCYTES # BLD AUTO: 1.7 K/UL (ref 1–4.8)
LYMPHOCYTES NFR BLD: 20.5 % (ref 18–48)
MAGNESIUM SERPL-MCNC: 1.7 MG/DL (ref 1.6–2.6)
MCH RBC QN AUTO: 30.3 PG (ref 27–31)
MCHC RBC AUTO-ENTMCNC: 33.8 G/DL (ref 32–36)
MCV RBC AUTO: 90 FL (ref 82–98)
MONOCYTES # BLD AUTO: 0.9 K/UL (ref 0.3–1)
MONOCYTES NFR BLD: 10.4 % (ref 4–15)
NEUTROPHILS # BLD AUTO: 5.4 K/UL (ref 1.8–7.7)
NEUTROPHILS NFR BLD: 63.2 % (ref 38–73)
NRBC BLD-RTO: 0 /100 WBC
PATHOLOGIST INTERPRETATION SPE: NORMAL
PLATELET # BLD AUTO: 90 K/UL (ref 150–450)
PMV BLD AUTO: 12.2 FL (ref 9.2–12.9)
POCT GLUCOSE: 137 MG/DL (ref 70–110)
POCT GLUCOSE: 147 MG/DL (ref 70–110)
POCT GLUCOSE: 186 MG/DL (ref 70–110)
POCT GLUCOSE: 203 MG/DL (ref 70–110)
POTASSIUM SERPL-SCNC: 3.6 MMOL/L (ref 3.5–5.1)
PROT SERPL-MCNC: 7.3 G/DL (ref 6–8.4)
PROT SERPL-MCNC: 7.5 G/DL (ref 6–8.4)
RBC # BLD AUTO: 2.44 M/UL (ref 4.6–6.2)
SODIUM SERPL-SCNC: 142 MMOL/L (ref 136–145)
WBC # BLD AUTO: 8.48 K/UL (ref 3.9–12.7)

## 2021-06-24 PROCEDURE — 80053 COMPREHEN METABOLIC PANEL: CPT | Performed by: HOSPITALIST

## 2021-06-24 PROCEDURE — 25000003 PHARM REV CODE 250: Performed by: HOSPITALIST

## 2021-06-24 PROCEDURE — 63600175 PHARM REV CODE 636 W HCPCS: Performed by: HOSPITALIST

## 2021-06-24 PROCEDURE — 20600001 HC STEP DOWN PRIVATE ROOM

## 2021-06-24 PROCEDURE — 99233 PR SUBSEQUENT HOSPITAL CARE,LEVL III: ICD-10-PCS | Mod: ,,, | Performed by: HOSPITALIST

## 2021-06-24 PROCEDURE — 83735 ASSAY OF MAGNESIUM: CPT | Performed by: HOSPITALIST

## 2021-06-24 PROCEDURE — 99233 SBSQ HOSP IP/OBS HIGH 50: CPT | Mod: ,,, | Performed by: HOSPITALIST

## 2021-06-24 PROCEDURE — 85025 COMPLETE CBC W/AUTO DIFF WBC: CPT | Performed by: HOSPITALIST

## 2021-06-24 RX ORDER — SODIUM CHLORIDE 9 MG/ML
INJECTION, SOLUTION INTRAVENOUS
Status: DISCONTINUED | OUTPATIENT
Start: 2021-06-24 | End: 2021-06-30 | Stop reason: HOSPADM

## 2021-06-24 RX ORDER — HYDRALAZINE HYDROCHLORIDE 25 MG/1
25 TABLET, FILM COATED ORAL EVERY 8 HOURS PRN
Status: DISCONTINUED | OUTPATIENT
Start: 2021-06-24 | End: 2021-06-30 | Stop reason: HOSPADM

## 2021-06-24 RX ORDER — MAGNESIUM SULFATE HEPTAHYDRATE 40 MG/ML
2 INJECTION, SOLUTION INTRAVENOUS ONCE
Status: COMPLETED | OUTPATIENT
Start: 2021-06-24 | End: 2021-06-24

## 2021-06-24 RX ORDER — SEVELAMER CARBONATE 800 MG/1
800 TABLET, FILM COATED ORAL
Status: DISCONTINUED | OUTPATIENT
Start: 2021-06-25 | End: 2021-06-30 | Stop reason: HOSPADM

## 2021-06-24 RX ADMIN — CLOPIDOGREL 75 MG: 75 TABLET, FILM COATED ORAL at 10:06

## 2021-06-24 RX ADMIN — POTASSIUM CHLORIDE 20 MEQ: 1500 TABLET, EXTENDED RELEASE ORAL at 12:06

## 2021-06-24 RX ADMIN — SODIUM CHLORIDE: 0.9 INJECTION, SOLUTION INTRAVENOUS at 03:06

## 2021-06-24 RX ADMIN — INSULIN ASPART 1 UNITS: 100 INJECTION, SOLUTION INTRAVENOUS; SUBCUTANEOUS at 08:06

## 2021-06-24 RX ADMIN — MUPIROCIN: 20 OINTMENT TOPICAL at 08:06

## 2021-06-24 RX ADMIN — MAGNESIUM SULFATE HEPTAHYDRATE 2 G: 40 INJECTION, SOLUTION INTRAVENOUS at 10:06

## 2021-06-24 RX ADMIN — MUPIROCIN: 20 OINTMENT TOPICAL at 10:06

## 2021-06-24 RX ADMIN — ASPIRIN 81 MG: 81 TABLET, COATED ORAL at 10:06

## 2021-06-24 RX ADMIN — AMLODIPINE BESYLATE 10 MG: 10 TABLET ORAL at 10:06

## 2021-06-24 RX ADMIN — SODIUM CHLORIDE: 0.9 INJECTION, SOLUTION INTRAVENOUS at 05:06

## 2021-06-24 RX ADMIN — METOPROLOL SUCCINATE 50 MG: 50 TABLET, EXTENDED RELEASE ORAL at 10:06

## 2021-06-24 RX ADMIN — SODIUM CHLORIDE: 0.9 INJECTION, SOLUTION INTRAVENOUS at 10:06

## 2021-06-24 RX ADMIN — ISOSORBIDE MONONITRATE 120 MG: 30 TABLET, EXTENDED RELEASE ORAL at 10:06

## 2021-06-25 ENCOUNTER — ANESTHESIA (OUTPATIENT)
Dept: ENDOSCOPY | Facility: HOSPITAL | Age: 76
DRG: 674 | End: 2021-06-25
Payer: MEDICARE

## 2021-06-25 ENCOUNTER — ANESTHESIA EVENT (OUTPATIENT)
Dept: ENDOSCOPY | Facility: HOSPITAL | Age: 76
DRG: 674 | End: 2021-06-25
Payer: MEDICARE

## 2021-06-25 LAB
ALBUMIN SERPL BCP-MCNC: 2.8 G/DL (ref 3.5–5.2)
ANION GAP SERPL CALC-SCNC: 21 MMOL/L (ref 8–16)
APTT BLDCRRT: 26.2 SEC (ref 21–32)
BASOPHILS # BLD AUTO: 0.06 K/UL (ref 0–0.2)
BASOPHILS NFR BLD: 0.8 % (ref 0–1.9)
BUN SERPL-MCNC: 131 MG/DL (ref 8–23)
CALCIUM SERPL-MCNC: 8.9 MG/DL (ref 8.7–10.5)
CHLORIDE SERPL-SCNC: 109 MMOL/L (ref 95–110)
CO2 SERPL-SCNC: 12 MMOL/L (ref 23–29)
CREAT SERPL-MCNC: 13 MG/DL (ref 0.5–1.4)
DIFFERENTIAL METHOD: ABNORMAL
EOSINOPHIL # BLD AUTO: 0.4 K/UL (ref 0–0.5)
EOSINOPHIL NFR BLD: 5.3 % (ref 0–8)
ERYTHROCYTE [DISTWIDTH] IN BLOOD BY AUTOMATED COUNT: 13.8 % (ref 11.5–14.5)
EST. GFR  (AFRICAN AMERICAN): 3.8 ML/MIN/1.73 M^2
EST. GFR  (NON AFRICAN AMERICAN): 3.3 ML/MIN/1.73 M^2
ESTIMATED AVG GLUCOSE: 157 MG/DL (ref 68–131)
GLUCOSE SERPL-MCNC: 109 MG/DL (ref 70–110)
HBA1C MFR BLD: 7.1 % (ref 4–5.6)
HCT VFR BLD AUTO: 21.6 % (ref 40–54)
HGB BLD-MCNC: 7.3 G/DL (ref 14–18)
IMM GRANULOCYTES # BLD AUTO: 0.02 K/UL (ref 0–0.04)
IMM GRANULOCYTES NFR BLD AUTO: 0.3 % (ref 0–0.5)
INR PPP: 1.1 (ref 0.8–1.2)
INTERPRETATION SERPL IFE-IMP: NORMAL
LYMPHOCYTES # BLD AUTO: 1.4 K/UL (ref 1–4.8)
LYMPHOCYTES NFR BLD: 18.5 % (ref 18–48)
MAGNESIUM SERPL-MCNC: 2 MG/DL (ref 1.6–2.6)
MCH RBC QN AUTO: 30.3 PG (ref 27–31)
MCHC RBC AUTO-ENTMCNC: 33.8 G/DL (ref 32–36)
MCV RBC AUTO: 90 FL (ref 82–98)
MONOCYTES # BLD AUTO: 0.7 K/UL (ref 0.3–1)
MONOCYTES NFR BLD: 8.8 % (ref 4–15)
NEUTROPHILS # BLD AUTO: 5 K/UL (ref 1.8–7.7)
NEUTROPHILS NFR BLD: 66.3 % (ref 38–73)
NRBC BLD-RTO: 0 /100 WBC
PHOSPHATE SERPL-MCNC: 7.5 MG/DL (ref 2.7–4.5)
PLATELET # BLD AUTO: 95 K/UL (ref 150–450)
PMV BLD AUTO: 12.2 FL (ref 9.2–12.9)
POCT GLUCOSE: 100 MG/DL (ref 70–110)
POCT GLUCOSE: 104 MG/DL (ref 70–110)
POCT GLUCOSE: 108 MG/DL (ref 70–110)
POCT GLUCOSE: 111 MG/DL (ref 70–110)
POCT GLUCOSE: 117 MG/DL (ref 70–110)
POTASSIUM SERPL-SCNC: 3.3 MMOL/L (ref 3.5–5.1)
PROTHROMBIN TIME: 12.4 SEC (ref 9–12.5)
RBC # BLD AUTO: 2.41 M/UL (ref 4.6–6.2)
SODIUM SERPL-SCNC: 142 MMOL/L (ref 136–145)
TROPONIN I SERPL DL<=0.01 NG/ML-MCNC: 0.09 NG/ML (ref 0–0.03)
WBC # BLD AUTO: 7.58 K/UL (ref 3.9–12.7)

## 2021-06-25 PROCEDURE — 93010 EKG 12-LEAD: ICD-10-PCS | Mod: ,,, | Performed by: INTERNAL MEDICINE

## 2021-06-25 PROCEDURE — 20600001 HC STEP DOWN PRIVATE ROOM

## 2021-06-25 PROCEDURE — 36556 CENTRAL LINE: ICD-10-PCS | Mod: 59,,, | Performed by: STUDENT IN AN ORGANIZED HEALTH CARE EDUCATION/TRAINING PROGRAM

## 2021-06-25 PROCEDURE — 25000003 PHARM REV CODE 250: Performed by: HOSPITALIST

## 2021-06-25 PROCEDURE — 85025 COMPLETE CBC W/AUTO DIFF WBC: CPT | Performed by: HOSPITALIST

## 2021-06-25 PROCEDURE — 84484 ASSAY OF TROPONIN QUANT: CPT | Performed by: HOSPITALIST

## 2021-06-25 PROCEDURE — 36556 INSERT NON-TUNNEL CV CATH: CPT | Performed by: STUDENT IN AN ORGANIZED HEALTH CARE EDUCATION/TRAINING PROGRAM

## 2021-06-25 PROCEDURE — 83036 HEMOGLOBIN GLYCOSYLATED A1C: CPT | Performed by: HOSPITALIST

## 2021-06-25 PROCEDURE — 99233 SBSQ HOSP IP/OBS HIGH 50: CPT | Mod: ,,, | Performed by: HOSPITALIST

## 2021-06-25 PROCEDURE — 83735 ASSAY OF MAGNESIUM: CPT | Performed by: HOSPITALIST

## 2021-06-25 PROCEDURE — 25000003 PHARM REV CODE 250: Performed by: INTERNAL MEDICINE

## 2021-06-25 PROCEDURE — 99233 PR SUBSEQUENT HOSPITAL CARE,LEVL III: ICD-10-PCS | Mod: ,,, | Performed by: HOSPITALIST

## 2021-06-25 PROCEDURE — 93005 ELECTROCARDIOGRAM TRACING: CPT

## 2021-06-25 PROCEDURE — 93010 ELECTROCARDIOGRAM REPORT: CPT | Mod: ,,, | Performed by: INTERNAL MEDICINE

## 2021-06-25 PROCEDURE — 63600175 PHARM REV CODE 636 W HCPCS: Performed by: STUDENT IN AN ORGANIZED HEALTH CARE EDUCATION/TRAINING PROGRAM

## 2021-06-25 PROCEDURE — 80069 RENAL FUNCTION PANEL: CPT | Performed by: HOSPITALIST

## 2021-06-25 PROCEDURE — 99233 SBSQ HOSP IP/OBS HIGH 50: CPT | Mod: GC,,, | Performed by: INTERNAL MEDICINE

## 2021-06-25 PROCEDURE — 85610 PROTHROMBIN TIME: CPT | Performed by: STUDENT IN AN ORGANIZED HEALTH CARE EDUCATION/TRAINING PROGRAM

## 2021-06-25 PROCEDURE — 99233 PR SUBSEQUENT HOSPITAL CARE,LEVL III: ICD-10-PCS | Mod: GC,,, | Performed by: INTERNAL MEDICINE

## 2021-06-25 PROCEDURE — 85730 THROMBOPLASTIN TIME PARTIAL: CPT | Performed by: HOSPITALIST

## 2021-06-25 RX ORDER — FENTANYL CITRATE 50 UG/ML
INJECTION, SOLUTION INTRAMUSCULAR; INTRAVENOUS CODE/TRAUMA/SEDATION MEDICATION
Status: COMPLETED | OUTPATIENT
Start: 2021-06-25 | End: 2021-06-25

## 2021-06-25 RX ORDER — SODIUM CHLORIDE 9 MG/ML
INJECTION, SOLUTION INTRAVENOUS ONCE
Status: DISCONTINUED | OUTPATIENT
Start: 2021-06-25 | End: 2021-06-29

## 2021-06-25 RX ORDER — SODIUM BICARBONATE 650 MG/1
1300 TABLET ORAL 2 TIMES DAILY
Status: DISCONTINUED | OUTPATIENT
Start: 2021-06-25 | End: 2021-06-30 | Stop reason: HOSPADM

## 2021-06-25 RX ORDER — CALCIUM CARBONATE 200(500)MG
500 TABLET,CHEWABLE ORAL 2 TIMES DAILY PRN
Status: DISCONTINUED | OUTPATIENT
Start: 2021-06-25 | End: 2021-06-30 | Stop reason: HOSPADM

## 2021-06-25 RX ADMIN — METOPROLOL SUCCINATE 50 MG: 50 TABLET, EXTENDED RELEASE ORAL at 11:06

## 2021-06-25 RX ADMIN — AMLODIPINE BESYLATE 10 MG: 10 TABLET ORAL at 11:06

## 2021-06-25 RX ADMIN — FENTANYL CITRATE 25 MCG: 50 INJECTION, SOLUTION INTRAMUSCULAR; INTRAVENOUS at 01:06

## 2021-06-25 RX ADMIN — SODIUM BICARBONATE 650 MG TABLET 1300 MG: at 11:06

## 2021-06-25 RX ADMIN — CALCIUM CARBONATE (ANTACID) CHEW TAB 500 MG 500 MG: 500 CHEW TAB at 04:06

## 2021-06-25 RX ADMIN — FENTANYL CITRATE 25 MCG: 50 INJECTION, SOLUTION INTRAMUSCULAR; INTRAVENOUS at 02:06

## 2021-06-25 RX ADMIN — CALCITRIOL CAPSULES 0.25 MCG 0.25 MCG: 0.25 CAPSULE ORAL at 06:06

## 2021-06-25 RX ADMIN — MUPIROCIN: 20 OINTMENT TOPICAL at 11:06

## 2021-06-25 RX ADMIN — MUPIROCIN: 20 OINTMENT TOPICAL at 08:06

## 2021-06-25 RX ADMIN — SEVELAMER CARBONATE 800 MG: 800 TABLET, FILM COATED ORAL at 11:06

## 2021-06-25 RX ADMIN — SEVELAMER CARBONATE 800 MG: 800 TABLET, FILM COATED ORAL at 06:06

## 2021-06-25 RX ADMIN — ISOSORBIDE MONONITRATE 120 MG: 30 TABLET, EXTENDED RELEASE ORAL at 11:06

## 2021-06-25 RX ADMIN — SODIUM BICARBONATE 650 MG TABLET 1300 MG: at 08:06

## 2021-06-26 LAB
ALBUMIN SERPL BCP-MCNC: 2.7 G/DL (ref 3.5–5.2)
ALBUMIN SERPL BCP-MCNC: 2.8 G/DL (ref 3.5–5.2)
ANION GAP SERPL CALC-SCNC: 18 MMOL/L (ref 8–16)
ANION GAP SERPL CALC-SCNC: 21 MMOL/L (ref 8–16)
BASOPHILS # BLD AUTO: 0.07 K/UL (ref 0–0.2)
BASOPHILS NFR BLD: 0.9 % (ref 0–1.9)
BUN SERPL-MCNC: 135 MG/DL (ref 8–23)
BUN SERPL-MCNC: 94 MG/DL (ref 8–23)
CALCIUM SERPL-MCNC: 9.1 MG/DL (ref 8.7–10.5)
CALCIUM SERPL-MCNC: 9.2 MG/DL (ref 8.7–10.5)
CHLORIDE SERPL-SCNC: 107 MMOL/L (ref 95–110)
CHLORIDE SERPL-SCNC: 111 MMOL/L (ref 95–110)
CO2 SERPL-SCNC: 13 MMOL/L (ref 23–29)
CO2 SERPL-SCNC: 20 MMOL/L (ref 23–29)
CREAT SERPL-MCNC: 12.8 MG/DL (ref 0.5–1.4)
CREAT SERPL-MCNC: 9.1 MG/DL (ref 0.5–1.4)
DIFFERENTIAL METHOD: ABNORMAL
EOSINOPHIL # BLD AUTO: 0.3 K/UL (ref 0–0.5)
EOSINOPHIL NFR BLD: 3.8 % (ref 0–8)
ERYTHROCYTE [DISTWIDTH] IN BLOOD BY AUTOMATED COUNT: 14.1 % (ref 11.5–14.5)
EST. GFR  (AFRICAN AMERICAN): 3.9 ML/MIN/1.73 M^2
EST. GFR  (AFRICAN AMERICAN): 5.8 ML/MIN/1.73 M^2
EST. GFR  (NON AFRICAN AMERICAN): 3.3 ML/MIN/1.73 M^2
EST. GFR  (NON AFRICAN AMERICAN): 5 ML/MIN/1.73 M^2
GLUCOSE SERPL-MCNC: 103 MG/DL (ref 70–110)
GLUCOSE SERPL-MCNC: 81 MG/DL (ref 70–110)
HCT VFR BLD AUTO: 21.9 % (ref 40–54)
HGB BLD-MCNC: 7.1 G/DL (ref 14–18)
IMM GRANULOCYTES # BLD AUTO: 0.04 K/UL (ref 0–0.04)
IMM GRANULOCYTES NFR BLD AUTO: 0.5 % (ref 0–0.5)
LYMPHOCYTES # BLD AUTO: 1.3 K/UL (ref 1–4.8)
LYMPHOCYTES NFR BLD: 16.7 % (ref 18–48)
MAGNESIUM SERPL-MCNC: 1.9 MG/DL (ref 1.6–2.6)
MCH RBC QN AUTO: 29.2 PG (ref 27–31)
MCHC RBC AUTO-ENTMCNC: 32.4 G/DL (ref 32–36)
MCV RBC AUTO: 90 FL (ref 82–98)
MONOCYTES # BLD AUTO: 0.8 K/UL (ref 0.3–1)
MONOCYTES NFR BLD: 9.9 % (ref 4–15)
NEUTROPHILS # BLD AUTO: 5.2 K/UL (ref 1.8–7.7)
NEUTROPHILS NFR BLD: 68.2 % (ref 38–73)
NRBC BLD-RTO: 0 /100 WBC
PHOSPHATE SERPL-MCNC: 5.2 MG/DL (ref 2.7–4.5)
PHOSPHATE SERPL-MCNC: 7.6 MG/DL (ref 2.7–4.5)
PLATELET # BLD AUTO: 105 K/UL (ref 150–450)
PMV BLD AUTO: 12.6 FL (ref 9.2–12.9)
POCT GLUCOSE: 163 MG/DL (ref 70–110)
POCT GLUCOSE: 181 MG/DL (ref 70–110)
POCT GLUCOSE: 246 MG/DL (ref 70–110)
POCT GLUCOSE: 97 MG/DL (ref 70–110)
POTASSIUM SERPL-SCNC: 3.2 MMOL/L (ref 3.5–5.1)
POTASSIUM SERPL-SCNC: 3.4 MMOL/L (ref 3.5–5.1)
RBC # BLD AUTO: 2.43 M/UL (ref 4.6–6.2)
SODIUM SERPL-SCNC: 145 MMOL/L (ref 136–145)
SODIUM SERPL-SCNC: 145 MMOL/L (ref 136–145)
WBC # BLD AUTO: 7.59 K/UL (ref 3.9–12.7)

## 2021-06-26 PROCEDURE — 83735 ASSAY OF MAGNESIUM: CPT | Performed by: HOSPITALIST

## 2021-06-26 PROCEDURE — 25000003 PHARM REV CODE 250: Performed by: INTERNAL MEDICINE

## 2021-06-26 PROCEDURE — 25000003 PHARM REV CODE 250: Performed by: HOSPITALIST

## 2021-06-26 PROCEDURE — 80100014 HC HEMODIALYSIS 1:1

## 2021-06-26 PROCEDURE — 20600001 HC STEP DOWN PRIVATE ROOM

## 2021-06-26 PROCEDURE — 63600175 PHARM REV CODE 636 W HCPCS: Performed by: HOSPITALIST

## 2021-06-26 PROCEDURE — 80069 RENAL FUNCTION PANEL: CPT | Performed by: HOSPITALIST

## 2021-06-26 PROCEDURE — 99233 PR SUBSEQUENT HOSPITAL CARE,LEVL III: ICD-10-PCS | Mod: ,,, | Performed by: HOSPITALIST

## 2021-06-26 PROCEDURE — 99233 SBSQ HOSP IP/OBS HIGH 50: CPT | Mod: ,,, | Performed by: HOSPITALIST

## 2021-06-26 PROCEDURE — 85025 COMPLETE CBC W/AUTO DIFF WBC: CPT | Performed by: HOSPITALIST

## 2021-06-26 RX ORDER — SODIUM CHLORIDE 9 MG/ML
INJECTION, SOLUTION INTRAVENOUS ONCE
Status: DISCONTINUED | OUTPATIENT
Start: 2021-06-27 | End: 2021-06-29

## 2021-06-26 RX ADMIN — AMLODIPINE BESYLATE 10 MG: 10 TABLET ORAL at 08:06

## 2021-06-26 RX ADMIN — POLYETHYLENE GLYCOL 3350 17 G: 17 POWDER, FOR SOLUTION ORAL at 08:06

## 2021-06-26 RX ADMIN — INSULIN ASPART 1 UNITS: 100 INJECTION, SOLUTION INTRAVENOUS; SUBCUTANEOUS at 11:06

## 2021-06-26 RX ADMIN — ISOSORBIDE MONONITRATE 120 MG: 30 TABLET, EXTENDED RELEASE ORAL at 08:06

## 2021-06-26 RX ADMIN — METOPROLOL SUCCINATE 50 MG: 50 TABLET, EXTENDED RELEASE ORAL at 08:06

## 2021-06-26 RX ADMIN — SEVELAMER CARBONATE 800 MG: 800 TABLET, FILM COATED ORAL at 04:06

## 2021-06-26 RX ADMIN — SODIUM BICARBONATE 650 MG TABLET 1300 MG: at 08:06

## 2021-06-26 RX ADMIN — MUPIROCIN: 20 OINTMENT TOPICAL at 08:06

## 2021-06-26 RX ADMIN — SEVELAMER CARBONATE 800 MG: 800 TABLET, FILM COATED ORAL at 08:06

## 2021-06-26 RX ADMIN — SEVELAMER CARBONATE 800 MG: 800 TABLET, FILM COATED ORAL at 12:06

## 2021-06-26 NOTE — PLAN OF CARE
to be discharged home with parents. Mother given discharge instructions, mother verbalized understanding of discharge instructions. ID bands verified, hugs tag removed.  placed securely in car seat by mother.  [place] in discharged in stable Notified Dr. Sotomayor of pt's temp 101.2.  Gave tylenol at 1437 for temp 100.9.  Nurse will try ice packs at this time.  MD stated he did not want to give ibuprofen at this time.

## 2021-06-27 DIAGNOSIS — N17.9 AKI (ACUTE KIDNEY INJURY): Primary | ICD-10-CM

## 2021-06-27 LAB
ABO + RH BLD: NORMAL
ALBUMIN SERPL BCP-MCNC: 2.8 G/DL (ref 3.5–5.2)
ANION GAP SERPL CALC-SCNC: 18 MMOL/L (ref 8–16)
BASOPHILS # BLD AUTO: 0.06 K/UL (ref 0–0.2)
BASOPHILS NFR BLD: 0.9 % (ref 0–1.9)
BLD GP AB SCN CELLS X3 SERPL QL: NORMAL
BUN SERPL-MCNC: 92 MG/DL (ref 8–23)
CALCIUM SERPL-MCNC: 9.2 MG/DL (ref 8.7–10.5)
CHLORIDE SERPL-SCNC: 106 MMOL/L (ref 95–110)
CO2 SERPL-SCNC: 21 MMOL/L (ref 23–29)
CREAT SERPL-MCNC: 9.5 MG/DL (ref 0.5–1.4)
DIFFERENTIAL METHOD: ABNORMAL
EOSINOPHIL # BLD AUTO: 0.3 K/UL (ref 0–0.5)
EOSINOPHIL NFR BLD: 3.7 % (ref 0–8)
ERYTHROCYTE [DISTWIDTH] IN BLOOD BY AUTOMATED COUNT: 14 % (ref 11.5–14.5)
EST. GFR  (AFRICAN AMERICAN): 5.5 ML/MIN/1.73 M^2
EST. GFR  (NON AFRICAN AMERICAN): 4.8 ML/MIN/1.73 M^2
GLUCOSE SERPL-MCNC: 125 MG/DL (ref 70–110)
HCT VFR BLD AUTO: 21.4 % (ref 40–54)
HGB BLD-MCNC: 7.2 G/DL (ref 14–18)
IMM GRANULOCYTES # BLD AUTO: 0.02 K/UL (ref 0–0.04)
IMM GRANULOCYTES NFR BLD AUTO: 0.3 % (ref 0–0.5)
LYMPHOCYTES # BLD AUTO: 1.4 K/UL (ref 1–4.8)
LYMPHOCYTES NFR BLD: 19.7 % (ref 18–48)
MAGNESIUM SERPL-MCNC: 1.7 MG/DL (ref 1.6–2.6)
MCH RBC QN AUTO: 30.3 PG (ref 27–31)
MCHC RBC AUTO-ENTMCNC: 33.6 G/DL (ref 32–36)
MCV RBC AUTO: 90 FL (ref 82–98)
MONOCYTES # BLD AUTO: 0.9 K/UL (ref 0.3–1)
MONOCYTES NFR BLD: 12.3 % (ref 4–15)
NEUTROPHILS # BLD AUTO: 4.5 K/UL (ref 1.8–7.7)
NEUTROPHILS NFR BLD: 63.1 % (ref 38–73)
NRBC BLD-RTO: 0 /100 WBC
PHOSPHATE SERPL-MCNC: 5.1 MG/DL (ref 2.7–4.5)
PLATELET # BLD AUTO: 105 K/UL (ref 150–450)
PMV BLD AUTO: 12.5 FL (ref 9.2–12.9)
POCT GLUCOSE: 131 MG/DL (ref 70–110)
POCT GLUCOSE: 132 MG/DL (ref 70–110)
POCT GLUCOSE: 170 MG/DL (ref 70–110)
POTASSIUM SERPL-SCNC: 3.2 MMOL/L (ref 3.5–5.1)
RBC # BLD AUTO: 2.38 M/UL (ref 4.6–6.2)
SODIUM SERPL-SCNC: 145 MMOL/L (ref 136–145)
WBC # BLD AUTO: 7.05 K/UL (ref 3.9–12.7)

## 2021-06-27 PROCEDURE — 83735 ASSAY OF MAGNESIUM: CPT | Performed by: HOSPITALIST

## 2021-06-27 PROCEDURE — 25000003 PHARM REV CODE 250: Performed by: INTERNAL MEDICINE

## 2021-06-27 PROCEDURE — 85025 COMPLETE CBC W/AUTO DIFF WBC: CPT | Performed by: HOSPITALIST

## 2021-06-27 PROCEDURE — 86900 BLOOD TYPING SEROLOGIC ABO: CPT | Performed by: HOSPITALIST

## 2021-06-27 PROCEDURE — 80069 RENAL FUNCTION PANEL: CPT | Performed by: HOSPITALIST

## 2021-06-27 PROCEDURE — 99232 PR SUBSEQUENT HOSPITAL CARE,LEVL II: ICD-10-PCS | Mod: ,,, | Performed by: HOSPITALIST

## 2021-06-27 PROCEDURE — 25000003 PHARM REV CODE 250: Performed by: HOSPITALIST

## 2021-06-27 PROCEDURE — 20600001 HC STEP DOWN PRIVATE ROOM

## 2021-06-27 PROCEDURE — 80100014 HC HEMODIALYSIS 1:1

## 2021-06-27 PROCEDURE — 99232 SBSQ HOSP IP/OBS MODERATE 35: CPT | Mod: ,,, | Performed by: HOSPITALIST

## 2021-06-27 RX ORDER — SODIUM CHLORIDE 9 MG/ML
INJECTION, SOLUTION INTRAVENOUS CONTINUOUS
Status: CANCELLED | OUTPATIENT
Start: 2021-06-27 | End: 2021-06-27

## 2021-06-27 RX ORDER — DOCUSATE SODIUM 283 MG/5ML
1 LIQUID RECTAL DAILY PRN
Status: DISCONTINUED | OUTPATIENT
Start: 2021-06-27 | End: 2021-06-27

## 2021-06-27 RX ORDER — BISACODYL 10 MG
10 SUPPOSITORY, RECTAL RECTAL DAILY PRN
Status: DISCONTINUED | OUTPATIENT
Start: 2021-06-27 | End: 2021-06-30 | Stop reason: HOSPADM

## 2021-06-27 RX ADMIN — AMLODIPINE BESYLATE 10 MG: 10 TABLET ORAL at 08:06

## 2021-06-27 RX ADMIN — SEVELAMER CARBONATE 800 MG: 800 TABLET, FILM COATED ORAL at 08:06

## 2021-06-27 RX ADMIN — SODIUM BICARBONATE 650 MG TABLET 1300 MG: at 08:06

## 2021-06-27 RX ADMIN — METOPROLOL SUCCINATE 50 MG: 50 TABLET, EXTENDED RELEASE ORAL at 08:06

## 2021-06-27 RX ADMIN — ISOSORBIDE MONONITRATE 120 MG: 30 TABLET, EXTENDED RELEASE ORAL at 08:06

## 2021-06-27 RX ADMIN — MUPIROCIN: 20 OINTMENT TOPICAL at 08:06

## 2021-06-27 RX ADMIN — SEVELAMER CARBONATE 800 MG: 800 TABLET, FILM COATED ORAL at 12:06

## 2021-06-28 LAB
ALBUMIN SERPL BCP-MCNC: 2.7 G/DL (ref 3.5–5.2)
ANION GAP SERPL CALC-SCNC: 17 MMOL/L (ref 8–16)
BASOPHILS # BLD AUTO: 0.06 K/UL (ref 0–0.2)
BASOPHILS NFR BLD: 1 % (ref 0–1.9)
BUN SERPL-MCNC: 61 MG/DL (ref 8–23)
CALCIUM SERPL-MCNC: 9.1 MG/DL (ref 8.7–10.5)
CHLORIDE SERPL-SCNC: 106 MMOL/L (ref 95–110)
CO2 SERPL-SCNC: 23 MMOL/L (ref 23–29)
CREAT SERPL-MCNC: 7.2 MG/DL (ref 0.5–1.4)
DIFFERENTIAL METHOD: ABNORMAL
EOSINOPHIL # BLD AUTO: 0.3 K/UL (ref 0–0.5)
EOSINOPHIL NFR BLD: 4.1 % (ref 0–8)
ERYTHROCYTE [DISTWIDTH] IN BLOOD BY AUTOMATED COUNT: 14 % (ref 11.5–14.5)
EST. GFR  (AFRICAN AMERICAN): 7.7 ML/MIN/1.73 M^2
EST. GFR  (NON AFRICAN AMERICAN): 6.7 ML/MIN/1.73 M^2
GLUCOSE SERPL-MCNC: 107 MG/DL (ref 70–110)
HAV IGM SERPL QL IA: NEGATIVE
HBV CORE AB SERPL QL IA: NEGATIVE
HBV SURFACE AB SER-ACNC: NEGATIVE M[IU]/ML
HBV SURFACE AG SERPL QL IA: NEGATIVE
HCT VFR BLD AUTO: 21.4 % (ref 40–54)
HGB BLD-MCNC: 7 G/DL (ref 14–18)
IMM GRANULOCYTES # BLD AUTO: 0.03 K/UL (ref 0–0.04)
IMM GRANULOCYTES NFR BLD AUTO: 0.5 % (ref 0–0.5)
LYMPHOCYTES # BLD AUTO: 1.5 K/UL (ref 1–4.8)
LYMPHOCYTES NFR BLD: 23 % (ref 18–48)
MAGNESIUM SERPL-MCNC: 1.6 MG/DL (ref 1.6–2.6)
MCH RBC QN AUTO: 30 PG (ref 27–31)
MCHC RBC AUTO-ENTMCNC: 32.7 G/DL (ref 32–36)
MCV RBC AUTO: 92 FL (ref 82–98)
MONOCYTES # BLD AUTO: 0.7 K/UL (ref 0.3–1)
MONOCYTES NFR BLD: 11.1 % (ref 4–15)
NEUTROPHILS # BLD AUTO: 3.8 K/UL (ref 1.8–7.7)
NEUTROPHILS NFR BLD: 60.3 % (ref 38–73)
NRBC BLD-RTO: 0 /100 WBC
PATHOLOGIST INTERPRETATION IFE: NORMAL
PHOSPHATE SERPL-MCNC: 4.8 MG/DL (ref 2.7–4.5)
PLATELET # BLD AUTO: 103 K/UL (ref 150–450)
PMV BLD AUTO: 11.4 FL (ref 9.2–12.9)
POCT GLUCOSE: 112 MG/DL (ref 70–110)
POCT GLUCOSE: 129 MG/DL (ref 70–110)
POCT GLUCOSE: 140 MG/DL (ref 70–110)
POTASSIUM SERPL-SCNC: 3.2 MMOL/L (ref 3.5–5.1)
RBC # BLD AUTO: 2.33 M/UL (ref 4.6–6.2)
SARS-COV-2 RDRP RESP QL NAA+PROBE: NEGATIVE
SODIUM SERPL-SCNC: 146 MMOL/L (ref 136–145)
WBC # BLD AUTO: 6.31 K/UL (ref 3.9–12.7)

## 2021-06-28 PROCEDURE — 36558 INSERT TUNNELED CV CATH: CPT | Mod: RT,,, | Performed by: INTERNAL MEDICINE

## 2021-06-28 PROCEDURE — 87340 HEPATITIS B SURFACE AG IA: CPT | Performed by: HOSPITALIST

## 2021-06-28 PROCEDURE — 86580 TB INTRADERMAL TEST: CPT | Performed by: HOSPITALIST

## 2021-06-28 PROCEDURE — 77001 FLUOROGUIDE FOR VEIN DEVICE: CPT | Mod: 26,,, | Performed by: INTERNAL MEDICINE

## 2021-06-28 PROCEDURE — 86706 HEP B SURFACE ANTIBODY: CPT | Performed by: HOSPITALIST

## 2021-06-28 PROCEDURE — 25000003 PHARM REV CODE 250: Performed by: INTERNAL MEDICINE

## 2021-06-28 PROCEDURE — 36558 INSERT TUNNELED CV CATH: CPT | Mod: RT | Performed by: INTERNAL MEDICINE

## 2021-06-28 PROCEDURE — 63600175 PHARM REV CODE 636 W HCPCS: Performed by: INTERNAL MEDICINE

## 2021-06-28 PROCEDURE — 85025 COMPLETE CBC W/AUTO DIFF WBC: CPT | Performed by: HOSPITALIST

## 2021-06-28 PROCEDURE — 76937 US GUIDE VASCULAR ACCESS: CPT | Mod: 26,,, | Performed by: INTERNAL MEDICINE

## 2021-06-28 PROCEDURE — 86704 HEP B CORE ANTIBODY TOTAL: CPT | Performed by: HOSPITALIST

## 2021-06-28 PROCEDURE — 77001 CHG FLUOROGUIDE CNTRL VEN ACCESS,PLACE,REPLACE,REMOVE: ICD-10-PCS | Mod: 26,,, | Performed by: INTERNAL MEDICINE

## 2021-06-28 PROCEDURE — 76937 US GUIDE VASCULAR ACCESS: CPT | Performed by: INTERNAL MEDICINE

## 2021-06-28 PROCEDURE — 30200315 PPD INTRADERMAL TEST REV CODE 302: Performed by: HOSPITALIST

## 2021-06-28 PROCEDURE — 63600175 PHARM REV CODE 636 W HCPCS: Performed by: HOSPITALIST

## 2021-06-28 PROCEDURE — 80069 RENAL FUNCTION PANEL: CPT | Performed by: HOSPITALIST

## 2021-06-28 PROCEDURE — U0002 COVID-19 LAB TEST NON-CDC: HCPCS | Performed by: HOSPITALIST

## 2021-06-28 PROCEDURE — 86709 HEPATITIS A IGM ANTIBODY: CPT | Performed by: HOSPITALIST

## 2021-06-28 PROCEDURE — 76937 PR  US GUIDE, VASCULAR ACCESS: ICD-10-PCS | Mod: 26,,, | Performed by: INTERNAL MEDICINE

## 2021-06-28 PROCEDURE — 99232 SBSQ HOSP IP/OBS MODERATE 35: CPT | Mod: ,,, | Performed by: HOSPITALIST

## 2021-06-28 PROCEDURE — 25000003 PHARM REV CODE 250: Performed by: HOSPITALIST

## 2021-06-28 PROCEDURE — 36558 PR INSERT TUNNELED CV CATH W/O PORT OR PUMP: ICD-10-PCS | Mod: RT,,, | Performed by: INTERNAL MEDICINE

## 2021-06-28 PROCEDURE — C1769 GUIDE WIRE: HCPCS | Performed by: INTERNAL MEDICINE

## 2021-06-28 PROCEDURE — 20600001 HC STEP DOWN PRIVATE ROOM

## 2021-06-28 PROCEDURE — 83735 ASSAY OF MAGNESIUM: CPT | Performed by: HOSPITALIST

## 2021-06-28 PROCEDURE — C1751 CATH, INF, PER/CENT/MIDLINE: HCPCS

## 2021-06-28 PROCEDURE — 77001 FLUOROGUIDE FOR VEIN DEVICE: CPT | Performed by: INTERNAL MEDICINE

## 2021-06-28 PROCEDURE — 99232 PR SUBSEQUENT HOSPITAL CARE,LEVL II: ICD-10-PCS | Mod: ,,, | Performed by: HOSPITALIST

## 2021-06-28 DEVICE — 14.5F X 32CM HEMO-FLOW® DOUBLE LUMEN CATHETER SETDOUBLE (CUFF 27CM FROM TIP)(CUFF 27CM F
Type: IMPLANTABLE DEVICE | Site: NECK | Status: FUNCTIONAL
Brand: HEMO-FLOW®

## 2021-06-28 RX ORDER — ASPIRIN 81 MG/1
81 TABLET ORAL DAILY
Status: DISCONTINUED | OUTPATIENT
Start: 2021-06-29 | End: 2021-06-30 | Stop reason: HOSPADM

## 2021-06-28 RX ORDER — SODIUM CHLORIDE 0.9 G/100ML
IRRIGANT IRRIGATION
Status: DISCONTINUED | OUTPATIENT
Start: 2021-06-28 | End: 2021-06-28 | Stop reason: HOSPADM

## 2021-06-28 RX ORDER — CLOPIDOGREL BISULFATE 75 MG/1
75 TABLET ORAL DAILY
Status: DISCONTINUED | OUTPATIENT
Start: 2021-06-29 | End: 2021-06-30 | Stop reason: HOSPADM

## 2021-06-28 RX ORDER — MIDAZOLAM HYDROCHLORIDE 1 MG/ML
INJECTION, SOLUTION INTRAMUSCULAR; INTRAVENOUS
Status: DISCONTINUED | OUTPATIENT
Start: 2021-06-28 | End: 2021-06-28 | Stop reason: HOSPADM

## 2021-06-28 RX ORDER — ACETAMINOPHEN 10 MG/ML
INJECTION, SOLUTION INTRAVENOUS
Status: DISCONTINUED | OUTPATIENT
Start: 2021-06-28 | End: 2021-06-30 | Stop reason: HOSPADM

## 2021-06-28 RX ORDER — LIDOCAINE HYDROCHLORIDE AND EPINEPHRINE 10; 10 MG/ML; UG/ML
INJECTION, SOLUTION INFILTRATION; PERINEURAL
Status: DISCONTINUED | OUTPATIENT
Start: 2021-06-28 | End: 2021-06-28 | Stop reason: HOSPADM

## 2021-06-28 RX ORDER — HEPARIN SODIUM 1000 [USP'U]/ML
INJECTION, SOLUTION INTRAVENOUS; SUBCUTANEOUS
Status: DISCONTINUED | OUTPATIENT
Start: 2021-06-28 | End: 2021-06-28 | Stop reason: HOSPADM

## 2021-06-28 RX ADMIN — INSULIN ASPART 2 UNITS: 100 INJECTION, SOLUTION INTRAVENOUS; SUBCUTANEOUS at 09:06

## 2021-06-28 RX ADMIN — SODIUM BICARBONATE 650 MG TABLET 1300 MG: at 09:06

## 2021-06-28 RX ADMIN — TUBERCULIN PURIFIED PROTEIN DERIVATIVE 5 UNITS: 5 INJECTION, SOLUTION INTRADERMAL at 05:06

## 2021-06-28 RX ADMIN — CALCITRIOL CAPSULES 0.25 MCG 0.25 MCG: 0.25 CAPSULE ORAL at 05:06

## 2021-06-28 RX ADMIN — ACETAMINOPHEN 650 MG: 325 TABLET ORAL at 07:06

## 2021-06-28 RX ADMIN — METOPROLOL SUCCINATE 50 MG: 50 TABLET, EXTENDED RELEASE ORAL at 08:06

## 2021-06-28 RX ADMIN — SODIUM BICARBONATE 650 MG TABLET 1300 MG: at 07:06

## 2021-06-28 RX ADMIN — ISOSORBIDE MONONITRATE 120 MG: 30 TABLET, EXTENDED RELEASE ORAL at 08:06

## 2021-06-28 RX ADMIN — AMLODIPINE BESYLATE 10 MG: 10 TABLET ORAL at 08:06

## 2021-06-29 LAB
ALBUMIN SERPL BCP-MCNC: 2.7 G/DL (ref 3.5–5.2)
ANION GAP SERPL CALC-SCNC: 17 MMOL/L (ref 8–16)
BASOPHILS # BLD AUTO: 0.06 K/UL (ref 0–0.2)
BASOPHILS NFR BLD: 0.8 % (ref 0–1.9)
BUN SERPL-MCNC: 66 MG/DL (ref 8–23)
CALCIUM SERPL-MCNC: 9.2 MG/DL (ref 8.7–10.5)
CHLORIDE SERPL-SCNC: 105 MMOL/L (ref 95–110)
CO2 SERPL-SCNC: 23 MMOL/L (ref 23–29)
CREAT SERPL-MCNC: 7.8 MG/DL (ref 0.5–1.4)
DIFFERENTIAL METHOD: ABNORMAL
EOSINOPHIL # BLD AUTO: 0.2 K/UL (ref 0–0.5)
EOSINOPHIL NFR BLD: 3.2 % (ref 0–8)
ERYTHROCYTE [DISTWIDTH] IN BLOOD BY AUTOMATED COUNT: 14.4 % (ref 11.5–14.5)
EST. GFR  (AFRICAN AMERICAN): 7 ML/MIN/1.73 M^2
EST. GFR  (NON AFRICAN AMERICAN): 6.1 ML/MIN/1.73 M^2
GLUCOSE SERPL-MCNC: 108 MG/DL (ref 70–110)
HCT VFR BLD AUTO: 21.6 % (ref 40–54)
HGB BLD-MCNC: 7.2 G/DL (ref 14–18)
IMM GRANULOCYTES # BLD AUTO: 0.02 K/UL (ref 0–0.04)
IMM GRANULOCYTES NFR BLD AUTO: 0.3 % (ref 0–0.5)
LYMPHOCYTES # BLD AUTO: 1.4 K/UL (ref 1–4.8)
LYMPHOCYTES NFR BLD: 19.2 % (ref 18–48)
MAGNESIUM SERPL-MCNC: 1.6 MG/DL (ref 1.6–2.6)
MCH RBC QN AUTO: 30.3 PG (ref 27–31)
MCHC RBC AUTO-ENTMCNC: 33.3 G/DL (ref 32–36)
MCV RBC AUTO: 91 FL (ref 82–98)
MONOCYTES # BLD AUTO: 0.7 K/UL (ref 0.3–1)
MONOCYTES NFR BLD: 10.1 % (ref 4–15)
NEUTROPHILS # BLD AUTO: 4.7 K/UL (ref 1.8–7.7)
NEUTROPHILS NFR BLD: 66.4 % (ref 38–73)
NRBC BLD-RTO: 0 /100 WBC
PHOSPHATE SERPL-MCNC: 5 MG/DL (ref 2.7–4.5)
PLATELET # BLD AUTO: 107 K/UL (ref 150–450)
PMV BLD AUTO: 11.6 FL (ref 9.2–12.9)
POCT GLUCOSE: 117 MG/DL (ref 70–110)
POCT GLUCOSE: 126 MG/DL (ref 70–110)
POCT GLUCOSE: 153 MG/DL (ref 70–110)
POCT GLUCOSE: 216 MG/DL (ref 70–110)
POTASSIUM SERPL-SCNC: 3.2 MMOL/L (ref 3.5–5.1)
RBC # BLD AUTO: 2.38 M/UL (ref 4.6–6.2)
SODIUM SERPL-SCNC: 145 MMOL/L (ref 136–145)
WBC # BLD AUTO: 7.14 K/UL (ref 3.9–12.7)

## 2021-06-29 PROCEDURE — 20600001 HC STEP DOWN PRIVATE ROOM

## 2021-06-29 PROCEDURE — 63600175 PHARM REV CODE 636 W HCPCS: Performed by: STUDENT IN AN ORGANIZED HEALTH CARE EDUCATION/TRAINING PROGRAM

## 2021-06-29 PROCEDURE — 99233 SBSQ HOSP IP/OBS HIGH 50: CPT | Mod: 24,GC,, | Performed by: INTERNAL MEDICINE

## 2021-06-29 PROCEDURE — 25000003 PHARM REV CODE 250: Performed by: INTERNAL MEDICINE

## 2021-06-29 PROCEDURE — 25000003 PHARM REV CODE 250: Performed by: HOSPITALIST

## 2021-06-29 PROCEDURE — 99232 SBSQ HOSP IP/OBS MODERATE 35: CPT | Mod: ,,, | Performed by: HOSPITALIST

## 2021-06-29 PROCEDURE — 97530 THERAPEUTIC ACTIVITIES: CPT

## 2021-06-29 PROCEDURE — 80069 RENAL FUNCTION PANEL: CPT | Performed by: HOSPITALIST

## 2021-06-29 PROCEDURE — 97116 GAIT TRAINING THERAPY: CPT

## 2021-06-29 PROCEDURE — 99233 PR SUBSEQUENT HOSPITAL CARE,LEVL III: ICD-10-PCS | Mod: 24,GC,, | Performed by: INTERNAL MEDICINE

## 2021-06-29 PROCEDURE — 83735 ASSAY OF MAGNESIUM: CPT | Performed by: HOSPITALIST

## 2021-06-29 PROCEDURE — 90935 HEMODIALYSIS ONE EVALUATION: CPT

## 2021-06-29 PROCEDURE — 85025 COMPLETE CBC W/AUTO DIFF WBC: CPT | Performed by: HOSPITALIST

## 2021-06-29 PROCEDURE — 99232 PR SUBSEQUENT HOSPITAL CARE,LEVL II: ICD-10-PCS | Mod: ,,, | Performed by: HOSPITALIST

## 2021-06-29 PROCEDURE — 97535 SELF CARE MNGMENT TRAINING: CPT

## 2021-06-29 RX ORDER — LACTULOSE 10 G/15ML
20 SOLUTION ORAL 2 TIMES DAILY PRN
Status: DISCONTINUED | OUTPATIENT
Start: 2021-06-29 | End: 2021-06-30 | Stop reason: HOSPADM

## 2021-06-29 RX ORDER — HEPARIN SODIUM 1000 [USP'U]/ML
1000 INJECTION, SOLUTION INTRAVENOUS; SUBCUTANEOUS
Status: DISCONTINUED | OUTPATIENT
Start: 2021-06-29 | End: 2021-06-30 | Stop reason: HOSPADM

## 2021-06-29 RX ORDER — SODIUM CHLORIDE 9 MG/ML
INJECTION, SOLUTION INTRAVENOUS ONCE
Status: CANCELLED | OUTPATIENT
Start: 2021-06-29 | End: 2021-06-29

## 2021-06-29 RX ORDER — SODIUM CHLORIDE 9 MG/ML
INJECTION, SOLUTION INTRAVENOUS ONCE
Status: DISCONTINUED | OUTPATIENT
Start: 2021-06-29 | End: 2021-06-30 | Stop reason: HOSPADM

## 2021-06-29 RX ORDER — BISACODYL 5 MG
10 TABLET, DELAYED RELEASE (ENTERIC COATED) ORAL ONCE
Status: COMPLETED | OUTPATIENT
Start: 2021-06-29 | End: 2021-06-29

## 2021-06-29 RX ADMIN — ISOSORBIDE MONONITRATE 120 MG: 30 TABLET, EXTENDED RELEASE ORAL at 09:06

## 2021-06-29 RX ADMIN — BISACODYL 10 MG: 5 TABLET, COATED ORAL at 06:06

## 2021-06-29 RX ADMIN — SODIUM BICARBONATE 650 MG TABLET 1300 MG: at 09:06

## 2021-06-29 RX ADMIN — ACETAMINOPHEN 650 MG: 325 TABLET ORAL at 09:06

## 2021-06-29 RX ADMIN — METOPROLOL SUCCINATE 50 MG: 50 TABLET, EXTENDED RELEASE ORAL at 09:06

## 2021-06-29 RX ADMIN — CLOPIDOGREL 75 MG: 75 TABLET, FILM COATED ORAL at 09:06

## 2021-06-29 RX ADMIN — SEVELAMER CARBONATE 800 MG: 800 TABLET, FILM COATED ORAL at 12:06

## 2021-06-29 RX ADMIN — POLYETHYLENE GLYCOL 3350 17 G: 17 POWDER, FOR SOLUTION ORAL at 05:06

## 2021-06-29 RX ADMIN — SEVELAMER CARBONATE 800 MG: 800 TABLET, FILM COATED ORAL at 06:06

## 2021-06-29 RX ADMIN — SODIUM BICARBONATE 650 MG TABLET 1300 MG: at 08:06

## 2021-06-29 RX ADMIN — ACETAMINOPHEN 650 MG: 325 TABLET ORAL at 05:06

## 2021-06-29 RX ADMIN — ASPIRIN 81 MG: 81 TABLET, COATED ORAL at 09:06

## 2021-06-29 RX ADMIN — SEVELAMER CARBONATE 800 MG: 800 TABLET, FILM COATED ORAL at 09:06

## 2021-06-29 RX ADMIN — AMLODIPINE BESYLATE 10 MG: 10 TABLET ORAL at 09:06

## 2021-06-29 RX ADMIN — HEPARIN SODIUM 1000 UNITS: 1000 INJECTION, SOLUTION INTRAVENOUS; SUBCUTANEOUS at 05:06

## 2021-06-30 VITALS
OXYGEN SATURATION: 99 % | RESPIRATION RATE: 18 BRPM | HEIGHT: 75 IN | SYSTOLIC BLOOD PRESSURE: 146 MMHG | BODY MASS INDEX: 25.8 KG/M2 | WEIGHT: 207.5 LBS | TEMPERATURE: 99 F | DIASTOLIC BLOOD PRESSURE: 64 MMHG | HEART RATE: 68 BPM

## 2021-06-30 PROBLEM — M86.9 TOE OSTEOMYELITIS, LEFT: Status: RESOLVED | Noted: 2019-07-26 | Resolved: 2021-06-30

## 2021-06-30 PROBLEM — M86.372 CHRONIC MULTIFOCAL OSTEOMYELITIS OF LEFT FOOT: Status: RESOLVED | Noted: 2021-06-22 | Resolved: 2021-06-30

## 2021-06-30 PROBLEM — N18.9 ACUTE KIDNEY INJURY SUPERIMPOSED ON CHRONIC KIDNEY DISEASE: Status: RESOLVED | Noted: 2021-06-22 | Resolved: 2021-06-30

## 2021-06-30 PROBLEM — N17.9 ACUTE KIDNEY INJURY SUPERIMPOSED ON CHRONIC KIDNEY DISEASE: Status: RESOLVED | Noted: 2021-06-22 | Resolved: 2021-06-30

## 2021-06-30 PROBLEM — L97.529 ULCER OF LEFT FOOT: Status: RESOLVED | Noted: 2021-05-12 | Resolved: 2021-06-30

## 2021-06-30 LAB
ALBUMIN SERPL BCP-MCNC: 2.9 G/DL (ref 3.5–5.2)
ANION GAP SERPL CALC-SCNC: 11 MMOL/L (ref 8–16)
BASOPHILS # BLD AUTO: 0.05 K/UL (ref 0–0.2)
BASOPHILS NFR BLD: 0.7 % (ref 0–1.9)
BUN SERPL-MCNC: 38 MG/DL (ref 8–23)
CALCIUM SERPL-MCNC: 9.1 MG/DL (ref 8.7–10.5)
CHLORIDE SERPL-SCNC: 104 MMOL/L (ref 95–110)
CO2 SERPL-SCNC: 26 MMOL/L (ref 23–29)
CREAT SERPL-MCNC: 5.4 MG/DL (ref 0.5–1.4)
DIFFERENTIAL METHOD: ABNORMAL
EOSINOPHIL # BLD AUTO: 0.3 K/UL (ref 0–0.5)
EOSINOPHIL NFR BLD: 3.7 % (ref 0–8)
ERYTHROCYTE [DISTWIDTH] IN BLOOD BY AUTOMATED COUNT: 14.4 % (ref 11.5–14.5)
EST. GFR  (AFRICAN AMERICAN): 11 ML/MIN/1.73 M^2
EST. GFR  (NON AFRICAN AMERICAN): 9.5 ML/MIN/1.73 M^2
GLUCOSE SERPL-MCNC: 120 MG/DL (ref 70–110)
HCT VFR BLD AUTO: 23.1 % (ref 40–54)
HGB BLD-MCNC: 7.8 G/DL (ref 14–18)
IMM GRANULOCYTES # BLD AUTO: 0.02 K/UL (ref 0–0.04)
IMM GRANULOCYTES NFR BLD AUTO: 0.3 % (ref 0–0.5)
LYMPHOCYTES # BLD AUTO: 1.3 K/UL (ref 1–4.8)
LYMPHOCYTES NFR BLD: 19 % (ref 18–48)
MAGNESIUM SERPL-MCNC: 1.6 MG/DL (ref 1.6–2.6)
MCH RBC QN AUTO: 31.2 PG (ref 27–31)
MCHC RBC AUTO-ENTMCNC: 33.8 G/DL (ref 32–36)
MCV RBC AUTO: 92 FL (ref 82–98)
MONOCYTES # BLD AUTO: 0.8 K/UL (ref 0.3–1)
MONOCYTES NFR BLD: 11.2 % (ref 4–15)
NEUTROPHILS # BLD AUTO: 4.5 K/UL (ref 1.8–7.7)
NEUTROPHILS NFR BLD: 65.1 % (ref 38–73)
NRBC BLD-RTO: 0 /100 WBC
PHOSPHATE SERPL-MCNC: 3.6 MG/DL (ref 2.7–4.5)
PLATELET # BLD AUTO: 120 K/UL (ref 150–450)
PMV BLD AUTO: 11.6 FL (ref 9.2–12.9)
POCT GLUCOSE: 112 MG/DL (ref 70–110)
POCT GLUCOSE: 147 MG/DL (ref 70–110)
POCT GLUCOSE: 179 MG/DL (ref 70–110)
POTASSIUM SERPL-SCNC: 3.9 MMOL/L (ref 3.5–5.1)
RBC # BLD AUTO: 2.5 M/UL (ref 4.6–6.2)
SODIUM SERPL-SCNC: 141 MMOL/L (ref 136–145)
TB INDURATION 48 - 72 HR READ: 0 MM
WBC # BLD AUTO: 6.96 K/UL (ref 3.9–12.7)

## 2021-06-30 PROCEDURE — 25000003 PHARM REV CODE 250: Performed by: INTERNAL MEDICINE

## 2021-06-30 PROCEDURE — 83735 ASSAY OF MAGNESIUM: CPT | Performed by: HOSPITALIST

## 2021-06-30 PROCEDURE — 99239 PR HOSPITAL DISCHARGE DAY,>30 MIN: ICD-10-PCS | Mod: ,,, | Performed by: HOSPITALIST

## 2021-06-30 PROCEDURE — 25000003 PHARM REV CODE 250: Performed by: HOSPITALIST

## 2021-06-30 PROCEDURE — 85025 COMPLETE CBC W/AUTO DIFF WBC: CPT | Performed by: HOSPITALIST

## 2021-06-30 PROCEDURE — 93970 EXTREMITY STUDY: CPT | Performed by: SURGERY

## 2021-06-30 PROCEDURE — 80069 RENAL FUNCTION PANEL: CPT | Performed by: HOSPITALIST

## 2021-06-30 PROCEDURE — 99239 HOSP IP/OBS DSCHRG MGMT >30: CPT | Mod: ,,, | Performed by: HOSPITALIST

## 2021-06-30 PROCEDURE — 99233 PR SUBSEQUENT HOSPITAL CARE,LEVL III: ICD-10-PCS | Mod: 24,GC,, | Performed by: INTERNAL MEDICINE

## 2021-06-30 PROCEDURE — 99233 SBSQ HOSP IP/OBS HIGH 50: CPT | Mod: 24,GC,, | Performed by: INTERNAL MEDICINE

## 2021-06-30 RX ORDER — PEN NEEDLE, DIABETIC 30 GX3/16"
1 NEEDLE, DISPOSABLE MISCELLANEOUS 4 TIMES DAILY PRN
Qty: 100 EACH | Refills: 1 | Status: ON HOLD | OUTPATIENT
Start: 2021-06-30 | End: 2023-01-01 | Stop reason: HOSPADM

## 2021-06-30 RX ORDER — FUROSEMIDE 80 MG/1
80 TABLET ORAL 2 TIMES DAILY
Qty: 60 TABLET | Refills: 0 | Status: SHIPPED | OUTPATIENT
Start: 2021-06-30 | End: 2021-07-30

## 2021-06-30 RX ORDER — INSULIN ASPART 100 [IU]/ML
0-5 INJECTION, SOLUTION INTRAVENOUS; SUBCUTANEOUS
Qty: 3 ML | Refills: 0 | Status: SHIPPED | OUTPATIENT
Start: 2021-06-30 | End: 2021-07-06 | Stop reason: SDUPTHER

## 2021-06-30 RX ORDER — SEVELAMER CARBONATE 800 MG/1
800 TABLET, FILM COATED ORAL
Qty: 90 TABLET | Refills: 1 | Status: SHIPPED | OUTPATIENT
Start: 2021-06-30 | End: 2022-01-01

## 2021-06-30 RX ADMIN — CLOPIDOGREL 75 MG: 75 TABLET, FILM COATED ORAL at 08:06

## 2021-06-30 RX ADMIN — SODIUM BICARBONATE 650 MG TABLET 1300 MG: at 08:06

## 2021-06-30 RX ADMIN — METOPROLOL SUCCINATE 50 MG: 50 TABLET, EXTENDED RELEASE ORAL at 08:06

## 2021-06-30 RX ADMIN — ASPIRIN 81 MG: 81 TABLET, COATED ORAL at 08:06

## 2021-06-30 RX ADMIN — AMLODIPINE BESYLATE 10 MG: 10 TABLET ORAL at 08:06

## 2021-06-30 RX ADMIN — LACTULOSE 20 G: 10 SOLUTION ORAL at 12:06

## 2021-06-30 RX ADMIN — SEVELAMER CARBONATE 800 MG: 800 TABLET, FILM COATED ORAL at 12:06

## 2021-06-30 RX ADMIN — SEVELAMER CARBONATE 800 MG: 800 TABLET, FILM COATED ORAL at 08:06

## 2021-06-30 RX ADMIN — ISOSORBIDE MONONITRATE 120 MG: 30 TABLET, EXTENDED RELEASE ORAL at 08:06

## 2021-07-06 ENCOUNTER — TELEPHONE (OUTPATIENT)
Dept: INTERNAL MEDICINE | Facility: CLINIC | Age: 76
End: 2021-07-06
Payer: MEDICARE

## 2021-07-06 RX ORDER — INSULIN ASPART 100 [IU]/ML
0-5 INJECTION, SOLUTION INTRAVENOUS; SUBCUTANEOUS
Qty: 3 ML | Refills: 0 | Status: SHIPPED | OUTPATIENT
Start: 2021-07-06 | End: 2021-07-08 | Stop reason: SDUPTHER

## 2021-07-08 RX ORDER — INSULIN ASPART 100 [IU]/ML
0-5 INJECTION, SOLUTION INTRAVENOUS; SUBCUTANEOUS
Qty: 3 ML | Refills: 0 | Status: SHIPPED | OUTPATIENT
Start: 2021-07-08 | End: 2023-01-01 | Stop reason: SDUPTHER

## 2021-07-10 ENCOUNTER — PATIENT MESSAGE (OUTPATIENT)
Dept: INTERNAL MEDICINE | Facility: CLINIC | Age: 76
End: 2021-07-10

## 2021-07-10 ENCOUNTER — PATIENT MESSAGE (OUTPATIENT)
Dept: SURGERY | Facility: CLINIC | Age: 76
End: 2021-07-10

## 2021-07-14 ENCOUNTER — OFFICE VISIT (OUTPATIENT)
Dept: SURGERY | Facility: CLINIC | Age: 76
End: 2021-07-14
Payer: MEDICARE

## 2021-07-14 VITALS
SYSTOLIC BLOOD PRESSURE: 149 MMHG | DIASTOLIC BLOOD PRESSURE: 75 MMHG | BODY MASS INDEX: 25.45 KG/M2 | WEIGHT: 204.69 LBS | HEIGHT: 75 IN | HEART RATE: 92 BPM

## 2021-07-14 DIAGNOSIS — Z78.9 CENTRAL VENOUS CATHETER IN PLACE: Primary | ICD-10-CM

## 2021-07-14 PROCEDURE — 99024 PR POST-OP FOLLOW-UP VISIT: ICD-10-PCS | Mod: POP,,, | Performed by: SURGERY

## 2021-07-14 PROCEDURE — 36589 REMOVAL TUNNELED CV CATH: CPT | Mod: S$PBB,,, | Performed by: SURGERY

## 2021-07-14 PROCEDURE — 36589 REMOVAL TUNNELED CV CATH: CPT | Mod: PBBFAC,PO | Performed by: SURGERY

## 2021-07-14 PROCEDURE — 99214 OFFICE O/P EST MOD 30 MIN: CPT | Mod: PBBFAC,PO | Performed by: SURGERY

## 2021-07-14 PROCEDURE — 99999 PR PBB SHADOW E&M-EST. PATIENT-LVL IV: CPT | Mod: PBBFAC,,, | Performed by: SURGERY

## 2021-07-14 PROCEDURE — 36589 PR REMOVAL TUNNELED CV CATH W/O SUBQ PORT OR PUMP: ICD-10-PCS | Mod: S$PBB,,, | Performed by: SURGERY

## 2021-07-14 PROCEDURE — 99024 POSTOP FOLLOW-UP VISIT: CPT | Mod: POP,,, | Performed by: SURGERY

## 2021-07-14 PROCEDURE — 99999 PR PBB SHADOW E&M-EST. PATIENT-LVL IV: ICD-10-PCS | Mod: PBBFAC,,, | Performed by: SURGERY

## 2021-07-21 ENCOUNTER — PATIENT MESSAGE (OUTPATIENT)
Dept: INTERNAL MEDICINE | Facility: CLINIC | Age: 76
End: 2021-07-21

## 2021-07-30 ENCOUNTER — OFFICE VISIT (OUTPATIENT)
Dept: INTERNAL MEDICINE | Facility: CLINIC | Age: 76
End: 2021-07-30
Payer: MEDICARE

## 2021-07-30 VITALS
HEART RATE: 69 BPM | WEIGHT: 203.5 LBS | SYSTOLIC BLOOD PRESSURE: 164 MMHG | TEMPERATURE: 98 F | OXYGEN SATURATION: 99 % | DIASTOLIC BLOOD PRESSURE: 92 MMHG | HEIGHT: 75 IN | BODY MASS INDEX: 25.3 KG/M2

## 2021-07-30 DIAGNOSIS — N18.6 ESRD (END STAGE RENAL DISEASE) ON DIALYSIS: Primary | ICD-10-CM

## 2021-07-30 DIAGNOSIS — N17.9 ACUTE KIDNEY INJURY SUPERIMPOSED ON CHRONIC KIDNEY DISEASE: ICD-10-CM

## 2021-07-30 DIAGNOSIS — Z99.2 ESRD (END STAGE RENAL DISEASE) ON DIALYSIS: Primary | ICD-10-CM

## 2021-07-30 DIAGNOSIS — N18.9 ACUTE KIDNEY INJURY SUPERIMPOSED ON CHRONIC KIDNEY DISEASE: ICD-10-CM

## 2021-07-30 PROCEDURE — 99999 PR PBB SHADOW E&M-EST. PATIENT-LVL V: CPT | Mod: PBBFAC,,, | Performed by: INTERNAL MEDICINE

## 2021-07-30 PROCEDURE — 99214 OFFICE O/P EST MOD 30 MIN: CPT | Mod: S$PBB,,, | Performed by: INTERNAL MEDICINE

## 2021-07-30 PROCEDURE — 99215 OFFICE O/P EST HI 40 MIN: CPT | Mod: PBBFAC,PO | Performed by: INTERNAL MEDICINE

## 2021-07-30 PROCEDURE — 99999 PR PBB SHADOW E&M-EST. PATIENT-LVL V: ICD-10-PCS | Mod: PBBFAC,,, | Performed by: INTERNAL MEDICINE

## 2021-07-30 PROCEDURE — 99214 PR OFFICE/OUTPT VISIT, EST, LEVL IV, 30-39 MIN: ICD-10-PCS | Mod: S$PBB,,, | Performed by: INTERNAL MEDICINE

## 2021-08-16 ENCOUNTER — PES CALL (OUTPATIENT)
Dept: ADMINISTRATIVE | Facility: CLINIC | Age: 76
End: 2021-08-16

## 2021-09-20 ENCOUNTER — PES CALL (OUTPATIENT)
Dept: ADMINISTRATIVE | Facility: CLINIC | Age: 76
End: 2021-09-20

## 2021-11-10 ENCOUNTER — PES CALL (OUTPATIENT)
Dept: ADMINISTRATIVE | Facility: CLINIC | Age: 76
End: 2021-11-10
Payer: MEDICARE

## 2021-11-24 RX ORDER — AMLODIPINE BESYLATE 10 MG/1
TABLET ORAL
Qty: 90 TABLET | Refills: 3 | Status: SHIPPED | OUTPATIENT
Start: 2021-11-24 | End: 2022-01-01 | Stop reason: SDUPTHER

## 2021-12-22 ENCOUNTER — HOSPITAL ENCOUNTER (EMERGENCY)
Facility: HOSPITAL | Age: 76
Discharge: HOME | End: 2021-12-23
Attending: EMERGENCY MEDICINE
Payer: MEDICARE

## 2021-12-22 DIAGNOSIS — U07.1 COVID-19 VIRUS INFECTION: Primary | ICD-10-CM

## 2021-12-22 LAB
BUN SERPL-MCNC: 41 MG/DL (ref 6–30)
CHLORIDE SERPL-SCNC: 103 MMOL/L (ref 95–110)
CREAT SERPL-MCNC: 6 MG/DL (ref 0.5–1.4)
CTP QC/QA: YES
GLUCOSE SERPL-MCNC: 128 MG/DL (ref 70–110)
HCT VFR BLD CALC: 28 %PCV (ref 36–54)
POC IONIZED CALCIUM: 1.2 MMOL/L (ref 1.06–1.42)
POC TCO2 (MEASURED): 24 MMOL/L (ref 23–29)
POTASSIUM BLD-SCNC: 4.3 MMOL/L (ref 3.5–5.1)
SAMPLE: ABNORMAL
SARS-COV-2 RDRP RESP QL NAA+PROBE: POSITIVE
SODIUM BLD-SCNC: 139 MMOL/L (ref 136–145)

## 2021-12-22 PROCEDURE — 99283 EMERGENCY DEPT VISIT LOW MDM: CPT | Mod: 25

## 2021-12-22 PROCEDURE — 99284 EMERGENCY DEPT VISIT MOD MDM: CPT | Mod: CR,CS,, | Performed by: EMERGENCY MEDICINE

## 2021-12-22 PROCEDURE — U0002 COVID-19 LAB TEST NON-CDC: HCPCS | Performed by: EMERGENCY MEDICINE

## 2021-12-22 PROCEDURE — 99284 PR EMERGENCY DEPT VISIT,LEVEL IV: ICD-10-PCS | Mod: CR,CS,, | Performed by: EMERGENCY MEDICINE

## 2021-12-22 PROCEDURE — 25000003 PHARM REV CODE 250: Performed by: EMERGENCY MEDICINE

## 2021-12-22 PROCEDURE — 80047 BASIC METABLC PNL IONIZED CA: CPT

## 2021-12-22 RX ORDER — ACETAMINOPHEN 500 MG
1000 TABLET ORAL
Status: COMPLETED | OUTPATIENT
Start: 2021-12-22 | End: 2021-12-22

## 2021-12-22 RX ADMIN — ACETAMINOPHEN 1000 MG: 500 TABLET ORAL at 11:12

## 2021-12-22 NOTE — Clinical Note
"Domingo"Bebe Castro was seen and treated in our emergency department on 12/22/2021.     COVID-19 is present in our communities across the state. There is limited testing for COVID at this time, so not all patients can be tested. In this situation, your employee meets the following criteria:    Domingo Castro has met the criteria for COVID-19 testing and has a POSITIVE result. He can return to work once they are asymptomatic for 72 hours without the use of fever reducing medications AND at least ten days from the first positive result.     If you have any questions or concerns, or if I can be of further assistance, please do not hesitate to contact me.    Sincerely,             Leticia Willis MD"

## 2021-12-23 ENCOUNTER — PATIENT MESSAGE (OUTPATIENT)
Dept: ADMINISTRATIVE | Facility: CLINIC | Age: 76
End: 2021-12-23
Payer: MEDICARE

## 2021-12-23 ENCOUNTER — NURSE TRIAGE (OUTPATIENT)
Dept: ADMINISTRATIVE | Facility: CLINIC | Age: 76
End: 2021-12-23
Payer: MEDICARE

## 2021-12-23 VITALS
DIASTOLIC BLOOD PRESSURE: 68 MMHG | SYSTOLIC BLOOD PRESSURE: 171 MMHG | OXYGEN SATURATION: 97 % | BODY MASS INDEX: 25.24 KG/M2 | HEART RATE: 74 BPM | WEIGHT: 203 LBS | HEIGHT: 75 IN | RESPIRATION RATE: 18 BRPM | TEMPERATURE: 102 F

## 2021-12-23 NOTE — DISCHARGE INSTRUCTIONS
See covid discharge paperwork  Call dialysis center in the morning to find out where patient and be dialyzed.  You may also call the Abigail Benitez at [facility]. [phone]  Follow up with your doctor  Return to ED for any concerns

## 2021-12-23 NOTE — ED NOTES
LOC: The patient is awake, alert, and oriented to self, place, time, and situation. Pt is calm and cooperative. Affect is appropriate.  Speech is appropriate and clear.     APPEARANCE: Patient resting comfortably in no acute distress.  Patient is clean and well groomed.    SKIN: The skin is warm and dry; color consistent with ethnicity.  Patient has normal skin turgor and moist mucus membranes.  Skin intact; no breakdown or bruising noted.     MUSCULOSKELETAL: Patient moving upper and lower extremities without difficulty; denies pain in the extremities or back.  Pt complains of weakness, fever, chills, bodyaches.    Extremities: Upper and lower extremities are atraumatic in appearance without tenderness or deformity. No swelling or erythema. Full range of motion is noted to all joints. Muscle strength is 5/5 bilaterally. Tendon function is normal. Capillary refill is less than 3 seconds in all extremities. Pulses palpable. Steady gait noted.    Spine: Neck and back are without deformity, external skin changes, or signs of trauma. Bony features of the shoulders and hips are of equal height bilaterally. Posture is upright, gait is smooth, steady, and within normal limits.     RESPIRATORY: Airway is open and patent. Respirations spontaneous, even, easy, and non-labored.  Patient has a normal effort and rate.  No accessory muscle use noted. Denies cough. The chest wall is symmetric and without deformity. No signs of trauma. Chest wall is non-tender. No signs of respiratory distress. Lung sounds are clear in all lobes bilaterally without rales, ronchi, or wheezes.     CARDIAC:  Normal rhythm and rate noted.  No peripheral edema noted. No complaints of chest pain.     ABDOMEN: Soft, symmetric, and non-tender without distention. There are no visible lesions or scars. The aorta is midline without bruit or visible pulsation. Umbilicus is midline without herniation. Bowel sounds are present and normoactive in all four  quadrants. No masses, hepatomegaly, or splenomegaly are noted. Pt denies abdominal pain; denies nausea, vomiting, diarrhea, or constipation.    NEUROLOGICAL: Eyes open spontaneously.  Behavior appropriate to situation.  Follows commands; facial expression symmetrical.  Purposeful motor response noted; normal sensation in all extremities. Pt denies headache; denies lightheadedness or dizziness; denies visual disturbances; denies loss of balance; denies unilateral weakness.    Psychiatric: Appropriate mood and affect. Good judgement and insight. No visual or auditory hallucinations. No suicidal or homicidal ideation

## 2021-12-23 NOTE — ED NOTES
Domingo Castro, a 76 y.o. male presents to the ED w/ complaint of 102.3 fever, cough, headache, +covid test, and body aches that started around 2000. Pt is on dialysis so the daughter was concerned If pt could getting dialysis if pt is positive.   Triage note:  Chief Complaint   Patient presents with    Fever     102.2, cough, headache, +COVID home test, body aches     Review of patient's allergies indicates:   Allergen Reactions    Atorvastatin Other (See Comments)     Past Medical History:   Diagnosis Date    Arthritis     Cataract     Chronic diastolic congestive heart failure     Coronary artery disease     Cystoid macular edema of both eyes     Diabetes mellitus type II     Diabetic retinopathy     Hyperlipemia     Hypertension     Retinal hole     Stage 4 chronic kidney disease     Streptococcus pyogenes bacteremia 12/23/2018    Due to left toe osteomyelitis

## 2021-12-23 NOTE — ED PROVIDER NOTES
Encounter Date: 2021       History     Chief Complaint   Patient presents with    Fever     102.2, cough, headache, +COVID home test, body aches     77 y/o M with HTN, HLD, DM2, CAD, CHF, and ESRD om HD since about 2021 presents with fever, cough, headache and body aches since this afternoon.  Daughter took a home covid test and it was positive so she came to the ED because she was concerned pt would not be able to get dialysis tomorrow morning.  No sob. No chest pain. No other complaints.         Review of patient's allergies indicates:   Allergen Reactions    Atorvastatin Other (See Comments)     Past Medical History:   Diagnosis Date    Arthritis     Cataract     Chronic diastolic congestive heart failure     Coronary artery disease     Cystoid macular edema of both eyes     Diabetes mellitus type II     Diabetic retinopathy     Hyperlipemia     Hypertension     Retinal hole     Stage 4 chronic kidney disease     Streptococcus pyogenes bacteremia 2018    Due to left toe osteomyelitis     Past Surgical History:   Procedure Laterality Date    ABDOMINAL AORTOGRAPHY N/A 2019    Procedure: AORTOGRAM-ABDOMINAL;  Surgeon: Amish Hyatt MD;  Location: [facility] CATH LAB/EP;  Service: Cardiology;  Laterality: N/A;  CO2 angiography    ANGIOGRAPHY OF LOWER EXTREMITY Left 2019    Procedure: Angiogram Extremity Unilateral;  Surgeon: Amish Hyatt MD;  Location: [facility] CATH LAB/EP;  Service: Cardiology;  Laterality: Left;    CATARACT EXTRACTION W/  INTRAOCULAR LENS IMPLANT Left 12/15/14    Dr de la cruz    CATARACT EXTRACTION W/  INTRAOCULAR LENS IMPLANT Right 14    dorothy    CIRCUMCISION, NON-      focal laser right eye      INSERTION OF SHAH CATHETER Right 2021    Procedure: INSERTION, CATHETER, CENTRAL VENOUS, SHAH DUAL LUMEN;  Surgeon: Denys Aleman Jr., MD;  Location: [facility] OR;  Service: General;  Laterality: Right;    INSERTION OF TUNNELED CENTRAL VENOUS  CATHETER (CVC) WITH SUBCUTANEOUS PORT Right 1/2/2019    Procedure: [id number];  Surgeon: Denys Aleman Jr., MD;  Location: [facility] OR;  Service: General;  Laterality: Right;    INSERTION OF TUNNELED CENTRAL VENOUS HEMODIALYSIS CATHETER Right 6/28/2021    Procedure: Insertion, Catheter, Central Venous, Hemodialysis;  Surgeon: Agata Rosado MD;  Location: [facility] CATH LAB;  Service: Interventional Nephrology;  Laterality: Right;    KNEE SURGERY      left    Left medial collateral ligament repair      TONSILLECTOMY       Family History   Problem Relation Age of Onset    Heart disease Mother     Cancer Mother     Cancer Brother     Heart disease Father     Diabetes Father     Cancer Sister     Cataracts Paternal Grandmother     Glaucoma Paternal Grandmother     Blindness Neg Hx     Amblyopia Neg Hx     Hypertension Neg Hx     Macular degeneration Neg Hx     Retinal detachment Neg Hx     Strabismus Neg Hx      Social History     Tobacco Use    Smoking status: Never Smoker    Smokeless tobacco: Never Used   Substance Use Topics    Alcohol use: No     Alcohol/week: 0.0 standard drinks    Drug use: No     Review of Systems   Constitutional: Positive for fatigue and fever.   HENT: Positive for sore throat.    Eyes: Negative for visual disturbance.   Respiratory: Positive for cough. Negative for chest tightness and shortness of breath.    Cardiovascular: Negative for chest pain.   Gastrointestinal: Negative for abdominal pain, nausea and vomiting.   Genitourinary: Negative for dysuria.   Musculoskeletal: Positive for myalgias.   Skin: Negative for rash.   Neurological: Positive for headaches. Negative for dizziness, weakness, light-headedness and numbness.       Physical Exam     Initial Vitals [12/22/21 2151]   BP Pulse Resp Temp SpO2   (!) 179/77 75 18 (!) 102.3 °F (39.1 °C) 96 %      MAP       --         Physical Exam    Nursing note and vitals reviewed.  Constitutional: He appears  well-developed and well-nourished. No distress.   HENT:   Head: Normocephalic and atraumatic.   Mouth/Throat: No oropharyngeal exudate.   Eyes: Conjunctivae are normal.   Neck: Neck supple.   Cardiovascular: Normal rate and regular rhythm.   Pulmonary/Chest: No respiratory distress.   Lt chest wall HD catheter   Abdominal: Abdomen is soft. He exhibits no distension. There is no abdominal tenderness. There is no rebound.   Musculoskeletal:         General: No edema.      Cervical back: Neck supple.     Neurological: He is alert and oriented to person, place, and time.   Skin: Skin is warm and dry.         ED Course   Procedures  Labs Reviewed   SARS-COV-2 RDRP GENE - Abnormal; Notable for the following components:       Result Value    POC Rapid COVID Positive (*)     All other components within normal limits    Narrative:     This test utilizes isothermal nucleic acid amplification   technology to detect the SARS-CoV-2 RdRp nucleic acid segment.   The analytical sensitivity (limit of detection) is 125 genome   equivalents/mL.   A POSITIVE result implies infection with the SARS-CoV-2 virus;   the patient is presumed to be contagious.     A NEGATIVE result means that SARS-CoV-2 nucleic acids are not   present above the limit of detection. A NEGATIVE result should be   treated as presumptive. It does not rule out the possibility of   COVID-19 and should not be the sole basis for treatment decisions.   If COVID-19 is strongly suspected based on clinical and exposure   history, re-testing using an alternate molecular assay should be   considered.   This test is only for use under the Food and Drug   Administration s Emergency Use Authorization (EUA).   Commercial kits are provided by [employer].   Performance characteristics of the EUA have been independently   verified by [facility] Department of   Pathology and Laboratory Medicine.   _________________________________________________________________    The authorized Fact Sheet for Healthcare Providers and the authorized Fact   Sheet for Patients of the ID NOW COVID-19 are available on the FDA   website:     https://www.fda.gov/media/919030/download  https://www.fda.gov/media/892442/download       ISTAT PROCEDURE - Abnormal; Notable for the following components:    POC Glucose 128 (*)     POC BUN 41 (*)     POC Creatinine 6.0 (*)     POC Hematocrit 28 (*)     All other components within normal limits          Imaging Results    None          Medications   acetaminophen tablet 1,000 mg (1,000 mg Oral Given 12/22/21 8222)        Medical Decision Making:   History:   I obtained history from: someone other than patient.       <> Summary of History: Daughter - see hpi  Old Medical Records: I decided to obtain old medical records.  Initial Assessment:   75 y/o M with symptoms of covid- home test positive  poc covid positive.  Will check istat to ensure pt does not require more emergent HD  If normal no indication for admission.  Clinical Tests:   Lab Tests: Ordered and Reviewed  ED Management:  Istat- potassium normal. Pt and daughter provided with number of dialysis center that does dialysis on covid positive patients. Daughter instructed to first call pts HD center as they may provide HD to pt. Home O2 monitor and referral to covid home surveillance program. Routine return precautions provided.                      Clinical Impression:   Final diagnoses:  [U07.1] COVID-19 virus infection (Primary)          ED Disposition Condition    Discharge Stable        ED Prescriptions     Medication Sig Dispense Start Date End Date Auth. Provider    pulse oximeter (PULSE OXIMETER) device by Apply Externally route 2 (two) times a day. Use twice daily at 8 AM and 3 PM and record the value in MyChart as directed. 1 each 12/22/2021  Leticia Willis MD        Follow-up Information     Follow up With Specialties Details Why Contact Info    Griselda Nugent MD Internal Medicine  Schedule an appointment as soon as possible for a visit   [address] [facility] [address]  [phone]             Leticia Willis MD  12/23/21 1631

## 2021-12-24 ENCOUNTER — PATIENT MESSAGE (OUTPATIENT)
Dept: ADMINISTRATIVE | Facility: HOSPITAL | Age: 76
End: 2021-12-24
Payer: MEDICARE

## 2021-12-28 ENCOUNTER — NURSE TRIAGE (OUTPATIENT)
Dept: ADMINISTRATIVE | Facility: CLINIC | Age: 76
End: 2021-12-28
Payer: MEDICARE

## 2022-01-01 ENCOUNTER — PATIENT OUTREACH (OUTPATIENT)
Dept: EMERGENCY MEDICINE | Facility: HOSPITAL | Age: 77
End: 2022-01-01
Payer: MEDICARE

## 2022-01-01 ENCOUNTER — HOSPITAL ENCOUNTER (OUTPATIENT)
Dept: RADIOLOGY | Facility: HOSPITAL | Age: 77
Discharge: HOME | End: 2022-03-07
Attending: INTERNAL MEDICINE
Payer: MEDICARE

## 2022-01-01 ENCOUNTER — ANESTHESIA EVENT (OUTPATIENT)
Dept: SURGERY | Facility: HOSPITAL | Age: 77
End: 2022-01-01
Payer: MEDICARE

## 2022-01-01 ENCOUNTER — HOSPITAL ENCOUNTER (OUTPATIENT)
Facility: HOSPITAL | Age: 77
Discharge: HOME | End: 2022-02-11
Attending: SURGERY | Admitting: SURGERY
Payer: MEDICARE

## 2022-01-01 ENCOUNTER — TELEPHONE (OUTPATIENT)
Dept: INTERNAL MEDICINE | Facility: CLINIC | Age: 77
End: 2022-01-01

## 2022-01-01 ENCOUNTER — TELEPHONE (OUTPATIENT)
Dept: INTERNAL MEDICINE | Facility: CLINIC | Age: 77
End: 2022-01-01
Payer: MEDICARE

## 2022-01-01 ENCOUNTER — LAB VISIT (OUTPATIENT)
Dept: LAB | Facility: HOSPITAL | Age: 77
End: 2022-01-01
Attending: INTERNAL MEDICINE
Payer: MEDICARE

## 2022-01-01 ENCOUNTER — TELEPHONE (OUTPATIENT)
Dept: HEMATOLOGY/ONCOLOGY | Facility: CLINIC | Age: 77
End: 2022-01-01
Payer: MEDICARE

## 2022-01-01 ENCOUNTER — TELEPHONE (OUTPATIENT)
Dept: TRANSPLANT | Facility: CLINIC | Age: 77
End: 2022-01-01
Payer: MEDICARE

## 2022-01-01 ENCOUNTER — INFUSION (OUTPATIENT)
Dept: INFUSION THERAPY | Facility: HOSPITAL | Age: 77
End: 2022-01-01
Attending: INTERNAL MEDICINE
Payer: MEDICARE

## 2022-01-01 ENCOUNTER — OFFICE VISIT (OUTPATIENT)
Dept: PODIATRY | Facility: CLINIC | Age: 77
End: 2022-01-01
Payer: MEDICARE

## 2022-01-01 ENCOUNTER — HOSPITAL ENCOUNTER (OUTPATIENT)
Dept: CARDIOLOGY | Facility: HOSPITAL | Age: 77
Discharge: HOME | End: 2022-01-26
Attending: INTERNAL MEDICINE
Payer: MEDICARE

## 2022-01-01 ENCOUNTER — PATIENT MESSAGE (OUTPATIENT)
Dept: SURGERY | Facility: HOSPITAL | Age: 77
End: 2022-01-01
Payer: MEDICARE

## 2022-01-01 ENCOUNTER — OFFICE VISIT (OUTPATIENT)
Dept: HEMATOLOGY/ONCOLOGY | Facility: CLINIC | Age: 77
End: 2022-01-01
Payer: MEDICARE

## 2022-01-01 ENCOUNTER — HOSPITAL ENCOUNTER (OUTPATIENT)
Dept: RADIOLOGY | Facility: HOSPITAL | Age: 77
Discharge: HOME | End: 2022-03-21
Attending: STUDENT IN AN ORGANIZED HEALTH CARE EDUCATION/TRAINING PROGRAM
Payer: MEDICARE

## 2022-01-01 ENCOUNTER — HOSPITAL ENCOUNTER (EMERGENCY)
Facility: HOSPITAL | Age: 77
Discharge: HOME | End: 2022-02-14
Attending: EMERGENCY MEDICINE
Payer: MEDICARE

## 2022-01-01 ENCOUNTER — NURSE TRIAGE (OUTPATIENT)
Dept: ADMINISTRATIVE | Facility: CLINIC | Age: 77
End: 2022-01-01
Payer: MEDICARE

## 2022-01-01 ENCOUNTER — EXTERNAL HOME HEALTH (OUTPATIENT)
Dept: HOME HEALTH SERVICES | Facility: HOSPITAL | Age: 77
End: 2022-01-01
Payer: MEDICARE

## 2022-01-01 ENCOUNTER — OFFICE VISIT (OUTPATIENT)
Dept: OPTOMETRY | Facility: CLINIC | Age: 77
End: 2022-01-01
Payer: MEDICARE

## 2022-01-01 ENCOUNTER — PATIENT MESSAGE (OUTPATIENT)
Dept: HEMATOLOGY/ONCOLOGY | Facility: CLINIC | Age: 77
End: 2022-01-01
Payer: MEDICARE

## 2022-01-01 ENCOUNTER — TELEPHONE (OUTPATIENT)
Dept: PODIATRY | Facility: CLINIC | Age: 77
End: 2022-01-01

## 2022-01-01 ENCOUNTER — HOSPITAL ENCOUNTER (OUTPATIENT)
Dept: RADIOLOGY | Facility: HOSPITAL | Age: 77
Discharge: HOME | End: 2022-06-17
Attending: INTERNAL MEDICINE
Payer: MEDICARE

## 2022-01-01 ENCOUNTER — OFFICE VISIT (OUTPATIENT)
Dept: INTERNAL MEDICINE | Facility: CLINIC | Age: 77
End: 2022-01-01
Payer: MEDICARE

## 2022-01-01 ENCOUNTER — PATIENT MESSAGE (OUTPATIENT)
Dept: PHARMACY | Facility: CLINIC | Age: 77
End: 2022-01-01
Payer: MEDICARE

## 2022-01-01 ENCOUNTER — HOSPITAL ENCOUNTER (OUTPATIENT)
Dept: RADIOLOGY | Facility: HOSPITAL | Age: 77
Discharge: HOME | End: 2022-04-25
Attending: INTERNAL MEDICINE
Payer: MEDICARE

## 2022-01-01 ENCOUNTER — HOSPITAL ENCOUNTER (OUTPATIENT)
Dept: RADIOLOGY | Facility: HOSPITAL | Age: 77
Discharge: HOME | End: 2022-12-05
Attending: INTERNAL MEDICINE
Payer: MEDICARE

## 2022-01-01 ENCOUNTER — PATIENT OUTREACH (OUTPATIENT)
Dept: ADMINISTRATIVE | Facility: OTHER | Age: 77
End: 2022-01-01
Payer: MEDICARE

## 2022-01-01 ENCOUNTER — SPECIALTY PHARMACY (OUTPATIENT)
Dept: PHARMACY | Facility: CLINIC | Age: 77
End: 2022-01-01
Payer: MEDICARE

## 2022-01-01 ENCOUNTER — OFFICE VISIT (OUTPATIENT)
Dept: CARDIOLOGY | Facility: CLINIC | Age: 77
End: 2022-01-01
Payer: MEDICARE

## 2022-01-01 ENCOUNTER — HOSPITAL ENCOUNTER (OUTPATIENT)
Dept: RADIOLOGY | Facility: HOSPITAL | Age: 77
Discharge: HOME | End: 2022-01-26
Attending: INTERNAL MEDICINE
Payer: MEDICARE

## 2022-01-01 ENCOUNTER — ANESTHESIA (OUTPATIENT)
Dept: SURGERY | Facility: HOSPITAL | Age: 77
End: 2022-01-01
Payer: MEDICARE

## 2022-01-01 ENCOUNTER — HOSPITAL ENCOUNTER (OUTPATIENT)
Facility: HOSPITAL | Age: 77
Discharge: HOME | End: 2022-04-01
Attending: SURGERY | Admitting: SURGERY
Payer: MEDICARE

## 2022-01-01 ENCOUNTER — OFFICE VISIT (OUTPATIENT)
Dept: OPHTHALMOLOGY | Facility: CLINIC | Age: 77
End: 2022-01-01
Payer: MEDICARE

## 2022-01-01 ENCOUNTER — PES CALL (OUTPATIENT)
Dept: ADMINISTRATIVE | Facility: CLINIC | Age: 77
End: 2022-01-01
Payer: MEDICARE

## 2022-01-01 ENCOUNTER — HOSPITAL ENCOUNTER (EMERGENCY)
Facility: HOSPITAL | Age: 77
Discharge: HOME | End: 2022-04-14
Attending: EMERGENCY MEDICINE
Payer: MEDICARE

## 2022-01-01 ENCOUNTER — HOSPITAL ENCOUNTER (OUTPATIENT)
Dept: RADIOLOGY | Facility: HOSPITAL | Age: 77
Discharge: HOME | End: 2022-03-18
Attending: INTERNAL MEDICINE
Payer: MEDICARE

## 2022-01-01 VITALS — BODY MASS INDEX: 24.37 KG/M2 | HEIGHT: 75 IN | BODY MASS INDEX: 24.37 KG/M2 | HEIGHT: 75 IN

## 2022-01-01 VITALS
HEIGHT: 75 IN | HEART RATE: 66 BPM | DIASTOLIC BLOOD PRESSURE: 70 MMHG | BODY MASS INDEX: 24.25 KG/M2 | WEIGHT: 195 LBS | SYSTOLIC BLOOD PRESSURE: 150 MMHG

## 2022-01-01 VITALS
DIASTOLIC BLOOD PRESSURE: 61 MMHG | SYSTOLIC BLOOD PRESSURE: 135 MMHG | OXYGEN SATURATION: 97 % | TEMPERATURE: 98 F | WEIGHT: 182.31 LBS | RESPIRATION RATE: 20 BRPM | OXYGEN SATURATION: 97 % | SYSTOLIC BLOOD PRESSURE: 135 MMHG | HEIGHT: 76 IN | BODY MASS INDEX: 22.2 KG/M2 | HEART RATE: 86 BPM | DIASTOLIC BLOOD PRESSURE: 61 MMHG | TEMPERATURE: 98 F | HEART RATE: 86 BPM | WEIGHT: 182.31 LBS | HEIGHT: 76 IN | RESPIRATION RATE: 20 BRPM | BODY MASS INDEX: 22.2 KG/M2

## 2022-01-01 VITALS — BODY MASS INDEX: 23.31 KG/M2 | HEIGHT: 76 IN

## 2022-01-01 VITALS
DIASTOLIC BLOOD PRESSURE: 50 MMHG | HEART RATE: 93 BPM | OXYGEN SATURATION: 98 % | RESPIRATION RATE: 15 BRPM | HEIGHT: 76 IN | WEIGHT: 195 LBS | SYSTOLIC BLOOD PRESSURE: 110 MMHG | BODY MASS INDEX: 23.75 KG/M2

## 2022-01-01 VITALS
SYSTOLIC BLOOD PRESSURE: 120 MMHG | HEART RATE: 81 BPM | TEMPERATURE: 98 F | DIASTOLIC BLOOD PRESSURE: 52 MMHG | HEIGHT: 76 IN | WEIGHT: 189 LBS | BODY MASS INDEX: 23.02 KG/M2 | OXYGEN SATURATION: 100 %

## 2022-01-01 VITALS
WEIGHT: 187.81 LBS | OXYGEN SATURATION: 99 % | BODY MASS INDEX: 23.17 KG/M2 | SYSTOLIC BLOOD PRESSURE: 165 MMHG | DIASTOLIC BLOOD PRESSURE: 72 MMHG | HEART RATE: 83 BPM

## 2022-01-01 VITALS
BODY MASS INDEX: 23.12 KG/M2 | WEIGHT: 184.94 LBS | SYSTOLIC BLOOD PRESSURE: 125 MMHG | HEART RATE: 74 BPM | RESPIRATION RATE: 16 BRPM | OXYGEN SATURATION: 96 % | DIASTOLIC BLOOD PRESSURE: 54 MMHG

## 2022-01-01 VITALS
WEIGHT: 195 LBS | TEMPERATURE: 99 F | HEART RATE: 73 BPM | DIASTOLIC BLOOD PRESSURE: 77 MMHG | OXYGEN SATURATION: 98 % | SYSTOLIC BLOOD PRESSURE: 165 MMHG | RESPIRATION RATE: 20 BRPM | BODY MASS INDEX: 24.37 KG/M2

## 2022-01-01 VITALS
SYSTOLIC BLOOD PRESSURE: 149 MMHG | BODY MASS INDEX: 22.89 KG/M2 | DIASTOLIC BLOOD PRESSURE: 81 MMHG | OXYGEN SATURATION: 98 % | WEIGHT: 188 LBS | TEMPERATURE: 98 F | HEART RATE: 69 BPM | RESPIRATION RATE: 17 BRPM | HEIGHT: 76 IN

## 2022-01-01 VITALS
TEMPERATURE: 98 F | WEIGHT: 187 LBS | HEART RATE: 88 BPM | HEIGHT: 76 IN | SYSTOLIC BLOOD PRESSURE: 114 MMHG | DIASTOLIC BLOOD PRESSURE: 54 MMHG | RESPIRATION RATE: 18 BRPM | OXYGEN SATURATION: 97 % | BODY MASS INDEX: 22.77 KG/M2

## 2022-01-01 VITALS
HEART RATE: 80 BPM | DIASTOLIC BLOOD PRESSURE: 66 MMHG | RESPIRATION RATE: 20 BRPM | OXYGEN SATURATION: 96 % | SYSTOLIC BLOOD PRESSURE: 156 MMHG

## 2022-01-01 VITALS
WEIGHT: 179.25 LBS | SYSTOLIC BLOOD PRESSURE: 114 MMHG | HEART RATE: 91 BPM | DIASTOLIC BLOOD PRESSURE: 49 MMHG | OXYGEN SATURATION: 94 % | TEMPERATURE: 99 F | RESPIRATION RATE: 20 BRPM | BODY MASS INDEX: 22.4 KG/M2

## 2022-01-01 VITALS — BODY MASS INDEX: 23.12 KG/M2 | HEIGHT: 75 IN

## 2022-01-01 VITALS — HEIGHT: 76 IN | BODY MASS INDEX: 23.17 KG/M2

## 2022-01-01 VITALS
SYSTOLIC BLOOD PRESSURE: 152 MMHG | RESPIRATION RATE: 17 BRPM | DIASTOLIC BLOOD PRESSURE: 65 MMHG | HEART RATE: 58 BPM | OXYGEN SATURATION: 99 % | TEMPERATURE: 98 F | HEIGHT: 75 IN | WEIGHT: 195 LBS | BODY MASS INDEX: 24.25 KG/M2

## 2022-01-01 VITALS — HEIGHT: 75 IN | BODY MASS INDEX: 24.87 KG/M2 | WEIGHT: 200 LBS

## 2022-01-01 VITALS — HEIGHT: 76 IN | BODY MASS INDEX: 22.87 KG/M2 | WEIGHT: 187.81 LBS

## 2022-01-01 DIAGNOSIS — I70.0 ATHEROSCLEROSIS OF AORTA: ICD-10-CM

## 2022-01-01 DIAGNOSIS — I50.32 CHRONIC CONGESTIVE HEART FAILURE WITH LEFT VENTRICULAR DIASTOLIC DYSFUNCTION: ICD-10-CM

## 2022-01-01 DIAGNOSIS — C79.51 SECONDARY MALIGNANT NEOPLASM OF BONE: ICD-10-CM

## 2022-01-01 DIAGNOSIS — C61 PROSTATE CANCER: ICD-10-CM

## 2022-01-01 DIAGNOSIS — E78.00 PURE HYPERCHOLESTEROLEMIA: ICD-10-CM

## 2022-01-01 DIAGNOSIS — Z99.2 ESRD (END STAGE RENAL DISEASE) ON DIALYSIS: ICD-10-CM

## 2022-01-01 DIAGNOSIS — Z91.81 RISK FOR FALLS: ICD-10-CM

## 2022-01-01 DIAGNOSIS — E66.9 OBESITY, DIABETES, AND HYPERTENSION SYNDROME: ICD-10-CM

## 2022-01-01 DIAGNOSIS — Z99.81 CHRONIC RESPIRATORY FAILURE WITH HYPOXIA, ON HOME OXYGEN THERAPY: ICD-10-CM

## 2022-01-01 DIAGNOSIS — R59.1 LYMPHADENOPATHY: ICD-10-CM

## 2022-01-01 DIAGNOSIS — R59.0 RETROPERITONEAL LYMPHADENOPATHY: Primary | ICD-10-CM

## 2022-01-01 DIAGNOSIS — E11.22 TYPE 2 DIABETES MELLITUS WITH END-STAGE RENAL DISEASE: ICD-10-CM

## 2022-01-01 DIAGNOSIS — H33.302 RETINAL HOLE OR TEAR, LEFT: ICD-10-CM

## 2022-01-01 DIAGNOSIS — E11.22 TYPE 2 DIABETES MELLITUS WITH CHRONIC KIDNEY DISEASE ON CHRONIC DIALYSIS, WITH LONG-TERM CURRENT USE OF INSULIN: ICD-10-CM

## 2022-01-01 DIAGNOSIS — L84 CORN OR CALLUS: ICD-10-CM

## 2022-01-01 DIAGNOSIS — L60.9 DISEASE OF NAIL: ICD-10-CM

## 2022-01-01 DIAGNOSIS — Z99.2 TYPE 2 DIABETES MELLITUS WITH CHRONIC KIDNEY DISEASE ON CHRONIC DIALYSIS, WITH LONG-TERM CURRENT USE OF INSULIN: ICD-10-CM

## 2022-01-01 DIAGNOSIS — L97.501 SKIN ULCER OF TOE, LIMITED TO BREAKDOWN OF SKIN, UNSPECIFIED LATERALITY: Primary | ICD-10-CM

## 2022-01-01 DIAGNOSIS — Z13.5 SCREENING FOR EYE CONDITION: ICD-10-CM

## 2022-01-01 DIAGNOSIS — R59.1 LYMPHADENOPATHY: Primary | ICD-10-CM

## 2022-01-01 DIAGNOSIS — R54 FRAIL ELDERLY: ICD-10-CM

## 2022-01-01 DIAGNOSIS — R59.0 RETROPERITONEAL LYMPHADENOPATHY: ICD-10-CM

## 2022-01-01 DIAGNOSIS — B35.1 ONYCHOMYCOSIS: ICD-10-CM

## 2022-01-01 DIAGNOSIS — N18.6 TYPE 2 DIABETES MELLITUS WITH END-STAGE RENAL DISEASE: ICD-10-CM

## 2022-01-01 DIAGNOSIS — I73.9 PAD (PERIPHERAL ARTERY DISEASE): Primary | ICD-10-CM

## 2022-01-01 DIAGNOSIS — E11.59 OBESITY, DIABETES, AND HYPERTENSION SYNDROME: ICD-10-CM

## 2022-01-01 DIAGNOSIS — I73.9 PAD (PERIPHERAL ARTERY DISEASE): ICD-10-CM

## 2022-01-01 DIAGNOSIS — Z00.00 ENCOUNTER FOR PREVENTIVE HEALTH EXAMINATION: Primary | ICD-10-CM

## 2022-01-01 DIAGNOSIS — I15.2 HYPERTENSION ASSOCIATED WITH DIABETES: ICD-10-CM

## 2022-01-01 DIAGNOSIS — C61 MALIGNANT NEOPLASM OF PROSTATE: ICD-10-CM

## 2022-01-01 DIAGNOSIS — R91.1 PULMONARY NODULE: ICD-10-CM

## 2022-01-01 DIAGNOSIS — C77.2 SECONDARY MALIGNANT NEOPLASM OF RETROPERITONEAL LYMPH NODES: ICD-10-CM

## 2022-01-01 DIAGNOSIS — E11.51 TYPE 2 DIABETES MELLITUS WITH PERIPHERAL ANGIOPATHY: ICD-10-CM

## 2022-01-01 DIAGNOSIS — E78.5 HYPERLIPIDEMIA ASSOCIATED WITH TYPE 2 DIABETES MELLITUS: ICD-10-CM

## 2022-01-01 DIAGNOSIS — M89.8X9 METABOLIC BONE DISEASE: ICD-10-CM

## 2022-01-01 DIAGNOSIS — D63.1 ANEMIA IN END-STAGE RENAL DISEASE: ICD-10-CM

## 2022-01-01 DIAGNOSIS — N18.6 TYPE 2 DIABETES MELLITUS WITH CHRONIC KIDNEY DISEASE ON CHRONIC DIALYSIS, WITH LONG-TERM CURRENT USE OF INSULIN: ICD-10-CM

## 2022-01-01 DIAGNOSIS — Z78.9 IMPAIRED MOBILITY AND ADLS: ICD-10-CM

## 2022-01-01 DIAGNOSIS — Z99.2 VASCULAR DIALYSIS CATHETER IN PLACE: ICD-10-CM

## 2022-01-01 DIAGNOSIS — R06.02 SOB (SHORTNESS OF BREATH): Primary | ICD-10-CM

## 2022-01-01 DIAGNOSIS — I50.33 ACUTE ON CHRONIC DIASTOLIC CHF (CONGESTIVE HEART FAILURE), NYHA CLASS 3: ICD-10-CM

## 2022-01-01 DIAGNOSIS — N18.5 CKD STAGE 5 DUE TO TYPE 2 DIABETES MELLITUS: ICD-10-CM

## 2022-01-01 DIAGNOSIS — E11.42 TYPE 2 DIABETES MELLITUS WITH PERIPHERAL NEUROPATHY: ICD-10-CM

## 2022-01-01 DIAGNOSIS — Z19.2 METASTATIC CASTRATION-RESISTANT ADENOCARCINOMA OF PROSTATE: Primary | ICD-10-CM

## 2022-01-01 DIAGNOSIS — Z99.2 ESRD (END STAGE RENAL DISEASE) ON DIALYSIS: Primary | ICD-10-CM

## 2022-01-01 DIAGNOSIS — T82.41XA HEMODIALYSIS CATHETER MALFUNCTION: Primary | ICD-10-CM

## 2022-01-01 DIAGNOSIS — I70.0 AORTIC ARCH ATHEROSCLEROSIS: ICD-10-CM

## 2022-01-01 DIAGNOSIS — R09.89 CAROTID BRUIT, UNSPECIFIED LATERALITY: ICD-10-CM

## 2022-01-01 DIAGNOSIS — E11.9 DIABETES MELLITUS WITHOUT COMPLICATION: Primary | ICD-10-CM

## 2022-01-01 DIAGNOSIS — Z99.2 HEMODIALYSIS PATIENT: ICD-10-CM

## 2022-01-01 DIAGNOSIS — N18.6 ESRD (END STAGE RENAL DISEASE) ON DIALYSIS: ICD-10-CM

## 2022-01-01 DIAGNOSIS — N18.5 ANEMIA OF CHRONIC RENAL FAILURE, STAGE 5: ICD-10-CM

## 2022-01-01 DIAGNOSIS — Z99.81 DEPENDENCE ON SUPPLEMENTAL OXYGEN: ICD-10-CM

## 2022-01-01 DIAGNOSIS — Z74.1 REQUIRES ASSISTANCE WITH ACTIVITIES OF DAILY LIVING (ADL): ICD-10-CM

## 2022-01-01 DIAGNOSIS — Z74.09 IMPAIRED MOBILITY AND ADLS: ICD-10-CM

## 2022-01-01 DIAGNOSIS — N18.6 ANEMIA IN END-STAGE RENAL DISEASE: ICD-10-CM

## 2022-01-01 DIAGNOSIS — N18.4 ANEMIA OF CHRONIC RENAL FAILURE, STAGE 4 (SEVERE): ICD-10-CM

## 2022-01-01 DIAGNOSIS — R06.02 SHORTNESS OF BREATH: ICD-10-CM

## 2022-01-01 DIAGNOSIS — G89.18 POSTOPERATIVE PAIN: Primary | ICD-10-CM

## 2022-01-01 DIAGNOSIS — D64.9 NORMOCYTIC ANEMIA: ICD-10-CM

## 2022-01-01 DIAGNOSIS — Z79.4 TYPE 2 DIABETES MELLITUS WITH CHRONIC KIDNEY DISEASE ON CHRONIC DIALYSIS, WITH LONG-TERM CURRENT USE OF INSULIN: ICD-10-CM

## 2022-01-01 DIAGNOSIS — N18.4 TYPE 1 DIABETES MELLITUS WITH STAGE 4 CHRONIC KIDNEY DISEASE: ICD-10-CM

## 2022-01-01 DIAGNOSIS — L84 PRE-ULCERATIVE CALLUSES: ICD-10-CM

## 2022-01-01 DIAGNOSIS — D63.0 ANEMIA IN NEOPLASTIC DISEASE: ICD-10-CM

## 2022-01-01 DIAGNOSIS — C61 MALIGNANT NEOPLASM OF PROSTATE: Primary | ICD-10-CM

## 2022-01-01 DIAGNOSIS — N25.81 SECONDARY HYPERPARATHYROIDISM OF RENAL ORIGIN: ICD-10-CM

## 2022-01-01 DIAGNOSIS — G89.3 CHRONIC NEOPLASM-RELATED PAIN: ICD-10-CM

## 2022-01-01 DIAGNOSIS — Z95.828 S/P ARTERIOVENOUS (AV) GRAFT PLACEMENT: ICD-10-CM

## 2022-01-01 DIAGNOSIS — I50.32 CHRONIC DIASTOLIC CONGESTIVE HEART FAILURE: Chronic | ICD-10-CM

## 2022-01-01 DIAGNOSIS — N18.4 CKD STAGE 4 DUE TO TYPE 1 DIABETES MELLITUS: ICD-10-CM

## 2022-01-01 DIAGNOSIS — R05.9 COUGH: ICD-10-CM

## 2022-01-01 DIAGNOSIS — E10.22 TYPE 1 DIABETES MELLITUS WITH STAGE 4 CHRONIC KIDNEY DISEASE: ICD-10-CM

## 2022-01-01 DIAGNOSIS — C61 PROSTATE CANCER: Primary | ICD-10-CM

## 2022-01-01 DIAGNOSIS — I25.10 CORONARY ARTERY DISEASE INVOLVING NATIVE CORONARY ARTERY OF NATIVE HEART WITHOUT ANGINA PECTORIS: ICD-10-CM

## 2022-01-01 DIAGNOSIS — R53.81 PHYSICAL DECONDITIONING: ICD-10-CM

## 2022-01-01 DIAGNOSIS — M79.602 LEFT ARM PAIN: ICD-10-CM

## 2022-01-01 DIAGNOSIS — D84.821 IMMUNODEFICIENCY DUE TO DRUGS: ICD-10-CM

## 2022-01-01 DIAGNOSIS — N18.6 ESRD (END STAGE RENAL DISEASE) ON DIALYSIS: Primary | ICD-10-CM

## 2022-01-01 DIAGNOSIS — E11.69 OBESITY, DIABETES, AND HYPERTENSION SYNDROME: ICD-10-CM

## 2022-01-01 DIAGNOSIS — E11.59 HYPERTENSION ASSOCIATED WITH DIABETES: ICD-10-CM

## 2022-01-01 DIAGNOSIS — N18.5 CRF (CHRONIC RENAL FAILURE), STAGE 5: ICD-10-CM

## 2022-01-01 DIAGNOSIS — Z79.4 TYPE 2 DIABETES MELLITUS WITH DIABETIC NEPHROPATHY, WITH LONG-TERM CURRENT USE OF INSULIN: ICD-10-CM

## 2022-01-01 DIAGNOSIS — R32 URINARY INCONTINENCE, UNSPECIFIED TYPE: ICD-10-CM

## 2022-01-01 DIAGNOSIS — Z99.89 DEPENDENCE ON OTHER ENABLING MACHINES AND DEVICES: ICD-10-CM

## 2022-01-01 DIAGNOSIS — D63.1 ANEMIA OF CHRONIC RENAL FAILURE, STAGE 4 (SEVERE): ICD-10-CM

## 2022-01-01 DIAGNOSIS — I15.2 OBESITY, DIABETES, AND HYPERTENSION SYNDROME: ICD-10-CM

## 2022-01-01 DIAGNOSIS — Z71.89 ADVANCE CARE PLANNING: ICD-10-CM

## 2022-01-01 DIAGNOSIS — E11.69 HYPERLIPIDEMIA ASSOCIATED WITH TYPE 2 DIABETES MELLITUS: ICD-10-CM

## 2022-01-01 DIAGNOSIS — I50.32 CHRONIC DIASTOLIC CONGESTIVE HEART FAILURE: ICD-10-CM

## 2022-01-01 DIAGNOSIS — D63.1 ANEMIA OF CHRONIC RENAL FAILURE, STAGE 5: ICD-10-CM

## 2022-01-01 DIAGNOSIS — Z87.2 HEALED ULCER OF LEFT FOOT ON EXAMINATION: Primary | ICD-10-CM

## 2022-01-01 DIAGNOSIS — L97.501 SKIN ULCER OF TOE, LIMITED TO BREAKDOWN OF SKIN, UNSPECIFIED LATERALITY: ICD-10-CM

## 2022-01-01 DIAGNOSIS — Z87.2 HEALED ULCER OF LEFT FOOT ON EXAMINATION: ICD-10-CM

## 2022-01-01 DIAGNOSIS — I50.32 CHRONIC DIASTOLIC CONGESTIVE HEART FAILURE: Primary | Chronic | ICD-10-CM

## 2022-01-01 DIAGNOSIS — R10.10 PAIN OF UPPER ABDOMEN: ICD-10-CM

## 2022-01-01 DIAGNOSIS — E11.21 TYPE 2 DIABETES MELLITUS WITH DIABETIC NEPHROPATHY, WITH LONG-TERM CURRENT USE OF INSULIN: ICD-10-CM

## 2022-01-01 DIAGNOSIS — J47.9 BRONCHIECTASIS WITHOUT COMPLICATION: ICD-10-CM

## 2022-01-01 DIAGNOSIS — Z79.4 TYPE 2 DIABETES MELLITUS WITH DIABETIC PERIPHERAL ANGIOPATHY WITHOUT GANGRENE, WITH LONG-TERM CURRENT USE OF INSULIN: ICD-10-CM

## 2022-01-01 DIAGNOSIS — I77.0 A-V FISTULA: ICD-10-CM

## 2022-01-01 DIAGNOSIS — C61 METASTATIC CASTRATION-RESISTANT ADENOCARCINOMA OF PROSTATE: Primary | ICD-10-CM

## 2022-01-01 DIAGNOSIS — E10.22 CKD STAGE 4 DUE TO TYPE 1 DIABETES MELLITUS: ICD-10-CM

## 2022-01-01 DIAGNOSIS — M54.12 CERVICAL RADICULOPATHY: ICD-10-CM

## 2022-01-01 DIAGNOSIS — R63.4 WEIGHT LOSS, NON-INTENTIONAL: ICD-10-CM

## 2022-01-01 DIAGNOSIS — R97.20 ELEVATED PSA: ICD-10-CM

## 2022-01-01 DIAGNOSIS — E11.51 TYPE 2 DIABETES MELLITUS WITH DIABETIC PERIPHERAL ANGIOPATHY WITHOUT GANGRENE, WITH LONG-TERM CURRENT USE OF INSULIN: ICD-10-CM

## 2022-01-01 DIAGNOSIS — H35.033 HYPERTENSIVE RETINOPATHY OF BOTH EYES: ICD-10-CM

## 2022-01-01 DIAGNOSIS — R26.89 DECREASED MOBILITY: ICD-10-CM

## 2022-01-01 DIAGNOSIS — N52.9 ERECTILE DYSFUNCTION, UNSPECIFIED ERECTILE DYSFUNCTION TYPE: ICD-10-CM

## 2022-01-01 DIAGNOSIS — K21.9 GERD WITHOUT ESOPHAGITIS: ICD-10-CM

## 2022-01-01 DIAGNOSIS — E11.65 UNCONTROLLED TYPE 2 DIABETES MELLITUS WITH HYPERGLYCEMIA: ICD-10-CM

## 2022-01-01 DIAGNOSIS — E11.22 CKD STAGE 5 DUE TO TYPE 2 DIABETES MELLITUS: ICD-10-CM

## 2022-01-01 DIAGNOSIS — R05.9 COUGH: Primary | ICD-10-CM

## 2022-01-01 DIAGNOSIS — J96.11 CHRONIC RESPIRATORY FAILURE WITH HYPOXIA, ON HOME OXYGEN THERAPY: ICD-10-CM

## 2022-01-01 DIAGNOSIS — Z79.899 IMMUNODEFICIENCY DUE TO DRUGS: ICD-10-CM

## 2022-01-01 DIAGNOSIS — N17.9 AKI (ACUTE KIDNEY INJURY): ICD-10-CM

## 2022-01-01 DIAGNOSIS — M54.12 CERVICAL RADICULOPATHY: Primary | ICD-10-CM

## 2022-01-01 DIAGNOSIS — R26.9 ABNORMAL GAIT: ICD-10-CM

## 2022-01-01 DIAGNOSIS — Z74.1 ASSISTANCE NEEDED FOR MOBILITY: ICD-10-CM

## 2022-01-01 DIAGNOSIS — E11.3413 SEVERE NONPROLIFERATIVE DIABETIC RETINOPATHY OF BOTH EYES WITH MACULAR EDEMA ASSOCIATED WITH TYPE 2 DIABETES MELLITUS: Primary | ICD-10-CM

## 2022-01-01 DIAGNOSIS — E13.3493: ICD-10-CM

## 2022-01-01 DIAGNOSIS — R63.4 WEIGHT LOSS: ICD-10-CM

## 2022-01-01 DIAGNOSIS — R53.83 FATIGUE, UNSPECIFIED TYPE: ICD-10-CM

## 2022-01-01 LAB
ALBUMIN SERPL BCP-MCNC: 3.4 G/DL (ref 3.5–5.2)
ALBUMIN SERPL BCP-MCNC: 3.7 G/DL (ref 3.5–5.2)
ALBUMIN SERPL BCP-MCNC: 4.1 G/DL (ref 3.5–5.2)
ALBUMIN SERPL BCP-MCNC: 4.3 G/DL (ref 3.5–5.2)
ALP SERPL-CCNC: 297 U/L (ref 38–126)
ALP SERPL-CCNC: 391 U/L (ref 38–126)
ALP SERPL-CCNC: 398 U/L (ref 38–126)
ALP SERPL-CCNC: 413 U/L (ref 38–126)
ALT SERPL W/O P-5'-P-CCNC: 12 U/L (ref 10–44)
ALT SERPL W/O P-5'-P-CCNC: 14 U/L (ref 10–44)
ALT SERPL W/O P-5'-P-CCNC: 22 U/L (ref 10–44)
ALT SERPL W/O P-5'-P-CCNC: 8 U/L (ref 10–44)
ANION GAP SERPL CALC-SCNC: 10 MMOL/L (ref 8–16)
ANION GAP SERPL CALC-SCNC: 13 MMOL/L (ref 8–16)
ANION GAP SERPL CALC-SCNC: 15 MMOL/L (ref 8–16)
ANION GAP SERPL CALC-SCNC: 16 MMOL/L (ref 8–16)
ANION GAP SERPL CALC-SCNC: 16 MMOL/L (ref 8–16)
ANION GAP SERPL CALC-SCNC: 17 MMOL/L (ref 8–16)
AORTIC ROOT ANNULUS: 3.05 CM
AORTIC VALVE CUSP SEPERATION: 1.91 CM
AST SERPL-CCNC: 17 U/L (ref 15–46)
AST SERPL-CCNC: 18 U/L (ref 15–46)
AST SERPL-CCNC: 22 U/L (ref 15–46)
AST SERPL-CCNC: 28 U/L (ref 15–46)
AV INDEX (PROSTH): 0.68
AV MEAN GRADIENT: 6 MMHG
AV PEAK GRADIENT: 10 MMHG
AV VALVE AREA: 2.9 CM2
AV VELOCITY RATIO: 0.59
BASOPHILS # BLD AUTO: 0.05 K/UL (ref 0–0.2)
BASOPHILS # BLD AUTO: 0.05 K/UL (ref 0–0.2)
BASOPHILS # BLD AUTO: 0.07 K/UL (ref 0–0.2)
BASOPHILS # BLD AUTO: 0.07 K/UL (ref 0–0.2)
BASOPHILS # BLD AUTO: 0.08 K/UL (ref 0–0.2)
BASOPHILS NFR BLD: 0.7 % (ref 0–1.9)
BASOPHILS NFR BLD: 0.8 % (ref 0–1.9)
BASOPHILS NFR BLD: 0.8 % (ref 0–1.9)
BASOPHILS NFR BLD: 1 % (ref 0–1.9)
BASOPHILS NFR BLD: 1.3 % (ref 0–1.9)
BILIRUB SERPL-MCNC: 0.4 MG/DL (ref 0.1–1)
BILIRUB SERPL-MCNC: 0.4 MG/DL (ref 0.1–1)
BILIRUB SERPL-MCNC: 0.5 MG/DL (ref 0.1–1)
BILIRUB SERPL-MCNC: 0.5 MG/DL (ref 0.1–1)
BSA FOR ECHO PROCEDURE: 2.19 M2
BUN SERPL-MCNC: 37 MG/DL (ref 8–23)
BUN SERPL-MCNC: 39 MG/DL (ref 8–23)
CALCIUM SERPL-MCNC: 10.5 MG/DL (ref 8.7–10.5)
CALCIUM SERPL-MCNC: 8.9 MG/DL (ref 8.7–10.5)
CALCIUM SERPL-MCNC: 9 MG/DL (ref 8.7–10.5)
CALCIUM SERPL-MCNC: 9.1 MG/DL (ref 8.7–10.5)
CALCIUM SERPL-MCNC: 9.3 MG/DL (ref 8.7–10.5)
CALCIUM SERPL-MCNC: 9.9 MG/DL (ref 8.7–10.5)
CHLORIDE SERPL-SCNC: 91 MMOL/L (ref 95–110)
CHLORIDE SERPL-SCNC: 93 MMOL/L (ref 95–110)
CHLORIDE SERPL-SCNC: 94 MMOL/L (ref 95–110)
CHLORIDE SERPL-SCNC: 96 MMOL/L (ref 95–110)
CHLORIDE SERPL-SCNC: 98 MMOL/L (ref 95–110)
CHLORIDE SERPL-SCNC: 99 MMOL/L (ref 95–110)
CO2 SERPL-SCNC: 26 MMOL/L (ref 23–29)
CO2 SERPL-SCNC: 27 MMOL/L (ref 23–29)
CO2 SERPL-SCNC: 27 MMOL/L (ref 23–29)
CO2 SERPL-SCNC: 29 MMOL/L (ref 23–29)
CO2 SERPL-SCNC: 30 MMOL/L (ref 23–29)
CO2 SERPL-SCNC: 32 MMOL/L (ref 23–29)
COMPLEXED PSA SERPL-MCNC: 206.9 NG/ML (ref 0–4)
COMPLEXED PSA SERPL-MCNC: 360.7 NG/ML (ref 0–4)
COMPLEXED PSA SERPL-MCNC: 855.8 NG/ML (ref 0–4)
COMPLEXED PSA SERPL-MCNC: >1000 NG/ML (ref 0–4)
COMPLEXED PSA SERPL-MCNC: >1000 NG/ML (ref 0–4)
CREAT SERPL-MCNC: 4.45 MG/DL (ref 0.5–1.4)
CREAT SERPL-MCNC: 4.7 MG/DL (ref 0.5–1.4)
CREAT SERPL-MCNC: 5.05 MG/DL (ref 0.5–1.4)
CREAT SERPL-MCNC: 5.2 MG/DL (ref 0.5–1.4)
CREAT SERPL-MCNC: 5.49 MG/DL (ref 0.5–1.4)
CREAT SERPL-MCNC: 6.38 MG/DL (ref 0.5–1.4)
CRP SERPL-MCNC: 9.92 MG/DL (ref 0–1)
CTP QC/QA: YES
CV ECHO LV RWT: 0.74 CM
DIFFERENTIAL METHOD: ABNORMAL
DOP CALC AO PEAK VEL: 1.58 M/S
DOP CALC AO VTI: 33.39 CM
DOP CALC LVOT AREA: 4.3 CM2
DOP CALC LVOT DIAMETER: 2.33 CM
DOP CALC LVOT PEAK VEL: 0.93 M/S
DOP CALC LVOT STROKE VOLUME: 96.74 CM3
DOP CALC MV VTI: 33.83 CM
DOP CALCLVOT PEAK VEL VTI: 22.7 CM
E WAVE DECELERATION TIME: 207.04 MSEC
E/A RATIO: 0.66
E/E' RATIO: 14.91 M/S
ECHO LV POSTERIOR WALL: 1.72 CM (ref 0.6–1.1)
EJECTION FRACTION: 55 %
EOSINOPHIL # BLD AUTO: 0 K/UL (ref 0–0.5)
EOSINOPHIL # BLD AUTO: 0.1 K/UL (ref 0–0.5)
EOSINOPHIL NFR BLD: 0.5 % (ref 0–8)
EOSINOPHIL NFR BLD: 0.9 % (ref 0–8)
EOSINOPHIL NFR BLD: 1.1 % (ref 0–8)
EOSINOPHIL NFR BLD: 1.4 % (ref 0–8)
EOSINOPHIL NFR BLD: 1.6 % (ref 0–8)
ERYTHROCYTE [DISTWIDTH] IN BLOOD BY AUTOMATED COUNT: 13.5 % (ref 11.5–14.5)
ERYTHROCYTE [DISTWIDTH] IN BLOOD BY AUTOMATED COUNT: 13.6 % (ref 11.5–14.5)
ERYTHROCYTE [DISTWIDTH] IN BLOOD BY AUTOMATED COUNT: 16.2 % (ref 11.5–14.5)
ERYTHROCYTE [DISTWIDTH] IN BLOOD BY AUTOMATED COUNT: 16.3 % (ref 11.5–14.5)
ERYTHROCYTE [DISTWIDTH] IN BLOOD BY AUTOMATED COUNT: 17.1 % (ref 11.5–14.5)
ERYTHROCYTE [SEDIMENTATION RATE] IN BLOOD BY WESTERGREN METHOD: 120 MM/HR (ref 0–10)
EST. GFR  (AFRICAN AMERICAN): 10.7 ML/MIN/1.73 M^2
EST. GFR  (AFRICAN AMERICAN): 11 ML/MIN/1.73 M^2
EST. GFR  (AFRICAN AMERICAN): 11.8 ML/MIN/1.73 M^2
EST. GFR  (AFRICAN AMERICAN): 13 ML/MIN/1.73 M^2
EST. GFR  (AFRICAN AMERICAN): 13.7 ML/MIN/1.73 M^2
EST. GFR  (NO RACE VARIABLE): 8.4 ML/MIN/1.73 M^2
EST. GFR  (NON AFRICAN AMERICAN): 10 ML/MIN/1.73 M^2
EST. GFR  (NON AFRICAN AMERICAN): 10.2 ML/MIN/1.73 M^2
EST. GFR  (NON AFRICAN AMERICAN): 11 ML/MIN/1.73 M^2
EST. GFR  (NON AFRICAN AMERICAN): 11.9 ML/MIN/1.73 M^2
EST. GFR  (NON AFRICAN AMERICAN): 9.2 ML/MIN/1.73 M^2
FRACTIONAL SHORTENING: 37 % (ref 28–44)
GLUCOSE SERPL-MCNC: 152 MG/DL (ref 70–110)
GLUCOSE SERPL-MCNC: 155 MG/DL (ref 70–110)
GLUCOSE SERPL-MCNC: 155 MG/DL (ref 70–110)
GLUCOSE SERPL-MCNC: 191 MG/DL (ref 70–110)
GLUCOSE SERPL-MCNC: 228 MG/DL (ref 70–110)
GLUCOSE SERPL-MCNC: 283 MG/DL (ref 70–110)
HCT VFR BLD AUTO: 27.4 % (ref 40–54)
HCT VFR BLD AUTO: 28.2 % (ref 40–54)
HCT VFR BLD AUTO: 30.8 % (ref 40–54)
HCT VFR BLD AUTO: 37.4 % (ref 40–54)
HCT VFR BLD AUTO: 39.9 % (ref 40–54)
HGB BLD-MCNC: 11.3 G/DL (ref 14–18)
HGB BLD-MCNC: 12.5 G/DL (ref 14–18)
HGB BLD-MCNC: 8.2 G/DL (ref 14–18)
HGB BLD-MCNC: 8.7 G/DL (ref 14–18)
HGB BLD-MCNC: 9.2 G/DL (ref 14–18)
IMM GRANULOCYTES # BLD AUTO: 0.02 K/UL (ref 0–0.04)
IMM GRANULOCYTES # BLD AUTO: 0.02 K/UL (ref 0–0.04)
IMM GRANULOCYTES # BLD AUTO: 0.03 K/UL (ref 0–0.04)
IMM GRANULOCYTES NFR BLD AUTO: 0.3 % (ref 0–0.5)
IMM GRANULOCYTES NFR BLD AUTO: 0.4 % (ref 0–0.5)
IMM GRANULOCYTES NFR BLD AUTO: 0.5 % (ref 0–0.5)
INFLUENZA A, MOLECULAR: NEGATIVE
INFLUENZA B, MOLECULAR: NEGATIVE
INTERVENTRICULAR SEPTUM: 1.52 CM (ref 0.6–1.1)
LA MAJOR: 4.58 CM
LA MINOR: 4.88 CM
LA WIDTH: 3.89 CM
LDH SERPL L TO P-CCNC: 208 U/L (ref 110–260)
LEFT ATRIUM SIZE: 3.57 CM
LEFT ATRIUM VOLUME INDEX: 25.5 ML/M2
LEFT ATRIUM VOLUME: 55.78 CM3
LEFT INTERNAL DIMENSION IN SYSTOLE: 2.93 CM (ref 2.1–4)
LEFT TBI: 0.51
LEFT TOE PRESSURE: 88 MMHG
LEFT VENTRICLE DIASTOLIC VOLUME INDEX: 46.14 ML/M2
LEFT VENTRICLE DIASTOLIC VOLUME: 101.05 ML
LEFT VENTRICLE MASS INDEX: 150 G/M2
LEFT VENTRICLE SYSTOLIC VOLUME INDEX: 15.1 ML/M2
LEFT VENTRICLE SYSTOLIC VOLUME: 32.98 ML
LEFT VENTRICULAR INTERNAL DIMENSION IN DIASTOLE: 4.67 CM (ref 3.5–6)
LEFT VENTRICULAR MASS: 327.63 G
LV LATERAL E/E' RATIO: 11.71 M/S
LV SEPTAL E/E' RATIO: 20.5 M/S
LYMPHOCYTES # BLD AUTO: 1 K/UL (ref 1–4.8)
LYMPHOCYTES # BLD AUTO: 1.7 K/UL (ref 1–4.8)
LYMPHOCYTES # BLD AUTO: 1.8 K/UL (ref 1–4.8)
LYMPHOCYTES # BLD AUTO: 1.9 K/UL (ref 1–4.8)
LYMPHOCYTES # BLD AUTO: 2.2 K/UL (ref 1–4.8)
LYMPHOCYTES NFR BLD: 16.3 % (ref 18–48)
LYMPHOCYTES NFR BLD: 18.2 % (ref 18–48)
LYMPHOCYTES NFR BLD: 27.4 % (ref 18–48)
LYMPHOCYTES NFR BLD: 28.7 % (ref 18–48)
LYMPHOCYTES NFR BLD: 35.5 % (ref 18–48)
MAGNESIUM SERPL-MCNC: 1.8 MG/DL (ref 1.6–2.6)
MAGNESIUM SERPL-MCNC: 1.8 MG/DL (ref 1.6–2.6)
MAGNESIUM SERPL-MCNC: 1.9 MG/DL (ref 1.6–2.6)
MCH RBC QN AUTO: 26.9 PG (ref 27–31)
MCH RBC QN AUTO: 27.2 PG (ref 27–31)
MCH RBC QN AUTO: 29 PG (ref 27–31)
MCH RBC QN AUTO: 29.8 PG (ref 27–31)
MCH RBC QN AUTO: 31.3 PG (ref 27–31)
MCHC RBC AUTO-ENTMCNC: 28.2 G/DL (ref 32–36)
MCHC RBC AUTO-ENTMCNC: 29.1 G/DL (ref 32–36)
MCHC RBC AUTO-ENTMCNC: 30.2 G/DL (ref 32–36)
MCHC RBC AUTO-ENTMCNC: 31.3 G/DL (ref 32–36)
MCHC RBC AUTO-ENTMCNC: 33.6 G/DL (ref 32–36)
MCV RBC AUTO: 93 FL (ref 82–98)
MCV RBC AUTO: 93 FL (ref 82–98)
MCV RBC AUTO: 95 FL (ref 82–98)
MCV RBC AUTO: 95 FL (ref 82–98)
MCV RBC AUTO: 96 FL (ref 82–98)
MONOCYTES # BLD AUTO: 0.5 K/UL (ref 0.3–1)
MONOCYTES # BLD AUTO: 0.5 K/UL (ref 0.3–1)
MONOCYTES # BLD AUTO: 0.6 K/UL (ref 0.3–1)
MONOCYTES # BLD AUTO: 0.7 K/UL (ref 0.3–1)
MONOCYTES # BLD AUTO: 0.9 K/UL (ref 0.3–1)
MONOCYTES NFR BLD: 10.5 % (ref 4–15)
MONOCYTES NFR BLD: 7.6 % (ref 4–15)
MONOCYTES NFR BLD: 7.7 % (ref 4–15)
MONOCYTES NFR BLD: 8.5 % (ref 4–15)
MONOCYTES NFR BLD: 8.8 % (ref 4–15)
MV A" WAVE DURATION": 9.34 MSEC
MV MEAN GRADIENT: 1 MMHG
MV PEAK A VEL: 1.25 M/S
MV PEAK E VEL: 0.82 M/S
MV PEAK GRADIENT: 6 MMHG
MV STENOSIS PRESSURE HALF TIME: 60.04 MS
MV VALVE AREA BY CONTINUITY EQUATION: 2.86 CM2
MV VALVE AREA P 1/2 METHOD: 3.66 CM2
NEUTROPHILS # BLD AUTO: 3.1 K/UL (ref 1.8–7.7)
NEUTROPHILS # BLD AUTO: 3.9 K/UL (ref 1.8–7.7)
NEUTROPHILS # BLD AUTO: 4.1 K/UL (ref 1.8–7.7)
NEUTROPHILS # BLD AUTO: 4.6 K/UL (ref 1.8–7.7)
NEUTROPHILS # BLD AUTO: 6.9 K/UL (ref 1.8–7.7)
NEUTROPHILS NFR BLD: 50.8 % (ref 38–73)
NEUTROPHILS NFR BLD: 60.5 % (ref 38–73)
NEUTROPHILS NFR BLD: 62.7 % (ref 38–73)
NEUTROPHILS NFR BLD: 70.6 % (ref 38–73)
NEUTROPHILS NFR BLD: 74.3 % (ref 38–73)
NRBC BLD-RTO: 0 /100 WBC
PHOSPHATE SERPL-MCNC: 4.4 MG/DL (ref 2.7–4.5)
PHOSPHATE SERPL-MCNC: 5.2 MG/DL (ref 2.7–4.5)
PHOSPHATE SERPL-MCNC: 5.6 MG/DL (ref 2.7–4.5)
PLATELET # BLD AUTO: 152 K/UL (ref 150–450)
PLATELET # BLD AUTO: 157 K/UL (ref 150–450)
PLATELET # BLD AUTO: 187 K/UL (ref 150–450)
PLATELET # BLD AUTO: 215 K/UL (ref 150–450)
PLATELET # BLD AUTO: 239 K/UL (ref 150–450)
PMV BLD AUTO: 10.2 FL (ref 9.2–12.9)
PMV BLD AUTO: 10.4 FL (ref 9.2–12.9)
PMV BLD AUTO: 10.5 FL (ref 9.2–12.9)
PMV BLD AUTO: 9.1 FL (ref 9.2–12.9)
PMV BLD AUTO: 9.7 FL (ref 9.2–12.9)
POTASSIUM SERPL-SCNC: 3.5 MMOL/L (ref 3.5–5.1)
POTASSIUM SERPL-SCNC: 3.7 MMOL/L (ref 3.5–5.1)
POTASSIUM SERPL-SCNC: 3.8 MMOL/L (ref 3.5–5.1)
POTASSIUM SERPL-SCNC: 3.9 MMOL/L (ref 3.5–5.1)
POTASSIUM SERPL-SCNC: 4.3 MMOL/L (ref 3.5–5.1)
POTASSIUM SERPL-SCNC: 5 MMOL/L (ref 3.5–5.1)
PROT SERPL-MCNC: 7.6 G/DL (ref 6–8.4)
PROT SERPL-MCNC: 8 G/DL (ref 6–8.4)
PROT SERPL-MCNC: 8.1 G/DL (ref 6–8.4)
PROT SERPL-MCNC: 8.2 G/DL (ref 6–8.4)
PULM VEIN S/D RATIO: 1.13
PV PEAK D VEL: 0.48 M/S
PV PEAK S VEL: 0.54 M/S
PV PEAK VELOCITY: 0.93 CM/S
RA MAJOR: 4.76 CM
RA PRESSURE: 3 MMHG
RA WIDTH: 3.24 CM
RBC # BLD AUTO: 2.94 M/UL (ref 4.6–6.2)
RBC # BLD AUTO: 3.02 M/UL (ref 4.6–6.2)
RBC # BLD AUTO: 3.23 M/UL (ref 4.6–6.2)
RBC # BLD AUTO: 3.89 M/UL (ref 4.6–6.2)
RBC # BLD AUTO: 4.19 M/UL (ref 4.6–6.2)
RIGHT ARM BP: 172 MMHG
RIGHT TBI: 0.59
RIGHT TOE PRESSURE: 102 MMHG
RIGHT VENTRICULAR END-DIASTOLIC DIMENSION: 2.43 CM
RV TISSUE DOPPLER FREE WALL SYSTOLIC VELOCITY 1 (APICAL 4 CHAMBER VIEW): 12.11 CM/S
SARS-COV-2 RDRP RESP QL NAA+PROBE: NEGATIVE
SINUS: 2.97 CM
SODIUM SERPL-SCNC: 135 MMOL/L (ref 136–145)
SODIUM SERPL-SCNC: 138 MMOL/L (ref 136–145)
SODIUM SERPL-SCNC: 138 MMOL/L (ref 136–145)
SODIUM SERPL-SCNC: 139 MMOL/L (ref 136–145)
SODIUM SERPL-SCNC: 139 MMOL/L (ref 136–145)
SODIUM SERPL-SCNC: 140 MMOL/L (ref 136–145)
SPECIMEN SOURCE: NORMAL
TDI LATERAL: 0.07 M/S
TDI SEPTAL: 0.04 M/S
TDI: 0.06 M/S
TESTOST SERPL-MCNC: 133 NG/DL (ref 304–1227)
TESTOST SERPL-MCNC: 49 NG/DL (ref 304–1227)
TESTOST SERPL-MCNC: 55 NG/DL (ref 304–1227)
TESTOST SERPL-MCNC: 59 NG/DL (ref 304–1227)
URATE SERPL-MCNC: 5 MG/DL (ref 3.4–7)
UUN UR-MCNC: 32 MG/DL (ref 2–20)
UUN UR-MCNC: 38 MG/DL (ref 2–20)
UUN UR-MCNC: 39 MG/DL (ref 2–20)
UUN UR-MCNC: 52 MG/DL (ref 2–20)
WBC # BLD AUTO: 6.15 K/UL (ref 3.9–12.7)
WBC # BLD AUTO: 6.19 K/UL (ref 3.9–12.7)
WBC # BLD AUTO: 6.27 K/UL (ref 3.9–12.7)
WBC # BLD AUTO: 6.73 K/UL (ref 3.9–12.7)
WBC # BLD AUTO: 9.75 K/UL (ref 3.9–12.7)

## 2022-01-01 PROCEDURE — 99999 PR PBB SHADOW E&M-EST. PATIENT-LVL IV: CPT | Mod: PBBFAC,,, | Performed by: INTERNAL MEDICINE

## 2022-01-01 PROCEDURE — 71000015 HC POSTOP RECOV 1ST HR: Performed by: SURGERY

## 2022-01-01 PROCEDURE — 84403 ASSAY OF TOTAL TESTOSTERONE: CPT | Mod: PO | Performed by: INTERNAL MEDICINE

## 2022-01-01 PROCEDURE — 99204 PR OFFICE/OUTPT VISIT, NEW, LEVL IV, 45-59 MIN: ICD-10-PCS | Mod: S$PBB,,, | Performed by: INTERNAL MEDICINE

## 2022-01-01 PROCEDURE — 36415 COLL VENOUS BLD VENIPUNCTURE: CPT | Mod: PO | Performed by: INTERNAL MEDICINE

## 2022-01-01 PROCEDURE — 25000003 PHARM REV CODE 250: Performed by: SURGERY

## 2022-01-01 PROCEDURE — 63600175 PHARM REV CODE 636 W HCPCS: Performed by: NURSE ANESTHETIST, CERTIFIED REGISTERED

## 2022-01-01 PROCEDURE — 80053 COMPREHEN METABOLIC PANEL: CPT | Mod: PO | Performed by: INTERNAL MEDICINE

## 2022-01-01 PROCEDURE — 99214 OFFICE O/P EST MOD 30 MIN: CPT | Mod: 25,S$PBB,, | Performed by: STUDENT IN AN ORGANIZED HEALTH CARE EDUCATION/TRAINING PROGRAM

## 2022-01-01 PROCEDURE — 99215 PR OFFICE/OUTPT VISIT, EST, LEVL V, 40-54 MIN: ICD-10-PCS | Mod: S$PBB,,, | Performed by: INTERNAL MEDICINE

## 2022-01-01 PROCEDURE — 93010 EKG 12-LEAD: ICD-10-PCS | Mod: ,,, | Performed by: INTERNAL MEDICINE

## 2022-01-01 PROCEDURE — 99215 OFFICE O/P EST HI 40 MIN: CPT | Mod: S$PBB,,, | Performed by: INTERNAL MEDICINE

## 2022-01-01 PROCEDURE — 11055 PARING/CUTG B9 HYPRKER LES 1: CPT | Mod: PBBFAC,PN | Performed by: STUDENT IN AN ORGANIZED HEALTH CARE EDUCATION/TRAINING PROGRAM

## 2022-01-01 PROCEDURE — 96402 CHEMO HORMON ANTINEOPL SQ/IM: CPT

## 2022-01-01 PROCEDURE — 99999 PR PBB SHADOW E&M-EST. PATIENT-LVL IV: ICD-10-PCS | Mod: PBBFAC,,, | Performed by: INTERNAL MEDICINE

## 2022-01-01 PROCEDURE — 63600175 PHARM REV CODE 636 W HCPCS: Mod: JG | Performed by: INTERNAL MEDICINE

## 2022-01-01 PROCEDURE — 99999 PR PBB SHADOW E&M-EST. PATIENT-LVL III: ICD-10-PCS | Mod: PBBFAC,,, | Performed by: OPHTHALMOLOGY

## 2022-01-01 PROCEDURE — 84550 ASSAY OF BLOOD/URIC ACID: CPT | Performed by: INTERNAL MEDICINE

## 2022-01-01 PROCEDURE — 99999 PR PBB SHADOW E&M-EST. PATIENT-LVL III: CPT | Mod: PBBFAC,,, | Performed by: INTERNAL MEDICINE

## 2022-01-01 PROCEDURE — 92014 COMPRE OPH EXAM EST PT 1/>: CPT | Mod: S$PBB,,, | Performed by: OPHTHALMOLOGY

## 2022-01-01 PROCEDURE — 36000707: Performed by: SURGERY

## 2022-01-01 PROCEDURE — 83615 LACTATE (LD) (LDH) ENZYME: CPT | Performed by: INTERNAL MEDICINE

## 2022-01-01 PROCEDURE — 99283 EMERGENCY DEPT VISIT LOW MDM: CPT

## 2022-01-01 PROCEDURE — 99497 ADVNCD CARE PLAN 30 MIN: CPT | Mod: PBBFAC,27,PO | Performed by: INTERNAL MEDICINE

## 2022-01-01 PROCEDURE — 99213 OFFICE O/P EST LOW 20 MIN: CPT | Mod: PBBFAC,PN | Performed by: STUDENT IN AN ORGANIZED HEALTH CARE EDUCATION/TRAINING PROGRAM

## 2022-01-01 PROCEDURE — 11721 DEBRIDE NAIL 6 OR MORE: CPT | Mod: PBBFAC,PN | Performed by: STUDENT IN AN ORGANIZED HEALTH CARE EDUCATION/TRAINING PROGRAM

## 2022-01-01 PROCEDURE — 83735 ASSAY OF MAGNESIUM: CPT | Mod: PO | Performed by: INTERNAL MEDICINE

## 2022-01-01 PROCEDURE — 11055 PARING/CUTG B9 HYPRKER LES 1: CPT | Mod: Q8,PBBFAC,PN | Performed by: STUDENT IN AN ORGANIZED HEALTH CARE EDUCATION/TRAINING PROGRAM

## 2022-01-01 PROCEDURE — 99214 OFFICE O/P EST MOD 30 MIN: CPT | Mod: PBBFAC,PO | Performed by: INTERNAL MEDICINE

## 2022-01-01 PROCEDURE — 80048 BASIC METABOLIC PNL TOTAL CA: CPT | Performed by: SURGERY

## 2022-01-01 PROCEDURE — 99499 UNLISTED E&M SERVICE: CPT | Mod: S$PBB,,, | Performed by: STUDENT IN AN ORGANIZED HEALTH CARE EDUCATION/TRAINING PROGRAM

## 2022-01-01 PROCEDURE — 25000003 PHARM REV CODE 250: Performed by: EMERGENCY MEDICINE

## 2022-01-01 PROCEDURE — G0180 PR HOME HEALTH MD CERTIFICATION: ICD-10-PCS | Mod: ,,, | Performed by: INTERNAL MEDICINE

## 2022-01-01 PROCEDURE — A9503 TC99M MEDRONATE: HCPCS | Mod: PO

## 2022-01-01 PROCEDURE — 11721 ROUTINE FOOT CARE: ICD-10-PCS | Mod: 59,Q8,S$PBB, | Performed by: STUDENT IN AN ORGANIZED HEALTH CARE EDUCATION/TRAINING PROGRAM

## 2022-01-01 PROCEDURE — 93010 ELECTROCARDIOGRAM REPORT: CPT | Mod: ,,, | Performed by: INTERNAL MEDICINE

## 2022-01-01 PROCEDURE — 99999 PR PBB SHADOW E&M-EST. PATIENT-LVL III: CPT | Mod: PBBFAC,,, | Performed by: STUDENT IN AN ORGANIZED HEALTH CARE EDUCATION/TRAINING PROGRAM

## 2022-01-01 PROCEDURE — 92134 POSTERIOR SEGMENT OCT RETINA (OCULAR COHERENCE TOMOGRAPHY)-BOTH EYES: ICD-10-PCS | Mod: 26,S$PBB,, | Performed by: OPHTHALMOLOGY

## 2022-01-01 PROCEDURE — 99999 PR PBB SHADOW E&M-EST. PATIENT-LVL III: ICD-10-PCS | Mod: PBBFAC,,, | Performed by: INTERNAL MEDICINE

## 2022-01-01 PROCEDURE — 84403 ASSAY OF TOTAL TESTOSTERONE: CPT | Performed by: INTERNAL MEDICINE

## 2022-01-01 PROCEDURE — 11042 DBRDMT SUBQ TIS 1ST 20SQCM/<: CPT | Mod: PBBFAC,PN | Performed by: STUDENT IN AN ORGANIZED HEALTH CARE EDUCATION/TRAINING PROGRAM

## 2022-01-01 PROCEDURE — 93306 ECHO (CUPID ONLY): ICD-10-PCS | Mod: 26,,, | Performed by: INTERNAL MEDICINE

## 2022-01-01 PROCEDURE — 92014 COMPRE OPH EXAM EST PT 1/>: CPT | Mod: S$PBB,,, | Performed by: OPTOMETRIST

## 2022-01-01 PROCEDURE — 74176 CT ABD & PELVIS W/O CONTRAST: CPT | Mod: TC,PO

## 2022-01-01 PROCEDURE — 27201423 OPTIME MED/SURG SUP & DEVICES STERILE SUPPLY: Performed by: SURGERY

## 2022-01-01 PROCEDURE — 99999 PR PBB SHADOW E&M-EST. PATIENT-LVL V: CPT | Mod: PBBFAC,,, | Performed by: INTERNAL MEDICINE

## 2022-01-01 PROCEDURE — 99214 PR OFFICE/OUTPT VISIT, EST, LEVL IV, 30-39 MIN: ICD-10-PCS | Mod: 25,S$PBB,, | Performed by: STUDENT IN AN ORGANIZED HEALTH CARE EDUCATION/TRAINING PROGRAM

## 2022-01-01 PROCEDURE — 99213 PR OFFICE/OUTPT VISIT, EST, LEVL III, 20-29 MIN: ICD-10-PCS | Mod: S$PBB,,, | Performed by: INTERNAL MEDICINE

## 2022-01-01 PROCEDURE — 93925 LOWER EXTREMITY STUDY: CPT | Mod: TC,PO

## 2022-01-01 PROCEDURE — 25000003 PHARM REV CODE 250: Performed by: NURSE ANESTHETIST, CERTIFIED REGISTERED

## 2022-01-01 PROCEDURE — 99213 OFFICE O/P EST LOW 20 MIN: CPT | Mod: S$PBB,,, | Performed by: INTERNAL MEDICINE

## 2022-01-01 PROCEDURE — 99213 OFFICE O/P EST LOW 20 MIN: CPT | Mod: PBBFAC | Performed by: OPHTHALMOLOGY

## 2022-01-01 PROCEDURE — 86140 C-REACTIVE PROTEIN: CPT | Mod: PO | Performed by: INTERNAL MEDICINE

## 2022-01-01 PROCEDURE — 99999 PR PBB SHADOW E&M-EST. PATIENT-LVL III: CPT | Mod: PBBFAC,,, | Performed by: OPHTHALMOLOGY

## 2022-01-01 PROCEDURE — 92014 PR EYE EXAM, EST PATIENT,COMPREHESV: ICD-10-PCS | Mod: S$PBB,,, | Performed by: OPHTHALMOLOGY

## 2022-01-01 PROCEDURE — 99999 PR PBB SHADOW E&M-EST. PATIENT-LVL V: CPT | Mod: PBBFAC,,, | Performed by: NURSE PRACTITIONER

## 2022-01-01 PROCEDURE — 99213 OFFICE O/P EST LOW 20 MIN: CPT | Mod: PBBFAC,PO | Performed by: INTERNAL MEDICINE

## 2022-01-01 PROCEDURE — 99213 PR OFFICE/OUTPT VISIT, EST, LEVL III, 20-29 MIN: ICD-10-PCS | Mod: 25,S$PBB,, | Performed by: STUDENT IN AN ORGANIZED HEALTH CARE EDUCATION/TRAINING PROGRAM

## 2022-01-01 PROCEDURE — 78306 BONE IMAGING WHOLE BODY: CPT | Mod: TC,PO

## 2022-01-01 PROCEDURE — 96401 CHEMO ANTI-NEOPL SQ/IM: CPT

## 2022-01-01 PROCEDURE — 99284 EMERGENCY DEPT VISIT MOD MDM: CPT | Mod: 25

## 2022-01-01 PROCEDURE — 85025 COMPLETE CBC W/AUTO DIFF WBC: CPT | Mod: PO | Performed by: INTERNAL MEDICINE

## 2022-01-01 PROCEDURE — 99999 PR PBB SHADOW E&M-EST. PATIENT-LVL III: ICD-10-PCS | Mod: PBBFAC,,, | Performed by: STUDENT IN AN ORGANIZED HEALTH CARE EDUCATION/TRAINING PROGRAM

## 2022-01-01 PROCEDURE — 99497 PR ADVNCD CARE PLAN 30 MIN: ICD-10-PCS | Mod: S$PBB,,, | Performed by: INTERNAL MEDICINE

## 2022-01-01 PROCEDURE — 11721 DEBRIDE NAIL 6 OR MORE: CPT | Mod: 59,Q8,S$PBB, | Performed by: STUDENT IN AN ORGANIZED HEALTH CARE EDUCATION/TRAINING PROGRAM

## 2022-01-01 PROCEDURE — 71000016 HC POSTOP RECOV ADDL HR: Performed by: SURGERY

## 2022-01-01 PROCEDURE — 37000009 HC ANESTHESIA EA ADD 15 MINS: Performed by: SURGERY

## 2022-01-01 PROCEDURE — 36415 COLL VENOUS BLD VENIPUNCTURE: CPT | Performed by: SURGERY

## 2022-01-01 PROCEDURE — C1768 GRAFT, VASCULAR: HCPCS | Performed by: SURGERY

## 2022-01-01 PROCEDURE — 84153 ASSAY OF PSA TOTAL: CPT | Performed by: INTERNAL MEDICINE

## 2022-01-01 PROCEDURE — 92014 PR EYE EXAM, EST PATIENT,COMPREHESV: ICD-10-PCS | Mod: S$PBB,,, | Performed by: OPTOMETRIST

## 2022-01-01 PROCEDURE — 99497 ADVNCD CARE PLAN 30 MIN: CPT | Mod: S$PBB,,, | Performed by: INTERNAL MEDICINE

## 2022-01-01 PROCEDURE — 99499 NO LOS: ICD-10-PCS | Mod: S$PBB,,, | Performed by: STUDENT IN AN ORGANIZED HEALTH CARE EDUCATION/TRAINING PROGRAM

## 2022-01-01 PROCEDURE — 92134 CPTRZ OPH DX IMG PST SGM RTA: CPT | Mod: PBBFAC | Performed by: OPHTHALMOLOGY

## 2022-01-01 PROCEDURE — 85025 COMPLETE CBC W/AUTO DIFF WBC: CPT | Performed by: EMERGENCY MEDICINE

## 2022-01-01 PROCEDURE — 11055 ROUTINE FOOT CARE: ICD-10-PCS | Mod: Q8,S$PBB,, | Performed by: STUDENT IN AN ORGANIZED HEALTH CARE EDUCATION/TRAINING PROGRAM

## 2022-01-01 PROCEDURE — 99215 OFFICE O/P EST HI 40 MIN: CPT | Mod: PBBFAC,PO | Performed by: NURSE PRACTITIONER

## 2022-01-01 PROCEDURE — 99999 PR PBB SHADOW E&M-EST. PATIENT-LVL V: ICD-10-PCS | Mod: PBBFAC,,, | Performed by: NURSE PRACTITIONER

## 2022-01-01 PROCEDURE — 99999 PR PBB SHADOW E&M-EST. PATIENT-LVL V: ICD-10-PCS | Mod: PBBFAC,,, | Performed by: INTERNAL MEDICINE

## 2022-01-01 PROCEDURE — 93005 ELECTROCARDIOGRAM TRACING: CPT

## 2022-01-01 PROCEDURE — 84100 ASSAY OF PHOSPHORUS: CPT | Mod: PO | Performed by: INTERNAL MEDICINE

## 2022-01-01 PROCEDURE — 93306 TTE W/DOPPLER COMPLETE: CPT | Mod: PO

## 2022-01-01 PROCEDURE — 93922 UPR/L XTREMITY ART 2 LEVELS: CPT | Mod: PO

## 2022-01-01 PROCEDURE — 99999 PR PBB SHADOW E&M-EST. PATIENT-LVL III: CPT | Mod: PBBFAC,,, | Performed by: OPTOMETRIST

## 2022-01-01 PROCEDURE — 99213 OFFICE O/P EST LOW 20 MIN: CPT | Mod: PBBFAC,PN,25 | Performed by: STUDENT IN AN ORGANIZED HEALTH CARE EDUCATION/TRAINING PROGRAM

## 2022-01-01 PROCEDURE — U0002 COVID-19 LAB TEST NON-CDC: HCPCS | Performed by: EMERGENCY MEDICINE

## 2022-01-01 PROCEDURE — 76700 US EXAM ABDOM COMPLETE: CPT | Mod: TC,PO

## 2022-01-01 PROCEDURE — 93922 UPR/L XTREMITY ART 2 LEVELS: CPT | Mod: 26,,, | Performed by: INTERNAL MEDICINE

## 2022-01-01 PROCEDURE — 25500020 PHARM REV CODE 255: Mod: PO | Performed by: INTERNAL MEDICINE

## 2022-01-01 PROCEDURE — 73630 X-RAY EXAM OF FOOT: CPT | Mod: TC,FY,PO,LT

## 2022-01-01 PROCEDURE — G0439 PR MEDICARE ANNUAL WELLNESS SUBSEQUENT VISIT: ICD-10-PCS | Mod: S$PBB,,, | Performed by: NURSE PRACTITIONER

## 2022-01-01 PROCEDURE — 87502 INFLUENZA DNA AMP PROBE: CPT | Performed by: EMERGENCY MEDICINE

## 2022-01-01 PROCEDURE — 93880 EXTRACRANIAL BILAT STUDY: CPT | Mod: TC,PO

## 2022-01-01 PROCEDURE — 93922 ANKLE BRACHIAL INDICES (ABI): ICD-10-PCS | Mod: 26,,, | Performed by: INTERNAL MEDICINE

## 2022-01-01 PROCEDURE — 37000008 HC ANESTHESIA 1ST 15 MINUTES: Performed by: SURGERY

## 2022-01-01 PROCEDURE — 36415 COLL VENOUS BLD VENIPUNCTURE: CPT | Performed by: INTERNAL MEDICINE

## 2022-01-01 PROCEDURE — 99213 OFFICE O/P EST LOW 20 MIN: CPT | Mod: 25,S$PBB,, | Performed by: STUDENT IN AN ORGANIZED HEALTH CARE EDUCATION/TRAINING PROGRAM

## 2022-01-01 PROCEDURE — 99213 OFFICE O/P EST LOW 20 MIN: CPT | Mod: PBBFAC | Performed by: OPTOMETRIST

## 2022-01-01 PROCEDURE — G0439 PPPS, SUBSEQ VISIT: HCPCS | Mod: S$PBB,,, | Performed by: NURSE PRACTITIONER

## 2022-01-01 PROCEDURE — 11721 ROUTINE FOOT CARE: ICD-10-PCS | Mod: Q8,59,S$PBB, | Performed by: STUDENT IN AN ORGANIZED HEALTH CARE EDUCATION/TRAINING PROGRAM

## 2022-01-01 PROCEDURE — 99204 OFFICE O/P NEW MOD 45 MIN: CPT | Mod: S$PBB,,, | Performed by: INTERNAL MEDICINE

## 2022-01-01 PROCEDURE — 92201 PR OPHTHALMOSCOPY, EXT, W/RET DRAW/SCLERAL DEPR, I&R, UNI/BI: ICD-10-PCS | Mod: S$PBB,,, | Performed by: OPHTHALMOLOGY

## 2022-01-01 PROCEDURE — 11042 WOUND DEBRIDEMENT: ICD-10-PCS | Mod: S$PBB,,, | Performed by: STUDENT IN AN ORGANIZED HEALTH CARE EDUCATION/TRAINING PROGRAM

## 2022-01-01 PROCEDURE — 85652 RBC SED RATE AUTOMATED: CPT | Performed by: INTERNAL MEDICINE

## 2022-01-01 PROCEDURE — 99999 PR PBB SHADOW E&M-EST. PATIENT-LVL III: ICD-10-PCS | Mod: PBBFAC,,, | Performed by: OPTOMETRIST

## 2022-01-01 PROCEDURE — 36000705 HC OR TIME LEV I EA ADD 15 MIN: Performed by: SURGERY

## 2022-01-01 PROCEDURE — 11721 DEBRIDE NAIL 6 OR MORE: CPT | Mod: Q8,59,S$PBB, | Performed by: STUDENT IN AN ORGANIZED HEALTH CARE EDUCATION/TRAINING PROGRAM

## 2022-01-01 PROCEDURE — 93306 TTE W/DOPPLER COMPLETE: CPT | Mod: 26,,, | Performed by: INTERNAL MEDICINE

## 2022-01-01 PROCEDURE — 36000704 HC OR TIME LEV I 1ST 15 MIN: Performed by: SURGERY

## 2022-01-01 PROCEDURE — 99215 OFFICE O/P EST HI 40 MIN: CPT | Mod: PBBFAC,PO | Performed by: INTERNAL MEDICINE

## 2022-01-01 PROCEDURE — 25000003 PHARM REV CODE 250

## 2022-01-01 PROCEDURE — 63600175 PHARM REV CODE 636 W HCPCS: Performed by: SURGERY

## 2022-01-01 PROCEDURE — 92201 OPSCPY EXTND RTA DRAW UNI/BI: CPT | Mod: PBBFAC | Performed by: OPHTHALMOLOGY

## 2022-01-01 PROCEDURE — 63600175 PHARM REV CODE 636 W HCPCS

## 2022-01-01 PROCEDURE — 92201 OPSCPY EXTND RTA DRAW UNI/BI: CPT | Mod: S$PBB,,, | Performed by: OPHTHALMOLOGY

## 2022-01-01 PROCEDURE — G0180 MD CERTIFICATION HHA PATIENT: HCPCS | Mod: ,,, | Performed by: INTERNAL MEDICINE

## 2022-01-01 PROCEDURE — 36000706: Performed by: SURGERY

## 2022-01-01 DEVICE — GRAFT STRETCH RING 6MMX5CMX45: Type: IMPLANTABLE DEVICE | Site: ARM | Status: FUNCTIONAL

## 2022-01-01 RX ORDER — SODIUM CHLORIDE 9 MG/ML
INJECTION, SOLUTION INTRAVENOUS CONTINUOUS
Status: CANCELLED | OUTPATIENT
Start: 2022-01-01

## 2022-01-01 RX ORDER — BICALUTAMIDE 50 MG/1
50 TABLET, FILM COATED ORAL DAILY
Qty: 90 TABLET | Refills: 3 | Status: SHIPPED | OUTPATIENT
Start: 2022-01-01 | End: 2022-01-01 | Stop reason: ALTCHOICE

## 2022-01-01 RX ORDER — HYDROCODONE BITARTRATE AND ACETAMINOPHEN 5; 325 MG/1; MG/1
1 TABLET ORAL EVERY 6 HOURS PRN
Qty: 24 TABLET | Refills: 0 | Status: SHIPPED | OUTPATIENT
Start: 2022-01-01 | End: 2022-01-01

## 2022-01-01 RX ORDER — FERROUS SULFATE, DRIED 160(50) MG
1 TABLET, EXTENDED RELEASE ORAL DAILY
Qty: 90 TABLET | Refills: 3 | Status: ON HOLD | OUTPATIENT
Start: 2022-01-01 | End: 2023-01-01 | Stop reason: HOSPADM

## 2022-01-01 RX ORDER — EZETIMIBE 10 MG/1
10 TABLET ORAL DAILY
Qty: 90 TABLET | Refills: 3 | Status: SHIPPED | OUTPATIENT
Start: 2022-01-01 | End: 2022-01-01 | Stop reason: SDUPTHER

## 2022-01-01 RX ORDER — SODIUM CHLORIDE 0.9 % (FLUSH) 0.9 %
10 SYRINGE (ML) INJECTION
Status: DISCONTINUED | OUTPATIENT
Start: 2022-01-01 | End: 2022-01-01 | Stop reason: HOSPADM

## 2022-01-01 RX ORDER — SODIUM CHLORIDE 9 MG/ML
INJECTION, SOLUTION INTRAVENOUS CONTINUOUS
Status: DISCONTINUED | OUTPATIENT
Start: 2022-01-01 | End: 2022-01-01 | Stop reason: HOSPADM

## 2022-01-01 RX ORDER — PANTOPRAZOLE SODIUM 40 MG/1
40 TABLET, DELAYED RELEASE ORAL DAILY
Qty: 30 TABLET | Refills: 11 | Status: ON HOLD | OUTPATIENT
Start: 2022-01-01 | End: 2023-01-01 | Stop reason: HOSPADM

## 2022-01-01 RX ORDER — LIDOCAINE HYDROCHLORIDE 10 MG/ML
INJECTION, SOLUTION EPIDURAL; INFILTRATION; INTRACAUDAL; PERINEURAL
Status: DISCONTINUED | OUTPATIENT
Start: 2022-01-01 | End: 2022-01-01 | Stop reason: HOSPADM

## 2022-01-01 RX ORDER — PROPOFOL 10 MG/ML
VIAL (ML) INTRAVENOUS
Status: DISCONTINUED | OUTPATIENT
Start: 2022-01-01 | End: 2022-01-01

## 2022-01-01 RX ORDER — ACETAMINOPHEN 325 MG/1
325 TABLET ORAL EVERY 6 HOURS PRN
Qty: 10 TABLET | Refills: 0
Start: 2022-01-01 | End: 2022-01-01

## 2022-01-01 RX ORDER — LIDOCAINE HYDROCHLORIDE 20 MG/ML
INJECTION INTRAVENOUS
Status: DISCONTINUED | OUTPATIENT
Start: 2022-01-01 | End: 2022-01-01

## 2022-01-01 RX ORDER — ONDANSETRON 2 MG/ML
INJECTION INTRAMUSCULAR; INTRAVENOUS
Status: DISCONTINUED | OUTPATIENT
Start: 2022-01-01 | End: 2022-01-01

## 2022-01-01 RX ORDER — HEPARIN SODIUM 10000 [USP'U]/ML
INJECTION, SOLUTION INTRAVENOUS; SUBCUTANEOUS
Status: DISCONTINUED | OUTPATIENT
Start: 2022-01-01 | End: 2022-01-01 | Stop reason: HOSPADM

## 2022-01-01 RX ORDER — OLMESARTAN MEDOXOMIL 20 MG/1
20 TABLET ORAL DAILY
Qty: 90 TABLET | Refills: 1 | Status: ON HOLD | OUTPATIENT
Start: 2022-01-01 | End: 2023-01-01 | Stop reason: HOSPADM

## 2022-01-01 RX ORDER — EZETIMIBE 10 MG/1
10 TABLET ORAL DAILY
Qty: 90 TABLET | Refills: 3 | Status: ON HOLD | OUTPATIENT
Start: 2022-01-01 | End: 2023-01-01 | Stop reason: HOSPADM

## 2022-01-01 RX ORDER — HYDROCODONE BITARTRATE AND ACETAMINOPHEN 5; 325 MG/1; MG/1
1 TABLET ORAL
Status: COMPLETED | OUTPATIENT
Start: 2022-01-01 | End: 2022-01-01

## 2022-01-01 RX ORDER — FENTANYL CITRATE 50 UG/ML
INJECTION, SOLUTION INTRAMUSCULAR; INTRAVENOUS
Status: DISCONTINUED | OUTPATIENT
Start: 2022-01-01 | End: 2022-01-01

## 2022-01-01 RX ORDER — HYDROCODONE BITARTRATE AND ACETAMINOPHEN 10; 325 MG/1; MG/1
1 TABLET ORAL EVERY 6 HOURS PRN
Qty: 12 TABLET | Refills: 0 | Status: SHIPPED | OUTPATIENT
Start: 2022-01-01 | End: 2022-01-01

## 2022-01-01 RX ORDER — HYDROMORPHONE HYDROCHLORIDE 2 MG/ML
0.5 INJECTION, SOLUTION INTRAMUSCULAR; INTRAVENOUS; SUBCUTANEOUS EVERY 5 MIN PRN
Status: CANCELLED | OUTPATIENT
Start: 2022-01-01

## 2022-01-01 RX ORDER — FUROSEMIDE 40 MG/1
40 TABLET ORAL DAILY
Qty: 2 TABLET | Refills: 0 | Status: SHIPPED | OUTPATIENT
Start: 2022-01-01 | End: 2022-01-01

## 2022-01-01 RX ORDER — HUMAN INSULIN 100 [IU]/ML
100 INJECTION, SUSPENSION SUBCUTANEOUS
Status: ON HOLD | COMMUNITY
Start: 2022-01-01 | End: 2023-01-01 | Stop reason: HOSPADM

## 2022-01-01 RX ORDER — HYDROCODONE BITARTRATE AND ACETAMINOPHEN 5; 325 MG/1; MG/1
1 TABLET ORAL EVERY 4 HOURS PRN
Status: CANCELLED | OUTPATIENT
Start: 2022-01-01

## 2022-01-01 RX ORDER — HYDRALAZINE HYDROCHLORIDE 100 MG/1
100 TABLET, FILM COATED ORAL EVERY 8 HOURS
COMMUNITY
Start: 2021-08-30 | End: 2022-01-01 | Stop reason: SDUPTHER

## 2022-01-01 RX ORDER — PROMETHAZINE HYDROCHLORIDE, PHENYLEPHRINE HYDROCHLORIDE AND CODEINE PHOSPHATE 6.25; 5; 1 MG/5ML; MG/5ML; MG/5ML
6.25 SOLUTION ORAL
Status: ON HOLD | COMMUNITY
Start: 2022-01-01 | End: 2023-01-01 | Stop reason: HOSPADM

## 2022-01-01 RX ORDER — VANCOMYCIN HYDROCHLORIDE 1 G/20ML
INJECTION, POWDER, LYOPHILIZED, FOR SOLUTION INTRAVENOUS
Status: DISCONTINUED | OUTPATIENT
Start: 2022-01-01 | End: 2022-01-01 | Stop reason: HOSPADM

## 2022-01-01 RX ORDER — BENZONATATE 100 MG/1
100 CAPSULE ORAL 3 TIMES DAILY PRN
Qty: 30 CAPSULE | Refills: 1 | Status: SHIPPED | OUTPATIENT
Start: 2022-01-01 | End: 2022-01-01 | Stop reason: SDUPTHER

## 2022-01-01 RX ORDER — PROMETHAZINE HYDROCHLORIDE AND PHENYLEPHRINE HYDROCHLORIDE 6.25; 5 MG/5ML; MG/5ML
2.5 SYRUP ORAL EVERY 6 HOURS PRN
Qty: 120 ML | Refills: 0 | Status: SHIPPED | OUTPATIENT
Start: 2022-01-01 | End: 2022-01-01 | Stop reason: SDUPTHER

## 2022-01-01 RX ORDER — PHENYLEPHRINE HYDROCHLORIDE 10 MG/ML
INJECTION INTRAVENOUS
Status: DISCONTINUED | OUTPATIENT
Start: 2022-01-01 | End: 2022-01-01

## 2022-01-01 RX ORDER — BENZONATATE 100 MG/1
100 CAPSULE ORAL 3 TIMES DAILY PRN
Qty: 30 CAPSULE | Refills: 1 | Status: ON HOLD | OUTPATIENT
Start: 2022-01-01 | End: 2023-01-01 | Stop reason: HOSPADM

## 2022-01-01 RX ORDER — MUPIROCIN 20 MG/G
OINTMENT TOPICAL
Status: DISCONTINUED | OUTPATIENT
Start: 2022-01-01 | End: 2022-01-01 | Stop reason: HOSPADM

## 2022-01-01 RX ORDER — PROMETHAZINE HYDROCHLORIDE AND PHENYLEPHRINE HYDROCHLORIDE 6.25; 5 MG/5ML; MG/5ML
2.5 SYRUP ORAL EVERY 6 HOURS PRN
Qty: 120 ML | Refills: 0 | Status: SHIPPED | OUTPATIENT
Start: 2022-01-01 | End: 2022-01-01

## 2022-01-01 RX ORDER — PROPOFOL 10 MG/ML
VIAL (ML) INTRAVENOUS CONTINUOUS PRN
Status: DISCONTINUED | OUTPATIENT
Start: 2022-01-01 | End: 2022-01-01

## 2022-01-01 RX ORDER — NITROGLYCERIN 0.4 MG/1
0.4 TABLET SUBLINGUAL EVERY 5 MIN PRN
Qty: 25 TABLET | Refills: 0 | Status: ON HOLD | OUTPATIENT
Start: 2022-01-01 | End: 2023-01-01 | Stop reason: HOSPADM

## 2022-01-01 RX ORDER — HYDRALAZINE HYDROCHLORIDE 100 MG/1
100 TABLET, FILM COATED ORAL EVERY 8 HOURS
Qty: 270 TABLET | Refills: 3 | Status: SHIPPED | OUTPATIENT
Start: 2022-01-01 | End: 2022-01-01

## 2022-01-01 RX ORDER — BICALUTAMIDE 50 MG/1
TABLET, FILM COATED ORAL
COMMUNITY
Start: 2022-01-01 | End: 2022-01-01 | Stop reason: ALTCHOICE

## 2022-01-01 RX ORDER — HYDROCODONE BITARTRATE AND ACETAMINOPHEN 5; 325 MG/1; MG/1
1 TABLET ORAL EVERY 4 HOURS PRN
Status: DISCONTINUED | OUTPATIENT
Start: 2022-01-01 | End: 2022-01-01 | Stop reason: HOSPADM

## 2022-01-01 RX ORDER — BENZONATATE 100 MG/1
100 CAPSULE ORAL 3 TIMES DAILY PRN
Status: DISCONTINUED | OUTPATIENT
Start: 2022-01-01 | End: 2022-01-01 | Stop reason: HOSPADM

## 2022-01-01 RX ORDER — BENZONATATE 100 MG/1
100 CAPSULE ORAL 3 TIMES DAILY PRN
Qty: 20 CAPSULE | Refills: 0 | Status: SHIPPED | OUTPATIENT
Start: 2022-01-01 | End: 2022-01-01 | Stop reason: SDUPTHER

## 2022-01-01 RX ORDER — ACETAMINOPHEN 325 MG/1
650 TABLET ORAL EVERY 4 HOURS PRN
Status: DISCONTINUED | OUTPATIENT
Start: 2022-01-01 | End: 2022-01-01 | Stop reason: HOSPADM

## 2022-01-01 RX ORDER — CEFAZOLIN SODIUM 1 G/3ML
INJECTION, POWDER, FOR SOLUTION INTRAMUSCULAR; INTRAVENOUS
Status: DISCONTINUED | OUTPATIENT
Start: 2022-01-01 | End: 2022-01-01

## 2022-01-01 RX ORDER — AMLODIPINE BESYLATE 10 MG/1
10 TABLET ORAL DAILY
Qty: 90 TABLET | Refills: 3 | Status: ON HOLD | OUTPATIENT
Start: 2022-01-01 | End: 2023-01-01 | Stop reason: HOSPADM

## 2022-01-01 RX ORDER — ACETAMINOPHEN 325 MG/1
650 TABLET ORAL EVERY 4 HOURS PRN
Status: CANCELLED | OUTPATIENT
Start: 2022-01-01

## 2022-01-01 RX ORDER — ONDANSETRON 2 MG/ML
4 INJECTION INTRAMUSCULAR; INTRAVENOUS ONCE AS NEEDED
Status: CANCELLED | OUTPATIENT
Start: 2022-01-01 | End: 2033-07-09

## 2022-01-01 RX ADMIN — PROPOFOL 20 MG: 10 INJECTION, EMULSION INTRAVENOUS at 11:04

## 2022-01-01 RX ADMIN — CALCIUM CHLORIDE 250 MG: 100 INJECTION, SOLUTION INTRAVENOUS at 09:02

## 2022-01-01 RX ADMIN — FENTANYL CITRATE 25 MCG: 50 INJECTION, SOLUTION INTRAMUSCULAR; INTRAVENOUS at 09:02

## 2022-01-01 RX ADMIN — PHENYLEPHRINE HYDROCHLORIDE 50 MCG: 10 INJECTION INTRAVENOUS at 10:02

## 2022-01-01 RX ADMIN — LEUPROLIDE ACETATE 22.5 MG: KIT SUBCUTANEOUS at 01:06

## 2022-01-01 RX ADMIN — PROPOFOL 75 MCG/KG/MIN: 10 INJECTION, EMULSION INTRAVENOUS at 09:02

## 2022-01-01 RX ADMIN — ONDANSETRON 4 MG: 2 INJECTION, SOLUTION INTRAMUSCULAR; INTRAVENOUS at 11:02

## 2022-01-01 RX ADMIN — FENTANYL CITRATE 50 MCG: 50 INJECTION, SOLUTION INTRAMUSCULAR; INTRAVENOUS at 09:02

## 2022-01-01 RX ADMIN — LIDOCAINE HYDROCHLORIDE 80 MG: 20 INJECTION, SOLUTION INTRAVENOUS at 10:04

## 2022-01-01 RX ADMIN — LEUPROLIDE ACETATE 22.5 MG: KIT SUBCUTANEOUS at 12:09

## 2022-01-01 RX ADMIN — PROPOFOL 30 MG: 10 INJECTION, EMULSION INTRAVENOUS at 11:04

## 2022-01-01 RX ADMIN — PROPOFOL 30 MG: 10 INJECTION, EMULSION INTRAVENOUS at 10:04

## 2022-01-01 RX ADMIN — PROPOFOL 20 MG: 10 INJECTION, EMULSION INTRAVENOUS at 09:02

## 2022-01-01 RX ADMIN — GLYCOPYRROLATE 0.1 MG: 0.2 INJECTION, SOLUTION INTRAMUSCULAR; INTRAVITREAL at 09:02

## 2022-01-01 RX ADMIN — IOHEXOL 30 ML: 300 INJECTION, SOLUTION INTRAVENOUS at 11:12

## 2022-01-01 RX ADMIN — PHENYLEPHRINE HYDROCHLORIDE 50 MCG: 10 INJECTION INTRAVENOUS at 11:02

## 2022-01-01 RX ADMIN — SODIUM CHLORIDE: 0.9 INJECTION, SOLUTION INTRAVENOUS at 07:02

## 2022-01-01 RX ADMIN — LEUPROLIDE ACETATE 22.5 MG: KIT SUBCUTANEOUS at 12:12

## 2022-01-01 RX ADMIN — BENZONATATE 100 MG: 100 CAPSULE ORAL at 12:04

## 2022-01-01 RX ADMIN — HYDROCODONE BITARTRATE AND ACETAMINOPHEN 1 TABLET: 5; 325 TABLET ORAL at 10:02

## 2022-01-01 RX ADMIN — SODIUM CHLORIDE: 0.9 INJECTION, SOLUTION INTRAVENOUS at 10:04

## 2022-01-01 RX ADMIN — CEFAZOLIN 2 G: 330 INJECTION, POWDER, FOR SOLUTION INTRAMUSCULAR; INTRAVENOUS at 09:02

## 2022-01-01 RX ADMIN — CALCIUM CHLORIDE 250 MG: 100 INJECTION, SOLUTION INTRAVENOUS at 10:02

## 2022-01-10 ENCOUNTER — TELEPHONE (OUTPATIENT)
Dept: CARDIOLOGY | Facility: CLINIC | Age: 77
End: 2022-01-10
Payer: MEDICARE

## 2022-01-10 DIAGNOSIS — I50.32 CHRONIC DIASTOLIC CONGESTIVE HEART FAILURE: Primary | ICD-10-CM

## 2022-01-10 DIAGNOSIS — I73.9 PAD (PERIPHERAL ARTERY DISEASE): ICD-10-CM

## 2022-01-10 DIAGNOSIS — E11.69 HYPERLIPIDEMIA ASSOCIATED WITH TYPE 2 DIABETES MELLITUS: ICD-10-CM

## 2022-01-10 DIAGNOSIS — I50.32 CHRONIC CONGESTIVE HEART FAILURE WITH LEFT VENTRICULAR DIASTOLIC DYSFUNCTION: ICD-10-CM

## 2022-01-10 DIAGNOSIS — E78.5 HYPERLIPIDEMIA ASSOCIATED WITH TYPE 2 DIABETES MELLITUS: ICD-10-CM

## 2022-01-10 NOTE — TELEPHONE ENCOUNTER
Request to arrange appt with Dr Urbina    appt arranged 2/2    HD on T, Tr, Sat   Prefers all test and appts early am Wed or Fridays

## 2022-01-11 ENCOUNTER — HOSPITAL ENCOUNTER (EMERGENCY)
Facility: HOSPITAL | Age: 77
Discharge: HOME | End: 2022-01-11
Attending: EMERGENCY MEDICINE
Payer: MEDICARE

## 2022-01-11 VITALS
HEIGHT: 75 IN | SYSTOLIC BLOOD PRESSURE: 157 MMHG | DIASTOLIC BLOOD PRESSURE: 73 MMHG | TEMPERATURE: 99 F | BODY MASS INDEX: 24.87 KG/M2 | WEIGHT: 200 LBS | HEART RATE: 64 BPM | OXYGEN SATURATION: 100 % | RESPIRATION RATE: 19 BRPM

## 2022-01-11 DIAGNOSIS — M54.50 ACUTE LEFT-SIDED LOW BACK PAIN WITHOUT SCIATICA: Primary | ICD-10-CM

## 2022-01-11 LAB
ALBUMIN SERPL BCP-MCNC: 4 G/DL (ref 3.5–5.2)
ALP SERPL-CCNC: 109 U/L (ref 55–135)
ALT SERPL W/O P-5'-P-CCNC: 6 U/L (ref 10–44)
ANION GAP SERPL CALC-SCNC: 11 MMOL/L (ref 8–16)
AST SERPL-CCNC: 20 U/L (ref 10–40)
BACTERIA #/AREA URNS HPF: NORMAL /HPF
BASOPHILS # BLD AUTO: 0.07 K/UL (ref 0–0.2)
BASOPHILS NFR BLD: 0.9 % (ref 0–1.9)
BILIRUB SERPL-MCNC: 0.5 MG/DL (ref 0.1–1)
BILIRUB UR QL STRIP: NEGATIVE
BUN SERPL-MCNC: 24 MG/DL (ref 8–23)
CALCIUM SERPL-MCNC: 9.6 MG/DL (ref 8.7–10.5)
CHLORIDE SERPL-SCNC: 99 MMOL/L (ref 95–110)
CLARITY UR: CLEAR
CO2 SERPL-SCNC: 27 MMOL/L (ref 23–29)
COLOR UR: COLORLESS
CREAT SERPL-MCNC: 3.8 MG/DL (ref 0.5–1.4)
DIFFERENTIAL METHOD: ABNORMAL
EOSINOPHIL # BLD AUTO: 0.1 K/UL (ref 0–0.5)
EOSINOPHIL NFR BLD: 1.5 % (ref 0–8)
ERYTHROCYTE [DISTWIDTH] IN BLOOD BY AUTOMATED COUNT: 13.7 % (ref 11.5–14.5)
EST. GFR  (AFRICAN AMERICAN): 17 ML/MIN/1.73 M^2
EST. GFR  (NON AFRICAN AMERICAN): 14 ML/MIN/1.73 M^2
GLUCOSE SERPL-MCNC: 137 MG/DL (ref 70–110)
GLUCOSE UR QL STRIP: ABNORMAL
HCT VFR BLD AUTO: 28.8 % (ref 40–54)
HGB BLD-MCNC: 9.9 G/DL (ref 14–18)
HGB UR QL STRIP: NEGATIVE
HYALINE CASTS #/AREA URNS LPF: 0 /LPF
IMM GRANULOCYTES # BLD AUTO: 0.01 K/UL (ref 0–0.04)
IMM GRANULOCYTES NFR BLD AUTO: 0.1 % (ref 0–0.5)
KETONES UR QL STRIP: NEGATIVE
LEUKOCYTE ESTERASE UR QL STRIP: NEGATIVE
LYMPHOCYTES # BLD AUTO: 1.8 K/UL (ref 1–4.8)
LYMPHOCYTES NFR BLD: 24.4 % (ref 18–48)
MCH RBC QN AUTO: 31.9 PG (ref 27–31)
MCHC RBC AUTO-ENTMCNC: 34.4 G/DL (ref 32–36)
MCV RBC AUTO: 93 FL (ref 82–98)
MICROSCOPIC COMMENT: NORMAL
MONOCYTES # BLD AUTO: 0.5 K/UL (ref 0.3–1)
MONOCYTES NFR BLD: 7.3 % (ref 4–15)
NEUTROPHILS # BLD AUTO: 4.8 K/UL (ref 1.8–7.7)
NEUTROPHILS NFR BLD: 65.8 % (ref 38–73)
NITRITE UR QL STRIP: NEGATIVE
NRBC BLD-RTO: 0 /100 WBC
PH UR STRIP: 8 [PH] (ref 5–8)
PLATELET # BLD AUTO: 147 K/UL (ref 150–450)
PMV BLD AUTO: 11.1 FL (ref 9.2–12.9)
POTASSIUM SERPL-SCNC: 3.7 MMOL/L (ref 3.5–5.1)
PROT SERPL-MCNC: 8.5 G/DL (ref 6–8.4)
PROT UR QL STRIP: ABNORMAL
RBC # BLD AUTO: 3.1 M/UL (ref 4.6–6.2)
RBC #/AREA URNS HPF: 0 /HPF (ref 0–4)
SODIUM SERPL-SCNC: 137 MMOL/L (ref 136–145)
SP GR UR STRIP: 1.01 (ref 1–1.03)
SQUAMOUS #/AREA URNS HPF: 2 /HPF
URN SPEC COLLECT METH UR: ABNORMAL
UROBILINOGEN UR STRIP-ACNC: NEGATIVE EU/DL
WBC # BLD AUTO: 7.37 K/UL (ref 3.9–12.7)
WBC #/AREA URNS HPF: 0 /HPF (ref 0–5)

## 2022-01-11 PROCEDURE — 81000 URINALYSIS NONAUTO W/SCOPE: CPT | Performed by: NURSE PRACTITIONER

## 2022-01-11 PROCEDURE — 99284 EMERGENCY DEPT VISIT MOD MDM: CPT

## 2022-01-11 PROCEDURE — 80053 COMPREHEN METABOLIC PANEL: CPT | Performed by: NURSE PRACTITIONER

## 2022-01-11 PROCEDURE — 85025 COMPLETE CBC W/AUTO DIFF WBC: CPT | Performed by: NURSE PRACTITIONER

## 2022-01-11 RX ORDER — CYCLOBENZAPRINE HCL 5 MG
5 TABLET ORAL 2 TIMES DAILY PRN
Qty: 10 TABLET | Refills: 0 | Status: SHIPPED | OUTPATIENT
Start: 2022-01-11 | End: 2022-01-01

## 2022-01-11 RX ORDER — OXYCODONE AND ACETAMINOPHEN 5; 325 MG/1; MG/1
1 TABLET ORAL EVERY 6 HOURS PRN
Qty: 12 TABLET | Refills: 0 | Status: SHIPPED | OUTPATIENT
Start: 2022-01-11 | End: 2022-01-11 | Stop reason: ALTCHOICE

## 2022-01-11 NOTE — FIRST PROVIDER EVALUATION
Emergency Department TeleTriage Encounter Note      CHIEF COMPLAINT    Chief Complaint   Patient presents with    Flank Pain     Left sided flank pain started yesterday, he went to diaylsis hoping it would clear and the pain remained the same       VITAL SIGNS   Initial Vitals [01/11/22 1201]   BP Pulse Resp Temp SpO2   (!) 167/76 80 18 98.3 °F (36.8 °C) 99 %      MAP       --            ALLERGIES    Review of patient's allergies indicates:   Allergen Reactions    Atorvastatin Other (See Comments)       PROVIDER TRIAGE NOTE  This is a teletriage evaluation of a 76 y.o. male presenting to the ED with c/o left sided flank pain that began yesterday. NO nausea, vomiting, diarrhea, fevers. On HD, last treatment PTA.  Initial orders will be placed and care will be transferred to an alternate provider when patient is roomed for a full evaluation. Any additional orders and the final disposition will be determined by that provider.         ORDERS  Labs Reviewed   CBC W/ AUTO DIFFERENTIAL   COMPREHENSIVE METABOLIC PANEL   URINALYSIS, REFLEX TO URINE CULTURE       ED Orders (720h ago, onward)    Start Ordered     Status Ordering Provider    01/11/22 1217 01/11/22 1216  Saline lock IV  Once         Ordered PORFIRIO ZEE PZuly    01/11/22 1217 01/11/22 1216  CBC auto differential  STAT         Ordered PORFIRIO ZEE    01/11/22 1217 01/11/22 1216  Comprehensive metabolic panel  STAT         Ordered PORFIRIO ZEE PZuly    01/11/22 1217 01/11/22 1216  Urinalysis, Reflex to Urine Culture Urine, Clean Catch  STAT         Ordered PORFIRIO ZEE PZuly    01/11/22 1217 01/11/22 1216  EKG 12-lead  Once         Ordered PORFIRIO ZEE            Virtual Visit Note: The provider triage portion of this emergency department evaluation and documentation was performed via [employer], a HIPAA-compliant telemedicine application, in concert with a tele-presenter in the room. A face to face patient evaluation with one of my colleagues will occur  once the patient is placed in an emergency department room.      DISCLAIMER: This note was prepared with [employer] voice recognition transcription software. Garbled syntax, mangled pronouns, and other bizarre constructions may be attributed to that software system.

## 2022-01-11 NOTE — ED PROVIDER NOTES
Encounter Date: 2022       History     Chief Complaint   Patient presents with    Flank Pain     Left sided flank pain started yesterday, he went to diaylsis hoping it would clear and the pain remained the same     Patient is a 76-year-old male who complains of left lower back pain.  Pain has been constant since onset yesterday.  He denies trauma or heavy lifting.  He has no flank or abdominal pain.  Patient has end-stage renal disease and was dialyzed today.  He still produces urine in feels as though he may have a kidney infection.  No fever or chills.  No nausea or vomiting.        Review of patient's allergies indicates:   Allergen Reactions    Atorvastatin Other (See Comments)     Past Medical History:   Diagnosis Date    Arthritis     Cataract     Chronic diastolic congestive heart failure     Coronary artery disease     Cystoid macular edema of both eyes     Diabetes mellitus type II     Diabetic retinopathy     Hyperlipemia     Hypertension     Retinal hole     Stage 4 chronic kidney disease     Streptococcus pyogenes bacteremia 2018    Due to left toe osteomyelitis     Past Surgical History:   Procedure Laterality Date    ABDOMINAL AORTOGRAPHY N/A 2019    Procedure: AORTOGRAM-ABDOMINAL;  Surgeon: Amish Hyatt MD;  Location: [facility] CATH LAB/EP;  Service: Cardiology;  Laterality: N/A;  CO2 angiography    ANGIOGRAPHY OF LOWER EXTREMITY Left 2019    Procedure: Angiogram Extremity Unilateral;  Surgeon: Amish Hyatt MD;  Location: [facility] CATH LAB/EP;  Service: Cardiology;  Laterality: Left;    CATARACT EXTRACTION W/  INTRAOCULAR LENS IMPLANT Left 12/15/14    Dr de la cruz    CATARACT EXTRACTION W/  INTRAOCULAR LENS IMPLANT Right 14    dorothy    CIRCUMCISION, NON-      focal laser right eye      INSERTION OF SHAH CATHETER Right 2021    Procedure: INSERTION, CATHETER, CENTRAL VENOUS, SHAH DUAL LUMEN;  Surgeon: Denys Aleman Jr., MD;  Location: [facility]  OR;  Service: General;  Laterality: Right;    INSERTION OF TUNNELED CENTRAL VENOUS CATHETER (CVC) WITH SUBCUTANEOUS PORT Right 1/2/2019    Procedure: [id number];  Surgeon: Denys Aleman Jr., MD;  Location: [facility] OR;  Service: General;  Laterality: Right;    INSERTION OF TUNNELED CENTRAL VENOUS HEMODIALYSIS CATHETER Right 6/28/2021    Procedure: Insertion, Catheter, Central Venous, Hemodialysis;  Surgeon: Agata Rosado MD;  Location: [facility] CATH LAB;  Service: Interventional Nephrology;  Laterality: Right;    KNEE SURGERY      left    Left medial collateral ligament repair      TONSILLECTOMY       Family History   Problem Relation Age of Onset    Heart disease Mother     Cancer Mother     Cancer Brother     Heart disease Father     Diabetes Father     Cancer Sister     Cataracts Paternal Grandmother     Glaucoma Paternal Grandmother     Blindness Neg Hx     Amblyopia Neg Hx     Hypertension Neg Hx     Macular degeneration Neg Hx     Retinal detachment Neg Hx     Strabismus Neg Hx      Social History     Tobacco Use    Smoking status: Never Smoker    Smokeless tobacco: Never Used   Substance Use Topics    Alcohol use: No     Alcohol/week: 0.0 standard drinks    Drug use: No     Review of Systems   Constitutional: Negative for chills and fever.   Respiratory: Negative for shortness of breath.    Cardiovascular: Negative for chest pain.   Gastrointestinal: Negative for abdominal pain, nausea and vomiting.   Genitourinary: Negative for dysuria and hematuria.   Musculoskeletal: Positive for back pain.   Skin: Negative for color change and rash.       Physical Exam     Initial Vitals [01/11/22 1201]   BP Pulse Resp Temp SpO2   (!) 167/76 80 18 98.3 °F (36.8 °C) 99 %      MAP       --         Physical Exam    Nursing note and vitals reviewed.  Constitutional: No distress.   HENT:   Head: Atraumatic.   Neck: Neck supple.   Cardiovascular: Normal rate, regular rhythm and normal heart  sounds.   Pulmonary/Chest: Breath sounds normal.   Abdominal: Abdomen is soft. There is no abdominal tenderness.   Musculoskeletal:         General: Normal range of motion.      Cervical back: Neck supple.      Comments: Lumbar spine is nontender.  No lumbar paraspinous muscle tenderness     Neurological: He is alert and oriented to person, place, and time. He has normal strength.   Skin: Skin is warm and dry.   Psychiatric: Thought content normal.         ED Course   Procedures  Labs Reviewed   CBC W/ AUTO DIFFERENTIAL - Abnormal; Notable for the following components:       Result Value    RBC 3.10 (*)     Hemoglobin 9.9 (*)     Hematocrit 28.8 (*)     MCH 31.9 (*)     Platelets 147 (*)     All other components within normal limits   COMPREHENSIVE METABOLIC PANEL - Abnormal; Notable for the following components:    Glucose 137 (*)     BUN 24 (*)     Creatinine 3.8 (*)     Total Protein 8.5 (*)     ALT 6 (*)     eGFR if  17 (*)     eGFR if non  14 (*)     All other components within normal limits   URINALYSIS, REFLEX TO URINE CULTURE - Abnormal; Notable for the following components:    Color, UA Colorless (*)     Protein, UA 2+ (*)     Glucose, UA 2+ (*)     All other components within normal limits    Narrative:     Specimen Source->Urine   URINALYSIS MICROSCOPIC    Narrative:     Specimen Source->Urine     EKG Readings: (Independently Interpreted)   Initial Reading: No STEMI. Rhythm: Normal Sinus Rhythm. Heart Rate: 70. ST Segments: Normal ST Segments. Axis: Left Axis Deviation.       Imaging Results    None          Medications - No data to display  Medical Decision Making:   Initial Assessment:   76-year-old male with left lower back pain.  No trauma.  He received dialysis today, but still produces urine and is concerned that he may have a kidney infection.  He has had no fever or chills. No nausea or vomiting.  Clinical Tests:   Lab Tests: Ordered and Reviewed  ED  Management:  Lab work is unremarkable with urinalysis showing no signs of infection.  Patient tells me he has back pain only in certain positions and with certain movements.  Therefore, etiology most likely muscular.  He is requesting Flexeril for this.  I have urged close follow-up with his primary physician but that he should return to the emergency department for any possible worsening.                      Clinical Impression:   Final diagnoses:  [M54.50] Acute left-sided low back pain without sciatica (Primary)          ED Disposition Condition    Discharge Stable        ED Prescriptions     Medication Sig Dispense Start Date End Date Auth. Provider    oxyCODONE-acetaminophen (PERCOCET) 5-325 mg per tablet  (Status: Discontinued) Take 1 tablet by mouth every 6 (six) hours as needed for Pain. 12 tablet 1/11/2022 1/11/2022 Hiram Muñoz MD    cyclobenzaprine (FLEXERIL) 5 MG tablet Take 1 tablet (5 mg total) by mouth 2 (two) times daily as needed (back pain). 10 tablet 1/11/2022 1/21/2022 Hiram Muñoz MD        Follow-up Information     Follow up With Specialties Details Why Contact Info    Griselda Nugent MD Internal Medicine  As needed [address] [facility] [address]  [phone]             Hiram Muñoz MD  01/12/22 0904

## 2022-01-18 ENCOUNTER — PES CALL (OUTPATIENT)
Dept: ADMINISTRATIVE | Facility: CLINIC | Age: 77
End: 2022-01-18
Payer: MEDICARE

## 2022-02-01 NOTE — PROGRESS NOTES
Health Maintenance Due   Topic Date Due    Shingles Vaccine (1 of 2) Never done    Influenza Vaccine (1) 09/01/2021    Hemoglobin A1c  09/25/2021    Eye Exam  11/12/2021     Updates were requested from care everywhere.  Chart was reviewed for overdue Proactive [facility] Encounters ([facility]) topics (CRS, Breast Cancer Screening, Eye exam)  Health Maintenance has been updated.  LINKS immunization registry triggered.  Immunizations were reconciled.

## 2022-02-02 NOTE — PROGRESS NOTES
Subjective:   Patient ID:  Domingo Castro is a 76 y.o. male who presents for follow up of Peripheral Arterial Disease, Hyperlipidemia, and Hypertension      HPI:         Domingo Castro 76 y.o. male is here follow up without any cardiovascular complaints.  He has a 's history of hypertension, hyperlipidemia, coronary disease treated medically after and non ST elevation MI, peripheral arterial disease with a history left great toe limb ischemia status post revascularization, grade 2 diastolic dysfunction, anemia chronic disease, end-stage renal disease on dialysis Monday Wednesday Friday, left carotid bruit, and deconditioning.        He was admitted with NSTEMI and with CKD5 and with chronic left foot ulcer. Treatment initiated with heparin gtt for troponin peak of 6.532. and he was continued on ASA, plavix, metorprolol and rosuvastatin. Cardiology consulted and further workup with stress test was positive for ischemia and Echo with EF=55% + grade II diastolic function. Given his CKD5 at the time prior to starting HD, Cardiology recommended medical management with medications were adjusted.      Echo with normal LVEF. Nuclear stress test with reversible ischemia to lateral wall in LCX territory. HR and BP stable overnight. Creatinine 4.6 stable at baseline. No LHC because of risk LANDON that could potentially lead to HD     MRI 5/12/2021: Plantar wound of the great toe with signal abnormality extending to the distal tuft level with corresponding T1 marrow heterogeneity and edema concerning for component of osteomyelitis.          2020 healed L great toe wound complicated with osteomyelitis. He is s/p revascularization of left SFA + AT with atherectomy + PTA using DCB. He was treated with IV then oral abx. He denies pain. + aspirin + plavix for DAPT. + statin therapy. No arb or acei because of CKD. He has HTN, HLP, DM II, and CKD V on ESRD         Diagnostic angiogram 7/2019      S/p transradial aortogram with run  off   Adequate inflow with patent aorta, b/l NOEMÍ , EIA, and CFA    80% mid left SFA stenosis, 99% proximal AT, 80% PT, and 80% PER   90% prox right SFA stenosis, 90% ostial PT + PER with subtotal proximal AT   Patent DP and plantar arch bilaterally   Case was done with CO2 in view of CKD          Peripheral intervention 8/2019     Hybrid approach with righ CFA access and retrograde left AT   Crossed left AT lesions with retrograde wire manipulation   Atherectomy of SFA + AT with 2.0 Classic CSI    PTA of AT with 4.0 x 150 mm Lutonix DCB   PTA of PT with 4.0 x 120 mm balloon after occlusion from distal embolization   PTA of SFA with 7.0 x 60 mm Lutonix DCB   Spasm of PER noted at the end of the case   CFA closed with perclose and retrograde access closed with manual pressure      US 5/2020     No significant stenosis                 TcPO2 of left foot 5/2021       Chest control 49 to 247 mmHg after oxygen exposure   Dorsum 48 to 197 mmHg after oxygen exposure    Medial ankle 54 to 137 mmHg after oxygen exposure         Echo 1/2022    · Concentric hypertrophy and normal systolic function.  · The estimated ejection fraction is 55%.  · Indeterminate left ventricular diastolic function.  · Normal right ventricular size with normal right ventricular systolic function.  · Systolic anterior motion of the mitral chordal apparatus is present.  · Normal central venous pressure (3 mmHg).       ROCIO 1/2022    · Non compressible ankle vessels  · TBI right 0.59 (102 mmHg) and left 0.51 (88 mmHg)        Arterial US 1/2022      1. Moderate peripheral arterial disease in the left lower extremity. There is an arteriovenous fistula between the left anterior tibial artery and anterior tibial vein.  The left anterior tibial artery has a peak systolic velocity of 780 centimeters/second.  2. Mild peripheral arterial disease in the right lower extremity.  There are findings characteristic of a significant stenosis in the right profunda  femoral artery.  There is minimal flow in the right anterior tibial and dorsalis pedis arteries.          Patient Active Problem List    Diagnosis Date Noted    ESRD (end stage renal disease) on dialysis     Acute kidney injury superimposed on CKD 06/23/2021    Acute kidney injury superimposed on chronic kidney disease 06/22/2021    Chest pain 05/09/2021    Pure hypercholesterolemia 05/09/2021    Diabetes mellitus with circulatory complication 05/09/2021    Elevated troponin 05/09/2021    NSTEMI (non-ST elevated myocardial infarction) 05/09/2021    CKD (chronic kidney disease), symptom management only, stage 5 04/28/2021    Type 1 diabetes mellitus with kidney complication 12/30/2020    CKD stage 4 due to type 1 diabetes mellitus 12/30/2020    MAGDA (acute kidney injury) on CKD 5 12/30/2020    Acute on chronic diastolic CHF (congestive heart failure), NYHA class 3 12/30/2020    Coronary artery disease involving native coronary artery of native heart without angina pectoris 12/30/2020    Decreased range of right hip movement 11/27/2020    Decreased strength 11/27/2020    Decreased mobility 11/27/2020    Chronic respiratory failure with hypoxia, on home oxygen therapy 09/29/2020    Overweight (BMI 25.0-29.9) 09/23/2019    Obesity, diabetes, and hypertension syndrome 09/23/2019    Aortic arch atherosclerosis 09/19/2019    Bronchiectasis without complication 09/19/2019     Ct chest 12/25/2018 results      Chronic congestive heart failure with left ventricular diastolic dysfunction 09/19/2019 12/31/2018 echo      Uncontrolled type 2 diabetes mellitus with hyperglycemia 09/19/2019    PAD (peripheral artery disease) 07/29/2019    Thrombocytopenia, unspecified 07/26/2019    Bilateral leg edema 01/16/2019    CKD stage 4 due to type 2 diabetes mellitus 12/23/2018    Type 2 diabetes mellitus with kidney complication, with long-term current use of insulin 12/18/2018    Chronic diastolic  congestive heart failure 2018    Incontinence 2018    Metabolic bone disease 2018    Anemia of chronic renal failure, stage 4 (severe) 2018    Pulmonary nodule 10/28/2016    Normocytic anemia     Impaired mobility and ADLs 2015    Cervical radiculopathy 2015    Severe nonproliferative diabetic retinopathy of both eyes 2013    Hypertensive retinopathy of both eyes 2013    Retinal hole or tear, left 2013    Hypogonadism male 2013    ED (erectile dysfunction) 2013    Hypertension associated with diabetes 01/10/2013    Hyperlipidemia associated with type 2 diabetes mellitus 01/10/2013           Right Arm BP - Sittin/70  Left Arm BP - Sittin/70        LABS      LAST HbA1c  Lab Results   Component Value Date    HGBA1C 7.1 (H) 2021       Lipid panel  Lab Results   Component Value Date    CHOL 164 2022    CHOL 148 2021    CHOL 162 2020     Lab Results   Component Value Date    HDL 61 2022    HDL 47 2021    HDL 44 2020     Lab Results   Component Value Date    LDLCALC 85.2 2022    LDLCALC 85.8 2021    LDLCALC 99.0 2020     Lab Results   Component Value Date    TRIG 89 2022    TRIG 76 2021    TRIG 95 2020     Lab Results   Component Value Date    CHOLHDL 37.2 2022    CHOLHDL 31.8 2021    CHOLHDL 27.2 2020            Review of Systems   Constitutional: Negative for diaphoresis, night sweats, weight gain and weight loss.   HENT: Negative for congestion.    Eyes: Negative for blurred vision, discharge and double vision.   Cardiovascular: Negative for chest pain, claudication, cyanosis, dyspnea on exertion, irregular heartbeat, leg swelling, near-syncope, orthopnea, palpitations, paroxysmal nocturnal dyspnea and syncope.   Respiratory: Negative for cough, shortness of breath and wheezing.    Endocrine: Negative for cold intolerance, heat  intolerance and polyphagia.   Hematologic/Lymphatic: Negative for adenopathy and bleeding problem. Does not bruise/bleed easily.   Skin: Negative for dry skin, nail changes and poor wound healing.   Musculoskeletal: Negative for arthritis, back pain, falls, joint pain, myalgias and neck pain.   Gastrointestinal: Negative for bloating, abdominal pain, change in bowel habit and constipation.   Genitourinary: Negative for bladder incontinence, dysuria, flank pain, genital sores and missed menses.   Neurological: Negative for aphonia, brief paralysis, difficulty with concentration, dizziness and weakness.   Psychiatric/Behavioral: Negative for altered mental status and memory loss. The patient does not have insomnia.    Allergic/Immunologic: Negative for environmental allergies.       Objective:   Physical Exam  Constitutional:       Appearance: He is well-developed.      Interventions: He is not intubated.  HENT:      Head: Normocephalic and atraumatic.      Right Ear: External ear normal.      Left Ear: External ear normal.   Eyes:      General: No scleral icterus.        Right eye: No discharge.         Left eye: No discharge.      Conjunctiva/sclera: Conjunctivae normal.      Pupils: Pupils are equal, round, and reactive to light.   Neck:      Thyroid: No thyromegaly.      Vascular: Normal carotid pulses. No carotid bruit, hepatojugular reflux or JVD.      Trachea: No tracheal deviation.   Cardiovascular:      Rate and Rhythm: Normal rate and regular rhythm.  No extrasystoles are present.     Chest Wall: PMI is not displaced.      Pulses: No midsystolic click.           Carotid pulses are 2+ on the right side with bruit and 2+ on the left side with bruit.       Radial pulses are 2+ on the right side and 2+ on the left side.        Femoral pulses are 2+ on the right side and 2+ on the left side.       Popliteal pulses are 2+ on the right side and 2+ on the left side.        Dorsalis pedis pulses are 0 on the right  side and 1+ on the left side.        Posterior tibial pulses are 0 on the right side and 1+ on the left side.      Heart sounds: S1 normal and S2 normal. Heart sounds not distant. No murmur heard.  No friction rub. No gallop. No S3 sounds.       Comments:     Triphasic L PT and biphasic L DP doppler signals       Biphasic R PT and monophasic R DP doppler signals                Pulmonary:      Effort: Pulmonary effort is normal. No tachypnea, bradypnea, accessory muscle usage or respiratory distress. He is not intubated.      Breath sounds: Normal breath sounds. No stridor. No decreased breath sounds, wheezing or rales.   Chest:      Chest wall: No tenderness.      Comments: R subclavian PICC line   Abdominal:      General: There is no distension or abdominal bruit.      Palpations: There is no mass or pulsatile mass.      Tenderness: There is no abdominal tenderness. There is no guarding or rebound.   Musculoskeletal:         General: No tenderness. Normal range of motion.      Cervical back: Normal range of motion and neck supple.   Lymphadenopathy:      Cervical: No cervical adenopathy.   Skin:     General: Skin is warm.      Coloration: Skin is not pale.      Findings: No erythema or rash.      Comments:     L great toe wound       No gangrene      Healing well with granulation tissue       See images                    Neurological:      Mental Status: He is alert and oriented to person, place, and time.      Cranial Nerves: No cranial nerve deficit.      Coordination: Coordination normal.      Deep Tendon Reflexes: Reflexes are normal and symmetric.   Psychiatric:         Behavior: Behavior normal.         Thought Content: Thought content normal.         Judgment: Judgment normal.           8/30/2019 7/8/2020 5/2021 1/2022              Assessment:     1. Chronic diastolic congestive heart failure    2. Hypertension associated with diabetes    3. Hyperlipidemia  associated with type 2 diabetes mellitus    4. PAD (peripheral artery disease)    5. Aortic arch atherosclerosis    6. Coronary artery disease involving native coronary artery of native heart without angina pectoris    7. Decreased mobility    8. Type 2 diabetes mellitus with diabetic peripheral angiopathy without gangrene, with long-term current use of insulin    9. ESRD (end stage renal disease) on dialysis    10. Carotid bruit, unspecified laterality    11. Chronic congestive heart failure with left ventricular diastolic dysfunction        Plan:         Medical therapy for coronary disease and PAD      We had a long discussion with the patient if he has refractory symptoms to medical therapy we will consider angiography with possible intervention.        Follow-up with nephrology as scheduled.  Agree with discussions regarding fistula creation in preparation for dialysis.      Proper foot wear  Proper foot care        Statin therapy; continue with crestor 40 mg qhs and add zetia 10 mg daily for LDL goal < 70  DAPT with aspirin + plavix  Aggressive risk factors modification  Carotid US to assess carotid bruit  Follow up 6 months         Continue with current medical plan and lifestyle changes.  Return sooner for concerns or questions. If symptoms persist go to the ED  I have reviewed all pertinent data on this patient       I have reviewed the patient's medical history in detail and updated the computerized patient record.    Orders Placed This Encounter   Procedures    US Carotid Bilateral     Standing Status:   Future     Standing Expiration Date:   2/2/2023    CBC Auto Differential     Standing Status:   Future     Standing Expiration Date:   8/2/2023    Comprehensive Metabolic Panel     Standing Status:   Future     Standing Expiration Date:   8/2/2023    Lipid Panel     Standing Status:   Future     Standing Expiration Date:   8/2/2023    CV Toe Pressures Only     Standing Status:   Future     Standing  Expiration Date:   2/2/2023     Order Specific Question:   Release to patient     Answer:   Immediate    Echo     Standing Status:   Future     Standing Expiration Date:   2/2/2023     Order Specific Question:   Release to patient     Answer:   Immediate       Follow up as scheduled. Return sooner for concerns or questions            He expressed verbal understanding and agreed with the plan        -In today's visit, at least 4 established conditions that pose a risk to life or bodily function have been addressed and the conditions are severe.    -In today's visit, monitoring for drug toxicity was accomplished.          Patient's Medications   New Prescriptions    EZETIMIBE (ZETIA) 10 MG TABLET    Take 1 tablet (10 mg total) by mouth once daily.   Previous Medications    ACETAMINOPHEN (TYLENOL) 325 MG TABLET    Take 2 tablets (650 mg total) by mouth every 4 (four) hours as needed.    AMLODIPINE (NORVASC) 10 MG TABLET    Take 1 tablet by mouth once daily    ASPIRIN (ECOTRIN) 81 MG EC TABLET    Take 81 mg by mouth once daily.    CLOPIDOGREL (PLAVIX) 75 MG TABLET    Take 1 tablet by mouth once daily    FUROSEMIDE (LASIX) 40 MG TABLET    Take 2 tablets by mouth once daily    HYDRALAZINE (APRESOLINE) 100 MG TABLET    Take 1 tablet (100 mg total) by mouth every 8 (eight) hours. Take 100 mg by mouth every 8 (eight) hours.    INSULIN ASPART U-100 (NOVOLOG) 100 UNIT/ML (3 ML) INPN PEN    Inject 0-5 Units into the skin before meals and at bedtime as needed (Hyperglycemia). **LOW CORRECTION DOSE** Blood Glucose mg/dL Pre-meal 151-200, 0 unit; 201-250, take 2 units;  251-300, take 3 units; 301-350, take 4 units; greater than 350, take 5 units.    ISOSORBIDE MONONITRATE (IMDUR) 120 MG 24 HR TABLET    Take 1 tablet (120 mg total) by mouth once daily.    METOPROLOL SUCCINATE (TOPROL-XL) 50 MG 24 HR TABLET    Take 1 tablet (50 mg total) by mouth once daily.    NEBULIZER AND COMPRESSOR BROCK    USE AS DIRECTED    NITROGLYCERIN  "(NITROSTAT) 0.4 MG SL TABLET    Place 1 tablet (0.4 mg total) under the tongue every 5 (five) minutes as needed for Chest pain.    OLMESARTAN (BENICAR) 20 MG TABLET    Take 1 tablet by mouth once daily    PEN NEEDLE, DIABETIC 31 GAUGE X 5/16" NDLE    Use 4 (four) times daily as needed (high blood sugar - see sliding scale on discharge paperwork).    PEN NEEDLE, DIABETIC, SAFETY (BD AUTOSHIELD PEN NEEDLE) 29 GAUGE X 5/16" NDLE    For use once daily with levemir flexpen    PULSE OXIMETER (PULSE OXIMETER) DEVICE    by Apply Externally route 2 (two) times a day. Use twice daily at 8 AM and 3 PM and record the value in [employer] [facility] as directed.    ROSUVASTATIN (CRESTOR) 40 MG TAB    Take 1 tablet (40 mg total) by mouth every evening.    SEVELAMER CARBONATE (RENVELA) 800 MG TAB    Take 1 tablet (800 mg total) by mouth 3 (three) times daily with meals.   Modified Medications    No medications on file   Discontinued Medications    ALBUTEROL-IPRATROPIUM (DUO-NEB) 2.5 MG-0.5 MG/3 ML NEBULIZER SOLUTION    Take 3 mLs by nebulization every 4 (four) hours as needed for Wheezing or Shortness of Breath. Rescue              "

## 2022-02-02 NOTE — PATIENT INSTRUCTIONS
"Patient Education       Coronary Artery Disease   The Basics   Written by the doctors and editors at [facility]   What is coronary artery disease? -- Coronary artery disease is a condition that puts you at risk for heart attack and other forms of heart disease. In people who have coronary artery disease, the arteries that supply blood to the heart get clogged with fatty deposits (figure 1). Other names for this disease are "coronary heart disease" or just "heart disease."  What are the symptoms of coronary artery disease? -- Many people with coronary artery disease have no symptoms. For those who do, the most common symptoms usually happen with exercise. They can include:  · Pain, pressure, or discomfort in the center of the chest  · Pain, tingling, or discomfort in other parts of the upper body - This might include the arms, back, neck, jaw, or stomach.  · Feeling short of breath  What are the symptoms of a heart attack? -- The first symptom of coronary artery disease can be a heart attack (figure 2). That's why it is so important to know how to spot a heart attack.  The symptoms of a heart attack can include:  · Pain, pressure, or discomfort in the center of the chest  · Pain, tingling, or discomfort in other parts of the upper body, including the arms, back, neck, jaw, or stomach  · Shortness of breath  · Nausea, vomiting, burping, or heartburn  · Sweating or having cold, clammy skin  · A racing or uneven heartbeat  · Feeling dizzy or lightheaded  If these symptoms last more than 10 minutes or they keep coming and going, call for an ambulance (in the [place] and [place], dial 9-1-1) right away. Do not try to get to the hospital on your own.  As mentioned above, some people with coronary artery disease have chest pain even when they are not having a heart attack. This is most likely to happen when they are walking, going up stairs, or moving around. But if you have chest pain that is new or different than pain you have " "had before, you should see a doctor right away.  Is there a test for coronary artery disease? -- Yes. If your doctor or nurse thinks you might have coronary artery disease, he or she might order blood tests and one or more of these tests:  · An electrocardiogram ("ECG") - This test measures the electrical activity in your heart.  · A stress test - During a stress test, which is also called an exercise test, you might be asked to run or walk on a treadmill while you also have an ECG. Physical activity increases the heart's need for blood. This test helps doctors see if the heart is getting enough blood. If you cannot walk or run, your doctor might do this test by giving you a medicine to make your heart pump faster.  · An echocardiogram - This test uses sound waves to create an image of your heart as it beats.  · Cardiac catheterization (also called "cardiac cath") - During this test, the doctor puts a thin tube into a blood vessel in your leg or arm. Then he or she moves the tube up to your heart. Next, the doctor puts a dye that shows up on X-ray into the tube. This part of the test is called "coronary angiography." It can show whether any of the arteries in your heart are clogged.  How is coronary artery disease treated? -- The main treatments for coronary artery disease are:  · Lifestyle changes - Here are some things you can do to reduce your risk of heart attack and death:  ? Quit smoking, if you smoke.  ? Eat lots of fruits, vegetables, and foods with a lot of fiber. Avoid foods that have a lot of sugar.  ? Walk or do some form of physically activity on most days of the week.  ? Lose weight, if you are overweight.  · Medicines - The medicines to treat heart disease are very important. Some medicines lower your risk of heart attacks and can help you live longer. But you must take them every day, as directed. Medicines your doctor might prescribe include:  ? Medicines called statins, which lower " "cholesterol  ? Medicines to lower blood pressure  ? Aspirin or other medicines that help prevent blood clots  ? Medicines to treat diabetes  People who have chest pain caused by coronary artery disease (called "angina") can also get medicines to relive their pain. These medicines might include "nitrates," "beta blockers," and others.  Some people with coronary artery disease can also have:  · A stent procedure - During this procedure, the doctor puts a thin plastic tube into the blocked artery, and uses a tiny balloon to open the blockage. Then the doctor leaves a tiny mesh tube called a "stent" inside the artery to hold it open.  · Bypass surgery (also known as "coronary artery bypass grafting" or CABG) - During bypass surgery, the doctor removes a piece of blood vessel from another part of the body. Then he or she reattaches the blood vessel above and below the area that is clogged. This re-routes blood around the clog and allows it to get to the part of the heart that was not getting blood (figure 3).  If your doctor recommends stenting or bypass surgery, ask these questions:  · What are the benefits of this procedure for me? Will the procedure help me live longer? Will it reduce my chance of having a heart attack? Will I feel better if I have this procedure?  · What are the risks of the procedure?  · What happens if I don't have this procedure?  All topics are updated as new evidence becomes available and our peer review process is complete.  This topic retrieved from [employer] on: Sep 21, 2021.  Topic 18486 Version 24.0  Release: 29.4.2 - C29.263  © 2021 UpToDate, Inc. and/or its affiliates. All rights reserved.  figure 1: Coronary heart disease     In people with coronary heart disease, the coronary arteries get clogged with fatty deposits called plaques.  Graphic 08742 Version 5.0    figure 2: Heart attack symptoms     This picture shows the main symptoms of a heart attack. People who are having a heart attack " "often have only some of these symptoms. The pain, pressure, and discomfort caused by a heart attack mostly affect the left side of the body (shown in darker red) but can also affect the right. If you think you are having a heart attack, call 9-1-1 for an ambulance. Do not try to get yourself to the hospital.  Graphic 00949 Version 1.0    figure 3: Coronary artery bypass graft surgery     During coronary artery bypass surgery, the surgeon removes a piece of blood vessel from the leg, chest, arm, or belly. Then the surgeon uses that piece of blood vessel (called a "graft") to reroute blood around the blocked artery. The surgery is called "bypass surgery" because it bypasses the blockage. Some people have more than 1 blocked artery bypassed. In this picture, the graft came from a vein in the leg called the "saphenous vein." But grafts can come from other places, too.  Graphic 19763 Version 5.0    Consumer Information Use and Disclaimer   This information is not specific medical advice and does not replace information you receive from your health care provider. This is only a brief summary of general information. It does NOT include all information about conditions, illnesses, injuries, tests, procedures, treatments, therapies, discharge instructions or life-style choices that may apply to you. You must talk with your health care provider for complete information about your health and treatment options. This information should not be used to decide whether or not to accept your health care provider's advice, instructions or recommendations. Only your health care provider has the knowledge and training to provide advice that is right for you. The use of this information is governed by the [employer] End User License Agreement, available at https://www.ClickShift.TrendPo/en/solutions/COTA/about/francie.The use of [employer] content is governed by the [employer] Terms of Use. ©2021 [employer] All rights reserved.  Copyright " "  © 2021 UpToDate, Inc. and/or its affiliates. All rights reserved.  Patient Education       Peripheral Artery Disease and Claudication   The Basics   Written by the doctors and editors at [facility]   What is peripheral artery disease? -- Peripheral artery disease (PAD) is a condition that affects the blood vessels (called arteries) that bring blood to the legs. PAD can cause muscle pain that gets worse with activity and better with rest, called "claudication." PAD can also cause wounds to heal more slowly than usual. This article is only about the leg pain related to PAD.  Normally, blood flows easily through arteries to all parts of the body. But sometimes, fatty clumps called "plaques" build up inside the walls of arteries (figure 1). Plaques can cause arteries to become narrow or blocked. This prevents blood from flowing normally. When muscles do not get enough blood, symptoms can occur.  Some people have a greater chance of getting PAD, such as those who:  · Smoke  · Have diabetes  · Have high cholesterol  · Have high blood pressure  What are the symptoms of PAD? -- PAD often causes pain in the back of the lower leg. The pain usually gets worse with walking or other exercise, and gets better with rest. PAD can also cause pain in the buttocks, thighs, or sometimes in the feet. People who have leg pain can have other symptoms, too, such as:  · Trouble walking up stairs  · Trouble with sexual arousal   Symptoms of claudication can be mild or severe, depending on:  · Which arteries are affected  · How narrow the arteries are  · How much activity a person does  Is there a test for PAD? -- Yes. Your doctor or nurse can do different tests to find out if you have PAD, and to check how severe it is. Your doctor might:  · Take the blood pressure in your arm and lower leg (just above the ankle) at rest and right after exercise, and compare them  · Take the blood pressure in other places in your leg (like the thigh) "   · Order a blood vessel imaging test such as an ultrasound, which can show pictures of your leg arteries  How can I help treat my PAD? -- To help treat your PAD and prevent it from getting worse, you can:  · Stop smoking  · Get your diabetes, high blood pressure, and high cholesterol under control (if you have these conditions)  · Walking - Doctors recommend that most people with PAD walk every day. Ask your doctor or nurse how best to begin a walking program.  What other treatments might I have? -- Along with a walking program and getting medical conditions under control, some people are also treated with medicines. The medicines used to treat PAD can reduce symptoms, increase blood flow to the legs, and help people walk farther without pain. Most doctors ask their patients to try a medicine called cilostazol (brand name: Pletal). But people who have certain heart problems cannot take cilostazol and must take a different medicine.  If you still have severe symptoms after trying medicines, your doctor will talk with you about the possibility of having a procedure to increase blood flow to your legs and feet. Your treatment options might include:  · Angioplasty or stenting - During angioplasty or stenting, the doctor sends a thin tube with a balloon at the end of it to the part of the artery that is blocked. Then the doctor inflates the balloon to open the blockage. Often the doctor props open the artery using a tiny mesh tube called a stent, which stays in the body.  · Bypass surgery - During bypass surgery, the doctor removes a piece of blood vessel (vein) from another part of the body. Then they reattach that piece of blood vessel (called a vein graft) above and below the area that is clogged. This re-routes blood around the clog, and allows it to get to the part of the leg that was not getting enough blood. Sometimes instead of taking a graft from another part of the body, the doctor can use a man-made  graft.  What should I know about the different procedures? -- Angioplasty and stenting work best for treating blocked areas that are short. Surgery works better long-term for treating blocked areas that are long. People who have the fewest long-term problems after bypass surgery include those who are younger than 70 and do not have diabetes.  Your doctor might recommend a procedure for you, depending on your symptoms, age, and medical problems. But many people can choose which procedure to have. If your doctor offers you a choice, ask:  · What are the benefits of each procedure for me?  · What are the downsides of each procedure for me?  · What happens if I do not have any procedure?  All topics are updated as new evidence becomes available and our peer review process is complete.  This topic retrieved from [employer] on: Sep 21, 2021.  Topic 14597 Version 10.0  Release: 29.4.2 - C29.263  © 2021 UpToDate, Inc. and/or its affiliates. All rights reserved.  figure 1: Peripheral artery disease     Graphic 05306 Version 6.0    Consumer Information Use and Disclaimer   This information is not specific medical advice and does not replace information you receive from your health care provider. This is only a brief summary of general information. It does NOT include all information about conditions, illnesses, injuries, tests, procedures, treatments, therapies, discharge instructions or life-style choices that may apply to you. You must talk with your health care provider for complete information about your health and treatment options. This information should not be used to decide whether or not to accept your health care provider's advice, instructions or recommendations. Only your health care provider has the knowledge and training to provide advice that is right for you. The use of this information is governed by the [employer] End User License Agreement, available at  https://www.[employer]wer.com/en/solutions/lexicomp/about/francie.The use of [employer] content is governed by the [employer] Terms of Use. ©2021 [employer]. All rights reserved.  Copyright   © 2021 UpToDate, Inc. and/or its affiliates. All rights reserved.

## 2022-02-11 NOTE — PLAN OF CARE
Pt rec'd to [facility] from OR with CRNA and RN, pt connected to monitors and assessed. Report rec'd. See flowsheets for VS and assessments.

## 2022-02-11 NOTE — ANESTHESIA POSTPROCEDURE EVALUATION
Anesthesia Post Evaluation    Patient: Domingo Castro    Procedure(s) Performed: Procedure(s) (LRB):  CREATION, AV FISTULA INSERTION GRAFT, LEFT UPPER FOREARM (Left)    Final Anesthesia Type: MAC      Patient location during evaluation: [facility]  Patient participation: Yes- Able to Participate  Level of consciousness: awake and alert and awake  Post-procedure vital signs: reviewed and stable  Pain management: adequate  Airway patency: patent    PONV status at discharge: No PONV  Anesthetic complications: no      Cardiovascular status: blood pressure returned to baseline, hemodynamically stable and stable  Respiratory status: unassisted, spontaneous ventilation and room air  Hydration status: euvolemic  Follow-up not needed.          Vitals Value Taken Time   BP  02/11/22 1215   Temp  02/11/22 1215   Pulse  02/11/22 1215   Resp  02/11/22 1215   SpO2  02/11/22 1215   See nursing notes for vitals      No case tracking events are documented in the log.      Pain/Ashli Score: No data recorded

## 2022-02-11 NOTE — ANESTHESIA PREPROCEDURE EVALUATION
02/11/2022  Domingo Castro is a 76 y.o., male.    Anesthesia Evaluation     I have reviewed the Nursing Notes.    I have reviewed the Medications.     Review of Systems  Anesthesia Hx:  No problems with previous Anesthesia Denies Hx of Anesthetic complications  Denies Family Hx of Anesthesia complications.    Social:  Non-Smoker, No Alcohol Use    Hematology/Oncology:  Hematology Normal   Oncology Normal     EENT/Dental:EENT/Dental Normal   Cardiovascular:   Exercise tolerance: good Hypertension Past MI CAD   CHF    Pulmonary:  Pulmonary Normal    Renal/:   Chronic Renal Disease    Hepatic/GI:  Hepatic/GI Normal    Musculoskeletal:  Musculoskeletal Normal    Neurological:   Neuromuscular Disease,    Endocrine:   Diabetes           Anesthesia Plan  Type of Anesthesia, risks & benefits discussed:  Anesthesia Type:  general    Patient's Preference: general  Plan Factors:          Intra-op Monitoring Plan:   Intra-op Monitoring Plan Comments:   Post Op Pain Control Plan:   Post Op Pain Control Plan Comments:     Induction:   IV  Beta Blocker:         Informed Consent: Patient understands risks and agrees with Anesthesia plan.  Questions answered. Anesthesia consent signed with patient.  ASA Score: 2     Day of Surgery Review of History & Physical:            Ready For Surgery From Anesthesia Perspective.

## 2022-02-11 NOTE — DISCHARGE INSTRUCTIONS
ANESTHESIA  -For the first 24 hours after surgery:  Do not drive, use heavy equipment, make important decisions, or drink alcohol  -It is normal to feel sleepy for several hours.  Rest until you are more awake.  -Have someone stay with you, if needed.  They can watch for problems and help keep you safe.  -Some possible post anesthesia side effects include: nausea and vomiting, sore throat and hoarseness, sleepiness, and dizziness.    PAIN  -If you have pain after surgery, pain medicine will help you feel better.  Take it as directed, before pain becomes severe.  Most pain relievers taken by mouth need at least 20-30 minutes to start working.  -Do not drive or drink alcohol while taking pain medicine.  -Pain medication can upset your stomach.  Taking them with a little food may help.  -Other ways to help control pain: elevation, ice, and relaxation  -Call your surgeon if still having unmanageable pain an hour after taking pain medicine.  -Pain medicine can cause constipation.  Taking an over-the counter stool softener while on prescription pain medicine and drinking plenty of fluids can prevent this side effect.  -Call your surgeon if you have severe side effects like: breathing problems, trouble waking up, dizziness, confusion, or severe constipation.    NAUSEA  -Some people have nausea after surgery.  This is often because of anesthesia, pain, pain medicine, or the stress of surgery.  -Do not push yourself to eat.  Start off with clear liquids and soup.  Slowly move to solid foods.  Don't eat fatty, rich, spicy foods at first.  Eat smaller amounts.  -If you develop persistent nausea and vomiting please notify your surgeon immediately.    BLEEDING  -Different types of surgery require different types of care and dressing changes.  It is important to follow all instructions and advice from your surgeon.  Change dressing as directed.  Call your surgeon for any concerns regarding postop bleeding.    SIGNS OF  INFECTION  -Signs of infection include: fever, swelling, drainage, and redness  -Notify your surgeon if you have a fever of 100.4 F (38.0 C) or higher.  -Notify your surgeon if you notice redness, swelling, increased pain, pus, or a foul smell at the incision site.          Patient Education       Placement of a Vascular Access for Dialysis   Why is this procedure done?   Doctors join an artery and a vein under your skin to make a vascular access for dialysis. This gives direct access to your blood vessels for dialysis. The vascular access is most often placed on the upper or lower arm. It can be made in one of two ways:  · Fistula ? A connection joining a vein and artery  · Graft ? A man-made tube connects an artery to a vein     What will the results be?   You will have an area where the dialysis needles are placed when having treatments to clean your blood.  What happens before the procedure?   · Your doctor will take your history. Talk to your doctor about:  ? All the drugs you are taking. Be sure to include all prescription and over-the-counter (OTC) drugs, and herbal supplements. Tell the doctor about any drug allergy. Bring a list of drugs you take with you.  ? Any bleeding problems. Be sure to tell your doctor if you are taking any drugs that may cause bleeding. Some of these are warfarin, rivaroxaban, apixaban, ticagrelor, clopidogrel, ketorolac, ibuprofen, naproxen, or aspirin. Certain vitamins and herbs, such as garlic and fish oil, may also add to the risk for bleeding. You may need to stop these drugs as well. Talk to your doctor about them.  ? When you need to stop eating or drinking before your procedure.  · Your doctor will do an exam and may order:  ? Lab tests  ? Ultrasound  ? Vein graphing. This is to make sure which vein and artery is open and large enough to be connected to use as an access.  · You will not be allowed to drive right away after the procedure. Ask a family member or a friend to  drive you home.  What happens during the procedure?   · Once you are in the operating room, you will be given a drug to make you sleepy. It will also help you stay pain free during the surgery. The staff will put an I.V. in your arm to give you fluids and drugs.  · Sometimes, the doctor will give you a special drug to make you numb for the surgery. Other times, you are fully asleep.  · The area to have the vascular access will be scrubbed with an antiseptic.  · The doctor will join the artery and vein. This area will have stitches inside and outside.  · The area will be covered with a clean bandage.  · The procedure takes about 30 to 60 minutes.  What happens after the procedure?   · You will be in the Recovery Room until you are well enough to go to your hospital room or go home.  · Your doctor will give you drugs for pain.  · Your vascular access will not be ready to use right away for dialysis. Talk to your doctor about how long it will be until your fistula or graft is ready to use. Ask about how you will do dialysis until then.  What care is needed at home?   · Ask your doctor what you need to do when you go home. Make sure you ask questions if you do not understand what the doctor says. This way you will know what you need to do.  · Check several times a day for the blood rushing through your graft or fistula. You will feel a vibration or pulse when you put your hand next to the site. This feeling is called a thrill.  · Protect your fistula or graft. Be careful not to put any extra pressure on the area. It could decrease the blood flow through your vascular access.  ? Wear loose clothing.  ? Do not sit, lie, or sleep in a position that puts pressure on the fistula or graft.  ? Do not wear any jewelry on that arm, like a watch or bracelet.  ? Do not carry anything with a strap on that arm, like a purse or grocery bag.  · Do not let anyone take your blood pressure on the same arm as your fistula or graft.  · Do  not let anyone start an IV or draw blood on the same arm where your fistula or graft is.  · Talk to your doctor about how to care for your cut site. Ask your doctor about:  ? When you should change your bandages  ? When you may take a bath or shower  ? If you need to be careful with lifting things over 10 pounds (4.5 kg)  ? When you may go back to your normal activities like work or driving  · Be sure to wash your hands before touching your wound or dressing.  · You may have some swelling or redness at the site of your fistula or graft. Raise your arm on 1 to 2 pillows with your elbow straight but relaxed.  What follow-up care is needed?   · Your doctor may ask you to make visits to the office to check on your progress. Be sure to keep these visits.  · If you have stitches or staples, you will need to have them taken out. Your doctor will often want to do this in 1 to 2 weeks.  · Your doctor will talk to you about your dialysis schedule.  What lifestyle changes are needed?   · Keep blood pressure, cholesterol, and high blood sugar under control.  · Talk to your doctor about the right amount of activity for you.  · Try not to go to crowded places where your graft or fistula can easily be bumped.  What problems could happen?   · Bleeding  · Infection  · Blood clots  · The graft or fistula does not work right.  Where can I learn more?   Centers for Disease Control and Prevention  https://www.cdc.gov/dialysis/patient/index.html   Last Reviewed Date   2020-06-01  Consumer Information Use and Disclaimer   This information is not specific medical advice and does not replace information you receive from your health care provider. This is only a brief summary of general information. It does NOT include all information about conditions, illnesses, injuries, tests, procedures, treatments, therapies, discharge instructions or life-style choices that may apply to you. You must talk with your health care provider for complete  information about your health and treatment options. This information should not be used to decide whether or not to accept your health care providers advice, instructions or recommendations. Only your health care provider has the knowledge and training to provide advice that is right for you.  Copyright   Copyright © 2021 [employer] and its affiliates and/or licensors. All rights reserved.        Patient Education       Hydrocodone and Acetaminophen (estuardo droe KOE done & a seet a MIN oh fen)   Brand Names: US Lorcet HD [DSC]; Lorcet Plus [DSC]; Lorcet [DSC]; Lortab; Norco [DSC]; Verdrocet; Vicodin ES [DSC]; Vicodin HP; Vicodin [DSC]; Xodol 10/300; Xodol 5/300; Xodol 7.5/300; Zamicet [DSC]   Warning   All products:   · This drug is a strong pain drug that can put you at risk for addiction, abuse, and misuse. Misuse or abuse of this drug can lead to overdose and death. Talk with your doctor.  · You will be watched closely to make sure you do not misuse, abuse, or become addicted to this drug.  · This drug may cause very bad and sometimes deadly breathing problems. Call your doctor right away if you have slow, shallow, or trouble breathing.  · The chance of very bad and sometimes deadly breathing problems may be greater when you first start this drug or anytime your dose is raised.  · Even one dose of this drug may be deadly if it is taken by someone else or by accident, especially in children. If this drug is taken by someone else or by accident, get medical help right away.  · Keep all drugs in a safe place. Keep all drugs out of the reach of children and pets.  · This drug has an opioid drug in it. Severe side effects have happened when opioid drugs were used with benzodiazepines, alcohol, marijuana or other forms of cannabis, or prescription or OTC drugs that may cause drowsiness or slowed actions. This includes slow or troubled breathing and death. Benzodiazepines include drugs like alprazolam, diazepam, and  lorazepam. Benzodiazepines may be used to treat many health problems like anxiety, trouble sleeping, or seizures. If you have questions, talk with the doctor.  · Many drugs interact with this drug and can raise the chance of side effects like deadly breathing problems. Talk with your doctor and pharmacist to make sure it is safe to use this drug with all of your drugs.  · Do not take with alcohol or products that have alcohol. Unsafe and sometimes deadly effects may happen.  · Get medical help right away if you feel very sleepy, very dizzy, or if you pass out. Caregivers or others need to get medical help right away if the patient does not respond, does not answer or react like normal, or will not wake up.  · This drug has acetaminophen in it. Liver problems have happened with the use of acetaminophen. Sometimes, this has led to a liver transplant or death. Most of the time, liver problems happened in people taking more than 4,000 mg (milligrams) of acetaminophen in a day. People were also often taking more than 1 drug that had acetaminophen.  · Using this drug for a long time during pregnancy may lead to withdrawal in the  baby. This can be life-threatening. Talk with the doctor.  Liquid:   · Be sure that you know how to measure your dose. Dosing errors can lead to accidental overdose and death. If you have any questions, talk with your doctor or pharmacist.  What is this drug used for?   · It is used to ease pain.    What do I need to tell my doctor BEFORE I take this drug?   · If you are allergic to this drug; any part of this drug; or any other drugs, foods, or substances. Tell your doctor about the allergy and what signs you had.  · If you have any of these health problems: Lung or breathing problems like asthma, trouble breathing, or sleep apnea; high levels of carbon dioxide in the blood; or stomach or bowel block or narrowing.  · If you are taking any of these drugs: Buprenorphine, butorphanol,  linezolid, methylene blue, nalbuphine, or pentazocine.  · If you have taken certain drugs for depression or Parkinson's disease in the last 14 days. This includes isocarboxazid, phenelzine, tranylcypromine, selegiline, or rasagiline. Very high blood pressure may happen.  This is not a list of all drugs or health problems that interact with this drug.  Tell your doctor and pharmacist about all of your drugs (prescription or OTC, natural products, vitamins) and health problems. You must check to make sure that it is safe for you to take this drug with all of your drugs and health problems. Do not start, stop, or change the dose of any drug without checking with your doctor.  What are some things I need to know or do while I take this drug?   · Tell all of your health care providers that you take this drug. This includes your doctors, nurses, pharmacists, and dentists.  · Avoid driving and doing other tasks or actions that call for you to be alert until you see how this drug affects you.  · To lower the chance of feeling dizzy or passing out, rise slowly if you have been sitting or lying down. Be careful going up and down stairs.  · If you have been taking this drug for a long time or at high doses, it may not work as well and you may need higher doses to get the same effect. This is known as tolerance. Call your doctor if this drug stops working well. Do not take more than ordered.  · Long-term or regular use of opioid drugs like this drug may lead to dependence. Lowering the dose or stopping this drug all of a sudden may cause a greater risk of withdrawal or other severe problems. Talk to your doctor before you lower the dose or stop this drug. You will need to follow your doctors instructions. Tell your doctor if you have more pain, mood changes, thoughts of suicide, or any other bad effects.  · Avoid taking other products that have acetaminophen in them. Check labels closely. Too much acetaminophen may cause liver  problems.  · Follow the directions exactly. Do not take more acetaminophen in a day than directed. If you do not know how much acetaminophen you can take in a day, ask your doctor or pharmacist. Some people may take up to 4,000 mg (milligrams) in a day if told to do so by the doctor. Some people (like people with liver problems and children) should take less acetaminophen. Call your doctor right away if you have taken too much acetaminophen in a day, even if you feel well.  · Call your doctor right away if you have signs of liver problems like dark urine, feeling tired, not hungry, upset stomach or stomach pain, light-colored stools, throwing up, or yellow skin or eyes.  · Long-term use of an opioid drug may lead to lower sex hormone levels. Call your doctor if you have a lowered interest in sex, fertility problems, no menstrual period, or ejaculation problems.  · Taking an opioid drug like this drug may lead to a rare but very bad adrenal gland problem. Call your doctor right away if you have very bad dizziness or passing out, very bad upset stomach or throwing up, or if you feel less hungry, very tired, or very weak.  · This drug may raise the chance of seizures in some people, including people who have had seizures in the past. Talk to your doctor to see if you have a greater chance of seizures while taking this drug.  · If you are 65 or older, use this drug with care. You could have more side effects.  · This drug may cause harm to the unborn baby if you take it while you are pregnant. If you are pregnant or you get pregnant while taking this drug, call your doctor right away.  · Tell your doctor if you are breast-feeding or plan to breast-feed. This drug passes into breast milk and may harm your baby.    What are some side effects that I need to call my doctor about right away?   WARNING/CAUTION: Even though it may be rare, some people may have very bad and sometimes deadly side effects when taking a drug. Tell  your doctor or get medical help right away if you have any of the following signs or symptoms that may be related to a very bad side effect:  · Signs of an allergic reaction, like rash; hives; itching; red, swollen, blistered, or peeling skin with or without fever; wheezing; tightness in the chest or throat; trouble breathing, swallowing, or talking; unusual hoarseness; or swelling of the mouth, face, lips, tongue, or throat. Rarely, some allergic reactions have been life-threatening.  · Very bad dizziness or passing out.  · Clammy skin.  · Sweating a lot.  · Feeling confused.  · Trouble walking.  · Severe constipation or stomach pain. These may be signs of a severe bowel problem.  · Not able to pass urine or change in how much urine is passed.  · Hearing loss.  · Change in hearing.  · Chest pain or pressure.  · Fast or slow heartbeat.  · Trouble breathing, slow breathing, or shallow breathing.  · Noisy breathing.  · Breathing problems during sleep (sleep apnea).  · Fever, chills, or sore throat; any unexplained bruising or bleeding; or feeling very tired or weak.  · Mood changes.  · Trouble passing urine.  · Change in eyesight.  · Seizures.  · A severe skin reaction (Chandra-Delonte syndrome/toxic epidermal necrolysis) may happen. It can cause severe health problems that may not go away, and sometimes death. Get medical help right away if you have signs like red, swollen, blistered, or peeling skin (with or without fever); red or irritated eyes; or sores in your mouth, throat, nose, or eyes.  · A severe and sometimes deadly problem called serotonin syndrome may happen if you take this drug with certain other drugs. Call your doctor right away if you have agitation; change in balance; confusion; hallucinations; fever; fast or abnormal heartbeat; flushing; muscle twitching or stiffness; seizures; shivering or shaking; sweating a lot; severe diarrhea, upset stomach, or throwing up; or severe headache.  What are some  other side effects of this drug?   All drugs may cause side effects. However, many people have no side effects or only have minor side effects. Call your doctor or get medical help if any of these side effects or any other side effects bother you or do not go away:  · Constipation.  · Upset stomach or throwing up.  · Stomach pain or heartburn.  · Feeling dizzy, sleepy, tired, or weak.  · Headache.  These are not all of the side effects that may occur. If you have questions about side effects, call your doctor. Call your doctor for medical advice about side effects.  You may report side effects to your national health agency.  You may report side effects to the FDA at 1-580.368.1171. You may also report side effects at https://www.fda.gov/medwatch.  How is this drug best taken?   Use this drug as ordered by your doctor. Read all information given to you. Follow all instructions closely.  All products:   · Take with or without food. Take with food if it causes an upset stomach.  · Do not take this drug with other strong pain drugs or if you are using a pain patch without talking to your doctor first.  · This drug may affect certain lab tests. Tell all of your health care providers and lab workers that you take this drug.  Liquid:   · Measure liquid doses carefully. Use the measuring device that comes with this drug. If there is none, ask the pharmacist for a device to measure this drug.  · Do not use a household teaspoon or tablespoon to measure this drug. Doing so could lead to the dose being too high.  What do I do if I miss a dose?   · If you take this drug on a regular basis, take a missed dose as soon as you think about it.  · If it is close to the time for your next dose, skip the missed dose and go back to your normal time.  · Do not take 2 doses at the same time or extra doses.  · Many times this drug is taken on an as needed basis. Do not take more often than told by the doctor.    How do I store and/or throw  out this drug?   Tablets and capsules:   · Store at room temperature in a dry place. Do not store in a bathroom.  Liquid:   · Store at room temperature. Do not refrigerate or freeze.  · Store in a dry place. Do not store in a bathroom.  All products:   · Store this drug in a safe place where children cannot see or reach it, and where other people cannot get to it. A locked box or area may help keep this drug safe. Keep all drugs away from pets.  · Throw away unused or  drugs. Do not flush down a toilet or pour down a drain unless you are told to do so. Check with your pharmacist if you have questions about the best way to throw out drugs. There may be drug take-back programs in your area.  General drug facts   · If your symptoms or health problems do not get better or if they become worse, call your doctor.  · Do not share your drugs with others and do not take anyone else's drugs.  · Some drugs may have another patient information leaflet. If you have any questions about this drug, please talk with your doctor, nurse, pharmacist, or other health care provider.  · This drug comes with an extra patient fact sheet called a Medication Guide. Read it with care. Read it again each time this drug is refilled. If you have any questions about this drug, please talk with the doctor, pharmacist, or other health care provider.  · A drug called naloxone can be used to help treat an overdose of this drug. Your doctor may order naloxone for you to keep with you while you take this drug. If you have questions about how to get or use naloxone, talk with your doctor or pharmacist. If you think there has been an overdose, get medical care right away even if naloxone has been used. Be ready to tell or show what was taken, how much, and when it happened.    Consumer Information Use and Disclaimer   This generalized information is a limited summary of diagnosis, treatment, and/or medication information. It is not meant to be  comprehensive and should be used as a tool to help the user understand and/or assess potential diagnostic and treatment options. It does NOT include all information about conditions, treatments, medications, side effects, or risks that may apply to a specific patient. It is not intended to be medical advice or a substitute for the medical advice, diagnosis, or treatment of a health care provider based on the health care provider's examination and assessment of a patient's specific and unique circumstances. Patients must speak with a health care provider for complete information about their health, medical questions, and treatment options, including any risks or benefits regarding use of medications. This information does not endorse any treatments or medications as safe, effective, or approved for treating a specific patient. [employer] and its affiliates disclaim any warranty or liability relating to this information or the use thereof. The use of this information is governed by the Terms of Use, available at https://www.[employer].com/en/solutions/lexicomp/about/francie.  Last Reviewed Date   2020-08-03  Copyright   © 2021 [employer] and its affiliates and/or licensors. All rights reserved.

## 2022-02-11 NOTE — OP NOTE
[place] - Surgery (Hospital)  Operative Note      Date of Procedure: 2/11/2022     Procedure: Procedure(s) (LRB):  CREATION, AV FISTULA (Left)   With cephalic vein , very poor result  Then insertion gortex graft av fistula left upper amr  Surgeon(s) and Role:     * Daren Dubon MD - Primary    Assisting Surgeon: None    Pre-Operative Diagnosis: Normocytic anemia [D64.9]  Type 2 diabetes mellitus with diabetic nephropathy, with long-term current use of insulin [E11.21, Z79.4]  CKD stage 4 due to type 1 diabetes mellitus [E10.22, N18.4]    Post-Operative Diagnosis: Post-Op Diagnosis Codes:     * Normocytic anemia [D64.9]     * Type 2 diabetes mellitus with diabetic nephropathy, with long-term current use of insulin [E11.21, Z79.4]     * 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    Description of Technical Procedures:  After satisfactory IV sedation patient supine position left upper arm forearm was prepped and draped normal sterile manner using stockinette ChloraPrep.  First incision was made in left antecubital fossa along the track of cephalic vein which was exposed completely and ligated distally another incision was made in the medial aspect brachial artery was isolated proximal dyscontrol was obtained end-to-side anastomosis was created between the cephalic vein and the brachial artery after completion of procedure vein was found to be very in it to create and not for seen to be able to be use for AV fistula was decided to put another graft in the left upper arm area area medial aspect of the left upper arm was then  infiltrated using 1% xylocaine solution incision was made in the same minute taken down deep subcu tissue subcutaneous bleeders clamped bovied brachial vein was isolated proximal dyscontrol was obtained laterally brachial artery was isolated proximal and distal control was obtained and another incision was made in the distal left upper arm a tunnel created between the 2 incision 1st go of [place]-Yevgeniy graft to vein anastomosis was created using running 6 0 Prolene suture and then graft to artery anastomosis was created laterally using 6 0 Prolene suture patient had good bruit after completion of procedure hemostasis was maintained wound was thoroughly irrigated antibiotic solution and closure of all wounds was performed 3 Vicryl subcutaneous tissue skin was closed using skin staple sterile gauze dressings applied instrument counts sponge count counts correct tolerated well no intraop complication patient sent to recovery in stable condition.    Significant Surgical Tasks Conducted by the Assistant(s), if Applicable: na      Estimated Blood Loss (EBL): 50 cc  Implant Name Type Inv. Item Serial No.  Lot No. LRB No. Used Action   GRAFT STRETCH RING [id number] - [id number]  GRAFT STRETCH RING [id number] [employer]  Left 1 Implanted       Specimens:   Specimen (24h ago, onward)            None                  Condition: Good    Disposition: PACU - hemodynamically stable.    Attestation: I performed the procedure.    Discharge Note    OUTCOME: Patient tolerated treatment/procedure well without complication and is now ready for discharge.    DISPOSITION: Home or Self Care    FINAL DIAGNOSIS:  CKD stage 4 due to type 2 diabetes mellitus    FOLLOWUP: In clinic    DISCHARGE INSTRUCTIONS:    Discharge Procedure Orders   Diet general     Keep surgical extremity elevated     Lifting restrictions     Call MD for:  temperature >100.4     Call MD for:  persistent nausea and vomiting     Call MD for:  severe  uncontrolled pain     Call MD for:  redness, tenderness, or signs of infection (pain, swelling, redness, odor or green/yellow discharge around incision site)     Leave dressing on - Keep it clean, dry, and intact until clinic visit

## 2022-02-11 NOTE — H&P
History & Physical    SUBJECTIVE:     History of Present Illness:  Patient is a 76 y.o. male presents with      No chief complaint on file.      Review of patient's allergies indicates:   Allergen Reactions    Atorvastatin Other (See Comments)       Current Facility-Administered Medications   Medication Dose Route Frequency Provider Last Rate Last Admin    0.9%  NaCl infusion   Intravenous Continuous Daren Dubon MD        mupirocin 2 % ointment   Nasal On Call Procedure Daren Dubon MD        sodium chloride 0.9% flush 10 mL  10 mL Intravenous PRN Samson Hinton MD        sodium chloride 0.9% flush 10 mL  10 mL Intravenous PRN Samson Hinton MD           Past Medical History:   Diagnosis Date    Arthritis     Cataract     Chronic diastolic congestive heart failure     Coronary artery disease     Cystoid macular edema of both eyes     Diabetes mellitus type II     Diabetic retinopathy     Hyperlipemia     Hypertension     Retinal hole     Stage 4 chronic kidney disease     Streptococcus pyogenes bacteremia 2018    Due to left toe osteomyelitis     Past Surgical History:   Procedure Laterality Date    ABDOMINAL AORTOGRAPHY N/A 2019    Procedure: AORTOGRAM-ABDOMINAL;  Surgeon: Amish Hyatt MD;  Location: [facility] CATH LAB/EP;  Service: Cardiology;  Laterality: N/A;  CO2 angiography    ANGIOGRAPHY OF LOWER EXTREMITY Left 2019    Procedure: Angiogram Extremity Unilateral;  Surgeon: Amish Hyatt MD;  Location: [facility] CATH LAB/EP;  Service: Cardiology;  Laterality: Left;    CATARACT EXTRACTION W/  INTRAOCULAR LENS IMPLANT Left 12/15/14    Dr de la cruz    CATARACT EXTRACTION W/  INTRAOCULAR LENS IMPLANT Right 14    dorothy    CIRCUMCISION, NON-      focal laser right eye      INSERTION OF SHAH CATHETER Right 2021    Procedure: INSERTION, CATHETER, CENTRAL VENOUS, SHAH DUAL LUMEN;  Surgeon: Denys Aleman Jr., MD;  Location: [facility] OR;  Service:  "General;  Laterality: Right;    INSERTION OF TUNNELED CENTRAL VENOUS CATHETER (CVC) WITH SUBCUTANEOUS PORT Right 1/2/2019    Procedure: [id number];  Surgeon: Denys Aleman Jr., MD;  Location: [facility] OR;  Service: General;  Laterality: Right;    INSERTION OF TUNNELED CENTRAL VENOUS HEMODIALYSIS CATHETER Right 6/28/2021    Procedure: Insertion, Catheter, Central Venous, Hemodialysis;  Surgeon: Agata Rosado MD;  Location: [facility] CATH LAB;  Service: Interventional Nephrology;  Laterality: Right;    KNEE SURGERY      left    Left medial collateral ligament repair      TONSILLECTOMY       Family History   Problem Relation Age of Onset    Heart disease Mother     Cancer Mother     Cancer Brother     Heart disease Father     Diabetes Father     Cancer Sister     Cataracts Paternal Grandmother     Glaucoma Paternal Grandmother     Blindness Neg Hx     Amblyopia Neg Hx     Hypertension Neg Hx     Macular degeneration Neg Hx     Retinal detachment Neg Hx     Strabismus Neg Hx      Social History     Tobacco Use    Smoking status: Never Smoker    Smokeless tobacco: Never Used   Substance Use Topics    Alcohol use: No     Alcohol/week: 0.0 standard drinks    Drug use: No        Review of Systems:    Review of Systems   Constitutional: Negative.    HENT: Negative.    Eyes: Negative.    Respiratory: Negative.    Cardiovascular: Negative.    Gastrointestinal: Negative.    Genitourinary: Negative.    Musculoskeletal: Negative.    Neurological: Negative.    Psychiatric/Behavioral: Negative.        OBJECTIVE:     Vital Signs (Most Recent)  Temp: 97.7 °F (36.5 °C) (02/11/22 0705)  Pulse: 67 (02/11/22 0705)  Resp: 16 (02/11/22 0705)  BP: (!) 188/83 (02/11/22 0705)  6' 3" (1.905 m)  88.5 kg (195 lb)     Physical Exam:    Physical Exam  Constitutional:       Appearance: He is well-developed.   HENT:      Head: Normocephalic.   Eyes:      Conjunctiva/sclera: Conjunctivae normal.      Pupils: Pupils are " equal, round, and reactive to light.   Cardiovascular:      Rate and Rhythm: Normal rate and regular rhythm.      Heart sounds: Normal heart sounds.   Pulmonary:      Effort: Pulmonary effort is normal.      Breath sounds: Normal breath sounds.   Abdominal:      General: Bowel sounds are normal.      Palpations: Abdomen is soft.   Musculoskeletal:         General: Normal range of motion.      Cervical back: Normal range of motion and neck supple.   Skin:     General: Skin is warm and dry.   Neurological:      Mental Status: He is alert and oriented to person, place, and time.      Deep Tendon Reflexes: Reflexes are normal and symmetric.         Laboratory      Diagnostic Results:      ASSESSMENT/PLAN:     crf 5  Dm   htn      PLAN:Plan   Av fistula left arm

## 2022-02-11 NOTE — PLAN OF CARE
Pt meets all OPS discharge criteria, VSS, Surgical site CDI, audible bruit through dressing, left hand warm and pink with < 3 sec cap refill, 2+ left radial pulse. Denies pain, denies nausea.

## 2022-02-14 NOTE — ED NOTES
Two patient identifiers have been checked and are correct.      Pt had dialysis access placed to L arm Friday. Reports 8/10 pain with movement to arm. No pain at rest. Scant amount of blood from incision site. Family at bedside.     Appearance: Pt awake, alert & oriented to person, place & time. Pt in no acute distress at present time. Pt is clean and well groomed with clothes appropriately fastened.   Skin: Skin warm, dry. Color consistent with ethnicity. Mucous membranes moist. No breakdown or brusing noted. Left arm incisions noted.   Musculoskeletal: Patient moving all extremities well, no obvious swelling or deformities noted.   Respiratory: Respirations spontaneous, even, and non-labored. Visible chest rise noted. Airway is open and patent. No accessory muscle use noted.   Neurologic: Sensation is intact. Speech is clear and appropriate. Eyes open spontaneously, behavior appropriate to situation, follows commands, facial expression symmetrical, bilateral hand grasp equal and even, purposeful motor response noted.  Cardiac: All peripheral pulses present. No Bilateral lower extremity edema. Cap refill is <3 seconds.  Abdomen: Abdomen soft, non-tender to palpation.   : Pt reports no dysuria or hematuria.

## 2022-02-14 NOTE — TELEPHONE ENCOUNTER
Pt had a dialysis fistula on Friday. He has been in a lot of pain. Pt started with fresh blood on bandage per cg. Bandage is covered with blood. Actively bleeding since last night. Pain is 8-9/10. Pt is requesting to go to hospital per cg. Care advice recommend pt go to Er. Cg verbalized understanding.     Reason for Disposition   Severe pain in the incision    Additional Information   Negative: [1] Major abdominal surgical incision AND [2] wound gaping open AND [3] visible internal organs   Negative: Sounds like a life-threatening emergency to the triager   Negative: [1] Bleeding from incision AND [2] won't stop after 10 minutes of direct pressure   Negative: [1] Widespread rash AND [2] bright red, sunburn-like    Protocols used: POST-OP INCISION SYMPTOMS AND [id number]

## 2022-02-14 NOTE — PROGRESS NOTES
Bozena Brito  ED Navigator  Emergency Department    Project: [facility] ED Navigator  Role: Community Health Worker    Date: 02/14/2022  Patient Name: Rev. Domingo Castro  MRN: [id number]  PCP: Griselda Nugent MD    Assessment:     Domingo Castro is a 76 y.o. male who has presented to ED for wound check. Patient has visited the ED 3 times in the past 3 months. Patient did contact PCP.     ED Navigator Initial Assessment    ED Navigator Enrollment Documentation  Consent to Services  Does patient consent to completing the assessment?: Yes  Contact  Method of Initial Contact: Face to Face  Transportation  Does the patient have issues with Transportation?: No  Does the patient have transportation to and from healthcare appointments?: Yes  Insurance Coverage  Do you have coverage/adequate coverage?: Yes  Type/kind of coverage: MEDICARE  Is patient able to afford co-pays/deductibles?: Yes  Is patient able to afford HME or supplies?: Yes  Does patient have an established [facility] PCP?: Yes  Able to access?: Yes  Does the patient have a lack of adequate coverage?: No  Specialist Appointment  Did the patient come to the ED to see a specialist?: No  Does the patient have a pending specialist referral?: No  Does the patient have a specialist appointment made?: No  PCP Follow Up Appointment  Has the patient had an appointment with a primary care provider in the past year?: Yes  Approximate date: 7/30/21  Provider: Griselda Nugent MD  Does the patient have a follow up appontment with a PCP?: Yes  Upcoming appointment date: 5/9/22  Provider: Griselda Nugent MD  When was the last time you saw your PCP?: 7/30/21  Medications  Is patient able to afford medication?: Yes  Is patient unable to get medication due to lack of transportation?: No  Psychological  Does the patient have psycho-social concerns?: No  Food  Does the patient have concerns about food?: No  Communication/Education  Does the patient have limited English  proficiency/English not primary language?: No  Does patient have low literacy and/or low health literacy?: No  Does patient have concerns with care?: No  Does patient have dissatisfaction with care?: No  Other Financial Concerns  Does the patient have immediate financial distress?: No  Does the patient have general financial concerns?: No  Other Social Barriers/Concerns  Does the patient have any additional barriers or concerns?: Work  Primary Barrier  Barriers identified: Structural barrier (service availability, waiting times, etc.)  Root Cause of ED Utilization: Lack of Access to Primary Care  Plan to address Lack of Access to Primary Care: Provided patient with information about the [facility] in their area, Provided [facility] PCP assistance line [phone], Provided information for [facility] ([facility] - [facility], [facility], etc.), Provided information for [facility] On Call 24/7 Nurse Triage line [phone] or 1-866-[facility] ([phone]), Provided information on COVID-Leaders2020 resources and hotline ([phone])  Next steps: Provided Education  Was education/educational materials provided surrounding PCP services/creating a medical home?: No    Was education/educational materials provided surrounding low cost, healthy foods?: No    Was education/educational materials provided surrounding other items? If so, use comment to explain.: No    Plan: Provided information for [facility] On Call 24/7 Nurse triage line, [phone] or 1-866-[facility] ([phone])  Expected Date of Follow Up 1: 2/21/22  Additional Documentation: Spoke with patient that presented to the ED for wound check. Patient stated he was doing fine just ready to go. Patient was asked if he had a PCP he stated he does and he has a follow up scheduled for 5/9/22 with Dr. Nguent. Patient denied needing any other assistance at this time.         Social History      Socioeconomic History    Marital status:    Tobacco Use    Smoking status: Never Smoker    Smokeless tobacco: Never Used   Substance and Sexual Activity    Alcohol use: No     Alcohol/week: 0.0 standard drinks    Drug use: No    Sexual activity: Yes     Partners: Female     Social Determinants of Health     Financial Resource Strain: Low Risk     Difficulty of Paying Living Expenses: Not very hard   Food Insecurity: No Food Insecurity    Worried About Running Out of Food in the Last Year: Never true    Ran Out of Food in the Last Year: Never true   Transportation Needs: No Transportation Needs    Lack of Transportation (Medical): No    Lack of Transportation (Non-Medical): No   Physical Activity: Inactive    Days of Exercise per Week: 0 days    Minutes of Exercise per Session: 0 min   Stress: No Stress Concern Present    Feeling of Stress : Not at all   Social Connections: Socially Integrated    Frequency of Communication with Friends and Family: More than three times a week    Frequency of Social Gatherings with Friends and Family: More than three times a week    Attends Je[employer] witness Services: More than 4 times per year    Active Member of Clubs or Organizations: Yes    Attends Club or Organization Meetings: More than 4 times per year    Marital Status:    Housing Stability: Unknown    Unable to Pay for Housing in the Last Year: No    Unstable Housing in the Last Year: No       Plan:    Spoke with patient that presented to the ED for wound check. Patient stated he was doing fine just ready to go. Patient was asked if he had a PCP he stated he does and he has a follow up scheduled for 5/9/22 with Dr. Nugent. Patient denied needing any other assistance at this time.    Medicare Status: Enrolled  This patient has a Medicare coverage.    Appointment made with: Griselda Nugent MD

## 2022-02-14 NOTE — FIRST PROVIDER EVALUATION
Emergency Department TeleTriage Encounter Note      CHIEF COMPLAINT    Chief Complaint   Patient presents with    Wound Check     Pt had dialysis access placed to left chest, family reports bleeding from site, and slight swelling noted, and pain around site        VITAL SIGNS   Initial Vitals [02/14/22 0931]   BP Pulse Resp Temp SpO2   (!) 165/77 73 18 98.9 °F (37.2 °C) 98 %      MAP       --            ALLERGIES    Review of patient's allergies indicates:   Allergen Reactions    Atorvastatin Other (See Comments)       PROVIDER TRIAGE NOTE  This is a teletriage evaluation of a 76 y.o. male presenting to the ED complaining of bleeding from dialysis access site. Pt had left chest dialysis access placed Thursday and bleeding started Saturday.     Initial orders will be placed and care will be transferred to an alternate provider when patient is roomed for a full evaluation. Any additional orders and the final disposition will be determined by that provider.           ORDERS  Labs Reviewed - No data to display    ED Orders (720h ago, onward)    None            Virtual Visit Note: The provider triage portion of this emergency department evaluation and documentation was performed via [employer], a HIPAA-compliant telemedicine application, in concert with a tele-presenter in the room. A face to face patient evaluation with one of my colleagues will occur once the patient is placed in an emergency department room.      DISCLAIMER: This note was prepared with [employer] voice recognition transcription software. Garbled syntax, mangled pronouns, and other bizarre constructions may be attributed to that software system.

## 2022-02-18 NOTE — TELEPHONE ENCOUNTER
NOC attempted contact for another pt by the # provided in the chart as the pts contact number but the number was incorrect.  The contact number was for this pt.  Spoke with wife, Daniel Castro, on pts behalf and this pt.  Pt tested + by home test and then at [facility] 12/22/21.  Denies any Covid symptoms at this time.  Pt does not qualify for the Covid Surveillance Program at this time.  Instructed to call O[facility] for questions/concerns.  VU.    Reason for Disposition   Information only question and nurse able to answer    Additional Information   Negative: Nursing judgment   Negative: Nursing judgment   Negative: Nursing judgment   Negative: Nursing judgment    Protocols used: INFORMATION ONLY CALL - NO TRIAGE-A-[place]

## 2022-02-22 NOTE — PROGRESS NOTES
Spoke with patient he stated he was doing fine and that he had just gotten back from the hospital having staples removed. Patient denied needing any other assistance at this time.

## 2022-02-24 PROBLEM — R07.9 CHEST PAIN: Status: RESOLVED | Noted: 2021-05-09 | Resolved: 2022-01-01

## 2022-02-24 PROBLEM — R54 FRAIL ELDERLY: Status: ACTIVE | Noted: 2022-01-01

## 2022-02-24 PROBLEM — E11.22 CKD STAGE 5 DUE TO TYPE 2 DIABETES MELLITUS: Status: ACTIVE | Noted: 2022-01-01

## 2022-02-24 PROBLEM — E10.29 TYPE 1 DIABETES MELLITUS WITH KIDNEY COMPLICATION: Status: RESOLVED | Noted: 2020-12-30 | Resolved: 2022-01-01

## 2022-02-24 PROBLEM — Z91.81 RISK FOR FALLS: Status: ACTIVE | Noted: 2022-01-01

## 2022-02-24 PROBLEM — Z99.2 HEMODIALYSIS PATIENT: Status: ACTIVE | Noted: 2022-01-01

## 2022-02-24 PROBLEM — N18.5 ANEMIA OF CHRONIC RENAL FAILURE, STAGE 5: Status: ACTIVE | Noted: 2018-06-14

## 2022-02-24 PROBLEM — I77.0 A-V FISTULA: Status: ACTIVE | Noted: 2022-01-01

## 2022-02-24 PROBLEM — D69.6 THROMBOCYTOPENIA, UNSPECIFIED: Status: RESOLVED | Noted: 2019-07-26 | Resolved: 2022-01-01

## 2022-02-24 PROBLEM — N18.5 CKD STAGE 5 DUE TO TYPE 2 DIABETES MELLITUS: Status: ACTIVE | Noted: 2022-01-01

## 2022-02-24 PROBLEM — I21.4 NSTEMI (NON-ST ELEVATED MYOCARDIAL INFARCTION): Status: RESOLVED | Noted: 2021-05-09 | Resolved: 2022-01-01

## 2022-02-28 PROBLEM — R53.81 PHYSICAL DECONDITIONING: Status: ACTIVE | Noted: 2022-01-01

## 2022-02-28 PROBLEM — E11.22 CKD STAGE 4 DUE TO TYPE 2 DIABETES MELLITUS: Status: RESOLVED | Noted: 2018-12-23 | Resolved: 2022-01-01

## 2022-02-28 PROBLEM — Z74.1 ASSISTANCE NEEDED FOR MOBILITY: Status: ACTIVE | Noted: 2022-01-01

## 2022-02-28 PROBLEM — E66.3 OVERWEIGHT (BMI 25.0-29.9): Status: RESOLVED | Noted: 2019-09-23 | Resolved: 2022-01-01

## 2022-02-28 PROBLEM — N18.4 CKD STAGE 4 DUE TO TYPE 2 DIABETES MELLITUS: Status: RESOLVED | Noted: 2018-12-23 | Resolved: 2022-01-01

## 2022-02-28 PROBLEM — Z99.2 VASCULAR DIALYSIS CATHETER IN PLACE: Status: ACTIVE | Noted: 2022-01-01

## 2022-02-28 PROBLEM — Z74.1 REQUIRES ASSISTANCE WITH ACTIVITIES OF DAILY LIVING (ADL): Status: ACTIVE | Noted: 2022-01-01

## 2022-02-28 PROBLEM — N25.81 SECONDARY HYPERPARATHYROIDISM OF RENAL ORIGIN: Status: ACTIVE | Noted: 2022-01-01

## 2022-02-28 NOTE — PATIENT INSTRUCTIONS
Counseling and Referral of Other Preventative  (Italic type indicates deductible and co-insurance are waived)    Patient Name: Domigno Castro  Today's Date: 2/28/2022    Health Maintenance         Date Due Completion Date    Influenza Vaccine (1) Check with [facility] if received   10/17/2019    Eye Exam Ordered-referral done   11/12/2020    Hemoglobin A1c Ordered- will get at [facility] tomorrow   6/25/2021    Shingles Vaccine (1 of 2) 02/28/2023    ---    Foot Exam 05/27/2022 5/27/2021    Lipid Panel 01/26/2023 1/26/2022    TETANUS VACCINE    Abnormal nutrition screening- non-intentional weight loss- spoke with Katey (nurse) at [facility]. Asked her to relay to nephrologist abnormal screen & no appetite. TSH &  T4 orders given to pt to get done at [facility] tomorrow. Requested copy to be sent to us.  PCP appt next month.     Fall prevention handouts    Carotid ultrasound orders in [facility]-copy of cardiology order given for pt to schedule    Has appt with Dr Nugent next month    Sit & be fit on TV home exercises- fall prevention -              08/26/2023 8/26/2013          Orders Placed This Encounter   Procedures    TSH    T4, Free    Ambulatory referral/consult to Ophthalmology     The following information is provided to all patients.  This information is to help you find resources for any of the problems found today that may be affecting your health:                Living healthy guide: www.[facility].[place].gov      Understanding Diabetes: www.diabetes.org      Eating healthy: www.cdc.gov/healthyweight      CDC home safety checklist: www.cdc.gov/steadi/patient.html      Agency on Aging: www.goea.[place].gov      Alcoholics anonymous (AA): www.aa.org      Physical Activity: www.sean.nih.gov/pd2somv      Tobacco use: www.quitwithusla.org

## 2022-02-28 NOTE — PROGRESS NOTES
"Domingo Castro presented for a  Medicare AWV and comprehensive Health Risk Assessment today. The following components were reviewed and updated:    · Medical history  · Family History  · Social history  · Allergies and Current Medications  · Health Risk Assessment  · Health Maintenance  · Care Team -[facility] dialysis in [facility]- no records to review    ** See Completed Assessments for Annual Wellness Visit within the encounter summary.**       The following assessments were completed:  · Living Situation  · CAGE  · Depression Screening  · Timed Get Up and Go- not performed- pt c/o fatigue- rollator  · Whisper Test  · Cognitive Function Screening  · Nutrition Screening- abnormal -   · ADL Screening- needs assistance w ADLS  · PAQ Screening    Vitals:    02/28/22 1155   BP: (!) 110/50   BP Location: Right arm   Pulse: 93   Resp: 15   SpO2: 98%   Weight: 88.5 kg (195 lb)   Height: 6' 3.5" (1.918 m)     Body mass index is 24.05 kg/m².  Physical Exam  Constitutional:       Appearance: He is well-developed.   HENT:      Head: Normocephalic.      Comments: Wears face mask  Cardiovascular:      Rate and Rhythm: Normal rate and regular rhythm.      Comments: Left upper chest wall catheter-dresg in place- dry & clean  Left arm incision without redness or drainage  Pulmonary:      Effort: Pulmonary effort is normal.      Breath sounds: Normal breath sounds.   Abdominal:      Palpations: Abdomen is soft.   Musculoskeletal:      Comments: Uses rollator- timed get up & go test not performed- pt states being fatigued.   Skin:     General: Skin is warm and dry.      Coloration: Skin is pale.   Neurological:      Mental Status: He is alert and oriented to person, place, and time.      Motor: No abnormal muscle tone.      Deep Tendon Reflexes: Reflexes are normal and symmetric.   Psychiatric:         Mood and Affect: Mood normal.         Behavior: Behavior normal.         Thought Content: Thought content normal.         Judgment: " Judgment normal.           Lab Results   Component Value Date    HGBA1C 7.1 (H) 06/25/2021    CHOL 164 01/26/2022    HDL 61 01/26/2022    LDLCALC 85.2 01/26/2022    TRIG 89 01/26/2022    CHOLHDL 37.2 01/26/2022      Lab Results   Component Value Date    PSA 4.4 (H) 08/18/2016     No results found. However, due to the size of the patient record, not all encounters were searched. Please check Results Review for a complete set of results.  No components found for: [id number]          Diagnoses and health risks identified today and associated recommendations/orders:    1. Hypertension associated with diabetes  Stable followed by nephrology    2. Hypertensive retinopathy of both eyes  Stable followed by ophth    3. Hyperlipidemia associated with type 2 diabetes mellitus  Stable followed by cardiology    4. ESRD (end stage renal disease) on dialysis  Stable followed by nephrology    5. Erectile dysfunction, unspecified erectile dysfunction type  Stable followed by PCP    6. Coronary artery disease involving native coronary artery of native heart without angina pectoris  Stable followed by cardiology    7. Chronic respiratory failure with hypoxia, on home oxygen therapy  Stable followed by PCP, cardiology    8. Chronic diastolic congestive heart failure  Stable followed by cardiology    9. Chronic congestive heart failure with left ventricular diastolic dysfunction  Stable followed by cardiology    10. Cervical radiculopathy  Stable followed by PCP    11. Bronchiectasis without complication  Stable followed by PCP    12. Aortic arch atherosclerosis  Stable followed by cardiology    13. MAGDA (acute kidney injury) on CKD 5  Stable followed by nephrology    14. Obesity, diabetes, and hypertension syndrome  Stable followed by PCP    15. PAD (peripheral artery disease)  Stable followed by cardiology    16. Impaired mobility and ADLs  Stable followed by PCP    17. Metabolic bone disease  Stable followed by nephrology    18. Pulmonary  nodule  Stable followed by PCP    19. Severe nonproliferative diabetic retinopathy of both eyes associated with diabetes mellitus of other type, macular edema presence unspecified  Stable followed by ophth    20. Pure hypercholesterolemia  Stable followed by cardiology    21. Urinary incontinence, unspecified type  Stable followed by PCP    22. Anemia of chronic renal failure, stage 5  Stable followed by nephrology    23. Retinal hole or tear, left  Stable followed by ophth    24. Acute on chronic diastolic CHF (congestive heart failure), NYHA class 3  Stable followed by cardiology, nephrology    25. A-V fistula  Stable followed by nephrology, general SX    26. Hemodialysis patient  Stable followed by nephrology    27. Frail elderly  Stable followed by PCP    28. Risk for falls  Stable followed by PCP    29. CKD stage 5 due to type 2 diabetes mellitus  Stable followed by nephrology    30. Weight loss, non-intentional  Stable followed by PCP, nephrology  - TSH; Future  - T4, Free; Future    31. Fatigue, unspecified type  Stable followed by PCP  - TSH; Future  - T4, Free; Future    32. Screening for eye condition  Stable followed by PCP  - Ambulatory referral/consult to Ophthalmology; Future    33. Secondary hyperparathyroidism of renal origin  Stable followed by nephrology    34. 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, with long-term current use of insulin  Stable followed by PCP    35. Dependence on other enabling machines and devices  Stable followed by PCP    36. Dependence on supplemental oxygen  Stable followed by PCP, cardiology    37. Encounter for preventive health examination  Here for Health Risk Assessment/Annual Wellness Visit.  Health maintenance reviewed and updated. Follow up in one year.     38. Uncontrolled type 2 diabetes mellitus with hyperglycemia  Stable followed by PCP  - Hemoglobin A1C; Future    39. Vascular dialysis  catheter in place Left upper chest wall  Stable followed by nephrology    40. Physical deconditioning  Stable followed by PCP    41. Type 2 diabetes mellitus with chronic kidney disease on chronic dialysis, with long-term current use of insulin  Stable followed by PCP, nephrology    42. Requires assistance with activities of daily living (ADL)  Stable followed by PCP    43. Assistance needed for mobility uses rollator  Stable followed by PCP      Provided Domingo with a 5-10 year written screening schedule and personal prevention plan. Recommendations were developed using the USPSTF age appropriate recommendations. Education, counseling, and referrals were provided as needed. After Visit Summary printed and given to patient which includes a list of additional screenings\tests needed.Here w wife- verbalized concern over 's nonintentional wt loss- has appt next mo w PCP for further evaluation- has been seen by dietician at [facility] dialysis unit- drinks supplement . Abnormal nutrition score today- has appts scheduled- spoke with Katey -nurse at [facility]- TSH & T4 & A1C orders given to pt to obtain tomorrow when dialysis labs are drawn- requested results be forwarded to me & PCP. Also gave copies of carotid u/s orders for pt to schedule from cardiologist. Home blood sugar ranges - 336 after eating. Home oxygen - has but states rarely used. Fall prevention. Sit & be fit TV exercises- fall prevention.  [facility] dialysis [phone] - [facility] nurse cell [phone].    Follow up in about 1 year (around 2/28/2023) for HRA.    VANIA Resendez offered to discuss advanced care planning, including how to pick a person who would make decisions for you if you were unable to make them for yourself, called a health care power of , and what kind of decisions you might make such as use of life sustaining treatments such as ventilators and tube feeding when faced with a life limiting illness recorded  on a living will that they will need to know. (How you want to be cared for as you near the end of your natural life)     X Patient is interested in learning more about how to make advanced directives.  I provided them paperwork and offered to discuss this with them.

## 2022-03-14 NOTE — PROGRESS NOTES
Spoke with patient and he stated he was doing fine. Patient stated his appetite has been off and on. Patient was asked if he had been to see his PCP he stated he has an appointment at the end of the month. Patient was advised to discuss with his PCP, to write down any questions or concerns and be sure to discuss with his PCP at his next appointment. Patient understood. Patient denied needing any other assistance at this time.

## 2022-03-21 NOTE — PROGRESS NOTES
Subjective:      Patient ID: Domingo Casrto is a 77 y.o. male.    Chief Complaint: Diabetes Mellitus (PCP Dr. Nugent, 7/30/21, next appt 4/18/22), Diabetic Foot Exam, Routine Foot Care, Peripheral Neuropathy, and Foot Problem (Left great toe sore, ulcer? Isn't bleeding or no drainage)    Domingo is a 77 y.o. male who presents to the clinic for evaluation and treatment of high risk feet. Domingo has a past medical history of Arthritis, Cataract, Chronic diastolic congestive heart failure, Coronary artery disease, Cystoid macular edema of both eyes, Diabetes mellitus type II, Diabetic retinopathy, Hyperlipemia, Hypertension, Retinal hole, Stage 4 chronic kidney disease, and Streptococcus pyogenes bacteremia (12/23/2018). The patient's chief complaint is diabetic foot ulcer, left hallux. This patient has documented high risk feet requiring routine maintenance secondary to peripheral vascular disease.  Discharged from [facility] on 08/04/2019.  Receiving 6 weeks of IV vancomycin per ID recommendations.  [facility] with 3 times weekly Medi Honey dressing changes. Accompanied by his daughter.  No new complaints.    09/19/2019:  Follow-up for chronic wound to left hallux treated for osteomyelitis.  Post endovascular intervention per .    10/17/2019:  Presents for wound check.  Followed per Susana .  Believes his wound may be healed.  Says his home health nurse peeled away some dead skin and created a wound at the tip of the left hallux.  No new complaints.    11/11/2019:  Presents for wound check left hallux.  Relates he was told by his home health nurse that the wound is healed.  No new complaints.    04/24/2020:  Presents today complaining of new onset blister to the left hallux.  He was previously followed per [facility].  He still has a port in his chest that should been removed.  Denies any trauma.    05/14/2020:  Presents for wound check to left foot.  No new complaints.  Charlene  [facility] is changing his dressings.    06/11/2020:  Follow-up for wound check to left hallux.  Followed by [facility].  No new complaints.    08/14/2020:  Returns for routine foot care.  No new complaints.    12/21/2020:  Returns for routine foot care.  No new complaints.    5/27/21: Pt presents as follow up from hospital admission, currently treated for osteomyelitis of left hallux. No new pedal complaints.     3/21/22: Pt seen today for left great toe wound, states he has recently lost over 80 pounds, states he no longer has an appetite. States he is currently on dialysis. Continues to follow up with his PCP. No other pedal complaints.     PCP: Griselda Nugent MD    Date Last Seen by PCP:  Upcoming appointment 4/18/22    Current shoe gear:  Affected Foot: Football and Darco shoe on the affected foot     Unaffected Foot: Rx diabetic extra depth shoes and custom accommodative insoles    History of Trauma: negative  Sign of Infection: none    Hemoglobin A1C   Date Value Ref Range Status   06/25/2021 7.1 (H) 4.0 - 5.6 % Final     Comment:     ADA Screening Guidelines:  5.7-6.4%  Consistent with prediabetes  >or=6.5%  Consistent with diabetes    High levels of fetal hemoglobin interfere with the HbA1C  assay. Heterozygous hemoglobin variants (HbS, HgC, etc)do  not significantly interfere with this assay.   However, presence of multiple variants may affect accuracy.     05/09/2021 8.5 (H) 4.0 - 5.6 % Final     Comment:     ADA Screening Guidelines:  5.7-6.4%  Consistent with prediabetes  >or=6.5%  Consistent with diabetes    High levels of fetal hemoglobin interfere with the HbA1C  assay. Heterozygous hemoglobin variants (HbS, HgC, etc)do  not significantly interfere with this assay.   However, presence of multiple variants may affect accuracy.     12/31/2020 5.8 (H) 4.0 - 5.6 % Final     Comment:     ADA Screening Guidelines:  5.7-6.4%  Consistent with prediabetes  >or=6.5%  Consistent with diabetes  High  "levels of fetal hemoglobin interfere with the HbA1C  assay. Heterozygous hemoglobin variants (HbS, HgC, etc)do  not significantly interfere with this assay.   However, presence of multiple variants may affect accuracy.       Vitals:    22 1014   Height: 6' 3" (1.905 m)   PainSc: 0-No pain      Past Medical History:   Diagnosis Date    Arthritis     Cataract     Chronic diastolic congestive heart failure     Coronary artery disease     Cystoid macular edema of both eyes     Diabetes mellitus type II     Diabetic retinopathy     Hyperlipemia     Hypertension     Retinal hole     Stage 4 chronic kidney disease     Streptococcus pyogenes bacteremia 2018    Due to left toe osteomyelitis       Past Surgical History:   Procedure Laterality Date    ABDOMINAL AORTOGRAPHY N/A 2019    Procedure: AORTOGRAM-ABDOMINAL;  Surgeon: Amish Hyatt MD;  Location: [facility] CATH LAB/EP;  Service: Cardiology;  Laterality: N/A;  CO2 angiography    ANGIOGRAPHY OF LOWER EXTREMITY Left 2019    Procedure: Angiogram Extremity Unilateral;  Surgeon: Amish Hyatt MD;  Location: [facility] CATH LAB/EP;  Service: Cardiology;  Laterality: Left;    AV FISTULA PLACEMENT Left 2022    Procedure: CREATION, AV FISTULA INSERTION GRAFT, LEFT UPPER FOREARM;  Surgeon: Daren Dubon MD;  Location: [facility] OR;  Service: General;  Laterality: Left;    CATARACT EXTRACTION W/  INTRAOCULAR LENS IMPLANT Left 12/15/14    Dr de la cruz    CATARACT EXTRACTION W/  INTRAOCULAR LENS IMPLANT Right 14    dorothy    CIRCUMCISION, NON-      focal laser right eye      INSERTION OF SHAH CATHETER Right 2021    Procedure: INSERTION, CATHETER, CENTRAL VENOUS, SHAH DUAL LUMEN;  Surgeon: Denys Aleman Jr., MD;  Location: [facility] OR;  Service: General;  Laterality: Right;    INSERTION OF TUNNELED CENTRAL VENOUS CATHETER (CVC) WITH SUBCUTANEOUS PORT Right 2019    Procedure: [id number];  Surgeon: Denys FERGUSON" Ash Savage MD;  Location: [facility] OR;  Service: General;  Laterality: Right;    INSERTION OF TUNNELED CENTRAL VENOUS HEMODIALYSIS CATHETER Right 6/28/2021    Procedure: Insertion, Catheter, Central Venous, Hemodialysis;  Surgeon: Agata Rosado MD;  Location: [facility] CATH LAB;  Service: Interventional Nephrology;  Laterality: Right;    KNEE SURGERY      left    Left medial collateral ligament repair      TONSILLECTOMY         Family History   Problem Relation Age of Onset    Heart disease Mother     Cancer Mother     Cancer Brother     Heart disease Father     Diabetes Father     Cancer Sister     Cataracts Paternal Grandmother     Glaucoma Paternal Grandmother     Blindness Neg Hx     Amblyopia Neg Hx     Hypertension Neg Hx     Macular degeneration Neg Hx     Retinal detachment Neg Hx     Strabismus Neg Hx        Social History     Socioeconomic History    Marital status:    Tobacco Use    Smoking status: Never Smoker    Smokeless tobacco: Never Used   Substance and Sexual Activity    Alcohol use: No     Alcohol/week: 0.0 standard drinks    Drug use: No    Sexual activity: Yes     Partners: Female     Social Determinants of Health     Financial Resource Strain: Low Risk     Difficulty of Paying Living Expenses: Not hard at all   Food Insecurity: No Food Insecurity    Worried About Running Out of Food in the Last Year: Never true    Ran Out of Food in the Last Year: Never true   Transportation Needs: No Transportation Needs    Lack of Transportation (Medical): No    Lack of Transportation (Non-Medical): No   Physical Activity: Inactive    Days of Exercise per Week: 0 days    Minutes of Exercise per Session: 0 min   Stress: Stress Concern Present    Feeling of Stress : Rather much   Social Connections: Socially Integrated    Frequency of Communication with Friends and Family: Twice a week    Frequency of Social Gatherings with Friends and Family: More than three times a week     Attends [employer] Services: More than 4 times per year    Active Member of Clubs or Organizations: Yes    Attends Club or Organization Meetings: More than 4 times per year    Marital Status:    Housing Stability: Unknown    Unable to Pay for Housing in the Last Year: No    Unstable Housing in the Last Year: No       Current Outpatient Medications   Medication Sig Dispense Refill    acetaminophen (TYLENOL) 325 MG tablet Take 2 tablets (650 mg total) by mouth every 4 (four) hours as needed.  0    amLODIPine (NORVASC) 10 MG tablet Take 1 tablet by mouth once daily 90 tablet 3    amoxicillin-clavulanate 875-125mg (AUGMENTIN) 875-125 mg per tablet Take 1 tablet by mouth 2 (two) times daily. 20 tablet 1    aspirin (ECOTRIN) 81 MG EC tablet Take 81 mg by mouth once daily.      clopidogreL (PLAVIX) 75 mg tablet Take 1 tablet by mouth once daily 30 tablet 3    ezetimibe (ZETIA) 10 mg tablet Take 1 tablet (10 mg total) by mouth once daily. 90 tablet 3    furosemide (LASIX) 40 MG tablet Take 2 tablets by mouth once daily 90 tablet 6    hydrALAZINE (APRESOLINE) 100 MG tablet Take 1 tablet (100 mg total) by mouth every 8 (eight) hours. Take 100 mg by mouth every 8 (eight) hours. 270 tablet 3    HYDROcodone-acetaminophen (NORCO)  mg per tablet Take 1 tablet by mouth every 6 (six) hours as needed (Moderate to severe pain). 12 tablet 0    HYDROcodone-acetaminophen (NORCO) 5-325 mg per tablet Take 1 tablet by mouth every 6 (six) hours as needed for Pain. 24 tablet 0    isosorbide mononitrate (IMDUR) 120 MG 24 hr tablet Take 1 tablet (120 mg total) by mouth once daily. 30 tablet 3    metoprolol succinate (TOPROL-XL) 50 MG 24 hr tablet Take 1 tablet (50 mg total) by mouth once daily. 30 tablet 3    nebulizer and compressor Alana USE AS DIRECTED 1 each 0    nitroGLYCERIN (NITROSTAT) 0.4 MG SL tablet Place 1 tablet (0.4 mg total) under the tongue every 5 (five) minutes as needed for Chest pain. 25  "tablet 0    olmesartan (BENICAR) 20 MG tablet Take 1 tablet by mouth once daily 30 tablet 0    pen needle, diabetic 31 gauge x 5/16" Ndle Use 4 (four) times daily as needed (high blood sugar - see sliding scale on discharge paperwork). 100 each 1    pen needle, diabetic, safety (BD AUTOSHIELD PEN NEEDLE) 29 gauge x 5/16" Ndle For use once daily with levemir flexpen 90 each 11    pulse oximeter (PULSE OXIMETER) device by Apply Externally route 2 (two) times a day. Use twice daily at 8 AM and 3 PM and record the value in [employer]hart as directed. 1 each 0    rosuvastatin (CRESTOR) 40 MG Tab Take 1 tablet (40 mg total) by mouth every evening. 90 tablet 3    TRIPHROCAPS 1 mg Cap Take 1 capsule by mouth once daily.      insulin aspart U-100 (NOVOLOG) 100 unit/mL (3 mL) InPn pen Inject 0-5 Units into the skin before meals and at bedtime as needed (Hyperglycemia). **LOW CORRECTION DOSE** Blood Glucose mg/dL Pre-meal 151-200, 0 unit; 201-250, take 2 units;  251-300, take 3 units; 301-350, take 4 units; greater than 350, take 5 units. 3 mL 0    sevelamer carbonate (RENVELA) 800 mg Tab Take 1 tablet (800 mg total) by mouth 3 (three) times daily with meals. 90 tablet 1     No current facility-administered medications for this visit.       Review of patient's allergies indicates:   Allergen Reactions    Atorvastatin Other (See Comments)         Review of Systems   Constitutional: Negative for chills and fever.   HENT: Negative for congestion and hearing loss.    Cardiovascular: Negative for chest pain and claudication.   Respiratory: Negative for cough and shortness of breath.    Skin: Positive for color change, nail changes and poor wound healing.   Musculoskeletal: Negative for back pain and joint pain.   Gastrointestinal: Negative for nausea and vomiting.   Neurological: Positive for numbness.   Psychiatric/Behavioral: Negative for altered mental status.           Objective:      Physical Exam  Constitutional:       " General: He is not in acute distress.     Appearance: He is not diaphoretic.   Cardiovascular:      Pulses:           Dorsalis pedis pulses are 1+ on the right side and 1+ on the left side.        Posterior tibial pulses are 1+ on the right side and 1+ on the left side.      Comments: Mild lower extremity edema bilateral. No hair growth bilateral lower extremity.  Musculoskeletal:      Comments: Semi reducible hallux malleus left hallux. Semi rigid clawtoe deformities toes 2-5 bilateral foot.    Mild cavus foot structure bilateral foot.   Feet:      Right foot:      Protective Sensation: 10 sites tested. 6 sites sensed.      Skin integrity: Dry skin present.      Toenail Condition: Right toenails are abnormally thick and long. Fungal disease present.     Left foot:      Protective Sensation: 10 sites tested. 6 sites sensed.      Skin integrity: Callus and dry skin present. No ulcer.      Toenail Condition: Left toenails are abnormally thick and long. Fungal disease present.  Skin:     General: Skin is warm and dry.      Capillary Refill: Capillary refill takes 2 to 3 seconds.      Coloration: Skin is not pale.      Findings: No ecchymosis, erythema or rash.      Nails: There is no clubbing.      Comments: Nails 1-5 left and 1-5 right are elongated 4-5 mm, thickened, discolored brown yellow with loosening and underlying debris.      Raised hyperkeratotic skin distal left hallux, upon debridement, with ulcer measures 0.8x0.8x0.1cm with fibrogranular base and hyperkeratotic borders, mild serosanguinous drainage. No purulence or acute signs of infection. Appear stable.      Neurological:      Mental Status: He is alert and oriented to person, place, and time.      Sensory: Sensory deficit present.                   Assessment:       Encounter Diagnoses   Name Primary?    Skin ulcer of toe, limited to breakdown of skin, unspecified laterality Yes    PAD (peripheral artery disease)     Disease of nail          Plan:        Domingo was seen today for diabetes mellitus, diabetic foot exam, routine foot care, peripheral neuropathy and foot problem.    Diagnoses and all orders for this visit:    Skin ulcer of toe, limited to breakdown of skin, unspecified laterality  -     X-Ray Foot Complete Left; Future  -     Routine Foot Care  -     Wound Debridement    PAD (peripheral artery disease)    Disease of nail      I counseled the patient on his conditions, their implications and medical management.    Routine foot care per attached note    Wound debridement to left hallux, dressed today with saline moistened hydrafera classic and football of dwayne foam, cast padding and flexinet, secured in darco shoe.     Rest, elevate, recommend PWB to heel only    Discussed importance of nutritional and protein supplementation to assist with wound healing. Recommend follow up with PCP to discuss recent weight loss.     Declines home health care    Shoe inspection. Diabetic Foot Education. Patient reminded of the importance of good nutrition and blood sugar control to help prevent podiatric complications of diabetes. Patient instructed on proper foot hygeine. We discussed wearing proper shoe gear, daily foot inspections, never walking without protective shoe gear, never putting sharp instruments to feet.    RTC in 1 week, sooner PRN

## 2022-03-21 NOTE — PROCEDURES
"Routine Foot Care    Date/Time: 3/21/2022 10:00 AM  Performed by: Vale Merrill DPM  Authorized by: Vale Merrill DPM     Time out: Immediately prior to procedure a "time out" was called to verify the correct patient, procedure, equipment, support staff and site/side marked as required.    Consent Done?:  Yes (Verbal)  Hyperkeratotic Skin Lesions?: No      Nail Care Type:  Debride  Location(s): All  (Left 1st Toe, Left 3rd Toe, Left 2nd Toe, Left 4th Toe, Left 5th Toe, Right 1st Toe, Right 2nd Toe, Right 3rd Toe, Right 4th Toe and Right 5th Toe)  Patient tolerance:  Patient tolerated the procedure well with no immediate complications  Wound Debridement    Date/Time: 3/21/2022 10:00 AM  Performed by: Vale Merrill DPM  Authorized by: Vale Merrill DPM     Consent Done?:  Yes (Verbal)  Local anesthesia used?: No      Wound Details:    Location:  Left foot    Location:  Left 1st Toe    Type of Debridement:  Non-excisional       Length (cm):  0.8       Area (sq cm):  0.64       Width (cm):  0.8       Percent Debrided (%):  100       Depth (cm):  0.1       Total Area Debrided (sq cm):  0.64    Depth of debridement:  Subcutaneous tissue    Tissue debrided:  Subcutaneous    Devitalized tissue debrided:  Biofilm and Callus    Instruments:  Blade and Curette    Bleeding:  None  Patient tolerance:  Patient tolerated the procedure well with no immediate complications     No cultures were taken during this visit       "

## 2022-03-28 NOTE — PROCEDURES
Wound Debridement    Date/Time: 3/28/2022 9:45 AM  Performed by: Vale Merrill DPM  Authorized by: Vale Merrill DPM     Consent Done?:  Yes (Verbal)  Local anesthesia used?: No      Wound Details:    Location:  Left foot    Location:  Left 1st Toe    Type of Debridement:  Non-excisional       Length (cm):  0       Area (sq cm):  0       Width (cm):  0       Percent Debrided (%):  100       Depth (cm):  0       Total Area Debrided (sq cm):  0    Depth of debridement:  Epidermis/Dermis    Tissue debrided:  Epidermis    Devitalized tissue debrided:  Callus    Instruments:  Blade and Curette    Bleeding:  None  Patient tolerance:  Patient tolerated the procedure well with no immediate complications     No cultures were taken during this visit

## 2022-03-28 NOTE — PROGRESS NOTES
Subjective:      Patient ID: Domingo Castro is a 77 y.o. male.    Chief Complaint: Diabetes Mellitus, Foot Ulcer (Toeulcer left great toe), and Follow-up    Domingo is a 77 y.o. male who presents to the clinic for evaluation and treatment of high risk feet. Domingo has a past medical history of Arthritis, Cataract, Chronic diastolic congestive heart failure, Coronary artery disease, Cystoid macular edema of both eyes, Diabetes mellitus type II, Diabetic retinopathy, Hyperlipemia, Hypertension, Retinal hole, Stage 4 chronic kidney disease, and Streptococcus pyogenes bacteremia (12/23/2018). The patient's chief complaint is diabetic foot ulcer, left hallux. This patient has documented high risk feet requiring routine maintenance secondary to peripheral vascular disease.  Discharged from [facility] on 08/04/2019.  Receiving 6 weeks of IV vancomycin per ID recommendations.  Follow-up per [facility] with 3 times weekly Medi Honey dressing changes. Accompanied by his daughter.  No new complaints.    09/19/2019:  Follow-up for chronic wound to left hallux treated for osteomyelitis.  Post endovascular intervention per .    10/17/2019:  Presents for wound check.  Followed per [facility].  Believes his wound may be healed.  Says his home health nurse peeled away some dead skin and created a wound at the tip of the left hallux.  No new complaints.    11/11/2019:  Presents for wound check left hallux.  Relates he was told by his home health nurse that the wound is healed.  No new complaints.    04/24/2020:  Presents today complaining of new onset blister to the left hallux.  He was previously followed per [facility].  He still has a port in his chest that should been removed.  Denies any trauma.    05/14/2020:  Presents for wound check to left foot.  No new complaints.  [facility] is changing his dressings.    06/11/2020:  Follow-up for wound check to left hallux.  Followed by [facility].  No  new complaints.    08/14/2020:  Returns for routine foot care.  No new complaints.    12/21/2020:  Returns for routine foot care.  No new complaints.    5/27/21: Pt presents as follow up from hospital admission, currently treated for osteomyelitis of left hallux. No new pedal complaints.     3/21/22: Pt seen today for left great toe wound, states he has recently lost over 80 pounds, states he no longer has an appetite. States he is currently on dialysis. Continues to follow up with his PCP. No other pedal complaints.     3/28/22: Pt seen today for left great toe wound,  has been taking protein shakes 2x per day. No other pedal complaints. [facility] has follow up with primary care soon.     PCP: Griselda Nugent MD    Date Last Seen by PCP:  Upcoming appointment 4/18/22    Current shoe gear:  Affected Foot: Football and Darco shoe on the affected foot     Unaffected Foot: Rx diabetic extra depth shoes and custom accommodative insoles    History of Trauma: negative  Sign of Infection: none    Hemoglobin A1C   Date Value Ref Range Status   06/25/2021 7.1 (H) 4.0 - 5.6 % Final     Comment:     ADA Screening Guidelines:  5.7-6.4%  Consistent with prediabetes  >or=6.5%  Consistent with diabetes    High levels of fetal hemoglobin interfere with the HbA1C  assay. Heterozygous hemoglobin variants (HbS, HgC, etc)do  not significantly interfere with this assay.   However, presence of multiple variants may affect accuracy.     05/09/2021 8.5 (H) 4.0 - 5.6 % Final     Comment:     ADA Screening Guidelines:  5.7-6.4%  Consistent with prediabetes  >or=6.5%  Consistent with diabetes    High levels of fetal hemoglobin interfere with the HbA1C  assay. Heterozygous hemoglobin variants (HbS, HgC, etc)do  not significantly interfere with this assay.   However, presence of multiple variants may affect accuracy.     12/31/2020 5.8 (H) 4.0 - 5.6 % Final     Comment:     ADA Screening Guidelines:  5.7-6.4%  Consistent with  "prediabetes  >or=6.5%  Consistent with diabetes  High levels of fetal hemoglobin interfere with the HbA1C  assay. Heterozygous hemoglobin variants (HbS, HgC, etc)do  not significantly interfere with this assay.   However, presence of multiple variants may affect accuracy.       Vitals:    22 1043   Height: 6' 3" (1.905 m)   PainSc: 0-No pain      Past Medical History:   Diagnosis Date    Arthritis     Cataract     Chronic diastolic congestive heart failure     Coronary artery disease     Cystoid macular edema of both eyes     Diabetes mellitus type II     Diabetic retinopathy     Hyperlipemia     Hypertension     Retinal hole     Stage 4 chronic kidney disease     Streptococcus pyogenes bacteremia 2018    Due to left toe osteomyelitis       Past Surgical History:   Procedure Laterality Date    ABDOMINAL AORTOGRAPHY N/A 2019    Procedure: AORTOGRAM-ABDOMINAL;  Surgeon: Amish Hyatt MD;  Location: [facility] CATH LAB/EP;  Service: Cardiology;  Laterality: N/A;  CO2 angiography    ANGIOGRAPHY OF LOWER EXTREMITY Left 2019    Procedure: Angiogram Extremity Unilateral;  Surgeon: Amish Hyatt MD;  Location: [facility] CATH LAB/EP;  Service: Cardiology;  Laterality: Left;    AV FISTULA PLACEMENT Left 2022    Procedure: CREATION, AV FISTULA INSERTION GRAFT, LEFT UPPER FOREARM;  Surgeon: Daren Dubon MD;  Location: [facility] OR;  Service: General;  Laterality: Left;    CATARACT EXTRACTION W/  INTRAOCULAR LENS IMPLANT Left 12/15/14    Dr de la cruz    CATARACT EXTRACTION W/  INTRAOCULAR LENS IMPLANT Right 14    dorothy    CIRCUMCISION, NON-      focal laser right eye      INSERTION OF SHAH CATHETER Right 2021    Procedure: INSERTION, CATHETER, CENTRAL VENOUS, SHAH DUAL LUMEN;  Surgeon: Denys Aleman Jr., MD;  Location: [facility] OR;  Service: General;  Laterality: Right;    INSERTION OF TUNNELED CENTRAL VENOUS CATHETER (CVC) WITH SUBCUTANEOUS PORT Right 2019    " Procedure: [id number];  Surgeon: Denys Aleman Jr., MD;  Location: [facility] OR;  Service: General;  Laterality: Right;    INSERTION OF TUNNELED CENTRAL VENOUS HEMODIALYSIS CATHETER Right 6/28/2021    Procedure: Insertion, Catheter, Central Venous, Hemodialysis;  Surgeon: Agata Rosado MD;  Location: [facility] CATH LAB;  Service: Interventional Nephrology;  Laterality: Right;    KNEE SURGERY      left    Left medial collateral ligament repair      TONSILLECTOMY         Family History   Problem Relation Age of Onset    Heart disease Mother     Cancer Mother     Cancer Brother     Heart disease Father     Diabetes Father     Cancer Sister     Cataracts Paternal Grandmother     Glaucoma Paternal Grandmother     Blindness Neg Hx     Amblyopia Neg Hx     Hypertension Neg Hx     Macular degeneration Neg Hx     Retinal detachment Neg Hx     Strabismus Neg Hx        Social History     Socioeconomic History    Marital status:    Tobacco Use    Smoking status: Never Smoker    Smokeless tobacco: Never Used   Substance and Sexual Activity    Alcohol use: No     Alcohol/week: 0.0 standard drinks    Drug use: No    Sexual activity: Yes     Partners: Female     Social Determinants of Health     Financial Resource Strain: Low Risk     Difficulty of Paying Living Expenses: Not hard at all   Food Insecurity: No Food Insecurity    Worried About Running Out of Food in the Last Year: Never true    Ran Out of Food in the Last Year: Never true   Transportation Needs: No Transportation Needs    Lack of Transportation (Medical): No    Lack of Transportation (Non-Medical): No   Physical Activity: Inactive    Days of Exercise per Week: 0 days    Minutes of Exercise per Session: 0 min   Stress: Stress Concern Present    Feeling of Stress : Rather much   Social Connections: Socially Integrated    Frequency of Communication with Friends and Family: Twice a week    Frequency of Social Gatherings  with Friends and Family: More than three times a week    Attends Gnosticism Services: More than 4 times per year    Active Member of Clubs or Organizations: Yes    Attends Club or Organization Meetings: More than 4 times per year    Marital Status:    Housing Stability: Unknown    Unable to Pay for Housing in the Last Year: No    Unstable Housing in the Last Year: No       Current Outpatient Medications   Medication Sig Dispense Refill    acetaminophen (TYLENOL) 325 MG tablet Take 2 tablets (650 mg total) by mouth every 4 (four) hours as needed.  0    amLODIPine (NORVASC) 10 MG tablet Take 1 tablet by mouth once daily 90 tablet 3    amoxicillin-clavulanate 875-125mg (AUGMENTIN) 875-125 mg per tablet Take 1 tablet by mouth 2 (two) times daily. 20 tablet 1    aspirin (ECOTRIN) 81 MG EC tablet Take 81 mg by mouth once daily.      clopidogreL (PLAVIX) 75 mg tablet Take 1 tablet by mouth once daily 30 tablet 3    ezetimibe (ZETIA) 10 mg tablet Take 1 tablet (10 mg total) by mouth once daily. 90 tablet 3    furosemide (LASIX) 40 MG tablet Take 2 tablets by mouth once daily 90 tablet 6    hydrALAZINE (APRESOLINE) 100 MG tablet Take 1 tablet (100 mg total) by mouth every 8 (eight) hours. Take 100 mg by mouth every 8 (eight) hours. 270 tablet 3    HYDROcodone-acetaminophen (NORCO)  mg per tablet Take 1 tablet by mouth every 6 (six) hours as needed (Moderate to severe pain). 12 tablet 0    HYDROcodone-acetaminophen (NORCO) 5-325 mg per tablet Take 1 tablet by mouth every 6 (six) hours as needed for Pain. 24 tablet 0    isosorbide mononitrate (IMDUR) 120 MG 24 hr tablet Take 1 tablet (120 mg total) by mouth once daily. 30 tablet 3    metoprolol succinate (TOPROL-XL) 50 MG 24 hr tablet Take 1 tablet (50 mg total) by mouth once daily. 30 tablet 3    nebulizer and compressor Alana USE AS DIRECTED 1 each 0    nitroGLYCERIN (NITROSTAT) 0.4 MG SL tablet Place 1 tablet (0.4 mg total) under the tongue  "every 5 (five) minutes as needed for Chest pain. 25 tablet 0    olmesartan (BENICAR) 20 MG tablet Take 1 tablet by mouth once daily 30 tablet 0    pen needle, diabetic 31 gauge x 5/16" Ndle Use 4 (four) times daily as needed (high blood sugar - see sliding scale on discharge paperwork). 100 each 1    pen needle, diabetic, safety (BD AUTOSHIELD PEN NEEDLE) 29 gauge x 5/16" Ndle For use once daily with levemir flexpen 90 each 11    pulse oximeter (PULSE OXIMETER) device by Apply Externally route 2 (two) times a day. Use twice daily at 8 AM and 3 PM and record the value in [employer] as directed. 1 each 0    rosuvastatin (CRESTOR) 40 MG Tab Take 1 tablet (40 mg total) by mouth every evening. 90 tablet 3    TRIPHROCAPS 1 mg Cap Take 1 capsule by mouth once daily.      insulin aspart U-100 (NOVOLOG) 100 unit/mL (3 mL) InPn pen Inject 0-5 Units into the skin before meals and at bedtime as needed (Hyperglycemia). **LOW CORRECTION DOSE** Blood Glucose mg/dL Pre-meal 151-200, 0 unit; 201-250, take 2 units;  251-300, take 3 units; 301-350, take 4 units; greater than 350, take 5 units. 3 mL 0    sevelamer carbonate (RENVELA) 800 mg Tab Take 1 tablet (800 mg total) by mouth 3 (three) times daily with meals. 90 tablet 1     No current facility-administered medications for this visit.       Review of patient's allergies indicates:   Allergen Reactions    Atorvastatin Other (See Comments)         Review of Systems   Constitutional: Negative for chills and fever.   HENT: Negative for congestion and hearing loss.    Cardiovascular: Negative for chest pain and claudication.   Respiratory: Negative for cough and shortness of breath.    Skin: Positive for color change, nail changes and poor wound healing.   Musculoskeletal: Negative for back pain and joint pain.   Gastrointestinal: Negative for nausea and vomiting.   Neurological: Positive for numbness.   Psychiatric/Behavioral: Negative for altered mental status.         "   Objective:      Physical Exam  Constitutional:       General: He is not in acute distress.     Appearance: He is not diaphoretic.   Cardiovascular:      Pulses:           Dorsalis pedis pulses are 1+ on the right side and 1+ on the left side.        Posterior tibial pulses are 1+ on the right side and 1+ on the left side.      Comments: Mild lower extremity edema bilateral. No hair growth bilateral lower extremity.  Musculoskeletal:      Comments: Semi reducible hallux malleus left hallux. Semi rigid clawtoe deformities toes 2-5 bilateral foot.    Mild cavus foot structure bilateral foot.   Feet:      Right foot:      Protective Sensation: 10 sites tested. 6 sites sensed.      Skin integrity: Dry skin present.      Toenail Condition: Right toenails are abnormally thick and long. Fungal disease present.     Left foot:      Protective Sensation: 10 sites tested. 6 sites sensed.      Skin integrity: Callus and dry skin present. No ulcer.      Toenail Condition: Left toenails are abnormally thick and long. Fungal disease present.  Skin:     General: Skin is warm and dry.      Capillary Refill: Capillary refill takes 2 to 3 seconds.      Coloration: Skin is not pale.      Findings: No ecchymosis, erythema or rash.      Nails: There is no clubbing.      Comments: Nails 1-5 left and 1-5 right are elongated 4-5 mm, thickened, discolored brown yellow with loosening and underlying debris.      Raised hyperkeratotic skin distal left hallux, upon debridement, wound is healed. No acute signs of infection. Appear stable.      Neurological:      Mental Status: He is alert and oriented to person, place, and time.      Sensory: Sensory deficit present.     3/28/22: Wound is healed.     Previous Images              Assessment:       Encounter Diagnoses   Name Primary?    Healed ulcer of left foot on examination Yes    PAD (peripheral artery disease)     Type 2 diabetes mellitus with peripheral neuropathy     Type 2 diabetes  mellitus with peripheral angiopathy          Plan:       Domingo was seen today for diabetes mellitus, foot ulcer and follow-up.    Diagnoses and all orders for this visit:    Healed ulcer of left foot on examination    PAD (peripheral artery disease)    Type 2 diabetes mellitus with peripheral neuropathy    Type 2 diabetes mellitus with peripheral angiopathy      I counseled the patient on his conditions, their implications and medical management.    Wound debridement to left hallux, wound is healed, he may slowly transition to supportive diabetic shoe.    Continue protein shakes for now. Recommend follow up with primary care doctor for further recommendations and consideration of multivitamin and for recent weight loss.     Shoe inspection. Diabetic Foot Education. Patient reminded of the importance of good nutrition and blood sugar control to help prevent podiatric complications of diabetes. Patient instructed on proper foot hygeine. We discussed wearing proper shoe gear, daily foot inspections, never walking without protective shoe gear, never putting sharp instruments to feet.    RTC in 1 month, sooner PRN

## 2022-04-01 PROBLEM — T82.41XA HEMODIALYSIS CATHETER MALFUNCTION: Status: ACTIVE | Noted: 2022-01-01

## 2022-04-01 NOTE — H&P
History & Physical    SUBJECTIVE:     History of Present Illness:  Patient is a 77 y.o. male presents with  Malfunction catheter left IJ, graft left arm working well  Here to remove the perm cath    No chief complaint on file.      Review of patient's allergies indicates:   Allergen Reactions    Atorvastatin Other (See Comments)       Current Facility-Administered Medications   Medication Dose Route Frequency Provider Last Rate Last Admin    0.9%  NaCl infusion   Intravenous Continuous Daren Dubon MD        mupirocin 2 % ointment   Nasal On Call Procedure Daren Dubon MD           Past Medical History:   Diagnosis Date    Arthritis     Cataract     Chronic diastolic congestive heart failure     Coronary artery disease     Cystoid macular edema of both eyes     Diabetes mellitus type II     Diabetic retinopathy     Hyperlipemia     Hypertension     Retinal hole     Stage 4 chronic kidney disease     Streptococcus pyogenes bacteremia 2018    Due to left toe osteomyelitis     Past Surgical History:   Procedure Laterality Date    ABDOMINAL AORTOGRAPHY N/A 2019    Procedure: AORTOGRAM-ABDOMINAL;  Surgeon: Amish Hyatt MD;  Location: [facility] CATH LAB/EP;  Service: Cardiology;  Laterality: N/A;  CO2 angiography    ANGIOGRAPHY OF LOWER EXTREMITY Left 2019    Procedure: Angiogram Extremity Unilateral;  Surgeon: Amish Hyatt MD;  Location: [facility] CATH LAB/EP;  Service: Cardiology;  Laterality: Left;    AV FISTULA PLACEMENT Left 2022    Procedure: CREATION, AV FISTULA INSERTION GRAFT, LEFT UPPER FOREARM;  Surgeon: Daren Dubon MD;  Location: [facility] OR;  Service: General;  Laterality: Left;    CATARACT EXTRACTION W/  INTRAOCULAR LENS IMPLANT Left 12/15/14    Dr de la cruz    CATARACT EXTRACTION W/  INTRAOCULAR LENS IMPLANT Right 14    dorothy    CIRCUMCISION, NON-      focal laser right eye      INSERTION OF SHAH CATHETER Right 2021    Procedure:  INSERTION, CATHETER, CENTRAL VENOUS, SHAH DUAL LUMEN;  Surgeon: Denys Aleman Jr., MD;  Location: [facility] OR;  Service: General;  Laterality: Right;    INSERTION OF TUNNELED CENTRAL VENOUS CATHETER (CVC) WITH SUBCUTANEOUS PORT Right 1/2/2019    Procedure: [id number];  Surgeon: Denys Aleman Jr., MD;  Location: [facility] OR;  Service: General;  Laterality: Right;    INSERTION OF TUNNELED CENTRAL VENOUS HEMODIALYSIS CATHETER Right 6/28/2021    Procedure: Insertion, Catheter, Central Venous, Hemodialysis;  Surgeon: Agata Rosado MD;  Location: [facility] CATH LAB;  Service: Interventional Nephrology;  Laterality: Right;    KNEE SURGERY      left    Left medial collateral ligament repair      TONSILLECTOMY       Family History   Problem Relation Age of Onset    Heart disease Mother     Cancer Mother     Cancer Brother     Heart disease Father     Diabetes Father     Cancer Sister     Cataracts Paternal Grandmother     Glaucoma Paternal Grandmother     Blindness Neg Hx     Amblyopia Neg Hx     Hypertension Neg Hx     Macular degeneration Neg Hx     Retinal detachment Neg Hx     Strabismus Neg Hx      Social History     Tobacco Use    Smoking status: Never Smoker    Smokeless tobacco: Never Used   Substance Use Topics    Alcohol use: No     Alcohol/week: 0.0 standard drinks    Drug use: No        Review of Systems:    Review of Systems   Constitutional: Negative.    HENT: Negative.    Eyes: Negative.    Respiratory: Negative.    Cardiovascular: Negative.    Gastrointestinal: Negative.    Genitourinary: Negative.    Musculoskeletal: Negative.    Neurological: Negative.    Psychiatric/Behavioral: Negative.        OBJECTIVE:     Vital Signs (Most Recent)              Physical Exam:    Physical Exam  Constitutional:       Appearance: He is well-developed.   HENT:      Head: Normocephalic.   Eyes:      Conjunctiva/sclera: Conjunctivae normal.      Pupils: Pupils are equal, round, and  reactive to light.   Cardiovascular:      Rate and Rhythm: Normal rate and regular rhythm.      Heart sounds: Normal heart sounds.   Pulmonary:      Effort: Pulmonary effort is normal.      Breath sounds: Normal breath sounds.   Abdominal:      General: Bowel sounds are normal.      Palpations: Abdomen is soft.   Musculoskeletal:         General: Normal range of motion.      Cervical back: Normal range of motion and neck supple.   Skin:     General: Skin is warm and dry.   Neurological:      Mental Status: He is alert and oriented to person, place, and time.      Deep Tendon Reflexes: Reflexes are normal and symmetric.         Laboratory      Diagnostic Results:      ASSESSMENT/PLAN:     Cf 5  Malfunction catheter left ij   dm    PLAN:Plan   Removal catheter friday

## 2022-04-01 NOTE — TRANSFER OF CARE
"Anesthesia Transfer of Care Note    Patient: Domingo Castro    Procedure(s) Performed: Procedure(s) (LRB):  REMOVAL, CATHETER, HEMODIALYSIS (Left)    Patient location: [facility]    Anesthesia Type: MAC    Transport from OR: Transported from OR on room air with adequate spontaneous ventilation    Post pain: adequate analgesia    Post assessment: no apparent anesthetic complications and tolerated procedure well    Post vital signs: stable    Level of consciousness: awake, alert and oriented    Nausea/Vomiting: no nausea/vomiting    Complications: none    Transfer of care protocol was followed      Last vitals:   Visit Vitals  BP (!) 181/74 (BP Location: Left arm, Patient Position: Lying)   Pulse 83   Temp 36.8 °C (98.2 °F) (Temporal)   Resp 20   Ht 6' 3.5" (1.918 m)   Wt 85.3 kg (188 lb)   SpO2 98%   BMI 23.19 kg/m²     "

## 2022-04-01 NOTE — OP NOTE
[place] - [facility] (Hospital)  Operative Note      Date of Procedure: 4/1/2022     Procedure: Procedure(s) (LRB):  REMOVAL, CATHETER, HEMODIALYSIS (Left)     Surgeon(s) and Role:     * Daren Dubon MD - Primary    Assisting Surgeon: None    Pre-Operative Diagnosis: S/P arteriovenous (AV) graft placement [Z95.828]  CRF (chronic renal failure), stage 5 [N18.5]    Post-Operative Diagnosis: Post-Op Diagnosis Codes:     * S/P arteriovenous (AV) graft placement [Z95.828]     * CRF (chronic renal failure), stage 5 [N18.5]    Anesthesia: Local MAC    Operative Findings (including complications, if any):  We will PermCath done under MAC anesthesia left IJ patient tolerated well no intraop complication sent to recovery room stable condition estimated blood loss 10 cc specimen removed catheter not submitted.    Description of Technical Procedures:   Operative Note       Surgery Date: 4/1/2022     Surgeon(s) and Role:     * Daren Dubon MD - Primary    Pre-op Diagnosis:  S/P arteriovenous (AV) graft placement [Z95.828]  CRF (chronic renal failure), stage 5 [N18.5]    Post-op Diagnosis: Post-Op Diagnosis Codes:     * S/P arteriovenous (AV) graft placement [Z95.828]     * CRF (chronic renal failure), stage 5 [N18.5]    Procedure(s) (LRB):  REMOVAL, CATHETER, HEMODIALYSIS (Left)    Anesthesia: Local MAC    Procedure in Detail/Findings:    The patient was positioned, the left  side of neck and   chest prepped and draped in normal sterile manner using ChloraPrep.  Timeout was   called.  The patient was properly recognized.  The area was then infiltrated   using 1% Xylocaine solution.  Incision was made in the same area, taken down to   deep subcutaneous tissue.  Subcutaneous bleeders clamped and bovied and further   taken down.  Cuff portion was then  from deep subcutaneous tissue and   was retrieved both proximally and distally.  Vein was ligated proximally using   interrupted 3-0 Vicryl, subcutaneous tissue  with 3-0 Vicryl, skin was closed   using running 4-0 nylon suture.  Sterile gauze dressing was applied.  Instrument   count, sponge count and needle count correct.  The patient tolerated it well.    Estimated blood loss was 5 mL.  Specimen removed was catheter, submitted for   gross ID only.  Postoperative diagnosis is malfunctioning Perm-A-Cath, status   post PD catheter.    Estimated Blood Loss: 10 cc           Specimens (From admission, onward)    None        Implants: * No implants in log *           Disposition: PACU - hemodynamically stable.           Condition: Good    Attestation:  I performed the procedure.           Discharge Note    Admit Date: 4/1/2022    Attending Physician: Daren Dubon MD     Discharge Physician: Daren Dubon MD    Final Diagnosis: Post-Op Diagnosis Codes:     * S/P arteriovenous (AV) graft placement [Z95.828]     * CRF (chronic renal failure), stage 5 [N18.5]    Disposition: Home or Self Care    Patient Instructions:   Current Discharge Medication List      START taking these medications    Details   !! acetaminophen (TYLENOL) 325 MG tablet Take 1 tablet (325 mg total) by mouth every 6 (six) hours as needed for Pain.  Qty: 10 tablet, Refills: 0       !! - Potential duplicate medications found. Please discuss with provider.      CONTINUE these medications which have NOT CHANGED    Details   !! acetaminophen (TYLENOL) 325 MG tablet Take 2 tablets (650 mg total) by mouth every 4 (four) hours as needed.  Refills: 0      aspirin (ECOTRIN) 81 MG EC tablet Take 81 mg by mouth once daily.      clopidogreL (PLAVIX) 75 mg tablet Take 1 tablet by mouth once daily  Qty: 30 tablet, Refills: 3      ezetimibe (ZETIA) 10 mg tablet Take 1 tablet (10 mg total) by mouth once daily.  Qty: 90 tablet, Refills: 3      amLODIPine (NORVASC) 10 MG tablet Take 1 tablet by mouth once daily  Qty: 90 tablet, Refills: 3      amoxicillin-clavulanate 875-125mg (AUGMENTIN) 875-125 mg per tablet Take 1  "tablet by mouth 2 (two) times daily.  Qty: 20 tablet, Refills: 1      furosemide (LASIX) 40 MG tablet Take 2 tablets by mouth once daily  Qty: 90 tablet, Refills: 6      hydrALAZINE (APRESOLINE) 100 MG tablet Take 1 tablet (100 mg total) by mouth every 8 (eight) hours. Take 100 mg by mouth every 8 (eight) hours.  Qty: 270 tablet, Refills: 3    Comments: .      HYDROcodone-acetaminophen (NORCO)  mg per tablet Take 1 tablet by mouth every 6 (six) hours as needed (Moderate to severe pain).  Qty: 12 tablet, Refills: 0      HYDROcodone-acetaminophen (NORCO) 5-325 mg per tablet Take 1 tablet by mouth every 6 (six) hours as needed for Pain.  Qty: 24 tablet, Refills: 0    Comments: Quantity prescribed more than 7 day supply? Yes, quantity medically necessary      insulin aspart U-100 (NOVOLOG) 100 unit/mL (3 mL) InPn pen Inject 0-5 Units into the skin before meals and at bedtime as needed (Hyperglycemia). **LOW CORRECTION DOSE** Blood Glucose mg/dL Pre-meal 151-200, 0 unit; 201-250, take 2 units;  251-300, take 3 units; 301-350, take 4 units; greater than 350, take 5 units.  Qty: 3 mL, Refills: 0      isosorbide mononitrate (IMDUR) 120 MG 24 hr tablet Take 1 tablet (120 mg total) by mouth once daily.  Qty: 30 tablet, Refills: 3      metoprolol succinate (TOPROL-XL) 50 MG 24 hr tablet Take 1 tablet (50 mg total) by mouth once daily.  Qty: 30 tablet, Refills: 3    Comments: .      nebulizer and compressor Alana USE AS DIRECTED  Qty: 1 each, Refills: 0      nitroGLYCERIN (NITROSTAT) 0.4 MG SL tablet Place 1 tablet (0.4 mg total) under the tongue every 5 (five) minutes as needed for Chest pain.  Qty: 25 tablet, Refills: 0      olmesartan (BENICAR) 20 MG tablet Take 1 tablet by mouth once daily  Qty: 30 tablet, Refills: 0      pen needle, diabetic 31 gauge x 5/16" Ndle Use 4 (four) times daily as needed (high blood sugar - see sliding scale on discharge paperwork).  Qty: 100 each, Refills: 1      pen needle, diabetic, " "safety (BD AUTOSHIELD PEN NEEDLE) 29 gauge x 5/16" Ndle For use once daily with levemir flexpen  Qty: 90 each, Refills: 11      pulse oximeter (PULSE OXIMETER) device by Apply Externally route 2 (two) times a day. Use twice daily at 8 AM and 3 PM and record the value in MyChart as directed.  Qty: 1 each, Refills: 0    Comments: This is a NO CHARGE item.  Please override price to zero.  DO NOT PRINT.  NORMAL MODE e-PRESCRIBE ONLY.      rosuvastatin (CRESTOR) 40 MG Tab Take 1 tablet (40 mg total) by mouth every evening.  Qty: 90 tablet, Refills: 3    Comments: Please consider 90 day supplies to promote better adherence      sevelamer carbonate (RENVELA) 800 mg Tab Take 1 tablet (800 mg total) by mouth 3 (three) times daily with meals.  Qty: 90 tablet, Refills: 1      TRIPHROCAPS 1 mg Cap Take 1 capsule by mouth once daily.       !! - Potential duplicate medications found. Please discuss with provider.          Discharge Procedure Orders (must include Diet, Follow-up, Activity)   Discharge Procedure Orders (must include Diet, Follow-up, Activity)   Diet general     Keep surgical extremity elevated     Lifting restrictions     Leave dressing on - Keep it clean, dry, and intact until clinic visit     Call MD for:  temperature >100.4     Call MD for:  persistent nausea and vomiting     Call MD for:  severe uncontrolled pain     Call MD for:  redness, tenderness, or signs of infection (pain, swelling, redness, odor or green/yellow discharge around incision site)        Discharge Date: No discharge date for patient encounter.      Significant Surgical Tasks Conducted by the Assistant(s), if Applicable: na    Estimated Blood Loss (EBL): 10 cc           Implants: * No implants in log *    Specimens:   Specimen (24h ago, onward)            None                  Condition: Good    Disposition: PACU - hemodynamically stable.    Attestation: I performed the procedure.    Discharge Note    OUTCOME: Patient tolerated " treatment/procedure well without complication and is now ready for discharge.    DISPOSITION: Home or Self Care    FINAL DIAGNOSIS:  Hemodialysis catheter malfunction    FOLLOWUP: In clinic 10 days    DISCHARGE INSTRUCTIONS:    Discharge Procedure Orders   Diet general     Keep surgical extremity elevated     Lifting restrictions     Leave dressing on - Keep it clean, dry, and intact until clinic visit     Call MD for:  temperature >100.4     Call MD for:  persistent nausea and vomiting     Call MD for:  severe uncontrolled pain     Call MD for:  redness, tenderness, or signs of infection (pain, swelling, redness, odor or green/yellow discharge around incision site)

## 2022-04-01 NOTE — ANESTHESIA POSTPROCEDURE EVALUATION
Anesthesia Post Evaluation    Patient: Domingo Castro    Procedure(s) Performed: Procedure(s) (LRB):  REMOVAL, CATHETER, HEMODIALYSIS (Left)    Final Anesthesia Type: MAC      [facility]  Patient participation: Yes- Able to Participate  Level of consciousness: awake and alert, awake and oriented  Post-procedure vital signs: reviewed and stable  Pain management: adequate  Airway patency: patent    PONV status at discharge: No PONV  Anesthetic complications: no      Cardiovascular status: blood pressure returned to baseline, hemodynamically stable and stable  Respiratory status: unassisted, spontaneous ventilation and room air  Hydration status: euvolemic  Follow-up not needed.          Vitals Value Taken Time   BP  04/01/22 1125   Temp  04/01/22 1125   Pulse  04/01/22 1125   Resp  04/01/22 1125   SpO2  04/01/22 1125     See nursing notes for vitals     No case tracking events are documented in the log.      Pain/Ashli Score: No data recorded

## 2022-04-01 NOTE — ANESTHESIA PREPROCEDURE EVALUATION
04/01/2022  Domingo Castro is a 77 y.o., male.    Pre-op Assessment     I have reviewed the Nursing Notes.    I have reviewed the Medications.     Review of Systems  Anesthesia Hx:  No problems with previous Anesthesia Denies Hx of Anesthetic complications Denies Family Hx of Anesthesia complications.    Social:  Non-Smoker, No Alcohol Use    Hematology/Oncology:  Hematology Normal   Oncology Normal     EENT/Dental:EENT/Dental Normal   Cardiovascular:   Exercise tolerance: good Hypertension Past MI CAD   CHF    Pulmonary:  Pulmonary Normal    Renal/:   Chronic Renal Disease    Hepatic/GI:  Hepatic/GI Normal    Musculoskeletal:  Musculoskeletal Normal    Neurological:   Neuromuscular Disease,    Endocrine:   Diabetes           Anesthesia Plan  Type of Anesthesia, risks & benefits discussed:  Anesthesia Type:  general    Patient's Preference: general  Plan Factors:          Intra-op Monitoring Plan:   Intra-op Monitoring Plan Comments:   Post Op Pain Control Plan:   Post Op Pain Control Plan Comments:     Induction:   IV  Beta Blocker:         Informed Consent: Informed consent signed with the Patient and all parties understand the risks and agree with anesthesia plan.  All questions answered.  Anesthesia consent signed with patient.  ASA Score: 2     Day of Surgery Review of History & Physical:              Ready For Surgery From Anesthesia Perspective.                Anesthesia Plan  Type of Anesthesia, risks & benefits discussed:    Anesthesia Type: general  Induction:  IV  Informed Consent: Informed consent signed with the Patient and all parties understand the risks and agree with anesthesia plan.  All questions answered.   ASA Score: 2    Ready For Surgery From Anesthesia Perspective.       .

## 2022-04-01 NOTE — DISCHARGE INSTRUCTIONS
DRESSING CARE AND ACTIVITY  -KEEP DRESSING CLEAN, DRY AND INTACT UNTIL FOLLOW UP, IF DRESSING BECOMES SATURATED WITH WATER OR BLOOD AND BEGINS TO FALL OFF YOU CAN REPLACE WITH A WATERPROOF BANDAGE  -ACTIVITY AS TOLERATED, NO HEAVY LIFTING OR STRENUOUS ACTIVITY UNTIL DR ZALDIVAR GIVES OK  -TAKE TYLENOL FOR PAIN AS NEEDED  ANESTHESIA  -For the first 24 hours after surgery:  Do not drive, use heavy equipment, make important decisions, or drink alcohol  -It is normal to feel sleepy for several hours.  Rest until you are more awake.  -Have someone stay with you, if needed.  They can watch for problems and help keep you safe.  -Some possible post anesthesia side effects include: nausea and vomiting, sore throat and hoarseness, sleepiness, and dizziness.    PAIN  -If you have pain after surgery, pain medicine will help you feel better.  Take it as directed, before pain becomes severe.  Most pain relievers taken by mouth need at least 20-30 minutes to start working.  -Do not drive or drink alcohol while taking pain medicine.  -Pain medication can upset your stomach.  Taking them with a little food may help.  -Other ways to help control pain: elevation, ice, and relaxation  -Call your surgeon if still having unmanageable pain an hour after taking pain medicine.  -Pain medicine can cause constipation.  Taking an over-the counter stool softener while on prescription pain medicine and drinking plenty of fluids can prevent this side effect.  -Call your surgeon if you have severe side effects like: breathing problems, trouble waking up, dizziness, confusion, or severe constipation.    NAUSEA  -Some people have nausea after surgery.  This is often because of anesthesia, pain, pain medicine, or the stress of surgery.  -Do not push yourself to eat.  Start off with clear liquids and soup.  Slowly move to solid foods.  Don't eat fatty, rich, spicy foods at first.  Eat smaller amounts.  -If you develop persistent nausea and vomiting  please notify your surgeon immediately.    BLEEDING  -Different types of surgery require different types of care and dressing changes.  It is important to follow all instructions and advice from your surgeon.  Change dressing as directed.  Call your surgeon for any concerns regarding postop bleeding.    SIGNS OF INFECTION  -Signs of infection include: fever, swelling, drainage, and redness  -Notify your surgeon if you have a fever of 100.4 F (38.0 C) or higher.  -Notify your surgeon if you notice redness, swelling, increased pain, pus, or a foul smell at the incision site.

## 2022-04-07 NOTE — PROGRESS NOTES
Spoke with patient and he stated he was doing fine he had just come from dialysis. Patient stated after dialysis he always loses his appetite but otherwise good. Patient denied needing any other assistance at this time.

## 2022-04-14 NOTE — ED PROVIDER NOTES
Encounter Date: 4/14/2022       History     Chief Complaint   Patient presents with    Shortness of Breath     Pt presents to ED with c/o SoB and dry cough on/off x3 weeks. He's a dialysis pt, last dialysis today, and generalized weakness noted. Pt has fistula to left, upper arm, Lump by  right clavicle, and stitches open to air to left, upper chest from previous quiton catheter.      76 yo man with a pmh of ESRD, CHF, who presents from dialysis for a low-grade cough over the last 3 weeks which has been relatively persistent.  He did have an element of weight gain a couple of days ago his daughter noted.  He notes that he just wants to see if something can help with this cough, has no desire to pursue any lab work at this time because he has difficulty in obtaining access.  He denies any fevers, chills, chest pain.  Nothing in particular has seemed to improve his symptoms in the past though he has taken some Coricidin which improved his symptoms his family thought he might be taking it too much at which time the told was stop.        Review of patient's allergies indicates:   Allergen Reactions    Atorvastatin Other (See Comments)     Past Medical History:   Diagnosis Date    Arthritis     Cataract     Chronic diastolic congestive heart failure     Coronary artery disease     Cystoid macular edema of both eyes     Diabetes mellitus type II     Diabetic retinopathy     Hyperlipemia     Hypertension     Retinal hole     Stage 4 chronic kidney disease     Streptococcus pyogenes bacteremia 12/23/2018    Due to left toe osteomyelitis     Past Surgical History:   Procedure Laterality Date    ABDOMINAL AORTOGRAPHY N/A 7/30/2019    Procedure: AORTOGRAM-ABDOMINAL;  Surgeon: Amish Hyatt MD;  Location: [facility] CATH LAB/EP;  Service: Cardiology;  Laterality: N/A;  CO2 angiography    ANGIOGRAPHY OF LOWER EXTREMITY Left 8/1/2019    Procedure: Angiogram Extremity Unilateral;  Surgeon: Amish Hyatt MD;   Location: [facility] CATH LAB/EP;  Service: Cardiology;  Laterality: Left;    AV FISTULA PLACEMENT Left 2022    Procedure: CREATION, AV FISTULA INSERTION GRAFT, LEFT UPPER FOREARM;  Surgeon: Daren Dubon MD;  Location: [facility];  Service: General;  Laterality: Left;    CATARACT EXTRACTION W/  INTRAOCULAR LENS IMPLANT Left 12/15/14    Dr de la cruz    CATARACT EXTRACTION W/  INTRAOCULAR LENS IMPLANT Right 14    dorothy    CIRCUMCISION, NON-      focal laser right eye      INSERTION OF SHAH CATHETER Right 2021    Procedure: INSERTION, CATHETER, CENTRAL VENOUS, SHAH DUAL LUMEN;  Surgeon: Denys Aleman Jr., MD;  Location: [facility];  Service: General;  Laterality: Right;    INSERTION OF TUNNELED CENTRAL VENOUS CATHETER (CVC) WITH SUBCUTANEOUS PORT Right 2019    Procedure: [id number];  Surgeon: Denys Aleman Jr., MD;  Location: [facility];  Service: General;  Laterality: Right;    INSERTION OF TUNNELED CENTRAL VENOUS HEMODIALYSIS CATHETER Right 2021    Procedure: Insertion, Catheter, Central Venous, Hemodialysis;  Surgeon: Agata Rosado MD;  Location: [facility] CATH LAB;  Service: Interventional Nephrology;  Laterality: Right;    KNEE SURGERY      left    Left medial collateral ligament repair      REMOVAL OF HEMODIALYSIS CATHETER Left 2022    Procedure: REMOVAL, CATHETER, HEMODIALYSIS;  Surgeon: Daren Dubon MD;  Location: [facility];  Service: General;  Laterality: Left;    TONSILLECTOMY       Family History   Problem Relation Age of Onset    Heart disease Mother     Cancer Mother     Cancer Brother     Heart disease Father     Diabetes Father     Cancer Sister     Cataracts Paternal Grandmother     Glaucoma Paternal Grandmother     Blindness Neg Hx     Amblyopia Neg Hx     Hypertension Neg Hx     Macular degeneration Neg Hx     Retinal detachment Neg Hx     Strabismus Neg Hx      Social History     Tobacco Use    Smoking status: Never  Smoker    Smokeless tobacco: Never Used   Substance Use Topics    Alcohol use: No     Alcohol/week: 0.0 standard drinks    Drug use: No     Review of Systems  Constitutional-no fever  HEENT-no congestion  Eyes-no redness  Respiratory-no shortness of breath + cough  Cardio-no chest pain  GI-no abdominal pain  Endocrine-no cold intolerance  -no difficulty urinating  MSK-no myalgias  Skin-no rashes  Allergy-no environmental allergy  Neurologic-, no headache  Hematology-no swollen nodes  Behavioral-no confusion  Physical Exam     Initial Vitals [04/14/22 1102]   BP Pulse Resp Temp SpO2   (!) 150/57 80 (!) 8 -- 100 %      MAP       --         Physical Exam  Constitutional: Well appearing, no distress.  Eyes: Conjunctivae normal.  ENT       Head: Normocephalic, atraumatic.       Nose: Normal external appearance        Mouth/Throat: no strigulous respirations   Hematological/Lymphatic/Immunilogical: no visible lymphadenopathy   Cardiovascular: Normal rate, small wound on right chest from previous catheter  Respiratory: Normal respiratory effort. Faint crackle in the left inferior lung field  Gastrointestinal: non distended   Musculoskeletal: Normal range of motion in all extremities. No obvious deformities or swelling.  Neurologic: Alert, oriented. Normal speech and language. No gross focal neurologic deficits are appreciated.  Skin: Skin is warm, dry. No rash noted.  Psychiatric: Mood and affect are normal.   ED Course   Procedures  Labs Reviewed   INFLUENZA A & B BY MOLECULAR   SARS-COV-2 RDRP GENE          Imaging Results          X-Ray Chest PA And Lateral (Final result)  Result time 04/14/22 12:18:54    Final result by Sotero Melgar MD (04/14/22 12:18:54)                 Impression:      No focal consolidation.    Possible trace left pleural effusion.      Electronically signed by: Sotero Melgar MD  Date:    04/14/2022  Time:    12:18             Narrative:    EXAMINATION:  XR CHEST PA AND  "LATERAL    CLINICAL HISTORY:  Provided history is "  Cough, unspecified".    TECHNIQUE:  Frontal and lateral views of the chest were performed.    COMPARISON:  06/25/2021.    FINDINGS:  Cardiac wires overlie the chest.  Cardiomediastinal silhouette is borderline enlarged and similar to the prior study.  Atherosclerotic calcifications overlie the aortic arch.  No focal consolidation.  Possible small left pleural effusion.  No pneumothorax.                                 Medications - No data to display  Medical Decision Making:   History:   I obtained history from: someone other than patient.       <> Summary of History: duaghters at bedside note persistent cough and 3 lb weight gain  Old Medical Records: I decided to obtain old medical records.  Old Records Summarized: records from clinic visits and records from previous admission(s).  Differential Diagnosis:   Pneumonia, covid, influenza, edema, chf  Clinical Tests:   Lab Tests: Reviewed and Ordered  Radiological Study: Ordered and Reviewed  Medical Tests: Ordered and Reviewed  ED Management:  Discussed with family and patient at length extensively options for evaluation, at this time he is well appearing on room air, he is in no distress, has a reassuring unchanged EKG, has not over systemic signs of infection.   XR shows small effusion which may be a component of his cough. I think He likely would beenfit from some diuresis and cough management. After extensive discussion about options will forgo further lab testing and attempt symptom care but return in case of wrosening.              ED Course as of 04/15/22 0853   u Apr 14, 2022   1224 My EKG interpretation, sinus rhythm, 80 beats per minute, left axis deviation, poor R-wave progression, incomplete left bundle branch block, when compared to previous  21 no appreciable changes [TK]      ED Course User Index  [TK] Jose Hodgson MD             Clinical Impression:   Final diagnoses:  [R06.02] " Shortness of breath  [R05.9] Cough          ED Disposition Condition    Discharge Stable        ED Prescriptions     Medication Sig Dispense Start Date End Date Auth. Provider    furosemide (LASIX) 40 MG tablet Take 1 tablet (40 mg total) by mouth once daily. for 2 days 2 tablet 4/14/2022 4/16/2022 Jose Hodgson MD    benzonatate (TESSALON) 100 MG capsule Take 1 capsule (100 mg total) by mouth 3 (three) times daily as needed for Cough. 20 capsule 4/14/2022 4/24/2022 Jose Hodgson MD    promethazine-phenylephrine (PROMETHAZINE VC) 6.25-5 mg/5 mL syrup Take 2.5 mLs by mouth every 6 (six) hours as needed (cough). 120 mL 4/14/2022 4/24/2022 Jose Hodgson MD        Follow-up Information     Follow up With Specialties Details Why Contact Info    Griselda Nugent MD Internal Medicine Go in 4 days For a follow up visit about today [address] [facility] [address]  [phone]             Jose Hodgson MD  04/15/22 0877

## 2022-04-14 NOTE — TELEPHONE ENCOUNTER
----- Message from Patty Chiarg sent at 4/14/2022 10:35 AM CDT -----  Contact: wife Patty [phone]  Patient would like to get medical advice.  Symptoms (please be specific):  cough  & loosing weight   How long have you had these symptoms: 3 days   Would you like a call back, or a response through your [facility] portal?:  call back   Pharmacy name and phone # (copy from chart):  [facility] in [place]   Comments: jose Brambila would like a call back

## 2022-04-14 NOTE — PHARMACY MED REC
"Admission Medication History     The home medication history was taken by Rebecca Arias CPhT.    Medication history obtained from, Patient's Daughter Verified    You may go to "Admission" then "Reconcile Home Medications" tabs to review and/or act upon these items.      The home medication list has been updated by the Pharmacy department.    Please read ALL comments highlighted in yellow.    Please address this information as you see fit.     Feel free to contact us if you have any questions or require assistance.      The medications listed below were removed from the home medication list.  Please reorder if appropriate:  Patient reports no longer taking the following medication(s):   Augmentin 875-125 mg   Plavix 75 mg   Lasix 40 mg   Norco 5-325 mg and  mg   Isosorbide Mononitrate 120 mg   Toprol- XL 50 mg   Renvela 800 mg          Rebecca Arias CPhT.  Ext [phone]              .          "

## 2022-04-14 NOTE — DISCHARGE INSTRUCTIONS
Mr. Castro,    Thank you for letting me care for you today! It was nice meeting you, and I hope you feel better soon.   If you would like access to your chart and what was done today please utilize the [facility].   Please come back to [facility] for all of your future medical needs.    Our goal in the emergency department is to always give you outstanding care and exceptional service. You may receive a survey by mail or e-mail in the next week regarding your experience in our ED. We would greatly appreciate you completing and returning the survey. Your feedback provides us with a way to recognize our staff who give very good care and it helps us learn how to improve when your experience was below our aspiration of excellence.     Sincerely,    Jose Hodgson MD  Board Certified Emergency Physician

## 2022-04-14 NOTE — TELEPHONE ENCOUNTER
Return call.  Patient has an appointment on Monday afternoon.  CT scan has been ordered.  Please schedule.

## 2022-04-14 NOTE — TELEPHONE ENCOUNTER
Pt has cough and requesting med- 3 days. Pt has also been losing weight.  LOV:02/28/2022  RTC:04/18/2022

## 2022-04-18 NOTE — TELEPHONE ENCOUNTER
Spoke to pharmacy and advise promethazine med that was sent today was for when the pt runs out of the last supply

## 2022-04-18 NOTE — PROGRESS NOTES
CC: left arm pain  HPI:  The patient is a 77 y.o. year old male who presents to the office for left arm pain.  He reports onset of pain today.  He also complains of sensation of cold and numbness of his left hand.  The patient also reports neck pain.  He has taken tylenol , three 650 mg tablets at once.    The patient denies any chest pain, but reports shortness of breath with activity.  Pain is 7-8/10.      PAST MEDICAL HISTORY:  Past Medical History:   Diagnosis Date    Arthritis     Cataract     Chronic diastolic congestive heart failure     Coronary artery disease     Cystoid macular edema of both eyes     Diabetes mellitus type II     Diabetic retinopathy     Hyperlipemia     Hypertension     Retinal hole     Stage 4 chronic kidney disease     Streptococcus pyogenes bacteremia 2018    Due to left toe osteomyelitis       SURGICAL HISTORY:  Past Surgical History:   Procedure Laterality Date    ABDOMINAL AORTOGRAPHY N/A 2019    Procedure: AORTOGRAM-ABDOMINAL;  Surgeon: Amish Hyatt MD;  Location: [facility] CATH LAB/EP;  Service: Cardiology;  Laterality: N/A;  CO2 angiography    ANGIOGRAPHY OF LOWER EXTREMITY Left 2019    Procedure: Angiogram Extremity Unilateral;  Surgeon: Amish Hyatt MD;  Location: [facility] CATH LAB/EP;  Service: Cardiology;  Laterality: Left;    AV FISTULA PLACEMENT Left 2022    Procedure: CREATION, AV FISTULA INSERTION GRAFT, LEFT UPPER FOREARM;  Surgeon: Daren Dubon MD;  Location: [facility] OR;  Service: General;  Laterality: Left;    CATARACT EXTRACTION W/  INTRAOCULAR LENS IMPLANT Left 12/15/14    Dr de la cruz    CATARACT EXTRACTION W/  INTRAOCULAR LENS IMPLANT Right 14    dorohty    CIRCUMCISION, NON-      focal laser right eye      INSERTION OF SHAH CATHETER Right 2021    Procedure: INSERTION, CATHETER, CENTRAL VENOUS, SHAH DUAL LUMEN;  Surgeon: Denys Aleman Jr., MD;  Location: [facility] OR;  Service: General;  Laterality: Right;     INSERTION OF TUNNELED CENTRAL VENOUS CATHETER (CVC) WITH SUBCUTANEOUS PORT Right 1/2/2019    Procedure: [id number];  Surgeon: Denys Aleman Jr., MD;  Location: [facility] OR;  Service: General;  Laterality: Right;    INSERTION OF TUNNELED CENTRAL VENOUS HEMODIALYSIS CATHETER Right 6/28/2021    Procedure: Insertion, Catheter, Central Venous, Hemodialysis;  Surgeon: Agata Rosado MD;  Location: [facility] CATH LAB;  Service: Interventional Nephrology;  Laterality: Right;    KNEE SURGERY      left    Left medial collateral ligament repair      REMOVAL OF HEMODIALYSIS CATHETER Left 4/1/2022    Procedure: REMOVAL, CATHETER, HEMODIALYSIS;  Surgeon: Daren Dubon MD;  Location: [facility] OR;  Service: General;  Laterality: Left;    TONSILLECTOMY         MEDS:  Medcard reviewed and updated    ALLERGIES: Allergy Card reviewed and updated    SOCIAL HISTORY:   The patient is a nonsmoker.    PE:   APPEARANCE: Well nourished, well developed, in no acute distress.    CHEST: Lungs clear to auscultation with unlabored respirations.  CARDIOVASCULAR: Normal S1, S2. No murmurs. No carotid bruits. No pedal edema.  ABDOMEN: Bowel sounds normal. Not distended. Soft. No tenderness or masses. No organomegaly.  MUSCULOSKELETAL:     PSYCHIATRIC: The patient is oriented to person, place, and time and has a pleasant affect.        ASSESSMENT/PLAN:  Domingo was seen today for follow-up, arm pain, anorexia and fatigue.    Diagnoses and all orders for this visit:    Cough  -     prescribed cough syrup    ESRD (end stage renal disease) on dialysis  -     continue hemodialysis    Left arm pain  -     continue Tylenol for pain, avoid nonsteroidals    Other orders  -     promethazine-phenylephrine (PROMETHAZINE VC) 6.25-5 mg/5 mL syrup; Take 2.5 mLs by mouth every 6 (six) hours as needed (cough).  -     benzonatate (TESSALON) 100 MG capsule; Take 1 capsule (100 mg total) by mouth 3 (three) times daily as needed for Cough.

## 2022-04-19 NOTE — TELEPHONE ENCOUNTER
----- Message from Natalie Jacobs sent at 4/19/2022 10:04 AM CDT -----  Contact: [phone]  Calling to get test results.  Name of test (lab, x-ray): ECG from ER  Date of test: 4/14/2022  Where was the test performed: [facility]  Would you like a call back, or a response through your [facility] portal?:   phone  Comments:

## 2022-04-21 NOTE — TELEPHONE ENCOUNTER
----- Message from Eloisa Stovall sent at 4/21/2022 12:05 PM CDT -----  Contact: Betty/Daughter [phone]  Pt daughter is looking for a RX for a push walker w/ seat.      Please call and advise

## 2022-04-22 NOTE — TELEPHONE ENCOUNTER
----- Message from Leticia Chamberlain sent at 4/22/2022  4:27 PM CDT -----  Contact: Daughter/Betty [phone]  Daughter states she was waiting on a call from yesterday re getting an order for pt to get a walker. Daughter states she needs this order by Sunday and would like a call from Trev urgently. Please advise.

## 2022-04-26 NOTE — TELEPHONE ENCOUNTER
----- Message from Jazmin Cross sent at 4/26/2022  2:33 PM CDT -----  Contact: Daniel/Tejas [phone]  2TESTRESULTS    Type: Test Results    What test was performed?  CT Scan     Who ordered the test?   Dr Nugent    When and where were the test performed? 4/25/22 / [facility]    Would you like response via [employer]: call back    Comments:    Please call and advise.    Thank You

## 2022-04-26 NOTE — TELEPHONE ENCOUNTER
Spoke to patient's wife and daughter.  Advise them of results of CT.  Recommend referral to Hematology due to pelvic lymphadenopathy.

## 2022-05-02 NOTE — PROGRESS NOTES
Subjective:      Patient ID: Domingo Castro is a 77 y.o. male.    Chief Complaint: Diabetes Mellitus (PCP Dr. Nugent, 4/18/22), Diabetic Foot Exam, and Routine Foot Care    Domingo is a 77 y.o. male who presents to the clinic for evaluation and treatment of high risk feet. Domingo has a past medical history of Arthritis, Cataract, Chronic diastolic congestive heart failure, Coronary artery disease, Cystoid macular edema of both eyes, Diabetes mellitus type II, Diabetic retinopathy, Hyperlipemia, Hypertension, Retinal hole, Stage 4 chronic kidney disease, and Streptococcus pyogenes bacteremia (12/23/2018). The patient's chief complaint is diabetic foot ulcer, left hallux. This patient has documented high risk feet requiring routine maintenance secondary to peripheral vascular disease.  Discharged from [facility] on 08/04/2019.  Receiving 6 weeks of IV vancomycin per ID recommendations.  Follow-up per [facility] with 3 times weekly Medi Honey dressing changes. Accompanied by his daughter.  No new complaints.    09/19/2019:  Follow-up for chronic wound to left hallux treated for osteomyelitis.  Post endovascular intervention per .    10/17/2019:  Presents for wound check.  [facility] .  Believes his wound may be healed.  Says his home health nurse peeled away some dead skin and created a wound at the tip of the left hallux.  No new complaints.    11/11/2019:  Presents for wound check left hallux.  Relates he was told by his home health nurse that the wound is healed.  No new complaints.    04/24/2020:  Presents today complaining of new onset blister to the left hallux.  He was previously followed per [facility].  He still has a port in his chest that should been removed.  Denies any trauma.    05/14/2020:  Presents for wound check to left foot.  No new complaints.  [facility] is changing his dressings.    06/11/2020:  Follow-up for wound check to left hallux.  Followed by [facility].  No new complaints.    08/14/2020:  Returns for routine foot care.  No new complaints.    12/21/2020:  Returns for routine foot care.  No new complaints.    5/27/21: Pt presents as follow up from hospital admission, currently treated for osteomyelitis of left hallux. No new pedal complaints.     3/21/22: Pt seen today for left great toe wound, states he has recently lost over 80 pounds, states he no longer has an appetite. States he is currently on dialysis. Continues to follow up with his PCP. No other pedal complaints.     3/28/22: Pt seen today for left great toe wound, states has been taking protein shakes 2x per day. No other pedal complaints. States has follow up with primary care soon.     5/2/22: Pt seen today for RFC and for ulcer check. No new pedal complaints.     PCP: Griselda Nugent MD    Date Last Seen by PCP: 4/18/22    Current shoe gear:  Affected Foot: Football and Darco shoe on the affected foot     Unaffected Foot: Rx diabetic extra depth shoes and custom accommodative insoles    History of Trauma: negative  Sign of Infection: none    Hemoglobin A1C   Date Value Ref Range Status   06/25/2021 7.1 (H) 4.0 - 5.6 % Final     Comment:     ADA Screening Guidelines:  5.7-6.4%  Consistent with prediabetes  >or=6.5%  Consistent with diabetes    High levels of fetal hemoglobin interfere with the HbA1C  assay. Heterozygous hemoglobin variants (HbS, HgC, etc)do  not significantly interfere with this assay.   However, presence of multiple variants may affect accuracy.     05/09/2021 8.5 (H) 4.0 - 5.6 % Final     Comment:     ADA Screening Guidelines:  5.7-6.4%  Consistent with prediabetes  >or=6.5%  Consistent with diabetes    High levels of fetal hemoglobin interfere with the HbA1C  assay. Heterozygous hemoglobin variants (HbS, HgC, etc)do  not significantly interfere with this assay.   However, presence of multiple variants may affect accuracy.     12/31/2020 5.8 (H) 4.0 - 5.6 % Final      "Comment:     ADA Screening Guidelines:  5.7-6.4%  Consistent with prediabetes  >or=6.5%  Consistent with diabetes  High levels of fetal hemoglobin interfere with the HbA1C  assay. Heterozygous hemoglobin variants (HbS, HgC, etc)do  not significantly interfere with this assay.   However, presence of multiple variants may affect accuracy.       Vitals:    22 1120   Height: 6' 3.5" (1.918 m)   PainSc: 0-No pain      Past Medical History:   Diagnosis Date    Arthritis     Cataract     Chronic diastolic congestive heart failure     Coronary artery disease     Cystoid macular edema of both eyes     Diabetes mellitus type II     Diabetic retinopathy     Hyperlipemia     Hypertension     Retinal hole     Stage 4 chronic kidney disease     Streptococcus pyogenes bacteremia 2018    Due to left toe osteomyelitis       Past Surgical History:   Procedure Laterality Date    ABDOMINAL AORTOGRAPHY N/A 2019    Procedure: AORTOGRAM-ABDOMINAL;  Surgeon: Amish Hyatt MD;  Location: [facility] CATH LAB/EP;  Service: Cardiology;  Laterality: N/A;  CO2 angiography    ANGIOGRAPHY OF LOWER EXTREMITY Left 2019    Procedure: Angiogram Extremity Unilateral;  Surgeon: Amish Hyatt MD;  Location: [facility] CATH LAB/EP;  Service: Cardiology;  Laterality: Left;    AV FISTULA PLACEMENT Left 2022    Procedure: CREATION, AV FISTULA INSERTION GRAFT, LEFT UPPER FOREARM;  Surgeon: Daren Dubon MD;  Location: [facility] OR;  Service: General;  Laterality: Left;    CATARACT EXTRACTION W/  INTRAOCULAR LENS IMPLANT Left 12/15/14    Dr de la cruz    CATARACT EXTRACTION W/  INTRAOCULAR LENS IMPLANT Right 14    dorothy    CIRCUMCISION, NON-      focal laser right eye      INSERTION OF SHAH CATHETER Right 2021    Procedure: INSERTION, CATHETER, CENTRAL VENOUS, SHAH DUAL LUMEN;  Surgeon: Denys Aleman Jr., MD;  Location: [facility] OR;  Service: General;  Laterality: Right;    INSERTION OF TUNNELED " CENTRAL VENOUS CATHETER (CVC) WITH SUBCUTANEOUS PORT Right 1/2/2019    Procedure: [id number];  Surgeon: Denys Aleman Jr., MD;  Location: [facility] OR;  Service: General;  Laterality: Right;    INSERTION OF TUNNELED CENTRAL VENOUS HEMODIALYSIS CATHETER Right 6/28/2021    Procedure: Insertion, Catheter, Central Venous, Hemodialysis;  Surgeon: Agata Rosado MD;  Location: [facility] CATH LAB;  Service: Interventional Nephrology;  Laterality: Right;    KNEE SURGERY      left    Left medial collateral ligament repair      REMOVAL OF HEMODIALYSIS CATHETER Left 4/1/2022    Procedure: REMOVAL, CATHETER, HEMODIALYSIS;  Surgeon: Daren Dubno MD;  Location: [facility] OR;  Service: General;  Laterality: Left;    TONSILLECTOMY         Family History   Problem Relation Age of Onset    Heart disease Mother     Cancer Mother     Cancer Brother     Heart disease Father     Diabetes Father     Cancer Sister     Cataracts Paternal Grandmother     Glaucoma Paternal Grandmother     Blindness Neg Hx     Amblyopia Neg Hx     Hypertension Neg Hx     Macular degeneration Neg Hx     Retinal detachment Neg Hx     Strabismus Neg Hx        Social History     Socioeconomic History    Marital status:    Tobacco Use    Smoking status: Never Smoker    Smokeless tobacco: Never Used   Substance and Sexual Activity    Alcohol use: No     Alcohol/week: 0.0 standard drinks    Drug use: No    Sexual activity: Yes     Partners: Female     Social Determinants of Health     Financial Resource Strain: Low Risk     Difficulty of Paying Living Expenses: Not hard at all   Food Insecurity: No Food Insecurity    Worried About Running Out of Food in the Last Year: Never true    Ran Out of Food in the Last Year: Never true   Transportation Needs: No Transportation Needs    Lack of Transportation (Medical): No    Lack of Transportation (Non-Medical): No   Physical Activity: Inactive    Days of Exercise per Week: 0  "days    Minutes of Exercise per Session: 0 min   Stress: Stress Concern Present    Feeling of Stress : Rather much   Social Connections: Socially Integrated    Frequency of Communication with Friends and Family: Twice a week    Frequency of Social Gatherings with Friends and Family: More than three times a week    Attends Church Services: More than 4 times per year    Active Member of Clubs or Organizations: Yes    Attends Club or Organization Meetings: More than 4 times per year    Marital Status:    Housing Stability: Unknown    Unable to Pay for Housing in the Last Year: No    Unstable Housing in the Last Year: No       Current Outpatient Medications   Medication Sig Dispense Refill    acetaminophen (TYLENOL) 325 MG tablet Take 2 tablets (650 mg total) by mouth every 4 (four) hours as needed.  0    amLODIPine (NORVASC) 10 MG tablet Take 1 tablet by mouth once daily 90 tablet 3    aspirin (ECOTRIN) 81 MG EC tablet Take 81 mg by mouth once daily.      benzonatate (TESSALON) 100 MG capsule Take 1 capsule (100 mg total) by mouth 3 (three) times daily as needed for Cough. 30 capsule 1    ezetimibe (ZETIA) 10 mg tablet Take 1 tablet (10 mg total) by mouth once daily. 90 tablet 3    hydrALAZINE (APRESOLINE) 100 MG tablet Take 1 tablet (100 mg total) by mouth every 8 (eight) hours. Take 100 mg by mouth every 8 (eight) hours. 270 tablet 3    nebulizer and compressor Alana USE AS DIRECTED 1 each 0    nitroGLYCERIN (NITROSTAT) 0.4 MG SL tablet Place 1 tablet (0.4 mg total) under the tongue every 5 (five) minutes as needed for Chest pain. 25 tablet 0    olmesartan (BENICAR) 20 MG tablet Take 1 tablet by mouth once daily 30 tablet 0    pen needle, diabetic 31 gauge x 5/16" Ndle Use 4 (four) times daily as needed (high blood sugar - see sliding scale on discharge paperwork). 100 each 1    pen needle, diabetic, safety (BD AUTOSHIELD PEN NEEDLE) 29 gauge x 5/16" Ndle For use once daily with levemir " flexpen 90 each 11    pulse oximeter (PULSE OXIMETER) device by Apply Externally route 2 (two) times a day. Use twice daily at 8 AM and 3 PM and record the value in [employer] as directed. 1 each 0    rosuvastatin (CRESTOR) 40 MG Tab Take 1 tablet (40 mg total) by mouth every evening. 90 tablet 3    TRIPHROCAPS 1 mg Cap Take 1 capsule by mouth once daily.      furosemide (LASIX) 40 MG tablet Take 1 tablet (40 mg total) by mouth once daily. for 2 days 2 tablet 0    insulin aspart U-100 (NOVOLOG) 100 unit/mL (3 mL) InPn pen Inject 0-5 Units into the skin before meals and at bedtime as needed (Hyperglycemia). **LOW CORRECTION DOSE** Blood Glucose mg/dL Pre-meal 151-200, 0 unit; 201-250, take 2 units;  251-300, take 3 units; 301-350, take 4 units; greater than 350, take 5 units. 3 mL 0     No current facility-administered medications for this visit.       Review of patient's allergies indicates:   Allergen Reactions    Atorvastatin Other (See Comments)         Review of Systems   Constitutional: Negative for chills and fever.   HENT: Negative for congestion and hearing loss.    Cardiovascular: Negative for chest pain and claudication.   Respiratory: Negative for cough and shortness of breath.    Skin: Positive for color change, nail changes and poor wound healing.   Musculoskeletal: Negative for back pain and joint pain.   Gastrointestinal: Negative for nausea and vomiting.   Neurological: Positive for numbness.   Psychiatric/Behavioral: Negative for altered mental status.           Objective:      Physical Exam  Constitutional:       General: He is not in acute distress.     Appearance: He is not diaphoretic.   Cardiovascular:      Pulses:           Dorsalis pedis pulses are 1+ on the right side and 1+ on the left side.        Posterior tibial pulses are 1+ on the right side and 1+ on the left side.      Comments: Mild lower extremity edema bilateral. No hair growth bilateral lower extremity.  Musculoskeletal:       Comments: Semi reducible hallux malleus left hallux. Semi rigid clawtoe deformities toes 2-5 bilateral foot.    Mild cavus foot structure bilateral foot.   Feet:      Right foot:      Protective Sensation: 10 sites tested. 6 sites sensed.      Skin integrity: Dry skin present.      Toenail Condition: Right toenails are abnormally thick and long. Fungal disease present.     Left foot:      Protective Sensation: 10 sites tested. 6 sites sensed.      Skin integrity: Callus and dry skin present. No ulcer.      Toenail Condition: Left toenails are abnormally thick and long. Fungal disease present.  Skin:     General: Skin is warm and dry.      Capillary Refill: Capillary refill takes 2 to 3 seconds.      Coloration: Skin is not pale.      Findings: No ecchymosis, erythema or rash.      Nails: There is no clubbing.      Comments: Nails 1-5 left and 1-5 right are elongated 4-5 mm, thickened, discolored brown yellow with loosening and underlying debris.      Raised hyperkeratotic skin distal left hallux, upon debridement, wound is healed. No acute signs of infection. Appear stable.      Neurological:      Mental Status: He is alert and oriented to person, place, and time.      Sensory: Sensory deficit present.       5/2/22: Wound is healed.   3/28/22: Wound is healed.     Previous Images              Assessment:       Encounter Diagnoses   Name Primary?    Healed ulcer of left foot on examination Yes    PAD (peripheral artery disease)     Type 2 diabetes mellitus with peripheral neuropathy     Type 2 diabetes mellitus with peripheral angiopathy     Corn or callus     Disease of nail     Onychomycosis     Pre-ulcerative calluses          Plan:       Domingo was seen today for diabetes mellitus, diabetic foot exam and routine foot care.    Diagnoses and all orders for this visit:    Healed ulcer of left foot on examination  -     Routine Foot Care  -     Wound Debridement    PAD (peripheral artery disease)  -      Routine Foot Care  -     Wound Debridement    Type 2 diabetes mellitus with peripheral neuropathy  -     Routine Foot Care  -     Wound Debridement    Type 2 diabetes mellitus with peripheral angiopathy  -     Routine Foot Care  -     Wound Debridement    Corn or callus  -     Routine Foot Care  -     Wound Debridement    Disease of nail  -     Routine Foot Care  -     Wound Debridement    Onychomycosis  -     Routine Foot Care  -     Wound Debridement    Pre-ulcerative calluses  -     Routine Foot Care  -     Wound Debridement      I counseled the patient on his conditions, their implications and medical management.    Wound debridement to left hallux, wound is healed, may continue supportive diabetic shoe at all times while ambulating    RFC per attached note.     Continue follow up with Interventional Cardiology for PAD    Recommend Kerasal Nail Repair for toenail changes    Shoe inspection. Diabetic Foot Education. Patient reminded of the importance of good nutrition and blood sugar control to help prevent podiatric complications of diabetes. Patient instructed on proper foot hygeine. We discussed wearing proper shoe gear, daily foot inspections, never walking without protective shoe gear, never putting sharp instruments to feet.    RTC in 2-3 months, sooner PRN

## 2022-05-02 NOTE — PROCEDURES
"Routine Foot Care    Date/Time: 5/2/2022 11:15 AM  Performed by: Vale Merrill DPM  Authorized by: Vale Merrill DPM     Time out: Immediately prior to procedure a "time out" was called to verify the correct patient, procedure, equipment, support staff and site/side marked as required.    Consent Done?:  Yes (Verbal)  Hyperkeratotic Skin Lesions?: Yes    Number of trimmed lesions:  1  Location(s):  Left 1st Toe    Nail Care Type:  Debride  Location(s): All  (Left 1st Toe, Left 3rd Toe, Left 2nd Toe, Left 4th Toe, Left 5th Toe, Right 1st Toe, Right 2nd Toe, Right 3rd Toe, Right 4th Toe and Right 5th Toe)  Patient tolerance:  Patient tolerated the procedure well with no immediate complications  Wound Debridement    Date/Time: 5/2/2022 11:15 AM  Performed by: Vale Merrill DPM  Authorized by: Vale Merrill DPM     Consent Done?:  Yes (Verbal)  Local anesthesia used?: No      Wound Details:    Location:  Left foot    Location:  Left 1st Toe    Type of Debridement:  Non-excisional       Length (cm):  0       Area (sq cm):  0       Width (cm):  0       Percent Debrided (%):  100       Depth (cm):  0       Total Area Debrided (sq cm):  0    Depth of debridement:  Epidermis/Dermis    Tissue debrided:  Epidermis    Devitalized tissue debrided:  Callus    Instruments:  Blade    Bleeding:  None  Patient tolerance:  Patient tolerated the procedure well with no immediate complications     No cultures were taken during this visit       "

## 2022-05-06 NOTE — TELEPHONE ENCOUNTER
Spoke to [facility] and they stated pt received a walker in 2019. The insurance will only cover the walker every 5 years. They stated they spoke to a family member on 04/27/2022.    Called pt wife and she states that they denied the walker in 2019 and didn't receive it.     Called [employer] back and they stated there is a signature for 2019 signing for the walker from [facility]. Even if the pt choose not to take it home, it was signed and sent to the insurance prior to being delivered to the room.

## 2022-05-06 NOTE — PROGRESS NOTES
HPI     76 Y/o male is here for routine eye exam with C/o DM ck--feels VA getting   worse  Pt denies pain and discomfort   No F/f    Eye med: none  Hemoglobin A1C       Date                     Value               Ref Range             Status                06/25/2021               7.1 (H)             4.0 - 5.6 %           Final                  Last edited by Rodolfo Viera, OD on 5/6/2022 10:48 AM. (History)        ROS     Positive for: Endocrine (DM), Eyes (ret hole repair X 2 OS, focal for   DM/cat surgery OU)    Negative for: Constitutional, Gastrointestinal, Neurological, Skin,   Genitourinary, Musculoskeletal, HENT, Cardiovascular, Respiratory,   Psychiatric, Allergic/Imm, Heme/Lymph    Last edited by Rodolfo Viera, OD on 5/6/2022 10:43 AM. (History)        Assessment /Plan     For exam results, see Encounter Report.    Severe nonproliferative diabetic retinopathy of both eyes with macular edema associated with type 2 diabetes mellitus    Screening for eye condition  -     Ambulatory referral/consult to Ophthalmology      1. Sp pciol OU--discussed w pt could not improve VA w new spex  2. Sp focal laser for periph holes X 2 OS--stable  3. PDR w hx mac edema/focal laser OU.  Pt was a patient of Dr Paz, but lost to follow up since 2020    PLAN:    Set pt up for appt to see Dr Harrison.  Pt left w appt slip in hand

## 2022-05-09 NOTE — PROGRESS NOTES
CC: followup of hypertension  HPI:  The patient is a 77 y.o. year old male who presents to the office for followup of hypertension.  The patient denies any chest pain, headache, excessive fatigue, nausea or vomiting, but reports shortness of breath with minimal exertion and blurred vision and fatiue..  He complains of left leg weakness, but denies any pain.  He states he stumbles and occasionally has to manually lift up his leg.  He has not taken hydralazine for about 3 months.    PAST MEDICAL HISTORY:  Past Medical History:   Diagnosis Date    Arthritis     Cataract     Chronic diastolic congestive heart failure     Coronary artery disease     Cystoid macular edema of both eyes     Diabetes mellitus type II     Diabetic retinopathy     Hyperlipemia     Hypertension     Retinal hole     Stage 4 chronic kidney disease     Streptococcus pyogenes bacteremia 2018    Due to left toe osteomyelitis       SURGICAL HISTORY:  Past Surgical History:   Procedure Laterality Date    ABDOMINAL AORTOGRAPHY N/A 2019    Procedure: AORTOGRAM-ABDOMINAL;  Surgeon: Amish Hyatt MD;  Location: [facility] CATH LAB/EP;  Service: Cardiology;  Laterality: N/A;  CO2 angiography    ANGIOGRAPHY OF LOWER EXTREMITY Left 2019    Procedure: Angiogram Extremity Unilateral;  Surgeon: Amish Hyatt MD;  Location: [facility] CATH LAB/EP;  Service: Cardiology;  Laterality: Left;    AV FISTULA PLACEMENT Left 2022    Procedure: CREATION, AV FISTULA INSERTION GRAFT, LEFT UPPER FOREARM;  Surgeon: Daren Dubon MD;  Location: [facility] OR;  Service: General;  Laterality: Left;    CATARACT EXTRACTION W/  INTRAOCULAR LENS IMPLANT Left 12/15/14    Dr de la cruz    CATARACT EXTRACTION W/  INTRAOCULAR LENS IMPLANT Right 14    dorothy    CIRCUMCISION, NON-      focal laser right eye      INSERTION OF SAHH CATHETER Right 2021    Procedure: INSERTION, CATHETER, CENTRAL VENOUS, SHAH DUAL LUMEN;  Surgeon: Denys FERGUSON  Ash Savage MD;  Location: [facility] OR;  Service: General;  Laterality: Right;    INSERTION OF TUNNELED CENTRAL VENOUS CATHETER (CVC) WITH SUBCUTANEOUS PORT Right 1/2/2019    Procedure: [id number]-T-CATH;  Surgeon: Denys Aleman Jr., MD;  Location: [facility] OR;  Service: General;  Laterality: Right;    INSERTION OF TUNNELED CENTRAL VENOUS HEMODIALYSIS CATHETER Right 6/28/2021    Procedure: Insertion, Catheter, Central Venous, Hemodialysis;  Surgeon: Agata Rosado MD;  Location: [facility] CATH LAB;  Service: Interventional Nephrology;  Laterality: Right;    KNEE SURGERY      left    Left medial collateral ligament repair      REMOVAL OF HEMODIALYSIS CATHETER Left 4/1/2022    Procedure: REMOVAL, CATHETER, HEMODIALYSIS;  Surgeon: Daren Dubon MD;  Location: [facility] OR;  Service: General;  Laterality: Left;    TONSILLECTOMY         MEDS:  Medcard reviewed and updated    ALLERGIES: Allergy Card reviewed and updated    SOCIAL HISTORY:   The patient is a nonsmoker.    PE:   APPEARANCE: Well nourished, well developed, in no acute distress.    CHEST: Lungs clear to auscultation with unlabored respirations.  CARDIOVASCULAR: Normal S1, S2. No murmurs. No carotid bruits. No pedal edema.  ABDOMEN: Bowel sounds normal. Not distended. Soft. No tenderness or masses.   PSYCHIATRIC: The patient is oriented to person, place, and time and has a pleasant affect.        ASSESSMENT/PLAN:  Domingo was seen today for follow-up.    Diagnoses and all orders for this visit:    ESRD (end stage renal disease) on dialysis  -     Ambulatory referral/consult to Home Health; Future    Frail elderly    Risk for falls  -     Ambulatory referral/consult to Home Health; Future    Abnormal gait  -     Ambulatory referral/consult to Home Health; Future    Other orders  -     nitroGLYCERIN (NITROSTAT) 0.4 MG SL tablet; Place 1 tablet (0.4 mg total) under the tongue every 5 (five) minutes as needed for Chest pain.

## 2022-05-11 NOTE — TELEPHONE ENCOUNTER
----- Message from Adina Centeno sent at 5/11/2022 12:47 PM CDT -----  Contact: Pt @ [phone]  Requesting an RX refill or new RX.  Is this a refill or new RX: refill  RX name and strength (copy/paste from chart):  amLODIPine (NORVASC) 10 MG tablet  Is this a 30 day or 90 day RX: 90  Pharmacy name and phone # (copy/paste from chart):    [place] [address] [place] [address] [facility] [address]  Phone: [phone] Fax: [phone]    2RX  Requesting an RX refill or new RX.  Is this a refill or new RX: refill  RX name and strength (copy/paste from chart):  ezetimibe (ZETIA) 10 mg tablet  Is this a 30 day or 90 day RX: 90  Pharmacy name and phone # (copy/paste from chart):    [address] [place] [address] [facility] [address]  Phone: [phone] Fax: [phone]    3RX  Requesting an RX refill or new RX.  Is this a refill or new RX: refill  RX name and strength (copy/paste from chart): benzonatate (TESSALON) 100 MG capsule   Is this a 30 day or 90 day RX: 90  Pharmacy name and phone # (copy/paste from chart):  [address] [facility] [address]  Phone: [phone] Fax: [phone]    4RX  Requesting an RX refill or new RX.  Is this a refill or new RX: refill  RX name and strength (copy/paste from chart):  pulse oximeter (PULSE OXIMETER) device  Is this a 30 day or 90 day RX: 30  Pharmacy name and phone # (copy/paste from chart):    [address] [facility] [address]  Phone: [phone] Fax: [phone]    5RX  Requesting an RX refill or new RX.  Is this a refill or new RX: new  RX name and strength (copy/paste from chart):  olmesartan (BENICAR) 20 MG tablet  Is this a 30 day or 90 day RX: 90  Pharmacy name and phone # (copy/paste from chart):    [facility] [address] [place] [address], [place] - [address] [facility] [address]  Phone: [phone]  Fax: [phone]  6RX  Requesting an RX refill or new RX.  Is this a refill or new RX: refill  RX name and strength (copy/paste from chart):  rosuvastatin (CRESTOR) 40 MG Tab  Is this a 30 day or 90 day RX: 90  Pharmacy name and phone # (copy/paste from chart):    [facility] [address] [place] [address], [place] - [address] [facility] [address]  Phone: [phone] Fax: [phone]        The doctors have asked that we provide their patients with the following 2 reminders -- prescription refills can take up to 72 hours, and a friendly reminder that in the future you can use your [facility] account to request refills: yes

## 2022-05-12 NOTE — ED PROVIDER NOTES
Encounter Date: 2019       History     Chief Complaint   Patient presents with    Wound Infection     Patient has wound to L big toe states he was hospitalized in december for it and he believes that it returned. Patient of Dr. Franz states his wound care nurse came today and told him he needed to come to ED. Only took aspirin 81 mg this morning      74-year-old male coming in today complaining of left toe wound and infection.  Patient notes he sees podiatry Dr. Franz and he has wound care who comes to his house.  Today wound care nurse came and noticed at the toe wound was getting worse.  As result they contacted the podiatrist and patient was then told that he needed to come to the ER to be seen and evaluated.  Denies a cause of any pain.  Denies any fevers or chills. Denies any trauma to the area.  No leg numbness or tingling although he does admit he does have chronic neuropathy.  Tetanus shot is up-to-date.  The patient here denies any other acute modifying factors at this time        Review of patient's allergies indicates:   Allergen Reactions    Atorvastatin Other (See Comments)     Past Medical History:   Diagnosis Date    Arthritis     Cataract     CHF (congestive heart failure)     Coronary artery disease     Cystoid macular edema of both eyes     Diabetes mellitus type II     Hyperlipemia     Hypertension     Retinal hole     Stage 4 chronic kidney disease     Streptococcus pyogenes bacteremia 2018    Due to left toe osteomyelitis     Past Surgical History:   Procedure Laterality Date    BLEPHAROPLASTY Bilateral 2017    Performed by [facility] at [facility] OR [address] [place] [address]    CATARACT EXTRACTION W/  INTRAOCULAR LENS IMPLANT Left 12/15/14    Dr martin    CATARACT EXTRACTION W/  INTRAOCULAR LENS IMPLANT Right 14    dorothy    CIRCUMCISION, NON-      focal laser right eye      INSERTION-INTRAOCULAR LENS (IOL) Right 2014    [facility] at  [facility] OR    INSERTION-INTRAOCULAR LENS (IOL) Left 12/15/2014    [facility]formed by [facility] at [facility] OR    NMGUHCLEH-RAQY-S-CATH Right 1/2/2019    [facility] at [facility] OR    KNEE SURGERY      left    Left medial collateral ligament repair      PHACOEMULSIFICATION-ASPIRATION-CATARACT Right 12/29/2014    Performed by [facility] at [facility] OR    PHACOEMULSIFICATION-ASPIRATION-CATARACT Left 12/15/2014    [facility] by [facility] at [facility] OR    REPAIR-PTOSIS/BILATERAL EXTERNAL LEVATORS Bilateral 8/30/2017    [facility] OR [facility]    TONSILLECTOMY       Family History   Problem Relation Age of Onset    Heart disease Mother     Cancer Mother     Cancer Brother     Heart disease Father     Diabetes Father     Cancer Sister     Cataracts Paternal Grandmother     Glaucoma Paternal Grandmother     Blindness Neg Hx     Amblyopia Neg Hx     Hypertension Neg Hx     Macular degeneration Neg Hx     Retinal detachment Neg Hx     Strabismus Neg Hx      Social History     Tobacco Use    Smoking status: Never Smoker    Smokeless tobacco: Never Used   Substance Use Topics    Alcohol use: No     Alcohol/week: 0.0 oz    Drug use: No     Review of Systems   Constitutional:        ----I have reviewed the PMHx, PSHx, FamHX, social hx, allergies and other nursing notes and agree with the nursing notes.         Constitutional: Negative for fever, negative for chills, negative for weight loss    Eyes: Negative for visual changes, negative for eye pain, negative for discharge.    ENMT: Negative for hearing changes, negative for ear pain, negative for ear discharge. negative for neck pain, negative for neck stiffness. negative for epistaxis, negative for rhinorrhea.    Cardiac: Negative for chest pain, negative for palpitations, negative for lower extremity edema.     Respiratory: Negative for cough, negative for shortness of breath.    GI: Negative for  nausea, negative for vomiting, negative for diarrhea, negative for abdominal pain. negative for rectal bleeding.    : Negative for dysuria, negative for frequency, negative for hematuria.    MS: Negative for muscle weakness, negative for joint pain, negative for back pain. see HPI    Neuro: Negative for headache, negative for focal weakness. negative for LOC. Denies any trauma.    Skin: Negative for skin rash.     Endocrine: Negative for polyuria, negative for polydypsia.         Physical Exam     Initial Vitals [07/25/19 2246]   BP Pulse Resp Temp SpO2   (!) 143/66 72 18 99.6 °F (37.6 °C) 98 %      MAP       --         Physical Exam    Constitutional:   Constitutional: A+Ox4, NAD    Eyes: PERRLA, EOMI, Negative for scleral erythema, Negative for eye discharge, Negative for periorbital swelling.    ENT: Tympanic membranes are normal in appearance without erythema bilaterally, Negative for external canal exudate and bleeding, Pharynx: Negative for erythema; no exudate noted; Negative for rhinorrhea.    Neck: Negative for neck stiffness, Negative for signs of meningismus.    Heart: RRR without M/G/R    Lungs: CTA-B, Negative for wheezes, stridor, or respiratory distress.    Abdomen: Soft, Bowel sounds x4, Negative for distension, Negative for tenderness in all 4 quadrants, Negative for guarding, Negative for rebound, Negative for rigidity, NOT a surgical acute abdomen.    Back: No costovertebral angle tenderness noted.    Skin: Warm, dry, intact.     MS: Full ROM in all ext. To his left great toe appears he does have chronic wounds and skin sloughing.  No active purulence however there is some surrounding erythema.  Patient does have 1+ pulses to lower extremities bilaterally.  Bilateral lower extremity edema. However all compartments are soft.    Psych: Mood appropriate.    Neuro: 5/5 strength in all extremities. 2+ distal pulses and 2+ sensation throughout all extremities. Negative for slurred speech, Negative for  facial droop. Negative for any focal neuro deficits. Gait:  Unable to assess gait at this time.          ----Parts of this note were created using [employer] voice recognition software program. Significant efforts were made to correct any mistakes made by this voice recognition software program however some mistakes, errors, or omissions may remain in the note that were not caught when the note was originally created.         ED Course   Procedures  Labs Reviewed   CBC W/ AUTO DIFFERENTIAL - Abnormal; Notable for the following components:       Result Value    RBC 3.70 (*)     Hemoglobin 10.3 (*)     Hematocrit 31.9 (*)     Platelets 118 (*)     Gran # (ANC) 8.2 (*)     Gran% 77.1 (*)     Lymph% 15.8 (*)     All other components within normal limits   COMPREHENSIVE METABOLIC PANEL - Abnormal; Notable for the following components:    Glucose 126 (*)     BUN, Bld 51 (*)     Creatinine 3.0 (*)     eGFR if  23 (*)     eGFR if non  20 (*)     All other components within normal limits   C-REACTIVE PROTEIN - Abnormal; Notable for the following components:    CRP 29.9 (*)     All other components within normal limits   CULTURE, BLOOD   CULTURE, BLOOD   APTT   LACTIC ACID, PLASMA   PROTIME-INR   SEDIMENTATION RATE          Imaging Results           X-Ray Toe 2 or More Views Left (Final result)  Result time 07/26/19 01:20:20    Final result by Teagan Mendoza MD (07/26/19 01:20:20)                 Impression:      As above described.    This report was flagged in Epic as abnormal.      Electronically signed by: Teagan Mendoza  Date:    07/26/2019  Time:    01:20             Narrative:    EXAMINATION:  TWO VIEWS MORE VIEWS OF THE LEFT TOES    CLINICAL HISTORY:  toe wound;    TECHNIQUE:  Two or more views of the left toes were submitted.    COMPARISON:  None.    FINDINGS:  Two more views of the left great toe demonstrate minimal bone loss at the central eric of the distal phalanx of the great  toe.  This is concerning for osteomyelitis.  No fracture or dislocation is detected.  The remainder of the digits are osteopenic.                                 Medical Decision Making:   ED Management:  74-year-old male came in today with chronic left toe wound however apparently getting worsened by a home wound care.  Arrive here and does have some skin sloughing and abnormal appearance to his left great toe.  Full workup and evaluation done.  White blood cell count was within normal limits.  However CRP was elevated in addition x-ray shows acute osteomyelitis of the left toe.  Given this abnormality feel best course of action is admission.  He has received IV antibiotics here.      I paged and spoke with the [facility] hospitalist on-call.  Nurse practitioner Mir has returned the call.  The case was discussed.  Agreed to admit this patient.  No further recommendations are given at this time      .  Spoke to the patient in regards this plan.  Okay with this plan.  Patient is now admitted                      Clinical Impression:       ICD-10-CM ICD-9-CM   1. Toe osteomyelitis, left M86.9 730.27         Disposition:   Disposition: Admitted  Condition: Stable                        Rikki Johnson MD  07/26/19 0146       Rikki Johnson MD  07/26/19 0147     Hypercholesterolemia Monitoring: I explained this is common when taking isotretinoin. We will monitor closely.

## 2022-05-19 NOTE — PROGRESS NOTES
"PATIENT: Domingo Castro  MRN: [id number]  DATE: 2022    Diagnosis:   1. Lymphadenopathy    2. Normocytic anemia    3. Anemia in end-stage renal disease    4. 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 Atherosclerosis of aorta    6. Elevated PSA    7. Advance care planning        Chief Complaint: Lymphadenopathy    Oncologic History:      Oncologic History     Oncologic Treatment     Pathology       Subjective:    History of Present Illness: Mr. Castro is a 77 y.o. male who presents for evaluation and management of lymphadenopathy. He is referred by Dr. Nugent.    - CT abdomen/pelvis (22) revealed "numerous enlarged retroperitoneal lymph node in the periaortic and aortocaval regions and along the bilateral iliac change."  - he endorses fatigue, unintentional weight loss (weight has recently stabilized), dyspnea upon exertion, nausea, constipation, decreased appetite, generalized weakness.  - his mother and sister  from pancreatic cancer. His father had prostate cancer.  - in the past year, he has lost about 80 lbs.    Past medical, surgical, family, and social histories have been reviewed and updated below.    Past Medical History:   Past Medical History:   Diagnosis Date    Arthritis     Cataract     Chronic diastolic congestive heart failure     Coronary artery disease     Cystoid macular edema of both eyes     Diabetes mellitus type II     Diabetic retinopathy     Hyperlipemia     Hypertension     Retinal hole     Stage 4 chronic kidney disease     Streptococcus pyogenes bacteremia 2018    Due to left toe osteomyelitis       Past Surgical History:   Past Surgical History:   Procedure Laterality Date    ABDOMINAL AORTOGRAPHY N/A 2019    Procedure: AORTOGRAM-ABDOMINAL;  Surgeon: Amish Hyatt MD;  Location: [facility] CATH LAB/EP;  Service: Cardiology;  Laterality: N/A;  CO2 angiography    ANGIOGRAPHY OF LOWER EXTREMITY Left 2019    Procedure: Angiogram " Extremity Unilateral;  Surgeon: Amish Hyatt MD;  Location: [facility] CATH LAB/EP;  Service: Cardiology;  Laterality: Left;    AV FISTULA PLACEMENT Left 2022    Procedure: CREATION, AV FISTULA INSERTION GRAFT, LEFT UPPER FOREARM;  Surgeon: Daren Dubon MD;  Location: [facility] OR;  Service: General;  Laterality: Left;    CATARACT EXTRACTION W/  INTRAOCULAR LENS IMPLANT Left 12/15/14    Dr de la cruz    CATARACT EXTRACTION W/  INTRAOCULAR LENS IMPLANT Right 14    dorothy    CIRCUMCISION, NON-      focal laser right eye      INSERTION OF SHAH CATHETER Right 2021    Procedure: INSERTION, CATHETER, CENTRAL VENOUS, SHAH DUAL LUMEN;  Surgeon: Denys Aleman Jr., MD;  Location: [facility];  Service: General;  Laterality: Right;    INSERTION OF TUNNELED CENTRAL VENOUS CATHETER (CVC) WITH SUBCUTANEOUS PORT Right 2019    Procedure: [id number]-L-CATH;  Surgeon: Denys Aleman Jr., MD;  Location: [facility];  Service: General;  Laterality: Right;    INSERTION OF TUNNELED CENTRAL VENOUS HEMODIALYSIS CATHETER Right 2021    Procedure: Insertion, Catheter, Central Venous, Hemodialysis;  Surgeon: Agata Rosado MD;  Location: [facility] CATH LAB;  Service: Interventional Nephrology;  Laterality: Right;    KNEE SURGERY      left    Left medial collateral ligament repair      REMOVAL OF HEMODIALYSIS CATHETER Left 2022    Procedure: REMOVAL, CATHETER, HEMODIALYSIS;  Surgeon: Daren Dubon MD;  Location: [facility];  Service: General;  Laterality: Left;    TONSILLECTOMY         Family History:   Family History   Problem Relation Age of Onset    Heart disease Mother     Cancer Mother     Cancer Brother     Heart disease Father     Diabetes Father     Cancer Sister     Cataracts Paternal Grandmother     Glaucoma Paternal Grandmother     Blindness Neg Hx     Amblyopia Neg Hx     Hypertension Neg Hx     Macular degeneration Neg Hx     Retinal detachment Neg Hx     Strabismus  "Neg Hx        Social History:  reports that he has never smoked. He has never used smokeless tobacco. He reports that he does not drink alcohol and does not use drugs.    Allergies:  Review of patient's allergies indicates:   Allergen Reactions    Atorvastatin Other (See Comments)       Medications:  Current Outpatient Medications   Medication Sig Dispense Refill    acetaminophen (TYLENOL) 325 MG tablet Take 2 tablets (650 mg total) by mouth every 4 (four) hours as needed.  0    amLODIPine (NORVASC) 10 MG tablet Take 1 tablet (10 mg total) by mouth once daily. 90 tablet 3    aspirin (ECOTRIN) 81 MG EC tablet Take 81 mg by mouth once daily.      benzonatate (TESSALON) 100 MG capsule Take 1 capsule (100 mg total) by mouth 3 (three) times daily as needed for Cough. 30 capsule 1    ezetimibe (ZETIA) 10 mg tablet Take 1 tablet (10 mg total) by mouth once daily. 90 tablet 3    insulin aspart U-100 (NOVOLOG) 100 unit/mL (3 mL) InPn pen Inject 0-5 Units into the skin before meals and at bedtime as needed (Hyperglycemia). **LOW CORRECTION DOSE** Blood Glucose mg/dL Pre-meal 151-200, 0 unit; 201-250, take 2 units;  251-300, take 3 units; 301-350, take 4 units; greater than 350, take 5 units. 3 mL 0    nebulizer and compressor Alana USE AS DIRECTED 1 each 0    nitroGLYCERIN (NITROSTAT) 0.4 MG SL tablet Place 1 tablet (0.4 mg total) under the tongue every 5 (five) minutes as needed for Chest pain. 25 tablet 0    olmesartan (BENICAR) 20 MG tablet Take 1 tablet (20 mg total) by mouth once daily. 90 tablet 1    pen needle, diabetic 31 gauge x 5/16" Ndle Use 4 (four) times daily as needed (high blood sugar - see sliding scale on discharge paperwork). 100 each 1    pen needle, diabetic, safety (BD AUTOSHIELD PEN NEEDLE) 29 gauge x 5/16" Ndle For use once daily with levemir flexpen 90 each 11    pulse oximeter (PULSE OXIMETER) device by Apply Externally route 2 (two) times a day. Use twice daily at 8 AM and 3 PM and " [facility]. 1 each 0    rosuvastatin (CRESTOR) 40 MG Tab Take 1 tablet (40 mg total) by mouth every evening. 90 tablet 3     No current facility-administered medications for this visit.       Review of Systems   Constitutional: Positive for appetite change, fever and unexpected weight change. Negative for fatigue.   HENT: Negative for sore throat.    Eyes: Negative for visual disturbance.   Respiratory: Positive for shortness of breath.    Cardiovascular: Negative for chest pain.   Gastrointestinal: Positive for constipation and nausea. Negative for abdominal pain.   Genitourinary: Negative for dysuria.        On dialysis but still urinates   Musculoskeletal: Positive for back pain.   Skin: Negative for rash.   Neurological: Positive for weakness. Negative for headaches.   Hematological: Negative for adenopathy.   Psychiatric/Behavioral: The patient is not nervous/anxious.      ECOG Performance Status:   ECOG SCORE    1 - Restricted in strenuous activity-ambulatory and able to carry out work of a light nature         Objective:      Vitals:   Vitals:    05/20/22 0852   BP: (!) 114/49   BP Location: Right arm   Patient Position: Sitting   BP Method: Medium (Automatic)   Pulse: 91   Resp: 20   Temp: 98.9 °F (37.2 °C)   TempSrc: Oral   SpO2: (!) 94%   Weight: 81.3 kg (179 lb 3.7 oz)     BMI: Body mass index is 22.4 kg/m².    Physical Exam  Vitals and nursing note reviewed.   Constitutional:       Appearance: He is well-developed.      Comments: Fatigued, in wheelchair.   HENT:      Head: Normocephalic and atraumatic.   Eyes:      Pupils: Pupils are equal, round, and reactive to light.   Cardiovascular:      Rate and Rhythm: Normal rate and regular rhythm.   Pulmonary:      Effort: Pulmonary effort is normal.      Breath sounds: Normal breath sounds.   Abdominal:      General: Bowel sounds are normal.      Palpations: Abdomen is soft.   Musculoskeletal:         General: Normal range of  motion.      Cervical back: Normal range of motion and neck supple.   Skin:     General: Skin is warm and dry.   Neurological:      Mental Status: He is alert and oriented to person, place, and time.   Psychiatric:         Behavior: Behavior normal.         Thought Content: Thought content normal.         Judgment: Judgment normal.         Laboratory Data:  Labs have been reviewed.    Lab Results   Component Value Date    WBC 9.75 02/14/2022    HGB 9.2 (L) 02/14/2022    HCT 27.4 (L) 02/14/2022    MCV 93 02/14/2022     02/14/2022           Imaging:     CT abdomen/pelvis (4/25/22):    Small left and minimal right pleural effusions.  Coronary artery calcifications.     Normal liver.  The gallbladder is normal.     The spleen is normal in size and appearance.  The pancreas is normal.  The adrenal glands are normal.  Atherosclerotic calcifications of the abdominal aorta and iliac vasculature.  Aneurysm of the left internal iliac artery measures 2.2 cm in maximal cross-sectional dimension.  Normal IVC.     Numerous enlarged retroperitoneal lymph node in the periaortic and aortocaval regions and along the bilateral iliac change.  Representative left periaortic lymph node measures 2.1 by 1.7 cm (series 2, image 90).  Representative right retrocaval lymph node measures 2.2 by 1.6 cm (series 2, image 75).  Enlarged right common iliac artery lymph node measures 2.3 by 2.0 cm (series 2, image 106).     Normal stomach.  Normal small intestine.  Circumferential colonic mucosal thickening right hepatic flexure, right transverse colon and splenic flexure.  Findings nonspecific but most likely due to colonic collapse rather than colitis.     Bladder is mildly distended with mild circumferential bladder wall thickening.  Mildly enlarged prostate.     Mild diffuse anasarca.     The pelvis demonstrates mixed lytic and sclerotic lesions of the spine and pelvis worrisome for metastatic disease.     Impression:     1. 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  2. Retroperitoneal and pelvic adenopathy.  3. Circumferential colonic mucosal thickening most likely due to a collapsed colon rather than low-grade colitis.  4. Subcentimeter lytic and sclerotic lesions of the spine and pelvis worrisome for metastatic disease.  All CT scans at this facility are performed  using dose modulation techniques as appropriate to performed exam including the following:  automated exposure control; adjustment of mA and/or kV according to the patients size (this includes techniques or standardized protocols for targeted exams where dose is matched to indication/reason for exam: i.e. extremities or head);  iterative reconstruction technique.    Assessment:       1. Lymphadenopathy    2. Normocytic anemia    3. Anemia in end-stage renal disease    4. 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 Atherosclerosis of aorta    6. Elevated PSA    7. Advance care planning           Plan:     1. Lymphadenopathy  - CT abdomen/pelvis (4/25/22) revealed "numerous enlarged retroperitoneal lymph node in the periaortic and aortocaval regions and along the bilateral iliac chain." It also showed that "the pelvis demonstrates mixed lytic and sclerotic lesions of the spine and pelvis worrisome for metastatic disease."  - he has a strong family history of cancer (prostate cancer, pancreatic cancer). In 2016 he had a minimally elevated PSA, so perhaps he has an undiagnosed prostate cancer.  - I will order some labs.  - I will message interventional radiology to see if any of these retroperitoneal lymph nodes can be biopsied.  - I will contact him with results of testing. If needed, I will schedule a follow-up appointment.    2. Anemia in ESRD  - hemoglobin on 2/14/22 was 9.2 g/dL.  - immunofixation (June 2021) was negative for monoclonal protein.  - anemia is likely just anemia in ESRD, will perform a few other labs for workup.    3. Type 2 diabetes  - last " hemoglobin A1c (6/25/21) was 7.1%  - defer management to Dr. Nugent.    4. Atherosclerosis of aorta  - seen on CT abdomen/pelvis (4/25/22)  - continue aspirin, amlodipine, olmesartan, rosuvastatin    5. Advance Care Planning     Date: 05/20/2022    Power of   I initiated the process of advance care planning today and explained the importance of this process to the patient.  I introduced the concept of advance directives to the patient, as well. Then the patient received detailed information about the importance of designating a Health Care Power of  (HCPOA). He was also instructed to communicate with this person about their wishes for future healthcare, should he become sick and lose decision-making capacity. The patient has not previously appointed a HCPOA. After our discussion, the patient has decided to complete a HCPOA and has appointed his wife Daniel Castro ([phone]), Carleyjamesjayme Liriano ([phone]), and Luli Castro ([phone]). I spent a total time of 16 minutes discussing this issue with the patient.        - I will contact him with results of testing. If needed, I will schedule a follow-up appointment.      Samson Santiago M.D.  Hematology/Oncology  [facility] - [address], [address]  [place] [address]  Phone: [phone]  Fax: [phone]

## 2022-05-20 NOTE — Clinical Note
Good morning,  Hope your cruise is awesome! When you get back, can you look at his CT abdomen/pelvis from 4/25/22? He has a few retroperitoneal lymph nodes, and some vague bone lesions. What are your thoughts? Is there anything that can be biopsied? Does any of this look like malignancy to you?  Thanks so much!

## 2022-05-24 PROBLEM — C61 MALIGNANT NEOPLASM OF PROSTATE: Status: ACTIVE | Noted: 2022-01-01

## 2022-05-24 NOTE — TELEPHONE ENCOUNTER
----- Message from Cintia Nicholas sent at 5/24/2022 12:08 PM CDT -----  Type:  Needs Medical Advice    Who Called: pt wife  Symptoms (please be specific): medication pt found 600 mg calcium and would like to know if pt can take these     Would the patient rather a call back or a response via [facility]? Please call  Best Call Back Number: [phone]  Additional Information:

## 2022-05-24 NOTE — TELEPHONE ENCOUNTER
I called and spoke to his wife. He can take the calcium tablets she found at the store.    Samson Santiago M.D.  Hematology/Oncology  [facility] - [address], [address]  [place] [address]  Phone: [phone]  Fax: [phone]

## 2022-05-24 NOTE — PLAN OF CARE
START ON PATHWAY REGIMEN - Prostate    [id number]        ADT (Androgen Deprivation Therapy)           Additional Orders: The committee feels that specific ADT is at physician   discretion; an appropriate LHRH agonist/antagonist may be considered. If an   LHRH agonist is utilized, the committee also recommends consideration of using a   non-steroidal antiandrogen (e.g. bicalutamide) for flare prevention and   continued use is at discretion of the physician.    **Always confirm dose/schedule in your pharmacy ordering system**    Patient Characteristics:  Adenocarcinoma, Recurrent/New Systemic Disease, Non-Castrate, M1, Low Volume   Disease  Histology: Adenocarcinoma  Therapeutic Status: Recurrent/New Systemic Disease  Intent of Therapy:  Non-Curative / Palliative Intent, Discussed with Patient

## 2022-05-24 NOTE — TELEPHONE ENCOUNTER
----- Message from Jesus Murphy sent at 5/24/2022 11:31 AM CDT -----  Contact: pt. wife  .Type:  Needs Medical Advice    Who Called: pt  Would the patient rather a call back or a response via [facility]? Call back  Best Call Back Number: [phone]   Additional Information: Pt. Is requesting a call back  regarding the script  for calcium-vitamin D3 (OYSTER SHELL CALCIUM-VIT D3) 500 mg-5 mcg (200 unit) per tablet.  [facility]  does not have the medication.  And she would like you give her a call back.

## 2022-05-24 NOTE — TELEPHONE ENCOUNTER
I called and spoke to his wife. His PSA is over 1000, which means he has metastatic prostate cancer. I sent prescription for bicalutamide and calcium/vitamin D. We will arrange for PSMA scan. We will arrange for eligard injection in June 2022.    Samson Santiago M.D.  Hematology/Oncology  [facility] - [address], [address]  [place] [address]  Phone: [phone]  Fax: [phone]

## 2022-06-14 NOTE — PROGRESS NOTES
HPI      6 mo OCT/DFE OU    DLS: 11/12/2020 by  Dr. VIDAL Harrison MD    Patient reports:  vision is better and I am reading great.     (++)Blurred Va  (--) Eye pain   (--) Headaches  (--) Flashes (-)Floaters  (--)Photophobia  (--)Glare     Eye Meds: ATs PRN   \        OCT - Central ME OD stable, nasal CME improved  OS- CME slightly worse compared to prior     Prior FA - Peripheral NP - but no obvious NV  Late macular leakage OU, macular ischemia OD      A/P    1. Severe NPDR OU  Improved A1c recently, but still uncontrolled on insulin  But  VH OD  S/p resumed Avastin OD x 1  FA shows NP, but no obvious NV        2. DME OU  - S/p Focal OU (OS x 2 06/13)  - S/p Ozurdex OD x2 2/19- fluid improved, but pt didn't notice much of a chance  - Some residual DME OD> OS    Stable low grade swelling OU - some macular ischemia OD   Pt would prefer to obs at this time.      3. PCIOL OU    4. HTN Ret OU    5. Ret Hole x 2  S/p barrier laser     6. Presbyopia  - Significantly increased since cataract surgery   - Pt unhappy with glasses- may need new MRx     7. 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

## 2022-06-22 NOTE — PROGRESS NOTES
"PATIENT: Domingo Castro  MRN: [id number]  DATE: 2022    Diagnosis:   1. 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 Secondary malignant neoplasm of bone    4. Chronic neoplasm-related pain    5. Anemia in end-stage renal disease    6. 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 Atherosclerosis of aorta        Chief Complaint: Prostate Cancer    Oncologic History:      Oncologic History 1. Prostate cancer - stage IV      Oncologic Treatment 1. Bicalutamide/leuprolide.      Pathology Diagnosed off imaging and PSA > 1000        Subjective:    History of Present Illness: Mr. Castro is a 77 y.o. male who presented in May 2022 for evaluation and management of lymphadenopathy. He was referred by Dr. Nugent.    - CT abdomen/pelvis (22) revealed "numerous enlarged retroperitoneal lymph node in the periaortic and aortocaval regions and along the bilateral iliac change."  - his mother and sister  from pancreatic cancer. His father had prostate cancer.  - in the past year, he has lost about 80 lbs.    Interval history:  - he presents for a follow-up appointment for his prostate cancer  - he started bicalutamide in May 2022.  - he is scheduled for leuprolide injection today.  - he endorses fatigue, unintentional weight loss (weight has recently stabilized), dyspnea upon exertion, nausea, constipation, decreased appetite, generalized weakness.    Past medical, surgical, family, and social histories have been reviewed and updated below.    Past Medical History:   Past Medical History:   Diagnosis Date    Arthritis     Cataract     Chronic diastolic congestive heart failure     Coronary artery disease     Cystoid macular edema of both eyes     Diabetes mellitus type II     Diabetic retinopathy     Hyperlipemia     Hypertension     Retinal hole     Stage 4 chronic kidney disease     Streptococcus pyogenes bacteremia 2018    Due to left toe " osteomyelitis       Past Surgical History:   Past Surgical History:   Procedure Laterality Date    ABDOMINAL AORTOGRAPHY N/A 2019    Procedure: AORTOGRAM-ABDOMINAL;  Surgeon: Amish Hyatt MD;  Location: [facility] CATH LAB/EP;  Service: Cardiology;  Laterality: N/A;  CO2 angiography    ANGIOGRAPHY OF LOWER EXTREMITY Left 2019    Procedure: Angiogram Extremity Unilateral;  Surgeon: Amish Hyatt MD;  Location: [facility] CATH LAB/EP;  Service: Cardiology;  Laterality: Left;    AV FISTULA PLACEMENT Left 2022    Procedure: CREATION, AV FISTULA INSERTION GRAFT, LEFT UPPER FOREARM;  Surgeon: Daren Dubon MD;  Location: [facility] OR;  Service: General;  Laterality: Left;    CATARACT EXTRACTION W/  INTRAOCULAR LENS IMPLANT Left 12/15/14    Dr de la cruz    CATARACT EXTRACTION W/  INTRAOCULAR LENS IMPLANT Right 14    dorothy    CIRCUMCISION, NON-      focal laser right eye      INSERTION OF SHAH CATHETER Right 2021    Procedure: INSERTION, CATHETER, CENTRAL VENOUS, SHAH DUAL LUMEN;  Surgeon: Denys Aleman Jr., MD;  Location: [facility];  Service: General;  Laterality: Right;    INSERTION OF TUNNELED CENTRAL VENOUS CATHETER (CVC) WITH SUBCUTANEOUS PORT Right 2019    Procedure: [id number];  Surgeon: Denys Aleman Jr., MD;  Location: [facility];  Service: General;  Laterality: Right;    INSERTION OF TUNNELED CENTRAL VENOUS HEMODIALYSIS CATHETER Right 2021    Procedure: Insertion, Catheter, Central Venous, Hemodialysis;  Surgeon: Agata Rosado MD;  Location: [facility] CATH LAB;  Service: Interventional Nephrology;  Laterality: Right;    KNEE SURGERY      left    Left medial collateral ligament repair      REMOVAL OF HEMODIALYSIS CATHETER Left 2022    Procedure: REMOVAL, CATHETER, HEMODIALYSIS;  Surgeon: Daren Dubon MD;  Location: [facility] OR;  Service: General;  Laterality: Left;    TONSILLECTOMY         Family History:   Family History   Problem Relation  Age of Onset    Heart disease Mother     Cancer Mother     Cancer Brother     Heart disease Father     Diabetes Father     Cancer Sister     Cataracts Paternal Grandmother     Glaucoma Paternal Grandmother     Blindness Neg Hx     Amblyopia Neg Hx     Hypertension Neg Hx     Macular degeneration Neg Hx     Retinal detachment Neg Hx     Strabismus Neg Hx        Social History:  reports that he has never smoked. He has never used smokeless tobacco. He reports that he does not drink alcohol and does not use drugs.    Allergies:  Review of patient's allergies indicates:   Allergen Reactions    Atorvastatin Other (See Comments)       Medications:  Current Outpatient Medications   Medication Sig Dispense Refill    acetaminophen (TYLENOL) 325 MG tablet Take 2 tablets (650 mg total) by mouth every 4 (four) hours as needed.  0    amLODIPine (NORVASC) 10 MG tablet Take 1 tablet (10 mg total) by mouth once daily. 90 tablet 3    aspirin (ECOTRIN) 81 MG EC tablet Take 81 mg by mouth once daily.      benzonatate (TESSALON) 100 MG capsule Take 1 capsule (100 mg total) by mouth 3 (three) times daily as needed for Cough. 30 capsule 1    bicalutamide (CASODEX) 50 MG Tab Take 1 tablet (50 mg total) by mouth once daily. 90 tablet 3    calcium-vitamin D3 (OYSTER SHELL CALCIUM-VIT D3) 500 mg-5 mcg (200 unit) per tablet Take 1 tablet by mouth once daily. 90 tablet 3    ezetimibe (ZETIA) 10 mg tablet Take 1 tablet (10 mg total) by mouth once daily. 90 tablet 3    insulin aspart U-100 (NOVOLOG) 100 unit/mL (3 mL) InPn pen Inject 0-5 Units into the skin before meals and at bedtime as needed (Hyperglycemia). **LOW CORRECTION DOSE** Blood Glucose mg/dL Pre-meal 151-200, 0 unit; 201-250, take 2 units;  251-300, take 3 units; 301-350, take 4 units; greater than 350, take 5 units. 3 mL 0    insulin NPH isoph U-100 human (NOVOLIN N FLEXPEN) 100 unit/mL (3 mL) InPn Inject 100 Units into the skin as needed.      nebulizer  "and compressor Alana USE AS DIRECTED 1 each 0    nitroGLYCERIN (NITROSTAT) 0.4 MG SL tablet Place 1 tablet (0.4 mg total) under the tongue every 5 (five) minutes as needed for Chest pain. 25 tablet 0    olmesartan (BENICAR) 20 MG tablet Take 1 tablet (20 mg total) by mouth once daily. 90 tablet 1    pen needle, diabetic 31 gauge x 5/16" Ndle Use 4 (four) times daily as needed (high blood sugar - see sliding scale on discharge paperwork). 100 each 1    pen needle, diabetic, safety (BD AUTOSHIELD PEN NEEDLE) 29 gauge x 5/16" Ndle For use once daily with levemir flexpen 90 each 11    phenyleph-promethazine-cod 5-6.25-10 mg/5 ml (PROMETHAZINE VC-CODEINE) 6.25-5-10 mg/5 mL Syrp Take 6.25 mg of amoxicillin by mouth as needed.      pulse oximeter (PULSE OXIMETER) device by Apply Externally route 2 (two) times a day. Use twice daily at 8 AM and 3 PM and record the value in MyChart as directed. 1 each 0    rosuvastatin (CRESTOR) 40 MG Tab Take 1 tablet (40 mg total) by mouth every evening. 90 tablet 3     No current facility-administered medications for this visit.       Review of Systems   Constitutional: Positive for appetite change, fever and unexpected weight change. Negative for fatigue.   HENT: Negative for sore throat.    Eyes: Negative for visual disturbance.   Respiratory: Positive for shortness of breath.    Cardiovascular: Negative for chest pain.   Gastrointestinal: Positive for constipation and nausea. Negative for abdominal pain.   Genitourinary: Negative for dysuria.        On dialysis but still urinates   Musculoskeletal: Positive for back pain.   Skin: Negative for rash.   Neurological: Positive for weakness. Negative for headaches.   Hematological: Negative for adenopathy.   Psychiatric/Behavioral: The patient is not nervous/anxious.      ECOG Performance Status:   ECOG SCORE    1 - Restricted in strenuous activity-ambulatory and able to carry out work of a light nature         Objective:      Vitals: "   Vitals:    06/22/22 1426   BP: (!) 125/54   BP Location: Right arm   Patient Position: Sitting   BP Method: Large (Automatic)   Pulse: 74   Resp: 16   SpO2: 96%   Weight: 83.9 kg (184 lb 15.5 oz)     BMI: Body mass index is 23.12 kg/m².    Physical Exam  Vitals and nursing note reviewed.   Constitutional:       Appearance: He is well-developed.      Comments: Fatigued, in wheelchair.   HENT:      Head: Normocephalic and atraumatic.   Eyes:      Pupils: Pupils are equal, round, and reactive to light.   Cardiovascular:      Rate and Rhythm: Normal rate and regular rhythm.   Pulmonary:      Effort: Pulmonary effort is normal.      Breath sounds: Normal breath sounds.   Abdominal:      General: Bowel sounds are normal.      Palpations: Abdomen is soft.   Musculoskeletal:         General: Normal range of motion.      Cervical back: Normal range of motion and neck supple.   Skin:     General: Skin is warm and dry.   Neurological:      Mental Status: He is alert and oriented to person, place, and time.   Psychiatric:         Behavior: Behavior normal.         Thought Content: Thought content normal.         Judgment: Judgment normal.         Laboratory Data:  Labs have been reviewed.    Lab Results   Component Value Date    WBC 6.73 06/01/2022    HGB 8.7 (L) 06/01/2022    HCT 30.8 (L) 06/01/2022    MCV 95 06/01/2022     06/01/2022           Imaging:     Bone scan (6/17/22):    There are multiple foci of increased radiotracer activity scattered throughout the skeletal system.  This will serve as the patient's baseline nuclear medicine study.     Impression:     There are multiple foci of increased radiotracer activity scattered throughout the skeletal system.  This is consistent with the patient's history and characteristic of metastatic prostate cancer.      CT abdomen/pelvis (4/25/22):    Small left and minimal right pleural effusions.  Coronary artery calcifications.     Normal liver.  The gallbladder is  normal.     The spleen is normal in size and appearance.  The pancreas is normal.  The adrenal glands are normal.  Atherosclerotic calcifications of the abdominal aorta and iliac vasculature.  Aneurysm of the left internal iliac artery measures 2.2 cm in maximal cross-sectional dimension.  Normal IVC.     Numerous enlarged retroperitoneal lymph node in the periaortic and aortocaval regions and along the bilateral iliac change.  Representative left periaortic lymph node measures 2.1 by 1.7 cm (series 2, image 90).  Representative right retrocaval lymph node measures 2.2 by 1.6 cm (series 2, image 75).  Enlarged right common iliac artery lymph node measures 2.3 by 2.0 cm (series 2, image 106).     Normal stomach.  Normal small intestine.  Circumferential colonic mucosal thickening right hepatic flexure, right transverse colon and splenic flexure.  Findings nonspecific but most likely due to colonic collapse rather than colitis.     Bladder is mildly distended with mild circumferential bladder wall thickening.  Mildly enlarged prostate.     Mild diffuse anasarca.     The pelvis demonstrates mixed lytic and sclerotic lesions of the spine and pelvis worrisome for metastatic disease.     Impression:     1. 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  2. Retroperitoneal and pelvic adenopathy.  3. Circumferential colonic mucosal thickening most likely due to a collapsed colon rather than low-grade colitis.  4. Subcentimeter lytic and sclerotic lesions of the spine and pelvis worrisome for metastatic disease.  All CT scans at this facility are performed  using dose modulation techniques as appropriate to performed exam including the following:  automated exposure control; adjustment of mA and/or kV according to the patients size (this includes techniques or standardized protocols for targeted exams where dose is matched to indication/reason for exam: i.e. extremities or head);  iterative reconstruction  "technique.    Assessment:       1. 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 Secondary malignant neoplasm of bone    4. Chronic neoplasm-related pain    5. Anemia in end-stage renal disease    6. 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 Atherosclerosis of aorta           Plan:     1. Prostate cancer - stage IV  - CT abdomen/pelvis (4/25/22) revealed "numerous enlarged retroperitoneal lymph node in the periaortic and aortocaval regions and along the bilateral iliac chain." It also showed that "the pelvis demonstrates mixed lytic and sclerotic lesions of the spine and pelvis worrisome for metastatic disease."  - he has a strong family history of cancer (prostate cancer, pancreatic cancer).  - PSA (may 2022) was over 1000 ng/mL, suggesting metastatic prostate cancer.  - Bone scan (6/17/22) was positive for osseous metastases.  - he started bicalutamide in May 2022  - proceed with dose #1 of leuprolide today.   - cannot give zoledronic acid due to ESRD. May consider denosumab but that has increased risk of severe hypocalcemia as well in end-stage renal disease  - check labs in one month.  - return to clinic in 3 months in preparation for next dose of leuprolide    2. Anemia in ESRD  - hemoglobin on 2/14/22 was 8.7 g/dL.  - immunofixation (June 2021) was negative for monoclonal protein.  - anemia is from ESRD, prostate cancer    3. Type 2 diabetes  - last hemoglobin A1c (6/25/21) was 7.1%  - defer management to Dr. Nugent.    4. Atherosclerosis of aorta  - seen on CT abdomen/pelvis (4/25/22)  - continue aspirin, amlodipine, olmesartan, rosuvastatin    5. Advance Care Planning     Power of   After our discussion (at previous visit), the patient decided to complete a HCPOA and appointed his wife Daniel Castro ([phone]), Carleyjamesjayme Liriano ([phone]), and Luli Castro ([phone]).     - return to clinic in 3 months in preparation " for next dose of leuprolide    Samson Santiago M.D.  Hematology/Oncology  [facility]  [address], [address]  [place] [address]  Phone: [phone]  Fax: [phone]

## 2022-06-22 NOTE — PLAN OF CARE
tolerated eligard injection well to right lower abdomen. educated on eligard and common signs and symptoms. ambulated out of clinic with larisa walker to Dr. Santiago's office at this time with daughter at side.

## 2022-08-08 NOTE — PROGRESS NOTES
Subjective:      Patient ID: Domingo Castro is a 77 y.o. male.    Chief Complaint: Diabetes Mellitus (PCP Dr. Nugent, 5/9/22, need prescription for diabet.shoes), Diabetic Foot Exam, and Routine Foot Care    Domingo is a 77 y.o. male who presents to the clinic for evaluation and treatment of high risk feet. Domingo has a past medical history of Arthritis, Cataract, Chronic diastolic congestive heart failure, Coronary artery disease, Cystoid macular edema of both eyes, Diabetes mellitus type II, Diabetic retinopathy, Hyperlipemia, Hypertension, Retinal hole, Stage 4 chronic kidney disease, and Streptococcus pyogenes bacteremia (12/23/2018). The patient's chief complaint is diabetic foot ulcer, left hallux. This patient has documented high risk feet requiring routine maintenance secondary to peripheral vascular disease.  Discharged from [facility] on 08/04/2019.  Receiving 6 weeks of IV vancomycin per ID recommendations.  Follow-up per [facility] with 3 times weekly Medi Honey dressing changes. Accompanied by his daughter.  No new complaints.    09/19/2019:  Follow-up for chronic wound to left hallux treated for osteomyelitis.  Post endovascular intervention per .    10/17/2019:  Presents for wound check.  Followed per [facility].  Believes his wound may be healed.  Says his home health nurse peeled away some dead skin and created a wound at the tip of the left hallux.  No new complaints.    11/11/2019:  Presents for wound check left hallux.  Relates he was told by his home health nurse that the wound is healed.  No new complaints.    04/24/2020:  Presents today complaining of new onset blister to the left hallux.  He was previously followed per [facility].  He still has a port in his chest that should been removed.  Denies any trauma.    05/14/2020:  Presents for wound check to left foot.  No new complaints.  [facility] is changing his dressings.    06/11/2020:  Follow-up for wound check  to left hallux.  Followed by [facility].  No new complaints.    08/14/2020:  Returns for routine foot care.  No new complaints.    12/21/2020:  Returns for routine foot care.  No new complaints.    5/27/21: Pt presents as follow up from hospital admission, currently treated for osteomyelitis of left hallux. No new pedal complaints.     3/21/22: Pt seen today for left great toe wound, states he has recently lost over 80 pounds, states he no longer has an appetite. States he is currently on dialysis. Continues to follow up with his PCP. No other pedal complaints.     3/28/22: Pt seen today for left great toe wound,  has been taking protein shakes 2x per day. No other pedal complaints. [facility] has follow up with primary care soon.     5/2/22: Pt seen today for RFC and for ulcer check. No new pedal complaints.    8/8/22: Pt seen today for RFC. Ulcer remains healed. Requesting for diabetic shoes. No other pedal complaints.      PCP: Griselda Nugent MD    Date Last Seen by PCP: 4/18/22    Current shoe gear:  Affected Foot: Football and Darco shoe on the affected foot     Unaffected Foot: Rx diabetic extra depth shoes and custom accommodative insoles    History of Trauma: negative  Sign of Infection: none    Hemoglobin A1C   Date Value Ref Range Status   06/25/2021 7.1 (H) 4.0 - 5.6 % Final     Comment:     ADA Screening Guidelines:  5.7-6.4%  Consistent with prediabetes  >or=6.5%  Consistent with diabetes    High levels of fetal hemoglobin interfere with the HbA1C  assay. Heterozygous hemoglobin variants (HbS, HgC, etc)do  not significantly interfere with this assay.   However, presence of multiple variants may affect accuracy.     05/09/2021 8.5 (H) 4.0 - 5.6 % Final     Comment:     ADA Screening Guidelines:  5.7-6.4%  Consistent with prediabetes  >or=6.5%  Consistent with diabetes    High levels of fetal hemoglobin interfere with the HbA1C  assay. Heterozygous hemoglobin variants (HbS, HgC, etc)do  not  "significantly interfere with this assay.   However, presence of multiple variants may affect accuracy.     2020 5.8 (H) 4.0 - 5.6 % Final     Comment:     ADA Screening Guidelines:  5.7-6.4%  Consistent with prediabetes  >or=6.5%  Consistent with diabetes  High levels of fetal hemoglobin interfere with the HbA1C  assay. Heterozygous hemoglobin variants (HbS, HgC, etc)do  not significantly interfere with this assay.   However, presence of multiple variants may affect accuracy.       Vitals:    22 0820   Height: 6' 3" (1.905 m)   PainSc: 0-No pain      Past Medical History:   Diagnosis Date    Arthritis     Cataract     Chronic diastolic congestive heart failure     Coronary artery disease     Cystoid macular edema of both eyes     Diabetes mellitus type II     Diabetic retinopathy     Hyperlipemia     Hypertension     Retinal hole     Stage 4 chronic kidney disease     Streptococcus pyogenes bacteremia 2018    Due to left toe osteomyelitis       Past Surgical History:   Procedure Laterality Date    ABDOMINAL AORTOGRAPHY N/A 2019    Procedure: AORTOGRAM-ABDOMINAL;  Surgeon: Amish Hyatt MD;  Location: [facility] CATH LAB/EP;  Service: Cardiology;  Laterality: N/A;  CO2 angiography    ANGIOGRAPHY OF LOWER EXTREMITY Left 2019    Procedure: Angiogram Extremity Unilateral;  Surgeon: Amish Hyatt MD;  Location: [facility] CATH LAB/EP;  Service: Cardiology;  Laterality: Left;    AV FISTULA PLACEMENT Left 2022    Procedure: CREATION, AV FISTULA INSERTION GRAFT, LEFT UPPER FOREARM;  Surgeon: Daren Dubon MD;  Location: [facility] OR;  Service: General;  Laterality: Left;    CATARACT EXTRACTION W/  INTRAOCULAR LENS IMPLANT Left 12/15/14    Dr de la cruz    CATARACT EXTRACTION W/  INTRAOCULAR LENS IMPLANT Right 14    dorothy    CIRCUMCISION, NON-      focal laser right eye      INSERTION OF SHAH CATHETER Right 2021    Procedure: INSERTION, CATHETER, CENTRAL VENOUS, " PABLO DUAL LUMEN;  Surgeon: Denys Aleman Jr., MD;  Location: [facility] OR;  Service: General;  Laterality: Right;    INSERTION OF TUNNELED CENTRAL VENOUS CATHETER (CVC) WITH SUBCUTANEOUS PORT Right 1/2/2019    Procedure: [id number];  Surgeon: Denys Aleman Jr., MD;  Location: [facility] OR;  Service: General;  Laterality: Right;    INSERTION OF TUNNELED CENTRAL VENOUS HEMODIALYSIS CATHETER Right 6/28/2021    Procedure: Insertion, Catheter, Central Venous, Hemodialysis;  Surgeon: Agata Rosado MD;  Location: [facility] CATH LAB;  Service: Interventional Nephrology;  Laterality: Right;    KNEE SURGERY      left    Left medial collateral ligament repair      REMOVAL OF HEMODIALYSIS CATHETER Left 4/1/2022    Procedure: REMOVAL, CATHETER, HEMODIALYSIS;  Surgeon: Daren Dubon MD;  Location: [facility] OR;  Service: General;  Laterality: Left;    TONSILLECTOMY         Family History   Problem Relation Age of Onset    Heart disease Mother     Cancer Mother     Cancer Brother     Heart disease Father     Diabetes Father     Cancer Sister     Cataracts Paternal Grandmother     Glaucoma Paternal Grandmother     Blindness Neg Hx     Amblyopia Neg Hx     Hypertension Neg Hx     Macular degeneration Neg Hx     Retinal detachment Neg Hx     Strabismus Neg Hx        Social History     Socioeconomic History    Marital status:    Tobacco Use    Smoking status: Never Smoker    Smokeless tobacco: Never Used   Substance and Sexual Activity    Alcohol use: No     Alcohol/week: 0.0 standard drinks    Drug use: No    Sexual activity: Yes     Partners: Female     Social Determinants of Health     Financial Resource Strain: Low Risk     Difficulty of Paying Living Expenses: Not hard at all   Food Insecurity: No Food Insecurity    Worried About Running Out of Food in the Last Year: Never true    Ran Out of Food in the Last Year: Never true   Transportation Needs: No Transportation Needs     Lack of Transportation (Medical): No    Lack of Transportation (Non-Medical): No   Physical Activity: Inactive    Days of Exercise per Week: 0 days    Minutes of Exercise per Session: 0 min   Stress: Stress Concern Present    Feeling of Stress : Rather much   Social Connections: Socially Integrated    Frequency of Communication with Friends and Family: Twice a week    Frequency of Social Gatherings with Friends and Family: More than three times a week    Attends Congregational Services: More than 4 times per year    Active Member of Clubs or Organizations: Yes    Attends Club or Organization Meetings: More than 4 times per year    Marital Status:    Housing Stability: Unknown    Unable to Pay for Housing in the Last Year: No    Unstable Housing in the Last Year: No       Current Outpatient Medications   Medication Sig Dispense Refill    acetaminophen (TYLENOL) 325 MG tablet Take 2 tablets (650 mg total) by mouth every 4 (four) hours as needed.  0    amLODIPine (NORVASC) 10 MG tablet Take 1 tablet (10 mg total) by mouth once daily. 90 tablet 3    aspirin (ECOTRIN) 81 MG EC tablet Take 81 mg by mouth once daily.      benzonatate (TESSALON) 100 MG capsule Take 1 capsule (100 mg total) by mouth 3 (three) times daily as needed for Cough. 30 capsule 1    bicalutamide (CASODEX) 50 MG Tab Take 1 tablet (50 mg total) by mouth once daily. 90 tablet 3    calcium-vitamin D3 (OYSTER SHELL CALCIUM-VIT D3) 500 mg-5 mcg (200 unit) per tablet Take 1 tablet by mouth once daily. 90 tablet 3    ezetimibe (ZETIA) 10 mg tablet Take 1 tablet (10 mg total) by mouth once daily. 90 tablet 3    insulin NPH isoph U-100 human (NOVOLIN N FLEXPEN) 100 unit/mL (3 mL) InPn Inject 100 Units into the skin as needed.      nebulizer and compressor Alana USE AS DIRECTED 1 each 0    nitroGLYCERIN (NITROSTAT) 0.4 MG SL tablet Place 1 tablet (0.4 mg total) under the tongue every 5 (five) minutes as needed for Chest pain. 25 tablet 0  "   olmesartan (BENICAR) 20 MG tablet Take 1 tablet (20 mg total) by mouth once daily. 90 tablet 1    pen needle, diabetic 31 gauge x 5/16" Ndle Use 4 (four) times daily as needed (high blood sugar - see sliding scale on discharge paperwork). 100 each 1    pen needle, diabetic, safety (BD AUTOSHIELD PEN NEEDLE) 29 gauge x 5/16" Ndle For use once daily with levemir flexpen 90 each 11    phenyleph-promethazine-cod 5-6.25-10 mg/5 ml (PROMETHAZINE VC-CODEINE) 6.25-5-10 mg/5 mL Syrp Take 6.25 mg of amoxicillin by mouth as needed.      pulse oximeter (PULSE OXIMETER) device by Apply Externally route 2 (two) times a day. Use twice daily at 8 AM and 3 PM and record the value in MD[employer] as directed. 1 each 0    rosuvastatin (CRESTOR) 40 MG Tab Take 1 tablet (40 mg total) by mouth every evening. 90 tablet 3    insulin aspart U-100 (NOVOLOG) 100 unit/mL (3 mL) InPn pen Inject 0-5 Units into the skin before meals and at bedtime as needed (Hyperglycemia). **LOW CORRECTION DOSE** Blood Glucose mg/dL Pre-meal 151-200, 0 unit; 201-250, take 2 units;  251-300, take 3 units; 301-350, take 4 units; greater than 350, take 5 units. 3 mL 0     No current facility-administered medications for this visit.       Review of patient's allergies indicates:   Allergen Reactions    Atorvastatin Other (See Comments)         Review of Systems   Constitutional: Negative for chills and fever.   HENT: Negative for congestion and hearing loss.    Cardiovascular: Negative for chest pain and claudication.   Respiratory: Negative for cough and shortness of breath.    Skin: Positive for color change, nail changes and poor wound healing.   Musculoskeletal: Negative for back pain and joint pain.   Gastrointestinal: Negative for nausea and vomiting.   Neurological: Positive for numbness.   Psychiatric/Behavioral: Negative for altered mental status.           Objective:      Physical Exam  Constitutional:       General: He is not in acute distress.     " Appearance: He is not diaphoretic.   Cardiovascular:      Pulses:           Dorsalis pedis pulses are 1+ on the right side and 1+ on the left side.        Posterior tibial pulses are 1+ on the right side and 1+ on the left side.      Comments: Mild lower extremity edema bilateral. No hair growth bilateral lower extremity.  Musculoskeletal:      Comments: Semi reducible hallux malleus left hallux. Semi rigid clawtoe deformities toes 2-5 bilateral foot.    Mild cavus foot structure bilateral foot.   Feet:      Right foot:      Protective Sensation: 10 sites tested. 6 sites sensed.      Skin integrity: Dry skin present.      Toenail Condition: Right toenails are abnormally thick and long. Fungal disease present.     Left foot:      Protective Sensation: 10 sites tested. 6 sites sensed.      Skin integrity: Callus and dry skin present. No ulcer.      Toenail Condition: Left toenails are abnormally thick and long. Fungal disease present.  Skin:     General: Skin is warm and dry.      Capillary Refill: Capillary refill takes 2 to 3 seconds.      Coloration: Skin is not pale.      Findings: No ecchymosis, erythema or rash.      Nails: There is no clubbing.      Comments: Nails 1-5 left and 1-5 right are elongated 4-5 mm, thickened, discolored brown yellow with loosening and underlying debris.      Raised hyperkeratotic skin distal left hallux, upon debridement, wound is healed. No acute signs of infection. Appear stable.      Neurological:      Mental Status: He is alert and oriented to person, place, and time.      Sensory: Sensory deficit present.       8/8/22: Wound is healed  5/2/22: Wound is healed.   3/28/22: Wound is healed.     Previous Images              Assessment:       Encounter Diagnoses   Name Primary?    PAD (peripheral artery disease) Yes    Healed ulcer of left foot on examination     Type 2 diabetes mellitus with peripheral neuropathy     Disease of nail          Plan:       Domingo was seen today for  diabetes mellitus, diabetic foot exam and routine foot care.    Diagnoses and all orders for this visit:    PAD (peripheral artery disease)  -     DIABETIC SHOES FOR HOME USE    Healed ulcer of left foot on examination  -     DIABETIC SHOES FOR HOME USE    Type 2 diabetes mellitus with peripheral neuropathy  -     DIABETIC SHOES FOR HOME USE    Disease of nail      I counseled the patient on his conditions, their implications and medical management.    RFC per attached note. Wound remains healed    Rx diabetic shoes    Continue follow up with Interventional Cardiology for PAD    Recommend K[employer] for toenail changes    Shoe inspection. Diabetic Foot Education. Patient reminded of the importance of good nutrition and blood sugar control to help prevent podiatric complications of diabetes. Patient instructed on proper foot hygeine. We discussed wearing proper shoe gear, daily foot inspections, never walking without protective shoe gear, never putting sharp instruments to feet.    RTC in 2-3 months, sooner PRN

## 2022-08-08 NOTE — PROCEDURES
"Routine Foot Care    Date/Time: 8/8/2022 8:00 AM  Performed by: Vale Merrill DPM  Authorized by: Vale Merrill DPM     Time out: Immediately prior to procedure a "time out" was called to verify the correct patient, procedure, equipment, support staff and site/side marked as required.    Consent Done?:  Yes (Verbal)  Hyperkeratotic Skin Lesions?: Yes    Number of trimmed lesions:  1  Location(s):  Left 1st Toe    Nail Care Type:  Debride  Location(s): All  (Left 1st Toe, Left 3rd Toe, Left 2nd Toe, Left 4th Toe, Left 5th Toe, Right 1st Toe, Right 2nd Toe, Right 3rd Toe, Right 4th Toe and Right 5th Toe)  Patient tolerance:  Patient tolerated the procedure well with no immediate complications      "

## 2022-09-22 NOTE — PROGRESS NOTES
"PATIENT: Domingo Castro  MRN: [id number]  DATE: 2022    Diagnosis:   1. 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 Secondary malignant neoplasm of bone    4. Chronic neoplasm-related pain    5. Anemia in end-stage renal disease    6. 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 Atherosclerosis of aorta      Chief Complaint: Prostate Cancer    Oncologic History:      Oncologic History 1. Prostate cancer - stage IV      Oncologic Treatment 1. Bicalutamide/leuprolide.      Pathology Diagnosed off imaging and PSA > 1000        Subjective:    History of Present Illness: Mr. Castro is a 77 y.o. male who presented in May 2022 for evaluation and management of lymphadenopathy. He was referred by Dr. Nugent.    - CT abdomen/pelvis (22) revealed "numerous enlarged retroperitoneal lymph node in the periaortic and aortocaval regions and along the bilateral iliac change."  - his mother and sister  from pancreatic cancer. His father had prostate cancer.  - in the past year, he has lost about 80 lbs.  - he started bicalutamide in May 2022.    Interval history:  - he presents for a follow-up appointment for his prostate cancer  - he is scheduled for leuprolide injection today.  - he endorses fatigue, dyspnea upon exertion. He denies chest pain, nausea, vomiting, diarrhea, constipation.      Past medical, surgical, family, and social histories have been reviewed and updated below.    Past Medical History:   Past Medical History:   Diagnosis Date    Arthritis     Cataract     Chronic diastolic congestive heart failure     Coronary artery disease     Cystoid macular edema of both eyes     Diabetes mellitus type II     Diabetic retinopathy     Hyperlipemia     Hypertension     Retinal hole     Stage 4 chronic kidney disease     Streptococcus pyogenes bacteremia 2018    Due to left toe osteomyelitis       Past Surgical History:   Past Surgical History: "   Procedure Laterality Date    ABDOMINAL AORTOGRAPHY N/A 2019    Procedure: AORTOGRAM-ABDOMINAL;  Surgeon: Amish Hyatt MD;  Location: [facility] CATH LAB/EP;  Service: Cardiology;  Laterality: N/A;  CO2 angiography    ANGIOGRAPHY OF LOWER EXTREMITY Left 2019    Procedure: Angiogram Extremity Unilateral;  Surgeon: Amish Hyatt MD;  Location: [facility] CATH LAB/EP;  Service: Cardiology;  Laterality: Left;    AV FISTULA PLACEMENT Left 2022    Procedure: CREATION, AV FISTULA INSERTION GRAFT, LEFT UPPER FOREARM;  Surgeon: Daren Dubon MD;  Location: [facility] OR;  Service: General;  Laterality: Left;    CATARACT EXTRACTION W/  INTRAOCULAR LENS IMPLANT Left 12/15/14    Dr de la cruz    CATARACT EXTRACTION W/  INTRAOCULAR LENS IMPLANT Right 14    dorothy    CIRCUMCISION, NON-      focal laser right eye      INSERTION OF SHAH CATHETER Right 2021    Procedure: INSERTION, CATHETER, CENTRAL VENOUS, SHAH DUAL LUMEN;  Surgeon: Denys Aleman Jr., MD;  Location: [facility];  Service: General;  Laterality: Right;    INSERTION OF TUNNELED CENTRAL VENOUS CATHETER (CVC) WITH SUBCUTANEOUS PORT Right 2019    Procedure: [id number];  Surgeon: Denys Aleman Jr., MD;  Location: [facility];  Service: General;  Laterality: Right;    INSERTION OF TUNNELED CENTRAL VENOUS HEMODIALYSIS CATHETER Right 2021    Procedure: Insertion, Catheter, Central Venous, Hemodialysis;  Surgeon: Agata Rosado MD;  [facility] CATH LAB;  Service: Interventional Nephrology;  Laterality: Right;    KNEE SURGERY      left    Left medial collateral ligament repair      REMOVAL OF HEMODIALYSIS CATHETER Left 2022    Procedure: REMOVAL, CATHETER, HEMODIALYSIS;  Surgeon: Daren Dubon MD;  Location: [facility];  Service: General;  Laterality: Left;    TONSILLECTOMY         Family History:   Family History   Problem Relation Age of Onset    Heart disease Mother     Cancer Mother     Cancer Brother     Heart  disease Father     Diabetes Father     Cancer Sister     Cataracts Paternal Grandmother     Glaucoma Paternal Grandmother     Blindness Neg Hx     Amblyopia Neg Hx     Hypertension Neg Hx     Macular degeneration Neg Hx     Retinal detachment Neg Hx     Strabismus Neg Hx        Social History:  reports that he has never smoked. He has never used smokeless tobacco. He reports that he does not drink alcohol and does not use drugs.    Allergies:  Review of patient's allergies indicates:   Allergen Reactions    Atorvastatin Other (See Comments)       Medications:  Current Outpatient Medications   Medication Sig Dispense Refill    acetaminophen (TYLENOL) 325 MG tablet Take 2 tablets (650 mg total) by mouth every 4 (four) hours as needed.  0    amLODIPine (NORVASC) 10 MG tablet Take 1 tablet (10 mg total) by mouth once daily. 90 tablet 3    aspirin (ECOTRIN) 81 MG EC tablet Take 81 mg by mouth once daily.      benzonatate (TESSALON) 100 MG capsule Take 1 capsule (100 mg total) by mouth 3 (three) times daily as needed for Cough. (Patient not taking: Reported on 8/23/2022) 30 capsule 1    bicalutamide (CASODEX) 50 MG Tab Take 1 tablet (50 mg total) by mouth once daily. 90 tablet 3    calcium-vitamin D3 (OYSTER SHELL CALCIUM-VIT D3) 500 mg-5 mcg (200 unit) per tablet Take 1 tablet by mouth once daily. 90 tablet 3    ezetimibe (ZETIA) 10 mg tablet Take 1 tablet (10 mg total) by mouth once daily. 90 tablet 3    insulin aspart U-100 (NOVOLOG) 100 unit/mL (3 mL) InPn pen Inject 0-5 Units into the skin before meals and at bedtime as needed (Hyperglycemia). **LOW CORRECTION DOSE** Blood Glucose mg/dL Pre-meal 151-200, 0 unit; 201-250, take 2 units;  251-300, take 3 units; 301-350, take 4 units; greater than 350, take 5 units. 3 mL 0    insulin NPH isoph U-100 human (NOVOLIN N FLEXPEN) 100 unit/mL (3 mL) InPn Inject 100 Units into the skin as needed.      nebulizer and compressor Alana USE AS DIRECTED 1 each 0    nitroGLYCERIN  "(NITROSTAT) 0.4 MG SL tablet Place 1 tablet (0.4 mg total) under the tongue every 5 (five) minutes as needed for Chest pain. 25 tablet 0    olmesartan (BENICAR) 20 MG tablet Take 1 tablet (20 mg total) by mouth once daily. 90 tablet 1    pen needle, diabetic 31 gauge x 5/16" Ndle Use 4 (four) times daily as needed (high blood sugar - see sliding scale on discharge paperwork). 100 each 1    pen needle, diabetic, safety (BD AUTOSHIELD PEN NEEDLE) 29 gauge x 5/16" Ndle For use once daily with levemir flexpen 90 each 11    phenyleph-promethazine-cod 5-6.25-10 mg/5 ml (PROMETHAZINE VC-CODEINE) 6.25-5-10 mg/5 mL Syrp Take 6.25 mg of amoxicillin by mouth as needed.      pulse oximeter (PULSE OXIMETER) device by Apply Externally route 2 (two) times a day. Use twice daily at 8 AM and 3 PM and [facility] as directed. 1 each 0    rosuvastatin (CRESTOR) 40 MG Tab Take 1 tablet (40 mg total) by mouth every evening. 90 tablet 3     No current facility-administered medications for this visit.       Review of Systems   Constitutional:  Positive for appetite change, fever and unexpected weight change. Negative for fatigue.   HENT:  Negative for sore throat.    Eyes:  Negative for visual disturbance.   Respiratory:  Positive for shortness of breath.    Cardiovascular:  Negative for chest pain.   Gastrointestinal:  Negative for abdominal pain.   Genitourinary:  Negative for dysuria.        On dialysis but still urinates   Musculoskeletal:  Positive for back pain.   Skin:  Negative for rash.   Neurological:  Positive for weakness. Negative for headaches.   Hematological:  Negative for adenopathy.   Psychiatric/Behavioral:  The patient is not nervous/anxious.      ECOG Performance Status:   ECOG SCORE 1            Objective:      Vitals:   Vitals:    09/23/22 1105   BP: 135/61   BP Location: Right arm   Patient Position: Sitting   BP Method: Medium (Automatic)   Pulse: 86   Resp: 20   Temp: 97.5 °F (36.4 °C)   TempSrc: " "Oral   SpO2: 97%   Weight: 82.7 kg (182 lb 5.1 oz)   Height: 6' 3.5" (1.918 m)     BMI: Body mass index is 22.49 kg/m².    Physical Exam  Vitals and nursing note reviewed.   Constitutional:       Appearance: He is well-developed.      Comments: Fatigued, in wheelchair.   HENT:      Head: Normocephalic and atraumatic.   Eyes:      Pupils: Pupils are equal, round, and reactive to light.   Cardiovascular:      Rate and Rhythm: Normal rate and regular rhythm.   Pulmonary:      Effort: Pulmonary effort is normal.      Breath sounds: Normal breath sounds.   Abdominal:      General: Bowel sounds are normal.      Palpations: Abdomen is soft.   Musculoskeletal:         General: Normal range of motion.      Cervical back: Normal range of motion and neck supple.   Skin:     General: Skin is warm and dry.   Neurological:      Mental Status: He is alert and oriented to person, place, and time.   Psychiatric:         Behavior: Behavior normal.         Thought Content: Thought content normal.         Judgment: Judgment normal.       Laboratory Data:  Labs have been reviewed.    Lab Results   Component Value Date    WBC 6.27 09/21/2022    HGB 12.5 (L) 09/21/2022    HCT 39.9 (L) 09/21/2022    MCV 95 09/21/2022     09/21/2022           Imaging:     Bone scan (6/17/22):    There are multiple foci of increased radiotracer activity scattered throughout the skeletal system.  This will serve as the patient's baseline nuclear medicine study.     Impression:     There are multiple foci of increased radiotracer activity scattered throughout the skeletal system.  This is consistent with the patient's history and characteristic of metastatic prostate cancer.      CT abdomen/pelvis (4/25/22):    Small left and minimal right pleural effusions.  Coronary artery calcifications.     Normal liver.  The gallbladder is normal.     The spleen is normal in size and appearance.  The pancreas is normal.  The adrenal glands are normal.  " Atherosclerotic calcifications of the abdominal aorta and iliac vasculature.  Aneurysm of the left internal iliac artery measures 2.2 cm in maximal cross-sectional dimension.  Normal IVC.     Numerous enlarged retroperitoneal lymph node in the periaortic and aortocaval regions and along the bilateral iliac change.  Representative left periaortic lymph node measures 2.1 by 1.7 cm (series 2, image 90).  Representative right retrocaval lymph node measures 2.2 by 1.6 cm (series 2, image 75).  Enlarged right common iliac artery lymph node measures 2.3 by 2.0 cm (series 2, image 106).     Normal stomach.  Normal small intestine.  Circumferential colonic mucosal thickening right hepatic flexure, right transverse colon and splenic flexure.  Findings nonspecific but most likely due to colonic collapse rather than colitis.     Bladder is mildly distended with mild circumferential bladder wall thickening.  Mildly enlarged prostate.     Mild diffuse anasarca.     The pelvis demonstrates mixed lytic and sclerotic lesions of the spine and pelvis worrisome for metastatic disease.     Impression:     1. 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  2. Retroperitoneal and pelvic adenopathy.  3. Circumferential colonic mucosal thickening most likely due to a collapsed colon rather than low-grade colitis.  4. Subcentimeter lytic and sclerotic lesions of the spine and pelvis worrisome for metastatic disease.  All CT scans at this facility are performed  using dose modulation techniques as appropriate to performed exam including the following:  automated exposure control; adjustment of mA and/or kV according to the patients size (this includes techniques or standardized protocols for targeted exams where dose is matched to indication/reason for exam: i.e. extremities or head);  iterative reconstruction technique.    Assessment:       1. 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 Secondary  "malignant neoplasm of bone    4. Chronic neoplasm-related pain    5. Anemia in end-stage renal disease    6. 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 Atherosclerosis of aorta           Plan:     1. Prostate cancer - stage IV  - CT abdomen/pelvis (4/25/22) revealed "numerous enlarged retroperitoneal lymph node in the periaortic and aortocaval regions and along the bilateral iliac chain." It also showed that "the pelvis demonstrates mixed lytic and sclerotic lesions of the spine and pelvis worrisome for metastatic disease."  - he has a strong family history of cancer (prostate cancer, pancreatic cancer).  - PSA (may 2022) was over 1000 ng/mL, suggesting metastatic prostate cancer.  - Bone scan (6/17/22) was positive for osseous metastases.  - he started bicalutamide in May 2022  - Labs have been reviewed. PSA was 206 ng/mL  - proceed with dose #2 of leuprolide today.   - continue bicalutamide  - cannot give zoledronic acid due to ESRD. May consider denosumab but that has increased risk of severe hypocalcemia as well in end-stage renal disease  - return to clinic in 3 months in preparation for next dose of leuprolide    2. Anemia in ESRD  - hemoglobin on 2/14/22 was 8.7 g/dL.  - immunofixation (June 2021) was negative for monoclonal protein.  - anemia is from ESRD, prostate cancer  - Labs have been reviewed. Hemoglobin is 12.5 g/dL.    3. Type 2 diabetes  - last hemoglobin A1c (6/25/21) was 7.1%  - repeat hemoglobin A1c with next labs.  - defer management to Dr. Nugent.    4. Atherosclerosis of aorta  - seen on CT abdomen/pelvis (4/25/22)  - continue aspirin, amlodipine, olmesartan, rosuvastatin    5. Advance Care Planning     Power of   After our discussion (at previous visit), the patient decided to complete a HCPOA and appointed his  wife Daniel Castro ([phone]), Daniel Liriano ([phone]), and Luli Castro ([phone]) .     - return to clinic in 3 months in " preparation for next dose of leuprolide    Samson Santiago M.D.  Hematology/Oncology  [facility]  [address], [address]  [place] [address]  Phone: [phone]  Fax: [phone]

## 2022-09-23 NOTE — NURSING
Patient tolerated Eligard injection well, reports noticed bumps on his head prior to appointment and forgot to show Dr. Santiago. Notified Dr. Santiago and Dr. Santiago came to chairside to assess. No new orders at this time. Next appointment schedule and pt d/c in no acute distress.

## 2022-11-30 NOTE — TELEPHONE ENCOUNTER
----- Message from Samson Santiago MD sent at 11/30/2022  9:45 AM CST -----  1. Move his labs/appt up to next week.  2. Schedule ct abdomen/pelvis without contrast next week if possible.    Thanks!

## 2022-11-30 NOTE — TELEPHONE ENCOUNTER
Spoke with the pt about his change in his appt for next week pt said that he check his my chart for hi appt

## 2022-12-06 NOTE — PROGRESS NOTES
"PATIENT: Domingo Castro  MRN: [id number]  DATE: 2022    Diagnosis:   1. 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 Secondary malignant neoplasm of bone    4. Chronic neoplasm-related pain    5. Immunodeficiency due to drugs    6. Anemia in end-stage renal disease    7. 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 Atherosclerosis of aorta      Chief Complaint: Prostate Cancer    Oncologic History:      Oncologic History 1. Prostate cancer - stage IV      Oncologic Treatment 1. Bicalutamide/leuprolide.      Pathology Diagnosed off imaging and PSA > 1000        Subjective:    History of Present Illness: Mr. Castro is a 77 y.o. male who presented in May 2022 for evaluation and management of lymphadenopathy. He was referred by Dr. Nugent.    - CT abdomen/pelvis (22) revealed "numerous enlarged retroperitoneal lymph node in the periaortic and aortocaval regions and along the bilateral iliac change."  - his mother and sister  from pancreatic cancer. His father had prostate cancer.  - in the past year, he has lost about 80 lbs.  - he started bicalutamide in May 2022.    Interval history:  - he presents for a follow-up appointment for his prostate cancer  - he underwent CT scan on 22  - he is scheduled for leuprolide injection on 22.  - he endorses fatigue, dyspnea upon exertion, acid reflux. His main complaint is the acid reflux, which is causing pain/poor appetite.   - He denies chest pain, nausea, vomiting, diarrhea, constipation.      Past medical, surgical, family, and social histories have been reviewed and updated below.    Past Medical History:   Past Medical History:   Diagnosis Date    Arthritis     Cataract     Chronic diastolic congestive heart failure     Coronary artery disease     Cystoid macular edema of both eyes     Diabetes mellitus type II     Diabetic retinopathy     Hyperlipemia     Hypertension     Retinal hole     Stage " 4 chronic kidney disease     Streptococcus pyogenes bacteremia 2018    Due to left toe osteomyelitis       Past Surgical History:   Past Surgical History:   Procedure Laterality Date    ABDOMINAL AORTOGRAPHY N/A 2019    Procedure: AORTOGRAM-ABDOMINAL;  Surgeon: Amish Hyatt MD;  Location: [facility] CATH LAB/EP;  Service: Cardiology;  Laterality: N/A;  CO2 angiography    ANGIOGRAPHY OF LOWER EXTREMITY Left 2019    Procedure: Angiogram Extremity Unilateral;  Surgeon: Amish Hyatt MD;  Location: [facility] CATH LAB/EP;  Service: Cardiology;  Laterality: Left;    AV FISTULA PLACEMENT Left 2022    Procedure: CREATION, AV FISTULA INSERTION GRAFT, LEFT UPPER FOREARM;  Surgeon: Daren Dubon MD;  Location: [facility] OR;  Service: General;  Laterality: Left;    CATARACT EXTRACTION W/  INTRAOCULAR LENS IMPLANT Left 12/15/14    Dr de la cruz    CATARACT EXTRACTION W/  INTRAOCULAR LENS IMPLANT Right 14    dorothy    CIRCUMCISION, NON-      focal laser right eye      INSERTION OF SHAH CATHETER Right 2021    Procedure: INSERTION, CATHETER, CENTRAL VENOUS, SHAH DUAL LUMEN;  Surgeon: Denys Aleman Jr., MD;  Location: [facility];  Service: General;  Laterality: Right;    INSERTION OF TUNNELED CENTRAL VENOUS CATHETER (CVC) WITH SUBCUTANEOUS PORT Right 2019    Procedure: [id number];  Surgeon: Denys Aleman Jr., MD;  Location: [facility];  Service: General;  Laterality: Right;    INSERTION OF TUNNELED CENTRAL VENOUS HEMODIALYSIS CATHETER Right 2021    Procedure: Insertion, Catheter, Central Venous, Hemodialysis;  Surgeon: Agata Rosado MD;  Location: [facility] CATH LAB;  Service: Interventional Nephrology;  Laterality: Right;    KNEE SURGERY      left    Left medial collateral ligament repair      REMOVAL OF HEMODIALYSIS CATHETER Left 2022    Procedure: REMOVAL, CATHETER, HEMODIALYSIS;  Surgeon: Daren Dubon MD;  Location: [facility] OR;  Service: General;  Laterality: Left;     TONSILLECTOMY         Family History:   Family History   Problem Relation Age of Onset    Heart disease Mother     Cancer Mother     Cancer Brother     Heart disease Father     Diabetes Father     Cancer Sister     Cataracts Paternal Grandmother     Glaucoma Paternal Grandmother     Blindness Neg Hx     Amblyopia Neg Hx     Hypertension Neg Hx     Macular degeneration Neg Hx     Retinal detachment Neg Hx     Strabismus Neg Hx        Social History:  reports that he has never smoked. He has never used smokeless tobacco. He reports that he does not drink alcohol and does not use drugs.    Allergies:  Review of patient's allergies indicates:   Allergen Reactions    Atorvastatin Other (See Comments)       Medications:  Current Outpatient Medications   Medication Sig Dispense Refill    acetaminophen (TYLENOL) 325 MG tablet Take 2 tablets (650 mg total) by mouth every 4 (four) hours as needed.  0    amLODIPine (NORVASC) 10 MG tablet Take 1 tablet (10 mg total) by mouth once daily. 90 tablet 3    aspirin (ECOTRIN) 81 MG EC tablet Take 81 mg by mouth once daily.      benzonatate (TESSALON) 100 MG capsule Take 1 capsule (100 mg total) by mouth 3 (three) times daily as needed for Cough. 30 capsule 1    bicalutamide (CASODEX) 50 MG Tab Take 1 tablet (50 mg total) by mouth once daily. 90 tablet 3    bicalutamide (CASODEX) 50 MG Tab 50 mg EVERY AM (route: oral)      calcium-vitamin D3 (OYSTER SHELL CALCIUM-VIT D3) 500 mg-5 mcg (200 unit) per tablet Take 1 tablet by mouth once daily. 90 tablet 3    ezetimibe (ZETIA) 10 mg tablet Take 1 tablet (10 mg total) by mouth once daily. 90 tablet 3    insulin aspart U-100 (NOVOLOG) 100 unit/mL (3 mL) InPn pen Inject 0-5 Units into the skin before meals and at bedtime as needed (Hyperglycemia). **LOW CORRECTION DOSE** Blood Glucose mg/dL Pre-meal 151-200, 0 unit; 201-250, take 2 units;  251-300, take 3 units; 301-350, take 4 units; greater than 350, take 5 units. 3 mL 0    insulin NPH  "isoph U-100 human (NOVOLIN N FLEXPEN) 100 unit/mL (3 mL) InPn Inject 100 Units into the skin as needed.      nebulizer and compressor Alana USE AS DIRECTED 1 each 0    nitroGLYCERIN (NITROSTAT) 0.4 MG SL tablet Place 1 tablet (0.4 mg total) under the tongue every 5 (five) minutes as needed for Chest pain. 25 tablet 0    olmesartan (BENICAR) 20 MG tablet Take 1 tablet (20 mg total) by mouth once daily. 90 tablet 1    pen needle, diabetic 31 gauge x 5/16" Ndle Use 4 (four) times daily as needed (high blood sugar - see sliding scale on discharge paperwork). 100 each 1    pen needle, diabetic, safety (BD AUTOSHIELD PEN NEEDLE) 29 gauge x 5/16" Ndle For use once daily with levemir flexpen 90 each 11    phenyleph-promethazine-cod 5-6.25-10 mg/5 ml (PROMETHAZINE VC-CODEINE) 6.25-5-10 mg/5 mL Syrp Take 6.25 mg of amoxicillin by mouth as needed.      pulse oximeter (PULSE OXIMETER) device by Apply Externally route 2 (two) times a day. Use twice daily at 8 AM and 3 PM and record the value in [employer] as directed. 1 each 0    rosuvastatin (CRESTOR) 40 MG Tab Take 1 tablet (40 mg total) by mouth every evening. 90 tablet 3     No current facility-administered medications for this visit.       Review of Systems   Constitutional:  Positive for appetite change. Negative for fatigue.   HENT:  Negative for sore throat.    Eyes:  Negative for visual disturbance.   Respiratory:  Negative for shortness of breath.    Cardiovascular:  Negative for chest pain.   Gastrointestinal:  Positive for abdominal pain.        Acid reflux   Genitourinary:  Negative for dysuria.        On dialysis but still urinates   Musculoskeletal:  Positive for back pain.   Skin:  Negative for rash.   Neurological:  Negative for headaches.   Hematological:  Negative for adenopathy.   Psychiatric/Behavioral:  The patient is not nervous/anxious.      ECOG Performance Status:   ECOG SCORE 1            Objective:      Vitals:   Vitals:    12/07/22 1137   BP: (!) 165/72 "   Pulse: 83   SpO2: 99%   Weight: 85.2 kg (187 lb 13.3 oz)     BMI: Body mass index is 23.17 kg/m².    Physical Exam  Vitals and nursing note reviewed.   Constitutional:       Appearance: He is well-developed.      Comments: Fatigued.   HENT:      Head: Normocephalic and atraumatic.   Eyes:      Pupils: Pupils are equal, round, and reactive to light.   Cardiovascular:      Rate and Rhythm: Normal rate and regular rhythm.   Pulmonary:      Effort: Pulmonary effort is normal.      Breath sounds: Normal breath sounds.   Abdominal:      General: Bowel sounds are normal.      Palpations: Abdomen is soft.   Musculoskeletal:         General: Normal range of motion.      Cervical back: Normal range of motion and neck supple.   Skin:     General: Skin is warm and dry.   Neurological:      Mental Status: He is alert and oriented to person, place, and time.   Psychiatric:         Behavior: Behavior normal.         Thought Content: Thought content normal.         Judgment: Judgment normal.       Laboratory Data:  Labs have been reviewed.    Lab Results   Component Value Date    WBC 7.76 12/05/2022    HGB 8.8 (L) 12/05/2022    HCT 27.2 (L) 12/05/2022    MCV 97 12/05/2022     12/05/2022           Imaging:     CT abdomen/pelvis (12/5/22): I have personally reviewed the images  Small left and trace right pleural effusions.  Bibasilar atelectasis/scarring.  Coronary artery calcifications.  There is a calcified granuloma in the right lower lobe.     The stomach, gallbladder, liver, spleen, pancreas, both adrenal glands are unremarkable.  Too small to characterize hypodensities in both kidneys likely represent cysts.  No hydronephrosis or nephrolithiasis.     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  Atherosclerotic calcifications are again seen.     No acute finding involving the small bowel.  Increase thickening of the rectum.  The appendix is visualized and is normal.     The bladder has an unremarkable appearance.   The prostate has an unremarkable appearance.     Retroperitoneal lymphadenopathy persists.     The representative right iliac chain lymph node measures 2 x 1.9 cm (series 2, image 87), previously 2.3 x 2 cm.     The representative left periaortic lymph node measures 1.6 x 1 cm (series 2, image 73, previously 2.1 x 1.7 cm.     The representative retrocaval lymph node measures 1.6 x 1.2 cm, previously 2.2 x 1.6 cm (series 2, image 57).     Worsening widespread sclerotic lesions.  The index lesion in the right femoral neck measures 1.7 x 1.6 cm (series 2, image 126).  No acute osseous finding.     Impression:     1. Metastatic lymphadenopathy has improved.  2. Increased size and number of widespread sclerotic osseous lesions when compared to prior CT.  This may represent progression of disease or evidence of treatment in light of the bone scan from June of this year that demonstrated widespread metastatic osseous lesions.    Bone scan (6/17/22):    There are multiple foci of increased radiotracer activity scattered throughout the skeletal system.  This will serve as the patient's baseline nuclear medicine study.     Impression:     There are multiple foci of increased radiotracer activity scattered throughout the skeletal system.  This is consistent with the patient's history and characteristic of metastatic prostate cancer.      CT abdomen/pelvis (4/25/22):    Small left and minimal right pleural effusions.  Coronary artery calcifications.     Normal liver.  The gallbladder is normal.     The spleen is normal in size and appearance.  The pancreas is normal.  The adrenal glands are normal.  Atherosclerotic calcifications of the abdominal aorta and iliac vasculature.  Aneurysm of the left internal iliac artery measures 2.2 cm in maximal cross-sectional dimension.  Normal IVC.     Numerous enlarged retroperitoneal lymph node in the periaortic and aortocaval regions and along the bilateral iliac change.  Representative  "left periaortic lymph node measures 2.1 by 1.7 cm (series 2, image 90).  Representative right retrocaval lymph node measures 2.2 by 1.6 cm (series 2, image 75).  Enlarged right common iliac artery lymph node measures 2.3 by 2.0 cm (series 2, image 106).     Normal stomach.  Normal small intestine.  Circumferential colonic mucosal thickening right hepatic flexure, right transverse colon and splenic flexure.  Findings nonspecific but most likely due to colonic collapse rather than colitis.     Bladder is mildly distended with mild circumferential bladder wall thickening.  Mildly enlarged prostate.     Mild diffuse anasarca.     The pelvis demonstrates mixed lytic and sclerotic lesions of the spine and pelvis worrisome for metastatic disease.     Impression:     1. 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  2. Retroperitoneal and pelvic adenopathy.  3. Circumferential colonic mucosal thickening most likely due to a collapsed colon rather than low-grade colitis.  4. Subcentimeter lytic and sclerotic lesions of the spine and pelvis worrisome for metastatic disease.  All CT scans at this facility are performed  using dose modulation techniques as appropriate to performed exam including the following:  automated exposure control; adjustment of mA and/or kV according to the patients size (this includes techniques or standardized protocols for targeted exams where dose is matched to indication/reason for exam: i.e. extremities or head);  iterative reconstruction technique.    Assessment:       1. 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 Secondary malignant neoplasm of bone    4. Chronic neoplasm-related pain    5. Immunodeficiency due to drugs    6. Anemia in end-stage renal disease    7. 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 Atherosclerosis of aorta           Plan:     1. Prostate cancer - stage IV  - CT abdomen/pelvis (4/25/22) revealed "numerous " "enlarged retroperitoneal lymph node in the periaortic and aortocaval regions and along the bilateral iliac chain." It also showed that "the pelvis demonstrates mixed lytic and sclerotic lesions of the spine and pelvis worrisome for metastatic disease."  - he has a strong family history of cancer (prostate cancer, pancreatic cancer).  - PSA (may 2022) was over 1000 ng/mL, suggesting metastatic prostate cancer.  - Bone scan (6/17/22) was positive for osseous metastases.  - he started bicalutamide in May 2022  - Labs have been reviewed. PSA has increased to 685.8 ng/mL. 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 Although, his testosterone was 65 ng/dL, which is not less than 50 ng/dL.  - proceed with dose #3 of leuprolide on 12/16/22. Will repeat PSA on that date. If it remains elevated, I will switch to enzalutamide or abiraterone  - continue bicalutamide  - cannot give zoledronic acid due to ESRD. May consider denosumab but that has increased risk of severe hypocalcemia as well in end-stage renal disease  - return to clinic in 1 month.    2. Anemia in ESRD  - hemoglobin on 2/14/22 was 8.7 g/dL.  - immunofixation (June 2021) was negative for monoclonal protein.  - anemia is from ESRD, prostate cancer  - Labs have been reviewed. Hemoglobin is 8.8 g/dL.    3. Type 2 diabetes  - last hemoglobin A1c (12/5/22) was 6.7%  - defer management to Dr. Nugent.    4. Atherosclerosis of aorta  - seen on CT abdomen/pelvis (4/25/22)  - continue aspirin, amlodipine, olmesartan, rosuvastatin    5. GERD  - start protonix    6. Advance Care Planning     Power of   After our discussion (at previous visit), the patient decided to complete a HCPOA and appointed his  wife Daniel Castro ([phone]), Daniel Matthew Deandra ([phone]), and Luli Castro ([phone]) .     - return to clinic in 1 month.    Samson Santiago, " M.D.  Hematology/Oncology  [facility] - [address], [address]  [place] [address]  Phone: [phone]  Fax: [phone]

## 2022-12-07 NOTE — Clinical Note
1. Schedule testosterone, psa on 12/16/22 at [place]. 2. Schedule labs cbc, cmp, testosterone, psa at [facility] in one month. 3. Schedule f/u appt 3-4 days after labs in one month.  Thanks!

## 2022-12-12 NOTE — PROCEDURES
"Routine Foot Care    Date/Time: 12/12/2022 9:30 AM  Performed by: Vale Merrill DPM  Authorized by: Vale Merrill DPM     Time out: Immediately prior to procedure a "time out" was called to verify the correct patient, procedure, equipment, support staff and site/side marked as required.    Consent Done?:  Yes (Verbal)  Hyperkeratotic Skin Lesions?: Yes    Number of trimmed lesions:  1  Location(s):  Left 1st Toe    Nail Care Type:  Debride  Location(s): All  (Left 1st Toe, Left 3rd Toe, Left 2nd Toe, Left 4th Toe, Left 5th Toe, Right 1st Toe, Right 2nd Toe, Right 3rd Toe, Right 4th Toe and Right 5th Toe)  Patient tolerance:  Patient tolerated the procedure well with no immediate complications  "

## 2022-12-12 NOTE — PROGRESS NOTES
Subjective:      Patient ID: Domingo Castro is a 77 y.o. male.    Chief Complaint: Diabetes Mellitus (PCP Dr. Nugent, 5/9/22), Diabetic Foot Exam, and Routine Foot Care    Domingo is a 77 y.o. male who presents to the clinic for evaluation and treatment of high risk feet. Domingo has a past medical history of Arthritis, Cataract, Chronic diastolic congestive heart failure, Coronary artery disease, Cystoid macular edema of both eyes, Diabetes mellitus type II, Diabetic retinopathy, Hyperlipemia, Hypertension, Retinal hole, Stage 4 chronic kidney disease, and Streptococcus pyogenes bacteremia (12/23/2018). The patient's chief complaint is diabetic foot ulcer, left hallux. This patient has documented high risk feet requiring routine maintenance secondary to peripheral vascular disease.  Discharged from [facility] on 08/04/2019.  Receiving 6 weeks of IV vancomycin per ID recommendations.  Follow-up per [facility] with 3 times weekly Medi Honey dressing changes. Accompanied by his daughter.  No new complaints. 09/19/2019:  Follow-up for chronic wound to left hallux treated for osteomyelitis.  Post endovascular intervention per .    3/21/22: Pt seen today for left great toe wound, states he has recently lost over 80 pounds, states he no longer has an appetite. States he is currently on dialysis. Continues to follow up with his PCP. No other pedal complaints.     3/28/22: Pt seen today for left great toe wound,  has been taking protein shakes 2x per day. No other pedal complaints. [facility] has follow up with primary care soon.     5/2/22: Pt seen today for RFC and for ulcer check. No new pedal complaints.    8/8/22: Pt seen today for RFC. Ulcer remains healed. Requesting for diabetic shoes. No other pedal complaints.      12/12/22: Pt seen today for routine foot care. States had a scab on his left great toe but it is gone today. Denies drainage or signs of infection. No further pedal complaints. Plan  "for annual foot exam during next visit.     PCP: Griselda Nugent MD    Date Last Seen by PCP: 4/18/22    Current shoe gear:  Affected Foot: Football and Darco shoe on the affected foot     Unaffected Foot: Rx diabetic extra depth shoes and custom accommodative insoles    History of Trauma: negative  Sign of Infection: none    Hemoglobin A1C   Date Value Ref Range Status   12/05/2022 6.7 (H) 4.0 - 5.6 % Final     Comment:     ADA Screening Guidelines:  5.7-6.4%  Consistent with prediabetes  >or=6.5%  Consistent with diabetes    High levels of fetal hemoglobin interfere with the HbA1C  assay. Heterozygous hemoglobin variants (HbS, HgC, etc)do  not significantly interfere with this assay.   However, presence of multiple variants may affect accuracy.     06/25/2021 7.1 (H) 4.0 - 5.6 % Final     Comment:     ADA Screening Guidelines:  5.7-6.4%  Consistent with prediabetes  >or=6.5%  Consistent with diabetes    High levels of fetal hemoglobin interfere with the HbA1C  assay. Heterozygous hemoglobin variants (HbS, HgC, etc)do  not significantly interfere with this assay.   However, presence of multiple variants may affect accuracy.     05/09/2021 8.5 (H) 4.0 - 5.6 % Final     Comment:     ADA Screening Guidelines:  5.7-6.4%  Consistent with prediabetes  >or=6.5%  Consistent with diabetes    High levels of fetal hemoglobin interfere with the HbA1C  assay. Heterozygous hemoglobin variants (HbS, HgC, etc)do  not significantly interfere with this assay.   However, presence of multiple variants may affect accuracy.       Vitals:    12/12/22 0938   Height: 6' 3.5" (1.918 m)   PainSc:   4   PainLoc: Foot      Past Medical History:   Diagnosis Date    Arthritis     Cataract     Chronic diastolic congestive heart failure     Coronary artery disease     Cystoid macular edema of both eyes     Diabetes mellitus type II     Diabetic retinopathy     Hyperlipemia     Hypertension     Retinal hole     Stage 4 chronic kidney disease     " Streptococcus pyogenes bacteremia 2018    Due to left toe osteomyelitis       Past Surgical History:   Procedure Laterality Date    ABDOMINAL AORTOGRAPHY N/A 2019    Procedure: AORTOGRAM-ABDOMINAL;  Surgeon: Amish Hyatt MD;  Location: [facility] CATH LAB/EP;  Service: Cardiology;  Laterality: N/A;  CO2 angiography    ANGIOGRAPHY OF LOWER EXTREMITY Left 2019    Procedure: Angiogram Extremity Unilateral;  Surgeon: Amish Hyatt MD;  Location: [facility] CATH LAB/EP;  Service: Cardiology;  Laterality: Left;    AV FISTULA PLACEMENT Left 2022    Procedure: CREATION, AV FISTULA INSERTION GRAFT, LEFT UPPER FOREARM;  Surgeon: Daren Dubon MD;  Location: [facility] OR;  Service: General;  Laterality: Left;    CATARACT EXTRACTION W/  INTRAOCULAR LENS IMPLANT Left 12/15/14    Dr de la cruz    CATARACT EXTRACTION W/  INTRAOCULAR LENS IMPLANT Right 14    dorothy    CIRCUMCISION, NON-      focal laser right eye      INSERTION OF SHAH CATHETER Right 2021    Procedure: INSERTION, CATHETER, CENTRAL VENOUS, SHAH DUAL LUMEN;  Surgeon: Denys Aleman Jr., MD;  Location: [facility];  Service: General;  Laterality: Right;    INSERTION OF TUNNELED CENTRAL VENOUS CATHETER (CVC) WITH SUBCUTANEOUS PORT Right 2019    Procedure: MPAADADNW-RISL-S-CATH;  Surgeon: Denys Aleman Jr., MD;  Location: [facility];  Service: General;  Laterality: Right;    INSERTION OF TUNNELED CENTRAL VENOUS HEMODIALYSIS CATHETER Right 2021    Procedure: Insertion, Catheter, Central Venous, Hemodialysis;  Surgeon: Agata Rosado MD;  Location: [facility] CATH LAB;  Service: Interventional Nephrology;  Laterality: Right;    KNEE SURGERY      left    Left medial collateral ligament repair      REMOVAL OF HEMODIALYSIS CATHETER Left 2022    Procedure: REMOVAL, CATHETER, HEMODIALYSIS;  Surgeon: Daren Dubon MD;  Location: [facility] OR;  Service: General;  Laterality: Left;    TONSILLECTOMY         Family History   Problem  Relation Age of Onset    Heart disease Mother     Cancer Mother     Cancer Brother     Heart disease Father     Diabetes Father     Cancer Sister     Cataracts Paternal Grandmother     Glaucoma Paternal Grandmother     Blindness Neg Hx     Amblyopia Neg Hx     Hypertension Neg Hx     Macular degeneration Neg Hx     Retinal detachment Neg Hx     Strabismus Neg Hx        Social History     Socioeconomic History    Marital status:    Tobacco Use    Smoking status: Never    Smokeless tobacco: Never   Substance and Sexual Activity    Alcohol use: No     Alcohol/week: 0.0 standard drinks    Drug use: No    Sexual activity: Yes     Partners: Female     Social Determinants of Health     Financial Resource Strain: Low Risk     Difficulty of Paying Living Expenses: Not hard at all   Food Insecurity: No Food Insecurity    Worried About Running Out of Food in the Last Year: Never true    Ran Out of Food in the Last Year: Never true   Transportation Needs: No Transportation Needs    Lack of Transportation (Medical): No    Lack of Transportation (Non-Medical): No   Physical Activity: Inactive    Days of Exercise per Week: 0 days    Minutes of Exercise per Session: 0 min   Stress: Stress Concern Present    Feeling of Stress : Rather much   Social Connections: Socially Integrated    Frequency of Communication with Friends and Family: Twice a week    Frequency of Social Gatherings with Friends and Family: More than three times a week    Attends [employer] Services: More than 4 times per year    Active Member of Clubs or Organizations: Yes    Attends Club or Organization Meetings: More than 4 times per year    Marital Status:    Housing Stability: Unknown    Unable to Pay for Housing in the Last Year: No    Unstable Housing in the Last Year: No       Current Outpatient Medications   Medication Sig Dispense Refill    acetaminophen (TYLENOL) 325 MG tablet Take 2 tablets (650 mg total) by mouth every 4 (four) hours as  "needed.  0    amLODIPine (NORVASC) 10 MG tablet Take 1 tablet (10 mg total) by mouth once daily. 90 tablet 3    aspirin (ECOTRIN) 81 MG EC tablet Take 81 mg by mouth once daily.      benzonatate (TESSALON) 100 MG capsule Take 1 capsule (100 mg total) by mouth 3 (three) times daily as needed for Cough. 30 capsule 1    bicalutamide (CASODEX) 50 MG Tab Take 1 tablet (50 mg total) by mouth once daily. 90 tablet 3    bicalutamide (CASODEX) 50 MG Tab 50 mg EVERY AM (route: oral)      calcium-vitamin D3 (OYSTER SHELL CALCIUM-VIT D3) 500 mg-5 mcg (200 unit) per tablet Take 1 tablet by mouth once daily. 90 tablet 3    ezetimibe (ZETIA) 10 mg tablet Take 1 tablet (10 mg total) by mouth once daily. 90 tablet 3    insulin NPH isoph U-100 human (NOVOLIN N FLEXPEN) 100 unit/mL (3 mL) InPn Inject 100 Units into the skin as needed.      nebulizer and compressor Alana USE AS DIRECTED 1 each 0    nitroGLYCERIN (NITROSTAT) 0.4 MG SL tablet Place 1 tablet (0.4 mg total) under the tongue every 5 (five) minutes as needed for Chest pain. 25 tablet 0    olmesartan (BENICAR) 20 MG tablet Take 1 tablet (20 mg total) by mouth once daily. 90 tablet 1    pantoprazole (PROTONIX) 40 MG tablet Take 1 tablet (40 mg total) by mouth once daily. 30 tablet 11    pen needle, diabetic 31 gauge x 5/16" Ndle Use 4 (four) times daily as needed (high blood sugar - see sliding scale on discharge paperwork). 100 each 1    pen needle, diabetic, safety (BD AUTOSHIELD PEN NEEDLE) 29 gauge x 5/16" Ndle For use once daily with levemir flexpen 90 each 11    phenyleph-promethazine-cod 5-6.25-10 mg/5 ml (PROMETHAZINE VC-CODEINE) 6.25-5-10 mg/5 mL Syrp Take 6.25 mg of amoxicillin by mouth as needed.      pulse oximeter (PULSE OXIMETER) device by Apply Externally route 2 (two) times a day. Use twice daily at 8 AM and 3 PM and record the value in [facility] as directed. 1 each 0    rosuvastatin (CRESTOR) 40 MG Tab Take 1 tablet (40 mg total) by mouth every evening. 90 tablet " 3    insulin aspart U-100 (NOVOLOG) 100 unit/mL (3 mL) InPn pen Inject 0-5 Units into the skin before meals and at bedtime as needed (Hyperglycemia). **LOW CORRECTION DOSE** Blood Glucose mg/dL Pre-meal 151-200, 0 unit; 201-250, take 2 units;  251-300, take 3 units; 301-350, take 4 units; greater than 350, take 5 units. 3 mL 0     No current facility-administered medications for this visit.       Review of patient's allergies indicates:   Allergen Reactions    Atorvastatin Other (See Comments)         Review of Systems   Constitutional: Negative for chills and fever.   HENT:  Negative for congestion and hearing loss.    Cardiovascular:  Negative for chest pain and claudication.   Respiratory:  Negative for cough and shortness of breath.    Skin:  Positive for color change, nail changes and poor wound healing.   Musculoskeletal:  Negative for back pain and joint pain.   Gastrointestinal:  Negative for nausea and vomiting.   Neurological:  Positive for numbness.   Psychiatric/Behavioral:  Negative for altered mental status.          Objective:      Physical Exam  Constitutional:       General: He is not in acute distress.     Appearance: He is not diaphoretic.   Cardiovascular:      Pulses:           Dorsalis pedis pulses are 1+ on the right side and 1+ on the left side.        Posterior tibial pulses are 1+ on the right side and 1+ on the left side.      Comments: Mild lower extremity edema bilateral. No hair growth bilateral lower extremity.  Musculoskeletal:      Comments: Semi reducible hallux malleus left hallux. Semi rigid clawtoe deformities toes 2-5 bilateral foot.    Mild cavus foot structure bilateral foot.   Feet:      Right foot:      Protective Sensation: 10 sites tested.  6 sites sensed.      Skin integrity: Dry skin present.      Toenail Condition: Right toenails are abnormally thick and long. Fungal disease present.     Left foot:      Protective Sensation: 10 sites tested.  6 sites sensed.      Skin  integrity: Callus and dry skin present. No ulcer.      Toenail Condition: Left toenails are abnormally thick and long. Fungal disease present.  Skin:     General: Skin is warm and dry.      Capillary Refill: Capillary refill takes 2 to 3 seconds.      Coloration: Skin is not pale.      Findings: No ecchymosis, erythema or rash.      Nails: There is no clubbing.      Comments: Nails 1-5 left and 1-5 right are elongated 4-5 mm, thickened, discolored brown yellow with loosening and underlying debris.      Raised hyperkeratotic skin distal left hallux, upon debridement, wound is healed. No acute signs of infection. Appear stable.      Neurological:      Mental Status: He is alert and oriented to person, place, and time.      Sensory: Sensory deficit present.     12/12/22: Wound remains healed  8/8/22: Wound is healed  5/2/22: Wound is healed.   3/28/22: Wound is healed.     Previous Images              Assessment:       Encounter Diagnoses   Name Primary?    PAD (peripheral artery disease) Yes    Healed ulcer of left foot on examination     Disease of nail     Type 2 diabetes mellitus with peripheral neuropathy     Type 2 diabetes mellitus with peripheral angiopathy     Corn or callus          Plan:       Domingo was seen today for diabetes mellitus, diabetic foot exam and routine foot care.    Diagnoses and all orders for this visit:    PAD (peripheral artery disease)  -     Routine Foot Care    Healed ulcer of left foot on examination    Disease of nail  -     Routine Foot Care    Type 2 diabetes mellitus with peripheral neuropathy  -     Routine Foot Care    Type 2 diabetes mellitus with peripheral angiopathy  -     Routine Foot Care    [place] or callus  -     Routine Foot Care      I counseled the patient on his conditions, their implications and medical management.    RFC per attached note. Wound remains healed    Previously rx diabetic shoes. Recommend wearing at all times while ambulating    Continue follow up with  Interventional Cardiology for PAD    Recommend Kerasal Nail Repair for thickened    Shoe inspection. Diabetic Foot Education. Patient reminded of the importance of good nutrition and blood sugar control to help prevent podiatric complications of diabetes. Patient instructed on proper foot hygeine. We discussed wearing proper shoe gear, daily foot inspections, never walking without protective shoe gear, never putting sharp instruments to feet.    RTC in 2-3 months, sooner PRN

## 2022-12-16 NOTE — Clinical Note
1. Let him/family know that the PSA had increased, so this suggests that the casodex is no longer working. I sent a prescription for new medication Xtandi to [facility]. He can start Xtandi whenever he receives it (may take a week or so to get it approved). Ask him to let me know when he receives it.  Thanks!

## 2022-12-16 NOTE — PROGRESS NOTES
PSA had increased, so this suggests that the casodex is no longer working. I sent a prescription for new medication Xtandi to [facility]. He can start Xtandi whenever he receives it (may take a week or so to get it approved). We will call to inform him/family of this information. We will ask him to let me know when he receives it.    Samson Santiago M.D.  Hematology/Oncology  [facility] - [address], [address]  [place] [address]  Phone: [phone]  Fax: [phone]

## 2022-12-16 NOTE — TELEPHONE ENCOUNTER
Spoke with the pt wife about the pt PSA results of her  test. His medication was sent to the special[facility] pharmacy so another medication was called in. The pt wife will reach out when he starts his medication    Patient

## 2022-12-16 NOTE — TELEPHONE ENCOUNTER
----- Message from Samson Santiago MD sent at 12/16/2022 12:38 PM CST -----  1. Let him/family know that the PSA had increased, so this suggests that the casodex is no longer working. I sent a prescription for new medication Xtandi to [facility]. He can start Xtandi whenever he receives it (may take a week or so to get it approved). Ask him to let me know when he receives it.    Thanks!

## 2022-12-19 NOTE — TELEPHONE ENCOUNTER
Xtandi has been approved until 12/16/2023  PA Case ID: [id number]    Spoke to rep and confirmed that pt is required to fill with [facility]

## 2023-01-01 ENCOUNTER — HOSPITAL ENCOUNTER (INPATIENT)
Facility: HOSPITAL | Age: 78
LOS: 1 days | DRG: 441 | End: 2023-01-20
Attending: EMERGENCY MEDICINE | Admitting: STUDENT IN AN ORGANIZED HEALTH CARE EDUCATION/TRAINING PROGRAM
Payer: MEDICARE

## 2023-01-01 ENCOUNTER — ANESTHESIA (OUTPATIENT)
Dept: CARDIOLOGY | Facility: HOSPITAL | Age: 78
DRG: 441 | End: 2023-01-01
Payer: MEDICARE

## 2023-01-01 ENCOUNTER — LAB VISIT (OUTPATIENT)
Dept: LAB | Facility: HOSPITAL | Age: 78
End: 2023-01-01
Attending: INTERNAL MEDICINE
Payer: MEDICARE

## 2023-01-01 ENCOUNTER — ANESTHESIA EVENT (OUTPATIENT)
Dept: CARDIOLOGY | Facility: HOSPITAL | Age: 78
DRG: 441 | End: 2023-01-01
Payer: MEDICARE

## 2023-01-01 ENCOUNTER — OFFICE VISIT (OUTPATIENT)
Dept: HEMATOLOGY/ONCOLOGY | Facility: CLINIC | Age: 78
End: 2023-01-01
Payer: MEDICARE

## 2023-01-01 VITALS
OXYGEN SATURATION: 100 % | HEIGHT: 75 IN | SYSTOLIC BLOOD PRESSURE: 131 MMHG | DIASTOLIC BLOOD PRESSURE: 59 MMHG | TEMPERATURE: 99 F | RESPIRATION RATE: 102 BRPM | WEIGHT: 194 LBS | BODY MASS INDEX: 24.12 KG/M2

## 2023-01-01 VITALS
DIASTOLIC BLOOD PRESSURE: 53 MMHG | HEART RATE: 70 BPM | WEIGHT: 183.88 LBS | SYSTOLIC BLOOD PRESSURE: 113 MMHG | BODY MASS INDEX: 22.68 KG/M2 | OXYGEN SATURATION: 97 %

## 2023-01-01 DIAGNOSIS — E11.22 TYPE 2 DIABETES MELLITUS WITH END-STAGE RENAL DISEASE: ICD-10-CM

## 2023-01-01 DIAGNOSIS — D72.829 LEUKOCYTOSIS, UNSPECIFIED TYPE: ICD-10-CM

## 2023-01-01 DIAGNOSIS — R10.9 ABDOMINAL PAIN: ICD-10-CM

## 2023-01-01 DIAGNOSIS — Z99.2 DEPENDENCE ON RENAL DIALYSIS: ICD-10-CM

## 2023-01-01 DIAGNOSIS — C61 PROSTATE CANCER: Primary | ICD-10-CM

## 2023-01-01 DIAGNOSIS — D63.1 ANEMIA IN END-STAGE RENAL DISEASE: ICD-10-CM

## 2023-01-01 DIAGNOSIS — C79.51 SECONDARY MALIGNANT NEOPLASM OF BONE: ICD-10-CM

## 2023-01-01 DIAGNOSIS — G89.3 CHRONIC NEOPLASM-RELATED PAIN: ICD-10-CM

## 2023-01-01 DIAGNOSIS — N25.81 SECONDARY HYPERPARATHYROIDISM OF RENAL ORIGIN: ICD-10-CM

## 2023-01-01 DIAGNOSIS — Z79.899 IMMUNODEFICIENCY DUE TO DRUGS: ICD-10-CM

## 2023-01-01 DIAGNOSIS — D84.821 IMMUNODEFICIENCY DUE TO DRUGS: ICD-10-CM

## 2023-01-01 DIAGNOSIS — C77.2 SECONDARY MALIGNANT NEOPLASM OF RETROPERITONEAL LYMPH NODES: ICD-10-CM

## 2023-01-01 DIAGNOSIS — C61 MALIGNANT NEOPLASM OF PROSTATE: ICD-10-CM

## 2023-01-01 DIAGNOSIS — N18.6 ANEMIA IN END-STAGE RENAL DISEASE: ICD-10-CM

## 2023-01-01 DIAGNOSIS — R52 GENERALIZED PAIN: ICD-10-CM

## 2023-01-01 DIAGNOSIS — N18.6 TYPE 2 DIABETES MELLITUS WITH END-STAGE RENAL DISEASE: ICD-10-CM

## 2023-01-01 DIAGNOSIS — I70.0 ATHEROSCLEROSIS OF AORTA: ICD-10-CM

## 2023-01-01 DIAGNOSIS — R07.9 CHEST PAIN: ICD-10-CM

## 2023-01-01 LAB
ALBUMIN SERPL BCP-MCNC: 2.3 G/DL (ref 3.5–5.2)
ALBUMIN SERPL BCP-MCNC: 2.4 G/DL (ref 3.5–5.2)
ALBUMIN SERPL BCP-MCNC: 2.7 G/DL (ref 3.5–5.2)
ALBUMIN SERPL BCP-MCNC: 3.2 G/DL (ref 3.5–5.2)
ALBUMIN SERPL BCP-MCNC: 4.1 G/DL (ref 3.5–5.2)
ALP SERPL-CCNC: 418 U/L (ref 55–135)
ALP SERPL-CCNC: 516 U/L (ref 55–135)
ALP SERPL-CCNC: 596 U/L (ref 55–135)
ALP SERPL-CCNC: 652 U/L (ref 38–126)
ALP SERPL-CCNC: 703 U/L (ref 55–135)
ALT SERPL W/O P-5'-P-CCNC: 58 U/L (ref 10–44)
ALT SERPL W/O P-5'-P-CCNC: 648 U/L (ref 10–44)
ALT SERPL W/O P-5'-P-CCNC: 9 U/L (ref 10–44)
ALT SERPL W/O P-5'-P-CCNC: <5 U/L (ref 10–44)
ALT SERPL W/O P-5'-P-CCNC: <5 U/L (ref 10–44)
ANION GAP SERPL CALC-SCNC: 11 MMOL/L (ref 8–16)
ANION GAP SERPL CALC-SCNC: 16 MMOL/L (ref 8–16)
ANION GAP SERPL CALC-SCNC: 18 MMOL/L (ref 8–16)
ANION GAP SERPL CALC-SCNC: 18 MMOL/L (ref 8–16)
ANION GAP SERPL CALC-SCNC: 20 MMOL/L (ref 8–16)
ANISOCYTOSIS BLD QL SMEAR: SLIGHT
ANISOCYTOSIS BLD QL SMEAR: SLIGHT
AST SERPL-CCNC: 1216 U/L (ref 10–40)
AST SERPL-CCNC: 13 U/L (ref 10–40)
AST SERPL-CCNC: 13 U/L (ref 10–40)
AST SERPL-CCNC: 18 U/L (ref 15–46)
AST SERPL-CCNC: 93 U/L (ref 10–40)
BACTERIA BLD CULT: ABNORMAL
BACTERIA UR CULT: ABNORMAL
BASO STIPL BLD QL SMEAR: ABNORMAL
BASO STIPL BLD QL SMEAR: ABNORMAL
BASOPHILS # BLD AUTO: 0.02 K/UL (ref 0–0.2)
BASOPHILS # BLD AUTO: 0.04 K/UL (ref 0–0.2)
BASOPHILS # BLD AUTO: 0.06 K/UL (ref 0–0.2)
BASOPHILS NFR BLD: 0 % (ref 0–1.9)
BASOPHILS NFR BLD: 0 % (ref 0–1.9)
BASOPHILS NFR BLD: 0.1 % (ref 0–1.9)
BASOPHILS NFR BLD: 0.2 % (ref 0–1.9)
BASOPHILS NFR BLD: 1 % (ref 0–1.9)
BILIRUB SERPL-MCNC: 0.4 MG/DL (ref 0.1–1)
BILIRUB SERPL-MCNC: 0.4 MG/DL (ref 0.1–1)
BILIRUB SERPL-MCNC: 0.5 MG/DL (ref 0.1–1)
BILIRUB SERPL-MCNC: 0.5 MG/DL (ref 0.1–1)
BILIRUB SERPL-MCNC: 0.6 MG/DL (ref 0.1–1)
BILIRUB UR QL STRIP: ABNORMAL
BUN SERPL-MCNC: 41 MG/DL (ref 8–23)
BUN SERPL-MCNC: 43 MG/DL (ref 8–23)
BUN SERPL-MCNC: 49 MG/DL (ref 8–23)
BUN SERPL-MCNC: 55 MG/DL (ref 8–23)
CALCIUM SERPL-MCNC: 10.2 MG/DL (ref 8.7–10.5)
CALCIUM SERPL-MCNC: 9 MG/DL (ref 8.7–10.5)
CALCIUM SERPL-MCNC: 9.1 MG/DL (ref 8.7–10.5)
CALCIUM SERPL-MCNC: 9.4 MG/DL (ref 8.7–10.5)
CALCIUM SERPL-MCNC: 9.7 MG/DL (ref 8.7–10.5)
CHLORIDE SERPL-SCNC: 90 MMOL/L (ref 95–110)
CHLORIDE SERPL-SCNC: 91 MMOL/L (ref 95–110)
CHLORIDE SERPL-SCNC: 91 MMOL/L (ref 95–110)
CHLORIDE SERPL-SCNC: 92 MMOL/L (ref 95–110)
CHLORIDE SERPL-SCNC: 96 MMOL/L (ref 95–110)
CLARITY UR: ABNORMAL
CO2 SERPL-SCNC: 20 MMOL/L (ref 23–29)
CO2 SERPL-SCNC: 21 MMOL/L (ref 23–29)
CO2 SERPL-SCNC: 22 MMOL/L (ref 23–29)
CO2 SERPL-SCNC: 22 MMOL/L (ref 23–29)
CO2 SERPL-SCNC: 31 MMOL/L (ref 23–29)
COLOR UR: ABNORMAL
COMPLEXED PSA SERPL-MCNC: 858.7 NG/ML (ref 0–4)
CREAT SERPL-MCNC: 4.74 MG/DL (ref 0.5–1.4)
CREAT SERPL-MCNC: 5 MG/DL (ref 0.5–1.4)
CREAT SERPL-MCNC: 5.8 MG/DL (ref 0.5–1.4)
CREAT SERPL-MCNC: 6.2 MG/DL (ref 0.5–1.4)
CREAT SERPL-MCNC: 6.6 MG/DL (ref 0.5–1.4)
DIFFERENTIAL METHOD: ABNORMAL
EOSINOPHIL # BLD AUTO: 0 K/UL (ref 0–0.5)
EOSINOPHIL NFR BLD: 0 % (ref 0–8)
EOSINOPHIL NFR BLD: 0.7 % (ref 0–8)
ERYTHROCYTE [DISTWIDTH] IN BLOOD BY AUTOMATED COUNT: 14.5 % (ref 11.5–14.5)
ERYTHROCYTE [DISTWIDTH] IN BLOOD BY AUTOMATED COUNT: 16.2 % (ref 11.5–14.5)
ERYTHROCYTE [DISTWIDTH] IN BLOOD BY AUTOMATED COUNT: 16.4 % (ref 11.5–14.5)
ERYTHROCYTE [DISTWIDTH] IN BLOOD BY AUTOMATED COUNT: 16.7 % (ref 11.5–14.5)
ERYTHROCYTE [DISTWIDTH] IN BLOOD BY AUTOMATED COUNT: 16.9 % (ref 11.5–14.5)
EST. GFR  (NO RACE VARIABLE): 11 ML/MIN/1.73 M^2
EST. GFR  (NO RACE VARIABLE): 12 ML/MIN/1.73 M^2
EST. GFR  (NO RACE VARIABLE): 8 ML/MIN/1.73 M^2
EST. GFR  (NO RACE VARIABLE): 9 ML/MIN/1.73 M^2
EST. GFR  (NO RACE VARIABLE): 9 ML/MIN/1.73 M^2
GIANT PLATELETS BLD QL SMEAR: PRESENT
GLUCOSE SERPL-MCNC: 186 MG/DL (ref 70–110)
GLUCOSE SERPL-MCNC: 205 MG/DL (ref 70–110)
GLUCOSE SERPL-MCNC: 210 MG/DL (ref 70–110)
GLUCOSE SERPL-MCNC: 218 MG/DL (ref 70–110)
GLUCOSE SERPL-MCNC: 228 MG/DL (ref 70–110)
GLUCOSE UR QL STRIP: ABNORMAL
HCT VFR BLD AUTO: 23.7 % (ref 40–54)
HCT VFR BLD AUTO: 24.9 % (ref 40–54)
HCT VFR BLD AUTO: 26.6 % (ref 40–54)
HCT VFR BLD AUTO: 27.1 % (ref 40–54)
HCT VFR BLD AUTO: 27.5 % (ref 40–54)
HGB BLD-MCNC: 7.3 G/DL (ref 14–18)
HGB BLD-MCNC: 7.8 G/DL (ref 14–18)
HGB BLD-MCNC: 8.2 G/DL (ref 14–18)
HGB BLD-MCNC: 8.5 G/DL (ref 14–18)
HGB BLD-MCNC: 8.7 G/DL (ref 14–18)
HGB UR QL STRIP: ABNORMAL
HYPOCHROMIA BLD QL SMEAR: ABNORMAL
HYPOCHROMIA BLD QL SMEAR: ABNORMAL
IMM GRANULOCYTES # BLD AUTO: 0.01 K/UL (ref 0–0.04)
IMM GRANULOCYTES # BLD AUTO: 0.11 K/UL (ref 0–0.04)
IMM GRANULOCYTES # BLD AUTO: 0.6 K/UL (ref 0–0.04)
IMM GRANULOCYTES # BLD AUTO: ABNORMAL K/UL (ref 0–0.04)
IMM GRANULOCYTES # BLD AUTO: ABNORMAL K/UL (ref 0–0.04)
IMM GRANULOCYTES NFR BLD AUTO: 0.2 % (ref 0–0.5)
IMM GRANULOCYTES NFR BLD AUTO: 0.5 % (ref 0–0.5)
IMM GRANULOCYTES NFR BLD AUTO: 3.4 % (ref 0–0.5)
IMM GRANULOCYTES NFR BLD AUTO: ABNORMAL % (ref 0–0.5)
IMM GRANULOCYTES NFR BLD AUTO: ABNORMAL % (ref 0–0.5)
INFLUENZA A, MOLECULAR: NEGATIVE
INFLUENZA B, MOLECULAR: NEGATIVE
KETONES UR QL STRIP: NEGATIVE
LACTATE SERPL-SCNC: 4.6 MMOL/L (ref 0.5–2.2)
LEUKOCYTE ESTERASE UR QL STRIP: ABNORMAL
LYMPHOCYTES # BLD AUTO: 1.1 K/UL (ref 1–4.8)
LYMPHOCYTES # BLD AUTO: 1.5 K/UL (ref 1–4.8)
LYMPHOCYTES # BLD AUTO: 2.1 K/UL (ref 1–4.8)
LYMPHOCYTES NFR BLD: 34.4 % (ref 18–48)
LYMPHOCYTES NFR BLD: 6 % (ref 18–48)
LYMPHOCYTES NFR BLD: 6.7 % (ref 18–48)
MAGNESIUM SERPL-MCNC: 2 MG/DL (ref 1.6–2.6)
MCH RBC QN AUTO: 29.8 PG (ref 27–31)
MCH RBC QN AUTO: 30 PG (ref 27–31)
MCH RBC QN AUTO: 30.1 PG (ref 27–31)
MCH RBC QN AUTO: 30.1 PG (ref 27–31)
MCH RBC QN AUTO: 30.4 PG (ref 27–31)
MCHC RBC AUTO-ENTMCNC: 30.8 G/DL (ref 32–36)
MCHC RBC AUTO-ENTMCNC: 30.8 G/DL (ref 32–36)
MCHC RBC AUTO-ENTMCNC: 31.3 G/DL (ref 32–36)
MCHC RBC AUTO-ENTMCNC: 31.4 G/DL (ref 32–36)
MCHC RBC AUTO-ENTMCNC: 31.6 G/DL (ref 32–36)
MCV RBC AUTO: 95 FL (ref 82–98)
MCV RBC AUTO: 96 FL (ref 82–98)
MCV RBC AUTO: 97 FL (ref 82–98)
MCV RBC AUTO: 97 FL (ref 82–98)
MCV RBC AUTO: 98 FL (ref 82–98)
MICROSCOPIC COMMENT: ABNORMAL
MONOCYTES # BLD AUTO: 0.6 K/UL (ref 0.3–1)
MONOCYTES # BLD AUTO: 0.9 K/UL (ref 0.3–1)
MONOCYTES # BLD AUTO: 1 K/UL (ref 0.3–1)
MONOCYTES NFR BLD: 4 % (ref 4–15)
MONOCYTES NFR BLD: 4 % (ref 4–15)
MONOCYTES NFR BLD: 4.2 % (ref 4–15)
MONOCYTES NFR BLD: 4.9 % (ref 4–15)
MONOCYTES NFR BLD: 9.9 % (ref 4–15)
NEUTROPHILS # BLD AUTO: 15.2 K/UL (ref 1.8–7.7)
NEUTROPHILS # BLD AUTO: 19.8 K/UL (ref 1.8–7.7)
NEUTROPHILS # BLD AUTO: 3.3 K/UL (ref 1.8–7.7)
NEUTROPHILS NFR BLD: 53.8 % (ref 38–73)
NEUTROPHILS NFR BLD: 85.6 % (ref 38–73)
NEUTROPHILS NFR BLD: 88.4 % (ref 38–73)
NEUTROPHILS NFR BLD: 90 % (ref 38–73)
NEUTROPHILS NFR BLD: 90 % (ref 38–73)
NITRITE UR QL STRIP: ABNORMAL
NRBC BLD-RTO: 0 /100 WBC
OVALOCYTES BLD QL SMEAR: ABNORMAL
OVALOCYTES BLD QL SMEAR: ABNORMAL
PH UR STRIP: 5 [PH] (ref 5–8)
PHOSPHATE SERPL-MCNC: 2.8 MG/DL (ref 2.7–4.5)
PLATELET # BLD AUTO: 171 K/UL (ref 150–450)
PLATELET # BLD AUTO: 187 K/UL (ref 150–450)
PLATELET # BLD AUTO: 198 K/UL (ref 150–450)
PLATELET # BLD AUTO: 212 K/UL (ref 150–450)
PLATELET # BLD AUTO: 250 K/UL (ref 150–450)
PLATELET BLD QL SMEAR: ABNORMAL
PLATELET BLD QL SMEAR: ABNORMAL
PMV BLD AUTO: 10.1 FL (ref 9.2–12.9)
PMV BLD AUTO: 10.5 FL (ref 9.2–12.9)
PMV BLD AUTO: 10.5 FL (ref 9.2–12.9)
PMV BLD AUTO: 10.8 FL (ref 9.2–12.9)
PMV BLD AUTO: 9.7 FL (ref 9.2–12.9)
POCT GLUCOSE: 152 MG/DL (ref 70–110)
POCT GLUCOSE: 187 MG/DL (ref 70–110)
POCT GLUCOSE: 197 MG/DL (ref 70–110)
POCT GLUCOSE: 204 MG/DL (ref 70–110)
POCT GLUCOSE: 206 MG/DL (ref 70–110)
POCT GLUCOSE: 206 MG/DL (ref 70–110)
POCT GLUCOSE: 211 MG/DL (ref 70–110)
POCT GLUCOSE: 217 MG/DL (ref 70–110)
POCT GLUCOSE: 217 MG/DL (ref 70–110)
POCT GLUCOSE: 219 MG/DL (ref 70–110)
POCT GLUCOSE: 228 MG/DL (ref 70–110)
POCT GLUCOSE: 259 MG/DL (ref 70–110)
POCT GLUCOSE: 260 MG/DL (ref 70–110)
POCT GLUCOSE: 266 MG/DL (ref 70–110)
POLYCHROMASIA BLD QL SMEAR: ABNORMAL
POLYCHROMASIA BLD QL SMEAR: ABNORMAL
POTASSIUM SERPL-SCNC: 3.5 MMOL/L (ref 3.5–5.1)
POTASSIUM SERPL-SCNC: 5.2 MMOL/L (ref 3.5–5.1)
POTASSIUM SERPL-SCNC: 5.4 MMOL/L (ref 3.5–5.1)
POTASSIUM SERPL-SCNC: 5.6 MMOL/L (ref 3.5–5.1)
POTASSIUM SERPL-SCNC: 6.1 MMOL/L (ref 3.5–5.1)
PROT SERPL-MCNC: 6.9 G/DL (ref 6–8.4)
PROT SERPL-MCNC: 6.9 G/DL (ref 6–8.4)
PROT SERPL-MCNC: 7.5 G/DL (ref 6–8.4)
PROT SERPL-MCNC: 8.1 G/DL (ref 6–8.4)
PROT SERPL-MCNC: 8.5 G/DL (ref 6–8.4)
PROT UR QL STRIP: ABNORMAL
RBC # BLD AUTO: 2.43 M/UL (ref 4.6–6.2)
RBC # BLD AUTO: 2.57 M/UL (ref 4.6–6.2)
RBC # BLD AUTO: 2.75 M/UL (ref 4.6–6.2)
RBC # BLD AUTO: 2.82 M/UL (ref 4.6–6.2)
RBC # BLD AUTO: 2.89 M/UL (ref 4.6–6.2)
RBC #/AREA URNS HPF: >100 /HPF (ref 0–4)
SARS-COV-2 RDRP RESP QL NAA+PROBE: NEGATIVE
SODIUM SERPL-SCNC: 128 MMOL/L (ref 136–145)
SODIUM SERPL-SCNC: 129 MMOL/L (ref 136–145)
SODIUM SERPL-SCNC: 132 MMOL/L (ref 136–145)
SODIUM SERPL-SCNC: 132 MMOL/L (ref 136–145)
SODIUM SERPL-SCNC: 138 MMOL/L (ref 136–145)
SP GR UR STRIP: ABNORMAL (ref 1–1.03)
SPECIMEN SOURCE: NORMAL
TESTOST SERPL-MCNC: 68 NG/DL (ref 304–1227)
URN SPEC COLLECT METH UR: ABNORMAL
UROBILINOGEN UR STRIP-ACNC: ABNORMAL EU/DL
UUN UR-MCNC: 38 MG/DL (ref 2–20)
VANCOMYCIN SERPL-MCNC: 10.7 UG/ML
WBC # BLD AUTO: 17.78 K/UL (ref 3.9–12.7)
WBC # BLD AUTO: 22.45 K/UL (ref 3.9–12.7)
WBC # BLD AUTO: 23.62 K/UL (ref 3.9–12.7)
WBC # BLD AUTO: 25.68 K/UL (ref 3.9–12.7)
WBC # BLD AUTO: 6.08 K/UL (ref 3.9–12.7)
WBC #/AREA URNS HPF: >100 /HPF (ref 0–5)

## 2023-01-01 PROCEDURE — 63600175 PHARM REV CODE 636 W HCPCS: Performed by: PHYSICIAN ASSISTANT

## 2023-01-01 PROCEDURE — 94761 N-INVAS EAR/PLS OXIMETRY MLT: CPT

## 2023-01-01 PROCEDURE — 99497 PR ADVNCD CARE PLAN 30 MIN: ICD-10-PCS | Mod: 25,,, | Performed by: STUDENT IN AN ORGANIZED HEALTH CARE EDUCATION/TRAINING PROGRAM

## 2023-01-01 PROCEDURE — 96376 TX/PRO/DX INJ SAME DRUG ADON: CPT

## 2023-01-01 PROCEDURE — 90935 HEMODIALYSIS ONE EVALUATION: CPT

## 2023-01-01 PROCEDURE — 1158F PR ADVANCE CARE PLANNING DISCUSS DOCUMENTED IN MEDICAL RECORD: ICD-10-PCS | Mod: CPTII,,, | Performed by: STUDENT IN AN ORGANIZED HEALTH CARE EDUCATION/TRAINING PROGRAM

## 2023-01-01 PROCEDURE — 99233 PR SUBSEQUENT HOSPITAL CARE,LEVL III: ICD-10-PCS | Mod: ,,, | Performed by: INTERNAL MEDICINE

## 2023-01-01 PROCEDURE — 1157F PR ADVANCE CARE PLAN OR EQUIV PRESENT IN MEDICAL RECORD: ICD-10-PCS | Mod: CPTII,S$GLB,, | Performed by: INTERNAL MEDICINE

## 2023-01-01 PROCEDURE — 1126F AMNT PAIN NOTED NONE PRSNT: CPT | Mod: CPTII,S$GLB,, | Performed by: INTERNAL MEDICINE

## 2023-01-01 PROCEDURE — 99232 PR SUBSEQUENT HOSPITAL CARE,LEVL II: ICD-10-PCS | Mod: ,,,

## 2023-01-01 PROCEDURE — 1159F PR MEDICATION LIST DOCUMENTED IN MEDICAL RECORD: ICD-10-PCS | Mod: CPTII,S$GLB,, | Performed by: INTERNAL MEDICINE

## 2023-01-01 PROCEDURE — 11000001 HC ACUTE MED/SURG PRIVATE ROOM

## 2023-01-01 PROCEDURE — 84403 ASSAY OF TOTAL TESTOSTERONE: CPT | Mod: PO | Performed by: INTERNAL MEDICINE

## 2023-01-01 PROCEDURE — 87077 CULTURE AEROBIC IDENTIFY: CPT | Performed by: PHYSICIAN ASSISTANT

## 2023-01-01 PROCEDURE — 63600175 PHARM REV CODE 636 W HCPCS: Performed by: STUDENT IN AN ORGANIZED HEALTH CARE EDUCATION/TRAINING PROGRAM

## 2023-01-01 PROCEDURE — 25000003 PHARM REV CODE 250: Performed by: CLINICAL NURSE SPECIALIST

## 2023-01-01 PROCEDURE — 87502 INFLUENZA DNA AMP PROBE: CPT

## 2023-01-01 PROCEDURE — 99223 1ST HOSP IP/OBS HIGH 75: CPT | Mod: ,,, | Performed by: STUDENT IN AN ORGANIZED HEALTH CARE EDUCATION/TRAINING PROGRAM

## 2023-01-01 PROCEDURE — 85025 COMPLETE CBC W/AUTO DIFF WBC: CPT | Performed by: PHYSICIAN ASSISTANT

## 2023-01-01 PROCEDURE — 80100016 HC MAINTENANCE HEMODIALYSIS

## 2023-01-01 PROCEDURE — 99223 1ST HOSP IP/OBS HIGH 75: CPT | Mod: ,,, | Performed by: INTERNAL MEDICINE

## 2023-01-01 PROCEDURE — 80053 COMPREHEN METABOLIC PANEL: CPT | Performed by: PHYSICIAN ASSISTANT

## 2023-01-01 PROCEDURE — 93010 EKG 12-LEAD: ICD-10-PCS | Mod: ,,, | Performed by: INTERNAL MEDICINE

## 2023-01-01 PROCEDURE — 3074F PR MOST RECENT SYSTOLIC BLOOD PRESSURE < 130 MM HG: ICD-10-PCS | Mod: CPTII,S$GLB,, | Performed by: INTERNAL MEDICINE

## 2023-01-01 PROCEDURE — 85025 COMPLETE CBC W/AUTO DIFF WBC: CPT | Mod: PO | Performed by: INTERNAL MEDICINE

## 2023-01-01 PROCEDURE — 63600175 PHARM REV CODE 636 W HCPCS: Performed by: EMERGENCY MEDICINE

## 2023-01-01 PROCEDURE — 31500 INSERT EMERGENCY AIRWAY: CPT | Performed by: ANESTHESIOLOGY

## 2023-01-01 PROCEDURE — 63600175 PHARM REV CODE 636 W HCPCS: Performed by: CLINICAL NURSE SPECIALIST

## 2023-01-01 PROCEDURE — 82962 GLUCOSE BLOOD TEST: CPT

## 2023-01-01 PROCEDURE — 99999 PR PBB SHADOW E&M-EST. PATIENT-LVL III: ICD-10-PCS | Mod: PBBFAC,,, | Performed by: INTERNAL MEDICINE

## 2023-01-01 PROCEDURE — 96366 THER/PROPH/DIAG IV INF ADDON: CPT

## 2023-01-01 PROCEDURE — 99223 PR INITIAL HOSPITAL CARE,LEVL III: ICD-10-PCS | Mod: ,,, | Performed by: INTERNAL MEDICINE

## 2023-01-01 PROCEDURE — 25000003 PHARM REV CODE 250: Performed by: EMERGENCY MEDICINE

## 2023-01-01 PROCEDURE — 25000003 PHARM REV CODE 250: Performed by: STUDENT IN AN ORGANIZED HEALTH CARE EDUCATION/TRAINING PROGRAM

## 2023-01-01 PROCEDURE — 96372 THER/PROPH/DIAG INJ SC/IM: CPT | Performed by: PHYSICIAN ASSISTANT

## 2023-01-01 PROCEDURE — 99498 PR ADVNCD CARE PLAN ADDL 30 MIN: ICD-10-PCS | Mod: ,,, | Performed by: STUDENT IN AN ORGANIZED HEALTH CARE EDUCATION/TRAINING PROGRAM

## 2023-01-01 PROCEDURE — 99232 SBSQ HOSP IP/OBS MODERATE 35: CPT | Mod: ,,,

## 2023-01-01 PROCEDURE — 99498 ADVNCD CARE PLAN ADDL 30 MIN: CPT | Mod: ,,, | Performed by: STUDENT IN AN ORGANIZED HEALTH CARE EDUCATION/TRAINING PROGRAM

## 2023-01-01 PROCEDURE — G0378 HOSPITAL OBSERVATION PER HR: HCPCS

## 2023-01-01 PROCEDURE — 99900035 HC TECH TIME PER 15 MIN (STAT)

## 2023-01-01 PROCEDURE — 93005 ELECTROCARDIOGRAM TRACING: CPT

## 2023-01-01 PROCEDURE — 96365 THER/PROPH/DIAG IV INF INIT: CPT

## 2023-01-01 PROCEDURE — 1101F PR PT FALLS ASSESS DOC 0-1 FALLS W/OUT INJ PAST YR: ICD-10-PCS | Mod: CPTII,S$GLB,, | Performed by: INTERNAL MEDICINE

## 2023-01-01 PROCEDURE — 36415 COLL VENOUS BLD VENIPUNCTURE: CPT | Performed by: HOSPITALIST

## 2023-01-01 PROCEDURE — 93010 ELECTROCARDIOGRAM REPORT: CPT | Mod: ,,, | Performed by: INTERNAL MEDICINE

## 2023-01-01 PROCEDURE — 87088 URINE BACTERIA CULTURE: CPT | Performed by: PHYSICIAN ASSISTANT

## 2023-01-01 PROCEDURE — U0002 COVID-19 LAB TEST NON-CDC: HCPCS | Performed by: EMERGENCY MEDICINE

## 2023-01-01 PROCEDURE — 3078F PR MOST RECENT DIASTOLIC BLOOD PRESSURE < 80 MM HG: ICD-10-PCS | Mod: CPTII,S$GLB,, | Performed by: INTERNAL MEDICINE

## 2023-01-01 PROCEDURE — 99233 SBSQ HOSP IP/OBS HIGH 50: CPT | Mod: ,,, | Performed by: INTERNAL MEDICINE

## 2023-01-01 PROCEDURE — 25000003 PHARM REV CODE 250: Performed by: INTERNAL MEDICINE

## 2023-01-01 PROCEDURE — 25000003 PHARM REV CODE 250: Performed by: PHYSICIAN ASSISTANT

## 2023-01-01 PROCEDURE — 1160F RVW MEDS BY RX/DR IN RCRD: CPT | Mod: CPTII,S$GLB,, | Performed by: INTERNAL MEDICINE

## 2023-01-01 PROCEDURE — 87502 INFLUENZA DNA AMP PROBE: CPT | Performed by: EMERGENCY MEDICINE

## 2023-01-01 PROCEDURE — 36415 COLL VENOUS BLD VENIPUNCTURE: CPT | Performed by: PHYSICIAN ASSISTANT

## 2023-01-01 PROCEDURE — 99215 PR OFFICE/OUTPT VISIT, EST, LEVL V, 40-54 MIN: ICD-10-PCS | Mod: S$GLB,,, | Performed by: INTERNAL MEDICINE

## 2023-01-01 PROCEDURE — 99497 ADVNCD CARE PLAN 30 MIN: CPT | Mod: 25,,, | Performed by: STUDENT IN AN ORGANIZED HEALTH CARE EDUCATION/TRAINING PROGRAM

## 2023-01-01 PROCEDURE — 99999 PR PBB SHADOW E&M-EST. PATIENT-LVL III: CPT | Mod: PBBFAC,,, | Performed by: INTERNAL MEDICINE

## 2023-01-01 PROCEDURE — 94002 VENT MGMT INPAT INIT DAY: CPT

## 2023-01-01 PROCEDURE — 36415 COLL VENOUS BLD VENIPUNCTURE: CPT | Performed by: STUDENT IN AN ORGANIZED HEALTH CARE EDUCATION/TRAINING PROGRAM

## 2023-01-01 PROCEDURE — 3288F PR FALLS RISK ASSESSMENT DOCUMENTED: ICD-10-PCS | Mod: CPTII,S$GLB,, | Performed by: INTERNAL MEDICINE

## 2023-01-01 PROCEDURE — 3074F SYST BP LT 130 MM HG: CPT | Mod: CPTII,S$GLB,, | Performed by: INTERNAL MEDICINE

## 2023-01-01 PROCEDURE — 1157F ADVNC CARE PLAN IN RCRD: CPT | Mod: CPTII,S$GLB,, | Performed by: INTERNAL MEDICINE

## 2023-01-01 PROCEDURE — 84153 ASSAY OF PSA TOTAL: CPT | Performed by: INTERNAL MEDICINE

## 2023-01-01 PROCEDURE — 3078F DIAST BP <80 MM HG: CPT | Mod: CPTII,S$GLB,, | Performed by: INTERNAL MEDICINE

## 2023-01-01 PROCEDURE — 1152F DOC ADVNCD DIS COMFORT 1ST: CPT | Mod: CPTII,,, | Performed by: STUDENT IN AN ORGANIZED HEALTH CARE EDUCATION/TRAINING PROGRAM

## 2023-01-01 PROCEDURE — 87040 BLOOD CULTURE FOR BACTERIA: CPT | Mod: 59 | Performed by: PHYSICIAN ASSISTANT

## 2023-01-01 PROCEDURE — 87086 URINE CULTURE/COLONY COUNT: CPT | Performed by: PHYSICIAN ASSISTANT

## 2023-01-01 PROCEDURE — 3288F FALL RISK ASSESSMENT DOCD: CPT | Mod: CPTII,S$GLB,, | Performed by: INTERNAL MEDICINE

## 2023-01-01 PROCEDURE — 85007 BL SMEAR W/DIFF WBC COUNT: CPT | Mod: NCS | Performed by: PHYSICIAN ASSISTANT

## 2023-01-01 PROCEDURE — 87186 SC STD MICRODIL/AGAR DIL: CPT | Performed by: PHYSICIAN ASSISTANT

## 2023-01-01 PROCEDURE — 87040 BLOOD CULTURE FOR BACTERIA: CPT | Performed by: STUDENT IN AN ORGANIZED HEALTH CARE EDUCATION/TRAINING PROGRAM

## 2023-01-01 PROCEDURE — 99223 PR INITIAL HOSPITAL CARE,LEVL III: ICD-10-PCS | Mod: ,,, | Performed by: STUDENT IN AN ORGANIZED HEALTH CARE EDUCATION/TRAINING PROGRAM

## 2023-01-01 PROCEDURE — 1160F PR REVIEW ALL MEDS BY PRESCRIBER/CLIN PHARMACIST DOCUMENTED: ICD-10-PCS | Mod: CPTII,S$GLB,, | Performed by: INTERNAL MEDICINE

## 2023-01-01 PROCEDURE — 96365 THER/PROPH/DIAG IV INF INIT: CPT | Mod: 59

## 2023-01-01 PROCEDURE — 25000003 PHARM REV CODE 250: Performed by: HOSPITALIST

## 2023-01-01 PROCEDURE — 85027 COMPLETE CBC AUTOMATED: CPT | Performed by: PHYSICIAN ASSISTANT

## 2023-01-01 PROCEDURE — 80053 COMPREHEN METABOLIC PANEL: CPT | Mod: PO | Performed by: INTERNAL MEDICINE

## 2023-01-01 PROCEDURE — 1126F PR PAIN SEVERITY QUANTIFIED, NO PAIN PRESENT: ICD-10-PCS | Mod: CPTII,S$GLB,, | Performed by: INTERNAL MEDICINE

## 2023-01-01 PROCEDURE — 1159F MED LIST DOCD IN RCRD: CPT | Mod: CPTII,S$GLB,, | Performed by: INTERNAL MEDICINE

## 2023-01-01 PROCEDURE — 63600175 PHARM REV CODE 636 W HCPCS: Performed by: HOSPITALIST

## 2023-01-01 PROCEDURE — 96367 TX/PROPH/DG ADDL SEQ IV INF: CPT

## 2023-01-01 PROCEDURE — 99215 OFFICE O/P EST HI 40 MIN: CPT | Mod: S$GLB,,, | Performed by: INTERNAL MEDICINE

## 2023-01-01 PROCEDURE — 81000 URINALYSIS NONAUTO W/SCOPE: CPT | Performed by: PHYSICIAN ASSISTANT

## 2023-01-01 PROCEDURE — 1158F ADVNC CARE PLAN TLK DOCD: CPT | Mod: CPTII,,, | Performed by: STUDENT IN AN ORGANIZED HEALTH CARE EDUCATION/TRAINING PROGRAM

## 2023-01-01 PROCEDURE — 99285 EMERGENCY DEPT VISIT HI MDM: CPT | Mod: 25

## 2023-01-01 PROCEDURE — 1152F PR DOC ADVANCED DISEASE DX, GOAL OF CARE PRIORITIZE COMFORT: ICD-10-PCS | Mod: CPTII,,, | Performed by: STUDENT IN AN ORGANIZED HEALTH CARE EDUCATION/TRAINING PROGRAM

## 2023-01-01 PROCEDURE — 96375 TX/PRO/DX INJ NEW DRUG ADDON: CPT

## 2023-01-01 PROCEDURE — 96372 THER/PROPH/DIAG INJ SC/IM: CPT | Performed by: CLINICAL NURSE SPECIALIST

## 2023-01-01 PROCEDURE — 80202 ASSAY OF VANCOMYCIN: CPT | Performed by: HOSPITALIST

## 2023-01-01 PROCEDURE — 36415 COLL VENOUS BLD VENIPUNCTURE: CPT | Mod: PO | Performed by: INTERNAL MEDICINE

## 2023-01-01 PROCEDURE — 83735 ASSAY OF MAGNESIUM: CPT | Performed by: PHYSICIAN ASSISTANT

## 2023-01-01 PROCEDURE — 83605 ASSAY OF LACTIC ACID: CPT | Performed by: STUDENT IN AN ORGANIZED HEALTH CARE EDUCATION/TRAINING PROGRAM

## 2023-01-01 PROCEDURE — 1101F PT FALLS ASSESS-DOCD LE1/YR: CPT | Mod: CPTII,S$GLB,, | Performed by: INTERNAL MEDICINE

## 2023-01-01 PROCEDURE — 84100 ASSAY OF PHOSPHORUS: CPT | Performed by: PHYSICIAN ASSISTANT

## 2023-01-01 RX ORDER — INSULIN ASPART 100 [IU]/ML
1-10 INJECTION, SOLUTION INTRAVENOUS; SUBCUTANEOUS
Status: DISCONTINUED | OUTPATIENT
Start: 2023-01-01 | End: 2023-01-01 | Stop reason: HOSPADM

## 2023-01-01 RX ORDER — ONDANSETRON 2 MG/ML
4 INJECTION INTRAMUSCULAR; INTRAVENOUS
Status: COMPLETED | OUTPATIENT
Start: 2023-01-01 | End: 2023-01-01

## 2023-01-01 RX ORDER — MAGNESIUM SULFATE HEPTAHYDRATE 500 MG/ML
INJECTION, SOLUTION INTRAMUSCULAR; INTRAVENOUS CODE/TRAUMA/SEDATION MEDICATION
Status: COMPLETED | OUTPATIENT
Start: 2023-01-01 | End: 2023-01-01

## 2023-01-01 RX ORDER — AMIODARONE HYDROCHLORIDE 150 MG/3ML
INJECTION, SOLUTION INTRAVENOUS CODE/TRAUMA/SEDATION MEDICATION
Status: COMPLETED | OUTPATIENT
Start: 2023-01-01 | End: 2023-01-01

## 2023-01-01 RX ORDER — NALOXONE HCL 0.4 MG/ML
0.02 VIAL (ML) INJECTION
Status: DISCONTINUED | OUTPATIENT
Start: 2023-01-01 | End: 2023-01-01 | Stop reason: HOSPADM

## 2023-01-01 RX ORDER — ACETAMINOPHEN 325 MG/1
650 TABLET ORAL EVERY 8 HOURS PRN
Status: DISCONTINUED | OUTPATIENT
Start: 2023-01-01 | End: 2023-01-01 | Stop reason: HOSPADM

## 2023-01-01 RX ORDER — PROPOFOL 10 MG/ML
0-50 INJECTION, EMULSION INTRAVENOUS CONTINUOUS
Status: CANCELLED | OUTPATIENT
Start: 2023-01-01

## 2023-01-01 RX ORDER — MORPHINE SULFATE 4 MG/ML
4 INJECTION, SOLUTION INTRAMUSCULAR; INTRAVENOUS
Status: COMPLETED | OUTPATIENT
Start: 2023-01-01 | End: 2023-01-01

## 2023-01-01 RX ORDER — GLUCAGON 1 MG
1 KIT INJECTION
Status: DISCONTINUED | OUTPATIENT
Start: 2023-01-01 | End: 2023-01-01 | Stop reason: HOSPADM

## 2023-01-01 RX ORDER — BISACODYL 10 MG
10 SUPPOSITORY, RECTAL RECTAL DAILY PRN
Status: DISCONTINUED | OUTPATIENT
Start: 2023-01-01 | End: 2023-01-01 | Stop reason: HOSPADM

## 2023-01-01 RX ORDER — EPINEPHRINE 0.1 MG/ML
INJECTION INTRAVENOUS CODE/TRAUMA/SEDATION MEDICATION
Status: COMPLETED | OUTPATIENT
Start: 2023-01-01 | End: 2023-01-01

## 2023-01-01 RX ORDER — TALC
9 POWDER (GRAM) TOPICAL NIGHTLY PRN
Status: DISCONTINUED | OUTPATIENT
Start: 2023-01-01 | End: 2023-01-01

## 2023-01-01 RX ORDER — OXYCODONE HYDROCHLORIDE 5 MG/1
5 TABLET ORAL EVERY 4 HOURS PRN
Status: DISCONTINUED | OUTPATIENT
Start: 2023-01-01 | End: 2023-01-01

## 2023-01-01 RX ORDER — SODIUM CHLORIDE 9 MG/ML
INJECTION, SOLUTION INTRAVENOUS ONCE
Status: DISCONTINUED | OUTPATIENT
Start: 2023-01-01 | End: 2023-01-01 | Stop reason: HOSPADM

## 2023-01-01 RX ORDER — NALOXONE HCL 0.4 MG/ML
0.02 VIAL (ML) INJECTION
Status: DISCONTINUED | OUTPATIENT
Start: 2023-01-01 | End: 2023-01-01

## 2023-01-01 RX ORDER — SODIUM CHLORIDE 9 MG/ML
INJECTION, SOLUTION INTRAVENOUS
Status: DISCONTINUED | OUTPATIENT
Start: 2023-01-01 | End: 2023-01-01 | Stop reason: HOSPADM

## 2023-01-01 RX ORDER — GLUCAGON 1 MG
1 KIT INJECTION
Status: DISCONTINUED | OUTPATIENT
Start: 2023-01-01 | End: 2023-01-01 | Stop reason: SDUPTHER

## 2023-01-01 RX ORDER — HYDROCODONE BITARTRATE AND ACETAMINOPHEN 5; 325 MG/1; MG/1
1 TABLET ORAL EVERY 6 HOURS PRN
Status: DISCONTINUED | OUTPATIENT
Start: 2023-01-01 | End: 2023-01-01

## 2023-01-01 RX ORDER — ASPIRIN 81 MG/1
81 TABLET ORAL DAILY
Status: DISCONTINUED | OUTPATIENT
Start: 2023-01-01 | End: 2023-01-01 | Stop reason: HOSPADM

## 2023-01-01 RX ORDER — ENOXAPARIN SODIUM 100 MG/ML
40 INJECTION SUBCUTANEOUS EVERY 24 HOURS
Status: DISCONTINUED | OUTPATIENT
Start: 2023-01-01 | End: 2023-01-01

## 2023-01-01 RX ORDER — IBUPROFEN 200 MG
24 TABLET ORAL
Status: DISCONTINUED | OUTPATIENT
Start: 2023-01-01 | End: 2023-01-01 | Stop reason: HOSPADM

## 2023-01-01 RX ORDER — FERROUS SULFATE, DRIED 160(50) MG
1 TABLET, EXTENDED RELEASE ORAL DAILY
Status: DISCONTINUED | OUTPATIENT
Start: 2023-01-01 | End: 2023-01-01 | Stop reason: HOSPADM

## 2023-01-01 RX ORDER — OXYCODONE HYDROCHLORIDE 5 MG/1
5 TABLET ORAL EVERY 12 HOURS PRN
Status: DISCONTINUED | OUTPATIENT
Start: 2023-01-01 | End: 2023-01-01 | Stop reason: HOSPADM

## 2023-01-01 RX ORDER — OXYCODONE HCL 10 MG/1
10 TABLET, FILM COATED, EXTENDED RELEASE ORAL EVERY 12 HOURS
Status: DISCONTINUED | OUTPATIENT
Start: 2023-01-01 | End: 2023-01-01

## 2023-01-01 RX ORDER — IBUPROFEN 200 MG
16 TABLET ORAL
Status: DISCONTINUED | OUTPATIENT
Start: 2023-01-01 | End: 2023-01-01 | Stop reason: SDUPTHER

## 2023-01-01 RX ORDER — INSULIN ASPART 100 [IU]/ML
0-5 INJECTION, SOLUTION INTRAVENOUS; SUBCUTANEOUS
Status: ON HOLD | COMMUNITY
End: 2023-01-01 | Stop reason: HOSPADM

## 2023-01-01 RX ORDER — AMLODIPINE BESYLATE 5 MG/1
10 TABLET ORAL DAILY
Status: DISCONTINUED | OUTPATIENT
Start: 2023-01-01 | End: 2023-01-01

## 2023-01-01 RX ORDER — ONDANSETRON 2 MG/ML
4 INJECTION INTRAMUSCULAR; INTRAVENOUS EVERY 8 HOURS PRN
Status: DISCONTINUED | OUTPATIENT
Start: 2023-01-01 | End: 2023-01-01 | Stop reason: HOSPADM

## 2023-01-01 RX ORDER — MUPIROCIN 20 MG/G
OINTMENT TOPICAL 2 TIMES DAILY
Status: DISCONTINUED | OUTPATIENT
Start: 2023-01-01 | End: 2023-01-01 | Stop reason: HOSPADM

## 2023-01-01 RX ORDER — SODIUM BICARBONATE 1 MEQ/ML
SYRINGE (ML) INTRAVENOUS CODE/TRAUMA/SEDATION MEDICATION
Status: COMPLETED | OUTPATIENT
Start: 2023-01-01 | End: 2023-01-01

## 2023-01-01 RX ORDER — MORPHINE SULFATE 15 MG/1
15 TABLET, FILM COATED, EXTENDED RELEASE ORAL EVERY 12 HOURS
Status: DISCONTINUED | OUTPATIENT
Start: 2023-01-01 | End: 2023-01-01

## 2023-01-01 RX ORDER — NALOXONE HCL 0.4 MG/ML
VIAL (ML) INJECTION
Status: DISCONTINUED
Start: 2023-01-01 | End: 2023-01-01 | Stop reason: HOSPADM

## 2023-01-01 RX ORDER — IBUPROFEN 200 MG
24 TABLET ORAL
Status: DISCONTINUED | OUTPATIENT
Start: 2023-01-01 | End: 2023-01-01 | Stop reason: SDUPTHER

## 2023-01-01 RX ORDER — HEPARIN SODIUM 5000 [USP'U]/ML
5000 INJECTION, SOLUTION INTRAVENOUS; SUBCUTANEOUS EVERY 8 HOURS
Status: DISCONTINUED | OUTPATIENT
Start: 2023-01-01 | End: 2023-01-01 | Stop reason: HOSPADM

## 2023-01-01 RX ORDER — INSULIN ASPART 100 [IU]/ML
0-5 INJECTION, SOLUTION INTRAVENOUS; SUBCUTANEOUS
Status: DISCONTINUED | OUTPATIENT
Start: 2023-01-01 | End: 2023-01-01

## 2023-01-01 RX ORDER — INSULIN ASPART 100 [IU]/ML
1-10 INJECTION, SOLUTION INTRAVENOUS; SUBCUTANEOUS
Status: DISCONTINUED | OUTPATIENT
Start: 2023-01-01 | End: 2023-01-01

## 2023-01-01 RX ORDER — SODIUM CHLORIDE 9 MG/ML
1000 INJECTION, SOLUTION INTRAVENOUS
Status: DISCONTINUED | OUTPATIENT
Start: 2023-01-01 | End: 2023-01-01

## 2023-01-01 RX ORDER — CALCIUM CHLORIDE INJECTION 100 MG/ML
INJECTION, SOLUTION INTRAVENOUS CODE/TRAUMA/SEDATION MEDICATION
Status: COMPLETED | OUTPATIENT
Start: 2023-01-01 | End: 2023-01-01

## 2023-01-01 RX ORDER — IBUPROFEN 200 MG
16 TABLET ORAL
Status: DISCONTINUED | OUTPATIENT
Start: 2023-01-01 | End: 2023-01-01 | Stop reason: HOSPADM

## 2023-01-01 RX ORDER — SIMETHICONE 80 MG
1 TABLET,CHEWABLE ORAL 4 TIMES DAILY PRN
Status: DISCONTINUED | OUTPATIENT
Start: 2023-01-01 | End: 2023-01-01 | Stop reason: HOSPADM

## 2023-01-01 RX ORDER — ACETAMINOPHEN 325 MG/1
650 TABLET ORAL EVERY 4 HOURS PRN
Status: DISCONTINUED | OUTPATIENT
Start: 2023-01-01 | End: 2023-01-01

## 2023-01-01 RX ORDER — POLYETHYLENE GLYCOL 3350 17 G/17G
17 POWDER, FOR SOLUTION ORAL 2 TIMES DAILY PRN
Status: DISCONTINUED | OUTPATIENT
Start: 2023-01-01 | End: 2023-01-01 | Stop reason: HOSPADM

## 2023-01-01 RX ORDER — PROPOFOL 10 MG/ML
0-50 INJECTION, EMULSION INTRAVENOUS CONTINUOUS
Status: DISCONTINUED | OUTPATIENT
Start: 2023-01-01 | End: 2023-01-01 | Stop reason: HOSPADM

## 2023-01-01 RX ORDER — SODIUM CHLORIDE 0.9 % (FLUSH) 0.9 %
10 SYRINGE (ML) INJECTION EVERY 12 HOURS PRN
Status: DISCONTINUED | OUTPATIENT
Start: 2023-01-01 | End: 2023-01-01 | Stop reason: HOSPADM

## 2023-01-01 RX ORDER — IPRATROPIUM BROMIDE AND ALBUTEROL SULFATE 2.5; .5 MG/3ML; MG/3ML
3 SOLUTION RESPIRATORY (INHALATION) EVERY 4 HOURS PRN
Status: DISCONTINUED | OUTPATIENT
Start: 2023-01-01 | End: 2023-01-01 | Stop reason: HOSPADM

## 2023-01-01 RX ORDER — NITROGLYCERIN 0.4 MG/1
0.4 TABLET SUBLINGUAL EVERY 5 MIN PRN
Status: DISCONTINUED | OUTPATIENT
Start: 2023-01-01 | End: 2023-01-01 | Stop reason: HOSPADM

## 2023-01-01 RX ORDER — PANTOPRAZOLE SODIUM 40 MG/1
40 TABLET, DELAYED RELEASE ORAL DAILY
Status: DISCONTINUED | OUTPATIENT
Start: 2023-01-01 | End: 2023-01-01 | Stop reason: HOSPADM

## 2023-01-01 RX ORDER — TALC
9 POWDER (GRAM) TOPICAL NIGHTLY
Status: DISCONTINUED | OUTPATIENT
Start: 2023-01-01 | End: 2023-01-01 | Stop reason: HOSPADM

## 2023-01-01 RX ADMIN — EPINEPHRINE 1 MG: 0.1 INJECTION INTRACARDIAC; INTRAVENOUS at 05:01

## 2023-01-01 RX ADMIN — HEPARIN SODIUM 5000 UNITS: 5000 INJECTION INTRAVENOUS; SUBCUTANEOUS at 09:01

## 2023-01-01 RX ADMIN — SODIUM ZIRCONIUM CYCLOSILICATE 10 G: 5 POWDER, FOR SUSPENSION ORAL at 05:01

## 2023-01-01 RX ADMIN — ONDANSETRON HYDROCHLORIDE 4 MG: 2 SOLUTION INTRAMUSCULAR; INTRAVENOUS at 02:01

## 2023-01-01 RX ADMIN — OXYCODONE HYDROCHLORIDE 10 MG: 10 TABLET, FILM COATED, EXTENDED RELEASE ORAL at 08:01

## 2023-01-01 RX ADMIN — SODIUM BICARBONATE 50 MEQ: 84 INJECTION, SOLUTION INTRAVENOUS at 05:01

## 2023-01-01 RX ADMIN — ASPIRIN 81 MG: 81 TABLET, COATED ORAL at 09:01

## 2023-01-01 RX ADMIN — VANCOMYCIN HYDROCHLORIDE 750 MG: 750 INJECTION, POWDER, LYOPHILIZED, FOR SOLUTION INTRAVENOUS at 05:01

## 2023-01-01 RX ADMIN — PIPERACILLIN AND TAZOBACTAM 4.5 G: 4; .5 INJECTION, POWDER, LYOPHILIZED, FOR SOLUTION INTRAVENOUS; PARENTERAL at 12:01

## 2023-01-01 RX ADMIN — PIPERACILLIN AND TAZOBACTAM 4.5 G: 4; .5 INJECTION, POWDER, LYOPHILIZED, FOR SOLUTION INTRAVENOUS; PARENTERAL at 03:01

## 2023-01-01 RX ADMIN — PANTOPRAZOLE SODIUM 40 MG: 40 TABLET, DELAYED RELEASE ORAL at 09:01

## 2023-01-01 RX ADMIN — OXYCODONE HYDROCHLORIDE 10 MG: 10 TABLET, FILM COATED, EXTENDED RELEASE ORAL at 09:01

## 2023-01-01 RX ADMIN — CALCIUM CHLORIDE 1 G: 100 INJECTION, SOLUTION INTRAVENOUS at 05:01

## 2023-01-01 RX ADMIN — Medication 9 MG: at 10:01

## 2023-01-01 RX ADMIN — HEPARIN SODIUM 5000 UNITS: 5000 INJECTION INTRAVENOUS; SUBCUTANEOUS at 01:01

## 2023-01-01 RX ADMIN — Medication 1 TABLET: at 12:01

## 2023-01-01 RX ADMIN — PIPERACILLIN AND TAZOBACTAM 4.5 G: 4; .5 INJECTION, POWDER, LYOPHILIZED, FOR SOLUTION INTRAVENOUS; PARENTERAL at 02:01

## 2023-01-01 RX ADMIN — MUPIROCIN: 20 OINTMENT TOPICAL at 09:01

## 2023-01-01 RX ADMIN — MORPHINE SULFATE 4 MG: 4 INJECTION INTRAVENOUS at 06:01

## 2023-01-01 RX ADMIN — SODIUM ZIRCONIUM CYCLOSILICATE 10 G: 10 POWDER, FOR SUSPENSION ORAL at 07:01

## 2023-01-01 RX ADMIN — INSULIN ASPART 3 UNITS: 100 INJECTION, SOLUTION INTRAVENOUS; SUBCUTANEOUS at 12:01

## 2023-01-01 RX ADMIN — HEPARIN SODIUM 5000 UNITS: 5000 INJECTION INTRAVENOUS; SUBCUTANEOUS at 05:01

## 2023-01-01 RX ADMIN — PANTOPRAZOLE SODIUM 40 MG: 40 TABLET, DELAYED RELEASE ORAL at 12:01

## 2023-01-01 RX ADMIN — ONDANSETRON HYDROCHLORIDE 4 MG: 2 SOLUTION INTRAMUSCULAR; INTRAVENOUS at 10:01

## 2023-01-01 RX ADMIN — VANCOMYCIN HYDROCHLORIDE 1750 MG: 1 INJECTION, POWDER, LYOPHILIZED, FOR SOLUTION INTRAVENOUS at 03:01

## 2023-01-01 RX ADMIN — ONDANSETRON HYDROCHLORIDE 4 MG: 2 SOLUTION INTRAMUSCULAR; INTRAVENOUS at 06:01

## 2023-01-01 RX ADMIN — HYDROCODONE BITARTRATE AND ACETAMINOPHEN 1 TABLET: 5; 325 TABLET ORAL at 10:01

## 2023-01-01 RX ADMIN — Medication 1 TABLET: at 09:01

## 2023-01-01 RX ADMIN — INSULIN ASPART 2 UNITS: 100 INJECTION, SOLUTION INTRAVENOUS; SUBCUTANEOUS at 09:01

## 2023-01-01 RX ADMIN — HYDROCODONE BITARTRATE AND ACETAMINOPHEN 1 TABLET: 5; 325 TABLET ORAL at 02:01

## 2023-01-01 RX ADMIN — HEPARIN SODIUM 5000 UNITS: 5000 INJECTION INTRAVENOUS; SUBCUTANEOUS at 12:01

## 2023-01-01 RX ADMIN — AMIODARONE HYDROCHLORIDE 150 MG: 50 INJECTION, SOLUTION INTRAVENOUS at 05:01

## 2023-01-01 RX ADMIN — INSULIN ASPART 1 UNITS: 100 INJECTION, SOLUTION INTRAVENOUS; SUBCUTANEOUS at 08:01

## 2023-01-01 RX ADMIN — INSULIN ASPART 2 UNITS: 100 INJECTION, SOLUTION INTRAVENOUS; SUBCUTANEOUS at 08:01

## 2023-01-01 RX ADMIN — MUPIROCIN: 20 OINTMENT TOPICAL at 08:01

## 2023-01-01 RX ADMIN — HYDROCODONE BITARTRATE AND ACETAMINOPHEN 1 TABLET: 5; 325 TABLET ORAL at 11:01

## 2023-01-01 RX ADMIN — INSULIN ASPART 2 UNITS: 100 INJECTION, SOLUTION INTRAVENOUS; SUBCUTANEOUS at 06:01

## 2023-01-01 RX ADMIN — ASPIRIN 81 MG: 81 TABLET, COATED ORAL at 12:01

## 2023-01-01 RX ADMIN — MAGNESIUM SULFATE HEPTAHYDRATE 1 G: 500 INJECTION, SOLUTION INTRAMUSCULAR; INTRAVENOUS at 05:01

## 2023-01-01 RX ADMIN — INSULIN ASPART 3 UNITS: 100 INJECTION, SOLUTION INTRAVENOUS; SUBCUTANEOUS at 10:01

## 2023-01-01 RX ADMIN — INSULIN ASPART 2 UNITS: 100 INJECTION, SOLUTION INTRAVENOUS; SUBCUTANEOUS at 12:01

## 2023-01-01 RX ADMIN — INSULIN ASPART 4 UNITS: 100 INJECTION, SOLUTION INTRAVENOUS; SUBCUTANEOUS at 04:01

## 2023-01-01 RX ADMIN — HEPARIN SODIUM 5000 UNITS: 5000 INJECTION INTRAVENOUS; SUBCUTANEOUS at 08:01

## 2023-01-09 PROBLEM — J47.9 BRONCHIECTASIS WITHOUT COMPLICATION: Status: RESOLVED | Noted: 2019-09-19 | Resolved: 2023-01-01

## 2023-01-09 NOTE — PROGRESS NOTES
"PATIENT: Domingo Castro  MRN: [id number]  DATE: 1/10/2023    Diagnosis:   1. 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 Secondary malignant neoplasm of bone    4. Immunodeficiency due to drugs    5. Chronic neoplasm-related pain    6. Anemia in end-stage renal disease    7. 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 Atherosclerosis of aorta    9. Secondary hyperparathyroidism of renal origin    10. Dependence on renal dialysis      Chief Complaint: Prostate Cancer      Oncologic History:      Oncologic History 1. Prostate cancer - stage IV      Oncologic Treatment 1. Bicalutamide/leuprolide.  2. enzalutamide      Pathology Diagnosed off imaging and PSA > 1000        Subjective:    History of Present Illness: Mr. Castro is a 77 y.o. male who presented in May 2022 for evaluation and management of lymphadenopathy. He was referred by Dr. Nugent.    - CT abdomen/pelvis (22) revealed "numerous enlarged retroperitoneal lymph node in the periaortic and aortocaval regions and along the bilateral iliac change."  - his mother and sister  from pancreatic cancer. His father had prostate cancer.  - in the past year, he has lost about 80 lbs.  - he started bicalutamide in May 2022.  - he underwent CT scan on 22  - he is scheduled for leuprolide injection on 22.    Interval history:  - he presents for a follow-up appointment for his prostate cancer  - he started enzalutamide in 2022. He began on 22. He does not notice worsened symptoms from enzalutamide.  - he endorses fatigue, dyspnea upon exertion. He correlates this to the dialysis  - He denies chest pain, nausea, vomiting, diarrhea, constipation.      Past medical, surgical, family, and social histories have been reviewed and updated below.    Past Medical History:   Past Medical History:   Diagnosis Date    Arthritis     Cataract     Chronic diastolic congestive heart failure     " Coronary artery disease     Cystoid macular edema of both eyes     Diabetes mellitus type II     Diabetic retinopathy     Hyperlipemia     Hypertension     Retinal hole     Stage 4 chronic kidney disease     Streptococcus pyogenes bacteremia 2018    Due to left toe osteomyelitis       Past Surgical History:   Past Surgical History:   Procedure Laterality Date    ABDOMINAL AORTOGRAPHY N/A 2019    Procedure: AORTOGRAM-ABDOMINAL;  Surgeon: Amish Hyatt MD;  Location: [facility] CATH LAB/EP;  Service: Cardiology;  Laterality: N/A;  CO2 angiography    ANGIOGRAPHY OF LOWER EXTREMITY Left 2019    Procedure: Angiogram Extremity Unilateral;  Surgeon: Amish Hyatt MD;  Location: [facility] CATH LAB/EP;  Service: Cardiology;  Laterality: Left;    AV FISTULA PLACEMENT Left 2022    Procedure: CREATION, AV FISTULA INSERTION GRAFT, LEFT UPPER FOREARM;  Surgeon: Daren Dubon MD;  Location: [facility] OR;  Service: General;  Laterality: Left;    CATARACT EXTRACTION W/  INTRAOCULAR LENS IMPLANT Left 12/15/14    Dr de la cruz    CATARACT EXTRACTION W/  INTRAOCULAR LENS IMPLANT Right 14    dorothy    CIRCUMCISION, NON-      focal laser right eye      INSERTION OF SHAH CATHETER Right 2021    Procedure: INSERTION, CATHETER, CENTRAL VENOUS, SHAH DUAL LUMEN;  Surgeon: Denys Aleman Jr., MD;  Location: [facility] OR;  Service: General;  Laterality: Right;    INSERTION OF TUNNELED CENTRAL VENOUS CATHETER (CVC) WITH SUBCUTANEOUS PORT Right 2019    Procedure: [id number];  Surgeon: Denys Aleman Jr., MD;  Location: [facility] OR;  Service: General;  Laterality: Right;    INSERTION OF TUNNELED CENTRAL VENOUS HEMODIALYSIS CATHETER Right 2021    Procedure: Insertion, Catheter, Central Venous, Hemodialysis;  Surgeon: Agata Rosado MD;  Location: [facility] CATH LAB;  Service: Interventional Nephrology;  Laterality: Right;    KNEE SURGERY      left    Left medial collateral ligament repair      REMOVAL  OF HEMODIALYSIS CATHETER Left 4/1/2022    Procedure: REMOVAL, CATHETER, HEMODIALYSIS;  Surgeon: Daren Dubon MD;  [facility];  Service: General;  Laterality: Left;    TONSILLECTOMY         Family History:   Family History   Problem Relation Age of Onset    Heart disease Mother     Cancer Mother     Cancer Brother     Heart disease Father     Diabetes Father     Cancer Sister     Cataracts Paternal Grandmother     Glaucoma Paternal Grandmother     Blindness Neg Hx     Amblyopia Neg Hx     Hypertension Neg Hx     Macular degeneration Neg Hx     Retinal detachment Neg Hx     Strabismus Neg Hx        Social History:  reports that he has never smoked. He has never used smokeless tobacco. He reports that he does not drink alcohol and does not use drugs.    Allergies:  Review of patient's allergies indicates:   Allergen Reactions    Atorvastatin Other (See Comments)       Medications:  Current Outpatient Medications   Medication Sig Dispense Refill    acetaminophen (TYLENOL) 325 MG tablet Take 2 tablets (650 mg total) by mouth every 4 (four) hours as needed.  0    amLODIPine (NORVASC) 10 MG tablet Take 1 tablet (10 mg total) by mouth once daily. 90 tablet 3    aspirin (ECOTRIN) 81 MG EC tablet Take 81 mg by mouth once daily.      benzonatate (TESSALON) 100 MG capsule Take 1 capsule (100 mg total) by mouth 3 (three) times daily as needed for Cough. 30 capsule 1    calcium-vitamin D3 (OYSTER SHELL CALCIUM-VIT D3) 500 mg-5 mcg (200 unit) per tablet Take 1 tablet by mouth once daily. 90 tablet 3    enzalutamide (XTANDI) 40 mg Cap Take 160 mg by mouth once daily. 120 capsule 11    ezetimibe (ZETIA) 10 mg tablet Take 1 tablet (10 mg total) by mouth once daily. 90 tablet 3    insulin aspart U-100 (NOVOLOG) 100 unit/mL (3 mL) InPn pen Inject 0-5 Units into the skin before meals and at bedtime as needed (Hyperglycemia). **LOW CORRECTION DOSE** Blood Glucose mg/dL Pre-meal 151-200, 0 unit; 201-250, take 2 units;   "251-300, take 3 units; 301-350, take 4 units; greater than 350, take 5 units. 3 mL 0    insulin NPH isoph U-100 human (NOVOLIN N FLEXPEN) 100 unit/mL (3 mL) InPn Inject 100 Units into the skin as needed.      nebulizer and compressor Alana USE AS DIRECTED 1 each 0    nitroGLYCERIN (NITROSTAT) 0.4 MG SL tablet Place 1 tablet (0.4 mg total) under the tongue every 5 (five) minutes as needed for Chest pain. 25 tablet 0    olmesartan (BENICAR) 20 MG tablet Take 1 tablet (20 mg total) by mouth once daily. 90 tablet 1    pantoprazole (PROTONIX) 40 MG tablet Take 1 tablet (40 mg total) by mouth once daily. 30 tablet 11    pen needle, diabetic 31 gauge x 5/16" Ndle Use 4 (four) times daily as needed (high blood sugar - see sliding scale on discharge paperwork). 100 each 1    pen needle, diabetic, safety (BD AUTOSHIELD PEN NEEDLE) 29 gauge x 5/16" Ndle For use once daily with levemir flexpen 90 each 11    phenyleph-promethazine-cod 5-6.25-10 mg/5 ml (PROMETHAZINE VC-CODEINE) 6.25-5-10 mg/5 mL Syrp Take 6.25 mg of amoxicillin by mouth as needed.      pulse oximeter (PULSE OXIMETER) device by Apply Externally route 2 (two) times a day. Use twice daily at 8 AM and 3 PM and record the value in [employer] as directed. 1 each 0    rosuvastatin (CRESTOR) 40 MG Tab Take 1 tablet (40 mg total) by mouth every evening. 90 tablet 3     No current facility-administered medications for this visit.       Review of Systems   Constitutional:  Positive for appetite change. Negative for fatigue.   HENT:  Negative for sore throat.    Eyes:  Negative for visual disturbance.   Respiratory:  Negative for shortness of breath.    Cardiovascular:  Negative for chest pain.   Gastrointestinal:  Positive for abdominal pain.        Acid reflux   Genitourinary:  Negative for dysuria.        On dialysis but still urinates   Musculoskeletal:  Positive for back pain.   Skin:  Negative for rash.   Neurological:  Negative for headaches.   Hematological:  Negative " for adenopathy.   Psychiatric/Behavioral:  The patient is not nervous/anxious.      ECOG Performance Status:   ECOG SCORE 1            Objective:      Vitals:   Vitals:    01/10/23 1519   BP: (!) 113/53   Pulse: 70   SpO2: 97%   Weight: 83.4 kg (183 lb 13.8 oz)     BMI: Body mass index is 22.68 kg/m².    Physical Exam  Vitals and nursing note reviewed.   Constitutional:       Appearance: He is well-developed.      Comments: Fatigued.   HENT:      Head: Normocephalic and atraumatic.   Eyes:      Pupils: Pupils are equal, round, and reactive to light.   Cardiovascular:      Rate and Rhythm: Normal rate and regular rhythm.   Pulmonary:      Effort: Pulmonary effort is normal.      Breath sounds: Normal breath sounds.   Abdominal:      General: Bowel sounds are normal.      Palpations: Abdomen is soft.   Musculoskeletal:         General: Normal range of motion.      Cervical back: Normal range of motion and neck supple.   Skin:     General: Skin is warm and dry.   Neurological:      Mental Status: He is alert and oriented to person, place, and time.   Psychiatric:         Behavior: Behavior normal.         Thought Content: Thought content normal.         Judgment: Judgment normal.       Laboratory Data:  Labs have been reviewed.    Lab Results   Component Value Date    WBC 6.08 01/06/2023    HGB 8.5 (L) 01/06/2023    HCT 27.1 (L) 01/06/2023    MCV 96 01/06/2023     01/06/2023           Imaging:     CT abdomen/pelvis (12/5/22): I have personally reviewed the images  Small left and trace right pleural effusions.  Bibasilar atelectasis/scarring.  Coronary artery calcifications.  There is a calcified granuloma in the right lower lobe.     The stomach, gallbladder, liver, spleen, pancreas, both adrenal glands are unremarkable.  Too small to characterize hypodensities in both kidneys likely represent cysts.  No hydronephrosis or nephrolithiasis.     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  Atherosclerotic  calcifications are again seen.     No acute finding involving the small bowel.  Increase thickening of the rectum.  The appendix is visualized and is normal.     The bladder has an unremarkable appearance.  The prostate has an unremarkable appearance.     Retroperitoneal lymphadenopathy persists.     The representative right iliac chain lymph node measures 2 x 1.9 cm (series 2, image 87), previously 2.3 x 2 cm.     The representative left periaortic lymph node measures 1.6 x 1 cm (series 2, image 73, previously 2.1 x 1.7 cm.     The representative retrocaval lymph node measures 1.6 x 1.2 cm, previously 2.2 x 1.6 cm (series 2, image 57).     Worsening widespread sclerotic lesions.  The index lesion in the right femoral neck measures 1.7 x 1.6 cm (series 2, image 126).  No acute osseous finding.     Impression:     1. Metastatic lymphadenopathy has improved.  2. Increased size and number of widespread sclerotic osseous lesions when compared to prior CT.  This may represent progression of disease or evidence of treatment in light of the bone scan from June of this year that demonstrated widespread metastatic osseous lesions.    Bone scan (6/17/22):    There are multiple foci of increased radiotracer activity scattered throughout the skeletal system.  This will serve as the patient's baseline nuclear medicine study.     Impression:     There are multiple foci of increased radiotracer activity scattered throughout the skeletal system.  This is consistent with the patient's history and characteristic of metastatic prostate cancer.      CT abdomen/pelvis (4/25/22):    Small left and minimal right pleural effusions.  Coronary artery calcifications.     Normal liver.  The gallbladder is normal.     The spleen is normal in size and appearance.  The pancreas is normal.  The adrenal glands are normal.  Atherosclerotic calcifications of the abdominal aorta and iliac vasculature.  Aneurysm of the left internal iliac artery  measures 2.2 cm in maximal cross-sectional dimension.  Normal IVC.     Numerous enlarged retroperitoneal lymph node in the periaortic and aortocaval regions and along the bilateral iliac change.  Representative left periaortic lymph node measures 2.1 by 1.7 cm (series 2, image 90).  Representative right retrocaval lymph node measures 2.2 by 1.6 cm (series 2, image 75).  Enlarged right common iliac artery lymph node measures 2.3 by 2.0 cm (series 2, image 106).     Normal stomach.  Normal small intestine.  Circumferential colonic mucosal thickening right hepatic flexure, right transverse colon and splenic flexure.  Findings nonspecific but most likely due to colonic collapse rather than colitis.     Bladder is mildly distended with mild circumferential bladder wall thickening.  Mildly enlarged prostate.     Mild diffuse anasarca.     The pelvis demonstrates mixed lytic and sclerotic lesions of the spine and pelvis worrisome for metastatic disease.     Impression:     1. 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  2. Retroperitoneal and pelvic adenopathy.  3. Circumferential colonic mucosal thickening most likely due to a collapsed colon rather than low-grade colitis.  4. Subcentimeter lytic and sclerotic lesions of the spine and pelvis worrisome for metastatic disease.  All CT scans at this facility are performed  using dose modulation techniques as appropriate to performed exam including the following:  automated exposure control; adjustment of mA and/or kV according to the patients size (this includes techniques or standardized protocols for targeted exams where dose is matched to indication/reason for exam: i.e. extremities or head);  iterative reconstruction technique.    Assessment:       1. 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 Secondary malignant neoplasm of bone    4. Immunodeficiency due to drugs    5. Chronic neoplasm-related pain    6. Anemia in end-stage  "renal disease    7. 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 Atherosclerosis of aorta    9. Secondary hyperparathyroidism of renal origin    10. Dependence on renal dialysis           Plan:     1. Prostate cancer - stage IV  - CT abdomen/pelvis (4/25/22) revealed "numerous enlarged retroperitoneal lymph node in the periaortic and aortocaval regions and along the bilateral iliac chain." It also showed that "the pelvis demonstrates mixed lytic and sclerotic lesions of the spine and pelvis worrisome for metastatic disease."  - he has a strong family history of cancer (prostate cancer, pancreatic cancer).  - PSA (may 2022) was over 1000 ng/mL, suggesting metastatic prostate cancer.  - Bone scan (6/17/22) was positive for osseous metastases.  - he started bicalutamide in May 2022  - Labs have been reviewed. PSA has increased to 685.8 ng/mL.  - follow-up PSA had increased to 855.8 ng/mL. He has castration-resistant prostate cancer.   - we switched from bicalutamide to enzalutamide.   - he began enzalutamide in December 2022.  - Labs have been reviewed. PSA stable at 858.7 ng/mL. I am hopeful that his PSA has peaked, and that it will start decreasing.   - cannot give zoledronic acid due to ESRD. May consider denosumab but that has increased risk of severe hypocalcemia as well in end-stage renal disease  - return to clinic in 2 months with repeat labs.     2. Anemia in ESRD  - immunofixation (June 2021) was negative for monoclonal protein.  - anemia is from ESRD, prostate cancer, chronic disease  - Labs have been reviewed. Hemoglobin is 8.5 g/dL.  - continue to monitor    3. Type 2 diabetes  - last hemoglobin A1c (12/5/22) was 6.7%  - defer management to Dr. Nugent.    4. Atherosclerosis of aorta  - seen on CT abdomen/pelvis (4/25/22)  - continue aspirin, amlodipine, olmesartan, rosuvastatin    5. Hyperparathyroidism of renal origin  - last PTH (4/27/21) was 520 pg/mL  - continue to monitor    6. Advance " Care Planning     Power of   After our discussion (at previous visit), the patient decided to complete a HCPOA and appointed his  wife Daniel Castro ([phone]), Daniel Castro Deandra ([phone]), and Luli Matthew ([phone]) .     - return to clinic in 2 months with repeat labs.     Samson Santiago M.D.  Hematology/Oncology  [facility] - [address], [address]  [place] [address]  Phone: [phone]  Fax: [phone]

## 2023-01-16 PROBLEM — R52 GENERALIZED PAIN: Status: ACTIVE | Noted: 2023-01-01

## 2023-01-16 NOTE — Clinical Note
Diagnosis: Prostate cancer [653106]   Future Attending Provider: JAZMYN MONTE [[id number]]   Admitting Provider:: JAZMYN MONTE [[id number]]

## 2023-01-17 PROBLEM — N17.9 ACUTE KIDNEY INJURY SUPERIMPOSED ON CHRONIC KIDNEY DISEASE: Status: RESOLVED | Noted: 2021-06-22 | Resolved: 2023-01-01

## 2023-01-17 PROBLEM — N18.9 ACUTE KIDNEY INJURY SUPERIMPOSED ON CHRONIC KIDNEY DISEASE: Status: RESOLVED | Noted: 2021-06-22 | Resolved: 2023-01-01

## 2023-01-17 NOTE — PHARMACY MED REC
"    [facility]           Pharmacy  Admission Medication History     The home medication history was taken by Bertha Dudley.      Medication history obtained from Medications listed below were obtained from: Patient/family    Based on information gathered for medication list, you may go to "Admission" then "Reconcile Home Medications" tabs to review and/or act upon those items.     The home medication list has been updated by the Pharmacy department.   Please read ALL comments highlighted in yellow.   Please address this information as you see fit.    Feel free to contact us if you have any questions or require assistance.      No current facility-administered medications on file prior to encounter.     Current Outpatient Medications on File Prior to Encounter   Medication Sig Dispense Refill    aspirin (ECOTRIN) 81 MG EC tablet Take 81 mg by mouth once daily.      calcium-vitamin D3 (OYSTER SHELL CALCIUM-VIT D3) 500 mg-5 mcg (200 unit) per tablet Take 1 tablet by mouth once daily. 90 tablet 3    enzalutamide (XTANDI) 40 mg Cap Take 160 mg by mouth once daily. 120 capsule 11    insulin aspart U-100 (NOVOLOG FLEXPEN U-100 INSULIN) 100 unit/mL (3 mL) InPn pen Inject 0-5 Units into the skin 4 (four) times daily before meals and nightly. 151-200=0  201-250=2units  251-300=3units  301-350=4units  Greater than 350 take 5units      pantoprazole (PROTONIX) 40 MG tablet Take 1 tablet (40 mg total) by mouth once daily. 30 tablet 11    acetaminophen (TYLENOL) 325 MG tablet Take 2 tablets (650 mg total) by mouth every 4 (four) hours as needed.  0    amLODIPine (NORVASC) 10 MG tablet Take 1 tablet (10 mg total) by mouth once daily. (Patient not taking: Reported on 1/17/2023) 90 tablet 3    benzonatate (TESSALON) 100 MG capsule Take 1 capsule (100 mg total) by mouth 3 (three) times daily as needed for Cough. (Patient not taking: Reported on 1/17/2023) 30 capsule 1    ezetimibe (ZETIA) 10 mg tablet Take 1 " "tablet (10 mg total) by mouth once daily. (Patient not taking: Reported on 1/17/2023) 90 tablet 3    insulin NPH isoph U-100 human (NOVOLIN N FLEXPEN) 100 unit/mL (3 mL) InPn Inject 100 Units into the skin as needed.      nebulizer and compressor Alana USE AS DIRECTED 1 each 0    nitroGLYCERIN (NITROSTAT) 0.4 MG SL tablet Place 1 tablet (0.4 mg total) under the tongue every 5 (five) minutes as needed for Chest pain. 25 tablet 0    olmesartan (BENICAR) 20 MG tablet Take 1 tablet (20 mg total) by mouth once daily. (Patient not taking: Reported on 1/17/2023) 90 tablet 1    pen needle, diabetic 31 gauge x 5/16" Ndle Use 4 (four) times daily as needed (high blood sugar - see sliding scale on discharge paperwork). 100 each 1    pen needle, diabetic, safety (BD AUTOSHIELD PEN NEEDLE) 29 gauge x 5/16" Ndle For use once daily with levemir flexpen 90 each 11    phenyleph-promethazine-cod 5-6.25-10 mg/5 ml (PROMETHAZINE VC-CODEINE) 6.25-5-10 mg/5 mL Syrp Take 6.25 mg of amoxicillin by mouth as needed.      pulse oximeter (PULSE OXIMETER) device by Apply Externally route 2 (two) times a day. Use twice daily at 8 AM and 3 PM and record the value in MyChart as directed. 1 each 0    rosuvastatin (CRESTOR) 40 MG Tab Take 1 tablet (40 mg total) by mouth every evening. (Patient not taking: Reported on 1/17/2023) 90 tablet 3       Please address this information as you see fit.  Feel free to contact us if you have any questions or require assistance.    Berhta Dudley  [phone]              .        "

## 2023-01-17 NOTE — HPI
Mr. 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 Patient presented to the  emergency department for evaluation of generalized pain x1 day. Patient also reports consistent non localized abdomen pain. Patient stated he was told by his oncology provider that his cancer is now in the bones. Patient has associated nausea and vomiting. Patient reports shortness if breath on exertion, denies chest pain. Patient denies fever or chills, denies constipation or diarrhea, Patient denies hematuria or dysuria. Denies hematemesis, melena or hematochezia.      On presentation in the emergency department patient abdomen xray showed There are numerous osseous metastases.  The visualized bowel gas pattern is nonspecific.  There is a moderate amount of stool.  There is no bowel obstruction. There is no abnormal abdominal calcification. Nonobstructive bowel gas pattern. Osseous metastatic disease. Chest xray results shows Numerous osseous metastases. Several nodular densities overlying the bilateral lungs may represent osseous metastases or pulmonary metastases. Lab work showed Na+ 132 K+ 5.4 C02 21, Glucose 228, Bun/Creat 43/5.8, WBC 22.45, H/H 8.7/27.5. Patient given morphine for pain and lokelma for K+.    On assessment patient is alert and oriented x3, slight lethargic from Morphine. Patient continue to endorse rebound pain in abdomen. Patient has nausea without vomiting. Blood pressure stable, no fever or chills noted.     Patient will be admitted to hospital services for further evaluation and medical management.

## 2023-01-17 NOTE — ASSESSMENT & PLAN NOTE
-Patient has dialysis on T/TH/Sat.    -Patient Bun/Creat is 43/5.8    -Continue to monitor intake and output and overall fluid. Avoid nephrotoxic medication, dose   medication according to renal function.     -Monitor electrolytes and replace as needed

## 2023-01-17 NOTE — CONSULTS
Consult Note  Palliative Care    Consult Requested By: Kit Maldonado MD  Reason for Consult: Goals of care    SUBJECTIVE:     History of Present Illness:  Disease Process: Cancer    77M with PMH of HLD, HTN, T2DM with retinopathy c/b ESRD on HD (since 6/2021) via LUE MWF and more recently dx with metastatic prostate CA in mid 2022, admitted to  service for pain and ?UTI.    Onc Hx - per Dr. Santiago    Pt was was started on ADT with leuprolide and bicalutamide in mid 2022 but by late 2022 repeat imaging showed progressive disease. Enzalutamide was added in 12/2022, too early to determine clinical response, recommended to continue enzalutamide as inpt this admission per onc recs.    Chief Complaint Leading to Admission    Pt presented to ED on 1/16 c/o one day of total body pain which he reports is attributable to diffuse osseous metastases. Abdominal imaging showed progressive bony mets (though last imaging was prior to chemo) and improved lymphadenopathy overall compatible with response to tx. Urine was sig for heav hematuria and pyuria with 3+ leukesterase. Given morphine for pain and Lokelma for ^K, vanc and zosyn for complicated UTI. Reportedly became lethargic after morphine dose.    Interval Hospital Course    1/17: NAEON. Seen by primary onc Dr. Santiago who reports concern for infection driving symptoms, recommends palliative input for pain and overall disease burden.    Palliative has been consulted for goals of care and pain control.    At time of initial interview pt's brother was at bedside and pt was AAOx3 in NAD, not acutely ill apperaing, complaining of chronic pain in his low back that is much improved since receiving a single dose of IV morphine though he slept for hours afterwards and is leery to try MSContin tonight for that reason. Advised pt that for his ESRD status oxycodone is preferable, pt needs something long acting due to the intractable nature of pain from overwhelming bony meds; writer  changed MSContin 15mg bid ordered by primary team to OME-equivalent OxyContin 10mg bid. Keeping current prn low dose Norco as it is appropriate for ESRD.    Pt states he wants to continue with palliative hormonal tx and chemo for his prostate cancer.    Pt's arrived to bedside around the time he went to HD and with pt's permission writer discussed recent developments separately with pt's spouse Daniel.    Daniel reports that pt remains active using a walker or rollator at home and inquired about the extent of pt's bony mets; advised her that simply put pt has bony lesions in all of his vertebrae that were visualized on most recent CT and his pelvic and hip bones are also extensively invaded. Suspect pt has been significantly downplaying his symptoms and has been in intractable pain. She agrees and states pt initially expressed profound existential pain at time of cancer dx, required extensive encouragement from family and his  to meet with oncology, and is now motivated to see his grandchildren grow up while dealing with decreasing control over his health.    Daniel inquired about prognosis and I advised her that pt's disease is very aggressive and with such overwhelming bony metastatic burden his prognosis is assessed as 6 months or less and already qualified for hospice. Pt would likely need to forego most cancer directed treatments to qualify though a simplified ADT regimen may be compatible with hospice; for now pt and spouse are hopeful he can start his full proposed tx regimen ASAP. At some point pt will suffer a hyperacute complication from this disease such as a pathologic fracture of a weight-bearing bone which will almost certainly be inoperable and heralding terminal decline not compatible with chemo. She agrees pt will be appropriate for hospice when unwilling or unable to continue with cancer directed treatment.    Daniel requests I not divulge this poor prognosis to pt or his relatives at  "this time stating pt will "give up" and I will try to honor this wish while ensuring pt can still provide valid advance directives and honor the philosophy of "hope for the best, prepare for the worst". She reports pt has previously expressed a will against prolonged life support and may have a living will from years past however I see no such document on file. She states pt would wish for CPR and time limited MV for potentially reversible causes, no trach, no PEG. She is pt's 1st HCPOA and his legal spouse so a formal LW is not essential to ensure she honors his wishes however I would like to confirm pt's wishes around end of life. Likely need spouse to be present.    Pt remains FULL CODE for now, no PEG, no trach otherwise no further restrictions on care; will continue HD and prostate CA treatments.    Will f/u 1/19 to discuss a living will with pt and potentially DNR status. Will f/u in palliative clinic longitudinally to monitor progress and  on hospice transition when appropriate. If pt is discharged 1/18 these discussions are nonurgent and can be postponed to our clinic f/u.    Past Medical History:   Diagnosis Date    Arthritis     Cataract     Chronic diastolic congestive heart failure     Coronary artery disease     Cystoid macular edema of both eyes     Diabetes mellitus type II     Diabetic retinopathy     Hyperlipemia     Hypertension     Retinal hole     Stage 4 chronic kidney disease     Streptococcus pyogenes bacteremia 12/23/2018    Due to left toe osteomyelitis     Past Surgical History:   Procedure Laterality Date    ABDOMINAL AORTOGRAPHY N/A 7/30/2019    Procedure: AORTOGRAM-ABDOMINAL;  Surgeon: Amish Hyatt MD;  Location: [facility] CATH LAB/EP;  Service: Cardiology;  Laterality: N/A;  CO2 angiography    ANGIOGRAPHY OF LOWER EXTREMITY Left 8/1/2019    Procedure: Angiogram Extremity Unilateral;  Surgeon: Amish Hyatt MD;  Location: [facility] CATH LAB/EP;  Service: Cardiology;  Laterality: Left; "    AV FISTULA PLACEMENT Left 2022    Procedure: CREATION, AV FISTULA INSERTION GRAFT, LEFT UPPER FOREARM;  Surgeon: Daren Dubon MD;  Location: [facility] OR;  Service: General;  Laterality: Left;    CATARACT EXTRACTION W/  INTRAOCULAR LENS IMPLANT Left 12/15/14    Dr de la cruz    CATARACT EXTRACTION W/  INTRAOCULAR LENS IMPLANT Right 14    dorothy    CIRCUMCISION, NON-      focal laser right eye      INSERTION OF SHAH CATHETER Right 2021    Procedure: INSERTION, CATHETER, CENTRAL VENOUS, SHAH DUAL LUMEN;  Surgeon: Denys Aleman Jr., MD;  Location: [facility] OR;  Service: General;  Laterality: Right;    INSERTION OF TUNNELED CENTRAL VENOUS CATHETER (CVC) WITH SUBCUTANEOUS PORT Right 2019    Procedure: [id number]-CATH;  Surgeon: Denys Aleman Jr., MD;  Location: [facility] OR;  Service: General;  Laterality: Right;    INSERTION OF TUNNELED CENTRAL VENOUS HEMODIALYSIS CATHETER Right 2021    Procedure: Insertion, Catheter, Central Venous, Hemodialysis;  Surgeon: Agata Rosado MD;  Location: [facility] CATH LAB;  Service: Interventional Nephrology;  Laterality: Right;    KNEE SURGERY      left    Left medial collateral ligament repair      REMOVAL OF HEMODIALYSIS CATHETER Left 2022    Procedure: REMOVAL, CATHETER, HEMODIALYSIS;  Surgeon: Daren Dubon MD;  Location: [facility];  Service: General;  Laterality: Left;    TONSILLECTOMY       Family History   Problem Relation Age of Onset    Heart disease Mother     Cancer Mother     Cancer Brother     Heart disease Father     Diabetes Father     Cancer Sister     Cataracts Paternal Grandmother     Glaucoma Paternal Grandmother     Blindness Neg Hx     Amblyopia Neg Hx     Hypertension Neg Hx     Macular degeneration Neg Hx     Retinal detachment Neg Hx     Strabismus Neg Hx      Social History     Tobacco Use    Smoking status: Never    Smokeless tobacco: Never   Substance Use Topics    Alcohol use: No     Alcohol/week: 0.0  standard drinks    Drug use: No     Mental Status: Oriented x3    ECOG Performance Status Grade: 3 - Confined to bed or chair 50% of waking hours    Review of Systems:  Positive for fatigue and low back pain.    Review of Symptoms      Symptom Assessment (ESAS 0-10 Scale)  Pain:  4  Dyspnea:  0  Anxiety:  0  Nausea:  0  Depression:  3  Anorexia:  0  Fatigue:  5  Insomnia:  0  Restlessness:  0  Agitation:  0     CAM / Delirium:  Negative  Constipation:  Negative  Diarrhea:  Negative      Pain Assessment:    Location(s): back    Back       Location: generalized        Quality: Throbbing and stabbing        Quantity: 4/10 in intensity        Chronicity: Onset 2 month(s) ago, gradually worsening        Aggravating Factors: Movement and sitting up        Alleviating Factors: Opiates and recumbency       Associated Symptoms: Dysuria    ECOG Performance Status ndGndrndanddndend:nd nd2nd Living Arrangements:  Lives in home and Lives with spouse    Psychosocial/Cultural:   See Palliative Psychosocial Note: No  Social Issues Identified: Coping deficit pt/family and Mental Health  Bereavement Risk: Yes: Close or dependent relationship to the  person  Caregiver Needs Discussed. Caregiver Distress: No: caretaker has realistic insight into symptoms and prognosis  Cultural: no concerns  **Primary  to Follow**  Palliative Care  Consult: No    Spiritual:  F - Sarah and Belief:  Lutheran  I - Importance:  High  C - Community:  Very close with  of 33 years  A - Address in Care:  Continue [employer] at [employer]     Time-Based Charting:  Yes  Chart Review: 35 minutes  Face to Face: 15 minutes  Symptom Assessment: 10 minutes  Coordination of Care: 5 minutes  Discharge Plannin minutes  Advance Care Plannin minutes  Goals of Care: 25 minutes    Total Time Spent: 120 minutes    Advance Care Planning   Advance Directives:   Living Will: No (Not formalized however NO PEG NO TRACH)    [place]: No    Do Not  Resuscitate Status: No    Medical Power of : Yes    Agent's Name:  Daniel Castro (spouse)   Agent's Contact Number:  [phone]    Decision Making:  Patient answered questions and Family answered questions  Goals of Care: The patient and family endorses that what is most important right now is to focus on spending time at home, avoiding the hospital, remaining as independent as possible, symptom/pain control, and improvement in condition but with limits to invasive therapies    Accordingly, we have decided that the best plan to meet the patient's goals includes continuing with treatment       OBJECTIVE:     Physical Exam  Constitutional:       General: He is not in acute distress.     Appearance: Normal appearance. He is normal weight. He is not ill-appearing.   HENT:      Head: Normocephalic and atraumatic.      Mouth/Throat:      Mouth: Mucous membranes are moist.      Pharynx: Oropharynx is clear.   Eyes:      Extraocular Movements: Extraocular movements intact.      Pupils: Pupils are equal, round, and reactive to light.   Cardiovascular:      Rate and Rhythm: Normal rate and regular rhythm.      Pulses: Normal pulses.      Heart sounds: Normal heart sounds. No murmur heard.    No friction rub. No gallop.   Pulmonary:      Effort: Pulmonary effort is normal.      Breath sounds: Normal breath sounds. No wheezing or rales.   Chest:      Chest wall: No tenderness or crepitus.   Abdominal:      General: Abdomen is flat. Bowel sounds are normal. There is no distension.      Palpations: Abdomen is soft. There is no mass.      Tenderness: There is no abdominal tenderness. There is no guarding or rebound.   Musculoskeletal:         General: No swelling, tenderness or deformity.   Skin:     General: Skin is warm and dry.      Capillary Refill: Capillary refill takes less than 2 seconds.   Neurological:      General: No focal deficit present.      Mental Status: He is alert and oriented to person, place, and  time. Mental status is at baseline.      Cranial Nerves: No cranial nerve deficit.      Sensory: No sensory deficit.      Motor: No weakness.   Psychiatric:         Attention and Perception: Attention and perception normal.         Mood and Affect: Mood normal. Affect is flat.         Speech: Speech normal.         Behavior: Behavior normal. Behavior is cooperative.         Thought Content: Thought content normal.         Cognition and Memory: Cognition and memory normal.         Judgment: Judgment normal.     ASSESSMENT/PLAN:     77M with PMH of HLD, HTN, T2DM with retinopathy c/b ESRD on HD (since 6/2021) via LUE MWF and more recently dx with metastatic prostate CA in mid 2022, started on ADT in late 2022 due to aggressive global bony metastasis with unclear response to date; not a candidate for bisphosphonate tx due to ESRD and denosumab is also relatively contraindicated. Overall prognosis is poor however pt has been underreporting pain and optimistically may recover sufficiently with analgesia to resume cancer directed treatment. Pt is ECOG 3 and if he becomes bedbound my recommendation will be for home hospice as neither he nor spouse wish for institutional care; would still need to agree to discontinue HD but pt's quality of life will be very poor at that point and his symptoms will only worsen. Will monitor progress in palliative clinic.    Need further ACP discussion however at this time pt is FULL CODE status but with reasonable limitations of no trach or PEG. Will discuss formal LW creation with pt given these reported wishes and introduce topic of DNR; pt would suffer terribly after chest compressions with such bony involvement and post code prognosis is bleak with ESRD and stage IV cancer.    Recommendations:  Medical: supportive care per primary  Symptom Management:   # Cancer related pain  - writer STOPPED MSContin 15mg bid as not safe for ESRD due to active metabolites  - writer STARTED equivalently  dosed OxyContin 10mg bid  - c/w present Norco 5/325mg q6h prn appropriate for ESRD  - adjust prn at followup  Psychosocial: spouse very devoted, wishes to avoid direct discussion of prognosis, do need to at least offer pt an opportunity to be informed and if he does not wish to know we can discuss pt's wishes for worst case scenarios regardless of what he believes his prognosis to be  Legal: spouse is 1st HCPOA, children are 2nd and 3rd   Prognosis: < 6 months for widely metastatic prostate CA and hospice qualified at any time; will need to forego HD to qualify however barring some other acutely life limiting dx which will not be temporized by HD    Christian Pleitez MD  Hospice and Palliative Medicine  Palliative Care Pager: [phone]    Advance Care Planning     Date: 01/18/2023    [facility]  I engaged the healthcare power of   in a conversation about advance care planning and we specifically addressed what the goals of care would be moving forward, in light of the patient's change in clinical status, specifically overwhelming bony mets from prostate CA.  We did specifically address the patient's likely prognosis, which is poor.  We explored the patient's values and preferences for future care.  The healthcare power of   endorses that what is most important right now is to focus on spending time at home, avoiding the hospital, remaining as independent as possible, symptom/pain control, and improvement in condition but with limits to invasive therapies    Accordingly, we have decided that the best plan to meet the patient's goals includes continuing with treatment    I did explain the role for hospice care at this stage of the patient's illness, including its ability to help the patient live with the best quality of life possible.  We will not be making a hospice referral at this time.    I spent a total of 50 minutes engaging the HCPOA in this advance care planning discussion.

## 2023-01-17 NOTE — ASSESSMENT & PLAN NOTE
Patient's FSGs are uncontrolled due to hyperglycemia on current medication regimen.  Last A1c reviewed-   Lab Results   Component Value Date    HGBA1C 6.7 (H) 12/05/2022     Most recent fingerstick glucose reviewed-   Recent Labs   Lab 01/16/23  2240   POCTGLUCOSE 260*     Current correctional scale  Low  Maintain anti-hyperglycemic dose as follows-   Antihyperglycemics (From admission, onward)    Start     Stop Route Frequency Ordered    01/16/23 2320  insulin aspart U-100 pen 0-5 Units         -- SubQ Before meals & nightly PRN 01/16/23 2221          -Patient last HA1C was 6.7 indicative of fair outpatient glycemic control    -Hyperglycemia protocol in place, low insulin sliding scale with Accu/Checks AC/HS      -Hold Oral hypoglycemics while patient is in the hospital.

## 2023-01-17 NOTE — ASSESSMENT & PLAN NOTE
-Patient stated having generalized pain x 1 day.    --Patient abdomen xray showed There are numerous osseous metastases.  The visualized bowel gas pattern is nonspecific.  There is a moderate amount of stool.  There is no bowel obstruction. There is no abnormal abdominal calcification. Nonobstructive bowel gas pattern. Osseous metastatic disease.     -Chest xray results shows Numerous osseous metastases. Several nodular densities overlying the bilateral lungs may represent osseous metastases or pulmonary metastases.     -Continue pain management

## 2023-01-17 NOTE — CONSULTS
----- Message from KINGSTON Rawls sent at 3/25/2019  1:56 PM EDT -----  keppra level stable   [facility] - Telemetry  Hematology/Oncology  Consult Note    Patient Name: Domingo Castro  MRN: [id number]  Admission Date: 1/16/2023  Hospital Length of Stay: 0 days  Code Status: Full Code   Attending Provider: Kit Maldonado MD  Consulting Provider: Samson Santiago MD  Primary Care Physician: Griselda Nugent MD  Principal Problem: generalized pain, possible infection    Inpatient consult to Hematology/Oncology  Consult performed by: Samson Santiago MD  Consult ordered by: Loretta Salmon NP  Reason for consult: prostate cancer        Subjective:     HPI:  77 year-old male with metastatic prostate cancer on leuprolide/enzalutamide was admitted on 1/16/23 for generalized pain, possible urinary tract infection. Consult is for prostate cancer.      - he endorses fatigue, generalized pain, abdominal pain, back pain, weakness. He denies fever.      Oncology Treatment Plan:   OP PROSTATE LEUPROLIDE 22.5 MG (ELIGARD) 3 MONTH    Medications:  Continuous Infusions:  Scheduled Meds:   amLODIPine  10 mg Oral Daily    aspirin  81 mg Oral Daily    calcium-vitamin D3  1 tablet Oral Daily    enzalutamide  160 mg Oral Daily    heparin (porcine)  5,000 Units Subcutaneous Q8H    pantoprazole  40 mg Oral Daily    piperacillin-tazobactam (ZOSYN) IVPB  4.5 g Intravenous Q12H     PRN Meds:acetaminophen, albuterol-ipratropium, bisacodyL, dextrose 10%, dextrose 10%, glucagon (human recombinant), glucose, glucose, HYDROcodone-acetaminophen, insulin aspart U-100, melatonin, naloxone, nitroGLYCERIN, ondansetron, polyethylene glycol, simethicone, sodium chloride 0.9%, trazodone, Pharmacy to dose Vancomycin consult **AND** vancomycin - pharmacy to dose     Review of patient's allergies indicates:   Allergen Reactions    Atorvastatin Other (See Comments)        Past Medical History:   Diagnosis Date    Arthritis     Cataract     Chronic diastolic congestive heart failure     Coronary artery disease     Cystoid macular edema of both eyes      Diabetes mellitus type II     Diabetic retinopathy     Hyperlipemia     Hypertension     Retinal hole     Stage 4 chronic kidney disease     Streptococcus pyogenes bacteremia 2018    Due to left toe osteomyelitis     Past Surgical History:   Procedure Laterality Date    ABDOMINAL AORTOGRAPHY N/A 2019    Procedure: AORTOGRAM-ABDOMINAL;  Surgeon: Amish Hyatt MD;  Location: [facility] CATH LAB/EP;  Service: Cardiology;  Laterality: N/A;  CO2 angiography    ANGIOGRAPHY OF LOWER EXTREMITY Left 2019    Procedure: Angiogram Extremity Unilateral;  Surgeon: Amish Hyatt MD;  Location: [facility] CATH LAB/EP;  Service: Cardiology;  Laterality: Left;    AV FISTULA PLACEMENT Left 2022    Procedure: CREATION, AV FISTULA INSERTION GRAFT, LEFT UPPER FOREARM;  Surgeon: Daren Dubon MD;  Location: [facility] OR;  Service: General;  Laterality: Left;    CATARACT EXTRACTION W/  INTRAOCULAR LENS IMPLANT Left 12/15/14    Dr de la cruz    CATARACT EXTRACTION W/  INTRAOCULAR LENS IMPLANT Right 14    dorothy    CIRCUMCISION, NON-      focal laser right eye      INSERTION OF SHAH CATHETER Right 2021    Procedure: INSERTION, CATHETER, CENTRAL VENOUS, SHAH DUAL LUMEN;  Surgeon: Denys Aleman Jr., MD;  Location: [facility] OR;  Service: General;  Laterality: Right;    INSERTION OF TUNNELED CENTRAL VENOUS CATHETER (CVC) WITH SUBCUTANEOUS PORT Right 2019    Procedure: [id number]-[id number];  Surgeon: Denys Aleman Jr., MD;  Location: [facility] OR;  Service: General;  Laterality: Right;    INSERTION OF TUNNELED CENTRAL VENOUS HEMODIALYSIS CATHETER Right 2021    Procedure: Insertion, Catheter, Central Venous, Hemodialysis;  Surgeon: Agata Rosado MD;  Location: [facility] CATH LAB;  Service: Interventional Nephrology;  Laterality: Right;    KNEE SURGERY      left    Left medial collateral ligament repair      REMOVAL OF HEMODIALYSIS CATHETER Left 2022    Procedure: REMOVAL, CATHETER,  HEMODIALYSIS;  Surgeon: Daren Dubon MD;  Location: [facility];  Service: General;  Laterality: Left;    TONSILLECTOMY       Family History       Problem Relation (Age of Onset)    Cancer Mother, Brother, Sister    Cataracts Paternal Grandmother    Diabetes Father    Glaucoma Paternal Grandmother    Heart disease Mother, Father          Tobacco Use    Smoking status: Never    Smokeless tobacco: Never   Substance and Sexual Activity    Alcohol use: No     Alcohol/week: 0.0 standard drinks    Drug use: No    Sexual activity: Yes     Partners: Female       Review of Systems   Constitutional:  Positive for fatigue.   HENT:  Negative for sore throat.    Eyes:  Negative for visual disturbance.   Respiratory:  Negative for shortness of breath.    Cardiovascular:  Negative for chest pain.   Gastrointestinal:  Positive for abdominal pain.   Genitourinary:  Negative for dysuria.   Musculoskeletal:  Positive for arthralgias and back pain.   Skin:  Negative for rash.   Neurological:  Positive for weakness. Negative for headaches.   Hematological:  Negative for adenopathy.   Psychiatric/Behavioral:  The patient is not nervous/anxious.    Objective:     Vital Signs (Most Recent):  Temp: 98.5 °F (36.9 °C) (01/16/23 1606)  Pulse: 96 (01/17/23 0416)  Resp: 20 (01/17/23 0421)  BP: (!) 113/55 (01/17/23 0416)  SpO2: 99 % (01/17/23 0416)   Vital Signs (24h Range):  Temp:  [98.5 °F (36.9 °C)] 98.5 °F (36.9 °C)  Pulse:  [84-96] 96  Resp:  [14-22] 20  SpO2:  [95 %-99 %] 99 %  BP: (113-149)/(55-66) 113/55     Weight: 81.6 kg (180 lb)  Body mass index is 22.2 kg/m².  Body surface area is 2.08 meters squared.    No intake or output data in the 24 hours ending 01/17/23 1034    Physical Exam  Vitals and nursing note reviewed.   Constitutional:       Appearance: He is well-developed.      Comments: fatigued   HENT:      Head: Normocephalic and atraumatic.   Eyes:      Pupils: Pupils are equal, round, and reactive to light.    Cardiovascular:      Rate and Rhythm: Regular rhythm. Tachycardia present.   Pulmonary:      Effort: Pulmonary effort is normal.      Breath sounds: Normal breath sounds.   Abdominal:      General: Bowel sounds are normal.      Palpations: Abdomen is soft.   Musculoskeletal:         General: Normal range of motion.      Cervical back: Normal range of motion and neck supple.   Skin:     General: Skin is warm and dry.   Neurological:      Mental Status: He is alert and oriented to person, place, and time.   Psychiatric:         Behavior: Behavior normal.         Thought Content: Thought content normal.         Judgment: Judgment normal.       Significant Labs:   CBC:   Recent Labs   Lab 01/16/23  1723 01/17/23  0429   WBC 22.45* 25.68*   HGB 8.7* 8.2*   HCT 27.5* 26.6*    171    and CMP:   Recent Labs   Lab 01/16/23  1723 01/17/23  0430   * 129*   K 5.4* 5.6*   CL 91* 91*   CO2 21* 20*   * 218*   BUN 43* 49*   CREATININE 5.8* 6.2*   CALCIUM 10.2 9.7   PROT 8.5* 7.5   ALBUMIN 3.2* 2.7*   BILITOT 0.5 0.4   ALKPHOS 703* 596*   AST 13 13   ALT <5* <5*   ANIONGAP 20* 18*       Diagnostic Results:  CT abdomen/pelvis (12/5/22): I have personally reviewed the images  Small left and trace right pleural effusions.  Bibasilar atelectasis/scarring.  Coronary artery calcifications.  There is a calcified granuloma in the right lower lobe.     The stomach, gallbladder, liver, spleen, pancreas, both adrenal glands are unremarkable.  Too small to characterize hypodensities in both kidneys likely represent cysts.  No hydronephrosis or nephrolithiasis.     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  Atherosclerotic calcifications are again seen.     No acute finding involving the small bowel.  Increase thickening of the rectum.  The appendix is visualized and is normal.     The bladder has an unremarkable appearance.  The prostate has an unremarkable appearance.     Retroperitoneal lymphadenopathy  persists.     The representative right iliac chain lymph node measures 2 x 1.9 cm (series 2, image 87), previously 2.3 x 2 cm.     The representative left periaortic lymph node measures 1.6 x 1 cm (series 2, image 73, previously 2.1 x 1.7 cm.     The representative retrocaval lymph node measures 1.6 x 1.2 cm, previously 2.2 x 1.6 cm (series 2, image 57).     Worsening widespread sclerotic lesions.  The index lesion in the right femoral neck measures 1.7 x 1.6 cm (series 2, image 126).  No acute osseous finding.     Impression:     1. Metastatic lymphadenopathy has improved.  2. Increased size and number of widespread sclerotic osseous lesions when compared to prior CT.  This may represent progression of disease or evidence of treatment in light of the bone scan from June of this year that demonstrated widespread metastatic osseous lesions.  The CT exam was performed using one or more of the following dose reduction techniques- Automated exposure control, adjustment of the mA and/or kV according to patient size, and/or use of iterative reconstructed technique.    Assessment/Plan:     Malignant neoplasm of prostate  - my patient in clinic  - diagnosed with metastatic prostate cancer in late spring/early summer 2022.  - he initially responded to leuprolide/bicalutamide but in late 2022 had progressive disease  - he started enzalutamide in late December 2022.  - CT abdomen/pelvis reveals decrease in size of lymphadenopathy. Bone lesions have increased but comparison imaging was prior to starting any therapy for prostate cancer.  - agree with pain control  - leukocytosis and urinalysis results may suggest an infectious process that could be contributing to his pain.  - recommend palliative care consult as well for pain control, overall disease state.  - continue enzalutamide while in the hospital.    Generalized pain  - see above        Thank you for your consult.     Samson Santiago MD  Hematology/Oncology  [facility] -  Telemetry

## 2023-01-17 NOTE — PROGRESS NOTES
[facility] Medicine  Progress Note    Patient Name: Domingo Castro  MRN: [id number]  Patient Class: OP- Observation   Admission Date: 1/16/2023  Length of Stay: 0 days  Attending Physician: Kit Maldonado MD  Primary Care Provider: Griselda Nugent MD        Subjective:     Principal Problem:<principal problem not specified>        HPI:  Mr. 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 Patient presented to the  emergency department for evaluation of generalized pain x1 day. Patient also reports consistent non localized abdomen pain. Patient stated he was told by his oncology provider that his cancer is now in the bones. Patient has associated nausea and vomiting. Patient reports shortness if breath on exertion, denies chest pain. Patient denies fever or chills, denies constipation or diarrhea, Patient denies hematuria or dysuria. Denies hematemesis, melena or hematochezia.      On presentation in the emergency department patient abdomen xray showed There are numerous osseous metastases.  The visualized bowel gas pattern is nonspecific.  There is a moderate amount of stool.  There is no bowel obstruction. There is no abnormal abdominal calcification. Nonobstructive bowel gas pattern. Osseous metastatic disease. Chest xray results shows Numerous osseous metastases. Several nodular densities overlying the bilateral lungs may represent osseous metastases or pulmonary metastases. Lab work showed Na+ 132 K+ 5.4 C02 21, Glucose 228, Bun/Creat 43/5.8, WBC 22.45, H/H 8.7/27.5. Patient given morphine for pain and lokelma for K+.    On assessment patient is alert and oriented x3, slight lethargic from Morphine. Patient continue to endorse  rebound pain in abdomen. Patient has nausea without vomiting. Blood pressure stable, no fever or chills noted.     Patient will be admitted to hospital services for further evaluation and medical management.             Overview/Hospital Course:  No notes on file    Interval History: Patient is seen lying in ED bed in no acute distress.  He reports abdominal and back pain and difficulty with turning in bed. He has not been able to walk, which he reports he was minimally able to do at home.  He also reports a decreased appetite.  He denies N/V/D, denies any fevers.  No additional complaints.      Review of Systems   Constitutional:  Positive for fatigue.   HENT:  Negative for sore throat.    Eyes:  Negative for visual disturbance.   Respiratory:  Negative for shortness of breath.    Cardiovascular:  Negative for chest pain.   Gastrointestinal:  Positive for abdominal pain.   Genitourinary:  Negative for dysuria.   Musculoskeletal:  Positive for arthralgias and back pain.   Skin:  Negative for rash.   Neurological:  Positive for weakness. Negative for headaches.   Hematological:  Negative for adenopathy.   Psychiatric/Behavioral:  The patient is not nervous/anxious.    Objective:     Vital Signs (Most Recent):  Temp: 98.5 °F (36.9 °C) (01/16/23 1606)  Pulse: 96 (01/17/23 0416)  Resp: 20 (01/17/23 0421)  BP: (!) 113/55 (01/17/23 0416)  SpO2: 99 % (01/17/23 0416)   Vital Signs (24h Range):  Temp:  [98.5 °F (36.9 °C)] 98.5 °F (36.9 °C)  Pulse:  [84-96] 96  Resp:  [14-22] 20  SpO2:  [95 %-99 %] 99 %  BP: (113-149)/(55-66) 113/55     Weight: 81.6 kg (180 lb)  Body mass index is 22.2 kg/m².  No intake or output data in the 24 hours ending 01/17/23 0936   Physical Exam  Constitutional:       Appearance: Normal appearance.   HENT:      Head: Normocephalic and atraumatic.      Nose: Nose normal.      Mouth/Throat:      Mouth: Mucous membranes are moist.   Eyes:      Pupils: Pupils are equal, round, and reactive to light.    Cardiovascular:      Rate and Rhythm: Normal rate and regular rhythm.      Heart sounds: Murmur heard.   Pulmonary:      Effort: Pulmonary effort is normal.      Breath sounds: Normal breath sounds.   Abdominal:      Palpations: Abdomen is soft.      Tenderness: There is abdominal tenderness. There is guarding.   Musculoskeletal:         General: Tenderness present.      Cervical back: Normal range of motion.   Skin:     General: Skin is warm.      Capillary Refill: Capillary refill takes less than 2 seconds.   Neurological:      Mental Status: He is alert and oriented to person, place, and time.      Motor: Weakness present.   Psychiatric:         Mood and Affect: Mood normal.         Behavior: Behavior normal.       Significant Labs: All pertinent labs within the past 24 hours have been reviewed.  CBC:   Recent Labs   Lab 01/16/23  1723 01/17/23  0429   WBC 22.45* 25.68*   HGB 8.7* 8.2*   HCT 27.5* 26.6*    171     CMP:   Recent Labs   Lab 01/16/23  1723 01/17/23  0430   * 129*   K 5.4* 5.6*   CL 91* 91*   CO2 21* 20*   * 218*   BUN 43* 49*   CREATININE 5.8* 6.2*   CALCIUM 10.2 9.7   PROT 8.5* 7.5   ALBUMIN 3.2* 2.7*   BILITOT 0.5 0.4   ALKPHOS 703* 596*   AST 13 13   ALT <5* <5*   ANIONGAP 20* 18*       Significant Imaging: I have reviewed all pertinent imaging results/findings within the past 24 hours.      Assessment/Plan:      Generalized pain  -Patient stated having generalized pain x 1 day.  -numerous osseous metastases as well as pulmonary nodules likely metastases likely from prostate Ca  -Continue pain management      Malignant neoplasm of prostate  -Dr Santiago, hem/onc following  -Advanced metastatic disease, will order palliative consult and initiated goals of care discussion with patient with wife at bedside    ESRD (end stage renal disease) on dialysis  ESRD on T/Th/Sa HD  BUN/Cr ratio elevated  Strict I/Os, mindful of fluid restriction as patient is oliguric.  Avoid nephrotoxic  medication, dose medication according to renal function.   Monitor electrolytes, maintain K>4.0-5.0, Mg>2.0      Acute on chronic diastolic CHF (congestive heart failure), NYHA class 3  Patient is identified as having Combined Systolic and Diastolic heart failure that is Acute on chronic. CHF is currently controlled. Latest ECHO performed and demonstrates- Results for orders placed during the hospital encounter of 01/26/22    Echo    Interpretation Summary  · Concentric hypertrophy and normal systolic function.  · The estimated ejection fraction is 55%.  · Indeterminate left ventricular diastolic function.  · Normal right ventricular size with normal right ventricular systolic function.  · Systolic anterior motion of the mitral chordal apparatus is present.  · Normal central venous pressure (3 mmHg).  . Continue and monitor clinical status closely. Monitor on telemetry. Patient is on CHF pathway.  Monitor strict Is&Os and daily weights.  Place on fluid restriction of 1 L. Continue to stress to patient importance of self efficacy and  on diet for CHF. Last BNP reviewed- and noted below No results for input(s): BNP, BNPTRIAGEBLO in the last 168 hours..  -Continue to monitor        Uncontrolled type 2 diabetes mellitus with hyperglycemia  Patient's FSGs are uncontrolled due to hyperglycemia on current medication regimen.  Last A1c reviewed-   Lab Results   Component Value Date    HGBA1C 6.7 (H) 12/05/2022     Most recent fingerstick glucose reviewed-   Recent Labs   Lab 01/16/23  2240   POCTGLUCOSE 260*     Current correctional scale  Low  Maintain anti-hyperglycemic dose as follows-   Antihyperglycemics (From admission, onward)    Start     Stop Route Frequency Ordered    01/16/23 2320  insulin aspart U-100 pen 0-5 Units         -- SubQ Before meals & nightly PRN 01/16/23 2221          -Patient last HA1C was 6.7 indicative of fair outpatient glycemic control    -Hyperglycemia protocol in place, low insulin  sliding scale with Accu/Checks AC/HS      -Hold Oral hypoglycemics while patient is in the hospital.    Leukocytosis  -Patient WBC was 22.45 on arrival. No clear source of infection  -Will start broad spectrum antibiotics Vanc and Zosyn  -Will follow up on blood cultures      Anemia in chronic kidney disease, on chronic dialysis  anemia is from ESRD, prostate cancer, chronic disease  Labs have been reviewed. Hgb of 8.2, may be dilutional.  EPO per nephrology           VTE Risk Mitigation (From admission, onward)         Ordered     heparin (porcine) injection 5,000 Units  Every 8 hours         01/16/23 2147     IP VTE HIGH RISK PATIENT  Once         01/16/23 2056     Place sequential compression device  Until discontinued         01/16/23 2056                Discharge Planning   LUIS FELIPE:      Code Status: Full Code   Is the patient medically ready for discharge?:     Reason for patient still in hospital (select all that apply): Consult recommendations             Kit Maldonado MD  Department of Hospital Medicine   [place] - On license of [facility]

## 2023-01-17 NOTE — ED PROVIDER NOTES
Encounter Date: 2023       History     Chief Complaint   Patient presents with    body pain     Pt presents to ED today c/o entire body pain   Pt reports hx of prostate ca met to bones  Due for dialysis tomorrow   Reports sx for several days      Patient is a 77-year-old male with metastatic prostate cancer who complains of generalized weakness and severe body aches.  He also complains of severe upper abdominal pain.  Symptoms began a few days ago.  He has nausea but no vomiting.  He denies fever or chills.  Patient also has end-stage renal disease and is due for dialysis tomorrow.    Review of patient's allergies indicates:   Allergen Reactions    Atorvastatin Other (See Comments)     Past Medical History:   Diagnosis Date    Arthritis     Cataract     Chronic diastolic congestive heart failure     Coronary artery disease     Cystoid macular edema of both eyes     Diabetes mellitus type II     Diabetic retinopathy     Hyperlipemia     Hypertension     Retinal hole     Stage 4 chronic kidney disease     Streptococcus pyogenes bacteremia 2018    Due to left toe osteomyelitis     Past Surgical History:   Procedure Laterality Date    ABDOMINAL AORTOGRAPHY N/A 2019    Procedure: AORTOGRAM-ABDOMINAL;  Surgeon: Amish Hyatt MD;  Location: [facility] CATH LAB/EP;  Service: Cardiology;  Laterality: N/A;  CO2 angiography    ANGIOGRAPHY OF LOWER EXTREMITY Left 2019    Procedure: Angiogram Extremity Unilateral;  Surgeon: Amish Hyatt MD;  Location: [facility] CATH LAB/EP;  Service: Cardiology;  Laterality: Left;    AV FISTULA PLACEMENT Left 2022    Procedure: CREATION, AV FISTULA INSERTION GRAFT, LEFT UPPER FOREARM;  Surgeon: Daren Dubon MD;  Location: [facility] OR;  Service: General;  Laterality: Left;    CATARACT EXTRACTION W/  INTRAOCULAR LENS IMPLANT Left 12/15/14    Dr de la cruz    CATARACT EXTRACTION W/  INTRAOCULAR LENS IMPLANT Right 14    dorothy    CIRCUMCISION, NON-      focal laser right  eye      INSERTION OF SHAH CATHETER Right 5/19/2021    Procedure: INSERTION, CATHETER, CENTRAL VENOUS, SHAH DUAL LUMEN;  Surgeon: Denys Aleman Jr., MD;  Location: [facility] OR;  Service: General;  Laterality: Right;    INSERTION OF TUNNELED CENTRAL VENOUS CATHETER (CVC) WITH SUBCUTANEOUS PORT Right 1/2/2019    Procedure: [id number];  Surgeon: Denys Aleman Jr., MD;  Location: [facility] OR;  Service: General;  Laterality: Right;    INSERTION OF TUNNELED CENTRAL VENOUS HEMODIALYSIS CATHETER Right 6/28/2021    Procedure: Insertion, Catheter, Central Venous, Hemodialysis;  Surgeon: Agata Rosado MD;  Location: [facility] CATH LAB;  Service: Interventional Nephrology;  Laterality: Right;    KNEE SURGERY      left    Left medial collateral ligament repair      REMOVAL OF HEMODIALYSIS CATHETER Left 4/1/2022    Procedure: REMOVAL, CATHETER, HEMODIALYSIS;  Surgeon: Daren Dubon MD;  Location: [facility] OR;  Service: General;  Laterality: Left;    TONSILLECTOMY       Family History   Problem Relation Age of Onset    Heart disease Mother     Cancer Mother     Cancer Brother     Heart disease Father     Diabetes Father     Cancer Sister     Cataracts Paternal Grandmother     Glaucoma Paternal Grandmother     Blindness Neg Hx     Amblyopia Neg Hx     Hypertension Neg Hx     Macular degeneration Neg Hx     Retinal detachment Neg Hx     Strabismus Neg Hx      Social History     Tobacco Use    Smoking status: Never    Smokeless tobacco: Never   Substance Use Topics    Alcohol use: No     Alcohol/week: 0.0 standard drinks    Drug use: No     Review of Systems   Constitutional:  Positive for fatigue. Negative for fever.   Respiratory:  Positive for cough. Negative for shortness of breath.    Cardiovascular:  Negative for chest pain.   Gastrointestinal:  Positive for abdominal pain and nausea. Negative for diarrhea and vomiting.   Musculoskeletal:  Positive for back pain and myalgias.   Skin:  Negative for color  change.   Neurological:  Positive for weakness.     Physical Exam     Initial Vitals [01/16/23 1606]   BP Pulse Resp Temp SpO2   (!) 149/66 88 (!) 22 98.5 °F (36.9 °C) 98 %      MAP       --         Physical Exam    Nursing note and vitals reviewed.  Constitutional: He appears distressed.   HENT:   Head: Atraumatic.   Dry mucous membranes.   Eyes:   Pale conjunctiva.   Neck: Neck supple.   Cardiovascular:  Normal rate, regular rhythm and normal heart sounds.           Pulmonary/Chest:   Slightly diminished breath sounds bilaterally.   Abdominal: Abdomen is soft. He exhibits no distension.   Very mild diffuse tenderness.   Musculoskeletal:         General: No edema.      Cervical back: Neck supple.     Neurological: He is alert and oriented to person, place, and time.   Skin: Skin is warm and dry.   Psychiatric: His behavior is normal. Thought content normal.       ED Course   Procedures  Labs Reviewed   CBC W/ AUTO DIFFERENTIAL - Abnormal; Notable for the following components:       Result Value    WBC 22.45 (*)     RBC 2.89 (*)     Hemoglobin 8.7 (*)     Hematocrit 27.5 (*)     MCHC 31.6 (*)     RDW 16.2 (*)     Gran # (ANC) 19.8 (*)     Immature Grans (Abs) 0.11 (*)     Gran % 88.4 (*)     Lymph % 6.7 (*)     All other components within normal limits   COMPREHENSIVE METABOLIC PANEL - Abnormal; Notable for the following components:    Sodium 132 (*)     Potassium 5.4 (*)     Chloride 91 (*)     CO2 21 (*)     Glucose 228 (*)     BUN 43 (*)     Creatinine 5.8 (*)     Total Protein 8.5 (*)     Albumin 3.2 (*)     Alkaline Phosphatase 703 (*)     ALT <5 (*)     Anion Gap 20 (*)     eGFR 9 (*)     All other components within normal limits   INFLUENZA A & B BY MOLECULAR   CULTURE, BLOOD   CULTURE, BLOOD   MAGNESIUM   PHOSPHORUS   SARS-COV-2 RNA AMPLIFICATION, QUAL   URINALYSIS, REFLEX TO URINE CULTURE   POCT GLUCOSE MONITORING CONTINUOUS          Imaging Results              X-Ray Abdomen Portable (Final result)  Result  time 01/16/23 18:09:43      Final result by Francisco Hooks DO (01/16/23 18:09:43)                   Impression:      Nonobstructive bowel gas pattern.    Osseous metastatic disease.      Electronically signed by: Francisco Hooks  Date:    01/16/2023  Time:    18:09               Narrative:    EXAMINATION:  XR ABDOMEN PORTABLE    CLINICAL HISTORY:  Unspecified abdominal pain    TECHNIQUE:  AP View(s) of the abdomen was performed.    COMPARISON:  05/18/2010.    FINDINGS:  There are numerous osseous metastases.  The visualized bowel gas pattern is nonspecific.  There is a moderate amount of stool.  There is no bowel obstruction.  There is no abnormal abdominal calcification.                                       X-Ray Chest 1 View (Final result)  Result time 01/16/23 18:07:41      Final result by Francisco Hooks DO (01/16/23 18:07:41)                   Impression:      Numerous osseous metastases.  Several nodular densities overlying the bilateral lungs may represent osseous metastases or pulmonary metastases.  Consider further evaluation with CT chest.      Electronically signed by: Francisco Hooks  Date:    01/16/2023  Time:    18:07               Narrative:    EXAMINATION:  XR CHEST 1 VIEW    CLINICAL HISTORY:  weakness;    TECHNIQUE:  Single frontal view of the chest was performed.    COMPARISON:  CT abdomen and pelvis from 12/05/2022.  Chest radiograph from 04/14/2022.    FINDINGS:  The lungs are well expanded.  The pleural spaces are clear.  The right costophrenic angle is not entirely included in the field of view.  The cardiac silhouette is enlarged.  There are calcifications of the aortic arch.  There are numerous osseous metastatic lesions.  There are multiple nodular densities overlying the bilateral lungs.                                       Medications   albuterol-ipratropium 2.5 mg-0.5 mg/3 mL nebulizer solution 3 mL (has no administration in time range)   melatonin tablet 9 mg (has no administration  in time range)   polyethylene glycol packet 17 g (has no administration in time range)   bisacodyL suppository 10 mg (has no administration in time range)   dextrose 10% bolus 125 mL 125 mL (has no administration in time range)   dextrose 10% bolus 250 mL 250 mL (has no administration in time range)   ondansetron injection 4 mg (has no administration in time range)   heparin (porcine) injection 5,000 Units (has no administration in time range)   sodium chloride 0.9% flush 10 mL (has no administration in time range)   naloxone 0.4 mg/mL injection 0.02 mg (has no administration in time range)   glucose chewable tablet 16 g (has no administration in time range)   glucose chewable tablet 24 g (has no administration in time range)   glucagon (human recombinant) injection 1 mg (has no administration in time range)   HYDROcodone-acetaminophen 5-325 mg per tablet 1 tablet (has no administration in time range)   insulin aspart U-100 pen 1-10 Units (has no administration in time range)   acetaminophen tablet 650 mg (has no administration in time range)   trazodone split tablet 25 mg (has no administration in time range)   simethicone chewable tablet 80 mg (has no administration in time range)   ondansetron injection 4 mg (4 mg Intravenous Given 1/16/23 1809)   morphine injection 4 mg (4 mg Intravenous Given 1/16/23 1809)   sodium zirconium cyclosilicate packet 10 g (10 g Oral Given 1/16/23 1953)     Medical Decision Making:   Initial Assessment:   77-year-old male with metastatic prostate cancer complaining of weakness and generalized pain.    Differential Diagnosis:   Dehydration, electrolyte abnormality, cancer pain, volume overload.  Clinical Tests:   Lab Tests: Reviewed and Ordered       <> Summary of Lab: CBC shows a WBC of 22.45, hemoglobin 8.7 and hematocrit 27.5.  CMP shows a potassium of 5.4, BUN of 43 and creatinine of 5.8.  Influenza and COVID swabs are negative.  ED Management:  Patient was given morphine for pain  here in the ED. very mild elevation of potassium was treated with Lokelma.  White count is 22.45.  I feel the patient will require admission for further evaluation and treatment and will be admitted by the [facility] hospitalist.                        Clinical Impression:   Final diagnoses:  [R52] Generalized pain  [R10.9] Abdominal pain  [C61] Prostate cancer (Primary)  [D72.829] Leukocytosis, unspecified type        ED Disposition Condition    Observation Stable                Hiram Muñoz MD  01/16/23 0948

## 2023-01-17 NOTE — SUBJECTIVE & OBJECTIVE
Interval History: Patient is seen lying in ED bed in no acute distress.  He reports abdominal and back pain and difficulty with turning in bed. He has not been able to walk, which he reports he was minimally able to do at home.  He also reports a decreased appetite.  He denies N/V/D, denies any fevers.  No additional complaints.      Review of Systems   Constitutional:  Positive for fatigue.   HENT:  Negative for sore throat.    Eyes:  Negative for visual disturbance.   Respiratory:  Negative for shortness of breath.    Cardiovascular:  Negative for chest pain.   Gastrointestinal:  Positive for abdominal pain.   Genitourinary:  Negative for dysuria.   Musculoskeletal:  Positive for arthralgias and back pain.   Skin:  Negative for rash.   Neurological:  Positive for weakness. Negative for headaches.   Hematological:  Negative for adenopathy.   Psychiatric/Behavioral:  The patient is not nervous/anxious.    Objective:     Vital Signs (Most Recent):  Temp: 98.5 °F (36.9 °C) (01/16/23 1606)  Pulse: 96 (01/17/23 0416)  Resp: 20 (01/17/23 0421)  BP: (!) 113/55 (01/17/23 0416)  SpO2: 99 % (01/17/23 0416)   Vital Signs (24h Range):  Temp:  [98.5 °F (36.9 °C)] 98.5 °F (36.9 °C)  Pulse:  [84-96] 96  Resp:  [14-22] 20  SpO2:  [95 %-99 %] 99 %  BP: (113-149)/(55-66) 113/55     Weight: 81.6 kg (180 lb)  Body mass index is 22.2 kg/m².  No intake or output data in the 24 hours ending 01/17/23 0936   Physical Exam  Constitutional:       Appearance: Normal appearance.   HENT:      Head: Normocephalic and atraumatic.      Nose: Nose normal.      Mouth/Throat:      Mouth: Mucous membranes are moist.   Eyes:      Pupils: Pupils are equal, round, and reactive to light.   Cardiovascular:      Rate and Rhythm: Normal rate and regular rhythm.      Heart sounds: Murmur heard.   Pulmonary:      Effort: Pulmonary effort is normal.      Breath sounds: Normal breath sounds.   Abdominal:      Palpations: Abdomen is soft.      Tenderness: There  is abdominal tenderness. There is guarding.   Musculoskeletal:         General: Tenderness present.      Cervical back: Normal range of motion.   Skin:     General: Skin is warm.      Capillary Refill: Capillary refill takes less than 2 seconds.   Neurological:      Mental Status: He is alert and oriented to person, place, and time.      Motor: Weakness present.   Psychiatric:         Mood and Affect: Mood normal.         Behavior: Behavior normal.       Significant Labs: All pertinent labs within the past 24 hours have been reviewed.  CBC:   Recent Labs   Lab 01/16/23  1723 01/17/23  0429   WBC 22.45* 25.68*   HGB 8.7* 8.2*   HCT 27.5* 26.6*    171     CMP:   Recent Labs   Lab 01/16/23  1723 01/17/23  0430   * 129*   K 5.4* 5.6*   CL 91* 91*   CO2 21* 20*   * 218*   BUN 43* 49*   CREATININE 5.8* 6.2*   CALCIUM 10.2 9.7   PROT 8.5* 7.5   ALBUMIN 3.2* 2.7*   BILITOT 0.5 0.4   ALKPHOS 703* 596*   AST 13 13   ALT <5* <5*   ANIONGAP 20* 18*       Significant Imaging: I have reviewed all pertinent imaging results/findings within the past 24 hours.

## 2023-01-17 NOTE — SUBJECTIVE & OBJECTIVE
Past Medical History:   Diagnosis Date    Arthritis     Cataract     Chronic diastolic congestive heart failure     Coronary artery disease     Cystoid macular edema of both eyes     Diabetes mellitus type II     Diabetic retinopathy     Hyperlipemia     Hypertension     Retinal hole     Stage 4 chronic kidney disease     Streptococcus pyogenes bacteremia 2018    Due to left toe osteomyelitis       Past Surgical History:   Procedure Laterality Date    ABDOMINAL AORTOGRAPHY N/A 2019    Procedure: AORTOGRAM-ABDOMINAL;  Surgeon: Amish Hyatt MD;  Location: [facility] CATH LAB/EP;  Service: Cardiology;  Laterality: N/A;  CO2 angiography    ANGIOGRAPHY OF LOWER EXTREMITY Left 2019    Procedure: Angiogram Extremity Unilateral;  Surgeon: Amish Hyatt MD;  Location: [facility] CATH LAB/EP;  Service: Cardiology;  Laterality: Left;    AV FISTULA PLACEMENT Left 2022    Procedure: CREATION, AV FISTULA INSERTION GRAFT, LEFT UPPER FOREARM;  Surgeon: Daren Dubon MD;  Location: [facility] OR;  Service: General;  Laterality: Left;    CATARACT EXTRACTION W/  INTRAOCULAR LENS IMPLANT Left 12/15/14    Dr de la cruz    CATARACT EXTRACTION W/  INTRAOCULAR LENS IMPLANT Right 14    dorothy    CIRCUMCISION, NON-      focal laser right eye      INSERTION OF SHAH CATHETER Right 2021    Procedure: INSERTION, CATHETER, CENTRAL VENOUS, SHAH DUAL LUMEN;  Surgeon: Denys Aleman Jr., MD;  Location: [facility] OR;  Service: General;  Laterality: Right;    INSERTION OF TUNNELED CENTRAL VENOUS CATHETER (CVC) WITH SUBCUTANEOUS PORT Right 2019    Procedure: [id number];  Surgeon: Denys Aleman Jr., MD;  Location: [facility] OR;  Service: General;  Laterality: Right;    INSERTION OF TUNNELED CENTRAL VENOUS HEMODIALYSIS CATHETER Right 2021    Procedure: Insertion, Catheter, Central Venous, Hemodialysis;  Surgeon: Agata Rosado MD;  Location: [facility] CATH LAB;  Service: Interventional Nephrology;   Laterality: Right;    KNEE SURGERY      left    Left medial collateral ligament repair      REMOVAL OF HEMODIALYSIS CATHETER Left 4/1/2022    Procedure: REMOVAL, CATHETER, HEMODIALYSIS;  Surgeon: Daren Dubon MD;  Location: [facility];  Service: General;  Laterality: Left;    TONSILLECTOMY         Review of patient's allergies indicates:   Allergen Reactions    Atorvastatin Other (See Comments)       No current facility-administered medications on file prior to encounter.     Current Outpatient Medications on File Prior to Encounter   Medication Sig    acetaminophen (TYLENOL) 325 MG tablet Take 2 tablets (650 mg total) by mouth every 4 (four) hours as needed.    amLODIPine (NORVASC) 10 MG tablet Take 1 tablet (10 mg total) by mouth once daily.    aspirin (ECOTRIN) 81 MG EC tablet Take 81 mg by mouth once daily.    benzonatate (TESSALON) 100 MG capsule Take 1 capsule (100 mg total) by mouth 3 (three) times daily as needed for Cough.    calcium-vitamin D3 (OYSTER SHELL CALCIUM-VIT D3) 500 mg-5 mcg (200 unit) per tablet Take 1 tablet by mouth once daily.    enzalutamide (XTANDI) 40 mg Cap Take 160 mg by mouth once daily.    ezetimibe (ZETIA) 10 mg tablet Take 1 tablet (10 mg total) by mouth once daily.    insulin aspart U-100 (NOVOLOG) 100 unit/mL (3 mL) InPn pen Inject 0-5 Units into the skin before meals and at bedtime as needed (Hyperglycemia). **LOW CORRECTION DOSE** Blood Glucose mg/dL Pre-meal 151-200, 0 unit; 201-250, take 2 units;  251-300, take 3 units; 301-350, take 4 units; greater than 350, take 5 units.    insulin NPH isoph U-100 human (NOVOLIN N FLEXPEN) 100 unit/mL (3 mL) InPn Inject 100 Units into the skin as needed.    nebulizer and compressor Alana USE AS DIRECTED    nitroGLYCERIN (NITROSTAT) 0.4 MG SL tablet Place 1 tablet (0.4 mg total) under the tongue every 5 (five) minutes as needed for Chest pain.    olmesartan (BENICAR) 20 MG tablet Take 1 tablet (20 mg total) by mouth once daily.     "pantoprazole (PROTONIX) 40 MG tablet Take 1 tablet (40 mg total) by mouth once daily.    pen needle, diabetic 31 gauge x 5/16" Ndle Use 4 (four) times daily as needed (high blood sugar - see sliding scale on discharge paperwork).    pen needle, diabetic, safety (BD AUTOSHIELD PEN NEEDLE) 29 gauge x 5/16" Ndle For use once daily with levemir flexpen    phenyleph-promethazine-cod 5-6.25-10 mg/5 ml (PROMETHAZINE VC-CODEINE) 6.25-5-10 mg/5 mL Syrp Take 6.25 mg of amoxicillin by mouth as needed.    pulse oximeter (PULSE OXIMETER) device by Apply Externally route 2 (two) times a day. Use twice daily at 8 AM and 3 PM and record the value in MyChart as directed.    rosuvastatin (CRESTOR) 40 MG Tab Take 1 tablet (40 mg total) by mouth every evening.     Family History       Problem Relation (Age of Onset)    Cancer Mother, Brother, Sister    Cataracts Paternal Grandmother    Diabetes Father    Glaucoma Paternal Grandmother    Heart disease Mother, Father          Tobacco Use    Smoking status: Never    Smokeless tobacco: Never   Substance and Sexual Activity    Alcohol use: No     Alcohol/week: 0.0 standard drinks    Drug use: No    Sexual activity: Yes     Partners: Female     Review of Systems   Constitutional:  Positive for activity change and appetite change.   HENT: Negative.     Eyes: Negative.    Respiratory: Negative.     Gastrointestinal:  Positive for abdominal pain, nausea and vomiting.   Endocrine: Negative.    Genitourinary: Negative.    Musculoskeletal:  Positive for back pain and myalgias.   Skin: Negative.    Allergic/Immunologic: Negative.    Neurological:  Positive for weakness.   Psychiatric/Behavioral: Negative.     Objective:     Vital Signs (Most Recent):  Temp: 98.5 °F (36.9 °C) (01/16/23 1606)  Pulse: 93 (01/16/23 1921)  Resp: 18 (01/16/23 1931)  BP: (!) 119/55 (01/16/23 1921)  SpO2: 99 % (01/16/23 1921)   Vital Signs (24h Range):  Temp:  [98.5 °F (36.9 °C)] 98.5 °F (36.9 °C)  Pulse:  [88-96] " 93  Resp:  [14-22] 18  SpO2:  [97 %-99 %] 99 %  BP: (119-149)/(55-66) 119/55        There is no height or weight on file to calculate BMI.    Physical Exam  Constitutional:       Appearance: Normal appearance.   HENT:      Head: Normocephalic and atraumatic.      Nose: Nose normal.      Mouth/Throat:      Mouth: Mucous membranes are moist.   Eyes:      Pupils: Pupils are equal, round, and reactive to light.   Cardiovascular:      Rate and Rhythm: Normal rate and regular rhythm.      Heart sounds: Murmur heard.   Pulmonary:      Effort: Pulmonary effort is normal.      Breath sounds: Normal breath sounds.   Abdominal:      Palpations: Abdomen is soft.      Tenderness: There is abdominal tenderness. There is guarding.   Musculoskeletal:         General: Tenderness present.      Cervical back: Normal range of motion.   Skin:     General: Skin is warm.      Capillary Refill: Capillary refill takes less than 2 seconds.   Neurological:      Mental Status: He is alert and oriented to person, place, and time.      Motor: Weakness present.   Psychiatric:         Mood and Affect: Mood normal.         Behavior: Behavior normal.         CRANIAL NERVES     CN III, IV, VI   Pupils are equal, round, and reactive to light.     Significant Labs: All pertinent labs within the past 24 hours have been reviewed.      Significant Imaging: I have reviewed all pertinent imaging results/findings within the past 24 hours.  I have reviewed and interpreted all pertinent imaging results/findings within the past 24 hours.

## 2023-01-17 NOTE — ASSESSMENT & PLAN NOTE
-Dr Santiago, hem/onc following  -Advanced metastatic disease, will order palliative consult and initiated goals of care discussion with patient with wife at bedside

## 2023-01-17 NOTE — NURSING
Report called to GERMAN Norman RN. Nurse asked to wait to bring up patient as the TV is being replaced in the room. Verbalized understanding.

## 2023-01-17 NOTE — CARE UPDATE
Palliative Medicine Quick Note    Full consult to follow - briefly, discussed advance directives with pt's spouse and she reports pt may have an old living will on file, will likely draft a new LW in clinic but pt would not accept indefinite life support nor would his spouse wish for it, status remains full code with time limited trial of MV but no trach or PEG.    Noted pt was ordered for MSContin 15mg bid, not advisable for HD patients due to risk of accumulating active metabolites - changed by writer to OME-equivalent OxyContin 10mg bid.    Christian Pleitez MD  Hospice and Palliative Medicine  Palliative Care Pager: [phone]

## 2023-01-17 NOTE — H&P
[facility] Emergency [facility] Medicine  History & Physical    Patient Name: Domingo Castro  MRN: [id number]  Patient Class: OP- Observation  Admission Date: 1/16/2023  Attending Physician: Rolo Rajput, *   Primary Care Provider: Griselda Nugent MD         Patient information was obtained from patient, relative(s) and ER records.     Subjective:     Principal Problem:<principal problem not specified>    Chief Complaint:   Chief Complaint   Patient presents with    body pain     Pt presents to ED today c/o entire body pain   Pt reports hx of prostate ca met to bones  Due for dialysis tomorrow   Reports sx for several days         HPI: Mr. 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 Patient presented to the  emergency department for evaluation of generalized pain x1 day. Patient also reports consistent non localized abdomen pain. Patient stated he was told by his oncology provider that his cancer is now in the bones. Patient has associated nausea and vomiting. Patient reports shortness if breath on exertion, denies chest pain. Patient denies fever or chills, denies constipation or diarrhea, Patient denies hematuria or dysuria. Denies hematemesis, melena or hematochezia.      On presentation in the emergency department patient abdomen xray showed There are numerous osseous metastases.  The visualized bowel gas pattern is nonspecific.  There is a moderate amount of stool.  There is no bowel obstruction. There is no abnormal abdominal calcification. Nonobstructive bowel gas pattern. Osseous metastatic disease. Chest xray results shows Numerous osseous metastases. Several nodular densities overlying the bilateral lungs may represent  osseous metastases or pulmonary metastases. Lab work showed Na+ 132 K+ 5.4 C02 21, Glucose 228, Bun/Creat 43/5.8, WBC 22.45, H/H 8.7/27.5. Patient given morphine for pain and lokelma for K+.    On assessment patient is alert and oriented x3, slight lethargic from Morphine. Patient continue to endorse rebound pain in abdomen. Patient has nausea without vomiting. Blood pressure stable, no fever or chills noted.     Patient will be admitted to hospital services for further evaluation and medical management.             Past Medical History:   Diagnosis Date    Arthritis     Cataract     Chronic diastolic congestive heart failure     Coronary artery disease     Cystoid macular edema of both eyes     Diabetes mellitus type II     Diabetic retinopathy     Hyperlipemia     Hypertension     Retinal hole     Stage 4 chronic kidney disease     Streptococcus pyogenes bacteremia 2018    Due to left toe osteomyelitis       Past Surgical History:   Procedure Laterality Date    ABDOMINAL AORTOGRAPHY N/A 2019    Procedure: AORTOGRAM-ABDOMINAL;  Surgeon: Amish Hyatt MD;  Location: [facility] CATH LAB/EP;  Service: Cardiology;  Laterality: N/A;  CO2 angiography    ANGIOGRAPHY OF LOWER EXTREMITY Left 2019    Procedure: Angiogram Extremity Unilateral;  Surgeon: Amish Hyatt MD;  Location: [facility] CATH LAB/EP;  Service: Cardiology;  Laterality: Left;    AV FISTULA PLACEMENT Left 2022    Procedure: CREATION, AV FISTULA INSERTION GRAFT, LEFT UPPER FOREARM;  Surgeon: Daren Dubon MD;  Location: [facility] OR;  Service: General;  Laterality: Left;    CATARACT EXTRACTION W/  INTRAOCULAR LENS IMPLANT Left 12/15/14    Dr de la cruz    CATARACT EXTRACTION W/  INTRAOCULAR LENS IMPLANT Right 14    dorothy    CIRCUMCISION, NON-      focal laser right eye      INSERTION OF SHAH CATHETER Right 2021    Procedure: INSERTION, CATHETER, CENTRAL VENOUS, SHAH DUAL LUMEN;  Surgeon: Denys Aleman  MD Janette;  Location: [facility] OR;  Service: General;  Laterality: Right;    INSERTION OF TUNNELED CENTRAL VENOUS CATHETER (CVC) WITH SUBCUTANEOUS PORT Right 1/2/2019    Procedure: [id number];  Surgeon: Denys Aleman Jr., MD;  Location: [facility] OR;  Service: General;  Laterality: Right;    INSERTION OF TUNNELED CENTRAL VENOUS HEMODIALYSIS CATHETER Right 6/28/2021    Procedure: Insertion, Catheter, Central Venous, Hemodialysis;  Surgeon: Agata Rosado MD;  Location: [facility] CATH LAB;  Service: Interventional Nephrology;  Laterality: Right;    KNEE SURGERY      left    Left medial collateral ligament repair      REMOVAL OF HEMODIALYSIS CATHETER Left 4/1/2022    Procedure: REMOVAL, CATHETER, HEMODIALYSIS;  Surgeon: Daren Dubon MD;  Location: [facility] OR;  Service: General;  Laterality: Left;    TONSILLECTOMY         Review of patient's allergies indicates:   Allergen Reactions    Atorvastatin Other (See Comments)       No current facility-administered medications on file prior to encounter.     Current Outpatient Medications on File Prior to Encounter   Medication Sig    acetaminophen (TYLENOL) 325 MG tablet Take 2 tablets (650 mg total) by mouth every 4 (four) hours as needed.    amLODIPine (NORVASC) 10 MG tablet Take 1 tablet (10 mg total) by mouth once daily.    aspirin (ECOTRIN) 81 MG EC tablet Take 81 mg by mouth once daily.    benzonatate (TESSALON) 100 MG capsule Take 1 capsule (100 mg total) by mouth 3 (three) times daily as needed for Cough.    calcium-vitamin D3 (OYSTER SHELL CALCIUM-VIT D3) 500 mg-5 mcg (200 unit) per tablet Take 1 tablet by mouth once daily.    enzalutamide (XTANDI) 40 mg Cap Take 160 mg by mouth once daily.    ezetimibe (ZETIA) 10 mg tablet Take 1 tablet (10 mg total) by mouth once daily.    insulin aspart U-100 (NOVOLOG) 100 unit/mL (3 mL) InPn pen Inject 0-5 Units into the skin before meals and at bedtime as needed (Hyperglycemia). **LOW CORRECTION DOSE** Blood  "Glucose mg/dL Pre-meal 151-200, 0 unit; 201-250, take 2 units;  251-300, take 3 units; 301-350, take 4 units; greater than 350, take 5 units.    insulin NPH isoph U-100 human (NOVOLIN N FLEXPEN) 100 unit/mL (3 mL) InPn Inject 100 Units into the skin as needed.    nebulizer and compressor Alana USE AS DIRECTED    nitroGLYCERIN (NITROSTAT) 0.4 MG SL tablet Place 1 tablet (0.4 mg total) under the tongue every 5 (five) minutes as needed for Chest pain.    olmesartan (BENICAR) 20 MG tablet Take 1 tablet (20 mg total) by mouth once daily.    pantoprazole (PROTONIX) 40 MG tablet Take 1 tablet (40 mg total) by mouth once daily.    pen needle, diabetic 31 gauge x 5/16" Ndle Use 4 (four) times daily as needed (high blood sugar - see sliding scale on discharge paperwork).    pen needle, diabetic, safety (VGBio AUTOSHIELD PEN NEEDLE) 29 gauge x 5/16" Ndle For use once daily with levemir flexpen    phenyleph-promethazine-cod 5-6.25-10 mg/5 ml (PROMETHAZINE VC-CODEINE) 6.25-5-10 mg/5 mL Syrp Take 6.25 mg of amoxicillin by mouth as needed.    pulse oximeter (PULSE OXIMETER) device by Apply Externally route 2 (two) times a day. Use twice daily at 8 AM and 3 PM and record the value in MyChart as directed.    rosuvastatin (CRESTOR) 40 MG Tab Take 1 tablet (40 mg total) by mouth every evening.     Family History       Problem Relation (Age of Onset)    Cancer Mother, Brother, Sister    Cataracts Paternal Grandmother    Diabetes Father    Glaucoma Paternal Grandmother    Heart disease Mother, Father          Tobacco Use    Smoking status: Never    Smokeless tobacco: Never   Substance and Sexual Activity    Alcohol use: No     Alcohol/week: 0.0 standard drinks    Drug use: No    Sexual activity: Yes     Partners: Female     Review of Systems   Constitutional:  Positive for activity change and appetite change.   HENT: Negative.     Eyes: Negative.    Respiratory: Negative.     Gastrointestinal:  Positive for abdominal pain, " nausea and vomiting.   Endocrine: Negative.    Genitourinary: Negative.    Musculoskeletal:  Positive for back pain and myalgias.   Skin: Negative.    Allergic/Immunologic: Negative.    Neurological:  Positive for weakness.   Psychiatric/Behavioral: Negative.     Objective:     Vital Signs (Most Recent):  Temp: 98.5 °F (36.9 °C) (01/16/23 1606)  Pulse: 93 (01/16/23 1921)  Resp: 18 (01/16/23 1931)  BP: (!) 119/55 (01/16/23 1921)  SpO2: 99 % (01/16/23 1921)   Vital Signs (24h Range):  Temp:  [98.5 °F (36.9 °C)] 98.5 °F (36.9 °C)  Pulse:  [88-96] 93  Resp:  [14-22] 18  SpO2:  [97 %-99 %] 99 %  BP: (119-149)/(55-66) 119/55        There is no height or weight on file to calculate BMI.    Physical Exam  Constitutional:       Appearance: Normal appearance.   HENT:      Head: Normocephalic and atraumatic.      Nose: Nose normal.      Mouth/Throat:      Mouth: Mucous membranes are moist.   Eyes:      Pupils: Pupils are equal, round, and reactive to light.   Cardiovascular:      Rate and Rhythm: Normal rate and regular rhythm.      Heart sounds: Murmur heard.   Pulmonary:      Effort: Pulmonary effort is normal.      Breath sounds: Normal breath sounds.   Abdominal:      Palpations: Abdomen is soft.      Tenderness: There is abdominal tenderness. There is guarding.   Musculoskeletal:         General: Tenderness present.      Cervical back: Normal range of motion.   Skin:     General: Skin is warm.      Capillary Refill: Capillary refill takes less than 2 seconds.   Neurological:      Mental Status: He is alert and oriented to person, place, and time.      Motor: Weakness present.   Psychiatric:         Mood and Affect: Mood normal.         Behavior: Behavior normal.         CRANIAL NERVES     CN III, IV, VI   Pupils are equal, round, and reactive to light.     Significant Labs: All pertinent labs within the past 24 hours have been reviewed.      Significant Imaging: I have reviewed all pertinent imaging results/findings  within the past 24 hours.  I have reviewed and interpreted all pertinent imaging results/findings within the past 24 hours.    Assessment/Plan:     Generalized pain  -Patient stated having generalized pain x 1 day.    --Patient abdomen xray showed There are numerous osseous metastases.  The visualized bowel gas pattern is nonspecific.  There is a moderate amount of stool.  There is no bowel obstruction. There is no abnormal abdominal calcification. Nonobstructive bowel gas pattern. Osseous metastatic disease.     -Chest xray results shows Numerous osseous metastases. Several nodular densities overlying the bilateral lungs may represent osseous metastases or pulmonary metastases.     -Continue pain management       Malignant neoplasm of prostate  -Patient abdomen xray showed There are numerous osseous metastases.  The visualized bowel gas pattern is nonspecific.  There is a moderate amount of stool.  There is no bowel obstruction. There is no abnormal abdominal calcification. Nonobstructive bowel gas pattern. Osseous metastatic disease.     -Chest xray results shows Numerous osseous metastases. Several nodular densities overlying the bilateral lungs may represent osseous metastases or pulmonary metastases.     -Will consult Dr. Santiago Hem/Onc    Acute kidney injury superimposed on chronic kidney disease  -Patient has dialysis on T/TH/Sat.    -Patient Bun/Creat is 43/5.8    -Continue to monitor intake and output and overall fluid. Avoid nephrotoxic medication, dose   medication according to renal function.     -Monitor electrolytes and replace as needed       Acute on chronic diastolic CHF (congestive heart failure), NYHA class 3  Patient is identified as having Combined Systolic and Diastolic heart failure that is Acute on chronic. CHF is currently controlled. Latest ECHO performed and demonstrates- Results for orders placed during the hospital encounter of 01/26/22    Echo    Interpretation Summary  · Concentric  hypertrophy and normal systolic function.  · The estimated ejection fraction is 55%.  · Indeterminate left ventricular diastolic function.  · Normal right ventricular size with normal right ventricular systolic function.  · Systolic anterior motion of the mitral chordal apparatus is present.  · Normal central venous pressure (3 mmHg).  . Continue and monitor clinical status closely. Monitor on telemetry. Patient is on CHF pathway.  Monitor strict Is&Os and daily weights.  Place on fluid restriction of 1 L. Continue to stress to patient importance of self efficacy and  on diet for CHF. Last BNP reviewed- and noted below No results for input(s): BNP, BNPTRIAGEBLO in the last 168 hours..    -Continue to monitor    -Will obtain BNP to assess overall fluid balance       Uncontrolled type 2 diabetes mellitus with hyperglycemia  Patient's FSGs are uncontrolled due to hyperglycemia on current medication regimen.  Last A1c reviewed-   Lab Results   Component Value Date    HGBA1C 6.7 (H) 12/05/2022     Most recent fingerstick glucose reviewed-   Recent Labs   Lab 01/16/23  2240   POCTGLUCOSE 260*     Current correctional scale  Low  Maintain anti-hyperglycemic dose as follows-   Antihyperglycemics (From admission, onward)    Start     Stop Route Frequency Ordered    01/16/23 2320  insulin aspart U-100 pen 0-5 Units         -- SubQ Before meals & nightly PRN 01/16/23 2221          -Patient last HA1C was 6.7 indicative of fair outpatient glycemic control    -Hyperglycemia protocol in place, low insulin sliding scale with Accu/Checks AC/HS      -Hold Oral hypoglycemics while patient is in the hospital.    Leukocytosis  -Patient WBC was 22.45 on arrival.    -Will start broad spectrum antibiotics Vanc and Zosyn    -Will follow up on blood cultures      Anemia in chronic kidney disease, on chronic dialysis  -anemia is from ESRD, prostate cancer, chronic disease    -Labs have been reviewed. Hemoglobin is 8.7  g/dL.    -continue to monitor           VTE Risk Mitigation (From admission, onward)         Ordered     heparin (porcine) injection 5,000 Units  Every 8 hours         01/16/23 2147     IP VTE HIGH RISK PATIENT  Once         01/16/23 2056     Place sequential compression device  Until discontinued         01/16/23 2056                   Loretta Salmon NP  Department of Hospital Medicine   [facility] Emergency Dept

## 2023-01-17 NOTE — PROGRESS NOTES
[facility]           Pharmacy        Current Drug Shortage     Due to national backorder and [facility] is critically low on inventory of Dextrose 50% (D50) Syringes and Vials, pharmacy has automatically switched from D50% to D10% IVPB at the equivalent dose until resolution of the shortage per P&T approved protocol.               Marichuy Elizalde, PharmD  [phone]

## 2023-01-17 NOTE — ASSESSMENT & PLAN NOTE
ESRD on T/Th/Sa HD  BUN/Cr ratio elevated  Strict I/Os, mindful of fluid restriction as patient is oliguric.  Avoid nephrotoxic medication, dose medication according to renal function.   Monitor electrolytes, maintain K>4.0-5.0, Mg>2.0

## 2023-01-17 NOTE — ASSESSMENT & PLAN NOTE
anemia is from ESRD, prostate cancer, chronic disease  Labs have been reviewed. Hgb of 8.2, may be dilutional.  EPO per nephrology

## 2023-01-17 NOTE — ASSESSMENT & PLAN NOTE
- my patient in clinic  - diagnosed with metastatic prostate cancer in late spring/early summer 2022.  - he initially responded to leuprolide/bicalutamide but in late 2022 had progressive disease  - he started enzalutamide in late December 2022.  - CT abdomen/pelvis reveals decrease in size of lymphadenopathy. Bone lesions have increased but comparison imaging was prior to starting any therapy for prostate cancer.  - agree with pain control  - leukocytosis and urinalysis results may suggest an infectious process that could be contributing to his pain.  - recommend palliative care consult as well for pain control, overall disease state.  - continue enzalutamide while in the hospital.

## 2023-01-17 NOTE — ASSESSMENT & PLAN NOTE
Patient is identified as having Combined Systolic and Diastolic heart failure that is Acute on chronic. CHF is currently controlled. Latest ECHO performed and demonstrates- Results for orders placed during the hospital encounter of 01/26/22    Echo    Interpretation Summary  · Concentric hypertrophy and normal systolic function.  · The estimated ejection fraction is 55%.  · Indeterminate left ventricular diastolic function.  · Normal right ventricular size with normal right ventricular systolic function.  · Systolic anterior motion of the mitral chordal apparatus is present.  · Normal central venous pressure (3 mmHg).  . Continue and monitor clinical status closely. Monitor on telemetry. Patient is on CHF pathway.  Monitor strict Is&Os and daily weights.  Place on fluid restriction of 1 L. Continue to stress to patient importance of self efficacy and  on diet for CHF. Last BNP reviewed- and noted below No results for input(s): BNP, BNPTRIAGEBLO in the last 168 hours..    -Continue to monitor    -Will obtain BNP to assess overall fluid balance

## 2023-01-17 NOTE — PLAN OF CARE
Problem: Adult Inpatient Plan of Care  Goal: Plan of Care Review  Outcome: Ongoing, Progressing      01/17/23 1052   Admission   Initial VN Admission Questions Complete  (Patient arrived to unit, AAOx4. Granddaughter at bedside. Admission questions completed. POC reviewed, allowed time for questions. Instructed pt to call for assistance.)   Communication Issues? None   Shift   Virtual Nurse - Rounding Complete   Virtual Nurse - Patient Verbalized Approval Of VN Rounding;Camera Use   Type of Frequent Check   Type Patient Rounds   Safety/Activity   Safety Promotion/Fall Prevention Fall Risk reviewed with patient/family;family to remain at bedside   Pain/Comfort/Sleep   Sleep/Rest/Relaxation awake

## 2023-01-17 NOTE — ASSESSMENT & PLAN NOTE
-anemia is from ESRD, prostate cancer, chronic disease    -Labs have been reviewed. Hemoglobin is 8.7 g/dL.    -continue to monitor

## 2023-01-17 NOTE — CONSULTS
NEPHROLOGY CONSULT NOTE    HPI & INTERVAL HISTORY:    Past Medical History:   Diagnosis Date    Arthritis     Cataract     Chronic diastolic congestive heart failure     Coronary artery disease     Cystoid macular edema of both eyes     Diabetes mellitus type II     Diabetic retinopathy     Hyperlipemia     Hypertension     Retinal hole     Stage 4 chronic kidney disease     Streptococcus pyogenes bacteremia 2018    Due to left toe osteomyelitis      Past Surgical History:   Procedure Laterality Date    ABDOMINAL AORTOGRAPHY N/A 2019    Procedure: AORTOGRAM-ABDOMINAL;  Surgeon: Amish Hyatt MD;  Location: [facility] CATH LAB/EP;  Service: Cardiology;  Laterality: N/A;  CO2 angiography    ANGIOGRAPHY OF LOWER EXTREMITY Left 2019    Procedure: Angiogram Extremity Unilateral;  Surgeon: Amish Hyatt MD;  Location: [facility] CATH LAB/EP;  Service: Cardiology;  Laterality: Left;    AV FISTULA PLACEMENT Left 2022    Procedure: CREATION, AV FISTULA INSERTION GRAFT, LEFT UPPER FOREARM;  Surgeon: Daren Dubon MD;  Location: [facility] OR;  Service: General;  Laterality: Left;    CATARACT EXTRACTION W/  INTRAOCULAR LENS IMPLANT Left 12/15/14    Dr de la cruz    CATARACT EXTRACTION W/  INTRAOCULAR LENS IMPLANT Right 14    dorothy    CIRCUMCISION, NON-      focal laser right eye      INSERTION OF SHAH CATHETER Right 2021    Procedure: INSERTION, CATHETER, CENTRAL VENOUS, SHAH DUAL LUMEN;  Surgeon: Denys Aleman Jr., MD;  Location: [facility];  Service: General;  Laterality: Right;    INSERTION OF TUNNELED CENTRAL VENOUS CATHETER (CVC) WITH SUBCUTANEOUS PORT Right 2019    Procedure: [id number];  Surgeon: Denys Aleman Jr., MD;  Location: [facility];  Service: General;  Laterality: Right;    INSERTION OF TUNNELED CENTRAL VENOUS HEMODIALYSIS CATHETER Right 2021    Procedure: Insertion, Catheter, Central Venous, Hemodialysis;  Surgeon: Agata Rosado MD;  Location: [facility] CATH  LAB;  Service: Interventional Nephrology;  Laterality: Right;    KNEE SURGERY      left    Left medial collateral ligament repair      REMOVAL OF HEMODIALYSIS CATHETER Left 4/1/2022    Procedure: REMOVAL, CATHETER, HEMODIALYSIS;  Surgeon: Daren Dubon MD;  Location: [facility] OR;  Service: General;  Laterality: Left;    TONSILLECTOMY        Review of patient's allergies indicates:   Allergen Reactions    Atorvastatin Other (See Comments)      Medications Prior to Admission   Medication Sig Dispense Refill Last Dose    aspirin (ECOTRIN) 81 MG EC tablet Take 81 mg by mouth once daily.       calcium-vitamin D3 (OYSTER SHELL CALCIUM-VIT D3) 500 mg-5 mcg (200 unit) per tablet Take 1 tablet by mouth once daily. 90 tablet 3     enzalutamide (XTANDI) 40 mg Cap Take 160 mg by mouth once daily. 120 capsule 11     insulin aspart U-100 (NOVOLOG FLEXPEN U-100 INSULIN) 100 unit/mL (3 mL) InPn pen Inject 0-5 Units into the skin 4 (four) times daily before meals and nightly. 151-200=0  201-250=2units  251-300=3units  301-350=4units  Greater than 350 take 5units       pantoprazole (PROTONIX) 40 MG tablet Take 1 tablet (40 mg total) by mouth once daily. 30 tablet 11     acetaminophen (TYLENOL) 325 MG tablet Take 2 tablets (650 mg total) by mouth every 4 (four) hours as needed.  0 Unknown    amLODIPine (NORVASC) 10 MG tablet Take 1 tablet (10 mg total) by mouth once daily. (Patient not taking: Reported on 1/17/2023) 90 tablet 3 Not Taking    benzonatate (TESSALON) 100 MG capsule Take 1 capsule (100 mg total) by mouth 3 (three) times daily as needed for Cough. (Patient not taking: Reported on 1/17/2023) 30 capsule 1 Not Taking    ezetimibe (ZETIA) 10 mg tablet Take 1 tablet (10 mg total) by mouth once daily. (Patient not taking: Reported on 1/17/2023) 90 tablet 3 Not Taking    insulin NPH isoph U-100 human (NOVOLIN N FLEXPEN) 100 unit/mL (3 mL) InPn Inject 100 Units into the skin as needed.       nebulizer and compressor Alana USE AS  "DIRECTED 1 each 0     nitroGLYCERIN (NITROSTAT) 0.4 MG SL tablet Place 1 tablet (0.4 mg total) under the tongue every 5 (five) minutes as needed for Chest pain. 25 tablet 0 Unknown    olmesartan (BENICAR) 20 MG tablet Take 1 tablet (20 mg total) by mouth once daily. (Patient not taking: Reported on 1/17/2023) 90 tablet 1 Not Taking    pen needle, diabetic 31 gauge x 5/16" Ndle Use 4 (four) times daily as needed (high blood sugar - see sliding scale on discharge paperwork). 100 each 1     pen needle, diabetic, safety (BD AUTOSHIELD PEN NEEDLE) 29 gauge x 5/16" Ndle For use once daily with levemir flexpen 90 each 11     phenyleph-promethazine-cod 5-6.25-10 mg/5 ml (PROMETHAZINE VC-CODEINE) 6.25-5-10 mg/5 mL Syrp Take 6.25 mg of amoxicillin by mouth as needed.       pulse oximeter (PULSE OXIMETER) device by Apply Externally route 2 (two) times a day. Use twice daily at 8 AM and 3 PM and record the value in MyChart as directed. 1 each 0     rosuvastatin (CRESTOR) 40 MG Tab Take 1 tablet (40 mg total) by mouth every evening. (Patient not taking: Reported on 1/17/2023) 90 tablet 3 Not Taking       Social History     Socioeconomic History    Marital status:    Tobacco Use    Smoking status: Never    Smokeless tobacco: Never   Substance and Sexual Activity    Alcohol use: No     Alcohol/week: 0.0 standard drinks    Drug use: No    Sexual activity: Yes     Partners: Female     Social Determinants of Health     Financial Resource Strain: Low Risk     Difficulty of Paying Living Expenses: Not hard at all   Food Insecurity: No Food Insecurity    Worried About Running Out of Food in the Last Year: Never true    Ran Out of Food in the Last Year: Never true   Transportation Needs: No Transportation Needs    Lack of Transportation (Medical): No    Lack of Transportation (Non-Medical): No   Physical Activity: Inactive    Days of Exercise per Week: 0 days    Minutes of Exercise per Session: 0 min   Stress: Stress Concern " Present    Feeling of Stress : Rather much   Social Connections: Socially Integrated    Frequency of Communication with Friends and Family: Twice a week    Frequency of Social Gatherings with Friends and Family: More than three times a week    Attends Hinduism Services: More than 4 times per year    Active Member of Clubs or Organizations: Yes    Attends Club or Organization Meetings: More than 4 times per year    Marital Status:    Housing Stability: Unknown    Unable to Pay for Housing in the Last Year: No    Unstable Housing in the Last Year: No        MEDS   aspirin  81 mg Oral Daily    calcium-vitamin D3  1 tablet Oral Daily    enzalutamide  160 mg Oral Daily    heparin (porcine)  5,000 Units Subcutaneous Q8H    pantoprazole  40 mg Oral Daily    piperacillin-tazobactam (ZOSYN) IVPB  4.5 g Intravenous Q12H           CONTINOUS INFUSIONS:    No intake or output data in the 24 hours ending 01/17/23 1152     HEMODYNAMICS:    Temp:  [98.5 °F (36.9 °C)-99.2 °F (37.3 °C)] 99.2 °F (37.3 °C)  Pulse:  [84-96] 87  Resp:  [14-22] 18  SpO2:  [94 %-99 %] 94 %  BP: (113-149)/(55-66) 121/57   General :   Fatigue, generalized pain , back pain, weakness,  decreased intake  No fever  No chills  No CP  No SOB  No cough  Cardiology : pulse 84  Pulmonary : diminished breath sounds  Abdomen soft   Extremities :edema   Skin: dry   LABS   Lab Results   Component Value Date    WBC 25.68 (H) 01/17/2023    HGB 8.2 (L) 01/17/2023    HCT 26.6 (L) 01/17/2023    MCV 97 01/17/2023     01/17/2023        Recent Labs   Lab 01/17/23  0430   *   CALCIUM 9.7   ALBUMIN 2.7*   PROT 7.5   *   K 5.6*   CO2 20*   CL 91*   BUN 49*   CREATININE 6.2*   ALKPHOS 596*   ALT <5*   AST 13   BILITOT 0.4      Lab Results   Component Value Date    .0 (H) 04/27/2021    CALCIUM 9.7 01/17/2023    PHOS 2.8 01/16/2023      Lab Results   Component Value Date    IRON 30 (L) 12/05/2022    TIBC 184 (L) 12/05/2022    FERRITIN 2,103 (H)  12/05/2022        ABG  No results for input(s): PH, PO2, PCO2, HCO3, BE in the last 168 hours.      IMAGING:  CXR    ASSESSMENT / PLAN  ESRD  Diabetes Mellitus  Prostate Cancer  UA RBC>100, WBC >100, blood 3  HD T-T-S  Left arm AV graft  Hyperkalemia  Hyponatremia  Metabolic acidosis  Metabolic bone disease  Poor nutrition  Albumin 2.7  Anemia multifactorial   Hb 8.2  Hypertension  Blood pressure 121/57  EF 55 %  XR abdomen There are numerous osseous metastases.  The visualized bowel gas pattern is nonspecific.  There is a moderate amount of stool.  There is no bowel obstruction.  There is no abnormal abdominal calcification.   Dialysis  Weight daily  Renal, ADA diet

## 2023-01-17 NOTE — ASSESSMENT & PLAN NOTE
-Patient WBC was 22.45 on arrival. No clear source of infection  -Will start broad spectrum antibiotics Vanc and Zosyn  -Will follow up on blood cultures

## 2023-01-17 NOTE — PROGRESS NOTES
Pharmacokinetic Initial Assessment: IV Vancomycin    Assessment/Plan:    Initiate intravenous vancomycin with loading dose of 1750 mg once (20 mg/kg loading due d/t ESRD on HD) with subsequent doses when random pre-HD concentrations are less than 25 mcg/mL  Desired empiric serum trough concentration is 15 to 20 mcg/mL  Draw vancomycin random level on 1/19 at 0400.  Pharmacy will continue to follow and monitor vancomycin.      Please contact pharmacy at extension [phone] with any questions regarding this assessment.     Thank you for the consult,   Marichuy Elizalde       Patient brief summary:  Domingo Castro is a 77 y.o. male initiated on antimicrobial therapy with IV Vancomycin for treatment of suspected bacteremia    Drug Allergies:   Review of patient's allergies indicates:   Allergen Reactions    Atorvastatin Other (See Comments)       Actual Body Weight:   81.6 kg    Renal Function:   Estimated Creatinine Clearance: 12.3 mL/min (A) (based on SCr of 5.8 mg/dL (H)).,     Dialysis Method (if applicable):  [facility]     CBC (last 72 hours):  Recent Labs   Lab Result Units 01/16/23  1723   WBC K/uL 22.45*   Hemoglobin g/dL 8.7*   Hematocrit % 27.5*   Platelets K/uL 212   Gran % % 88.4*   Lymph % % 6.7*   Mono % % 4.2   Eosinophil % % 0.0   Basophil % % 0.2   Differential Method  Automated       Metabolic Panel (last 72 hours):  Recent Labs   Lab Result Units 01/16/23  1723   Sodium mmol/L 132*   Potassium mmol/L 5.4*   Chloride mmol/L 91*   CO2 mmol/L 21*   Glucose mg/dL 228*   BUN mg/dL 43*   Creatinine mg/dL 5.8*   Albumin g/dL 3.2*   Total Bilirubin mg/dL 0.5   Alkaline Phosphatase U/L 703*   AST U/L 13   ALT U/L <5*   Magnesium mg/dL 2.0   Phosphorus mg/dL 2.8       Drug levels (last 3 results):  No results for input(s): VANCOMYCINRA, VANCORANDOM, VANCOMYCINPE, VANCOPEAK, VANCOMYCINTR, VANCOTROUGH in the last 72 hours.    Microbiologic Results:  Microbiology Results (last 7  days)       Procedure Component Value Units Date/Time    Blood culture (site 1) [[id number]] Collected: 01/16/23 2256    Order Status: Sent Specimen: Blood from Antecubital, Right Arm Updated: 01/16/23 2257    Blood culture (site 2) [[id number]] Collected: 01/16/23 2256    Order Status: Sent Specimen: Blood from Peripheral, Left Hand Updated: 01/16/23 2256    Influenza A & B by Molecular [[id number]] Collected: 01/16/23 1909    Order Status: Completed Specimen: Nasopharyngeal Swab Updated: 01/16/23 2002     Influenza A, Molecular Negative     Influenza B, Molecular Negative     Flu A & B Source Nasal swab

## 2023-01-17 NOTE — ASSESSMENT & PLAN NOTE
Patient is identified as having Combined Systolic and Diastolic heart failure that is Acute on chronic. CHF is currently controlled. Latest ECHO performed and demonstrates- Results for orders placed during the hospital encounter of 01/26/22    Echo    Interpretation Summary  · Concentric hypertrophy and normal systolic function.  · The estimated ejection fraction is 55%.  · Indeterminate left ventricular diastolic function.  · Normal right ventricular size with normal right ventricular systolic function.  · Systolic anterior motion of the mitral chordal apparatus is present.  · Normal central venous pressure (3 mmHg).  . Continue and monitor clinical status closely. Monitor on telemetry. Patient is on CHF pathway.  Monitor strict Is&Os and daily weights.  Place on fluid restriction of 1 L. Continue to stress to patient importance of self efficacy and  on diet for CHF. Last BNP reviewed- and noted below No results for input(s): BNP, BNPTRIAGEBLO in the last 168 hours..  -Continue to monitor

## 2023-01-17 NOTE — ASSESSMENT & PLAN NOTE
-Patient abdomen xray showed There are numerous osseous metastases.  The visualized bowel gas pattern is nonspecific.  There is a moderate amount of stool.  There is no bowel obstruction. There is no abnormal abdominal calcification. Nonobstructive bowel gas pattern. Osseous metastatic disease.     -Chest xray results shows Numerous osseous metastases. Several nodular densities overlying the bilateral lungs may represent osseous metastases or pulmonary metastases.     -Will consult Dr. Santiago Hem/Onc

## 2023-01-17 NOTE — ASSESSMENT & PLAN NOTE
-Patient stated having generalized pain x 1 day.  -numerous osseous metastases as well as pulmonary nodules likely metastases likely from prostate Ca  -Continue pain management

## 2023-01-17 NOTE — HPI
77 year-old male with metastatic prostate cancer on leuprolide/enzalutamide was admitted on 1/16/23 for generalized pain, possible urinary tract infection. Consult is for prostate cancer.

## 2023-01-17 NOTE — SUBJECTIVE & OBJECTIVE
- he endorses fatigue, generalized pain, abdominal pain, back pain, weakness. He denies fever.      Oncology Treatment Plan:   OP PROSTATE LEUPROLIDE 22.5 MG (ELIGARD) 3 MONTH    Medications:  Continuous Infusions:  Scheduled Meds:   amLODIPine  10 mg Oral Daily    aspirin  81 mg Oral Daily    calcium-vitamin D3  1 tablet Oral Daily    enzalutamide  160 mg Oral Daily    heparin (porcine)  5,000 Units Subcutaneous Q8H    pantoprazole  40 mg Oral Daily    piperacillin-tazobactam (ZOSYN) IVPB  4.5 g Intravenous Q12H     PRN Meds:acetaminophen, albuterol-ipratropium, bisacodyL, dextrose 10%, dextrose 10%, glucagon (human recombinant), glucose, glucose, HYDROcodone-acetaminophen, insulin aspart U-100, melatonin, naloxone, nitroGLYCERIN, ondansetron, polyethylene glycol, simethicone, sodium chloride 0.9%, trazodone, Pharmacy to dose Vancomycin consult **AND** vancomycin - pharmacy to dose     Review of patient's allergies indicates:   Allergen Reactions    Atorvastatin Other (See Comments)        Past Medical History:   Diagnosis Date    Arthritis     Cataract     Chronic diastolic congestive heart failure     Coronary artery disease     Cystoid macular edema of both eyes     Diabetes mellitus type II     Diabetic retinopathy     Hyperlipemia     Hypertension     Retinal hole     Stage 4 chronic kidney disease     Streptococcus pyogenes bacteremia 12/23/2018    Due to left toe osteomyelitis     Past Surgical History:   Procedure Laterality Date    ABDOMINAL AORTOGRAPHY N/A 7/30/2019    Procedure: AORTOGRAM-ABDOMINAL;  Surgeon: Amish Hyatt MD;  Location: [facility] CATH LAB/EP;  Service: Cardiology;  Laterality: N/A;  CO2 angiography    ANGIOGRAPHY OF LOWER EXTREMITY Left 8/1/2019    Procedure: Angiogram Extremity Unilateral;  Surgeon: Amish Hyatt MD;  Location: [facility] CATH LAB/EP;  Service: Cardiology;  Laterality: Left;    AV FISTULA PLACEMENT Left 2/11/2022    Procedure: CREATION, AV FISTULA INSERTION GRAFT, LEFT UPPER  FOREARM;  Surgeon: Daren Dubon MD;  Location: [facility] OR;  Service: General;  Laterality: Left;    CATARACT EXTRACTION W/  INTRAOCULAR LENS IMPLANT Left 12/15/14    Dr de la cruz    CATARACT EXTRACTION W/  INTRAOCULAR LENS IMPLANT Right 14    dorothy    CIRCUMCISION, NON-      focal laser right eye      INSERTION OF SHAH CATHETER Right 2021    Procedure: INSERTION, CATHETER, CENTRAL VENOUS, SHAH DUAL LUMEN;  Surgeon: Denys Aleman Jr., MD;  Location: [facility] OR;  Service: General;  Laterality: Right;    INSERTION OF TUNNELED CENTRAL VENOUS CATHETER (CVC) WITH SUBCUTANEOUS PORT Right 2019    Procedure: [id number]-CATH;  Surgeon: Denys Aleman Jr., MD;  Location: [facility] OR;  Service: General;  Laterality: Right;    INSERTION OF TUNNELED CENTRAL VENOUS HEMODIALYSIS CATHETER Right 2021    Procedure: Insertion, Catheter, Central Venous, Hemodialysis;  Surgeon: Agata Rosado MD;  Location: [facility] CATH LAB;  Service: Interventional Nephrology;  Laterality: Right;    KNEE SURGERY      left    Left medial collateral ligament repair      REMOVAL OF HEMODIALYSIS CATHETER Left 2022    Procedure: REMOVAL, CATHETER, HEMODIALYSIS;  Surgeon: Daren Dubon MD;  Location: [facility] OR;  Service: General;  Laterality: Left;    TONSILLECTOMY       Family History       Problem Relation (Age of Onset)    Cancer Mother, Brother, Sister    Cataracts Paternal Grandmother    Diabetes Father    Glaucoma Paternal Grandmother    Heart disease Mother, Father          Tobacco Use    Smoking status: Never    Smokeless tobacco: Never   Substance and Sexual Activity    Alcohol use: No     Alcohol/week: 0.0 standard drinks    Drug use: No    Sexual activity: Yes     Partners: Female       Review of Systems   Constitutional:  Positive for fatigue.   HENT:  Negative for sore throat.    Eyes:  Negative for visual disturbance.   Respiratory:  Negative for shortness of breath.    Cardiovascular:  Negative  for chest pain.   Gastrointestinal:  Positive for abdominal pain.   Genitourinary:  Negative for dysuria.   Musculoskeletal:  Positive for arthralgias and back pain.   Skin:  Negative for rash.   Neurological:  Positive for weakness. Negative for headaches.   Hematological:  Negative for adenopathy.   Psychiatric/Behavioral:  The patient is not nervous/anxious.    Objective:     Vital Signs (Most Recent):  Temp: 98.5 °F (36.9 °C) (01/16/23 1606)  Pulse: 96 (01/17/23 0416)  Resp: 20 (01/17/23 0421)  BP: (!) 113/55 (01/17/23 0416)  SpO2: 99 % (01/17/23 0416)   Vital Signs (24h Range):  Temp:  [98.5 °F (36.9 °C)] 98.5 °F (36.9 °C)  Pulse:  [84-96] 96  Resp:  [14-22] 20  SpO2:  [95 %-99 %] 99 %  BP: (113-149)/(55-66) 113/55     Weight: 81.6 kg (180 lb)  Body mass index is 22.2 kg/m².  Body surface area is 2.08 meters squared.    No intake or output data in the 24 hours ending 01/17/23 1034    Physical Exam  Vitals and nursing note reviewed.   Constitutional:       Appearance: He is well-developed.      Comments: fatigued   HENT:      Head: Normocephalic and atraumatic.   Eyes:      Pupils: Pupils are equal, round, and reactive to light.   Cardiovascular:      Rate and Rhythm: Regular rhythm. Tachycardia present.   Pulmonary:      Effort: Pulmonary effort is normal.      Breath sounds: Normal breath sounds.   Abdominal:      General: Bowel sounds are normal.      Palpations: Abdomen is soft.   Musculoskeletal:         General: Normal range of motion.      Cervical back: Normal range of motion and neck supple.   Skin:     General: Skin is warm and dry.   Neurological:      Mental Status: He is alert and oriented to person, place, and time.   Psychiatric:         Behavior: Behavior normal.         Thought Content: Thought content normal.         Judgment: Judgment normal.       Significant Labs:   CBC:   Recent Labs   Lab 01/16/23  1723 01/17/23  0429   WBC 22.45* 25.68*   HGB 8.7* 8.2*   HCT 27.5* 26.6*    171     and CMP:   Recent Labs   Lab 01/16/23  1723 01/17/23  0430   * 129*   K 5.4* 5.6*   CL 91* 91*   CO2 21* 20*   * 218*   BUN 43* 49*   CREATININE 5.8* 6.2*   CALCIUM 10.2 9.7   PROT 8.5* 7.5   ALBUMIN 3.2* 2.7*   BILITOT 0.5 0.4   ALKPHOS 703* 596*   AST 13 13   ALT <5* <5*   ANIONGAP 20* 18*       Diagnostic Results:  CT abdomen/pelvis (12/5/22): I have personally reviewed the images  Small left and trace right pleural effusions.  Bibasilar atelectasis/scarring.  Coronary artery calcifications.  There is a calcified granuloma in the right lower lobe.     The stomach, gallbladder, liver, spleen, pancreas, both adrenal glands are unremarkable.  Too small to characterize hypodensities in both kidneys likely represent cysts.  No hydronephrosis or nephrolithiasis.     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  Atherosclerotic calcifications are again seen.     No acute finding involving the small bowel.  Increase thickening of the rectum.  The appendix is visualized and is normal.     The bladder has an unremarkable appearance.  The prostate has an unremarkable appearance.     Retroperitoneal lymphadenopathy persists.     The representative right iliac chain lymph node measures 2 x 1.9 cm (series 2, image 87), previously 2.3 x 2 cm.     The representative left periaortic lymph node measures 1.6 x 1 cm (series 2, image 73, previously 2.1 x 1.7 cm.     The representative retrocaval lymph node measures 1.6 x 1.2 cm, previously 2.2 x 1.6 cm (series 2, image 57).     Worsening widespread sclerotic lesions.  The index lesion in the right femoral neck measures 1.7 x 1.6 cm (series 2, image 126).  No acute osseous finding.     Impression:     1. Metastatic lymphadenopathy has improved.  2. Increased size and number of widespread sclerotic osseous lesions when compared to prior CT.  This may represent progression of disease or evidence of treatment in light of the bone scan from June of this year that  demonstrated widespread metastatic osseous lesions.  The CT exam was performed using one or more of the following dose reduction techniques- Automated exposure control, adjustment of the mA and/or kV according to patient size, and/or use of iterative reconstructed technique.

## 2023-01-18 NOTE — ASSESSMENT & PLAN NOTE
anemia is from ESRD, prostate cancer, chronic disease  Labs have been reviewed. Hgb of 8.2, may be dilutional.  EPO per nephrology  Lately has been unable to tolerate dialysis due to hypotension  Explained situation to family and recommended hospice care

## 2023-01-18 NOTE — PROGRESS NOTES
01/17/23 1717   Vital Signs   Pulse 91   BP (!) 91/51   During Hemodialysis Assessment   Blood Flow Rate (mL/min) 300 mL/min   Dialysate Flow Rate (mL/min) 600 ml/min   Ultrafiltration Rate (mL/Hr) 50 mL/Hr   Arteriovenous Lines Secure Yes   Arterial Pressure (mmHg) -88 mmHg   Venous Pressure (mmHg) 240   Blood Volume Processed (Liters) 15.5 L   UF Removed (mL) 323 mL   TMP 25   Venous Line in Air Detector No   Intake (mL) 0 mL   Transducer Dry Yes   Access Visible Yes    notified of access issue? N/A   Heparin given? N/A   Intra-Hemodialysis Comments could not rinse back..pt pulled out needle..started to leak around site and it could not be secured.. pt stated that his neck and back hurt and he did not want to try dialysis again today.. blood pressure started to decrease after dialysis.. we gave him some water and placed him on 2L nc.. he stated that he felt better and his pressure came up to baseline..   Post-Hemodialysis Assessment   Rinseback Volume (mL) 0 mL   Blood Volume Processed (Liters) 15.5 L   Duration of Treatment 95 minutes   Additional Fluid Intake (mL) 250 mL   Total UF (mL) 73 mL   Net Fluid Removal 323   Patient Response to Treatment pt responded well   Arterial bleeding stop time (min) 5 min   Venous bleeding stop time (min) 5 min   Post-Hemodialysis Comments did not rinse back.. line was removed by pt.. vss

## 2023-01-18 NOTE — TREATMENT PLAN
[facility] RN met with pt's wife and pt's family in regarding [facility] hospital follow up upon discharge. Pt's wife agreeable to hospital follow up .RN informed pt of scheduled appointment and that appointment will also appear on d/c AVS. Patient's wife informed to bring all medication bottles to PCC follow up appointment.  [facility] information handout, appointment letter and folder provided to patient's wife  pt's wife states pt has transportation to appointment.    Patient Contact info: [phone]    Person providing transportation contact info: [phone]    Barriers to attending PCC visit: pt lives in [place], [place]    Future Appointments   Date Time Provider Department Center   2/3/2023 11:00 AM Pattie Waldron MD [facility] IMPRI [facility]   3/10/2023  9:30 AM LAB, [facility] RVPH LAB [facility]   3/13/2023  2:00 PM Samson Santiago MD [facility] HEM ONC [place] [facility]   3/13/2023  2:30 PM CHAIR 07 [facility] CHEMO [place] [facility]   3/20/2023  9:30 AM Vale Merrill DPM [facility] POD [place]

## 2023-01-18 NOTE — CARE UPDATE
Pts pain medication adjusted to OxyContin 10mg bid on 1/17/2022.  Pt has received 2 doses at this time.  Pt reports decreased overall pain and denies any pain at this time.  Pt denies any overall sleepiness/ drowsiness but pt reports not sleeping last night and expresses being tired.  Family expresses concern that medication could cause sedation.  Discussed with family and will continue to monitor for side effects. Pts wife in agreement with plan to continue current medication regimen and check in tomorrow.  Pt will also follow in the palliative outpatient clinic with Dr Christian Pleitez.

## 2023-01-18 NOTE — PROGRESS NOTES
[facility] Medicine  Progress Note    Patient Name: Domingo Castro  MRN: [id number]  Patient Class: OP- Observation   Admission Date: 1/16/2023  Length of Stay: 0 days  Attending Physician: Kit Maldonado MD  Primary Care Provider: Griselda Nugent MD        Subjective:     Principal Problem:<principal problem not specified>        HPI:  Mr. 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 Patient presented to the  emergency department for evaluation of generalized pain x1 day. Patient also reports consistent non localized abdomen pain. Patient stated he was told by his oncology provider that his cancer is now in the bones. Patient has associated nausea and vomiting. Patient reports shortness if breath on exertion, denies chest pain. Patient denies fever or chills, denies constipation or diarrhea, Patient denies hematuria or dysuria. Denies hematemesis, melena or hematochezia.      On presentation in the emergency department patient abdomen xray showed There are numerous osseous metastases.  The visualized bowel gas pattern is nonspecific.  There is a moderate amount of stool.  There is no bowel obstruction. There is no abnormal abdominal calcification. Nonobstructive bowel gas pattern. Osseous metastatic disease. Chest xray results shows Numerous osseous metastases. Several nodular densities overlying the bilateral lungs may represent osseous metastases or pulmonary metastases. Lab work showed Na+ 132 K+ 5.4 C02 21, Glucose 228, Bun/Creat 43/5.8, WBC 22.45, H/H 8.7/27.5. Patient given morphine for pain and lokelma for K+.    On assessment patient is alert and oriented x3, slight lethargic from Morphine. Patient continue to endorse  rebound pain in abdomen. Patient has nausea without vomiting. Blood pressure stable, no fever or chills noted.     Patient will be admitted to hospital services for further evaluation and medical management.             Overview/Hospital Course:  No notes on file    Interval History: No acute events overnight, was switched to oxycontin overnight by palliative team for pain control with improvement in his back pain.  He is still lethargic but does not appear in any acute distress.  I had a long discussion with the wife, daughter, and granddaughter about the patient's prognosis and likely course of illness and about goals of care.  Family is tearful on exam but is amenable to pursuing comfort measures as his current condition is very complex and likely will not see any meaningful improvement despite aggressive medical management.      Objective:     Vital Signs (Most Recent):  Temp: 97.6 °F (36.4 °C) (01/18/23 1508)  Pulse: 90 (01/18/23 1508)  Resp: 17 (01/18/23 1508)  BP: (!) 103/54 (01/18/23 1508)  SpO2: (!) 91 % (01/18/23 1508)   Vital Signs (24h Range):  Temp:  [97.6 °F (36.4 °C)-98.4 °F (36.9 °C)] 97.6 °F (36.4 °C)  Pulse:  [] 90  Resp:  [16-20] 17  SpO2:  [90 %-97 %] 91 %  BP: ()/(43-65) 103/54     Weight: 88 kg (194 lb 0.1 oz)  Body mass index is 24.25 kg/m².    Intake/Output Summary (Last 24 hours) at 1/18/2023 1653  Last data filed at 1/18/2023 1415  Gross per 24 hour   Intake 450 ml   Output 423 ml   Net 27 ml      Physical Exam  Constitutional:       Appearance: Normal appearance.   HENT:      Head: Normocephalic and atraumatic.      Nose: Nose normal.      Mouth/Throat:      Mouth: Mucous membranes are moist.   Eyes:      Pupils: Pupils are equal, round, and reactive to light.   Cardiovascular:      Rate and Rhythm: Normal rate and regular rhythm.      Heart sounds: Murmur heard.   Pulmonary:      Effort: Pulmonary effort is normal.      Breath sounds: Normal breath sounds.   Abdominal:       Palpations: Abdomen is soft.      Tenderness: There is abdominal tenderness. There is guarding.   Musculoskeletal:         General: Tenderness present.      Cervical back: Normal range of motion.   Skin:     General: Skin is warm.      Capillary Refill: Capillary refill takes less than 2 seconds.   Neurological:      Mental Status: He is alert and oriented to person, place, and time.      Motor: Weakness present.   Psychiatric:         Mood and Affect: Mood normal.         Behavior: Behavior normal.       Significant Labs: All pertinent labs within the past 24 hours have been reviewed.  BMP:   Recent Labs   Lab 01/16/23 1723 01/17/23 0430 01/18/23 0222   *   < > 210*   *   < > 128*   K 5.4*   < > 6.1*   CL 91*   < > 90*   CO2 21*   < > 22*   BUN 43*   < > 55*   CREATININE 5.8*   < > 6.6*   CALCIUM 10.2   < > 9.0   MG 2.0  --   --     < > = values in this interval not displayed.     CBC:   Recent Labs   Lab 01/16/23 1723 01/17/23 0429 01/18/23 0222   WBC 22.45* 25.68* 23.62*   HGB 8.7* 8.2* 7.8*   HCT 27.5* 26.6* 24.9*    171 187     CMP:   Recent Labs   Lab 01/16/23 1723 01/17/23 0430 01/18/23 0222   * 129* 128*   K 5.4* 5.6* 6.1*   CL 91* 91* 90*   CO2 21* 20* 22*   * 218* 210*   BUN 43* 49* 55*   CREATININE 5.8* 6.2* 6.6*   CALCIUM 10.2 9.7 9.0   PROT 8.5* 7.5 6.9   ALBUMIN 3.2* 2.7* 2.4*   BILITOT 0.5 0.4 0.4   ALKPHOS 703* 596* 516*   AST 13 13 93*   ALT <5* <5* 58*   ANIONGAP 20* 18* 16       Significant Imaging: I have reviewed all pertinent imaging results/findings within the past 24 hours.  I have reviewed and interpreted all pertinent imaging results/findings within the past 24 hours.      Assessment/Plan:      Generalized pain  -Patient stated having generalized pain x 1 day.  -numerous osseous metastases as well as pulmonary nodules likely metastases likely from prostate Ca  -Continue pain management, is currently on oxycontin with good pain control      Malignant  neoplasm of prostate  -Dr Santiago, hem/onc following  -Advanced metastatic disease, will order palliative consult and initiated goals of care discussion with patient with wife at bedside    ESRD (end stage renal disease) on dialysis  ESRD on T/Th/Sa HD  BUN/Cr ratio elevated  Strict I/Os, mindful of fluid restriction as patient is oliguric.  Avoid nephrotoxic medication, dose medication according to renal function.   Monitor electrolytes, maintain K>4.0-5.0, Mg>2.0      Acute on chronic diastolic CHF (congestive heart failure), NYHA class 3  Patient is identified as having Combined Systolic and Diastolic heart failure that is Acute on chronic. CHF is currently controlled. Latest ECHO performed and demonstrates- Results for orders placed during the hospital encounter of 01/26/22    Echo    Interpretation Summary  · Concentric hypertrophy and normal systolic function.  · The estimated ejection fraction is 55%.  · Indeterminate left ventricular diastolic function.  · Normal right ventricular size with normal right ventricular systolic function.  · Systolic anterior motion of the mitral chordal apparatus is present.  · Normal central venous pressure (3 mmHg).  . Continue and monitor clinical status closely. Monitor on telemetry. Patient is on CHF pathway.  Monitor strict Is&Os and daily weights.  Place on fluid restriction of 1 L. Continue to stress to patient importance of self efficacy and  on diet for CHF. Last BNP reviewed- and noted below No results for input(s): BNP, BNPTRIAGEBLO in the last 168 hours..  -Continue to monitor        Uncontrolled type 2 diabetes mellitus with hyperglycemia  Patient's FSGs are uncontrolled due to hyperglycemia on current medication regimen.  Last A1c reviewed-   Lab Results   Component Value Date    HGBA1C 6.7 (H) 12/05/2022     Most recent fingerstick glucose reviewed-   Recent Labs   Lab 01/17/23  1803 01/17/23 2010 01/18/23  0520   POCTGLUCOSE 206* 219* 217*     Current  correctional scale  Low  Maintain anti-hyperglycemic dose as follows-   Antihyperglycemics (From admission, onward)    Start     Stop Route Frequency Ordered    01/16/23 2320  insulin aspart U-100 pen 0-5 Units         -- SubQ Before meals & nightly PRN 01/16/23 2221          -Patient last HA1C was 6.7 indicative of fair outpatient glycemic control    -Hyperglycemia protocol in place, low insulin sliding scale with Accu/Checks AC/HS      -Hold Oral hypoglycemics while patient is in the hospital.    Leukocytosis  -Patient WBC was 22.45 on arrival. No clear source of infection  -Will start broad spectrum antibiotics Vanc and Zosyn  -Will follow up on blood cultures      Anemia in chronic kidney disease, on chronic dialysis  anemia is from ESRD, prostate cancer, chronic disease  Labs have been reviewed. Hgb of 8.2, may be dilutional.  EPO per nephrology  Lately has been unable to tolerate dialysis due to hypotension  Explained situation to family and recommended hospice care           VTE Risk Mitigation (From admission, onward)         Ordered     heparin (porcine) injection 5,000 Units  Every 8 hours         01/16/23 2147     IP VTE HIGH RISK PATIENT  Once         01/16/23 2056     Place sequential compression device  Until discontinued         01/16/23 2056                Discharge Planning   LUIS FELIPE:      Code Status: Full Code   Is the patient medically ready for discharge?:     Reason for patient still in hospital (select all that apply): Patient unstable and Pending disposition  Discharge Plan A: Home with family          Kit Maldonado MD  Department of Hospital Medicine   [facility] [place]

## 2023-01-18 NOTE — NURSING
Pt in room with family to bedside. Pt asking to get up a couple times in the middle of the night. Advised against for safety. Pt upset about not being able to sit up in chair that his wife is currently in. Explained to pt that more help is needed to ambulate him and that his safety is the most important. Pt ok and lying back in bed. Abx given as ordered. Provider overnight given info on blood culture results. No new orders.

## 2023-01-18 NOTE — ASSESSMENT & PLAN NOTE
-Patient stated having generalized pain x 1 day.  -numerous osseous metastases as well as pulmonary nodules likely metastases likely from prostate Ca  -Continue pain management, is currently on oxycontin with good pain control

## 2023-01-18 NOTE — ASSESSMENT & PLAN NOTE
Patient's FSGs are uncontrolled due to hyperglycemia on current medication regimen.  Last A1c reviewed-   Lab Results   Component Value Date    HGBA1C 6.7 (H) 12/05/2022     Most recent fingerstick glucose reviewed-   Recent Labs   Lab 01/17/23  1803 01/17/23 2010 01/18/23  0520   POCTGLUCOSE 206* 219* 217*     Current correctional scale  Low  Maintain anti-hyperglycemic dose as follows-   Antihyperglycemics (From admission, onward)    Start     Stop Route Frequency Ordered    01/16/23 2320  insulin aspart U-100 pen 0-5 Units         -- SubQ Before meals & nightly PRN 01/16/23 2221          -Patient last HA1C was 6.7 indicative of fair outpatient glycemic control    -Hyperglycemia protocol in place, low insulin sliding scale with Accu/Checks AC/HS      -Hold Oral hypoglycemics while patient is in the hospital.

## 2023-01-18 NOTE — SUBJECTIVE & OBJECTIVE
Interval History: No acute events overnight, was switched to oxycontin overnight by palliative team for pain control with improvement in his back pain.  He is still lethargic but does not appear in any acute distress.  I had a long discussion with the wife, daughter, and granddaughter about the patient's prognosis and likely course of illness and about goals of care.  Family is tearful on exam but is amenable to pursuing comfort measures as his current condition is very complex and likely will not see any meaningful improvement despite aggressive medical management.      Objective:     Vital Signs (Most Recent):  Temp: 97.6 °F (36.4 °C) (01/18/23 1508)  Pulse: 90 (01/18/23 1508)  Resp: 17 (01/18/23 1508)  BP: (!) 103/54 (01/18/23 1508)  SpO2: (!) 91 % (01/18/23 1508)   Vital Signs (24h Range):  Temp:  [97.6 °F (36.4 °C)-98.4 °F (36.9 °C)] 97.6 °F (36.4 °C)  Pulse:  [] 90  Resp:  [16-20] 17  SpO2:  [90 %-97 %] 91 %  BP: ()/(43-65) 103/54     Weight: 88 kg (194 lb 0.1 oz)  Body mass index is 24.25 kg/m².    Intake/Output Summary (Last 24 hours) at 1/18/2023 1653  Last data filed at 1/18/2023 1415  Gross per 24 hour   Intake 450 ml   Output 423 ml   Net 27 ml      Physical Exam  Constitutional:       Appearance: Normal appearance.   HENT:      Head: Normocephalic and atraumatic.      Nose: Nose normal.      Mouth/Throat:      Mouth: Mucous membranes are moist.   Eyes:      Pupils: Pupils are equal, round, and reactive to light.   Cardiovascular:      Rate and Rhythm: Normal rate and regular rhythm.      Heart sounds: Murmur heard.   Pulmonary:      Effort: Pulmonary effort is normal.      Breath sounds: Normal breath sounds.   Abdominal:      Palpations: Abdomen is soft.      Tenderness: There is abdominal tenderness. There is guarding.   Musculoskeletal:         General: Tenderness present.      Cervical back: Normal range of motion.   Skin:     General: Skin is warm.      Capillary Refill: Capillary  refill takes less than 2 seconds.   Neurological:      Mental Status: He is alert and oriented to person, place, and time.      Motor: Weakness present.   Psychiatric:         Mood and Affect: Mood normal.         Behavior: Behavior normal.       Significant Labs: All pertinent labs within the past 24 hours have been reviewed.  BMP:   Recent Labs   Lab 01/16/23 1723 01/17/23 0430 01/18/23 0222   *   < > 210*   *   < > 128*   K 5.4*   < > 6.1*   CL 91*   < > 90*   CO2 21*   < > 22*   BUN 43*   < > 55*   CREATININE 5.8*   < > 6.6*   CALCIUM 10.2   < > 9.0   MG 2.0  --   --     < > = values in this interval not displayed.     CBC:   Recent Labs   Lab 01/16/23 1723 01/17/23 0429 01/18/23 0222   WBC 22.45* 25.68* 23.62*   HGB 8.7* 8.2* 7.8*   HCT 27.5* 26.6* 24.9*    171 187     CMP:   Recent Labs   Lab 01/16/23 1723 01/17/23 0430 01/18/23 0222   * 129* 128*   K 5.4* 5.6* 6.1*   CL 91* 91* 90*   CO2 21* 20* 22*   * 218* 210*   BUN 43* 49* 55*   CREATININE 5.8* 6.2* 6.6*   CALCIUM 10.2 9.7 9.0   PROT 8.5* 7.5 6.9   ALBUMIN 3.2* 2.7* 2.4*   BILITOT 0.5 0.4 0.4   ALKPHOS 703* 596* 516*   AST 13 13 93*   ALT <5* <5* 58*   ANIONGAP 20* 18* 16       Significant Imaging: I have reviewed all pertinent imaging results/findings within the past 24 hours.  I have reviewed and interpreted all pertinent imaging results/findings within the past 24 hours.

## 2023-01-18 NOTE — PLAN OF CARE
SW met with pt via [employer] to complete assessment. Pt has spouse at bedside who answered most questions as pt was reported to be sleeping. Pt is AxO x3 at baseline and able to verbally answer assessment questions.  Spouse confirmed demographic information. While at home pt is said to be pretty independent of ADL's. Pt uses rolling walker and wheelchair at baseline. Pt drives himself to dialysis and daughter meets him at home to ensure pt is not too weak and has needs met. Family reports able to support ppt as much as needed but open to services as well. SW to review Home health with family. At time of discharge pt to be transported home by family. SW updated whiteboard with SW name and contact information. SW confirmed pt understanding of Observation unit and expected discharge plan. SW will continue to follow pt throughout care and assist with any discharge needs.         01/18/23 1004   Discharge Planning   Assessment Type Discharge Planning Brief Assessment   Resource/Environmental Concerns none   Support Systems Spouse/significant other;Children;Family members   Equipment Currently Used at Home walker, rolling;wheelchair   Current Living Arrangements home   Patient/Family Anticipates Transition to home with family   Patient/Family Anticipated Services at Transition none   DME Needed Upon Discharge  none   Discharge Plan A Home with family       Future Appointments   Date Time Provider Department Center   2/3/2023 11:00 AM Pattie Waldron MD [facility] IMPRI [facility]   3/10/2023  9:30 AM LAB, [facility] RVPH LAB [facility]   3/13/2023  2:00 PM Samson Santiago MD [facility] HEM ONC [facility]   3/13/2023  2:30 PM CHAIR 07 [facility] CHEMO [place] [facility]   3/20/2023  9:30 AM Vale Merrill DPM [facility] POD [facility]  [phone]

## 2023-01-19 PROBLEM — Z51.5 PALLIATIVE CARE ENCOUNTER: Status: ACTIVE | Noted: 2023-01-01

## 2023-01-19 PROBLEM — K72.00 SHOCK LIVER: Status: ACTIVE | Noted: 2023-01-01

## 2023-01-19 PROBLEM — I50.33 ACUTE ON CHRONIC DIASTOLIC CHF (CONGESTIVE HEART FAILURE), NYHA CLASS 3: Status: RESOLVED | Noted: 2020-12-30 | Resolved: 2023-01-01

## 2023-01-19 NOTE — PROGRESS NOTES
Pharmacokinetic Assessment Follow Up: IV Vancomycin    Vancomycin serum concentration assessment(s):    The trough level was drawn correctly and can be used to guide therapy at this time. The measurement is within the desired definitive target range of 15 to 20 mcg/mL.    Vancomycin Regimen Plan:    Re-dose when the random level is less than 20 mcg/mL, next level to be drawn at 0400 on 1/21/2023    Drug levels (last 3 results):  Recent Labs   Lab Result Units 01/19/23  0351   Vancomycin, Random ug/mL 10.7       Pharmacy will continue to follow and monitor vancomycin.    Please contact pharmacy at extension [phone] for questions regarding this assessment.    Thank you for the consult,   Bruce Mccarthy       Patient brief summary:  Domingo Castro is a 77 y.o. male initiated on antimicrobial therapy with IV Vancomycin for treatment of bacteremia    The patient's current regimen is vancomycin 750mg  iv x1    Drug Allergies:   Review of patient's allergies indicates:   Allergen Reactions    Atorvastatin Other (See Comments)       Actual Body Weight:   88kg    Renal Function:   Estimated Creatinine Clearance: 14.8 mL/min (A) (based on SCr of 5 mg/dL (H)).,     Dialysis Method (if applicable):      CBC (last 72 hours):  Recent Labs   Lab Result Units 01/16/23  1723 01/17/23  0429 01/18/23  0222 01/19/23  0351   WBC K/uL 22.45* 25.68* 23.62* 17.78*   Hemoglobin g/dL 8.7* 8.2* 7.8* 7.3*   Hematocrit % 27.5* 26.6* 24.9* 23.7*   Platelets K/uL 212 171 187 198   Gran % % 88.4* 90.0* 90.0* 85.6*   Lymph % % 6.7* 6.0* 6.0* 6.0*   Mono % % 4.2 4.0 4.0 4.9   Eosinophil % % 0.0 0.0 0.0 0.0   Basophil % % 0.2 0.0 0.0 0.1   Differential Method  Automated Manual Automated Automated       Metabolic Panel (last 72 hours):  Recent Labs   Lab Result Units 01/16/23  1723 01/17/23  0430 01/17/23  0439 01/18/23  0222 01/19/23  0351   Sodium mmol/L 132* 129*  --  128* 132*   Potassium mmol/L 5.4* 5.6*  --  6.1* 5.2*   Chloride mmol/L 91* 91*  --   90* 92*   CO2 mmol/L 21* 20*  --  22* 22*   Glucose mg/dL 228* 218*  --  210* 205*   Glucose, UA   --   --  SEE COMMENT  --   --    BUN mg/dL 43* 49*  --  55* 41*   Creatinine mg/dL 5.8* 6.2*  --  6.6* 5.0*   Albumin g/dL 3.2* 2.7*  --  2.4* 2.3*   Total Bilirubin mg/dL 0.5 0.4  --  0.4 0.6   Alkaline Phosphatase U/L 703* 596*  --  516* 418*   AST U/L 13 13  --  93* 1,216*   ALT U/L <5* <5*  --  58* 648*   Magnesium mg/dL 2.0  --   --   --   --    Phosphorus mg/dL 2.8  --   --   --   --        Vancomycin Administrations:  vancomycin given in the last 96 hours                     vancomycin (VANCOCIN) 1,750 mg in dextrose 5 % 500 mL IVPB (mg) 1,750 mg New Bag 01/17/23 0349                    Microbiologic Results:  Microbiology Results (last 7 days)       Procedure Component Value Units Date/Time    Blood culture (site 1) [[id number]] Collected: 01/16/23 2256    Order Status: Completed Specimen: Blood from Antecubital, Right Arm Updated: 01/19/23 0612     Blood Culture, Routine No Growth to date      No Growth to date      No Growth to date    Narrative:      Site # 1, aerobic and anaerobic    Urine culture [[id number]]  (Abnormal) Collected: 01/17/23 0439    Order Status: Completed Specimen: Urine Updated: 01/18/23 1730     Urine Culture, Routine GRAM NEGATIVE FIDE  >100,000 cfu/ml  Identification and susceptibility pending      Narrative:      Specimen Source->Urine    Blood culture (site 2) [[id number]] Collected: 01/16/23 2256    Order Status: Completed Specimen: Blood from Peripheral, Left Hand Updated: 01/18/23 1028     Blood Culture, Routine Gram stain aer bottle: Gram negative rods Results called to and read      back by: leonarda Gonzales  01/17/2023  22:33    Narrative:      Site # 2, aerobic only    Influenza A & B by Molecular [[id number]] Collected: 01/16/23 1909    Order Status: Completed Specimen: Nasopharyngeal Swab Updated: 01/16/23 2002     Influenza A, Molecular Negative     Influenza B, Molecular Negative      Flu A & B Source Nasal swab

## 2023-01-19 NOTE — PROGRESS NOTES
[facility] - Telemetry  Hematology/Oncology  Progress Note    Patient Name: Domingo Castro  Admission Date: 1/16/2023  Hospital Length of Stay: 0 days  Code Status: Full Code     Subjective:     HPI:  77 year-old male with metastatic prostate cancer on leuprolide/enzalutamide was admitted on 1/16/23 for generalized pain, possible urinary tract infection. Consult is for prostate cancer.      VIRTUAL TELENOTE    Start time: 8:00am  Chief complaint: prostate cancer/pain  The patient location is: [facility]  The patient arrived at: 8:00am  Present with the patient at the time of the telemed/virtual assessment: wife   End time:  8:10am    Total time spent with patient: 10 minutes    The attending portion of this evaluation, treatment, and documentation was performed per Samson Santiago MD via Telemedicine AudioVisual using the secure [employer] software platform with 2 way audio/video. The provider was located off-site and the patient is located in the hospital. The aforementioned video software was utilized to document the relevant history and physical exam.        Interval History:   - his pain is better controlled with pain medicine, but wife notes that he is now very lethargic from the medicine.      Oncology Treatment Plan:   OP PROSTATE LEUPROLIDE 22.5 MG (ELIGARD) 3 MONTH    Medications:  Continuous Infusions:  Scheduled Meds:   sodium chloride 0.9%   Intravenous Once    aspirin  81 mg Oral Daily    calcium-vitamin D3  1 tablet Oral Daily    enzalutamide  160 mg Oral Daily    heparin (porcine)  5,000 Units Subcutaneous Q8H    mupirocin   Nasal BID    oxyCODONE  10 mg Oral Q12H    pantoprazole  40 mg Oral Daily    piperacillin-tazobactam (ZOSYN) IVPB  4.5 g Intravenous Q12H    vancomycin (VANCOCIN) IVPB  750 mg Intravenous Once     PRN Meds:sodium chloride 0.9%, acetaminophen, albuterol-ipratropium, bisacodyL, dextrose 10%, dextrose 10%, glucagon (human recombinant), glucose, glucose, HYDROcodone-acetaminophen, insulin  aspart U-100, melatonin, naloxone, nitroGLYCERIN, ondansetron, oxyCODONE, polyethylene glycol, simethicone, sodium chloride 0.9%, sodium chloride 0.9%, trazodone, Pharmacy to dose Vancomycin consult **AND** vancomycin - pharmacy to dose     Review of Systems   Constitutional:  Positive for fatigue.   HENT:  Negative for sore throat.    Eyes:  Negative for visual disturbance.   Respiratory:  Negative for shortness of breath.    Cardiovascular:  Negative for chest pain.   Gastrointestinal:  Positive for abdominal pain (improved).   Genitourinary:  Negative for dysuria.   Musculoskeletal:  Positive for arthralgias (improved) and back pain (improved).   Skin:  Negative for rash.   Neurological:  Negative for headaches.   Hematological:  Negative for adenopathy.   Psychiatric/Behavioral:  The patient is not nervous/anxious.    Objective:     Vital Signs (Most Recent):  Temp: 98.7 °F (37.1 °C) (01/19/23 0529)  Pulse: 87 (01/19/23 0529)  Resp: 16 (01/19/23 0529)  BP: (!) 113/46 (01/19/23 0529)  SpO2: (!) 93 % (01/19/23 0529)   Vital Signs (24h Range):  Temp:  [96.6 °F (35.9 °C)-99.1 °F (37.3 °C)] 98.7 °F (37.1 °C)  Pulse:  [] 87  Resp:  [16-20] 16  SpO2:  [91 %-94 %] 93 %  BP: ()/(42-65) 113/46     Weight: 88 kg (194 lb 0.1 oz)  Body mass index is 24.25 kg/m².  Body surface area is 2.16 meters squared.      Intake/Output Summary (Last 24 hours) at 1/19/2023 0804  Last data filed at 1/18/2023 1415  Gross per 24 hour   Intake 200 ml   Output 300 ml   Net -100 ml       Physical Exam  Deferred due to telemedicine visit.    Significant Labs:   CBC:   Recent Labs   Lab 01/18/23 0222 01/19/23  0351   WBC 23.62* 17.78*   HGB 7.8* 7.3*   HCT 24.9* 23.7*    198    and CMP:   Recent Labs   Lab 01/18/23 0222 01/19/23  0351   * 132*   K 6.1* 5.2*   CL 90* 92*   CO2 22* 22*   * 205*   BUN 55* 41*   CREATININE 6.6* 5.0*   CALCIUM 9.0 9.1   PROT 6.9 6.9   ALBUMIN 2.4* 2.3*   BILITOT 0.4 0.6   ALKPHOS 516*  418*   AST 93* 1,216*   ALT 58* 648*   ANIONGAP 16 18*     Assessment/Plan:     Malignant neoplasm of prostate  - my patient in clinic  - diagnosed with metastatic prostate cancer in late spring/early summer 2022.  - he initially responded to leuprolide/bicalutamide but in late 2022 had progressive disease  - he started enzalutamide in late December 2022.  - CT abdomen/pelvis reveals decrease in size of lymphadenopathy. Bone lesions have increased but comparison imaging was prior to starting any therapy for prostate cancer.  - appreciate palliative care's assistance with pain management.  - continue enzalutamide while in the hospital.    Generalized pain  - see above          Samson Santiago MD  Hematology/Oncology  [facility] - Telemetry

## 2023-01-19 NOTE — SUBJECTIVE & OBJECTIVE
Interval History: Patient now very lethargic but still arousable since starting his pain regimen with oxycontin.  Patient this morning endorsing 10/10 pain and requesting pain medication for his back pain.  Vital signs have been stable, patient afebrile, however yesterday patient became hypotensive during dialysis again so full dialysis session was not tolerated.  Family is present at bedside.  No acute events, patient has been stable and electrolytes appear improved on labs.  Blood culture now positive for klebsiella pneumonia x 1/2, urine culture shows gram negative rods, speciation pending.  No acute events, no plans for dialysis today.      Objective:     Vital Signs (Most Recent):  Temp: 98.6 °F (37 °C) (01/19/23 1204)  Pulse: 87 (01/19/23 1204)  Resp: 16 (01/19/23 1204)  BP: (!) 107/51 (01/19/23 1204)  SpO2: 100 % (01/19/23 1204)   Vital Signs (24h Range):  Temp:  [96.6 °F (35.9 °C)-99.1 °F (37.3 °C)] 98.6 °F (37 °C)  Pulse:  [65-99] 87  Resp:  [16-18] 16  SpO2:  [91 %-100 %] 100 %  BP: ()/(42-64) 107/51     Weight: 88 kg (194 lb 0.1 oz)  Body mass index is 24.25 kg/m².    Intake/Output Summary (Last 24 hours) at 1/19/2023 1417  Last data filed at 1/19/2023 1002  Gross per 24 hour   Intake 250 ml   Output --   Net 250 ml      Physical Exam  Constitutional:       Comments: Patient very lethargic on exam but easily arousable, appears in distress when aroused   HENT:      Head: Normocephalic and atraumatic.      Nose: Nose normal.      Mouth/Throat:      Mouth: Mucous membranes are moist.   Eyes:      Pupils: Pupils are equal, round, and reactive to light.   Cardiovascular:      Rate and Rhythm: Normal rate and regular rhythm.      Heart sounds: Murmur heard.   Pulmonary:      Effort: Pulmonary effort is normal.      Breath sounds: Normal breath sounds.   Abdominal:      Palpations: Abdomen is soft.      Tenderness: There is abdominal tenderness. There is guarding.   Musculoskeletal:         General:  Tenderness present.      Cervical back: Normal range of motion.   Skin:     General: Skin is warm.      Capillary Refill: Capillary refill takes less than 2 seconds.   Neurological:      Mental Status: He is oriented to person, place, and time.      Motor: Weakness present.   Psychiatric:         Mood and Affect: Mood normal.         Behavior: Behavior normal.       Significant Labs: All pertinent labs within the past 24 hours have been reviewed.  BMP:   Recent Labs   Lab 01/19/23 0351   *   *   K 5.2*   CL 92*   CO2 22*   BUN 41*   CREATININE 5.0*   CALCIUM 9.1     CBC:   Recent Labs   Lab 01/18/23 0222 01/19/23 0351   WBC 23.62* 17.78*   HGB 7.8* 7.3*   HCT 24.9* 23.7*    198     CMP:   Recent Labs   Lab 01/18/23 0222 01/19/23 0351   * 132*   K 6.1* 5.2*   CL 90* 92*   CO2 22* 22*   * 205*   BUN 55* 41*   CREATININE 6.6* 5.0*   CALCIUM 9.0 9.1   PROT 6.9 6.9   ALBUMIN 2.4* 2.3*   BILITOT 0.4 0.6   ALKPHOS 516* 418*   AST 93* 1,216*   ALT 58* 648*   ANIONGAP 16 18*       Significant Imaging: I have reviewed all pertinent imaging results/findings within the past 24 hours.  I have reviewed and interpreted all pertinent imaging results/findings within the past 24 hours.

## 2023-01-19 NOTE — PROGRESS NOTES
Progress Note  Nephrology      Consult Requested By: Kit Maldonado MD      SUBJECTIVE:     Overnight events  Patient is a 77 y.o. male     Patient Active Problem List   Diagnosis    Hypertension associated with diabetes    Hyperlipidemia associated with type 2 diabetes mellitus    Hypogonadism male    ED (erectile dysfunction)    Severe nonproliferative diabetic retinopathy of both eyes    Hypertensive retinopathy of both eyes    Retinal hole or tear, left    Cervical radiculopathy    Impaired mobility and ADLs    Anemia in chronic kidney disease, on chronic dialysis    Pulmonary nodule    Incontinence    Metabolic bone disease    CRF (chronic renal failure), stage 5    Chronic diastolic congestive heart failure    Type 2 diabetes mellitus with kidney complication, with long-term current use of insulin    Leukocytosis    Bilateral leg edema    PAD (peripheral artery disease)    Aortic arch atherosclerosis    Chronic congestive heart failure with left ventricular diastolic dysfunction    Uncontrolled type 2 diabetes mellitus with hyperglycemia    Obesity, diabetes, and hypertension syndrome    S/P arteriovenous (AV) graft placement    Chronic respiratory failure with hypoxia, on home oxygen therapy    Decreased range of right hip movement    Decreased strength    Decreased mobility    MAGDA (acute kidney injury) on CKD 5    Coronary artery disease involving native coronary artery of native heart without angina pectoris    CKD (chronic kidney disease), symptom management only, stage 5    Pure hypercholesterolemia    Diabetes mellitus with circulatory complication    Elevated troponin    Acute kidney injury superimposed on CKD    ESRD (end stage renal disease) on dialysis    A-V fistula    Hemodialysis patient    Frail elderly    Risk for falls    CKD stage 5 due to type 2 diabetes mellitus    Secondary hyperparathyroidism of renal origin    Vascular dialysis catheter in place Left upper chest wall    Physical  deconditioning    Requires assistance with activities of daily living (ADL)    Assistance needed for mobility uses rollator    Hemodialysis catheter malfunction    Malignant neoplasm of prostate    Uncontrolled type 2 diabetes mellitus with both eyes affected by severe nonproliferative retinopathy and macular edema, with long-term current use of insulin    Generalized pain    Shock liver    Palliative care encounter     Past Medical History:   Diagnosis Date    Arthritis     Cataract     Chronic diastolic congestive heart failure     Coronary artery disease     Cystoid macular edema of both eyes     Diabetes mellitus type II     Diabetic retinopathy     Hyperlipemia     Hypertension     Retinal hole     Stage 4 chronic kidney disease     Streptococcus pyogenes bacteremia 12/23/2018    Due to left toe osteomyelitis              OBJECTIVE:     Vitals:    01/19/23 0831 01/19/23 1046 01/19/23 1204 01/19/23 1610   BP: (!) 97/49  (!) 107/51    BP Location:   Right arm    Patient Position: Lying  Lying    Pulse: 65  87 83   Resp: 16 16 16    Temp: 96.6 °F (35.9 °C)  98.6 °F (37 °C)    TempSrc: Oral  Oral    SpO2: (!) 92%  100%    Weight:       Height:           Temp: 98.6 °F (37 °C) (01/19/23 1204)  Pulse: 83 (01/19/23 1610)  Resp: 16 (01/19/23 1204)  BP: (!) 107/51 (01/19/23 1204)  SpO2: 100 % (01/19/23 1204)    Date 01/19/23 0700 - 01/20/23 0659   Shift 8396-8674 9864-1745 4247-4120 24 Hour Total   INTAKE   P.O. 250   250   Shift Total(mL/kg) 250(2.8)   250(2.8)   OUTPUT   Shift Total(mL/kg)       Weight (kg) 88 88 88 88             Medications:   sodium chloride 0.9%   Intravenous Once    aspirin  81 mg Oral Daily    calcium-vitamin D3  1 tablet Oral Daily    enzalutamide  160 mg Oral Daily    heparin (porcine)  5,000 Units Subcutaneous Q8H    melatonin  9 mg Oral QHS    mupirocin   Nasal BID    pantoprazole  40 mg Oral Daily    piperacillin-tazobactam (ZOSYN) IVPB  4.5 g Intravenous Q12H    trazodone  25 mg Oral QHS     vancomycin (VANCOCIN) IVPB  750 mg Intravenous Once                 Physical Exam:  General appearance:NAD  Lungs: diminished breath sounds  Heart: Pulse 83  Abdomen: soft  Extremities: no edema  Skin: dry    Laboratory:  ABG  Labs reviewed  No results found for this or any previous visit (from the past 336 hour(s)).  Recent Results (from the past 336 hour(s))   CBC with Automated Differential    Collection Time: 01/19/23  3:51 AM   Result Value Ref Range    WBC 17.78 (H) 3.90 - 12.70 K/uL    Hemoglobin 7.3 (L) 14.0 - 18.0 g/dL    Hematocrit 23.7 (L) 40.0 - 54.0 %    Platelets 198 150 - 450 K/uL   CBC with Automated Differential    Collection Time: 01/18/23  2:22 AM   Result Value Ref Range    WBC 23.62 (H) 3.90 - 12.70 K/uL    Hemoglobin 7.8 (L) 14.0 - 18.0 g/dL    Hematocrit 24.9 (L) 40.0 - 54.0 %    Platelets 187 150 - 450 K/uL   CBC with Automated Differential    Collection Time: 01/17/23  4:29 AM   Result Value Ref Range    WBC 25.68 (H) 3.90 - 12.70 K/uL    Hemoglobin 8.2 (L) 14.0 - 18.0 g/dL    Hematocrit 26.6 (L) 40.0 - 54.0 %    Platelets 171 150 - 450 K/uL     Urinalysis  No results for input(s): COLORU, CLARITYU, SPECGRAV, PHUR, PROTEINUA, GLUCOSEU, BILIRUBINCON, BLOODU, WBCU, RBCU, BACTERIA, MUCUS, NITRITE, LEUKOCYTESUR, UROBILINOGEN, HYALINECASTS in the last 24 hours.    Diagnostic Results:  X-Ray: Reviewed  US: Reviewed  Echo: Reviewed  ACCESS    ASSESSMENT/PLAN:     ESRD   Metabolic bone disease  Anemia  Poor nutrition  Renal diet  /51  Dialysis yesterday

## 2023-01-19 NOTE — SUBJECTIVE & OBJECTIVE
VIRTUAL TELENOTE    Start time: 8:00am  Chief complaint: prostate cancer/pain  The patient location is: [facility]  The patient arrived at: 8:00am  Present with the patient at the time of the telemed/virtual assessment: wife   End time:  8:10am    Total time spent with patient: 10 minutes    The attending portion of this evaluation, treatment, and documentation was performed per Samson Santiago MD via Telemedicine AudioVisual using the secure [employer] software platform with 2 way audio/video. The provider was located off-site and the patient is located in the hospital. The aforementioned video software was utilized to document the relevant history and physical exam.        Interval History:   - his pain is better controlled with pain medicine, but wife notes that he is now very lethargic from the medicine.      Oncology Treatment Plan:   OP PROSTATE LEUPROLIDE 22.5 MG (ELIGARD) 3 MONTH    Medications:  Continuous Infusions:  Scheduled Meds:   sodium chloride 0.9%   Intravenous Once    aspirin  81 mg Oral Daily    calcium-vitamin D3  1 tablet Oral Daily    enzalutamide  160 mg Oral Daily    heparin (porcine)  5,000 Units Subcutaneous Q8H    mupirocin   Nasal BID    oxyCODONE  10 mg Oral Q12H    pantoprazole  40 mg Oral Daily    piperacillin-tazobactam (ZOSYN) IVPB  4.5 g Intravenous Q12H    vancomycin (VANCOCIN) IVPB  750 mg Intravenous Once     PRN Meds:sodium chloride 0.9%, acetaminophen, albuterol-ipratropium, bisacodyL, dextrose 10%, dextrose 10%, glucagon (human recombinant), glucose, glucose, HYDROcodone-acetaminophen, insulin aspart U-100, melatonin, naloxone, nitroGLYCERIN, ondansetron, oxyCODONE, polyethylene glycol, simethicone, sodium chloride 0.9%, sodium chloride 0.9%, trazodone, Pharmacy to dose Vancomycin consult **AND** vancomycin - pharmacy to dose     Review of Systems   Constitutional:  Positive for fatigue.   HENT:  Negative for sore throat.    Eyes:  Negative for visual disturbance.   Respiratory:   Negative for shortness of breath.    Cardiovascular:  Negative for chest pain.   Gastrointestinal:  Positive for abdominal pain (improved).   Genitourinary:  Negative for dysuria.   Musculoskeletal:  Positive for arthralgias (improved) and back pain (improved).   Skin:  Negative for rash.   Neurological:  Negative for headaches.   Hematological:  Negative for adenopathy.   Psychiatric/Behavioral:  The patient is not nervous/anxious.    Objective:     Vital Signs (Most Recent):  Temp: 98.7 °F (37.1 °C) (01/19/23 0529)  Pulse: 87 (01/19/23 0529)  Resp: 16 (01/19/23 0529)  BP: (!) 113/46 (01/19/23 0529)  SpO2: (!) 93 % (01/19/23 0529)   Vital Signs (24h Range):  Temp:  [96.6 °F (35.9 °C)-99.1 °F (37.3 °C)] 98.7 °F (37.1 °C)  Pulse:  [] 87  Resp:  [16-20] 16  SpO2:  [91 %-94 %] 93 %  BP: ()/(42-65) 113/46     Weight: 88 kg (194 lb 0.1 oz)  Body mass index is 24.25 kg/m².  Body surface area is 2.16 meters squared.      Intake/Output Summary (Last 24 hours) at 1/19/2023 0804  Last data filed at 1/18/2023 1415  Gross per 24 hour   Intake 200 ml   Output 300 ml   Net -100 ml       Physical Exam  Deferred due to telemedicine visit.    Significant Labs:   CBC:   Recent Labs   Lab 01/18/23 0222 01/19/23 0351   WBC 23.62* 17.78*   HGB 7.8* 7.3*   HCT 24.9* 23.7*    198    and CMP:   Recent Labs   Lab 01/18/23 0222 01/19/23 0351   * 132*   K 6.1* 5.2*   CL 90* 92*   CO2 22* 22*   * 205*   BUN 55* 41*   CREATININE 6.6* 5.0*   CALCIUM 9.0 9.1   PROT 6.9 6.9   ALBUMIN 2.4* 2.3*   BILITOT 0.4 0.6   ALKPHOS 516* 418*   AST 93* 1,216*   ALT 58* 648*   ANIONGAP 16 18*

## 2023-01-19 NOTE — ASSESSMENT & PLAN NOTE
-Patient WBC was 22.45 on arrival, was started on empiric vancomycin and zosyn renally dosed  -blood culture from admission 1/2 for klebsiella pneumonia, likely source is urine  -Will repeat blood cultures today

## 2023-01-19 NOTE — PROGRESS NOTES
Pharmacokinetic Assessment Follow Up: IV Vancomycin    Vancomycin serum concentration assessment(s):    The trough level was drawn correctly and can be used to guide therapy at this time. The measurement is within the desired definitive target range of 15 to 20 mcg/mL.    Vancomycin Regimen Plan:    Re-dose when the random level is less than 20 mcg/mL, next level to be drawn at 0400 on 1/21/2023    Drug levels (last 3 results):  Recent Labs   Lab Result Units 01/19/23  0351   Vancomycin, Random ug/mL 10.7       Pharmacy will continue to follow and monitor vancomycin.    Please contact pharmacy at extension [phone] for questions regarding this assessment.    Thank you for the consult,   Bruce Mccarthy       Patient brief summary:  Domingo Castro is a 77 y.o. male initiated on antimicrobial therapy with IV Vancomycin for treatment of bacteremia    The patient's current regimen is vancomycin 750mg  iv x1    Drug Allergies:   Review of patient's allergies indicates:   Allergen Reactions    Atorvastatin Other (See Comments)       Actual Body Weight:   88kg    Renal Function:   Estimated Creatinine Clearance: 14.8 mL/min (A) (based on SCr of 5 mg/dL (H)).,     Dialysis Method (if applicable):      CBC (last 72 hours):  Recent Labs   Lab Result Units 01/16/23  1723 01/17/23  0429 01/18/23  0222 01/19/23  0351   WBC K/uL 22.45* 25.68* 23.62* 17.78*   Hemoglobin g/dL 8.7* 8.2* 7.8* 7.3*   Hematocrit % 27.5* 26.6* 24.9* 23.7*   Platelets K/uL 212 171 187 198   Gran % % 88.4* 90.0* 90.0* 85.6*   Lymph % % 6.7* 6.0* 6.0* 6.0*   Mono % % 4.2 4.0 4.0 4.9   Eosinophil % % 0.0 0.0 0.0 0.0   Basophil % % 0.2 0.0 0.0 0.1   Differential Method  Automated Manual Automated Automated       Metabolic Panel (last 72 hours):  Recent Labs   Lab Result Units 01/16/23  1723 01/17/23  0430 01/17/23  0439 01/18/23  0222 01/19/23  0351   Sodium mmol/L 132* 129*  --  128* 132*   Potassium mmol/L 5.4* 5.6*  --  6.1* 5.2*   Chloride mmol/L 91* 91*  --   90* 92*   CO2 mmol/L 21* 20*  --  22* 22*   Glucose mg/dL 228* 218*  --  210* 205*   Glucose, UA   --   --  SEE COMMENT  --   --    BUN mg/dL 43* 49*  --  55* 41*   Creatinine mg/dL 5.8* 6.2*  --  6.6* 5.0*   Albumin g/dL 3.2* 2.7*  --  2.4* 2.3*   Total Bilirubin mg/dL 0.5 0.4  --  0.4 0.6   Alkaline Phosphatase U/L 703* 596*  --  516* 418*   AST U/L 13 13  --  93* 1,216*   ALT U/L <5* <5*  --  58* 648*   Magnesium mg/dL 2.0  --   --   --   --    Phosphorus mg/dL 2.8  --   --   --   --        Vancomycin Administrations:  vancomycin given in the last 96 hours                     vancomycin (VANCOCIN) 1,750 mg in dextrose 5 % 500 mL IVPB (mg) 1,750 mg New Bag 01/17/23 0349                    Microbiologic Results:  Microbiology Results (last 7 days)       Procedure Component Value Units Date/Time    Blood culture (site 1) [[id number]] Collected: 01/16/23 2256    Order Status: Completed Specimen: Blood from Antecubital, Right Arm Updated: 01/19/23 0612     Blood Culture, Routine No Growth to date      No Growth to date      No Growth to date    Narrative:      Site # 1, aerobic and anaerobic    Urine culture [[id number]]  (Abnormal) Collected: 01/17/23 0439    Order Status: Completed Specimen: Urine Updated: 01/18/23 1730     Urine Culture, Routine GRAM NEGATIVE FIDE  >100,000 cfu/ml  Identification and susceptibility pending      Narrative:      Specimen Source->Urine    Blood culture (site 2) [[id number]] Collected: 01/16/23 2256    Order Status: Completed Specimen: Blood from Peripheral, Left Hand Updated: 01/18/23 1028     Blood Culture, Routine Gram stain aer bottle: Gram negative rods Results called to and read      back by: leonarda Gonzales  01/17/2023  22:33    Narrative:      Site # 2, aerobic only    Influenza A & B by Molecular [[id number]] Collected: 01/16/23 1909    Order Status: Completed Specimen: Nasopharyngeal Swab Updated: 01/16/23 2002     Influenza A, Molecular Negative     Influenza B, Molecular Negative      Flu A & B Source Nasal swab

## 2023-01-19 NOTE — PROGRESS NOTES
[facility] Medicine  Progress Note    Patient Name: Domingo Castro  MRN: [id number]  Patient Class: OP- Observation   Admission Date: 1/16/2023  Length of Stay: 0 days  Attending Physician: Kit Maldonado MD  Primary Care Provider: Griselda Nugent MD        Subjective:     Principal Problem:<principal problem not specified>        HPI:  Mr. 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 Patient presented to the  emergency department for evaluation of generalized pain x1 day. Patient also reports consistent non localized abdomen pain. Patient stated he was told by his oncology provider that his cancer is now in the bones. Patient has associated nausea and vomiting. Patient reports shortness if breath on exertion, denies chest pain. Patient denies fever or chills, denies constipation or diarrhea, Patient denies hematuria or dysuria. Denies hematemesis, melena or hematochezia.      On presentation in the emergency department patient abdomen xray showed There are numerous osseous metastases.  The visualized bowel gas pattern is nonspecific.  There is a moderate amount of stool.  There is no bowel obstruction. There is no abnormal abdominal calcification. Nonobstructive bowel gas pattern. Osseous metastatic disease. Chest xray results shows Numerous osseous metastases. Several nodular densities overlying the bilateral lungs may represent osseous metastases or pulmonary metastases. Lab work showed Na+ 132 K+ 5.4 C02 21, Glucose 228, Bun/Creat 43/5.8, WBC 22.45, H/H 8.7/27.5. Patient given morphine for pain and lokelma for K+.    On assessment patient is alert and oriented x3, slight lethargic from Morphine. Patient continue to endorse  rebound pain in abdomen. Patient has nausea without vomiting. Blood pressure stable, no fever or chills noted.     Patient will be admitted to hospital services for further evaluation and medical management.             Overview/Hospital Course:  No notes on file    Interval History: Patient now very lethargic but still arousable since starting his pain regimen with oxycontin.  Patient this morning endorsing 10/10 pain and requesting pain medication for his back pain.  Vital signs have been stable, patient afebrile, however yesterday patient became hypotensive during dialysis again so full dialysis session was not tolerated.  Family is present at bedside.  No acute events, patient has been stable and electrolytes appear improved on labs.  Blood culture now positive for klebsiella pneumonia x 1/2, urine culture shows gram negative rods, speciation pending.  No acute events, no plans for dialysis today.      Objective:     Vital Signs (Most Recent):  Temp: 98.6 °F (37 °C) (01/19/23 1204)  Pulse: 87 (01/19/23 1204)  Resp: 16 (01/19/23 1204)  BP: (!) 107/51 (01/19/23 1204)  SpO2: 100 % (01/19/23 1204)   Vital Signs (24h Range):  Temp:  [96.6 °F (35.9 °C)-99.1 °F (37.3 °C)] 98.6 °F (37 °C)  Pulse:  [65-99] 87  Resp:  [16-18] 16  SpO2:  [91 %-100 %] 100 %  BP: ()/(42-64) 107/51     Weight: 88 kg (194 lb 0.1 oz)  Body mass index is 24.25 kg/m².    Intake/Output Summary (Last 24 hours) at 1/19/2023 1417  Last data filed at 1/19/2023 1002  Gross per 24 hour   Intake 250 ml   Output --   Net 250 ml      Physical Exam  Constitutional:       Comments: Patient very lethargic on exam but easily arousable, appears in distress when aroused   HENT:      Head: Normocephalic and atraumatic.      Nose: Nose normal.      Mouth/Throat:      Mouth: Mucous membranes are moist.   Eyes:      Pupils: Pupils are equal, round, and reactive to light.   Cardiovascular:      Rate and Rhythm: Normal rate and regular rhythm.      Heart sounds:  Murmur heard.   Pulmonary:      Effort: Pulmonary effort is normal.      Breath sounds: Normal breath sounds.   Abdominal:      Palpations: Abdomen is soft.      Tenderness: There is abdominal tenderness. There is guarding.   Musculoskeletal:         General: Tenderness present.      Cervical back: Normal range of motion.   Skin:     General: Skin is warm.      Capillary Refill: Capillary refill takes less than 2 seconds.   Neurological:      Mental Status: He is oriented to person, place, and time.      Motor: Weakness present.   Psychiatric:         Mood and Affect: Mood normal.         Behavior: Behavior normal.       Significant Labs: All pertinent labs within the past 24 hours have been reviewed.  BMP:   Recent Labs   Lab 01/19/23 0351   *   *   K 5.2*   CL 92*   CO2 22*   BUN 41*   CREATININE 5.0*   CALCIUM 9.1     CBC:   Recent Labs   Lab 01/18/23 0222 01/19/23 0351   WBC 23.62* 17.78*   HGB 7.8* 7.3*   HCT 24.9* 23.7*    198     CMP:   Recent Labs   Lab 01/18/23 0222 01/19/23 0351   * 132*   K 6.1* 5.2*   CL 90* 92*   CO2 22* 22*   * 205*   BUN 55* 41*   CREATININE 6.6* 5.0*   CALCIUM 9.0 9.1   PROT 6.9 6.9   ALBUMIN 2.4* 2.3*   BILITOT 0.4 0.6   ALKPHOS 516* 418*   AST 93* 1,216*   ALT 58* 648*   ANIONGAP 16 18*       Significant Imaging: I have reviewed all pertinent imaging results/findings within the past 24 hours.  I have reviewed and interpreted all pertinent imaging results/findings within the past 24 hours.      Assessment/Plan:      Shock liver  Patient liver enzymes significantly elevated today, no documented episodes of hypotension besides during dialysis  Will continue to trend liver enzymes    Generalized pain  -Patient stated having generalized pain x 1 day.  -numerous osseous metastases as well as pulmonary nodules likely metastases likely from prostate Ca  -Continue pain management, is currently on oxycontin with good pain control  -titrating pain  medication as patient is lethargic while taking current dose of oxycontin, but pain has been inadequately controlled despite being lethargic      Malignant neoplasm of prostate  -Dr Santiago, hem/onc following  -Advanced metastatic disease, will order palliative consult and initiated goals of care discussion with patient with wife at bedside    ESRD (end stage renal disease) on dialysis  ESRD on T/Th/Sa HD  BUN/Cr ratio elevated  Strict I/Os, mindful of fluid restriction as patient is oliguric.  Avoid nephrotoxic medication, dose medication according to renal function.   Monitor electrolytes, maintain K>4.0-5.0, Mg>2.0      Uncontrolled type 2 diabetes mellitus with hyperglycemia  Patient's FSGs are uncontrolled due to hyperglycemia on current medication regimen.  Last A1c reviewed-   Lab Results   Component Value Date    HGBA1C 6.7 (H) 12/05/2022     Most recent fingerstick glucose reviewed-   Recent Labs   Lab 01/17/23  1803 01/17/23 2010 01/18/23  0520   POCTGLUCOSE 206* 219* 217*     Current correctional scale  Low  Maintain anti-hyperglycemic dose as follows-   Antihyperglycemics (From admission, onward)    Start     Stop Route Frequency Ordered    01/16/23 2320  insulin aspart U-100 pen 0-5 Units         -- SubQ Before meals & nightly PRN 01/16/23 2221          -Patient last HA1C was 6.7 indicative of fair outpatient glycemic control    -Hyperglycemia protocol in place, low insulin sliding scale with Accu/Checks AC/HS      -Hold Oral hypoglycemics while patient is in the hospital.    Leukocytosis  -Patient WBC was 22.45 on arrival, was started on empiric vancomycin and zosyn renally dosed  -blood culture from admission 1/2 for klebsiella pneumonia, likely source is urine  -Will repeat blood cultures today      Anemia in chronic kidney disease, on chronic dialysis  anemia is from ESRD, prostate cancer, chronic disease  Labs have been reviewed. Hgb of 8.2, may be dilutional.  EPO per nephrology  Lately has been  unable to tolerate dialysis due to hypotension, electrolytes have been tenuous, K 5.2 this morning  No episodes of bradyarrhythmias on telemetry review  Explained situation to family and recommended hospice/palliative care for end of life           VTE Risk Mitigation (From admission, onward)         Ordered     heparin (porcine) injection 5,000 Units  Every 8 hours         01/16/23 2147     IP VTE HIGH RISK PATIENT  Once         01/16/23 2056     Place sequential compression device  Until discontinued         01/16/23 2056                Discharge Planning   LUIS FELIPE:      Code Status: Full Code   Is the patient medically ready for discharge?:     Reason for patient still in hospital (select all that apply): Patient unstable, Patient new problem, Patient trending condition, Treatment and Pending disposition  Discharge Plan A: Home with family, Home Health   Discharge Delays: None known at this time    Based on my clinical assessment, patient will require close monitoring at least in the inpatient setting due to:  New onset shock liver, multiorgan failure  Urosepsis currently on empiric IV antibiotic therapy pending susceptibilities  Hemodynamic instability with hemodialysis, vasoplegia vs hypovolemia  Uncontrolled pain secondary to metastatic prostate cancer    Kit Maldonado MD  Department of Hospital Medicine   [place] [facility]

## 2023-01-19 NOTE — ASSESSMENT & PLAN NOTE
- my patient in clinic  - diagnosed with metastatic prostate cancer in late spring/early summer 2022.  - he initially responded to leuprolide/bicalutamide but in late 2022 had progressive disease  - he started enzalutamide in late December 2022.  - CT abdomen/pelvis reveals decrease in size of lymphadenopathy. Bone lesions have increased but comparison imaging was prior to starting any therapy for prostate cancer.  - appreciate palliative care's assistance with pain management.  - continue enzalutamide while in the hospital.

## 2023-01-19 NOTE — PLAN OF CARE
Room air SpO2 = 93% . Patient with no SOB.  CRC will continue to follow.    Patient assessed with no need for PRN therapy at this time.Patient instructed for need and benefits of PRN nebulizer. CRC will continue to follow.

## 2023-01-19 NOTE — PLAN OF CARE
SW called pt spouse who is in the room at pt bedside. Pt spouse reports family in the room and not able to full express herself at the moment. SW offered to speak with spouse in person in the family room for privacy.   SW confirmed family does not want placement but wants pt to return home with supports.       SW met with pt and spouse at bedside. SW was able to also meet with nurse and Dr. Pleitez outside of pt room to listen to plan of care.     Pt spouse confirmed at time of discharge pt to receive support from home health with PT/OT and nursing support. Family reported having enough support at time and would like pt to remain at home for the time being. Family is supported by adult children and grands. Pt was also a  for the past 33yrs and have a [employer] family who supports him if needed. Pt spouse reports aware of LTM and services offered as she provided care for her mother at EOL and was a part of the program. Spouse is aware that pt is over the income limit to qualify for LTC support. Pt receives SSI, pension, and pastoral support.   Spouse reports being familiar with IV infusions if pt was to discharge with any IV ABX needs. Spouse reports pt had IV ABX and wound care in the past for possible amputation of his toe. Family would like pt to continue with palliative care support outpatient. Family reported not ready for hospice care. Family would like to continue dialysis and understands pt may not be able to resume driving himself as pt drove himself to dialysis just a day before coming into the hospital.     Spouse would like pt to have [facility] for support at home. SW to send referral via care"[employer]. Spouse is considering having a hospital bed delivered to home prior to pt discharge. SW to start approval process and if pt does want DME then all [facility] staff will have to do is coordinate delivery. Family would like pt to resume dialysis and able to provide transportation.        01/19/23 1041   Post-Acute  Status   Post-Acute Authorization Placement;Home Health   Post-Acute Placement Status Patient declined/refused   Home Health Status Referrals Sent   Hospital Resources/Appts/Education Provided Appointments scheduled and added to AVS   Discharge Delays None known at this time   Discharge Plan   Discharge Plan A Home with family;Home Health   Discharge Plan B Home with family;Home Health;Hospice/home       Future Appointments   Date Time Provider Department Center   2/3/2023 11:00 AM Pattie Waldron MD [facility] IMPRI [facility]   3/10/2023  9:30 AM LAB, [facility] RVPH LAB [facility]   3/13/2023  2:00 PM Samson Santiago MD [facility] HEM ONC [facility]   3/13/2023  2:30 PM CHAIR 07 [facility] CHEMO [facility]   3/20/2023  9:30 AM [facility]  [phone]

## 2023-01-19 NOTE — ASSESSMENT & PLAN NOTE
Patient liver enzymes significantly elevated today, no documented episodes of hypotension besides during dialysis  Will continue to trend liver enzymes

## 2023-01-19 NOTE — ASSESSMENT & PLAN NOTE
77M with PMH of HLD, HTN, T2DM with retinopathy c/b ESRD on HD (since 6/2021) via LUE MWF and more recently dx with metastatic prostate CA in mid 2022, started on ADT in late 2022 due to aggressive global bony metastasis with unclear response to date; not a candidate for bisphosphonate tx due to ESRD and denosumab is also relatively contraindicated. Overall prognosis is poor however pt has been underreporting pain and optimistically may recover sufficiently with analgesia to resume cancer directed treatment. Pt is ECOG 3 and if he becomes bedbound my recommendation will be for home hospice as neither he nor spouse wish for institutional care; would still need to agree to discontinue HD but pt's quality of life will be very poor at that point and his symptoms will only worsen. Will monitor progress in palliative clinic.     1/19/2023  -Pt did not exhibit signs of opioid toxicity on revisit this afternoon, arouses to name, PERRLA, non pinpoint.  Pt does remain overall sleepy today- likely multifactorial in nature..  Family reports that pt was awake all night last night.  -PO pain medication adjusted to oxycodone IR 5mg Q12 PRN severe pain  -Needs Pt/OT evaluation to help with d/c disposition.  Per family patient still very active and independent of all ADLS as well as driving independently as recently as day prior to admit.     1/17/2023   Per Dr Pleitez's initial consult note    Need further ACP discussion however at this time pt is FULL CODE status but with reasonable limitations of no trach or PEG. Will discuss formal LW creation with pt given these reported wishes and introduce topic of DNR; pt would suffer terribly after chest compressions with such bony involvement and post code prognosis is bleak with ESRD and stage IV cancer.     Recommendations:  Medical: supportive care per primary  Symptom Management:   # Cancer related pain  - writer STOPPED MSContin 15mg bid as not safe for ESRD due to active  metabolites  - writer STARTED equivalently dosed OxyContin 10mg bid  - c/w present Norco 5/325mg q6h prn appropriate for ESRD  - adjust prn at followup  Psychosocial: spouse very devoted, wishes to avoid direct discussion of prognosis, do need to at least offer pt an opportunity to be informed and if he does not wish to know we can discuss pt's wishes for worst case scenarios regardless of what he believes his prognosis to be  Legal: spouse is 1st HCPOA, children are 2nd and 3rd          Prognosis: < 6 months for widely metastatic prostate CA and hospice qualified at any time; will need to forego HD to qualify however barring some other acutely life limiting dx which will not be temporized by HD

## 2023-01-19 NOTE — PLAN OF CARE
SW noted Home health recs. SW to confirm pt acceptance.   SW to await Home health orders. SW to submit referral VIA [employer]. Once accepting agency identified SW to add to AVS.  SW to complete pt choice form.    SW also sent referral to [facility] to review and follow in case pt is to d/c on home IV ABX. Family has reported supporting pt at home with this in the past and have received education.      01/19/23 1452   Post-Acute Status   Post-Acute Authorization Placement;IV Infusion   Post-Acute Placement Status Patient declined/refused   Home Health Status Referrals Sent   IV Infusion Status Referral(s) sent   Hospital Resources/Appts/Education Provided Appointments scheduled and added to AVS   Discharge Delays None known at this time   Discharge Plan   Discharge Plan A Home with family;Home Health       Future Appointments   Date Time Provider Department Center   2/3/2023 11:00 AM Pattie Waldron MD [facility] IMPRI [facility]   3/10/2023  9:30 AM LAB, [facility] RVPH LAB [facility]   3/13/2023  2:00 PM Samson Santiago MD [facility] HEM ONC [facility]   3/13/2023  2:30 PM CHAIR 07 [facility] CHEMO [facility]   3/20/2023  9:30 AM [facility] Case [facility]  [phone]

## 2023-01-19 NOTE — HPI
"Per chart, " Mr. 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 Patient presented to the  emergency department for evaluation of generalized pain x1 day. Patient also reports consistent non localized abdomen pain. Patient stated he was told by his oncology provider that his cancer is now in the bones. Patient has associated nausea and vomiting. Patient reports shortness if breath on exertion, denies chest pain. Patient denies fever or chills, denies constipation or diarrhea, Patient denies hematuria or dysuria. Denies hematemesis, melena or hematochezia.       On presentation in the emergency department patient abdomen xray showed There are numerous osseous metastases.  The visualized bowel gas pattern is nonspecific.  There is a moderate amount of stool.  There is no bowel obstruction. There is no abnormal abdominal calcification. Nonobstructive bowel gas pattern. Osseous metastatic disease. Chest xray results shows Numerous osseous metastases. Several nodular densities overlying the bilateral lungs may represent osseous metastases or pulmonary metastases. Lab work showed Na+ 132 K+ 5.4 C02 21, Glucose 228, Bun/Creat 43/5.8, WBC 22.45, H/H 8.7/27.5. Patient given morphine for pain and lokelma for K+.     On assessment patient is alert and oriented x3, slight lethargic from Morphine. Patient continue to endorse rebound pain in abdomen. Patient has nausea without vomiting. Blood pressure stable, no fever or chills noted.      Patient will be admitted to hospital services for further evaluation and medical management. "  "

## 2023-01-19 NOTE — SUBJECTIVE & OBJECTIVE
Interval History:  No acute events, patient has been stable and electrolytes appear improved on labs.  Blood culture now positive for klebsiella pneumonia x 1/2, urine culture shows gram negative rods, speciation pending.  No acute events, no plans for dialysis today.         Medications:  Continuous Infusions:  Scheduled Meds:   sodium chloride 0.9%   Intravenous Once    aspirin  81 mg Oral Daily    calcium-vitamin D3  1 tablet Oral Daily    enzalutamide  160 mg Oral Daily    heparin (porcine)  5,000 Units Subcutaneous Q8H    melatonin  9 mg Oral QHS    mupirocin   Nasal BID    pantoprazole  40 mg Oral Daily    piperacillin-tazobactam (ZOSYN) IVPB  4.5 g Intravenous Q12H    trazodone  25 mg Oral QHS    vancomycin (VANCOCIN) IVPB  750 mg Intravenous Once     PRN Meds:sodium chloride 0.9%, acetaminophen, albuterol-ipratropium, bisacodyL, dextrose 10%, dextrose 10%, glucagon (human recombinant), glucose, glucose, insulin aspart U-100, naloxone, nitroGLYCERIN, ondansetron, oxyCODONE, polyethylene glycol, simethicone, sodium chloride 0.9%, sodium chloride 0.9%, Pharmacy to dose Vancomycin consult **AND** vancomycin - pharmacy to dose    Objective:     Vital Signs (Most Recent):  Temp: 98.6 °F (37 °C) (01/19/23 1204)  Pulse: 87 (01/19/23 1204)  Resp: 16 (01/19/23 1204)  BP: (!) 107/51 (01/19/23 1204)  SpO2: 100 % (01/19/23 1204)   Vital Signs (24h Range):  Temp:  [96.6 °F (35.9 °C)-99.1 °F (37.3 °C)] 98.6 °F (37 °C)  Pulse:  [65-99] 87  Resp:  [16-18] 16  SpO2:  [92 %-100 %] 100 %  BP: ()/(42-64) 107/51     Weight: 88 kg (194 lb 0.1 oz)  Body mass index is 24.25 kg/m².    Physical Exam  Vitals and nursing note reviewed. Exam conducted with a chaperone present.   Constitutional:       Appearance: Normal appearance. He is ill-appearing.   HENT:      Head: Normocephalic.      Nose: Nose normal.   Eyes:      Pupils: Pupils are equal, round, and reactive to light.   Cardiovascular:      Rate and Rhythm: Normal rate and  regular rhythm.      Pulses: Normal pulses.   Pulmonary:      Effort: Pulmonary effort is normal.      Breath sounds: Normal breath sounds.   Abdominal:      General: Abdomen is flat.      Palpations: Abdomen is soft.   Skin:     General: Skin is warm and dry.      Capillary Refill: Capillary refill takes less than 2 seconds.   Neurological:      Mental Status: He is alert and easily aroused.      Motor: Weakness present.      Comments: Odd affect, impaired concentration    Psychiatric:         Speech: Speech normal.         Behavior: Behavior is slowed.       Review of Symptoms      Symptom Assessment (ESAS 0-10 Scale)  Pain:  0  Dyspnea:  0  Anxiety:  0  Nausea:  0  Depression:  0  Anorexia:  0  Fatigue:  3  Insomnia:  0  Restlessness:  0  Agitation:  0         ECOG Performance Status ndGndrndanddndend:nd nd2nd Living Arrangements:  Lives in home and Lives with spouse    Psychosocial/Cultural:   See Palliative Psychosocial Note: No  Social Issues Identified: Coping deficit pt/family  Bereavement Risk: No  Caregiver Needs Discussed. Caregiver Distress: No: Intensity of family caregiving  Cultural: none   **Primary  to Follow**  Palliative Care  Consult: No    Spiritual:  F - Sarah and Belief:  Lutheran   I - Importance:  High   C - Community:  Family support   A - Address in Care:  Pastoral visits PRN      Time-Based Charting:  Yes  Chart Review: 12 minutes  Face to Face: 10 minutes  Symptom Assessment: 6 minutes  Coordination of Care: 10 minutes  Discharge Plannin minutes    Total Time Spent: 43 minutes      Advance Care Planning   Advance Directives:   Living Will: No    LaPOST: No    Do Not Resuscitate Status: No    Medical Power of : Yes    Agent's Name:  Daniel Castro   Agent's Contact Number:  [phone]    Decision Making:  Family answered questions and Patient answered questions  Goals of Care: What is most important right now is to focus on spending time at home, avoiding the  hospital, remaining as independent as possible, symptom/pain control, improvement in condition but with limits to invasive therapies. Accordingly, we have decided that the best plan to meet the patient's goals includes continuing with treatment.       Significant Labs: All pertinent labs within the past 24 hours have been reviewed.  CBC:   Recent Labs   Lab 01/19/23  0351   WBC 17.78*   HGB 7.3*   HCT 23.7*   MCV 98        BMP:  Recent Labs   Lab 01/19/23 0351   *   *   K 5.2*   CL 92*   CO2 22*   BUN 41*   CREATININE 5.0*   CALCIUM 9.1     LFT:  Lab Results   Component Value Date    AST 1,216 (H) 01/19/2023    ALKPHOS 418 (H) 01/19/2023    BILITOT 0.6 01/19/2023     Albumin:   Albumin   Date Value Ref Range Status   01/19/2023 2.3 (L) 3.5 - 5.2 g/dL Final   04/04/2013 4.6 3.6 - 5.1 g/dL Final     Protein:   Total Protein   Date Value Ref Range Status   01/19/2023 6.9 6.0 - 8.4 g/dL Final     Lactic acid:   Lab Results   Component Value Date    LACTATE 4.6 (HH) 01/19/2023    LACTATE 1.4 10/22/2019       Significant Imaging: I have reviewed all pertinent imaging results/findings within the past 24 hours.

## 2023-01-19 NOTE — PROGRESS NOTES
[facility] - Telemetry  Palliative Medicine  Progress Note    Patient Name: Domingo Castro  MRN: [id number]  Admission Date: 1/16/2023  Hospital Length of Stay: 0 days  Code Status: Full Code   Attending Provider: Kit Maldonado MD  Consulting Provider: Rosa Barrientos NP  Primary Care Physician: Griselda Nugent MD  Principal Problem:<principal problem not specified>    Patient information was obtained from patient, spouse/SO, past medical records and primary team.      Assessment/Plan:     Palliative care encounter    77M with PMH of HLD, HTN, T2DM with retinopathy c/b ESRD on HD (since 6/2021) via LUE MWF and more recently dx with metastatic prostate CA in mid 2022, started on ADT in late 2022 due to aggressive global bony metastasis with unclear response to date; not a candidate for bisphosphonate tx due to ESRD and denosumab is also relatively contraindicated. Overall prognosis is poor however pt has been underreporting pain and optimistically may recover sufficiently with analgesia to resume cancer directed treatment. Pt is ECOG 3 and if he becomes bedbound my recommendation will be for home hospice as neither he nor spouse wish for institutional care; would still need to agree to discontinue HD but pt's quality of life will be very poor at that point and his symptoms will only worsen. Will monitor progress in palliative clinic.     1/19/2023  -Pt did not exhibit signs of opioid toxicity on revisit this afternoon, arouses to name, RICHARD, non pinpoint.  Pt does remain overall sleepy today- likely multifactorial in nature..  Family reports that pt was awake all night last night.  -PO pain medication adjusted to oxycodone IR 5mg Q12 PRN severe pain  -Needs Pt/OT evaluation to help with d/c disposition.  Per family patient still very active and independent of all ADLS as well as driving independently as recently as day prior to admit.     1/17/2023   Per Dr Pleitez's initial consult note    Need further ACP discussion  "however at this time pt is FULL CODE status but with reasonable limitations of no trach or PEG. Will discuss formal LW creation with pt given these reported wishes and introduce topic of DNR; pt would suffer terribly after chest compressions with such bony involvement and post code prognosis is bleak with ESRD and stage IV cancer.     Recommendations:  Medical: supportive care per primary  Symptom Management:   # Cancer related pain  - writer STOPPED MSContin 15mg bid as not safe for ESRD due to active metabolites  - writer STARTED equivalently dosed OxyContin 10mg bid  - c/w present Norco 5/325mg q6h prn appropriate for ESRD  - adjust prn at followup  Psychosocial: spouse very devoted, wishes to avoid direct discussion of prognosis, do need to at least offer pt an opportunity to be informed and if he does not wish to know we can discuss pt's wishes for worst case scenarios regardless of what he believes his prognosis to be  Legal: spouse is 1st HCPOA, children are 2nd and 3rd          Prognosis: < 6 months for widely metastatic prostate CA and hospice qualified at any time; will need to forego HD to qualify however barring some other acutely life limiting dx which will not be temporized by HD      I will follow-up with patient. Please contact us if you have any additional questions.    Subjective:     Chief Complaint:   Chief Complaint   Patient presents with    body pain     Pt presents to ED today c/o entire body pain   Pt reports hx of prostate ca met to bones  Due for dialysis tomorrow   Reports sx for several days        HPI:   Per chart, " Mr. 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 Patient presented to the  emergency department " "for evaluation of generalized pain x1 day. Patient also reports consistent non localized abdomen pain. Patient stated he was told by his oncology provider that his cancer is now in the bones. Patient has associated nausea and vomiting. Patient reports shortness if breath on exertion, denies chest pain. Patient denies fever or chills, denies constipation or diarrhea, Patient denies hematuria or dysuria. Denies hematemesis, melena or hematochezia.       On presentation in the emergency department patient abdomen xray showed There are numerous osseous metastases.  The visualized bowel gas pattern is nonspecific.  There is a moderate amount of stool.  There is no bowel obstruction. There is no abnormal abdominal calcification. Nonobstructive bowel gas pattern. Osseous metastatic disease. Chest xray results shows Numerous osseous metastases. Several nodular densities overlying the bilateral lungs may represent osseous metastases or pulmonary metastases. Lab work showed Na+ 132 K+ 5.4 C02 21, Glucose 228, Bun/Creat 43/5.8, WBC 22.45, H/H 8.7/27.5. Patient given morphine for pain and lokelma for K+.     On assessment patient is alert and oriented x3, slight lethargic from Morphine. Patient continue to endorse rebound pain in abdomen. Patient has nausea without vomiting. Blood pressure stable, no fever or chills noted.      Patient will be admitted to hospital services for further evaluation and medical management. "    Interval History:  No acute events, patient has been stable and electrolytes appear improved on labs.  Blood culture now positive for klebsiella pneumonia x 1/2, urine culture shows gram negative rods, speciation pending.  No acute events, no plans for dialysis today.         Medications:  Continuous Infusions:  Scheduled Meds:   sodium chloride 0.9%   Intravenous Once    aspirin  81 mg Oral Daily    calcium-vitamin D3  1 tablet Oral Daily    enzalutamide  160 mg Oral Daily    heparin (porcine)  5,000 " Units Subcutaneous Q8H    melatonin  9 mg Oral QHS    mupirocin   Nasal BID    pantoprazole  40 mg Oral Daily    piperacillin-tazobactam (ZOSYN) IVPB  4.5 g Intravenous Q12H    trazodone  25 mg Oral QHS    vancomycin (VANCOCIN) IVPB  750 mg Intravenous Once     PRN Meds:sodium chloride 0.9%, acetaminophen, albuterol-ipratropium, bisacodyL, dextrose 10%, dextrose 10%, glucagon (human recombinant), glucose, glucose, insulin aspart U-100, naloxone, nitroGLYCERIN, ondansetron, oxyCODONE, polyethylene glycol, simethicone, sodium chloride 0.9%, sodium chloride 0.9%, Pharmacy to dose Vancomycin consult **AND** vancomycin - pharmacy to dose    Objective:     Vital Signs (Most Recent):  Temp: 98.6 °F (37 °C) (01/19/23 1204)  Pulse: 87 (01/19/23 1204)  Resp: 16 (01/19/23 1204)  BP: (!) 107/51 (01/19/23 1204)  SpO2: 100 % (01/19/23 1204)   Vital Signs (24h Range):  Temp:  [96.6 °F (35.9 °C)-99.1 °F (37.3 °C)] 98.6 °F (37 °C)  Pulse:  [65-99] 87  Resp:  [16-18] 16  SpO2:  [92 %-100 %] 100 %  BP: ()/(42-64) 107/51     Weight: 88 kg (194 lb 0.1 oz)  Body mass index is 24.25 kg/m².    Physical Exam  Vitals and nursing note reviewed. Exam conducted with a chaperone present.   Constitutional:       Appearance: Normal appearance. He is ill-appearing.   HENT:      Head: Normocephalic.      Nose: Nose normal.   Eyes:      Pupils: Pupils are equal, round, and reactive to light.   Cardiovascular:      Rate and Rhythm: Normal rate and regular rhythm.      Pulses: Normal pulses.   Pulmonary:      Effort: Pulmonary effort is normal.      Breath sounds: Normal breath sounds.   Abdominal:      General: Abdomen is flat.      Palpations: Abdomen is soft.   Skin:     General: Skin is warm and dry.      Capillary Refill: Capillary refill takes less than 2 seconds.   Neurological:      Mental Status: He is alert and easily aroused.      Motor: Weakness present.      Comments: Odd affect, impaired concentration    Psychiatric:          Speech: Speech normal.         Behavior: Behavior is slowed.       Review of Symptoms      Symptom Assessment (ESAS 0-10 Scale)  Pain:  0  Dyspnea:  0  Anxiety:  0  Nausea:  0  Depression:  0  Anorexia:  0  Fatigue:  3  Insomnia:  0  Restlessness:  0  Agitation:  0         ECOG Performance Status ndGndrndanddndend:nd nd2nd Living Arrangements:  Lives in home and Lives with spouse    Psychosocial/Cultural:   See Palliative Psychosocial Note: No  Social Issues Identified: Coping deficit pt/family  Bereavement Risk: No  Caregiver Needs Discussed. Caregiver Distress: No: Intensity of family caregiving  Cultural: none   **Primary  to Follow**  Palliative Care  Consult: No    Spiritual:  F - Sarah and Belief:  Baptism   I - Importance:  High   C - Community:  Family support   A - Address in Care:  Pastoral visits PRN      Time-Based Charting:  Yes  Chart Review: 12 minutes  Face to Face: 10 minutes  Symptom Assessment: 6 minutes  Coordination of Care: 10 minutes  Discharge Plannin minutes    Total Time Spent: 43 minutes      Advance Care Planning   Advance Directives:   Living Will: No    LaPOST: No    Do Not Resuscitate Status: No    Medical Power of : Yes    Agent's Name:  Daniel Castro   Agent's Contact Number:  [phone]    Decision Making:  Family answered questions and Patient answered questions  Goals of Care: What is most important right now is to focus on spending time at home, avoiding the hospital, remaining as independent as possible, symptom/pain control, improvement in condition but with limits to invasive therapies. Accordingly, we have decided that the best plan to meet the patient's goals includes continuing with treatment.       Significant Labs: All pertinent labs within the past 24 hours have been reviewed.  CBC:   Recent Labs   Lab 23  035   WBC 17.78*   HGB 7.3*   HCT 23.7*   MCV 98        BMP:  Recent Labs   Lab 23   *   *   K 5.2*    CL 92*   CO2 22*   BUN 41*   CREATININE 5.0*   CALCIUM 9.1     LFT:  Lab Results   Component Value Date    AST 1,216 (H) 01/19/2023    ALKPHOS 418 (H) 01/19/2023    BILITOT 0.6 01/19/2023     Albumin:   Albumin   Date Value Ref Range Status   01/19/2023 2.3 (L) 3.5 - 5.2 g/dL Final   04/04/2013 4.6 3.6 - 5.1 g/dL Final     Protein:   Total Protein   Date Value Ref Range Status   01/19/2023 6.9 6.0 - 8.4 g/dL Final     Lactic acid:   Lab Results   Component Value Date    LACTATE 4.6 (HH) 01/19/2023    LACTATE 1.4 10/22/2019       Significant Imaging: I have reviewed all pertinent imaging results/findings within the past 24 hours.      Rosa Barrientos NP  Palliative Medicine  [facility]

## 2023-01-19 NOTE — ASSESSMENT & PLAN NOTE
anemia is from ESRD, prostate cancer, chronic disease  Labs have been reviewed. Hgb of 8.2, may be dilutional.  EPO per nephrology  Lately has been unable to tolerate dialysis due to hypotension, electrolytes have been tenuous, K 5.2 this morning  No episodes of bradyarrhythmias on telemetry review  Explained situation to family and recommended hospice/palliative care for end of life

## 2023-01-20 NOTE — ANESTHESIA PROCEDURE NOTES
Ad Hoc Intubation    Date/Time: 1/20/2023 4:46 AM  Performed by: Samson Hinton MD  Authorized by: Samson Hinton MD     Indications:  Respiratory distress  Diagnosis:  Respiratory distress  Patient Location:  Floor  Timeout:  1/20/2023 4:44 AM  Procedure Start Time:  1/20/2023 4:45 AM  Procedure End Time:  1/20/2023 4:47 AM  Staff:     Anesthesiologist Present: Yes    Intubation:     Intubated:  Preinduction-awake intubation    Mask Ventilation:  Easy mask    Attempts:  1    Attempted By:  Staff anesthesiologist    Method of Intubation:  Direct    Blade:  Pushpa 4    Laryngeal View Grade: Grade IIb - only the arytenoids and epiglottis seen      Difficult Airway Encountered?: No      Complications:  None    Airway Device:  Oral endotracheal tube    Airway Device Size:  7.5    Style/Cuff Inflation:  Cuffed (inflated to minimal occlusive pressure)    Inflation Amount (mL):  5    Tube secured:  24    Secured at:  The lips    Placement Verified By:  Colorimetric ETCO2 device    Complicating Factors:  None    Findings Post-Intubation:  BS equal bilateral and atraumatic/condition of teeth unchanged  Notes:      No meds given. Code blue

## 2023-01-20 NOTE — AI DETERIORATION ALERT
Artificial Intelligence Notification  [facility]      Admit Date: 2023  LOS: 0  Code Status: Full Code   Date of Consult: 2023  : 1945  Age: 77 y.o.  Weight:   Wt Readings from Last 1 Encounters:   23 88 kg (194 lb 0.1 oz)     Sex: male  Bed: [facility] A:   MRN: [id number]  Attending Physician: Kit Maldonado MD  Primary Service: Networked reference to record PCT   Time AI Alert Received: ***  Time at Bedside: ***           ***      Vital Signs (Most Recent):  Temp: 99.4 °F (37.4 °C) (23)  Pulse: 79 (23)  Resp: 20 (23)  BP: (!) 103/53 (23)  SpO2: (!) 92 % (23)   Vital Signs (24h Range):  Temp:  [96.6 °F (35.9 °C)-99.4 °F (37.4 °C)] 99.4 °F (37.4 °C)  Pulse:  [65-98] 79  Resp:  [16-20] 20  SpO2:  [92 %-100 %] 92 %  BP: ()/(46-53) 103/53         This encounter was triggered by an Artificial Intelligence Notification.     Artificial Intelligence alert discussed with Primary team:  Name ***      Evaluation: ***    Disposition: ***

## 2023-01-20 NOTE — PLAN OF CARE
23 0911   Final Note   Assessment Type Final Discharge Note   Anticipated Discharge Disposition

## 2023-01-20 NOTE — NURSING
0411 Patient is extremely lethargic and very hard to awake. Patient was with shallow respirations and coarse breath sounds. Vital signs 98/43, 54 Map 62. Patient showing SB/NSR on cardiac monitor. Gisele Charge nurse notified and aware. Loretta Salmon NP called to the bedside to assess the patient. 0434 Stat Chest Xray and ABG ordered. Code blue was called at approximately 0438 due to patient declining in health and going into respiratory arrest. Code documented by Virtual Nurse. 0445 Patient intubated and transported to ICU safely via hospital bed. 0500 report given to Usha ICU nurse.

## 2023-01-20 NOTE — PROGRESS NOTES
01/19/23 2218   Nurse Notification   Charge Nurse Notified? Yes  (AI alert)   Name of Charge Nurse LIZETH Kern RN   Bedside Nurse Notified? Yes   Name of Bedside Nurse RANJIT Banuelos   Nurse Notfication Method Secure Chat   Nurse Notified Of Other  (AI alert)   Provider Notification   Provider Notified? Yes   Name of Provider Dr Hu   Provider Notification Method Secure Chat   Provider Notified Of AI Deterioration Alert

## 2023-01-20 NOTE — PROGRESS NOTES
"  I was called to the the bedside to assess patient at 411. On presentation patient was very lethargic with apneic breathing. Patient was very hard to arouse not following commands nor responding to stimuli. Vitals signs 98/43, 54 Map 64. Code was called at 0438. Patient was intubated and transferred to ICU, after arriving in the ICU patient began to candace down and another code was called. Several rounds of CPR was preformed, son was brought to the bedside and he stated "if everything was done you can stop" Code ended at 0530 and patient was pronounced.   "

## 2023-01-20 NOTE — PROGRESS NOTES
[facility] - Intensive Care  Discharge Final Note    Primary Care Provider: Griselda Nugent MD    Expected Discharge Date:     Final Discharge Note (most recent)       Final Note - 23 0911          Final Note    Assessment Type Final Discharge Note     Anticipated Discharge Disposition                      Important Message from Medicare

## 2023-01-20 NOTE — PLAN OF CARE
Room air SpO2 = 97% . Patient with no SOB.  CRC will continue to follow.    Patient assessed with no need for PRN therapy at this time.Patient instructed for need and benefits of PRN nebulizer. CRC will continue to follow.

## 2023-01-20 NOTE — NURSING
RAPID RESPONSE NURSE NOTE       Time of Visit: 441    Admit Date: 2023  LOS: 1  Code Status: Full Code   Date of Visit: 2023  : 1945  Age: 77 y.o.  Sex: male  Race: Black or   Bed: K559/K559 A:   MRN: [id number]  Was the patient discharged from an ICU this admission? No   Was the patient discharged from a PACU within last 24 hours? No   Did the patient receive conscious sedation/general anesthesia in last 24 hours? No   Was the patient in the ED within the past 24 hours? No   Was the patient on NIPPV within the past 24 hours? No   Attending Physician: Anselmo Mcdonough MD  Primary Service: Hospitalist   Time spent at the bedside: 30 - 45 min    SITUATION    Notified by code pager  Reason for alert: Code blue over head and subsequently changed to MET.     Diagnosis: <principal problem not specified>   has a past medical history of Arthritis, Cataract, Chronic diastolic congestive heart failure, Coronary artery disease, Cystoid macular edema of both eyes, Diabetes mellitus type II, Diabetic retinopathy, Hyperlipemia, Hypertension, Retinal hole, Stage 4 chronic kidney disease, and Streptococcus pyogenes bacteremia.    Last Vitals:  Temp: 99.2 °F (37.3 °C) ( 2359)  Pulse: 0 ( 05)  Resp: 102 (516)  BP: 131/59 (443)  SpO2: 100 % ( 0500)    24 Hour Vitals Range:  Temp:  [96.6 °F (35.9 °C)-99.4 °F (37.4 °C)]   Pulse:  [0-87]   Resp:  []   BP: ()/(43-59)   SpO2:  [92 %-100 %]     Clinical Issues: Respiratory    ASSESSMENT/INTERVENTIONS    Pt agonally breathing. Bagged until DEBORA corado arrived to intubate. Palpable pulse. Aspirate contents noted in ET tube post intubation. TX to ICU.     RECOMMENDATIONSPROVIDER ESCALATION    Physician escalation: Yes    Orders received and case discussed with  Tamara NP.    Disposition:Tx in ICU bed 559

## 2023-01-20 NOTE — DISCHARGE SUMMARY
[facility]  Discharge Summary      Patient Name: Domingo Castro  MRN: [id number]  [facility]: [id number]  Patient Class: IP- Inpatient  Admission Date: 1/16/2023  Hospital Length of Stay: 1 days  Discharge Date and Time:  01/20/2023 6:05 AM  Attending Physician: Anselmo Mcdonough MD   Discharging Provider: Loretta Samlon NP  Primary Care Provider: Griselda Nugent MD    Primary Care Team: Networked reference to record PCT     HPI:   Mr. 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 Patient presented to the  emergency department for evaluation of generalized pain x1 day. Patient also reports consistent non localized abdomen pain. Patient stated he was told by his oncology provider that his cancer is now in the bones. Patient has associated nausea and vomiting. Patient reports shortness if breath on exertion, denies chest pain. Patient denies fever or chills, denies constipation or diarrhea, Patient denies hematuria or dysuria. Denies hematemesis, melena or hematochezia.      On presentation in the emergency department patient abdomen xray showed There are numerous osseous metastases.  The visualized bowel gas pattern is nonspecific.  There is a moderate amount of stool.  There is no bowel obstruction. There is no abnormal abdominal calcification. Nonobstructive bowel gas pattern. Osseous metastatic disease. Chest xray results shows Numerous osseous metastases. Several nodular densities overlying the bilateral lungs may represent osseous metastases or pulmonary metastases. Lab work showed Na+ 132 K+ 5.4 C02 21, Glucose 228, Bun/Creat 43/5.8, WBC 22.45, H/H 8.7/27.5. Patient given morphine for pain and lokelma for K+.    On  assessment patient is alert and oriented x3, slight lethargic from Morphine. Patient continue to endorse rebound pain in abdomen. Patient has nausea without vomiting. Blood pressure stable, no fever or chills noted.     Patient will be admitted to hospital services for further evaluation and medical management.             * No surgery found *      Hospital Course:   Mr. 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 Patient presented to the  emergency department for evaluation of generalized pain x1 day. Patient also reports consistent non localized abdomen pain. Patient stated he was told by his oncology provider that his cancer is now in the bones. Patient has associated nausea and vomiting. Patient reports shortness if breath on exertion, denies chest pain. Patient denies fever or chills, denies constipation or diarrhea, Patient denies hematuria or dysuria. Denies hematemesis, melena or hematochezia.       On presentation in the emergency department patient abdomen xray showed There are numerous osseous metastases.  The visualized bowel gas pattern is nonspecific.  There is a moderate amount of stool.  There is no bowel obstruction. There is no abnormal abdominal calcification. Nonobstructive bowel gas pattern. Osseous metastatic disease. Chest xray results shows Numerous osseous metastases. Several nodular densities overlying the bilateral lungs may represent osseous metastases or pulmonary metastases. Lab work showed Na+ 132 K+ 5.4 C02 21, Glucose 228, Bun/Creat 43/5.8, WBC 22.45, H/H 8.7/27.5. Patient given morphine for pain and lokelma for K+.     On assessment patient is alert and oriented x3, slight lethargic from Morphine. Patient  continue to endorse rebound pain in abdomen. Patient has nausea without vomiting. Blood pressure stable, no fever or chills noted.     Shock liver  Patient liver enzymes significantly elevated today, no documented episodes of hypotension besides during dialysis  Will continue to trend liver enzymes     Generalized pain  -Patient stated having generalized pain x 1 day.  -numerous osseous metastases as well as pulmonary nodules likely metastases likely from prostate Ca  -Continue pain management, is currently on oxycontin with good pain control  -titrating pain medication as patient is lethargic while taking current dose of oxycontin, but pain has been inadequately controlled despite being lethargic        Malignant neoplasm of prostate  -Dr Santiago, hem/onc following  -Advanced metastatic disease, will order palliative consult and initiated goals of care discussion with patient with wife at bedside     ESRD (end stage renal disease) on dialysis  ESRD on T/Th/Sa HD  BUN/Cr ratio elevated  Strict I/Os, mindful of fluid restriction as patient is oliguric.  Avoid nephrotoxic medication, dose medication according to renal function.   Monitor electrolytes, maintain K>4.0-5.0, Mg>2.0        Uncontrolled type 2 diabetes mellitus with hyperglycemia  Patient's FSGs are uncontrolled due to hyperglycemia on current medication regimen.  Last A1c reviewed-     Leukocytosis  -Patient WBC was 22.45 on arrival, was started on empiric vancomycin and zosyn renally dosed  -blood culture from admission 1/2 for klebsiella pneumonia, likely source is urine  -Will repeat blood cultures today        Anemia in chronic kidney disease, on chronic dialysis  anemia is from ESRD, prostate cancer, chronic disease  Labs have been reviewed. Hgb of 8.2, may be dilutional.  EPO per nephrology  Lately has been unable to tolerate dialysis due to hypotension, electrolytes have been tenuous, K 5.2 this morning  No episodes of bradyarrhythmias on telemetry  "review  Explained situation to family and recommended hospice/palliative care for end of life        I was called to the the bedside to assess patient at 411. On presentation patient was very lethargic with apneic breathing. Patient was very hard to arouse not following commands nor responding to stimuli. Vitals signs 98/43, 54 Map 64. Code was called at 0438. Patient was intubated and transferred to ICU, after arriving in the ICU patient began to candace down and another code was called. Several rounds of CPR was preformed, son was brought to the bedside and he stated "if everything was done you can stop" Code ended at 0530 and patient was pronounced.                Goals of Care Treatment Preferences:  Code Status: Full Code    Health care agent: [facility] agent number: [phone]          What is most important right now is to focus on spending time at home, avoiding the hospital, remaining as independent as possible, symptom/pain control, improvement in condition but with limits to invasive therapies.  Accordingly, we have decided that the best plan to meet the patient's goals includes continuing with treatment.      Consults:   Consults (From admission, onward)        Status Ordering Provider     Inpatient consult to Palliative Care  Once        Provider:  (Not yet assigned)    Completed MERCEDES, IVONNE     Inpatient consult to [facility] ([facility])  Once        Provider:  (Not yet assigned)    Completed MERCEDES, IVONNE     Inpatient consult to Hematology/Oncology  Once        Provider:  Samson Santiago MD    Completed SAMARIA CURRAN     Pharmacy to dose Vancomycin consult  Once        Provider:  (Not yet assigned)   See Hyperspace for full Linked Orders Report.    Acknowledged SAMARIA CURRAN          No new Assessment & Plan notes have been filed under this hospital service since the last note was generated.  Service: Hospital Medicine    Final Active Diagnoses:    " "Diagnosis Date Noted POA    Shock liver [K72.00] 2023 Unknown    Palliative care encounter [Z51.5] 2023 Not Applicable    Generalized pain [R52] 2023 Yes    Malignant neoplasm of prostate [C61] 2022 Yes    ESRD (end stage renal disease) on dialysis [N18.6, Z99.2]  Not Applicable    Uncontrolled type 2 diabetes mellitus with hyperglycemia [E11.65] 2019 Yes    Leukocytosis [D72.829] 2018 Unknown    Anemia in chronic kidney disease, on chronic dialysis [N18.6, D63.1, Z99.2]  Not Applicable      Problems Resolved During this Admission:    Diagnosis Date Noted Date Resolved POA    Acute kidney injury superimposed on chronic kidney disease [N17.9, N18.9] 2021 Yes    Acute on chronic diastolic CHF (congestive heart failure), NYHA class 3 [I50.33] 2020 Yes       Discharged Condition:     Disposition:     Follow Up:    Patient Instructions:   No discharge procedures on file.    Significant Diagnostic Studies: Labs: All labs within the past 24 hours have been reviewed    Pending Diagnostic Studies:     Procedure Component Value Units Date/Time    X-Ray Chest 1 View [[id number]]     Order Status: Sent Lab Status: No result          Medications:  Reconciled Home Medications:      Medication List      STOP taking these medications    acetaminophen 325 MG tablet  Commonly known as: TYLENOL     amLODIPine 10 MG tablet  Commonly known as: NORVASC     aspirin 81 MG EC tablet  Commonly known as: ECOTRIN     BD ULTRA-FINE SHORT PEN NEEDLE 31 gauge x 5/16" Ndle  Generic drug: pen needle, diabetic     benzonatate 100 MG capsule  Commonly known as: TESSALON     calcium-vitamin D3 500 mg-5 mcg (200 unit) per tablet  Commonly known as: OYSTER SHELL CALCIUM-VIT D3     COMP-AIR NEBULIZER COMPRESSOR Alana  Generic drug: nebulizer and compressor     enzalutamide 40 mg Cap  Commonly known as: XTANDI     ezetimibe 10 mg tablet  Commonly known as: ZETIA   " "  nitroGLYCERIN 0.4 MG SL tablet  Commonly known as: NITROSTAT     NovoLIN N FlexPen 100 unit/mL (3 mL) Inpn  Generic drug: insulin NPH isoph U-100 human     NovoLOG FlexPen U-100 Insulin 100 unit/mL (3 mL) Inpn pen  Generic drug: insulin aspart U-100     olmesartan 20 MG tablet  Commonly known as: BENICAR     pantoprazole 40 MG tablet  Commonly known as: PROTONIX     pen needle, diabetic, safety 29 gauge x 5/16" Ndle  Commonly known as: BD AUTOSHIELD PEN NEEDLE     phenyleph-promethazine-cod 5-6.25-10 mg/5 ml 6.25-5-10 mg/5 mL Syrp  Commonly known as: PROMETHAZINE VC-CODEINE     pulse oximeter device  Commonly known as: pulse oximeter     rosuvastatin 40 MG Tab  Commonly known as: CRESTOR            Indwelling Lines/Drains at time of discharge:   Lines/Drains/Airways     Central Venous Catheter Line  Duration           Tunneled Central Line Insertion/Assessment - Double Lumen  05/19/21 right subclavian 611 days    Permacath 06/28/21 1051 left internal jugular 570 days          Airway  Duration                Airway - Non-Surgical 01/20/23 0446 <1 day       Airway Anesthesia 01/20/23 <1 day                Time spent on the discharge of patient: 30 minutes    Critical care time spent on the evaluation and treatment of severe organ dysfunction, review of pertinent labs and imaging studies, discussions with consulting providers and discussions with patient/family: 60 minutes.     Loretta Salmon NP  Department of Hospital Medicine  [facility] - Intensive Care  "

## 2023-01-20 NOTE — SIGNIFICANT EVENT
MET called on floor, patient found unresponsive and apneic. Palpable pulses, HR in the upper 50s to low 60s. Patient was intubated by on-call anesthesiologist. Aspirate contents noted to be present within the endotracheal tube. Patient was transferred to the ICU for further evaluation. Around 20 minutes after ICU transfer, code blue was called as patient was asystole on cardiac telemetry. CPR was immediately initiated. Code blue was initiated at 0513. Cardiac monitor showing VT, provided initial 120 J defibrillation and shortly after provided epinephrine. Patient was noted to be with pulseless electrical activity and VFib/V-tach throughout the total duration of the code blue.    Medications provided during code:  IV Epinephrine 1 mg x 4  Sodium bicarbonate x 2  Calcium chloride x 1  Magnesium sulfate 4 mg x 1  IV Amiodarone x 1       Resuscitation efforts were unsuccessful and patient was pronounced at 530 am. Family was comforted at bedside.     Tin Le DO  [facility], PGY-2  01/20/2023

## 2023-01-20 NOTE — HOSPITAL COURSE
Mr. 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 Patient presented to the  emergency department for evaluation of generalized pain x1 day. Patient also reports consistent non localized abdomen pain. Patient stated he was told by his oncology provider that his cancer is now in the bones. Patient has associated nausea and vomiting. Patient reports shortness if breath on exertion, denies chest pain. Patient denies fever or chills, denies constipation or diarrhea, Patient denies hematuria or dysuria. Denies hematemesis, melena or hematochezia.       On presentation in the emergency department patient abdomen xray showed There are numerous osseous metastases.  The visualized bowel gas pattern is nonspecific.  There is a moderate amount of stool.  There is no bowel obstruction. There is no abnormal abdominal calcification. Nonobstructive bowel gas pattern. Osseous metastatic disease. Chest xray results shows Numerous osseous metastases. Several nodular densities overlying the bilateral lungs may represent osseous metastases or pulmonary metastases. Lab work showed Na+ 132 K+ 5.4 C02 21, Glucose 228, Bun/Creat 43/5.8, WBC 22.45, H/H 8.7/27.5. Patient given morphine for pain and lokelma for K+.     On assessment patient is alert and oriented x3, slight lethargic from Morphine. Patient continue to endorse rebound pain in abdomen. Patient has nausea without vomiting. Blood pressure stable, no fever or chills noted.     Shock liver  Patient liver enzymes significantly elevated today, no documented episodes of hypotension besides during dialysis  Will continue to trend liver enzymes     Generalized pain  -Patient stated having generalized pain x 1  day.  -numerous osseous metastases as well as pulmonary nodules likely metastases likely from prostate Ca  -Continue pain management, is currently on oxycontin with good pain control  -titrating pain medication as patient is lethargic while taking current dose of oxycontin, but pain has been inadequately controlled despite being lethargic        Malignant neoplasm of prostate  -Dr Santiago, hem/onc following  -Advanced metastatic disease, will order palliative consult and initiated goals of care discussion with patient with wife at bedside     ESRD (end stage renal disease) on dialysis  ESRD on T/Th/Sa HD  BUN/Cr ratio elevated  Strict I/Os, mindful of fluid restriction as patient is oliguric.  Avoid nephrotoxic medication, dose medication according to renal function.   Monitor electrolytes, maintain K>4.0-5.0, Mg>2.0        Uncontrolled type 2 diabetes mellitus with hyperglycemia  Patient's FSGs are uncontrolled due to hyperglycemia on current medication regimen.  Last A1c reviewed-     Leukocytosis  -Patient WBC was 22.45 on arrival, was started on empiric vancomycin and zosyn renally dosed  -blood culture from admission 1/2 for klebsiella pneumonia, likely source is urine  -Will repeat blood cultures today        Anemia in chronic kidney disease, on chronic dialysis  anemia is from ESRD, prostate cancer, chronic disease  Labs have been reviewed. Hgb of 8.2, may be dilutional.  EPO per nephrology  Lately has been unable to tolerate dialysis due to hypotension, electrolytes have been tenuous, K 5.2 this morning  No episodes of bradyarrhythmias on telemetry review  Explained situation to family and recommended hospice/palliative care for end of life        I was called to the the bedside to assess patient at 411. On presentation patient was very lethargic with apneic breathing. Patient was very hard to arouse not following commands nor responding to stimuli. Vitals signs 98/43, 54 Map 64. Code was called at 0438.  "Patient was intubated and transferred to ICU, after arriving in the ICU patient began to candace down and another code was called. Several rounds of CPR was preformed, son was brought to the bedside and he stated "if everything was done you can stop" Code ended at 0530 and patient was pronounced.           "

## 2023-01-20 NOTE — RESPIRATORY THERAPY
Responded to code blue. Began BVM ventilation. CPR started. Pt intubated by ansesthesia with MAC 4 and 7.5 ETT 24 at teeth.  Placement confirmed by auscultation and colorimeter device. Pt transported to ICU via bag mask ventilation. TOD called by MD at 0529.

## 2023-01-20 NOTE — PROGRESS NOTES
04:55 - pt intubated on the floor during MET and transferred from room 472 to . Ventilator and telemetry applied. Pt NSR 60-70's. SpO2 100%. Pt nonresponsive. Pupils sluggish. CB Salmon NP to bedside.    05:10 - pt candace 30-40 BPM. Pulses palpable    05:13 - Asystole on telemetry. Code blue called and chest compressions started.   SMITHA Le MD to bedside to lead code blue.     05:29 - pt remains asystole on telemetry. Family at bedside. TOD 05:29; pronounced by SMITHA Le MD    06:24 - [facility]  notified by RANJIT Lane in EICU.     06:34 - [facility]  notified. No autopsy to be performed and body to be released as soon as family decides on  home per Dr. Coles.

## 2023-01-22 LAB — BACTERIA BLD CULT: NORMAL

## 2023-01-24 LAB
BACTERIA BLD CULT: NORMAL
BACTERIA BLD CULT: NORMAL

## 2023-01-26 NOTE — PHYSICIAN QUERY
PT Name: Domingo Castro  MR #: [id number]     DOCUMENTATION CLARIFICATION     CDS/: Dorothea Newsome RN           Contact information: [email or web address]                  Query Withdrawn per guidance of Leadership, refer to account notes     This form is a permanent document in the medical record.     Query Date: January 26, 2023    By submitting this query, we are merely seeking further clarification of documentation.  Please utilize your independent clinical judgment when addressing the question(s) below.    The Medical Record contains the following   Indicators Supporting Clinical Findings Location in Medical Record   x Heart Failure documented Acute on chronic diastolic CHF (congestive heart failure), NYHA class 3  Patient is identified as having Combined Systolic and Diastolic heart failure that is Acute on chronic. CHF is currently controlled. Latest ECHO performed and demonstrates- Results for orders placed during the hospital encounter of 01/26/22     Echo   Interpretation Summary  · Concentric hypertrophy and normal systolic function.  · The estimated ejection fraction is 55%.  · Indeterminate left ventricular diastolic function.  · Normal right ventricular size with normal right ventricular systolic function.  · Systolic anterior motion of the mitral chordal apparatus is present.  · Normal central venous pressure (3 mmHg).  . Continue and monitor clinical status closely. Monitor on telemetry. Patient is on CHF pathway.  Monitor strict Is&Os and daily weights.  Place on fluid restriction of 1 L. Continue to stress to patient importance of self efficacy and  on diet for CHF. Last BNP reviewed- and noted below No results for input(s): BNP, BNPTRIAGEBLO in the last 168 hours..     -Continue to monitor     -Will obtain BNP to assess overall fluid balance       Acute on chronic diastolic CHF (congestive heart failure), NYHA class 3  Patient is identified as having Combined Systolic and Diastolic  heart failure that is Acute on chronic. CHF is currently controlled.    Acute on chronic diastolic CHF (congestive heart failure)    H & P of 1/16                                                 HM PN of 1/18         Discharge Summary of 1/20     BNP       x EF/Echo Concentric hypertrophy and normal systolic function.  The estimated ejection fraction is 55%.  Indeterminate left ventricular diastolic function.  Normal right ventricular size with normal right ventricular systolic function.  Systolic anterior motion of the mitral chordal apparatus is present.  Normal central venous pressure (3 mmHg).   Echo of 1/26/2022    x Radiology findings Numerous osseous metastases.  Several nodular densities overlying the bilateral lungs may represent osseous metastases or pulmonary metastases.  Consider further evaluation with CT chest.   Chest xray of 1/16    x Subjective/Objective Respiratory Conditions Respiratory: Negative.    Pulmonary:      Effort: Pulmonary effort is normal.      Breath sounds: Normal breath sounds.    H & P of 1/16     HM PN of 1/17       Recent/Current MI        Heart Transplant, LVAD       x Edema, JVD   No edema. ED Provider Note of 1/16     Ascites       x Diuretics/Meds   Norvasc 10 mg by mouth once daily   Benicar 20 mg by mouth once daily    Home Med List on H & P of 1/16    x Other Treatment Remained on Room Air  RT PCS Flow Sheet of 1/16 to 1/19       Other       Heart failure is a clinical diagnosis which includes symptomatic fluid retention, elevated intracardiac pressures, and/or the inability of the heart to deliver adequate blood flow.    Heart Failure with reduced Ejection Fraction (HFrEF) or Systolic Heart Failure (loses ability to contract normally, EF is <40%)    Heart Failure with preserved Ejection Fraction (HFpEF) or Diastolic Heart Failure (stiff ventricles, does not relax properly, EF is >50%)     Heart Failure with Combined Systolic and Diastolic Failure (stiff ventricles, does  not relax properly and EF is <50%)    Mid-range or mildly reduced ejection fraction (HFmrEF) is classified as systolic heart failure.  Congestive heart failure with a recovered EF is classified as Diastolic Heart Failure.  Common clues to acute exacerbation:  Rapidly progressive symptoms (w/in 2 weeks of presentation), using IV diuretics, using supplemental O2, pulmonary edema on Xray, new or worsening pleural effusion, +JVD or other signs of volume overload, MI w/in 4 weeks, and/or BNP >500  The clinical guidelines noted are only system guidelines, and do not replace the providers clinical judgment.        Provider, Due to Conflicting Documentation Please clarify Congestive Heart Failure associated with the above clinical findings.    [   ] Chronic Diastolic Heart Failure (HFpEF) - preexisting and stable   [   ] Chronic Combined Systolic and Diastolic Heart Failure - pre-existing and stable   [   ]  Acute on Chronic Diastolic Heart Failure (HFpEF) - worsening of CHF signs/symptoms in preexisting CHF   [   ]  Acute on Chronic Combined Systolic and Diastolic Heart Failure - worsening of CHF signs/symptoms in preexisting CHF   [   ]  Other (please specify): ___________________________________   [  ]  Clinically Undetermined           Please document in your progress notes daily for the duration of treatment until resolved and include in your discharge summary.    References:  American Heart Association editorial staff. (2017, May). Ejection Fraction Heart Failure Measurement. American Heart Association. https://www.heart.org/en/health-topics/heart-failure/diagnosing-heart-failure/ejection-fraction-heart-failure-measurement#:~:text=Ejection%20fraction%20(EF)%20is%20a,pushed%20out%20with%20each%20heartbeat  MICH Heard (2020, December 15). Heart failure with preserved ejection fraction: Clinical manifestations and diagnosis. UpToDate.  https://www.[employer]ine[employer].Mengcao/contents/heart-failure-with-preserved-ejection-fraction-clinical-manifestations-and-diagnosis.  ICD-10-CM/PCS Coding Clinic Third Quarter ICD-10, Effective with discharges: September 8, 2020 [facility] Heart failure with mid-range or mildly reduced ejection fraction (2020).  ICD-10-CM/PCS Coding Clinic Third Quarter ICD-10, Effective with discharges: September 8, 2020 [facility] § Heart failure with recovered ejection fraction (2020).  Form No. [id number]

## 2023-03-02 NOTE — TELEPHONE ENCOUNTER
"Spoke with patient he states that he is almost out of lasix tablets.  Patient states that he was told by Dr. Sotelo to take lasix 80 mg twice a day. Patient states that [facility] is not wanting to refill his lasx because the prescription was sent in for patient to take 80 mg by mouth once daily.  Patient states he has 5 tablets left.    Spoke with on call provider Dr. Grissom he states to have patient take 40 mg twice a day and follow up with Dr. Sotelo on Monday.  Patient given the advise of Dr. Grissom.  Also advised patient to call back if he develops any symptoms.  Patient verbalized understanding.     Reason for Disposition   [1] Request for URGENT new prescription or refill of "essential" medication (i.e., likelihood of harm to patient if not taken) AND [2] triager unable to fill per unit policy    Protocols used: MEDICATION QUESTION CALL-A-AH      " Anesthesia Pre Eval Note    Pre-Anesthesia Evaluation Note    Procedure: COLONOSCOPY  Pre-op Diagnosis: Hemorrhage of anus and rectum; Constipation, unspecified constipation type  Surgeon: Jhony Hamilton MD  Anesthesia History:     Patient:  Negative    Family:  Negative  NPO Adequate      History of Present Illness:  Monika Schwarz is a 49 year old female who presents for above.  Medically optimized.        Anesthesia ROS/Med Hx        Anesthetic Complication History:  Patient does not have a history of anesthetic complications      Pulmonary Review:  Patient does not have a pulmonary history      Neuro/Psych Review:    Positive for psychiatric history - Depression    Cardiovascular Review:  Patient does not have a cardiovascular history       GI/HEPATIC/RENAL Review:  Patient does not have a GI/hepatic/renalhistory       End/Other Review:  Patient does not have an endo/other history    Additional Results:       No results found for: WBC, RBC, HGB, HCT, MCV, MCH, MCHC, RDWCV, SODIUM, POTASSIUM, CHLORIDE, CO2, GLUCOSE, BUN, CREATININE, GFRESTIMATE, EGFRNONAFR, GFRA, GFRNA, CALCIUM, HCG, PLT, PTT, INR     There are no problems to display for this patient.     Past Medical History:   Diagnosis Date   • Depression      History reviewed. No pertinent surgical history.  Social History     Tobacco Use   • Smoking status: Never   • Smokeless tobacco: Never   Vaping Use   • Vaping Use: never used   Substance Use Topics   • Alcohol use: Never   • Drug use: Never          Visit Vitals  /73   Pulse 92   Temp 36.7 °C (98 °F) (Oral)   Resp 13   Ht 5' 2\" (1.575 m)   Wt 54.4 kg (120 lb)   SpO2 94%   BMI 21.95 kg/m²      Wt Readings from Last 1 Encounters:   03/02/23 54.4 kg (120 lb)      Body mass index is 21.95 kg/m².     Physical Exam     Airway   Mallampati: I  TM Distance: >3 FB    Dental Exam  Dental exam normal       ALLERGIES:  No Known Allergies     Medications:  No current facility-administered medications  for this encounter.     No current facility-administered medications for this encounter.     Current Outpatient Medications   Medication Sig Dispense Refill   • escitalopram (LEXAPRO) 20 MG tablet 20 mg.     • hydrOXYzine (ATARAX) 25 MG tablet   See Instructions, TAKE 1 TABLET BY MOUTH THREE TIMES DAILY AS NEEDED FOR ANXIETY AS NEEDED ANXIETY OR INSOMNIA, # 90 TAB, 30, 5 Refill(s), 11/07/22 10:16:00 CST, Substitute, Pharmacy: [facility] #[id number], 156, cm, 09/15/21 8:07:00 CDT, Height,...       No current facility-administered medications for this encounter.      Prior to Admission medications    Medication Sig Start Date End Date Taking? Authorizing Provider   escitalopram (LEXAPRO) 20 MG tablet 20 mg. 11/7/22 5/6/23 Yes Outside Provider   hydrOXYzine (ATARAX) 25 MG tablet   See Instructions, TAKE 1 TABLET BY MOUTH THREE TIMES DAILY AS NEEDED FOR ANXIETY AS NEEDED ANXIETY OR INSOMNIA, # 90 TAB, 30, 5 Refill(s), 11/07/22 10:16:00 CST, Substitute, Pharmacy: [facility] #[id number], 156, cm, 09/15/21 8:07:00 CDT, Height,... 11/7/22 5/7/23 Yes Outside Provider          Results:    Preg neg       No results found for: CBCDIF, PROTHROMBIN, PTT   WBC (THOUSAND/mcL)   Date Value   04/25/2016 10.5      HGB (gm/dL)   Date Value   04/25/2016 13.9      No results found for: HCT   PLT (THOUSAND/mcL)   Date Value   04/25/2016 287      No results found for: PT  No results found for: INR   No results found   No results found    No results available in last 24 hours      No results found for this or any previous visit (from the past 4464 hour(s)).           Assessment/Plan  Anesthesia Plan:    ASA Status: 2  Anesthesia Type: General    Induction: Intravenous  Preferred Airway Type: Nasal Cannula  Maintenance: TIVA  Premedication: None      Post-op Pain Management: Per Surgeon      Checklist  Reviewed: Lab Results, Patient Summary, Allergies, Past Med History, Medications, DNR Status, Beta Blocker Status, Nursing Notes,  EKG, Consultations, Outside Records, NPO Status and Problem list  Consent/Risks Discussed Statement:  The proposed anesthetic plan, including its risks and benefits, have been discussed with the Patient along with the risks and benefits of alternatives. Questions were encouraged and answered and the patient and/or representative understands and agrees to proceed.        I discussed with the patient (and/or patient's legal representative) the risks and benefits of the proposed anesthesia plan, General, which may include services performed by other anesthesia providers.    Alternative anesthesia plans, if available, were reviewed with the patient (and/or patient's legal representative). Discussion has been held with the patient (and/or patient's legal representative) regarding risks of anesthesia, which include allergic reaction, anxiety, depressed breathing, intra-operative awareness, hypotension, nausea, organ damage, vomiting, dental injury, oral injury and sore throat and emergent situations that may require change in anesthesia plan.    The patient (and/or patient's legal representative) has indicated understanding, his/her questions have been answered, and he/she wishes to proceed with the planned anesthetic.    Comments  Plan Comments: Risk of cardiopulmonary complications discussed.        Elayne Mckeon MD   3/2/2023     Chart reviewed prior to seeing patient.

## 2023-04-26 NOTE — ASSESSMENT & PLAN NOTE
Domingo Castro is a 74 y.o. male with left hallux toe infection , suspicious for osteomyelitis  -ESR: 91, CRP: 29, WBC: 10  -Imaging: x-ray and MRI showing osteomyelitis of left hallux  -Discussed treatment options of osteomyelitis amputation vs IV antibiotics.  Podiatry recommends amputaiton due to non-viability appearance of toe, recurrence of osteo.  Patient resistant to idea of amputation, but says he will think about it  -ID consulted.  Appreciate their opinion on likelyhood of conservative measures along resolving the infection.  -obtained wound cultures   -applied betadine wet to dry gauze dressigns  -Will discuss case again with patient tomorrow.  If patient refuses amputation podiatry can do a bone biopsy to direct antibiotic care.  If amenable will will plan on hallux amputation on Sunday, sooner if patient become septic, but currently stable.    Podiatry will follow  
 Domingo Castro is a 74 y.o. male with left hallux toe infection , suspicious for osteomyelitis  -ESR: 91, CRP: 29, WBC: 10  -Imaging: x-ray and MRI showing osteomyelitis of left hallux  -Discussed treatment options of osteomyelitis amputation vs IV antibiotics.  Podiatry recommends amputaiton due to tissue loss and non-viability appearance of toe, recurrence of osteo.  Patient is still resistant to idea of amputation, and would like to do conservative measures.  Obtained bone biopsy.  See note below.    -ID on board.  Appreciate their opinion on likelyhood of conservative measures along resolving the infection.  -obtained wound cultures   -applied betadine wet to dry gauze dressings.  Nursing to do daily dressing changes.  -Ordered ROCIO's and arterial US, if results are concerning recommend consult to Interventional Cardiology.    Podiatry will follow    07/27/2019  Procedure note:   Surgeon:  Dr. Jackson  Assisting Surgeon: Merrill Tadeo DPM, PGY3  Pre op diagnosis: osteomyelitis   Post op diagnosis: same    Anesthesia:   Hemostasis: direct pressure.   EBL: < 1ml      Procedure: bone biopsy    Findings:  After consent was signed and witnessed 4ml of 1% lidocaine was given locally.  Left great toe was prepped in a sterile manner.  A Jamshidi needle was used to biopsy the Left hallux proximal phalanx.  Hard bone was noted.  Patient tolerated procedure well, and hemostasis was controlled with compression.  Foot was dressed with Betadine wet to dry gauze dressings.      
 Domingo Castro is a 74 y.o. male with left hallux toe infection , suspicious for osteomyelitis  -Imaging: x-ray and MRI showing osteomyelitis of left hallux  -bone cultures pending  -ID on board.  abx per ID recs  -applied betadine wet to dry gauze dressings.  Nursing to do daily dressing changes.  -ROCIO's abnormal, consulted Interventional Cardiology  -Will continue with conservative care at this time.  Will follow patient peripherally.      Podiatry will follow    
 Domingo Castro is a 74 y.o. male with left hallux toe infection with osteomyelitis  -Imaging: x-ray and MRI showing osteomyelitis of left hallux  -ID on board.  abx per ID recs  -applied betadine wet to dry gauze dressings.  Nursing to do daily dressing changes.  -s/p revasc per Cardiology, appreciate recs  -Will continue with conservative care at this time.  Will follow patient peripherally.          
-Continue statin  
-Continue statin  
-ESR 91 CRP 29.9  -MRI with evidence of osteomyelitis; wound cultures with MRSA present; ID on board and currently on IV Vanc and Zosyn  -continue with abx recs per ID and wound care per Podiatry recs   
-ESR 91 CRP 29.9  -MRI with evidence of osteomyelitis; wound cultures with MRSA present; ID on board and currently on IV Vanc and Zosyn  -continue with abx recs per ID and wound care per Podiatry recs   
-H&H this AM 10.2&31.5 this AM  -stable at baseline of 8-9/25-30 since 12/2018; 10-13/37-41 from [id number]  -continue to monitor H&H closely  
-H&H this AM 9.4&30.2; down slightly from 10.3 & 31.9  -stable at baseline of 8-9/25-30 since 12/2018; 10-13/37-41 from [id number]  -continue to monitor H&H closely; if further trend down may need to hold off on LE angiogram   
-left great toe wound since 12/2018 with almost complete healing per patient; recurrent worsening of wound 3 days ago with fever and chills  -ESR 91 CRP 29.9 with normal WBCs but elevated temp of 102 at home per patient  -BLE arterial ultrasound with elevated velocities to bilateral AT consistent with PAD; will need LE angiogram for further evaluation with possible revascularization of left AT to improve wound healing  -will place NPO for possible abdominal AO with run off tomorrow with CO2; recommend ASA and statin therapy; would not be opposed to initiation of Plavix if not contraindicated   
-left great toe wound since 12/2018 with almost complete healing per patient; recurrent worsening of wound 3 prior to admission with fever and chills  -ESR 91 CRP 29.9 with normal WBCs but elevated temp of 102 at home per patient  -BLE arterial ultrasound with elevated velocities to bilateral AT consistent with PAD  -underwent LE angiogram yesterday with following results:  Patent aorta  Bilateral renal artery stenosis  Patent NOEMÍ + EIA   Subtotal R IIA  Patent L CFA, 80% mid L SFA stenosis, 75% L POP with 99% proximal L AT  Patent R CFA, 90% prox R SFA stenosis, 90% ostial PT + PER with subtotal proximal AT  Patent DP and plantar arch bilaterally    -needs  staged intervention of L SFA + POP + AT revascularization and will attempt to proceed tomorrow  -plan for medical management of RLE PAD for now with close follow up; no plans for revascularization of RLE as of now   -continue ASA and statin therapy; will add Plavix daily to regimen   
-left great toe wound since 12/2018 with almost complete healing per patient; recurrent worsening of wound 3 prior to admission with fever and chills  -ESR 91 CRP 29.9 with normal WBCs but elevated temp of 102 at home per patient  -BLE arterial ultrasound with elevated velocities to bilateral AT consistent with PAD  -underwent diagnostic LE angiogram followed by LLE revascularization yesterday with following results:                  Atherectomy with 2.0 Classic CSI              PTA of AT with 4.0 x 150 mm Lutonix DCB              PTA of PT with 4.0 x 120 mm balloon after occlusion from distal embolization              PTA of SFA with 7.0 x 60 mm Lutonix DCB              R CFA closed with perclose              L AT retrograde access close with manual pressure     -access site stable  -continue ASA, statin and Plavix  -stable for discharge from cardiac standpoint  -follow up with Dr. Hyatt in 2 weeks with surveillance US and TCOMs as directed by MD   
As above  
CKD (chronic kidney disease) stage 4, GFR 15-29 ml/min    Creatinine at baseline   Voiding well  Strict I&O  Avoid Nephrotoxic Agents  Renally dose medications  
Consult Podiatry  Continue Vanc and Zosyn  Blood Cultures: Pending  Wound Cultures: Pending      
Continue statin  
Creatinine at baseline   Voiding well  Strict I&O  Avoid Nephrotoxic Agents  Renally dose medications      
Creatinine at baseline, Voiding well  -Strict I&O  -Avoid Nephrotoxic Agents  -Renally dose medications  
Essential hypertension    Denies chest pain or SOB  Continue Amlodipine, Coreg Furosemide and Hydralazine    
Essential hypertension  Denies chest pain or SOB  -Continue Amlodipine, Coreg Furosemide and Hydralazine  
Essential hypertension  Denies chest pain or SOB  -Continue Amlodipine, Coreg, Furosemide and Hydralazine  
Essential hypertension  Denies chest pain or SOB  -Continue Amlodipine, Coreg, Furosemide and Hydralazine  
Monitor  
Per primary    
Replaced    
Replaced  Monitor    
Stable, no visual signs of bleeding  
Stable, no visual signs of bleeding  -Monitor  
Stable, no visual signs of bleeding  Monitor  
Stable, no visual signs of bleeding  Monitor  
Toe ulcer    Consult Podiatry  ID consulted per Podiatry: plan pending culture results  Continue Vanc and Zosyn  Blood Cultures: Pending  Wound Cultures: Pending      
Toe ulcer    Consult Podiatry:  Per Podiatry notes, awaiting cultures, continue IV ABX and wound care       ID consulted per Podiatry: Per ID notes, follow cultures, pending sensitivities, will need 6 weeks of      ABX, still has his port that was placed in January for his last osteomyelitis treatment.   Continue Vanc and Zosyn  Wound care ordered per podiatry  Blood Cultures: NGTD       Wound Cultures: growing staph, pending sensitivities   
Toe ulcer    Consult Podiatry:  Per Podiatry notes, awaiting cultures, continue IV ABX and wound care  ID consulted per Podiatry: Per ID notes, pending cultures, will need 6 weeks of ABX  Continue Vanc and Zosyn  Wound care ordered per podiatry  ROCIO's and arterial US: pending  Blood Cultures: NGTD  Wound Cultures:NGTD    
Toe ulcer  -Podiatry consult: wound care  -ID consult: six weeks of IV ABx, has usable port  -Continue vanc/zosyn  -BCx: NGTD  -Wound cultures MRSA  -PAD management per cardiology  
Toe ulcer  -Podiatry consult: wound care  -ID consult: six weeks of IV ABx, has usable port  -Continue vanc/zosyn  -BCx: NGTD  -Wound cultures MRSA  -PAD management per cardiology  
Toe ulcer  -Podiatry consult: wound care, will continue conservative management  -ID consult: six weeks of IV ABx, has usable port  -Continue vanc  -BCx: NGTD  -Wound cultures MRSA  -PAD management per cardiology; revascularization done 8/1  
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  
no
4 = No assist / stand by assistance

## (undated) DEVICE — BLADE SURG CARBON STEEL SZ11

## (undated) DEVICE — CATH IMA INFINITI 4FRX100CM

## (undated) DEVICE — COVER OVERHEAD SURG LT BLUE

## (undated) DEVICE — SYR ONLY LUER LOCK 20CC

## (undated) DEVICE — SYR 30CC LUER LOCK

## (undated) DEVICE — SEE MEDLINE ITEM 156955

## (undated) DEVICE — SYR 10CC LUER LOCK

## (undated) DEVICE — APPLIER LIGACLIP SM 9.38IN

## (undated) DEVICE — ELECTRODE REM PLYHSV RETURN 9

## (undated) DEVICE — SEE MEDLINE ITEM 157116

## (undated) DEVICE — CLEANER TIP ELECSURG 2X2IN

## (undated) DEVICE — TRAY MUSCLE LID EYE

## (undated) DEVICE — VISE RADIFOCUS MULTI TORQUE

## (undated) DEVICE — JELLY LUBRICANT STERILE 4 OZ

## (undated) DEVICE — SHEATH DESTINATION 6F X 65CM

## (undated) DEVICE — SEE MEDLINE ITEM 157117

## (undated) DEVICE — CLOSURE SKIN STERI STRIP 1/2X4

## (undated) DEVICE — CATH NAVICROSS .035X150 ANGLE

## (undated) DEVICE — TUBING SET ANGIASSIST MED GAS

## (undated) DEVICE — DRAPE STERI-DRAPE 1000 17X11IN

## (undated) DEVICE — HEMOSTAT VASC BAND LONG 27CM

## (undated) DEVICE — SOL BETADINE 5%

## (undated) DEVICE — CATH 5FR MP 125CM 5/BX

## (undated) DEVICE — NDL 22GA X1 1/2 REG BEVEL

## (undated) DEVICE — Device

## (undated) DEVICE — TRAY PICC CENTRAL LINE DRSNG

## (undated) DEVICE — GAUZE SPONGE 4X4 12PLY

## (undated) DEVICE — TOWEL OR DISP STRL BLUE 4/PK

## (undated) DEVICE — NDL HYPO REG 25G X 1 1/2

## (undated) DEVICE — MANIFOLD 4 PORT

## (undated) DEVICE — PROTECTOR CORNEAL CROUCH ST

## (undated) DEVICE — COVER PROBE 6X48

## (undated) DEVICE — COVER BAND BAG 40 X 40

## (undated) DEVICE — SYR B-D DISP CONTROL 10CC100/C

## (undated) DEVICE — GLOVE BIOGEL ECLIPSE SZ 7.5

## (undated) DEVICE — KIT LEFT HEART MANIFOLD CUSTOM

## (undated) DEVICE — CATH DIAMONDBACK 2.00MM CLASS

## (undated) DEVICE — PACK BASIC

## (undated) DEVICE — GOWN SURGICAL X-LARGE

## (undated) DEVICE — GUIDEWIRE ADVNTG 035X260CM ANG

## (undated) DEVICE — SHEET EENT SPLIT

## (undated) DEVICE — SOL BSS BALANCED SALT

## (undated) DEVICE — DRAPE STERI INSTRUMENT 1018

## (undated) DEVICE — SUCTION COAGULATOR 10FR 6IN

## (undated) DEVICE — CATH IMPULSE 5FR PIGTAIL 125CM

## (undated) DEVICE — STOCKINET TUBULAR 1 PLY 6X60IN

## (undated) DEVICE — DRAPE C-ARM/MOBILE XRAY 44X80

## (undated) DEVICE — NDL HYPO A BEVEL 22X1 1/2

## (undated) DEVICE — SEE MEDLINE ITEM 157187

## (undated) DEVICE — SUT ETHILON 4-0 BLK MONO

## (undated) DEVICE — SEE MEDLINE ITEM 157173

## (undated) DEVICE — SUT PROLENE 6-0 24 BV-1

## (undated) DEVICE — APPLICATOR CHLORAPREP ORN 26ML

## (undated) DEVICE — LUBRICANT VIPERSLIDE 100ML BAG

## (undated) DEVICE — SEE MEDLINE ITEM 152496

## (undated) DEVICE — SET DECANTER MEDICHOICE

## (undated) DEVICE — GUIDEWIRE FIELDER XT.014X300CM

## (undated) DEVICE — DRAPE OPTIMA MAJOR PEDIATRIC

## (undated) DEVICE — STAPLER SKIN ROTATING HEAD

## (undated) DEVICE — PAD PREP 50/CA

## (undated) DEVICE — SUT VICRYL 3-0 27 SH

## (undated) DEVICE — SHEATH MICROPUNCTURE PEDAL 4FR

## (undated) DEVICE — DRESSING XEROFORM FOIL PK 1X8

## (undated) DEVICE — CATH ULTRAVERSE 035 7X40X130

## (undated) DEVICE — ADAPTER CHECK FLO ADAPTER

## (undated) DEVICE — DRESSING ANTIMICROBIAL 1 INCH

## (undated) DEVICE — TIE VAS SIL RUBBER MINI WHT ST

## (undated) DEVICE — SEE MEDLINE ITEM 152622

## (undated) DEVICE — NDL REG BEVEL 27GX1/2IN

## (undated) DEVICE — SPONGE DERMACEA GAUZE 4X4

## (undated) DEVICE — DEVICE PERCLOSE SUT CLSR 6FR

## (undated) DEVICE — SUT MONOCYRL 4-0 PS2 UND

## (undated) DEVICE — DENTAL ROLL 1 1/2 X 3/8

## (undated) DEVICE — CORD BIPOLAR 12 FOOT

## (undated) DEVICE — SHEET THYROID W/ISO-BAC

## (undated) DEVICE — CUP MEDICINE STERILE 2OZ

## (undated) DEVICE — SUT ETHILON 3-0 PS2 18 BLK

## (undated) DEVICE — SEE MEDLINE ITEM 156894

## (undated) DEVICE — FORCEP CURVED DISP

## (undated) DEVICE — SOL 9P NACL IRR PIC IL

## (undated) DEVICE — SET MICRO PUNCT 4FR/MPIS-401

## (undated) DEVICE — HEMOSTAT SURGICEL 4X8IN

## (undated) DEVICE — ADHESIVE DERMABOND ADVANCED

## (undated) DEVICE — GLIDESHEATH SLENDER SS 5FR10CM

## (undated) DEVICE — GUIDEWIRE EMERALD 150CM PTFE

## (undated) DEVICE — DROPPER MEDICINE

## (undated) DEVICE — CATH CXI ANG 2.6F .018IN 150CM

## (undated) DEVICE — GUIDEWIRE VIPER FIRM .014

## (undated) DEVICE — KIT INTRODUCER W/GUIDEWIRE

## (undated) DEVICE — SOL WATER STRL IRR 1000ML

## (undated) DEVICE — COVER PROBE US 5.5X58L NON LTX

## (undated) DEVICE — DRAPE LAP TIBURON 77X122IN

## (undated) DEVICE — SUT VICRYL CTD 2-0 GI 27 SH

## (undated) DEVICE — SPONGE DERMA 8PLY 2X2

## (undated) DEVICE — NDL HYPO A BEVEL 30X1/2

## (undated) DEVICE — SKINMARKER & RULER REGULAR X-F

## (undated) DEVICE — COVER LIGHT HANDLE 80/CA

## (undated) DEVICE — GUIDEWIRE COUGAR XT 300 ST HY

## (undated) DEVICE — GLOVE SURGICAL LATEX SZ 7

## (undated) DEVICE — CANNULA VSL W/DUCKBILL

## (undated) DEVICE — DRESSING TEGADERM 2 3/8 X 2.75

## (undated) DEVICE — SUT PDS II 2-0 CT1

## (undated) DEVICE — CATH NAVICROSS .035X135 ANGLE

## (undated) DEVICE — PAD DEFIB CADENCE ADULT R2

## (undated) DEVICE — GLOVE 7.5 PROTEXIS PI BLUE

## (undated) DEVICE — SEE MEDLINE ITEM 152487

## (undated) DEVICE — SUT CTD VICRYL 4-0 BR PS-2

## (undated) DEVICE — SHEATH INTRODUCER 6FR 11CM

## (undated) DEVICE — CATH ULTRAVERSE 014 4X12X150

## (undated) DEVICE — GLOVE PROTEXIS HYDROGEL SZ7

## (undated) DEVICE — BLADE SURG #15 CARBON STEEL

## (undated) DEVICE — SUT VICRYL 4-0 ANTIBACT

## (undated) DEVICE — CONTRAST VISIPAQUE 150ML

## (undated) DEVICE — SUT MCRYL PLUS 4-0 PS2 27IN

## (undated) DEVICE — NDL STRAIGHT 4CM LEIBINGER

## (undated) DEVICE — BOOT SUTURE AID

## (undated) DEVICE — DRESSING TRANS 4X4 3/4

## (undated) DEVICE — SHEATH INTRODUCER 4FR 11CM

## (undated) DEVICE — KIT PROBE COVER WITH GEL

## (undated) DEVICE — TIE VAS SIL RUBBER MAXI BLU ST

## (undated) DEVICE — CATH LUTONIX DCB 018X130X4X150

## (undated) DEVICE — PENCIL ROCKER SWITCH 10FT CORD

## (undated) DEVICE — DEVICE PICC SECURE SORBA VIEW

## (undated) DEVICE — APPLICATOR STERILE 3IN